# Patient Record
Sex: FEMALE | Race: WHITE | Employment: OTHER | URBAN - METROPOLITAN AREA
[De-identification: names, ages, dates, MRNs, and addresses within clinical notes are randomized per-mention and may not be internally consistent; named-entity substitution may affect disease eponyms.]

---

## 2017-02-15 ENCOUNTER — HOSPITAL ENCOUNTER (OUTPATIENT)
Age: 67
Discharge: HOME | End: 2017-02-15
Payer: MEDICARE

## 2017-02-15 DIAGNOSIS — E11.9 CONTROLLED TYPE 2 DIABETES MELLITUS WITHOUT COMPLICATION, WITHOUT LONG-TERM CURRENT USE OF INSULIN (HCC): Chronic | ICD-10-CM

## 2017-02-15 DIAGNOSIS — Z11.59 NEED FOR HEPATITIS C SCREENING TEST: ICD-10-CM

## 2017-02-15 LAB
ALBUMIN SERPL-MCNC: 3.6 G/DL (ref 3.5–5.2)
ALBUMIN/GLOBULIN RATIO: ABNORMAL (ref 1–2.5)
ALP BLD-CCNC: 86 U/L (ref 35–104)
ALT SERPL-CCNC: 9 U/L (ref 5–33)
ANION GAP SERPL CALCULATED.3IONS-SCNC: 12 MMOL/L (ref 9–17)
AST SERPL-CCNC: 14 U/L
BILIRUB SERPL-MCNC: 0.34 MG/DL (ref 0.3–1.2)
BUN BLDV-MCNC: 13 MG/DL (ref 8–23)
BUN/CREAT BLD: ABNORMAL (ref 9–20)
CALCIUM SERPL-MCNC: 9.1 MG/DL (ref 8.6–10.4)
CHLORIDE BLD-SCNC: 98 MMOL/L (ref 98–107)
CHOLESTEROL/HDL RATIO: 2.7
CHOLESTEROL: 164 MG/DL
CO2: 33 MMOL/L (ref 20–31)
CREAT SERPL-MCNC: 0.65 MG/DL (ref 0.5–0.9)
CREATININE URINE: 65.6 MG/DL (ref 28–217)
GFR AFRICAN AMERICAN: >60 ML/MIN
GFR NON-AFRICAN AMERICAN: >60 ML/MIN
GFR SERPL CREATININE-BSD FRML MDRD: ABNORMAL ML/MIN/{1.73_M2}
GFR SERPL CREATININE-BSD FRML MDRD: ABNORMAL ML/MIN/{1.73_M2}
GLUCOSE BLD-MCNC: 110 MG/DL (ref 70–99)
HDLC SERPL-MCNC: 60 MG/DL
HEPATITIS C ANTIBODY: NONREACTIVE
LDL CHOLESTEROL: 89 MG/DL (ref 0–130)
MICROALBUMIN/CREAT 24H UR: <12 MG/L
MICROALBUMIN/CREAT UR-RTO: 18 MCG/MG CREAT
POTASSIUM SERPL-SCNC: 3 MMOL/L (ref 3.7–5.3)
SODIUM BLD-SCNC: 143 MMOL/L (ref 135–144)
TOTAL PROTEIN: 6.4 G/DL (ref 6.4–8.3)
TRIGL SERPL-MCNC: 73 MG/DL
VLDLC SERPL CALC-MCNC: NORMAL MG/DL (ref 1–30)

## 2017-02-15 PROCEDURE — 82043 UR ALBUMIN QUANTITATIVE: CPT

## 2017-02-15 PROCEDURE — 86803 HEPATITIS C AB TEST: CPT

## 2017-02-15 PROCEDURE — 36415 COLL VENOUS BLD VENIPUNCTURE: CPT

## 2017-02-15 PROCEDURE — 80061 LIPID PANEL: CPT

## 2017-02-15 PROCEDURE — 80053 COMPREHEN METABOLIC PANEL: CPT

## 2017-02-15 PROCEDURE — 82570 ASSAY OF URINE CREATININE: CPT

## 2017-03-07 PROBLEM — G89.29 CHRONIC BILATERAL LOW BACK PAIN WITH LEFT-SIDED SCIATICA: Status: ACTIVE | Noted: 2017-03-07

## 2017-03-07 PROBLEM — M54.42 CHRONIC BILATERAL LOW BACK PAIN WITH LEFT-SIDED SCIATICA: Status: ACTIVE | Noted: 2017-03-07

## 2017-07-18 ENCOUNTER — HOSPITAL ENCOUNTER (OUTPATIENT)
Dept: WOMENS IMAGING | Age: 67
Discharge: HOME | End: 2017-07-18
Payer: MEDICARE

## 2017-07-18 DIAGNOSIS — Z12.31 ENCOUNTER FOR SCREENING MAMMOGRAM FOR BREAST CANCER: ICD-10-CM

## 2017-07-18 PROCEDURE — G0202 SCR MAMMO BI INCL CAD: HCPCS

## 2017-12-21 ENCOUNTER — HOSPITAL ENCOUNTER (OUTPATIENT)
Age: 67
Discharge: HOME | End: 2017-12-21
Payer: MEDICARE

## 2017-12-21 ENCOUNTER — HOSPITAL ENCOUNTER (OUTPATIENT)
Dept: GENERAL RADIOLOGY | Age: 67
Discharge: HOME | End: 2017-12-21
Payer: MEDICARE

## 2017-12-21 DIAGNOSIS — M54.2 NECK PAIN: ICD-10-CM

## 2017-12-21 PROCEDURE — 72040 X-RAY EXAM NECK SPINE 2-3 VW: CPT

## 2018-01-25 ENCOUNTER — HOSPITAL ENCOUNTER (OUTPATIENT)
Dept: CARDIAC CATH/INVASIVE PROCEDURES | Age: 68
Discharge: HOME | End: 2018-01-25
Payer: MEDICARE

## 2018-01-25 VITALS
HEART RATE: 66 BPM | DIASTOLIC BLOOD PRESSURE: 62 MMHG | WEIGHT: 214 LBS | BODY MASS INDEX: 35.65 KG/M2 | SYSTOLIC BLOOD PRESSURE: 129 MMHG | TEMPERATURE: 98.8 F | OXYGEN SATURATION: 92 % | HEIGHT: 65 IN | RESPIRATION RATE: 17 BRPM

## 2018-01-25 LAB
ACTION: NORMAL
DATE AND TIME: NORMAL
GFR NON-AFRICAN AMERICAN: >60 ML/MIN
GFR SERPL CREATININE-BSD FRML MDRD: >60 ML/MIN
GFR SERPL CREATININE-BSD FRML MDRD: NORMAL ML/MIN/{1.73_M2}
GLUCOSE BLD-MCNC: 133 MG/DL (ref 74–100)
NOTIFY: NORMAL
PLATELET # BLD: 282 K/UL (ref 138–453)
POC CHLORIDE: 96 MMOL/L (ref 98–107)
POC CREATININE: 0.72 MG/DL (ref 0.51–1.19)
POC HEMATOCRIT: 43 % (ref 36–46)
POC HEMOGLOBIN: 14.5 G/DL (ref 12–16)
POC POTASSIUM: 2.8 MMOL/L (ref 3.5–4.5)
POC SODIUM: 143 MMOL/L (ref 138–146)
READ BACK: YES

## 2018-01-25 PROCEDURE — 2500000003 HC RX 250 WO HCPCS

## 2018-01-25 PROCEDURE — 2709999900 HC NON-CHARGEABLE SUPPLY

## 2018-01-25 PROCEDURE — 6370000000 HC RX 637 (ALT 250 FOR IP)

## 2018-01-25 PROCEDURE — 82435 ASSAY OF BLOOD CHLORIDE: CPT

## 2018-01-25 PROCEDURE — 82947 ASSAY GLUCOSE BLOOD QUANT: CPT

## 2018-01-25 PROCEDURE — 93458 L HRT ARTERY/VENTRICLE ANGIO: CPT

## 2018-01-25 PROCEDURE — C1894 INTRO/SHEATH, NON-LASER: HCPCS

## 2018-01-25 PROCEDURE — 7100000010 HC PHASE II RECOVERY - FIRST 15 MIN

## 2018-01-25 PROCEDURE — 84132 ASSAY OF SERUM POTASSIUM: CPT

## 2018-01-25 PROCEDURE — 7100000011 HC PHASE II RECOVERY - ADDTL 15 MIN

## 2018-01-25 PROCEDURE — C1769 GUIDE WIRE: HCPCS

## 2018-01-25 PROCEDURE — C1725 CATH, TRANSLUMIN NON-LASER: HCPCS

## 2018-01-25 PROCEDURE — 82565 ASSAY OF CREATININE: CPT

## 2018-01-25 PROCEDURE — 85014 HEMATOCRIT: CPT

## 2018-01-25 PROCEDURE — 84295 ASSAY OF SERUM SODIUM: CPT

## 2018-01-25 PROCEDURE — 6360000002 HC RX W HCPCS

## 2018-01-25 PROCEDURE — 85049 AUTOMATED PLATELET COUNT: CPT

## 2018-01-25 RX ORDER — SODIUM CHLORIDE 9 MG/ML
INJECTION, SOLUTION INTRAVENOUS CONTINUOUS
Status: DISCONTINUED | OUTPATIENT
Start: 2018-01-25 | End: 2018-01-26 | Stop reason: HOSPADM

## 2018-01-25 RX ORDER — POTASSIUM CHLORIDE 20MEQ/15ML
40 LIQUID (ML) ORAL ONCE
Status: COMPLETED | OUTPATIENT
Start: 2018-01-25 | End: 2018-01-25

## 2018-01-25 RX ORDER — POTASSIUM CHLORIDE 7.45 MG/ML
40 INJECTION INTRAVENOUS ONCE
Status: COMPLETED | OUTPATIENT
Start: 2018-01-25 | End: 2018-01-25

## 2018-01-25 RX ORDER — ONDANSETRON 2 MG/ML
4 INJECTION INTRAMUSCULAR; INTRAVENOUS EVERY 6 HOURS PRN
Status: CANCELLED | OUTPATIENT
Start: 2018-01-25

## 2018-01-25 RX ORDER — SODIUM CHLORIDE 9 MG/ML
INJECTION, SOLUTION INTRAVENOUS CONTINUOUS
Status: CANCELLED | OUTPATIENT
Start: 2018-01-25

## 2018-01-25 RX ORDER — ACETAMINOPHEN 325 MG/1
650 TABLET ORAL EVERY 4 HOURS PRN
Status: CANCELLED | OUTPATIENT
Start: 2018-01-25

## 2018-01-25 RX ADMIN — Medication 40 MEQ: at 14:56

## 2018-01-25 RX ADMIN — POTASSIUM CHLORIDE 10 MEQ: 7.45 INJECTION INTRAVENOUS at 15:04

## 2018-01-25 RX ADMIN — SODIUM CHLORIDE: 9 INJECTION, SOLUTION INTRAVENOUS at 14:56

## 2018-01-25 NOTE — PROGRESS NOTES
Patient admitted, consent signed and questions answered. Patient ready for procedure. Call light to reach with side rails up 2 of 2. Bilateral groins clipped. family at bedside with patient.   History and physical needed

## 2018-01-31 ENCOUNTER — INITIAL CONSULT (OUTPATIENT)
Dept: CARDIOTHORACIC SURGERY | Age: 68
End: 2018-01-31
Payer: MEDICARE

## 2018-01-31 VITALS
BODY MASS INDEX: 35.82 KG/M2 | TEMPERATURE: 97.5 F | OXYGEN SATURATION: 97 % | WEIGHT: 215 LBS | HEIGHT: 65 IN | DIASTOLIC BLOOD PRESSURE: 66 MMHG | SYSTOLIC BLOOD PRESSURE: 109 MMHG | HEART RATE: 57 BPM

## 2018-01-31 DIAGNOSIS — Z01.818 PREOPERATIVE TESTING: ICD-10-CM

## 2018-01-31 DIAGNOSIS — R09.89 RIGHT CAROTID BRUIT: ICD-10-CM

## 2018-01-31 DIAGNOSIS — I71.21 ASCENDING AORTIC ANEURYSM: Primary | ICD-10-CM

## 2018-01-31 DIAGNOSIS — I25.119 CORONARY ARTERY DISEASE INVOLVING NATIVE HEART WITH ANGINA PECTORIS, UNSPECIFIED VESSEL OR LESION TYPE (HCC): ICD-10-CM

## 2018-01-31 PROCEDURE — 3014F SCREEN MAMMO DOC REV: CPT | Performed by: THORACIC SURGERY (CARDIOTHORACIC VASCULAR SURGERY)

## 2018-01-31 PROCEDURE — 1036F TOBACCO NON-USER: CPT | Performed by: THORACIC SURGERY (CARDIOTHORACIC VASCULAR SURGERY)

## 2018-01-31 PROCEDURE — G8427 DOCREV CUR MEDS BY ELIG CLIN: HCPCS | Performed by: THORACIC SURGERY (CARDIOTHORACIC VASCULAR SURGERY)

## 2018-01-31 PROCEDURE — 99204 OFFICE O/P NEW MOD 45 MIN: CPT | Performed by: THORACIC SURGERY (CARDIOTHORACIC VASCULAR SURGERY)

## 2018-01-31 PROCEDURE — 1090F PRES/ABSN URINE INCON ASSESS: CPT | Performed by: THORACIC SURGERY (CARDIOTHORACIC VASCULAR SURGERY)

## 2018-01-31 PROCEDURE — G8417 CALC BMI ABV UP PARAM F/U: HCPCS | Performed by: THORACIC SURGERY (CARDIOTHORACIC VASCULAR SURGERY)

## 2018-01-31 PROCEDURE — 3017F COLORECTAL CA SCREEN DOC REV: CPT | Performed by: THORACIC SURGERY (CARDIOTHORACIC VASCULAR SURGERY)

## 2018-01-31 PROCEDURE — 1123F ACP DISCUSS/DSCN MKR DOCD: CPT | Performed by: THORACIC SURGERY (CARDIOTHORACIC VASCULAR SURGERY)

## 2018-01-31 PROCEDURE — G8482 FLU IMMUNIZE ORDER/ADMIN: HCPCS | Performed by: THORACIC SURGERY (CARDIOTHORACIC VASCULAR SURGERY)

## 2018-01-31 PROCEDURE — 4040F PNEUMOC VAC/ADMIN/RCVD: CPT | Performed by: THORACIC SURGERY (CARDIOTHORACIC VASCULAR SURGERY)

## 2018-01-31 PROCEDURE — G8598 ASA/ANTIPLAT THER USED: HCPCS | Performed by: THORACIC SURGERY (CARDIOTHORACIC VASCULAR SURGERY)

## 2018-01-31 PROCEDURE — G8399 PT W/DXA RESULTS DOCUMENT: HCPCS | Performed by: THORACIC SURGERY (CARDIOTHORACIC VASCULAR SURGERY)

## 2018-01-31 NOTE — PROGRESS NOTES
of breath  Cardiovascular: negative  Gastrointestinal: negative  Genitourinary:negative  Hematologic/lymphatic: negative  Musculoskeletal:negative  Neurological: negative  Endocrine: negative    Physical Exam:  Vitals:    01/31/18 0959   BP: 109/66   Pulse: 57   Temp: 97.5 °F (36.4 °C)   SpO2: 97%     Weight: Weight: 215 lb (97.5 kg)    Weight: 215 lb (97.5 kg)    Labs:    Liver Profile:  Lab Results   Component Value Date    AST 14 02/15/2017    ALT 9 02/15/2017    BILITOT 0.34 02/15/2017    ALKPHOS 86 02/15/2017     Lab Results   Component Value Date    CHOL 164 02/15/2017    CHOL 165 09/29/2015    HDL 60 02/15/2017    TRIG 73 02/15/2017     Lexiscan stress (1/15/2018): A Lexiscan nuclear stress study was performed with regadenoson infused   using standard protocol. · Baseline ECG indicates sinus rhythm. There was no ST segment deviation   noted during stress. · Moderate apical anterior wall defect moderate in size, that is partially   reversible, consistent with cory infarct ischemia. · Severe lateral wall defect moderate to large in size, that is partially   reversible, consistent with cory infarct ischemia. · Low normal EF 52%. · Based on the perfusion study data, risk of cardiovascular events is   Intermediate. Echo (1/15/2018): Severe concentric hypertrophy. No LVOT obstruction is noted. · Normal left ventricular ejection fraction. · The estimated left ventricular ejection fraction is 55%. · Grade II (pseudonormal) left ventricular diastolic dysfunction. · Mild to moderate tricuspid valve regurgitation. Normal RVSP 40-45 mmHg. · Mild dilted Aortic root measured at 3.6 cm. Dilated Ascending Aortic   measured at 4.2 cm. Cardiac cath (1/25/2018):  LMCA: Normal 0% stenosis.     LAD: mid 80%, ostial diagonal 70%    LCx: prox OM 1 70%    RCA: prox 70-80%, mid 70-80%             General: Alert, awake, oriented x3  Neck: Supple, no JVD, RIGHT carotid bruit auscultated  Heart: S1 and S2, no murmurs, no rubs  Lungs: no rales, wheezes, or rhonchi  Abdomen: positive bowel sounds, soft, non-tender, non-distended, no bruits, no masses  Extremities: warm and dry, no varicosities, no edema  Neurologic: alert and oriented x 3, moves all extremities, grasps strong bilaterally    Assessment:  Patient Active Problem List   Diagnosis    Chronic pain    Controlled type 2 diabetes mellitus without complication, without long-term current use of insulin ([facility])    Allergic rhinitis    Hypertension goal BP (blood pressure) < 140/90    Mixed hyperlipidemia    Chronic obstructive pulmonary disease ([address]    Coronary artery disease involving native coronary artery of native heart without angina pectoris    Primary insomnia    Diarrhea in adult patient    Bug bites    Restless leg syndrome, uncontrolled    Hypokalemia    Atherosclerosis of superior mesenteric artery (HCC)    Traumatic compression fracture of T9 thoracic vertebra ([facility])    Left adrenal mass ([facility]    Former smoker    Obesity, Class I, BMI 30.0-34.9 (see actual BMI)    Obesity (BMI 30-39. 9)    Chronic bilateral low back pain with left-sided sciatica    BMI 37.0-37.9, adult    Class 2 obesity due to excess calories with serious comorbidity and body mass index (BMI) of 37.0 to 37.9 in adult   1. Multivessel CAD  2. S/P emergent CABG x1 with SVG from left upper leg  3. Right carotid bruit  4. Echo with dilated ascending aorta/ascending aneurysm  5. HTN  6. Hyperlipidemia  7. DM-2 (A1C 7.0 on 12/14/17)      Plan: We have discussed the proposed procedure (Re-do CABG with possible aortic aneurysm repair), along with its risk, benefits, and therapeutic alternatives in detail. Specific risks discussed included myocardial infarction, stroke, death, infection, bleeding, mediastinitis, re-operation for bleeding, and atrial fibrillation. We have also discussed the potential need for blood transfusion, along with its risks and benefits.   Patient verbalized understanding, and consents to proceed. 1. Proceed with pre-operative testing including vein mapping, carotid ultrasound, CTA chest for aortic aneurysm, and PFTs  2. We will have her stop her Plavix today and stop her Metformin on 2/4/2018 (Sunday)  3. Will tentatively plan for surgery on Tuesday, Feb. 6, 2018 as patient is eager to get this done as soon as possible  4.  was present throughout the discussion and visit and understood the risks. Thank you for allowing us to participate in [address] [place] [address]. Electronically signed on 01/31/18 at 10:52 AM by:    Leonora Dyson MD   Fellow, Cardiovascular Diseases on [facility]  Pt was seen and examined by me and I agree with findings, plan of treatment and documentation. I have made necessary changes where needed.     Rosi Connors MD

## 2018-02-01 ENCOUNTER — HOSPITAL ENCOUNTER (OUTPATIENT)
Dept: PULMONOLOGY | Age: 68
Discharge: HOME | End: 2018-02-01
Payer: MEDICARE

## 2018-02-01 ENCOUNTER — HOSPITAL ENCOUNTER (OUTPATIENT)
Dept: CT IMAGING | Age: 68
Discharge: HOME | End: 2018-02-03
Payer: MEDICARE

## 2018-02-01 ENCOUNTER — HOSPITAL ENCOUNTER (OUTPATIENT)
Dept: VASCULAR LAB | Age: 68
Discharge: HOME | End: 2018-02-01
Payer: MEDICARE

## 2018-02-01 DIAGNOSIS — Z01.818 PREOPERATIVE TESTING: ICD-10-CM

## 2018-02-01 DIAGNOSIS — R09.89 RIGHT CAROTID BRUIT: ICD-10-CM

## 2018-02-01 DIAGNOSIS — I25.119 CORONARY ARTERY DISEASE INVOLVING NATIVE HEART WITH ANGINA PECTORIS, UNSPECIFIED VESSEL OR LESION TYPE (HCC): ICD-10-CM

## 2018-02-01 DIAGNOSIS — I71.21 ASCENDING AORTIC ANEURYSM: ICD-10-CM

## 2018-02-01 LAB
BUN BLDV-MCNC: 18 MG/DL (ref 8–23)
CREAT SERPL-MCNC: 0.72 MG/DL (ref 0.5–0.9)
GFR AFRICAN AMERICAN: >60 ML/MIN
GFR NON-AFRICAN AMERICAN: >60 ML/MIN
GFR SERPL CREATININE-BSD FRML MDRD: NORMAL ML/MIN/{1.73_M2}
GFR SERPL CREATININE-BSD FRML MDRD: NORMAL ML/MIN/{1.73_M2}

## 2018-02-01 PROCEDURE — 71275 CT ANGIOGRAPHY CHEST: CPT

## 2018-02-01 PROCEDURE — 94060 EVALUATION OF WHEEZING: CPT

## 2018-02-01 PROCEDURE — 93880 EXTRACRANIAL BILAT STUDY: CPT

## 2018-02-01 PROCEDURE — 94727 GAS DIL/WSHOT DETER LNG VOL: CPT

## 2018-02-01 PROCEDURE — 84520 ASSAY OF UREA NITROGEN: CPT

## 2018-02-01 PROCEDURE — 6360000002 HC RX W HCPCS: Performed by: THORACIC SURGERY (CARDIOTHORACIC VASCULAR SURGERY)

## 2018-02-01 PROCEDURE — 82565 ASSAY OF CREATININE: CPT

## 2018-02-01 PROCEDURE — 6360000004 HC RX CONTRAST MEDICATION: Performed by: INTERNAL MEDICINE

## 2018-02-01 PROCEDURE — 2580000003 HC RX 258: Performed by: INTERNAL MEDICINE

## 2018-02-01 PROCEDURE — 94726 PLETHYSMOGRAPHY LUNG VOLUMES: CPT

## 2018-02-01 PROCEDURE — 36415 COLL VENOUS BLD VENIPUNCTURE: CPT

## 2018-02-01 PROCEDURE — 94728 AIRWY RESIST BY OSCILLOMETRY: CPT

## 2018-02-01 PROCEDURE — 94729 DIFFUSING CAPACITY: CPT

## 2018-02-01 RX ORDER — SODIUM CHLORIDE 0.9 % (FLUSH) 0.9 %
10 SYRINGE (ML) INJECTION PRN
Status: DISCONTINUED | OUTPATIENT
Start: 2018-02-01 | End: 2018-02-04 | Stop reason: HOSPADM

## 2018-02-01 RX ORDER — ALBUTEROL SULFATE 2.5 MG/3ML
2.5 SOLUTION RESPIRATORY (INHALATION) ONCE
Status: COMPLETED | OUTPATIENT
Start: 2018-02-01 | End: 2018-02-01

## 2018-02-01 RX ORDER — 0.9 % SODIUM CHLORIDE 0.9 %
100 INTRAVENOUS SOLUTION INTRAVENOUS ONCE
Status: COMPLETED | OUTPATIENT
Start: 2018-02-01 | End: 2018-02-01

## 2018-02-01 RX ADMIN — IOPAMIDOL 100 ML: 755 INJECTION, SOLUTION INTRAVENOUS at 10:38

## 2018-02-01 RX ADMIN — Medication 10 ML: at 10:38

## 2018-02-01 RX ADMIN — SODIUM CHLORIDE 100 ML: 9 INJECTION, SOLUTION INTRAVENOUS at 10:38

## 2018-02-01 RX ADMIN — ALBUTEROL SULFATE 2.5 MG: 2.5 SOLUTION RESPIRATORY (INHALATION) at 12:37

## 2018-02-02 ENCOUNTER — HOSPITAL ENCOUNTER (OUTPATIENT)
Dept: PREADMISSION TESTING | Age: 68
Discharge: HOME | End: 2018-02-06
Payer: MEDICARE

## 2018-02-02 ENCOUNTER — HOSPITAL ENCOUNTER (OUTPATIENT)
Dept: VASCULAR LAB | Age: 68
Discharge: HOME | End: 2018-02-02
Payer: MEDICARE

## 2018-02-02 ENCOUNTER — HOSPITAL ENCOUNTER (OUTPATIENT)
Dept: GENERAL RADIOLOGY | Age: 68
Discharge: HOME | End: 2018-02-04
Payer: MEDICARE

## 2018-02-02 ENCOUNTER — TELEPHONE (OUTPATIENT)
Dept: CARDIOTHORACIC SURGERY | Age: 68
End: 2018-02-02

## 2018-02-02 VITALS
WEIGHT: 212.52 LBS | SYSTOLIC BLOOD PRESSURE: 142 MMHG | TEMPERATURE: 98 F | OXYGEN SATURATION: 97 % | HEIGHT: 65 IN | DIASTOLIC BLOOD PRESSURE: 74 MMHG | HEART RATE: 60 BPM | BODY MASS INDEX: 35.41 KG/M2 | RESPIRATION RATE: 20 BRPM

## 2018-02-02 DIAGNOSIS — Z01.818 PREOPERATIVE TESTING: ICD-10-CM

## 2018-02-02 DIAGNOSIS — I25.119 CORONARY ARTERY DISEASE INVOLVING NATIVE HEART WITH ANGINA PECTORIS, UNSPECIFIED VESSEL OR LESION TYPE (HCC): Primary | ICD-10-CM

## 2018-02-02 DIAGNOSIS — Z01.818 PRE-OP TESTING: ICD-10-CM

## 2018-02-02 LAB
-: ABNORMAL
ABSOLUTE EOS #: 0.21 K/UL (ref 0–0.44)
ABSOLUTE IMMATURE GRANULOCYTE: 0.04 K/UL (ref 0–0.3)
ABSOLUTE LYMPH #: 0.87 K/UL (ref 1.1–3.7)
ABSOLUTE MONO #: 0.65 K/UL (ref 0.1–1.2)
ALBUMIN SERPL-MCNC: 4 G/DL (ref 3.5–5.2)
ALBUMIN/GLOBULIN RATIO: 1.4 (ref 1–2.5)
ALP BLD-CCNC: 94 U/L (ref 35–104)
ALT SERPL-CCNC: 8 U/L (ref 5–33)
AMORPHOUS: ABNORMAL
ANION GAP SERPL CALCULATED.3IONS-SCNC: 13 MMOL/L (ref 9–17)
AST SERPL-CCNC: 16 U/L
BACTERIA: ABNORMAL
BASOPHILS # BLD: 0 % (ref 0–2)
BASOPHILS ABSOLUTE: 0.04 K/UL (ref 0–0.2)
BILIRUB SERPL-MCNC: 0.37 MG/DL (ref 0.3–1.2)
BILIRUBIN URINE: NEGATIVE
BUN BLDV-MCNC: 16 MG/DL (ref 8–23)
BUN/CREAT BLD: ABNORMAL (ref 9–20)
CALCIUM SERPL-MCNC: 9.6 MG/DL (ref 8.6–10.4)
CASTS UA: ABNORMAL /LPF (ref 0–8)
CHLORIDE BLD-SCNC: 99 MMOL/L (ref 98–107)
CO2: 33 MMOL/L (ref 20–31)
COLLAGEN ADENOSINE-5'-DIPHOSPHATE (ADP) TIME: 74 SEC (ref 67–112)
COLLAGEN EPINEPHRINE TIME: 130 SEC (ref 85–172)
COLOR: ABNORMAL
COMMENT UA: ABNORMAL
CREAT SERPL-MCNC: 0.8 MG/DL (ref 0.5–0.9)
CRYSTALS, UA: ABNORMAL /HPF
DIFFERENTIAL TYPE: ABNORMAL
EKG ATRIAL RATE: 58 BPM
EKG P AXIS: 28 DEGREES
EKG P-R INTERVAL: 178 MS
EKG Q-T INTERVAL: 476 MS
EKG QRS DURATION: 114 MS
EKG QTC CALCULATION (BAZETT): 467 MS
EKG R AXIS: 14 DEGREES
EKG T AXIS: 92 DEGREES
EKG VENTRICULAR RATE: 58 BPM
EOSINOPHILS RELATIVE PERCENT: 2 % (ref 1–4)
EPITHELIAL CELLS UA: ABNORMAL /HPF (ref 0–5)
ESTIMATED AVERAGE GLUCOSE: 177 MG/DL
GFR AFRICAN AMERICAN: >60 ML/MIN
GFR NON-AFRICAN AMERICAN: >60 ML/MIN
GFR SERPL CREATININE-BSD FRML MDRD: ABNORMAL ML/MIN/{1.73_M2}
GFR SERPL CREATININE-BSD FRML MDRD: ABNORMAL ML/MIN/{1.73_M2}
GLUCOSE BLD-MCNC: 187 MG/DL (ref 70–99)
GLUCOSE URINE: NEGATIVE
HBA1C MFR BLD: 7.8 % (ref 4–6)
HCT VFR BLD CALC: 42.1 % (ref 36.3–47.1)
HEMOGLOBIN: 12.7 G/DL (ref 11.9–15.1)
IMMATURE GRANULOCYTES: 0 %
INR BLD: 0.9
KETONES, URINE: NEGATIVE
LEUKOCYTE ESTERASE, URINE: NEGATIVE
LYMPHOCYTES # BLD: 9 % (ref 24–43)
MCH RBC QN AUTO: 26.1 PG (ref 25.2–33.5)
MCHC RBC AUTO-ENTMCNC: 30.2 G/DL (ref 28.4–34.8)
MCV RBC AUTO: 86.4 FL (ref 82.6–102.9)
MONOCYTES # BLD: 7 % (ref 3–12)
MRSA, DNA, NASAL: NORMAL
MUCUS: ABNORMAL
NITRITE, URINE: NEGATIVE
NRBC AUTOMATED: 0 PER 100 WBC
OTHER OBSERVATIONS UA: ABNORMAL
PARTIAL THROMBOPLASTIN TIME: 23.1 SEC (ref 21.3–31.3)
PDW BLD-RTO: 14.1 % (ref 11.8–14.4)
PH UA: 5.5 (ref 5–8)
PLATELET # BLD: 307 K/UL (ref 138–453)
PLATELET ESTIMATE: ABNORMAL
PLATELET FUNCTION INTERP: NORMAL
PMV BLD AUTO: 10.7 FL (ref 8.1–13.5)
POTASSIUM SERPL-SCNC: 3.5 MMOL/L (ref 3.7–5.3)
PROTEIN UA: ABNORMAL
PROTHROMBIN TIME: 10 SEC (ref 9.4–12.6)
RBC # BLD: 4.87 M/UL (ref 3.95–5.11)
RBC # BLD: ABNORMAL 10*6/UL
RBC UA: ABNORMAL /HPF (ref 0–4)
RENAL EPITHELIAL, UA: ABNORMAL /HPF
SEG NEUTROPHILS: 82 % (ref 36–65)
SEGMENTED NEUTROPHILS ABSOLUTE COUNT: 8.18 K/UL (ref 1.5–8.1)
SODIUM BLD-SCNC: 145 MMOL/L (ref 135–144)
SPECIFIC GRAVITY UA: 1.05 (ref 1–1.03)
SPECIMEN DESCRIPTION: NORMAL
TOTAL PROTEIN: 6.9 G/DL (ref 6.4–8.3)
TRICHOMONAS: ABNORMAL
TURBIDITY: ABNORMAL
URINE HGB: NEGATIVE
UROBILINOGEN, URINE: NORMAL
WBC # BLD: 10 K/UL (ref 3.5–11.3)
WBC # BLD: ABNORMAL 10*3/UL
WBC UA: ABNORMAL /HPF (ref 0–5)
YEAST: ABNORMAL

## 2018-02-02 PROCEDURE — 85025 COMPLETE CBC W/AUTO DIFF WBC: CPT

## 2018-02-02 PROCEDURE — 93005 ELECTROCARDIOGRAM TRACING: CPT

## 2018-02-02 PROCEDURE — 86850 RBC ANTIBODY SCREEN: CPT

## 2018-02-02 PROCEDURE — 86900 BLOOD TYPING SEROLOGIC ABO: CPT

## 2018-02-02 PROCEDURE — 87186 SC STD MICRODIL/AGAR DIL: CPT

## 2018-02-02 PROCEDURE — 85730 THROMBOPLASTIN TIME PARTIAL: CPT

## 2018-02-02 PROCEDURE — 83036 HEMOGLOBIN GLYCOSYLATED A1C: CPT

## 2018-02-02 PROCEDURE — 93970 EXTREMITY STUDY: CPT

## 2018-02-02 PROCEDURE — 87641 MR-STAPH DNA AMP PROBE: CPT

## 2018-02-02 PROCEDURE — 87086 URINE CULTURE/COLONY COUNT: CPT

## 2018-02-02 PROCEDURE — 85610 PROTHROMBIN TIME: CPT

## 2018-02-02 PROCEDURE — 85576 BLOOD PLATELET AGGREGATION: CPT

## 2018-02-02 PROCEDURE — 86920 COMPATIBILITY TEST SPIN: CPT

## 2018-02-02 PROCEDURE — 71046 X-RAY EXAM CHEST 2 VIEWS: CPT

## 2018-02-02 PROCEDURE — 81001 URINALYSIS AUTO W/SCOPE: CPT

## 2018-02-02 PROCEDURE — 36415 COLL VENOUS BLD VENIPUNCTURE: CPT

## 2018-02-02 PROCEDURE — 87088 URINE BACTERIA CULTURE: CPT

## 2018-02-02 PROCEDURE — 86901 BLOOD TYPING SEROLOGIC RH(D): CPT

## 2018-02-02 PROCEDURE — 80053 COMPREHEN METABOLIC PANEL: CPT

## 2018-02-02 RX ORDER — CLOPIDOGREL BISULFATE 75 MG/1
75 TABLET ORAL DAILY
Status: ON HOLD | COMMUNITY
End: 2018-02-12 | Stop reason: HOSPADM

## 2018-02-02 RX ORDER — SODIUM CHLORIDE, SODIUM LACTATE, POTASSIUM CHLORIDE, CALCIUM CHLORIDE 600; 310; 30; 20 MG/100ML; MG/100ML; MG/100ML; MG/100ML
1000 INJECTION, SOLUTION INTRAVENOUS CONTINUOUS
Status: CANCELLED | OUTPATIENT
Start: 2018-02-02

## 2018-02-03 LAB
CULTURE: ABNORMAL
CULTURE: ABNORMAL
Lab: ABNORMAL
ORGANISM: ABNORMAL
SPECIMEN DESCRIPTION: ABNORMAL
STATUS: ABNORMAL

## 2018-02-05 ENCOUNTER — TELEPHONE (OUTPATIENT)
Dept: CARDIOTHORACIC SURGERY | Age: 68
End: 2018-02-05

## 2018-02-05 PROCEDURE — 99024 POSTOP FOLLOW-UP VISIT: CPT | Performed by: PHYSICIAN ASSISTANT
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
## 2018-02-06 ENCOUNTER — APPOINTMENT (OUTPATIENT)
Dept: GENERAL RADIOLOGY | Age: 68
DRG: 236 | End: 2018-02-06
Attending: THORACIC SURGERY (CARDIOTHORACIC VASCULAR SURGERY)
Payer: MEDICARE

## 2018-02-06 ENCOUNTER — ANESTHESIA EVENT (OUTPATIENT)
Dept: OPERATING ROOM | Age: 68
DRG: 236 | End: 2018-02-06
Payer: MEDICARE

## 2018-02-06 ENCOUNTER — ANESTHESIA (OUTPATIENT)
Dept: OPERATING ROOM | Age: 68
DRG: 236 | End: 2018-02-06
Payer: MEDICARE

## 2018-02-06 ENCOUNTER — HOSPITAL ENCOUNTER (INPATIENT)
Age: 68
LOS: 6 days | Discharge: HOME HEALTH | DRG: 236 | End: 2018-02-12
Attending: THORACIC SURGERY (CARDIOTHORACIC VASCULAR SURGERY) | Admitting: THORACIC SURGERY (CARDIOTHORACIC VASCULAR SURGERY)
Payer: MEDICARE

## 2018-02-06 VITALS
SYSTOLIC BLOOD PRESSURE: 68 MMHG | OXYGEN SATURATION: 100 % | DIASTOLIC BLOOD PRESSURE: 27 MMHG | RESPIRATION RATE: 10 BRPM

## 2018-02-06 DIAGNOSIS — Z95.1 S/P CABG X 4: Primary | ICD-10-CM

## 2018-02-06 PROBLEM — I25.10 CAD, MULTIPLE VESSEL: Status: ACTIVE | Noted: 2018-02-06

## 2018-02-06 LAB
ALLEN TEST: ABNORMAL
ANION GAP: 11 MMOL/L (ref 7–16)
EKG ATRIAL RATE: 68 BPM
EKG P AXIS: 75 DEGREES
EKG P-R INTERVAL: 158 MS
EKG Q-T INTERVAL: 390 MS
EKG QRS DURATION: 92 MS
EKG QTC CALCULATION (BAZETT): 414 MS
EKG R AXIS: 4 DEGREES
EKG T AXIS: 155 DEGREES
EKG VENTRICULAR RATE: 68 BPM
FIBRINOGEN: <80 MG/DL (ref 140–420)
FIO2: 40
FIO2: 60
FIO2: ABNORMAL
GLUCOSE BLD-MCNC: 101 MG/DL (ref 74–100)
GLUCOSE BLD-MCNC: 123 MG/DL (ref 74–100)
GLUCOSE BLD-MCNC: 131 MG/DL (ref 74–100)
GLUCOSE BLD-MCNC: 134 MG/DL (ref 74–100)
GLUCOSE BLD-MCNC: 137 MG/DL (ref 74–100)
GLUCOSE BLD-MCNC: 139 MG/DL (ref 74–100)
GLUCOSE BLD-MCNC: 142 MG/DL (ref 74–100)
GLUCOSE BLD-MCNC: 148 MG/DL (ref 74–100)
GLUCOSE BLD-MCNC: 155 MG/DL (ref 74–100)
GLUCOSE BLD-MCNC: 211 MG/DL (ref 65–105)
HCT VFR BLD CALC: 29.2 % (ref 36.3–47.1)
HCT VFR BLD CALC: 29.7 % (ref 36.3–47.1)
HEMOGLOBIN: 9.3 G/DL (ref 11.9–15.1)
HEMOGLOBIN: 9.3 G/DL (ref 11.9–15.1)
INR BLD: 2.1
MCH RBC QN AUTO: 27 PG (ref 25.2–33.5)
MCHC RBC AUTO-ENTMCNC: 31.3 G/DL (ref 28.4–34.8)
MCV RBC AUTO: 86.1 FL (ref 82.6–102.9)
MODE: ABNORMAL
NEGATIVE BASE EXCESS, ART: 3 (ref 0–2)
NEGATIVE BASE EXCESS, ART: ABNORMAL (ref 0–2)
NRBC AUTOMATED: 0 PER 100 WBC
O2 DEVICE/FLOW/%: ABNORMAL
PARTIAL THROMBOPLASTIN TIME: 59.1 SEC (ref 21.3–31.3)
PATIENT TEMP: ABNORMAL
PDW BLD-RTO: 13.9 % (ref 11.8–14.4)
PLATELET # BLD: 89 K/UL (ref 138–453)
PLATELET # BLD: ABNORMAL K/UL (ref 138–453)
PLATELET, FLUORESCENCE: 82 K/UL (ref 138–453)
PLATELET, IMMATURE FRACTION: 3.2 % (ref 1.1–10.3)
PMV BLD AUTO: ABNORMAL FL (ref 8.1–13.5)
POC CHLORIDE: 111 MMOL/L (ref 98–107)
POC HCO3: 21.8 MMOL/L (ref 21–28)
POC HCO3: 25 MMOL/L (ref 21–28)
POC HCO3: 26.9 MMOL/L (ref 21–28)
POC HCO3: 28.6 MMOL/L (ref 21–28)
POC HCO3: 28.6 MMOL/L (ref 21–28)
POC HCO3: 29.2 MMOL/L (ref 21–28)
POC HCO3: 29.2 MMOL/L (ref 21–28)
POC HCO3: 31 MMOL/L (ref 21–28)
POC HCO3: 31 MMOL/L (ref 21–28)
POC HCO3: 31.3 MMOL/L (ref 21–28)
POC HEMATOCRIT: 21 % (ref 36–46)
POC HEMATOCRIT: 21 % (ref 36–46)
POC HEMATOCRIT: 22 % (ref 36–46)
POC HEMATOCRIT: 22 % (ref 36–46)
POC HEMATOCRIT: 25 % (ref 36–46)
POC HEMATOCRIT: 26 % (ref 36–46)
POC HEMATOCRIT: 26 % (ref 36–46)
POC HEMATOCRIT: 29 % (ref 36–46)
POC HEMATOCRIT: 33 % (ref 36–46)
POC HEMOGLOBIN: 10 G/DL (ref 12–16)
POC HEMOGLOBIN: 11.3 G/DL (ref 12–16)
POC HEMOGLOBIN: 7.1 G/DL (ref 12–16)
POC HEMOGLOBIN: 7.2 G/DL (ref 12–16)
POC HEMOGLOBIN: 7.5 G/DL (ref 12–16)
POC HEMOGLOBIN: 7.6 G/DL (ref 12–16)
POC HEMOGLOBIN: 8.4 G/DL (ref 12–16)
POC HEMOGLOBIN: 8.8 G/DL (ref 12–16)
POC HEMOGLOBIN: 8.9 G/DL (ref 12–16)
POC IONIZED CALCIUM: 1.1 MMOL/L (ref 1.15–1.33)
POC IONIZED CALCIUM: 1.1 MMOL/L (ref 1.15–1.33)
POC IONIZED CALCIUM: 1.11 MMOL/L (ref 1.15–1.33)
POC IONIZED CALCIUM: 1.17 MMOL/L (ref 1.15–1.33)
POC IONIZED CALCIUM: 1.19 MMOL/L (ref 1.15–1.33)
POC IONIZED CALCIUM: 1.2 MMOL/L (ref 1.15–1.33)
POC IONIZED CALCIUM: 1.28 MMOL/L (ref 1.15–1.33)
POC IONIZED CALCIUM: 1.31 MMOL/L (ref 1.15–1.33)
POC IONIZED CALCIUM: 1.6 MMOL/L (ref 1.15–1.33)
POC O2 SATURATION: 100 % (ref 94–98)
POC O2 SATURATION: 96 % (ref 94–98)
POC PCO2 TEMP: ABNORMAL MM HG
POC PCO2: 32.4 MM HG (ref 35–48)
POC PCO2: 35.7 MM HG (ref 35–48)
POC PCO2: 37.1 MM HG (ref 35–48)
POC PCO2: 37.4 MM HG (ref 35–48)
POC PCO2: 38.6 MM HG (ref 35–48)
POC PCO2: 40 MM HG (ref 35–48)
POC PCO2: 41.1 MM HG (ref 35–48)
POC PCO2: 42.5 MM HG (ref 35–48)
POC PCO2: 42.8 MM HG (ref 35–48)
POC PCO2: 43.5 MM HG (ref 35–48)
POC PH TEMP: ABNORMAL
POC PH: 7.38 (ref 7.35–7.45)
POC PH: 7.41 (ref 7.35–7.45)
POC PH: 7.43 (ref 7.35–7.45)
POC PH: 7.44 (ref 7.35–7.45)
POC PH: 7.45 (ref 7.35–7.45)
POC PH: 7.46 (ref 7.35–7.45)
POC PH: 7.49 (ref 7.35–7.45)
POC PH: 7.5 (ref 7.35–7.45)
POC PH: 7.51 (ref 7.35–7.45)
POC PH: 7.59 (ref 7.35–7.45)
POC PO2 TEMP: ABNORMAL MM HG
POC PO2: 186.7 MM HG (ref 83–108)
POC PO2: 238.5 MM HG (ref 83–108)
POC PO2: 243.5 MM HG (ref 83–108)
POC PO2: 313 MM HG (ref 83–108)
POC PO2: 352.8 MM HG (ref 83–108)
POC PO2: 363.9 MM HG (ref 83–108)
POC PO2: 411.8 MM HG (ref 83–108)
POC PO2: 412.8 MM HG (ref 83–108)
POC PO2: 521 MM HG (ref 83–108)
POC PO2: 83.5 MM HG (ref 83–108)
POC POTASSIUM: 2.9 MMOL/L (ref 3.5–4.5)
POC POTASSIUM: 2.9 MMOL/L (ref 3.5–4.5)
POC POTASSIUM: 3 MMOL/L (ref 3.5–4.5)
POC POTASSIUM: 3.2 MMOL/L (ref 3.5–4.5)
POC POTASSIUM: 3.4 MMOL/L (ref 3.5–4.5)
POC POTASSIUM: 3.5 MMOL/L (ref 3.5–4.5)
POC POTASSIUM: 4.8 MMOL/L (ref 3.5–4.5)
POC SODIUM: 142 MMOL/L (ref 138–146)
POC SODIUM: 147 MMOL/L (ref 138–146)
POSITIVE BASE EXCESS, ART: 1 (ref 0–3)
POSITIVE BASE EXCESS, ART: 2 (ref 0–3)
POSITIVE BASE EXCESS, ART: 4 (ref 0–3)
POSITIVE BASE EXCESS, ART: 4 (ref 0–3)
POSITIVE BASE EXCESS, ART: 5 (ref 0–3)
POSITIVE BASE EXCESS, ART: 5 (ref 0–3)
POSITIVE BASE EXCESS, ART: 7 (ref 0–3)
POSITIVE BASE EXCESS, ART: 8 (ref 0–3)
POSITIVE BASE EXCESS, ART: 9 (ref 0–3)
POSITIVE BASE EXCESS, ART: ABNORMAL (ref 0–3)
POTASSIUM SERPL-SCNC: 4 MMOL/L (ref 3.7–5.3)
PROTHROMBIN TIME: 22.9 SEC (ref 9.4–12.6)
RBC # BLD: 3.45 M/UL (ref 3.95–5.11)
SAMPLE SITE: ABNORMAL
TCO2 (CALC), ART: 23 MMOL/L (ref 22–29)
TCO2 (CALC), ART: 26 MMOL/L (ref 22–29)
TCO2 (CALC), ART: 28 MMOL/L (ref 22–29)
TCO2 (CALC), ART: 30 MMOL/L (ref 22–29)
TCO2 (CALC), ART: 30 MMOL/L (ref 22–29)
TCO2 (CALC), ART: 31 MMOL/L (ref 22–29)
TCO2 (CALC), ART: 31 MMOL/L (ref 22–29)
TCO2 (CALC), ART: 32 MMOL/L (ref 22–29)
TCO2 (CALC), ART: 32 MMOL/L (ref 22–29)
TCO2 (CALC), ART: 33 MMOL/L (ref 22–29)
WBC # BLD: 28.2 K/UL (ref 3.5–11.3)

## 2018-02-06 PROCEDURE — 82435 ASSAY OF BLOOD CHLORIDE: CPT

## 2018-02-06 PROCEDURE — 3700000001 HC ADD 15 MINUTES (ANESTHESIA): Performed by: THORACIC SURGERY (CARDIOTHORACIC VASCULAR SURGERY)

## 2018-02-06 PROCEDURE — 84132 ASSAY OF SERUM POTASSIUM: CPT

## 2018-02-06 PROCEDURE — 71045 X-RAY EXAM CHEST 1 VIEW: CPT

## 2018-02-06 PROCEDURE — 85610 PROTHROMBIN TIME: CPT

## 2018-02-06 PROCEDURE — 6360000002 HC RX W HCPCS: Performed by: NURSE ANESTHETIST, CERTIFIED REGISTERED

## 2018-02-06 PROCEDURE — 2500000003 HC RX 250 WO HCPCS

## 2018-02-06 PROCEDURE — 85014 HEMATOCRIT: CPT

## 2018-02-06 PROCEDURE — C1729 CATH, DRAINAGE: HCPCS | Performed by: THORACIC SURGERY (CARDIOTHORACIC VASCULAR SURGERY)

## 2018-02-06 PROCEDURE — 85384 FIBRINOGEN ACTIVITY: CPT

## 2018-02-06 PROCEDURE — 82330 ASSAY OF CALCIUM: CPT

## 2018-02-06 PROCEDURE — 33530 CORONARY ARTERY BYPASS/REOP: CPT | Performed by: THORACIC SURGERY (CARDIOTHORACIC VASCULAR SURGERY)

## 2018-02-06 PROCEDURE — 85390 FIBRINOLYSINS SCREEN I&R: CPT

## 2018-02-06 PROCEDURE — A4648 IMPLANTABLE TISSUE MARKER: HCPCS | Performed by: THORACIC SURGERY (CARDIOTHORACIC VASCULAR SURGERY)

## 2018-02-06 PROCEDURE — 94002 VENT MGMT INPAT INIT DAY: CPT

## 2018-02-06 PROCEDURE — 82803 BLOOD GASES ANY COMBINATION: CPT

## 2018-02-06 PROCEDURE — P9045 ALBUMIN (HUMAN), 5%, 250 ML: HCPCS

## 2018-02-06 PROCEDURE — P9047 ALBUMIN (HUMAN), 25%, 50ML: HCPCS

## 2018-02-06 PROCEDURE — 2100000001 HC CVICU R&B

## 2018-02-06 PROCEDURE — 87086 URINE CULTURE/COLONY COUNT: CPT

## 2018-02-06 PROCEDURE — 021109W BYPASS CORONARY ARTERY, TWO ARTERIES FROM AORTA WITH AUTOLOGOUS VENOUS TISSUE, OPEN APPROACH: ICD-10-PCS | Performed by: THORACIC SURGERY (CARDIOTHORACIC VASCULAR SURGERY)

## 2018-02-06 PROCEDURE — 85027 COMPLETE CBC AUTOMATED: CPT

## 2018-02-06 PROCEDURE — 2580000003 HC RX 258: Performed by: THORACIC SURGERY (CARDIOTHORACIC VASCULAR SURGERY)

## 2018-02-06 PROCEDURE — C1875 STENT, COATED/COV W/O DEL SY: HCPCS | Performed by: THORACIC SURGERY (CARDIOTHORACIC VASCULAR SURGERY)

## 2018-02-06 PROCEDURE — B24BZZ4 ULTRASONOGRAPHY OF HEART WITH AORTA, TRANSESOPHAGEAL: ICD-10-PCS | Performed by: ANESTHESIOLOGY

## 2018-02-06 PROCEDURE — 33534 CABG ARTERIAL TWO: CPT | Performed by: THORACIC SURGERY (CARDIOTHORACIC VASCULAR SURGERY)

## 2018-02-06 PROCEDURE — P9046 ALBUMIN (HUMAN), 25%, 20 ML: HCPCS

## 2018-02-06 PROCEDURE — 33518 CABG ARTERY-VEIN TWO: CPT | Performed by: THORACIC SURGERY (CARDIOTHORACIC VASCULAR SURGERY)

## 2018-02-06 PROCEDURE — 94770 HC ETCO2 MONITOR DAILY: CPT

## 2018-02-06 PROCEDURE — 6360000002 HC RX W HCPCS

## 2018-02-06 PROCEDURE — 85018 HEMOGLOBIN: CPT

## 2018-02-06 PROCEDURE — 94762 N-INVAS EAR/PLS OXIMTRY CONT: CPT

## 2018-02-06 PROCEDURE — 85347 COAGULATION TIME ACTIVATED: CPT

## 2018-02-06 PROCEDURE — 84295 ASSAY OF SERUM SODIUM: CPT

## 2018-02-06 PROCEDURE — 02110Z9 BYPASS CORONARY ARTERY, TWO ARTERIES FROM LEFT INTERNAL MAMMARY, OPEN APPROACH: ICD-10-PCS | Performed by: THORACIC SURGERY (CARDIOTHORACIC VASCULAR SURGERY)

## 2018-02-06 PROCEDURE — 85576 BLOOD PLATELET AGGREGATION: CPT

## 2018-02-06 PROCEDURE — S0028 INJECTION, FAMOTIDINE, 20 MG: HCPCS | Performed by: THORACIC SURGERY (CARDIOTHORACIC VASCULAR SURGERY)

## 2018-02-06 PROCEDURE — 7100000000 HC PACU RECOVERY - FIRST 15 MIN

## 2018-02-06 PROCEDURE — 3600000018 HC SURGERY OHS ADDTL 15MIN: Performed by: THORACIC SURGERY (CARDIOTHORACIC VASCULAR SURGERY)

## 2018-02-06 PROCEDURE — 3600000008 HC SURGERY OHS BASE: Performed by: THORACIC SURGERY (CARDIOTHORACIC VASCULAR SURGERY)

## 2018-02-06 PROCEDURE — 6370000000 HC RX 637 (ALT 250 FOR IP): Performed by: THORACIC SURGERY (CARDIOTHORACIC VASCULAR SURGERY)

## 2018-02-06 PROCEDURE — 6360000002 HC RX W HCPCS: Performed by: THORACIC SURGERY (CARDIOTHORACIC VASCULAR SURGERY)

## 2018-02-06 PROCEDURE — 06BP4ZZ EXCISION OF RIGHT SAPHENOUS VEIN, PERCUTANEOUS ENDOSCOPIC APPROACH: ICD-10-PCS | Performed by: THORACIC SURGERY (CARDIOTHORACIC VASCULAR SURGERY)

## 2018-02-06 PROCEDURE — 85049 AUTOMATED PLATELET COUNT: CPT

## 2018-02-06 PROCEDURE — 6370000000 HC RX 637 (ALT 250 FOR IP): Performed by: NURSE ANESTHETIST, CERTIFIED REGISTERED

## 2018-02-06 PROCEDURE — 7100000011 HC PHASE II RECOVERY - ADDTL 15 MIN

## 2018-02-06 PROCEDURE — 2500000003 HC RX 250 WO HCPCS: Performed by: NURSE ANESTHETIST, CERTIFIED REGISTERED

## 2018-02-06 PROCEDURE — 2580000003 HC RX 258: Performed by: NURSE ANESTHETIST, CERTIFIED REGISTERED

## 2018-02-06 PROCEDURE — 3700000000 HC ANESTHESIA ATTENDED CARE: Performed by: THORACIC SURGERY (CARDIOTHORACIC VASCULAR SURGERY)

## 2018-02-06 PROCEDURE — 94799 UNLISTED PULMONARY SVC/PX: CPT

## 2018-02-06 PROCEDURE — 93005 ELECTROCARDIOGRAM TRACING: CPT

## 2018-02-06 PROCEDURE — 2500000003 HC RX 250 WO HCPCS: Performed by: THORACIC SURGERY (CARDIOTHORACIC VASCULAR SURGERY)

## 2018-02-06 PROCEDURE — 82947 ASSAY GLUCOSE BLOOD QUANT: CPT

## 2018-02-06 PROCEDURE — 5A1221Z PERFORMANCE OF CARDIAC OUTPUT, CONTINUOUS: ICD-10-PCS | Performed by: THORACIC SURGERY (CARDIOTHORACIC VASCULAR SURGERY)

## 2018-02-06 PROCEDURE — 2720000010 HC SURG SUPPLY STERILE: Performed by: THORACIC SURGERY (CARDIOTHORACIC VASCULAR SURGERY)

## 2018-02-06 PROCEDURE — A6550 NEG PRES WOUND THER DRSG SET: HCPCS | Performed by: THORACIC SURGERY (CARDIOTHORACIC VASCULAR SURGERY)

## 2018-02-06 PROCEDURE — 6370000000 HC RX 637 (ALT 250 FOR IP)

## 2018-02-06 PROCEDURE — P9045 ALBUMIN (HUMAN), 5%, 250 ML: HCPCS | Performed by: NURSE ANESTHETIST, CERTIFIED REGISTERED

## 2018-02-06 PROCEDURE — 85730 THROMBOPLASTIN TIME PARTIAL: CPT

## 2018-02-06 PROCEDURE — 85055 RETICULATED PLATELET ASSAY: CPT

## 2018-02-06 DEVICE — U-SHAPED STYLE, SILICONE (1 PER STERILE PKG)
Type: IMPLANTABLE DEVICE | Site: HEART | Status: FUNCTIONAL
Brand: SCANLAN® A/C LOCATOR® GRAFT MARKER

## 2018-02-06 RX ORDER — ALBUMIN, HUMAN INJ 5% 5 %
SOLUTION INTRAVENOUS
Status: COMPLETED
Start: 2018-02-06 | End: 2018-02-06

## 2018-02-06 RX ORDER — CHLORHEXIDINE GLUCONATE 0.12 MG/ML
15 RINSE ORAL 2 TIMES DAILY
Status: DISCONTINUED | OUTPATIENT
Start: 2018-02-06 | End: 2018-02-07

## 2018-02-06 RX ORDER — DIPHENHYDRAMINE HCL 25 MG
25 TABLET ORAL NIGHTLY PRN
Status: DISCONTINUED | OUTPATIENT
Start: 2018-02-07 | End: 2018-02-12 | Stop reason: HOSPADM

## 2018-02-06 RX ORDER — NICOTINE POLACRILEX 4 MG
15 LOZENGE BUCCAL PRN
Status: DISCONTINUED | OUTPATIENT
Start: 2018-02-06 | End: 2018-02-12 | Stop reason: HOSPADM

## 2018-02-06 RX ORDER — SIMVASTATIN 20 MG
20 TABLET ORAL NIGHTLY
Status: DISCONTINUED | OUTPATIENT
Start: 2018-02-07 | End: 2018-02-12 | Stop reason: HOSPADM

## 2018-02-06 RX ORDER — ASPIRIN 300 MG/1
150 SUPPOSITORY RECTAL ONCE
Status: DISCONTINUED | OUTPATIENT
Start: 2018-02-06 | End: 2018-02-12 | Stop reason: HOSPADM

## 2018-02-06 RX ORDER — CHLORHEXIDINE GLUCONATE 0.12 MG/ML
15 RINSE ORAL ONCE
Status: DISCONTINUED | OUTPATIENT
Start: 2018-02-06 | End: 2018-02-06 | Stop reason: HOSPADM

## 2018-02-06 RX ORDER — ALBUMIN, HUMAN INJ 5% 5 %
SOLUTION INTRAVENOUS PRN
Status: DISCONTINUED | OUTPATIENT
Start: 2018-02-06 | End: 2018-02-06 | Stop reason: SDUPTHER

## 2018-02-06 RX ORDER — CEFAZOLIN SODIUM 1 G/3ML
INJECTION, POWDER, FOR SOLUTION INTRAMUSCULAR; INTRAVENOUS PRN
Status: DISCONTINUED | OUTPATIENT
Start: 2018-02-06 | End: 2018-02-06 | Stop reason: SDUPTHER

## 2018-02-06 RX ORDER — DEXTROSE MONOHYDRATE 50 MG/ML
100 INJECTION, SOLUTION INTRAVENOUS PRN
Status: DISCONTINUED | OUTPATIENT
Start: 2018-02-06 | End: 2018-02-12 | Stop reason: HOSPADM

## 2018-02-06 RX ORDER — CITALOPRAM 20 MG/1
20 TABLET ORAL DAILY
Status: DISCONTINUED | OUTPATIENT
Start: 2018-02-06 | End: 2018-02-12 | Stop reason: HOSPADM

## 2018-02-06 RX ORDER — DEXTROSE MONOHYDRATE 25 G/50ML
12.5 INJECTION, SOLUTION INTRAVENOUS PRN
Status: DISCONTINUED | OUTPATIENT
Start: 2018-02-06 | End: 2018-02-12 | Stop reason: HOSPADM

## 2018-02-06 RX ORDER — SODIUM CHLORIDE, SODIUM LACTATE, POTASSIUM CHLORIDE, CALCIUM CHLORIDE 600; 310; 30; 20 MG/100ML; MG/100ML; MG/100ML; MG/100ML
1000 INJECTION, SOLUTION INTRAVENOUS CONTINUOUS
Status: DISCONTINUED | OUTPATIENT
Start: 2018-02-06 | End: 2018-02-06

## 2018-02-06 RX ORDER — PROTAMINE SULFATE 10 MG/ML
INJECTION, SOLUTION INTRAVENOUS PRN
Status: DISCONTINUED | OUTPATIENT
Start: 2018-02-06 | End: 2018-02-06 | Stop reason: SDUPTHER

## 2018-02-06 RX ORDER — HEPARIN SODIUM 1000 [USP'U]/ML
INJECTION, SOLUTION INTRAVENOUS; SUBCUTANEOUS PRN
Status: DISCONTINUED | OUTPATIENT
Start: 2018-02-06 | End: 2018-02-06 | Stop reason: SDUPTHER

## 2018-02-06 RX ORDER — CHLORHEXIDINE GLUCONATE 4 G/100ML
SOLUTION TOPICAL SEE ADMIN INSTRUCTIONS
Status: DISCONTINUED | OUTPATIENT
Start: 2018-02-06 | End: 2018-02-06 | Stop reason: HOSPADM

## 2018-02-06 RX ORDER — LIDOCAINE HYDROCHLORIDE 10 MG/ML
INJECTION, SOLUTION INFILTRATION; PERINEURAL PRN
Status: DISCONTINUED | OUTPATIENT
Start: 2018-02-06 | End: 2018-02-06 | Stop reason: SDUPTHER

## 2018-02-06 RX ORDER — ROCURONIUM BROMIDE 10 MG/ML
INJECTION, SOLUTION INTRAVENOUS PRN
Status: DISCONTINUED | OUTPATIENT
Start: 2018-02-06 | End: 2018-02-06 | Stop reason: SDUPTHER

## 2018-02-06 RX ORDER — ONDANSETRON 2 MG/ML
4 INJECTION INTRAMUSCULAR; INTRAVENOUS EVERY 8 HOURS PRN
Status: DISCONTINUED | OUTPATIENT
Start: 2018-02-06 | End: 2018-02-12 | Stop reason: HOSPADM

## 2018-02-06 RX ORDER — POTASSIUM CHLORIDE 29.8 MG/ML
20 INJECTION INTRAVENOUS PRN
Status: DISCONTINUED | OUTPATIENT
Start: 2018-02-06 | End: 2018-02-12 | Stop reason: HOSPADM

## 2018-02-06 RX ORDER — METOCLOPRAMIDE HYDROCHLORIDE 5 MG/ML
10 INJECTION INTRAMUSCULAR; INTRAVENOUS EVERY 6 HOURS PRN
Status: DISCONTINUED | OUTPATIENT
Start: 2018-02-06 | End: 2018-02-06

## 2018-02-06 RX ORDER — SODIUM CHLORIDE 0.9 % (FLUSH) 0.9 %
10 SYRINGE (ML) INJECTION PRN
Status: DISCONTINUED | OUTPATIENT
Start: 2018-02-06 | End: 2018-02-12 | Stop reason: HOSPADM

## 2018-02-06 RX ORDER — ASPIRIN 81 MG/1
81 TABLET ORAL DAILY
Status: DISCONTINUED | OUTPATIENT
Start: 2018-02-06 | End: 2018-02-06

## 2018-02-06 RX ORDER — ROPINIROLE 0.25 MG/1
0.5 TABLET, FILM COATED ORAL 3 TIMES DAILY
Status: DISCONTINUED | OUTPATIENT
Start: 2018-02-06 | End: 2018-02-12 | Stop reason: HOSPADM

## 2018-02-06 RX ORDER — MAGNESIUM SULFATE 1 G/100ML
1 INJECTION INTRAVENOUS PRN
Status: DISCONTINUED | OUTPATIENT
Start: 2018-02-06 | End: 2018-02-12 | Stop reason: HOSPADM

## 2018-02-06 RX ORDER — FENTANYL CITRATE 50 UG/ML
50 INJECTION, SOLUTION INTRAMUSCULAR; INTRAVENOUS
Status: DISCONTINUED | OUTPATIENT
Start: 2018-02-06 | End: 2018-02-12 | Stop reason: HOSPADM

## 2018-02-06 RX ORDER — PAPAVERINE HYDROCHLORIDE 30 MG/ML
INJECTION INTRAMUSCULAR; INTRAVENOUS PRN
Status: DISCONTINUED | OUTPATIENT
Start: 2018-02-06 | End: 2018-02-06 | Stop reason: HOSPADM

## 2018-02-06 RX ORDER — FENTANYL CITRATE 50 UG/ML
INJECTION, SOLUTION INTRAMUSCULAR; INTRAVENOUS PRN
Status: DISCONTINUED | OUTPATIENT
Start: 2018-02-06 | End: 2018-02-06 | Stop reason: SDUPTHER

## 2018-02-06 RX ORDER — PROPOFOL 10 MG/ML
10 INJECTION, EMULSION INTRAVENOUS
Status: DISCONTINUED | OUTPATIENT
Start: 2018-02-06 | End: 2018-02-07

## 2018-02-06 RX ORDER — CLOPIDOGREL BISULFATE 75 MG/1
75 TABLET ORAL DAILY
Status: DISCONTINUED | OUTPATIENT
Start: 2018-02-07 | End: 2018-02-12 | Stop reason: HOSPADM

## 2018-02-06 RX ORDER — HYDROCODONE BITARTRATE AND ACETAMINOPHEN 5; 325 MG/1; MG/1
1 TABLET ORAL EVERY 4 HOURS PRN
Status: DISCONTINUED | OUTPATIENT
Start: 2018-02-06 | End: 2018-02-12 | Stop reason: HOSPADM

## 2018-02-06 RX ORDER — HYDRALAZINE HYDROCHLORIDE 20 MG/ML
5 INJECTION INTRAMUSCULAR; INTRAVENOUS EVERY 5 MIN PRN
Status: DISCONTINUED | OUTPATIENT
Start: 2018-02-06 | End: 2018-02-12 | Stop reason: HOSPADM

## 2018-02-06 RX ORDER — MIDAZOLAM HYDROCHLORIDE 1 MG/ML
INJECTION INTRAMUSCULAR; INTRAVENOUS PRN
Status: DISCONTINUED | OUTPATIENT
Start: 2018-02-06 | End: 2018-02-06 | Stop reason: SDUPTHER

## 2018-02-06 RX ORDER — AMIODARONE HYDROCHLORIDE 200 MG/1
200 TABLET ORAL 3 TIMES DAILY
Status: DISCONTINUED | OUTPATIENT
Start: 2018-02-06 | End: 2018-02-11

## 2018-02-06 RX ORDER — ACETAMINOPHEN 325 MG/1
650 TABLET ORAL EVERY 4 HOURS PRN
Status: DISCONTINUED | OUTPATIENT
Start: 2018-02-06 | End: 2018-02-12 | Stop reason: HOSPADM

## 2018-02-06 RX ORDER — SODIUM CHLORIDE 0.9 % (FLUSH) 0.9 %
10 SYRINGE (ML) INJECTION PRN
Status: DISCONTINUED | OUTPATIENT
Start: 2018-02-06 | End: 2018-02-06

## 2018-02-06 RX ORDER — MAGNESIUM SULFATE HEPTAHYDRATE 500 MG/ML
INJECTION, SOLUTION INTRAMUSCULAR; INTRAVENOUS PRN
Status: DISCONTINUED | OUTPATIENT
Start: 2018-02-06 | End: 2018-02-06 | Stop reason: SDUPTHER

## 2018-02-06 RX ORDER — MAGNESIUM HYDROXIDE 1200 MG/15ML
LIQUID ORAL CONTINUOUS PRN
Status: DISCONTINUED | OUTPATIENT
Start: 2018-02-06 | End: 2018-02-06 | Stop reason: HOSPADM

## 2018-02-06 RX ORDER — HYDROCODONE BITARTRATE AND ACETAMINOPHEN 5; 325 MG/1; MG/1
1 TABLET ORAL EVERY 4 HOURS PRN
Status: DISCONTINUED | OUTPATIENT
Start: 2018-02-06 | End: 2018-02-06 | Stop reason: SDUPTHER

## 2018-02-06 RX ORDER — GLYCOPYRROLATE 0.2 MG/ML
INJECTION INTRAMUSCULAR; INTRAVENOUS PRN
Status: DISCONTINUED | OUTPATIENT
Start: 2018-02-06 | End: 2018-02-06 | Stop reason: SDUPTHER

## 2018-02-06 RX ORDER — HYDROCODONE BITARTRATE AND ACETAMINOPHEN 5; 325 MG/1; MG/1
2 TABLET ORAL EVERY 4 HOURS PRN
Status: DISCONTINUED | OUTPATIENT
Start: 2018-02-06 | End: 2018-02-12 | Stop reason: HOSPADM

## 2018-02-06 RX ORDER — M-VIT,TX,IRON,MINS/CALC/FOLIC 27MG-0.4MG
1 TABLET ORAL
Status: DISCONTINUED | OUTPATIENT
Start: 2018-02-07 | End: 2018-02-12 | Stop reason: HOSPADM

## 2018-02-06 RX ORDER — PROPOFOL 10 MG/ML
INJECTION, EMULSION INTRAVENOUS
Status: COMPLETED
Start: 2018-02-06 | End: 2018-02-06

## 2018-02-06 RX ORDER — CALCIUM CHLORIDE 100 MG/ML
INJECTION INTRAVENOUS; INTRAVENTRICULAR PRN
Status: DISCONTINUED | OUTPATIENT
Start: 2018-02-06 | End: 2018-02-06 | Stop reason: SDUPTHER

## 2018-02-06 RX ORDER — PROPOFOL 10 MG/ML
INJECTION, EMULSION INTRAVENOUS CONTINUOUS PRN
Status: DISCONTINUED | OUTPATIENT
Start: 2018-02-06 | End: 2018-02-06 | Stop reason: SDUPTHER

## 2018-02-06 RX ORDER — HYDROCODONE BITARTRATE AND ACETAMINOPHEN 5; 325 MG/1; MG/1
2 TABLET ORAL EVERY 4 HOURS PRN
Status: DISCONTINUED | OUTPATIENT
Start: 2018-02-06 | End: 2018-02-06 | Stop reason: SDUPTHER

## 2018-02-06 RX ORDER — SODIUM CHLORIDE, SODIUM LACTATE, POTASSIUM CHLORIDE, CALCIUM CHLORIDE 600; 310; 30; 20 MG/100ML; MG/100ML; MG/100ML; MG/100ML
INJECTION, SOLUTION INTRAVENOUS CONTINUOUS PRN
Status: DISCONTINUED | OUTPATIENT
Start: 2018-02-06 | End: 2018-02-06 | Stop reason: SDUPTHER

## 2018-02-06 RX ORDER — SODIUM CHLORIDE 0.9 % (FLUSH) 0.9 %
10 SYRINGE (ML) INJECTION EVERY 12 HOURS SCHEDULED
Status: DISCONTINUED | OUTPATIENT
Start: 2018-02-06 | End: 2018-02-12 | Stop reason: HOSPADM

## 2018-02-06 RX ORDER — PROPOFOL 10 MG/ML
INJECTION, EMULSION INTRAVENOUS PRN
Status: DISCONTINUED | OUTPATIENT
Start: 2018-02-06 | End: 2018-02-06 | Stop reason: SDUPTHER

## 2018-02-06 RX ORDER — MEPERIDINE HYDROCHLORIDE 50 MG/ML
25 INJECTION INTRAMUSCULAR; INTRAVENOUS; SUBCUTANEOUS
Status: ACTIVE | OUTPATIENT
Start: 2018-02-06 | End: 2018-02-06

## 2018-02-06 RX ORDER — SODIUM CHLORIDE 9 MG/ML
INJECTION, SOLUTION INTRAVENOUS CONTINUOUS PRN
Status: DISCONTINUED | OUTPATIENT
Start: 2018-02-06 | End: 2018-02-06 | Stop reason: SDUPTHER

## 2018-02-06 RX ORDER — POTASSIUM CHLORIDE 2 MEQ/ML
INJECTION, SOLUTION, CONCENTRATE INTRAVENOUS PRN
Status: DISCONTINUED | OUTPATIENT
Start: 2018-02-06 | End: 2018-02-06 | Stop reason: SDUPTHER

## 2018-02-06 RX ORDER — SODIUM CHLORIDE 9 MG/ML
INJECTION, SOLUTION INTRAVENOUS CONTINUOUS
Status: DISCONTINUED | OUTPATIENT
Start: 2018-02-06 | End: 2018-02-08

## 2018-02-06 RX ORDER — ASPIRIN 81 MG/1
81 TABLET ORAL DAILY
Status: DISCONTINUED | OUTPATIENT
Start: 2018-02-06 | End: 2018-02-12 | Stop reason: HOSPADM

## 2018-02-06 RX ORDER — SODIUM CHLORIDE 9 MG/ML
INJECTION, SOLUTION INTRAVENOUS CONTINUOUS
Status: DISCONTINUED | OUTPATIENT
Start: 2018-02-06 | End: 2018-02-07

## 2018-02-06 RX ADMIN — PROPOFOL 25 MCG/KG/MIN: 10 INJECTION, EMULSION INTRAVENOUS at 16:41

## 2018-02-06 RX ADMIN — PROPOFOL 150 MG: 10 INJECTION, EMULSION INTRAVENOUS at 08:03

## 2018-02-06 RX ADMIN — Medication 50 MEQ: at 10:38

## 2018-02-06 RX ADMIN — FENTANYL CITRATE 100 MCG: 50 INJECTION, SOLUTION INTRAMUSCULAR; INTRAVENOUS at 09:47

## 2018-02-06 RX ADMIN — PHENYLEPHRINE HYDROCHLORIDE 100 MCG: 10 INJECTION INTRAVENOUS at 13:52

## 2018-02-06 RX ADMIN — FENTANYL CITRATE 150 MCG: 50 INJECTION, SOLUTION INTRAMUSCULAR; INTRAVENOUS at 09:16

## 2018-02-06 RX ADMIN — FENTANYL CITRATE 50 MCG: 50 INJECTION INTRAMUSCULAR; INTRAVENOUS at 18:34

## 2018-02-06 RX ADMIN — PHENYLEPHRINE HYDROCHLORIDE 100 MCG: 10 INJECTION INTRAVENOUS at 14:10

## 2018-02-06 RX ADMIN — PHENYLEPHRINE HYDROCHLORIDE 200 MCG: 10 INJECTION INTRAVENOUS at 10:38

## 2018-02-06 RX ADMIN — HYDRALAZINE HYDROCHLORIDE 5 MG: 20 INJECTION INTRAMUSCULAR; INTRAVENOUS at 17:20

## 2018-02-06 RX ADMIN — HEPARIN SODIUM 3000 UNITS: 1000 INJECTION, SOLUTION INTRAVENOUS; SUBCUTANEOUS at 09:27

## 2018-02-06 RX ADMIN — SODIUM CHLORIDE: 900 INJECTION, SOLUTION INTRAVENOUS at 08:40

## 2018-02-06 RX ADMIN — FENTANYL CITRATE 250 MCG: 50 INJECTION, SOLUTION INTRAMUSCULAR; INTRAVENOUS at 08:03

## 2018-02-06 RX ADMIN — SODIUM CHLORIDE 2 UNITS/HR: 9 INJECTION, SOLUTION INTRAVENOUS at 08:57

## 2018-02-06 RX ADMIN — PHENYLEPHRINE HYDROCHLORIDE 100 MCG: 10 INJECTION INTRAVENOUS at 14:37

## 2018-02-06 RX ADMIN — ALBUMIN (HUMAN) 12.5 G: 12.5 INJECTION, SOLUTION INTRAVENOUS at 13:54

## 2018-02-06 RX ADMIN — HYDRALAZINE HYDROCHLORIDE 5 MG: 20 INJECTION INTRAMUSCULAR; INTRAVENOUS at 17:30

## 2018-02-06 RX ADMIN — SODIUM CHLORIDE, POTASSIUM CHLORIDE, SODIUM LACTATE AND CALCIUM CHLORIDE: 600; 310; 30; 20 INJECTION, SOLUTION INTRAVENOUS at 10:04

## 2018-02-06 RX ADMIN — SODIUM CHLORIDE, POTASSIUM CHLORIDE, SODIUM LACTATE AND CALCIUM CHLORIDE: 600; 310; 30; 20 INJECTION, SOLUTION INTRAVENOUS at 08:03

## 2018-02-06 RX ADMIN — SODIUM CHLORIDE, POTASSIUM CHLORIDE, SODIUM LACTATE AND CALCIUM CHLORIDE: 600; 310; 30; 20 INJECTION, SOLUTION INTRAVENOUS at 09:13

## 2018-02-06 RX ADMIN — PROPOFOL 25 MCG/KG/MIN: 10 INJECTION, EMULSION INTRAVENOUS at 14:26

## 2018-02-06 RX ADMIN — PROTAMINE SULFATE 250 MG: 10 INJECTION, SOLUTION INTRAVENOUS at 13:45

## 2018-02-06 RX ADMIN — POTASSIUM CHLORIDE 20 MEQ: 400 INJECTION, SOLUTION INTRAVENOUS at 20:29

## 2018-02-06 RX ADMIN — CALCIUM CHLORIDE 1 G: 100 INJECTION, SOLUTION INTRAVENOUS; INTRAVENTRICULAR at 10:38

## 2018-02-06 RX ADMIN — MIDAZOLAM HYDROCHLORIDE 2 MG: 1 INJECTION, SOLUTION INTRAMUSCULAR; INTRAVENOUS at 13:40

## 2018-02-06 RX ADMIN — POTASSIUM CHLORIDE 20 MEQ: 400 INJECTION, SOLUTION INTRAVENOUS at 19:12

## 2018-02-06 RX ADMIN — MIDAZOLAM HYDROCHLORIDE 2 MG: 1 INJECTION, SOLUTION INTRAMUSCULAR; INTRAVENOUS at 07:58

## 2018-02-06 RX ADMIN — SODIUM CHLORIDE, POTASSIUM CHLORIDE, SODIUM LACTATE AND CALCIUM CHLORIDE: 600; 310; 30; 20 INJECTION, SOLUTION INTRAVENOUS at 14:03

## 2018-02-06 RX ADMIN — Medication 0.1 MCG/KG/MIN: at 10:32

## 2018-02-06 RX ADMIN — PHENYLEPHRINE HYDROCHLORIDE 200 MCG: 10 INJECTION INTRAVENOUS at 10:34

## 2018-02-06 RX ADMIN — CEFAZOLIN 2000 MG: 330 INJECTION, POWDER, FOR SOLUTION INTRAMUSCULAR; INTRAVENOUS at 13:00

## 2018-02-06 RX ADMIN — CHLORHEXIDINE GLUCONATE 15 ML: 1.2 RINSE ORAL at 20:11

## 2018-02-06 RX ADMIN — DEXMEDETOMIDINE HYDROCHLORIDE 0.2 MCG/KG/HR: 100 INJECTION, SOLUTION INTRAVENOUS at 19:41

## 2018-02-06 RX ADMIN — FENTANYL CITRATE 50 MCG: 50 INJECTION INTRAMUSCULAR; INTRAVENOUS at 17:20

## 2018-02-06 RX ADMIN — PHENYLEPHRINE HYDROCHLORIDE 100 MCG: 10 INJECTION INTRAVENOUS at 14:06

## 2018-02-06 RX ADMIN — FENTANYL CITRATE 250 MCG: 50 INJECTION, SOLUTION INTRAMUSCULAR; INTRAVENOUS at 09:02

## 2018-02-06 RX ADMIN — ROCURONIUM BROMIDE 30 MG: 10 INJECTION INTRAVENOUS at 13:15

## 2018-02-06 RX ADMIN — SODIUM CHLORIDE, POTASSIUM CHLORIDE, SODIUM LACTATE AND CALCIUM CHLORIDE: 600; 310; 30; 20 INJECTION, SOLUTION INTRAVENOUS at 13:11

## 2018-02-06 RX ADMIN — ROCURONIUM BROMIDE 50 MG: 10 INJECTION INTRAVENOUS at 09:45

## 2018-02-06 RX ADMIN — HYDRALAZINE HYDROCHLORIDE 5 MG: 20 INJECTION INTRAMUSCULAR; INTRAVENOUS at 17:40

## 2018-02-06 RX ADMIN — POTASSIUM CHLORIDE 20 MEQ: 400 INJECTION, SOLUTION INTRAVENOUS at 17:04

## 2018-02-06 RX ADMIN — SODIUM CHLORIDE, POTASSIUM CHLORIDE, SODIUM LACTATE AND CALCIUM CHLORIDE: 600; 310; 30; 20 INJECTION, SOLUTION INTRAVENOUS at 14:39

## 2018-02-06 RX ADMIN — FAMOTIDINE 20 MG: 10 INJECTION, SOLUTION INTRAVENOUS at 23:39

## 2018-02-06 RX ADMIN — SODIUM CHLORIDE: 9 INJECTION, SOLUTION INTRAVENOUS at 15:05

## 2018-02-06 RX ADMIN — FENTANYL CITRATE 50 MCG: 50 INJECTION INTRAMUSCULAR; INTRAVENOUS at 23:38

## 2018-02-06 RX ADMIN — CEFAZOLIN 2000 MG: 330 INJECTION, POWDER, FOR SOLUTION INTRAMUSCULAR; INTRAVENOUS at 09:00

## 2018-02-06 RX ADMIN — MAGNESIUM SULFATE HEPTAHYDRATE 2 G: 500 INJECTION, SOLUTION INTRAMUSCULAR; INTRAVENOUS at 09:28

## 2018-02-06 RX ADMIN — ALBUMIN (HUMAN) 25 G: 12.5 INJECTION, SOLUTION INTRAVENOUS at 15:30

## 2018-02-06 RX ADMIN — ROCURONIUM BROMIDE 50 MG: 10 INJECTION INTRAVENOUS at 08:03

## 2018-02-06 RX ADMIN — PHENYLEPHRINE HYDROCHLORIDE 200 MCG: 10 INJECTION INTRAVENOUS at 10:31

## 2018-02-06 RX ADMIN — EPINEPHRINE 0.02 MCG/KG/MIN: 1 INJECTION PARENTERAL at 08:50

## 2018-02-06 RX ADMIN — SODIUM CHLORIDE, POTASSIUM CHLORIDE, SODIUM LACTATE AND CALCIUM CHLORIDE: 600; 310; 30; 20 INJECTION, SOLUTION INTRAVENOUS at 08:25

## 2018-02-06 RX ADMIN — FENTANYL CITRATE 50 MCG: 50 INJECTION INTRAMUSCULAR; INTRAVENOUS at 22:40

## 2018-02-06 RX ADMIN — PHENYLEPHRINE HYDROCHLORIDE 100 MCG: 10 INJECTION INTRAVENOUS at 14:21

## 2018-02-06 RX ADMIN — FENTANYL CITRATE 50 MCG: 50 INJECTION INTRAMUSCULAR; INTRAVENOUS at 21:25

## 2018-02-06 RX ADMIN — HEPARIN SODIUM 10000 UNITS: 1000 INJECTION, SOLUTION INTRAVENOUS; SUBCUTANEOUS at 10:03

## 2018-02-06 RX ADMIN — HEPARIN SODIUM 5000 UNITS: 1000 INJECTION, SOLUTION INTRAVENOUS; SUBCUTANEOUS at 10:36

## 2018-02-06 RX ADMIN — POTASSIUM CHLORIDE 40 MEQ: 149 INJECTION, SOLUTION, CONCENTRATE INTRAVENOUS at 08:53

## 2018-02-06 RX ADMIN — FENTANYL CITRATE 50 MCG: 50 INJECTION, SOLUTION INTRAMUSCULAR; INTRAVENOUS at 14:00

## 2018-02-06 RX ADMIN — GLYCOPYRROLATE 0.2 MG: 0.2 INJECTION INTRAMUSCULAR; INTRAVENOUS at 09:10

## 2018-02-06 RX ADMIN — POTASSIUM CHLORIDE 20 MEQ: 149 INJECTION, SOLUTION, CONCENTRATE INTRAVENOUS at 14:27

## 2018-02-06 RX ADMIN — FENTANYL CITRATE 50 MCG: 50 INJECTION, SOLUTION INTRAMUSCULAR; INTRAVENOUS at 13:58

## 2018-02-06 RX ADMIN — PHENYLEPHRINE HYDROCHLORIDE 200 MCG: 10 INJECTION INTRAVENOUS at 10:37

## 2018-02-06 RX ADMIN — ALBUMIN (HUMAN) 25 G: 12.5 INJECTION, SOLUTION INTRAVENOUS at 16:50

## 2018-02-06 RX ADMIN — Medication 2 G: at 20:11

## 2018-02-06 RX ADMIN — HYDRALAZINE HYDROCHLORIDE 5 MG: 20 INJECTION INTRAMUSCULAR; INTRAVENOUS at 17:25

## 2018-02-06 RX ADMIN — LIDOCAINE HYDROCHLORIDE 50 MG: 10 INJECTION, SOLUTION EPIDURAL; INFILTRATION; INTRACAUDAL; PERINEURAL at 08:03

## 2018-02-06 ASSESSMENT — PAIN SCALES - GENERAL: PAINLEVEL_OUTOF10: 10

## 2018-02-06 ASSESSMENT — PULMONARY FUNCTION TESTS
PIF_VALUE: 23
PIF_VALUE: 1
PIF_VALUE: 22
PIF_VALUE: 20
PIF_VALUE: 1
PIF_VALUE: 29
PIF_VALUE: 3
PIF_VALUE: 22
PIF_VALUE: 1
PIF_VALUE: 22
PIF_VALUE: 25
PIF_VALUE: 1
PIF_VALUE: 25
PIF_VALUE: 2
PIF_VALUE: 1
PIF_VALUE: 24
PIF_VALUE: 1
PIF_VALUE: 20
PIF_VALUE: 1
PIF_VALUE: 2
PIF_VALUE: 1
PIF_VALUE: 1
PIF_VALUE: 2
PIF_VALUE: 1
PIF_VALUE: 24
PIF_VALUE: 1
PIF_VALUE: 22
PIF_VALUE: 1
PIF_VALUE: 27
PIF_VALUE: 2
PIF_VALUE: 22
PIF_VALUE: 22
PIF_VALUE: 20
PIF_VALUE: 24
PIF_VALUE: 2
PIF_VALUE: 1
PIF_VALUE: 22
PIF_VALUE: 19
PIF_VALUE: 2
PIF_VALUE: 21
PIF_VALUE: 1
PIF_VALUE: 23
PIF_VALUE: 20
PIF_VALUE: 19
PIF_VALUE: 19
PIF_VALUE: 2
PIF_VALUE: 1
PIF_VALUE: 29
PIF_VALUE: 1
PIF_VALUE: 23
PIF_VALUE: 14
PIF_VALUE: 23
PIF_VALUE: 1
PIF_VALUE: 1
PIF_VALUE: 27
PIF_VALUE: 19
PIF_VALUE: 23
PIF_VALUE: 1
PIF_VALUE: 1
PIF_VALUE: 19
PIF_VALUE: 2
PIF_VALUE: 23
PIF_VALUE: 23
PIF_VALUE: 1
PIF_VALUE: 23
PIF_VALUE: 1
PIF_VALUE: 1
PIF_VALUE: 2
PIF_VALUE: 22
PIF_VALUE: 22
PIF_VALUE: 23
PIF_VALUE: 21
PIF_VALUE: 12
PIF_VALUE: 2
PIF_VALUE: 22
PIF_VALUE: 1
PIF_VALUE: 1
PIF_VALUE: 33
PIF_VALUE: 20
PIF_VALUE: 20
PIF_VALUE: 26
PIF_VALUE: 1
PIF_VALUE: 26
PIF_VALUE: 23
PIF_VALUE: 1
PIF_VALUE: 22
PIF_VALUE: 2
PIF_VALUE: 1
PIF_VALUE: 27
PIF_VALUE: 25
PIF_VALUE: 1
PIF_VALUE: 22
PIF_VALUE: 19
PIF_VALUE: 23
PIF_VALUE: 1
PIF_VALUE: 2
PIF_VALUE: 24
PIF_VALUE: 1
PIF_VALUE: 22
PIF_VALUE: 1
PIF_VALUE: 20
PIF_VALUE: 26
PIF_VALUE: 23
PIF_VALUE: 22
PIF_VALUE: 23
PIF_VALUE: 20
PIF_VALUE: 1
PIF_VALUE: 4
PIF_VALUE: 1
PIF_VALUE: 19
PIF_VALUE: 27
PIF_VALUE: 22
PIF_VALUE: 2
PIF_VALUE: 22
PIF_VALUE: 23
PIF_VALUE: 19
PIF_VALUE: 26
PIF_VALUE: 2
PIF_VALUE: 19
PIF_VALUE: 1
PIF_VALUE: 24
PIF_VALUE: 25
PIF_VALUE: 26
PIF_VALUE: 1
PIF_VALUE: 1
PIF_VALUE: 3
PIF_VALUE: 1
PIF_VALUE: 22
PIF_VALUE: 1
PIF_VALUE: 23
PIF_VALUE: 23
PIF_VALUE: 31
PIF_VALUE: 20
PIF_VALUE: 1
PIF_VALUE: 1
PIF_VALUE: 5
PIF_VALUE: 22
PIF_VALUE: 24
PIF_VALUE: 22
PIF_VALUE: 19
PIF_VALUE: 20
PIF_VALUE: 1
PIF_VALUE: 2
PIF_VALUE: 23
PIF_VALUE: 24
PIF_VALUE: 1
PIF_VALUE: 24
PIF_VALUE: 2
PIF_VALUE: 1
PIF_VALUE: 2
PIF_VALUE: 1
PIF_VALUE: 25
PIF_VALUE: 1
PIF_VALUE: 19
PIF_VALUE: 1
PIF_VALUE: 19
PIF_VALUE: 4
PIF_VALUE: 1
PIF_VALUE: 23
PIF_VALUE: 2
PIF_VALUE: 23
PIF_VALUE: 1
PIF_VALUE: 1
PIF_VALUE: 26
PIF_VALUE: 1
PIF_VALUE: 4
PIF_VALUE: 19
PIF_VALUE: 29
PIF_VALUE: 19
PIF_VALUE: 1
PIF_VALUE: 1
PIF_VALUE: 21
PIF_VALUE: 23
PIF_VALUE: 24
PIF_VALUE: 23
PIF_VALUE: 2
PIF_VALUE: 25
PIF_VALUE: 1
PIF_VALUE: 23
PIF_VALUE: 20
PIF_VALUE: 23
PIF_VALUE: 22
PIF_VALUE: 25
PIF_VALUE: 23
PIF_VALUE: 1
PIF_VALUE: 25
PIF_VALUE: 19
PIF_VALUE: 2
PIF_VALUE: 20
PIF_VALUE: 27
PIF_VALUE: 1
PIF_VALUE: 21
PIF_VALUE: 26
PIF_VALUE: 1
PIF_VALUE: 21
PIF_VALUE: 23
PIF_VALUE: 3
PIF_VALUE: 22
PIF_VALUE: 23
PIF_VALUE: 22
PIF_VALUE: 23
PIF_VALUE: 21
PIF_VALUE: 23
PIF_VALUE: 22
PIF_VALUE: 24
PIF_VALUE: 2
PIF_VALUE: 2
PIF_VALUE: 20
PIF_VALUE: 1
PIF_VALUE: 1
PIF_VALUE: 23
PIF_VALUE: 22
PIF_VALUE: 24
PIF_VALUE: 2
PIF_VALUE: 14
PIF_VALUE: 20
PIF_VALUE: 4
PIF_VALUE: 21
PIF_VALUE: 27
PIF_VALUE: 21
PIF_VALUE: 20
PIF_VALUE: 22
PIF_VALUE: 24
PIF_VALUE: 19
PIF_VALUE: 23
PIF_VALUE: 21
PIF_VALUE: 23
PIF_VALUE: 20
PIF_VALUE: 22
PIF_VALUE: 1
PIF_VALUE: 1
PIF_VALUE: 20
PIF_VALUE: 1
PIF_VALUE: 17
PIF_VALUE: 27
PIF_VALUE: 29
PIF_VALUE: 1
PIF_VALUE: 19
PIF_VALUE: 1
PIF_VALUE: 22
PIF_VALUE: 26
PIF_VALUE: 24
PIF_VALUE: 24
PIF_VALUE: 2
PIF_VALUE: 22
PIF_VALUE: 19
PIF_VALUE: 6
PIF_VALUE: 22
PIF_VALUE: 1
PIF_VALUE: 1
PIF_VALUE: 19
PIF_VALUE: 1
PIF_VALUE: 22
PIF_VALUE: 1
PIF_VALUE: 20
PIF_VALUE: 1
PIF_VALUE: 22
PIF_VALUE: 24
PIF_VALUE: 2
PIF_VALUE: 23
PIF_VALUE: 1
PIF_VALUE: 2
PIF_VALUE: 1
PIF_VALUE: 2
PIF_VALUE: 28
PIF_VALUE: 2
PIF_VALUE: 1
PIF_VALUE: 20
PIF_VALUE: 23
PIF_VALUE: 19
PIF_VALUE: 22
PIF_VALUE: 20
PIF_VALUE: 23
PIF_VALUE: 22
PIF_VALUE: 27
PIF_VALUE: 31
PIF_VALUE: 1
PIF_VALUE: 24
PIF_VALUE: 19
PIF_VALUE: 23
PIF_VALUE: 1
PIF_VALUE: 21
PIF_VALUE: 2
PIF_VALUE: 22
PIF_VALUE: 1
PIF_VALUE: 22
PIF_VALUE: 21
PIF_VALUE: 2
PIF_VALUE: 3
PIF_VALUE: 3
PIF_VALUE: 1
PIF_VALUE: 22
PIF_VALUE: 1
PIF_VALUE: 3
PIF_VALUE: 20
PIF_VALUE: 1
PIF_VALUE: 23
PIF_VALUE: 1
PIF_VALUE: 19
PIF_VALUE: 21
PIF_VALUE: 22
PIF_VALUE: 25
PIF_VALUE: 23
PIF_VALUE: 1
PIF_VALUE: 24
PIF_VALUE: 24
PIF_VALUE: 25
PIF_VALUE: 1
PIF_VALUE: 1
PIF_VALUE: 25
PIF_VALUE: 1
PIF_VALUE: 1
PIF_VALUE: 21
PIF_VALUE: 23
PIF_VALUE: 22
PIF_VALUE: 22
PIF_VALUE: 23
PIF_VALUE: 2
PIF_VALUE: 1
PIF_VALUE: 22
PIF_VALUE: 21
PIF_VALUE: 2
PIF_VALUE: 1
PIF_VALUE: 20
PIF_VALUE: 20
PIF_VALUE: 22
PIF_VALUE: 20
PIF_VALUE: 28
PIF_VALUE: 1
PIF_VALUE: 23
PIF_VALUE: 1
PIF_VALUE: 2
PIF_VALUE: 1
PIF_VALUE: 21
PIF_VALUE: 20
PIF_VALUE: 24
PIF_VALUE: 2
PIF_VALUE: 20
PIF_VALUE: 23
PIF_VALUE: 1
PIF_VALUE: 1
PIF_VALUE: 21
PIF_VALUE: 1
PIF_VALUE: 2
PIF_VALUE: 28
PIF_VALUE: 22
PIF_VALUE: 20
PIF_VALUE: 24
PIF_VALUE: 1
PIF_VALUE: 22
PIF_VALUE: 21
PIF_VALUE: 1
PIF_VALUE: 19
PIF_VALUE: 22
PIF_VALUE: 26
PIF_VALUE: 24
PIF_VALUE: 20
PIF_VALUE: 2
PIF_VALUE: 30
PIF_VALUE: 1
PIF_VALUE: 19
PIF_VALUE: 1
PIF_VALUE: 4
PIF_VALUE: 3
PIF_VALUE: 2
PIF_VALUE: 27
PIF_VALUE: 22
PIF_VALUE: 23
PIF_VALUE: 21
PIF_VALUE: 24
PIF_VALUE: 27
PIF_VALUE: 28
PIF_VALUE: 1
PIF_VALUE: 26
PIF_VALUE: 22
PIF_VALUE: 21
PIF_VALUE: 29
PIF_VALUE: 27
PIF_VALUE: 22
PIF_VALUE: 21
PIF_VALUE: 27
PIF_VALUE: 27
PIF_VALUE: 21
PIF_VALUE: 23
PIF_VALUE: 23
PIF_VALUE: 1
PIF_VALUE: 21
PIF_VALUE: 19
PIF_VALUE: 1
PIF_VALUE: 1
PIF_VALUE: 26
PIF_VALUE: 1
PIF_VALUE: 2
PIF_VALUE: 2
PIF_VALUE: 1
PIF_VALUE: 22
PIF_VALUE: 1
PIF_VALUE: 24
PIF_VALUE: 23

## 2018-02-06 ASSESSMENT — LIFESTYLE VARIABLES: SMOKING_STATUS: 0
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
## 2018-02-06 NOTE — ANESTHESIA PROCEDURE NOTES
Central Venous Line:    A central venous line was placed using surface landmarks, in the OR for the following indication(s):     Sterility preparation included the following: hand hygiene performed prior to procedure, maximum sterile barriers used and sterile technique used to drape from head to toe. The patient was placed in Trendelenburg position. The right internal jugular vein was prepped. The site was prepped with Betadine. A 9 Fr (size), 10 (length), introducer     During the procedure, the following specific steps were taken: target vein identified, needle advanced into vein and blood aspirated and guidewire advanced into vein. Intravenous verification was obtained by venous blood return. Post insertion care included: all ports aspirated, all ports flushed easily, guidewire removed intact, Biopatch applied, line sutured in place and dressing applied. During the procedure the patient experienced: patient tolerated procedure well with no complications. Anesthesia type: generalA(n) oximetric, 7 (size) Pulmonary Artery Catheter (PAC) was placed through the Introducer CVL in the  internal jugular vein. The PAC placement was confirmed by pressure tracing changes. The patient experienced the following events during the procedure: patient tolerated procedure well with no complications. PA Cath placed?: Yes  Staffing  Anesthesiologist: Steffany Donald  Performed: Anesthesiologist   Preanesthetic Checklist  Completed: patient identified, site marked, surgical consent, pre-op evaluation, timeout performed, IV checked, risks and benefits discussed, monitors and equipment checked, anesthesia consent given, oxygen available and patient being monitored

## 2018-02-07 ENCOUNTER — APPOINTMENT (OUTPATIENT)
Dept: GENERAL RADIOLOGY | Age: 68
DRG: 236 | End: 2018-02-07
Attending: THORACIC SURGERY (CARDIOTHORACIC VASCULAR SURGERY)
Payer: MEDICARE

## 2018-02-07 LAB
ANION GAP SERPL CALCULATED.3IONS-SCNC: 11 MMOL/L (ref 9–17)
BUN BLDV-MCNC: 12 MG/DL (ref 8–23)
BUN/CREAT BLD: ABNORMAL (ref 9–20)
CALCIUM IONIZED: 1.18 MMOL/L (ref 1.13–1.33)
CALCIUM SERPL-MCNC: 8.1 MG/DL (ref 8.6–10.4)
CHLORIDE BLD-SCNC: 110 MMOL/L (ref 98–107)
CO2: 24 MMOL/L (ref 20–31)
CREAT SERPL-MCNC: 0.71 MG/DL (ref 0.5–0.9)
CULTURE: NO GROWTH
CULTURE: NORMAL
GFR AFRICAN AMERICAN: >60 ML/MIN
GFR NON-AFRICAN AMERICAN: >60 ML/MIN
GFR SERPL CREATININE-BSD FRML MDRD: ABNORMAL ML/MIN/{1.73_M2}
GFR SERPL CREATININE-BSD FRML MDRD: ABNORMAL ML/MIN/{1.73_M2}
GLUCOSE BLD-MCNC: 102 MG/DL (ref 65–105)
GLUCOSE BLD-MCNC: 110 MG/DL (ref 65–105)
GLUCOSE BLD-MCNC: 123 MG/DL (ref 65–105)
GLUCOSE BLD-MCNC: 132 MG/DL (ref 65–105)
GLUCOSE BLD-MCNC: 134 MG/DL (ref 70–99)
GLUCOSE BLD-MCNC: 135 MG/DL (ref 65–105)
GLUCOSE BLD-MCNC: 138 MG/DL (ref 65–105)
GLUCOSE BLD-MCNC: 144 MG/DL (ref 65–105)
GLUCOSE BLD-MCNC: 147 MG/DL (ref 65–105)
GLUCOSE BLD-MCNC: 165 MG/DL (ref 65–105)
GLUCOSE BLD-MCNC: 166 MG/DL (ref 65–105)
GLUCOSE BLD-MCNC: 203 MG/DL (ref 65–105)
GLUCOSE BLD-MCNC: 215 MG/DL (ref 65–105)
GLUCOSE BLD-MCNC: 237 MG/DL (ref 65–105)
GLUCOSE BLD-MCNC: 270 MG/DL (ref 65–105)
GLUCOSE BLD-MCNC: 94 MG/DL (ref 65–105)
GLUCOSE BLD-MCNC: 98 MG/DL (ref 65–105)
HBCO, MIXED, EXTENDED: 1.2 % (ref 0–5)
HEMOGLOBIN, MIXED, EXTENDED: 8 G/DL (ref 12–18)
Lab: NORMAL
MAGNESIUM: 1.8 MG/DL (ref 1.6–2.6)
MAGNESIUM: 2.4 MG/DL (ref 1.6–2.6)
METHB, MIXED, EXTENDED: 1.2 % (ref 0–1.5)
O2 CONTENT, MIXED, EXTENDED: 8 VOL % (ref 6–15)
O2 SAT, MIXED, EXTENDED: 71.2 % (ref 60–80)
OXYGEN STATUS: ABNORMAL
POC ANGLE TEG W HEP: 42.7 DEG (ref 59–74)
POC ANGLE TEG W HEP: 77.1 DEG (ref 59–74)
POC ANGLE TEG: 38.5 DEG (ref 59–74)
POC ANGLE TEG: 78 DEG (ref 59–74)
POC EPL TEG W/HEP: 0.3 % (ref 0–15)
POC EPL TEG W/HEP: 2.4 % (ref 0–15)
POC EPL TEG: 0.1 % (ref 0–15)
POC EPL TEG: 0.7 % (ref 0–15)
POC KINETICS TEG W HEP: 1 MIN (ref 1–3)
POC KINETICS TEG W HEP: 4.3 MIN (ref 1–3)
POC KINETICS TEG: 0.9 MIN (ref 1–3)
POC KINETICS TEG: 4.9 MIN (ref 1–3)
POC LY30(LYSIS) TEG W HEP: 0.3 % (ref 0–8)
POC LY30(LYSIS) TEG W HEP: 2.4 % (ref 0–8)
POC LY30(LYSIS) TEG: 0.1 % (ref 0–8)
POC LY30(LYSIS) TEG: 0.7 % (ref 0–8)
POC MA(MAX CLOT) TEG: 53.9 MM (ref 55–74)
POC MA(MAX CLOT) TEG: 78.8 MM (ref 55–74)
POC MAX CLOT TEG W HEP: 54.6 MM (ref 55–74)
POC MAX CLOT TEG W HEP: 82.8 MM (ref 55–74)
POC REACTION TIME TEG W HEP: 13 MIN (ref 4–9)
POC REACTION TIME TEG W HEP: 5.3 MIN (ref 4–9)
POC REACTION TIME TEG: 14.4 MIN (ref 4–9)
POC REACTION TIME TEG: 5.4 MIN (ref 4–9)
POTASSIUM SERPL-SCNC: 4.4 MMOL/L (ref 3.7–5.3)
POTASSIUM SERPL-SCNC: 4.5 MMOL/L (ref 3.7–5.3)
SODIUM BLD-SCNC: 145 MMOL/L (ref 135–144)
SPECIMEN DESCRIPTION: NORMAL
STATUS: NORMAL
TEG COMMENT: ABNORMAL

## 2018-02-07 PROCEDURE — 83735 ASSAY OF MAGNESIUM: CPT

## 2018-02-07 PROCEDURE — G8978 MOBILITY CURRENT STATUS: HCPCS

## 2018-02-07 PROCEDURE — 82375 ASSAY CARBOXYHB QUANT: CPT

## 2018-02-07 PROCEDURE — 83050 HGB METHEMOGLOBIN QUAN: CPT

## 2018-02-07 PROCEDURE — 82805 BLOOD GASES W/O2 SATURATION: CPT

## 2018-02-07 PROCEDURE — 6370000000 HC RX 637 (ALT 250 FOR IP): Performed by: THORACIC SURGERY (CARDIOTHORACIC VASCULAR SURGERY)

## 2018-02-07 PROCEDURE — 6360000002 HC RX W HCPCS: Performed by: PHYSICIAN ASSISTANT

## 2018-02-07 PROCEDURE — 6360000002 HC RX W HCPCS: Performed by: THORACIC SURGERY (CARDIOTHORACIC VASCULAR SURGERY)

## 2018-02-07 PROCEDURE — 82947 ASSAY GLUCOSE BLOOD QUANT: CPT

## 2018-02-07 PROCEDURE — 82330 ASSAY OF CALCIUM: CPT

## 2018-02-07 PROCEDURE — 99024 POSTOP FOLLOW-UP VISIT: CPT | Performed by: PHYSICIAN ASSISTANT

## 2018-02-07 PROCEDURE — 97110 THERAPEUTIC EXERCISES: CPT

## 2018-02-07 PROCEDURE — 2140000001 HC CVICU INTERMEDIATE R&B

## 2018-02-07 PROCEDURE — G8979 MOBILITY GOAL STATUS: HCPCS

## 2018-02-07 PROCEDURE — 97162 PT EVAL MOD COMPLEX 30 MIN: CPT

## 2018-02-07 PROCEDURE — 71045 X-RAY EXAM CHEST 1 VIEW: CPT

## 2018-02-07 PROCEDURE — 80048 BASIC METABOLIC PNL TOTAL CA: CPT

## 2018-02-07 PROCEDURE — 97116 GAIT TRAINING THERAPY: CPT

## 2018-02-07 PROCEDURE — P9045 ALBUMIN (HUMAN), 5%, 250 ML: HCPCS | Performed by: PHYSICIAN ASSISTANT

## 2018-02-07 PROCEDURE — G8987 SELF CARE CURRENT STATUS: HCPCS

## 2018-02-07 PROCEDURE — 84132 ASSAY OF SERUM POTASSIUM: CPT

## 2018-02-07 PROCEDURE — G8988 SELF CARE GOAL STATUS: HCPCS

## 2018-02-07 PROCEDURE — 97166 OT EVAL MOD COMPLEX 45 MIN: CPT

## 2018-02-07 PROCEDURE — 97530 THERAPEUTIC ACTIVITIES: CPT

## 2018-02-07 RX ORDER — FAMOTIDINE 20 MG/1
10 TABLET, FILM COATED ORAL 2 TIMES DAILY
Status: DISCONTINUED | OUTPATIENT
Start: 2018-02-07 | End: 2018-02-12 | Stop reason: HOSPADM

## 2018-02-07 RX ORDER — ALBUMIN, HUMAN INJ 5% 5 %
25 SOLUTION INTRAVENOUS ONCE
Status: COMPLETED | OUTPATIENT
Start: 2018-02-07 | End: 2018-02-07

## 2018-02-07 RX ORDER — FUROSEMIDE 10 MG/ML
40 INJECTION INTRAMUSCULAR; INTRAVENOUS ONCE
Status: COMPLETED | OUTPATIENT
Start: 2018-02-07 | End: 2018-02-07

## 2018-02-07 RX ORDER — FUROSEMIDE 10 MG/ML
20 INJECTION INTRAMUSCULAR; INTRAVENOUS ONCE
Status: COMPLETED | OUTPATIENT
Start: 2018-02-07 | End: 2018-02-07

## 2018-02-07 RX ORDER — ALBUMIN, HUMAN INJ 5% 5 %
SOLUTION INTRAVENOUS
Status: DISPENSED
Start: 2018-02-07 | End: 2018-02-07

## 2018-02-07 RX ORDER — 0.9 % SODIUM CHLORIDE 0.9 %
250 INTRAVENOUS SOLUTION INTRAVENOUS ONCE
Status: DISCONTINUED | OUTPATIENT
Start: 2018-02-07 | End: 2018-02-11

## 2018-02-07 RX ADMIN — HYDROCODONE BITARTRATE AND ACETAMINOPHEN 2 TABLET: 5; 325 TABLET ORAL at 11:40

## 2018-02-07 RX ADMIN — AMIODARONE HYDROCHLORIDE 200 MG: 200 TABLET ORAL at 21:05

## 2018-02-07 RX ADMIN — ROPINIROLE HYDROCHLORIDE 0.5 MG: 0.25 TABLET, FILM COATED ORAL at 21:05

## 2018-02-07 RX ADMIN — MAGNESIUM SULFATE HEPTAHYDRATE 1 G: 1 INJECTION, SOLUTION INTRAVENOUS at 03:35

## 2018-02-07 RX ADMIN — FAMOTIDINE 10 MG: 20 TABLET, FILM COATED ORAL at 15:22

## 2018-02-07 RX ADMIN — HYDROCODONE BITARTRATE AND ACETAMINOPHEN 2 TABLET: 5; 325 TABLET ORAL at 23:11

## 2018-02-07 RX ADMIN — METOPROLOL TARTRATE 12.5 MG: 25 TABLET ORAL at 15:23

## 2018-02-07 RX ADMIN — DIPHENHYDRAMINE HCL 25 MG: 25 TABLET ORAL at 23:11

## 2018-02-07 RX ADMIN — METOPROLOL TARTRATE 12.5 MG: 25 TABLET ORAL at 21:05

## 2018-02-07 RX ADMIN — HYDROCODONE BITARTRATE AND ACETAMINOPHEN 1 TABLET: 5; 325 TABLET ORAL at 00:05

## 2018-02-07 RX ADMIN — AMIODARONE HYDROCHLORIDE 200 MG: 200 TABLET ORAL at 15:24

## 2018-02-07 RX ADMIN — ROPINIROLE HYDROCHLORIDE 0.5 MG: 0.25 TABLET, FILM COATED ORAL at 15:25

## 2018-02-07 RX ADMIN — FUROSEMIDE 40 MG: 10 INJECTION, SOLUTION INTRAVENOUS at 11:41

## 2018-02-07 RX ADMIN — CITALOPRAM 20 MG: 20 TABLET, FILM COATED ORAL at 11:41

## 2018-02-07 RX ADMIN — CLOPIDOGREL 75 MG: 75 TABLET, FILM COATED ORAL at 11:42

## 2018-02-07 RX ADMIN — ROPINIROLE HYDROCHLORIDE 0.5 MG: 0.25 TABLET, FILM COATED ORAL at 11:41

## 2018-02-07 RX ADMIN — HYDRALAZINE HYDROCHLORIDE 5 MG: 20 INJECTION INTRAMUSCULAR; INTRAVENOUS at 00:33

## 2018-02-07 RX ADMIN — HYDROCODONE BITARTRATE AND ACETAMINOPHEN 2 TABLET: 5; 325 TABLET ORAL at 05:46

## 2018-02-07 RX ADMIN — FUROSEMIDE 20 MG: 10 INJECTION, SOLUTION INTRAVENOUS at 03:32

## 2018-02-07 RX ADMIN — Medication 2 G: at 15:22

## 2018-02-07 RX ADMIN — Medication 2 G: at 05:32

## 2018-02-07 RX ADMIN — FENTANYL CITRATE 50 MCG: 50 INJECTION INTRAMUSCULAR; INTRAVENOUS at 02:28

## 2018-02-07 RX ADMIN — MAGNESIUM SULFATE HEPTAHYDRATE 1 G: 1 INJECTION, SOLUTION INTRAVENOUS at 02:26

## 2018-02-07 RX ADMIN — SIMVASTATIN 20 MG: 20 TABLET, FILM COATED ORAL at 21:05

## 2018-02-07 RX ADMIN — MULTIPLE VITAMINS W/ MINERALS TAB 1 TABLET: TAB at 11:41

## 2018-02-07 RX ADMIN — FAMOTIDINE 10 MG: 20 TABLET, FILM COATED ORAL at 21:05

## 2018-02-07 RX ADMIN — AMIODARONE HYDROCHLORIDE 200 MG: 200 TABLET ORAL at 11:42

## 2018-02-07 RX ADMIN — ALBUMIN (HUMAN) 25 G: 12.5 INJECTION, SOLUTION INTRAVENOUS at 03:31

## 2018-02-07 RX ADMIN — ENOXAPARIN SODIUM 40 MG: 40 INJECTION SUBCUTANEOUS at 15:22

## 2018-02-07 ASSESSMENT — PAIN DESCRIPTION - FREQUENCY: FREQUENCY: CONTINUOUS

## 2018-02-07 ASSESSMENT — PAIN SCALES - GENERAL
PAINLEVEL_OUTOF10: 9
PAINLEVEL_OUTOF10: 7
PAINLEVEL_OUTOF10: 7
PAINLEVEL_OUTOF10: 9
PAINLEVEL_OUTOF10: 9
PAINLEVEL_OUTOF10: 4
PAINLEVEL_OUTOF10: 9
PAINLEVEL_OUTOF10: 8

## 2018-02-07 ASSESSMENT — PAIN DESCRIPTION - DESCRIPTORS: DESCRIPTORS: SORE;SHARP;DISCOMFORT

## 2018-02-07 ASSESSMENT — PAIN DESCRIPTION - PAIN TYPE
TYPE: ACUTE PAIN;SURGICAL PAIN
TYPE: ACUTE PAIN;SURGICAL PAIN
TYPE: SURGICAL PAIN

## 2018-02-07 ASSESSMENT — PAIN DESCRIPTION - LOCATION
LOCATION: CHEST;STERNUM
LOCATION: CHEST;STERNUM

## 2018-02-07 ASSESSMENT — PAIN DESCRIPTION - ORIENTATION: ORIENTATION: MID

## 2018-02-07 NOTE — PROGRESS NOTES
Spoke With Ashley Levine 1729 very low urine output , verbal orders received to give 250ml albumin and 20 iv lasix

## 2018-02-07 NOTE — FLOWSHEET NOTE
Visit:  visited while rounding. Patient was awake and alert, and engaged in conversation. Assessment: Patient is coping with post surgery discomfort. Intervention:  provided a supportive and listening presence. Patient spoke of her past heart surgery, and that she is grateful for having made it through this one. Her  and two children are sources of support. \"My son has been more attentive because if this. \"  She stated that she knows she is in God's hands.  prayed with patient. Plan: Chaplains to remain available for continued support as needed. 02/07/18 0843   Encounter Summary   Services provided to: Patient   Referral/Consult From: [employer]   Support System Spouse; Children   [facility] of [facility] (2/7/18)   Complexity of Encounter Moderate   Length of Encounter 15 minutes   Spiritual Assessment Completed Yes   Routine   Type Post-procedure   Assessment Calm; Approachable   Intervention Active listening;Explored feelings, thoughts, concerns;Prayer   Outcome Expressed gratitude;Engaged in conversation;Expressed feelings/needs/concerns;Coping;Receptive

## 2018-02-07 NOTE — PROGRESS NOTES
Previous Treatment: Not applicable  Family / Caregiver Present: No  Follows Commands: Within Functional Limits  General Comment  Comments: OT co-eval  Subjective  Subjective: RN and pt agreeable to PT. Pt alert in bed bed upon arrival.   Pain Screening  Patient Currently in Pain: Yes  Pain Assessment  Pain Assessment: 0-10  Pain Level: 9  Pain Type: Acute pain;Surgical pain  Pain Location: Chest;Sternum  Pain Intervention(s): Repositioned; Ambulation/Increased activity; Emotional support  Response to Pain Intervention: Patient Satisfied  Vital Signs  Patient Currently in Pain: Yes  Pre Treatment Pain Screening  Intervention List: Patient able to continue with treatment    Orientation  Orientation  Overall Orientation Status: Within Functional Limits    Social/Functional History  Social/Functional History  Lives With: Spouse  Type of Home: House  Home Layout: One level  Home Access: Stairs to enter with rails  Entrance Stairs - Number of Steps: 7  Entrance Stairs - Rails: Both  Bathroom Shower/Tub: Tub/Shower unit  Bathroom Equipment: Grab bars in shower  Bathroom Accessibility: Accessible  Home Equipment:  (none)  Receives Help From: Family  ADL Assistance: [address] [place] [address] Avenue: Independent  Homemaking Responsibilities: Yes  Ambulation Assistance: Independent  Transfer Assistance: Independent  Active : Yes  Additional Comments: prior CABG x1 on 2005  Objective          AROM RLE (degrees)  RLE AROM: WFL  AROM LLE (degrees)  LLE AROM : WFL  AROM RUE (degrees)  RUE AROM : WFL  AROM LUE (degrees)  LUE AROM : WFL  Strength RLE  Strength RLE: WFL  Strength LLE  Strength LLE: WFL  Strength RUE  Strength RUE: WFL  Strength LUE  Strength LUE: WFL     Sensation  Overall Sensation Status: Impaired (R hand, minor N/T which has improved since line came out. Also some N/T in chest)  Bed mobility  Supine to Sit: Moderate assistance  Sit to Supine:  (left in chair)  Scooting:  Moderate assistance  Comment: Pt dizzy upon EOB, cues to deep breath and rest at EOB with gradual improvemenet. Transfers  Sit to Stand: Minimal Assistance  Stand to sit: Minimal Assistance  Comment: to RW  Ambulation  Ambulation?: Yes  Ambulation 1  Surface: level tile  Device: Rolling Walker  Assistance: Contact guard assistance  Quality of Gait: slowed movements, which was incouraged d/t line managemenet. Pt grossly stable, no signs of buckling or c/o weakness. Distance: 3' to chair  Stairs/Curb  Stairs?: No     Balance  Posture: Good  Sitting - Static: Good  Sitting - Dynamic: Good  Standing - Static: Good;-  Standing - Dynamic: Fair;+  Comments: RW for standing balance        Assessment   Body structures, Functions, Activity limitations: Decreased endurance;Decreased balance;Decreased functional mobility   Assessment: amb 3' to chair RW Karla. Pt limited by pain, fatigue, endurance this session. Pt would benefit from acute and post acute PT due to deficits listed above. Prognosis: Good  Decision Making: Medium Complexity  Patient Education: PT POC  REQUIRES PT FOLLOW UP: Yes  Activity Tolerance  Activity Tolerance: Patient Tolerated treatment well;Patient limited by pain; Patient limited by endurance  PT Equipment Recommendations  Other: TBD     Plan   Plan  Times per week: 7x/wk BID  Current Treatment Recommendations: Strengthening, Transfer Training, Endurance Training, Balance Training, Gait Training, Functional Mobility Training, Stair training, Home Exercise Program, Safety Education & Training, Patient/Caregiver Education & Training, Equipment Evaluation, Education, & procurement  Safety Devices  Type of devices: Call light within reach, Left in chair, Patient at risk for falls, Gait belt, Nurse notified  Restraints  Initially in place: No    G-Code  PT G-Codes  Functional Assessment Tool Used: [place] AM-PAC  Score: 14  Functional Limitation: Mobility: Walking and moving around  Mobility: Walking and Moving Around Current Status ():  At

## 2018-02-07 NOTE — PROGRESS NOTES
Diarrhea in adult patient    Bug bites    Restless leg syndrome, uncontrolled    Hypokalemia    Atherosclerosis of superior mesenteric artery (HCC)    Traumatic compression fracture of T9 thoracic vertebra (HCC)    Left adrenal mass ([address]    Former smoker    Obesity, Class I, BMI 30.0-34.9 (see actual BMI)    Obesity (BMI 30-39. 9)    Chronic bilateral low back pain with left-sided sciatica    BMI 37.0-37.9, adult    Class 2 obesity due to excess calories with serious comorbidity and body mass index (BMI) of 37.0 to 37.9 in adult    CAD, multiple vessel       1. S/p    Redo CABGx4, LIMA in seq to LAD and Diagonal, SVG to OM1, SVG to PDA under CPB assist  Po: Day 1  2. HD -Hemodynamically stable, off drips  3. Pulmo - Nasal oxygen  4. GI - + bowel sounds, denies N/V  5.  - Normal creatine  6. Neuro - Pain controlled  ABLA - HB 9.3 and 82    DVT ppx: SCD's while stationary  Patient is on BB,, ASA, Statin therapy per protocol       The above recommendations including medications and orders were discussed and agreed upon with  the attending on service for the cardiothoracic surgery group today.     Electronically signed by Zunilda Sow PA-C on 2/7/2018 at 4:40 PM

## 2018-02-07 NOTE — PROGRESS NOTES
Social/Functional History  Social/Functional History  Lives With: Spouse  Type of Home: House  Home Layout: One level  Home Access: Stairs to enter with rails  Entrance Stairs - Number of Steps: 7  Entrance Stairs - Rails: Both  Bathroom Shower/Tub: Tub/Shower unit  Bathroom Toilet: Standard  Bathroom Equipment: Grab bars in shower  Bathroom Accessibility: Accessible  Home Equipment:  (none)  Receives Help From: Family  ADL Assistance: Independent  Homemaking Assistance: Independent  Homemaking Responsibilities: Yes  Ambulation Assistance: Independent  Transfer Assistance: Independent  Active : Yes  Occupation: Retired  Leisure & Hobbies: go out to eat  Additional Comments: prior CABG x1 on 2005, spouse able to assist 24/7     Objective   Vision: Impaired  Vision Exceptions: Wears glasses at all times  Hearing: Within functional limits    Orientation  Overall Orientation Status: Within Functional Limits     Balance  Sitting Balance: Modified independent   Standing Balance: Stand by assistance  Standing Balance  Time: 2 min  Activity: Pt stood bedside and short func mob to nearby chair  Sit to stand: Stand by assistance  Stand to sit: Supervision  Functional Mobility  Functional - Mobility Device: Rolling Walker  Activity: Other  Assist Level: Contact guard assistance  Functional Mobility Comments: Short func mob to recliner, no LOB noted, fatigued quickly  ADL  Feeding: Setup;Modified independent   Grooming: Supervision;Setup; Increased time to complete  UE Bathing: Setup; Moderate assistance  LE Bathing: Setup;Maximum assistance  UE Dressing: Maximum assistance;Setup  LE Dressing: Maximum assistance;Setup  Toileting: Setup;Maximum assistance  Tone RUE  RUE Tone: Normotonic  Tone LUE  LUE Tone: Normotonic  Coordination  Movements Are Fluid And Coordinated: No  Coordination and Movement description: Decreased speed     Bed mobility  Supine to Sit: Moderate assistance x2  Sit to Supine: Unable to assess  Scooting: Moderate assistance  Comment: Pt sat EOB for 10 min for dizziness to subside, pt able to support self well  Transfers  Sit to stand: Stand by assistance  Stand to sit: Supervision     Cognition  Overall Cognitive Status: WFL        Sensation  Overall Sensation Status: Impaired (R hand, minor N/T which has improved since line came out. Also some N/T in chest)      LUE AROM (degrees)  LUE AROM : WFL  RUE AROM (degrees)  RUE AROM : WFL  LUE Strength  Gross LUE Strength: WFL  L Hand Grasp: 5/5  L Hand Release: 5/5  RUE Strength  Gross RUE Strength: WFL  R Hand Grasp: 5/5  R Hand Release: 5/5      Assessment   Performance deficits / Impairments: Decreased functional mobility ; Decreased ADL status; Decreased high-level IADLs;Decreased balance;Decreased endurance  Prognosis: Fair  Decision Making: Medium Complexity  Patient Education: Safety awareness, OTPOC, discharge rec, sternal precautions  REQUIRES OT FOLLOW UP: Yes  Activity Tolerance  Activity Tolerance: Patient limited by fatigue;Patient limited by pain  Safety Devices  Safety Devices in place: Yes  Type of devices: Left in chair;Nurse notified;Call light within reach; All fall risk precautions in place;Gait belt  Restraints  Initially in place: No  OT Equipment Recommendations  Equipment Needed: Yes  Mobility Devices: [employer]; ADL Assistive Devices  Walker: [employer]  ADL Assistive Devices: Transfer Tub Bench        Plan   Plan  Times per week: 5x  Current Treatment Recommendations: Balance Training, Functional Mobility Training, Endurance Training, Pain Management, Safety Education & Training, Self-Care / ADL, Home Management Training, Patient/Caregiver Education & Training, Equipment Evaluation, Education, & procurement    G-Code  OT G-codes  Functional Assessment Tool Used: [facility]  Score: 15/24  Functional Limitation: Self care  Self Care Current Status ():  At least 40 percent but less than 60 percent impaired, limited or restricted  Self

## 2018-02-07 NOTE — PROGRESS NOTES
Physical Therapy  Facility/Department: [facility] CAR 1  Daily Treatment Note  NAME: Murray Christopher  : 1950  MRN: [id number]    Date of Service: 2018    Patient Diagnosis(es):   Patient Active Problem List    Diagnosis Date Noted    CAD, multiple vessel 2018    Class 2 obesity due to excess calories with serious comorbidity and body mass index (BMI) of 37.0 to 37.9 in adult 2017    Chronic bilateral low back pain with left-sided sciatica 2017    BMI 37.0-37.9, adult 2017    Obesity (BMI 30-39.9) 11/10/2016    Obesity, Class I, BMI 30.0-34.9 (see actual BMI) 2016    Atherosclerosis of superior mesenteric artery ([address] 10/06/2015    Traumatic compression fracture of T9 thoracic vertebra ([address] 10/06/2015    Left adrenal mass ([address] 10/06/2015    Former smoker 10/06/2015    Hypokalemia 2015    Controlled type 2 diabetes mellitus without complication, without long-term current use of insulin ([address] 09/10/2015    Allergic rhinitis 09/10/2015    Hypertension goal BP (blood pressure) < 140/90 09/10/2015    Mixed hyperlipidemia 09/10/2015    Chronic obstructive pulmonary disease ([address] 09/10/2015    Coronary artery disease involving native coronary artery of native heart without angina pectoris 09/10/2015    Primary insomnia 09/10/2015    Diarrhea in adult patient 09/10/2015    Bug bites 09/10/2015    Restless leg syndrome, uncontrolled 09/10/2015    Chronic pain        Past Medical History:   Diagnosis Date    Allergic rhinitis     Arthritis     general    CAD (coronary artery disease)     Dr. Ricardo Elizalde CHF (congestive heart failure) ([address]     Controlled type 2 diabetes mellitus without complication, without long-term current use of insulin ([address] 9/10/2015    COPD (chronic obstructive pulmonary disease) ([address]     Depression     Former smoker 10/6/2015    History of blood transfusion     no reaction    Hyperlipidemia     Hypertension     Kidney mobility  Supine to Sit: Moderate assistance  Sit to Supine:  (left in chair)  Scooting: Moderate assistance  Comment: Pt dizzy upon EOB, cues to deep breath and rest at EOB with gradual improvemenet. Transfers  Sit to Stand: Minimal Assistance  Stand to sit: Contact guard assistance (cueing on hand placement)  Comment: to RW  Ambulation  Ambulation?: Yes  Ambulation 1  Surface: level tile  Device: Rolling Walker  Other Apparatus: O2 (6L)  Assistance: Contact guard assistance  Quality of Gait: significantly decreased michelle, decreased step length, quick to fatigue, cues to deep breath. Distance: 20'  Comments: Additional 2' w/c to chair after 5min rest break following ambulation with water and cool cloth resulting in good recovery. Stairs/Curb  Stairs?: No     Balance  Posture: Good  Sitting - Static: Good  Sitting - Dynamic: Good  Standing - Static: Good;-  Standing - Dynamic: Fair  Comments: RW for standing balance  Exercises  Comments: 15x Bilat: ankle pumps, quad sets, glute sets, LAQ, increased time to complete. Pt performed 2 sit to stands   AROM RLE (degrees)  RLE AROM: WFL  AROM LLE (degrees)  LLE AROM : WFL  AROM RUE (degrees)  RUE AROM : WFL  AROM LUE (degrees)  LUE AROM : WFL  Strength RLE  Strength RLE: WFL  Strength LLE  Strength LLE: WFL  Strength RUE  Strength RUE: WFL  Strength LUE  Strength LUE: WFL                 Assessment   Body structures, Functions, Activity limitations: Decreased endurance;Decreased balance;Decreased functional mobility   Assessment: amb 20' RW Karla very slow. Pt limited by feeling hot, needing to sit after 20' with good recovery following cold drink and cool cloth. Pt grossly stable with mobility except for deficits listed above. Pt with significantly increased time to complete all activity d/t frequent rest breaks and slow mobility. Prognosis: Good  Decision Making: Medium Complexity  Patient Education: improtance of mobility.   REQUIRES PT FOLLOW UP: Yes  Activity

## 2018-02-08 LAB
ABO/RH: NORMAL
ANION GAP SERPL CALCULATED.3IONS-SCNC: 5 MMOL/L (ref 9–17)
ANTIBODY SCREEN: NEGATIVE
ARM BAND NUMBER: NORMAL
BLD PROD TYP BPU: NORMAL
BUN BLDV-MCNC: 16 MG/DL (ref 8–23)
BUN/CREAT BLD: ABNORMAL (ref 9–20)
CALCIUM SERPL-MCNC: 8.5 MG/DL (ref 8.6–10.4)
CHLORIDE BLD-SCNC: 108 MMOL/L (ref 98–107)
CO2: 27 MMOL/L (ref 20–31)
CREAT SERPL-MCNC: 0.67 MG/DL (ref 0.5–0.9)
CROSSMATCH RESULT: NORMAL
DISPENSE STATUS BLOOD BANK: NORMAL
EXPIRATION DATE: NORMAL
GFR AFRICAN AMERICAN: >60 ML/MIN
GFR NON-AFRICAN AMERICAN: >60 ML/MIN
GFR SERPL CREATININE-BSD FRML MDRD: ABNORMAL ML/MIN/{1.73_M2}
GFR SERPL CREATININE-BSD FRML MDRD: ABNORMAL ML/MIN/{1.73_M2}
GLUCOSE BLD-MCNC: 150 MG/DL (ref 65–105)
GLUCOSE BLD-MCNC: 191 MG/DL (ref 65–105)
GLUCOSE BLD-MCNC: 204 MG/DL (ref 65–105)
GLUCOSE BLD-MCNC: 205 MG/DL (ref 65–105)
GLUCOSE BLD-MCNC: 227 MG/DL (ref 65–105)
GLUCOSE BLD-MCNC: 253 MG/DL (ref 65–105)
GLUCOSE BLD-MCNC: 92 MG/DL (ref 70–99)
HCT VFR BLD CALC: 25.6 % (ref 36.3–47.1)
HEMOGLOBIN: 7.8 G/DL (ref 11.9–15.1)
MAGNESIUM: 2.3 MG/DL (ref 1.6–2.6)
MCH RBC QN AUTO: 27.1 PG (ref 25.2–33.5)
MCHC RBC AUTO-ENTMCNC: 30.5 G/DL (ref 28.4–34.8)
MCV RBC AUTO: 88.9 FL (ref 82.6–102.9)
NRBC AUTOMATED: 0 PER 100 WBC
PDW BLD-RTO: 15.2 % (ref 11.8–14.4)
PLATELET # BLD: ABNORMAL K/UL (ref 138–453)
PLATELET, FLUORESCENCE: 129 K/UL (ref 138–453)
PLATELET, IMMATURE FRACTION: 6 % (ref 1.1–10.3)
PMV BLD AUTO: ABNORMAL FL (ref 8.1–13.5)
POTASSIUM SERPL-SCNC: 4.1 MMOL/L (ref 3.7–5.3)
RBC # BLD: 2.88 M/UL (ref 3.95–5.11)
SODIUM BLD-SCNC: 140 MMOL/L (ref 135–144)
TRANSFUSION STATUS: NORMAL
UNIT DIVISION: 0
UNIT NUMBER: NORMAL
WBC # BLD: 21.2 K/UL (ref 3.5–11.3)

## 2018-02-08 PROCEDURE — 83735 ASSAY OF MAGNESIUM: CPT

## 2018-02-08 PROCEDURE — 85027 COMPLETE CBC AUTOMATED: CPT

## 2018-02-08 PROCEDURE — 94640 AIRWAY INHALATION TREATMENT: CPT

## 2018-02-08 PROCEDURE — 6360000002 HC RX W HCPCS: Performed by: THORACIC SURGERY (CARDIOTHORACIC VASCULAR SURGERY)

## 2018-02-08 PROCEDURE — 85055 RETICULATED PLATELET ASSAY: CPT

## 2018-02-08 PROCEDURE — 80048 BASIC METABOLIC PNL TOTAL CA: CPT

## 2018-02-08 PROCEDURE — 6360000002 HC RX W HCPCS: Performed by: NURSE PRACTITIONER

## 2018-02-08 PROCEDURE — 97116 GAIT TRAINING THERAPY: CPT

## 2018-02-08 PROCEDURE — 97110 THERAPEUTIC EXERCISES: CPT

## 2018-02-08 PROCEDURE — 6370000000 HC RX 637 (ALT 250 FOR IP): Performed by: THORACIC SURGERY (CARDIOTHORACIC VASCULAR SURGERY)

## 2018-02-08 PROCEDURE — 97530 THERAPEUTIC ACTIVITIES: CPT

## 2018-02-08 PROCEDURE — 82947 ASSAY GLUCOSE BLOOD QUANT: CPT

## 2018-02-08 PROCEDURE — 2140000001 HC CVICU INTERMEDIATE R&B

## 2018-02-08 PROCEDURE — 36415 COLL VENOUS BLD VENIPUNCTURE: CPT

## 2018-02-08 RX ORDER — FUROSEMIDE 10 MG/ML
40 INJECTION INTRAMUSCULAR; INTRAVENOUS ONCE
Status: COMPLETED | OUTPATIENT
Start: 2018-02-08 | End: 2018-02-08

## 2018-02-08 RX ORDER — ALBUTEROL SULFATE 2.5 MG/3ML
2.5 SOLUTION RESPIRATORY (INHALATION) 4 TIMES DAILY
Status: DISCONTINUED | OUTPATIENT
Start: 2018-02-08 | End: 2018-02-09

## 2018-02-08 RX ORDER — ALBUTEROL SULFATE 2.5 MG/3ML
2.5 SOLUTION RESPIRATORY (INHALATION) EVERY 6 HOURS PRN
Status: DISCONTINUED | OUTPATIENT
Start: 2018-02-08 | End: 2018-02-12 | Stop reason: HOSPADM

## 2018-02-08 RX ORDER — POTASSIUM CHLORIDE 20 MEQ/1
20 TABLET, EXTENDED RELEASE ORAL 2 TIMES DAILY
Status: DISCONTINUED | OUTPATIENT
Start: 2018-02-08 | End: 2018-02-12 | Stop reason: HOSPADM

## 2018-02-08 RX ORDER — KETOROLAC TROMETHAMINE 30 MG/ML
15 INJECTION, SOLUTION INTRAMUSCULAR; INTRAVENOUS ONCE
Status: COMPLETED | OUTPATIENT
Start: 2018-02-08 | End: 2018-02-08

## 2018-02-08 RX ADMIN — SIMVASTATIN 20 MG: 20 TABLET, FILM COATED ORAL at 21:00

## 2018-02-08 RX ADMIN — METOPROLOL TARTRATE 12.5 MG: 25 TABLET ORAL at 21:02

## 2018-02-08 RX ADMIN — ALBUTEROL SULFATE 2.5 MG: 2.5 SOLUTION RESPIRATORY (INHALATION) at 15:21

## 2018-02-08 RX ADMIN — FAMOTIDINE 10 MG: 20 TABLET, FILM COATED ORAL at 09:08

## 2018-02-08 RX ADMIN — AMIODARONE HYDROCHLORIDE 200 MG: 200 TABLET ORAL at 13:46

## 2018-02-08 RX ADMIN — LINAGLIPTIN 5 MG: 5 TABLET, FILM COATED ORAL at 11:11

## 2018-02-08 RX ADMIN — POTASSIUM CHLORIDE 20 MEQ: 20 TABLET, EXTENDED RELEASE ORAL at 21:04

## 2018-02-08 RX ADMIN — MULTIPLE VITAMINS W/ MINERALS TAB 1 TABLET: TAB at 09:02

## 2018-02-08 RX ADMIN — ENOXAPARIN SODIUM 40 MG: 40 INJECTION SUBCUTANEOUS at 09:02

## 2018-02-08 RX ADMIN — POTASSIUM CHLORIDE 20 MEQ: 20 TABLET, EXTENDED RELEASE ORAL at 10:59

## 2018-02-08 RX ADMIN — ROPINIROLE HYDROCHLORIDE 0.5 MG: 0.25 TABLET, FILM COATED ORAL at 09:03

## 2018-02-08 RX ADMIN — HYDROCODONE BITARTRATE AND ACETAMINOPHEN 2 TABLET: 5; 325 TABLET ORAL at 10:59

## 2018-02-08 RX ADMIN — METOPROLOL TARTRATE 12.5 MG: 25 TABLET ORAL at 09:10

## 2018-02-08 RX ADMIN — ROPINIROLE HYDROCHLORIDE 0.5 MG: 0.25 TABLET, FILM COATED ORAL at 13:46

## 2018-02-08 RX ADMIN — FAMOTIDINE 10 MG: 20 TABLET, FILM COATED ORAL at 21:02

## 2018-02-08 RX ADMIN — KETOROLAC TROMETHAMINE 15 MG: 30 INJECTION, SOLUTION INTRAMUSCULAR at 10:58

## 2018-02-08 RX ADMIN — INSULIN LISPRO 1 UNITS: 100 INJECTION, SOLUTION INTRAVENOUS; SUBCUTANEOUS at 21:54

## 2018-02-08 RX ADMIN — HYDROCODONE BITARTRATE AND ACETAMINOPHEN 2 TABLET: 5; 325 TABLET ORAL at 21:54

## 2018-02-08 RX ADMIN — ALBUTEROL SULFATE 2.5 MG: 2.5 SOLUTION RESPIRATORY (INHALATION) at 08:26

## 2018-02-08 RX ADMIN — AMIODARONE HYDROCHLORIDE 200 MG: 200 TABLET ORAL at 21:00

## 2018-02-08 RX ADMIN — DIPHENHYDRAMINE HCL 25 MG: 25 TABLET ORAL at 21:54

## 2018-02-08 RX ADMIN — ALBUTEROL SULFATE 2.5 MG: 2.5 SOLUTION RESPIRATORY (INHALATION) at 20:30

## 2018-02-08 RX ADMIN — CITALOPRAM 20 MG: 20 TABLET, FILM COATED ORAL at 09:09

## 2018-02-08 RX ADMIN — FUROSEMIDE 40 MG: 10 INJECTION, SOLUTION INTRAMUSCULAR; INTRAVENOUS at 10:59

## 2018-02-08 RX ADMIN — INSULIN LISPRO 2 UNITS: 100 INJECTION, SOLUTION INTRAVENOUS; SUBCUTANEOUS at 18:14

## 2018-02-08 RX ADMIN — ROPINIROLE HYDROCHLORIDE 0.5 MG: 0.25 TABLET, FILM COATED ORAL at 21:00

## 2018-02-08 RX ADMIN — ALBUTEROL SULFATE 2.5 MG: 2.5 SOLUTION RESPIRATORY (INHALATION) at 11:27

## 2018-02-08 RX ADMIN — CLOPIDOGREL 75 MG: 75 TABLET, FILM COATED ORAL at 09:02

## 2018-02-08 RX ADMIN — AMIODARONE HYDROCHLORIDE 200 MG: 200 TABLET ORAL at 09:02

## 2018-02-08 ASSESSMENT — PAIN SCALES - GENERAL
PAINLEVEL_OUTOF10: 7
PAINLEVEL_OUTOF10: 3

## 2018-02-08 ASSESSMENT — PAIN DESCRIPTION - PAIN TYPE
TYPE: SURGICAL PAIN
TYPE: SURGICAL PAIN

## 2018-02-08 NOTE — PROGRESS NOTES
Physical Therapy  Facility/Department: [facility] CAR 1  Daily Treatment Note  NAME: Gordon Simons  : 1950  MRN: [id number]    Date of Service: 2018    Patient Diagnosis(es):   Patient Active Problem List    Diagnosis Date Noted    CAD, multiple vessel 2018    Class 2 obesity due to excess calories with serious comorbidity and body mass index (BMI) of 37.0 to 37.9 in adult 2017    Chronic bilateral low back pain with left-sided sciatica 2017    BMI 37.0-37.9, adult 2017    Obesity (BMI 30-39.9) 11/10/2016    Obesity, Class I, BMI 30.0-34.9 (see actual BMI) 2016    Atherosclerosis of superior mesenteric artery ([address] 10/06/2015    Traumatic compression fracture of T9 thoracic vertebra ([address] 10/06/2015    Left adrenal mass ([address] 10/06/2015    Former smoker 10/06/2015    Hypokalemia 2015    Controlled type 2 diabetes mellitus without complication, without long-term current use of insulin ([address] 09/10/2015    Allergic rhinitis 09/10/2015    Hypertension goal BP (blood pressure) < 140/90 09/10/2015    Mixed hyperlipidemia 09/10/2015    Chronic obstructive pulmonary disease ([address] 09/10/2015    Coronary artery disease involving native coronary artery of native heart without angina pectoris 09/10/2015    Primary insomnia 09/10/2015    Diarrhea in adult patient 09/10/2015    Bug bites 09/10/2015    Restless leg syndrome, uncontrolled 09/10/2015    Chronic pain        Past Medical History:   Diagnosis Date    Allergic rhinitis     Arthritis     general    CAD (coronary artery disease)     Dr. Cuba Villarreal CHF (congestive heart failure) ([address]     Controlled type 2 diabetes mellitus without complication, without long-term current use of insulin ([address] 9/10/2015    COPD (chronic obstructive pulmonary disease) ([address]     Depression     Former smoker 10/6/2015    History of blood transfusion     no reaction    Hyperlipidemia     Hypertension     Kidney

## 2018-02-08 NOTE — PROGRESS NOTES
Progress Note    Patient Name:  Denver Berumen    :  1950 8:08 AM      SUBJECTIVE       Ms. Lesly Santamaria  has no chest pain, shortness of breath, palpitations, nausea or vomiting      OBJECTIVE     Vital signs:    BP (!) 143/75   Pulse 81   Temp 97.5 °F (36.4 °C) (Oral)   Resp 17   Ht 5' 5\" (1.651 m)   Wt 212 lb 1.3 oz (96.2 kg)   SpO2 100%   BMI 35.29 kg/m²  5 L/min      Admit Weight:  210 lb 5.1 oz (95.4 kg)    Last 3 weights: Wt Readings from Last 3 Encounters:   18 212 lb 1.3 oz (96.2 kg)   18 212 lb 8.4 oz (96.4 kg)   18 215 lb (97.5 kg)       BMI: Body mass index is 35.29 kg/m². Input/Output:       Intake/Output Summary (Last 24 hours) at 18 0808  Last data filed at 18 0600   Gross per 24 hour   Intake             2534 ml   Output             2850 ml   Net             -316 ml         Exam:     General appearance: awake and alert moves all ext   Lungs: no rhonchi, no wheezes, no rales  Heart: S1 and S2 no murmur  Abdomen: positive bowel sounds, no bruits, no masses  Extremities: warm and dry, no cyanosis, no clubbing        Laboratory Studies:     CBC: Recent Labs      18   1436  18   1553  18   0722   WBC   --   28.2*  21.2*   HGB  9.3*  9.3*  7.8*   HCT  29.2*  29.7*  25.6*   MCV   --   86.1  88.9   PLT  89*  See Reflexed IPF Result  See Reflexed IPF Result     BMP: Recent Labs      18   0031  18   0557  18   0422   NA   --   145*  140   K  4.5  4.4  4.1   CL   --   110*  108*   CO2   --   24  27   BUN   --   12  16   CREATININE   --   0.71  0.67     PT/INR:   Recent Labs      18   1436   PROTIME  22.9*   INR  2.1     APTT:   Recent Labs      18   1436   APTT  59.1*     MAG:   Recent Labs      18   0031  18   0557  18   0422   MG  1.8  2.4  2.3     D Dimer: No results for input(s): DDIMER in the last 72 hours. Troponin  No results for input(s): TROPONINI in the last 72 hours.       No

## 2018-02-09 LAB
ANION GAP SERPL CALCULATED.3IONS-SCNC: 9 MMOL/L (ref 9–17)
BLOOD BANK SPECIMEN: NORMAL
BUN BLDV-MCNC: 21 MG/DL (ref 8–23)
BUN/CREAT BLD: ABNORMAL (ref 9–20)
CALCIUM SERPL-MCNC: 8.5 MG/DL (ref 8.6–10.4)
CHLORIDE BLD-SCNC: 105 MMOL/L (ref 98–107)
CO2: 27 MMOL/L (ref 20–31)
CREAT SERPL-MCNC: 0.64 MG/DL (ref 0.5–0.9)
GFR AFRICAN AMERICAN: >60 ML/MIN
GFR NON-AFRICAN AMERICAN: >60 ML/MIN
GFR SERPL CREATININE-BSD FRML MDRD: ABNORMAL ML/MIN/{1.73_M2}
GFR SERPL CREATININE-BSD FRML MDRD: ABNORMAL ML/MIN/{1.73_M2}
GLUCOSE BLD-MCNC: 140 MG/DL (ref 65–105)
GLUCOSE BLD-MCNC: 149 MG/DL (ref 70–99)
GLUCOSE BLD-MCNC: 154 MG/DL (ref 65–105)
GLUCOSE BLD-MCNC: 220 MG/DL (ref 65–105)
GLUCOSE BLD-MCNC: 233 MG/DL (ref 65–105)
GLUCOSE BLD-MCNC: 90 MG/DL (ref 65–105)
HCT VFR BLD CALC: 24.9 % (ref 36.3–47.1)
HCT VFR BLD CALC: 27.2 % (ref 36.3–47.1)
HEMOGLOBIN: 7.2 G/DL (ref 11.9–15.1)
HEMOGLOBIN: 8.2 G/DL (ref 11.9–15.1)
MAGNESIUM: 2.2 MG/DL (ref 1.6–2.6)
MCH RBC QN AUTO: 26.8 PG (ref 25.2–33.5)
MCHC RBC AUTO-ENTMCNC: 28.9 G/DL (ref 28.4–34.8)
MCV RBC AUTO: 92.6 FL (ref 82.6–102.9)
NRBC AUTOMATED: 0.1 PER 100 WBC
PDW BLD-RTO: 15.4 % (ref 11.8–14.4)
PLATELET # BLD: 145 K/UL (ref 138–453)
PMV BLD AUTO: 11.5 FL (ref 8.1–13.5)
POTASSIUM SERPL-SCNC: 4.4 MMOL/L (ref 3.7–5.3)
RBC # BLD: 2.69 M/UL (ref 3.95–5.11)
SODIUM BLD-SCNC: 141 MMOL/L (ref 135–144)
WBC # BLD: 14.8 K/UL (ref 3.5–11.3)

## 2018-02-09 PROCEDURE — 36430 TRANSFUSION BLD/BLD COMPNT: CPT

## 2018-02-09 PROCEDURE — 6360000002 HC RX W HCPCS: Performed by: THORACIC SURGERY (CARDIOTHORACIC VASCULAR SURGERY)

## 2018-02-09 PROCEDURE — 97116 GAIT TRAINING THERAPY: CPT

## 2018-02-09 PROCEDURE — 83735 ASSAY OF MAGNESIUM: CPT

## 2018-02-09 PROCEDURE — 6360000002 HC RX W HCPCS: Performed by: PHYSICIAN ASSISTANT

## 2018-02-09 PROCEDURE — 97110 THERAPEUTIC EXERCISES: CPT

## 2018-02-09 PROCEDURE — 36415 COLL VENOUS BLD VENIPUNCTURE: CPT

## 2018-02-09 PROCEDURE — 99024 POSTOP FOLLOW-UP VISIT: CPT | Performed by: PHYSICIAN ASSISTANT

## 2018-02-09 PROCEDURE — 80048 BASIC METABOLIC PNL TOTAL CA: CPT

## 2018-02-09 PROCEDURE — 6370000000 HC RX 637 (ALT 250 FOR IP): Performed by: THORACIC SURGERY (CARDIOTHORACIC VASCULAR SURGERY)

## 2018-02-09 PROCEDURE — P9016 RBC LEUKOCYTES REDUCED: HCPCS

## 2018-02-09 PROCEDURE — 85018 HEMOGLOBIN: CPT

## 2018-02-09 PROCEDURE — 2140000001 HC CVICU INTERMEDIATE R&B

## 2018-02-09 PROCEDURE — 86920 COMPATIBILITY TEST SPIN: CPT

## 2018-02-09 PROCEDURE — 2580000003 HC RX 258: Performed by: THORACIC SURGERY (CARDIOTHORACIC VASCULAR SURGERY)

## 2018-02-09 PROCEDURE — 82947 ASSAY GLUCOSE BLOOD QUANT: CPT

## 2018-02-09 PROCEDURE — 86901 BLOOD TYPING SEROLOGIC RH(D): CPT

## 2018-02-09 PROCEDURE — 86900 BLOOD TYPING SEROLOGIC ABO: CPT

## 2018-02-09 PROCEDURE — 85014 HEMATOCRIT: CPT

## 2018-02-09 PROCEDURE — 85027 COMPLETE CBC AUTOMATED: CPT

## 2018-02-09 PROCEDURE — 94640 AIRWAY INHALATION TREATMENT: CPT

## 2018-02-09 PROCEDURE — 6360000002 HC RX W HCPCS: Performed by: NURSE PRACTITIONER

## 2018-02-09 PROCEDURE — 97535 SELF CARE MNGMENT TRAINING: CPT

## 2018-02-09 PROCEDURE — 86850 RBC ANTIBODY SCREEN: CPT

## 2018-02-09 PROCEDURE — 94762 N-INVAS EAR/PLS OXIMTRY CONT: CPT

## 2018-02-09 RX ORDER — FUROSEMIDE 10 MG/ML
20 INJECTION INTRAMUSCULAR; INTRAVENOUS ONCE
Status: COMPLETED | OUTPATIENT
Start: 2018-02-09 | End: 2018-02-09

## 2018-02-09 RX ORDER — ALBUTEROL SULFATE 2.5 MG/3ML
2.5 SOLUTION RESPIRATORY (INHALATION) 3 TIMES DAILY
Status: DISCONTINUED | OUTPATIENT
Start: 2018-02-09 | End: 2018-02-12 | Stop reason: HOSPADM

## 2018-02-09 RX ORDER — 0.9 % SODIUM CHLORIDE 0.9 %
250 INTRAVENOUS SOLUTION INTRAVENOUS ONCE
Status: COMPLETED | OUTPATIENT
Start: 2018-02-09 | End: 2018-02-10

## 2018-02-09 RX ADMIN — DIPHENHYDRAMINE HCL 25 MG: 25 TABLET ORAL at 21:04

## 2018-02-09 RX ADMIN — METOPROLOL TARTRATE 12.5 MG: 25 TABLET ORAL at 21:04

## 2018-02-09 RX ADMIN — INSULIN LISPRO 2 UNITS: 100 INJECTION, SOLUTION INTRAVENOUS; SUBCUTANEOUS at 16:30

## 2018-02-09 RX ADMIN — ENOXAPARIN SODIUM 40 MG: 40 INJECTION SUBCUTANEOUS at 08:35

## 2018-02-09 RX ADMIN — ALBUTEROL SULFATE 2.5 MG: 2.5 SOLUTION RESPIRATORY (INHALATION) at 08:30

## 2018-02-09 RX ADMIN — SODIUM CHLORIDE 250 ML: 9 INJECTION, SOLUTION INTRAVENOUS at 16:29

## 2018-02-09 RX ADMIN — INSULIN LISPRO 2 UNITS: 100 INJECTION, SOLUTION INTRAVENOUS; SUBCUTANEOUS at 11:52

## 2018-02-09 RX ADMIN — INSULIN LISPRO 1 UNITS: 100 INJECTION, SOLUTION INTRAVENOUS; SUBCUTANEOUS at 21:05

## 2018-02-09 RX ADMIN — METOPROLOL TARTRATE 12.5 MG: 25 TABLET ORAL at 08:34

## 2018-02-09 RX ADMIN — ALBUTEROL SULFATE 2.5 MG: 2.5 SOLUTION RESPIRATORY (INHALATION) at 20:04

## 2018-02-09 RX ADMIN — AMIODARONE HYDROCHLORIDE 200 MG: 200 TABLET ORAL at 08:34

## 2018-02-09 RX ADMIN — CITALOPRAM 20 MG: 20 TABLET, FILM COATED ORAL at 08:34

## 2018-02-09 RX ADMIN — SIMVASTATIN 20 MG: 20 TABLET, FILM COATED ORAL at 21:04

## 2018-02-09 RX ADMIN — FAMOTIDINE 10 MG: 20 TABLET, FILM COATED ORAL at 21:04

## 2018-02-09 RX ADMIN — AMIODARONE HYDROCHLORIDE 200 MG: 200 TABLET ORAL at 21:04

## 2018-02-09 RX ADMIN — ROPINIROLE HYDROCHLORIDE 0.5 MG: 0.25 TABLET, FILM COATED ORAL at 08:34

## 2018-02-09 RX ADMIN — HYDROCODONE BITARTRATE AND ACETAMINOPHEN 2 TABLET: 5; 325 TABLET ORAL at 06:44

## 2018-02-09 RX ADMIN — ROPINIROLE HYDROCHLORIDE 0.5 MG: 0.25 TABLET, FILM COATED ORAL at 16:27

## 2018-02-09 RX ADMIN — FAMOTIDINE 10 MG: 20 TABLET, FILM COATED ORAL at 08:34

## 2018-02-09 RX ADMIN — ROPINIROLE HYDROCHLORIDE 0.5 MG: 0.25 TABLET, FILM COATED ORAL at 21:04

## 2018-02-09 RX ADMIN — AMIODARONE HYDROCHLORIDE 200 MG: 200 TABLET ORAL at 16:27

## 2018-02-09 RX ADMIN — MULTIPLE VITAMINS W/ MINERALS TAB 1 TABLET: TAB at 08:34

## 2018-02-09 RX ADMIN — CLOPIDOGREL 75 MG: 75 TABLET, FILM COATED ORAL at 08:34

## 2018-02-09 RX ADMIN — HYDROCODONE BITARTRATE AND ACETAMINOPHEN 2 TABLET: 5; 325 TABLET ORAL at 19:49

## 2018-02-09 RX ADMIN — POTASSIUM CHLORIDE 20 MEQ: 20 TABLET, EXTENDED RELEASE ORAL at 08:34

## 2018-02-09 RX ADMIN — LINAGLIPTIN 5 MG: 5 TABLET, FILM COATED ORAL at 08:34

## 2018-02-09 RX ADMIN — FUROSEMIDE 20 MG: 10 INJECTION, SOLUTION INTRAVENOUS at 21:08

## 2018-02-09 RX ADMIN — HYDROCODONE BITARTRATE AND ACETAMINOPHEN 2 TABLET: 5; 325 TABLET ORAL at 10:26

## 2018-02-09 ASSESSMENT — PAIN DESCRIPTION - ORIENTATION
ORIENTATION: LEFT;MID
ORIENTATION: LEFT

## 2018-02-09 ASSESSMENT — PAIN DESCRIPTION - FREQUENCY
FREQUENCY: INTERMITTENT
FREQUENCY: CONTINUOUS

## 2018-02-09 ASSESSMENT — PAIN SCALES - GENERAL
PAINLEVEL_OUTOF10: 7
PAINLEVEL_OUTOF10: 4
PAINLEVEL_OUTOF10: 7

## 2018-02-09 ASSESSMENT — PAIN DESCRIPTION - LOCATION
LOCATION: CHEST
LOCATION: CHEST

## 2018-02-09 ASSESSMENT — PAIN DESCRIPTION - PAIN TYPE
TYPE: ACUTE PAIN
TYPE: SURGICAL PAIN
TYPE: ACUTE PAIN;SURGICAL PAIN

## 2018-02-09 ASSESSMENT — PAIN DESCRIPTION - DESCRIPTORS
DESCRIPTORS: SORE
DESCRIPTORS: ACHING

## 2018-02-09 ASSESSMENT — PAIN DESCRIPTION - ONSET: ONSET: ON-GOING

## 2018-02-09 NOTE — PROGRESS NOTES
Left;Mid  Pain Descriptors: Aching  Pain Frequency: Continuous  Pain Intervention(s): RN notified;Distraction  Response to Pain Intervention: Patient Satisfied  Objective    ADL  Feeding: Independent (seated in chair at tray table)  Grooming: Stand by assistance; Moderate assistance;Setup; Increased time to complete (seated in chair at tray table, mod assist w/brushing hair, SBA for washing face and brushing teeth)  UE Bathing: Minimal assistance;Setup; Increased time to complete (assist w/back)  LE Bathing: Minimal assistance;Setup; Increased time to complete (assist w/feet and bottom)  UE Dressing: Minimal assistance;Setup (assist w/gown)  LE Dressing: Maximum assistance;Setup (assist w/footies)  Toileting: Maximum assistance ([place] cath)     Balance  Sitting Balance: Stand by assistance  Standing Balance: Contact guard assistance  Standing Balance  Time: Pt stood for approx 3-4 min for cory care and to transfer from eob to chair w/SW  Sit to stand: Contact guard assistance  Stand to sit: Contact guard assistance  Functional Mobility  Functional - Mobility Device: Standard Walker  Assist Level: Contact guard assistance  Functional Mobility Comments: Short func mob to recliner, no LOB noted, fatigued quickly, vc's for deep breathing tech  Bed mobility  Rolling to Left: Stand by assistance;Modified independent (using bed rail and HOB elevated)  Supine to Sit: Stand by assistance;Modified independent (w/HOB elevated and use of grab bar)  Scooting: Stand by assistance;Modified independent  Transfers  Stand Step Transfers: Contact guard assistance  Sit to stand: Contact guard assistance  Stand to sit: Contact guard assistance      Assessment   Performance deficits / Impairments: Decreased functional mobility ; Decreased ADL status; Decreased endurance;Decreased high-level IADLs  Prognosis: Good  Patient Education: Safety awareness, OT POC, discharge rec, sternal precautions  REQUIRES OT FOLLOW UP: Yes  Activity Tolerance  Activity Tolerance: Patient Tolerated treatment well;Patient limited by fatigue;Patient limited by pain  Safety Devices  Safety Devices in place: Yes  Type of devices: Left in chair;Nurse notified;Call light within reach; All fall risk precautions in place;Gait belt  OT Equipment Recommendations  Equipment Needed: Yes  Mobility Devices: Walker  ADL Assistive Devices: Transfer Tub Bench;Hand-held Shower;Long-handled Sponge       Plan   Plan  Times per week: 5x  Current Treatment Recommendations: Balance Training, Functional Mobility Training, Endurance Training, Pain Management, Safety Education & Training, Self-Care / ADL, Home Management Training, Patient/Caregiver Education & Training, Equipment Evaluation, Education, & procurement    Goals  Short term goals  Time Frame for Short term goals: Pt will by discharge   Short term goal 1: identify all sternal precautions with 1 vc  Short term goal 2: demo ADL UB/LB dressing/bathing seated with setup and min A  Short term goal 3: demo good safety awareness during func mob around floor with RW and mod I  Short term goal 4: demo min A for all transfers and bed mob  Short term goal 5: identify 2 pain-relieving tech's with 1 vc     Therapy Time   Individual Concurrent Group Co-treatment   Time In 0845         Time Out 8112         Minutes 79               RN ok for OT Tx. Pt in bed upon arrival. Sup to sit to eob SBA. Sit<>stand and func mob w/SW to chair CGA, no LOB. Pt set up at tray table for self care using surgical soap (see above for LOF). Pt wore O2 NC throughout tx @7L. RN advised to turn down to 3L. Pt maintained SpO2 in high 90's. BLE elevated, call light and phone in reach. Vc's for deep breathing tech vs mouth breathing with SOB during activity.     [address] A BACA, QUICK/L

## 2018-02-09 NOTE — PROGRESS NOTES
Physical Therapy  Facility/Department: [facility] CAR 1  Daily Treatment Note  NAME: Arcadio King  : 1950  MRN: [id number]    Date of Service: 2018    Patient Diagnosis(es):   Patient Active Problem List    Diagnosis Date Noted    CAD, multiple vessel 2018    Class 2 obesity due to excess calories with serious comorbidity and body mass index (BMI) of 37.0 to 37.9 in adult 2017    Chronic bilateral low back pain with left-sided sciatica 2017    BMI 37.0-37.9, adult 2017    Obesity (BMI 30-39.9) 11/10/2016    Obesity, Class I, BMI 30.0-34.9 (see actual BMI) 2016    Atherosclerosis of superior mesenteric artery ([address] 10/06/2015    Traumatic compression fracture of T9 thoracic vertebra ([address] 10/06/2015    Left adrenal mass ([address] 10/06/2015    Former smoker 10/06/2015    Hypokalemia 2015    Controlled type 2 diabetes mellitus without complication, without long-term current use of insulin ([address] 09/10/2015    Allergic rhinitis 09/10/2015    Hypertension goal BP (blood pressure) < 140/90 09/10/2015    Mixed hyperlipidemia 09/10/2015    Chronic obstructive pulmonary disease ([address] 09/10/2015    Coronary artery disease involving native coronary artery of native heart without angina pectoris 09/10/2015    Primary insomnia 09/10/2015    Diarrhea in adult patient 09/10/2015    Bug bites 09/10/2015    Restless leg syndrome, uncontrolled 09/10/2015    Chronic pain        Past Medical History:   Diagnosis Date    Allergic rhinitis     Arthritis     general    CAD (coronary artery disease)     Dr. Mario Haskins CHF (congestive heart failure) ([address]     Controlled type 2 diabetes mellitus without complication, without long-term current use of insulin ([address] 9/10/2015    COPD (chronic obstructive pulmonary disease) ([address]     Depression     Former smoker 10/6/2015    History of blood transfusion     no reaction    Hyperlipidemia     Hypertension     Kidney

## 2018-02-09 NOTE — PLAN OF CARE
Problem: Falls - Risk of  Goal: Absence of falls  Outcome: Ongoing  Patient remains free from falls or injuries, call light with in reach, bed is locked in lowest position with bed alarm on, chair alarm on when in use. Nurse rounds hourly and prn. Nurse continued to monitor patient.

## 2018-02-09 NOTE — PROGRESS NOTES
Progress Note    Patient Name:  Armando Ware    :  1950 12:28 PM      SUBJECTIVE       Ms. Joel Zhou  has no chest pain, shortness of breath, palpitations, nausea or vomiting      OBJECTIVE     Vital signs:    BP (!) 100/57   Pulse 69   Temp 97.9 °F (36.6 °C) (Oral)   Resp 16   Ht 5' 5\" (1.651 m)   Wt 212 lb 8.4 oz (96.4 kg)   SpO2 100%   BMI 35.37 kg/m²  5 L/min      Admit Weight:  210 lb 5.1 oz (95.4 kg)    Last 3 weights: Wt Readings from Last 3 Encounters:   18 212 lb 8.4 oz (96.4 kg)   18 212 lb 8.4 oz (96.4 kg)   18 215 lb (97.5 kg)       BMI: Body mass index is 35.37 kg/m². Input/Output:       Intake/Output Summary (Last 24 hours) at 18 1228  Last data filed at 18 0800   Gross per 24 hour   Intake                0 ml   Output              600 ml   Net             -600 ml         Exam:     General appearance: awake and alert moves all ext   Lungs: no rhonchi, no wheezes, no rales  Heart: S1 and S2 no murmur  Abdomen: positive bowel sounds, no bruits, no masses  Extremities: warm and dry, no cyanosis, no clubbing        Laboratory Studies:     CBC: Recent Labs      18   1553  18   0722  18   0457   WBC  28.2*  21.2*  14.8*   HGB  9.3*  7.8*  7.2*   HCT  29.7*  25.6*  24.9*   MCV  86.1  88.9  92.6   PLT  See Reflexed IPF Result  See Reflexed IPF Result  145     BMP: Recent Labs      18   0557  18   0422  18   0457   NA  145*  140  141   K  4.4  4.1  4.4   CL  110*  108*  105   CO2  24  27  27   BUN  12  16  21   CREATININE  0.71  0.67  0.64     PT/INR:   Recent Labs      18   1436   PROTIME  22.9*   INR  2.1     APTT:   Recent Labs      18   1436   APTT  59.1*     MAG:   Recent Labs      18   0557  18   0422  18   0457   MG  2.4  2.3  2.2     D Dimer: No results for input(s): DDIMER in the last 72 hours. Troponin  No results for input(s): TROPONINI in the last 72 hours.       No

## 2018-02-09 NOTE — PROGRESS NOTES
37.0 to 37.9 in adult    CAD, multiple vessel       69year old female: S/p  Redo sternotomy. Redo CABGx4, LIMA in seq to LAD and Diagonal, SVG to OM1, SVG to PDA under CPB assist. PO Day 3       HD:  - stable, no pressors  - On Bb, and on amio  - lovenox and plavix   - lasix  - zocor     Pulm:  - encourage IS  - breathing tx  -D/C chest tubes     GI:  - bowel regimen, james PO  - pepcid PO     Renal:  - Cr WNL  - lasix and potassium PO     Neuro/Pain:  - fentanyl, norco, tylenol prn  - home meds resumed : requip and celexa      Heme/Onc:  - anemia of acute blood loss; stable  -CBC pending     Endo:  - resume home tradjenta      Other info:  -Recheck weight and chart I/0 accurately        DVT ppx: Lovenox & SCD's while stationary  Patient is on BB,  Statin therapy per protocol   Plan for discharge pending improvement and functional status    The above recommendations including medications and orders were discussed and agreed upon with  the attending on service for the cardiothoracic surgery group today.     Electronically signed by Teresa Muro PA-C on 2/9/2018 at 4:17 PM

## 2018-02-09 NOTE — PROGRESS NOTES
Nutrition Education    Type and Reason for Visit: Patient Education        · Verbally reviewed following information with patient  · Written educational materials provided. · Contact number provided. · Refer to Patient Education activity for more details.     Electronically signed by Mona Kay DTR on 2/9/18 at 1:58 PM

## 2018-02-10 LAB
ABO/RH: NORMAL
ANION GAP SERPL CALCULATED.3IONS-SCNC: 8 MMOL/L (ref 9–17)
ANTIBODY SCREEN: NEGATIVE
ARM BAND NUMBER: NORMAL
BLD PROD TYP BPU: NORMAL
BUN BLDV-MCNC: 15 MG/DL (ref 8–23)
BUN/CREAT BLD: ABNORMAL (ref 9–20)
CALCIUM SERPL-MCNC: 8.5 MG/DL (ref 8.6–10.4)
CHLORIDE BLD-SCNC: 104 MMOL/L (ref 98–107)
CO2: 28 MMOL/L (ref 20–31)
CREAT SERPL-MCNC: 0.5 MG/DL (ref 0.5–0.9)
CROSSMATCH RESULT: NORMAL
DISPENSE STATUS BLOOD BANK: NORMAL
EXPIRATION DATE: NORMAL
GFR AFRICAN AMERICAN: >60 ML/MIN
GFR NON-AFRICAN AMERICAN: >60 ML/MIN
GFR SERPL CREATININE-BSD FRML MDRD: ABNORMAL ML/MIN/{1.73_M2}
GFR SERPL CREATININE-BSD FRML MDRD: ABNORMAL ML/MIN/{1.73_M2}
GLUCOSE BLD-MCNC: 152 MG/DL (ref 65–105)
GLUCOSE BLD-MCNC: 156 MG/DL (ref 70–99)
GLUCOSE BLD-MCNC: 160 MG/DL (ref 65–105)
GLUCOSE BLD-MCNC: 189 MG/DL (ref 65–105)
GLUCOSE BLD-MCNC: 209 MG/DL (ref 65–105)
HCT VFR BLD CALC: 27.7 % (ref 36.3–47.1)
HEMOGLOBIN: 8.4 G/DL (ref 11.9–15.1)
MAGNESIUM: 1.9 MG/DL (ref 1.6–2.6)
MCH RBC QN AUTO: 27.3 PG (ref 25.2–33.5)
MCHC RBC AUTO-ENTMCNC: 30.3 G/DL (ref 28.4–34.8)
MCV RBC AUTO: 89.9 FL (ref 82.6–102.9)
NRBC AUTOMATED: 0 PER 100 WBC
PDW BLD-RTO: 15.7 % (ref 11.8–14.4)
PLATELET # BLD: 202 K/UL (ref 138–453)
PMV BLD AUTO: 10.9 FL (ref 8.1–13.5)
POTASSIUM SERPL-SCNC: 4.3 MMOL/L (ref 3.7–5.3)
RBC # BLD: 3.08 M/UL (ref 3.95–5.11)
SODIUM BLD-SCNC: 140 MMOL/L (ref 135–144)
TRANSFUSION STATUS: NORMAL
UNIT DIVISION: 0
UNIT NUMBER: NORMAL
WBC # BLD: 13.3 K/UL (ref 3.5–11.3)

## 2018-02-10 PROCEDURE — 82947 ASSAY GLUCOSE BLOOD QUANT: CPT

## 2018-02-10 PROCEDURE — 97116 GAIT TRAINING THERAPY: CPT

## 2018-02-10 PROCEDURE — 36415 COLL VENOUS BLD VENIPUNCTURE: CPT

## 2018-02-10 PROCEDURE — 6360000002 HC RX W HCPCS: Performed by: THORACIC SURGERY (CARDIOTHORACIC VASCULAR SURGERY)

## 2018-02-10 PROCEDURE — 97530 THERAPEUTIC ACTIVITIES: CPT

## 2018-02-10 PROCEDURE — 94640 AIRWAY INHALATION TREATMENT: CPT

## 2018-02-10 PROCEDURE — 97110 THERAPEUTIC EXERCISES: CPT

## 2018-02-10 PROCEDURE — 85027 COMPLETE CBC AUTOMATED: CPT

## 2018-02-10 PROCEDURE — 2140000001 HC CVICU INTERMEDIATE R&B

## 2018-02-10 PROCEDURE — 6370000000 HC RX 637 (ALT 250 FOR IP): Performed by: THORACIC SURGERY (CARDIOTHORACIC VASCULAR SURGERY)

## 2018-02-10 PROCEDURE — 83735 ASSAY OF MAGNESIUM: CPT

## 2018-02-10 PROCEDURE — 2580000003 HC RX 258: Performed by: PHYSICIAN ASSISTANT

## 2018-02-10 PROCEDURE — 80048 BASIC METABOLIC PNL TOTAL CA: CPT

## 2018-02-10 PROCEDURE — 99024 POSTOP FOLLOW-UP VISIT: CPT | Performed by: PHYSICIAN ASSISTANT

## 2018-02-10 PROCEDURE — 94762 N-INVAS EAR/PLS OXIMTRY CONT: CPT

## 2018-02-10 RX ORDER — LANOLIN ALCOHOL/MO/W.PET/CERES
325 CREAM (GRAM) TOPICAL 2 TIMES DAILY WITH MEALS
Status: DISCONTINUED | OUTPATIENT
Start: 2018-02-10 | End: 2018-02-12 | Stop reason: HOSPADM

## 2018-02-10 RX ORDER — BISACODYL 10 MG
10 SUPPOSITORY, RECTAL RECTAL DAILY PRN
Status: DISCONTINUED | OUTPATIENT
Start: 2018-02-10 | End: 2018-02-12 | Stop reason: HOSPADM

## 2018-02-10 RX ORDER — FUROSEMIDE 10 MG/ML
40 INJECTION INTRAMUSCULAR; INTRAVENOUS ONCE
Status: COMPLETED | OUTPATIENT
Start: 2018-02-10 | End: 2018-02-10

## 2018-02-10 RX ADMIN — AMIODARONE HYDROCHLORIDE 200 MG: 200 TABLET ORAL at 19:40

## 2018-02-10 RX ADMIN — HYDROCODONE BITARTRATE AND ACETAMINOPHEN 1 TABLET: 5; 325 TABLET ORAL at 11:52

## 2018-02-10 RX ADMIN — AMIODARONE HYDROCHLORIDE 200 MG: 200 TABLET ORAL at 08:47

## 2018-02-10 RX ADMIN — FAMOTIDINE 10 MG: 20 TABLET, FILM COATED ORAL at 19:40

## 2018-02-10 RX ADMIN — ALBUTEROL SULFATE 2.5 MG: 2.5 SOLUTION RESPIRATORY (INHALATION) at 08:08

## 2018-02-10 RX ADMIN — ALBUTEROL SULFATE 2.5 MG: 2.5 SOLUTION RESPIRATORY (INHALATION) at 19:56

## 2018-02-10 RX ADMIN — FAMOTIDINE 10 MG: 20 TABLET, FILM COATED ORAL at 08:50

## 2018-02-10 RX ADMIN — METOPROLOL TARTRATE 25 MG: 25 TABLET ORAL at 19:40

## 2018-02-10 RX ADMIN — Medication 10 ML: at 08:47

## 2018-02-10 RX ADMIN — LINAGLIPTIN 5 MG: 5 TABLET, FILM COATED ORAL at 08:48

## 2018-02-10 RX ADMIN — INSULIN LISPRO 1 UNITS: 100 INJECTION, SOLUTION INTRAVENOUS; SUBCUTANEOUS at 20:55

## 2018-02-10 RX ADMIN — ROPINIROLE HYDROCHLORIDE 0.5 MG: 0.25 TABLET, FILM COATED ORAL at 08:48

## 2018-02-10 RX ADMIN — FUROSEMIDE 40 MG: 10 INJECTION, SOLUTION INTRAMUSCULAR; INTRAVENOUS at 08:46

## 2018-02-10 RX ADMIN — CITALOPRAM 20 MG: 20 TABLET, FILM COATED ORAL at 08:47

## 2018-02-10 RX ADMIN — POTASSIUM CHLORIDE 20 MEQ: 20 TABLET, EXTENDED RELEASE ORAL at 19:39

## 2018-02-10 RX ADMIN — MULTIPLE VITAMINS W/ MINERALS TAB 1 TABLET: TAB at 08:47

## 2018-02-10 RX ADMIN — ENOXAPARIN SODIUM 40 MG: 40 INJECTION SUBCUTANEOUS at 08:47

## 2018-02-10 RX ADMIN — ROPINIROLE HYDROCHLORIDE 0.5 MG: 0.25 TABLET, FILM COATED ORAL at 19:39

## 2018-02-10 RX ADMIN — INSULIN LISPRO 2 UNITS: 100 INJECTION, SOLUTION INTRAVENOUS; SUBCUTANEOUS at 11:52

## 2018-02-10 RX ADMIN — POTASSIUM CHLORIDE 20 MEQ: 20 TABLET, EXTENDED RELEASE ORAL at 08:48

## 2018-02-10 RX ADMIN — SIMVASTATIN 20 MG: 20 TABLET, FILM COATED ORAL at 19:39

## 2018-02-10 RX ADMIN — MAGNESIUM HYDROXIDE 30 ML: 400 SUSPENSION ORAL at 15:12

## 2018-02-10 RX ADMIN — METOPROLOL TARTRATE 25 MG: 25 TABLET ORAL at 08:48

## 2018-02-10 RX ADMIN — CLOPIDOGREL 75 MG: 75 TABLET, FILM COATED ORAL at 08:48

## 2018-02-10 RX ADMIN — FERROUS SULFATE TAB EC 325 MG (65 MG FE EQUIVALENT) 325 MG: 325 (65 FE) TABLET DELAYED RESPONSE at 11:52

## 2018-02-10 RX ADMIN — FERROUS SULFATE TAB EC 325 MG (65 MG FE EQUIVALENT) 325 MG: 325 (65 FE) TABLET DELAYED RESPONSE at 17:22

## 2018-02-10 RX ADMIN — ALBUTEROL SULFATE 2.5 MG: 2.5 SOLUTION RESPIRATORY (INHALATION) at 14:30

## 2018-02-10 RX ADMIN — HYDROCODONE BITARTRATE AND ACETAMINOPHEN 2 TABLET: 5; 325 TABLET ORAL at 19:37

## 2018-02-10 ASSESSMENT — PAIN SCALES - GENERAL
PAINLEVEL_OUTOF10: 9
PAINLEVEL_OUTOF10: 5

## 2018-02-10 NOTE — PROGRESS NOTES
200 mg Oral TID    citalopram  20 mg Oral Daily    rOPINIRole  0.5 mg Oral TID    amiodarone  200 mg Oral TID    therapeutic multivitamin-minerals  1 tablet Oral Daily with breakfast    simvastatin  20 mg Oral Nightly    aspirin  150 mg Rectal Once    clopidogrel  75 mg Oral Daily     Continuous Infusions:    dextrose         Exam:   General: alert and oriented to person, place and time, well-developed and well-nourished, in no acute distress  Heart:Normal S1 and S2.  Regular rhythm. No murmurs, gallops, or rubs. Lungs:Diminished bilaterally. Equal and symmetric expansion with respiration. Chest tubes to water seal  Abdomen: soft, non tender, non distended, BSx4  Extremities:trace edema  Musculoskeletal:  Intact range of motion of peripheral joints. grossly normal  Neurologic:  Non-focal sensory deficits on exam.  Psychiatric: Mood and affect are appropriate. Wounds: clean and dry, healing appropriately, dressing in place and intact  . Pravena with out leaks, seal in tact. Assessment/Plan:   Patient Active Problem List   Diagnosis    Chronic pain    Controlled type 2 diabetes mellitus without complication, without long-term current use of insulin (HCC)    Allergic rhinitis    Hypertension goal BP (blood pressure) < 140/90    Mixed hyperlipidemia    Chronic obstructive pulmonary disease (HCC)    Coronary artery disease involving native coronary artery of native heart without angina pectoris    Primary insomnia    Diarrhea in adult patient    Bug bites    Restless leg syndrome, uncontrolled    Hypokalemia    Atherosclerosis of superior mesenteric artery (HCC)    Traumatic compression fracture of T9 thoracic vertebra (HCC)    Left adrenal mass ([facility] [address]    Former smoker    Obesity, Class I, BMI 30.0-34.9 (see actual BMI)    Obesity (BMI 30-39. 9)    Chronic bilateral low back pain with left-sided sciatica    BMI 37.0-37.9, adult    Class 2 obesity due to excess calories with serious
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
## 2018-02-10 NOTE — PLAN OF CARE
Problem: Falls - Risk of  Goal: Absence of falls  Outcome: Ongoing      Problem:  Activity Intolerance:  Goal: Able to perform prescribed physical activity  Able to perform prescribed physical activity   Outcome: Ongoing      Problem: Fluid Volume - Imbalance:  Goal: Ability to achieve a balanced intake and output will improve  Ability to achieve a balanced intake and output will improve   Outcome: Ongoing      Problem: Gas Exchange - Impaired:  Goal: Levels of oxygenation will improve  Levels of oxygenation will improve   Outcome: Ongoing

## 2018-02-10 NOTE — PROGRESS NOTES
endurance;Decreased balance;Decreased functional mobility   Assessment: amb 300' HHA, CGA, pt quickly fatigued and SOB. Pt grossly stable with mobility except for deficits listed above. Pt with significantly increased time to complete all activity d/t frequent rest breaks and slow mobility. Prognosis: Good  Decision Making: Medium Complexity   Patient  Education: Kelsy Sellers.   Goals  Short term goals  Time Frame for Short term goals: 14 visits  Short term goal 1: Pt will be I with bed mobility  Short term goal 2: Pt will be I with trasnfers  Short term goal 3: Pt will be I with amb 300' without AD  Short term goal 4: Pt will navigate 10 steps with either or both rail use Qian    Plan    Plan  Times per week: 7x/wk BID  Current Treatment Recommendations: Strengthening, Transfer Training, Endurance Training, Balance Training, Gait Training, Functional Mobility Training, Stair training, Home Exercise Program, Safety Education & Training, Patient/Caregiver Education & Training, Equipment Evaluation, Education, & procurement  Safety Devices  Type of devices: Call light within reach, Left in chair, Patient at risk for falls, Gait belt, Nurse notified  Restraints  Initially in place: No     Therapy Time   Individual Concurrent Group Co-treatment   Time In   915         Time Out   945         Minutes   [facility], PTA,

## 2018-02-11 LAB
GLUCOSE BLD-MCNC: 151 MG/DL (ref 65–105)
GLUCOSE BLD-MCNC: 158 MG/DL (ref 65–105)
GLUCOSE BLD-MCNC: 159 MG/DL (ref 65–105)
GLUCOSE BLD-MCNC: 213 MG/DL (ref 65–105)

## 2018-02-11 PROCEDURE — 97530 THERAPEUTIC ACTIVITIES: CPT

## 2018-02-11 PROCEDURE — 6370000000 HC RX 637 (ALT 250 FOR IP): Performed by: THORACIC SURGERY (CARDIOTHORACIC VASCULAR SURGERY)

## 2018-02-11 PROCEDURE — 97116 GAIT TRAINING THERAPY: CPT

## 2018-02-11 PROCEDURE — 94640 AIRWAY INHALATION TREATMENT: CPT

## 2018-02-11 PROCEDURE — 94762 N-INVAS EAR/PLS OXIMTRY CONT: CPT

## 2018-02-11 PROCEDURE — 2140000001 HC CVICU INTERMEDIATE R&B

## 2018-02-11 PROCEDURE — 97110 THERAPEUTIC EXERCISES: CPT

## 2018-02-11 PROCEDURE — 6360000002 HC RX W HCPCS: Performed by: THORACIC SURGERY (CARDIOTHORACIC VASCULAR SURGERY)

## 2018-02-11 PROCEDURE — 82947 ASSAY GLUCOSE BLOOD QUANT: CPT

## 2018-02-11 RX ORDER — AMIODARONE HYDROCHLORIDE 200 MG/1
100 TABLET ORAL 2 TIMES DAILY
Status: DISCONTINUED | OUTPATIENT
Start: 2018-02-11 | End: 2018-02-12 | Stop reason: HOSPADM

## 2018-02-11 RX ADMIN — FERROUS SULFATE TAB EC 325 MG (65 MG FE EQUIVALENT) 325 MG: 325 (65 FE) TABLET DELAYED RESPONSE at 08:41

## 2018-02-11 RX ADMIN — ENOXAPARIN SODIUM 40 MG: 40 INJECTION SUBCUTANEOUS at 08:41

## 2018-02-11 RX ADMIN — FAMOTIDINE 10 MG: 20 TABLET, FILM COATED ORAL at 20:32

## 2018-02-11 RX ADMIN — INSULIN LISPRO 1 UNITS: 100 INJECTION, SOLUTION INTRAVENOUS; SUBCUTANEOUS at 21:21

## 2018-02-11 RX ADMIN — AMIODARONE HYDROCHLORIDE 100 MG: 200 TABLET ORAL at 20:32

## 2018-02-11 RX ADMIN — POTASSIUM CHLORIDE 20 MEQ: 20 TABLET, EXTENDED RELEASE ORAL at 20:44

## 2018-02-11 RX ADMIN — INSULIN LISPRO 2 UNITS: 100 INJECTION, SOLUTION INTRAVENOUS; SUBCUTANEOUS at 12:24

## 2018-02-11 RX ADMIN — CITALOPRAM 20 MG: 20 TABLET, FILM COATED ORAL at 08:42

## 2018-02-11 RX ADMIN — FAMOTIDINE 10 MG: 20 TABLET, FILM COATED ORAL at 08:42

## 2018-02-11 RX ADMIN — ALBUTEROL SULFATE 2.5 MG: 2.5 SOLUTION RESPIRATORY (INHALATION) at 21:28

## 2018-02-11 RX ADMIN — AMIODARONE HYDROCHLORIDE 100 MG: 200 TABLET ORAL at 08:42

## 2018-02-11 RX ADMIN — ROPINIROLE HYDROCHLORIDE 0.5 MG: 0.25 TABLET, FILM COATED ORAL at 12:24

## 2018-02-11 RX ADMIN — METOPROLOL TARTRATE 25 MG: 25 TABLET ORAL at 20:44

## 2018-02-11 RX ADMIN — ALBUTEROL SULFATE 2.5 MG: 2.5 SOLUTION RESPIRATORY (INHALATION) at 14:42

## 2018-02-11 RX ADMIN — LINAGLIPTIN 5 MG: 5 TABLET, FILM COATED ORAL at 08:42

## 2018-02-11 RX ADMIN — FERROUS SULFATE TAB EC 325 MG (65 MG FE EQUIVALENT) 325 MG: 325 (65 FE) TABLET DELAYED RESPONSE at 16:36

## 2018-02-11 RX ADMIN — HYDROCODONE BITARTRATE AND ACETAMINOPHEN 2 TABLET: 5; 325 TABLET ORAL at 20:32

## 2018-02-11 RX ADMIN — ROPINIROLE HYDROCHLORIDE 0.5 MG: 0.25 TABLET, FILM COATED ORAL at 08:42

## 2018-02-11 RX ADMIN — ROPINIROLE HYDROCHLORIDE 0.5 MG: 0.25 TABLET, FILM COATED ORAL at 20:32

## 2018-02-11 RX ADMIN — ALBUTEROL SULFATE 2.5 MG: 2.5 SOLUTION RESPIRATORY (INHALATION) at 07:52

## 2018-02-11 RX ADMIN — METOPROLOL TARTRATE 25 MG: 25 TABLET ORAL at 08:41

## 2018-02-11 RX ADMIN — CLOPIDOGREL 75 MG: 75 TABLET, FILM COATED ORAL at 08:42

## 2018-02-11 RX ADMIN — HYDROCODONE BITARTRATE AND ACETAMINOPHEN 1 TABLET: 5; 325 TABLET ORAL at 16:36

## 2018-02-11 RX ADMIN — SIMVASTATIN 20 MG: 20 TABLET, FILM COATED ORAL at 20:32

## 2018-02-11 ASSESSMENT — PAIN DESCRIPTION - PAIN TYPE: TYPE: SURGICAL PAIN

## 2018-02-11 ASSESSMENT — PAIN SCALES - GENERAL
PAINLEVEL_OUTOF10: 9
PAINLEVEL_OUTOF10: 5

## 2018-02-11 ASSESSMENT — PAIN DESCRIPTION - LOCATION: LOCATION: STERNUM
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
## 2018-02-12 VITALS
HEART RATE: 79 BPM | OXYGEN SATURATION: 95 % | BODY MASS INDEX: 35.96 KG/M2 | TEMPERATURE: 97.9 F | HEIGHT: 65 IN | WEIGHT: 215.83 LBS | RESPIRATION RATE: 18 BRPM | DIASTOLIC BLOOD PRESSURE: 84 MMHG | SYSTOLIC BLOOD PRESSURE: 137 MMHG

## 2018-02-12 PROBLEM — I25.10 CAD, MULTIPLE VESSEL: Status: RESOLVED | Noted: 2018-02-06 | Resolved: 2018-02-12

## 2018-02-12 LAB
ANION GAP SERPL CALCULATED.3IONS-SCNC: 9 MMOL/L (ref 9–17)
BUN BLDV-MCNC: 13 MG/DL (ref 8–23)
BUN/CREAT BLD: ABNORMAL (ref 9–20)
CALCIUM SERPL-MCNC: 8.4 MG/DL (ref 8.6–10.4)
CHLORIDE BLD-SCNC: 101 MMOL/L (ref 98–107)
CO2: 29 MMOL/L (ref 20–31)
CREAT SERPL-MCNC: 0.51 MG/DL (ref 0.5–0.9)
GFR AFRICAN AMERICAN: >60 ML/MIN
GFR NON-AFRICAN AMERICAN: >60 ML/MIN
GFR SERPL CREATININE-BSD FRML MDRD: ABNORMAL ML/MIN/{1.73_M2}
GFR SERPL CREATININE-BSD FRML MDRD: ABNORMAL ML/MIN/{1.73_M2}
GLUCOSE BLD-MCNC: 143 MG/DL (ref 70–99)
GLUCOSE BLD-MCNC: 151 MG/DL (ref 65–105)
GLUCOSE BLD-MCNC: 205 MG/DL (ref 65–105)
HCT VFR BLD CALC: 26.1 % (ref 36.3–47.1)
HEMOGLOBIN: 8 G/DL (ref 11.9–15.1)
MAGNESIUM: 2.1 MG/DL (ref 1.6–2.6)
MCH RBC QN AUTO: 26.8 PG (ref 25.2–33.5)
MCHC RBC AUTO-ENTMCNC: 30.7 G/DL (ref 28.4–34.8)
MCV RBC AUTO: 87.6 FL (ref 82.6–102.9)
NRBC AUTOMATED: 0.3 PER 100 WBC
PDW BLD-RTO: 16 % (ref 11.8–14.4)
PLATELET # BLD: 308 K/UL (ref 138–453)
PMV BLD AUTO: 10.3 FL (ref 8.1–13.5)
POTASSIUM SERPL-SCNC: 4.4 MMOL/L (ref 3.7–5.3)
RBC # BLD: 2.98 M/UL (ref 3.95–5.11)
SODIUM BLD-SCNC: 139 MMOL/L (ref 135–144)
WBC # BLD: 11.1 K/UL (ref 3.5–11.3)

## 2018-02-12 PROCEDURE — 83735 ASSAY OF MAGNESIUM: CPT

## 2018-02-12 PROCEDURE — 80048 BASIC METABOLIC PNL TOTAL CA: CPT

## 2018-02-12 PROCEDURE — 94762 N-INVAS EAR/PLS OXIMTRY CONT: CPT

## 2018-02-12 PROCEDURE — 97110 THERAPEUTIC EXERCISES: CPT

## 2018-02-12 PROCEDURE — 99024 POSTOP FOLLOW-UP VISIT: CPT | Performed by: PHYSICIAN ASSISTANT

## 2018-02-12 PROCEDURE — 85027 COMPLETE CBC AUTOMATED: CPT

## 2018-02-12 PROCEDURE — 94640 AIRWAY INHALATION TREATMENT: CPT

## 2018-02-12 PROCEDURE — 99999 PR OFFICE/OUTPT VISIT,PROCEDURE ONLY: CPT | Performed by: NURSE PRACTITIONER

## 2018-02-12 PROCEDURE — 82947 ASSAY GLUCOSE BLOOD QUANT: CPT

## 2018-02-12 PROCEDURE — 99999 PR OFFICE/OUTPT VISIT,PROCEDURE ONLY: CPT | Performed by: THORACIC SURGERY (CARDIOTHORACIC VASCULAR SURGERY)

## 2018-02-12 PROCEDURE — 6370000000 HC RX 637 (ALT 250 FOR IP): Performed by: THORACIC SURGERY (CARDIOTHORACIC VASCULAR SURGERY)

## 2018-02-12 PROCEDURE — 6360000002 HC RX W HCPCS: Performed by: THORACIC SURGERY (CARDIOTHORACIC VASCULAR SURGERY)

## 2018-02-12 PROCEDURE — 97116 GAIT TRAINING THERAPY: CPT

## 2018-02-12 PROCEDURE — 36415 COLL VENOUS BLD VENIPUNCTURE: CPT

## 2018-02-12 RX ORDER — ASPIRIN 81 MG/1
81 TABLET ORAL DAILY
Qty: 30 TABLET | Refills: 3 | Status: ON HOLD | OUTPATIENT
Start: 2018-02-12 | End: 2020-07-26 | Stop reason: HOSPADM

## 2018-02-12 RX ORDER — M-VIT,TX,IRON,MINS/CALC/FOLIC 27MG-0.4MG
1 TABLET ORAL
Qty: 30 TABLET | Refills: 0 | Status: SHIPPED | OUTPATIENT
Start: 2018-02-13 | End: 2018-06-29

## 2018-02-12 RX ORDER — AMIODARONE HYDROCHLORIDE 200 MG/1
100 TABLET ORAL 2 TIMES DAILY
Qty: 30 TABLET | Refills: 3 | Status: SHIPPED | OUTPATIENT
Start: 2018-02-12 | End: 2018-03-21 | Stop reason: SDUPTHER

## 2018-02-12 RX ORDER — FAMOTIDINE 10 MG
10 TABLET ORAL 2 TIMES DAILY
Qty: 60 TABLET | Refills: 3 | Status: SHIPPED | OUTPATIENT
Start: 2018-02-12 | End: 2018-03-23

## 2018-02-12 RX ORDER — LANOLIN ALCOHOL/MO/W.PET/CERES
325 CREAM (GRAM) TOPICAL 2 TIMES DAILY WITH MEALS
Qty: 90 TABLET | Refills: 3 | Status: SHIPPED | OUTPATIENT
Start: 2018-02-12 | End: 2018-03-23

## 2018-02-12 RX ORDER — HYDROCODONE BITARTRATE AND ACETAMINOPHEN 5; 325 MG/1; MG/1
1 TABLET ORAL EVERY 6 HOURS PRN
Qty: 28 TABLET | Refills: 0 | Status: SHIPPED | OUTPATIENT
Start: 2018-02-12 | End: 2018-02-19

## 2018-02-12 RX ORDER — CLOPIDOGREL BISULFATE 75 MG/1
75 TABLET ORAL DAILY
Qty: 30 TABLET | Refills: 3 | Status: SHIPPED | OUTPATIENT
Start: 2018-02-13 | End: 2019-02-14

## 2018-02-12 RX ORDER — SIMVASTATIN 20 MG
20 TABLET ORAL NIGHTLY
Qty: 30 TABLET | Refills: 3 | Status: SHIPPED | OUTPATIENT
Start: 2018-02-12 | End: 2018-06-21

## 2018-02-12 RX ADMIN — ALBUTEROL SULFATE 2.5 MG: 2.5 SOLUTION RESPIRATORY (INHALATION) at 08:30

## 2018-02-12 RX ADMIN — ROPINIROLE HYDROCHLORIDE 0.5 MG: 0.25 TABLET, FILM COATED ORAL at 13:13

## 2018-02-12 RX ADMIN — INSULIN LISPRO 2 UNITS: 100 INJECTION, SOLUTION INTRAVENOUS; SUBCUTANEOUS at 13:09

## 2018-02-12 RX ADMIN — ALBUTEROL SULFATE 2.5 MG: 2.5 SOLUTION RESPIRATORY (INHALATION) at 13:40

## 2018-02-12 RX ADMIN — ROPINIROLE HYDROCHLORIDE 0.5 MG: 0.25 TABLET, FILM COATED ORAL at 08:44

## 2018-02-12 RX ADMIN — AMIODARONE HYDROCHLORIDE 100 MG: 200 TABLET ORAL at 08:37

## 2018-02-12 RX ADMIN — FERROUS SULFATE TAB EC 325 MG (65 MG FE EQUIVALENT) 325 MG: 325 (65 FE) TABLET DELAYED RESPONSE at 08:40

## 2018-02-12 RX ADMIN — CITALOPRAM 20 MG: 20 TABLET, FILM COATED ORAL at 08:44

## 2018-02-12 RX ADMIN — METOPROLOL TARTRATE 25 MG: 25 TABLET ORAL at 08:40

## 2018-02-12 RX ADMIN — ENOXAPARIN SODIUM 40 MG: 40 INJECTION SUBCUTANEOUS at 08:16

## 2018-02-12 RX ADMIN — HYDROCODONE BITARTRATE AND ACETAMINOPHEN 1 TABLET: 5; 325 TABLET ORAL at 12:57

## 2018-02-12 RX ADMIN — POTASSIUM CHLORIDE 20 MEQ: 20 TABLET, EXTENDED RELEASE ORAL at 08:41

## 2018-02-12 RX ADMIN — LINAGLIPTIN 5 MG: 5 TABLET, FILM COATED ORAL at 08:43

## 2018-02-12 RX ADMIN — INSULIN LISPRO 1 UNITS: 100 INJECTION, SOLUTION INTRAVENOUS; SUBCUTANEOUS at 08:35

## 2018-02-12 RX ADMIN — HYDROCODONE BITARTRATE AND ACETAMINOPHEN 1 TABLET: 5; 325 TABLET ORAL at 08:39

## 2018-02-12 RX ADMIN — CLOPIDOGREL 75 MG: 75 TABLET, FILM COATED ORAL at 08:40

## 2018-02-12 RX ADMIN — FAMOTIDINE 10 MG: 20 TABLET, FILM COATED ORAL at 08:38

## 2018-02-12 RX ADMIN — MULTIPLE VITAMINS W/ MINERALS TAB 1 TABLET: TAB at 08:41

## 2018-02-12 ASSESSMENT — PAIN SCALES - GENERAL
PAINLEVEL_OUTOF10: 9
PAINLEVEL_OUTOF10: 10

## 2018-02-12 NOTE — PLAN OF CARE
Problem: RESPIRATORY  Intervention: Administer treatments as ordered  BRONCHOSPASM/BRONCHOCONSTRICTION     [x]         IMPROVE AERATION/BREATH SOUNDS  [x]   ADMINISTER BRONCHODILATOR THERAPY AS APPROPRIATE  [x]   ASSESS BREATH SOUNDS  []   IMPLEMENT AEROSOL/MDI PROTOCOL  [x]   PATIENT EDUCATION AS NEEDED      Intervention: Education, Medication and treatments  Patient educated on current respiratory medication and modalities administered. Possible side effects of medications discussed. AIDT method of introduction used in presenting my self.   Patient accepts Daily POC

## 2018-02-12 NOTE — CARE COORDINATION
Chart reviewed  Placed call to [address] and left message for octavia Vazquez of patient discharge disposition to home.
Consult received this date re;concerns about living condition  Chart reviewed  Patient  Is a 69year old female present with consultation for redo CABG  Case discussed with Libertad RN . Patients step daughter Romeo Chirinos reports patient and  her  both have significant health problems .  is in wheelchair and home is infested with bedbugs. Writer contacted [facility] Dept [phone] no assistance available for bed bug infestation . Placed call to stepdtr Romeo Chirinos [phone] states she is here visiting from [place]. And will be returning home Friday 2/9/18. Dtr inquired about help for bed bugs. Informed her of response from Health Dept. Romeo Chirinos states patient came to visit her in [place]. last year and her home became infested with bed bugs. Romeo Chirinos states it was very expensive to have her home exterminated. Romeo Chirinos states she had to take out a loan to pay for it. Romeo Chirinos states she has tried to de-clutter their home and patient would not allow it. Romeo Chirinos states she can not afford to exterminate their home due to her own financial issues. Passport services suggested for additional assistance in home. Dtr unsure if patient will be agreeable.   Placed call to [facility] [phone] spoke with Everton Moore and referral made  Car 1 casemgr notified of above  Insurance is [facility] Medicare/Medicaid
Met with patient to discuss discharge planning. Patient agrees that her  is in poor health and family is out of town. She is agreeable to making a referral for SNF. SNF list provided to patient for review and freedom of choice. Patient is agreeable for referral to [facility].   Referral electronically faxed and VM left for liaison Dorothy Acevedo, await return call    [phone] from [facility] here for on-site visit
Misha Reid RN on 2/7/18 at 10:12 AM

## 2018-02-12 NOTE — PROGRESS NOTES
[Urine:600]  Air Leak:  No air leak     Scheduled Meds:    amiodarone  100 mg Oral BID    metoprolol tartrate  25 mg Oral BID    ferrous sulfate  325 mg Oral BID WC    albuterol  2.5 mg Nebulization TID    potassium chloride  20 mEq Oral BID    linagliptin  5 mg Oral Daily    insulin lispro  0-6 Units Subcutaneous TID     insulin lispro  0-3 Units Subcutaneous Nightly    famotidine  10 mg Oral BID    enoxaparin  40 mg Subcutaneous Daily    [MAR Hold] sodium chloride flush  10 mL Intravenous 2 times per day    [MAR Hold] aspirin  81 mg Oral Daily    citalopram  20 mg Oral Daily    rOPINIRole  0.5 mg Oral TID    therapeutic multivitamin-minerals  1 tablet Oral Daily with breakfast    simvastatin  20 mg Oral Nightly    aspirin  150 mg Rectal Once    clopidogrel  75 mg Oral Daily     Continuous Infusions:    dextrose         Exam:   General: alert and oriented to person, place and time, well-developed and well-nourished, in no acute distress  Heart:Normal S1 and S2.  Regular rhythm. No murmurs, gallops, or rubs. , Pacing wires  No  Lungs: clear to auscultation bilaterally Equal and symmetric expansion with respiration. Chest tubes -No  Abdomen: soft, non tender, non distended, BSx4  Extremities: negative, intact pulses in all four extremities  Musculoskeletal:  Intact range of motion of peripheral joints. grossly normal  Neurologic:  Non-focal sensory deficits on exam.  Psychiatric: Mood and affect are appropriate. Wounds: clean and dry, healing appropriately, dressing in place with pravena. No leaks. Right leg still has drain with wrap.       Assessment/Plan:   Patient Active Problem List   Diagnosis    Chronic pain    Controlled type 2 diabetes mellitus without complication, without long-term current use of insulin ([facility])    Allergic rhinitis    Hypertension goal BP (blood pressure) < 140/90    Mixed hyperlipidemia    Chronic obstructive pulmonary disease (HCC)    Coronary artery disease involving native coronary artery of native heart without angina pectoris    Primary insomnia    Diarrhea in adult patient    Bug bites    Restless leg syndrome, uncontrolled    Hypokalemia    Atherosclerosis of superior mesenteric artery (HCC)    Traumatic compression fracture of T9 thoracic vertebra (HCC)    Left adrenal mass ([facility] [address]    Former smoker    Obesity, Class I, BMI 30.0-34.9 (see actual BMI)    Obesity (BMI 30-39. 9)    Chronic bilateral low back pain with left-sided sciatica    BMI 37.0-37.9, adult    Class 2 obesity due to excess calories with serious comorbidity and body mass index (BMI) of 37.0 to 37.9 in adult    CAD, multiple vessel       1. S/p Redo sternotomy. Redo CABGx4, LIMA in seq to LAD and Diagonal, SVG to OM1, SVG to PDA under CPB assist  POD 6  2. HD - stable, no drips  3. Pulmo - RUL crackles, Chest tubes d/macy  4. GI - Soft, non tender. + Bs, Denies N/V  5.  - normal CR  6. Neuro - Pain controlled, No focal deficits  ABLA - Hb: 8.0 Pl: 308       Patient is on:   Plavix tab 75mg daily  Lovenox inj 40 mg Daily  Lopressor tab 25 mg 2x Daily  Simvastatin tab 20mg nightly      Plan for discharge today    The above recommendations including medications and orders were discussed and agreed upon with  the attending on service for the cardiothoracic surgery group today.     Electronically signed by Kaye Borja PA-C on 2/12/2018 at 9:58 AM

## 2018-02-12 NOTE — PROGRESS NOTES
Progress Note    Patient Name:  Omega Coley    :  1950 1:38 PM      SUBJECTIVE       Ms. Eid Night  has no chest pain, shortness of breath, palpitations, nausea or vomiting. Ready to go home    OBJECTIVE     Vital signs:    /84   Pulse 79   Temp 97.9 °F (36.6 °C) (Oral)   Resp 18   Ht 5' 5\" (1.651 m)   Wt 215 lb 13.3 oz (97.9 kg) Comment: per stand up scale  SpO2 95%   BMI 35.92 kg/m²  2 L/min      Admit Weight:  210 lb 5.1 oz (95.4 kg)    Last 3 weights: Wt Readings from Last 3 Encounters:   18 215 lb 13.3 oz (97.9 kg)   18 212 lb 8.4 oz (96.4 kg)   18 215 lb (97.5 kg)       BMI: Body mass index is 35.92 kg/m². Input/Output:       Intake/Output Summary (Last 24 hours) at 18 1338  Last data filed at 18 0946   Gross per 24 hour   Intake              980 ml   Output              600 ml   Net              380 ml         Exam:     General appearance: awake and alert moves all ext   Lungs:clear to auscultation. Heart: S1 and S2 no murmur  Abdomen:soft. Extremities: no significant edema        Laboratory Studies:     CBC:   Recent Labs      02/09/18   2028  02/10/18   0529  18   0433   WBC   --   13.3*  11.1   HGB  8.2*  8.4*  8.0*   HCT  27.2*  27.7*  26.1*   MCV   --   89.9  87.6   PLT   --   202  308     BMP:   Recent Labs      02/10/18   0529  18   0433   NA  140  139   K  4.3  4.4   CL  104  101   CO2  28  29   BUN  15  13   CREATININE  0.50  0.51     PT/INR: No results for input(s): PROTIME, INR in the last 72 hours. APTT: No results for input(s): APTT in the last 72 hours. MAG:   Recent Labs      02/10/18   0529  18   0433   MG  1.9  2.1       Pulse Ox:  SpO2  Av %  Min: 95 %  Max: 97 %  Supplemental O2: O2 Flow Rate (L/min): 2 L/min     Current Meds:    amiodarone  100 mg Oral BID    metoprolol tartrate  25 mg Oral BID    ferrous sulfate  325 mg Oral BID WC    albuterol  2.5 mg Nebulization TID    potassium

## 2018-02-12 NOTE — FLOWSHEET NOTE
Visit: Chaplain anders with Dr. Arcenio White. Patient sitting in chair and engaging very well in conversation. Assessment: Patient was happy at the news of going home to see her  and her pets. She expressed great determination to get up the seven steps she will need to climb to get into her home. Intervention:  provided a supportive presence and prayed with the patient for continued healing. Plan: Chaplains to remain available for further support before patient is discharged. 02/12/18 0750   Encounter Summary   Services provided to: Patient   Referral/Consult From: Russ   Continue Visiting (2/12/18)   Complexity of Encounter Low   Length of Encounter 15 minutes   Routine   Type Follow up   Assessment Calm; Approachable; Hopeful   Intervention Explored feelings, thoughts, concerns;Prayer   Outcome Expressed gratitude;Engaged in conversation;Receptive
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
## 2018-02-12 NOTE — PLAN OF CARE
DISCHARGE INSTRUCTIONS    Discharge instruction given: to pt and her spouse at [facility] After Open Heart Surgery Book  Supplies needed  Activity instructions  Incisional Care  Weight and temperature   Pain Medication  Remi Hose  Incentive spirometer   Smoking Cessation  When to call your Doctor  Follow up appointments  Call Primary Doctor if blood sugars consistently above 130 or symptoms of high or low blood sugars  Call Primary Care Doctor if feelings of depression persist  Reinforced pt to participate in outpt cardiac rehabilitation program  Cardiac rehab referral faxed with face sheet, op note and H&P [facility] [address] incision with Prevena replaced today by Paula Ordonez NP  Rt leg incisions x 3 open to air with staples (prevena removed and left off per Paula Ordonez NP)    Verbalized understanding of all discharge instructions

## 2018-02-12 NOTE — PLAN OF CARE
Problem: Falls - Risk of  Goal: Absence of falls  Outcome: Ongoing  Pt advised to call out for any all needs. Call light, bedside table within reach.

## 2018-02-14 ENCOUNTER — TELEPHONE (OUTPATIENT)
Dept: CARDIOTHORACIC SURGERY | Age: 68
End: 2018-02-14

## 2018-02-14 NOTE — TELEPHONE ENCOUNTER
Pt was inquiring about some medications that weren't filled when she picked them up. I called [facility]o about the Iron prescription; that medication is an OTC medication that is not covered by the pts insurance (Medicare D). Pt is stating that she was having low Potassium levels and was on previously on Lasix. I spoke with Pipo; we will have a BMP done on the patient at her appt on 2/21/18 to clarify if she should continue with the Potassium. If there is any weight gain or swelling of the legs please call the office.

## 2018-02-16 ENCOUNTER — TELEPHONE (OUTPATIENT)
Dept: CARDIOTHORACIC SURGERY | Age: 68
End: 2018-02-16

## 2018-02-16 RX ORDER — FUROSEMIDE 40 MG/1
40 TABLET ORAL DAILY
Qty: 30 TABLET | Refills: 3 | Status: SHIPPED | OUTPATIENT
Start: 2018-02-16 | End: 2018-02-16 | Stop reason: SDUPTHER

## 2018-02-16 RX ORDER — POTASSIUM CHLORIDE 750 MG/1
TABLET, FILM COATED, EXTENDED RELEASE ORAL
Qty: 180 TABLET | Refills: 3 | Status: SHIPPED | OUTPATIENT
Start: 2018-02-16 | End: 2019-03-11 | Stop reason: SDUPTHER

## 2018-02-16 RX ORDER — FUROSEMIDE 40 MG/1
40 TABLET ORAL DAILY
Qty: 90 TABLET | Refills: 3 | Status: SHIPPED | OUTPATIENT
Start: 2018-02-16 | End: 2018-03-06

## 2018-02-16 RX ORDER — POTASSIUM CHLORIDE 20 MEQ/1
20 TABLET, EXTENDED RELEASE ORAL DAILY
Qty: 30 TABLET | Refills: 3 | Status: SHIPPED | OUTPATIENT
Start: 2018-02-16 | End: 2018-02-16 | Stop reason: SDUPTHER

## 2018-02-16 NOTE — TELEPHONE ENCOUNTER
Pt's [facility] Nurse called in with weight concerns. The pt was 215lbs at discharge and is 221lbs today. Any suggestions?
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
## 2018-02-21 ENCOUNTER — OFFICE VISIT (OUTPATIENT)
Dept: CARDIOTHORACIC SURGERY | Age: 68
End: 2018-02-21

## 2018-02-21 VITALS
HEART RATE: 79 BPM | OXYGEN SATURATION: 98 % | DIASTOLIC BLOOD PRESSURE: 74 MMHG | TEMPERATURE: 98.1 F | SYSTOLIC BLOOD PRESSURE: 120 MMHG | BODY MASS INDEX: 35.78 KG/M2 | WEIGHT: 215 LBS

## 2018-02-21 DIAGNOSIS — J44.9 CHRONIC OBSTRUCTIVE PULMONARY DISEASE, UNSPECIFIED COPD TYPE (HCC): Primary | ICD-10-CM

## 2018-02-21 DIAGNOSIS — Z95.1 S/P CABG X 4: ICD-10-CM

## 2018-02-21 DIAGNOSIS — L03.115 CELLULITIS OF RIGHT LOWER EXTREMITY: ICD-10-CM

## 2018-02-21 PROCEDURE — 99024 POSTOP FOLLOW-UP VISIT: CPT | Performed by: NURSE PRACTITIONER

## 2018-02-21 RX ORDER — ROPINIROLE 0.5 MG/1
TABLET, FILM COATED ORAL
COMMUNITY
Start: 2018-01-30 | End: 2018-02-21 | Stop reason: SDUPTHER

## 2018-02-21 RX ORDER — SIMVASTATIN 20 MG
1 TABLET ORAL DAILY
COMMUNITY
Start: 2018-02-14 | End: 2018-02-21 | Stop reason: SDUPTHER

## 2018-02-21 RX ORDER — CEPHALEXIN 500 MG/1
500 CAPSULE ORAL 3 TIMES DAILY
Qty: 21 CAPSULE | Refills: 0 | Status: SHIPPED | OUTPATIENT
Start: 2018-02-21 | End: 2018-03-06

## 2018-02-21 RX ORDER — POTASSIUM CHLORIDE 750 MG/1
2 TABLET, FILM COATED, EXTENDED RELEASE ORAL DAILY
COMMUNITY
Start: 2018-02-16 | End: 2018-02-21 | Stop reason: SDUPTHER

## 2018-02-21 RX ORDER — ALBUTEROL SULFATE 90 UG/1
2 AEROSOL, METERED RESPIRATORY (INHALATION) EVERY 4 HOURS PRN
Qty: 1 INHALER | Refills: 3 | Status: SHIPPED | OUTPATIENT
Start: 2018-02-21 | End: 2021-04-20 | Stop reason: SDUPTHER

## 2018-02-21 RX ORDER — LISINOPRIL 2.5 MG/1
1 TABLET ORAL DAILY
COMMUNITY
Start: 2018-01-12 | End: 2018-03-06

## 2018-02-21 RX ORDER — FUROSEMIDE 40 MG/1
1 TABLET ORAL DAILY
COMMUNITY
Start: 2018-02-16 | End: 2018-02-21 | Stop reason: SDUPTHER

## 2018-02-21 NOTE — PATIENT INSTRUCTIONS
Ace wrap to right upper leg, on during day, off at night. Wear ENRRIQUE hose during day, bilateral legs, off at night.

## 2018-02-28 ENCOUNTER — OFFICE VISIT (OUTPATIENT)
Dept: CARDIOTHORACIC SURGERY | Age: 68
End: 2018-02-28

## 2018-02-28 VITALS
OXYGEN SATURATION: 94 % | TEMPERATURE: 97.1 F | SYSTOLIC BLOOD PRESSURE: 107 MMHG | DIASTOLIC BLOOD PRESSURE: 61 MMHG | WEIGHT: 210 LBS | HEIGHT: 65 IN | HEART RATE: 68 BPM | RESPIRATION RATE: 18 BRPM | BODY MASS INDEX: 34.99 KG/M2

## 2018-02-28 DIAGNOSIS — L03.115 CELLULITIS OF RIGHT LOWER EXTREMITY: ICD-10-CM

## 2018-02-28 DIAGNOSIS — Z95.1 S/P CABG X 4: Primary | ICD-10-CM

## 2018-02-28 PROCEDURE — 99024 POSTOP FOLLOW-UP VISIT: CPT | Performed by: NURSE PRACTITIONER

## 2018-02-28 RX ORDER — ISOSORBIDE MONONITRATE 30 MG/1
30 TABLET, EXTENDED RELEASE ORAL DAILY
COMMUNITY
End: 2018-03-06

## 2018-02-28 RX ORDER — HYDROCHLOROTHIAZIDE 25 MG/1
25 TABLET ORAL DAILY
COMMUNITY
End: 2018-03-06

## 2018-02-28 RX ORDER — CIPROFLOXACIN 500 MG/1
500 TABLET, FILM COATED ORAL 3 TIMES DAILY
COMMUNITY
End: 2018-02-28 | Stop reason: DRUGHIGH

## 2018-02-28 RX ORDER — CIPROFLOXACIN 500 MG/1
500 TABLET, FILM COATED ORAL 2 TIMES DAILY
Qty: 10 TABLET | Refills: 0 | Status: SHIPPED
Start: 2018-02-28 | End: 2018-03-05

## 2018-02-28 NOTE — PROGRESS NOTES
[facility]  Office Visit      Subjective:  Ms. Jocelyn Yoon is a 69 y.o. female s/p Redo CABG x 4 on 2/6/2018. she feels well today. Pain is controlled without use of pain medication. She admits to intermittent muscle spasm in the left breast/arm area that resolve within a few minutes without interventions. Denies chest pain or shortness of breath. She has had no more episodes of wheezing since last visit. Plan of care reviewed and questions answered. Physical Exam  Vitals:  Vitals:    02/28/18 0943   BP: 107/61   Pulse: 68   Resp: 18   Temp: 97.1 °F (36.2 °C)   SpO2: 94%       General: Alert and Oriented x3. Ambulatory. No apparent distress. HEENT:  Normocephalic and atraumatic. Neck:  Supple. Trachea midline. Chest:  No abnormality. Equal and symmetric expansion with respiration. Lungs:  Clear/diminished to auscultation. Cardiac:  Regular rate and rhythm without murmurs, rubs or gallops. Abdomen:  Soft, non-tender, normoactive bowel sounds. No masses or organomegaly. Extremities:  +1 pitting edema BLE. Intact pulses in all four extremities. Musculoskeletal:  Intact range of motion of peripheral joints. Normal muscular strength. Psychiatric: Mood and affect are appropriate.   Surgical Wounds: Chest midline incision well approximated, open to air, no drainage; RLE 2x incisions, upper incision +staples, well-approximated, no drainage, minimal erythema surrounding site; lower incision +staples, no drainage, well-approximated, erythematous and edematous, warm to touch surrounding site    Current Medications:    Current Outpatient Prescriptions:     isosorbide mononitrate (IMDUR) 30 MG extended release tablet, Take 30 mg by mouth daily, Disp: , Rfl:     hydrochlorothiazide (HYDRODIURIL) 25 MG tablet, Take 25 mg by mouth daily, Disp: , Rfl:     ciprofloxacin (CIPRO) 500 MG tablet, Take 1 tablet by mouth 2 times daily for 5 days, Disp: 10 tablet, Rfl: 0    lisinopril (PRINIVIL;ZESTRIL) 2.5 MG tablet, Take 1 tablet by mouth daily, Disp: , Rfl:     albuterol sulfate HFA (PROAIR HFA) 108 (90 Base) MCG/ACT inhaler, Inhale 2 puffs into the lungs every 4 hours as needed for Wheezing, Disp: 1 Inhaler, Rfl: 3    Spacer/Aero-Holding Chambers (E-Z SPACER) [place], 1 Device by Does not apply route daily as needed (with pro air inhaler), Disp: 1 Device, Rfl: 0    furosemide (LASIX) 40 MG tablet, TAKE 1 TABLET BY MOUTH DAILY, Disp: 90 tablet, Rfl: 3    potassium chloride (KLOR-CON) 10 MEQ extended release tablet, TAKE 2 TABLETS BY MOUTH ONCE DAILY, Disp: 180 tablet, Rfl: 3    metFORMIN (GLUCOPHAGE-XR) 500 MG extended release tablet, TAKE 1 TABLET BY MOUTH EVERY DAY WITH BREAKFAST, Disp: 90 tablet, Rfl: 11    aspirin 81 MG EC tablet, Take 1 tablet by mouth daily, Disp: 30 tablet, Rfl: 3    amiodarone (CORDARONE) 200 MG tablet, Take 0.5 tablets by mouth 2 times daily, Disp: 30 tablet, Rfl: 3    simvastatin (ZOCOR) 20 MG tablet, Take 1 tablet by mouth nightly, Disp: 30 tablet, Rfl: 3    metoprolol tartrate (LOPRESSOR) 25 MG tablet, Take 1 tablet by mouth 2 times daily, Disp: 60 tablet, Rfl: 3    ferrous sulfate 325 (65 Fe) MG EC tablet, Take 1 tablet by mouth 2 times daily (with meals), Disp: 90 tablet, Rfl: 3    clopidogrel (PLAVIX) 75 MG tablet, Take 1 tablet by mouth daily, Disp: 30 tablet, Rfl: 3    Multiple Vitamins-Minerals (THERAPEUTIC MULTIVITAMIN-MINERALS) tablet, Take 1 tablet by mouth daily (with breakfast), Disp: 30 tablet, Rfl: 0    famotidine (PEPCID) 10 MG tablet, Take 1 tablet by mouth 2 times daily, Disp: 60 tablet, Rfl: 3    JANUVIA 100 MG tablet, TAKE 1 TABLET BY MOUTH DAILY, Disp: 90 tablet, Rfl: 3    citalopram (CELEXA) 20 MG tablet, TAKE 1 TABLET BY MOUTH EVERY DAY, Disp: 90 tablet, Rfl: 3    glucose blood VI test strips (ASCENSIA AUTODISC VI;ONE TOUCH ULTRA TEST VI) strip, 1 each by In Vitro route daily As needed. , Disp: 100 each, Rfl: 3    hydrocortisone 2.5 % cream, Apply topically 2 times daily. , Disp: 28 g, Rfl: 11    rOPINIRole (REQUIP) 0.5 MG tablet, TAKE 1 TABLET BY MOUTH EVERY NIGHT AS NEEDED, Disp: 90 tablet, Rfl: 3    cephALEXin (KEFLEX) 500 MG capsule, Take 1 capsule by mouth 3 times daily, Disp: 21 capsule, Rfl: 0    Past Surgical History:   Procedure Laterality Date    APPENDECTOMY      CARDIAC SURGERY      cath x 2/ stent x 1    CHOLECYSTECTOMY      HYSTERECTOMY      JOINT REPLACEMENT Bilateral     knees    [place] OFFICE/OUTPT VISIT,PROCEDURE ONLY N/A 2/6/2018    CABG X 3 LIMA-LAD-DIAG,SVG-PDA,CORONARY ARTERY BYPASS REDO, PUMP ASSIST, SWAN, JARRED, REDO STERNOTOMY performed by Gudelia Rodriguez MD at [facility] Hx: reports that she quit smoking about 6 weeks ago. Her smoking use included Cigarettes. She smoked 0.50 packs per day. She has never used smokeless tobacco.    Assessment & Plan:   1. Removed staples from remaining right leg incisions. Well-approximated, has one day left of Keflex. Continued cellulitis surrounding lower right leg incision. Will order 500 mg Cipro BID for 5 days. 2. [address] rehab next week. OK to drive next week if muscle pain is controlled. No need for scheduled follow up. 3. Follow up with Dr. Kathy Blake as needed. The above recommendations including medications and orders were discussed and agreed upon with .     Mert Magana  Office Phone: [phone]

## 2018-03-06 PROBLEM — E03.9 HYPOTHYROIDISM: Status: ACTIVE | Noted: 2018-03-06

## 2018-03-14 ENCOUNTER — HOSPITAL ENCOUNTER (OUTPATIENT)
Age: 68
Discharge: HOME | End: 2018-03-14
Payer: MEDICARE

## 2018-03-14 DIAGNOSIS — E11.9 CONTROLLED TYPE 2 DIABETES MELLITUS WITHOUT COMPLICATION, WITHOUT LONG-TERM CURRENT USE OF INSULIN (HCC): Chronic | ICD-10-CM

## 2018-03-14 DIAGNOSIS — I10 HYPERTENSION GOAL BP (BLOOD PRESSURE) < 140/90: Chronic | ICD-10-CM

## 2018-03-14 DIAGNOSIS — E03.9 HYPOTHYROIDISM, UNSPECIFIED TYPE: ICD-10-CM

## 2018-03-14 LAB
ALBUMIN SERPL-MCNC: 3.7 G/DL (ref 3.5–5.2)
ALBUMIN/GLOBULIN RATIO: ABNORMAL (ref 1–2.5)
ALP BLD-CCNC: 97 U/L (ref 35–104)
ALT SERPL-CCNC: 8 U/L (ref 5–33)
ANION GAP SERPL CALCULATED.3IONS-SCNC: 9 MMOL/L (ref 9–17)
AST SERPL-CCNC: 18 U/L
BILIRUB SERPL-MCNC: 0.23 MG/DL (ref 0.3–1.2)
BUN BLDV-MCNC: 14 MG/DL (ref 8–23)
BUN/CREAT BLD: ABNORMAL (ref 9–20)
CALCIUM SERPL-MCNC: 9.4 MG/DL (ref 8.6–10.4)
CHLORIDE BLD-SCNC: 100 MMOL/L (ref 98–107)
CHOLESTEROL/HDL RATIO: 2
CHOLESTEROL: 131 MG/DL
CO2: 33 MMOL/L (ref 20–31)
CREAT SERPL-MCNC: 0.92 MG/DL (ref 0.5–0.9)
CREATININE URINE: 227.1 MG/DL (ref 28–217)
GFR AFRICAN AMERICAN: >60 ML/MIN
GFR NON-AFRICAN AMERICAN: >60 ML/MIN
GFR SERPL CREATININE-BSD FRML MDRD: ABNORMAL ML/MIN/{1.73_M2}
GFR SERPL CREATININE-BSD FRML MDRD: ABNORMAL ML/MIN/{1.73_M2}
GLUCOSE BLD-MCNC: 163 MG/DL (ref 70–99)
HDLC SERPL-MCNC: 65 MG/DL
LDL CHOLESTEROL: 53 MG/DL (ref 0–130)
MICROALBUMIN/CREAT 24H UR: 41 MG/L
MICROALBUMIN/CREAT UR-RTO: 18 MCG/MG CREAT
POTASSIUM SERPL-SCNC: 3.5 MMOL/L (ref 3.7–5.3)
SODIUM BLD-SCNC: 142 MMOL/L (ref 135–144)
THYROXINE, FREE: 1.16 NG/DL (ref 0.93–1.7)
TOTAL PROTEIN: 6.4 G/DL (ref 6.4–8.3)
TRIGL SERPL-MCNC: 63 MG/DL
TSH SERPL DL<=0.05 MIU/L-ACNC: 4.29 MIU/L (ref 0.3–5)
VLDLC SERPL CALC-MCNC: NORMAL MG/DL (ref 1–30)

## 2018-03-14 PROCEDURE — 36415 COLL VENOUS BLD VENIPUNCTURE: CPT

## 2018-03-14 PROCEDURE — 80053 COMPREHEN METABOLIC PANEL: CPT

## 2018-03-14 PROCEDURE — 84439 ASSAY OF FREE THYROXINE: CPT

## 2018-03-14 PROCEDURE — 82570 ASSAY OF URINE CREATININE: CPT

## 2018-03-14 PROCEDURE — 82043 UR ALBUMIN QUANTITATIVE: CPT

## 2018-03-14 PROCEDURE — 84443 ASSAY THYROID STIM HORMONE: CPT

## 2018-03-14 PROCEDURE — 80061 LIPID PANEL: CPT

## 2018-03-21 ENCOUNTER — OFFICE VISIT (OUTPATIENT)
Dept: CARDIOTHORACIC SURGERY | Age: 68
End: 2018-03-21

## 2018-03-21 VITALS
DIASTOLIC BLOOD PRESSURE: 72 MMHG | WEIGHT: 212 LBS | HEIGHT: 65 IN | TEMPERATURE: 97.1 F | BODY MASS INDEX: 35.32 KG/M2 | SYSTOLIC BLOOD PRESSURE: 114 MMHG | OXYGEN SATURATION: 98 % | RESPIRATION RATE: 18 BRPM | HEART RATE: 89 BPM

## 2018-03-21 DIAGNOSIS — Z95.1 S/P CABG X 4: Primary | ICD-10-CM

## 2018-03-21 PROCEDURE — 99024 POSTOP FOLLOW-UP VISIT: CPT | Performed by: NURSE PRACTITIONER

## 2018-03-21 RX ORDER — AMIODARONE HYDROCHLORIDE 200 MG/1
100 TABLET ORAL DAILY
Qty: 30 TABLET | Refills: 0
Start: 2018-03-21 | End: 2019-01-10

## 2018-03-23 ENCOUNTER — HOSPITAL ENCOUNTER (OUTPATIENT)
Dept: CARDIAC REHAB | Age: 68
Setting detail: THERAPIES SERIES
Discharge: HOME | End: 2018-03-23
Payer: MEDICARE

## 2018-03-23 VITALS
DIASTOLIC BLOOD PRESSURE: 74 MMHG | WEIGHT: 210.7 LBS | SYSTOLIC BLOOD PRESSURE: 118 MMHG | HEIGHT: 65 IN | BODY MASS INDEX: 35.1 KG/M2

## 2018-03-23 PROCEDURE — 93798 PHYS/QHP OP CAR RHAB W/ECG: CPT

## 2018-03-23 NOTE — PROGRESS NOTES
New pt orientation complete. Pt watched CAD video, medications reconciled, equipment reviewed in exercise room. Pt given updated med list and further information.

## 2018-03-26 ENCOUNTER — HOSPITAL ENCOUNTER (OUTPATIENT)
Dept: CARDIAC REHAB | Age: 68
Setting detail: THERAPIES SERIES
Discharge: HOME | End: 2018-03-26
Payer: MEDICARE

## 2018-03-28 ENCOUNTER — HOSPITAL ENCOUNTER (OUTPATIENT)
Dept: CARDIAC REHAB | Age: 68
Setting detail: THERAPIES SERIES
Discharge: HOME | End: 2018-03-28
Payer: MEDICARE

## 2018-03-30 ENCOUNTER — APPOINTMENT (OUTPATIENT)
Dept: CARDIAC REHAB | Age: 68
End: 2018-03-30
Payer: MEDICARE

## 2018-04-04 ENCOUNTER — HOSPITAL ENCOUNTER (OUTPATIENT)
Dept: CARDIAC REHAB | Age: 68
Setting detail: THERAPIES SERIES
Discharge: HOME | End: 2018-04-04
Payer: MEDICARE

## 2018-05-02 ENCOUNTER — HOSPITAL ENCOUNTER (INPATIENT)
Age: 68
LOS: 8 days | Discharge: HOME HEALTH | DRG: 862 | End: 2018-05-10
Attending: EMERGENCY MEDICINE | Admitting: FAMILY MEDICINE
Payer: MEDICARE

## 2018-05-02 ENCOUNTER — APPOINTMENT (OUTPATIENT)
Dept: GENERAL RADIOLOGY | Age: 68
DRG: 862 | End: 2018-05-02
Payer: MEDICARE

## 2018-05-02 DIAGNOSIS — D72.829 LEUKOCYTOSIS, UNSPECIFIED TYPE: ICD-10-CM

## 2018-05-02 DIAGNOSIS — N30.00 ACUTE CYSTITIS WITHOUT HEMATURIA: ICD-10-CM

## 2018-05-02 DIAGNOSIS — L03.115 CELLULITIS OF RIGHT LOWER EXTREMITY: ICD-10-CM

## 2018-05-02 DIAGNOSIS — A41.9 SEPSIS, DUE TO UNSPECIFIED ORGANISM: Primary | ICD-10-CM

## 2018-05-02 DIAGNOSIS — R09.02 HYPOXIA: ICD-10-CM

## 2018-05-02 PROBLEM — L03.90 SEPSIS DUE TO CELLULITIS (HCC): Status: ACTIVE | Noted: 2018-05-02

## 2018-05-02 LAB
-: ABNORMAL
ABSOLUTE EOS #: 0 K/UL (ref 0–0.4)
ABSOLUTE IMMATURE GRANULOCYTE: ABNORMAL K/UL (ref 0–0.3)
ABSOLUTE LYMPH #: 0.76 K/UL (ref 1–4.8)
ABSOLUTE MONO #: 0.57 K/UL (ref 0.1–1.3)
ALBUMIN SERPL-MCNC: 3.7 G/DL (ref 3.5–5.2)
ALBUMIN/GLOBULIN RATIO: ABNORMAL (ref 1–2.5)
ALP BLD-CCNC: 128 U/L (ref 35–104)
ALT SERPL-CCNC: 12 U/L (ref 5–33)
AMORPHOUS: ABNORMAL
ANION GAP SERPL CALCULATED.3IONS-SCNC: 12 MMOL/L (ref 9–17)
AST SERPL-CCNC: 21 U/L
BACTERIA: ABNORMAL
BASOPHILS # BLD: 0 % (ref 0–2)
BASOPHILS ABSOLUTE: 0 K/UL (ref 0–0.2)
BILIRUB SERPL-MCNC: 0.54 MG/DL (ref 0.3–1.2)
BILIRUBIN URINE: NEGATIVE
BNP INTERPRETATION: ABNORMAL
BUN BLDV-MCNC: 9 MG/DL (ref 8–23)
BUN/CREAT BLD: ABNORMAL (ref 9–20)
CALCIUM SERPL-MCNC: 9.1 MG/DL (ref 8.6–10.4)
CASTS UA: ABNORMAL /LPF
CHLORIDE BLD-SCNC: 96 MMOL/L (ref 98–107)
CO2: 27 MMOL/L (ref 20–31)
COLOR: YELLOW
COMMENT UA: ABNORMAL
CREAT SERPL-MCNC: 0.78 MG/DL (ref 0.5–0.9)
CRYSTALS, UA: ABNORMAL /HPF
DIFFERENTIAL TYPE: ABNORMAL
EKG ATRIAL RATE: 103 BPM
EKG P AXIS: 66 DEGREES
EKG P-R INTERVAL: 146 MS
EKG Q-T INTERVAL: 358 MS
EKG QRS DURATION: 92 MS
EKG QTC CALCULATION (BAZETT): 468 MS
EKG R AXIS: 11 DEGREES
EKG T AXIS: 107 DEGREES
EKG VENTRICULAR RATE: 103 BPM
EOSINOPHILS RELATIVE PERCENT: 0 % (ref 0–4)
EPITHELIAL CELLS UA: ABNORMAL /HPF
GFR AFRICAN AMERICAN: >60 ML/MIN
GFR NON-AFRICAN AMERICAN: >60 ML/MIN
GFR SERPL CREATININE-BSD FRML MDRD: ABNORMAL ML/MIN/{1.73_M2}
GFR SERPL CREATININE-BSD FRML MDRD: ABNORMAL ML/MIN/{1.73_M2}
GLUCOSE BLD-MCNC: 217 MG/DL (ref 70–99)
GLUCOSE URINE: NEGATIVE
HCT VFR BLD CALC: 31 % (ref 36–46)
HEMOGLOBIN: 9.5 G/DL (ref 12–16)
IMMATURE GRANULOCYTES: ABNORMAL %
INR BLD: 1.1
KETONES, URINE: ABNORMAL
LACTIC ACID, SEPSIS WHOLE BLOOD: NORMAL MMOL/L (ref 0.5–1.9)
LACTIC ACID, SEPSIS: 1.1 MMOL/L (ref 0.5–1.9)
LACTIC ACID, WHOLE BLOOD: NORMAL MMOL/L (ref 0.7–2.1)
LACTIC ACID: 1.6 MMOL/L (ref 0.5–2.2)
LEUKOCYTE ESTERASE, URINE: ABNORMAL
LYMPHOCYTES # BLD: 4 % (ref 24–44)
MCH RBC QN AUTO: 23.1 PG (ref 26–34)
MCHC RBC AUTO-ENTMCNC: 30.8 G/DL (ref 31–37)
MCV RBC AUTO: 75.2 FL (ref 80–100)
MONOCYTES # BLD: 3 % (ref 1–7)
MORPHOLOGY: ABNORMAL
MUCUS: ABNORMAL
NITRITE, URINE: NEGATIVE
NRBC AUTOMATED: ABNORMAL PER 100 WBC
OTHER OBSERVATIONS UA: ABNORMAL
PARTIAL THROMBOPLASTIN TIME: 26.5 SEC (ref 23–31)
PDW BLD-RTO: 16.7 % (ref 11.5–14.9)
PH UA: 5.5 (ref 5–8)
PLATELET # BLD: 344 K/UL (ref 150–450)
PLATELET ESTIMATE: ABNORMAL
PMV BLD AUTO: 8.3 FL (ref 6–12)
POTASSIUM SERPL-SCNC: 3.4 MMOL/L (ref 3.7–5.3)
PRO-BNP: 2627 PG/ML
PROTEIN UA: ABNORMAL
PROTHROMBIN TIME: 11.1 SEC (ref 9.7–12)
RBC # BLD: 4.12 M/UL (ref 4–5.2)
RBC # BLD: ABNORMAL 10*6/UL
RBC UA: ABNORMAL /HPF
RENAL EPITHELIAL, UA: ABNORMAL /HPF
SEG NEUTROPHILS: 93 % (ref 36–66)
SEGMENTED NEUTROPHILS ABSOLUTE COUNT: 17.77 K/UL (ref 1.3–9.1)
SODIUM BLD-SCNC: 135 MMOL/L (ref 135–144)
SPECIFIC GRAVITY UA: 1.02 (ref 1–1.03)
TOTAL PROTEIN: 6.8 G/DL (ref 6.4–8.3)
TRICHOMONAS: ABNORMAL
TROPONIN INTERP: NORMAL
TROPONIN INTERP: NORMAL
TROPONIN T: <0.03 NG/ML
TROPONIN T: <0.03 NG/ML
TURBIDITY: ABNORMAL
URINE HGB: NEGATIVE
UROBILINOGEN, URINE: NORMAL
WBC # BLD: 19.1 K/UL (ref 3.5–11)
WBC # BLD: ABNORMAL 10*3/UL
WBC UA: ABNORMAL /HPF
YEAST: ABNORMAL

## 2018-05-02 PROCEDURE — 84484 ASSAY OF TROPONIN QUANT: CPT

## 2018-05-02 PROCEDURE — 6370000000 HC RX 637 (ALT 250 FOR IP): Performed by: EMERGENCY MEDICINE

## 2018-05-02 PROCEDURE — 36415 COLL VENOUS BLD VENIPUNCTURE: CPT

## 2018-05-02 PROCEDURE — 83605 ASSAY OF LACTIC ACID: CPT

## 2018-05-02 PROCEDURE — 80053 COMPREHEN METABOLIC PANEL: CPT

## 2018-05-02 PROCEDURE — 2060000000 HC ICU INTERMEDIATE R&B

## 2018-05-02 PROCEDURE — 85730 THROMBOPLASTIN TIME PARTIAL: CPT

## 2018-05-02 PROCEDURE — 71045 X-RAY EXAM CHEST 1 VIEW: CPT

## 2018-05-02 PROCEDURE — 83880 ASSAY OF NATRIURETIC PEPTIDE: CPT

## 2018-05-02 PROCEDURE — 83036 HEMOGLOBIN GLYCOSYLATED A1C: CPT

## 2018-05-02 PROCEDURE — 6360000002 HC RX W HCPCS: Performed by: EMERGENCY MEDICINE

## 2018-05-02 PROCEDURE — 87641 MR-STAPH DNA AMP PROBE: CPT

## 2018-05-02 PROCEDURE — 2580000003 HC RX 258: Performed by: EMERGENCY MEDICINE

## 2018-05-02 PROCEDURE — 81001 URINALYSIS AUTO W/SCOPE: CPT

## 2018-05-02 PROCEDURE — 2580000003 HC RX 258: Performed by: FAMILY MEDICINE

## 2018-05-02 PROCEDURE — 6370000000 HC RX 637 (ALT 250 FOR IP): Performed by: FAMILY MEDICINE

## 2018-05-02 PROCEDURE — 85025 COMPLETE CBC W/AUTO DIFF WBC: CPT

## 2018-05-02 PROCEDURE — 96365 THER/PROPH/DIAG IV INF INIT: CPT

## 2018-05-02 PROCEDURE — 82947 ASSAY GLUCOSE BLOOD QUANT: CPT

## 2018-05-02 PROCEDURE — 93005 ELECTROCARDIOGRAM TRACING: CPT

## 2018-05-02 PROCEDURE — 96368 THER/DIAG CONCURRENT INF: CPT

## 2018-05-02 PROCEDURE — 99285 EMERGENCY DEPT VISIT HI MDM: CPT

## 2018-05-02 PROCEDURE — 87040 BLOOD CULTURE FOR BACTERIA: CPT

## 2018-05-02 PROCEDURE — 87086 URINE CULTURE/COLONY COUNT: CPT

## 2018-05-02 PROCEDURE — 96366 THER/PROPH/DIAG IV INF ADDON: CPT

## 2018-05-02 PROCEDURE — 85610 PROTHROMBIN TIME: CPT

## 2018-05-02 PROCEDURE — 1200000000 HC SEMI PRIVATE

## 2018-05-02 PROCEDURE — 96375 TX/PRO/DX INJ NEW DRUG ADDON: CPT

## 2018-05-02 RX ORDER — FENTANYL CITRATE 50 UG/ML
50 INJECTION, SOLUTION INTRAMUSCULAR; INTRAVENOUS ONCE
Status: COMPLETED | OUTPATIENT
Start: 2018-05-02 | End: 2018-05-02

## 2018-05-02 RX ORDER — DEXTROSE MONOHYDRATE 50 MG/ML
100 INJECTION, SOLUTION INTRAVENOUS PRN
Status: DISCONTINUED | OUTPATIENT
Start: 2018-05-02 | End: 2018-05-10 | Stop reason: HOSPADM

## 2018-05-02 RX ORDER — ACETAMINOPHEN 325 MG/1
650 TABLET ORAL ONCE
Status: COMPLETED | OUTPATIENT
Start: 2018-05-02 | End: 2018-05-02

## 2018-05-02 RX ORDER — CIPROFLOXACIN 2 MG/ML
400 INJECTION, SOLUTION INTRAVENOUS EVERY 12 HOURS
Status: DISCONTINUED | OUTPATIENT
Start: 2018-05-03 | End: 2018-05-04

## 2018-05-02 RX ORDER — AMIODARONE HYDROCHLORIDE 200 MG/1
100 TABLET ORAL DAILY
Status: DISCONTINUED | OUTPATIENT
Start: 2018-05-03 | End: 2018-05-10 | Stop reason: HOSPADM

## 2018-05-02 RX ORDER — SODIUM CHLORIDE 9 MG/ML
INJECTION, SOLUTION INTRAVENOUS CONTINUOUS
Status: DISCONTINUED | OUTPATIENT
Start: 2018-05-02 | End: 2018-05-08

## 2018-05-02 RX ORDER — ALBUTEROL SULFATE 90 UG/1
2 AEROSOL, METERED RESPIRATORY (INHALATION) EVERY 4 HOURS PRN
Status: DISCONTINUED | OUTPATIENT
Start: 2018-05-02 | End: 2018-05-10 | Stop reason: HOSPADM

## 2018-05-02 RX ORDER — M-VIT,TX,IRON,MINS/CALC/FOLIC 27MG-0.4MG
1 TABLET ORAL
Status: DISCONTINUED | OUTPATIENT
Start: 2018-05-03 | End: 2018-05-10 | Stop reason: HOSPADM

## 2018-05-02 RX ORDER — ASPIRIN 81 MG/1
81 TABLET ORAL DAILY
Status: DISCONTINUED | OUTPATIENT
Start: 2018-05-03 | End: 2018-05-10 | Stop reason: HOSPADM

## 2018-05-02 RX ORDER — FUROSEMIDE 40 MG/1
40 TABLET ORAL DAILY
Status: DISCONTINUED | OUTPATIENT
Start: 2018-05-03 | End: 2018-05-10 | Stop reason: HOSPADM

## 2018-05-02 RX ORDER — DEXTROSE MONOHYDRATE 25 G/50ML
12.5 INJECTION, SOLUTION INTRAVENOUS PRN
Status: DISCONTINUED | OUTPATIENT
Start: 2018-05-02 | End: 2018-05-10 | Stop reason: HOSPADM

## 2018-05-02 RX ORDER — METFORMIN HYDROCHLORIDE 500 MG/1
500 TABLET, EXTENDED RELEASE ORAL
Status: DISCONTINUED | OUTPATIENT
Start: 2018-05-03 | End: 2018-05-05

## 2018-05-02 RX ORDER — ROPINIROLE 0.5 MG/1
0.5 TABLET, FILM COATED ORAL NIGHTLY
Status: DISCONTINUED | OUTPATIENT
Start: 2018-05-02 | End: 2018-05-10 | Stop reason: HOSPADM

## 2018-05-02 RX ORDER — CLOPIDOGREL BISULFATE 75 MG/1
75 TABLET ORAL DAILY
Status: DISCONTINUED | OUTPATIENT
Start: 2018-05-03 | End: 2018-05-10 | Stop reason: HOSPADM

## 2018-05-02 RX ORDER — SODIUM CHLORIDE 0.9 % (FLUSH) 0.9 %
10 SYRINGE (ML) INJECTION PRN
Status: DISCONTINUED | OUTPATIENT
Start: 2018-05-02 | End: 2018-05-07 | Stop reason: SDUPTHER

## 2018-05-02 RX ORDER — SIMVASTATIN 20 MG
20 TABLET ORAL NIGHTLY
Status: DISCONTINUED | OUTPATIENT
Start: 2018-05-02 | End: 2018-05-10 | Stop reason: HOSPADM

## 2018-05-02 RX ORDER — CITALOPRAM 20 MG/1
20 TABLET ORAL DAILY
Status: DISCONTINUED | OUTPATIENT
Start: 2018-05-03 | End: 2018-05-10 | Stop reason: HOSPADM

## 2018-05-02 RX ORDER — POTASSIUM CHLORIDE 750 MG/1
20 CAPSULE, EXTENDED RELEASE ORAL DAILY
Status: DISCONTINUED | OUTPATIENT
Start: 2018-05-03 | End: 2018-05-10 | Stop reason: HOSPADM

## 2018-05-02 RX ORDER — CIPROFLOXACIN 2 MG/ML
400 INJECTION, SOLUTION INTRAVENOUS ONCE
Status: COMPLETED | OUTPATIENT
Start: 2018-05-02 | End: 2018-05-02

## 2018-05-02 RX ORDER — NICOTINE POLACRILEX 4 MG
15 LOZENGE BUCCAL PRN
Status: DISCONTINUED | OUTPATIENT
Start: 2018-05-02 | End: 2018-05-10 | Stop reason: HOSPADM

## 2018-05-02 RX ORDER — ALBUTEROL SULFATE 2.5 MG/3ML
2.5 SOLUTION RESPIRATORY (INHALATION) EVERY 6 HOURS PRN
Status: DISCONTINUED | OUTPATIENT
Start: 2018-05-02 | End: 2018-05-10 | Stop reason: HOSPADM

## 2018-05-02 RX ORDER — 0.9 % SODIUM CHLORIDE 0.9 %
30 INTRAVENOUS SOLUTION INTRAVENOUS ONCE
Status: COMPLETED | OUTPATIENT
Start: 2018-05-02 | End: 2018-05-02

## 2018-05-02 RX ORDER — SODIUM CHLORIDE 0.9 % (FLUSH) 0.9 %
10 SYRINGE (ML) INJECTION EVERY 12 HOURS SCHEDULED
Status: DISCONTINUED | OUTPATIENT
Start: 2018-05-02 | End: 2018-05-10 | Stop reason: HOSPADM

## 2018-05-02 RX ADMIN — VANCOMYCIN HYDROCHLORIDE 1500 MG: 10 INJECTION, POWDER, LYOPHILIZED, FOR SOLUTION INTRAVENOUS at 20:50

## 2018-05-02 RX ADMIN — ACETAMINOPHEN 650 MG: 325 TABLET, FILM COATED ORAL at 19:45

## 2018-05-02 RX ADMIN — ROPINIROLE HYDROCHLORIDE 0.5 MG: 0.5 TABLET, FILM COATED ORAL at 23:55

## 2018-05-02 RX ADMIN — CIPROFLOXACIN 400 MG: 2 INJECTION, SOLUTION INTRAVENOUS at 21:27

## 2018-05-02 RX ADMIN — INSULIN LISPRO 1 UNITS: 100 INJECTION, SOLUTION INTRAVENOUS; SUBCUTANEOUS at 23:58

## 2018-05-02 RX ADMIN — METOPROLOL TARTRATE 25 MG: 25 TABLET, FILM COATED ORAL at 23:55

## 2018-05-02 RX ADMIN — SODIUM CHLORIDE: 9 INJECTION, SOLUTION INTRAVENOUS at 23:56

## 2018-05-02 RX ADMIN — SODIUM CHLORIDE 1000 ML: 9 INJECTION, SOLUTION INTRAVENOUS at 19:31

## 2018-05-02 RX ADMIN — FENTANYL CITRATE 50 MCG: 50 INJECTION INTRAMUSCULAR; INTRAVENOUS at 19:46

## 2018-05-02 RX ADMIN — SIMVASTATIN 20 MG: 20 TABLET, FILM COATED ORAL at 23:55

## 2018-05-02 RX ADMIN — PIPERACILLIN SODIUM,TAZOBACTAM SODIUM 3.38 G: 3; .375 INJECTION, POWDER, FOR SOLUTION INTRAVENOUS at 19:47

## 2018-05-02 ASSESSMENT — ENCOUNTER SYMPTOMS
CONSTIPATION: 0
DIARRHEA: 0
PHOTOPHOBIA: 0
ABDOMINAL PAIN: 0
COUGH: 0
VOMITING: 0
WHEEZING: 0
COLOR CHANGE: 1
RHINORRHEA: 0
SHORTNESS OF BREATH: 0
SINUS PAIN: 0
CHOKING: 0
NAUSEA: 0
BACK PAIN: 0

## 2018-05-02 ASSESSMENT — PAIN DESCRIPTION - ORIENTATION: ORIENTATION: RIGHT

## 2018-05-02 ASSESSMENT — PAIN SCALES - GENERAL
PAINLEVEL_OUTOF10: 8
PAINLEVEL_OUTOF10: 8
PAINLEVEL_OUTOF10: 7
PAINLEVEL_OUTOF10: 8

## 2018-05-02 ASSESSMENT — PAIN DESCRIPTION - PAIN TYPE
TYPE: ACUTE PAIN
TYPE: ACUTE PAIN

## 2018-05-02 ASSESSMENT — PAIN DESCRIPTION - LOCATION
LOCATION: NECK
LOCATION: NECK

## 2018-05-03 PROBLEM — N30.00 ACUTE CYSTITIS WITHOUT HEMATURIA: Status: ACTIVE | Noted: 2018-05-03

## 2018-05-03 LAB
ABSOLUTE BANDS #: 0.77 K/UL (ref 0–1)
ABSOLUTE EOS #: 0 K/UL (ref 0–0.4)
ABSOLUTE IMMATURE GRANULOCYTE: ABNORMAL K/UL (ref 0–0.3)
ABSOLUTE LYMPH #: 1.02 K/UL (ref 1–4.8)
ABSOLUTE MONO #: 1.54 K/UL (ref 0.1–1.3)
ANION GAP SERPL CALCULATED.3IONS-SCNC: 10 MMOL/L (ref 9–17)
BANDS: 3 % (ref 0–10)
BASOPHILS # BLD: 0 % (ref 0–2)
BASOPHILS ABSOLUTE: 0 K/UL (ref 0–0.2)
BUN BLDV-MCNC: 10 MG/DL (ref 8–23)
BUN/CREAT BLD: ABNORMAL (ref 9–20)
CALCIUM SERPL-MCNC: 8.1 MG/DL (ref 8.6–10.4)
CHLORIDE BLD-SCNC: 102 MMOL/L (ref 98–107)
CO2: 27 MMOL/L (ref 20–31)
CREAT SERPL-MCNC: 0.75 MG/DL (ref 0.5–0.9)
CULTURE: NORMAL
CULTURE: NORMAL
DIFFERENTIAL TYPE: ABNORMAL
EOSINOPHILS RELATIVE PERCENT: 0 % (ref 0–4)
ESTIMATED AVERAGE GLUCOSE: 134 MG/DL
GFR AFRICAN AMERICAN: >60 ML/MIN
GFR NON-AFRICAN AMERICAN: >60 ML/MIN
GFR SERPL CREATININE-BSD FRML MDRD: ABNORMAL ML/MIN/{1.73_M2}
GFR SERPL CREATININE-BSD FRML MDRD: ABNORMAL ML/MIN/{1.73_M2}
GLUCOSE BLD-MCNC: 183 MG/DL (ref 70–99)
GLUCOSE BLD-MCNC: 186 MG/DL (ref 65–105)
GLUCOSE BLD-MCNC: 188 MG/DL (ref 65–105)
GLUCOSE BLD-MCNC: 245 MG/DL (ref 65–105)
HBA1C MFR BLD: 6.3 % (ref 4–6)
HCT VFR BLD CALC: 26.9 % (ref 36–46)
HEMOGLOBIN: 8.1 G/DL (ref 12–16)
IMMATURE GRANULOCYTES: ABNORMAL %
LV EF: 53 %
LVEF MODALITY: NORMAL
LYMPHOCYTES # BLD: 4 % (ref 24–44)
Lab: NORMAL
MAGNESIUM: 1.6 MG/DL (ref 1.6–2.6)
MCH RBC QN AUTO: 23 PG (ref 26–34)
MCHC RBC AUTO-ENTMCNC: 30.2 G/DL (ref 31–37)
MCV RBC AUTO: 76.2 FL (ref 80–100)
MONOCYTES # BLD: 6 % (ref 1–7)
MORPHOLOGY: ABNORMAL
MRSA, DNA, NASAL: NORMAL
NRBC AUTOMATED: ABNORMAL PER 100 WBC
PDW BLD-RTO: 16.7 % (ref 11.5–14.9)
PLATELET # BLD: 296 K/UL (ref 150–450)
PLATELET ESTIMATE: ABNORMAL
PMV BLD AUTO: 8.8 FL (ref 6–12)
POTASSIUM SERPL-SCNC: 3.4 MMOL/L (ref 3.7–5.3)
RBC # BLD: 3.53 M/UL (ref 4–5.2)
RBC # BLD: ABNORMAL 10*6/UL
SEG NEUTROPHILS: 87 % (ref 36–66)
SEGMENTED NEUTROPHILS ABSOLUTE COUNT: 22.27 K/UL (ref 1.3–9.1)
SODIUM BLD-SCNC: 139 MMOL/L (ref 135–144)
SPECIMEN DESCRIPTION: NORMAL
STATUS: NORMAL
WBC # BLD: 25.6 K/UL (ref 3.5–11)
WBC # BLD: ABNORMAL 10*3/UL

## 2018-05-03 PROCEDURE — 82947 ASSAY GLUCOSE BLOOD QUANT: CPT

## 2018-05-03 PROCEDURE — 93306 TTE W/DOPPLER COMPLETE: CPT

## 2018-05-03 PROCEDURE — 2060000000 HC ICU INTERMEDIATE R&B

## 2018-05-03 PROCEDURE — 6360000002 HC RX W HCPCS: Performed by: FAMILY MEDICINE

## 2018-05-03 PROCEDURE — 2580000003 HC RX 258: Performed by: FAMILY MEDICINE

## 2018-05-03 PROCEDURE — 80048 BASIC METABOLIC PNL TOTAL CA: CPT

## 2018-05-03 PROCEDURE — 94640 AIRWAY INHALATION TREATMENT: CPT

## 2018-05-03 PROCEDURE — 6370000000 HC RX 637 (ALT 250 FOR IP): Performed by: FAMILY MEDICINE

## 2018-05-03 PROCEDURE — 2580000003 HC RX 258: Performed by: EMERGENCY MEDICINE

## 2018-05-03 PROCEDURE — 83735 ASSAY OF MAGNESIUM: CPT

## 2018-05-03 PROCEDURE — 6360000002 HC RX W HCPCS: Performed by: EMERGENCY MEDICINE

## 2018-05-03 PROCEDURE — 85025 COMPLETE CBC W/AUTO DIFF WBC: CPT

## 2018-05-03 PROCEDURE — 94760 N-INVAS EAR/PLS OXIMETRY 1: CPT

## 2018-05-03 PROCEDURE — 36415 COLL VENOUS BLD VENIPUNCTURE: CPT

## 2018-05-03 RX ORDER — POTASSIUM CHLORIDE 20 MEQ/1
40 TABLET, EXTENDED RELEASE ORAL ONCE
Status: COMPLETED | OUTPATIENT
Start: 2018-05-03 | End: 2018-05-03

## 2018-05-03 RX ORDER — HYDROCODONE BITARTRATE AND ACETAMINOPHEN 5; 325 MG/1; MG/1
1 TABLET ORAL EVERY 6 HOURS PRN
Status: DISCONTINUED | OUTPATIENT
Start: 2018-05-03 | End: 2018-05-06

## 2018-05-03 RX ORDER — ONDANSETRON 2 MG/ML
4 INJECTION INTRAMUSCULAR; INTRAVENOUS EVERY 4 HOURS PRN
Status: DISCONTINUED | OUTPATIENT
Start: 2018-05-03 | End: 2018-05-07 | Stop reason: SDUPTHER

## 2018-05-03 RX ORDER — LORAZEPAM 0.5 MG/1
0.5 TABLET ORAL NIGHTLY PRN
Status: DISCONTINUED | OUTPATIENT
Start: 2018-05-03 | End: 2018-05-10 | Stop reason: HOSPADM

## 2018-05-03 RX ADMIN — AMIODARONE HYDROCHLORIDE 100 MG: 200 TABLET ORAL at 08:07

## 2018-05-03 RX ADMIN — INSULIN LISPRO 1 UNITS: 100 INJECTION, SOLUTION INTRAVENOUS; SUBCUTANEOUS at 12:36

## 2018-05-03 RX ADMIN — INSULIN LISPRO 1 UNITS: 100 INJECTION, SOLUTION INTRAVENOUS; SUBCUTANEOUS at 18:11

## 2018-05-03 RX ADMIN — METFORMIN HYDROCHLORIDE 500 MG: 500 TABLET, EXTENDED RELEASE ORAL at 08:08

## 2018-05-03 RX ADMIN — FUROSEMIDE 40 MG: 40 TABLET ORAL at 08:09

## 2018-05-03 RX ADMIN — CITALOPRAM HYDROBROMIDE 20 MG: 20 TABLET ORAL at 08:09

## 2018-05-03 RX ADMIN — PIPERACILLIN SODIUM AND TAZOBACTAM SODIUM 3.38 G: 3; .375 INJECTION, POWDER, LYOPHILIZED, FOR SOLUTION INTRAVENOUS at 12:42

## 2018-05-03 RX ADMIN — PIPERACILLIN SODIUM AND TAZOBACTAM SODIUM 3.38 G: 3; .375 INJECTION, POWDER, LYOPHILIZED, FOR SOLUTION INTRAVENOUS at 18:09

## 2018-05-03 RX ADMIN — ALBUTEROL SULFATE 2.5 MG: 2.5 SOLUTION RESPIRATORY (INHALATION) at 08:56

## 2018-05-03 RX ADMIN — CIPROFLOXACIN 400 MG: 2 INJECTION, SOLUTION INTRAVENOUS at 21:01

## 2018-05-03 RX ADMIN — HYDROCODONE BITARTRATE AND ACETAMINOPHEN 1 TABLET: 5; 325 TABLET ORAL at 07:39

## 2018-05-03 RX ADMIN — PIPERACILLIN SODIUM,TAZOBACTAM SODIUM 3.38 G: 3; .375 INJECTION, POWDER, FOR SOLUTION INTRAVENOUS at 04:29

## 2018-05-03 RX ADMIN — LINAGLIPTIN 5 MG: 5 TABLET, FILM COATED ORAL at 08:08

## 2018-05-03 RX ADMIN — SODIUM CHLORIDE: 9 INJECTION, SOLUTION INTRAVENOUS at 16:35

## 2018-05-03 RX ADMIN — CLOPIDOGREL BISULFATE 75 MG: 75 TABLET ORAL at 08:09

## 2018-05-03 RX ADMIN — HYDROCODONE BITARTRATE AND ACETAMINOPHEN 1 TABLET: 5; 325 TABLET ORAL at 14:41

## 2018-05-03 RX ADMIN — VANCOMYCIN HYDROCHLORIDE 1500 MG: 1 INJECTION, POWDER, LYOPHILIZED, FOR SOLUTION INTRAVENOUS at 10:22

## 2018-05-03 RX ADMIN — POTASSIUM CHLORIDE 20 MEQ: 750 CAPSULE, EXTENDED RELEASE ORAL at 08:08

## 2018-05-03 RX ADMIN — ROPINIROLE HYDROCHLORIDE 0.5 MG: 0.5 TABLET, FILM COATED ORAL at 21:00

## 2018-05-03 RX ADMIN — ENOXAPARIN SODIUM 40 MG: 40 INJECTION SUBCUTANEOUS at 08:06

## 2018-05-03 RX ADMIN — METOPROLOL TARTRATE 25 MG: 25 TABLET, FILM COATED ORAL at 21:00

## 2018-05-03 RX ADMIN — SIMVASTATIN 20 MG: 20 TABLET, FILM COATED ORAL at 21:00

## 2018-05-03 RX ADMIN — HYDROCODONE BITARTRATE AND ACETAMINOPHEN 1 TABLET: 5; 325 TABLET ORAL at 21:00

## 2018-05-03 RX ADMIN — Medication 10 ML: at 21:01

## 2018-05-03 RX ADMIN — METOPROLOL TARTRATE 25 MG: 25 TABLET, FILM COATED ORAL at 08:07

## 2018-05-03 RX ADMIN — ASPIRIN 81 MG: 81 TABLET, COATED ORAL at 08:08

## 2018-05-03 RX ADMIN — Medication 10 ML: at 08:11

## 2018-05-03 RX ADMIN — MULTIPLE VITAMINS W/ MINERALS TAB 1 TABLET: TAB at 08:09

## 2018-05-03 RX ADMIN — INSULIN LISPRO 1 UNITS: 100 INJECTION, SOLUTION INTRAVENOUS; SUBCUTANEOUS at 21:11

## 2018-05-03 RX ADMIN — ONDANSETRON 4 MG: 2 INJECTION INTRAMUSCULAR; INTRAVENOUS at 21:01

## 2018-05-03 RX ADMIN — POTASSIUM CHLORIDE 40 MEQ: 20 TABLET, EXTENDED RELEASE ORAL at 10:18

## 2018-05-03 RX ADMIN — CIPROFLOXACIN 400 MG: 2 INJECTION, SOLUTION INTRAVENOUS at 08:11

## 2018-05-03 RX ADMIN — VANCOMYCIN HYDROCHLORIDE 1500 MG: 1 INJECTION, POWDER, LYOPHILIZED, FOR SOLUTION INTRAVENOUS at 22:29

## 2018-05-03 RX ADMIN — ALBUTEROL SULFATE 2.5 MG: 2.5 SOLUTION RESPIRATORY (INHALATION) at 19:21

## 2018-05-03 ASSESSMENT — PAIN SCALES - GENERAL
PAINLEVEL_OUTOF10: 9
PAINLEVEL_OUTOF10: 0
PAINLEVEL_OUTOF10: 0
PAINLEVEL_OUTOF10: 5
PAINLEVEL_OUTOF10: 10
PAINLEVEL_OUTOF10: 0
PAINLEVEL_OUTOF10: 4
PAINLEVEL_OUTOF10: 0

## 2018-05-03 ASSESSMENT — PAIN DESCRIPTION - DESCRIPTORS
DESCRIPTORS: ACHING
DESCRIPTORS: ACHING

## 2018-05-03 ASSESSMENT — PAIN DESCRIPTION - LOCATION
LOCATION: HEAD
LOCATION: HEAD;NECK
LOCATION: NECK

## 2018-05-03 ASSESSMENT — PAIN DESCRIPTION - PAIN TYPE
TYPE: CHRONIC PAIN
TYPE: ACUTE PAIN
TYPE: ACUTE PAIN

## 2018-05-03 ASSESSMENT — PAIN DESCRIPTION - FREQUENCY
FREQUENCY: INTERMITTENT
FREQUENCY: INTERMITTENT

## 2018-05-03 ASSESSMENT — PAIN DESCRIPTION - ORIENTATION
ORIENTATION: POSTERIOR
ORIENTATION: POSTERIOR

## 2018-05-04 LAB
ABSOLUTE EOS #: 0.19 K/UL (ref 0–0.4)
ABSOLUTE IMMATURE GRANULOCYTE: ABNORMAL K/UL (ref 0–0.3)
ABSOLUTE LYMPH #: 0.56 K/UL (ref 1–4.8)
ABSOLUTE MONO #: 1.3 K/UL (ref 0.1–1.3)
ANION GAP SERPL CALCULATED.3IONS-SCNC: 9 MMOL/L (ref 9–17)
BASOPHILS # BLD: 0 % (ref 0–2)
BASOPHILS ABSOLUTE: 0 K/UL (ref 0–0.2)
BUN BLDV-MCNC: 13 MG/DL (ref 8–23)
BUN/CREAT BLD: ABNORMAL (ref 9–20)
CALCIUM SERPL-MCNC: 8.3 MG/DL (ref 8.6–10.4)
CHLORIDE BLD-SCNC: 100 MMOL/L (ref 98–107)
CO2: 26 MMOL/L (ref 20–31)
CREAT SERPL-MCNC: 0.81 MG/DL (ref 0.5–0.9)
DIFFERENTIAL TYPE: ABNORMAL
EOSINOPHILS RELATIVE PERCENT: 1 % (ref 0–4)
GFR AFRICAN AMERICAN: >60 ML/MIN
GFR NON-AFRICAN AMERICAN: >60 ML/MIN
GFR SERPL CREATININE-BSD FRML MDRD: ABNORMAL ML/MIN/{1.73_M2}
GFR SERPL CREATININE-BSD FRML MDRD: ABNORMAL ML/MIN/{1.73_M2}
GLUCOSE BLD-MCNC: 142 MG/DL (ref 65–105)
GLUCOSE BLD-MCNC: 149 MG/DL (ref 65–105)
GLUCOSE BLD-MCNC: 168 MG/DL (ref 65–105)
GLUCOSE BLD-MCNC: 171 MG/DL (ref 65–105)
GLUCOSE BLD-MCNC: 178 MG/DL (ref 70–99)
GLUCOSE BLD-MCNC: 179 MG/DL (ref 65–105)
GLUCOSE BLD-MCNC: 179 MG/DL (ref 65–105)
HCT VFR BLD CALC: 25.8 % (ref 36–46)
HEMOGLOBIN: 7.9 G/DL (ref 12–16)
IMMATURE GRANULOCYTES: ABNORMAL %
LYMPHOCYTES # BLD: 3 % (ref 24–44)
MCH RBC QN AUTO: 23.5 PG (ref 26–34)
MCHC RBC AUTO-ENTMCNC: 30.7 G/DL (ref 31–37)
MCV RBC AUTO: 76.6 FL (ref 80–100)
MONOCYTES # BLD: 7 % (ref 1–7)
MORPHOLOGY: ABNORMAL
NRBC AUTOMATED: ABNORMAL PER 100 WBC
PDW BLD-RTO: 16.5 % (ref 11.5–14.9)
PLATELET # BLD: 262 K/UL (ref 150–450)
PLATELET ESTIMATE: ABNORMAL
PMV BLD AUTO: 9.1 FL (ref 6–12)
POTASSIUM SERPL-SCNC: 3.6 MMOL/L (ref 3.7–5.3)
RBC # BLD: 3.36 M/UL (ref 4–5.2)
RBC # BLD: ABNORMAL 10*6/UL
SEG NEUTROPHILS: 89 % (ref 36–66)
SEGMENTED NEUTROPHILS ABSOLUTE COUNT: 16.45 K/UL (ref 1.3–9.1)
SODIUM BLD-SCNC: 135 MMOL/L (ref 135–144)
VANCOMYCIN TROUGH DATE LAST DOSE: NORMAL
VANCOMYCIN TROUGH DOSE AMOUNT: NORMAL
VANCOMYCIN TROUGH TIME LAST DOSE: 2229
VANCOMYCIN TROUGH: 18.6 UG/ML (ref 10–20)
WBC # BLD: 18.5 K/UL (ref 3.5–11)
WBC # BLD: ABNORMAL 10*3/UL

## 2018-05-04 PROCEDURE — 6370000000 HC RX 637 (ALT 250 FOR IP): Performed by: FAMILY MEDICINE

## 2018-05-04 PROCEDURE — 2580000003 HC RX 258: Performed by: EMERGENCY MEDICINE

## 2018-05-04 PROCEDURE — 80048 BASIC METABOLIC PNL TOTAL CA: CPT

## 2018-05-04 PROCEDURE — 99232 SBSQ HOSP IP/OBS MODERATE 35: CPT | Performed by: FAMILY MEDICINE

## 2018-05-04 PROCEDURE — 93970 EXTREMITY STUDY: CPT

## 2018-05-04 PROCEDURE — 2060000000 HC ICU INTERMEDIATE R&B

## 2018-05-04 PROCEDURE — 99222 1ST HOSP IP/OBS MODERATE 55: CPT | Performed by: INTERNAL MEDICINE

## 2018-05-04 PROCEDURE — 6360000002 HC RX W HCPCS: Performed by: EMERGENCY MEDICINE

## 2018-05-04 PROCEDURE — 85025 COMPLETE CBC W/AUTO DIFF WBC: CPT

## 2018-05-04 PROCEDURE — 2580000003 HC RX 258: Performed by: FAMILY MEDICINE

## 2018-05-04 PROCEDURE — 80202 ASSAY OF VANCOMYCIN: CPT

## 2018-05-04 PROCEDURE — 6360000002 HC RX W HCPCS: Performed by: INTERNAL MEDICINE

## 2018-05-04 PROCEDURE — 82947 ASSAY GLUCOSE BLOOD QUANT: CPT

## 2018-05-04 PROCEDURE — 36415 COLL VENOUS BLD VENIPUNCTURE: CPT

## 2018-05-04 PROCEDURE — 6360000002 HC RX W HCPCS: Performed by: FAMILY MEDICINE

## 2018-05-04 PROCEDURE — 2580000003 HC RX 258: Performed by: INTERNAL MEDICINE

## 2018-05-04 RX ADMIN — METFORMIN HYDROCHLORIDE 500 MG: 500 TABLET, EXTENDED RELEASE ORAL at 09:14

## 2018-05-04 RX ADMIN — Medication 10 ML: at 20:27

## 2018-05-04 RX ADMIN — PIPERACILLIN SODIUM AND TAZOBACTAM SODIUM 3.38 G: 3; .375 INJECTION, POWDER, LYOPHILIZED, FOR SOLUTION INTRAVENOUS at 01:08

## 2018-05-04 RX ADMIN — CEFEPIME HYDROCHLORIDE 2 G: 2 INJECTION, POWDER, FOR SOLUTION INTRAVENOUS at 14:05

## 2018-05-04 RX ADMIN — POTASSIUM CHLORIDE 20 MEQ: 750 CAPSULE, EXTENDED RELEASE ORAL at 09:14

## 2018-05-04 RX ADMIN — ONDANSETRON 4 MG: 2 INJECTION INTRAMUSCULAR; INTRAVENOUS at 17:42

## 2018-05-04 RX ADMIN — HYDROCODONE BITARTRATE AND ACETAMINOPHEN 1 TABLET: 5; 325 TABLET ORAL at 21:18

## 2018-05-04 RX ADMIN — METOPROLOL TARTRATE 25 MG: 25 TABLET, FILM COATED ORAL at 09:14

## 2018-05-04 RX ADMIN — HYDROCODONE BITARTRATE AND ACETAMINOPHEN 1 TABLET: 5; 325 TABLET ORAL at 03:01

## 2018-05-04 RX ADMIN — HYDROCODONE BITARTRATE AND ACETAMINOPHEN 1 TABLET: 5; 325 TABLET ORAL at 15:18

## 2018-05-04 RX ADMIN — ASPIRIN 81 MG: 81 TABLET, COATED ORAL at 09:13

## 2018-05-04 RX ADMIN — VANCOMYCIN HYDROCHLORIDE 1500 MG: 1 INJECTION, POWDER, LYOPHILIZED, FOR SOLUTION INTRAVENOUS at 14:46

## 2018-05-04 RX ADMIN — CIPROFLOXACIN 400 MG: 2 INJECTION, SOLUTION INTRAVENOUS at 09:15

## 2018-05-04 RX ADMIN — PIPERACILLIN SODIUM AND TAZOBACTAM SODIUM 3.38 G: 3; .375 INJECTION, POWDER, LYOPHILIZED, FOR SOLUTION INTRAVENOUS at 06:46

## 2018-05-04 RX ADMIN — ENOXAPARIN SODIUM 30 MG: 30 INJECTION SUBCUTANEOUS at 20:32

## 2018-05-04 RX ADMIN — INSULIN LISPRO 1 UNITS: 100 INJECTION, SOLUTION INTRAVENOUS; SUBCUTANEOUS at 20:28

## 2018-05-04 RX ADMIN — LINAGLIPTIN 5 MG: 5 TABLET, FILM COATED ORAL at 09:13

## 2018-05-04 RX ADMIN — ONDANSETRON 4 MG: 2 INJECTION INTRAMUSCULAR; INTRAVENOUS at 20:27

## 2018-05-04 RX ADMIN — ROPINIROLE HYDROCHLORIDE 0.5 MG: 0.5 TABLET, FILM COATED ORAL at 20:27

## 2018-05-04 RX ADMIN — SODIUM CHLORIDE: 9 INJECTION, SOLUTION INTRAVENOUS at 07:39

## 2018-05-04 RX ADMIN — INSULIN LISPRO 1 UNITS: 100 INJECTION, SOLUTION INTRAVENOUS; SUBCUTANEOUS at 11:58

## 2018-05-04 RX ADMIN — INSULIN LISPRO 1 UNITS: 100 INJECTION, SOLUTION INTRAVENOUS; SUBCUTANEOUS at 17:35

## 2018-05-04 RX ADMIN — CITALOPRAM HYDROBROMIDE 20 MG: 20 TABLET ORAL at 09:13

## 2018-05-04 RX ADMIN — FUROSEMIDE 40 MG: 40 TABLET ORAL at 09:13

## 2018-05-04 RX ADMIN — HYDROCODONE BITARTRATE AND ACETAMINOPHEN 1 TABLET: 5; 325 TABLET ORAL at 09:13

## 2018-05-04 RX ADMIN — MULTIPLE VITAMINS W/ MINERALS TAB 1 TABLET: TAB at 09:13

## 2018-05-04 RX ADMIN — AMIODARONE HYDROCHLORIDE 100 MG: 200 TABLET ORAL at 09:13

## 2018-05-04 RX ADMIN — INSULIN LISPRO 1 UNITS: 100 INJECTION, SOLUTION INTRAVENOUS; SUBCUTANEOUS at 09:09

## 2018-05-04 RX ADMIN — SODIUM CHLORIDE: 9 INJECTION, SOLUTION INTRAVENOUS at 22:24

## 2018-05-04 RX ADMIN — ENOXAPARIN SODIUM 30 MG: 30 INJECTION SUBCUTANEOUS at 09:14

## 2018-05-04 RX ADMIN — SIMVASTATIN 20 MG: 20 TABLET, FILM COATED ORAL at 20:27

## 2018-05-04 RX ADMIN — CLOPIDOGREL BISULFATE 75 MG: 75 TABLET ORAL at 09:13

## 2018-05-04 RX ADMIN — PIPERACILLIN SODIUM AND TAZOBACTAM SODIUM 3.38 G: 3; .375 INJECTION, POWDER, LYOPHILIZED, FOR SOLUTION INTRAVENOUS at 11:39

## 2018-05-04 RX ADMIN — METOPROLOL TARTRATE 25 MG: 25 TABLET, FILM COATED ORAL at 20:27

## 2018-05-04 ASSESSMENT — PAIN DESCRIPTION - DESCRIPTORS
DESCRIPTORS: ACHING
DESCRIPTORS: ACHING

## 2018-05-04 ASSESSMENT — PAIN SCALES - GENERAL
PAINLEVEL_OUTOF10: 9
PAINLEVEL_OUTOF10: 0
PAINLEVEL_OUTOF10: 10
PAINLEVEL_OUTOF10: 10
PAINLEVEL_OUTOF10: 5
PAINLEVEL_OUTOF10: 2
PAINLEVEL_OUTOF10: 0
PAINLEVEL_OUTOF10: 9
PAINLEVEL_OUTOF10: 0
PAINLEVEL_OUTOF10: 5
PAINLEVEL_OUTOF10: 0

## 2018-05-04 ASSESSMENT — PAIN DESCRIPTION - LOCATION
LOCATION: HEAD
LOCATION: HEAD;NECK
LOCATION: HEAD
LOCATION: BACK;NECK;HEAD
LOCATION: HEAD

## 2018-05-04 ASSESSMENT — PAIN DESCRIPTION - PAIN TYPE
TYPE: CHRONIC PAIN

## 2018-05-04 ASSESSMENT — PAIN DESCRIPTION - ORIENTATION
ORIENTATION: POSTERIOR
ORIENTATION: POSTERIOR
ORIENTATION: RIGHT

## 2018-05-04 ASSESSMENT — PAIN DESCRIPTION - FREQUENCY
FREQUENCY: INTERMITTENT
FREQUENCY: INTERMITTENT

## 2018-05-05 ENCOUNTER — APPOINTMENT (OUTPATIENT)
Dept: CT IMAGING | Age: 68
DRG: 862 | End: 2018-05-05
Payer: MEDICARE

## 2018-05-05 LAB
ABSOLUTE EOS #: 0.17 K/UL (ref 0–0.4)
ABSOLUTE IMMATURE GRANULOCYTE: ABNORMAL K/UL (ref 0–0.3)
ABSOLUTE LYMPH #: 0.83 K/UL (ref 1–4.8)
ABSOLUTE MONO #: 0.66 K/UL (ref 0.1–1.3)
ANION GAP SERPL CALCULATED.3IONS-SCNC: 11 MMOL/L (ref 9–17)
BASOPHILS # BLD: 0 % (ref 0–2)
BASOPHILS ABSOLUTE: 0 K/UL (ref 0–0.2)
BUN BLDV-MCNC: 14 MG/DL (ref 8–23)
BUN/CREAT BLD: ABNORMAL (ref 9–20)
CALCIUM SERPL-MCNC: 8.4 MG/DL (ref 8.6–10.4)
CHLORIDE BLD-SCNC: 99 MMOL/L (ref 98–107)
CO2: 25 MMOL/L (ref 20–31)
CREAT SERPL-MCNC: 0.9 MG/DL (ref 0.5–0.9)
DIFFERENTIAL TYPE: ABNORMAL
EOSINOPHILS RELATIVE PERCENT: 1 % (ref 0–4)
GFR AFRICAN AMERICAN: >60 ML/MIN
GFR NON-AFRICAN AMERICAN: >60 ML/MIN
GFR SERPL CREATININE-BSD FRML MDRD: ABNORMAL ML/MIN/{1.73_M2}
GFR SERPL CREATININE-BSD FRML MDRD: ABNORMAL ML/MIN/{1.73_M2}
GLUCOSE BLD-MCNC: 164 MG/DL (ref 65–105)
GLUCOSE BLD-MCNC: 167 MG/DL (ref 65–105)
GLUCOSE BLD-MCNC: 197 MG/DL (ref 65–105)
GLUCOSE BLD-MCNC: 206 MG/DL (ref 70–99)
GLUCOSE BLD-MCNC: 220 MG/DL (ref 65–105)
HCT VFR BLD CALC: 25.6 % (ref 36–46)
HEMOGLOBIN: 7.8 G/DL (ref 12–16)
IMMATURE GRANULOCYTES: ABNORMAL %
LYMPHOCYTES # BLD: 5 % (ref 24–44)
MCH RBC QN AUTO: 23.2 PG (ref 26–34)
MCHC RBC AUTO-ENTMCNC: 30.6 G/DL (ref 31–37)
MCV RBC AUTO: 75.8 FL (ref 80–100)
MONOCYTES # BLD: 4 % (ref 1–7)
MORPHOLOGY: ABNORMAL
NRBC AUTOMATED: ABNORMAL PER 100 WBC
PDW BLD-RTO: 16.5 % (ref 11.5–14.9)
PLATELET # BLD: 289 K/UL (ref 150–450)
PLATELET ESTIMATE: ABNORMAL
PMV BLD AUTO: 8.8 FL (ref 6–12)
POTASSIUM SERPL-SCNC: 3.9 MMOL/L (ref 3.7–5.3)
RBC # BLD: 3.38 M/UL (ref 4–5.2)
RBC # BLD: ABNORMAL 10*6/UL
SEG NEUTROPHILS: 90 % (ref 36–66)
SEGMENTED NEUTROPHILS ABSOLUTE COUNT: 14.94 K/UL (ref 1.3–9.1)
SODIUM BLD-SCNC: 135 MMOL/L (ref 135–144)
WBC # BLD: 16.6 K/UL (ref 3.5–11)
WBC # BLD: ABNORMAL 10*3/UL

## 2018-05-05 PROCEDURE — 2580000003 HC RX 258: Performed by: EMERGENCY MEDICINE

## 2018-05-05 PROCEDURE — 6360000002 HC RX W HCPCS: Performed by: INTERNAL MEDICINE

## 2018-05-05 PROCEDURE — 36415 COLL VENOUS BLD VENIPUNCTURE: CPT

## 2018-05-05 PROCEDURE — 73701 CT LOWER EXTREMITY W/DYE: CPT

## 2018-05-05 PROCEDURE — 6360000002 HC RX W HCPCS: Performed by: EMERGENCY MEDICINE

## 2018-05-05 PROCEDURE — 2580000003 HC RX 258: Performed by: FAMILY MEDICINE

## 2018-05-05 PROCEDURE — 2580000003 HC RX 258: Performed by: INTERNAL MEDICINE

## 2018-05-05 PROCEDURE — 80048 BASIC METABOLIC PNL TOTAL CA: CPT

## 2018-05-05 PROCEDURE — 6360000002 HC RX W HCPCS: Performed by: FAMILY MEDICINE

## 2018-05-05 PROCEDURE — 6360000004 HC RX CONTRAST MEDICATION: Performed by: INTERNAL MEDICINE

## 2018-05-05 PROCEDURE — 99232 SBSQ HOSP IP/OBS MODERATE 35: CPT | Performed by: FAMILY MEDICINE

## 2018-05-05 PROCEDURE — 6370000000 HC RX 637 (ALT 250 FOR IP): Performed by: FAMILY MEDICINE

## 2018-05-05 PROCEDURE — 85025 COMPLETE CBC W/AUTO DIFF WBC: CPT

## 2018-05-05 PROCEDURE — 99233 SBSQ HOSP IP/OBS HIGH 50: CPT | Performed by: INTERNAL MEDICINE

## 2018-05-05 PROCEDURE — 82947 ASSAY GLUCOSE BLOOD QUANT: CPT

## 2018-05-05 PROCEDURE — 1200000000 HC SEMI PRIVATE

## 2018-05-05 RX ORDER — SODIUM CHLORIDE 0.9 % (FLUSH) 0.9 %
10 SYRINGE (ML) INJECTION PRN
Status: DISCONTINUED | OUTPATIENT
Start: 2018-05-05 | End: 2018-05-06

## 2018-05-05 RX ORDER — 0.9 % SODIUM CHLORIDE 0.9 %
100 INTRAVENOUS SOLUTION INTRAVENOUS ONCE
Status: COMPLETED | OUTPATIENT
Start: 2018-05-05 | End: 2018-05-05

## 2018-05-05 RX ADMIN — CEFEPIME HYDROCHLORIDE 2 G: 2 INJECTION, POWDER, FOR SOLUTION INTRAVENOUS at 00:44

## 2018-05-05 RX ADMIN — HYDROCODONE BITARTRATE AND ACETAMINOPHEN 1 TABLET: 5; 325 TABLET ORAL at 17:20

## 2018-05-05 RX ADMIN — CITALOPRAM HYDROBROMIDE 20 MG: 20 TABLET ORAL at 08:24

## 2018-05-05 RX ADMIN — IOPAMIDOL 100 ML: 755 INJECTION, SOLUTION INTRAVENOUS at 22:10

## 2018-05-05 RX ADMIN — INSULIN LISPRO 1 UNITS: 100 INJECTION, SOLUTION INTRAVENOUS; SUBCUTANEOUS at 12:09

## 2018-05-05 RX ADMIN — SODIUM CHLORIDE: 9 INJECTION, SOLUTION INTRAVENOUS at 09:24

## 2018-05-05 RX ADMIN — ROPINIROLE HYDROCHLORIDE 0.5 MG: 0.5 TABLET, FILM COATED ORAL at 21:32

## 2018-05-05 RX ADMIN — AMIODARONE HYDROCHLORIDE 100 MG: 200 TABLET ORAL at 08:24

## 2018-05-05 RX ADMIN — Medication 10 ML: at 08:23

## 2018-05-05 RX ADMIN — LORAZEPAM 0.5 MG: 0.5 TABLET ORAL at 22:39

## 2018-05-05 RX ADMIN — CEFEPIME HYDROCHLORIDE 2 G: 2 INJECTION, POWDER, FOR SOLUTION INTRAVENOUS at 23:46

## 2018-05-05 RX ADMIN — CLOPIDOGREL BISULFATE 75 MG: 75 TABLET ORAL at 08:24

## 2018-05-05 RX ADMIN — ONDANSETRON 4 MG: 2 INJECTION INTRAMUSCULAR; INTRAVENOUS at 16:33

## 2018-05-05 RX ADMIN — MULTIPLE VITAMINS W/ MINERALS TAB 1 TABLET: TAB at 08:24

## 2018-05-05 RX ADMIN — ENOXAPARIN SODIUM 30 MG: 30 INJECTION SUBCUTANEOUS at 08:23

## 2018-05-05 RX ADMIN — Medication 10 ML: at 21:33

## 2018-05-05 RX ADMIN — SODIUM CHLORIDE: 9 INJECTION, SOLUTION INTRAVENOUS at 22:39

## 2018-05-05 RX ADMIN — ASPIRIN 81 MG: 81 TABLET, COATED ORAL at 08:24

## 2018-05-05 RX ADMIN — METFORMIN HYDROCHLORIDE 500 MG: 500 TABLET, EXTENDED RELEASE ORAL at 08:24

## 2018-05-05 RX ADMIN — SIMVASTATIN 20 MG: 20 TABLET, FILM COATED ORAL at 21:32

## 2018-05-05 RX ADMIN — HYDROCODONE BITARTRATE AND ACETAMINOPHEN 1 TABLET: 5; 325 TABLET ORAL at 03:21

## 2018-05-05 RX ADMIN — LINAGLIPTIN 5 MG: 5 TABLET, FILM COATED ORAL at 08:24

## 2018-05-05 RX ADMIN — VANCOMYCIN HYDROCHLORIDE 1500 MG: 1 INJECTION, POWDER, LYOPHILIZED, FOR SOLUTION INTRAVENOUS at 02:18

## 2018-05-05 RX ADMIN — ENOXAPARIN SODIUM 30 MG: 30 INJECTION SUBCUTANEOUS at 21:33

## 2018-05-05 RX ADMIN — CEFEPIME HYDROCHLORIDE 2 G: 2 INJECTION, POWDER, FOR SOLUTION INTRAVENOUS at 12:09

## 2018-05-05 RX ADMIN — METOPROLOL TARTRATE 25 MG: 25 TABLET, FILM COATED ORAL at 08:24

## 2018-05-05 RX ADMIN — INSULIN LISPRO 1 UNITS: 100 INJECTION, SOLUTION INTRAVENOUS; SUBCUTANEOUS at 08:25

## 2018-05-05 RX ADMIN — INSULIN LISPRO 1 UNITS: 100 INJECTION, SOLUTION INTRAVENOUS; SUBCUTANEOUS at 21:32

## 2018-05-05 RX ADMIN — Medication 10 ML: at 22:11

## 2018-05-05 RX ADMIN — INSULIN LISPRO 1 UNITS: 100 INJECTION, SOLUTION INTRAVENOUS; SUBCUTANEOUS at 17:15

## 2018-05-05 RX ADMIN — METOPROLOL TARTRATE 25 MG: 25 TABLET, FILM COATED ORAL at 21:32

## 2018-05-05 RX ADMIN — HYDROCODONE BITARTRATE AND ACETAMINOPHEN 1 TABLET: 5; 325 TABLET ORAL at 09:25

## 2018-05-05 RX ADMIN — FUROSEMIDE 40 MG: 40 TABLET ORAL at 08:24

## 2018-05-05 RX ADMIN — POTASSIUM CHLORIDE 20 MEQ: 750 CAPSULE, EXTENDED RELEASE ORAL at 08:24

## 2018-05-05 RX ADMIN — VANCOMYCIN HYDROCHLORIDE 1500 MG: 1 INJECTION, POWDER, LYOPHILIZED, FOR SOLUTION INTRAVENOUS at 16:29

## 2018-05-05 RX ADMIN — SODIUM CHLORIDE 100 ML: 9 INJECTION, SOLUTION INTRAVENOUS at 22:10

## 2018-05-05 ASSESSMENT — PAIN DESCRIPTION - PAIN TYPE
TYPE: CHRONIC PAIN
TYPE: ACUTE PAIN
TYPE: CHRONIC PAIN
TYPE: CHRONIC PAIN

## 2018-05-05 ASSESSMENT — PAIN SCALES - GENERAL
PAINLEVEL_OUTOF10: 3
PAINLEVEL_OUTOF10: 10
PAINLEVEL_OUTOF10: 9
PAINLEVEL_OUTOF10: 9
PAINLEVEL_OUTOF10: 5

## 2018-05-05 ASSESSMENT — PAIN DESCRIPTION - LOCATION
LOCATION: HEAD
LOCATION: HEAD
LOCATION: LEG
LOCATION: HEAD

## 2018-05-05 ASSESSMENT — PAIN DESCRIPTION - FREQUENCY: FREQUENCY: INTERMITTENT

## 2018-05-05 ASSESSMENT — PAIN DESCRIPTION - DESCRIPTORS: DESCRIPTORS: ACHING

## 2018-05-05 ASSESSMENT — PAIN DESCRIPTION - ONSET: ONSET: ON-GOING

## 2018-05-05 ASSESSMENT — PAIN SCALES - WONG BAKER: WONGBAKER_NUMERICALRESPONSE: 0

## 2018-05-05 ASSESSMENT — PAIN DESCRIPTION - ORIENTATION
ORIENTATION: RIGHT
ORIENTATION: POSTERIOR

## 2018-05-06 ENCOUNTER — ANESTHESIA EVENT (OUTPATIENT)
Dept: OPERATING ROOM | Age: 68
DRG: 862 | End: 2018-05-06
Payer: MEDICARE

## 2018-05-06 LAB
ANION GAP SERPL CALCULATED.3IONS-SCNC: 7 MMOL/L (ref 9–17)
BUN BLDV-MCNC: 13 MG/DL (ref 8–23)
BUN/CREAT BLD: ABNORMAL (ref 9–20)
CALCIUM SERPL-MCNC: 8.3 MG/DL (ref 8.6–10.4)
CHLORIDE BLD-SCNC: 102 MMOL/L (ref 98–107)
CO2: 28 MMOL/L (ref 20–31)
CREAT SERPL-MCNC: 0.71 MG/DL (ref 0.5–0.9)
GFR AFRICAN AMERICAN: >60 ML/MIN
GFR NON-AFRICAN AMERICAN: >60 ML/MIN
GFR SERPL CREATININE-BSD FRML MDRD: ABNORMAL ML/MIN/{1.73_M2}
GFR SERPL CREATININE-BSD FRML MDRD: ABNORMAL ML/MIN/{1.73_M2}
GLUCOSE BLD-MCNC: 178 MG/DL (ref 65–105)
GLUCOSE BLD-MCNC: 187 MG/DL (ref 65–105)
GLUCOSE BLD-MCNC: 198 MG/DL (ref 65–105)
GLUCOSE BLD-MCNC: 214 MG/DL (ref 70–99)
GLUCOSE BLD-MCNC: 226 MG/DL (ref 65–105)
POTASSIUM SERPL-SCNC: 3.7 MMOL/L (ref 3.7–5.3)
SODIUM BLD-SCNC: 137 MMOL/L (ref 135–144)

## 2018-05-06 PROCEDURE — 6370000000 HC RX 637 (ALT 250 FOR IP): Performed by: FAMILY MEDICINE

## 2018-05-06 PROCEDURE — 99232 SBSQ HOSP IP/OBS MODERATE 35: CPT | Performed by: FAMILY MEDICINE

## 2018-05-06 PROCEDURE — 2580000003 HC RX 258: Performed by: EMERGENCY MEDICINE

## 2018-05-06 PROCEDURE — 1200000000 HC SEMI PRIVATE

## 2018-05-06 PROCEDURE — 99233 SBSQ HOSP IP/OBS HIGH 50: CPT | Performed by: INTERNAL MEDICINE

## 2018-05-06 PROCEDURE — 36415 COLL VENOUS BLD VENIPUNCTURE: CPT

## 2018-05-06 PROCEDURE — 82947 ASSAY GLUCOSE BLOOD QUANT: CPT

## 2018-05-06 PROCEDURE — 6360000002 HC RX W HCPCS: Performed by: INTERNAL MEDICINE

## 2018-05-06 PROCEDURE — 80048 BASIC METABOLIC PNL TOTAL CA: CPT

## 2018-05-06 PROCEDURE — 2580000003 HC RX 258: Performed by: INTERNAL MEDICINE

## 2018-05-06 PROCEDURE — 6360000002 HC RX W HCPCS: Performed by: FAMILY MEDICINE

## 2018-05-06 PROCEDURE — 6360000002 HC RX W HCPCS: Performed by: EMERGENCY MEDICINE

## 2018-05-06 PROCEDURE — 2580000003 HC RX 258: Performed by: FAMILY MEDICINE

## 2018-05-06 RX ORDER — HYDROCODONE BITARTRATE AND ACETAMINOPHEN 5; 325 MG/1; MG/1
2 TABLET ORAL EVERY 4 HOURS PRN
Status: DISCONTINUED | OUTPATIENT
Start: 2018-05-06 | End: 2018-05-10 | Stop reason: HOSPADM

## 2018-05-06 RX ADMIN — VANCOMYCIN HYDROCHLORIDE 1500 MG: 1 INJECTION, POWDER, LYOPHILIZED, FOR SOLUTION INTRAVENOUS at 01:52

## 2018-05-06 RX ADMIN — ENOXAPARIN SODIUM 30 MG: 30 INJECTION SUBCUTANEOUS at 19:57

## 2018-05-06 RX ADMIN — ASPIRIN 81 MG: 81 TABLET, COATED ORAL at 08:46

## 2018-05-06 RX ADMIN — POTASSIUM CHLORIDE 20 MEQ: 750 CAPSULE, EXTENDED RELEASE ORAL at 08:46

## 2018-05-06 RX ADMIN — LORAZEPAM 0.5 MG: 0.5 TABLET ORAL at 22:38

## 2018-05-06 RX ADMIN — INSULIN LISPRO 1 UNITS: 100 INJECTION, SOLUTION INTRAVENOUS; SUBCUTANEOUS at 22:38

## 2018-05-06 RX ADMIN — CEFEPIME HYDROCHLORIDE 2 G: 2 INJECTION, POWDER, FOR SOLUTION INTRAVENOUS at 13:44

## 2018-05-06 RX ADMIN — INSULIN LISPRO 1 UNITS: 100 INJECTION, SOLUTION INTRAVENOUS; SUBCUTANEOUS at 08:48

## 2018-05-06 RX ADMIN — ROPINIROLE HYDROCHLORIDE 0.5 MG: 0.5 TABLET, FILM COATED ORAL at 19:57

## 2018-05-06 RX ADMIN — INSULIN LISPRO 1 UNITS: 100 INJECTION, SOLUTION INTRAVENOUS; SUBCUTANEOUS at 17:21

## 2018-05-06 RX ADMIN — CLOPIDOGREL BISULFATE 75 MG: 75 TABLET ORAL at 08:46

## 2018-05-06 RX ADMIN — FUROSEMIDE 40 MG: 40 TABLET ORAL at 08:46

## 2018-05-06 RX ADMIN — HYDROCODONE BITARTRATE AND ACETAMINOPHEN 1 TABLET: 5; 325 TABLET ORAL at 09:58

## 2018-05-06 RX ADMIN — AMIODARONE HYDROCHLORIDE 100 MG: 200 TABLET ORAL at 08:46

## 2018-05-06 RX ADMIN — LINAGLIPTIN 5 MG: 5 TABLET, FILM COATED ORAL at 08:47

## 2018-05-06 RX ADMIN — ENOXAPARIN SODIUM 30 MG: 30 INJECTION SUBCUTANEOUS at 08:45

## 2018-05-06 RX ADMIN — SIMVASTATIN 20 MG: 20 TABLET, FILM COATED ORAL at 19:57

## 2018-05-06 RX ADMIN — INSULIN LISPRO 1 UNITS: 100 INJECTION, SOLUTION INTRAVENOUS; SUBCUTANEOUS at 12:56

## 2018-05-06 RX ADMIN — HYDROCODONE BITARTRATE AND ACETAMINOPHEN 1 TABLET: 5; 325 TABLET ORAL at 15:56

## 2018-05-06 RX ADMIN — HYDROCODONE BITARTRATE AND ACETAMINOPHEN 2 TABLET: 5; 325 TABLET ORAL at 20:03

## 2018-05-06 RX ADMIN — SODIUM CHLORIDE: 9 INJECTION, SOLUTION INTRAVENOUS at 12:56

## 2018-05-06 RX ADMIN — METOPROLOL TARTRATE 25 MG: 25 TABLET, FILM COATED ORAL at 08:46

## 2018-05-06 RX ADMIN — HYDROCODONE BITARTRATE AND ACETAMINOPHEN 1 TABLET: 5; 325 TABLET ORAL at 01:56

## 2018-05-06 RX ADMIN — METOPROLOL TARTRATE 25 MG: 25 TABLET, FILM COATED ORAL at 19:57

## 2018-05-06 RX ADMIN — VANCOMYCIN HYDROCHLORIDE 1500 MG: 1 INJECTION, POWDER, LYOPHILIZED, FOR SOLUTION INTRAVENOUS at 14:52

## 2018-05-06 RX ADMIN — MULTIPLE VITAMINS W/ MINERALS TAB 1 TABLET: TAB at 08:46

## 2018-05-06 RX ADMIN — Medication 10 ML: at 08:48

## 2018-05-06 RX ADMIN — CITALOPRAM HYDROBROMIDE 20 MG: 20 TABLET ORAL at 08:45

## 2018-05-06 ASSESSMENT — PAIN DESCRIPTION - ORIENTATION
ORIENTATION: RIGHT

## 2018-05-06 ASSESSMENT — PAIN DESCRIPTION - PAIN TYPE
TYPE: ACUTE PAIN

## 2018-05-06 ASSESSMENT — PAIN SCALES - GENERAL
PAINLEVEL_OUTOF10: 5
PAINLEVEL_OUTOF10: 5
PAINLEVEL_OUTOF10: 9
PAINLEVEL_OUTOF10: 6
PAINLEVEL_OUTOF10: 9
PAINLEVEL_OUTOF10: 10
PAINLEVEL_OUTOF10: 7
PAINLEVEL_OUTOF10: 10

## 2018-05-06 ASSESSMENT — PAIN DESCRIPTION - LOCATION
LOCATION: LEG

## 2018-05-07 ENCOUNTER — ANESTHESIA (OUTPATIENT)
Dept: OPERATING ROOM | Age: 68
DRG: 862 | End: 2018-05-07
Payer: MEDICARE

## 2018-05-07 VITALS — OXYGEN SATURATION: 100 % | DIASTOLIC BLOOD PRESSURE: 77 MMHG | SYSTOLIC BLOOD PRESSURE: 138 MMHG

## 2018-05-07 LAB
ABSOLUTE BANDS #: 0.49 K/UL (ref 0–1)
ABSOLUTE EOS #: 0 K/UL (ref 0–0.4)
ABSOLUTE IMMATURE GRANULOCYTE: ABNORMAL K/UL (ref 0–0.3)
ABSOLUTE LYMPH #: 0.33 K/UL (ref 1–4.8)
ABSOLUTE MONO #: 0.65 K/UL (ref 0.1–1.3)
ANION GAP SERPL CALCULATED.3IONS-SCNC: 10 MMOL/L (ref 9–17)
BANDS: 3 % (ref 0–10)
BASOPHILS # BLD: 0 % (ref 0–2)
BASOPHILS ABSOLUTE: 0 K/UL (ref 0–0.2)
BUN BLDV-MCNC: 9 MG/DL (ref 8–23)
BUN/CREAT BLD: ABNORMAL (ref 9–20)
CALCIUM SERPL-MCNC: 8.5 MG/DL (ref 8.6–10.4)
CHLORIDE BLD-SCNC: 100 MMOL/L (ref 98–107)
CO2: 26 MMOL/L (ref 20–31)
CREAT SERPL-MCNC: 0.62 MG/DL (ref 0.5–0.9)
DIFFERENTIAL TYPE: ABNORMAL
EOSINOPHILS RELATIVE PERCENT: 0 % (ref 0–4)
GFR AFRICAN AMERICAN: >60 ML/MIN
GFR NON-AFRICAN AMERICAN: >60 ML/MIN
GFR SERPL CREATININE-BSD FRML MDRD: ABNORMAL ML/MIN/{1.73_M2}
GFR SERPL CREATININE-BSD FRML MDRD: ABNORMAL ML/MIN/{1.73_M2}
GLUCOSE BLD-MCNC: 144 MG/DL (ref 65–105)
GLUCOSE BLD-MCNC: 153 MG/DL (ref 65–105)
GLUCOSE BLD-MCNC: 154 MG/DL (ref 70–99)
GLUCOSE BLD-MCNC: 165 MG/DL (ref 65–105)
GLUCOSE BLD-MCNC: 166 MG/DL (ref 65–105)
GLUCOSE BLD-MCNC: 199 MG/DL (ref 65–105)
HCT VFR BLD CALC: 27.8 % (ref 36–46)
HEMOGLOBIN: 9 G/DL (ref 12–16)
IMMATURE GRANULOCYTES: ABNORMAL %
LYMPHOCYTES # BLD: 2 % (ref 24–44)
MCH RBC QN AUTO: 23.9 PG (ref 26–34)
MCHC RBC AUTO-ENTMCNC: 32.3 G/DL (ref 31–37)
MCV RBC AUTO: 73.8 FL (ref 80–100)
MONOCYTES # BLD: 4 % (ref 1–7)
MORPHOLOGY: ABNORMAL
NRBC AUTOMATED: ABNORMAL PER 100 WBC
PDW BLD-RTO: 16.6 % (ref 11.5–14.9)
PLATELET # BLD: 348 K/UL (ref 150–450)
PLATELET ESTIMATE: ABNORMAL
PMV BLD AUTO: 8.6 FL (ref 6–12)
POTASSIUM SERPL-SCNC: 3.7 MMOL/L (ref 3.7–5.3)
RBC # BLD: 3.76 M/UL (ref 4–5.2)
RBC # BLD: ABNORMAL 10*6/UL
SEG NEUTROPHILS: 91 % (ref 36–66)
SEGMENTED NEUTROPHILS ABSOLUTE COUNT: 14.83 K/UL (ref 1.3–9.1)
SODIUM BLD-SCNC: 136 MMOL/L (ref 135–144)
WBC # BLD: 16.3 K/UL (ref 3.5–11)
WBC # BLD: ABNORMAL 10*3/UL

## 2018-05-07 PROCEDURE — 87205 SMEAR GRAM STAIN: CPT

## 2018-05-07 PROCEDURE — 94760 N-INVAS EAR/PLS OXIMETRY 1: CPT

## 2018-05-07 PROCEDURE — 87176 TISSUE HOMOGENIZATION CULTR: CPT

## 2018-05-07 PROCEDURE — 86403 PARTICLE AGGLUT ANTBDY SCRN: CPT

## 2018-05-07 PROCEDURE — 87186 SC STD MICRODIL/AGAR DIL: CPT

## 2018-05-07 PROCEDURE — 99231 SBSQ HOSP IP/OBS SF/LOW 25: CPT | Performed by: FAMILY MEDICINE

## 2018-05-07 PROCEDURE — 80048 BASIC METABOLIC PNL TOTAL CA: CPT

## 2018-05-07 PROCEDURE — 1200000000 HC SEMI PRIVATE

## 2018-05-07 PROCEDURE — 6360000002 HC RX W HCPCS: Performed by: NURSE ANESTHETIST, CERTIFIED REGISTERED

## 2018-05-07 PROCEDURE — 99233 SBSQ HOSP IP/OBS HIGH 50: CPT | Performed by: INTERNAL MEDICINE

## 2018-05-07 PROCEDURE — 7100000001 HC PACU RECOVERY - ADDTL 15 MIN: Performed by: SURGERY

## 2018-05-07 PROCEDURE — 2500000003 HC RX 250 WO HCPCS: Performed by: NURSE ANESTHETIST, CERTIFIED REGISTERED

## 2018-05-07 PROCEDURE — 3700000001 HC ADD 15 MINUTES (ANESTHESIA): Performed by: SURGERY

## 2018-05-07 PROCEDURE — 82947 ASSAY GLUCOSE BLOOD QUANT: CPT

## 2018-05-07 PROCEDURE — 3600000002 HC SURGERY LEVEL 2 BASE: Performed by: SURGERY

## 2018-05-07 PROCEDURE — 85025 COMPLETE CBC W/AUTO DIFF WBC: CPT

## 2018-05-07 PROCEDURE — 2580000003 HC RX 258: Performed by: INTERNAL MEDICINE

## 2018-05-07 PROCEDURE — 6360000002 HC RX W HCPCS: Performed by: SURGERY

## 2018-05-07 PROCEDURE — 6370000000 HC RX 637 (ALT 250 FOR IP): Performed by: FAMILY MEDICINE

## 2018-05-07 PROCEDURE — 87075 CULTR BACTERIA EXCEPT BLOOD: CPT

## 2018-05-07 PROCEDURE — 87070 CULTURE OTHR SPECIMN AEROBIC: CPT

## 2018-05-07 PROCEDURE — 6360000002 HC RX W HCPCS: Performed by: INTERNAL MEDICINE

## 2018-05-07 PROCEDURE — 36415 COLL VENOUS BLD VENIPUNCTURE: CPT

## 2018-05-07 PROCEDURE — 2580000003 HC RX 258: Performed by: EMERGENCY MEDICINE

## 2018-05-07 PROCEDURE — 7100000000 HC PACU RECOVERY - FIRST 15 MIN: Performed by: SURGERY

## 2018-05-07 PROCEDURE — 2580000003 HC RX 258: Performed by: FAMILY MEDICINE

## 2018-05-07 PROCEDURE — 3700000000 HC ANESTHESIA ATTENDED CARE: Performed by: SURGERY

## 2018-05-07 PROCEDURE — 3600000012 HC SURGERY LEVEL 2 ADDTL 15MIN: Performed by: SURGERY

## 2018-05-07 PROCEDURE — 0J9L0ZX DRAINAGE OF RIGHT UPPER LEG SUBCUTANEOUS TISSUE AND FASCIA, OPEN APPROACH, DIAGNOSTIC: ICD-10-PCS | Performed by: SURGERY

## 2018-05-07 PROCEDURE — 2580000003 HC RX 258: Performed by: NURSE ANESTHETIST, CERTIFIED REGISTERED

## 2018-05-07 PROCEDURE — 6360000002 HC RX W HCPCS: Performed by: EMERGENCY MEDICINE

## 2018-05-07 RX ORDER — SODIUM CHLORIDE 0.9 % (FLUSH) 0.9 %
10 SYRINGE (ML) INJECTION PRN
Status: DISCONTINUED | OUTPATIENT
Start: 2018-05-07 | End: 2018-05-10 | Stop reason: HOSPADM

## 2018-05-07 RX ORDER — FENTANYL CITRATE 50 UG/ML
25 INJECTION, SOLUTION INTRAMUSCULAR; INTRAVENOUS EVERY 5 MIN PRN
Status: DISCONTINUED | OUTPATIENT
Start: 2018-05-07 | End: 2018-05-07 | Stop reason: HOSPADM

## 2018-05-07 RX ORDER — ONDANSETRON 2 MG/ML
INJECTION INTRAMUSCULAR; INTRAVENOUS PRN
Status: DISCONTINUED | OUTPATIENT
Start: 2018-05-07 | End: 2018-05-07 | Stop reason: SDUPTHER

## 2018-05-07 RX ORDER — MIDAZOLAM HYDROCHLORIDE 1 MG/ML
INJECTION INTRAMUSCULAR; INTRAVENOUS PRN
Status: DISCONTINUED | OUTPATIENT
Start: 2018-05-07 | End: 2018-05-07 | Stop reason: SDUPTHER

## 2018-05-07 RX ORDER — ONDANSETRON 2 MG/ML
4 INJECTION INTRAMUSCULAR; INTRAVENOUS
Status: DISCONTINUED | OUTPATIENT
Start: 2018-05-07 | End: 2018-05-07 | Stop reason: HOSPADM

## 2018-05-07 RX ORDER — CEFEPIME HYDROCHLORIDE 2 G/1
INJECTION, POWDER, FOR SOLUTION INTRAVENOUS PRN
Status: DISCONTINUED | OUTPATIENT
Start: 2018-05-07 | End: 2018-05-07 | Stop reason: SDUPTHER

## 2018-05-07 RX ORDER — SODIUM CHLORIDE 9 MG/ML
INJECTION, SOLUTION INTRAVENOUS CONTINUOUS PRN
Status: DISCONTINUED | OUTPATIENT
Start: 2018-05-07 | End: 2018-05-07 | Stop reason: SDUPTHER

## 2018-05-07 RX ORDER — LABETALOL HYDROCHLORIDE 5 MG/ML
5 INJECTION, SOLUTION INTRAVENOUS EVERY 10 MIN PRN
Status: DISCONTINUED | OUTPATIENT
Start: 2018-05-07 | End: 2018-05-07 | Stop reason: HOSPADM

## 2018-05-07 RX ORDER — LIDOCAINE HYDROCHLORIDE 10 MG/ML
INJECTION, SOLUTION EPIDURAL; INFILTRATION; INTRACAUDAL; PERINEURAL PRN
Status: DISCONTINUED | OUTPATIENT
Start: 2018-05-07 | End: 2018-05-07 | Stop reason: SDUPTHER

## 2018-05-07 RX ORDER — DIPHENHYDRAMINE HYDROCHLORIDE 50 MG/ML
12.5 INJECTION INTRAMUSCULAR; INTRAVENOUS
Status: DISCONTINUED | OUTPATIENT
Start: 2018-05-07 | End: 2018-05-07 | Stop reason: HOSPADM

## 2018-05-07 RX ORDER — ONDANSETRON 2 MG/ML
4 INJECTION INTRAMUSCULAR; INTRAVENOUS EVERY 6 HOURS PRN
Status: DISCONTINUED | OUTPATIENT
Start: 2018-05-07 | End: 2018-05-10 | Stop reason: HOSPADM

## 2018-05-07 RX ORDER — FENTANYL CITRATE 50 UG/ML
INJECTION, SOLUTION INTRAMUSCULAR; INTRAVENOUS PRN
Status: DISCONTINUED | OUTPATIENT
Start: 2018-05-07 | End: 2018-05-07 | Stop reason: SDUPTHER

## 2018-05-07 RX ORDER — ACETAMINOPHEN 325 MG/1
650 TABLET ORAL EVERY 4 HOURS PRN
Status: DISCONTINUED | OUTPATIENT
Start: 2018-05-07 | End: 2018-05-10 | Stop reason: HOSPADM

## 2018-05-07 RX ORDER — SODIUM CHLORIDE 0.9 % (FLUSH) 0.9 %
10 SYRINGE (ML) INJECTION EVERY 12 HOURS SCHEDULED
Status: DISCONTINUED | OUTPATIENT
Start: 2018-05-07 | End: 2018-05-10 | Stop reason: HOSPADM

## 2018-05-07 RX ORDER — PROPOFOL 10 MG/ML
INJECTION, EMULSION INTRAVENOUS PRN
Status: DISCONTINUED | OUTPATIENT
Start: 2018-05-07 | End: 2018-05-07 | Stop reason: SDUPTHER

## 2018-05-07 RX ADMIN — CEFEPIME HYDROCHLORIDE 2 G: 2 INJECTION, POWDER, FOR SOLUTION INTRAVENOUS at 00:10

## 2018-05-07 RX ADMIN — FENTANYL CITRATE 50 MCG: 50 INJECTION, SOLUTION INTRAMUSCULAR; INTRAVENOUS at 13:29

## 2018-05-07 RX ADMIN — LIDOCAINE HYDROCHLORIDE 50 MG: 10 INJECTION, SOLUTION EPIDURAL; INFILTRATION; INTRACAUDAL; PERINEURAL at 13:03

## 2018-05-07 RX ADMIN — MIDAZOLAM 2 MG: 1 INJECTION INTRAMUSCULAR; INTRAVENOUS at 12:44

## 2018-05-07 RX ADMIN — CEFEPIME 2 G: 2 INJECTION, POWDER, FOR SOLUTION INTRAVENOUS at 12:56

## 2018-05-07 RX ADMIN — SIMVASTATIN 20 MG: 20 TABLET, FILM COATED ORAL at 22:47

## 2018-05-07 RX ADMIN — HYDROCODONE BITARTRATE AND ACETAMINOPHEN 2 TABLET: 5; 325 TABLET ORAL at 00:10

## 2018-05-07 RX ADMIN — PROPOFOL 150 MG: 10 INJECTION, EMULSION INTRAVENOUS at 13:03

## 2018-05-07 RX ADMIN — AMIODARONE HYDROCHLORIDE 100 MG: 200 TABLET ORAL at 08:45

## 2018-05-07 RX ADMIN — FENTANYL CITRATE 50 MCG: 50 INJECTION, SOLUTION INTRAMUSCULAR; INTRAVENOUS at 13:11

## 2018-05-07 RX ADMIN — SODIUM CHLORIDE: 9 INJECTION, SOLUTION INTRAVENOUS at 00:10

## 2018-05-07 RX ADMIN — FENTANYL CITRATE 50 MCG: 50 INJECTION, SOLUTION INTRAMUSCULAR; INTRAVENOUS at 13:03

## 2018-05-07 RX ADMIN — SODIUM CHLORIDE: 9 INJECTION, SOLUTION INTRAVENOUS at 12:34

## 2018-05-07 RX ADMIN — LORAZEPAM 0.5 MG: 0.5 TABLET ORAL at 23:21

## 2018-05-07 RX ADMIN — ROPINIROLE HYDROCHLORIDE 0.5 MG: 0.5 TABLET, FILM COATED ORAL at 22:47

## 2018-05-07 RX ADMIN — INSULIN LISPRO 1 UNITS: 100 INJECTION, SOLUTION INTRAVENOUS; SUBCUTANEOUS at 22:48

## 2018-05-07 RX ADMIN — HYDROCODONE BITARTRATE AND ACETAMINOPHEN 2 TABLET: 5; 325 TABLET ORAL at 23:21

## 2018-05-07 RX ADMIN — VANCOMYCIN HYDROCHLORIDE 1500 MG: 1 INJECTION, POWDER, LYOPHILIZED, FOR SOLUTION INTRAVENOUS at 01:45

## 2018-05-07 RX ADMIN — METOPROLOL TARTRATE 25 MG: 25 TABLET, FILM COATED ORAL at 22:47

## 2018-05-07 RX ADMIN — METOPROLOL TARTRATE 25 MG: 25 TABLET, FILM COATED ORAL at 08:45

## 2018-05-07 RX ADMIN — PROPOFOL 50 MG: 10 INJECTION, EMULSION INTRAVENOUS at 13:13

## 2018-05-07 RX ADMIN — ONDANSETRON 4 MG: 2 INJECTION INTRAMUSCULAR; INTRAVENOUS at 13:29

## 2018-05-07 RX ADMIN — ENOXAPARIN SODIUM 30 MG: 30 INJECTION SUBCUTANEOUS at 22:47

## 2018-05-07 RX ADMIN — FUROSEMIDE 40 MG: 40 TABLET ORAL at 10:08

## 2018-05-07 ASSESSMENT — PULMONARY FUNCTION TESTS
PIF_VALUE: 14
PIF_VALUE: 15
PIF_VALUE: 14
PIF_VALUE: 0
PIF_VALUE: 0
PIF_VALUE: 15
PIF_VALUE: 16
PIF_VALUE: 20
PIF_VALUE: 1
PIF_VALUE: 1
PIF_VALUE: 14
PIF_VALUE: 14
PIF_VALUE: 17
PIF_VALUE: 1
PIF_VALUE: 1
PIF_VALUE: 15
PIF_VALUE: 0
PIF_VALUE: 14
PIF_VALUE: 14
PIF_VALUE: 18
PIF_VALUE: 0
PIF_VALUE: 16
PIF_VALUE: 0
PIF_VALUE: 0
PIF_VALUE: 1
PIF_VALUE: 15
PIF_VALUE: 14
PIF_VALUE: 14
PIF_VALUE: 0
PIF_VALUE: 16
PIF_VALUE: 15
PIF_VALUE: 15
PIF_VALUE: 1
PIF_VALUE: 18
PIF_VALUE: 12
PIF_VALUE: 15
PIF_VALUE: 2
PIF_VALUE: 14
PIF_VALUE: 16
PIF_VALUE: 15
PIF_VALUE: 14
PIF_VALUE: 17
PIF_VALUE: 15
PIF_VALUE: 16
PIF_VALUE: 1
PIF_VALUE: 15
PIF_VALUE: 0
PIF_VALUE: 16
PIF_VALUE: 1
PIF_VALUE: 17
PIF_VALUE: 14

## 2018-05-07 ASSESSMENT — PAIN DESCRIPTION - ORIENTATION: ORIENTATION: RIGHT

## 2018-05-07 ASSESSMENT — PAIN SCALES - GENERAL
PAINLEVEL_OUTOF10: 9
PAINLEVEL_OUTOF10: 9
PAINLEVEL_OUTOF10: 0

## 2018-05-07 ASSESSMENT — PAIN DESCRIPTION - LOCATION: LOCATION: LEG

## 2018-05-07 ASSESSMENT — COPD QUESTIONNAIRES: CAT_SEVERITY: NO INTERVAL CHANGE

## 2018-05-07 ASSESSMENT — ENCOUNTER SYMPTOMS: STRIDOR: 0

## 2018-05-07 ASSESSMENT — PAIN DESCRIPTION - PAIN TYPE: TYPE: ACUTE PAIN

## 2018-05-08 PROBLEM — E66.01 MORBID OBESITY WITH BODY MASS INDEX (BMI) OF 40.0 TO 49.9 (HCC): Status: ACTIVE | Noted: 2018-05-08

## 2018-05-08 PROBLEM — M25.561 ACUTE PAIN OF RIGHT KNEE: Status: ACTIVE | Noted: 2018-05-08

## 2018-05-08 PROBLEM — L02.415 ABSCESS OF RIGHT THIGH: Status: ACTIVE | Noted: 2018-05-08

## 2018-05-08 LAB
ABSOLUTE EOS #: 0.32 K/UL (ref 0–0.4)
ABSOLUTE IMMATURE GRANULOCYTE: ABNORMAL K/UL (ref 0–0.3)
ABSOLUTE LYMPH #: 1.4 K/UL (ref 1–4.8)
ABSOLUTE MONO #: 0.97 K/UL (ref 0.1–1.3)
ANION GAP SERPL CALCULATED.3IONS-SCNC: 10 MMOL/L (ref 9–17)
BASOPHILS # BLD: 1 % (ref 0–2)
BASOPHILS ABSOLUTE: 0.11 K/UL (ref 0–0.2)
BUN BLDV-MCNC: 12 MG/DL (ref 8–23)
BUN/CREAT BLD: ABNORMAL (ref 9–20)
CALCIUM SERPL-MCNC: 8.5 MG/DL (ref 8.6–10.4)
CHLORIDE BLD-SCNC: 99 MMOL/L (ref 98–107)
CO2: 28 MMOL/L (ref 20–31)
CREAT SERPL-MCNC: 0.79 MG/DL (ref 0.5–0.9)
CULTURE: NORMAL
DIFFERENTIAL TYPE: ABNORMAL
EOSINOPHILS RELATIVE PERCENT: 3 % (ref 0–4)
GFR AFRICAN AMERICAN: >60 ML/MIN
GFR NON-AFRICAN AMERICAN: >60 ML/MIN
GFR SERPL CREATININE-BSD FRML MDRD: ABNORMAL ML/MIN/{1.73_M2}
GFR SERPL CREATININE-BSD FRML MDRD: ABNORMAL ML/MIN/{1.73_M2}
GLUCOSE BLD-MCNC: 182 MG/DL (ref 65–105)
GLUCOSE BLD-MCNC: 183 MG/DL (ref 70–99)
GLUCOSE BLD-MCNC: 189 MG/DL (ref 65–105)
GLUCOSE BLD-MCNC: 220 MG/DL (ref 65–105)
GLUCOSE BLD-MCNC: 248 MG/DL (ref 65–105)
HCT VFR BLD CALC: 27.9 % (ref 36–46)
HEMOGLOBIN: 8.4 G/DL (ref 12–16)
IMMATURE GRANULOCYTES: ABNORMAL %
LYMPHOCYTES # BLD: 13 % (ref 24–44)
Lab: NORMAL
Lab: NORMAL
MAGNESIUM: 1.9 MG/DL (ref 1.6–2.6)
MCH RBC QN AUTO: 22.7 PG (ref 26–34)
MCHC RBC AUTO-ENTMCNC: 29.9 G/DL (ref 31–37)
MCV RBC AUTO: 75.7 FL (ref 80–100)
MONOCYTES # BLD: 9 % (ref 1–7)
MORPHOLOGY: ABNORMAL
NRBC AUTOMATED: ABNORMAL PER 100 WBC
PDW BLD-RTO: 16.2 % (ref 11.5–14.9)
PLATELET # BLD: 354 K/UL (ref 150–450)
PLATELET ESTIMATE: ABNORMAL
PMV BLD AUTO: 8.2 FL (ref 6–12)
POTASSIUM SERPL-SCNC: 3.3 MMOL/L (ref 3.7–5.3)
RBC # BLD: 3.69 M/UL (ref 4–5.2)
RBC # BLD: ABNORMAL 10*6/UL
SEG NEUTROPHILS: 74 % (ref 36–66)
SEGMENTED NEUTROPHILS ABSOLUTE COUNT: 8 K/UL (ref 1.3–9.1)
SODIUM BLD-SCNC: 137 MMOL/L (ref 135–144)
SPECIMEN DESCRIPTION: NORMAL
STATUS: NORMAL
STATUS: NORMAL
WBC # BLD: 10.8 K/UL (ref 3.5–11)
WBC # BLD: ABNORMAL 10*3/UL

## 2018-05-08 PROCEDURE — 2580000003 HC RX 258: Performed by: FAMILY MEDICINE

## 2018-05-08 PROCEDURE — 6360000002 HC RX W HCPCS: Performed by: INTERNAL MEDICINE

## 2018-05-08 PROCEDURE — 0SJC3ZZ INSPECTION OF RIGHT KNEE JOINT, PERCUTANEOUS APPROACH: ICD-10-PCS | Performed by: ORTHOPAEDIC SURGERY

## 2018-05-08 PROCEDURE — 99233 SBSQ HOSP IP/OBS HIGH 50: CPT | Performed by: INTERNAL MEDICINE

## 2018-05-08 PROCEDURE — 82947 ASSAY GLUCOSE BLOOD QUANT: CPT

## 2018-05-08 PROCEDURE — 1200000000 HC SEMI PRIVATE

## 2018-05-08 PROCEDURE — 80048 BASIC METABOLIC PNL TOTAL CA: CPT

## 2018-05-08 PROCEDURE — 2580000003 HC RX 258: Performed by: EMERGENCY MEDICINE

## 2018-05-08 PROCEDURE — 2580000003 HC RX 258: Performed by: INTERNAL MEDICINE

## 2018-05-08 PROCEDURE — 99231 SBSQ HOSP IP/OBS SF/LOW 25: CPT | Performed by: FAMILY MEDICINE

## 2018-05-08 PROCEDURE — 83735 ASSAY OF MAGNESIUM: CPT

## 2018-05-08 PROCEDURE — 99223 1ST HOSP IP/OBS HIGH 75: CPT | Performed by: ORTHOPAEDIC SURGERY

## 2018-05-08 PROCEDURE — 6370000000 HC RX 637 (ALT 250 FOR IP): Performed by: FAMILY MEDICINE

## 2018-05-08 PROCEDURE — 85025 COMPLETE CBC W/AUTO DIFF WBC: CPT

## 2018-05-08 PROCEDURE — 20610 DRAIN/INJ JOINT/BURSA W/O US: CPT | Performed by: ORTHOPAEDIC SURGERY

## 2018-05-08 PROCEDURE — 36415 COLL VENOUS BLD VENIPUNCTURE: CPT

## 2018-05-08 PROCEDURE — 6360000002 HC RX W HCPCS: Performed by: EMERGENCY MEDICINE

## 2018-05-08 PROCEDURE — 6360000002 HC RX W HCPCS: Performed by: SURGERY

## 2018-05-08 RX ORDER — ACETAMINOPHEN 650 MG
TABLET, EXTENDED RELEASE ORAL PRN
Status: DISCONTINUED | OUTPATIENT
Start: 2018-05-08 | End: 2018-05-10 | Stop reason: HOSPADM

## 2018-05-08 RX ORDER — METFORMIN HYDROCHLORIDE 500 MG/1
500 TABLET, EXTENDED RELEASE ORAL
Status: DISCONTINUED | OUTPATIENT
Start: 2018-05-08 | End: 2018-05-10 | Stop reason: HOSPADM

## 2018-05-08 RX ADMIN — LORAZEPAM 0.5 MG: 0.5 TABLET ORAL at 23:15

## 2018-05-08 RX ADMIN — ENOXAPARIN SODIUM 30 MG: 30 INJECTION SUBCUTANEOUS at 21:00

## 2018-05-08 RX ADMIN — CEFEPIME HYDROCHLORIDE 2 G: 2 INJECTION, POWDER, FOR SOLUTION INTRAVENOUS at 13:20

## 2018-05-08 RX ADMIN — VANCOMYCIN HYDROCHLORIDE 1500 MG: 1 INJECTION, POWDER, LYOPHILIZED, FOR SOLUTION INTRAVENOUS at 14:55

## 2018-05-08 RX ADMIN — INSULIN LISPRO 1 UNITS: 100 INJECTION, SOLUTION INTRAVENOUS; SUBCUTANEOUS at 17:23

## 2018-05-08 RX ADMIN — CLOPIDOGREL BISULFATE 75 MG: 75 TABLET ORAL at 08:38

## 2018-05-08 RX ADMIN — SODIUM CHLORIDE: 9 INJECTION, SOLUTION INTRAVENOUS at 01:27

## 2018-05-08 RX ADMIN — INSULIN LISPRO 1 UNITS: 100 INJECTION, SOLUTION INTRAVENOUS; SUBCUTANEOUS at 08:37

## 2018-05-08 RX ADMIN — METFORMIN HYDROCHLORIDE 500 MG: 500 TABLET, EXTENDED RELEASE ORAL at 08:50

## 2018-05-08 RX ADMIN — MULTIPLE VITAMINS W/ MINERALS TAB 1 TABLET: TAB at 08:38

## 2018-05-08 RX ADMIN — LINAGLIPTIN 5 MG: 5 TABLET, FILM COATED ORAL at 08:38

## 2018-05-08 RX ADMIN — INSULIN LISPRO 2 UNITS: 100 INJECTION, SOLUTION INTRAVENOUS; SUBCUTANEOUS at 11:40

## 2018-05-08 RX ADMIN — HYDROCODONE BITARTRATE AND ACETAMINOPHEN 2 TABLET: 5; 325 TABLET ORAL at 08:50

## 2018-05-08 RX ADMIN — AMIODARONE HYDROCHLORIDE 100 MG: 200 TABLET ORAL at 08:38

## 2018-05-08 RX ADMIN — INSULIN LISPRO 2 UNITS: 100 INJECTION, SOLUTION INTRAVENOUS; SUBCUTANEOUS at 21:00

## 2018-05-08 RX ADMIN — ROPINIROLE HYDROCHLORIDE 0.5 MG: 0.5 TABLET, FILM COATED ORAL at 22:07

## 2018-05-08 RX ADMIN — ENOXAPARIN SODIUM 30 MG: 30 INJECTION SUBCUTANEOUS at 08:38

## 2018-05-08 RX ADMIN — POTASSIUM CHLORIDE 20 MEQ: 750 CAPSULE, EXTENDED RELEASE ORAL at 08:38

## 2018-05-08 RX ADMIN — METOPROLOL TARTRATE 25 MG: 25 TABLET, FILM COATED ORAL at 21:00

## 2018-05-08 RX ADMIN — VANCOMYCIN HYDROCHLORIDE 1500 MG: 1 INJECTION, POWDER, LYOPHILIZED, FOR SOLUTION INTRAVENOUS at 03:31

## 2018-05-08 RX ADMIN — CEFEPIME HYDROCHLORIDE 2 G: 2 INJECTION, POWDER, FOR SOLUTION INTRAVENOUS at 01:27

## 2018-05-08 RX ADMIN — ASPIRIN 81 MG: 81 TABLET, COATED ORAL at 08:38

## 2018-05-08 RX ADMIN — Medication 10 ML: at 21:00

## 2018-05-08 RX ADMIN — HYDROCODONE BITARTRATE AND ACETAMINOPHEN 2 TABLET: 5; 325 TABLET ORAL at 23:14

## 2018-05-08 RX ADMIN — METOPROLOL TARTRATE 25 MG: 25 TABLET, FILM COATED ORAL at 08:38

## 2018-05-08 RX ADMIN — CITALOPRAM HYDROBROMIDE 20 MG: 20 TABLET ORAL at 08:38

## 2018-05-08 RX ADMIN — FUROSEMIDE 40 MG: 40 TABLET ORAL at 08:38

## 2018-05-08 RX ADMIN — SIMVASTATIN 20 MG: 20 TABLET, FILM COATED ORAL at 21:00

## 2018-05-08 RX ADMIN — HYDROCODONE BITARTRATE AND ACETAMINOPHEN 2 TABLET: 5; 325 TABLET ORAL at 18:26

## 2018-05-08 ASSESSMENT — PAIN SCALES - GENERAL
PAINLEVEL_OUTOF10: 10
PAINLEVEL_OUTOF10: 9
PAINLEVEL_OUTOF10: 4
PAINLEVEL_OUTOF10: 9

## 2018-05-08 ASSESSMENT — PAIN DESCRIPTION - PAIN TYPE: TYPE: ACUTE PAIN

## 2018-05-08 ASSESSMENT — PAIN DESCRIPTION - LOCATION: LOCATION: LEG

## 2018-05-08 ASSESSMENT — PAIN DESCRIPTION - ORIENTATION: ORIENTATION: RIGHT

## 2018-05-09 LAB
ANION GAP SERPL CALCULATED.3IONS-SCNC: 7 MMOL/L (ref 9–17)
BUN BLDV-MCNC: 9 MG/DL (ref 8–23)
BUN/CREAT BLD: ABNORMAL (ref 9–20)
CALCIUM SERPL-MCNC: 8.2 MG/DL (ref 8.6–10.4)
CHLORIDE BLD-SCNC: 100 MMOL/L (ref 98–107)
CO2: 34 MMOL/L (ref 20–31)
CREAT SERPL-MCNC: 0.81 MG/DL (ref 0.5–0.9)
GFR AFRICAN AMERICAN: >60 ML/MIN
GFR NON-AFRICAN AMERICAN: >60 ML/MIN
GFR SERPL CREATININE-BSD FRML MDRD: ABNORMAL ML/MIN/{1.73_M2}
GFR SERPL CREATININE-BSD FRML MDRD: ABNORMAL ML/MIN/{1.73_M2}
GLUCOSE BLD-MCNC: 150 MG/DL (ref 65–105)
GLUCOSE BLD-MCNC: 157 MG/DL (ref 65–105)
GLUCOSE BLD-MCNC: 161 MG/DL (ref 70–99)
GLUCOSE BLD-MCNC: 192 MG/DL (ref 65–105)
GLUCOSE BLD-MCNC: 247 MG/DL (ref 65–105)
MAGNESIUM: 1.8 MG/DL (ref 1.6–2.6)
POTASSIUM SERPL-SCNC: 3.5 MMOL/L (ref 3.7–5.3)
SODIUM BLD-SCNC: 141 MMOL/L (ref 135–144)
VANCOMYCIN TROUGH DATE LAST DOSE: ABNORMAL
VANCOMYCIN TROUGH DOSE AMOUNT: 1500
VANCOMYCIN TROUGH TIME LAST DOSE: 148
VANCOMYCIN TROUGH: 33.9 UG/ML (ref 10–20)

## 2018-05-09 PROCEDURE — 82947 ASSAY GLUCOSE BLOOD QUANT: CPT

## 2018-05-09 PROCEDURE — 6370000000 HC RX 637 (ALT 250 FOR IP): Performed by: FAMILY MEDICINE

## 2018-05-09 PROCEDURE — 2580000003 HC RX 258: Performed by: EMERGENCY MEDICINE

## 2018-05-09 PROCEDURE — 83735 ASSAY OF MAGNESIUM: CPT

## 2018-05-09 PROCEDURE — 1200000000 HC SEMI PRIVATE

## 2018-05-09 PROCEDURE — 2580000003 HC RX 258: Performed by: SURGERY

## 2018-05-09 PROCEDURE — 2580000003 HC RX 258: Performed by: INTERNAL MEDICINE

## 2018-05-09 PROCEDURE — 6360000002 HC RX W HCPCS: Performed by: INTERNAL MEDICINE

## 2018-05-09 PROCEDURE — 6360000002 HC RX W HCPCS: Performed by: SURGERY

## 2018-05-09 PROCEDURE — 36415 COLL VENOUS BLD VENIPUNCTURE: CPT

## 2018-05-09 PROCEDURE — 6360000002 HC RX W HCPCS: Performed by: EMERGENCY MEDICINE

## 2018-05-09 PROCEDURE — 99231 SBSQ HOSP IP/OBS SF/LOW 25: CPT | Performed by: ORTHOPAEDIC SURGERY

## 2018-05-09 PROCEDURE — 80048 BASIC METABOLIC PNL TOTAL CA: CPT

## 2018-05-09 PROCEDURE — 2580000003 HC RX 258: Performed by: FAMILY MEDICINE

## 2018-05-09 PROCEDURE — 99221 1ST HOSP IP/OBS SF/LOW 40: CPT | Performed by: FAMILY MEDICINE

## 2018-05-09 PROCEDURE — 80202 ASSAY OF VANCOMYCIN: CPT

## 2018-05-09 RX ADMIN — LINAGLIPTIN 5 MG: 5 TABLET, FILM COATED ORAL at 07:51

## 2018-05-09 RX ADMIN — INSULIN LISPRO 2 UNITS: 100 INJECTION, SOLUTION INTRAVENOUS; SUBCUTANEOUS at 11:37

## 2018-05-09 RX ADMIN — METOPROLOL TARTRATE 25 MG: 25 TABLET, FILM COATED ORAL at 07:51

## 2018-05-09 RX ADMIN — CEFEPIME HYDROCHLORIDE 2 G: 2 INJECTION, POWDER, FOR SOLUTION INTRAVENOUS at 00:42

## 2018-05-09 RX ADMIN — Medication 10 ML: at 07:54

## 2018-05-09 RX ADMIN — INSULIN LISPRO 1 UNITS: 100 INJECTION, SOLUTION INTRAVENOUS; SUBCUTANEOUS at 20:52

## 2018-05-09 RX ADMIN — ASPIRIN 81 MG: 81 TABLET, COATED ORAL at 07:51

## 2018-05-09 RX ADMIN — SIMVASTATIN 20 MG: 20 TABLET, FILM COATED ORAL at 19:34

## 2018-05-09 RX ADMIN — ENOXAPARIN SODIUM 30 MG: 30 INJECTION SUBCUTANEOUS at 19:34

## 2018-05-09 RX ADMIN — ROPINIROLE HYDROCHLORIDE 0.5 MG: 0.5 TABLET, FILM COATED ORAL at 19:35

## 2018-05-09 RX ADMIN — INSULIN LISPRO 1 UNITS: 100 INJECTION, SOLUTION INTRAVENOUS; SUBCUTANEOUS at 17:08

## 2018-05-09 RX ADMIN — MULTIPLE VITAMINS W/ MINERALS TAB 1 TABLET: TAB at 07:50

## 2018-05-09 RX ADMIN — METFORMIN HYDROCHLORIDE 500 MG: 500 TABLET, EXTENDED RELEASE ORAL at 07:51

## 2018-05-09 RX ADMIN — METOPROLOL TARTRATE 25 MG: 25 TABLET, FILM COATED ORAL at 19:34

## 2018-05-09 RX ADMIN — AMIODARONE HYDROCHLORIDE 100 MG: 200 TABLET ORAL at 07:50

## 2018-05-09 RX ADMIN — VANCOMYCIN HYDROCHLORIDE 1500 MG: 1 INJECTION, POWDER, LYOPHILIZED, FOR SOLUTION INTRAVENOUS at 01:48

## 2018-05-09 RX ADMIN — ENOXAPARIN SODIUM 30 MG: 30 INJECTION SUBCUTANEOUS at 07:50

## 2018-05-09 RX ADMIN — POTASSIUM CHLORIDE 20 MEQ: 750 CAPSULE, EXTENDED RELEASE ORAL at 07:59

## 2018-05-09 RX ADMIN — CEFEPIME HYDROCHLORIDE 2 G: 2 INJECTION, POWDER, FOR SOLUTION INTRAVENOUS at 12:18

## 2018-05-09 RX ADMIN — FUROSEMIDE 40 MG: 40 TABLET ORAL at 07:51

## 2018-05-09 RX ADMIN — HYDROCODONE BITARTRATE AND ACETAMINOPHEN 2 TABLET: 5; 325 TABLET ORAL at 19:35

## 2018-05-09 RX ADMIN — HYDROCODONE BITARTRATE AND ACETAMINOPHEN 2 TABLET: 5; 325 TABLET ORAL at 07:05

## 2018-05-09 RX ADMIN — CLOPIDOGREL BISULFATE 75 MG: 75 TABLET ORAL at 07:50

## 2018-05-09 RX ADMIN — INSULIN LISPRO 1 UNITS: 100 INJECTION, SOLUTION INTRAVENOUS; SUBCUTANEOUS at 08:06

## 2018-05-09 RX ADMIN — CITALOPRAM HYDROBROMIDE 20 MG: 20 TABLET ORAL at 07:50

## 2018-05-09 RX ADMIN — Medication 10 ML: at 19:36

## 2018-05-09 ASSESSMENT — PAIN DESCRIPTION - DESCRIPTORS: DESCRIPTORS: PINS AND NEEDLES

## 2018-05-09 ASSESSMENT — PAIN SCALES - GENERAL
PAINLEVEL_OUTOF10: 4
PAINLEVEL_OUTOF10: 9
PAINLEVEL_OUTOF10: 7
PAINLEVEL_OUTOF10: 9

## 2018-05-09 ASSESSMENT — PAIN DESCRIPTION - ORIENTATION: ORIENTATION: RIGHT;UPPER

## 2018-05-09 ASSESSMENT — PAIN DESCRIPTION - FREQUENCY: FREQUENCY: CONTINUOUS

## 2018-05-09 ASSESSMENT — PAIN DESCRIPTION - PROGRESSION: CLINICAL_PROGRESSION: GRADUALLY IMPROVING

## 2018-05-09 ASSESSMENT — PAIN DESCRIPTION - LOCATION: LOCATION: LEG

## 2018-05-09 ASSESSMENT — PAIN DESCRIPTION - PAIN TYPE: TYPE: SURGICAL PAIN

## 2018-05-10 ENCOUNTER — APPOINTMENT (OUTPATIENT)
Dept: INTERVENTIONAL RADIOLOGY/VASCULAR | Age: 68
DRG: 862 | End: 2018-05-10
Payer: MEDICARE

## 2018-05-10 VITALS
HEART RATE: 72 BPM | RESPIRATION RATE: 14 BRPM | DIASTOLIC BLOOD PRESSURE: 65 MMHG | OXYGEN SATURATION: 100 % | BODY MASS INDEX: 40.87 KG/M2 | HEIGHT: 64 IN | TEMPERATURE: 99.3 F | WEIGHT: 239.42 LBS | SYSTOLIC BLOOD PRESSURE: 118 MMHG

## 2018-05-10 LAB
ANION GAP SERPL CALCULATED.3IONS-SCNC: 5 MMOL/L (ref 9–17)
BUN BLDV-MCNC: 9 MG/DL (ref 8–23)
BUN/CREAT BLD: ABNORMAL (ref 9–20)
CALCIUM SERPL-MCNC: 8.3 MG/DL (ref 8.6–10.4)
CHLORIDE BLD-SCNC: 99 MMOL/L (ref 98–107)
CO2: 37 MMOL/L (ref 20–31)
CREAT SERPL-MCNC: 0.71 MG/DL (ref 0.5–0.9)
GFR AFRICAN AMERICAN: >60 ML/MIN
GFR NON-AFRICAN AMERICAN: >60 ML/MIN
GFR SERPL CREATININE-BSD FRML MDRD: ABNORMAL ML/MIN/{1.73_M2}
GFR SERPL CREATININE-BSD FRML MDRD: ABNORMAL ML/MIN/{1.73_M2}
GLUCOSE BLD-MCNC: 122 MG/DL (ref 70–99)
GLUCOSE BLD-MCNC: 124 MG/DL (ref 65–105)
GLUCOSE BLD-MCNC: 161 MG/DL (ref 65–105)
GLUCOSE BLD-MCNC: 245 MG/DL (ref 65–105)
MAGNESIUM: 1.8 MG/DL (ref 1.6–2.6)
POTASSIUM SERPL-SCNC: 3.4 MMOL/L (ref 3.7–5.3)
SODIUM BLD-SCNC: 141 MMOL/L (ref 135–144)

## 2018-05-10 PROCEDURE — G8978 MOBILITY CURRENT STATUS: HCPCS

## 2018-05-10 PROCEDURE — 36415 COLL VENOUS BLD VENIPUNCTURE: CPT

## 2018-05-10 PROCEDURE — 80048 BASIC METABOLIC PNL TOTAL CA: CPT

## 2018-05-10 PROCEDURE — 83735 ASSAY OF MAGNESIUM: CPT

## 2018-05-10 PROCEDURE — 76937 US GUIDE VASCULAR ACCESS: CPT | Performed by: RADIOLOGY

## 2018-05-10 PROCEDURE — G8979 MOBILITY GOAL STATUS: HCPCS

## 2018-05-10 PROCEDURE — 82947 ASSAY GLUCOSE BLOOD QUANT: CPT

## 2018-05-10 PROCEDURE — 99024 POSTOP FOLLOW-UP VISIT: CPT | Performed by: ORTHOPAEDIC SURGERY

## 2018-05-10 PROCEDURE — 99232 SBSQ HOSP IP/OBS MODERATE 35: CPT | Performed by: INTERNAL MEDICINE

## 2018-05-10 PROCEDURE — C1751 CATH, INF, PER/CENT/MIDLINE: HCPCS

## 2018-05-10 PROCEDURE — 2580000003 HC RX 258: Performed by: INTERNAL MEDICINE

## 2018-05-10 PROCEDURE — 97161 PT EVAL LOW COMPLEX 20 MIN: CPT

## 2018-05-10 PROCEDURE — 6360000002 HC RX W HCPCS: Performed by: INTERNAL MEDICINE

## 2018-05-10 PROCEDURE — 77001 FLUOROGUIDE FOR VEIN DEVICE: CPT | Performed by: RADIOLOGY

## 2018-05-10 PROCEDURE — 6370000000 HC RX 637 (ALT 250 FOR IP): Performed by: FAMILY MEDICINE

## 2018-05-10 PROCEDURE — 02HV33Z INSERTION OF INFUSION DEVICE INTO SUPERIOR VENA CAVA, PERCUTANEOUS APPROACH: ICD-10-PCS | Performed by: RADIOLOGY

## 2018-05-10 PROCEDURE — 36569 INSJ PICC 5 YR+ W/O IMAGING: CPT | Performed by: RADIOLOGY

## 2018-05-10 PROCEDURE — G8980 MOBILITY D/C STATUS: HCPCS

## 2018-05-10 RX ORDER — SODIUM CHLORIDE 0.9 % (FLUSH) 0.9 %
10 SYRINGE (ML) INJECTION EVERY 12 HOURS SCHEDULED
Status: DISCONTINUED | OUTPATIENT
Start: 2018-05-10 | End: 2018-05-10 | Stop reason: HOSPADM

## 2018-05-10 RX ORDER — SODIUM CHLORIDE 0.9 % (FLUSH) 0.9 %
10 SYRINGE (ML) INJECTION PRN
Status: DISCONTINUED | OUTPATIENT
Start: 2018-05-10 | End: 2018-05-10 | Stop reason: HOSPADM

## 2018-05-10 RX ADMIN — HYDROCODONE BITARTRATE AND ACETAMINOPHEN 2 TABLET: 5; 325 TABLET ORAL at 00:15

## 2018-05-10 RX ADMIN — VANCOMYCIN HYDROCHLORIDE 1500 MG: 10 INJECTION, POWDER, LYOPHILIZED, FOR SOLUTION INTRAVENOUS at 04:41

## 2018-05-10 RX ADMIN — CITALOPRAM HYDROBROMIDE 20 MG: 20 TABLET ORAL at 08:44

## 2018-05-10 RX ADMIN — CLOPIDOGREL BISULFATE 75 MG: 75 TABLET ORAL at 08:43

## 2018-05-10 RX ADMIN — LINAGLIPTIN 5 MG: 5 TABLET, FILM COATED ORAL at 08:44

## 2018-05-10 RX ADMIN — POTASSIUM CHLORIDE 20 MEQ: 750 CAPSULE, EXTENDED RELEASE ORAL at 08:43

## 2018-05-10 RX ADMIN — MULTIPLE VITAMINS W/ MINERALS TAB 1 TABLET: TAB at 08:44

## 2018-05-10 RX ADMIN — INSULIN LISPRO 2 UNITS: 100 INJECTION, SOLUTION INTRAVENOUS; SUBCUTANEOUS at 15:08

## 2018-05-10 RX ADMIN — AMIODARONE HYDROCHLORIDE 100 MG: 200 TABLET ORAL at 08:44

## 2018-05-10 RX ADMIN — METOPROLOL TARTRATE 25 MG: 25 TABLET, FILM COATED ORAL at 08:43

## 2018-05-10 RX ADMIN — ASPIRIN 81 MG: 81 TABLET, COATED ORAL at 08:44

## 2018-05-10 RX ADMIN — DEXTROSE MONOHYDRATE 2 G: 50 INJECTION, SOLUTION INTRAVENOUS at 14:21

## 2018-05-10 RX ADMIN — LORAZEPAM 0.5 MG: 0.5 TABLET ORAL at 00:15

## 2018-05-10 RX ADMIN — METFORMIN HYDROCHLORIDE 500 MG: 500 TABLET, EXTENDED RELEASE ORAL at 08:43

## 2018-05-10 RX ADMIN — FUROSEMIDE 40 MG: 40 TABLET ORAL at 08:43

## 2018-05-10 RX ADMIN — HYDROCODONE BITARTRATE AND ACETAMINOPHEN 2 TABLET: 5; 325 TABLET ORAL at 14:16

## 2018-05-10 ASSESSMENT — PAIN DESCRIPTION - FREQUENCY: FREQUENCY: CONTINUOUS

## 2018-05-10 ASSESSMENT — PAIN DESCRIPTION - DESCRIPTORS: DESCRIPTORS: PINS AND NEEDLES

## 2018-05-10 ASSESSMENT — PAIN SCALES - GENERAL
PAINLEVEL_OUTOF10: 0
PAINLEVEL_OUTOF10: 9
PAINLEVEL_OUTOF10: 0
PAINLEVEL_OUTOF10: 6
PAINLEVEL_OUTOF10: 0

## 2018-05-10 ASSESSMENT — PAIN SCALES - WONG BAKER: WONGBAKER_NUMERICALRESPONSE: 0

## 2018-05-10 ASSESSMENT — PAIN DESCRIPTION - PAIN TYPE: TYPE: SURGICAL PAIN;ACUTE PAIN

## 2018-05-10 ASSESSMENT — PAIN DESCRIPTION - LOCATION: LOCATION: LEG

## 2018-05-10 ASSESSMENT — PAIN DESCRIPTION - ORIENTATION: ORIENTATION: RIGHT;UPPER

## 2018-05-11 ENCOUNTER — TELEPHONE (OUTPATIENT)
Dept: INFECTIOUS DISEASES | Age: 68
End: 2018-05-11

## 2018-05-11 PROBLEM — M79.604 RIGHT LEG PAIN: Status: ACTIVE | Noted: 2018-05-11

## 2018-05-12 LAB
CULTURE: ABNORMAL
DIRECT EXAM: ABNORMAL
Lab: ABNORMAL
Lab: ABNORMAL
ORGANISM: ABNORMAL
SPECIMEN DESCRIPTION: ABNORMAL
STATUS: ABNORMAL
STATUS: ABNORMAL

## 2018-05-14 ENCOUNTER — HOSPITAL ENCOUNTER (OUTPATIENT)
Age: 68
Setting detail: SPECIMEN
Discharge: HOME | End: 2018-05-14
Payer: MEDICARE

## 2018-05-14 LAB
ALBUMIN SERPL-MCNC: 3.1 G/DL (ref 3.5–5.2)
ALBUMIN/GLOBULIN RATIO: ABNORMAL (ref 1–2.5)
ALP BLD-CCNC: 106 U/L (ref 35–104)
ALT SERPL-CCNC: 15 U/L (ref 5–33)
AST SERPL-CCNC: 21 U/L
BILIRUB SERPL-MCNC: <0.15 MG/DL (ref 0.3–1.2)
BILIRUBIN DIRECT: <0.08 MG/DL
BILIRUBIN, INDIRECT: ABNORMAL MG/DL (ref 0–1)
BUN BLDV-MCNC: 14 MG/DL (ref 8–23)
C-REACTIVE PROTEIN: 35 MG/L (ref 0–5)
CREAT SERPL-MCNC: 0.7 MG/DL (ref 0.5–0.9)
GFR AFRICAN AMERICAN: >60 ML/MIN
GFR NON-AFRICAN AMERICAN: >60 ML/MIN
GFR SERPL CREATININE-BSD FRML MDRD: NORMAL ML/MIN/{1.73_M2}
GFR SERPL CREATININE-BSD FRML MDRD: NORMAL ML/MIN/{1.73_M2}
GLOBULIN: ABNORMAL G/DL (ref 1.5–3.8)
TOTAL PROTEIN: 6.5 G/DL (ref 6.4–8.3)

## 2018-05-14 PROCEDURE — 80076 HEPATIC FUNCTION PANEL: CPT

## 2018-05-14 PROCEDURE — 86140 C-REACTIVE PROTEIN: CPT

## 2018-05-14 PROCEDURE — 84520 ASSAY OF UREA NITROGEN: CPT

## 2018-05-14 PROCEDURE — 82565 ASSAY OF CREATININE: CPT

## 2018-05-16 ENCOUNTER — OFFICE VISIT (OUTPATIENT)
Dept: INFECTIOUS DISEASES | Age: 68
End: 2018-05-16
Payer: MEDICARE

## 2018-05-16 ENCOUNTER — TELEPHONE (OUTPATIENT)
Dept: INFECTIOUS DISEASES | Age: 68
End: 2018-05-16

## 2018-05-16 VITALS
BODY MASS INDEX: 38.31 KG/M2 | SYSTOLIC BLOOD PRESSURE: 121 MMHG | WEIGHT: 224.4 LBS | HEART RATE: 69 BPM | HEIGHT: 64 IN | DIASTOLIC BLOOD PRESSURE: 68 MMHG | RESPIRATION RATE: 16 BRPM

## 2018-05-16 DIAGNOSIS — L02.91 ABSCESS: Primary | ICD-10-CM

## 2018-05-16 PROCEDURE — 1036F TOBACCO NON-USER: CPT | Performed by: INTERNAL MEDICINE

## 2018-05-16 PROCEDURE — 1111F DSCHRG MED/CURRENT MED MERGE: CPT | Performed by: INTERNAL MEDICINE

## 2018-05-16 PROCEDURE — G8428 CUR MEDS NOT DOCUMENT: HCPCS | Performed by: INTERNAL MEDICINE

## 2018-05-16 PROCEDURE — 4040F PNEUMOC VAC/ADMIN/RCVD: CPT | Performed by: INTERNAL MEDICINE

## 2018-05-16 PROCEDURE — G8417 CALC BMI ABV UP PARAM F/U: HCPCS | Performed by: INTERNAL MEDICINE

## 2018-05-16 PROCEDURE — G8399 PT W/DXA RESULTS DOCUMENT: HCPCS | Performed by: INTERNAL MEDICINE

## 2018-05-16 PROCEDURE — 99215 OFFICE O/P EST HI 40 MIN: CPT | Performed by: INTERNAL MEDICINE

## 2018-05-16 PROCEDURE — G8598 ASA/ANTIPLAT THER USED: HCPCS | Performed by: INTERNAL MEDICINE

## 2018-05-16 PROCEDURE — 1090F PRES/ABSN URINE INCON ASSESS: CPT | Performed by: INTERNAL MEDICINE

## 2018-05-16 PROCEDURE — 3017F COLORECTAL CA SCREEN DOC REV: CPT | Performed by: INTERNAL MEDICINE

## 2018-05-16 PROCEDURE — 1123F ACP DISCUSS/DSCN MKR DOCD: CPT | Performed by: INTERNAL MEDICINE

## 2018-05-19 ENCOUNTER — HOSPITAL ENCOUNTER (EMERGENCY)
Age: 68
Discharge: HOME | End: 2018-05-19
Attending: EMERGENCY MEDICINE
Payer: MEDICARE

## 2018-05-19 VITALS
HEART RATE: 72 BPM | HEIGHT: 64 IN | SYSTOLIC BLOOD PRESSURE: 131 MMHG | BODY MASS INDEX: 38.24 KG/M2 | WEIGHT: 224 LBS | OXYGEN SATURATION: 97 % | RESPIRATION RATE: 16 BRPM | TEMPERATURE: 98.4 F | DIASTOLIC BLOOD PRESSURE: 70 MMHG

## 2018-05-19 DIAGNOSIS — T82.9XXA COMPLICATION ASSOCIATED WITH PERIPHERALLY INSERTED CENTRAL CATHETER, INITIAL ENCOUNTER: Primary | ICD-10-CM

## 2018-05-19 PROCEDURE — 99283 EMERGENCY DEPT VISIT LOW MDM: CPT

## 2018-05-19 ASSESSMENT — ENCOUNTER SYMPTOMS
EYE PAIN: 0
SORE THROAT: 0
VOMITING: 0
DIARRHEA: 0
ABDOMINAL PAIN: 0
COUGH: 0
SHORTNESS OF BREATH: 0
BACK PAIN: 0
NAUSEA: 0

## 2018-05-19 ASSESSMENT — PAIN SCALES - GENERAL: PAINLEVEL_OUTOF10: 8

## 2018-05-21 ENCOUNTER — TELEPHONE (OUTPATIENT)
Dept: INFECTIOUS DISEASES | Age: 68
End: 2018-05-21

## 2018-05-21 ENCOUNTER — HOSPITAL ENCOUNTER (OUTPATIENT)
Age: 68
Setting detail: SPECIMEN
Discharge: HOME | End: 2018-05-21
Payer: MEDICARE

## 2018-05-21 ENCOUNTER — HOSPITAL ENCOUNTER (OUTPATIENT)
Dept: WOUND CARE | Age: 68
Discharge: HOME | End: 2018-05-21

## 2018-05-21 ENCOUNTER — APPOINTMENT (OUTPATIENT)
Dept: INTERVENTIONAL RADIOLOGY/VASCULAR | Age: 68
End: 2018-05-21
Payer: MEDICARE

## 2018-05-21 ENCOUNTER — HOSPITAL ENCOUNTER (EMERGENCY)
Age: 68
Discharge: HOME | End: 2018-05-21
Attending: EMERGENCY MEDICINE
Payer: MEDICARE

## 2018-05-21 ENCOUNTER — HOSPITAL ENCOUNTER (OUTPATIENT)
Age: 68
Discharge: HOME | End: 2018-05-21
Payer: MEDICARE

## 2018-05-21 VITALS
HEIGHT: 64 IN | OXYGEN SATURATION: 97 % | WEIGHT: 224 LBS | DIASTOLIC BLOOD PRESSURE: 67 MMHG | TEMPERATURE: 98.5 F | RESPIRATION RATE: 16 BRPM | HEART RATE: 67 BPM | SYSTOLIC BLOOD PRESSURE: 133 MMHG | BODY MASS INDEX: 38.24 KG/M2

## 2018-05-21 DIAGNOSIS — Z95.828 STATUS POST PERIPHERALLY INSERTED CENTRAL CATHETER (PICC) CENTRAL LINE PLACEMENT: Primary | ICD-10-CM

## 2018-05-21 PROCEDURE — 36569 INSJ PICC 5 YR+ W/O IMAGING: CPT | Performed by: RADIOLOGY

## 2018-05-21 PROCEDURE — 77001 FLUOROGUIDE FOR VEIN DEVICE: CPT | Performed by: RADIOLOGY

## 2018-05-21 PROCEDURE — 86140 C-REACTIVE PROTEIN: CPT

## 2018-05-21 PROCEDURE — 99283 EMERGENCY DEPT VISIT LOW MDM: CPT

## 2018-05-21 PROCEDURE — 80076 HEPATIC FUNCTION PANEL: CPT

## 2018-05-21 PROCEDURE — 85651 RBC SED RATE NONAUTOMATED: CPT

## 2018-05-21 PROCEDURE — 82565 ASSAY OF CREATININE: CPT

## 2018-05-21 PROCEDURE — 86141 C-REACTIVE PROTEIN HS: CPT

## 2018-05-21 PROCEDURE — 76937 US GUIDE VASCULAR ACCESS: CPT | Performed by: RADIOLOGY

## 2018-05-21 PROCEDURE — C1751 CATH, INF, PER/CENT/MIDLINE: HCPCS

## 2018-05-21 PROCEDURE — 84520 ASSAY OF UREA NITROGEN: CPT

## 2018-05-22 ENCOUNTER — HOSPITAL ENCOUNTER (OUTPATIENT)
Dept: WOUND CARE | Age: 68
Discharge: HOME | End: 2018-05-22
Payer: MEDICARE

## 2018-05-22 VITALS
HEIGHT: 64 IN | TEMPERATURE: 97.9 F | RESPIRATION RATE: 18 BRPM | WEIGHT: 224 LBS | HEART RATE: 61 BPM | DIASTOLIC BLOOD PRESSURE: 58 MMHG | BODY MASS INDEX: 38.24 KG/M2 | SYSTOLIC BLOOD PRESSURE: 105 MMHG

## 2018-05-22 DIAGNOSIS — L97.912 LEG ULCER, RIGHT, WITH FAT LAYER EXPOSED (HCC): ICD-10-CM

## 2018-05-22 LAB
-: NORMAL
ALBUMIN SERPL-MCNC: 3.1 G/DL (ref 3.5–5.2)
ALBUMIN/GLOBULIN RATIO: ABNORMAL (ref 1–2.5)
ALP BLD-CCNC: 93 U/L (ref 35–104)
ALT SERPL-CCNC: 9 U/L (ref 5–33)
AST SERPL-CCNC: 17 U/L
BILIRUB SERPL-MCNC: 0.17 MG/DL (ref 0.3–1.2)
BILIRUBIN DIRECT: <0.08 MG/DL
BILIRUBIN, INDIRECT: ABNORMAL MG/DL (ref 0–1)
BUN BLDV-MCNC: 11 MG/DL (ref 8–23)
C-REACTIVE PROTEIN: 19 MG/L (ref 0–5)
CREAT SERPL-MCNC: 0.69 MG/DL (ref 0.5–0.9)
GFR AFRICAN AMERICAN: >60 ML/MIN
GFR NON-AFRICAN AMERICAN: >60 ML/MIN
GFR SERPL CREATININE-BSD FRML MDRD: NORMAL ML/MIN/{1.73_M2}
GFR SERPL CREATININE-BSD FRML MDRD: NORMAL ML/MIN/{1.73_M2}
GLOBULIN: ABNORMAL G/DL (ref 1.5–3.8)
HIGH SENSITIVE C-REACTIVE PROTEIN: 18.6 MG/L
REASON FOR REJECTION: NORMAL
SEDIMENTATION RATE, ERYTHROCYTE: 34 MM (ref 0–20)
TOTAL PROTEIN: 6.2 G/DL (ref 6.4–8.3)
ZZ NTE CLEAN UP: ORDERED TEST: NORMAL
ZZ NTE WITH NAME CLEAN UP: SPECIMEN SOURCE: NORMAL

## 2018-05-22 PROCEDURE — 99213 OFFICE O/P EST LOW 20 MIN: CPT

## 2018-05-22 PROCEDURE — 6370000000 HC RX 637 (ALT 250 FOR IP): Performed by: NURSE PRACTITIONER

## 2018-05-22 PROCEDURE — 11042 DBRDMT SUBQ TIS 1ST 20SQCM/<: CPT | Performed by: NURSE PRACTITIONER

## 2018-05-22 PROCEDURE — 11042 DBRDMT SUBQ TIS 1ST 20SQCM/<: CPT

## 2018-05-22 RX ORDER — LIDOCAINE HYDROCHLORIDE 40 MG/ML
SOLUTION TOPICAL ONCE
Status: COMPLETED | OUTPATIENT
Start: 2018-05-22 | End: 2018-05-22

## 2018-05-22 RX ADMIN — LIDOCAINE HYDROCHLORIDE 10 ML: 40 SOLUTION TOPICAL at 10:32

## 2018-05-22 ASSESSMENT — PAIN SCALES - GENERAL: PAINLEVEL_OUTOF10: 0

## 2018-05-30 ENCOUNTER — OFFICE VISIT (OUTPATIENT)
Dept: INFECTIOUS DISEASES | Age: 68
End: 2018-05-30
Payer: MEDICARE

## 2018-05-30 VITALS
TEMPERATURE: 98.1 F | DIASTOLIC BLOOD PRESSURE: 72 MMHG | HEART RATE: 70 BPM | HEIGHT: 64 IN | SYSTOLIC BLOOD PRESSURE: 134 MMHG | WEIGHT: 210 LBS | BODY MASS INDEX: 35.85 KG/M2

## 2018-05-30 DIAGNOSIS — L02.91 ABSCESS: Primary | ICD-10-CM

## 2018-05-30 PROCEDURE — G8598 ASA/ANTIPLAT THER USED: HCPCS | Performed by: INTERNAL MEDICINE

## 2018-05-30 PROCEDURE — 99215 OFFICE O/P EST HI 40 MIN: CPT | Performed by: INTERNAL MEDICINE

## 2018-05-30 PROCEDURE — 1090F PRES/ABSN URINE INCON ASSESS: CPT | Performed by: INTERNAL MEDICINE

## 2018-05-30 PROCEDURE — 1111F DSCHRG MED/CURRENT MED MERGE: CPT | Performed by: INTERNAL MEDICINE

## 2018-05-30 PROCEDURE — G8399 PT W/DXA RESULTS DOCUMENT: HCPCS | Performed by: INTERNAL MEDICINE

## 2018-05-30 PROCEDURE — 1123F ACP DISCUSS/DSCN MKR DOCD: CPT | Performed by: INTERNAL MEDICINE

## 2018-05-30 PROCEDURE — 3017F COLORECTAL CA SCREEN DOC REV: CPT | Performed by: INTERNAL MEDICINE

## 2018-05-30 PROCEDURE — 4040F PNEUMOC VAC/ADMIN/RCVD: CPT | Performed by: INTERNAL MEDICINE

## 2018-05-30 PROCEDURE — 1036F TOBACCO NON-USER: CPT | Performed by: INTERNAL MEDICINE

## 2018-05-30 PROCEDURE — G8427 DOCREV CUR MEDS BY ELIG CLIN: HCPCS | Performed by: INTERNAL MEDICINE

## 2018-05-30 PROCEDURE — G8417 CALC BMI ABV UP PARAM F/U: HCPCS | Performed by: INTERNAL MEDICINE

## 2018-05-30 RX ORDER — CEPHALEXIN 500 MG/1
500 CAPSULE ORAL 3 TIMES DAILY
Qty: 42 CAPSULE | Refills: 0 | Status: SHIPPED | OUTPATIENT
Start: 2018-05-30 | End: 2018-06-29

## 2018-05-31 ENCOUNTER — HOSPITAL ENCOUNTER (OUTPATIENT)
Dept: WOUND CARE | Age: 68
Discharge: HOME | End: 2018-05-31
Payer: MEDICARE

## 2018-05-31 VITALS
BODY MASS INDEX: 35.68 KG/M2 | TEMPERATURE: 99.5 F | RESPIRATION RATE: 18 BRPM | HEIGHT: 64 IN | DIASTOLIC BLOOD PRESSURE: 63 MMHG | SYSTOLIC BLOOD PRESSURE: 105 MMHG | HEART RATE: 74 BPM | WEIGHT: 209 LBS

## 2018-05-31 DIAGNOSIS — L97.923 LEG ULCER, LEFT, WITH NECROSIS OF MUSCLE (HCC): Primary | ICD-10-CM

## 2018-05-31 PROBLEM — E03.9 HYPOTHYROIDISM: Chronic | Status: ACTIVE | Noted: 2018-03-06

## 2018-05-31 PROBLEM — I48.91 ATRIAL FIBRILLATION (HCC): Status: ACTIVE | Noted: 2018-05-31

## 2018-05-31 PROBLEM — M54.42 CHRONIC BILATERAL LOW BACK PAIN WITH LEFT-SIDED SCIATICA: Chronic | Status: ACTIVE | Noted: 2017-03-07

## 2018-05-31 PROBLEM — L97.912 LEG ULCER, RIGHT, WITH FAT LAYER EXPOSED (HCC): Chronic | Status: ACTIVE | Noted: 2018-05-22

## 2018-05-31 PROBLEM — G89.29 CHRONIC BILATERAL LOW BACK PAIN WITH LEFT-SIDED SCIATICA: Chronic | Status: ACTIVE | Noted: 2017-03-07

## 2018-05-31 PROBLEM — R94.39 ABNORMAL STRESS TEST: Status: ACTIVE | Noted: 2018-01-24

## 2018-05-31 PROBLEM — E66.01 MORBID OBESITY WITH BODY MASS INDEX (BMI) OF 40.0 TO 49.9 (HCC): Chronic | Status: ACTIVE | Noted: 2018-05-08

## 2018-05-31 PROBLEM — I20.9 ANGINA PECTORIS (HCC): Status: ACTIVE | Noted: 2018-05-31

## 2018-05-31 LAB
ALBUMIN SERPL-MCNC: 3.4 G/DL (ref 3.5–5.2)
PREALBUMIN: 13.7 MG/DL (ref 20–40)
TOTAL PROTEIN: 6.7 G/DL (ref 6.4–8.3)

## 2018-05-31 PROCEDURE — 82040 ASSAY OF SERUM ALBUMIN: CPT

## 2018-05-31 PROCEDURE — 11043 DBRDMT MUSC&/FSCA 1ST 20/<: CPT

## 2018-05-31 PROCEDURE — 6370000000 HC RX 637 (ALT 250 FOR IP): Performed by: SURGERY

## 2018-05-31 PROCEDURE — 36415 COLL VENOUS BLD VENIPUNCTURE: CPT

## 2018-05-31 PROCEDURE — 84134 ASSAY OF PREALBUMIN: CPT

## 2018-05-31 PROCEDURE — 84155 ASSAY OF PROTEIN SERUM: CPT

## 2018-05-31 RX ORDER — MULTIVIT-MIN/IRON FUM/FOLIC AC 7.5 MG-4
1 TABLET ORAL DAILY
Qty: 30 TABLET | Refills: 5 | Status: ON HOLD | OUTPATIENT
Start: 2018-05-31 | End: 2020-10-14

## 2018-05-31 RX ORDER — HYDROCODONE BITARTRATE AND ACETAMINOPHEN 5; 325 MG/1; MG/1
1 TABLET ORAL
COMMUNITY
End: 2018-06-07 | Stop reason: ALTCHOICE

## 2018-05-31 RX ORDER — LIDOCAINE HYDROCHLORIDE 40 MG/ML
SOLUTION TOPICAL ONCE
Status: COMPLETED | OUTPATIENT
Start: 2018-05-31 | End: 2018-05-31

## 2018-05-31 RX ADMIN — LIDOCAINE HYDROCHLORIDE 5 ML: 40 SOLUTION TOPICAL at 13:46

## 2018-05-31 ASSESSMENT — PAIN SCALES - GENERAL: PAINLEVEL_OUTOF10: 0

## 2018-06-07 ENCOUNTER — HOSPITAL ENCOUNTER (OUTPATIENT)
Dept: WOUND CARE | Age: 68
Discharge: HOME | End: 2018-06-07
Payer: MEDICARE

## 2018-06-07 VITALS
BODY MASS INDEX: 35.51 KG/M2 | HEART RATE: 73 BPM | HEIGHT: 64 IN | TEMPERATURE: 99.5 F | SYSTOLIC BLOOD PRESSURE: 144 MMHG | DIASTOLIC BLOOD PRESSURE: 75 MMHG | WEIGHT: 208 LBS

## 2018-06-07 DIAGNOSIS — T81.89XD DELAYED SURGICAL WOUND HEALING, SUBSEQUENT ENCOUNTER: Chronic | ICD-10-CM

## 2018-06-07 PROBLEM — T81.89XA DELAYED SURGICAL WOUND HEALING: Chronic | Status: ACTIVE | Noted: 2018-06-07

## 2018-06-07 PROCEDURE — 11043 DBRDMT MUSC&/FSCA 1ST 20/<: CPT

## 2018-06-07 PROCEDURE — 6370000000 HC RX 637 (ALT 250 FOR IP): Performed by: SURGERY

## 2018-06-07 RX ORDER — LIDOCAINE HYDROCHLORIDE 40 MG/ML
SOLUTION TOPICAL ONCE
Status: COMPLETED | OUTPATIENT
Start: 2018-06-07 | End: 2018-06-07

## 2018-06-07 RX ADMIN — LIDOCAINE HYDROCHLORIDE 5 ML: 40 SOLUTION TOPICAL at 15:50

## 2018-06-07 ASSESSMENT — PAIN DESCRIPTION - LOCATION: LOCATION: LEG

## 2018-06-07 ASSESSMENT — PAIN DESCRIPTION - ONSET: ONSET: ON-GOING

## 2018-06-07 ASSESSMENT — PAIN SCALES - WONG BAKER: WONGBAKER_NUMERICALRESPONSE: 8

## 2018-06-07 ASSESSMENT — PAIN DESCRIPTION - FREQUENCY: FREQUENCY: INTERMITTENT

## 2018-06-07 ASSESSMENT — PAIN SCALES - GENERAL: PAINLEVEL_OUTOF10: 8

## 2018-06-07 ASSESSMENT — PAIN DESCRIPTION - DESCRIPTORS: DESCRIPTORS: JABBING

## 2018-06-07 ASSESSMENT — PAIN DESCRIPTION - PAIN TYPE: TYPE: SURGICAL PAIN;ACUTE PAIN

## 2018-06-07 ASSESSMENT — PAIN DESCRIPTION - ORIENTATION: ORIENTATION: RIGHT;UPPER

## 2018-06-07 ASSESSMENT — PAIN DESCRIPTION - PROGRESSION: CLINICAL_PROGRESSION: NOT CHANGED

## 2018-06-14 ENCOUNTER — HOSPITAL ENCOUNTER (OUTPATIENT)
Dept: WOUND CARE | Age: 68
Discharge: HOME | End: 2018-06-14
Payer: MEDICARE

## 2018-06-14 VITALS
TEMPERATURE: 98.8 F | SYSTOLIC BLOOD PRESSURE: 118 MMHG | RESPIRATION RATE: 18 BRPM | BODY MASS INDEX: 35.51 KG/M2 | HEIGHT: 64 IN | DIASTOLIC BLOOD PRESSURE: 65 MMHG | HEART RATE: 75 BPM | WEIGHT: 208 LBS

## 2018-06-14 PROCEDURE — 11042 DBRDMT SUBQ TIS 1ST 20SQCM/<: CPT

## 2018-06-14 PROCEDURE — 97605 NEG PRS WND THER DME<=50SQCM: CPT

## 2018-06-14 PROCEDURE — 6370000000 HC RX 637 (ALT 250 FOR IP): Performed by: SURGERY

## 2018-06-14 RX ORDER — LIDOCAINE HYDROCHLORIDE 40 MG/ML
SOLUTION TOPICAL ONCE
Status: COMPLETED | OUTPATIENT
Start: 2018-06-14 | End: 2018-06-14

## 2018-06-14 RX ADMIN — LIDOCAINE HYDROCHLORIDE 5 ML: 40 SOLUTION TOPICAL at 14:31

## 2018-06-21 ENCOUNTER — HOSPITAL ENCOUNTER (OUTPATIENT)
Dept: WOUND CARE | Age: 68
Discharge: HOME | End: 2018-06-21
Payer: MEDICARE

## 2018-06-21 VITALS
BODY MASS INDEX: 36.02 KG/M2 | DIASTOLIC BLOOD PRESSURE: 81 MMHG | RESPIRATION RATE: 18 BRPM | WEIGHT: 211 LBS | HEIGHT: 64 IN | TEMPERATURE: 98.4 F | SYSTOLIC BLOOD PRESSURE: 148 MMHG | HEART RATE: 69 BPM

## 2018-06-21 DIAGNOSIS — R22.41 MASS OF RIGHT THIGH: Primary | ICD-10-CM

## 2018-06-21 PROBLEM — T81.89XA DELAYED SURGICAL WOUND HEALING: Chronic | Status: RESOLVED | Noted: 2018-06-07 | Resolved: 2018-06-21

## 2018-06-21 PROCEDURE — 6370000000 HC RX 637 (ALT 250 FOR IP): Performed by: SURGERY

## 2018-06-21 PROCEDURE — 11042 DBRDMT SUBQ TIS 1ST 20SQCM/<: CPT

## 2018-06-21 RX ORDER — LIDOCAINE HYDROCHLORIDE 40 MG/ML
SOLUTION TOPICAL ONCE
Status: COMPLETED | OUTPATIENT
Start: 2018-06-21 | End: 2018-06-21

## 2018-06-21 RX ADMIN — LIDOCAINE HYDROCHLORIDE 5 ML: 40 SOLUTION TOPICAL at 15:43

## 2018-06-21 ASSESSMENT — PAIN SCALES - GENERAL: PAINLEVEL_OUTOF10: 0

## 2018-06-25 ENCOUNTER — HOSPITAL ENCOUNTER (OUTPATIENT)
Dept: ULTRASOUND IMAGING | Age: 68
Discharge: HOME | End: 2018-06-27
Payer: MEDICARE

## 2018-06-25 DIAGNOSIS — R22.41 MASS OF RIGHT THIGH: ICD-10-CM

## 2018-06-25 PROCEDURE — 76882 US LMTD JT/FCL EVL NVASC XTR: CPT

## 2018-06-29 ENCOUNTER — HOSPITAL ENCOUNTER (OUTPATIENT)
Dept: WOUND CARE | Age: 68
Discharge: HOME | End: 2018-06-29
Payer: MEDICARE

## 2018-06-29 VITALS
DIASTOLIC BLOOD PRESSURE: 75 MMHG | TEMPERATURE: 98.4 F | HEIGHT: 64 IN | SYSTOLIC BLOOD PRESSURE: 122 MMHG | HEART RATE: 64 BPM | RESPIRATION RATE: 18 BRPM | WEIGHT: 211 LBS | BODY MASS INDEX: 36.02 KG/M2

## 2018-06-29 PROCEDURE — 11042 DBRDMT SUBQ TIS 1ST 20SQCM/<: CPT

## 2018-06-29 PROCEDURE — 6370000000 HC RX 637 (ALT 250 FOR IP): Performed by: SURGERY

## 2018-06-29 RX ORDER — LIDOCAINE HYDROCHLORIDE 40 MG/ML
SOLUTION TOPICAL ONCE
Status: COMPLETED | OUTPATIENT
Start: 2018-06-29 | End: 2018-06-29

## 2018-06-29 RX ADMIN — LIDOCAINE HYDROCHLORIDE 5 ML: 40 SOLUTION TOPICAL at 13:11

## 2018-06-29 ASSESSMENT — PAIN SCALES - GENERAL: PAINLEVEL_OUTOF10: 0

## 2018-07-06 ENCOUNTER — HOSPITAL ENCOUNTER (OUTPATIENT)
Dept: WOUND CARE | Age: 68
Discharge: HOME | End: 2018-07-06
Payer: MEDICARE

## 2018-07-13 ENCOUNTER — HOSPITAL ENCOUNTER (OUTPATIENT)
Dept: WOUND CARE | Age: 68
Discharge: HOME | End: 2018-07-13
Payer: MEDICARE

## 2018-07-13 VITALS
BODY MASS INDEX: 36.02 KG/M2 | SYSTOLIC BLOOD PRESSURE: 137 MMHG | TEMPERATURE: 97.7 F | WEIGHT: 211 LBS | DIASTOLIC BLOOD PRESSURE: 78 MMHG | HEIGHT: 64 IN | RESPIRATION RATE: 18 BRPM | HEART RATE: 61 BPM

## 2018-07-13 PROCEDURE — 99211 OFF/OP EST MAY X REQ PHY/QHP: CPT

## 2018-07-13 PROCEDURE — 6370000000 HC RX 637 (ALT 250 FOR IP): Performed by: SURGERY

## 2018-07-13 RX ORDER — LIDOCAINE HYDROCHLORIDE 40 MG/ML
SOLUTION TOPICAL ONCE
Status: COMPLETED | OUTPATIENT
Start: 2018-07-13 | End: 2018-07-13

## 2018-07-13 RX ADMIN — LIDOCAINE HYDROCHLORIDE 5 ML: 40 SOLUTION TOPICAL at 13:43

## 2018-07-13 ASSESSMENT — PAIN SCALES - GENERAL: PAINLEVEL_OUTOF10: 0

## 2018-07-13 NOTE — PROGRESS NOTES
INCISION AND DRAINAGE THIGH ABSCESS performed by Janessa Mendoza DO at [address] [place] [address] OFFICE/OUTPT VISIT,PROCEDURE ONLY N/A 2/6/2018    CABG X 3 LIMA-LAD-DIAG,SVG-PDA,CORONARY ARTERY BYPASS REDO, PUMP ASSIST, SWAN, JARRED, REDO STERNOTOMY performed by Ayala Newberry MD at [facility]    Family History   Problem Relation Age of Onset    Heart Disease Father        SOCIAL HISTORY    Social History   Substance Use Topics    Smoking status: Former Smoker     Packs/day: 1.00     Years: 56.00     Types: Cigarettes     Quit date: 1/17/2018    Smokeless tobacco: Never Used    Alcohol use No       ALLERGIES    Allergies   Allergen Reactions    Codeine Other (See Comments)     Constipation.  Morphine Other (See Comments)     Hallucinations.        MEDICATIONS    Current Outpatient Prescriptions on File Prior to Encounter   Medication Sig Dispense Refill    metoprolol tartrate (LOPRESSOR) 25 MG tablet TAKE 1 TABLET BY MOUTH TWICE DAILY 180 tablet 3    atorvastatin (LIPITOR) 40 MG tablet Take 1 tablet by mouth daily 90 tablet 3    lisinopril (PRINIVIL;ZESTRIL) 2.5 MG tablet TAKE 1 TABLET BY MOUTH EVERY DAY 90 tablet 3    rOPINIRole (REQUIP) 1 MG tablet TAKE 1 TABLET BY MOUTH EVERY NIGHT AS NEEDED 90 tablet 11    Multiple Vitamins-Minerals (MULTIVITAMIN WITH MINERALS) tablet Take 1 tablet by mouth daily 30 tablet 5    metFORMIN (GLUCOPHAGE-XR) 500 MG extended release tablet TAKE 1 TABLET BY MOUTH EVERY DAY WITH BREAKFAST 90 tablet 3    tiZANidine (ZANAFLEX) 4 MG tablet TAKE 1 TABLET BY MOUTH EVERY 8 HOURS AS NEEDED FOR BACK PAIN 270 tablet 0    amiodarone (CORDARONE) 200 MG tablet Take 0.5 tablets by mouth daily 30 tablet 0    albuterol (PROVENTIL) (2.5 MG/3ML) 0.083% nebulizer solution Take 3 mLs by nebulization every 6 hours as needed for Wheezing 120 each 3    furosemide (LASIX) 40 MG tablet Take 40 mg by mouth daily      albuterol sulfate HFA (PROAIR HFA) 108 (90 Base) MCG/ACT inhaler Inhale 2 puffs into the lungs every 4 hours as needed for Wheezing 1 Inhaler 3    potassium chloride (KLOR-CON) 10 MEQ extended release tablet TAKE 2 TABLETS BY MOUTH ONCE DAILY 180 tablet 3    aspirin 81 MG EC tablet Take 1 tablet by mouth daily 30 tablet 3    clopidogrel (PLAVIX) 75 MG tablet Take 1 tablet by mouth daily 30 tablet 3    JANUVIA 100 MG tablet TAKE 1 TABLET BY MOUTH DAILY 90 tablet 3    citalopram (CELEXA) 20 MG tablet TAKE 1 TABLET BY MOUTH EVERY DAY 90 tablet 3    hydrocortisone 2.5 % cream Apply topically 2 times daily. 28 g 11     No current facility-administered medications on file prior to encounter. REVIEW OF SYSTEMS    Pertinent items are noted in HPI.     Objective:      /78   Pulse 61   Temp 97.7 °F (36.5 °C) (Tympanic)   Resp 18   Ht 5' 4\" (1.626 m)   Wt 211 lb (95.7 kg)   BMI 36.22 kg/m²     Wt Readings from Last 3 Encounters:   07/13/18 211 lb (95.7 kg)   06/29/18 211 lb (95.7 kg)   06/21/18 211 lb (95.7 kg)       PHYSICAL EXAM    General Appearance: alert and oriented to person, place and time, well developed and well- nourished, in no acute distress  Skin: warm and dry, no rash or erythema  Head: normocephalic and atraumatic  Eyes: pupils equal, round, and reactive to light, extraocular eye movements intact, conjunctivae normal  ENT: tympanic membrane, external ear and ear canal normal bilaterally, nose without deformity, nasal mucosa and turbinates normal without polyps  Neck: supple and non-tender without mass, no thyromegaly or thyroid nodules, no cervical lymphadenopathy  Pulmonary/Chest: clear to auscultation bilaterally- no wheezes, rales or rhonchi, normal air movement, no respiratory distress  Cardiovascular: normal rate, regular rhythm, normal S1 and S2, no murmurs, rubs, clicks, or gallops, distal pulses intact, no carotid bruits  Abdomen: soft, non-tender, non-distended, normal bowel sounds, no masses or organomegaly  Extremities: no cyanosis, above and if you are unable to keep, kindly give a 24 hour notice.  Thank you.)     If you experience any of the following, please call the [address] during business hours:  [phone]     * Increase in Pain  * Temperature over 101  * Increase in drainage from your wound  * Drainage with a foul odor  * Bleeding  * Increase in swelling  * Need for compression bandage changes due to slippage, breakthrough drainage.     If you need medical attention outside of the business hours of the [address] please contact your PCP or go to the nearest emergency room.      AVS Reviewed  [x] YES     Patient Signature:__________________________________________________Date:_______        Electronically signed by Arley Hough MD on 7/13/2018 at 1:51 PM

## 2018-07-18 ENCOUNTER — OFFICE VISIT (OUTPATIENT)
Dept: INFECTIOUS DISEASES | Age: 68
End: 2018-07-18
Payer: MEDICARE

## 2018-07-18 VITALS
SYSTOLIC BLOOD PRESSURE: 149 MMHG | BODY MASS INDEX: 35.99 KG/M2 | DIASTOLIC BLOOD PRESSURE: 82 MMHG | TEMPERATURE: 98.4 F | HEIGHT: 65 IN | HEART RATE: 61 BPM | WEIGHT: 216 LBS

## 2018-07-18 DIAGNOSIS — L02.91 ABSCESS: Primary | ICD-10-CM

## 2018-07-18 PROCEDURE — 1123F ACP DISCUSS/DSCN MKR DOCD: CPT | Performed by: INTERNAL MEDICINE

## 2018-07-18 PROCEDURE — 1101F PT FALLS ASSESS-DOCD LE1/YR: CPT | Performed by: INTERNAL MEDICINE

## 2018-07-18 PROCEDURE — G8598 ASA/ANTIPLAT THER USED: HCPCS | Performed by: INTERNAL MEDICINE

## 2018-07-18 PROCEDURE — 3017F COLORECTAL CA SCREEN DOC REV: CPT | Performed by: INTERNAL MEDICINE

## 2018-07-18 PROCEDURE — G8427 DOCREV CUR MEDS BY ELIG CLIN: HCPCS | Performed by: INTERNAL MEDICINE

## 2018-07-18 PROCEDURE — G8399 PT W/DXA RESULTS DOCUMENT: HCPCS | Performed by: INTERNAL MEDICINE

## 2018-07-18 PROCEDURE — 1036F TOBACCO NON-USER: CPT | Performed by: INTERNAL MEDICINE

## 2018-07-18 PROCEDURE — G8417 CALC BMI ABV UP PARAM F/U: HCPCS | Performed by: INTERNAL MEDICINE

## 2018-07-18 PROCEDURE — 99214 OFFICE O/P EST MOD 30 MIN: CPT | Performed by: INTERNAL MEDICINE

## 2018-07-18 PROCEDURE — 1090F PRES/ABSN URINE INCON ASSESS: CPT | Performed by: INTERNAL MEDICINE

## 2018-07-18 PROCEDURE — 4040F PNEUMOC VAC/ADMIN/RCVD: CPT | Performed by: INTERNAL MEDICINE

## 2018-07-18 NOTE — PROGRESS NOTES
uncontrolled     Wears glasses     Wears partial dentures     upper plate      Past Surgical History:   Procedure Laterality Date    APPENDECTOMY      CARDIAC SURGERY      cath x 2/ stent x 1    CHOLECYSTECTOMY      HYSTERECTOMY      JOINT REPLACEMENT Bilateral     knees    DE INCIS/DRAIN THIGH/KNEE ABSCESS,DEEP Right 5/7/2018    DEBRIDEMENT INCISION AND DRAINAGE THIGH ABSCESS performed by Keyla English DO at [facility] OFFICE/OUTPT VISIT,PROCEDURE ONLY N/A 2/6/2018    CABG X 3 LIMA-LAD-DIAG,SVG-PDA,CORONARY ARTERY BYPASS REDO, PUMP ASSIST, SWAN, JARRED, REDO STERNOTOMY performed by Vilma Ward MD at [address] [facility]          Admission Meds  Current Outpatient Prescriptions on File Prior to Visit   Medication Sig Dispense Refill    metoprolol tartrate (LOPRESSOR) 25 MG tablet TAKE 1 TABLET BY MOUTH TWICE DAILY 180 tablet 3    atorvastatin (LIPITOR) 40 MG tablet Take 1 tablet by mouth daily 90 tablet 3    lisinopril (PRINIVIL;ZESTRIL) 2.5 MG tablet TAKE 1 TABLET BY MOUTH EVERY DAY 90 tablet 3    rOPINIRole (REQUIP) 1 MG tablet TAKE 1 TABLET BY MOUTH EVERY NIGHT AS NEEDED 90 tablet 11    Multiple Vitamins-Minerals (MULTIVITAMIN WITH MINERALS) tablet Take 1 tablet by mouth daily 30 tablet 5    metFORMIN (GLUCOPHAGE-XR) 500 MG extended release tablet TAKE 1 TABLET BY MOUTH EVERY DAY WITH BREAKFAST 90 tablet 3    tiZANidine (ZANAFLEX) 4 MG tablet TAKE 1 TABLET BY MOUTH EVERY 8 HOURS AS NEEDED FOR BACK PAIN 270 tablet 0    amiodarone (CORDARONE) 200 MG tablet Take 0.5 tablets by mouth daily 30 tablet 0    albuterol (PROVENTIL) (2.5 MG/3ML) 0.083% nebulizer solution Take 3 mLs by nebulization every 6 hours as needed for Wheezing 120 each 3    furosemide (LASIX) 40 MG tablet Take 40 mg by mouth daily      albuterol sulfate HFA (PROAIR HFA) 108 (90 Base) MCG/ACT inhaler Inhale 2 puffs into the lungs every 4 hours as needed for Wheezing 1 Inhaler 3    potassium chloride (KLOR-CON) 10 MEQ extended

## 2018-08-06 ENCOUNTER — HOSPITAL ENCOUNTER (OUTPATIENT)
Dept: WOMENS IMAGING | Age: 68
Discharge: HOME | End: 2018-08-08
Payer: MEDICARE

## 2018-08-06 DIAGNOSIS — Z12.31 ENCOUNTER FOR SCREENING MAMMOGRAM FOR BREAST CANCER: ICD-10-CM

## 2018-08-06 PROCEDURE — 77067 SCR MAMMO BI INCL CAD: CPT

## 2018-10-22 ENCOUNTER — HOSPITAL ENCOUNTER (OUTPATIENT)
Age: 68
Discharge: HOME | End: 2018-10-22
Payer: MEDICARE

## 2018-10-22 LAB
ALBUMIN SERPL-MCNC: 4 G/DL (ref 3.5–5.2)
ALBUMIN/GLOBULIN RATIO: ABNORMAL (ref 1–2.5)
ALP BLD-CCNC: 109 U/L (ref 35–104)
ALT SERPL-CCNC: 18 U/L (ref 5–33)
AST SERPL-CCNC: 25 U/L
BILIRUB SERPL-MCNC: 0.29 MG/DL (ref 0.3–1.2)
BILIRUBIN DIRECT: 0.09 MG/DL
BILIRUBIN, INDIRECT: 0.2 MG/DL (ref 0–1)
CHOLESTEROL/HDL RATIO: 2.7
CHOLESTEROL: 178 MG/DL
GLOBULIN: ABNORMAL G/DL (ref 1.5–3.8)
HDLC SERPL-MCNC: 65 MG/DL
LDL CHOLESTEROL: 102 MG/DL (ref 0–130)
TOTAL PROTEIN: 7.2 G/DL (ref 6.4–8.3)
TRIGL SERPL-MCNC: 53 MG/DL
VLDLC SERPL CALC-MCNC: NORMAL MG/DL (ref 1–30)

## 2018-10-22 PROCEDURE — 36415 COLL VENOUS BLD VENIPUNCTURE: CPT

## 2018-10-22 PROCEDURE — 80076 HEPATIC FUNCTION PANEL: CPT

## 2018-10-22 PROCEDURE — 80061 LIPID PANEL: CPT

## 2019-01-10 PROBLEM — F17.200 TOBACCO USE DISORDER: Status: ACTIVE | Noted: 2019-01-10

## 2019-03-11 PROBLEM — M54.2 CERVICAL PAIN (NECK): Status: ACTIVE | Noted: 2019-03-11

## 2019-08-07 ENCOUNTER — HOSPITAL ENCOUNTER (OUTPATIENT)
Dept: WOMENS IMAGING | Age: 69
Discharge: HOME | End: 2019-08-09
Payer: MEDICARE

## 2019-08-07 DIAGNOSIS — Z12.39 SCREENING BREAST EXAMINATION: ICD-10-CM

## 2019-08-07 PROCEDURE — 77063 BREAST TOMOSYNTHESIS BI: CPT

## 2019-08-29 ENCOUNTER — HOSPITAL ENCOUNTER (OUTPATIENT)
Age: 69
Setting detail: OUTPATIENT SURGERY
Discharge: HOME | End: 2019-08-29
Attending: SURGERY | Admitting: SURGERY
Payer: MEDICARE

## 2019-08-29 VITALS
OXYGEN SATURATION: 95 % | DIASTOLIC BLOOD PRESSURE: 61 MMHG | WEIGHT: 193 LBS | HEART RATE: 76 BPM | RESPIRATION RATE: 17 BRPM | SYSTOLIC BLOOD PRESSURE: 136 MMHG | BODY MASS INDEX: 32.15 KG/M2 | HEIGHT: 65 IN | TEMPERATURE: 97.4 F

## 2019-08-29 LAB — GLUCOSE BLD-MCNC: 192 MG/DL (ref 65–105)

## 2019-08-29 PROCEDURE — 88305 TISSUE EXAM BY PATHOLOGIST: CPT

## 2019-08-29 PROCEDURE — 7100000011 HC PHASE II RECOVERY - ADDTL 15 MIN: Performed by: SURGERY

## 2019-08-29 PROCEDURE — 3600000002 HC SURGERY LEVEL 2 BASE: Performed by: SURGERY

## 2019-08-29 PROCEDURE — 2709999900 HC NON-CHARGEABLE SUPPLY: Performed by: SURGERY

## 2019-08-29 PROCEDURE — 2500000003 HC RX 250 WO HCPCS: Performed by: SURGERY

## 2019-08-29 PROCEDURE — 82947 ASSAY GLUCOSE BLOOD QUANT: CPT

## 2019-08-29 PROCEDURE — 7100000010 HC PHASE II RECOVERY - FIRST 15 MIN: Performed by: SURGERY

## 2019-08-29 PROCEDURE — 2720000010 HC SURG SUPPLY STERILE: Performed by: SURGERY

## 2019-08-29 PROCEDURE — 3600000012 HC SURGERY LEVEL 2 ADDTL 15MIN: Performed by: SURGERY

## 2019-08-29 RX ORDER — LIDOCAINE HYDROCHLORIDE 10 MG/ML
INJECTION, SOLUTION INFILTRATION; PERINEURAL PRN
Status: DISCONTINUED | OUTPATIENT
Start: 2019-08-29 | End: 2019-08-29 | Stop reason: ALTCHOICE

## 2019-08-29 ASSESSMENT — PAIN - FUNCTIONAL ASSESSMENT: PAIN_FUNCTIONAL_ASSESSMENT: 0-10

## 2019-08-29 ASSESSMENT — PAIN SCALES - GENERAL: PAINLEVEL_OUTOF10: 0

## 2019-08-29 NOTE — H&P
nebulizer solution Take 3 mLs by nebulization every 6 hours as needed for Wheezing 120 each 3    albuterol sulfate HFA (PROAIR HFA) 108 (90 Base) MCG/ACT inhaler Inhale 2 puffs into the lungs every 4 hours as needed for Wheezing 1 Inhaler 3    aspirin 81 MG EC tablet Take 1 tablet by mouth daily 30 tablet 3    hydrocortisone 2.5 % cream Apply topically 2 times daily. 28 g 11     Negative except for what is mentioned in the HPI. GENERAL PHYSICAL EXAM     Vitals: /68   Pulse 75   Temp 97.3 °F (36.3 °C) (Oral)   Resp 18   Ht 5' 5\" (1.651 m)   Wt 193 lb (87.5 kg)   SpO2 95%   BMI 32.12 kg/m²  Body mass index is 32.12 kg/m². GENERAL APPEARANCE:   Chuck Campbell is a 77 y.o.   female, mildly obese, nourished, conscious, alert. Does not appear to be distress or pain at this time. SKIN:  Normal temperature, turgor and texture. No cyanosis or jaundice. HEAD:  Normocephalic, atraumatic. EYES:  Pupils equal, reactive to light and accomodation. Conjunctiva clear, no pallor. THROAT:   Mucous membranes moist. No tonsillar erythema or exudates. NECK:  No stiffness, trachea central.  No palpable masses. CHEST:  Symmetrical and equal on expansion. HEART:  Regular rate, rhythm. No murmur. LUNGS:  Equal on expansion. Clear to auscultation with no adventitious sounds. ABDOMEN:  Obese. Soft on palpation. No localized tenderness, guarding or rigidity. EXTREMITIES:  Right lower extremity with focal area of swelling posterior legs, non tender to palpation. Right foot with scattered erythematous lesions, scaling present, mild edema. No calf tenderness. No pain with dorsiflexion. NEUROLOGIC:  The patient is conscious, alert, oriented x 3. Speech is clear, no facial droop. No focal sensory or motor deficits.

## 2019-09-06 LAB — SURGICAL PATHOLOGY REPORT: NORMAL

## 2019-09-09 LAB — SURGICAL PATHOLOGY REPORT: NORMAL

## 2019-09-20 ENCOUNTER — APPOINTMENT (OUTPATIENT)
Dept: CT IMAGING | Age: 69
End: 2019-09-20
Payer: MEDICARE

## 2019-09-20 ENCOUNTER — APPOINTMENT (OUTPATIENT)
Dept: NUCLEAR MEDICINE | Age: 69
End: 2019-09-20
Payer: MEDICARE

## 2019-09-20 ENCOUNTER — APPOINTMENT (OUTPATIENT)
Dept: GENERAL RADIOLOGY | Age: 69
End: 2019-09-20
Payer: MEDICARE

## 2019-09-20 ENCOUNTER — HOSPITAL ENCOUNTER (OUTPATIENT)
Age: 69
Setting detail: OBSERVATION
Discharge: HOSPICE | End: 2019-09-21
Attending: EMERGENCY MEDICINE | Admitting: FAMILY MEDICINE
Payer: MEDICARE

## 2019-09-20 DIAGNOSIS — R07.9 CHEST PAIN, UNSPECIFIED TYPE: Primary | ICD-10-CM

## 2019-09-20 LAB
ABSOLUTE EOS #: 0.3 K/UL (ref 0–0.4)
ABSOLUTE IMMATURE GRANULOCYTE: ABNORMAL K/UL (ref 0–0.3)
ABSOLUTE LYMPH #: 1.3 K/UL (ref 1–4.8)
ABSOLUTE MONO #: 0.9 K/UL (ref 0.1–1.3)
ALBUMIN SERPL-MCNC: 3.8 G/DL (ref 3.5–5.2)
ALBUMIN/GLOBULIN RATIO: ABNORMAL (ref 1–2.5)
ALP BLD-CCNC: 105 U/L (ref 35–104)
ALT SERPL-CCNC: <5 U/L (ref 5–33)
ANION GAP SERPL CALCULATED.3IONS-SCNC: 11 MMOL/L (ref 9–17)
ANION GAP SERPL CALCULATED.3IONS-SCNC: 13 MMOL/L (ref 9–17)
AST SERPL-CCNC: 11 U/L
BASOPHILS # BLD: 2 % (ref 0–2)
BASOPHILS ABSOLUTE: 0.2 K/UL (ref 0–0.2)
BILIRUB SERPL-MCNC: 0.27 MG/DL (ref 0.3–1.2)
BNP INTERPRETATION: ABNORMAL
BUN BLDV-MCNC: 10 MG/DL (ref 8–23)
BUN BLDV-MCNC: 10 MG/DL (ref 8–23)
BUN/CREAT BLD: ABNORMAL (ref 9–20)
BUN/CREAT BLD: ABNORMAL (ref 9–20)
CALCIUM SERPL-MCNC: 9.1 MG/DL (ref 8.6–10.4)
CALCIUM SERPL-MCNC: 9.2 MG/DL (ref 8.6–10.4)
CHLORIDE BLD-SCNC: 102 MMOL/L (ref 98–107)
CHLORIDE BLD-SCNC: 97 MMOL/L (ref 98–107)
CHOLESTEROL/HDL RATIO: 3.1
CHOLESTEROL: 132 MG/DL
CO2: 28 MMOL/L (ref 20–31)
CO2: 28 MMOL/L (ref 20–31)
CREAT SERPL-MCNC: 0.92 MG/DL (ref 0.5–0.9)
CREAT SERPL-MCNC: 1.06 MG/DL (ref 0.5–0.9)
D-DIMER QUANTITATIVE: 3.07 MG/L FEU (ref 0–0.59)
DIFFERENTIAL TYPE: ABNORMAL
EKG ATRIAL RATE: 76 BPM
EKG P AXIS: 39 DEGREES
EKG P-R INTERVAL: 166 MS
EKG Q-T INTERVAL: 442 MS
EKG QRS DURATION: 100 MS
EKG QTC CALCULATION (BAZETT): 497 MS
EKG R AXIS: 16 DEGREES
EKG T AXIS: 76 DEGREES
EKG VENTRICULAR RATE: 76 BPM
EOSINOPHILS RELATIVE PERCENT: 3 % (ref 0–4)
ESTIMATED AVERAGE GLUCOSE: 128 MG/DL
GFR AFRICAN AMERICAN: >60 ML/MIN
GFR AFRICAN AMERICAN: >60 ML/MIN
GFR NON-AFRICAN AMERICAN: 51 ML/MIN
GFR NON-AFRICAN AMERICAN: >60 ML/MIN
GFR SERPL CREATININE-BSD FRML MDRD: ABNORMAL ML/MIN/{1.73_M2}
GLUCOSE BLD-MCNC: 109 MG/DL (ref 65–105)
GLUCOSE BLD-MCNC: 127 MG/DL (ref 70–99)
GLUCOSE BLD-MCNC: 153 MG/DL (ref 65–105)
GLUCOSE BLD-MCNC: 172 MG/DL (ref 70–99)
HBA1C MFR BLD: 6.1 % (ref 4–6)
HCT VFR BLD CALC: 35.8 % (ref 36–46)
HDLC SERPL-MCNC: 43 MG/DL
HEMOGLOBIN: 11.7 G/DL (ref 12–16)
IMMATURE GRANULOCYTES: ABNORMAL %
INR BLD: 0.9
LDL CHOLESTEROL: 68 MG/DL (ref 0–130)
LYMPHOCYTES # BLD: 12 % (ref 24–44)
MAGNESIUM: 1.5 MG/DL (ref 1.6–2.6)
MAGNESIUM: 1.6 MG/DL (ref 1.6–2.6)
MCH RBC QN AUTO: 27.8 PG (ref 26–34)
MCHC RBC AUTO-ENTMCNC: 32.7 G/DL (ref 31–37)
MCV RBC AUTO: 85 FL (ref 80–100)
MONOCYTES # BLD: 9 % (ref 1–7)
NRBC AUTOMATED: ABNORMAL PER 100 WBC
PARTIAL THROMBOPLASTIN TIME: 26.8 SEC (ref 24–36)
PDW BLD-RTO: 14.8 % (ref 11.5–14.9)
PLATELET # BLD: 287 K/UL (ref 150–450)
PLATELET ESTIMATE: ABNORMAL
PMV BLD AUTO: 9.2 FL (ref 6–12)
POTASSIUM SERPL-SCNC: 2.9 MMOL/L (ref 3.7–5.3)
POTASSIUM SERPL-SCNC: 3.5 MMOL/L (ref 3.7–5.3)
PRO-BNP: 736 PG/ML
PROTHROMBIN TIME: 12.6 SEC (ref 11.8–14.6)
RBC # BLD: 4.21 M/UL (ref 4–5.2)
RBC # BLD: ABNORMAL 10*6/UL
SEG NEUTROPHILS: 74 % (ref 36–66)
SEGMENTED NEUTROPHILS ABSOLUTE COUNT: 7.7 K/UL (ref 1.3–9.1)
SODIUM BLD-SCNC: 138 MMOL/L (ref 135–144)
SODIUM BLD-SCNC: 141 MMOL/L (ref 135–144)
TOTAL PROTEIN: 7.2 G/DL (ref 6.4–8.3)
TRIGL SERPL-MCNC: 104 MG/DL
TROPONIN INTERP: ABNORMAL
TROPONIN T: ABNORMAL NG/ML
TROPONIN, HIGH SENSITIVITY: 21 NG/L (ref 0–14)
TROPONIN, HIGH SENSITIVITY: 22 NG/L (ref 0–14)
TROPONIN, HIGH SENSITIVITY: 23 NG/L (ref 0–14)
VLDLC SERPL CALC-MCNC: NORMAL MG/DL (ref 1–30)
WBC # BLD: 10.3 K/UL (ref 3.5–11)
WBC # BLD: ABNORMAL 10*3/UL

## 2019-09-20 PROCEDURE — 85730 THROMBOPLASTIN TIME PARTIAL: CPT

## 2019-09-20 PROCEDURE — 85025 COMPLETE CBC W/AUTO DIFF WBC: CPT

## 2019-09-20 PROCEDURE — 80048 BASIC METABOLIC PNL TOTAL CA: CPT

## 2019-09-20 PROCEDURE — 96365 THER/PROPH/DIAG IV INF INIT: CPT

## 2019-09-20 PROCEDURE — 6360000002 HC RX W HCPCS: Performed by: EMERGENCY MEDICINE

## 2019-09-20 PROCEDURE — 6370000000 HC RX 637 (ALT 250 FOR IP): Performed by: FAMILY MEDICINE

## 2019-09-20 PROCEDURE — 2580000003 HC RX 258: Performed by: FAMILY MEDICINE

## 2019-09-20 PROCEDURE — 83036 HEMOGLOBIN GLYCOSYLATED A1C: CPT

## 2019-09-20 PROCEDURE — 83880 ASSAY OF NATRIURETIC PEPTIDE: CPT

## 2019-09-20 PROCEDURE — 83735 ASSAY OF MAGNESIUM: CPT

## 2019-09-20 PROCEDURE — G0378 HOSPITAL OBSERVATION PER HR: HCPCS

## 2019-09-20 PROCEDURE — 71045 X-RAY EXAM CHEST 1 VIEW: CPT

## 2019-09-20 PROCEDURE — 36415 COLL VENOUS BLD VENIPUNCTURE: CPT

## 2019-09-20 PROCEDURE — A9500 TC99M SESTAMIBI: HCPCS | Performed by: FAMILY MEDICINE

## 2019-09-20 PROCEDURE — 96366 THER/PROPH/DIAG IV INF ADDON: CPT

## 2019-09-20 PROCEDURE — 71260 CT THORAX DX C+: CPT

## 2019-09-20 PROCEDURE — 85610 PROTHROMBIN TIME: CPT

## 2019-09-20 PROCEDURE — 78452 HT MUSCLE IMAGE SPECT MULT: CPT

## 2019-09-20 PROCEDURE — 84484 ASSAY OF TROPONIN QUANT: CPT

## 2019-09-20 PROCEDURE — 6360000002 HC RX W HCPCS: Performed by: FAMILY MEDICINE

## 2019-09-20 PROCEDURE — 80053 COMPREHEN METABOLIC PANEL: CPT

## 2019-09-20 PROCEDURE — 85379 FIBRIN DEGRADATION QUANT: CPT

## 2019-09-20 PROCEDURE — 93010 ELECTROCARDIOGRAM REPORT: CPT | Performed by: INTERNAL MEDICINE

## 2019-09-20 PROCEDURE — 82947 ASSAY GLUCOSE BLOOD QUANT: CPT

## 2019-09-20 PROCEDURE — 6360000004 HC RX CONTRAST MEDICATION: Performed by: FAMILY MEDICINE

## 2019-09-20 PROCEDURE — 99285 EMERGENCY DEPT VISIT HI MDM: CPT

## 2019-09-20 PROCEDURE — 80061 LIPID PANEL: CPT

## 2019-09-20 PROCEDURE — 93005 ELECTROCARDIOGRAM TRACING: CPT | Performed by: EMERGENCY MEDICINE

## 2019-09-20 PROCEDURE — 96372 THER/PROPH/DIAG INJ SC/IM: CPT

## 2019-09-20 PROCEDURE — 3430000000 HC RX DIAGNOSTIC RADIOPHARMACEUTICAL: Performed by: FAMILY MEDICINE

## 2019-09-20 PROCEDURE — 6370000000 HC RX 637 (ALT 250 FOR IP): Performed by: EMERGENCY MEDICINE

## 2019-09-20 RX ORDER — 0.9 % SODIUM CHLORIDE 0.9 %
80 INTRAVENOUS SOLUTION INTRAVENOUS ONCE
Status: COMPLETED | OUTPATIENT
Start: 2019-09-20 | End: 2019-09-20

## 2019-09-20 RX ORDER — CLOPIDOGREL BISULFATE 75 MG/1
75 TABLET ORAL DAILY
Status: DISCONTINUED | OUTPATIENT
Start: 2019-09-20 | End: 2019-09-21 | Stop reason: HOSPADM

## 2019-09-20 RX ORDER — TIZANIDINE 4 MG/1
4 TABLET ORAL EVERY 8 HOURS PRN
Status: DISCONTINUED | OUTPATIENT
Start: 2019-09-20 | End: 2019-09-21 | Stop reason: HOSPADM

## 2019-09-20 RX ORDER — SODIUM CHLORIDE 9 MG/ML
500 INJECTION, SOLUTION INTRAVENOUS CONTINUOUS PRN
Status: ACTIVE | OUTPATIENT
Start: 2019-09-20 | End: 2019-09-20

## 2019-09-20 RX ORDER — M-VIT,TX,IRON,MINS/CALC/FOLIC 27MG-0.4MG
1 TABLET ORAL DAILY
Status: DISCONTINUED | OUTPATIENT
Start: 2019-09-20 | End: 2019-09-21 | Stop reason: HOSPADM

## 2019-09-20 RX ORDER — SODIUM CHLORIDE 0.9 % (FLUSH) 0.9 %
10 SYRINGE (ML) INJECTION EVERY 12 HOURS SCHEDULED
Status: DISCONTINUED | OUTPATIENT
Start: 2019-09-20 | End: 2019-09-21 | Stop reason: HOSPADM

## 2019-09-20 RX ORDER — POTASSIUM CHLORIDE 20 MEQ/1
40 TABLET, EXTENDED RELEASE ORAL ONCE
Status: COMPLETED | OUTPATIENT
Start: 2019-09-20 | End: 2019-09-20

## 2019-09-20 RX ORDER — DEXTROSE MONOHYDRATE 25 G/50ML
12.5 INJECTION, SOLUTION INTRAVENOUS PRN
Status: DISCONTINUED | OUTPATIENT
Start: 2019-09-20 | End: 2019-09-21 | Stop reason: HOSPADM

## 2019-09-20 RX ORDER — ONDANSETRON 2 MG/ML
4 INJECTION INTRAMUSCULAR; INTRAVENOUS EVERY 6 HOURS PRN
Status: DISCONTINUED | OUTPATIENT
Start: 2019-09-20 | End: 2019-09-21 | Stop reason: HOSPADM

## 2019-09-20 RX ORDER — FUROSEMIDE 40 MG/1
40 TABLET ORAL DAILY
Status: DISCONTINUED | OUTPATIENT
Start: 2019-09-20 | End: 2019-09-21 | Stop reason: HOSPADM

## 2019-09-20 RX ORDER — ACETAMINOPHEN 325 MG/1
650 TABLET ORAL EVERY 4 HOURS PRN
Status: DISCONTINUED | OUTPATIENT
Start: 2019-09-20 | End: 2019-09-21 | Stop reason: HOSPADM

## 2019-09-20 RX ORDER — ATROPINE SULFATE 0.1 MG/ML
0.5 INJECTION INTRAVENOUS EVERY 5 MIN PRN
Status: ACTIVE | OUTPATIENT
Start: 2019-09-20 | End: 2019-09-20

## 2019-09-20 RX ORDER — NITROGLYCERIN 0.4 MG/1
0.4 TABLET SUBLINGUAL EVERY 5 MIN PRN
Status: DISCONTINUED | OUTPATIENT
Start: 2019-09-20 | End: 2019-09-21 | Stop reason: HOSPADM

## 2019-09-20 RX ORDER — NITROGLYCERIN 0.4 MG/1
0.4 TABLET SUBLINGUAL EVERY 5 MIN PRN
Status: ACTIVE | OUTPATIENT
Start: 2019-09-20 | End: 2019-09-20

## 2019-09-20 RX ORDER — SODIUM CHLORIDE 0.9 % (FLUSH) 0.9 %
10 SYRINGE (ML) INJECTION PRN
Status: DISCONTINUED | OUTPATIENT
Start: 2019-09-20 | End: 2019-09-21 | Stop reason: HOSPADM

## 2019-09-20 RX ORDER — ISOSORBIDE MONONITRATE 30 MG/1
30 TABLET, EXTENDED RELEASE ORAL DAILY
Status: DISCONTINUED | OUTPATIENT
Start: 2019-09-20 | End: 2019-09-21 | Stop reason: HOSPADM

## 2019-09-20 RX ORDER — CITALOPRAM 20 MG/1
20 TABLET ORAL DAILY
Status: DISCONTINUED | OUTPATIENT
Start: 2019-09-20 | End: 2019-09-21 | Stop reason: HOSPADM

## 2019-09-20 RX ORDER — LISINOPRIL 2.5 MG/1
2.5 TABLET ORAL DAILY
Status: DISCONTINUED | OUTPATIENT
Start: 2019-09-20 | End: 2019-09-21 | Stop reason: HOSPADM

## 2019-09-20 RX ORDER — NICOTINE POLACRILEX 4 MG
15 LOZENGE BUCCAL PRN
Status: DISCONTINUED | OUTPATIENT
Start: 2019-09-20 | End: 2019-09-21 | Stop reason: HOSPADM

## 2019-09-20 RX ORDER — ASPIRIN 81 MG/1
81 TABLET, CHEWABLE ORAL DAILY
Status: DISCONTINUED | OUTPATIENT
Start: 2019-09-21 | End: 2019-09-21 | Stop reason: HOSPADM

## 2019-09-20 RX ORDER — ALBUTEROL SULFATE 90 UG/1
2 AEROSOL, METERED RESPIRATORY (INHALATION) PRN
Status: ACTIVE | OUTPATIENT
Start: 2019-09-20 | End: 2019-09-20

## 2019-09-20 RX ORDER — ASPIRIN 81 MG/1
324 TABLET, CHEWABLE ORAL ONCE
Status: COMPLETED | OUTPATIENT
Start: 2019-09-20 | End: 2019-09-20

## 2019-09-20 RX ORDER — SODIUM CHLORIDE AND POTASSIUM CHLORIDE .9; .15 G/100ML; G/100ML
SOLUTION INTRAVENOUS CONTINUOUS
Status: DISCONTINUED | OUTPATIENT
Start: 2019-09-20 | End: 2019-09-21 | Stop reason: HOSPADM

## 2019-09-20 RX ORDER — SODIUM CHLORIDE 0.9 % (FLUSH) 0.9 %
10 SYRINGE (ML) INJECTION PRN
Status: ACTIVE | OUTPATIENT
Start: 2019-09-20 | End: 2019-09-20

## 2019-09-20 RX ORDER — AMINOPHYLLINE DIHYDRATE 25 MG/ML
50 INJECTION, SOLUTION INTRAVENOUS PRN
Status: ACTIVE | OUTPATIENT
Start: 2019-09-20 | End: 2019-09-20

## 2019-09-20 RX ORDER — DEXTROSE MONOHYDRATE 50 MG/ML
100 INJECTION, SOLUTION INTRAVENOUS PRN
Status: DISCONTINUED | OUTPATIENT
Start: 2019-09-20 | End: 2019-09-21 | Stop reason: HOSPADM

## 2019-09-20 RX ORDER — ALBUTEROL SULFATE 2.5 MG/3ML
2.5 SOLUTION RESPIRATORY (INHALATION) EVERY 6 HOURS PRN
Status: DISCONTINUED | OUTPATIENT
Start: 2019-09-20 | End: 2019-09-21 | Stop reason: HOSPADM

## 2019-09-20 RX ORDER — METOPROLOL TARTRATE 5 MG/5ML
5 INJECTION INTRAVENOUS EVERY 5 MIN PRN
Status: ACTIVE | OUTPATIENT
Start: 2019-09-20 | End: 2019-09-20

## 2019-09-20 RX ORDER — ROPINIROLE 1 MG/1
1 TABLET, FILM COATED ORAL NIGHTLY
Status: DISCONTINUED | OUTPATIENT
Start: 2019-09-20 | End: 2019-09-21 | Stop reason: HOSPADM

## 2019-09-20 RX ORDER — POTASSIUM CHLORIDE 750 MG/1
10 CAPSULE, EXTENDED RELEASE ORAL DAILY
Status: DISCONTINUED | OUTPATIENT
Start: 2019-09-20 | End: 2019-09-21 | Stop reason: HOSPADM

## 2019-09-20 RX ORDER — ATORVASTATIN CALCIUM 40 MG/1
40 TABLET, FILM COATED ORAL DAILY
Status: DISCONTINUED | OUTPATIENT
Start: 2019-09-20 | End: 2019-09-21 | Stop reason: HOSPADM

## 2019-09-20 RX ORDER — ASPIRIN 81 MG/1
81 TABLET ORAL DAILY
Status: DISCONTINUED | OUTPATIENT
Start: 2019-09-20 | End: 2019-09-20 | Stop reason: SDUPTHER

## 2019-09-20 RX ORDER — METFORMIN HYDROCHLORIDE 500 MG/1
500 TABLET, EXTENDED RELEASE ORAL
Status: DISCONTINUED | OUTPATIENT
Start: 2019-09-21 | End: 2019-09-20

## 2019-09-20 RX ORDER — POTASSIUM CHLORIDE 7.45 MG/ML
10 INJECTION INTRAVENOUS ONCE
Status: COMPLETED | OUTPATIENT
Start: 2019-09-20 | End: 2019-09-20

## 2019-09-20 RX ADMIN — LINAGLIPTIN 5 MG: 5 TABLET, FILM COATED ORAL at 17:57

## 2019-09-20 RX ADMIN — SODIUM CHLORIDE 80 ML: 9 INJECTION, SOLUTION INTRAVENOUS at 13:46

## 2019-09-20 RX ADMIN — CLOPIDOGREL BISULFATE 75 MG: 75 TABLET ORAL at 15:18

## 2019-09-20 RX ADMIN — POTASSIUM CHLORIDE 10 MEQ: 7.46 INJECTION, SOLUTION INTRAVENOUS at 03:28

## 2019-09-20 RX ADMIN — POTASSIUM CHLORIDE AND SODIUM CHLORIDE: 900; 150 INJECTION, SOLUTION INTRAVENOUS at 15:18

## 2019-09-20 RX ADMIN — Medication 10 ML: at 13:07

## 2019-09-20 RX ADMIN — CITALOPRAM HYDROBROMIDE 20 MG: 20 TABLET ORAL at 15:19

## 2019-09-20 RX ADMIN — Medication 10 ML: at 21:29

## 2019-09-20 RX ADMIN — INSULIN LISPRO 1 UNITS: 100 INJECTION, SOLUTION INTRAVENOUS; SUBCUTANEOUS at 17:57

## 2019-09-20 RX ADMIN — POTASSIUM CHLORIDE 40 MEQ: 1500 TABLET, EXTENDED RELEASE ORAL at 03:28

## 2019-09-20 RX ADMIN — MULTIPLE VITAMINS W/ MINERALS TAB 1 TABLET: TAB at 15:18

## 2019-09-20 RX ADMIN — IOVERSOL 75 ML: 741 INJECTION INTRA-ARTERIAL; INTRAVENOUS at 13:46

## 2019-09-20 RX ADMIN — ENOXAPARIN SODIUM 40 MG: 40 INJECTION SUBCUTANEOUS at 15:19

## 2019-09-20 RX ADMIN — LISINOPRIL 2.5 MG: 2.5 TABLET ORAL at 21:28

## 2019-09-20 RX ADMIN — POTASSIUM CHLORIDE 10 MEQ: 750 CAPSULE, EXTENDED RELEASE ORAL at 15:18

## 2019-09-20 RX ADMIN — METOPROLOL TARTRATE 25 MG: 25 TABLET ORAL at 15:18

## 2019-09-20 RX ADMIN — ROPINIROLE HYDROCHLORIDE 1 MG: 1 TABLET, FILM COATED ORAL at 21:28

## 2019-09-20 RX ADMIN — ATORVASTATIN CALCIUM 40 MG: 40 TABLET, FILM COATED ORAL at 21:28

## 2019-09-20 RX ADMIN — METOPROLOL TARTRATE 25 MG: 25 TABLET ORAL at 21:28

## 2019-09-20 RX ADMIN — FUROSEMIDE 40 MG: 40 TABLET ORAL at 15:18

## 2019-09-20 RX ADMIN — ASPIRIN 81 MG 324 MG: 81 TABLET ORAL at 03:28

## 2019-09-20 RX ADMIN — Medication 10 ML: at 13:46

## 2019-09-20 RX ADMIN — TETRAKIS(2-METHOXYISOBUTYLISOCYANIDE)COPPER(I) TETRAFLUOROBORATE 28.2 MILLICURIE: 1 INJECTION, POWDER, LYOPHILIZED, FOR SOLUTION INTRAVENOUS at 13:07

## 2019-09-20 RX ADMIN — ISOSORBIDE MONONITRATE 30 MG: 30 TABLET, EXTENDED RELEASE ORAL at 15:19

## 2019-09-20 ASSESSMENT — PAIN SCALES - GENERAL
PAINLEVEL_OUTOF10: 7
PAINLEVEL_OUTOF10: 0

## 2019-09-20 NOTE — ED NOTES
Pt is an alert and oriented x4 female who presents to the ER c/o chest pain. Pt has a significant cardiac hx. Pt states she took nitro at home with some relief that was short lasting. Pt is in no distress at this time.  [facility] [address] [facility], [facility]  09/20/19 0662

## 2019-09-20 NOTE — ED PROVIDER NOTES
SURGERY      cath x 2/ stent x 1    CHOLECYSTECTOMY      HYSTERECTOMY      JOINT REPLACEMENT Bilateral     knees    PLEURAL CATHETER INSERTION Right 8/29/2019    LEG LESION PUNCH BIOPSY performed by Gus Jacques MD at [address] [facility] INCIS/DRAIN THIGH/KNEE ABSCESS,DEEP Right 5/7/2018    DEBRIDEMENT INCISION AND DRAINAGE THIGH ABSCESS performed by Radha Barboza DO at [address] [facility] OFFICE/OUTPT VISIT,PROCEDURE ONLY N/A 2/6/2018    CABG X 3 LIMA-LAD-DIAG,SVG-PDA,CORONARY ARTERY BYPASS REDO, PUMP ASSIST, SWAN, JARRED, REDO STERNOTOMY performed by Cyril Sanchez MD at [address]       Previous Medications    ALBUTEROL (PROVENTIL) (2.5 MG/3ML) 0.083% NEBULIZER SOLUTION    Take 3 mLs by nebulization every 6 hours as needed for Wheezing    ALBUTEROL SULFATE HFA (PROAIR HFA) 108 (90 BASE) MCG/ACT INHALER    Inhale 2 puffs into the lungs every 4 hours as needed for Wheezing    ASPIRIN 81 MG EC TABLET    Take 1 tablet by mouth daily    ATORVASTATIN (LIPITOR) 40 MG TABLET    TAKE 1 TABLET BY MOUTH DAILY    CITALOPRAM (CELEXA) 20 MG TABLET    TAKE 1 TABLET BY MOUTH EVERY DAY    CLOPIDOGREL (PLAVIX) 75 MG TABLET    TAKE 1 TABLET BY MOUTH EVERY DAY    FUROSEMIDE (LASIX) 40 MG TABLET    Take 1 tablet by mouth daily    HANDICAP PLACARD MISC    by Does not apply route 5 years, cannot walk over 200 ft. HYDROCORTISONE 2.5 % CREAM    Apply topically 2 times daily.     ISOSORBIDE MONONITRATE (IMDUR) 30 MG EXTENDED RELEASE TABLET    Take 30 mg by mouth    JANUVIA 100 MG TABLET    TAKE 1 TABLET BY MOUTH DAILY    LISINOPRIL (PRINIVIL;ZESTRIL) 2.5 MG TABLET    TAKE 1 TABLET BY MOUTH EVERY DAY    METFORMIN (GLUCOPHAGE-XR) 500 MG EXTENDED RELEASE TABLET    TAKE 1 TABLET BY MOUTH EVERY DAY WITH BREAKFAST    METOPROLOL TARTRATE (LOPRESSOR) 25 MG TABLET    TAKE 1 TABLET BY MOUTH TWICE DAILY    MULTIPLE VITAMINS-MINERALS (MULTIVITAMIN WITH MINERALS) TABLET    Take 1 tablet by mouth daily    NITROGLYCERIN

## 2019-09-20 NOTE — CONSULTS
tablet TAKE 1 TABLET BY MOUTH TWICE DAILY 7/1/19  Yes JAMES Orr CNP   JANUVIA 100 MG tablet TAKE 1 TABLET BY MOUTH DAILY 7/1/19  Yes Flory Farr MD   isosorbide mononitrate (IMDUR) 30 MG extended release tablet Take 30 mg by mouth 5/24/19  Yes Historical Provider, MD   nitroGLYCERIN (NITROSTAT) 0.4 MG SL tablet Place 0.4 mg under the tongue every 5 minutes as needed for Chest pain up to max of 3 total doses. If no relief after 1 dose, call 911.    Yes Historical Provider, MD   rOPINIRole (REQUIP) 1 MG tablet TAKE 1 TABLET BY MOUTH EVERY NIGHT AS NEEDED 4/12/19  Yes JAMES Orr CNP   metFORMIN (GLUCOPHAGE-XR) 500 MG extended release tablet TAKE 1 TABLET BY MOUTH EVERY DAY WITH BREAKFAST 4/1/19  Yes JAMES Orr CNP   ONE TOUCH ULTRA TEST strip TEST ONCE DAILY AS NEEDED 3/28/19  Yes JAMES Orr CNP   potassium chloride (KLOR-CON) 10 MEQ extended release tablet TAKE 2 TABLETS BY MOUTH ONCE DAILY 3/11/19  Yes Flory Farr MD   clopidogrel (PLAVIX) 75 MG tablet TAKE 1 TABLET BY MOUTH EVERY DAY 2/14/19  Yes Flory Farr MD   Handicap [facility] [address] [facility] by Does not apply route 5 years, cannot walk over 200 ft. 1/10/19  Yes Flory Farr MD   furosemide (LASIX) 40 MG tablet Take 1 tablet by mouth daily 1/10/19  Yes Flory Farr MD   lisinopril (PRINIVIL;ZESTRIL) 2.5 MG tablet TAKE 1 TABLET BY MOUTH EVERY DAY 1/2/19  Yes JAMES Orr CNP   citalopram (CELEXA) 20 MG tablet TAKE 1 TABLET BY MOUTH EVERY DAY 11/15/18  Yes JAMES Orr CNP   Multiple Vitamins-Minerals (MULTIVITAMIN WITH MINERALS) tablet Take 1 tablet by mouth daily 5/31/18  Yes Lisa Banda MD   albuterol (PROVENTIL) (2.5 MG/3ML) 0.083% nebulizer solution Take 3 mLs by nebulization every 6 hours as needed for Wheezing 3/9/18  Yes Barbara Alston, APRN - CNP   albuterol sulfate HFA (PROAIR HFA) 108 (90 Base) MCG/ACT inhaler Inhale 2 puffs

## 2019-09-21 VITALS
OXYGEN SATURATION: 96 % | HEART RATE: 62 BPM | BODY MASS INDEX: 31.15 KG/M2 | WEIGHT: 186.95 LBS | TEMPERATURE: 98.6 F | SYSTOLIC BLOOD PRESSURE: 93 MMHG | RESPIRATION RATE: 16 BRPM | DIASTOLIC BLOOD PRESSURE: 44 MMHG | HEIGHT: 65 IN

## 2019-09-21 LAB
GLUCOSE BLD-MCNC: 131 MG/DL (ref 65–105)
GLUCOSE BLD-MCNC: 204 MG/DL (ref 65–105)
LV EF: 47 %
LVEF MODALITY: NORMAL

## 2019-09-21 PROCEDURE — 93017 CV STRESS TEST TRACING ONLY: CPT

## 2019-09-21 PROCEDURE — 96372 THER/PROPH/DIAG INJ SC/IM: CPT

## 2019-09-21 PROCEDURE — 2580000003 HC RX 258: Performed by: FAMILY MEDICINE

## 2019-09-21 PROCEDURE — 82947 ASSAY GLUCOSE BLOOD QUANT: CPT

## 2019-09-21 PROCEDURE — 3430000000 HC RX DIAGNOSTIC RADIOPHARMACEUTICAL: Performed by: FAMILY MEDICINE

## 2019-09-21 PROCEDURE — 6360000002 HC RX W HCPCS: Performed by: FAMILY MEDICINE

## 2019-09-21 PROCEDURE — 6360000002 HC RX W HCPCS: Performed by: INTERNAL MEDICINE

## 2019-09-21 PROCEDURE — A9500 TC99M SESTAMIBI: HCPCS | Performed by: FAMILY MEDICINE

## 2019-09-21 PROCEDURE — 99220 PR INITIAL OBSERVATION CARE/DAY 70 MINUTES: CPT | Performed by: FAMILY MEDICINE

## 2019-09-21 PROCEDURE — 6370000000 HC RX 637 (ALT 250 FOR IP): Performed by: INTERNAL MEDICINE

## 2019-09-21 PROCEDURE — G0378 HOSPITAL OBSERVATION PER HR: HCPCS

## 2019-09-21 PROCEDURE — 6370000000 HC RX 637 (ALT 250 FOR IP): Performed by: FAMILY MEDICINE

## 2019-09-21 RX ORDER — SODIUM CHLORIDE 9 MG/ML
500 INJECTION, SOLUTION INTRAVENOUS CONTINUOUS PRN
Status: DISCONTINUED | OUTPATIENT
Start: 2019-09-21 | End: 2019-09-21 | Stop reason: HOSPADM

## 2019-09-21 RX ORDER — METOPROLOL TARTRATE 5 MG/5ML
5 INJECTION INTRAVENOUS EVERY 5 MIN PRN
Status: DISCONTINUED | OUTPATIENT
Start: 2019-09-21 | End: 2019-09-21 | Stop reason: HOSPADM

## 2019-09-21 RX ORDER — NITROGLYCERIN 0.4 MG/1
0.4 TABLET SUBLINGUAL EVERY 5 MIN PRN
Status: DISCONTINUED | OUTPATIENT
Start: 2019-09-21 | End: 2019-09-21 | Stop reason: HOSPADM

## 2019-09-21 RX ORDER — ALBUTEROL SULFATE 90 UG/1
2 AEROSOL, METERED RESPIRATORY (INHALATION) PRN
Status: DISCONTINUED | OUTPATIENT
Start: 2019-09-21 | End: 2019-09-21 | Stop reason: HOSPADM

## 2019-09-21 RX ORDER — ATROPINE SULFATE 0.1 MG/ML
0.5 INJECTION INTRAVENOUS EVERY 5 MIN PRN
Status: DISCONTINUED | OUTPATIENT
Start: 2019-09-21 | End: 2019-09-21 | Stop reason: HOSPADM

## 2019-09-21 RX ORDER — SODIUM CHLORIDE 0.9 % (FLUSH) 0.9 %
10 SYRINGE (ML) INJECTION PRN
Status: DISCONTINUED | OUTPATIENT
Start: 2019-09-21 | End: 2019-09-21 | Stop reason: HOSPADM

## 2019-09-21 RX ORDER — AMINOPHYLLINE DIHYDRATE 25 MG/ML
50 INJECTION, SOLUTION INTRAVENOUS PRN
Status: DISCONTINUED | OUTPATIENT
Start: 2019-09-21 | End: 2019-09-21 | Stop reason: HOSPADM

## 2019-09-21 RX ADMIN — CLOPIDOGREL BISULFATE 75 MG: 75 TABLET ORAL at 10:45

## 2019-09-21 RX ADMIN — ATORVASTATIN CALCIUM 40 MG: 40 TABLET, FILM COATED ORAL at 10:45

## 2019-09-21 RX ADMIN — CITALOPRAM HYDROBROMIDE 20 MG: 20 TABLET ORAL at 10:45

## 2019-09-21 RX ADMIN — ASPIRIN 81 MG 81 MG: 81 TABLET ORAL at 10:45

## 2019-09-21 RX ADMIN — Medication 10 ML: at 09:26

## 2019-09-21 RX ADMIN — LINAGLIPTIN 5 MG: 5 TABLET, FILM COATED ORAL at 10:45

## 2019-09-21 RX ADMIN — POTASSIUM CHLORIDE 10 MEQ: 750 CAPSULE, EXTENDED RELEASE ORAL at 10:45

## 2019-09-21 RX ADMIN — FUROSEMIDE 40 MG: 40 TABLET ORAL at 10:45

## 2019-09-21 RX ADMIN — POTASSIUM CHLORIDE AND SODIUM CHLORIDE 75 ML/HR: 900; 150 INJECTION, SOLUTION INTRAVENOUS at 04:26

## 2019-09-21 RX ADMIN — MULTIPLE VITAMINS W/ MINERALS TAB 1 TABLET: TAB at 10:45

## 2019-09-21 RX ADMIN — INSULIN LISPRO 2 UNITS: 100 INJECTION, SOLUTION INTRAVENOUS; SUBCUTANEOUS at 12:36

## 2019-09-21 RX ADMIN — ENOXAPARIN SODIUM 40 MG: 40 INJECTION SUBCUTANEOUS at 10:45

## 2019-09-21 RX ADMIN — ACETAMINOPHEN 650 MG: 325 TABLET, FILM COATED ORAL at 10:44

## 2019-09-21 RX ADMIN — TETRAKIS(2-METHOXYISOBUTYLISOCYANIDE)COPPER(I) TETRAFLUOROBORATE 36 MILLICURIE: 1 INJECTION, POWDER, LYOPHILIZED, FOR SOLUTION INTRAVENOUS at 09:50

## 2019-09-21 RX ADMIN — ISOSORBIDE MONONITRATE 30 MG: 30 TABLET, EXTENDED RELEASE ORAL at 10:45

## 2019-09-21 RX ADMIN — REGADENOSON 0.4 MG: 0.08 INJECTION, SOLUTION INTRAVENOUS at 09:25

## 2019-09-21 ASSESSMENT — PAIN SCALES - GENERAL
PAINLEVEL_OUTOF10: 4
PAINLEVEL_OUTOF10: 0
PAINLEVEL_OUTOF10: 0

## 2019-09-21 NOTE — FLOWSHEET NOTE
09/21/19 1140   Encounter Summary   Services provided to: Patient   Referral/Consult From: Rounding   Complexity of Encounter Low   Length of Encounter 15 minutes   Spiritual/Congregation   Type Spiritual support   Assessment Sleeping   Intervention Prayer;Provided reading materials/devotional materials   Outcome Did not respond
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
## 2019-09-23 NOTE — PROCEDURES
[address] [facility]                    [address] [facility]. [address]                              CARDIAC STRESS TEST    PATIENT NAME: Darvin Rubio                  :        1950  MED REC NO:   [id number]                              ROOM:       2105  ACCOUNT NO:   [de-identified]                           ADMIT DATE: 2019  PROVIDER:     Grodo Arreguin    DATE OF STUDY:  2019    ORDERING PROVIDER:  Art Maciel MD    INTERPRETING PHYSICIAN:  SAMINA RIVERA MD    LEXISCAN STRESS TEST    INDICATION:  CHEST PAIN    INTERPRETATION:  Resting heart rate:  68 bpm.  Resting blood pressure:  129/65 mmHg. Resting EKG:  Valarie sinus rhythm. Nonspecific T wave changes. Stress heart response:  Normal response. Blood pressure response:  Appropriate. Stress EKGs:  No changes seen. No ischemic Ekg changes. IMPRESSION:  NEGATIVE LEXISCAN STRESS TEST. THE NUCLEAR SCAN TO FOLLOW.           COLE RIVERA    D: 2019 10:10:53       T: 2019 10:11:59     RITO  Job#: [id number]     Doc#: Unknown    CC:    (Retain this field even if not dictated or not decipherable)

## 2020-05-20 ENCOUNTER — HOSPITAL ENCOUNTER (OUTPATIENT)
Age: 70
Discharge: HOME | End: 2020-05-20
Payer: MEDICARE

## 2020-05-20 LAB
ANION GAP SERPL CALCULATED.3IONS-SCNC: 11 MMOL/L (ref 9–17)
BUN BLDV-MCNC: 15 MG/DL (ref 8–23)
BUN/CREAT BLD: ABNORMAL (ref 9–20)
CALCIUM SERPL-MCNC: 9.2 MG/DL (ref 8.6–10.4)
CHLORIDE BLD-SCNC: 105 MMOL/L (ref 98–107)
CO2: 28 MMOL/L (ref 20–31)
CREAT SERPL-MCNC: 1.22 MG/DL (ref 0.5–0.9)
GFR AFRICAN AMERICAN: 53 ML/MIN
GFR NON-AFRICAN AMERICAN: 43 ML/MIN
GFR SERPL CREATININE-BSD FRML MDRD: ABNORMAL ML/MIN/{1.73_M2}
GFR SERPL CREATININE-BSD FRML MDRD: ABNORMAL ML/MIN/{1.73_M2}
GLUCOSE BLD-MCNC: 122 MG/DL (ref 70–99)
POTASSIUM SERPL-SCNC: 4.2 MMOL/L (ref 3.7–5.3)
SODIUM BLD-SCNC: 144 MMOL/L (ref 135–144)
THYROXINE, FREE: 1.42 NG/DL (ref 0.93–1.7)
TSH SERPL DL<=0.05 MIU/L-ACNC: 1.15 MIU/L (ref 0.3–5)

## 2020-05-20 PROCEDURE — 36415 COLL VENOUS BLD VENIPUNCTURE: CPT

## 2020-05-20 PROCEDURE — 84439 ASSAY OF FREE THYROXINE: CPT

## 2020-05-20 PROCEDURE — 84443 ASSAY THYROID STIM HORMONE: CPT

## 2020-05-20 PROCEDURE — 80048 BASIC METABOLIC PNL TOTAL CA: CPT

## 2020-07-02 ENCOUNTER — APPOINTMENT (OUTPATIENT)
Dept: GENERAL RADIOLOGY | Age: 70
End: 2020-07-02
Payer: MEDICARE

## 2020-07-02 ENCOUNTER — HOSPITAL ENCOUNTER (EMERGENCY)
Age: 70
Discharge: HOME | End: 2020-07-02
Attending: EMERGENCY MEDICINE
Payer: MEDICARE

## 2020-07-02 VITALS
TEMPERATURE: 98.1 F | RESPIRATION RATE: 18 BRPM | DIASTOLIC BLOOD PRESSURE: 63 MMHG | OXYGEN SATURATION: 98 % | WEIGHT: 160 LBS | HEART RATE: 63 BPM | SYSTOLIC BLOOD PRESSURE: 120 MMHG | BODY MASS INDEX: 26.63 KG/M2

## 2020-07-02 LAB
ABSOLUTE EOS #: 0.1 K/UL (ref 0–0.4)
ABSOLUTE IMMATURE GRANULOCYTE: ABNORMAL K/UL (ref 0–0.3)
ABSOLUTE LYMPH #: 1.4 K/UL (ref 1–4.8)
ABSOLUTE MONO #: 0.9 K/UL (ref 0.1–1.3)
ANION GAP SERPL CALCULATED.3IONS-SCNC: 12 MMOL/L (ref 9–17)
BASOPHILS # BLD: 1 % (ref 0–2)
BASOPHILS ABSOLUTE: 0.1 K/UL (ref 0–0.2)
BUN BLDV-MCNC: 22 MG/DL (ref 8–23)
BUN/CREAT BLD: ABNORMAL (ref 9–20)
CALCIUM SERPL-MCNC: 9.3 MG/DL (ref 8.6–10.4)
CHLORIDE BLD-SCNC: 103 MMOL/L (ref 98–107)
CO2: 25 MMOL/L (ref 20–31)
CREAT SERPL-MCNC: 1.32 MG/DL (ref 0.5–0.9)
DIFFERENTIAL TYPE: ABNORMAL
EOSINOPHILS RELATIVE PERCENT: 1 % (ref 0–4)
GFR AFRICAN AMERICAN: 48 ML/MIN
GFR NON-AFRICAN AMERICAN: 40 ML/MIN
GFR SERPL CREATININE-BSD FRML MDRD: ABNORMAL ML/MIN/{1.73_M2}
GFR SERPL CREATININE-BSD FRML MDRD: ABNORMAL ML/MIN/{1.73_M2}
GLUCOSE BLD-MCNC: 111 MG/DL (ref 70–99)
HCT VFR BLD CALC: 34.9 % (ref 36–46)
HEMOGLOBIN: 11.5 G/DL (ref 12–16)
IMMATURE GRANULOCYTES: ABNORMAL %
LACTIC ACID: 1.3 MMOL/L (ref 0.5–2.2)
LYMPHOCYTES # BLD: 11 % (ref 24–44)
MCH RBC QN AUTO: 28.7 PG (ref 26–34)
MCHC RBC AUTO-ENTMCNC: 32.8 G/DL (ref 31–37)
MCV RBC AUTO: 87.3 FL (ref 80–100)
MONOCYTES # BLD: 8 % (ref 1–7)
NRBC AUTOMATED: ABNORMAL PER 100 WBC
PDW BLD-RTO: 16.9 % (ref 11.5–14.9)
PLATELET # BLD: 285 K/UL (ref 150–450)
PLATELET ESTIMATE: ABNORMAL
PMV BLD AUTO: 8.2 FL (ref 6–12)
POTASSIUM SERPL-SCNC: 3.8 MMOL/L (ref 3.7–5.3)
RBC # BLD: 4 M/UL (ref 4–5.2)
RBC # BLD: ABNORMAL 10*6/UL
SEG NEUTROPHILS: 79 % (ref 36–66)
SEGMENTED NEUTROPHILS ABSOLUTE COUNT: 9.6 K/UL (ref 1.3–9.1)
SODIUM BLD-SCNC: 140 MMOL/L (ref 135–144)
WBC # BLD: 12.2 K/UL (ref 3.5–11)
WBC # BLD: ABNORMAL 10*3/UL

## 2020-07-02 PROCEDURE — 83605 ASSAY OF LACTIC ACID: CPT

## 2020-07-02 PROCEDURE — 90471 IMMUNIZATION ADMIN: CPT | Performed by: PHYSICIAN ASSISTANT

## 2020-07-02 PROCEDURE — 90715 TDAP VACCINE 7 YRS/> IM: CPT | Performed by: PHYSICIAN ASSISTANT

## 2020-07-02 PROCEDURE — 99283 EMERGENCY DEPT VISIT LOW MDM: CPT

## 2020-07-02 PROCEDURE — 6360000002 HC RX W HCPCS: Performed by: PHYSICIAN ASSISTANT

## 2020-07-02 PROCEDURE — 2580000003 HC RX 258: Performed by: PHYSICIAN ASSISTANT

## 2020-07-02 PROCEDURE — 36415 COLL VENOUS BLD VENIPUNCTURE: CPT

## 2020-07-02 PROCEDURE — 96374 THER/PROPH/DIAG INJ IV PUSH: CPT

## 2020-07-02 PROCEDURE — 80048 BASIC METABOLIC PNL TOTAL CA: CPT

## 2020-07-02 PROCEDURE — 85025 COMPLETE CBC W/AUTO DIFF WBC: CPT

## 2020-07-02 PROCEDURE — 96375 TX/PRO/DX INJ NEW DRUG ADDON: CPT

## 2020-07-02 PROCEDURE — 73130 X-RAY EXAM OF HAND: CPT

## 2020-07-02 RX ORDER — AMOXICILLIN AND CLAVULANATE POTASSIUM 875; 125 MG/1; MG/1
1 TABLET, FILM COATED ORAL 2 TIMES DAILY
Qty: 20 TABLET | Refills: 0 | Status: SHIPPED | OUTPATIENT
Start: 2020-07-02 | End: 2020-07-12

## 2020-07-02 RX ORDER — KETOROLAC TROMETHAMINE 30 MG/ML
15 INJECTION, SOLUTION INTRAMUSCULAR; INTRAVENOUS ONCE
Status: COMPLETED | OUTPATIENT
Start: 2020-07-02 | End: 2020-07-02

## 2020-07-02 RX ORDER — SODIUM CHLORIDE 9 MG/ML
1000 INJECTION, SOLUTION INTRAVENOUS CONTINUOUS
Status: DISCONTINUED | OUTPATIENT
Start: 2020-07-02 | End: 2020-07-02 | Stop reason: HOSPADM

## 2020-07-02 RX ORDER — METHYLPREDNISOLONE SODIUM SUCCINATE 40 MG/ML
40 INJECTION, POWDER, LYOPHILIZED, FOR SOLUTION INTRAMUSCULAR; INTRAVENOUS ONCE
Status: COMPLETED | OUTPATIENT
Start: 2020-07-02 | End: 2020-07-02

## 2020-07-02 RX ADMIN — METHYLPREDNISOLONE SODIUM SUCCINATE 40 MG: 40 INJECTION, POWDER, FOR SOLUTION INTRAMUSCULAR; INTRAVENOUS at 15:22

## 2020-07-02 RX ADMIN — AMPICILLIN SODIUM AND SULBACTAM SODIUM 3 G: 2; 1 INJECTION, POWDER, FOR SOLUTION INTRAMUSCULAR; INTRAVENOUS at 15:21

## 2020-07-02 RX ADMIN — KETOROLAC TROMETHAMINE 15 MG: 30 INJECTION, SOLUTION INTRAMUSCULAR at 15:22

## 2020-07-02 RX ADMIN — SODIUM CHLORIDE 1000 ML: 9 INJECTION, SOLUTION INTRAVENOUS at 15:23

## 2020-07-02 RX ADMIN — TETANUS TOXOID, REDUCED DIPHTHERIA TOXOID AND ACELLULAR PERTUSSIS VACCINE, ADSORBED 0.5 ML: 5; 2.5; 8; 8; 2.5 SUSPENSION INTRAMUSCULAR at 15:22

## 2020-07-02 ASSESSMENT — PAIN SCALES - GENERAL
PAINLEVEL_OUTOF10: 8
PAINLEVEL_OUTOF10: 8

## 2020-07-02 NOTE — ED PROVIDER NOTES
[address]  Emergency Department  Independent Attestation     Pt Name: Destini Winkler  MRN: [id number]  [place] 1950  Date of evaluation: 7/2/20       Destini Winkler is a 68 y.o. female who presents with Animal Bite      I was personally available for consultation in the Emergency Department.     Katty Avery DO  Attending Emergency Physician  [address] ED      (Please note that portions of this note were completed with a voice recognition program.  Efforts were made to edit the dictations but occasionally words are mis-transcribed.)        Katty Avery DO  07/02/20 [id number]

## 2020-07-23 ENCOUNTER — OFFICE VISIT (OUTPATIENT)
Dept: ORTHOPEDIC SURGERY | Age: 70
End: 2020-07-23
Payer: MEDICARE

## 2020-07-23 PROCEDURE — 4040F PNEUMOC VAC/ADMIN/RCVD: CPT | Performed by: ORTHOPAEDIC SURGERY

## 2020-07-23 PROCEDURE — 1123F ACP DISCUSS/DSCN MKR DOCD: CPT | Performed by: ORTHOPAEDIC SURGERY

## 2020-07-23 PROCEDURE — G8399 PT W/DXA RESULTS DOCUMENT: HCPCS | Performed by: ORTHOPAEDIC SURGERY

## 2020-07-23 PROCEDURE — 1036F TOBACCO NON-USER: CPT | Performed by: ORTHOPAEDIC SURGERY

## 2020-07-23 PROCEDURE — G8428 CUR MEDS NOT DOCUMENT: HCPCS | Performed by: ORTHOPAEDIC SURGERY

## 2020-07-23 PROCEDURE — G8417 CALC BMI ABV UP PARAM F/U: HCPCS | Performed by: ORTHOPAEDIC SURGERY

## 2020-07-23 PROCEDURE — 99213 OFFICE O/P EST LOW 20 MIN: CPT | Performed by: ORTHOPAEDIC SURGERY

## 2020-07-23 PROCEDURE — 1090F PRES/ABSN URINE INCON ASSESS: CPT | Performed by: ORTHOPAEDIC SURGERY

## 2020-07-23 PROCEDURE — 3017F COLORECTAL CA SCREEN DOC REV: CPT | Performed by: ORTHOPAEDIC SURGERY
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
## 2020-07-25 ENCOUNTER — APPOINTMENT (OUTPATIENT)
Dept: GENERAL RADIOLOGY | Age: 70
End: 2020-07-25
Payer: MEDICARE

## 2020-07-25 ENCOUNTER — APPOINTMENT (OUTPATIENT)
Dept: CT IMAGING | Age: 70
End: 2020-07-25
Payer: MEDICARE

## 2020-07-25 ENCOUNTER — HOSPITAL ENCOUNTER (EMERGENCY)
Age: 70
Discharge: ANOTHER HOSPITAL | End: 2020-07-26
Attending: EMERGENCY MEDICINE
Payer: MEDICARE

## 2020-07-25 VITALS
SYSTOLIC BLOOD PRESSURE: 115 MMHG | WEIGHT: 160 LBS | HEART RATE: 64 BPM | BODY MASS INDEX: 26.66 KG/M2 | OXYGEN SATURATION: 93 % | HEIGHT: 65 IN | RESPIRATION RATE: 16 BRPM | TEMPERATURE: 97.4 F | DIASTOLIC BLOOD PRESSURE: 61 MMHG

## 2020-07-25 PROBLEM — I63.232 ACUTE ISCHEMIC LEFT ICA STROKE (HCC): Status: ACTIVE | Noted: 2020-07-25

## 2020-07-25 LAB
ABSOLUTE EOS #: 0.3 K/UL (ref 0–0.4)
ABSOLUTE IMMATURE GRANULOCYTE: ABNORMAL K/UL (ref 0–0.3)
ABSOLUTE LYMPH #: 2.3 K/UL (ref 1–4.8)
ABSOLUTE MONO #: 0.9 K/UL (ref 0.1–1.3)
ACETAMINOPHEN LEVEL: <5 UG/ML (ref 10–30)
ANION GAP SERPL CALCULATED.3IONS-SCNC: 14 MMOL/L (ref 9–17)
BASOPHILS # BLD: 2 % (ref 0–2)
BASOPHILS ABSOLUTE: 0.2 K/UL (ref 0–0.2)
BUN BLDV-MCNC: 12 MG/DL (ref 8–23)
BUN/CREAT BLD: ABNORMAL (ref 9–20)
CALCIUM SERPL-MCNC: 9 MG/DL (ref 8.6–10.4)
CHLORIDE BLD-SCNC: 103 MMOL/L (ref 98–107)
CHP ED QC CHECK: YES
CO2: 24 MMOL/L (ref 20–31)
CREAT SERPL-MCNC: 1.34 MG/DL (ref 0.5–0.9)
DIFFERENTIAL TYPE: ABNORMAL
EKG ATRIAL RATE: 60 BPM
EKG P AXIS: 8 DEGREES
EKG P-R INTERVAL: 138 MS
EKG Q-T INTERVAL: 460 MS
EKG QRS DURATION: 98 MS
EKG QTC CALCULATION (BAZETT): 460 MS
EKG R AXIS: 32 DEGREES
EKG T AXIS: 102 DEGREES
EKG VENTRICULAR RATE: 60 BPM
EOSINOPHILS RELATIVE PERCENT: 3 % (ref 0–4)
ETHANOL PERCENT: <0.01 %
ETHANOL: <10 MG/DL
GFR AFRICAN AMERICAN: 47 ML/MIN
GFR NON-AFRICAN AMERICAN: 39 ML/MIN
GFR SERPL CREATININE-BSD FRML MDRD: ABNORMAL ML/MIN/{1.73_M2}
GFR SERPL CREATININE-BSD FRML MDRD: ABNORMAL ML/MIN/{1.73_M2}
GLUCOSE BLD-MCNC: 103 MG/DL (ref 70–99)
GLUCOSE BLD-MCNC: 104 MG/DL
HCT VFR BLD CALC: 34.8 % (ref 36–46)
HEMOGLOBIN: 11.6 G/DL (ref 12–16)
IMMATURE GRANULOCYTES: ABNORMAL %
LYMPHOCYTES # BLD: 22 % (ref 24–44)
MAGNESIUM: 1.8 MG/DL (ref 1.6–2.6)
MCH RBC QN AUTO: 29.4 PG (ref 26–34)
MCHC RBC AUTO-ENTMCNC: 33.4 G/DL (ref 31–37)
MCV RBC AUTO: 88 FL (ref 80–100)
MONOCYTES # BLD: 9 % (ref 1–7)
NRBC AUTOMATED: ABNORMAL PER 100 WBC
PDW BLD-RTO: 16.9 % (ref 11.5–14.9)
PLATELET # BLD: 347 K/UL (ref 150–450)
PLATELET ESTIMATE: ABNORMAL
PMV BLD AUTO: 8.7 FL (ref 6–12)
POTASSIUM SERPL-SCNC: 3.8 MMOL/L (ref 3.7–5.3)
RBC # BLD: 3.95 M/UL (ref 4–5.2)
RBC # BLD: ABNORMAL 10*6/UL
SALICYLATE LEVEL: <1 MG/DL (ref 3–10)
SARS-COV-2, PCR: NORMAL
SARS-COV-2, RAPID: NOT DETECTED
SARS-COV-2: NORMAL
SEG NEUTROPHILS: 64 % (ref 36–66)
SEGMENTED NEUTROPHILS ABSOLUTE COUNT: 6.7 K/UL (ref 1.3–9.1)
SODIUM BLD-SCNC: 141 MMOL/L (ref 135–144)
SOURCE: NORMAL
TROPONIN INTERP: ABNORMAL
TROPONIN INTERP: ABNORMAL
TROPONIN T: ABNORMAL NG/ML
TROPONIN T: ABNORMAL NG/ML
TROPONIN, HIGH SENSITIVITY: 25 NG/L (ref 0–14)
TROPONIN, HIGH SENSITIVITY: 28 NG/L (ref 0–14)
WBC # BLD: 10.4 K/UL (ref 3.5–11)
WBC # BLD: ABNORMAL 10*3/UL

## 2020-07-25 PROCEDURE — G0426 INPT/ED TELECONSULT50: HCPCS | Performed by: PSYCHIATRY & NEUROLOGY

## 2020-07-25 PROCEDURE — 6370000000 HC RX 637 (ALT 250 FOR IP): Performed by: EMERGENCY MEDICINE

## 2020-07-25 PROCEDURE — 2580000003 HC RX 258: Performed by: EMERGENCY MEDICINE

## 2020-07-25 PROCEDURE — 6360000002 HC RX W HCPCS: Performed by: EMERGENCY MEDICINE

## 2020-07-25 PROCEDURE — U0002 COVID-19 LAB TEST NON-CDC: HCPCS

## 2020-07-25 PROCEDURE — 36415 COLL VENOUS BLD VENIPUNCTURE: CPT

## 2020-07-25 PROCEDURE — 99285 EMERGENCY DEPT VISIT HI MDM: CPT

## 2020-07-25 PROCEDURE — 85025 COMPLETE CBC W/AUTO DIFF WBC: CPT

## 2020-07-25 PROCEDURE — 82947 ASSAY GLUCOSE BLOOD QUANT: CPT

## 2020-07-25 PROCEDURE — 84484 ASSAY OF TROPONIN QUANT: CPT

## 2020-07-25 PROCEDURE — 70498 CT ANGIOGRAPHY NECK: CPT

## 2020-07-25 PROCEDURE — 6360000004 HC RX CONTRAST MEDICATION: Performed by: EMERGENCY MEDICINE

## 2020-07-25 PROCEDURE — 83880 ASSAY OF NATRIURETIC PEPTIDE: CPT

## 2020-07-25 PROCEDURE — 83735 ASSAY OF MAGNESIUM: CPT

## 2020-07-25 PROCEDURE — G0480 DRUG TEST DEF 1-7 CLASSES: HCPCS

## 2020-07-25 PROCEDURE — 71045 X-RAY EXAM CHEST 1 VIEW: CPT

## 2020-07-25 PROCEDURE — 96374 THER/PROPH/DIAG INJ IV PUSH: CPT

## 2020-07-25 PROCEDURE — 80048 BASIC METABOLIC PNL TOTAL CA: CPT

## 2020-07-25 PROCEDURE — 80307 DRUG TEST PRSMV CHEM ANLYZR: CPT

## 2020-07-25 PROCEDURE — 82565 ASSAY OF CREATININE: CPT

## 2020-07-25 PROCEDURE — 70450 CT HEAD/BRAIN W/O DYE: CPT

## 2020-07-25 PROCEDURE — 93005 ELECTROCARDIOGRAM TRACING: CPT

## 2020-07-25 RX ORDER — 0.9 % SODIUM CHLORIDE 0.9 %
500 INTRAVENOUS SOLUTION INTRAVENOUS ONCE
Status: COMPLETED | OUTPATIENT
Start: 2020-07-25 | End: 2020-07-26

## 2020-07-25 RX ORDER — SODIUM CHLORIDE 0.9 % (FLUSH) 0.9 %
10 SYRINGE (ML) INJECTION PRN
Status: DISCONTINUED | OUTPATIENT
Start: 2020-07-25 | End: 2020-07-26 | Stop reason: HOSPADM

## 2020-07-25 RX ORDER — HEPARIN SODIUM 10000 [USP'U]/100ML
12 INJECTION, SOLUTION INTRAVENOUS CONTINUOUS
Status: DISCONTINUED | OUTPATIENT
Start: 2020-07-25 | End: 2020-07-26 | Stop reason: HOSPADM

## 2020-07-25 RX ORDER — ACETAMINOPHEN 500 MG
1000 TABLET ORAL ONCE
Status: COMPLETED | OUTPATIENT
Start: 2020-07-25 | End: 2020-07-25

## 2020-07-25 RX ORDER — 0.9 % SODIUM CHLORIDE 0.9 %
80 INTRAVENOUS SOLUTION INTRAVENOUS ONCE
Status: COMPLETED | OUTPATIENT
Start: 2020-07-25 | End: 2020-07-25

## 2020-07-25 RX ORDER — HEPARIN SODIUM 5000 [USP'U]/ML
3000 INJECTION, SOLUTION INTRAVENOUS; SUBCUTANEOUS ONCE
Status: COMPLETED | OUTPATIENT
Start: 2020-07-25 | End: 2020-07-25

## 2020-07-25 RX ADMIN — SODIUM CHLORIDE 500 ML: 9 INJECTION, SOLUTION INTRAVENOUS at 23:15

## 2020-07-25 RX ADMIN — SODIUM CHLORIDE 80 ML: 9 INJECTION, SOLUTION INTRAVENOUS at 21:56

## 2020-07-25 RX ADMIN — HEPARIN SODIUM AND DEXTROSE 12 UNITS/KG/HR: 10000; 5 INJECTION INTRAVENOUS at 23:15

## 2020-07-25 RX ADMIN — HEPARIN SODIUM 3000 UNITS: 5000 INJECTION INTRAVENOUS; SUBCUTANEOUS at 23:15

## 2020-07-25 RX ADMIN — Medication 10 ML: at 21:56

## 2020-07-25 RX ADMIN — IOVERSOL 75 ML: 741 INJECTION INTRA-ARTERIAL; INTRAVENOUS at 21:56

## 2020-07-25 RX ADMIN — ACETAMINOPHEN 1000 MG: 500 TABLET, FILM COATED ORAL at 23:13

## 2020-07-25 ASSESSMENT — PAIN SCALES - GENERAL: PAINLEVEL_OUTOF10: 5

## 2020-07-26 ENCOUNTER — APPOINTMENT (OUTPATIENT)
Dept: MRI IMAGING | Age: 70
DRG: 066 | End: 2020-07-26
Payer: MEDICARE

## 2020-07-26 ENCOUNTER — APPOINTMENT (OUTPATIENT)
Dept: INTERVENTIONAL RADIOLOGY/VASCULAR | Age: 70
DRG: 066 | End: 2020-07-26
Payer: MEDICARE

## 2020-07-26 ENCOUNTER — ANESTHESIA (OUTPATIENT)
Dept: INTERVENTIONAL RADIOLOGY/VASCULAR | Age: 70
DRG: 066 | End: 2020-07-26
Payer: MEDICARE

## 2020-07-26 ENCOUNTER — ANESTHESIA EVENT (OUTPATIENT)
Dept: INTERVENTIONAL RADIOLOGY/VASCULAR | Age: 70
DRG: 066 | End: 2020-07-26
Payer: MEDICARE

## 2020-07-26 ENCOUNTER — HOSPITAL ENCOUNTER (INPATIENT)
Age: 70
LOS: 1 days | Discharge: HOME | DRG: 066 | End: 2020-07-26
Attending: EMERGENCY MEDICINE | Admitting: PSYCHIATRY & NEUROLOGY
Payer: MEDICARE

## 2020-07-26 VITALS
RESPIRATION RATE: 20 BRPM | HEIGHT: 68 IN | WEIGHT: 160 LBS | HEART RATE: 62 BPM | TEMPERATURE: 97.7 F | BODY MASS INDEX: 24.25 KG/M2 | OXYGEN SATURATION: 96 % | DIASTOLIC BLOOD PRESSURE: 72 MMHG | SYSTOLIC BLOOD PRESSURE: 150 MMHG

## 2020-07-26 VITALS — DIASTOLIC BLOOD PRESSURE: 67 MMHG | OXYGEN SATURATION: 96 % | TEMPERATURE: 96.2 F | SYSTOLIC BLOOD PRESSURE: 132 MMHG

## 2020-07-26 PROBLEM — I63.512 ACUTE ISCHEMIC LEFT MCA STROKE (HCC): Status: ACTIVE | Noted: 2020-07-25

## 2020-07-26 PROBLEM — I65.22 STENOSIS OF LEFT INTERNAL CAROTID ARTERY: Status: ACTIVE | Noted: 2020-07-26

## 2020-07-26 PROBLEM — I63.9 STROKE (HCC): Status: ACTIVE | Noted: 2020-07-25

## 2020-07-26 LAB
ABSOLUTE EOS #: 0.24 K/UL (ref 0–0.44)
ABSOLUTE IMMATURE GRANULOCYTE: 0.03 K/UL (ref 0–0.3)
ABSOLUTE LYMPH #: 1.39 K/UL (ref 1.1–3.7)
ABSOLUTE MONO #: 0.7 K/UL (ref 0.1–1.2)
ACTIVATED CLOTTING TIME: 129 SEC (ref 79–149)
ACTIVATED CLOTTING TIME: 230 SEC (ref 79–149)
ACTIVATED CLOTTING TIME: 95 SEC (ref 79–149)
ANION GAP SERPL CALCULATED.3IONS-SCNC: 11 MMOL/L (ref 9–17)
BASOPHILS # BLD: 1 % (ref 0–2)
BASOPHILS ABSOLUTE: 0.08 K/UL (ref 0–0.2)
BNP INTERPRETATION: ABNORMAL
BUN BLDV-MCNC: 12 MG/DL (ref 8–23)
BUN/CREAT BLD: ABNORMAL (ref 9–20)
CALCIUM SERPL-MCNC: 8.3 MG/DL (ref 8.6–10.4)
CHLORIDE BLD-SCNC: 102 MMOL/L (ref 98–107)
CHOLESTEROL/HDL RATIO: 3.1
CHOLESTEROL: 140 MG/DL
CHP ED QC CHECK: 105
CO2: 24 MMOL/L (ref 20–31)
CREAT SERPL-MCNC: 1.13 MG/DL (ref 0.5–0.9)
DIFFERENTIAL TYPE: ABNORMAL
EOSINOPHILS RELATIVE PERCENT: 3 % (ref 1–4)
ESTIMATED AVERAGE GLUCOSE: 128 MG/DL
GFR AFRICAN AMERICAN: 58 ML/MIN
GFR NON-AFRICAN AMERICAN: 47 ML/MIN
GFR SERPL CREATININE-BSD FRML MDRD: ABNORMAL ML/MIN/{1.73_M2}
GFR SERPL CREATININE-BSD FRML MDRD: ABNORMAL ML/MIN/{1.73_M2}
GLUCOSE BLD-MCNC: 105 MG/DL (ref 65–105)
GLUCOSE BLD-MCNC: 110 MG/DL (ref 70–99)
HBA1C MFR BLD: 6.1 % (ref 4–6)
HCT VFR BLD CALC: 34.5 % (ref 36.3–47.1)
HDLC SERPL-MCNC: 45 MG/DL
HEMOGLOBIN: 10.8 G/DL (ref 11.9–15.1)
IMMATURE GRANULOCYTES: 0 %
INR BLD: 1
LDL CHOLESTEROL: 84 MG/DL (ref 0–130)
LYMPHOCYTES # BLD: 17 % (ref 24–43)
MAGNESIUM: 1.6 MG/DL (ref 1.6–2.6)
MCH RBC QN AUTO: 28.4 PG (ref 25.2–33.5)
MCHC RBC AUTO-ENTMCNC: 31.3 G/DL (ref 28.4–34.8)
MCV RBC AUTO: 90.8 FL (ref 82.6–102.9)
MONOCYTES # BLD: 8 % (ref 3–12)
NRBC AUTOMATED: 0 PER 100 WBC
PARTIAL THROMBOPLASTIN TIME: 30 SEC (ref 20.5–30.5)
PARTIAL THROMBOPLASTIN TIME: >120 SEC (ref 20.5–30.5)
PDW BLD-RTO: 15.8 % (ref 11.8–14.4)
PLATELET # BLD: 298 K/UL (ref 138–453)
PLATELET ESTIMATE: ABNORMAL
PMV BLD AUTO: 10.7 FL (ref 8.1–13.5)
POTASSIUM SERPL-SCNC: 3.8 MMOL/L (ref 3.7–5.3)
PRO-BNP: 2539 PG/ML
PROTHROMBIN TIME: 10.3 SEC (ref 9–12)
RBC # BLD: 3.8 M/UL (ref 3.95–5.11)
RBC # BLD: ABNORMAL 10*6/UL
SARS-COV-2, PCR: NORMAL
SARS-COV-2, RAPID: NOT DETECTED
SARS-COV-2: NORMAL
SEG NEUTROPHILS: 71 % (ref 36–65)
SEGMENTED NEUTROPHILS ABSOLUTE COUNT: 5.88 K/UL (ref 1.5–8.1)
SODIUM BLD-SCNC: 137 MMOL/L (ref 135–144)
SOURCE: NORMAL
TRIGL SERPL-MCNC: 54 MG/DL
VLDLC SERPL CALC-MCNC: NORMAL MG/DL (ref 1–30)
WBC # BLD: 8.3 K/UL (ref 3.5–11.3)
WBC # BLD: ABNORMAL 10*3/UL

## 2020-07-26 PROCEDURE — 70551 MRI BRAIN STEM W/O DYE: CPT

## 2020-07-26 PROCEDURE — 2709999900 HC NON-CHARGEABLE SUPPLY

## 2020-07-26 PROCEDURE — 3700000001 HC ADD 15 MINUTES (ANESTHESIA)

## 2020-07-26 PROCEDURE — 6370000000 HC RX 637 (ALT 250 FOR IP): Performed by: STUDENT IN AN ORGANIZED HEALTH CARE EDUCATION/TRAINING PROGRAM

## 2020-07-26 PROCEDURE — 80048 BASIC METABOLIC PNL TOTAL CA: CPT

## 2020-07-26 PROCEDURE — 85025 COMPLETE CBC W/AUTO DIFF WBC: CPT

## 2020-07-26 PROCEDURE — 85347 COAGULATION TIME ACTIVATED: CPT

## 2020-07-26 PROCEDURE — 2500000003 HC RX 250 WO HCPCS: Performed by: NURSE ANESTHETIST, CERTIFIED REGISTERED

## 2020-07-26 PROCEDURE — 7100000000 HC PACU RECOVERY - FIRST 15 MIN

## 2020-07-26 PROCEDURE — 36222 PLACE CATH CAROTID/INOM ART: CPT | Performed by: PSYCHIATRY & NEUROLOGY

## 2020-07-26 PROCEDURE — B3151ZZ FLUOROSCOPY OF BILATERAL COMMON CAROTID ARTERIES USING LOW OSMOLAR CONTRAST: ICD-10-PCS | Performed by: PSYCHIATRY & NEUROLOGY

## 2020-07-26 PROCEDURE — 99285 EMERGENCY DEPT VISIT HI MDM: CPT

## 2020-07-26 PROCEDURE — 2060000000 HC ICU INTERMEDIATE R&B

## 2020-07-26 PROCEDURE — 2580000003 HC RX 258: Performed by: NURSE ANESTHETIST, CERTIFIED REGISTERED

## 2020-07-26 PROCEDURE — 80061 LIPID PANEL: CPT

## 2020-07-26 PROCEDURE — 83735 ASSAY OF MAGNESIUM: CPT

## 2020-07-26 PROCEDURE — 93005 ELECTROCARDIOGRAM TRACING: CPT | Performed by: STUDENT IN AN ORGANIZED HEALTH CARE EDUCATION/TRAINING PROGRAM

## 2020-07-26 PROCEDURE — U0002 COVID-19 LAB TEST NON-CDC: HCPCS

## 2020-07-26 PROCEDURE — 85610 PROTHROMBIN TIME: CPT

## 2020-07-26 PROCEDURE — 83036 HEMOGLOBIN GLYCOSYLATED A1C: CPT

## 2020-07-26 PROCEDURE — C1894 INTRO/SHEATH, NON-LASER: HCPCS

## 2020-07-26 PROCEDURE — 6360000002 HC RX W HCPCS: Performed by: NURSE ANESTHETIST, CERTIFIED REGISTERED

## 2020-07-26 PROCEDURE — 76377 3D RENDER W/INTRP POSTPROCES: CPT | Performed by: PSYCHIATRY & NEUROLOGY

## 2020-07-26 PROCEDURE — 6360000004 HC RX CONTRAST MEDICATION: Performed by: EMERGENCY MEDICINE

## 2020-07-26 PROCEDURE — 6360000002 HC RX W HCPCS: Performed by: STUDENT IN AN ORGANIZED HEALTH CARE EDUCATION/TRAINING PROGRAM

## 2020-07-26 PROCEDURE — C1725 CATH, TRANSLUMIN NON-LASER: HCPCS

## 2020-07-26 PROCEDURE — 99222 1ST HOSP IP/OBS MODERATE 55: CPT | Performed by: PSYCHIATRY & NEUROLOGY

## 2020-07-26 PROCEDURE — 85730 THROMBOPLASTIN TIME PARTIAL: CPT

## 2020-07-26 PROCEDURE — 3700000000 HC ANESTHESIA ATTENDED CARE

## 2020-07-26 PROCEDURE — 99152 MOD SED SAME PHYS/QHP 5/>YRS: CPT | Performed by: PSYCHIATRY & NEUROLOGY

## 2020-07-26 PROCEDURE — 36224 PLACE CATH CAROTD ART: CPT | Performed by: PSYCHIATRY & NEUROLOGY

## 2020-07-26 PROCEDURE — G0378 HOSPITAL OBSERVATION PER HR: HCPCS

## 2020-07-26 PROCEDURE — 6370000000 HC RX 637 (ALT 250 FOR IP): Performed by: PSYCHIATRY & NEUROLOGY

## 2020-07-26 PROCEDURE — C1760 CLOSURE DEV, VASC: HCPCS

## 2020-07-26 PROCEDURE — C1769 GUIDE WIRE: HCPCS

## 2020-07-26 PROCEDURE — 82947 ASSAY GLUCOSE BLOOD QUANT: CPT

## 2020-07-26 PROCEDURE — 7100000001 HC PACU RECOVERY - ADDTL 15 MIN

## 2020-07-26 PROCEDURE — 2580000003 HC RX 258: Performed by: STUDENT IN AN ORGANIZED HEALTH CARE EDUCATION/TRAINING PROGRAM

## 2020-07-26 RX ORDER — CLOPIDOGREL BISULFATE 75 MG/1
75 TABLET ORAL DAILY
Status: DISCONTINUED | OUTPATIENT
Start: 2020-07-26 | End: 2020-07-26 | Stop reason: HOSPADM

## 2020-07-26 RX ORDER — MIDAZOLAM HYDROCHLORIDE 1 MG/ML
INJECTION INTRAMUSCULAR; INTRAVENOUS PRN
Status: DISCONTINUED | OUTPATIENT
Start: 2020-07-26 | End: 2020-07-26 | Stop reason: SDUPTHER

## 2020-07-26 RX ORDER — LISINOPRIL 2.5 MG/1
2.5 TABLET ORAL DAILY
Status: DISCONTINUED | OUTPATIENT
Start: 2020-07-26 | End: 2020-07-26 | Stop reason: HOSPADM

## 2020-07-26 RX ORDER — ROPINIROLE 1 MG/1
1 TABLET, FILM COATED ORAL NIGHTLY
Status: DISCONTINUED | OUTPATIENT
Start: 2020-07-26 | End: 2020-07-26 | Stop reason: HOSPADM

## 2020-07-26 RX ORDER — CEFAZOLIN SODIUM 2 G/50ML
SOLUTION INTRAVENOUS PRN
Status: DISCONTINUED | OUTPATIENT
Start: 2020-07-26 | End: 2020-07-26 | Stop reason: SDUPTHER

## 2020-07-26 RX ORDER — SODIUM CHLORIDE, SODIUM LACTATE, POTASSIUM CHLORIDE, CALCIUM CHLORIDE 600; 310; 30; 20 MG/100ML; MG/100ML; MG/100ML; MG/100ML
INJECTION, SOLUTION INTRAVENOUS CONTINUOUS PRN
Status: DISCONTINUED | OUTPATIENT
Start: 2020-07-26 | End: 2020-07-26 | Stop reason: SDUPTHER

## 2020-07-26 RX ORDER — ATORVASTATIN CALCIUM 40 MG/1
80 TABLET, FILM COATED ORAL DAILY
Qty: 90 TABLET | Refills: 3 | Status: SHIPPED | OUTPATIENT
Start: 2020-07-26 | End: 2021-02-02 | Stop reason: SDUPTHER

## 2020-07-26 RX ORDER — IODIXANOL 270 MG/ML
150 INJECTION, SOLUTION INTRAVASCULAR
Status: COMPLETED | OUTPATIENT
Start: 2020-07-26 | End: 2020-07-26

## 2020-07-26 RX ORDER — ACETAMINOPHEN 325 MG/1
650 TABLET ORAL EVERY 4 HOURS PRN
Status: DISCONTINUED | OUTPATIENT
Start: 2020-07-26 | End: 2020-07-26 | Stop reason: HOSPADM

## 2020-07-26 RX ORDER — HEPARIN SODIUM 10000 [USP'U]/100ML
12 INJECTION, SOLUTION INTRAVENOUS CONTINUOUS
Status: DISCONTINUED | OUTPATIENT
Start: 2020-07-26 | End: 2020-07-26 | Stop reason: HOSPADM

## 2020-07-26 RX ORDER — CITALOPRAM 20 MG/1
20 TABLET ORAL DAILY
Status: DISCONTINUED | OUTPATIENT
Start: 2020-07-26 | End: 2020-07-26 | Stop reason: HOSPADM

## 2020-07-26 RX ORDER — ASPIRIN 81 MG/1
81 TABLET, CHEWABLE ORAL DAILY
Status: DISCONTINUED | OUTPATIENT
Start: 2020-07-26 | End: 2020-07-26

## 2020-07-26 RX ORDER — SODIUM CHLORIDE 0.9 % (FLUSH) 0.9 %
10 SYRINGE (ML) INJECTION PRN
Status: DISCONTINUED | OUTPATIENT
Start: 2020-07-26 | End: 2020-07-26 | Stop reason: HOSPADM

## 2020-07-26 RX ORDER — SODIUM CHLORIDE, SODIUM LACTATE, POTASSIUM CHLORIDE, CALCIUM CHLORIDE 600; 310; 30; 20 MG/100ML; MG/100ML; MG/100ML; MG/100ML
INJECTION, SOLUTION INTRAVENOUS CONTINUOUS
Status: DISCONTINUED | OUTPATIENT
Start: 2020-07-26 | End: 2020-07-26 | Stop reason: HOSPADM

## 2020-07-26 RX ORDER — ATORVASTATIN CALCIUM 80 MG/1
80 TABLET, FILM COATED ORAL NIGHTLY
Status: DISCONTINUED | OUTPATIENT
Start: 2020-07-26 | End: 2020-07-26 | Stop reason: HOSPADM

## 2020-07-26 RX ORDER — LABETALOL HYDROCHLORIDE 5 MG/ML
5 INJECTION, SOLUTION INTRAVENOUS EVERY 10 MIN PRN
Status: DISCONTINUED | OUTPATIENT
Start: 2020-07-26 | End: 2020-07-26 | Stop reason: HOSPADM

## 2020-07-26 RX ORDER — CLOPIDOGREL BISULFATE 75 MG/1
75 TABLET ORAL DAILY
Qty: 90 TABLET | Refills: 3 | Status: SHIPPED | OUTPATIENT
Start: 2020-07-26 | End: 2021-03-17 | Stop reason: SDUPTHER

## 2020-07-26 RX ORDER — HEPARIN SODIUM 1000 [USP'U]/ML
INJECTION, SOLUTION INTRAVENOUS; SUBCUTANEOUS PRN
Status: DISCONTINUED | OUTPATIENT
Start: 2020-07-26 | End: 2020-07-26 | Stop reason: SDUPTHER

## 2020-07-26 RX ORDER — ASPIRIN 81 MG/1
81 TABLET, CHEWABLE ORAL DAILY
Status: DISCONTINUED | OUTPATIENT
Start: 2020-07-27 | End: 2020-07-26 | Stop reason: HOSPADM

## 2020-07-26 RX ORDER — ONDANSETRON 2 MG/ML
4 INJECTION INTRAMUSCULAR; INTRAVENOUS
Status: DISCONTINUED | OUTPATIENT
Start: 2020-07-26 | End: 2020-07-26 | Stop reason: HOSPADM

## 2020-07-26 RX ORDER — FENTANYL CITRATE 50 UG/ML
25 INJECTION, SOLUTION INTRAMUSCULAR; INTRAVENOUS
Status: DISCONTINUED | OUTPATIENT
Start: 2020-07-26 | End: 2020-07-26 | Stop reason: HOSPADM

## 2020-07-26 RX ORDER — FENTANYL CITRATE 50 UG/ML
25 INJECTION, SOLUTION INTRAMUSCULAR; INTRAVENOUS EVERY 5 MIN PRN
Status: DISCONTINUED | OUTPATIENT
Start: 2020-07-26 | End: 2020-07-26 | Stop reason: HOSPADM

## 2020-07-26 RX ORDER — ASPIRIN 81 MG/1
81 TABLET, CHEWABLE ORAL DAILY
Qty: 30 TABLET | Refills: 3 | Status: SHIPPED | OUTPATIENT
Start: 2020-07-27 | End: 2021-08-11

## 2020-07-26 RX ORDER — LIDOCAINE HYDROCHLORIDE 10 MG/ML
INJECTION, SOLUTION EPIDURAL; INFILTRATION; INTRACAUDAL; PERINEURAL PRN
Status: DISCONTINUED | OUTPATIENT
Start: 2020-07-26 | End: 2020-07-26 | Stop reason: SDUPTHER

## 2020-07-26 RX ORDER — ISOSORBIDE MONONITRATE 30 MG/1
30 TABLET, EXTENDED RELEASE ORAL DAILY
Status: DISCONTINUED | OUTPATIENT
Start: 2020-07-26 | End: 2020-07-26 | Stop reason: HOSPADM

## 2020-07-26 RX ORDER — ALBUTEROL SULFATE 2.5 MG/3ML
2.5 SOLUTION RESPIRATORY (INHALATION) EVERY 6 HOURS PRN
Status: DISCONTINUED | OUTPATIENT
Start: 2020-07-26 | End: 2020-07-26 | Stop reason: HOSPADM

## 2020-07-26 RX ORDER — POLYETHYLENE GLYCOL 3350 17 G/17G
17 POWDER, FOR SOLUTION ORAL DAILY
Status: DISCONTINUED | OUTPATIENT
Start: 2020-07-26 | End: 2020-07-26 | Stop reason: HOSPADM

## 2020-07-26 RX ORDER — DIPHENHYDRAMINE HYDROCHLORIDE 50 MG/ML
12.5 INJECTION INTRAMUSCULAR; INTRAVENOUS
Status: DISCONTINUED | OUTPATIENT
Start: 2020-07-26 | End: 2020-07-26 | Stop reason: HOSPADM

## 2020-07-26 RX ORDER — FENTANYL CITRATE 50 UG/ML
INJECTION, SOLUTION INTRAMUSCULAR; INTRAVENOUS PRN
Status: DISCONTINUED | OUTPATIENT
Start: 2020-07-26 | End: 2020-07-26 | Stop reason: SDUPTHER

## 2020-07-26 RX ORDER — PROTAMINE SULFATE 10 MG/ML
INJECTION, SOLUTION INTRAVENOUS PRN
Status: DISCONTINUED | OUTPATIENT
Start: 2020-07-26 | End: 2020-07-26 | Stop reason: SDUPTHER

## 2020-07-26 RX ORDER — PANTOPRAZOLE SODIUM 40 MG/1
40 TABLET, DELAYED RELEASE ORAL
Status: DISCONTINUED | OUTPATIENT
Start: 2020-07-26 | End: 2020-07-26 | Stop reason: HOSPADM

## 2020-07-26 RX ORDER — TIZANIDINE 4 MG/1
4 TABLET ORAL EVERY 8 HOURS PRN
Status: DISCONTINUED | OUTPATIENT
Start: 2020-07-26 | End: 2020-07-26 | Stop reason: HOSPADM

## 2020-07-26 RX ORDER — SODIUM CHLORIDE 0.9 % (FLUSH) 0.9 %
10 SYRINGE (ML) INJECTION EVERY 12 HOURS SCHEDULED
Status: DISCONTINUED | OUTPATIENT
Start: 2020-07-26 | End: 2020-07-26 | Stop reason: HOSPADM

## 2020-07-26 RX ORDER — FUROSEMIDE 40 MG/1
40 TABLET ORAL EVERY OTHER DAY
Status: DISCONTINUED | OUTPATIENT
Start: 2020-07-26 | End: 2020-07-26 | Stop reason: HOSPADM

## 2020-07-26 RX ORDER — ALBUTEROL SULFATE 90 UG/1
2 AEROSOL, METERED RESPIRATORY (INHALATION) EVERY 4 HOURS PRN
Status: DISCONTINUED | OUTPATIENT
Start: 2020-07-26 | End: 2020-07-26 | Stop reason: HOSPADM

## 2020-07-26 RX ADMIN — MIDAZOLAM HYDROCHLORIDE 1 MG: 1 INJECTION, SOLUTION INTRAMUSCULAR; INTRAVENOUS at 09:24

## 2020-07-26 RX ADMIN — CLOPIDOGREL BISULFATE 75 MG: 75 TABLET ORAL at 08:58

## 2020-07-26 RX ADMIN — HEPARIN SODIUM AND DEXTROSE 12 UNITS/KG/HR: 10000; 5 INJECTION INTRAVENOUS at 02:31

## 2020-07-26 RX ADMIN — SODIUM CHLORIDE, POTASSIUM CHLORIDE, SODIUM LACTATE AND CALCIUM CHLORIDE: 600; 310; 30; 20 INJECTION, SOLUTION INTRAVENOUS at 02:28

## 2020-07-26 RX ADMIN — LIDOCAINE HYDROCHLORIDE 10 MG: 10 INJECTION, SOLUTION EPIDURAL; INFILTRATION; INTRACAUDAL; PERINEURAL at 09:25

## 2020-07-26 RX ADMIN — PANTOPRAZOLE SODIUM 40 MG: 40 TABLET, DELAYED RELEASE ORAL at 07:45

## 2020-07-26 RX ADMIN — FENTANYL CITRATE 30 MCG: 50 INJECTION INTRAMUSCULAR; INTRAVENOUS at 11:14

## 2020-07-26 RX ADMIN — PROTAMINE SULFATE 35 MG: 10 INJECTION, SOLUTION INTRAVENOUS at 11:01

## 2020-07-26 RX ADMIN — CEFAZOLIN SODIUM 2 G: 2 SOLUTION INTRAVENOUS at 09:35

## 2020-07-26 RX ADMIN — MIDAZOLAM HYDROCHLORIDE 1 MG: 1 INJECTION, SOLUTION INTRAMUSCULAR; INTRAVENOUS at 11:11

## 2020-07-26 RX ADMIN — HEPARIN SODIUM 3000 UNITS: 1000 INJECTION INTRAVENOUS; SUBCUTANEOUS at 10:33

## 2020-07-26 RX ADMIN — HEPARIN SODIUM 5000 UNITS: 1000 INJECTION INTRAVENOUS; SUBCUTANEOUS at 10:16

## 2020-07-26 RX ADMIN — IODIXANOL 82 ML: 270 INJECTION, SOLUTION INTRAVASCULAR at 11:14

## 2020-07-26 RX ADMIN — SODIUM CHLORIDE, POTASSIUM CHLORIDE, SODIUM LACTATE AND CALCIUM CHLORIDE: 600; 310; 30; 20 INJECTION, SOLUTION INTRAVENOUS at 09:14

## 2020-07-26 RX ADMIN — PROTAMINE SULFATE 5 MG: 10 INJECTION, SOLUTION INTRAVENOUS at 11:00

## 2020-07-26 RX ADMIN — ASPIRIN 81 MG 81 MG: 81 TABLET ORAL at 08:58

## 2020-07-26 RX ADMIN — FENTANYL CITRATE 25 MCG: 50 INJECTION INTRAMUSCULAR; INTRAVENOUS at 10:10

## 2020-07-26 RX ADMIN — FENTANYL CITRATE 25 MCG: 50 INJECTION INTRAMUSCULAR; INTRAVENOUS at 09:24

## 2020-07-26 RX ADMIN — ATORVASTATIN CALCIUM 80 MG: 80 TABLET, FILM COATED ORAL at 02:28

## 2020-07-26 RX ADMIN — FENTANYL CITRATE 20 MCG: 50 INJECTION INTRAMUSCULAR; INTRAVENOUS at 10:48

## 2020-07-26 ASSESSMENT — ENCOUNTER SYMPTOMS
VOMITING: 0
SHORTNESS OF BREATH: 0
WHEEZING: 0
PHOTOPHOBIA: 0
ABDOMINAL PAIN: 0
NAUSEA: 0
RHINORRHEA: 0

## 2020-07-26 ASSESSMENT — PULMONARY FUNCTION TESTS
PIF_VALUE: 0
PIF_VALUE: 1
PIF_VALUE: 1
PIF_VALUE: 0
PIF_VALUE: 1
PIF_VALUE: 1
PIF_VALUE: 0
PIF_VALUE: 1
PIF_VALUE: 0
PIF_VALUE: 0
PIF_VALUE: 1
PIF_VALUE: 0
PIF_VALUE: 0
PIF_VALUE: 1
PIF_VALUE: 0
PIF_VALUE: 1
PIF_VALUE: 0
PIF_VALUE: 1
PIF_VALUE: 0
PIF_VALUE: 1
PIF_VALUE: 0
PIF_VALUE: 1
PIF_VALUE: 0
PIF_VALUE: 1
PIF_VALUE: 0
PIF_VALUE: 1
PIF_VALUE: 0
PIF_VALUE: 1
PIF_VALUE: 0
PIF_VALUE: 1
PIF_VALUE: 0
PIF_VALUE: 0
PIF_VALUE: 1
PIF_VALUE: 0
PIF_VALUE: 0
PIF_VALUE: 1
PIF_VALUE: 1
PIF_VALUE: 0
PIF_VALUE: 1
PIF_VALUE: 0
PIF_VALUE: 1
PIF_VALUE: 0
PIF_VALUE: 0
PIF_VALUE: 1
PIF_VALUE: 0
PIF_VALUE: 0
PIF_VALUE: 1
PIF_VALUE: 0
PIF_VALUE: 1
PIF_VALUE: 0
PIF_VALUE: 1
PIF_VALUE: 0
PIF_VALUE: 1
PIF_VALUE: 0
PIF_VALUE: 1
PIF_VALUE: 0
PIF_VALUE: 1
PIF_VALUE: 0
PIF_VALUE: 1
PIF_VALUE: 1
PIF_VALUE: 0
PIF_VALUE: 1
PIF_VALUE: 0
PIF_VALUE: 1
PIF_VALUE: 0
PIF_VALUE: 1
PIF_VALUE: 2
PIF_VALUE: 0
PIF_VALUE: 0
PIF_VALUE: 1
PIF_VALUE: 1

## 2020-07-26 ASSESSMENT — LIFESTYLE VARIABLES: SMOKING_STATUS: 0

## 2020-07-26 ASSESSMENT — PAIN SCALES - GENERAL
PAINLEVEL_OUTOF10: 0

## 2020-07-26 NOTE — ED PROVIDER NOTES
[address] [facility]     Emergency Department     Faculty Attestation        I performed a history and physical examination of the patient and discussed management with the resident. I reviewed the residents note and agree with the documented findings and plan of care. Any areas of disagreement are noted on the chart. I was personally present for the key portions of any procedures. I have documented in the chart those procedures where I was not present during the key portions. I have reviewed the emergency nurses triage note. I agree with the chief complaint, past medical history, past surgical history, allergies, medications, social and family history as documented unless otherwise noted below. For Physician Assistant/ Nurse Practitioner cases/documentation I have personally evaluated this patient and have completed at least one if not all key elements of the E/M (history, physical exam, and MDM). Additional findings are as noted. Vital Signs: BP: (!) 154/93  Pulse: 64  Resp: 15  Temp: 98.8 °F (37.1 °C) SpO2: 98 %  PCP:  Libby Iverson MD    Pertinent Comments:     Patient is a 75-year-old female transfer from [facility] for CVA with aphasia. 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 Dr. Tommy Parra from neuro interventional has already started the patient on aspirin/Plavix/heparin. Last known normal was well before 4 and half hours ago therefore no lytic candidate. COVID test is negative  Plan: Stroke alert      EKG Interpretation    Interpreted by emergency department physician    Rhythm: normal sinus   Rate: normal at 61 bpm  Axis: normal  Conduction: normal  ST Segments: no acute change  T Waves: no acute change  Q Waves: no acute change    Clinical Impression:  nonspecific EKG.             CRITICAL CARE: There was a high probability of clinically significant/life threatening deterioration in this patient's condition which required my urgent intervention. Total critical care time was 15 minutes. This excludes any time for separately reportable procedures. This patient was evaluated in the Emergency Department for symptoms described in the history of present illness. He/she was evaluated in the context of the global COVID-19 pandemic, which necessitated consideration that the patient might be at risk for infection with the SARS-CoV-2 virus that causes COVID-19. Institutional protocols and algorithms that pertain to the evaluation of patients at risk for COVID-19 are in a state of rapid change based on information released by regulatory bodies including the CDC and federal and state organizations. These policies and algorithms were followed during the patient's care in the ED. (Please note that portions of this note were completed with a voice recognition program. Efforts were made to edit the dictations but occasionally words are mis-transcribed.  Whenever words are used in this note in any gender, they shall be construed as though they were used in the gender appropriate to the circumstances; and whenever words are used in this note in the singular or plural form, they shall be construed as though they were used in the form appropriate to the circumstances.)    MD Isidro Phoenix  Attending Emergency Medicine Physician            Jes Sanchez MD  07/26/20 Parviz Miles MD  07/26/20 [id number]       Jes Sanchez MD  07/26/20 9633

## 2020-07-26 NOTE — ED PROVIDER NOTES
Faculty Sign-Out Attestation  Handoff taken on the following patient from prior Attending Physician: Victoriano Sutton    I was available and discussed any additional care issues that arose and coordinated the management plans with the resident(s) caring for the patient during my duty period. Any areas of disagreement with residents documentation of care or procedures are noted on the chart. I was personally present for the key portions of any/all procedures during my duty period. I have documented in the chart those procedures where I was not present during the key portions. Stroke, admission, boarding.     Jessica Danielson MD  Attending Physician         Jessica Danielson MD  07/26/20 3032

## 2020-07-26 NOTE — ED NOTES
Pt resting on cot with eyes closed, RR even and unlabored, NAD, call light in reach     LIVE Medina  07/26/20 8705

## 2020-07-26 NOTE — ED PROVIDER NOTES
[address] [facility]  Emergency Department  Emergency Medicine Resident Sign-out     Care of [address] was assumed from Dr. Vero Aguilar  and is being seen for Cerebrovascular Accident (symptoms started yesterday later afternoon)  . The patient's initial evaluation and plan have been discussed with the prior provider who initially evaluated the patient.      EMERGENCY DEPARTMENT COURSE / MEDICAL DECISION MAKING:       MEDICATIONS GIVEN:  Orders Placed This Encounter   Medications    atorvastatin (LIPITOR) tablet 80 mg    heparin 25,000 units in dextrose 5% 250 mL infusion    lactated ringers infusion    pantoprazole (PROTONIX) tablet 40 mg    polyethylene glycol (GLYCOLAX) packet 17 g    metoprolol tartrate (LOPRESSOR) tablet 25 mg    rOPINIRole (REQUIP) tablet 1 mg    tiZANidine (ZANAFLEX) tablet 4 mg    fentaNYL (SUBLIMAZE) injection 25 mcg       LABS / RADIOLOGY:     Results for orders placed or performed during the hospital encounter of 07/26/20   APTT   Result Value Ref Range    PTT >120.0 (HH) 20.5 - 30.5 sec   LIPID PANEL   Result Value Ref Range    Cholesterol 140 <200 mg/dL    HDL 45 >40 mg/dL    LDL Cholesterol 84 0 - 130 mg/dL    Chol/HDL Ratio 3.1 <5    Triglycerides 54 <150 mg/dL    VLDL NOT REPORTED 1 - 30 mg/dL   Protime-INR   Result Value Ref Range    Protime 10.3 9.0 - 12.0 sec    INR 1.0    CBC WITH AUTO DIFFERENTIAL   Result Value Ref Range    WBC 8.3 3.5 - 11.3 k/uL    RBC 3.80 (L) 3.95 - 5.11 m/uL    Hemoglobin 10.8 (L) 11.9 - 15.1 g/dL    Hematocrit 34.5 (L) 36.3 - 47.1 %    MCV 90.8 82.6 - 102.9 fL    MCH 28.4 25.2 - 33.5 pg    MCHC 31.3 28.4 - 34.8 g/dL    RDW 15.8 (H) 11.8 - 14.4 %    Platelets 246 214 - 673 k/uL    MPV 10.7 8.1 - 13.5 fL    NRBC Automated 0.0 0.0 per 100 WBC    Differential Type NOT REPORTED     Seg Neutrophils 71 (H) 36 - 65 %    Lymphocytes 17 (L) 24 - 43 %    Monocytes 8 3 - 12 %    Eosinophils % 3 1 - 4 %    Basophils 1 0 - 2 %    Immature Granulocytes 0 0 %    Segs Absolute 5.88 1.50 - 8.10 k/uL    Absolute Lymph # 1.39 1.10 - 3.70 k/uL    Absolute Mono # 0.70 0.10 - 1.20 k/uL    Absolute Eos # 0.24 0.00 - 0.44 k/uL    Basophils Absolute 0.08 0.00 - 0.20 k/uL    Absolute Immature Granulocyte 0.03 0.00 - 0.30 k/uL    WBC Morphology NOT REPORTED     RBC Morphology ANISOCYTOSIS PRESENT     Platelet Estimate NOT REPORTED    BASIC METABOLIC PANEL   Result Value Ref Range    Glucose 110 (H) 70 - 99 mg/dL    BUN 12 8 - 23 mg/dL    CREATININE 1.13 (H) 0.50 - 0.90 mg/dL    Bun/Cre Ratio NOT REPORTED 9 - 20    Calcium 8.3 (L) 8.6 - 10.4 mg/dL    Sodium 137 135 - 144 mmol/L    Potassium 3.8 3.7 - 5.3 mmol/L    Chloride 102 98 - 107 mmol/L    CO2 24 20 - 31 mmol/L    Anion Gap 11 9 - 17 mmol/L    GFR Non-African American 47 (L) >60 mL/min    GFR  58 (L) >60 mL/min    GFR Comment          GFR Staging NOT REPORTED    MAGNESIUM   Result Value Ref Range    Magnesium 1.6 1.6 - 2.6 mg/dL   APTT   Result Value Ref Range    PTT 30.0 20.5 - 30.5 sec   POCT Glucose   Result Value Ref Range    QC OK? 105    POC Glucose Fingerstick   Result Value Ref Range    POC Glucose 105 65 - 105 mg/dL       Xr Hand Right (min 3 Views)    Result Date: 7/2/2020  EXAMINATION: THREE XRAY VIEWS OF THE RIGHT HAND 7/2/2020 3:44 pm COMPARISON: None. HISTORY: ORDERING SYSTEM PROVIDED HISTORY: cat bite TECHNOLOGIST PROVIDED HISTORY: cat bite Reason for Exam: cat bite rt hand, swelling, ? infection Acuity: Unknown Type of Exam: Unknown FINDINGS: Degenerative changes of the thumb CMC and triscaphe joint. Thumb MCP degenerative change and lesser thumb IP joint degenerative change. Degenerative changes of 2nd and 3rd PIP joints. Soft tissue swelling of the hand. No radiopaque foreign body or soft tissue air. No acute osseous abnormality. Mild soft tissue swelling without air or radiopaque foreign body. Scattered degenerative changes about the hand.      Ct Head Wo Contrast    Result Date: 7/25/2020  EXAMINATION: CT OF THE HEAD WITHOUT CONTRAST  7/25/2020 9:37 pm TECHNIQUE: CT of the head was performed without the administration of intravenous contrast. Dose modulation, iterative reconstruction, and/or weight based adjustment of the mA/kV was utilized to reduce the radiation dose to as low as reasonably achievable. COMPARISON: September 13, 2015 HISTORY: ORDERING SYSTEM PROVIDED HISTORY: altered speech difficulties TECHNOLOGIST PROVIDED HISTORY: altered speech difficulties FINDINGS: BRAIN/VENTRICLES: There is no acute intracranial hemorrhage, mass effect or midline shift. No abnormal extra-axial fluid collection. Loss of cortical gray-white matter differentiation posterior parietal cortex on the left. There is no evidence of hydrocephalus. Mild cortical atrophy. Intracranial atherosclerosis. ORBITS: The visualized portion of the orbits demonstrate no acute abnormality. SINUSES: The visualized paranasal sinuses and mastoid air cells demonstrate no acute abnormality. SOFT TISSUES/SKULL:  No acute abnormality of the visualized skull or soft tissues. Possible acute infarct left posterior parietal lobe. RECOMMENDATIONS: The findings were sent to the Radiology Results [address] at 9:50 pm on 7/25/2020to be communicated to a licensed caregiver. Discussed with Dr. Nathan Vergara at 10:02 p.m. Xr Chest Portable    Result Date: 7/26/2020  EXAMINATION: ONE XRAY VIEW OF THE CHEST 7/25/2020 10:11 pm COMPARISON: 09/20/2019. HISTORY: ORDERING SYSTEM PROVIDED HISTORY: stroke workup TECHNOLOGIST PROVIDED HISTORY: stroke workup Reason for Exam: Difficulty speaking and altered mental status. Acuity: Acute Type of Exam: Initial Additional signs and symptoms: Difficulty speaking and altered mental status. FINDINGS: Low lung volumes which may be on the basis of patient inspiratory effort. Mild bibasilar likely scarring versus atelectasis.   No focal consolidations, pleural effusions or pulmonary edema. No pneumothoraces. Cardiac and mediastinal silhouettes are stable with stable cardiomegaly. No acute bony abnormality. Prior median sternotomy. No evidence for acute cardiopulmonary process. Cta Head Neck W Contrast    Result Date: 7/25/2020  EXAMINATION: CTA OF THE HEAD AND NECK WITH CONTRAST 7/25/2020 9:54 pm: TECHNIQUE: CTA of the head and neck was performed with the administration of intravenous contrast. Multiplanar reformatted images are provided for review. MIP images are provided for review. Stenosis of the internal carotid arteries measured using NASCET criteria. Dose modulation, iterative reconstruction, and/or weight based adjustment of the mA/kV was utilized to reduce the radiation dose to as low as reasonably achievable. COMPARISON: CT of the head from today. HISTORY: ORDERING SYSTEM PROVIDED HISTORY: speech difficulties TECHNOLOGIST PROVIDED HISTORY: speech difficulties Reason for Exam: stroke Acuity: Acute Type of Exam: Initial FINDINGS: CTA NECK: AORTIC ARCH/ARCH VESSELS: No dissection or arterial injury. No significant stenosis of the brachiocephalic or subclavian arteries. CAROTID ARTERIES: Calcified plaque in the carotid bulb causes severe/90% narrowing of the proximal left internal carotid artery. Right internal carotid artery is patent. Both carotids are tortuous and otherwise patent. Carotid bifurcation is high and retropharyngeal bilaterally. VERTEBRAL ARTERIES: Calcified plaque at the origin of the left vertebral artery causes moderate stenosis. Bilateral vertebral arteries otherwise normal. SOFT TISSUES: The lung apices are clear. No cervical or superior mediastinal lymphadenopathy. The larynx and pharynx are unremarkable. No acute abnormality of the salivary and thyroid glands. Calcific coronary artery disease. BONES: No acute osseous abnormality.  CTA HEAD: ANTERIOR CIRCULATION: No significant stenosis of the intracranial internal carotid, anterior cerebral, or middle cerebral arteries. No aneurysm. POSTERIOR CIRCULATION: No significant stenosis of the vertebral, basilar, or posterior cerebral arteries. No aneurysm. OTHER: No dural venous sinus thrombosis on this non-dedicated study. BRAIN: No mass effect or midline shift. No extra-axial fluid collection. The gray-white differentiation is maintained. Severe/90% stenosis proximal left internal carotid artery. Moderate stenosis proximal left vertebral artery approximately 60-70%. RECOMMENDATIONS: The findings were sent to the Radiology Results [address] at 10:17 pm on 7/25/2020to be communicated to a licensed caregiver. Discussed with Dr. Merlyn Nichols at 10:27 p.m. RECENT VITALS:     Temp: 98.8 °F (37.1 °C),  Pulse: 64, Resp: 15, BP: (!) 154/93, SpO2: 98 %    This patient is a 68 y.o. Female with CVA with right-sided weakness and left ICA stenosis. Patient moved to the neuro ICU and started on heparin for plan for angiography. OUTSTANDING TASKS / RECOMMENDATIONS:    1. None, waiting for bed     FINAL IMPRESSION:     1.  Stenosis of left internal carotid artery        DISPOSITION:         DISPOSITION:  []  Home   []  Nursing Facility   []  Transfer -    [x]  Admission -  NICU   FOLLOW-UP: Leonie Layne MD  [facility]  [facility]  [address] [facility] [phone]           [address]: New Prescriptions    No medications on file          Berta Kelsey DO  Emergency Medicine Resident  [address] [facility] [place], [place]  Resident  07/26/20 7581

## 2020-07-26 NOTE — ED NOTES
Bed: 27  Expected date:   Expected time:   Means of arrival:   Comments:  [facility] transfer     [facility]  07/26/20 7319

## 2020-07-26 NOTE — FLOWSHEET NOTE
SPIRITUAL CARE DEPARTMENT - [facility]  PROGRESS NOTE    Shift date: 7/25/20  Shift day: Saturday   Shift # 3    Room # 27/27   Name: Karlo Mallory            Age: 68 y.o. Gender: female          Gnosticist:    Place of Judaism:     Referral: Routine Visit    Admit Date & Time: 7/26/2020  1:24 AM    PATIENT/EVENT DESCRIPTION:  Karlo Mallory is a 68 y.o. female The patient is a 68 y.o. female who presents as transfer from [facility] where she had presented with expressive aphasia. Pt started on heparin gtt and loaded with aspirin and plavix pta. CTH showed possible left parietal infarct. Per boyfriend last known well was 7/25 am howver per family language difficulties have been ongoing x 3 days.        SPIRITUAL ASSESSMENT/INTERVENTION:   was bedside support for Stroke Alert offering comfort and compassionate support.  was calming presence and offered prayer.  will follow up during hospital stay.      SPIRITUAL CARE FOLLOW-UP PLAN:  Chaplains will remain available to offer spiritual and emotional support as needed.     Electronically signed by David Tafoya on 7/26/2020 at 8:20 AM.  [facility]  [phone]     07/26/20 0713   Encounter Summary   Services provided to: Patient   Referral/Consult From: Multi-disciplinary team   Support System Family members   Continue Visiting   (7/25/20)   Complexity of Encounter Moderate   Length of Encounter 30 minutes   Spiritual Assessment Completed Yes   Routine   Type Initial   Assessment Approachable   Intervention Explored feelings, thoughts, concerns   Outcome Comfort

## 2020-07-26 NOTE — ED NOTES
Report received per Lavonne RN  Patient stable, nondistressed       Bushra Zavala [facility]  07/26/20 5181
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
## 2020-07-26 NOTE — ED NOTES
Patient ambulates to and from restroom with steady gait with writer at side.        Dutch Pierre RN  07/26/20 3710

## 2020-07-26 NOTE — ANESTHESIA PROCEDURE NOTES
Arterial Line:    An arterial line was placed using surface landmarks, in the OR for the following indication(s): continuous blood pressure monitoring and blood sampling needed. A 20 gauge (size), 1 and 1/4 inch (length), Arrow (type) catheter was placed, Seldinger technique used, into the left radial artery, secured by tape. Anesthesia type: Local  Local infiltration: Topical    Events:  patient tolerated procedure well with no complications and EBL < 5mL.   7/26/2020 9:20 AM  Resident/CRNA: JAMES Frederick - CRNA  Performed: Resident/CRNA

## 2020-07-26 NOTE — ED NOTES
Pt resting on cot, RR even and unlabored, NAD, A&O, call light in reach, denies any needs     Jeff Ballesteros RN  07/26/20 0708

## 2020-07-26 NOTE — CONSULTS
Endovascular Neurosurgery Consult    Pt Name: Cindy Munoz  MRN: [id number]  YOB: 1950  Date of evaluation: 7/26/2020  Primary Care Physician: Simone Doss MD  Reason for evaluation: Left ICA severe stenosis    SUBJECTIVE:   History of Chief Complaint:    Cindy Munoz is a 68 y.o. female with past medical history including carotid artery disease, congestive heart failure, diabetes, COPD, hypertension and hyperlipidemia. Patient presented to outside hospital with speech difficulty. Patient last known well at [address] [place] [address] in July 25. CT head showed possible left parietal ischemia. She underwent CTA head and neck showing severe left ICA cervical stenosis. She was loaded with aspirin 325 and Plavix 300. She was started on heparin drip. Transferred here for higher level of care. Patient was seen and examined this morning. She reported doing good. No headache. No chest pain. She reported being on aspirin and Plavix at home. She lives in [place] with a boyfriend. Allergies  is allergic to codeine and morphine. Medications  Prior to Admission medications    Medication Sig Start Date End Date Taking?  Authorizing Provider   atorvastatin (LIPITOR) 40 MG tablet TAKE 1 TABLET BY MOUTH DAILY 7/5/20   Simone Doss MD   metoprolol tartrate (LOPRESSOR) 25 MG tablet TAKE 1 TABLET BY MOUTH TWICE DAILY 7/5/20   Simone Doss MD   diclofenac sodium (VOLTAREN) 1 % GEL Apply 2 g topically 4 times daily 6/2/20   Simone Doss MD   furosemide (LASIX) 40 MG tablet Take 1 tablet by mouth every other day 5/20/20   Simone Doss MD   ONE TOUCH ULTRA TEST strip TEST ONCE DAILY AS NEEDED 4/6/20   Simone Doss MD   fluticasone [facility]) 50 MCG/ACT nasal spray SHAKE LIQUID AND USE 2 SPRAYS IN EACH NOSTRIL DAILY 4/2/20 7/1/20  JAMES Hook NP   tiZANidine (ZANAFLEX) 4 MG tablet TAKE 1 TABLET BY MOUTH EVERY 8 HOURS AS NEEDED FOR BACK PAIN 3/9/20   Jimmy Spence Mee Freire MD   potassium chloride (KLOR-CON) 10 MEQ extended release tablet TAKE 2 TABLETS BY MOUTH EVERY DAY 2/29/20   Dudley Pfeiffer MD   clopidogrel (PLAVIX) 75 MG tablet TAKE 1 TABLET BY MOUTH EVERY DAY 2/29/20   Dudley Pfeiffer MD   rOPINIRole (REQUIP) 1 MG tablet TAKE 1 TABLET BY MOUTH EVERY NIGHT AS NEEDED 2/19/20   Dudley Pfeiffer MD   metFORMIN (GLUCOPHAGE-XR) 500 MG extended release tablet Take 3 tablets by mouth daily (with breakfast) 1/3/20   Dudley Pfeiffer MD   lisinopril (PRINIVIL;ZESTRIL) 2.5 MG tablet TAKE 1 TABLET BY MOUTH EVERY DAY 1/2/20   Dudley Pfeiffer MD   citalopram (CELEXA) 20 MG tablet TAKE 1 TABLET BY MOUTH EVERY DAY 10/21/19   Dudley Pfeiffer MD   isosorbide mononitrate (IMDUR) 30 MG extended release tablet Take 30 mg by mouth 5/24/19   Historical Provider, MD   nitroGLYCERIN (NITROSTAT) 0.4 MG SL tablet Place 0.4 mg under the tongue every 5 minutes as needed for Chest pain up to max of 3 total doses. If no relief after 1 dose, call 911. Historical Provider, MD   Multiple Vitamins-Minerals (MULTIVITAMIN WITH MINERALS) tablet Take 1 tablet by mouth daily 5/31/18   Cheli Godoy MD   albuterol (PROVENTIL) (2.5 MG/3ML) 0.083% nebulizer solution Take 3 mLs by nebulization every 6 hours as needed for Wheezing 3/9/18   JAMES Orr CNP   albuterol sulfate HFA (PROAIR HFA) 108 (90 Base) MCG/ACT inhaler Inhale 2 puffs into the lungs every 4 hours as needed for Wheezing 2/21/18   JAMES Henley CNP   aspirin 81 MG EC tablet Take 1 tablet by mouth daily 2/12/18   JAMES Henley CNP   hydrocortisone 2.5 % cream Apply topically 2 times daily.  12/14/17   Dudley Pfeiffer MD    Scheduled Meds:   atorvastatin  80 mg Oral Nightly    pantoprazole  40 mg Oral QAM AC    polyethylene glycol  17 g Oral Daily    metoprolol tartrate  25 mg Oral BID    rOPINIRole  1 mg Oral Nightly    aspirin  81 mg Oral Daily    clopidogrel  75 mg Oral Daily Continuous Infusions:   heparin (porcine) Stopped (07/26/20 0600)    lactated ringers 75 mL/hr at 07/26/20 0228     PRN Meds:.tiZANidine, fentanNYL  Past Medical History   has a past medical history of Allergic rhinitis, Arthritis, CAD (coronary artery disease), CHF (congestive heart failure) ([facility] [address] Controlled type 2 diabetes mellitus without complication, without long-term current use of insulin ([facility] [address] COPD (chronic obstructive pulmonary disease) ([facility] [address] Depression, Former smoker, History of blood transfusion, Hyperlipidemia, Hypertension, Kidney stone, Myocardial infarction ([facility] [address] Obesity, Class I, BMI 30.0-34.9 (see actual BMI), Restless leg syndrome, Skin abnormality, Type II or unspecified type diabetes mellitus without mention of complication, not stated as uncontrolled, Wears glasses, and Wears partial dentures. Past Surgical History    has a past surgical history that includes Appendectomy; Hysterectomy; Cholecystectomy; joint replacement (Bilateral); pr office/outpt visit,procedure only (N/A, 2/6/2018); pr incis/drain thigh/knee abscess,deep (Right, 5/7/2018); Leg biopsy excision (Right, 8/29/2019); Cardiac surgery; and Cardiac surgery. Social History   reports that she quit smoking about 10 months ago. Her smoking use included cigarettes. She has a 56.00 pack-year smoking history. She has never used smokeless tobacco.   reports no history of alcohol use. reports current drug use. Drug: Marijuana. Family History  family history includes Heart Disease in her father.     Review of Systems:  CONSTITUTIONAL:  negative for fevers, chills, fatigue and malaise    EYES:  negative for double vision, blurred vision and photophobia     HEENT:  negative for tinnitus, epistaxis and sore throat    RESPIRATORY:  negative for cough, shortness of breath, wheezing    CARDIOVASCULAR:  negative for chest pain, palpitations, syncope, edema    GASTROINTESTINAL:  negative for nausea, vomiting    GENITOURINARY:  negative for incontinence    MUSCULOSKELETAL:  negative for neck or back pain    NEUROLOGICAL:  Negative for weakness and tingling  negative for headaches and dizziness    PSYCHIATRIC:  negative for anxiety      Review of systems otherwise negative. OBJECTIVE:   Vitals: /70   Pulse 68   Temp 98.8 °F (37.1 °C) (Oral)   Resp 15   Ht 5' 8\" (1.727 m)   Wt 160 lb (72.6 kg)   SpO2 95%   BMI 24.33 kg/m²   General appearance: Lying in bed, NAD. HEENT: Atraumatic. Neck: Neck is supple. Lungs: No respiratory distress noted. Heart: normal sinus rhythm on tele. .   Abdomen: Soft nontender. Extremities: No lower limb edema noted. Neurologic: awake, following simple commands appropriately, able to name simple objects intermittently, she has mild to moderate expressive aphasia noted, intact comprehension, no dysarthria noted. CN: Has intact extraocular muscles movements, no facial droop noted, intact sensation on the face on trigeminal distribution bilaterally. MOTOR: Has good strength in both upper and lower extremities, moving both upper and lower extremities against gravity with no drift. SENSORY: Intact sensation to simple touch bilaterally in both upper and lower extremities. NIH Stroke Scale Total:  1a.  Level of consciousness:  0  1b. Level of consciousness questions:  0   1c. Level of consciousness questions:  0   2. Best Gaze:  0   3. Visual:  0   4. Facial Palsy:  0   5a. Motor left arm:  0  5b. Motor right arm:  0  6a. Motor left le  6b. Motor right le  7. Limb Ataxia:  0  8. Sensory:  0  9. Best Language:  2  10. Dysarthria:  0  11.   Extinction and Inattention: 0     Total: 2    LABS:     Recent Labs     208 20  0312   WBC 10.4 8.3   HGB 11.6* 10.8*   HCT 34.8* 34.5*    298    137   K 3.8 3.8    102   CO2 24 24   BUN 12 12   CREATININE 1.34* 1.13*   MG 1.8 1.6   CALCIUM 9.0 8.3*   INR  --  1.0     No results for input(s): ALKPHOS, ALT, AST, BILITOT, BILIDIR, LABALBU, AMYLASE, LIPASE in the last 72 hours. RADIOLOGY:   Images were personally reviewed including:  CT brain without contrast: No bleeding noted  CTA imaging: Severe left cervical ICA stenosis. IMPRESSIONS:     1. Symptomatic severe left cervical ICA stenosis. Discussed with the patient current neuro imaging findings. Due to symptomatic left ICA disease we will proceed for carotid stenting. Patient was loaded with aspirin Plavix. Hold heparin drip. MRI brain was ordered. Cerebral angiogram with IV moderate sedation. Risks and benefits discussed including but not limited to bruising, stroke, sah, death, retroperitoneal hematoma, femoral pseudoaneurysm, lower ext and renal as well as peripheral vasc compromise discussed - informed consent obtained at bedside from the patient. PLANS:   1. Keep patient n.p.o.  2. Continue dual antiplatelet therapy with aspirin and Plavix. 3. Hold heparin drip. 4. MRI brain. 5. IV fluids. 6. Proceed for left carotid stenting under MAC. Hold heparin drip Thank you for the interesting evaluation.      Gwyneth Simmonds, MD  Pager [phone]  Stroke, [facility]  [id number]  Electronically signed 7/26/2020 at 8:20 AM

## 2020-07-26 NOTE — ED NOTES
Unable to complete triage at this time d/t pt difficulty speaking.      Mann Grant, RN  07/25/20 0737

## 2020-07-26 NOTE — ED NOTES
68 yof with CVA expressive aphasia. Last normal this morning. CTA head with negative head CT however also showing left ICA occlusion. Dr. Jennifer Astudillo aware the patient and wishes transfer ED to ED secondary to no neuro ICU beds at this time.    Currently has aspirin and Plavix as well as heparin drip  Hemodynamically stable coming by ground     Ephraim Chaney RN  07/26/20 6778

## 2020-07-26 NOTE — ED PROVIDER NOTES
[facility] ED  Emergency Department  Emergency Medicine Resident Sign-out     Care of Destini Winkler was assumed from Dr. Howard Plata and is being seen for Cerebrovascular Accident (symptoms started yesterday later afternoon)  . The patient's initial evaluation and plan have been discussed with the prior provider who initially evaluated the patient. EMERGENCY DEPARTMENT COURSE / MEDICAL DECISION MAKING:       MEDICATIONS GIVEN:  Orders Placed This Encounter   Medications    atorvastatin (LIPITOR) tablet 80 mg    heparin 25,000 units in dextrose 5% 250 mL infusion    lactated ringers infusion    pantoprazole (PROTONIX) tablet 40 mg    polyethylene glycol (GLYCOLAX) packet 17 g    metoprolol tartrate (LOPRESSOR) tablet 25 mg    rOPINIRole (REQUIP) tablet 1 mg    tiZANidine (ZANAFLEX) tablet 4 mg    fentaNYL (SUBLIMAZE) injection 25 mcg       LABS / RADIOLOGY:     Labs Reviewed   APTT - Abnormal; Notable for the following components:       Result Value    PTT >120.0 (*)     All other components within normal limits   CBC WITH AUTO DIFFERENTIAL - Abnormal; Notable for the following components:    RBC 3.80 (*)     Hemoglobin 10.8 (*)     Hematocrit 34.5 (*)     RDW 15.8 (*)     Seg Neutrophils 71 (*)     Lymphocytes 17 (*)     All other components within normal limits   BASIC METABOLIC PANEL - Abnormal; Notable for the following components:    Glucose 110 (*)     CREATININE 1.13 (*)     Calcium 8.3 (*)     GFR Non- 47 (*)     GFR  58 (*)     All other components within normal limits   POCT GLUCOSE - Normal   LIPID PANEL   PROTIME-INR   MAGNESIUM   APTT   HEMOGLOBIN A1C   COVID-19   POC GLUCOSE FINGERSTICK       Xr Hand Right (min 3 Views)    Result Date: 7/2/2020  EXAMINATION: THREE XRAY VIEWS OF THE RIGHT HAND 7/2/2020 3:44 pm COMPARISON: None.  HISTORY: ORDERING SYSTEM PROVIDED HISTORY: cat bite TECHNOLOGIST PROVIDED HISTORY: cat bite Reason for Exam: OF THE CHEST 7/25/2020 10:11 pm COMPARISON: 09/20/2019. HISTORY: ORDERING SYSTEM PROVIDED HISTORY: stroke workup TECHNOLOGIST PROVIDED HISTORY: stroke workup Reason for Exam: Difficulty speaking and altered mental status. Acuity: Acute Type of Exam: Initial Additional signs and symptoms: Difficulty speaking and altered mental status. FINDINGS: Low lung volumes which may be on the basis of patient inspiratory effort. Mild bibasilar likely scarring versus atelectasis. No focal consolidations, pleural effusions or pulmonary edema. No pneumothoraces. Cardiac and mediastinal silhouettes are stable with stable cardiomegaly. No acute bony abnormality. Prior median sternotomy. No evidence for acute cardiopulmonary process. Cta Head Neck W Contrast    Result Date: 7/25/2020  EXAMINATION: CTA OF THE HEAD AND NECK WITH CONTRAST 7/25/2020 9:54 pm: TECHNIQUE: CTA of the head and neck was performed with the administration of intravenous contrast. Multiplanar reformatted images are provided for review. MIP images are provided for review. Stenosis of the internal carotid arteries measured using NASCET criteria. Dose modulation, iterative reconstruction, and/or weight based adjustment of the mA/kV was utilized to reduce the radiation dose to as low as reasonably achievable. COMPARISON: CT of the head from today. HISTORY: ORDERING SYSTEM PROVIDED HISTORY: speech difficulties TECHNOLOGIST PROVIDED HISTORY: speech difficulties Reason for Exam: stroke Acuity: Acute Type of Exam: Initial FINDINGS: CTA NECK: AORTIC ARCH/ARCH VESSELS: No dissection or arterial injury. No significant stenosis of the brachiocephalic or subclavian arteries. CAROTID ARTERIES: Calcified plaque in the carotid bulb causes severe/90% narrowing of the proximal left internal carotid artery. Right internal carotid artery is patent. Both carotids are tortuous and otherwise patent. Carotid bifurcation is high and retropharyngeal bilaterally.  VERTEBRAL Against Medical Advice   []  Eloped   FOLLOW-UP: Mariela Borrero, [address]  [address] [address] [address] [facility] [phone]           DISCHARGE MEDICATIONS: New Prescriptions    No medications on file           Jo Mcknight MD  Emergency Medicine Resident  [address] Jo Mcknight MD  07/26/20 2903

## 2020-07-26 NOTE — ED NOTES
Phone call received, patient to be going to lab for carotid stent placement shortly       Miles Champion RN  07/26/20 5118

## 2020-07-26 NOTE — ED NOTES
Report given to [facility] MU - charge nurse at [facility]     Deanna Reardon RN  07/26/20 2650
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
## 2020-07-26 NOTE — ED NOTES
Pt resting on cot with eyes closed, RR even and unlabored, NAD, call light in reach     LIVE Medina  07/26/20 1412

## 2020-07-26 NOTE — CODE DOCUMENTATION
Patient on ct table and scan in progress, Dr Karen Reyna spoke with family and they said patient has not been acting right x 3 days
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
## 2020-07-26 NOTE — VIRTUAL HEALTH
Consults  Patient Location:  [address] [facility] ED      [facility] AT [facility] Stroke and Vascular Neurology Consult for  G[facility] Stroke Alert through [address] @ 21:38  7/25/2020 9:51 PM  Pt Name: Alyssa Osuna  MRN: [id number]  YOB: 1950  Date of evaluation: 7/25/2020  Primary Care Physician: Janett Guerrero MD  Reason for Evaluation: Stroke Evaluation with Discussion with Ed or primary team with Telemedicine and stroke evaluation with Review of imaging and labs    Alyssa Osuna is a 68 y.o. female with medical history of CAD, CHF and COPD who presents with language impairment. The patient, according to nursing, was last known normal at approximately 1830. However, ED staff spoke with the patient's family and her daughter said that she began having difficulty with language approximately 3 days ago. Her boyfriend said that she was last normal this morning. The patient was taken to the ED for evaluation. Head CT was performed which showed possible left parietal infarct. Allergies  is allergic to codeine and morphine. Medications  Prior to Admission medications    Medication Sig Start Date End Date Taking?  Authorizing Provider   atorvastatin (LIPITOR) 40 MG tablet TAKE 1 TABLET BY MOUTH DAILY 7/5/20   Janett Guerrero MD   metoprolol tartrate (LOPRESSOR) 25 MG tablet TAKE 1 TABLET BY MOUTH TWICE DAILY 7/5/20   Janett Guerrero MD   diclofenac sodium (VOLTAREN) 1 % GEL Apply 2 g topically 4 times daily 6/2/20   Janett Guerrero MD   furosemide (LASIX) 40 MG tablet Take 1 tablet by mouth every other day 5/20/20   Janett Guerrero MD   ONE TOUCH ULTRA TEST strip TEST ONCE DAILY AS NEEDED 4/6/20   Janett Guerrero MD   fluticasone (FLONASE) 50 MCG/ACT nasal spray SHAKE LIQUID AND USE 2 SPRAYS IN EACH NOSTRIL DAILY 4/2/20 7/1/20  JAMES Weber NP   tiZANidine (ZANAFLEX) 4 MG tablet TAKE 1 TABLET BY MOUTH EVERY 8 HOURS AS diabetes mellitus without complication, without long-term current use of insulin ([facility] COPD (chronic obstructive pulmonary disease) ([facility] Depression, Former smoker, History of blood transfusion, Hyperlipidemia, Hypertension, Kidney stone, Myocardial infarction ([facility] Obesity, Class I, BMI 30.0-34.9 (see actual BMI), Restless leg syndrome, Skin abnormality, Type II or unspecified type diabetes mellitus without mention of complication, not stated as uncontrolled, Wears glasses, and Wears partial dentures. Social History  Social History     Socioeconomic History    Marital status:       Spouse name: Not on file    Number of children: Not on file    Years of education: Not on file    Highest education level: Not on file   Occupational History    Not on file   Social Needs    Financial resource strain: Not on file    Food insecurity     Worry: Not on file     Inability: Not on file    Transportation needs     Medical: Not on file     Non-medical: Not on file   Tobacco Use    Smoking status: Former Smoker     Packs/day: 1.00     Years: 56.00     Pack years: 56.00     Types: Cigarettes     Last attempt to quit: 2019     Years since quittin.8    Smokeless tobacco: Never Used   Substance and Sexual Activity    Alcohol use: No     Alcohol/week: 0.0 standard drinks    Drug use: Yes     Types: Marijuana    Sexual activity: Not on file   Lifestyle    Physical activity     Days per week: Not on file     Minutes per session: Not on file    Stress: Not on file   Relationships    Social connections     Talks on phone: Not on file     Gets together: Not on file     Attends Pentecostalism service: Not on file     Active member of club or organization: Not on file     Attends meetings of clubs or organizations: Not on file     Relationship status: Not on file    Intimate partner violence     Fear of current or ex partner: Not on file     Emotionally abused: Not on file     Physically abused: Not on file Forced sexual activity: Not on file   Other Topics Concern    Not on file   Social History Narrative    Not on file     Family History      Problem Relation Age of Onset    Heart Disease Father        OBJECTIVE  /66   Pulse 55   Temp 97.4 °F (36.3 °C) (Tympanic)   Resp 20   Ht 5' 5\" (1.651 m)   Wt 160 lb (72.6 kg)   SpO2 96%   BMI 26.63 kg/m²     NIH Stroke Scale  NIH Stroke Scale Assessed: Yes  Level of Consciousness (1a. ): Alert  LOC Questions (1b. ): Answers both correctly  LOC Commands (1c. ): Performs both tasks correctly  Best Gaze (2. ): Normal  Visual (3. ): No visual loss  Facial Palsy (4. ): Normal symmetrical movement  Motor Arm, Left (5a. ): No drift  Motor Arm, Right (5b. ): No drift  Motor Leg, Left (6a. ): No drift  Motor Leg, Right (6b. ): No drift  Limb Ataxia (7. ): (!) Present in two limbs  Sensory (8. ): Normal  Best Language (9. ): Mild to moderate aphasia  Dysarthria (10. ): Normal  Extinction and Inattention (11): No abnormality  Total: 3  Pre-Morbid mRS: 0    Imaging:  Images were personally reviewed including:  CT brain without contrast: Loss of gray white differentiation in the left parietal lobe which may be acute stroke  CTA imaging:  Critical stenosis, 90%, of proximal left ICA    Assessment      Differential DDx:  1. Left parietal stroke      Recommendations:  1. NIH 4  2. Recommend Inpatient Neurology Consult for further assessment and evaluation   3. Patient is not a TPA candidate as there is no definitive last known well. 4. Transfer to [facility] neuro ICU - Rule out COVID prior to transfer to neuro ICU  5. Load aspirin 325 and plavix 300  6. 3000 U Heparin bolus followed by low intensity heparin infusion with no subsequent boluses  7. MRI brain without contrast  8. Echo with bubble study  9. -180, 500 cc bolus  10. Lipid profile  11. Statin therapy  12.  Hemoglobin A1C        Discussed with ED Physician    At least 50 min of Telemedicine and time in

## 2020-07-26 NOTE — ED NOTES
Report given to [facility], [facility] [address], [facility]  07/26/20 5712

## 2020-07-26 NOTE — ED PROVIDER NOTES
[facility] ED  Emergency Department Encounter  Emergency Medicine Resident     Pt Name: Akila Lin  MRN: [id number]  [place] 1950  Date of evaluation: 7/26/20  PCP:  Harley Garay MD    CHIEF COMPLAINT       Chief Complaint   Patient presents with    Cerebrovascular Accident     symptoms started yesterday later afternoon       HISTORY OFPRESENT ILLNESS  (Location/Symptom, Timing/Onset, Context/Setting, Quality, Duration, Modifying Factors,Severity.)      Akila Lin is a 68 y. o.yo female who presents with possible stroke. Patient was a transfer from [facility] where she presented with expressive aphasia. Her last known well by patient's family was 3 days prior however per patient's boyfriend on report her last known well was approximately 8 hours prior to patient's presentation to this ED. Patient states that she felt that she had trouble speaking without any other symptoms. No falls. No previous strokes. No anticoagulation use. PAST MEDICAL / SURGICAL / SOCIAL / FAMILY HISTORY      has a past medical history of Allergic rhinitis, Arthritis, CAD (coronary artery disease), CHF (congestive heart failure) ([address] Controlled type 2 diabetes mellitus without complication, without long-term current use of insulin ([address] COPD (chronic obstructive pulmonary disease) ([address] Depression, Former smoker, History of blood transfusion, Hyperlipidemia, Hypertension, Kidney stone, Myocardial infarction ([address] Obesity, Class I, BMI 30.0-34.9 (see actual BMI), Restless leg syndrome, Skin abnormality, Type II or unspecified type diabetes mellitus without mention of complication, not stated as uncontrolled, Wears glasses, and Wears partial dentures. has a past surgical history that includes Appendectomy; Hysterectomy;  Cholecystectomy; joint replacement (Bilateral); pr office/outpt visit,procedure only (N/A, 2/6/2018); pr incis/drain thigh/knee abscess,deep (Right, 5/7/2018); Yaya Escalante MD   metoprolol tartrate (LOPRESSOR) 25 MG tablet TAKE 1 TABLET BY MOUTH TWICE DAILY 7/5/20   Kennedi Farfan MD   diclofenac sodium (VOLTAREN) 1 % GEL Apply 2 g topically 4 times daily 6/2/20   Kennedi Farfan MD   furosemide (LASIX) 40 MG tablet Take 1 tablet by mouth every other day 5/20/20   Kennedi Farfan MD   ONE TOUCH ULTRA TEST strip TEST ONCE DAILY AS NEEDED 4/6/20   Kennedi Farfan MD   fluticasone [facility]) 50 MCG/ACT nasal spray SHAKE LIQUID AND USE 2 SPRAYS IN EACH NOSTRIL DAILY 4/2/20 7/1/20  JAMES Suero NP   tiZANidine (ZANAFLEX) 4 MG tablet TAKE 1 TABLET BY MOUTH EVERY 8 HOURS AS NEEDED FOR BACK PAIN 3/9/20   Kennedi Farfan MD   potassium chloride (KLOR-CON) 10 MEQ extended release tablet TAKE 2 TABLETS BY MOUTH EVERY DAY 2/29/20   Kennedi Farfan MD   clopidogrel (PLAVIX) 75 MG tablet TAKE 1 TABLET BY MOUTH EVERY DAY 2/29/20   Kennedi Farfan MD   rOPINIRole (REQUIP) 1 MG tablet TAKE 1 TABLET BY MOUTH EVERY NIGHT AS NEEDED 2/19/20   Kennedi Farfan MD   metFORMIN (GLUCOPHAGE-XR) 500 MG extended release tablet Take 3 tablets by mouth daily (with breakfast) 1/3/20   Kennedi Farfan MD   lisinopril (PRINIVIL;ZESTRIL) 2.5 MG tablet TAKE 1 TABLET BY MOUTH EVERY DAY 1/2/20   Kennedi Farfan MD   citalopram (CELEXA) 20 MG tablet TAKE 1 TABLET BY MOUTH EVERY DAY 10/21/19   Kennedi Farfan MD   isosorbide mononitrate (IMDUR) 30 MG extended release tablet Take 30 mg by mouth 5/24/19   Historical Provider, MD   nitroGLYCERIN (NITROSTAT) 0.4 MG SL tablet Place 0.4 mg under the tongue every 5 minutes as needed for Chest pain up to max of 3 total doses. If no relief after 1 dose, call 911.     Historical Provider, MD   Multiple Vitamins-Minerals (MULTIVITAMIN WITH MINERALS) tablet Take 1 tablet by mouth daily 5/31/18   Rg Brunner MD   albuterol (PROVENTIL) (2.5 MG/3ML) 0.083% nebulizer solution Take 3 mLs by nebulization every 6 hours as needed for Wheezing 3/9/18   Barbara AlstonJAMES - CNP   albuterol sulfate HFA (PROAIR HFA) 108 (90 Base) MCG/ACT inhaler Inhale 2 puffs into the lungs every 4 hours as needed for Wheezing 2/21/18   Robertmelissa JAMES Capone CNP   aspirin 81 MG EC tablet Take 1 tablet by mouth daily 2/12/18   JAMES Arroyo CNP   hydrocortisone 2.5 % cream Apply topically 2 times daily. 12/14/17   Jhony Ferguson MD       REVIEW OFSYSTEMS    (2-9 systems for level 4, 10 or more for level 5)      Review of Systems   Constitutional: Negative for chills and fever. HENT: Negative for congestion and rhinorrhea. Eyes: Negative for photophobia and visual disturbance. Respiratory: Negative for shortness of breath and wheezing. Cardiovascular: Negative for chest pain and palpitations. Gastrointestinal: Negative for abdominal pain, nausea and vomiting. Musculoskeletal: Negative for neck pain and neck stiffness. Skin: Negative for rash and wound. Neurological: Positive for speech difficulty. Negative for dizziness and headaches. PHYSICAL EXAM   (up to 7 for level 4, 8 or more forlevel 5)      INITIAL VITALS:   ED Triage Vitals [07/26/20 0130]   BP Temp Temp Source Pulse Resp SpO2 Height Weight   (!) 154/93 98.8 °F (37.1 °C) Oral 64 15 98 % 5' 8\" (1.727 m) 160 lb (72.6 kg)       Physical Exam  Constitutional:       General: She is not in acute distress. Appearance: She is well-developed. HENT:      Head: Normocephalic and atraumatic. Eyes:      Extraocular Movements: Extraocular movements intact. Conjunctiva/sclera: Conjunctivae normal.      Pupils: Pupils are equal, round, and reactive to light. Neck:      Musculoskeletal: Normal range of motion and neck supple. Cardiovascular:      Rate and Rhythm: Normal rate. Heart sounds: Normal heart sounds. Pulmonary:      Effort: Pulmonary effort is normal.   Abdominal:      Palpations: Abdomen is soft. Tenderness:  There is no abdominal tenderness. Musculoskeletal: Normal range of motion. General: No deformity. Skin:     General: Skin is warm. Capillary Refill: Capillary refill takes less than 2 seconds. Neurological:      Mental Status: She is alert. Comments: Mild expressive aphasia. Right-sided dysmetria in the upper extremity. Otherwise, no focal neurological deficits. INITIAL NIH STROKE SCALE      Administer stroke scale items in the order listed. Record performance in each category after each subscale exam. Do not go back and change scores. Follow directions provided for each exam technique. Scores should reflect what the patient does, not what the clinician thinks the patient can do. The clinician should record answers while administering the exam and work quickly. Except where indicated, the patient should not be coached (i.e., repeated requests to patient to make a special effort). 1a.  Level of consciousness:  0 - alert; keenly responsive  1b. Level of consciousness questions:  0 - answers both questions correctly  1c. Level of consciousness questions:  0 - performs both tasks correctly  2. Best Gaze:  0 - normal  3. Visual:  0 - no visual loss  4. Facial Palsy:  0 - normal symmetric movement  5a. Motor left arm:  0 - no drift, limb holds 90 (or 45) degrees for full 10 seconds  5b. Motor right arm:  0 - no drift, limb holds 90 (or 45) degrees for full 10 seconds  6a. Motor left le - no drift; leg holds 30 degree position for full 5 seconds  6b. Motor right le - no drift; leg holds 30 degree position for full 5 seconds  7. Limb Ataxia:  1 - present in one limb  8. Sensory:  0 - normal; no sensory loss  9. Best Language:  1 - mild to moderate aphasia; some obvious loss of fluency or facility of comprehension without significant limitation on ideas expressed or form of expression.   Reduction of speech and/or comprehension, however, makes conversation about provided materials difficult or impossible. For example, in conversation about provided materials, examiner can identify picture or naming card content from patient's response. 10.  Dysarthria:  0 - normal  11. Extinction and Inattention:  0 - no abnormality    TOTAL:  2        DIFFERENTIAL  DIAGNOSIS     PLAN (LABS / IMAGING / EKG):  Orders Placed This Encounter   Procedures    MRI BRAIN WO CONTRAST    APTT    HEMOGLOBIN A1C    LIPID PANEL    APTT    Protime-INR    CBC WITH AUTO DIFFERENTIAL    BASIC METABOLIC PANEL    MAGNESIUM    APTT    Diet NPO, After Midnight Exceptions are: Sips with Meds    Misc nursing order (specify)    Inpatient consult to Stroke Team    Inpatient consult to Neurocritical care    OT eval and treat    PT eval and treat    SLP eval and treat    POCT Glucose    POC Glucose Fingerstick    EKG 12 Lead    ECHO Complete 2D W Doppler W Color    PATIENT STATUS (FROM ED OR OR/PROCEDURAL) Inpatient       MEDICATIONS ORDERED:  Orders Placed This Encounter   Medications    atorvastatin (LIPITOR) tablet 80 mg    heparin 25,000 units in dextrose 5% 250 mL infusion    lactated ringers infusion    pantoprazole (PROTONIX) tablet 40 mg    polyethylene glycol (GLYCOLAX) packet 17 g    metoprolol tartrate (LOPRESSOR) tablet 25 mg    rOPINIRole (REQUIP) tablet 1 mg    tiZANidine (ZANAFLEX) tablet 4 mg    fentaNYL (SUBLIMAZE) injection 25 mcg       Initial MDM/Plan: 68 y.o. female who presents with mild a fascia. Transfer from [facility]. Patient with MI in the past and on aspirin and Plavix. She was sent on heparin. CT head shows a possible left parietal stroke and CTA did show severe, 90% stenosis of the proximal left ICA with moderate stenosis of the left vertebral artery. NIH of 2 upon arrival.  No TPA. Patient is hemodynamically stable. Likely CVA and patient will need MRI. Likely this angiogram in a.m. Will discuss with neuro ICU for admission.     DIAGNOSTIC RESULTS / EMERGENCYDEPARTMENT COURSE / MDM     LABS:  Labs Reviewed   APTT - Abnormal; Notable for the following components:       Result Value    PTT >120.0 (*)     All other components within normal limits   CBC WITH AUTO DIFFERENTIAL - Abnormal; Notable for the following components:    RBC 3.80 (*)     Hemoglobin 10.8 (*)     Hematocrit 34.5 (*)     RDW 15.8 (*)     Seg Neutrophils 71 (*)     Lymphocytes 17 (*)     All other components within normal limits   BASIC METABOLIC PANEL - Abnormal; Notable for the following components:    Glucose 110 (*)     CREATININE 1.13 (*)     Calcium 8.3 (*)     GFR Non- 47 (*)     GFR  58 (*)     All other components within normal limits   POCT GLUCOSE - Normal   LIPID PANEL   PROTIME-INR   MAGNESIUM   APTT   HEMOGLOBIN A1C   APTT   POC GLUCOSE FINGERSTICK         RADIOLOGY:  Ct Head Wo Contrast    Result Date: 7/25/2020  EXAMINATION: CT OF THE HEAD WITHOUT CONTRAST  7/25/2020 9:37 pm TECHNIQUE: CT of the head was performed without the administration of intravenous contrast. Dose modulation, iterative reconstruction, and/or weight based adjustment of the mA/kV was utilized to reduce the radiation dose to as low as reasonably achievable. COMPARISON: September 13, 2015 HISTORY: ORDERING SYSTEM PROVIDED HISTORY: altered speech difficulties TECHNOLOGIST PROVIDED HISTORY: altered speech difficulties FINDINGS: BRAIN/VENTRICLES: There is no acute intracranial hemorrhage, mass effect or midline shift. No abnormal extra-axial fluid collection. Loss of cortical gray-white matter differentiation posterior parietal cortex on the left. There is no evidence of hydrocephalus. Mild cortical atrophy. Intracranial atherosclerosis. ORBITS: The visualized portion of the orbits demonstrate no acute abnormality. SINUSES: The visualized paranasal sinuses and mastoid air cells demonstrate no acute abnormality.  SOFT TISSUES/SKULL:  No acute abnormality of the visualized skull or soft tissues. Possible acute infarct left posterior parietal lobe. RECOMMENDATIONS: The findings were sent to the Radiology Results [address] at 9:50 pm on 7/25/2020to be communicated to a licensed caregiver. Discussed with Dr. Omid Calhoun at 10:02 p.m. Xr Chest Portable    Result Date: 7/26/2020  EXAMINATION: ONE XRAY VIEW OF THE CHEST 7/25/2020 10:11 pm COMPARISON: 09/20/2019. HISTORY: ORDERING SYSTEM PROVIDED HISTORY: stroke workup TECHNOLOGIST PROVIDED HISTORY: stroke workup Reason for Exam: Difficulty speaking and altered mental status. Acuity: Acute Type of Exam: Initial Additional signs and symptoms: Difficulty speaking and altered mental status. FINDINGS: Low lung volumes which may be on the basis of patient inspiratory effort. Mild bibasilar likely scarring versus atelectasis. No focal consolidations, pleural effusions or pulmonary edema. No pneumothoraces. Cardiac and mediastinal silhouettes are stable with stable cardiomegaly. No acute bony abnormality. Prior median sternotomy. No evidence for acute cardiopulmonary process. Cta Head Neck W Contrast    Result Date: 7/25/2020  EXAMINATION: CTA OF THE HEAD AND NECK WITH CONTRAST 7/25/2020 9:54 pm: TECHNIQUE: CTA of the head and neck was performed with the administration of intravenous contrast. Multiplanar reformatted images are provided for review. MIP images are provided for review. Stenosis of the internal carotid arteries measured using NASCET criteria. Dose modulation, iterative reconstruction, and/or weight based adjustment of the mA/kV was utilized to reduce the radiation dose to as low as reasonably achievable. COMPARISON: CT of the head from today. HISTORY: ORDERING SYSTEM PROVIDED HISTORY: speech difficulties TECHNOLOGIST PROVIDED HISTORY: speech difficulties Reason for Exam: stroke Acuity: Acute Type of Exam: Initial FINDINGS: CTA NECK: AORTIC ARCH/ARCH VESSELS: No dissection or arterial injury.   No significant stenosis

## 2020-07-26 NOTE — ED NOTES
Pt resting on cot with eyes closed, RR even and unlabored, NAD, call light in reach     LIVE Medina  07/26/20 [id number]

## 2020-07-26 NOTE — ED NOTES
Patient resting on cart, non distressed, TV turned on for comfort    Updated that patient continues to be boarder in ER. Denies pain or complaints at this time  MRI checklist completed with patient and faxed    Denies complaints at this time.   Call light within reach    Will continue to monitor       Tod Dyer RN  07/26/20 [id number]

## 2020-07-26 NOTE — OP NOTE
[facility]    NEUROENDOVASCULAR SERVICE: POST-OP NOTE: 7/26/2020    Pt Name: Hannah Jacobs  MRN: [id number]  YOB: 1950  Date of Procedure: 7/26/2020  Primary Care Physician: Gaetano Hatfield MD     Pre-Procedural Diagnosis: Severe left carotid stenosis noted on CTA neck  Post-Procedural Diagnosis: Less than 50% left carotid stenosis with underlying ulcerated plaque      Procedure Performed:Diagnostic Cerebral Angiogram    Surgeon:   Sloane Alves MD    Fellow:  Marialuisa Martino MD     1st Assistant:  Donald Shankar    PRE-PROCEDURAL EXAM:  Prestroke baseline mRS MODIFIED MORRIS SCORE: 2 - Slight disability:  unable to carry out all previous activities, but able to look after own affairs without assistance. Neurological exam performed and unchanged from initial H&P or consult    Anesthesia: MAC anesthesia  Complications: none    Intra-Operative EXAM:  Neurological exam performed and unchanged from initial H&P or consult    EBL: < Minimal      Cc            Specimens: Were not Obtained  Contrast:     Visipaque 270 low osmolar 80 Cc             Fluoro: 14.1 min    Findings:  Please see dictated Radiology note for further details  1. Left common carotid artery proximal tortuosity noted. 2. Left cervical internal carotid artery ulcerated plaque with less than 50% stenosis per NASCET criteria noted. POST-PROCEDURAL EXAM :   Stable neurological Exam  Neurological exam performed and unchanged from initial H&P or consult    Closure:  right Angioseal 6   F      POST-PROCEDURAL MONITORING : see orders  Disposition: Recovery room      Recommendations:  Back to recovery than 5 cm neurology floor. Do not bend right leg for 3 hours. Groin checks per protocol. Neuro checks per protocol  Peripheral pulse checks per protocol. Further management per primary team.  SBP goal 110-150  Resume aspirin and plavix starting tomorrow. She was loaded with 600 Plavix in the IR lab.   Upon discharge follow up with Dr. Edna Calzada in 2 weeks and Dr. Mia Reynoso in 3 months.       MD Catalino Paris MD   Pager: [phone]  Stroke, [facility]  [address] [address] [place]  Electronically signed 7/26/2020 at 11:55 AM

## 2020-07-26 NOTE — ED NOTES
MRI calls, reports they will be coming to get patient shortly for MRI  Pt updated       Nikolai Lazo RN  07/26/20 [id number]

## 2020-07-27 LAB
EKG ATRIAL RATE: 61 BPM
EKG P AXIS: -29 DEGREES
EKG P-R INTERVAL: 116 MS
EKG Q-T INTERVAL: 440 MS
EKG QRS DURATION: 96 MS
EKG QTC CALCULATION (BAZETT): 442 MS
EKG R AXIS: 10 DEGREES
EKG T AXIS: 103 DEGREES
EKG VENTRICULAR RATE: 61 BPM
GFR NON-AFRICAN AMERICAN: 35 ML/MIN
GFR SERPL CREATININE-BSD FRML MDRD: 43 ML/MIN
GFR SERPL CREATININE-BSD FRML MDRD: ABNORMAL ML/MIN/{1.73_M2}
GLUCOSE BLD-MCNC: 117 MG/DL (ref 65–105)
POC CREATININE: 1.46 MG/DL (ref 0.51–1.19)

## 2020-07-27 PROCEDURE — 93010 ELECTROCARDIOGRAM REPORT: CPT | Performed by: INTERNAL MEDICINE

## 2020-07-27 NOTE — DISCHARGE SUMMARY
Neuro Critical Care   Discharge Summary      PATIENT NAME: Hannah Jacobs  YOB: 1950  MEDICAL RECORD NO. [id number]  DATE: 7/27/2020  PRIMARY CARE PHYSICIAN: Gaetano Hatfield MD  DISCHARGE DATE:  7/26/2020  DISCHARGE DIAGNOSIS:   Patient Active Problem List   Diagnosis Code    Chronic pain G89.29    Controlled type 2 diabetes mellitus without complication, without long-term current use of insulin ([facility]) E11.9    Allergic rhinitis J30.9    Hypertension goal BP (blood pressure) < 140/90 I10    Mixed hyperlipidemia E78.2    Chronic obstructive pulmonary disease ([facility]) J44.9    Coronary artery disease involving native coronary artery of native heart without angina pectoris I25.10    Primary insomnia F51.01    Diarrhea in adult patient R19.7    Restless leg syndrome G25.81    Atherosclerosis of superior mesenteric artery ([facility]) K55.1    Traumatic compression fracture of T9 thoracic vertebra S22.070A    Left adrenal mass ([facility]) E27.8    Former smoker Z87.891    Chronic bilateral low back pain with left-sided sciatica M54.42, G89.29    Hypothyroidism E03.9    S/P CABG x 4 Z95.1    Morbid obesity with body mass index (BMI) of 40.0 to 49.9 ([facility]) E66.01    Acute pain of right knee M25.561    Abscess of right thigh L02.415    Leg ulcer, left, with necrosis of muscle ([facility]) L97.923    Angina pectoris ([facility]) I20.9    Abnormal stress test R94.39    Atrial fibrillation ([facility]) I48.91    Sepsis ([facility]) A41.9    Tobacco use disorder F17.200    Cervical pain (neck) M54.2    Chest pain R07.9    Acute ischemic left MCA stroke ([facility]) C75.922    Internal carotid artery occlusion I65.29    Stenosis of left internal carotid artery I65.22       HOSPITAL COURSE     Hannah Jacobs is a 68 y.o. yo female who presented to [facility] on 7/26/2020  1:24 AM with language difficulty.   Patient had a loss of gray-white differentiation on the left parietal lobe, as well as 90% stenosis of proximal ICA on CTA head and neck. Patient was loaded with aspirin, Plavix, and given heparin bolus and infusion. Patient was then transferred to the endovascular suite for angiogram possible stenting. Upon stenting, the left carotid stenosis less than 50% but there was an underlying ulcerated plaque. No stenting was performed. Postoperatively, patient was speaking in full sentences with her speech difficulty improved. Labs and imaging were followed daily. At time of discharge, Alisa Kendrick was tolerating a No diet orders on file, having bowel movements, and is in good condition to be discharged to home. PROCEDURES:    Diagnostic angio without intervention    PHYSICAL EXAMINATION        Discharge Vitals:  height is 5' 8\" (1.727 m) and weight is 160 lb (72.6 kg). Her temporal temperature is 97.7 °F (36.5 °C). Her blood pressure is 150/72 (abnormal) and her pulse is 62. Her respiration is 20 and oxygen saturation is 96%. CONSTITUTIONAL:  Well developed, well nourished, alert and oriented x 3, in no acute distress. GCS 15. Nontoxic. No dysarthria. No aphasia.    HEAD:  normocephalic, atraumatic    EYES:  PERRLA, EOMI.   ENT:  moist mucous membranes   NECK:  supple, symmetric   LUNGS:  Equal air entry bilaterally   CARDIOVASCULAR:  normal s1 / s2, RRR, distal pulses intact   ABDOMEN:  Soft, no rigidity   NEUROLOGIC:  Mental Status:  A & O x3,awake             Cranial Nerves:    cranial nerves II-XII are grossly intact    Motor Exam:    Drift:  absent  Tone:  normal    Motor exam is symmetrical 5 out of 5 all extremities bilaterally    Sensory:    Touch:    Right Upper Extremity:  normal  Left Upper Extremity:  normal  Right Lower Extremity:  normal  Left Lower Extremity:  normal    Clonus:  absent                 LABS/IMAGING     Recent Labs     07/25/20 2128 07/26/20  0312   WBC 10.4 8.3   HGB 11.6* 10.8*   HCT 34.8* 34.5*    298    137   K 3.8 3.8    102   CO2 24 24   BUN 12 12   CREATININE 1.34*  1.46* 1.13*       Xr Hand Right (min 3 Views)    Result Date: 7/2/2020  EXAMINATION: THREE XRAY VIEWS OF THE RIGHT HAND 7/2/2020 3:44 pm COMPARISON: None. HISTORY: ORDERING SYSTEM PROVIDED HISTORY: cat bite TECHNOLOGIST PROVIDED HISTORY: cat bite Reason for Exam: cat bite rt hand, swelling, ? infection Acuity: Unknown Type of Exam: Unknown FINDINGS: Degenerative changes of the thumb CMC and triscaphe joint. Thumb MCP degenerative change and lesser thumb IP joint degenerative change. Degenerative changes of 2nd and 3rd PIP joints. Soft tissue swelling of the hand. No radiopaque foreign body or soft tissue air. No acute osseous abnormality. Mild soft tissue swelling without air or radiopaque foreign body. Scattered degenerative changes about the hand. Ct Head Wo Contrast    Result Date: 7/25/2020  EXAMINATION: CT OF THE HEAD WITHOUT CONTRAST  7/25/2020 9:37 pm TECHNIQUE: CT of the head was performed without the administration of intravenous contrast. Dose modulation, iterative reconstruction, and/or weight based adjustment of the mA/kV was utilized to reduce the radiation dose to as low as reasonably achievable. COMPARISON: September 13, 2015 HISTORY: ORDERING SYSTEM PROVIDED HISTORY: altered speech difficulties TECHNOLOGIST PROVIDED HISTORY: altered speech difficulties FINDINGS: BRAIN/VENTRICLES: There is no acute intracranial hemorrhage, mass effect or midline shift. No abnormal extra-axial fluid collection. Loss of cortical gray-white matter differentiation posterior parietal cortex on the left. There is no evidence of hydrocephalus. Mild cortical atrophy. Intracranial atherosclerosis. ORBITS: The visualized portion of the orbits demonstrate no acute abnormality. SINUSES: The visualized paranasal sinuses and mastoid air cells demonstrate no acute abnormality. SOFT TISSUES/SKULL:  No acute abnormality of the visualized skull or soft tissues.      Possible acute infarct left posterior parietal lobe. RECOMMENDATIONS: The findings were sent to the Radiology Results Communication Center at 9:50 pm on 7/25/2020to be communicated to a licensed caregiver. Discussed with Dr. Cuco Driver at 10:02 p.m. Ir Angiogram Carotid Cerebral Bilateral    Result Date: 7/26/2020  Date of procedure: 7/26/2020 12:01 PM Procedure: Diagnostic cerebral angiogram. Indication: Severe left carotid stenosis noted on CTA neck Procedures: 1. Selective left common carotid artery angiogram 2. Right common femoral artery angiogram 3. Left common carotid artery 3-D angiogram. Neurointerventionalist: Estela Dunaway MD Colony: Margarette Menchaca MD Comparison: None Fluoroscopy time: 14.1 minutes Contrast: 80 cc Visipaque-270 Side of Access: Right femoral. Closure device: Angio-Seal. Sedation: Mac anesthesia Patient arrived to the angio suite: 9:34 AM Puncture obtained at: 10:15 AM Vascular access removed at: 11:14 AM Consent: After explaining the risks and benefits to the patient and the patient's family, including but not limited to stroke, coma, death, vessel injury, dissection, tear, occlusion, and X-ray dye allergic type reaction, a signed consent form was obtained. Anesthesia: MAC anesthesia was supervised by Dr. Dea Huston. The patient was independently monitored by a Registered Nurse assigned to the Department of Radiology?using automated blood pressure, EKG and pulse oximetry. ? The detailed Conscious Record is permanently stored  in the [place] [id number]. The following is the conscious sedation record including Start and End times: Fentanyl and Versed from 10:00 AM to  11:15 AM . Clinical History: 26-year-old female with past medical history including hypertension, hyperlipidemia, coronary artery disease and congestive heart failure. She presented with speech difficulty and found to have severe left carotid artery stenosis noted on CTA head and neck. She is undergoing cerebral angiogram for evaluation of possible carotid stenting. Description and findings: The patient's right groin was prepped and draped in standard sterile fashion and under local anesthesia with conscious sedation, the right common femoral artery was accessed with a micropuncture needle technique, and a 6 [place] Brite tip sheath was placed within the right common femoral artery, establishing arterial access. A 5 [place] [employer] 1 35 cm catheter was then advanced over a guide wire to the level of the aortic arch, and used to selectively catheterize the right common carotid artery. Left CCA technique: The left common carotid artery was selectively catheterized under fluoroscopic guidance and digital subtraction angiography images were obtained in biplane projections of the right cervical carotid artery. There is proximal common carotid artery tortuosity with type III kink noted. Interpretation: The left common carotid artery injection demonstrates normal antegrade flow into the external and internal carotid arteries with normal filling of the external carotid artery branches. Course and caliber of the cervical portion of the left internal carotid artery revealed type I kink. Further inspection demonstrates ulcerated plaque at the carotid bulb resulting in less than 50% stenosis per NASCET criteria. Otherwise no evidence of dissection, stenosis, aneurysm, or other vascular abnormality within the right CCA into the cervical segment of the left ICA. 3-D rotational left internal carotid cerebral angiogram: A 3-D rotation of cerebral angiogram was performed with a left common carotid artery injection using a power injector to better visualize the aneurysmal morphology and for surgical planning. This was interpreted on a separate workstation. Information obtained from the 3-D rotational angiogram could not be obtained from other noninvasive diagnostic studies done prior to the procedure.  Right CFA technique: A right common femoral artery angiogram was performed and demonstrated contrast. Multiplanar reformatted images are provided for review. MIP images are provided for review. Stenosis of the internal carotid arteries measured using NASCET criteria. Dose modulation, iterative reconstruction, and/or weight based adjustment of the mA/kV was utilized to reduce the radiation dose to as low as reasonably achievable. COMPARISON: CT of the head from today. HISTORY: ORDERING SYSTEM PROVIDED HISTORY: speech difficulties TECHNOLOGIST PROVIDED HISTORY: speech difficulties Reason for Exam: stroke Acuity: Acute Type of Exam: Initial FINDINGS: CTA NECK: AORTIC ARCH/ARCH VESSELS: No dissection or arterial injury. No significant stenosis of the brachiocephalic or subclavian arteries. CAROTID ARTERIES: Calcified plaque in the carotid bulb causes severe/90% narrowing of the proximal left internal carotid artery. Right internal carotid artery is patent. Both carotids are tortuous and otherwise patent. Carotid bifurcation is high and retropharyngeal bilaterally. VERTEBRAL ARTERIES: Calcified plaque at the origin of the left vertebral artery causes moderate stenosis. Bilateral vertebral arteries otherwise normal. SOFT TISSUES: The lung apices are clear. No cervical or superior mediastinal lymphadenopathy. The larynx and pharynx are unremarkable. No acute abnormality of the salivary and thyroid glands. Calcific coronary artery disease. BONES: No acute osseous abnormality. CTA HEAD: ANTERIOR CIRCULATION: No significant stenosis of the intracranial internal carotid, anterior cerebral, or middle cerebral arteries. No aneurysm. POSTERIOR CIRCULATION: No significant stenosis of the vertebral, basilar, or posterior cerebral arteries. No aneurysm. OTHER: No dural venous sinus thrombosis on this non-dedicated study. BRAIN: No mass effect or midline shift. No extra-axial fluid collection. The gray-white differentiation is maintained. Severe/90% stenosis proximal left internal carotid artery.  Moderate stenosis proximal left vertebral artery approximately 60-70%. RECOMMENDATIONS: The findings were sent to the Radiology Results [id number] at 10:17 pm on 7/25/2020to be communicated to a licensed caregiver. Discussed with Dr. Jennifer Astudillo at 10:27 p.m. Mri Brain Wo Contrast    Result Date: 7/26/2020  EXAMINATION: MRI OF THE BRAIN WITHOUT CONTRAST  7/26/2020 8:19 am TECHNIQUE: Multiplanar multisequence MRI of the brain was performed without the administration of intravenous contrast. COMPARISON: None. HISTORY: ORDERING SYSTEM PROVIDED HISTORY: expressive aphasia left ica occlusion TECHNOLOGIST PROVIDED HISTORY: expressive aphasia left ica occlusion FINDINGS: INTRACRANIAL STRUCTURES/VENTRICLES: The sellar and suprasellar structures, optic chiasm, corpus callosum, pineal gland, tectum, and midline brainstem structures are unremarkable. The craniocervical junction is unremarkable. There is no acute hemorrhage, mass effect, or midline shift. There is satisfactory overall gray-white matter differentiation. There is chronic microvascular disease. The ventricular structures are symmetric and unremarkable. The infratentorial structures including the cerebellopontine angles and internal auditory canals are unremarkable. There is restricted diffusion in the left parietal lobe with associated FLAIR signal abnormality. There is no abnormal blooming artifact on susceptibility weighted imaging. ORBITS: The visualized portion of the orbits demonstrate no acute abnormality. SINUSES: The visualized paranasal sinuses and mastoid air cells are well aerated. BONES/SOFT TISSUES: The bone marrow signal intensity appears normal. The soft tissues demonstrate no acute abnormality. Acute/subacute ischemia in the left parietal lobe.          DISCHARGE INSTRUCTIONS     Discharge Medications:        Medication List      START taking these medications    aspirin 81 MG chewable tablet  Take 1 tablet by mouth daily  Replaces: aspirin 81 MG EC tablet        CHANGE how you take these medications    atorvastatin 40 MG tablet  Commonly known as:  LIPITOR  Take 2 tablets by mouth daily  What changed:  how much to take     clopidogrel 75 MG tablet  Commonly known as:  PLAVIX  Take 1 tablet by mouth daily  What changed:  See the new instructions. CONTINUE taking these medications    * albuterol sulfate  (90 Base) MCG/ACT inhaler  Commonly known as:  ProAir HFA  Inhale 2 puffs into the lungs every 4 hours as needed for Wheezing     * albuterol (2.5 MG/3ML) 0.083% nebulizer solution  Commonly known as:  PROVENTIL  Take 3 mLs by nebulization every 6 hours as needed for Wheezing     citalopram 20 MG tablet  Commonly known as:  CELEXA  TAKE 1 TABLET BY MOUTH EVERY DAY     diclofenac sodium 1 % Gel  Commonly known as:  VOLTAREN  Apply 2 g topically 4 times daily     fluticasone 50 MCG/ACT nasal spray  Commonly known as:  FLONASE  SHAKE LIQUID AND USE 2 SPRAYS IN EACH NOSTRIL DAILY     furosemide 40 MG tablet  Commonly known as:  LASIX  Take 1 tablet by mouth every other day     hydrocortisone 2.5 % cream  Apply topically 2 times daily.      isosorbide mononitrate 30 MG extended release tablet  Commonly known as:  IMDUR     lisinopril 2.5 MG tablet  Commonly known as:  PRINIVIL;ZESTRIL  TAKE 1 TABLET BY MOUTH EVERY DAY     metFORMIN 500 MG extended release tablet  Commonly known as:  GLUCOPHAGE-XR  Take 3 tablets by mouth daily (with breakfast)     metoprolol tartrate 25 MG tablet  Commonly known as:  LOPRESSOR  TAKE 1 TABLET BY MOUTH TWICE DAILY     multivitamin with minerals tablet  Take 1 tablet by mouth daily     nitroGLYCERIN 0.4 MG SL tablet  Commonly known as:  NITROSTAT     ONE TOUCH ULTRA TEST strip  Generic drug:  blood glucose test strips  TEST ONCE DAILY AS NEEDED     potassium chloride 10 MEQ extended release tablet  Commonly known as:  KLOR-CON  TAKE 2 TABLETS BY MOUTH EVERY DAY     rOPINIRole 1 MG tablet  Commonly known as: REQUIP  TAKE 1 TABLET BY MOUTH EVERY NIGHT AS NEEDED     tiZANidine 4 MG tablet  Commonly known as:  ZANAFLEX  TAKE 1 TABLET BY MOUTH EVERY 8 HOURS AS NEEDED FOR BACK PAIN         * This list has 2 medication(s) that are the same as other medications prescribed for you. Read the directions carefully, and ask your doctor or other care provider to review them with you.             STOP taking these medications    aspirin 81 MG EC tablet  Replaced by:  aspirin 81 MG chewable tablet           Where to Get Your Medications      You can get these medications from any pharmacy    Bring a paper prescription for each of these medications  · aspirin 81 MG chewable tablet  · atorvastatin 40 MG tablet  · clopidogrel 75 MG tablet       Diet: No diet orders on file diet as tolerated  Activity: as tolerated  Follow-up:  neuroendovascular in 2 weeks and in 3 months  Time Spent for discharge: 30 minutes    Wendie Burgess DO  Neuro Critical Care  7/27/2020, 4:07 PM

## 2020-07-29 LAB — GLUCOSE BLD-MCNC: 104 MG/DL (ref 65–105)

## 2020-07-30 ENCOUNTER — HOSPITAL ENCOUNTER (OUTPATIENT)
Dept: CT IMAGING | Age: 70
Discharge: HOME | End: 2020-08-01
Payer: MEDICARE

## 2020-07-30 ENCOUNTER — HOSPITAL ENCOUNTER (OUTPATIENT)
Dept: MRI IMAGING | Age: 70
Discharge: HOME | End: 2020-08-01
Payer: MEDICARE

## 2020-07-30 PROCEDURE — 72141 MRI NECK SPINE W/O DYE: CPT

## 2020-07-30 PROCEDURE — 72125 CT NECK SPINE W/O DYE: CPT

## 2020-08-03 ENCOUNTER — TELEPHONE (OUTPATIENT)
Dept: ORTHOPEDIC SURGERY | Age: 70
End: 2020-08-03

## 2020-09-03 ENCOUNTER — OFFICE VISIT (OUTPATIENT)
Dept: ORTHOPEDIC SURGERY | Age: 70
End: 2020-09-03
Payer: MEDICARE

## 2020-09-03 PROCEDURE — 1123F ACP DISCUSS/DSCN MKR DOCD: CPT | Performed by: ORTHOPAEDIC SURGERY

## 2020-09-03 PROCEDURE — 4040F PNEUMOC VAC/ADMIN/RCVD: CPT | Performed by: ORTHOPAEDIC SURGERY

## 2020-09-03 PROCEDURE — 99213 OFFICE O/P EST LOW 20 MIN: CPT | Performed by: ORTHOPAEDIC SURGERY

## 2020-09-03 PROCEDURE — G8420 CALC BMI NORM PARAMETERS: HCPCS | Performed by: ORTHOPAEDIC SURGERY

## 2020-09-03 PROCEDURE — 3017F COLORECTAL CA SCREEN DOC REV: CPT | Performed by: ORTHOPAEDIC SURGERY

## 2020-09-03 PROCEDURE — G8399 PT W/DXA RESULTS DOCUMENT: HCPCS | Performed by: ORTHOPAEDIC SURGERY

## 2020-09-03 PROCEDURE — 1090F PRES/ABSN URINE INCON ASSESS: CPT | Performed by: ORTHOPAEDIC SURGERY

## 2020-09-03 PROCEDURE — G8427 DOCREV CUR MEDS BY ELIG CLIN: HCPCS | Performed by: ORTHOPAEDIC SURGERY

## 2020-09-03 PROCEDURE — 1036F TOBACCO NON-USER: CPT | Performed by: ORTHOPAEDIC SURGERY
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
## 2020-09-14 PROBLEM — M48.02 STENOSIS OF CERVICAL SPINE WITH MYELOPATHY (HCC): Status: ACTIVE | Noted: 2020-09-14

## 2020-09-14 PROBLEM — G99.2 STENOSIS OF CERVICAL SPINE WITH MYELOPATHY (HCC): Status: ACTIVE | Noted: 2020-09-14

## 2020-09-14 PROBLEM — M43.12 ACQUIRED SPONDYLOLISTHESIS OF CERVICAL VERTEBRA: Status: ACTIVE | Noted: 2020-09-14

## 2020-09-21 ENCOUNTER — HOSPITAL ENCOUNTER (OUTPATIENT)
Dept: WOMENS IMAGING | Age: 70
Discharge: HOME | End: 2020-09-23
Payer: MEDICARE

## 2020-09-21 PROCEDURE — 77063 BREAST TOMOSYNTHESIS BI: CPT

## 2020-09-30 ENCOUNTER — HOSPITAL ENCOUNTER (OUTPATIENT)
Dept: NON INVASIVE DIAGNOSTICS | Age: 70
Discharge: HOME | End: 2020-09-30
Payer: MEDICARE

## 2020-09-30 ENCOUNTER — HOSPITAL ENCOUNTER (OUTPATIENT)
Dept: NUCLEAR MEDICINE | Age: 70
Discharge: HOME | End: 2020-10-02
Payer: MEDICARE

## 2020-09-30 ENCOUNTER — HOSPITAL ENCOUNTER (OUTPATIENT)
Dept: PREADMISSION TESTING | Age: 70
Discharge: HOME | End: 2020-10-04
Payer: MEDICARE

## 2020-09-30 VITALS
OXYGEN SATURATION: 100 % | WEIGHT: 138 LBS | SYSTOLIC BLOOD PRESSURE: 131 MMHG | HEART RATE: 79 BPM | DIASTOLIC BLOOD PRESSURE: 87 MMHG | HEIGHT: 65 IN | TEMPERATURE: 98.1 F | RESPIRATION RATE: 18 BRPM | BODY MASS INDEX: 22.99 KG/M2

## 2020-09-30 VITALS — BODY MASS INDEX: 22.99 KG/M2 | HEIGHT: 65 IN | WEIGHT: 138 LBS

## 2020-09-30 LAB
ABSOLUTE EOS #: 0.1 K/UL (ref 0–0.4)
ABSOLUTE IMMATURE GRANULOCYTE: ABNORMAL K/UL (ref 0–0.3)
ABSOLUTE LYMPH #: 1.1 K/UL (ref 1–4.8)
ABSOLUTE MONO #: 0.5 K/UL (ref 0.1–1.3)
ANION GAP SERPL CALCULATED.3IONS-SCNC: 10 MMOL/L (ref 9–17)
BASOPHILS # BLD: 1 % (ref 0–2)
BASOPHILS ABSOLUTE: 0 K/UL (ref 0–0.2)
BUN BLDV-MCNC: 14 MG/DL (ref 8–23)
BUN/CREAT BLD: ABNORMAL (ref 9–20)
CALCIUM SERPL-MCNC: 9.7 MG/DL (ref 8.6–10.4)
CHLORIDE BLD-SCNC: 102 MMOL/L (ref 98–107)
CO2: 27 MMOL/L (ref 20–31)
CREAT SERPL-MCNC: 1.17 MG/DL (ref 0.5–0.9)
DIFFERENTIAL TYPE: ABNORMAL
EOSINOPHILS RELATIVE PERCENT: 1 % (ref 0–4)
GFR AFRICAN AMERICAN: 55 ML/MIN
GFR NON-AFRICAN AMERICAN: 45 ML/MIN
GFR SERPL CREATININE-BSD FRML MDRD: ABNORMAL ML/MIN/{1.73_M2}
GFR SERPL CREATININE-BSD FRML MDRD: ABNORMAL ML/MIN/{1.73_M2}
GLUCOSE BLD-MCNC: 165 MG/DL (ref 70–99)
HCT VFR BLD CALC: 38.1 % (ref 36–46)
HEMOGLOBIN: 12.5 G/DL (ref 12–16)
IMMATURE GRANULOCYTES: ABNORMAL %
LV EF: 51 %
LVEF MODALITY: NORMAL
LYMPHOCYTES # BLD: 14 % (ref 24–44)
MCH RBC QN AUTO: 28.5 PG (ref 26–34)
MCHC RBC AUTO-ENTMCNC: 32.9 G/DL (ref 31–37)
MCV RBC AUTO: 86.6 FL (ref 80–100)
MONOCYTES # BLD: 7 % (ref 1–7)
NRBC AUTOMATED: ABNORMAL PER 100 WBC
PDW BLD-RTO: 14.7 % (ref 11.5–14.9)
PLATELET # BLD: 342 K/UL (ref 150–450)
PLATELET ESTIMATE: ABNORMAL
PMV BLD AUTO: 8.7 FL (ref 6–12)
POTASSIUM SERPL-SCNC: 3.8 MMOL/L (ref 3.7–5.3)
RBC # BLD: 4.39 M/UL (ref 4–5.2)
RBC # BLD: ABNORMAL 10*6/UL
SEG NEUTROPHILS: 77 % (ref 36–66)
SEGMENTED NEUTROPHILS ABSOLUTE COUNT: 6 K/UL (ref 1.3–9.1)
SODIUM BLD-SCNC: 139 MMOL/L (ref 135–144)
WBC # BLD: 7.7 K/UL (ref 3.5–11)
WBC # BLD: ABNORMAL 10*3/UL

## 2020-09-30 PROCEDURE — 6360000002 HC RX W HCPCS: Performed by: INTERNAL MEDICINE

## 2020-09-30 PROCEDURE — 36415 COLL VENOUS BLD VENIPUNCTURE: CPT

## 2020-09-30 PROCEDURE — 3430000000 HC RX DIAGNOSTIC RADIOPHARMACEUTICAL: Performed by: INTERNAL MEDICINE

## 2020-09-30 PROCEDURE — 78452 HT MUSCLE IMAGE SPECT MULT: CPT

## 2020-09-30 PROCEDURE — 93017 CV STRESS TEST TRACING ONLY: CPT

## 2020-09-30 PROCEDURE — 80048 BASIC METABOLIC PNL TOTAL CA: CPT

## 2020-09-30 PROCEDURE — 2580000003 HC RX 258: Performed by: INTERNAL MEDICINE

## 2020-09-30 PROCEDURE — 85025 COMPLETE CBC W/AUTO DIFF WBC: CPT

## 2020-09-30 PROCEDURE — A9500 TC99M SESTAMIBI: HCPCS | Performed by: INTERNAL MEDICINE

## 2020-09-30 RX ORDER — ATROPINE SULFATE 0.1 MG/ML
0.5 INJECTION INTRAVENOUS EVERY 5 MIN PRN
Status: ACTIVE | OUTPATIENT
Start: 2020-09-30 | End: 2020-09-30

## 2020-09-30 RX ORDER — SODIUM CHLORIDE 0.9 % (FLUSH) 0.9 %
10 SYRINGE (ML) INJECTION PRN
Status: ACTIVE | OUTPATIENT
Start: 2020-09-30 | End: 2020-09-30

## 2020-09-30 RX ORDER — AMINOPHYLLINE DIHYDRATE 25 MG/ML
50 INJECTION, SOLUTION INTRAVENOUS PRN
Status: ACTIVE | OUTPATIENT
Start: 2020-09-30 | End: 2020-09-30

## 2020-09-30 RX ORDER — SODIUM CHLORIDE 9 MG/ML
500 INJECTION, SOLUTION INTRAVENOUS CONTINUOUS PRN
Status: ACTIVE | OUTPATIENT
Start: 2020-09-30 | End: 2020-09-30

## 2020-09-30 RX ORDER — METOPROLOL TARTRATE 5 MG/5ML
5 INJECTION INTRAVENOUS EVERY 5 MIN PRN
Status: ACTIVE | OUTPATIENT
Start: 2020-09-30 | End: 2020-09-30

## 2020-09-30 RX ORDER — NITROGLYCERIN 0.4 MG/1
0.4 TABLET SUBLINGUAL EVERY 5 MIN PRN
Status: ACTIVE | OUTPATIENT
Start: 2020-09-30 | End: 2020-09-30

## 2020-09-30 RX ORDER — SODIUM CHLORIDE 0.9 % (FLUSH) 0.9 %
10 SYRINGE (ML) INJECTION PRN
Status: DISCONTINUED | OUTPATIENT
Start: 2020-09-30 | End: 2020-10-03 | Stop reason: HOSPADM

## 2020-09-30 RX ADMIN — TETRAKIS(2-METHOXYISOBUTYLISOCYANIDE)COPPER(I) TETRAFLUOROBORATE 34.3 MILLICURIE: 1 INJECTION, POWDER, LYOPHILIZED, FOR SOLUTION INTRAVENOUS at 09:00

## 2020-09-30 RX ADMIN — Medication 10 ML: at 08:28

## 2020-09-30 RX ADMIN — REGADENOSON 0.4 MG: 0.08 INJECTION, SOLUTION INTRAVENOUS at 08:58

## 2020-09-30 RX ADMIN — Medication 10 ML: at 08:59

## 2020-09-30 RX ADMIN — Medication 10 ML: at 07:17

## 2020-09-30 RX ADMIN — Medication 10 ML: at 07:16

## 2020-09-30 RX ADMIN — TETRAKIS(2-METHOXYISOBUTYLISOCYANIDE)COPPER(I) TETRAFLUOROBORATE 11.1 MILLICURIE: 1 INJECTION, POWDER, LYOPHILIZED, FOR SOLUTION INTRAVENOUS at 07:17

## 2020-09-30 NOTE — PROGRESS NOTES
Dr. Andrew Lacey, anesthesia, was contacted and informed of patient's history and planned surgery. Cardiac clearance requested. Surgery scheduling will notify Dr. Niharika Antoine office who will be responsible for making sure the clearance is obtained and is in the chart for surgery. Patient saw Dr Claudia Gibson and ordered a stress test for clearance which she had done today-results are pending.
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
## 2020-09-30 NOTE — PROCEDURES
[address] [place], [place] [address]                              CARDIAC STRESS TEST    PATIENT NAME: Rachel Stahl                  :        1950  MED REC NO:   [id number]                              ROOM:  ACCOUNT NO:   [de-identified]                           ADMIT DATE: 2020  PROVIDER:     Nichol Yancey DO    DATE OF STUDY:  2020    TEST TYPE: LEXISCAN CARDIOLYTE STRESS TEST  INDICATION: CHEST PAIN  REFERRING PHYSICIAN: FELICITAS LE    RESTING HEART RATE: 63 BEATS PER MINUTE  RESTING BLOOD PRESSURE: 145/79    MEDICATION(S) GIVEN: 0.4MG IV LEXISCAN  REASON FOR TERMINATION: MEDICATION INFUSION COMPLETE    RESTING EKG: ABNORMAL  STRESS HEART RESPONSE: NORMAL RESPONSE  BLOOD PRESSURE RESPONSE: APPROPRIATE  STRESS EKGs: PREMATURE VENTRICULAR CONTRACTION, PREMATURE ATRIAL  CONTRACTION, SINUS ARRHYTHMIA  ISCHEMIC EKG CHANGES: NONE    EKG IMPRESSION: ELECTROCARDIOGRAPHICALLY NEGATIVE LEXISCAN STRESS TEST. RADIOISOTOPE RESULTS TO FOLLOW FROM THE DEPARTMENT OF NUCLEAR MEDICINE. COMMENTS: ABNORMAL BASELINE ECG, T-WAVE INVERSION LATERALLY, NO CHANGES  WITH STRESS.     [address] Noemi DO    D: 2020 13:08:46       T: 2020 13:18:26     /MARIELLA  Job#: [id number]     Doc#: Unknown    CC:    (Retain this field even if not dictated or not decipherable)
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
## 2020-09-30 NOTE — PROGRESS NOTES
lexiscan stress test  Complete. Pt tolerated procedure well. Pepsi given. Vital signs back to baseline. HR 86   /73   . IV taken out and pt taken to nuclear med for images.

## 2020-10-01 ENCOUNTER — ANESTHESIA EVENT (OUTPATIENT)
Dept: OPERATING ROOM | Age: 70
DRG: 471 | End: 2020-10-01
Payer: MEDICARE

## 2020-10-01 RX ORDER — SODIUM CHLORIDE 0.9 % (FLUSH) 0.9 %
10 SYRINGE (ML) INJECTION EVERY 12 HOURS SCHEDULED
Status: CANCELLED | OUTPATIENT
Start: 2020-10-01

## 2020-10-01 RX ORDER — LIDOCAINE HYDROCHLORIDE 10 MG/ML
1 INJECTION, SOLUTION EPIDURAL; INFILTRATION; INTRACAUDAL; PERINEURAL
Status: CANCELLED | OUTPATIENT
Start: 2020-10-01 | End: 2020-10-01

## 2020-10-01 RX ORDER — SODIUM CHLORIDE 9 MG/ML
INJECTION, SOLUTION INTRAVENOUS CONTINUOUS
Status: CANCELLED | OUTPATIENT
Start: 2020-10-01

## 2020-10-01 RX ORDER — SODIUM CHLORIDE 0.9 % (FLUSH) 0.9 %
10 SYRINGE (ML) INJECTION PRN
Status: CANCELLED | OUTPATIENT
Start: 2020-10-01

## 2020-10-10 ENCOUNTER — HOSPITAL ENCOUNTER (OUTPATIENT)
Dept: PREADMISSION TESTING | Age: 70
Setting detail: SPECIMEN
Discharge: HOME | End: 2020-10-14
Payer: MEDICARE

## 2020-10-10 PROCEDURE — U0003 INFECTIOUS AGENT DETECTION BY NUCLEIC ACID (DNA OR RNA); SEVERE ACUTE RESPIRATORY SYNDROME CORONAVIRUS 2 (SARS-COV-2) (CORONAVIRUS DISEASE [COVID-19]), AMPLIFIED PROBE TECHNIQUE, MAKING USE OF HIGH THROUGHPUT TECHNOLOGIES AS DESCRIBED BY CMS-2020-01-R: HCPCS

## 2020-10-11 LAB
SARS-COV-2, RAPID: NORMAL
SARS-COV-2: NORMAL
SARS-COV-2: NOT DETECTED
SOURCE: NORMAL

## 2020-10-14 ENCOUNTER — APPOINTMENT (OUTPATIENT)
Dept: GENERAL RADIOLOGY | Age: 70
DRG: 471 | End: 2020-10-14
Attending: ORTHOPAEDIC SURGERY
Payer: MEDICARE

## 2020-10-14 ENCOUNTER — HOSPITAL ENCOUNTER (INPATIENT)
Age: 70
LOS: 4 days | Discharge: HOME HEALTH | DRG: 471 | End: 2020-10-18
Attending: ORTHOPAEDIC SURGERY | Admitting: ORTHOPAEDIC SURGERY
Payer: MEDICARE

## 2020-10-14 ENCOUNTER — ANESTHESIA (OUTPATIENT)
Dept: OPERATING ROOM | Age: 70
DRG: 471 | End: 2020-10-14
Payer: MEDICARE

## 2020-10-14 VITALS — DIASTOLIC BLOOD PRESSURE: 79 MMHG | OXYGEN SATURATION: 97 % | TEMPERATURE: 97 F | SYSTOLIC BLOOD PRESSURE: 131 MMHG

## 2020-10-14 LAB
GLUCOSE BLD-MCNC: 132 MG/DL (ref 65–105)
GLUCOSE BLD-MCNC: 155 MG/DL (ref 65–105)
GLUCOSE BLD-MCNC: 165 MG/DL (ref 65–105)
GLUCOSE BLD-MCNC: 181 MG/DL (ref 65–105)

## 2020-10-14 PROCEDURE — 2580000003 HC RX 258: Performed by: ANESTHESIOLOGY

## 2020-10-14 PROCEDURE — 76000 FLUOROSCOPY <1 HR PHYS/QHP: CPT

## 2020-10-14 PROCEDURE — 72040 X-RAY EXAM NECK SPINE 2-3 VW: CPT

## 2020-10-14 PROCEDURE — 82947 ASSAY GLUCOSE BLOOD QUANT: CPT

## 2020-10-14 PROCEDURE — 2580000003 HC RX 258: Performed by: ORTHOPAEDIC SURGERY

## 2020-10-14 PROCEDURE — 3600000013 HC SURGERY LEVEL 3 ADDTL 15MIN: Performed by: ORTHOPAEDIC SURGERY

## 2020-10-14 PROCEDURE — 3700000001 HC ADD 15 MINUTES (ANESTHESIA): Performed by: ORTHOPAEDIC SURGERY

## 2020-10-14 PROCEDURE — 3600000003 HC SURGERY LEVEL 3 BASE: Performed by: ORTHOPAEDIC SURGERY

## 2020-10-14 PROCEDURE — 6360000002 HC RX W HCPCS: Performed by: ANESTHESIOLOGY

## 2020-10-14 PROCEDURE — 6360000002 HC RX W HCPCS: Performed by: ORTHOPAEDIC SURGERY

## 2020-10-14 PROCEDURE — 6370000000 HC RX 637 (ALT 250 FOR IP): Performed by: ORTHOPAEDIC SURGERY

## 2020-10-14 PROCEDURE — 6360000002 HC RX W HCPCS: Performed by: NURSE ANESTHETIST, CERTIFIED REGISTERED

## 2020-10-14 PROCEDURE — 01N10ZZ RELEASE CERVICAL NERVE, OPEN APPROACH: ICD-10-PCS | Performed by: ORTHOPAEDIC SURGERY

## 2020-10-14 PROCEDURE — 2580000003 HC RX 258: Performed by: NURSE ANESTHETIST, CERTIFIED REGISTERED

## 2020-10-14 PROCEDURE — 3700000000 HC ANESTHESIA ATTENDED CARE: Performed by: ORTHOPAEDIC SURGERY

## 2020-10-14 PROCEDURE — 7100000001 HC PACU RECOVERY - ADDTL 15 MIN: Performed by: ORTHOPAEDIC SURGERY

## 2020-10-14 PROCEDURE — 0RG2071 FUSION OF 2 OR MORE CERVICAL VERTEBRAL JOINTS WITH AUTOLOGOUS TISSUE SUBSTITUTE, POSTERIOR APPROACH, POSTERIOR COLUMN, OPEN APPROACH: ICD-10-PCS | Performed by: ORTHOPAEDIC SURGERY

## 2020-10-14 PROCEDURE — C1713 ANCHOR/SCREW BN/BN,TIS/BN: HCPCS | Performed by: ORTHOPAEDIC SURGERY

## 2020-10-14 PROCEDURE — 7100000000 HC PACU RECOVERY - FIRST 15 MIN: Performed by: ORTHOPAEDIC SURGERY

## 2020-10-14 PROCEDURE — 1200000000 HC SEMI PRIVATE

## 2020-10-14 PROCEDURE — 2709999900 HC NON-CHARGEABLE SUPPLY: Performed by: ORTHOPAEDIC SURGERY

## 2020-10-14 PROCEDURE — 2720000010 HC SURG SUPPLY STERILE: Performed by: ORTHOPAEDIC SURGERY

## 2020-10-14 PROCEDURE — 2500000003 HC RX 250 WO HCPCS: Performed by: NURSE ANESTHETIST, CERTIFIED REGISTERED

## 2020-10-14 DEVICE — 3.5MM ELLIPSE ROD, 80MM
Type: IMPLANTABLE DEVICE | Site: SPINE CERVICAL | Status: FUNCTIONAL
Brand: ELLIPSE

## 2020-10-14 DEVICE — 3.5MM ELLIPSE POLYAXIAL SCREW, 10MM
Type: IMPLANTABLE DEVICE | Site: SPINE CERVICAL | Status: FUNCTIONAL
Brand: ELLIPSE

## 2020-10-14 DEVICE — 3.5MM ELLIPSE POLYAXIAL SCREW, 12MM
Type: IMPLANTABLE DEVICE | Site: SPINE CERVICAL | Status: FUNCTIONAL
Brand: ELLIPSE

## 2020-10-14 DEVICE — ELLIPSE LOCKING CAP
Type: IMPLANTABLE DEVICE | Site: SPINE CERVICAL | Status: FUNCTIONAL
Brand: ELLIPSE

## 2020-10-14 RX ORDER — ROPINIROLE 0.5 MG/1
1 TABLET, FILM COATED ORAL NIGHTLY
Status: DISCONTINUED | OUTPATIENT
Start: 2020-10-14 | End: 2020-10-18 | Stop reason: HOSPADM

## 2020-10-14 RX ORDER — ALBUTEROL SULFATE 2.5 MG/3ML
2.5 SOLUTION RESPIRATORY (INHALATION) EVERY 6 HOURS PRN
Status: DISCONTINUED | OUTPATIENT
Start: 2020-10-14 | End: 2020-10-18 | Stop reason: HOSPADM

## 2020-10-14 RX ORDER — ROCURONIUM BROMIDE 10 MG/ML
INJECTION, SOLUTION INTRAVENOUS PRN
Status: DISCONTINUED | OUTPATIENT
Start: 2020-10-14 | End: 2020-10-14 | Stop reason: SDUPTHER

## 2020-10-14 RX ORDER — LIDOCAINE HYDROCHLORIDE 10 MG/ML
INJECTION, SOLUTION EPIDURAL; INFILTRATION; INTRACAUDAL; PERINEURAL PRN
Status: DISCONTINUED | OUTPATIENT
Start: 2020-10-14 | End: 2020-10-14 | Stop reason: SDUPTHER

## 2020-10-14 RX ORDER — POTASSIUM CHLORIDE 750 MG/1
10 CAPSULE, EXTENDED RELEASE ORAL DAILY
Status: DISCONTINUED | OUTPATIENT
Start: 2020-10-14 | End: 2020-10-18 | Stop reason: HOSPADM

## 2020-10-14 RX ORDER — FENTANYL CITRATE 50 UG/ML
INJECTION, SOLUTION INTRAMUSCULAR; INTRAVENOUS PRN
Status: DISCONTINUED | OUTPATIENT
Start: 2020-10-14 | End: 2020-10-14 | Stop reason: SDUPTHER

## 2020-10-14 RX ORDER — SODIUM CHLORIDE 9 MG/ML
INJECTION, SOLUTION INTRAVENOUS CONTINUOUS
Status: DISCONTINUED | OUTPATIENT
Start: 2020-10-14 | End: 2020-10-14

## 2020-10-14 RX ORDER — CYCLOBENZAPRINE HCL 10 MG
10 TABLET ORAL 3 TIMES DAILY PRN
Status: DISCONTINUED | OUTPATIENT
Start: 2020-10-14 | End: 2020-10-18 | Stop reason: HOSPADM

## 2020-10-14 RX ORDER — NITROGLYCERIN 0.4 MG/1
0.4 TABLET SUBLINGUAL EVERY 5 MIN PRN
Status: DISCONTINUED | OUTPATIENT
Start: 2020-10-14 | End: 2020-10-18 | Stop reason: HOSPADM

## 2020-10-14 RX ORDER — TRANEXAMIC ACID 100 MG/ML
INJECTION, SOLUTION INTRAVENOUS PRN
Status: DISCONTINUED | OUTPATIENT
Start: 2020-10-14 | End: 2020-10-14 | Stop reason: SDUPTHER

## 2020-10-14 RX ORDER — PROMETHAZINE HYDROCHLORIDE 25 MG/ML
6.25 INJECTION, SOLUTION INTRAMUSCULAR; INTRAVENOUS
Status: DISCONTINUED | OUTPATIENT
Start: 2020-10-14 | End: 2020-10-14 | Stop reason: HOSPADM

## 2020-10-14 RX ORDER — ONDANSETRON 2 MG/ML
4 INJECTION INTRAMUSCULAR; INTRAVENOUS EVERY 6 HOURS PRN
Status: DISCONTINUED | OUTPATIENT
Start: 2020-10-14 | End: 2020-10-18 | Stop reason: HOSPADM

## 2020-10-14 RX ORDER — MIDAZOLAM HYDROCHLORIDE 1 MG/ML
INJECTION INTRAMUSCULAR; INTRAVENOUS PRN
Status: DISCONTINUED | OUTPATIENT
Start: 2020-10-14 | End: 2020-10-14 | Stop reason: SDUPTHER

## 2020-10-14 RX ORDER — GLYCOPYRROLATE 1 MG/5 ML
SYRINGE (ML) INTRAVENOUS PRN
Status: DISCONTINUED | OUTPATIENT
Start: 2020-10-14 | End: 2020-10-14 | Stop reason: SDUPTHER

## 2020-10-14 RX ORDER — ONDANSETRON 2 MG/ML
INJECTION INTRAMUSCULAR; INTRAVENOUS PRN
Status: DISCONTINUED | OUTPATIENT
Start: 2020-10-14 | End: 2020-10-14 | Stop reason: SDUPTHER

## 2020-10-14 RX ORDER — PROMETHAZINE HYDROCHLORIDE 25 MG/1
12.5 TABLET ORAL EVERY 6 HOURS PRN
Status: DISCONTINUED | OUTPATIENT
Start: 2020-10-14 | End: 2020-10-18 | Stop reason: HOSPADM

## 2020-10-14 RX ORDER — CITALOPRAM 20 MG/1
20 TABLET ORAL DAILY
Status: DISCONTINUED | OUTPATIENT
Start: 2020-10-14 | End: 2020-10-18 | Stop reason: HOSPADM

## 2020-10-14 RX ORDER — SODIUM CHLORIDE 0.9 % (FLUSH) 0.9 %
10 SYRINGE (ML) INJECTION PRN
Status: DISCONTINUED | OUTPATIENT
Start: 2020-10-14 | End: 2020-10-18 | Stop reason: HOSPADM

## 2020-10-14 RX ORDER — OXYCODONE HYDROCHLORIDE 5 MG/1
5 TABLET ORAL EVERY 4 HOURS PRN
Status: DISCONTINUED | OUTPATIENT
Start: 2020-10-14 | End: 2020-10-18 | Stop reason: HOSPADM

## 2020-10-14 RX ORDER — ATORVASTATIN CALCIUM 80 MG/1
80 TABLET, FILM COATED ORAL DAILY
Status: DISCONTINUED | OUTPATIENT
Start: 2020-10-14 | End: 2020-10-18 | Stop reason: HOSPADM

## 2020-10-14 RX ORDER — LISINOPRIL 2.5 MG/1
2.5 TABLET ORAL DAILY
Status: DISCONTINUED | OUTPATIENT
Start: 2020-10-14 | End: 2020-10-18 | Stop reason: HOSPADM

## 2020-10-14 RX ORDER — SODIUM CHLORIDE 0.9 % (FLUSH) 0.9 %
10 SYRINGE (ML) INJECTION PRN
Status: DISCONTINUED | OUTPATIENT
Start: 2020-10-14 | End: 2020-10-14 | Stop reason: HOSPADM

## 2020-10-14 RX ORDER — SODIUM CHLORIDE 0.9 % (FLUSH) 0.9 %
10 SYRINGE (ML) INJECTION EVERY 12 HOURS SCHEDULED
Status: DISCONTINUED | OUTPATIENT
Start: 2020-10-14 | End: 2020-10-14 | Stop reason: HOSPADM

## 2020-10-14 RX ORDER — ISOSORBIDE MONONITRATE 30 MG/1
30 TABLET, EXTENDED RELEASE ORAL DAILY
Status: DISCONTINUED | OUTPATIENT
Start: 2020-10-14 | End: 2020-10-18 | Stop reason: HOSPADM

## 2020-10-14 RX ORDER — KETAMINE HYDROCHLORIDE 50 MG/ML
INJECTION, SOLUTION, CONCENTRATE INTRAMUSCULAR; INTRAVENOUS PRN
Status: DISCONTINUED | OUTPATIENT
Start: 2020-10-14 | End: 2020-10-14 | Stop reason: SDUPTHER

## 2020-10-14 RX ORDER — LABETALOL HYDROCHLORIDE 5 MG/ML
5 INJECTION, SOLUTION INTRAVENOUS EVERY 10 MIN PRN
Status: DISCONTINUED | OUTPATIENT
Start: 2020-10-14 | End: 2020-10-14 | Stop reason: HOSPADM

## 2020-10-14 RX ORDER — DIPHENHYDRAMINE HYDROCHLORIDE 50 MG/ML
12.5 INJECTION INTRAMUSCULAR; INTRAVENOUS
Status: DISCONTINUED | OUTPATIENT
Start: 2020-10-14 | End: 2020-10-14 | Stop reason: HOSPADM

## 2020-10-14 RX ORDER — DEXAMETHASONE SODIUM PHOSPHATE 4 MG/ML
INJECTION, SOLUTION INTRA-ARTICULAR; INTRALESIONAL; INTRAMUSCULAR; INTRAVENOUS; SOFT TISSUE PRN
Status: DISCONTINUED | OUTPATIENT
Start: 2020-10-14 | End: 2020-10-14 | Stop reason: SDUPTHER

## 2020-10-14 RX ORDER — FUROSEMIDE 40 MG/1
40 TABLET ORAL EVERY OTHER DAY
Status: DISCONTINUED | OUTPATIENT
Start: 2020-10-14 | End: 2020-10-18 | Stop reason: HOSPADM

## 2020-10-14 RX ORDER — PROPOFOL 10 MG/ML
INJECTION, EMULSION INTRAVENOUS PRN
Status: DISCONTINUED | OUTPATIENT
Start: 2020-10-14 | End: 2020-10-14 | Stop reason: SDUPTHER

## 2020-10-14 RX ORDER — METOCLOPRAMIDE HYDROCHLORIDE 5 MG/ML
10 INJECTION INTRAMUSCULAR; INTRAVENOUS
Status: DISCONTINUED | OUTPATIENT
Start: 2020-10-14 | End: 2020-10-14 | Stop reason: HOSPADM

## 2020-10-14 RX ORDER — HYDRALAZINE HYDROCHLORIDE 20 MG/ML
5 INJECTION INTRAMUSCULAR; INTRAVENOUS EVERY 10 MIN PRN
Status: DISCONTINUED | OUTPATIENT
Start: 2020-10-14 | End: 2020-10-14 | Stop reason: HOSPADM

## 2020-10-14 RX ORDER — SODIUM CHLORIDE 0.9 % (FLUSH) 0.9 %
10 SYRINGE (ML) INJECTION EVERY 12 HOURS SCHEDULED
Status: DISCONTINUED | OUTPATIENT
Start: 2020-10-14 | End: 2020-10-18 | Stop reason: HOSPADM

## 2020-10-14 RX ORDER — PROPOFOL 10 MG/ML
INJECTION, EMULSION INTRAVENOUS CONTINUOUS PRN
Status: DISCONTINUED | OUTPATIENT
Start: 2020-10-14 | End: 2020-10-14 | Stop reason: SDUPTHER

## 2020-10-14 RX ORDER — LIDOCAINE HYDROCHLORIDE 10 MG/ML
1 INJECTION, SOLUTION EPIDURAL; INFILTRATION; INTRACAUDAL; PERINEURAL
Status: DISCONTINUED | OUTPATIENT
Start: 2020-10-14 | End: 2020-10-14 | Stop reason: HOSPADM

## 2020-10-14 RX ORDER — FENTANYL CITRATE 50 UG/ML
25 INJECTION, SOLUTION INTRAMUSCULAR; INTRAVENOUS EVERY 5 MIN PRN
Status: DISCONTINUED | OUTPATIENT
Start: 2020-10-14 | End: 2020-10-14 | Stop reason: HOSPADM

## 2020-10-14 RX ORDER — PHENYLEPHRINE HYDROCHLORIDE 10 MG/ML
INJECTION INTRAVENOUS PRN
Status: DISCONTINUED | OUTPATIENT
Start: 2020-10-14 | End: 2020-10-14 | Stop reason: SDUPTHER

## 2020-10-14 RX ORDER — 0.9 % SODIUM CHLORIDE 0.9 %
500 INTRAVENOUS SOLUTION INTRAVENOUS
Status: DISCONTINUED | OUTPATIENT
Start: 2020-10-14 | End: 2020-10-14 | Stop reason: HOSPADM

## 2020-10-14 RX ORDER — OXYCODONE HYDROCHLORIDE 10 MG/1
10 TABLET ORAL EVERY 4 HOURS PRN
Status: DISCONTINUED | OUTPATIENT
Start: 2020-10-14 | End: 2020-10-18 | Stop reason: HOSPADM

## 2020-10-14 RX ORDER — KETAMINE HYDROCHLORIDE 10 MG/ML
INJECTION, SOLUTION INTRAMUSCULAR; INTRAVENOUS PRN
Status: DISCONTINUED | OUTPATIENT
Start: 2020-10-14 | End: 2020-10-14 | Stop reason: SDUPTHER

## 2020-10-14 RX ORDER — METFORMIN HYDROCHLORIDE 500 MG/1
1500 TABLET, EXTENDED RELEASE ORAL
Status: DISCONTINUED | OUTPATIENT
Start: 2020-10-15 | End: 2020-10-18 | Stop reason: HOSPADM

## 2020-10-14 RX ADMIN — MIDAZOLAM 2 MG: 1 INJECTION INTRAMUSCULAR; INTRAVENOUS at 07:26

## 2020-10-14 RX ADMIN — Medication 10 ML: at 20:00

## 2020-10-14 RX ADMIN — ONDANSETRON 4 MG: 2 INJECTION INTRAMUSCULAR; INTRAVENOUS at 11:35

## 2020-10-14 RX ADMIN — CEFAZOLIN 2 G: 10 INJECTION, POWDER, FOR SOLUTION INTRAVENOUS at 07:41

## 2020-10-14 RX ADMIN — DEXAMETHASONE SODIUM PHOSPHATE 8 MG: 4 INJECTION, SOLUTION INTRA-ARTICULAR; INTRALESIONAL; INTRAMUSCULAR; INTRAVENOUS; SOFT TISSUE at 08:24

## 2020-10-14 RX ADMIN — OXYCODONE HYDROCHLORIDE 10 MG: 10 TABLET ORAL at 15:24

## 2020-10-14 RX ADMIN — LIDOCAINE HYDROCHLORIDE 40 MG: 10 INJECTION, SOLUTION EPIDURAL; INFILTRATION; INTRACAUDAL; PERINEURAL at 07:30

## 2020-10-14 RX ADMIN — PROPOFOL 50 MG: 10 INJECTION, EMULSION INTRAVENOUS at 11:11

## 2020-10-14 RX ADMIN — Medication 0.2 MG: at 07:42

## 2020-10-14 RX ADMIN — POTASSIUM CHLORIDE 10 MEQ: 10 CAPSULE, COATED, EXTENDED RELEASE ORAL at 15:23

## 2020-10-14 RX ADMIN — HYDROMORPHONE HYDROCHLORIDE 0.5 MG: 1 INJECTION, SOLUTION INTRAMUSCULAR; INTRAVENOUS; SUBCUTANEOUS at 12:52

## 2020-10-14 RX ADMIN — FENTANYL CITRATE 50 MCG: 50 INJECTION, SOLUTION INTRAMUSCULAR; INTRAVENOUS at 08:54

## 2020-10-14 RX ADMIN — TRANEXAMIC ACID 1000 MG: 100 INJECTION, SOLUTION INTRAVENOUS at 08:42

## 2020-10-14 RX ADMIN — LISINOPRIL 2.5 MG: 2.5 TABLET ORAL at 15:25

## 2020-10-14 RX ADMIN — ROCURONIUM BROMIDE 25 MG: 10 INJECTION INTRAVENOUS at 07:30

## 2020-10-14 RX ADMIN — CEFAZOLIN 2 G: 10 INJECTION, POWDER, FOR SOLUTION INTRAVENOUS at 11:30

## 2020-10-14 RX ADMIN — SODIUM CHLORIDE: 9 INJECTION, SOLUTION INTRAVENOUS at 06:58

## 2020-10-14 RX ADMIN — PHENYLEPHRINE HYDROCHLORIDE 200 MCG: 10 INJECTION INTRAVENOUS at 07:42

## 2020-10-14 RX ADMIN — FENTANYL CITRATE 50 MCG: 50 INJECTION, SOLUTION INTRAMUSCULAR; INTRAVENOUS at 08:37

## 2020-10-14 RX ADMIN — PHENYLEPHRINE HYDROCHLORIDE 200 MCG: 10 INJECTION INTRAVENOUS at 08:43

## 2020-10-14 RX ADMIN — SODIUM CHLORIDE: 9 INJECTION, SOLUTION INTRAVENOUS at 13:12

## 2020-10-14 RX ADMIN — KETAMINE HYDROCHLORIDE 50 MG: 50 INJECTION, SOLUTION INTRAMUSCULAR; INTRAVENOUS at 07:40

## 2020-10-14 RX ADMIN — OXYCODONE HYDROCHLORIDE 10 MG: 10 TABLET ORAL at 21:03

## 2020-10-14 RX ADMIN — TRANEXAMIC ACID 1000 MG: 100 INJECTION, SOLUTION INTRAVENOUS at 11:25

## 2020-10-14 RX ADMIN — FENTANYL CITRATE 50 MCG: 50 INJECTION, SOLUTION INTRAMUSCULAR; INTRAVENOUS at 07:30

## 2020-10-14 RX ADMIN — METOPROLOL TARTRATE 25 MG: 25 TABLET, FILM COATED ORAL at 15:23

## 2020-10-14 RX ADMIN — FUROSEMIDE 40 MG: 40 TABLET ORAL at 15:23

## 2020-10-14 RX ADMIN — FENTANYL CITRATE 50 MCG: 50 INJECTION, SOLUTION INTRAMUSCULAR; INTRAVENOUS at 11:11

## 2020-10-14 RX ADMIN — PROPOFOL 75 MCG/KG/MIN: 10 INJECTION, EMULSION INTRAVENOUS at 08:22

## 2020-10-14 RX ADMIN — CITALOPRAM HYDROBROMIDE 20 MG: 20 TABLET ORAL at 15:23

## 2020-10-14 RX ADMIN — METOPROLOL TARTRATE 25 MG: 25 TABLET, FILM COATED ORAL at 21:29

## 2020-10-14 RX ADMIN — KETAMINE HYDROCHLORIDE 50 MG: 10 INJECTION, SOLUTION INTRAMUSCULAR; INTRAVENOUS at 07:40

## 2020-10-14 RX ADMIN — PHENYLEPHRINE HYDROCHLORIDE 100 MCG: 10 INJECTION INTRAVENOUS at 08:26

## 2020-10-14 RX ADMIN — ROPINIROLE HYDROCHLORIDE 1 MG: 0.5 TABLET, FILM COATED ORAL at 21:29

## 2020-10-14 RX ADMIN — PHENYLEPHRINE HYDROCHLORIDE 50 MCG/MIN: 10 INJECTION, SOLUTION INTRAMUSCULAR; INTRAVENOUS; SUBCUTANEOUS at 08:48

## 2020-10-14 RX ADMIN — ATORVASTATIN CALCIUM 80 MG: 80 TABLET, FILM COATED ORAL at 15:23

## 2020-10-14 RX ADMIN — ISOSORBIDE MONONITRATE 30 MG: 30 TABLET, EXTENDED RELEASE ORAL at 15:23

## 2020-10-14 RX ADMIN — PHENYLEPHRINE HYDROCHLORIDE 100 MCG: 10 INJECTION INTRAVENOUS at 07:54

## 2020-10-14 RX ADMIN — CEFAZOLIN 2 G: 1 INJECTION, POWDER, FOR SOLUTION INTRAMUSCULAR; INTRAVENOUS at 19:48

## 2020-10-14 RX ADMIN — SODIUM CHLORIDE: 9 INJECTION, SOLUTION INTRAVENOUS at 08:30

## 2020-10-14 ASSESSMENT — PULMONARY FUNCTION TESTS
PIF_VALUE: 18
PIF_VALUE: 19
PIF_VALUE: 18
PIF_VALUE: 18
PIF_VALUE: 19
PIF_VALUE: 18
PIF_VALUE: 1
PIF_VALUE: 19
PIF_VALUE: 18
PIF_VALUE: 22
PIF_VALUE: 18
PIF_VALUE: 19
PIF_VALUE: 18
PIF_VALUE: 19
PIF_VALUE: 21
PIF_VALUE: 12
PIF_VALUE: 18
PIF_VALUE: 19
PIF_VALUE: 18
PIF_VALUE: 18
PIF_VALUE: 23
PIF_VALUE: 18
PIF_VALUE: 19
PIF_VALUE: 18
PIF_VALUE: 19
PIF_VALUE: 20
PIF_VALUE: 19
PIF_VALUE: 1
PIF_VALUE: 18
PIF_VALUE: 21
PIF_VALUE: 18
PIF_VALUE: 19
PIF_VALUE: 30
PIF_VALUE: 18
PIF_VALUE: 19
PIF_VALUE: 18
PIF_VALUE: 19
PIF_VALUE: 1
PIF_VALUE: 20
PIF_VALUE: 18
PIF_VALUE: 18
PIF_VALUE: 19
PIF_VALUE: 18
PIF_VALUE: 2
PIF_VALUE: 18
PIF_VALUE: 18
PIF_VALUE: 19
PIF_VALUE: 18
PIF_VALUE: 22
PIF_VALUE: 20
PIF_VALUE: 18
PIF_VALUE: 20
PIF_VALUE: 20
PIF_VALUE: 30
PIF_VALUE: 19
PIF_VALUE: 18
PIF_VALUE: 19
PIF_VALUE: 18
PIF_VALUE: 19
PIF_VALUE: 18
PIF_VALUE: 19
PIF_VALUE: 18
PIF_VALUE: 22
PIF_VALUE: 18
PIF_VALUE: 22
PIF_VALUE: 18
PIF_VALUE: 19
PIF_VALUE: 16
PIF_VALUE: 19
PIF_VALUE: 18
PIF_VALUE: 18
PIF_VALUE: 2
PIF_VALUE: 19
PIF_VALUE: 18
PIF_VALUE: 19
PIF_VALUE: 18
PIF_VALUE: 18
PIF_VALUE: 1
PIF_VALUE: 17
PIF_VALUE: 19
PIF_VALUE: 18
PIF_VALUE: 19
PIF_VALUE: 18
PIF_VALUE: 19
PIF_VALUE: 19
PIF_VALUE: 18
PIF_VALUE: 19
PIF_VALUE: 19
PIF_VALUE: 18
PIF_VALUE: 19
PIF_VALUE: 18
PIF_VALUE: 21
PIF_VALUE: 19
PIF_VALUE: 18
PIF_VALUE: 21
PIF_VALUE: 22
PIF_VALUE: 26
PIF_VALUE: 18
PIF_VALUE: 19
PIF_VALUE: 18
PIF_VALUE: 20
PIF_VALUE: 19
PIF_VALUE: 18
PIF_VALUE: 20
PIF_VALUE: 18
PIF_VALUE: 19
PIF_VALUE: 18
PIF_VALUE: 20
PIF_VALUE: 19
PIF_VALUE: 19
PIF_VALUE: 18
PIF_VALUE: 18
PIF_VALUE: 35
PIF_VALUE: 18
PIF_VALUE: 24
PIF_VALUE: 18
PIF_VALUE: 18
PIF_VALUE: 19
PIF_VALUE: 18
PIF_VALUE: 18
PIF_VALUE: 20
PIF_VALUE: 18
PIF_VALUE: 18
PIF_VALUE: 19
PIF_VALUE: 19
PIF_VALUE: 18
PIF_VALUE: 19
PIF_VALUE: 18
PIF_VALUE: 19
PIF_VALUE: 18
PIF_VALUE: 19
PIF_VALUE: 18
PIF_VALUE: 19
PIF_VALUE: 20
PIF_VALUE: 18
PIF_VALUE: 18
PIF_VALUE: 19
PIF_VALUE: 20
PIF_VALUE: 19
PIF_VALUE: 19
PIF_VALUE: 9
PIF_VALUE: 18
PIF_VALUE: 26
PIF_VALUE: 19
PIF_VALUE: 20
PIF_VALUE: 19
PIF_VALUE: 18
PIF_VALUE: 19
PIF_VALUE: 36
PIF_VALUE: 18
PIF_VALUE: 19
PIF_VALUE: 1
PIF_VALUE: 18
PIF_VALUE: 19
PIF_VALUE: 18
PIF_VALUE: 20
PIF_VALUE: 19
PIF_VALUE: 18
PIF_VALUE: 19
PIF_VALUE: 18
PIF_VALUE: 18
PIF_VALUE: 19
PIF_VALUE: 1
PIF_VALUE: 1
PIF_VALUE: 18
PIF_VALUE: 19
PIF_VALUE: 18
PIF_VALUE: 20
PIF_VALUE: 22
PIF_VALUE: 18
PIF_VALUE: 18
PIF_VALUE: 19
PIF_VALUE: 2
PIF_VALUE: 20
PIF_VALUE: 20
PIF_VALUE: 18
PIF_VALUE: 19
PIF_VALUE: 18
PIF_VALUE: 15
PIF_VALUE: 20
PIF_VALUE: 22
PIF_VALUE: 19
PIF_VALUE: 21
PIF_VALUE: 18
PIF_VALUE: 19
PIF_VALUE: 18
PIF_VALUE: 19
PIF_VALUE: 20
PIF_VALUE: 18
PIF_VALUE: 18
PIF_VALUE: 20
PIF_VALUE: 19
PIF_VALUE: 18
PIF_VALUE: 19
PIF_VALUE: 18
PIF_VALUE: 19
PIF_VALUE: 19
PIF_VALUE: 18
PIF_VALUE: 20
PIF_VALUE: 18
PIF_VALUE: 3
PIF_VALUE: 18
PIF_VALUE: 4
PIF_VALUE: 18
PIF_VALUE: 19
PIF_VALUE: 19
PIF_VALUE: 18
PIF_VALUE: 20
PIF_VALUE: 18
PIF_VALUE: 21
PIF_VALUE: 19
PIF_VALUE: 10
PIF_VALUE: 18
PIF_VALUE: 19
PIF_VALUE: 19
PIF_VALUE: 18
PIF_VALUE: 18

## 2020-10-14 ASSESSMENT — PAIN DESCRIPTION - LOCATION
LOCATION: NECK
LOCATION: NECK

## 2020-10-14 ASSESSMENT — PAIN SCALES - GENERAL
PAINLEVEL_OUTOF10: 5
PAINLEVEL_OUTOF10: 10
PAINLEVEL_OUTOF10: 10
PAINLEVEL_OUTOF10: 9
PAINLEVEL_OUTOF10: 0
PAINLEVEL_OUTOF10: 5

## 2020-10-14 ASSESSMENT — PAIN DESCRIPTION - FREQUENCY
FREQUENCY: CONTINUOUS
FREQUENCY: CONTINUOUS

## 2020-10-14 ASSESSMENT — PAIN DESCRIPTION - DESCRIPTORS
DESCRIPTORS: ACHING;DISCOMFORT
DESCRIPTORS: BURNING
DESCRIPTORS: BURNING

## 2020-10-14 ASSESSMENT — PAIN DESCRIPTION - PAIN TYPE
TYPE: SURGICAL PAIN
TYPE: SURGICAL PAIN

## 2020-10-14 ASSESSMENT — PAIN - FUNCTIONAL ASSESSMENT: PAIN_FUNCTIONAL_ASSESSMENT: 0-10

## 2020-10-14 ASSESSMENT — LIFESTYLE VARIABLES: SMOKING_STATUS: 1

## 2020-10-14 ASSESSMENT — PAIN DESCRIPTION - ORIENTATION
ORIENTATION: POSTERIOR
ORIENTATION: POSTERIOR

## 2020-10-14 NOTE — ANESTHESIA POSTPROCEDURE EVALUATION
Department of Anesthesiology  Postprocedure Note    Patient: Mario Samaniego  MRN: [id number]  YOB: 1950  Date of evaluation: 10/14/2020  Time:  1:54 PM     Procedure Summary     Date:  10/14/20 Room / Location:  [address] [place] [address] Agustina Vanegas 01 / Josephine\[facility] [place] [facility]\"" 167    Anesthesia Start:  1703 Anesthesia Stop:  1218    Procedure:  C3-C7 POSTERIOR CERVICAL DECOMPRESSION FUSION (N/A Spine Cervical) Diagnosis:  (CERVICAL DJD, STENOSIS, MYELOPATHY)    Surgeon:  Noris Negro MD Responsible Provider:  Luan Levi MD    Anesthesia Type:  general ASA Status:  3          Anesthesia Type: general    Carlos Phase I: Carlos Score: 8    Carlos Phase II:      Last vitals: Reviewed and per EMR flowsheets.        Anesthesia Post Evaluation    Comments: POST- ANESTHESIA EVALUATION       Pt Name: Mario Samaniego  MRN: [id number]  [place]: 1950  Date of evaluation: 10/14/2020  Time:  1:54 PM      /71   Pulse 86   Temp 98.6 °F (37 °C)   Resp 15   Ht 5' 5\" (1.651 m)   Wt 138 lb (62.6 kg)   SpO2 92%   BMI 22.96 kg/m²      Consciousness Level  Awake  Cardiopulmonary Status  Stable  Pain Adequately Treated YES  Nausea / Vomiting  NO  Adequate Hydration  YES  Anesthesia Related Complications NONE      Electronically signed by Luan Levi MD on 10/14/2020 at 1:54 PM
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
## 2020-10-14 NOTE — CARE COORDINATION
CASE MANAGEMENT NOTE:    Admission Date:  10/14/2020 Jcarlos Andrade is a 65 y.o.  female    Admitted for : Neck pain [M54.2]    Met with:  Patient    PCP:  Dr. Jj Kash:  [facility] Medicare      Current Residence/ Living Arrangements:  independently at home with boyfriend            Current Services PTA:  No    Is patient agreeable to VNS: No    Freedom of choice provided:  Yes    List of [address] provided: No    VNS chosen:  NA    DME:  none    Home Oxygen: No    Nebulizer: No    CPAP/BIPAP: No    Supplier: N/A    Potential Assistance Needed: No    SNF needed: No    Freedom of choice and list provided: NA    Pharmacy:  [facility] on [address] [place] [address]       Does Patient want to use MEDS to BEDS? No    Is patient currently receiving oral anticoagulation therapy? No    Is the Patient an [facility] with Readmission Risk Score greater than 14%? No  If yes, pt needs a follow up appointment made within 7 days. Family Members/Caregivers that pt would like involved in their care:    Yes    If yes, list name here:  Benoit Andersen    Transportation Provider:  Patient and Family                  Discharge Plan:  Home without needs. POD#0 C3-7 posterior cervical decompression fusion.              Electronically signed by: Librado Avila RN on 10/14/2020 at 3:38 PM

## 2020-10-14 NOTE — INTERVAL H&P NOTE
Update History & Physical    The patient's History and Physical of September 30, 2020 was reviewed with the patient and I examined the patient. There was no change. The surgical site was confirmed by the patient and me. C3-C7 POSSIBLE T1 OR T2 POSTERIOR CERVICAL DECOMPRESSION FUSION. Pt denies fever/chills, chest pain , or SOB  Pt NPO since the past midnight,  Pt took Lopressor , and Imdur with sip of water today . Pt stopped taking aspirin and plavix since 10/6/2020. Pt denies any problem with anesthesia ,no Hx of infection MRSA    See nursing flow sheet for vital signs,     Plan: The risks, benefits, expected outcome, and alternative to the recommended procedure have been discussed with the patient. Patient understands and wants to proceed with the procedure.      Electronically signed by JAMES Powell CNP on 10/14/2020 at 5:55 AM

## 2020-10-14 NOTE — PROGRESS NOTES
Pt arrived in the room at this time, pt orientated to the room, call light is within reach, bed is in the lowest position, wheels are locked.

## 2020-10-14 NOTE — BRIEF OP NOTE
Brief Postoperative Note      Patient: Kim Evans  YOB: 1950  MRN: [id number]    Date of Procedure: 10/14/2020    Pre-Op Diagnosis: CERVICAL DJD, STENOSIS, MYELOPATHY     Post-Op Diagnosis: Same       Procedure(s):  C3-C7 POSTERIOR CERVICAL DECOMPRESSION FUSION    Surgeon(s):  Gloria Leon MD    Assistant:  * No surgical staff found *    Anesthesia: General    Estimated Blood Loss (mL): 054     Complications: None    Specimens:   * No specimens in log *    Implants:  Implant Name Type Inv.  Item Serial No.  Lot No. LRB No. Used Action   CAP LK ELLIPSE Spine CAP LK ELLIPSE  [employer]  N/A 8 Implanted   SCREW 3.5 12MM POLYAXIAL ELLIPSE Spine SCREW 3.5 12MM POLYAXIAL ELLIPSE  [employer]  N/A 1 Implanted   SCREW POLYAXIAL ELLIPSE 3.5X10MM Spine SCREW POLYAXIAL ELLIPSE 3.5X10MM  [employer]  N/A 7 Implanted   IMPL SPINE SPARKLE ELLIPSE 3.5X80MM Spine IMPL SPINE SPARKLE ELLIPSE 3.5X80MM  [employer]  N/A 2 Implanted         Drains: * No LDAs found *    Findings: see dictation    Electronically signed by Gloria Leon MD on 10/14/2020 at 12:10 PM

## 2020-10-15 ENCOUNTER — APPOINTMENT (OUTPATIENT)
Dept: GENERAL RADIOLOGY | Age: 70
DRG: 471 | End: 2020-10-15
Attending: ORTHOPAEDIC SURGERY
Payer: MEDICARE

## 2020-10-15 LAB
GLUCOSE BLD-MCNC: 142 MG/DL (ref 65–105)
GLUCOSE BLD-MCNC: 145 MG/DL (ref 65–105)
GLUCOSE BLD-MCNC: 160 MG/DL (ref 65–105)
GLUCOSE BLD-MCNC: 172 MG/DL (ref 65–105)
HCT VFR BLD CALC: 33.5 % (ref 36–46)
HEMOGLOBIN: 10.9 G/DL (ref 12–16)
MCH RBC QN AUTO: 28.3 PG (ref 26–34)
MCHC RBC AUTO-ENTMCNC: 32.5 G/DL (ref 31–37)
MCV RBC AUTO: 86.9 FL (ref 80–100)
NRBC AUTOMATED: ABNORMAL PER 100 WBC
PDW BLD-RTO: 14.3 % (ref 11.5–14.9)
PLATELET # BLD: 273 K/UL (ref 150–450)
PMV BLD AUTO: 8.3 FL (ref 6–12)
RBC # BLD: 3.85 M/UL (ref 4–5.2)
WBC # BLD: 15 K/UL (ref 3.5–11)

## 2020-10-15 PROCEDURE — 36415 COLL VENOUS BLD VENIPUNCTURE: CPT

## 2020-10-15 PROCEDURE — 6360000002 HC RX W HCPCS: Performed by: ORTHOPAEDIC SURGERY

## 2020-10-15 PROCEDURE — 2580000003 HC RX 258: Performed by: ORTHOPAEDIC SURGERY

## 2020-10-15 PROCEDURE — 85027 COMPLETE CBC AUTOMATED: CPT

## 2020-10-15 PROCEDURE — 99024 POSTOP FOLLOW-UP VISIT: CPT | Performed by: ORTHOPAEDIC SURGERY

## 2020-10-15 PROCEDURE — 97162 PT EVAL MOD COMPLEX 30 MIN: CPT

## 2020-10-15 PROCEDURE — 71045 X-RAY EXAM CHEST 1 VIEW: CPT

## 2020-10-15 PROCEDURE — 97530 THERAPEUTIC ACTIVITIES: CPT

## 2020-10-15 PROCEDURE — 97166 OT EVAL MOD COMPLEX 45 MIN: CPT

## 2020-10-15 PROCEDURE — 82947 ASSAY GLUCOSE BLOOD QUANT: CPT

## 2020-10-15 PROCEDURE — 1200000000 HC SEMI PRIVATE

## 2020-10-15 PROCEDURE — 6370000000 HC RX 637 (ALT 250 FOR IP): Performed by: ORTHOPAEDIC SURGERY

## 2020-10-15 RX ORDER — OXYCODONE HYDROCHLORIDE AND ACETAMINOPHEN 5; 325 MG/1; MG/1
1 TABLET ORAL EVERY 6 HOURS PRN
Qty: 28 TABLET | Refills: 0 | Status: SHIPPED | OUTPATIENT
Start: 2020-10-15 | End: 2020-10-22

## 2020-10-15 RX ADMIN — CYCLOBENZAPRINE 10 MG: 10 TABLET, FILM COATED ORAL at 20:57

## 2020-10-15 RX ADMIN — ISOSORBIDE MONONITRATE 30 MG: 30 TABLET, EXTENDED RELEASE ORAL at 08:02

## 2020-10-15 RX ADMIN — LISINOPRIL 2.5 MG: 2.5 TABLET ORAL at 11:57

## 2020-10-15 RX ADMIN — OXYCODONE HYDROCHLORIDE 10 MG: 10 TABLET ORAL at 08:01

## 2020-10-15 RX ADMIN — CEFAZOLIN 2 G: 1 INJECTION, POWDER, FOR SOLUTION INTRAMUSCULAR; INTRAVENOUS at 02:56

## 2020-10-15 RX ADMIN — CITALOPRAM HYDROBROMIDE 20 MG: 20 TABLET ORAL at 08:01

## 2020-10-15 RX ADMIN — OXYCODONE HYDROCHLORIDE 10 MG: 10 TABLET ORAL at 11:57

## 2020-10-15 RX ADMIN — ROPINIROLE HYDROCHLORIDE 1 MG: 0.5 TABLET, FILM COATED ORAL at 20:57

## 2020-10-15 RX ADMIN — METOPROLOL TARTRATE 25 MG: 25 TABLET, FILM COATED ORAL at 08:02

## 2020-10-15 RX ADMIN — OXYCODONE HYDROCHLORIDE 10 MG: 10 TABLET ORAL at 17:19

## 2020-10-15 RX ADMIN — METFORMIN HYDROCHLORIDE 1500 MG: 500 TABLET, EXTENDED RELEASE ORAL at 08:02

## 2020-10-15 RX ADMIN — OXYCODONE HYDROCHLORIDE 10 MG: 10 TABLET ORAL at 21:15

## 2020-10-15 RX ADMIN — METOPROLOL TARTRATE 25 MG: 25 TABLET, FILM COATED ORAL at 20:57

## 2020-10-15 RX ADMIN — OXYCODONE HYDROCHLORIDE 10 MG: 10 TABLET ORAL at 01:34

## 2020-10-15 RX ADMIN — ATORVASTATIN CALCIUM 80 MG: 80 TABLET, FILM COATED ORAL at 08:01

## 2020-10-15 RX ADMIN — ONDANSETRON 4 MG: 2 INJECTION INTRAMUSCULAR; INTRAVENOUS at 18:22

## 2020-10-15 RX ADMIN — POTASSIUM CHLORIDE 10 MEQ: 10 CAPSULE, COATED, EXTENDED RELEASE ORAL at 08:02

## 2020-10-15 RX ADMIN — Medication 10 ML: at 20:57

## 2020-10-15 ASSESSMENT — PAIN DESCRIPTION - DESCRIPTORS
DESCRIPTORS: DISCOMFORT;HEADACHE
DESCRIPTORS: DISCOMFORT;HEADACHE
DESCRIPTORS: PATIENT UNABLE TO DESCRIBE
DESCRIPTORS: DISCOMFORT

## 2020-10-15 ASSESSMENT — PAIN DESCRIPTION - ORIENTATION
ORIENTATION: POSTERIOR

## 2020-10-15 ASSESSMENT — PAIN SCALES - GENERAL
PAINLEVEL_OUTOF10: 7
PAINLEVEL_OUTOF10: 6
PAINLEVEL_OUTOF10: 7
PAINLEVEL_OUTOF10: 9
PAINLEVEL_OUTOF10: 8
PAINLEVEL_OUTOF10: 8
PAINLEVEL_OUTOF10: 7
PAINLEVEL_OUTOF10: 0
PAINLEVEL_OUTOF10: 10
PAINLEVEL_OUTOF10: 8
PAINLEVEL_OUTOF10: 9
PAINLEVEL_OUTOF10: 7
PAINLEVEL_OUTOF10: 10

## 2020-10-15 ASSESSMENT — PAIN DESCRIPTION - ONSET
ONSET: ON-GOING

## 2020-10-15 ASSESSMENT — PAIN DESCRIPTION - FREQUENCY
FREQUENCY: CONTINUOUS
FREQUENCY: CONTINUOUS
FREQUENCY: INTERMITTENT
FREQUENCY: CONTINUOUS

## 2020-10-15 ASSESSMENT — PAIN DESCRIPTION - PAIN TYPE
TYPE: SURGICAL PAIN

## 2020-10-15 ASSESSMENT — PAIN DESCRIPTION - LOCATION
LOCATION: NECK
LOCATION: NECK
LOCATION: HEAD;NECK
LOCATION: HEAD;NECK

## 2020-10-15 ASSESSMENT — PAIN - FUNCTIONAL ASSESSMENT
PAIN_FUNCTIONAL_ASSESSMENT: PREVENTS OR INTERFERES SOME ACTIVE ACTIVITIES AND ADLS
PAIN_FUNCTIONAL_ASSESSMENT: PREVENTS OR INTERFERES SOME ACTIVE ACTIVITIES AND ADLS

## 2020-10-15 ASSESSMENT — PAIN DESCRIPTION - PROGRESSION
CLINICAL_PROGRESSION: NOT CHANGED
CLINICAL_PROGRESSION: GRADUALLY WORSENING
CLINICAL_PROGRESSION: GRADUALLY WORSENING
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
## 2020-10-15 NOTE — CARE COORDINATION
DISCHARGE PLANNING NOTE:    Plan is for this patient to return to home where she lives with her boyfriend. She would like [facility] - Referral sent - No PT/OT until F/U appt with Dr. Amaya Negro. She is POD #1 C3-C7 posterior cervical decompression fusion. Awaiting PT/OT recs post-op for any discharge recommendations.      Electronically signed by Jeff Sal RN on 10/15/2020 at 11:25 AM

## 2020-10-15 NOTE — PROGRESS NOTES
[facility]    Occupational Therapy Evaluation  Date: 10/15/20  Patient Name: Kim Evans       Room: 1081/7938-06  MRN: [id number]  Account: [de-identified]   : 1950  (65 y.o.) Gender: female     Discharge Recommendations: The patient's needs are being met with no further Occupational Therapy recommended at discharge. Equipment Needed: (TBD)    Referring Practitioner: Dr. Chon Tabor  Diagnosis: 10/14/20 C3-7 posterior cervical decompression fusion    Treatment Diagnosis: Impaired self-care status  Past Medical History:  has a past medical history of Allergic rhinitis, Arthritis, CAD (coronary artery disease), Cerebral artery occlusion with cerebral infarction ([address] CHF (congestive heart failure) ([address] Controlled type 2 diabetes mellitus without complication, without long-term current use of insulin ([address] COPD (chronic obstructive pulmonary disease) ([address] Depression, Former smoker, History of blood transfusion, Hyperlipidemia, Hypertension, Kidney stone, Myocardial infarction ([address] Obesity, Class I, BMI 30.0-34.9 (see actual BMI), Restless leg syndrome, Skin abnormality, Type II or unspecified type diabetes mellitus without mention of complication, not stated as uncontrolled, Wears glasses, and Wears partial dentures. Past Surgical History:   has a past surgical history that includes Appendectomy; Hysterectomy; Cholecystectomy; joint replacement (Bilateral); pr office/outpt visit,procedure only (N/A, 2018); pr incis/drain thigh/knee abscess,deep (Right, 2018); Leg biopsy excision (Right, 2019); Cardiac surgery; Cardiac surgery; other surgical history (2020); and cervical laminectomy (N/A, 10/14/2020).     Restrictions  Restrictions/Precautions: General Precautions, Fall Risk  Implants present? : Metal implants(cervical fusion, B TKA)  Other position/activity restrictions: ambulate patient  Required Braces or Orthoses?: (ASPEN collar ordered by Ortho after evaluation completed. Will need for follow-up treatments. Per order may remove to shower and eat.)     Vitals  Temp: 97.3 °F (36.3 °C)  Pulse: 85  Resp: 16  BP: 127/68  Height: 5' 5\" (165.1 cm)  Weight: 147 lb 12.8 oz (67 kg)  BMI (Calculated): 24.6  Oxygen Therapy  SpO2: 95 %  Pulse Oximeter Device Mode: Continuous  Pulse Oximeter Device Location: Left, Finger  O2 Device: Nasal cannula  O2 Flow Rate (L/min): 3 L/min  Level of Consciousness: Alert    Subjective  Subjective: \"I can do for myself\"  Comments: Pt very motivated to complete self-care tasks independently. Overall Orientation Status: Within Functional Limits  Vision  Vision: Impaired  Vision Exceptions: Wears glasses at all times  Hearing  Hearing: Within functional limits  Social/Functional History  Lives With: Significant other  Type of Home: House  Home Layout: One level, Laundry in basement  Home Access: Stairs to enter with rails  Entrance Stairs - Number of Steps: 5, flight to basement no HR  Entrance Stairs - Rails: Both  Bathroom Shower/Tub: Tub/Shower unit, Curtain  Bathroom Toilet: Standard  Bathroom Equipment: Shower chair, Hand-held shower  Bathroom Accessibility: Accessible  Home Equipment: Cane, Ayesha Moy cane  ADL Assistance: Independent  Homemaking Assistance: Independent  Homemaking Responsibilities: Yes  Ambulation Assistance: Independent  Transfer Assistance: Independent  Active : Yes  Mode of Transportation: [employer]  Occupation: Retired  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 No home therapy prior to admit. (Reports she has DIL that isn't working and can A if needed.)  Pain Assessment  Pain Assessment: 0-10  Pain Level: 9  Pain Type: Surgical pain  Pain Location: Head, Neck  Pain Orientation: Posterior  Pain Descriptors: Discomfort, Headache  Pain Frequency: Continuous  Clinical Progression: Gradually worsening  Response to Pain Intervention: Patient Satisfied    Objective  Vision - Basic Assessment  Patient Visual Report: No visual complaint reported. Cognition  Overall Cognitive Status: WFL   Perception  Overall Perceptual Status: WFL  Sensation  Overall Sensation Status: WFL(pt denies)   ADL  Feeding: Setup  Grooming: Minimal assistance(A for hair 2* pain )  UE Bathing: Minimal assistance  LE Bathing: Minimal assistance  UE Dressing: Minimal assistance  LE Dressing: Minimal assistance  Toileting: Setup(Hygiene only, no clothing mgt assessed)  Additional Comments: Above levels based on pt report, skilled observation, and clinical reasoning unless otherwise noted. Pt able to don socks while sitting EOB and completed toileting hygiene in sitting. Pt with limited ROM of B shoulders 2* associated neck pain, resulting in a need for increased A with grooming/UB ADL's. CGA for safety in standing with rolling walker. UE Function  LUE Strength  Gross LUE Strength: Exceptions to [facility] [employer]  L Hand General: 4/5  LUE Strength Comment: Shoulder/elbow NT 2* recent cervical surgery and associated neck pain     LUE Tone: Normotonic     LUE AROM (degrees)  LUE AROM : Exceptions  LUE General AROM: Active shoulder ROM <50% due to associated neck pain; Other joints WFL     Left Hand AROM (degrees)  Left Hand AROM: WFL     RUE Strength  Gross RUE Strength: Exceptions to [facility] [employer] [employer]  R Hand General: 4/5  RUE Strength Comment: Shoulder/elbow NT 2* recent cervical surgery and associated neck pain      RUE Tone: Normotonic     RUE AROM (degrees)  RUE AROM : Exceptions  RUE General AROM: Active shoulder ROM <50% due to associated neck pain;  Other joints WFL     Right Hand AROM (degrees)  Right Hand AROM: WFL    Fine Motor Skills  Coordination  Movements Are Fluid And Coordinated: No  Coordination and Movement description: Gross motor impairments, Right UE, Left UE(Limited function at shoulder level 2* neck pain)     Mobility  Supine to Sit: Minimal assistance(at trunk)  Sit to Supine: Stand by assistance       Balance  Sitting Balance: Supervision  Standing Balance: Contact guard assistance     Bed mobility  Bridging: Stand by assistance  Rolling to Left: Stand by assistance(use of bed rail)  Rolling to Right: Stand by assistance(use of bed rail)  Supine to Sit: Minimal assistance(at trunk)  Sit to Supine: Stand by assistance  Scooting: Stand by assistance  Comment: Per PT evaluation done this AM.      Transfers  Stand Step Transfers: Contact guard assistance  Stand Pivot Transfers: Contact guard assistance  Sit to stand: Contact guard assistance  Stand to sit: Contact guard assistance  Transfer Comments: w/rolling walker  Toilet Transfers  Toilet - Technique: Stand pivot  Equipment Used: Standard bedside commode  Toilet Transfer: Contact guard assistance(with rolling walker)  Toilet Transfers Comments: Pt declined to ambulate into bathroom  Functional Activity Tolerance  Functional Activity Tolerance: Tolerates 10 - 20 min exercise with multiple rests   Assessment  Performance deficits / Impairments: Decreased functional mobility , Decreased ADL status, Decreased ROM, Decreased strength, Decreased endurance, Decreased balance, Decreased high-level IADLs  Treatment Diagnosis: Impaired self-care status  Prognosis: Good  Decision Making: Medium Complexity  REQUIRES OT FOLLOW UP: Yes  Activity Tolerance: Patient Tolerated treatment well, Patient limited by pain    Goals  Patient Goals   Patient goals : Return home with A from SO/DIL as needed. Agreeable to [facility] services. Short term goals  Time Frame for Short term goals: By Discharge  Short term goal 1: Pt will complete toilet transfer and toileting with Modified IND and Good safety using least restrictive device. Short term goal 2: Pt will complete BADL's with set-up/supervision and Good safety using modified techniques as needed.   Short term goal 3: Pt will verbalize/demonstrate Good understanding of proper body mechanice/neck care priniciples during functional tasks. Short term goal 4: Pt will actively participate in 15-20 minutes of therapeutic exercise/activity to promote increased independence and safety with self-care and mobility.     Plan  Safety Devices  Safety Devices in place: Yes  Type of devices: Patient at risk for falls, Gait belt, Left in chair, Call light within reach, Chair alarm in place, Nurse notified     Plan  Times per week: 5-7  Times per day: Daily  Current Treatment Recommendations: ROM, Balance Training, Functional Mobility Training, Endurance Training, Pain Management, Safety Education & Training, Patient/Caregiver Education & Training, Equipment Evaluation, Education, & procurement, Self-Care / ADL, Positioning, Home Management Training    Equipment Recommendations  Equipment Needed: (TBD)  OT Individual Minutes  Time In: 1300  Time Out: 3100  Minutes: 17    Electronically signed by Maxx Garcia on 10/15/20 at 2:08 PM EDT

## 2020-10-15 NOTE — PLAN OF CARE
Problem: Falls - Risk of:  Goal: Will remain free from falls  Description: Will remain free from falls  10/15/2020 1517 by Sergo Lowery RN  Outcome: Ongoing  10/15/2020 0634 by Geena Metcalf RN  Outcome: Ongoing  Note: Patient remained free from falls this shift. Call light and bed side table are within reach, bed is in lowest position, and side rails are up x2. Will continue to monitor. Goal: Absence of physical injury  Description: Absence of physical injury  10/15/2020 1517 by Sergo Lowery RN  Outcome: Ongoing  10/15/2020 0634 by Geena Metcalf RN  Outcome: Ongoing     Problem: Skin Integrity:  Goal: Will show no infection signs and symptoms  Description: Will show no infection signs and symptoms  10/15/2020 1517 by Sergo Lowery RN  Outcome: Ongoing  10/15/2020 0634 by Geena Metcalf RN  Outcome: Ongoing  Goal: Absence of new skin breakdown  Description: Absence of new skin breakdown  10/15/2020 1517 by Sergo Lowery RN  Outcome: Ongoing  10/15/2020 0634 by Geena Metcalf RN  Outcome: Ongoing     Problem: Pain:  Goal: Pain level will decrease  Description: Pain level will decrease  10/15/2020 1517 by Sergo Lowery RN  Outcome: Ongoing  10/15/2020 0634 by Geena Metcalf RN  Outcome: Ongoing  Goal: Control of acute pain  Description: Control of acute pain  10/15/2020 1517 by Sergo Lowery RN  Outcome: Ongoing  10/15/2020 0634 by Geena Metcalf RN  Outcome: Ongoing  Goal: Control of chronic pain  Description: Control of chronic pain  10/15/2020 1517 by Sergo Lowery RN  Outcome: Ongoing  10/15/2020 0634 by Geena Metcalf RN  Outcome: Ongoing     Problem: Musculor/Skeletal Functional Status  Goal: Highest potential functional level  Patient sat in chair for 2 hours after therapy session today.    Outcome: Ongoing  Goal: Absence of falls  Outcome: Ongoing

## 2020-10-15 NOTE — PROGRESS NOTES
Physical Therapy      Per Dr Gregorio Hernandez Updated orders at [phone] for neck brace aspen collar. Patient may remove to eat and shower. Updated accordingly in PT flowsheet and whiteboard to be worn for future therapy sessions.       Marley Steele, PT

## 2020-10-15 NOTE — PROGRESS NOTES
Physical Therapy  Facility/Department: [facility] MED SURG  Daily Treatment Note  NAME: Catherine Pizarro  : 1950  MRN: [id number]    Date of Service: 10/15/2020    Discharge Recommendations:  Patient would benefit from continued therapy after discharge, Continue to assess pending progress   PT Equipment Recommendations  Other: to be determined if RW needed    Assessment   Body structures, Functions, Activity limitations: Decreased functional mobility ; Decreased ADL status; Decreased strength;Decreased ROM; Decreased endurance;Decreased balance; Increased pain;Decreased posture  Treatment Diagnosis: Impaired functional mobility 2* neck pain  Specific instructions for Next Treatment: monitor O2 sats, progress gait/stairs, up to chair, trial RW with gait vs cane  Prognosis: Good  History: s/p C3-C7 POSTERIOR CERVICAL DECOMPRESSION FUSION on 10/14/20  Barriers to Learning: pain  REQUIRES PT FOLLOW UP: Yes  Activity Tolerance  Activity Tolerance: Patient limited by pain     Patient Diagnosis(es): There were no encounter diagnoses. has a past medical history of Allergic rhinitis, Arthritis, CAD (coronary artery disease), Cerebral artery occlusion with cerebral infarction ([address] CHF (congestive heart failure) ([address] Controlled type 2 diabetes mellitus without complication, without long-term current use of insulin ([address] COPD (chronic obstructive pulmonary disease) ([address] Depression, Former smoker, History of blood transfusion, Hyperlipidemia, Hypertension, Kidney stone, Myocardial infarction ([address] Obesity, Class I, BMI 30.0-34.9 (see actual BMI), Restless leg syndrome, Skin abnormality, Type II or unspecified type diabetes mellitus without mention of complication, not stated as uncontrolled, Wears glasses, and Wears partial dentures. has a past surgical history that includes Appendectomy; Hysterectomy;  Cholecystectomy; joint replacement (Bilateral); pr office/outpt visit,procedure only (N/A, 2018); pr incis/drain thigh/knee abscess,deep (Right, 5/7/2018); Leg biopsy excision (Right, 8/29/2019); Cardiac surgery; Cardiac surgery; other surgical history (07/26/2020); and cervical laminectomy (N/A, 10/14/2020). Restrictions  Restrictions/Precautions  Restrictions/Precautions: General Precautions, Fall Risk  Required Braces or Orthoses?: No  Implants present? : Metal implants(cervical fusion, B TKA)  Position Activity Restriction  Other position/activity restrictions: ambulate patient  Subjective   General  Additional Pertinent Hx: DM, COPD, MI, CABG  Family / Caregiver Present: No  Referring Practitioner: Kareem Camilo MD   Subjective  Subjective: Patient in bed upon arrival. Requests needing to use bedside commode, defers further walker. Pt requires extra encouragement initially. General Comment  Comments: RN ok's pt. Co-tx with Nuzhat Yanes OT. Pain Screening  Patient Currently in Pain: Yes  Pain Assessment  Pain Assessment: 0-10  Pain Level: 9  Pain Type: Surgical pain  Pain Location: Head;Neck  Pain Orientation: Posterior  Pain Descriptors: Discomfort; Headache  Pain Frequency: Continuous  Pain Onset: On-going  Non-Pharmaceutical Pain Intervention(s): Rest;Repositioned;Distraction; Ambulation/Increased Activity  Vital Signs  Patient Currently in Pain: Yes       Orientation  Orientation  Overall Orientation Status: Within Functional Limits  Cognition      Objective   Bed mobility  Bridging: Stand by assistance  Rolling to Left: Stand by assistance  Rolling to Right: Stand by assistance  Supine to Sit: Minimal assistance  Sit to Supine: Unable to assess(Pt in bedside chair at end of session)  Scooting: Stand by assistance  Comment: Use of hand rail for rolling. Cues for technique. Increased time d/t increased neck pain with activity.   Transfers  Sit to Stand: Contact guard assistance  Stand to sit: Contact guard assistance  Bed to Chair: Contact guard assistance  Stand Pivot Transfers: Contact guard assistance  Comment: CGA for transfers using rolling walker. Cues for technique and hand placement with fair carryover. Transfers from edge of bed and commode. Ambulation  Ambulation?: Yes  Ambulation 1  Surface: level tile  Device: Rolling Walker  Assistance: Contact guard assistance;Minimal assistance  Quality of Gait: small steps, slighty unsteady, narrow MARIN, no LOB  Gait Deviations: Slow Lindsay;Decreased step length;Decreased step height  Distance: 5 steps x2  Comments: Cues to keep rolling walker within MARIN with fair carryover. Stairs/Curb  Stairs?: No        Other exercises  Other exercises?: Yes  Other exercises 1: Bed mobility x1  Other exercises 2: Toilet transfer using commode  Other exercises 3: Seated B LE AROM x10 reps            Goals  Short term goals  Time Frame for Short term goals: 2-3 days  Short term goal 1: Pt to demo Mod I bed mobility. Short term goal 2: Pt to demo Mod I transfers. Short term goal 3: Pt to amb 75'-100' Mod I. Short term goal 4: Pt to ascend/descend 5 stairs per home set up, SBA. Short term goal 5: Pt to demo good technique for HEP for B LE strengthening. Short term goal 6: Pt to reduce pain to 3/10 to improve independence and tolerance to functional mobility. Patient Goals   Patient goals : To go home    Plan    Plan  Times per week: 1-2x/day  Specific instructions for Next Treatment: monitor O2 sats, progress gait/stairs, up to chair, trial RW with gait vs cane  Current Treatment Recommendations: Strengthening, ROM, Balance Training, Functional Mobility Training, Transfer Training, Endurance Training, Gait Training, Stair training, Equipment Evaluation, Education, & procurement, Patient/Caregiver Education & Training, Safety Education & Training, Home Exercise Program, Pain Management, Positioning  Safety Devices  Type of devices:  All fall risk precautions in place, Chair alarm in place, Gait belt, Nurse notified, Left in chair, Patient at risk for falls     Therapy Time   Individual Concurrent

## 2020-10-15 NOTE — PLAN OF CARE
Problem: Falls - Risk of:  Goal: Will remain free from falls  Description: Will remain free from falls  Outcome: Ongoing  Note: Patient remained free from falls this shift. Call light and bed side table are within reach, bed is in lowest position, and side rails are up x2. Will continue to monitor.    Goal: Absence of physical injury  Description: Absence of physical injury  Outcome: Ongoing     Problem: Skin Integrity:  Goal: Will show no infection signs and symptoms  Description: Will show no infection signs and symptoms  Outcome: Ongoing  Goal: Absence of new skin breakdown  Description: Absence of new skin breakdown  Outcome: Ongoing     Problem: Pain:  Goal: Pain level will decrease  Description: Pain level will decrease  Outcome: Ongoing  Goal: Control of acute pain  Description: Control of acute pain  Outcome: Ongoing  Goal: Control of chronic pain  Description: Control of chronic pain  Outcome: Ongoing

## 2020-10-15 NOTE — PROGRESS NOTES
[address]   OCCUPATIONAL THERAPY MISSED TREATMENT NOTE   INPATIENT   Date: 10/15/20  Patient Name: Suzy Wilkerson       Room: 3899/3761-64  MRN: [id number]   Account #: [de-identified]    : 1950  (65 y.o.)  Gender: female     REASON FOR MISSED TREATMENT:  Pt requested OT return in PM after next dose of pain medication - 1046.     [place] [facility]

## 2020-10-15 NOTE — DISCHARGE INSTR - COC
Continuity of Care Form    Patient Name: Mike Ty   :  1950  MRN:  [id number]    Admit date:  10/14/2020  Discharge date:  10/18/2020    Code Status Order: Full Code   Advance Directives:   [facility] Documentation       Date/Time Healthcare Directive Type of Healthcare Directive Copy in [address] [facility] [address] Agent's Name Healthcare Agent's Phone Number    10/14/20 4076  No, patient does not have an advance directive for healthcare treatment declined info -- -- -- -- --            Admitting Physician:  Heath Abad MD  PCP: Maryetta Seip, MD    Discharging Nurse: [facility] Unit/Room#: 3337/9631-65  Discharging Unit Phone Number: [phone]    Emergency Contact:   Extended Emergency Contact Information  Primary Emergency Contact: Guanakito Zheng  Address: [address] [facility]           [address] [facility], [address] Lolita Dance of [address] Phone: [phone]  Work Phone: [phone]  Mobile Phone: [phone]  Relation: Child    Past Surgical History:  Past Surgical History:   Procedure Laterality Date    APPENDECTOMY      CARDIAC SURGERY      cath x 2/ stent x 1    CARDIAC SURGERY      bypass 4 vessel 2018    CERVICAL LAMINECTOMY N/A 10/14/2020    C3-C7 POSTERIOR CERVICAL DECOMPRESSION FUSION performed by Heath Abad MD at [address] [facility] Bilateral     knees    LEG BIOPSY EXCISION Right 2019    LEG LESION PUNCH BIOPSY performed by Babar Moore MD at [place] [address]  2020    cerebral angiogram    [place] INCIS/DRAIN THIGH/KNEE ABSCESS,DEEP Right 2018    DEBRIDEMENT INCISION AND DRAINAGE THIGH ABSCESS performed by Andres Briceño DO at [address] [facility] OFFICE/OUTPT VISIT,PROCEDURE ONLY N/A 2018    CABG X 3 LIMA-LAD-DIAG,SVG-PDA,CORONARY ARTERY BYPASS REDO, PUMP ASSIST, SWAN, JARRED, REDO STERNOTOMY performed by Rosa Oro MD at [facility] Immunization History:   Immunization History   Administered Date(s) Administered    Influenza Virus Vaccine 11/14/2016, 09/29/2017    Influenza Whole 11/11/2015    Influenza, High Dose (Fluzone 65 yrs and older) 10/24/2018    Influenza, Quadv, IM, PF (6 mo and older Fluzone, Flulaval, Fluarix, and 3 yrs and older Afluria) 08/27/2019    Pneumococcal Conjugate 13-valent ([id number]) 11/14/2016    Pneumococcal Polysaccharide ([id number]) 11/01/2017    Tdap (Boostrix, Adacel) 07/02/2020       Active Problems:  Patient Active Problem List   Diagnosis Code    Chronic pain G89.29    Controlled type 2 diabetes mellitus without complication, without long-term current use of insulin ([facility]) E11.9    Allergic rhinitis J30.9    Hypertension goal BP (blood pressure) < 140/90 I10    Mixed hyperlipidemia E78.2    Chronic obstructive pulmonary disease ([facility]) J44.9    Coronary artery disease involving native coronary artery of native heart without angina pectoris I25.10    Primary insomnia F51.01    Diarrhea in adult patient R19.7    Restless leg syndrome G25.81    Atherosclerosis of superior mesenteric artery ([facility]) K55.1    Traumatic compression fracture of T9 thoracic vertebra S22.070A    Left adrenal mass ([facility]) E27.8    Former smoker Z87.891    Chronic bilateral low back pain with left-sided sciatica M54.42, G89.29    Hypothyroidism E03.9    S/P CABG x 4 Z95.1    Morbid obesity with body mass index (BMI) of 40.0 to 49.9 ([facility]) E66.01    Acute pain of right knee M25.561    Abscess of right thigh L02.415    Leg ulcer, left, with necrosis of muscle ([facility]) L97.923    Angina pectoris ([facility]) I20.9    Abnormal stress test R94.39    Atrial fibrillation ([facility]) I48.91    Sepsis ([facility]) A41.9    Tobacco use disorder F17.200    Neck pain M54.2    Chest pain R07.9    Acute ischemic left MCA stroke ([facility]) L08.884    Internal carotid artery occlusion I65.29    Stenosis of left internal carotid artery I65.22    Acquired spondylolisthesis of cervical vertebra M43.12    Stenosis of cervical spine with myelopathy (HCC) M48.02, G99.2       Isolation/Infection:   Isolation            No Isolation          Patient Infection Status       Infection Onset Added Last Indicated Last Indicated By Review Planned Expiration Resolved Resolved By    None active    Resolved    COVID-19 Rule Out 10/10/20 10/10/20 10/10/20 COVID-19 (Ordered)   10/11/20 Rule-Out Test Resulted            Nurse Assessment:  Last Vital Signs: /68   Pulse 85   Temp 97.3 °F (36.3 °C) (Oral)   Resp 16   Ht 5' 5\" (1.651 m)   Wt 147 lb 12.8 oz (67 kg)   SpO2 95%   BMI 24.60 kg/m²     Last documented pain score (0-10 scale): Pain Level: 9  Last Weight:   Wt Readings from Last 1 Encounters:   10/15/20 147 lb 12.8 oz (67 kg)     Mental Status:  oriented and alert    IV Access:  - None    Nursing Mobility/ADLs:  Walking   Assisted  Transfer  Assisted  Bathing  Assisted  Dressing  Independent  Toileting  Independent  Feeding  Independent  Med Admin  Independent  Med Delivery   whole      Safety Concerns: At Risk for Falls    Impairments/Disabilities:      None    Nutrition Therapy:  Current Nutrition Therapy:   - Oral Diet:  General    Routes of Feeding: Oral  Liquids: No Restrictions  Daily Fluid Restriction: no  Last Modified Barium Swallow with Video (Video Swallowing Test): not done    Treatments at the Time of Hospital Discharge:   Respiratory Treatments: see MAR  Oxygen Therapy:  is on oxygen at 2 L/min per nasal cannula.  this is new for the patient  Ventilator:    - No ventilator support    Rehab Therapies: No PT/OT until follow-up with Dr. Paul Edmonds  Weight Bearing Status/Restrictions: No weight bearing restirctions  Other Medical Equipment (for information only, NOT a DME order):  walker  Other Treatments: Skilled Nursing Assessment, Medication Education and Monitoring  Patient wants Rohit Bermudez as her nurse - Her neighbor    Patient's personal belongings (please select all that are sent with patient):  None    RN SIGNATURE:  Electronically signed by Diogo Gan RN on 10/18/20 at 12:59 PM EDT    CASE MANAGEMENT/SOCIAL WORK SECTION    Inpatient Status Date: 10/14/2020    Readmission Risk Assessment Score:  Readmission Risk              Risk of Unplanned Readmission:        16           Discharging to Facility/ [facility]  Phone: [phone]  Fax [phone]        / signature: Electronically signed by Trell Ch RN on 10/15/20 at 2:07 PM EDT    PHYSICIAN SECTION    Prognosis: Fair    Condition at Discharge: Stable    Rehab Potential (if transferring to Rehab): Fair    Recommended Labs or Other Treatments After Discharge: see above    Physician Certification: I certify the above information and transfer of Vicky Martínez  is necessary for the continuing treatment of the diagnosis listed and that she requires Home Care for less 30 days.      Update Admission H&P: Changes in H&P as follows - s/p Cspine surgery    PHYSICIAN SIGNATURE:  Electronically signed by Albino Damon MD on 10/18/20 at 10:51 AM EDT

## 2020-10-15 NOTE — FLOWSHEET NOTE
Patient weaned from 3L to 2L successfully on this shift. Just weaned down to 1L will continue to monitor.
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
## 2020-10-15 NOTE — OP NOTE
[address]                 [address] [facility], [address]                                OPERATIVE REPORT    PATIENT NAME: Cheryl Driscoll                  :        1950  MED REC NO:   [id number]                              ROOM:       2059  ACCOUNT NO:   [de-identified]                           ADMIT DATE: 10/14/2020  PROVIDER:     Magda Young    DATE OF PROCEDURE:  10/14/2020    PREOPERATIVE DIAGNOSES:  Multilevel cervical spondylosis with cervical  spinal stenosis and cervical myelopathy. POSTOPERATIVE DIAGNOSES:  Multilevel cervical spondylosis with cervical  spinal stenosis and cervical myelopathy. OPERATING SURGEON:  Magda Young MD    ASSISTANT:  None. ANESTHESIA:  General.    BLOOD LOSS:  200. COMPLICATIONS:  None. SPECIMEN:  None. IMPLANTS:  Globus posterior screw edvin complex. DRAINS:  None. PROCEDURE PERFORMED:  1. Posterior cervical decompression with laminectomies at C3, C4, C5,  C6 and C7, posterior segmental instrumentation C3 to C7, posterior  fusion C3-4, C4-5, C5-6 and C6-7.  2.  Local autograft bone grafting. 3.  Fluoroscopic assistance. FINDINGS:  1. The patient with inability to instrument C5, C6 and C7  simultaneously due to the significant C6-7 anterior subluxation. 2.  C3 lateral mass on the right, very atrophic, as the patient had some  lateral rotatory subluxation at C3-4 as well. PROCEDURE IN DETAIL:  The patient was taken to the operating room,  placed under general anesthesia, transferred to the operating table,  checked for padding and positioning. Lateral fluoroscopy was verified  that we could see to approximately C3, although visualization below was  extraordinarily difficult, even with oblique x-rays of the lateral, we  were unable to really visualize any more than this. Neck was prepped and draped in the usual sterile fashion.     Posterior incision was made, carried down through skin and subcutaneous  tissue. Paraspinal musculature was elevated out to the lateral aspect  of the lateral masses C3 to C7, caudal aspect of C2. Lateral mass  screws were drilled. On the right at C3, I actually had initiated my  drill hole caudal to get to the lateral mass, I had just to about clip  the inferior aspect of the C2 facet. It was right at this margin that I  had to drill. Post drilling for the lateral mass screws, the posterior decompression  laminectomies were completed. High-speed jenni was utilized to cut  channels bilaterally at C3, C4, C5, C6 and C7. Having completed this,  the posterior lamina were removed. Actually due to the patient's changes, although we could put screws at  C7, having screws, her neck was so short that we also had some in  addition to not being able to make the edvin contour, we were getting some  tulip-Head impingement with the screws at C4, C5 and C6. I therefore  utilized one screw at C5 on one side and one screw at C6 on the  contralateral side. Screws were placed, rods were contoured, placed into position as the  rotatory deformity subluxation was more significant on the right. This  made for a bit of an unusual appearance for the edvin screw head  configuration. Left was much more symmetrical and looked much more  traditional.  All screws were torqued. Lateral gutters were grafted  with the spinous processes that had been cleaned and minced. Deep fascia was re-approximated in three layers of #1 Ethibond,  subcuticular layer with 2-0 Vicryl followed by skin staple skin closure. It should be noted the patient's soft tissue envelope was a bit unique  in that it appeared that she had lost a very substantial amount of  weight and had quite elastic skin. When we initially taped down her  shoulders, actually we had to reposition the neuromonitoring as we had  moved the wires quite caudally. Final AP and lateral fluoroscopic images were obtained.   This showed  satisfactory hardware position although a lateral x-ray was definitely  challenged. The patient was awakened from anesthesia, taken to recovery  room in stable condition. COMPLICATIONS RAISED DURING THE OPERATION:  None noted.         SHANTELLE Atkins    D: 10/14/2020 12:27:07       T: 10/14/2020 12:34:47     [email or web address]  Job#: [id number]     Doc#: [id number]    CC:

## 2020-10-16 LAB
GLUCOSE BLD-MCNC: 115 MG/DL (ref 65–105)
GLUCOSE BLD-MCNC: 164 MG/DL (ref 65–105)
HCT VFR BLD CALC: 33.2 % (ref 36–46)
HEMOGLOBIN: 10.8 G/DL (ref 12–16)
MCH RBC QN AUTO: 28.3 PG (ref 26–34)
MCHC RBC AUTO-ENTMCNC: 32.6 G/DL (ref 31–37)
MCV RBC AUTO: 86.8 FL (ref 80–100)
NRBC AUTOMATED: ABNORMAL PER 100 WBC
PDW BLD-RTO: 14.6 % (ref 11.5–14.9)
PLATELET # BLD: 230 K/UL (ref 150–450)
PMV BLD AUTO: 8.6 FL (ref 6–12)
RBC # BLD: 3.82 M/UL (ref 4–5.2)
WBC # BLD: 11.3 K/UL (ref 3.5–11)

## 2020-10-16 PROCEDURE — 97530 THERAPEUTIC ACTIVITIES: CPT

## 2020-10-16 PROCEDURE — 85027 COMPLETE CBC AUTOMATED: CPT

## 2020-10-16 PROCEDURE — 97535 SELF CARE MNGMENT TRAINING: CPT

## 2020-10-16 PROCEDURE — 1200000000 HC SEMI PRIVATE

## 2020-10-16 PROCEDURE — 97110 THERAPEUTIC EXERCISES: CPT

## 2020-10-16 PROCEDURE — 97116 GAIT TRAINING THERAPY: CPT

## 2020-10-16 PROCEDURE — 82947 ASSAY GLUCOSE BLOOD QUANT: CPT

## 2020-10-16 PROCEDURE — 6370000000 HC RX 637 (ALT 250 FOR IP): Performed by: ORTHOPAEDIC SURGERY

## 2020-10-16 PROCEDURE — 2580000003 HC RX 258: Performed by: ORTHOPAEDIC SURGERY

## 2020-10-16 PROCEDURE — 36415 COLL VENOUS BLD VENIPUNCTURE: CPT

## 2020-10-16 PROCEDURE — 99024 POSTOP FOLLOW-UP VISIT: CPT | Performed by: ORTHOPAEDIC SURGERY

## 2020-10-16 RX ADMIN — ISOSORBIDE MONONITRATE 30 MG: 30 TABLET, EXTENDED RELEASE ORAL at 08:48

## 2020-10-16 RX ADMIN — ROPINIROLE HYDROCHLORIDE 1 MG: 0.5 TABLET, FILM COATED ORAL at 21:37

## 2020-10-16 RX ADMIN — Medication 10 ML: at 08:38

## 2020-10-16 RX ADMIN — FUROSEMIDE 40 MG: 40 TABLET ORAL at 08:48

## 2020-10-16 RX ADMIN — OXYCODONE HYDROCHLORIDE 10 MG: 10 TABLET ORAL at 02:54

## 2020-10-16 RX ADMIN — METOPROLOL TARTRATE 25 MG: 25 TABLET, FILM COATED ORAL at 08:36

## 2020-10-16 RX ADMIN — OXYCODONE HYDROCHLORIDE 10 MG: 10 TABLET ORAL at 12:04

## 2020-10-16 RX ADMIN — POTASSIUM CHLORIDE 10 MEQ: 10 CAPSULE, COATED, EXTENDED RELEASE ORAL at 08:37

## 2020-10-16 RX ADMIN — ATORVASTATIN CALCIUM 80 MG: 80 TABLET, FILM COATED ORAL at 08:48

## 2020-10-16 RX ADMIN — CITALOPRAM HYDROBROMIDE 20 MG: 20 TABLET ORAL at 08:48

## 2020-10-16 RX ADMIN — Medication 10 ML: at 23:24

## 2020-10-16 RX ADMIN — METFORMIN HYDROCHLORIDE 1500 MG: 500 TABLET, EXTENDED RELEASE ORAL at 08:38

## 2020-10-16 ASSESSMENT — PAIN DESCRIPTION - PROGRESSION
CLINICAL_PROGRESSION: NOT CHANGED
CLINICAL_PROGRESSION: NOT CHANGED

## 2020-10-16 ASSESSMENT — PAIN SCALES - GENERAL
PAINLEVEL_OUTOF10: 8
PAINLEVEL_OUTOF10: 5
PAINLEVEL_OUTOF10: 5
PAINLEVEL_OUTOF10: 8
PAINLEVEL_OUTOF10: 3
PAINLEVEL_OUTOF10: 7

## 2020-10-16 ASSESSMENT — PAIN DESCRIPTION - PAIN TYPE
TYPE: SURGICAL PAIN;ACUTE PAIN
TYPE: SURGICAL PAIN;ACUTE PAIN

## 2020-10-16 ASSESSMENT — PAIN DESCRIPTION - LOCATION
LOCATION: NECK
LOCATION: NECK

## 2020-10-16 ASSESSMENT — PAIN DESCRIPTION - ORIENTATION
ORIENTATION: POSTERIOR
ORIENTATION: POSTERIOR

## 2020-10-16 NOTE — CARE COORDINATION
DISCHARGE PLANNING NOTE:    Plan is for this patient to return to home. Patient would like [employer] services - She is still trying to figure which company her neighbor works for. Patient states she is not discharging today. Bedside nurse states patients lungs do not sound great.      Electronically signed by Megan Griffin RN on 10/16/2020 at 12:07 PM

## 2020-10-16 NOTE — PROGRESS NOTES
Pt is approved to go to [facility] if she decides she needs skilled care. SW is aware pt plans on going home. However, if pt changes her mind, authorization is good thru the weekend. Call [place] [phone].

## 2020-10-16 NOTE — PROGRESS NOTES
Page out to Dr. Candance Phoenix regarding discharge order and oxygen sat levels. Update provided. New order for pulmonary consult.

## 2020-10-16 NOTE — PROGRESS NOTES
Surgical Progress Note    POD: 2    Patient doing fairly well  Vitals:    10/16/20 1439   BP:    Pulse:    Resp:    Temp:    SpO2: 93%      Temp (24hrs), Av.2 °F (36.8 °C), Min:97.6 °F (36.4 °C), Max:98.8 °F (37.1 °C)       Pain Control Fair  No unusual nausea    Exam:  Doing well      Lungs:  No respiratory distress    Labs reviewed:  Hemoglobin   Date/Time Value Ref Range Status   10/16/2020 05:42 AM 10.8 (L) 12.0 - 16.0 g/dL Final     Hematocrit   Date/Time Value Ref Range Status   10/16/2020 05:42 AM 33.2 (L) 36 - 46 % Final     WBC   Date/Time Value Ref Range Status   10/16/2020 05:42 AM 11.3 (H) 3.5 - 11.0 k/uL Final     Sodium   Date/Time Value Ref Range Status   2020 09:50  135 - 144 mmol/L Final     Potassium   Date/Time Value Ref Range Status   2020 09:50 AM 3.8 3.7 - 5.3 mmol/L Final     Chloride   Date/Time Value Ref Range Status   2020 09:50  98 - 107 mmol/L Final     CO2   Date/Time Value Ref Range Status   2020 09:50 AM 27 20 - 31 mmol/L Final     INR   Date/Time Value Ref Range Status   2020 03:12 AM 1.0  Final     Comment:           Therapeutic Range: Moderate Anticoagulant Intensity:     INR = 2.0-3.0   High Anticoagulant Intensity:     INR = 2.5-3.5               I/O last 3 completed shifts:   In: 65 [P.O.:635]  Out: 625 [Urine:625]    Assessment:  S/p posterior cervical decompression and fusion    Plan:  See my orders  discharge  Gonzalez Callahan MD  10/16/2020 3:28 PM

## 2020-10-16 NOTE — PLAN OF CARE
Problem: Falls - Risk of:  Goal: Will remain free from falls  Description: Will remain free from falls  10/16/2020 0453 by Jeramie Stoddard RN  Outcome: Met This Shift  Note: No falls noted this shift. Bed kept in low position. Safe environment maintained. Bedside table & call light in reach. Uses call light appropriately when needing assistance. 10/15/2020 1517 by Efrain May RN  Outcome: Ongoing  Goal: Absence of physical injury  Description: Absence of physical injury  10/16/2020 0453 by Jeramie Stoddard RN  Outcome: Met This Shift  10/15/2020 1517 by Efrain May RN  Outcome: Ongoing     Problem: Skin Integrity:  Goal: Will show no infection signs and symptoms  Description: Will show no infection signs and symptoms  10/16/2020 0453 by Jeramie Stoddard RN  Outcome: Met This Shift  Note: Afebrile. VSS. Incision to posterior neck has Aquacel dressing on from surgery. No need to change at this time. 10/15/2020 1517 by Efrain May RN  Outcome: Ongoing  Goal: Absence of new skin breakdown  Description: Absence of new skin breakdown  10/16/2020 0453 by Jeramie Stoddard RN  Outcome: Met This Shift  10/15/2020 1517 by Efrain May RN  Outcome: Ongoing     Problem: Pain:  Goal: Pain level will decrease  Description: Pain level will decrease  10/16/2020 0453 by Jeramie Stoddard RN  Outcome: Met This Shift  Note: Patient taking PRN oxycodone 10mg PO for pain with effective relief of symptoms.    10/15/2020 1517 by Efrain May RN  Outcome: Ongoing  Goal: Control of acute pain  Description: Control of acute pain  10/16/2020 0453 by Jeramie Stoddard RN  Outcome: Met This Shift  10/15/2020 1517 by Efrain May RN  Outcome: Ongoing     Problem: Musculor/Skeletal Functional Status  Goal: Absence of falls  10/16/2020 0453 by Jeramie Stoddard RN  Outcome: Met This Shift  10/15/2020 1517 by Efrain May RN  Outcome: Ongoing     Problem: Musculor/Skeletal Functional Status  Goal: Highest potential functional level  10/16/2020 0453 by Jeramie Stoddard RN  Outcome: Ongoing  Note: Patient encouraged to cough and deep breathe as well as use IS while awake. RN required to stay in room to ensure patient is using IS correctly. Educated on use of IS and rationale. Pt verbalizes understanding.    10/15/2020 1517 by Efrain May RN  Outcome: Ongoing     Problem: Pain:  Goal: Control of chronic pain  Description: Control of chronic pain  10/16/2020 0453 by Jeramie Stoddard RN  Outcome: Completed  10/15/2020 1517 by Efrain May RN  Outcome: Ongoing

## 2020-10-16 NOTE — PROGRESS NOTES
Patient educated on use of Aspen neck brace as well as rationale, as she continues to refuse to wear.

## 2020-10-16 NOTE — PROGRESS NOTES
Physical Therapy  Facility/Department: [facility] MED SURG  Daily Treatment Note  NAME: Vera Stewart  : 1950  MRN: [id number]    Date of Service: 10/16/2020    Discharge Recommendations:  Patient would benefit from continued therapy after discharge, Continue to assess pending progress   PT Equipment Recommendations  Other: to be determined if RW needed    Assessment   Body structures, Functions, Activity limitations: Decreased functional mobility ; Decreased ADL status; Decreased strength;Decreased ROM; Decreased endurance;Decreased balance; Increased pain;Decreased posture  Treatment Diagnosis: Impaired functional mobility 2* neck pain  Specific instructions for Next Treatment: monitor O2 sats, progress gait/stairs, up to chair, trial RW with gait vs cane  Prognosis: Good  Barriers to Learning: pain  REQUIRES PT FOLLOW UP: Yes  Activity Tolerance  Activity Tolerance: Patient limited by pain     Patient Diagnosis(es): The primary encounter diagnosis was Acquired spondylolisthesis of cervical vertebra. A diagnosis of Stenosis of cervical spine with myelopathy (HCC) was also pertinent to this visit. has a past medical history of Allergic rhinitis, Arthritis, CAD (coronary artery disease), Cerebral artery occlusion with cerebral infarction ([address] CHF (congestive heart failure) ([address] Controlled type 2 diabetes mellitus without complication, without long-term current use of insulin ([address] COPD (chronic obstructive pulmonary disease) ([address] Depression, Former smoker, History of blood transfusion, Hyperlipidemia, Hypertension, Kidney stone, Myocardial infarction ([address] Obesity, Class I, BMI 30.0-34.9 (see actual BMI), Restless leg syndrome, Skin abnormality, Type II or unspecified type diabetes mellitus without mention of complication, not stated as uncontrolled, Wears glasses, and Wears partial dentures. has a past surgical history that includes Appendectomy; Hysterectomy;  Cholecystectomy; joint replacement (Bilateral); pr office/outpt visit,procedure only (N/A, 2/6/2018); pr incis/drain thigh/knee abscess,deep (Right, 5/7/2018); Leg biopsy excision (Right, 8/29/2019); Cardiac surgery; Cardiac surgery; other surgical history (07/26/2020); and cervical laminectomy (N/A, 10/14/2020). Restrictions  Restrictions/Precautions  Restrictions/Precautions: General Precautions, Fall Risk  Required Braces or Orthoses?: Yes  Implants present? : Metal implants(cervical fusion, B TKA)  Required Braces or Orthoses  Cervical: c-collar(May remove to eat and shower. )  Position Activity Restriction  Other position/activity restrictions: ambulate patient  Subjective   Subjective  Subjective: Patient in bed upon arrival, agreeable to therapy. General Comment  Comments: Patient cooperative, report being sleepy. Pt stated she slept poorly. Pain Assessment  Pain Assessment: 0-10  Pain Level: 5  Pain Type: Surgical pain;Acute pain  Pain Location: Neck(Surgery site.)  Pain Orientation: Posterior  Clinical Progression: Not changed  Oxygen Therapy  SpO2: 96 %  Pulse Oximeter Device Mode: Intermittent  Pulse Oximeter Device Location: Finger  O2 Device: Nasal cannula  O2 Flow Rate (L/min): 2.5 L/min(2.5 L O2@ 96%.  Decreased O2 1.5L, sats decreased 94%)       Orientation     Cognition      Objective   Bed mobility  Bridging: Stand by assistance  Rolling to Left: Stand by assistance  Supine to Sit: Minimal assistance(Cues to roll to side and support Upper back and trunk.)  Sit to Supine: (Patient remained in bedside chair following treatment.)  Scooting: Stand by assistance(Pt uses bedrail)  Transfers  Sit to Stand: Contact guard assistance  Stand to sit: Contact guard assistance  Bed to Chair: Contact guard assistance  Stand Pivot Transfers: Contact guard assistance  Comment: Transfer CGA using RW  Ambulation  Ambulation?: Yes  Ambulation 1  Surface: level tile  Device: Rolling Walker  Assistance: Contact guard assistance  Quality of Gait: chair, Patient at risk for falls     Therapy Time   Individual Concurrent Group Co-treatment   Time In 0914         Time Out 0946         Minutes [address] [place], [place]   Electronically signed by Julio César Byrd PTA on 10/16/2020 at 1:53 PM

## 2020-10-16 NOTE — PROGRESS NOTES
Dressing: Stand by assistance(footies only)  Toileting: Setup(Hygiene only, no clothing mgt assessed)  Additional Comments: Above levels based on pt report, skilled observation, and clinical reasoning unless otherwise noted. Pt able to don socks while sitting in chair by bringing foot to opposite kneeand completed toileting hygiene in sitting. Pt with limited ROM of B shoulders 2* associated neck pain, resulting in a need for increased A with grooming/UB ADL's. CGA for safety in standing with rolling walker. pt edu on AE/DME to increase safety and independence during ADLs and transfers ie reacher, grab bar placement, hand held showerhead     Transfers  Sit to stand: Contact guard assistance  Stand to sit: Contact guard assistance  Transfer Comments: w/rolling walker  Toilet Transfers  Toilet - Technique: Stand pivot  Equipment Used: Standard bedside commode  Toilet Transfer: Contact guard assistance(with rolling walker)                  Vision  Patient Visual Report: No visual complaint reported. Assessment  Performance deficits / Impairments: Decreased functional mobility ; Decreased ADL status; Decreased ROM; Decreased strength;Decreased endurance;Decreased balance;Decreased high-level IADLs  Prognosis: Good  Activity Tolerance: Patient Tolerated treatment well;Patient limited by pain  Safety Devices in place: Yes  Type of devices: Patient at risk for falls;Gait belt;Left in chair;Call light within reach; Chair alarm in place;Nurse notified  Equipment Recommendations  Equipment Needed: (TBD)          Patient Education:  Patient Education: OT POC, home safety/fall prevention, RW safety, C-collar mgt and how to wash and replace pads, AE/DME  Learner:patient  Method: demonstration, explanation and handout       Outcome: acknowledged understanding, demonstrated understanding and asked questions     Plan  Safety Devices  Safety Devices in place: Yes  Type of devices: Patient at risk for falls, Gait belt, Left in chair, Call light within reach, Chair alarm in place, Nurse notified  Plan  Times per week: 5-7  Times per day: Daily  Current Treatment Recommendations: ROM, Balance Training, Functional Mobility Training, Endurance Training, Pain Management, Safety Education & Training, Patient/Caregiver Education & Training, Equipment Evaluation, Education, & procurement, Self-Care / ADL, Positioning, Home Management Training      Goals  Short term goals  Time Frame for Short term goals: By Discharge  Short term goal 1: Pt will complete toilet transfer and toileting with Modified IND and Good safety using least restrictive device. Short term goal 2: Pt will complete BADL's with set-up/supervision and Good safety using modified techniques as needed. Short term goal 3: Pt will verbalize/demonstrate Good understanding of proper body mechanice/neck care priniciples during functional tasks. Short term goal 4: Pt will actively participate in 15-20 minutes of therapeutic exercise/activity to promote increased independence and safety with self-care and mobility.     OT Individual Minutes  Time In: 4660  Time Out: 9784  Minutes: 27      Electronically signed by SARAHY De La Fuente on 10/16/20 at 11:26 AM EDT
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
## 2020-10-17 LAB
GLUCOSE BLD-MCNC: 134 MG/DL (ref 65–105)
GLUCOSE BLD-MCNC: 155 MG/DL (ref 65–105)
GLUCOSE BLD-MCNC: 161 MG/DL (ref 65–105)

## 2020-10-17 PROCEDURE — 94669 MECHANICAL CHEST WALL OSCILL: CPT

## 2020-10-17 PROCEDURE — 94640 AIRWAY INHALATION TREATMENT: CPT

## 2020-10-17 PROCEDURE — 82947 ASSAY GLUCOSE BLOOD QUANT: CPT

## 2020-10-17 PROCEDURE — 1200000000 HC SEMI PRIVATE

## 2020-10-17 PROCEDURE — 2580000003 HC RX 258: Performed by: ORTHOPAEDIC SURGERY

## 2020-10-17 PROCEDURE — 94664 DEMO&/EVAL PT USE INHALER: CPT

## 2020-10-17 PROCEDURE — 6370000000 HC RX 637 (ALT 250 FOR IP): Performed by: ORTHOPAEDIC SURGERY

## 2020-10-17 PROCEDURE — 99232 SBSQ HOSP IP/OBS MODERATE 35: CPT | Performed by: FAMILY MEDICINE

## 2020-10-17 PROCEDURE — 97116 GAIT TRAINING THERAPY: CPT

## 2020-10-17 PROCEDURE — 2700000000 HC OXYGEN THERAPY PER DAY

## 2020-10-17 PROCEDURE — 94761 N-INVAS EAR/PLS OXIMETRY MLT: CPT

## 2020-10-17 PROCEDURE — 97110 THERAPEUTIC EXERCISES: CPT

## 2020-10-17 PROCEDURE — 6370000000 HC RX 637 (ALT 250 FOR IP): Performed by: INTERNAL MEDICINE

## 2020-10-17 RX ORDER — GUAIFENESIN 600 MG/1
600 TABLET, EXTENDED RELEASE ORAL 2 TIMES DAILY
Status: DISCONTINUED | OUTPATIENT
Start: 2020-10-17 | End: 2020-10-18 | Stop reason: HOSPADM

## 2020-10-17 RX ORDER — IPRATROPIUM BROMIDE AND ALBUTEROL SULFATE 2.5; .5 MG/3ML; MG/3ML
1 SOLUTION RESPIRATORY (INHALATION)
Status: DISCONTINUED | OUTPATIENT
Start: 2020-10-17 | End: 2020-10-18 | Stop reason: HOSPADM

## 2020-10-17 RX ADMIN — IPRATROPIUM BROMIDE AND ALBUTEROL SULFATE 1 AMPULE: .5; 3 SOLUTION RESPIRATORY (INHALATION) at 15:01

## 2020-10-17 RX ADMIN — OXYCODONE HYDROCHLORIDE 10 MG: 10 TABLET ORAL at 18:09

## 2020-10-17 RX ADMIN — ISOSORBIDE MONONITRATE 30 MG: 30 TABLET, EXTENDED RELEASE ORAL at 10:19

## 2020-10-17 RX ADMIN — ROPINIROLE HYDROCHLORIDE 1 MG: 0.5 TABLET, FILM COATED ORAL at 22:11

## 2020-10-17 RX ADMIN — IPRATROPIUM BROMIDE AND ALBUTEROL SULFATE 1 AMPULE: .5; 3 SOLUTION RESPIRATORY (INHALATION) at 19:47

## 2020-10-17 RX ADMIN — GUAIFENESIN 600 MG: 600 TABLET, EXTENDED RELEASE ORAL at 14:03

## 2020-10-17 RX ADMIN — METOPROLOL TARTRATE 25 MG: 25 TABLET, FILM COATED ORAL at 10:15

## 2020-10-17 RX ADMIN — OXYCODONE HYDROCHLORIDE 10 MG: 10 TABLET ORAL at 09:01

## 2020-10-17 RX ADMIN — CITALOPRAM HYDROBROMIDE 20 MG: 20 TABLET ORAL at 10:16

## 2020-10-17 RX ADMIN — METFORMIN HYDROCHLORIDE 1500 MG: 500 TABLET, EXTENDED RELEASE ORAL at 10:14

## 2020-10-17 RX ADMIN — Medication 10 ML: at 10:16

## 2020-10-17 RX ADMIN — POTASSIUM CHLORIDE 10 MEQ: 10 CAPSULE, COATED, EXTENDED RELEASE ORAL at 10:19

## 2020-10-17 RX ADMIN — LISINOPRIL 2.5 MG: 2.5 TABLET ORAL at 10:19

## 2020-10-17 RX ADMIN — IPRATROPIUM BROMIDE AND ALBUTEROL SULFATE 1 AMPULE: .5; 3 SOLUTION RESPIRATORY (INHALATION) at 12:12

## 2020-10-17 RX ADMIN — OXYCODONE HYDROCHLORIDE 10 MG: 10 TABLET ORAL at 14:05

## 2020-10-17 RX ADMIN — Medication 10 ML: at 22:11

## 2020-10-17 RX ADMIN — GUAIFENESIN 600 MG: 600 TABLET, EXTENDED RELEASE ORAL at 22:11

## 2020-10-17 RX ADMIN — ATORVASTATIN CALCIUM 80 MG: 80 TABLET, FILM COATED ORAL at 10:19

## 2020-10-17 RX ADMIN — METOPROLOL TARTRATE 25 MG: 25 TABLET, FILM COATED ORAL at 22:11

## 2020-10-17 ASSESSMENT — PAIN DESCRIPTION - ORIENTATION
ORIENTATION: POSTERIOR
ORIENTATION: POSTERIOR

## 2020-10-17 ASSESSMENT — PAIN DESCRIPTION - DESCRIPTORS
DESCRIPTORS: ACHING;DULL
DESCRIPTORS: ACHING;DULL

## 2020-10-17 ASSESSMENT — PAIN SCALES - GENERAL
PAINLEVEL_OUTOF10: 8
PAINLEVEL_OUTOF10: 7
PAINLEVEL_OUTOF10: 5
PAINLEVEL_OUTOF10: 7

## 2020-10-17 ASSESSMENT — PAIN DESCRIPTION - PAIN TYPE
TYPE: ACUTE PAIN;SURGICAL PAIN
TYPE: SURGICAL PAIN;ACUTE PAIN
TYPE: SURGICAL PAIN;ACUTE PAIN

## 2020-10-17 ASSESSMENT — PAIN DESCRIPTION - LOCATION
LOCATION: NECK
LOCATION: NECK

## 2020-10-17 ASSESSMENT — PAIN DESCRIPTION - PROGRESSION
CLINICAL_PROGRESSION: NOT CHANGED

## 2020-10-17 ASSESSMENT — PAIN DESCRIPTION - FREQUENCY
FREQUENCY: CONTINUOUS
FREQUENCY: CONTINUOUS

## 2020-10-17 ASSESSMENT — PAIN DESCRIPTION - ONSET: ONSET: ON-GOING

## 2020-10-17 NOTE — PLAN OF CARE
Problem: Falls - Risk of:  Goal: Will remain free from falls  Description: Will remain free from falls  Outcome: Ongoing  Goal: Absence of physical injury  Description: Absence of physical injury  Outcome: Ongoing     Problem: Skin Integrity:  Goal: Will show no infection signs and symptoms  Description: Will show no infection signs and symptoms  Outcome: Ongoing  Goal: Absence of new skin breakdown  Description: Absence of new skin breakdown  Outcome: Ongoing     Problem: Pain:  Goal: Pain level will decrease  Description: Pain level will decrease  Outcome: Ongoing  Goal: Control of acute pain  Description: Control of acute pain  Outcome: Ongoing     Problem: Musculor/Skeletal Functional Status  Goal: Highest potential functional level  Outcome: Ongoing  Goal: Absence of falls  Outcome: Ongoing
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
## 2020-10-17 NOTE — PROGRESS NOTES
FAMILY MEDICINE  - PROGRESS NOTE    Date:  10/17/2020  Sanjuana Gutierrez  [id number]      Chief complaint: Neck pain      Interval History:  not changed, she has no new complaints this am. Pulmonology was consulted and will see her today.       Subjective  Respiratory: positive for cough and emphysema  Musculoskeletal:positive for arthralgias, myalgias and stiff joints  Endocrine: positive for diabetic symptoms including hyperglycemia  Allergic/Immunologic: positive for hay fever:    Objective:    /65   Pulse 74   Temp 97.4 °F (36.3 °C) (Oral)   Resp 18   Ht 5' 5\" (1.651 m)   Wt 143 lb 4.8 oz (65 kg)   SpO2 93%   BMI 23.85 kg/m²   General appearance - alert, well appearing, and in no distress  Mental status - alert, oriented to person, place, and time  Eyes - pupils equal and reactive, extraocular eye movements intact  Ears - hearing grossly normal bilaterally  Nose - normal and patent, no erythema, discharge or polyps  Mouth - mucous membranes moist, pharynx normal without lesions  Neck - supple, no significant adenopathy  Lymphatics - no palpable lymphadenopathy, no hepatosplenomegaly  Chest - wheezing noted posteriorly, decreased air entry noted posteriorly  Heart - normal rate, regular rhythm, normal S1, S2, no murmurs, rubs, clicks or gallops  Abdomen - soft, nontender, nondistended, no masses or organomegaly  Breasts - not examined  Back exam - not examined  Neurological - alert, oriented, normal speech, no focal findings or movement disorder noted  Musculoskeletal - osteoarthritic changes noted in both hands  Extremities - peripheral pulses normal, no pedal edema, no clubbing or cyanosis  Skin - normal coloration and turgor, no rashes, no suspicious skin lesions noted    Data:   Medications:   Current Facility-Administered Medications   Medication Dose Route Frequency Provider Last Rate Last Dose    albuterol (PROVENTIL) nebulizer solution 2.5 mg promethazine (PHENERGAN) tablet 12.5 mg  12.5 mg Oral Q6H PRN Gonzalez Callahan MD        Or    ondansetron [facility]) injection 4 mg  4 mg Intravenous Q6H PRN Gonzalez Callahan MD   4 mg at 10/15/20 1822       Intake/Output Summary (Last 24 hours) at 10/17/2020 8641  Last data filed at 10/16/2020 1100  Gross per 24 hour   Intake 515 ml   Output 175 ml   Net 340 ml     Recent Results (from the past 24 hour(s))   POC Glucose Fingerstick    Collection Time: 10/16/20 11:46 AM   Result Value Ref Range    POC Glucose 164 (H) 65 - 105 mg/dL     -----------------------------------------------------------------  RAD:  EKG:  Micro:     Assessment & Plan:    Patient Active Problem List:     Chronic pain     Controlled type 2 diabetes mellitus without complication, without long-term current use of insulin ([facility])     Allergic rhinitis     Hypertension goal BP (blood pressure) < 140/90     Mixed hyperlipidemia     Chronic obstructive pulmonary disease ([facility])     Coronary artery disease involving native coronary artery of native heart without angina pectoris     Primary insomnia     Diarrhea in adult patient     Restless leg syndrome     Atherosclerosis of superior mesenteric artery ([facility])     Traumatic compression fracture of T9 thoracic vertebra     Left adrenal mass ([facility])     Former smoker     Chronic bilateral low back pain with left-sided sciatica     Hypothyroidism     S/P CABG x 4     Morbid obesity with body mass index (BMI) of 40.0 to 49.9 ([facility])     Acute pain of right knee     Abscess of right thigh     Leg ulcer, left, with necrosis of muscle ([facility])     Angina pectoris ([facility])     Abnormal stress test     Atrial fibrillation ([facility])     Sepsis ([facility])     Tobacco use disorder     Neck pain     Chest pain     Acute ischemic left MCA stroke ([facility])     Internal carotid artery occlusion     Stenosis of left internal carotid artery     Acquired spondylolisthesis of cervical vertebra     Stenosis of cervical spine with myelopathy ([facility]) Plan:  -Cervical decompression - POD #3, doing okay. -COPD with cough and dyspnea - Pulmonology consulted. -D/C planning ongoing.  -Continue current treatments.  -Complete orders per chart.     See orders   Disposition:    Electronically signed by Nathaly Castorena MD on 10/17/2020 at 8:28 AM

## 2020-10-17 NOTE — PROGRESS NOTES
Physical Therapy  Facility/Department: [facility] MED SURG  Daily Treatment Note  NAME: Erna Mesa  : 1950  MRN: [id number]    Date of Service: 10/17/2020    Discharge Recommendations:  Patient would benefit from continued therapy after discharge, Continue to assess pending progress   PT Equipment Recommendations  Other: to be determined if RW needed    Assessment   Body structures, Functions, Activity limitations: Decreased functional mobility ; Decreased ADL status; Decreased strength;Decreased ROM; Decreased endurance;Decreased balance; Increased pain;Decreased posture  Treatment Diagnosis: Impaired functional mobility 2* neck pain  Prognosis: Good  History: s/p C3-C7 POSTERIOR CERVICAL DECOMPRESSION FUSION on 10/14/20  Barriers to Learning: pain  REQUIRES PT FOLLOW UP: Yes  Activity Tolerance  Activity Tolerance: Patient limited by pain     Patient Diagnosis(es): The primary encounter diagnosis was Acquired spondylolisthesis of cervical vertebra. A diagnosis of Stenosis of cervical spine with myelopathy (HCC) was also pertinent to this visit. has a past medical history of Allergic rhinitis, Arthritis, CAD (coronary artery disease), Cerebral artery occlusion with cerebral infarction ([address] CHF (congestive heart failure) ([address] Controlled type 2 diabetes mellitus without complication, without long-term current use of insulin ([address] COPD (chronic obstructive pulmonary disease) ([address] Depression, Former smoker, History of blood transfusion, Hyperlipidemia, Hypertension, Kidney stone, Myocardial infarction ([address] Obesity, Class I, BMI 30.0-34.9 (see actual BMI), Restless leg syndrome, Skin abnormality, Type II or unspecified type diabetes mellitus without mention of complication, not stated as uncontrolled, Wears glasses, and Wears partial dentures. has a past surgical history that includes Appendectomy; Hysterectomy;  Cholecystectomy; joint replacement (Bilateral); pr office/outpt visit,procedure only (N/A, 2/6/2018); pr incis/drain thigh/knee abscess,deep (Right, 5/7/2018); Leg biopsy excision (Right, 8/29/2019); Cardiac surgery; Cardiac surgery; other surgical history (07/26/2020); and cervical laminectomy (N/A, 10/14/2020). Restrictions  Restrictions/Precautions  Restrictions/Precautions: General Precautions, Fall Risk  Required Braces or Orthoses?: Yes  Implants present? : Metal implants  Required Braces or Orthoses  Cervical: c-collar(May remove to eat or shower.)  Position Activity Restriction  Other position/activity restrictions: ambulate patient  Subjective   General  Chart Reviewed: Yes  Additional Pertinent Hx: DM, COPD, MI, CABG  Family / Caregiver Present: No  Referring Practitioner: Suellen Villeda MD   Subjective  Subjective: Patient in bed upon arrival. Pt is alert and agreeable for therapy. General Comment  Comments: Writer assists patient with donning c-collar beginning of session. Pt reports understanding of c-collar. However while seated in bedside chair at the end of session, patient declines keeping c-collar donned. LIVE Barone notifed. Pain Screening  Patient Currently in Pain: Yes  Pain Assessment  Pain Assessment: 0-10  Pain Level: 7  Pain Type: Surgical pain;Acute pain  Pain Location: Neck  Pain Orientation: Posterior  Pain Descriptors: Aching;Dull  Pain Frequency: Continuous  Non-Pharmaceutical Pain Intervention(s): Ambulation/Increased Activity;Repositioned  Vital Signs  Patient Currently in Pain: Yes       Orientation  Orientation  Overall Orientation Status: Within Functional Limits  Cognition      Objective   Bed mobility  Bridging: Stand by assistance  Rolling to Left: Stand by assistance  Rolling to Right: Stand by assistance  Supine to Sit: Stand by assistance  Sit to Supine: Unable to assess(patient in bedside chair end of session.)  Scooting: Stand by assistance  Comment: Head of bed flat, used hand rail for bed mobility.  Increased time d/t increased neck pain with activity. Transfers  Sit to Stand: Contact guard assistance  Stand to sit: Stand by assistance  Bed to Chair: Stand by assistance  Comment: Transfers with RW. No cues needed safe technique. Ambulation  Ambulation?: Yes  Ambulation 1  Surface: level tile  Device: Rolling Walker  Assistance: Contact guard assistance  Quality of Gait: small steps, slighty unsteady, narrow MARIN, no LOB  Gait Deviations: Slow [place];Decreased step length;Decreased step height  Distance: 100 ft  Comments: Cues to keep rolling walker within MARIN with fair carryover. Stairs/Curb  Stairs?: Yes  Stairs  # Steps : 5  Stairs Height: 4\"  Rails: (R HHA)  Assistance: Contact guard assistance  Comment: slow pace, steady, no LOB     Balance  Posture: Fair  Sitting - Static: Good  Sitting - Dynamic: Good;-  Standing - Static: Fair;+  Comments: Standing w/ RW   Other exercises  Other exercises 1: Bed mobility - review of log roll technique  Other exercises 2: Seated (B) LE AROM exercises x10-12 reps  Other exercises 3: Patient education of importance of c-collar and use. Goals  Short term goals  Time Frame for Short term goals: 2-3 days  Short term goal 1: Pt to demo Mod I bed mobility. Short term goal 2: Pt to demo Mod I transfers. Short term goal 3: Pt to amb 75'-100' Mod I. Short term goal 4: Pt to ascend/descend 5 stairs per home set up, SBA. Short term goal 5: Pt to demo good technique for HEP for B LE strengthening. Short term goal 6: Pt to reduce pain to 3/10 to improve independence and tolerance to functional mobility. Patient Goals   Patient goals :  To go home    Plan    Plan  Times per week: 1-2x/day  Specific instructions for Next Treatment: monitor O2 sats, progress gait/stairs, up to chair, trial RW with gait vs cane  Current Treatment Recommendations: Strengthening, ROM, Balance Training, Functional Mobility Training, Transfer Training, Endurance Training, Gait Training, Stair training, Equipment Evaluation, Education, & procurement, Patient/Caregiver Education & Training, Safety Education & Training, Home Exercise Program, Pain Management, Positioning  Safety Devices  Type of devices:  All fall risk precautions in place, Chair alarm in place, Gait belt, Nurse notified, Left in chair, Patient at risk for falls     Therapy Time   Individual Concurrent Group Co-treatment   Time In 0810         Time Out 0840         Minutes David 634, PTA

## 2020-10-17 NOTE — CARE COORDINATION
ONGOING DISCHARGE PLAN:    Spoke with patient regarding discharge plan and patient confirms that plan is still home with [facility]. Referral was sent yesterday. Does not want to go to SNF- [facility]. POD#3 C3-7 posterior cervical decompression fusion. Pulmonary consulted. CXR ordered for tomorrow. Started on Duone[facility]. Will continue to follow for additional discharge needs.     Electronically signed by Sarahi Diaz RN on 10/17/2020 at 9:40 AM

## 2020-10-17 NOTE — PLAN OF CARE
Problem: Falls - Risk of:  Goal: Will remain free from falls  Description: Will remain free from falls  10/17/2020 1510 by Ernesto Roper RN  Outcome: Ongoing  10/17/2020 0159 by Abel Olivia RN  Outcome: Ongoing  Goal: Absence of physical injury  Description: Absence of physical injury  10/17/2020 1510 by Ernesto Roper RN  Outcome: Ongoing  10/17/2020 0159 by Abel Olivia RN  Outcome: Ongoing     Problem: Skin Integrity:  Goal: Will show no infection signs and symptoms  Description: Will show no infection signs and symptoms  10/17/2020 1510 by Ernesto Roper RN  Outcome: Ongoing  10/17/2020 0159 by Abel Olivia RN  Outcome: Ongoing  Goal: Absence of new skin breakdown  Description: Absence of new skin breakdown  10/17/2020 1510 by Ernesto Roper RN  Outcome: Ongoing  10/17/2020 0159 by Abel Olivia RN  Outcome: Ongoing     Problem: Pain:  Goal: Pain level will decrease  Description: Pain level will decrease  10/17/2020 1510 by Ernesto Roper RN  Outcome: Ongoing  10/17/2020 0159 by Abel Olivia RN  Outcome: Ongoing  Goal: Control of acute pain  Description: Control of acute pain  10/17/2020 1510 by Ernesto Roper RN  Outcome: Ongoing  10/17/2020 0159 by Abel Olivia RN  Outcome: Ongoing     Problem: Musculor/Skeletal Functional Status  Goal: Highest potential functional level  10/17/2020 1510 by Ernesto Roper RN  Outcome: Ongoing  10/17/2020 0159 by Abel Olivai RN  Outcome: Ongoing  Goal: Absence of falls  10/17/2020 1510 by Ernesto Roper RN  Outcome: Ongoing  10/17/2020 0159 by Abel Olivia RN  Outcome: Ongoing

## 2020-10-18 ENCOUNTER — APPOINTMENT (OUTPATIENT)
Dept: GENERAL RADIOLOGY | Age: 70
DRG: 471 | End: 2020-10-18
Attending: ORTHOPAEDIC SURGERY
Payer: MEDICARE

## 2020-10-18 VITALS
HEIGHT: 65 IN | RESPIRATION RATE: 16 BRPM | SYSTOLIC BLOOD PRESSURE: 147 MMHG | OXYGEN SATURATION: 95 % | WEIGHT: 143.3 LBS | HEART RATE: 86 BPM | BODY MASS INDEX: 23.88 KG/M2 | TEMPERATURE: 97.8 F | DIASTOLIC BLOOD PRESSURE: 71 MMHG

## 2020-10-18 LAB
GLUCOSE BLD-MCNC: 121 MG/DL (ref 65–105)
GLUCOSE BLD-MCNC: 131 MG/DL (ref 65–105)

## 2020-10-18 PROCEDURE — 6370000000 HC RX 637 (ALT 250 FOR IP): Performed by: INTERNAL MEDICINE

## 2020-10-18 PROCEDURE — 82947 ASSAY GLUCOSE BLOOD QUANT: CPT

## 2020-10-18 PROCEDURE — 2580000003 HC RX 258: Performed by: ORTHOPAEDIC SURGERY

## 2020-10-18 PROCEDURE — 71046 X-RAY EXAM CHEST 2 VIEWS: CPT

## 2020-10-18 PROCEDURE — 6370000000 HC RX 637 (ALT 250 FOR IP): Performed by: ORTHOPAEDIC SURGERY

## 2020-10-18 PROCEDURE — 99232 SBSQ HOSP IP/OBS MODERATE 35: CPT | Performed by: FAMILY MEDICINE

## 2020-10-18 PROCEDURE — 97116 GAIT TRAINING THERAPY: CPT

## 2020-10-18 PROCEDURE — 97110 THERAPEUTIC EXERCISES: CPT

## 2020-10-18 PROCEDURE — 94640 AIRWAY INHALATION TREATMENT: CPT

## 2020-10-18 RX ADMIN — GUAIFENESIN 600 MG: 600 TABLET, EXTENDED RELEASE ORAL at 08:27

## 2020-10-18 RX ADMIN — ATORVASTATIN CALCIUM 80 MG: 80 TABLET, FILM COATED ORAL at 08:27

## 2020-10-18 RX ADMIN — METFORMIN HYDROCHLORIDE 1500 MG: 500 TABLET, EXTENDED RELEASE ORAL at 08:27

## 2020-10-18 RX ADMIN — CITALOPRAM HYDROBROMIDE 20 MG: 20 TABLET ORAL at 08:27

## 2020-10-18 RX ADMIN — IPRATROPIUM BROMIDE AND ALBUTEROL SULFATE 1 AMPULE: .5; 3 SOLUTION RESPIRATORY (INHALATION) at 07:03

## 2020-10-18 RX ADMIN — IPRATROPIUM BROMIDE AND ALBUTEROL SULFATE 1 AMPULE: .5; 3 SOLUTION RESPIRATORY (INHALATION) at 10:53

## 2020-10-18 RX ADMIN — LISINOPRIL 2.5 MG: 2.5 TABLET ORAL at 08:29

## 2020-10-18 RX ADMIN — METOPROLOL TARTRATE 25 MG: 25 TABLET, FILM COATED ORAL at 08:27

## 2020-10-18 RX ADMIN — POTASSIUM CHLORIDE 10 MEQ: 10 CAPSULE, COATED, EXTENDED RELEASE ORAL at 08:28

## 2020-10-18 RX ADMIN — ISOSORBIDE MONONITRATE 30 MG: 30 TABLET, EXTENDED RELEASE ORAL at 08:27

## 2020-10-18 RX ADMIN — FUROSEMIDE 40 MG: 40 TABLET ORAL at 08:27

## 2020-10-18 RX ADMIN — Medication 10 ML: at 08:29

## 2020-10-18 ASSESSMENT — PAIN DESCRIPTION - FREQUENCY
FREQUENCY: CONTINUOUS
FREQUENCY: CONTINUOUS

## 2020-10-18 ASSESSMENT — PAIN DESCRIPTION - ONSET: ONSET: ON-GOING

## 2020-10-18 ASSESSMENT — PAIN DESCRIPTION - PROGRESSION
CLINICAL_PROGRESSION: NOT CHANGED

## 2020-10-18 ASSESSMENT — PAIN DESCRIPTION - PAIN TYPE
TYPE: SURGICAL PAIN;ACUTE PAIN
TYPE: SURGICAL PAIN;ACUTE PAIN

## 2020-10-18 ASSESSMENT — PAIN DESCRIPTION - LOCATION
LOCATION: NECK
LOCATION: NECK

## 2020-10-18 ASSESSMENT — PAIN DESCRIPTION - DESCRIPTORS
DESCRIPTORS: DULL;ACHING
DESCRIPTORS: DULL;ACHING

## 2020-10-18 ASSESSMENT — PAIN DESCRIPTION - ORIENTATION
ORIENTATION: POSTERIOR
ORIENTATION: POSTERIOR

## 2020-10-18 ASSESSMENT — PAIN SCALES - GENERAL: PAINLEVEL_OUTOF10: 7

## 2020-10-18 ASSESSMENT — PAIN - FUNCTIONAL ASSESSMENT: PAIN_FUNCTIONAL_ASSESSMENT: PREVENTS OR INTERFERES SOME ACTIVE ACTIVITIES AND ADLS

## 2020-10-18 NOTE — CARE COORDINATION
[facility] [address] Encounter Date/Time: 10/14/2020 Richie Santacruz Account: [de-identified]    MRN: [id number]    Patient: Lubna Garcia    Contact Serial #: [id number]      ENCOUNTER          Patient Class: I Private Enc? No Unit RM BD: Leticia 15    Hospital Service: Med/Surg   ADM DX: Neck pain [M54.2]   ADM Provider: Suellen Villeda MD   Procedure: VT LUMBAR SPINE FUSN,POS*   ATT Provider: Suellen Villeda MD   REF Provider:        PATIENT  Name: Lubna Garcia : 1950 (70 yrs)   Address: [address] Sex: Female   [place] city: [address] [place] [address]         Marital Status:    Employer: [address] [place] [address] View         Sikh: Tenriism   Primary Care Provider: Susana Hernandes MD         Primary Phone: [phone]   EMERGENCY CONTACT   Contact Name Legal Guardian? Relationship to Patient Home Phone Work Phone   1. Guanakito Zheng  2. *No Contact Specified*      Child    [phone]            GUARANTOR            Guarantor: Lubna Garcia     : 1950   Address: [address] Sex: Female   [place] [address]     Relation to Patient: Self       Home Phone: [phone]   Guarantor ID: [id number]       Work Phone:     Guarantor Employer: [employer]         Status: RETIRED      COVERAGE        PRIMARY INSURANCE   Payor: [facility] MEDICARE Plan: Sanjuanita Rj [place]*   Payor Address: ,          Group Number: [id number] Insurance Type: INDEMNITY   Subscriber Name: Tom Soto Subscriber : 1950   Subscriber ID: [id number] Pat. Rel. to Sub: Self   SECONDARY INSURANCE   Payor:   Plan:     Payor Address:  ,           Group Number:   Insurance Type:     Subscriber Name:   Subscriber :     Subscriber ID:   Pat.  Rel. to Sub:              CSN                                    Req/Control # [Problem retrieving Specimen ID]                                   Order Date:  Oct 18, 2020  [id number]                                          Patient Information Comp Date of Injury:

## 2020-10-18 NOTE — PLAN OF CARE
Problem: Falls - Risk of:  Goal: Will remain free from falls  Description: Will remain free from falls  10/18/2020 1415 by Cheli Burkett RN  Outcome: Ongoing  10/18/2020 0352 by Mari Fisher RN  Outcome: Ongoing  Goal: Absence of physical injury  Description: Absence of physical injury  10/18/2020 1415 by Cheli Burkett RN  Outcome: Ongoing  10/18/2020 0352 by Mari Fisher RN  Outcome: Ongoing     Problem: Skin Integrity:  Goal: Will show no infection signs and symptoms  Description: Will show no infection signs and symptoms  10/18/2020 1415 by Cheli Burkett RN  Outcome: Ongoing  10/18/2020 0352 by Mari Fisher RN  Outcome: Ongoing  Goal: Absence of new skin breakdown  Description: Absence of new skin breakdown  10/18/2020 1415 by Cheli Burkett RN  Outcome: Ongoing  10/18/2020 0352 by Mari Fisher RN  Outcome: Ongoing     Problem: Pain:  Goal: Pain level will decrease  Description: Pain level will decrease  Outcome: Ongoing  Goal: Control of acute pain  Description: Control of acute pain  Outcome: Ongoing     Problem: Musculor/Skeletal Functional Status  Goal: Highest potential functional level  Outcome: Ongoing  Goal: Absence of falls  Outcome: Ongoing

## 2020-10-18 NOTE — PROGRESS NOTES
FAMILY MEDICINE  - PROGRESS NOTE    Date:  10/18/2020  Chico Marcos  [id number]      Chief complaint: Neck pain      Interval History:  Improved, patient getting ready to do physical therapy. She has no new complaints.       Subjective  Eyes: positive for contacts/glasses  Respiratory: positive for cough and emphysema  Musculoskeletal:positive for arthralgias, myalgias and stiff joints  Behavioral/Psych: negative  Endocrine: positive for diabetic symptoms including hyperglycemia:    Objective:    BP (!) 147/71   Pulse 86   Temp 97.8 °F (36.6 °C) (Oral)   Resp 16   Ht 5' 5\" (1.651 m)   Wt 143 lb 4.8 oz (65 kg)   SpO2 97%   BMI 23.85 kg/m²   General appearance - alert, well appearing, and in no distress  Mental status - alert, oriented to person, place, and time  Eyes - pupils equal and reactive, extraocular eye movements intact  Ears - hearing grossly normal bilaterally  Nose - normal and patent, no erythema, discharge or polyps  Mouth - mucous membranes moist, pharynx normal without lesions  Neck - supple, no significant adenopathy  Lymphatics - no palpable lymphadenopathy, no hepatosplenomegaly  Chest - clear to auscultation, no wheezes, rales or rhonchi, symmetric air entry, decreased air entry noted posteriorly  Heart - normal rate, regular rhythm, normal S1, S2, no murmurs, rubs, clicks or gallops  Abdomen - soft, nontender, nondistended, no masses or organomegaly  Breasts - not examined  Back exam - not examined  Neurological - alert, oriented, normal speech, no focal findings or movement disorder noted  Musculoskeletal - osteoarthritic changes noted in both hands  Extremities - peripheral pulses normal, no pedal edema, no clubbing or cyanosis  Skin - normal coloration and turgor, no rashes, no suspicious skin lesions noted    Data:   Medications:   Current Facility-Administered Medications   Medication Dose Route Frequency Provider Last Rate Last Dose    guaiFENesin (MUCINEX) extended release tablet 600 mg  600 mg Oral BID Tono Bridges MD   600 mg at 10/18/20 0827    ipratropium-albuterol (DUONEB) nebulizer solution 1 ampule  1 ampule Inhalation Q4H WA Tono Bridges MD   1 ampule at 10/18/20 0703    albuterol (PROVENTIL) nebulizer solution 2.5 mg  2.5 mg Nebulization Q6H PRN Gudelia Davis MD        albuterol (PROVENTIL) nebulizer solution 2.5 mg  2.5 mg Nebulization Q6H PRN Gudelia Davis MD        atorvastatin (LIPITOR) tablet 80 mg  80 mg Oral Daily Gudelia Davis MD   80 mg at 10/18/20 0827    citalopram (CELEXA) tablet 20 mg  20 mg Oral Daily Gudelia Davis MD   20 mg at 10/18/20 0827    furosemide (LASIX) tablet 40 mg  40 mg Oral Every Other Day Gudelia Davis MD   40 mg at 10/18/20 0827    isosorbide mononitrate (IMDUR) extended release tablet 30 mg  30 mg Oral Daily Gudelia Davis MD   30 mg at 10/18/20 0827    lisinopril (PRINIVIL;ZESTRIL) tablet 2.5 mg  2.5 mg Oral Daily Gudelia Davis MD   2.5 mg at 10/18/20 7000    metFORMIN (GLUCOPHAGE-XR) extended release tablet 1,500 mg  1,500 mg Oral Daily with breakfast Gudelia Davis MD   1,500 mg at 10/18/20 0827    metoprolol tartrate (LOPRESSOR) tablet 25 mg  25 mg Oral BID Gudelia Davis MD   25 mg at 10/18/20 0827    nitroGLYCERIN (NITROSTAT) SL tablet 0.4 mg  0.4 mg Sublingual Q5 Min PRN Gudelia Davis MD        potassium chloride (MICRO-K) extended release capsule 10 mEq  10 mEq Oral Daily Gudelia Davis MD   10 mEq at 10/18/20 2938    rOPINIRole (REQUIP) tablet 1 mg  1 mg Oral Nightly Gudelia Davis MD   1 mg at 10/17/20 2471    sodium chloride flush 0.9 % injection 10 mL  10 mL Intravenous 2 times per day Gudelia Davis MD   10 mL at 10/18/20 0829    sodium chloride flush 0.9 % injection 10 mL  10 mL Intravenous PRN Gudelia Davis MD        oxyCODONE (ROXICODONE) immediate release tablet 5 mg  5 mg Oral Q4H PRN Gudelia Davis MD        Or   Sarwat Hanks oxyCODONE HCl (OXY-IR) immediate release tablet 10 mg  10 mg Oral Q4H PRN Tariq Herrera MD   10 mg at 10/17/20 1809    cyclobenzaprine (FLEXERIL) tablet 10 mg  10 mg Oral TID PRN Tariq Herrera MD   10 mg at 10/15/20 2057    magnesium hydroxide (MILK OF MAGNESIA) 400 MG/5ML suspension 30 mL  30 mL Oral Daily PRN Tariq Herrera MD        promethazine (PHENERGAN) tablet 12.5 mg  12.5 mg Oral Q6H PRMASON Herrera MD        Or    ondansetron [facility] injection 4 mg  4 mg Intravenous Q6H PRN Tariq Herrera MD   4 mg at 10/15/20 1822       Intake/Output Summary (Last 24 hours) at 10/18/2020 0900  Last data filed at 10/17/2020 1145  Gross per 24 hour   Intake --   Output 350 ml   Net -350 ml     Recent Results (from the past 24 hour(s))   POC Glucose Fingerstick    Collection Time: 10/17/20  9:09 AM   Result Value Ref Range    POC Glucose 134 (H) 65 - 105 mg/dL   POC Glucose Fingerstick    Collection Time: 10/17/20  5:00 PM   Result Value Ref Range    POC Glucose 161 (H) 65 - 105 mg/dL   POC Glucose Fingerstick    Collection Time: 10/17/20  8:45 PM   Result Value Ref Range    POC Glucose 155 (H) 65 - 105 mg/dL   POC Glucose Fingerstick    Collection Time: 10/18/20  6:09 AM   Result Value Ref Range    POC Glucose 121 (H) 65 - 105 mg/dL     -----------------------------------------------------------------  RAD:  EKG:  Micro:     Assessment & Plan:    Patient Active Problem List:     Chronic pain     Controlled type 2 diabetes mellitus without complication, without long-term current use of insulin (HCC)     Allergic rhinitis     Hypertension goal BP (blood pressure) < 140/90     Mixed hyperlipidemia     Chronic obstructive pulmonary disease (HCC)     Coronary artery disease involving native coronary artery of native heart without angina pectoris     Primary insomnia     Diarrhea in adult patient     Restless leg syndrome     Atherosclerosis of superior mesenteric artery ([address]     Traumatic compression fracture of T9 thoracic vertebra     Left adrenal mass

## 2020-10-18 NOTE — PLAN OF CARE
Problem: Falls - Risk of:  Goal: Will remain free from falls  Description: Will remain free from falls  10/18/2020 0352 by Sabine Rod RN  Outcome: Ongoing  10/17/2020 1510 by Frances Patel RN  Outcome: Ongoing  Goal: Absence of physical injury  Description: Absence of physical injury  10/18/2020 0352 by Sabine Rod RN  Outcome: Ongoing  10/17/2020 1510 by Frances Patel RN  Outcome: Ongoing

## 2020-10-18 NOTE — PROGRESS NOTES
Patient removed from O2 this morning and declined to 88% on room air. Patient was placed back on 2L NC. Message sent to Dr. Urszula Hayes. Confirmed to do a home O2 evaluation.

## 2020-10-18 NOTE — CARE COORDINATION
Continuity of Care Form    Patient Name: Mike Ty   :  1950  MRN:  [id number]    Admit date:  10/14/2020  Discharge date:  10/18/2020    Code Status Order: Full Code   Advance Directives:   [facility] Documentation       Date/Time Healthcare Directive Type of Healthcare Directive Copy in [address] Agent's Name Healthcare Agent's Phone Number    10/14/20 4475  No, patient does not have an advance directive for healthcare treatment declined info -- -- -- -- --            Admitting Physician:  Heath Abad MD  PCP: Maryetta Seip, MD    Discharging Nurse: [facility] Unit/Room#: 9099/4584-37  Discharging Unit Phone Number: [phone]    Emergency Contact:   Extended Emergency Contact Information  Primary Emergency Contact: Guanakito Zheng  Address: [address] Phone: [phone]  Work Phone: [phone]  Mobile Phone: [phone]  Relation: Child    Past Surgical History:  Past Surgical History:   Procedure Laterality Date    APPENDECTOMY      CARDIAC SURGERY      cath x 2/ stent x 1    CARDIAC SURGERY      bypass 4 vessel 2018    CERVICAL LAMINECTOMY N/A 10/14/2020    C3-C7 POSTERIOR CERVICAL DECOMPRESSION FUSION performed by Heath Abad MD at [address] [facility] [place] Bilateral     knees    LEG BIOPSY EXCISION Right 2019    LEG LESION PUNCH BIOPSY performed by Babar Moore MD at [place] [address]  2020    cerebral angiogram    DE INCIS/DRAIN THIGH/KNEE ABSCESS,DEEP Right 2018    DEBRIDEMENT INCISION AND DRAINAGE THIGH ABSCESS performed by Andres Briceño DO at [address] [place] [address] OFFICE/OUTPT VISIT,PROCEDURE ONLY N/A 2018    CABG X 3 LIMA-LAD-DIAG,SVG-PDA,CORONARY ARTERY BYPASS REDO, PUMP ASSIST, SWAN, JARRED, REDO STERNOTOMY performed by Rosa Oro MD at [address] [facility] Immunization History:   Immunization History   Administered Date(s) Administered    Influenza Virus Vaccine 11/14/2016, 09/29/2017    Influenza Whole 11/11/2015    Influenza, High Dose (Fluzone 65 yrs and older) 10/24/2018    Influenza, Quadv, IM, PF (6 mo and older Fluzone, Flulaval, Fluarix, and 3 yrs and older Afluria) 08/27/2019    Pneumococcal Conjugate 13-valent ([id number]) 11/14/2016    Pneumococcal Polysaccharide ([id number]) 11/01/2017    Tdap (Boostrix, Adacel) 07/02/2020       Active Problems:  Patient Active Problem List   Diagnosis Code    Chronic pain G89.29    Controlled type 2 diabetes mellitus without complication, without long-term current use of insulin ([facility]) E11.9    Allergic rhinitis J30.9    Hypertension goal BP (blood pressure) < 140/90 I10    Mixed hyperlipidemia E78.2    Chronic obstructive pulmonary disease ([facility]) J44.9    Coronary artery disease involving native coronary artery of native heart without angina pectoris I25.10    Primary insomnia F51.01    Diarrhea in adult patient R19.7    Restless leg syndrome G25.81    Atherosclerosis of superior mesenteric artery ([facility]) K55.1    Traumatic compression fracture of T9 thoracic vertebra S22.070A    Left adrenal mass ([facility]) E27.8    Former smoker Z87.891    Chronic bilateral low back pain with left-sided sciatica M54.42, G89.29    Hypothyroidism E03.9    S/P CABG x 4 Z95.1    Morbid obesity with body mass index (BMI) of 40.0 to 49.9 ([facility]) E66.01    Acute pain of right knee M25.561    Abscess of right thigh L02.415    Leg ulcer, left, with necrosis of muscle ([facility]) L97.923    Angina pectoris ([facility]) I20.9    Abnormal stress test R94.39    Atrial fibrillation ([facility]) I48.91    Sepsis ([facility]) A41.9    Tobacco use disorder F17.200    Neck pain M54.2    Chest pain R07.9    Acute ischemic left MCA stroke ([facility]) X67.693    Internal carotid artery occlusion I65.29    Stenosis of left internal carotid artery I65.22    Acquired spondylolisthesis of cervical vertebra M43.12    Stenosis of cervical spine with myelopathy (HCC) M48.02, G99.2       Isolation/Infection:   Isolation            No Isolation          Patient Infection Status       Infection Onset Added Last Indicated Last Indicated By Review Planned Expiration Resolved Resolved By    None active    Resolved    COVID-19 Rule Out 10/10/20 10/10/20 10/10/20 COVID-19 (Ordered)   10/11/20 Rule-Out Test Resulted            Nurse Assessment:  Last Vital Signs: /68   Pulse 85   Temp 97.3 °F (36.3 °C) (Oral)   Resp 16   Ht 5' 5\" (1.651 m)   Wt 147 lb 12.8 oz (67 kg)   SpO2 95%   BMI 24.60 kg/m²     Last documented pain score (0-10 scale): Pain Level: 9  Last Weight:   Wt Readings from Last 1 Encounters:   10/15/20 147 lb 12.8 oz (67 kg)     Mental Status:  oriented and alert    IV Access:  - None    Nursing Mobility/ADLs:  Walking   Assisted  Transfer  Assisted  Bathing  Assisted  Dressing  Independent  Toileting  Independent  Feeding  Independent  Med Admin  Independent  Med Delivery   whole      Safety Concerns: At Risk for Falls    Impairments/Disabilities:      None    Nutrition Therapy:  Current Nutrition Therapy:   - Oral Diet:  General    Routes of Feeding: Oral  Liquids: No Restrictions  Daily Fluid Restriction: no  Last Modified Barium Swallow with Video (Video Swallowing Test): not done    Treatments at the Time of Hospital Discharge:   Respiratory Treatments: see MAR  Oxygen Therapy:  is on oxygen at 2 L/min per nasal cannula.  this is new for the patient  Ventilator:    - No ventilator support    Rehab Therapies: No PT/OT until follow-up with Dr. Brayan Calero  Weight Bearing Status/Restrictions: No weight bearing restirctions  Other Medical Equipment (for information only, NOT a DME order):  walker  Other Treatments: Skilled Nursing Assessment, Medication Education and Monitoring  Patient wants Yossi Olga as her nurse - Her neighbor    Patient's personal belongings (please select all that are sent with patient):  None    RN SIGNATURE:  Electronically signed by Cassandra Arshad RN on 10/18/20 at 12:59 PM EDT    CASE MANAGEMENT/SOCIAL WORK SECTION    Inpatient Status Date: 10/14/2020    Readmission Risk Assessment Score:  Readmission Risk              Risk of Unplanned Readmission:        16           Discharging to Facility/ [facility]  Phone: [phone]  Fax [phone]        / signature: Electronically signed by Ahsan Gee RN on 10/15/20 at 2:07 PM EDT    PHYSICIAN SECTION    Prognosis: Fair    Condition at Discharge: Stable    Rehab Potential (if transferring to Rehab): Fair    Recommended Labs or Other Treatments After Discharge: see above    Physician Certification: I certify the above information and transfer of Aries Pierce  is necessary for the continuing treatment of the diagnosis listed and that she requires Home Care for less 30 days. Update Admission H&P: Changes in H&P as follows - s/p Cspine surgery    PHYSICIAN SIGNATURE:  Electronically signed by Silas Niño MD on 10/18/20 at 10:51 AM EDT    Current Discharge Medication List     START taking these medications     Medication  Dose    oxyCODONE-acetaminophen (PERCOCET) 5-325 MG per tablet  1 tablet    Take 1 tablet by mouth every 6 hours as needed for Pain for up to 7 days. Intended supply: 7 days.  Take lowest dose possible to manage pain    Quantity: 28 tablet  Refills: 0        Comments: Post operative       CONTINUE these medications which have NOT CHANGED     Medication  Dose    atorvastatin (LIPITOR) 40 MG tablet  80 mg    Take 2 tablets by mouth daily    Quantity: 90 tablet  Refills: 3        clopidogrel (PLAVIX) 75 MG tablet  75 mg    Take 1 tablet by mouth daily    Quantity: 90 tablet  Refills: 3        metoprolol tartrate (LOPRESSOR) 25 MG tablet     TAKE 1 TABLET BY MOUTH TWICE DAILY    Quantity: 180 tablet  Refills: 3        diclofenac sodium (VOLTAREN) 1 % GEL  2 g    Apply 2 g topically 4 times daily    Quantity: 2 Tube  Refills: 1        furosemide (LASIX) 40 MG tablet  40 mg    Take 1 tablet by mouth every other day    Quantity: 90 tablet  Refills: 3        fluticasone (FLONASE) 50 MCG/ACT nasal spray     SHAKE LIQUID AND USE 2 SPRAYS IN EACH NOSTRIL DAILY    Quantity: 3 Bottle  Refills: 3        Comments: **Patient requests 90 days supply**       tiZANidine (ZANAFLEX) 4 MG tablet     TAKE 1 TABLET BY MOUTH EVERY 8 HOURS AS NEEDED FOR BACK PAIN    Quantity: 270 tablet  Refills: 0        potassium chloride (KLOR-CON) 10 MEQ extended release tablet     TAKE 2 TABLETS BY MOUTH EVERY DAY    Quantity: 180 tablet  Refills: 3        rOPINIRole (REQUIP) 1 MG tablet     TAKE 1 TABLET BY MOUTH EVERY NIGHT AS NEEDED    Quantity: 90 tablet  Refills: 3        metFORMIN (GLUCOPHAGE-XR) 500 MG extended release tablet  1,500 mg    Take 3 tablets by mouth daily (with breakfast)    Quantity: 270 tablet  Refills: 3        lisinopril (PRINIVIL;ZESTRIL) 2.5 MG tablet     TAKE 1 TABLET BY MOUTH EVERY DAY    Quantity: 90 tablet  Refills: 3        citalopram (CELEXA) 20 MG tablet     TAKE 1 TABLET BY MOUTH EVERY DAY    Quantity: 90 tablet  Refills: 3        isosorbide mononitrate (IMDUR) 30 MG extended release tablet  30 mg    Take 30 mg by mouth        albuterol (PROVENTIL) (2.5 MG/3ML) 0.083% nebulizer solution  2.5 mg    Take 3 mLs by nebulization every 6 hours as needed for Wheezing    Quantity: 120 each  Refills: 3        albuterol sulfate HFA (PROAIR HFA) 108 (90 Base) MCG/ACT inhaler  2 puffs    Inhale 2 puffs into the lungs every 4 hours as needed for Wheezing    Quantity: 1 Inhaler  Refills: 3        hydrocortisone 2.5 % cream     Apply topically 2 times daily.     Quantity: 28 g  Refills: 11        aspirin 81 MG chewable tablet  81 mg    Take 1 tablet by mouth daily    Quantity: 30 tablet  Refills: 3        ONE TOUCH ULTRA TEST strip TEST ONCE DAILY AS NEEDED    Quantity: 100 strip  Refills: 3        nitroGLYCERIN (NITROSTAT) 0.4 MG SL tablet  0.4 mg    Place 0.4 mg under the tongue every 5 minutes as needed for Chest pain up to max of 3 total doses. If no relief after 1 dose, call 911.             STOP taking these previous medications     Medication  Dose  Reason for Stopping  Comments    (STOP TAKING) Multiple Vitamins-Minerals (MULTIVITAMIN WITH MINERALS) tablet  1 tablet

## 2020-10-18 NOTE — PROGRESS NOTES
CREATININE, CALCIUM in the last 72 hours. ABGs: No results for input(s): PHART, PO2ART, HDJ9TBP, YDH0CBB, BEART, Q0FDYYNQ, MXI8RTK in the last 72 hours.    PT/INR:  No results found for: PTINR    ASSESSMENT / PLAN:    COPD - scheduled BD  Mucus plugging - mucinex; acapella  Acute hypoxic resp failure - due to atelectasis - IS  CXR 10/18/20  S/p posterior cervical decompression C3-7 and fusion  10/14/20  F2F for home O2  OK for DC  F/u in office    Electronically signed by Kasey Solano on 10/18/20 at 12:45 PM.

## 2020-10-18 NOTE — CARE COORDINATION
ONGOING DISCHARGE PLAN:     Spoke with patient regarding discharge plan and she confirmed that plan is home with [facility]. Yesterday, she stated she would like [facility], now today she states it is actually [address] that she would like. Called [facility], spoke with \"Ashley\" and cancelled referral.  Referral faxed to [address] Pt was NOT agreeable to having a walker ordered for her yesterday, however today she is agreeable.     POD#4 C3-7 posterior cervical decompression fusion.     Pulmonary following. CXR today. On Duoneb (has neb at home). May need home 02 at discharge.     Will continue to follow for additional discharge needs. Electronically signed by Mike Lopez RN on 10/18/2020 at 9:05 AM    Pt qualifies for home 02. Electronically signed by Mike Lopez RN on 10/18/2020 at 12:09 PM    Faxed facesheet and order for walker to [facility]. Also faxed home 02 eval, home 02 order, face to face, and d/c med list to [facility]. Spoke with Lavinia from [facility]. Portable tank and walker to be delivered to pt's room for d/c today. Electronically signed by Mike Lopez RN on 10/18/2020 at 12:56 PM     Faxed [address] and d/c med list to [address]     Electronically signed by Mike Lopez RN on 10/18/2020 at 1:00 PM

## 2020-10-18 NOTE — PROGRESS NOTES
Physical Therapy  Facility/Department: [facility] MED SURG  Daily Treatment Note  NAME: Cody Arenas  : 1950  MRN: [id number]    Date of Service: 10/18/2020    Discharge Recommendations:  Patient would benefit from continued therapy after discharge, Continue to assess pending progress   PT Equipment Recommendations  Equipment Needed: Yes  Mobility Devices: Mehreennikky Kerns: Standard    Assessment   Body structures, Functions, Activity limitations: Decreased functional mobility ; Decreased ADL status; Decreased strength;Decreased ROM; Decreased endurance;Decreased balance; Increased pain;Decreased posture  Treatment Diagnosis: Impaired functional mobility 2* neck pain  Specific instructions for Next Treatment: monitor O2 sats, progress gait/stairs, up to chair, trial RW with gait vs cane  Prognosis: Good  History: s/p C3-C7 POSTERIOR CERVICAL DECOMPRESSION FUSION on 10/14/20  Barriers to Learning: pain  REQUIRES PT FOLLOW UP: Yes  Activity Tolerance  Activity Tolerance: Patient limited by pain     Patient Diagnosis(es): The primary encounter diagnosis was Acquired spondylolisthesis of cervical vertebra. A diagnosis of Stenosis of cervical spine with myelopathy (HCC) was also pertinent to this visit. has a past medical history of Allergic rhinitis, Arthritis, CAD (coronary artery disease), Cerebral artery occlusion with cerebral infarction ([address] CHF (congestive heart failure) ([address] Controlled type 2 diabetes mellitus without complication, without long-term current use of insulin ([address] COPD (chronic obstructive pulmonary disease) ([address] Depression, Former smoker, History of blood transfusion, Hyperlipidemia, Hypertension, Kidney stone, Myocardial infarction ([address] Obesity, Class I, BMI 30.0-34.9 (see actual BMI), Restless leg syndrome, Skin abnormality, Type II or unspecified type diabetes mellitus without mention of complication, not stated as uncontrolled, Wears glasses, and Wears partial dentures.    has a past surgical history that includes Appendectomy; Hysterectomy; Cholecystectomy; joint replacement (Bilateral); pr office/outpt visit,procedure only (N/A, 2/6/2018); pr incis/drain thigh/knee abscess,deep (Right, 5/7/2018); Leg biopsy excision (Right, 8/29/2019); Cardiac surgery; Cardiac surgery; other surgical history (07/26/2020); and cervical laminectomy (N/A, 10/14/2020). Restrictions  Restrictions/Precautions  Restrictions/Precautions: General Precautions, Fall Risk  Required Braces or Orthoses?: Yes  Implants present? : Metal implants  Required Braces or Orthoses  Cervical: c-collar(May remove to eat or shower.)  Position Activity Restriction  Other position/activity restrictions: ambulate patient  Subjective   General  Additional Pertinent Hx: DM, COPD, MI, CABG  Response To Previous Treatment: Patient with no complaints from previous session. Family / Caregiver Present: No  Referring Practitioner: Andra Ritchie MD   Subjective  Subjective: Patient in bed upon arrival. Pt is alert and agreeable for therapy. States she is eager to go home. General Comment  Comments: RN Miladis preston's pt for PT. C-collar donned for out of bed activities. Pain Screening  Patient Currently in Pain: Yes  Pain Assessment  Pain Assessment: 0-10  Patient's Stated Pain Goal: 7  Pain Type: Surgical pain;Acute pain  Pain Location: Neck  Pain Orientation: Posterior  Pain Descriptors: Dull;Aching  Pain Frequency: Continuous  Non-Pharmaceutical Pain Intervention(s): Repositioned; Ambulation/Increased Activity; Rest  Vital Signs  Patient Currently in Pain: Yes  Oxygen Therapy  SpO2: 95 %  Pulse Oximeter Device Location: Finger;Right  O2 Device: Nasal cannula  O2 Flow Rate (L/min): 2 L/min       Orientation  Orientation  Overall Orientation Status: Within Functional Limits  Cognition      Objective   Bed mobility  Bridging: Supervision  Rolling to Left: Supervision  Rolling to Right: Supervision  Supine to Sit: Stand by assistance  Sit to Supine: Stand by assistance  Scooting: Supervision  Comment: Head of bed flat, used hand rail for rolling. Increased time d/t increased neck pain with activity. Transfers  Sit to Stand: Stand by assistance;Contact guard assistance  Stand to sit: Stand by assistance  Stand Pivot Transfers: Contact guard assistance  Comment: Transfers with rolling walker. No cues needed for safe technique. Ambulation  Ambulation?: Yes  Ambulation 1  Surface: level tile  Device: Rolling Walker  Assistance: Contact guard assistance;Stand by assistance  Quality of Gait: small steps, slighty unsteady, narrow MARIN, no LOB, forward posture  Gait Deviations: Slow Lindsay;Decreased step length;Decreased step height  Distance: 120 ft  Comments: Cues for posture and to look up, tends to gaze down at floor. Cues to keep rolling walker within MARIN with fair carryover. Stairs/Curb  Stairs?: Yes  Stairs  # Steps : 10  Stairs Height: 4\"  Rails: (R HHA)  Assistance: Contact guard assistance  Comment: slow pace, steady, no LOB     Balance  Posture: Fair  Sitting - Static: Good  Sitting - Dynamic: Good;-  Standing - Static: Fair;+  Standing - Dynamic: Fair  Comments: Standing w/ RW   Other exercises  Other exercises 1: Bed mobility - review of log roll technique  Other exercises 2: Seated (B) LE AROM exercises x10-12 reps  Other exercises 3: Patient education of importance of c-collar and use. Other exercises 4: Issued and reviewed HEP for B LE strengthening. All questions answered. Goals  Short term goals  Time Frame for Short term goals: 2-3 days  Short term goal 1: Pt to demo Mod I bed mobility. Short term goal 2: Pt to demo Mod I transfers. Short term goal 3: Pt to amb 75'-100' Mod I. Short term goal 4: Pt to ascend/descend 5 stairs per home set up, SBA. Short term goal 5: Pt to demo good technique for HEP for B LE strengthening.   Short term goal 6: Pt to reduce pain to 3/10 to improve independence and tolerance to functional mobility. Patient Goals   Patient goals : To go home    Plan    Plan  Times per week: 1-2x/day  Specific instructions for Next Treatment: monitor O2 sats, progress gait/stairs, up to chair, trial RW with gait vs cane  Current Treatment Recommendations: Strengthening, ROM, Balance Training, Functional Mobility Training, Transfer Training, Endurance Training, Gait Training, Stair training, Equipment Evaluation, Education, & procurement, Patient/Caregiver Education & Training, Safety Education & Training, Home Exercise Program, Pain Management, Positioning  Safety Devices  Type of devices:  All fall risk precautions in place, Gait belt, Nurse notified, Patient at risk for falls, Left in bed, Bed alarm in place     Therapy Time   Individual Concurrent Group Co-treatment   Time In 1026         Time Out 1053         Minutes [address], PTA

## 2020-10-18 NOTE — CARE COORDINATION
DISCHARGE PLANNING NOTE:    Walker and portable 02 tank delivered to room per [facility].     Electronically signed by Enrique Villasenor RN on 10/18/2020 at 2:52 PM

## 2020-10-18 NOTE — PROGRESS NOTES
Home Oxygen Evaluation    Home Oxygen Evaluation completed. Patient is on 2 liters per minute via nasal cannula. Resting SpO2 = 95%  Resting SpO2 on room air = 88%    SpO2 on room air with exercise = na%  SpO2 on oxygen as above with exercise = na%    Nocturnal Oximetry with patient on room air is recommended is SpO2 is between 89% and 95% (requires additional order).     Alicia Nuñez  11:36 AM

## 2020-10-18 NOTE — CARE COORDINATION
[facility] [address] Encounter Date/Time: 10/14/2020 Richie Santacruz Account: [de-identified]    MRN: [id number]    Patient: Loyda Mora    Contact Serial #: [id number]      ENCOUNTER                Patient Class: I Private Enc? Shanon Nim BD: [address] RPS [id number]   Hospital Service: Med/Surg   ADM DX: Neck pain [M54.2]   ADM Provider: Nuvia Good MD   Procedure: LA LUMBAR SPINE FUSN,POS*   ATT Provider: Nuvia Good MD   REF Provider:        PATIENT                 Name: Loyda Mora : 1950 (70 yrs)   Address: [address] Sex: Female   [place] city: [address] [place] [address]         Marital Status:    Employer: [address] [place], [place] [address] [place]         Pentecostalism: [employer]   Primary Care Provider: Mehul Wilkerson MD         Primary Phone: [phone]   EMERGENCY CONTACT   Contact Name Legal Guardian? Relationship to Patient Home Phone Work Phone   1. Guanakito Zheng  2. *No Contact Specified*      Child    [phone]            GUARANTOR                    Guarantor: Loyda Mora     : 1950   Address: [address] Sex: Female   [place] [address]     Relation to Patient: Self       Home Phone: [phone]   Guarantor ID: [id number]       Work Phone:     Guarantor Employer: [employer]         Status: RETIRED      COVERAGE            PRIMARY INSURANCE   Payor: [facility] MEDICARE Plan: [address] [place] [address] DUAL CO*   Payor Address: ,          Group Number: [id number] Insurance Type: INDEMNITY   Subscriber Name: Edilma Contreras Subscriber : 1950   Subscriber ID: [id number] Pat. Rel. to Sub: Self   SECONDARY INSURANCE   Payor:   Plan:     Payor Address:  ,           Group Number:   Insurance Type:     Subscriber Name:   Subscriber :     Subscriber ID:   Pat.  Rel. to Sub:          CSN                                    Req/Control # [Problem retrieving Specimen ID]                                   Order Date:  Oct 18, 2020  [id number]     Patient Information      Name:  Jimy Arellano  :  1950  Age:  65 y.o. Address:  [address] [place] [address]   Zip:  [address]  PCP: Feliberto Barriga MD Sex:  F  SSN: [id number]  Home Phone: [phone]  Work Phone:    Patient MRN:  [id number]    Alt Patient ID:  [id number]  PCP Phone: [phone]       Authorizing Provider Information       AUTHORIZING PROVIDER: Zeus Olson MD  Physician ID: [id number]  NPI:  [id number]  Site:   Address: [address] [place] [address] 3, [address] [facility] [address]  [address]  Phone: [phone]  Fax: [phone]               EQUIPMENT ORDERED  DME Order for Home Oxygen as OP [KLJ504] (ORD   #:   [id number]) Priority  Routine Class  Hospital Performed        Associated Diagnosis:  Panlobular emphysema ([facility] (J43.1 [ICD-10-CM])        Comments:    You must complete the order parameters below and add the medical necessity documentation for this DME in a separate note.     Stationary Oxygen Concentrator at 2 lpm via Nasal Cannula     Stationary Prescribed at:  Continuous     Portable Gaseous O2 System and contents at 2 lpm via Nasal Cannula     3-4hrs/day     Diagnosis: COPD  Length of need: Lifetime            Scheduling Instructions:                                 Specimen Source             Collection Date    Collection Time    Order Status    Expected Date                Electronically Signed By  Zeus Olson MD Date  Oct 18, 2020      Responsible Party Leonard Kevin      Guar-ActID   Relationship Account Type Home Phone   Ramakrishna Adams - 100* [address] [place], [address] Self P/F [phone]   Work [facility]     Primary Insurance  Insurance/Subscriber ID:  [id number]  Subscriber Name:  Ramakrishna Adams              Relationship to Patient: SelfSigned ABN: N    Payor Name:  [address]:  Eliseo Dsouza Group: [id number]  Worker's Comp Date of Injury:            Current Discharge Medication List     START taking these medications     Medication  Dose    oxyCODONE-acetaminophen (PERCOCET) 5-325 MG per tablet  1 tablet    Take 1 tablet by mouth every 6 hours as needed for Pain for up to 7 days. Intended supply: 7 days.  Take lowest dose possible to manage pain    Quantity: 28 tablet  Refills: 0        Comments: Post operative       CONTINUE these medications which have NOT CHANGED     Medication  Dose    atorvastatin (LIPITOR) 40 MG tablet  80 mg    Take 2 tablets by mouth daily    Quantity: 90 tablet  Refills: 3        clopidogrel (PLAVIX) 75 MG tablet  75 mg    Take 1 tablet by mouth daily    Quantity: 90 tablet  Refills: 3        metoprolol tartrate (LOPRESSOR) 25 MG tablet     TAKE 1 TABLET BY MOUTH TWICE DAILY    Quantity: 180 tablet  Refills: 3        diclofenac sodium (VOLTAREN) 1 % GEL  2 g    Apply 2 g topically 4 times daily    Quantity: 2 Tube  Refills: 1        furosemide (LASIX) 40 MG tablet  40 mg    Take 1 tablet by mouth every other day    Quantity: 90 tablet  Refills: 3        fluticasone (FLONASE) 50 MCG/ACT nasal spray     SHAKE LIQUID AND USE 2 SPRAYS IN EACH NOSTRIL DAILY    Quantity: 3 Bottle  Refills: 3        Comments: **Patient requests 90 days supply**       tiZANidine (ZANAFLEX) 4 MG tablet     TAKE 1 TABLET BY MOUTH EVERY 8 HOURS AS NEEDED FOR BACK PAIN    Quantity: 270 tablet  Refills: 0        potassium chloride (KLOR-CON) 10 MEQ extended release tablet     TAKE 2 TABLETS BY MOUTH EVERY DAY    Quantity: 180 tablet  Refills: 3        rOPINIRole (REQUIP) 1 MG tablet     TAKE 1 TABLET BY MOUTH EVERY NIGHT AS NEEDED    Quantity: 90 tablet  Refills: 3        metFORMIN (GLUCOPHAGE-XR) 500 MG extended release tablet  1,500 mg    Take 3 tablets by mouth daily (with breakfast)    Quantity: 270 tablet  Refills: 3        lisinopril (PRINIVIL;ZESTRIL) 2.5 MG tablet     TAKE 1 TABLET BY MOUTH EVERY DAY Quantity: 90 tablet  Refills: 3        citalopram (CELEXA) 20 MG tablet     TAKE 1 TABLET BY MOUTH EVERY DAY    Quantity: 90 tablet  Refills: 3        isosorbide mononitrate (IMDUR) 30 MG extended release tablet  30 mg    Take 30 mg by mouth        albuterol (PROVENTIL) (2.5 MG/3ML) 0.083% nebulizer solution  2.5 mg    Take 3 mLs by nebulization every 6 hours as needed for Wheezing    Quantity: 120 each  Refills: 3        albuterol sulfate HFA (PROAIR HFA) 108 (90 Base) MCG/ACT inhaler  2 puffs    Inhale 2 puffs into the lungs every 4 hours as needed for Wheezing    Quantity: 1 Inhaler  Refills: 3        hydrocortisone 2.5 % cream     Apply topically 2 times daily. Quantity: 28 g  Refills: 11        aspirin 81 MG chewable tablet  81 mg    Take 1 tablet by mouth daily    Quantity: 30 tablet  Refills: 3        ONE TOUCH ULTRA TEST strip     TEST ONCE DAILY AS NEEDED    Quantity: 100 strip  Refills: 3        nitroGLYCERIN (NITROSTAT) 0.4 MG SL tablet  0.4 mg    Place 0.4 mg under the tongue every 5 minutes as needed for Chest pain up to max of 3 total doses. If no relief after 1 dose, call 911.             STOP taking these previous medications     Medication  Dose  Reason for Stopping  Comments    (STOP TAKING) Multiple Vitamins-Minerals (MULTIVITAMIN WITH MINERALS) tablet  1 tablet
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
## 2020-10-27 ENCOUNTER — OFFICE VISIT (OUTPATIENT)
Dept: ORTHOPEDIC SURGERY | Age: 70
End: 2020-10-27

## 2020-10-27 PROCEDURE — 99024 POSTOP FOLLOW-UP VISIT: CPT | Performed by: ORTHOPAEDIC SURGERY

## 2020-10-27 NOTE — PROGRESS NOTES
at [facility] OFFICE/OUTPT VISIT,PROCEDURE ONLY N/A 2018    CABG X 3 LIMA-LAD-DIAG,SVG-PDA,CORONARY ARTERY BYPASS REDO, PUMP ASSIST, SWAN, JARRED, REDO STERNOTOMY performed by Jude Perera MD at [facility] History   Problem Relation Age of Onset    Heart Disease Father      Social History     Occupational History    Not on file   Tobacco Use    Smoking status: Current Every Day Smoker     Packs/day: 0.25     Years: 56.00     Pack years: 14.00     Types: Cigarettes     Last attempt to quit: 2019     Years since quittin.1    Smokeless tobacco: Never Used    Tobacco comment: 4-5 cigarettes a day   Substance and Sexual Activity    Alcohol use: No     Alcohol/week: 0.0 standard drinks    Drug use: Yes     Types: Marijuana     Comment: ocassionally    Sexual activity: Not on file        Review of Systems         Physical Exam  Vitals signs and nursing note reviewed. Constitutional:       Appearance: She is well-developed. HENT:      Head: Normocephalic and atraumatic. Nose: Nose normal.   Eyes:      Conjunctiva/sclera: Conjunctivae normal.   Neck:      Musculoskeletal: Normal range of motion and neck supple. Pulmonary:      Effort: Pulmonary effort is normal. No respiratory distress. Musculoskeletal:      Comments: Normal gait     Skin:     General: Skin is warm and dry. Neurological:      Mental Status: She is alert and oriented to person, place, and time. Sensory: No sensory deficit. Psychiatric:         Behavior: Behavior normal.         Thought Content: Thought content normal.     Incision healing well Staples discontinued    Diagnostic x-rays AP lateral cervical spine severe multilevel cervical spondylosis status post C3-C7 posterior cervical decompression instrumented fusion  Assessment:          1. Neck pain    2. Acquired spondylolisthesis of cervical vertebra    3. Stenosis of cervical spine with myelopathy ([facility]        Plan:      Fu 4 w  Discontinue collar 2 weeks    Orders Placed This Encounter   Procedures    XR CERVICAL SPINE (2-3 VIEWS)     Standing Status:   Future     Number of Occurrences:   1     Standing Expiration Date:   10/27/2021        Damian Lorenz MD    Please note that this chart was generated using voicerecognition Dragon dictation software. Although every effort was made to ensurethe accuracy of this automated transcription, some errors in transcription may haveoccurred.

## 2020-11-11 PROBLEM — Z01.810 ENCOUNTER FOR PREPROCEDURAL CARDIOVASCULAR EXAMINATION: Status: ACTIVE | Noted: 2020-09-04

## 2020-11-11 PROBLEM — R94.31 ABNORMAL EKG: Status: ACTIVE | Noted: 2020-09-04

## 2020-11-19 ENCOUNTER — OFFICE VISIT (OUTPATIENT)
Dept: ORTHOPEDIC SURGERY | Age: 70
End: 2020-11-19

## 2020-11-19 VITALS — WEIGHT: 135 LBS | HEIGHT: 65 IN | BODY MASS INDEX: 22.49 KG/M2 | TEMPERATURE: 97.9 F

## 2020-11-19 PROCEDURE — 99024 POSTOP FOLLOW-UP VISIT: CPT | Performed by: ORTHOPAEDIC SURGERY
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
## 2020-12-11 PROBLEM — Z01.810 ENCOUNTER FOR PREPROCEDURAL CARDIOVASCULAR EXAMINATION: Status: RESOLVED | Noted: 2020-09-04 | Resolved: 2020-12-11

## 2021-01-21 ENCOUNTER — OFFICE VISIT (OUTPATIENT)
Dept: ORTHOPEDIC SURGERY | Age: 71
End: 2021-01-21
Payer: MEDICARE

## 2021-01-21 DIAGNOSIS — M43.12 ACQUIRED SPONDYLOLISTHESIS OF CERVICAL VERTEBRA: Primary | ICD-10-CM

## 2021-01-21 DIAGNOSIS — G99.2 STENOSIS OF CERVICAL SPINE WITH MYELOPATHY (HCC): ICD-10-CM

## 2021-01-21 DIAGNOSIS — M54.2 NECK PAIN: ICD-10-CM

## 2021-01-21 DIAGNOSIS — M48.02 STENOSIS OF CERVICAL SPINE WITH MYELOPATHY (HCC): ICD-10-CM

## 2021-01-21 PROCEDURE — G8420 CALC BMI NORM PARAMETERS: HCPCS | Performed by: ORTHOPAEDIC SURGERY

## 2021-01-21 PROCEDURE — 99213 OFFICE O/P EST LOW 20 MIN: CPT | Performed by: ORTHOPAEDIC SURGERY

## 2021-01-21 PROCEDURE — 4004F PT TOBACCO SCREEN RCVD TLK: CPT | Performed by: ORTHOPAEDIC SURGERY

## 2021-01-21 PROCEDURE — 1090F PRES/ABSN URINE INCON ASSESS: CPT | Performed by: ORTHOPAEDIC SURGERY

## 2021-01-21 PROCEDURE — 1123F ACP DISCUSS/DSCN MKR DOCD: CPT | Performed by: ORTHOPAEDIC SURGERY

## 2021-01-21 PROCEDURE — G8399 PT W/DXA RESULTS DOCUMENT: HCPCS | Performed by: ORTHOPAEDIC SURGERY

## 2021-01-21 PROCEDURE — 4040F PNEUMOC VAC/ADMIN/RCVD: CPT | Performed by: ORTHOPAEDIC SURGERY

## 2021-01-21 PROCEDURE — G8482 FLU IMMUNIZE ORDER/ADMIN: HCPCS | Performed by: ORTHOPAEDIC SURGERY

## 2021-01-21 PROCEDURE — 3017F COLORECTAL CA SCREEN DOC REV: CPT | Performed by: ORTHOPAEDIC SURGERY

## 2021-01-21 PROCEDURE — G8427 DOCREV CUR MEDS BY ELIG CLIN: HCPCS | Performed by: ORTHOPAEDIC SURGERY
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
## 2021-02-12 ENCOUNTER — TELEPHONE (OUTPATIENT)
Dept: ORTHOPEDIC SURGERY | Age: 71
End: 2021-02-12

## 2021-02-12 DIAGNOSIS — M48.02 SPINAL STENOSIS IN CERVICAL REGION: Primary | ICD-10-CM

## 2021-02-12 NOTE — TELEPHONE ENCOUNTER
Pt called in stating she was having a lot of pain in her neck pt is wondering if there is something dr Feliciano Garcia can prescribe to help with the pain   Pt last ov 01/21/2015

## 2021-02-13 RX ORDER — HYDROCODONE BITARTRATE AND ACETAMINOPHEN 5; 325 MG/1; MG/1
1 TABLET ORAL EVERY 4 HOURS PRN
Qty: 18 TABLET | Refills: 0 | Status: SHIPPED | OUTPATIENT
Start: 2021-02-13 | End: 2021-02-16

## 2021-04-21 DIAGNOSIS — M48.02 SPINAL STENOSIS IN CERVICAL REGION: Primary | ICD-10-CM

## 2021-04-21 DIAGNOSIS — M43.12 ACQUIRED SPONDYLOLISTHESIS OF CERVICAL VERTEBRA: ICD-10-CM

## 2021-04-21 DIAGNOSIS — M54.2 NECK PAIN: ICD-10-CM

## 2021-05-13 PROBLEM — E66.01 MORBID OBESITY WITH BODY MASS INDEX (BMI) OF 40.0 TO 49.9 (HCC): Chronic | Status: RESOLVED | Noted: 2018-05-08 | Resolved: 2021-05-13

## 2021-05-13 PROBLEM — L97.923 LEG ULCER, LEFT, WITH NECROSIS OF MUSCLE (HCC): Status: RESOLVED | Noted: 2018-05-22 | Resolved: 2021-05-13

## 2021-06-08 ENCOUNTER — OFFICE VISIT (OUTPATIENT)
Dept: ORTHOPEDIC SURGERY | Age: 71
End: 2021-06-08
Payer: MEDICARE

## 2021-06-08 VITALS — HEIGHT: 65 IN | WEIGHT: 135 LBS | BODY MASS INDEX: 22.49 KG/M2 | HEART RATE: 90 BPM

## 2021-06-08 DIAGNOSIS — M54.2 NECK PAIN: ICD-10-CM

## 2021-06-08 DIAGNOSIS — M48.02 SPINAL STENOSIS IN CERVICAL REGION: Primary | ICD-10-CM

## 2021-06-08 DIAGNOSIS — G99.2 STENOSIS OF CERVICAL SPINE WITH MYELOPATHY (HCC): ICD-10-CM

## 2021-06-08 DIAGNOSIS — M43.12 ACQUIRED SPONDYLOLISTHESIS OF CERVICAL VERTEBRA: ICD-10-CM

## 2021-06-08 DIAGNOSIS — M48.02 STENOSIS OF CERVICAL SPINE WITH MYELOPATHY (HCC): ICD-10-CM

## 2021-06-08 PROCEDURE — 1123F ACP DISCUSS/DSCN MKR DOCD: CPT | Performed by: ORTHOPAEDIC SURGERY

## 2021-06-08 PROCEDURE — G8399 PT W/DXA RESULTS DOCUMENT: HCPCS | Performed by: ORTHOPAEDIC SURGERY

## 2021-06-08 PROCEDURE — 1090F PRES/ABSN URINE INCON ASSESS: CPT | Performed by: ORTHOPAEDIC SURGERY

## 2021-06-08 PROCEDURE — 99213 OFFICE O/P EST LOW 20 MIN: CPT | Performed by: ORTHOPAEDIC SURGERY

## 2021-06-08 PROCEDURE — G8420 CALC BMI NORM PARAMETERS: HCPCS | Performed by: ORTHOPAEDIC SURGERY

## 2021-06-08 PROCEDURE — 3017F COLORECTAL CA SCREEN DOC REV: CPT | Performed by: ORTHOPAEDIC SURGERY

## 2021-06-08 PROCEDURE — 4040F PNEUMOC VAC/ADMIN/RCVD: CPT | Performed by: ORTHOPAEDIC SURGERY

## 2021-06-08 PROCEDURE — 4004F PT TOBACCO SCREEN RCVD TLK: CPT | Performed by: ORTHOPAEDIC SURGERY

## 2021-06-08 PROCEDURE — G8427 DOCREV CUR MEDS BY ELIG CLIN: HCPCS | Performed by: ORTHOPAEDIC SURGERY
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
## 2021-08-11 ENCOUNTER — HOSPITAL ENCOUNTER (INPATIENT)
Age: 71
LOS: 6 days | Discharge: HOME | DRG: 191 | End: 2021-08-17
Attending: EMERGENCY MEDICINE | Admitting: FAMILY MEDICINE
Payer: MEDICARE

## 2021-08-11 ENCOUNTER — APPOINTMENT (OUTPATIENT)
Dept: GENERAL RADIOLOGY | Age: 71
DRG: 191 | End: 2021-08-11
Payer: MEDICARE

## 2021-08-11 DIAGNOSIS — J44.1 COPD EXACERBATION (HCC): Primary | ICD-10-CM

## 2021-08-11 LAB
ABSOLUTE EOS #: 0.18 K/UL (ref 0–0.4)
ABSOLUTE IMMATURE GRANULOCYTE: ABNORMAL K/UL (ref 0–0.3)
ABSOLUTE LYMPH #: 1.09 K/UL (ref 1–4.8)
ABSOLUTE MONO #: 0.73 K/UL (ref 0.1–1.3)
ALBUMIN SERPL-MCNC: 3.4 G/DL (ref 3.5–5.2)
ALBUMIN/GLOBULIN RATIO: ABNORMAL (ref 1–2.5)
ALP BLD-CCNC: 126 U/L (ref 35–104)
ALT SERPL-CCNC: 27 U/L (ref 5–33)
ANION GAP SERPL CALCULATED.3IONS-SCNC: 10 MMOL/L (ref 9–17)
AST SERPL-CCNC: 20 U/L
BASOPHILS # BLD: 1 % (ref 0–2)
BASOPHILS ABSOLUTE: 0.09 K/UL (ref 0–0.2)
BILIRUB SERPL-MCNC: 0.21 MG/DL (ref 0.3–1.2)
BILIRUBIN DIRECT: <0.08 MG/DL
BILIRUBIN, INDIRECT: ABNORMAL MG/DL (ref 0–1)
BUN BLDV-MCNC: 27 MG/DL (ref 8–23)
BUN/CREAT BLD: ABNORMAL (ref 9–20)
CALCIUM SERPL-MCNC: 8.4 MG/DL (ref 8.6–10.4)
CHLORIDE BLD-SCNC: 102 MMOL/L (ref 98–107)
CO2: 28 MMOL/L (ref 20–31)
CREAT SERPL-MCNC: 1.45 MG/DL (ref 0.5–0.9)
DIFFERENTIAL TYPE: ABNORMAL
EOSINOPHILS RELATIVE PERCENT: 2 % (ref 0–4)
GFR AFRICAN AMERICAN: 43 ML/MIN
GFR NON-AFRICAN AMERICAN: 35 ML/MIN
GFR SERPL CREATININE-BSD FRML MDRD: ABNORMAL ML/MIN/{1.73_M2}
GFR SERPL CREATININE-BSD FRML MDRD: ABNORMAL ML/MIN/{1.73_M2}
GLOBULIN: ABNORMAL G/DL (ref 1.5–3.8)
GLUCOSE BLD-MCNC: 236 MG/DL (ref 70–99)
GLUCOSE BLD-MCNC: 249 MG/DL (ref 65–105)
GLUCOSE BLD-MCNC: 395 MG/DL (ref 65–105)
HCT VFR BLD CALC: 28.8 % (ref 36–46)
HEMOGLOBIN: 9.3 G/DL (ref 12–16)
IMMATURE GRANULOCYTES: ABNORMAL %
INR BLD: 1
LYMPHOCYTES # BLD: 12 % (ref 24–44)
MAGNESIUM: 1.3 MG/DL (ref 1.6–2.6)
MAGNESIUM: 1.7 MG/DL (ref 1.6–2.6)
MCH RBC QN AUTO: 24.5 PG (ref 26–34)
MCHC RBC AUTO-ENTMCNC: 32.5 G/DL (ref 31–37)
MCV RBC AUTO: 75.6 FL (ref 80–100)
MONOCYTES # BLD: 8 % (ref 1–7)
MORPHOLOGY: ABNORMAL
NRBC AUTOMATED: ABNORMAL PER 100 WBC
PARTIAL THROMBOPLASTIN TIME: 25.6 SEC (ref 24–36)
PDW BLD-RTO: 16.8 % (ref 11.5–14.9)
PLATELET # BLD: 415 K/UL (ref 150–450)
PLATELET ESTIMATE: ABNORMAL
PMV BLD AUTO: 7.5 FL (ref 6–12)
POTASSIUM SERPL-SCNC: 2.9 MMOL/L (ref 3.7–5.3)
POTASSIUM SERPL-SCNC: 3.3 MMOL/L (ref 3.7–5.3)
PROTHROMBIN TIME: 12.9 SEC (ref 11.8–14.6)
RBC # BLD: 3.81 M/UL (ref 4–5.2)
RBC # BLD: ABNORMAL 10*6/UL
SARS-COV-2, RAPID: NOT DETECTED
SEG NEUTROPHILS: 77 % (ref 36–66)
SEGMENTED NEUTROPHILS ABSOLUTE COUNT: 7.01 K/UL (ref 1.3–9.1)
SODIUM BLD-SCNC: 140 MMOL/L (ref 135–144)
SPECIMEN DESCRIPTION: NORMAL
TOTAL PROTEIN: 7.2 G/DL (ref 6.4–8.3)
TROPONIN INTERP: ABNORMAL
TROPONIN T: ABNORMAL NG/ML
TROPONIN, HIGH SENSITIVITY: 39 NG/L (ref 0–14)
TROPONIN, HIGH SENSITIVITY: 45 NG/L (ref 0–14)
TROPONIN, HIGH SENSITIVITY: 46 NG/L (ref 0–14)
TROPONIN, HIGH SENSITIVITY: 64 NG/L (ref 0–14)
WBC # BLD: 9.1 K/UL (ref 3.5–11)
WBC # BLD: ABNORMAL 10*3/UL

## 2021-08-11 PROCEDURE — 94640 AIRWAY INHALATION TREATMENT: CPT

## 2021-08-11 PROCEDURE — 99284 EMERGENCY DEPT VISIT MOD MDM: CPT

## 2021-08-11 PROCEDURE — 71045 X-RAY EXAM CHEST 1 VIEW: CPT

## 2021-08-11 PROCEDURE — 6370000000 HC RX 637 (ALT 250 FOR IP): Performed by: EMERGENCY MEDICINE

## 2021-08-11 PROCEDURE — 6360000002 HC RX W HCPCS: Performed by: EMERGENCY MEDICINE

## 2021-08-11 PROCEDURE — 96375 TX/PRO/DX INJ NEW DRUG ADDON: CPT

## 2021-08-11 PROCEDURE — 85610 PROTHROMBIN TIME: CPT

## 2021-08-11 PROCEDURE — 94761 N-INVAS EAR/PLS OXIMETRY MLT: CPT

## 2021-08-11 PROCEDURE — 6360000002 HC RX W HCPCS: Performed by: FAMILY MEDICINE

## 2021-08-11 PROCEDURE — 83735 ASSAY OF MAGNESIUM: CPT

## 2021-08-11 PROCEDURE — 2580000003 HC RX 258: Performed by: FAMILY MEDICINE

## 2021-08-11 PROCEDURE — 85730 THROMBOPLASTIN TIME PARTIAL: CPT

## 2021-08-11 PROCEDURE — 84484 ASSAY OF TROPONIN QUANT: CPT

## 2021-08-11 PROCEDURE — 93005 ELECTROCARDIOGRAM TRACING: CPT | Performed by: EMERGENCY MEDICINE

## 2021-08-11 PROCEDURE — 84132 ASSAY OF SERUM POTASSIUM: CPT

## 2021-08-11 PROCEDURE — 6370000000 HC RX 637 (ALT 250 FOR IP): Performed by: INTERNAL MEDICINE

## 2021-08-11 PROCEDURE — 80076 HEPATIC FUNCTION PANEL: CPT

## 2021-08-11 PROCEDURE — 80048 BASIC METABOLIC PNL TOTAL CA: CPT

## 2021-08-11 PROCEDURE — 85025 COMPLETE CBC W/AUTO DIFF WBC: CPT

## 2021-08-11 PROCEDURE — 6370000000 HC RX 637 (ALT 250 FOR IP): Performed by: FAMILY MEDICINE

## 2021-08-11 PROCEDURE — 82947 ASSAY GLUCOSE BLOOD QUANT: CPT

## 2021-08-11 PROCEDURE — 2060000000 HC ICU INTERMEDIATE R&B

## 2021-08-11 PROCEDURE — 36415 COLL VENOUS BLD VENIPUNCTURE: CPT

## 2021-08-11 PROCEDURE — 83036 HEMOGLOBIN GLYCOSYLATED A1C: CPT

## 2021-08-11 PROCEDURE — 96365 THER/PROPH/DIAG IV INF INIT: CPT

## 2021-08-11 PROCEDURE — 87635 SARS-COV-2 COVID-19 AMP PRB: CPT

## 2021-08-11 RX ORDER — MAGNESIUM SULFATE 1 G/100ML
1000 INJECTION INTRAVENOUS PRN
Status: DISCONTINUED | OUTPATIENT
Start: 2021-08-11 | End: 2021-08-17 | Stop reason: HOSPADM

## 2021-08-11 RX ORDER — ASPIRIN 81 MG/1
81 TABLET ORAL DAILY
Status: DISCONTINUED | OUTPATIENT
Start: 2021-08-11 | End: 2021-08-17 | Stop reason: HOSPADM

## 2021-08-11 RX ORDER — FUROSEMIDE 40 MG/1
40 TABLET ORAL DAILY
Status: DISCONTINUED | OUTPATIENT
Start: 2021-08-11 | End: 2021-08-11

## 2021-08-11 RX ORDER — DEXTROSE MONOHYDRATE 50 MG/ML
100 INJECTION, SOLUTION INTRAVENOUS PRN
Status: DISCONTINUED | OUTPATIENT
Start: 2021-08-11 | End: 2021-08-17 | Stop reason: HOSPADM

## 2021-08-11 RX ORDER — LISINOPRIL 5 MG/1
2.5 TABLET ORAL DAILY
Status: DISCONTINUED | OUTPATIENT
Start: 2021-08-11 | End: 2021-08-13

## 2021-08-11 RX ORDER — METHYLPREDNISOLONE SODIUM SUCCINATE 125 MG/2ML
125 INJECTION, POWDER, LYOPHILIZED, FOR SOLUTION INTRAMUSCULAR; INTRAVENOUS ONCE
Status: COMPLETED | OUTPATIENT
Start: 2021-08-11 | End: 2021-08-11

## 2021-08-11 RX ORDER — PANTOPRAZOLE SODIUM 40 MG/1
40 TABLET, DELAYED RELEASE ORAL
Status: DISCONTINUED | OUTPATIENT
Start: 2021-08-12 | End: 2021-08-17 | Stop reason: HOSPADM

## 2021-08-11 RX ORDER — ONDANSETRON 2 MG/ML
4 INJECTION INTRAMUSCULAR; INTRAVENOUS EVERY 6 HOURS PRN
Status: DISCONTINUED | OUTPATIENT
Start: 2021-08-11 | End: 2021-08-17 | Stop reason: HOSPADM

## 2021-08-11 RX ORDER — MAGNESIUM SULFATE 1 G/100ML
1000 INJECTION INTRAVENOUS
Status: DISPENSED | OUTPATIENT
Start: 2021-08-11 | End: 2021-08-11

## 2021-08-11 RX ORDER — POTASSIUM CHLORIDE 750 MG/1
20 CAPSULE, EXTENDED RELEASE ORAL DAILY
Status: DISCONTINUED | OUTPATIENT
Start: 2021-08-11 | End: 2021-08-11

## 2021-08-11 RX ORDER — SODIUM CHLORIDE 0.9 % (FLUSH) 0.9 %
5-40 SYRINGE (ML) INJECTION EVERY 12 HOURS SCHEDULED
Status: DISCONTINUED | OUTPATIENT
Start: 2021-08-11 | End: 2021-08-17 | Stop reason: HOSPADM

## 2021-08-11 RX ORDER — ONDANSETRON 4 MG/1
4 TABLET, ORALLY DISINTEGRATING ORAL EVERY 8 HOURS PRN
Status: DISCONTINUED | OUTPATIENT
Start: 2021-08-11 | End: 2021-08-17 | Stop reason: HOSPADM

## 2021-08-11 RX ORDER — TIZANIDINE 2 MG/1
2 TABLET ORAL NIGHTLY PRN
Status: DISCONTINUED | OUTPATIENT
Start: 2021-08-11 | End: 2021-08-17 | Stop reason: HOSPADM

## 2021-08-11 RX ORDER — POTASSIUM CHLORIDE 7.45 MG/ML
10 INJECTION INTRAVENOUS PRN
Status: DISCONTINUED | OUTPATIENT
Start: 2021-08-11 | End: 2021-08-17 | Stop reason: HOSPADM

## 2021-08-11 RX ORDER — SODIUM CHLORIDE 0.9 % (FLUSH) 0.9 %
5-40 SYRINGE (ML) INJECTION PRN
Status: DISCONTINUED | OUTPATIENT
Start: 2021-08-11 | End: 2021-08-17 | Stop reason: HOSPADM

## 2021-08-11 RX ORDER — POLYETHYLENE GLYCOL 3350 17 G/17G
17 POWDER, FOR SOLUTION ORAL DAILY PRN
Status: DISCONTINUED | OUTPATIENT
Start: 2021-08-11 | End: 2021-08-17 | Stop reason: HOSPADM

## 2021-08-11 RX ORDER — POTASSIUM CHLORIDE 20 MEQ/1
40 TABLET, EXTENDED RELEASE ORAL ONCE
Status: COMPLETED | OUTPATIENT
Start: 2021-08-11 | End: 2021-08-11

## 2021-08-11 RX ORDER — METFORMIN HYDROCHLORIDE 500 MG/1
500 TABLET, EXTENDED RELEASE ORAL
Status: DISCONTINUED | OUTPATIENT
Start: 2021-08-12 | End: 2021-08-17 | Stop reason: HOSPADM

## 2021-08-11 RX ORDER — ACETAMINOPHEN 650 MG/1
650 SUPPOSITORY RECTAL EVERY 6 HOURS PRN
Status: DISCONTINUED | OUTPATIENT
Start: 2021-08-11 | End: 2021-08-17 | Stop reason: HOSPADM

## 2021-08-11 RX ORDER — DEXTROSE MONOHYDRATE 25 G/50ML
12.5 INJECTION, SOLUTION INTRAVENOUS PRN
Status: DISCONTINUED | OUTPATIENT
Start: 2021-08-11 | End: 2021-08-17 | Stop reason: HOSPADM

## 2021-08-11 RX ORDER — ROPINIROLE 1 MG/1
1 TABLET, FILM COATED ORAL NIGHTLY
Status: DISCONTINUED | OUTPATIENT
Start: 2021-08-11 | End: 2021-08-17 | Stop reason: HOSPADM

## 2021-08-11 RX ORDER — CLOPIDOGREL BISULFATE 75 MG/1
75 TABLET ORAL DAILY
Status: DISCONTINUED | OUTPATIENT
Start: 2021-08-11 | End: 2021-08-17 | Stop reason: HOSPADM

## 2021-08-11 RX ORDER — POTASSIUM CHLORIDE 20 MEQ/1
40 TABLET, EXTENDED RELEASE ORAL PRN
Status: DISCONTINUED | OUTPATIENT
Start: 2021-08-11 | End: 2021-08-17 | Stop reason: SDUPTHER

## 2021-08-11 RX ORDER — MAGNESIUM SULFATE 1 G/100ML
1000 INJECTION INTRAVENOUS PRN
Status: DISCONTINUED | OUTPATIENT
Start: 2021-08-11 | End: 2021-08-17 | Stop reason: SDUPTHER

## 2021-08-11 RX ORDER — CITALOPRAM 20 MG/1
20 TABLET ORAL DAILY
Status: DISCONTINUED | OUTPATIENT
Start: 2021-08-11 | End: 2021-08-17 | Stop reason: HOSPADM

## 2021-08-11 RX ORDER — ACETAMINOPHEN 325 MG/1
650 TABLET ORAL EVERY 6 HOURS PRN
Status: DISCONTINUED | OUTPATIENT
Start: 2021-08-11 | End: 2021-08-17 | Stop reason: HOSPADM

## 2021-08-11 RX ORDER — FLUTICASONE PROPIONATE 50 MCG
2 SPRAY, SUSPENSION (ML) NASAL DAILY
Status: DISCONTINUED | OUTPATIENT
Start: 2021-08-11 | End: 2021-08-17 | Stop reason: HOSPADM

## 2021-08-11 RX ORDER — SODIUM CHLORIDE 9 MG/ML
25 INJECTION, SOLUTION INTRAVENOUS PRN
Status: DISCONTINUED | OUTPATIENT
Start: 2021-08-11 | End: 2021-08-17 | Stop reason: HOSPADM

## 2021-08-11 RX ORDER — FUROSEMIDE 10 MG/ML
40 INJECTION INTRAMUSCULAR; INTRAVENOUS DAILY
Status: DISCONTINUED | OUTPATIENT
Start: 2021-08-11 | End: 2021-08-12

## 2021-08-11 RX ORDER — METOPROLOL TARTRATE 50 MG/1
50 TABLET, FILM COATED ORAL DAILY
Status: DISCONTINUED | OUTPATIENT
Start: 2021-08-11 | End: 2021-08-11

## 2021-08-11 RX ORDER — POTASSIUM CHLORIDE 20 MEQ/1
40 TABLET, EXTENDED RELEASE ORAL PRN
Status: DISCONTINUED | OUTPATIENT
Start: 2021-08-11 | End: 2021-08-17 | Stop reason: HOSPADM

## 2021-08-11 RX ORDER — NICOTINE POLACRILEX 4 MG
15 LOZENGE BUCCAL PRN
Status: DISCONTINUED | OUTPATIENT
Start: 2021-08-11 | End: 2021-08-17 | Stop reason: HOSPADM

## 2021-08-11 RX ORDER — ISOSORBIDE MONONITRATE 30 MG/1
30 TABLET, EXTENDED RELEASE ORAL DAILY
Status: DISCONTINUED | OUTPATIENT
Start: 2021-08-11 | End: 2021-08-17 | Stop reason: HOSPADM

## 2021-08-11 RX ORDER — ATORVASTATIN CALCIUM 80 MG/1
80 TABLET, FILM COATED ORAL DAILY
Status: DISCONTINUED | OUTPATIENT
Start: 2021-08-11 | End: 2021-08-17 | Stop reason: HOSPADM

## 2021-08-11 RX ORDER — POTASSIUM CHLORIDE 7.45 MG/ML
10 INJECTION INTRAVENOUS PRN
Status: DISCONTINUED | OUTPATIENT
Start: 2021-08-11 | End: 2021-08-17 | Stop reason: SDUPTHER

## 2021-08-11 RX ORDER — IPRATROPIUM BROMIDE AND ALBUTEROL SULFATE 2.5; .5 MG/3ML; MG/3ML
2 SOLUTION RESPIRATORY (INHALATION) EVERY 4 HOURS PRN
Status: DISCONTINUED | OUTPATIENT
Start: 2021-08-11 | End: 2021-08-15

## 2021-08-11 RX ORDER — NITROGLYCERIN 0.4 MG/1
0.4 TABLET SUBLINGUAL EVERY 5 MIN PRN
Status: DISCONTINUED | OUTPATIENT
Start: 2021-08-11 | End: 2021-08-17 | Stop reason: HOSPADM

## 2021-08-11 RX ORDER — POTASSIUM CHLORIDE 20 MEQ/1
40 TABLET, EXTENDED RELEASE ORAL
Status: DISCONTINUED | OUTPATIENT
Start: 2021-08-11 | End: 2021-08-12

## 2021-08-11 RX ADMIN — POTASSIUM CHLORIDE 40 MEQ: 1500 TABLET, EXTENDED RELEASE ORAL at 20:41

## 2021-08-11 RX ADMIN — ROPINIROLE HYDROCHLORIDE 1 MG: 1 TABLET, FILM COATED ORAL at 20:41

## 2021-08-11 RX ADMIN — LISINOPRIL 2.5 MG: 5 TABLET ORAL at 20:41

## 2021-08-11 RX ADMIN — POTASSIUM CHLORIDE 40 MEQ: 1500 TABLET, EXTENDED RELEASE ORAL at 15:20

## 2021-08-11 RX ADMIN — ENOXAPARIN SODIUM 40 MG: 40 INJECTION SUBCUTANEOUS at 20:42

## 2021-08-11 RX ADMIN — INSULIN LISPRO 3 UNITS: 100 INJECTION, SOLUTION INTRAVENOUS; SUBCUTANEOUS at 20:42

## 2021-08-11 RX ADMIN — CITALOPRAM HYDROBROMIDE 20 MG: 20 TABLET ORAL at 20:41

## 2021-08-11 RX ADMIN — IPRATROPIUM BROMIDE AND ALBUTEROL SULFATE 2 AMPULE: .5; 3 SOLUTION RESPIRATORY (INHALATION) at 13:26

## 2021-08-11 RX ADMIN — SODIUM CHLORIDE, PRESERVATIVE FREE 10 ML: 5 INJECTION INTRAVENOUS at 20:43

## 2021-08-11 RX ADMIN — Medication 400 MG: at 20:41

## 2021-08-11 RX ADMIN — METHYLPREDNISOLONE SODIUM SUCCINATE 125 MG: 125 INJECTION, POWDER, FOR SOLUTION INTRAMUSCULAR; INTRAVENOUS at 13:30

## 2021-08-11 RX ADMIN — INSULIN LISPRO 2 UNITS: 100 INJECTION, SOLUTION INTRAVENOUS; SUBCUTANEOUS at 18:26

## 2021-08-11 RX ADMIN — MAGNESIUM SULFATE HEPTAHYDRATE 1000 MG: 1 INJECTION, SOLUTION INTRAVENOUS at 15:19

## 2021-08-11 ASSESSMENT — PAIN SCALES - GENERAL: PAINLEVEL_OUTOF10: 4

## 2021-08-11 ASSESSMENT — ENCOUNTER SYMPTOMS
COUGH: 1
SHORTNESS OF BREATH: 1
BACK PAIN: 0
COLOR CHANGE: 0
EYE PAIN: 0
ABDOMINAL PAIN: 0

## 2021-08-11 ASSESSMENT — PAIN DESCRIPTION - DESCRIPTORS: DESCRIPTORS: ACHING

## 2021-08-11 ASSESSMENT — PAIN DESCRIPTION - LOCATION: LOCATION: CHEST

## 2021-08-11 ASSESSMENT — PAIN DESCRIPTION - ORIENTATION: ORIENTATION: LEFT

## 2021-08-11 NOTE — ED TRIAGE NOTES
Mode of arrival (squad #, walk in, police, etc) : ambulance        Chief complaint(s): SOB        Arrival Note (brief scenario, treatment PTA, etc). : Pt BIB ambulance reporting x2 weeks of of worsening SOB despite using inhaler and nebulizer treatments. Pt 88% on RA per EMS and placed on 4L saturating >95%. Pt with rapid respirations and tripod position. Pt A&Ox4, able to transfer self from stretcher to bed. C= \"Have you ever felt that you should Cut down on your drinking? \"  No  A= \"Have people Annoyed you by criticizing your drinking? \"  No  G= \"Have you ever felt bad or Guilty about your drinking? \"  No  E= \"Have you ever had a drink as an Eye-opener first thing in the morning to steady your nerves or to help a hangover? \"  No      Deferred []      Reason for deferring: N/A    *If yes to two or more: probable alcohol abuse. *

## 2021-08-11 NOTE — ED NOTES
Bed: 01  Expected date:   Expected time:   Means of arrival:   Comments:  LUANN [id number] Adrianne Fontenot RN  08/11/21 [id number]
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
## 2021-08-12 ENCOUNTER — APPOINTMENT (OUTPATIENT)
Dept: NON INVASIVE DIAGNOSTICS | Age: 71
DRG: 191 | End: 2021-08-12
Payer: MEDICARE

## 2021-08-12 LAB
ANION GAP SERPL CALCULATED.3IONS-SCNC: 5 MMOL/L (ref 9–17)
BUN BLDV-MCNC: 29 MG/DL (ref 8–23)
BUN/CREAT BLD: ABNORMAL (ref 9–20)
CALCIUM SERPL-MCNC: 8.3 MG/DL (ref 8.6–10.4)
CHLORIDE BLD-SCNC: 106 MMOL/L (ref 98–107)
CHOLESTEROL/HDL RATIO: 2.8
CHOLESTEROL: 136 MG/DL
CO2: 29 MMOL/L (ref 20–31)
CREAT SERPL-MCNC: 1.2 MG/DL (ref 0.5–0.9)
EKG ATRIAL RATE: 100 BPM
EKG ATRIAL RATE: 77 BPM
EKG P AXIS: 88 DEGREES
EKG P AXIS: 88 DEGREES
EKG P-R INTERVAL: 146 MS
EKG P-R INTERVAL: 154 MS
EKG Q-T INTERVAL: 434 MS
EKG Q-T INTERVAL: 460 MS
EKG QRS DURATION: 90 MS
EKG QRS DURATION: 98 MS
EKG QTC CALCULATION (BAZETT): 520 MS
EKG QTC CALCULATION (BAZETT): 545 MS
EKG R AXIS: -4 DEGREES
EKG R AXIS: 12 DEGREES
EKG T AXIS: 64 DEGREES
EKG T AXIS: 84 DEGREES
EKG VENTRICULAR RATE: 77 BPM
EKG VENTRICULAR RATE: 95 BPM
ESTIMATED AVERAGE GLUCOSE: 146 MG/DL
GFR AFRICAN AMERICAN: 54 ML/MIN
GFR NON-AFRICAN AMERICAN: 44 ML/MIN
GFR SERPL CREATININE-BSD FRML MDRD: ABNORMAL ML/MIN/{1.73_M2}
GFR SERPL CREATININE-BSD FRML MDRD: ABNORMAL ML/MIN/{1.73_M2}
GLUCOSE BLD-MCNC: 123 MG/DL (ref 65–105)
GLUCOSE BLD-MCNC: 124 MG/DL (ref 65–105)
GLUCOSE BLD-MCNC: 174 MG/DL (ref 70–99)
GLUCOSE BLD-MCNC: 175 MG/DL (ref 65–105)
GLUCOSE BLD-MCNC: 213 MG/DL (ref 65–105)
HBA1C MFR BLD: 6.7 % (ref 4–6)
HDLC SERPL-MCNC: 49 MG/DL
LDL CHOLESTEROL: 79 MG/DL (ref 0–130)
LV EF: 35 %
LVEF MODALITY: NORMAL
MAGNESIUM: 1.8 MG/DL (ref 1.6–2.6)
POTASSIUM SERPL-SCNC: 4.3 MMOL/L (ref 3.7–5.3)
SODIUM BLD-SCNC: 140 MMOL/L (ref 135–144)
THYROXINE, FREE: 1.27 NG/DL (ref 0.93–1.7)
TRIGL SERPL-MCNC: 41 MG/DL
TSH SERPL DL<=0.05 MIU/L-ACNC: 0.22 MIU/L (ref 0.3–5)
VLDLC SERPL CALC-MCNC: NORMAL MG/DL (ref 1–30)

## 2021-08-12 PROCEDURE — 93005 ELECTROCARDIOGRAM TRACING: CPT | Performed by: INTERNAL MEDICINE

## 2021-08-12 PROCEDURE — 99222 1ST HOSP IP/OBS MODERATE 55: CPT | Performed by: FAMILY MEDICINE

## 2021-08-12 PROCEDURE — 6370000000 HC RX 637 (ALT 250 FOR IP): Performed by: NURSE PRACTITIONER

## 2021-08-12 PROCEDURE — 6360000002 HC RX W HCPCS: Performed by: NURSE PRACTITIONER

## 2021-08-12 PROCEDURE — 6370000000 HC RX 637 (ALT 250 FOR IP): Performed by: FAMILY MEDICINE

## 2021-08-12 PROCEDURE — 2580000003 HC RX 258: Performed by: FAMILY MEDICINE

## 2021-08-12 PROCEDURE — 2060000000 HC ICU INTERMEDIATE R&B

## 2021-08-12 PROCEDURE — 93010 ELECTROCARDIOGRAM REPORT: CPT | Performed by: INTERNAL MEDICINE

## 2021-08-12 PROCEDURE — 6370000000 HC RX 637 (ALT 250 FOR IP): Performed by: INTERNAL MEDICINE

## 2021-08-12 PROCEDURE — 80061 LIPID PANEL: CPT

## 2021-08-12 PROCEDURE — 6360000002 HC RX W HCPCS: Performed by: FAMILY MEDICINE

## 2021-08-12 PROCEDURE — 84443 ASSAY THYROID STIM HORMONE: CPT

## 2021-08-12 PROCEDURE — 36415 COLL VENOUS BLD VENIPUNCTURE: CPT

## 2021-08-12 PROCEDURE — 84439 ASSAY OF FREE THYROXINE: CPT

## 2021-08-12 PROCEDURE — 83735 ASSAY OF MAGNESIUM: CPT

## 2021-08-12 PROCEDURE — 82947 ASSAY GLUCOSE BLOOD QUANT: CPT

## 2021-08-12 PROCEDURE — 80048 BASIC METABOLIC PNL TOTAL CA: CPT

## 2021-08-12 PROCEDURE — C8929 TTE W OR WO FOL WCON,DOPPLER: HCPCS

## 2021-08-12 RX ORDER — AZITHROMYCIN 250 MG/1
250 TABLET, FILM COATED ORAL DAILY
Status: COMPLETED | OUTPATIENT
Start: 2021-08-13 | End: 2021-08-16

## 2021-08-12 RX ORDER — GUAIFENESIN 600 MG/1
600 TABLET, EXTENDED RELEASE ORAL 2 TIMES DAILY
Status: DISCONTINUED | OUTPATIENT
Start: 2021-08-12 | End: 2021-08-17 | Stop reason: HOSPADM

## 2021-08-12 RX ORDER — POTASSIUM CHLORIDE 20 MEQ/1
20 TABLET, EXTENDED RELEASE ORAL
Status: DISCONTINUED | OUTPATIENT
Start: 2021-08-13 | End: 2021-08-13

## 2021-08-12 RX ORDER — FUROSEMIDE 40 MG/1
40 TABLET ORAL DAILY
Status: DISCONTINUED | OUTPATIENT
Start: 2021-08-12 | End: 2021-08-17 | Stop reason: HOSPADM

## 2021-08-12 RX ORDER — AZITHROMYCIN 250 MG/1
500 TABLET, FILM COATED ORAL DAILY
Status: COMPLETED | OUTPATIENT
Start: 2021-08-12 | End: 2021-08-12

## 2021-08-12 RX ORDER — METHYLPREDNISOLONE SODIUM SUCCINATE 40 MG/ML
40 INJECTION, POWDER, LYOPHILIZED, FOR SOLUTION INTRAMUSCULAR; INTRAVENOUS EVERY 8 HOURS
Status: DISCONTINUED | OUTPATIENT
Start: 2021-08-12 | End: 2021-08-17

## 2021-08-12 RX ADMIN — SODIUM CHLORIDE, PRESERVATIVE FREE 10 ML: 5 INJECTION INTRAVENOUS at 20:45

## 2021-08-12 RX ADMIN — ENOXAPARIN SODIUM 40 MG: 40 INJECTION SUBCUTANEOUS at 11:21

## 2021-08-12 RX ADMIN — PANTOPRAZOLE SODIUM 40 MG: 40 TABLET, DELAYED RELEASE ORAL at 06:26

## 2021-08-12 RX ADMIN — LISINOPRIL 2.5 MG: 5 TABLET ORAL at 11:22

## 2021-08-12 RX ADMIN — CITALOPRAM HYDROBROMIDE 20 MG: 20 TABLET ORAL at 11:22

## 2021-08-12 RX ADMIN — AZITHROMYCIN MONOHYDRATE 500 MG: 250 TABLET ORAL at 12:07

## 2021-08-12 RX ADMIN — CLOPIDOGREL BISULFATE 75 MG: 75 TABLET ORAL at 11:23

## 2021-08-12 RX ADMIN — ASPIRIN 81 MG: 81 TABLET, COATED ORAL at 11:21

## 2021-08-12 RX ADMIN — POTASSIUM CHLORIDE 40 MEQ: 1500 TABLET, EXTENDED RELEASE ORAL at 11:24

## 2021-08-12 RX ADMIN — Medication 400 MG: at 11:21

## 2021-08-12 RX ADMIN — INSULIN LISPRO 1 UNITS: 100 INJECTION, SOLUTION INTRAVENOUS; SUBCUTANEOUS at 17:10

## 2021-08-12 RX ADMIN — SODIUM CHLORIDE, PRESERVATIVE FREE 10 ML: 5 INJECTION INTRAVENOUS at 11:25

## 2021-08-12 RX ADMIN — FLUTICASONE PROPIONATE 2 SPRAY: 50 SPRAY, METERED NASAL at 11:23

## 2021-08-12 RX ADMIN — ISOSORBIDE MONONITRATE 30 MG: 30 TABLET, EXTENDED RELEASE ORAL at 11:22

## 2021-08-12 RX ADMIN — ROPINIROLE HYDROCHLORIDE 1 MG: 1 TABLET, FILM COATED ORAL at 20:43

## 2021-08-12 RX ADMIN — ATORVASTATIN CALCIUM 80 MG: 80 TABLET, FILM COATED ORAL at 11:21

## 2021-08-12 RX ADMIN — METHYLPREDNISOLONE SODIUM SUCCINATE 40 MG: 40 INJECTION, POWDER, FOR SOLUTION INTRAMUSCULAR; INTRAVENOUS at 12:07

## 2021-08-12 RX ADMIN — ACETAMINOPHEN 650 MG: 325 TABLET ORAL at 20:50

## 2021-08-12 RX ADMIN — METFORMIN HYDROCHLORIDE 500 MG: 500 TABLET, EXTENDED RELEASE ORAL at 11:21

## 2021-08-12 RX ADMIN — METOPROLOL TARTRATE 25 MG: 25 TABLET, FILM COATED ORAL at 11:21

## 2021-08-12 RX ADMIN — INSULIN LISPRO 1 UNITS: 100 INJECTION, SOLUTION INTRAVENOUS; SUBCUTANEOUS at 20:45

## 2021-08-12 RX ADMIN — GUAIFENESIN 600 MG: 600 TABLET, EXTENDED RELEASE ORAL at 20:44

## 2021-08-12 RX ADMIN — ACETAMINOPHEN 650 MG: 325 TABLET ORAL at 02:00

## 2021-08-12 RX ADMIN — Medication 400 MG: at 20:43

## 2021-08-12 RX ADMIN — METHYLPREDNISOLONE SODIUM SUCCINATE 40 MG: 40 INJECTION, POWDER, FOR SOLUTION INTRAMUSCULAR; INTRAVENOUS at 20:43

## 2021-08-12 RX ADMIN — GUAIFENESIN 600 MG: 600 TABLET, EXTENDED RELEASE ORAL at 12:07

## 2021-08-12 RX ADMIN — FUROSEMIDE 40 MG: 40 TABLET ORAL at 11:23

## 2021-08-12 ASSESSMENT — PAIN DESCRIPTION - PAIN TYPE
TYPE: ACUTE PAIN

## 2021-08-12 ASSESSMENT — PAIN DESCRIPTION - LOCATION
LOCATION: HEAD

## 2021-08-12 ASSESSMENT — PAIN SCALES - GENERAL
PAINLEVEL_OUTOF10: 5
PAINLEVEL_OUTOF10: 2
PAINLEVEL_OUTOF10: 0
PAINLEVEL_OUTOF10: 5

## 2021-08-12 ASSESSMENT — PAIN DESCRIPTION - DESCRIPTORS
DESCRIPTORS: ACHING;HEADACHE

## 2021-08-12 ASSESSMENT — PAIN DESCRIPTION - FREQUENCY
FREQUENCY: INTERMITTENT

## 2021-08-12 ASSESSMENT — PAIN DESCRIPTION - ORIENTATION: ORIENTATION: LEFT

## 2021-08-12 NOTE — PLAN OF CARE
Problem: Falls - Risk of:  Goal: Will remain free from falls  Description: Will remain free from falls  8/12/2021 1536 by Israel Berry RN  Outcome: Met This Shift     Problem: Falls - Risk of:  Goal: Absence of physical injury  Description: Absence of physical injury  Outcome: Met This Shift     Problem: Pain:  Goal: Pain level will decrease  Description: Pain level will decrease  8/12/2021 1536 by Israel Berry RN  Outcome: Ongoing     Problem: Pain:  Goal: Control of acute pain  Description: Control of acute pain  Outcome: Ongoing     Problem: Pain:  Goal: Control of chronic pain  Description: Control of chronic pain  Outcome: Ongoing
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
## 2021-08-12 NOTE — CARE COORDINATION
CASE MANAGEMENT NOTE:    Admission Date:  8/11/2021 Prashant Uriostegui is a 65 y.o.  female    Admitted for : Acute exacerbation of chronic obstructive pulmonary disease (COPD) ([facility] [J44.1]  COPD exacerbation ([facility] [J44.1]    Met with:  Patient    PCP:  Dr. Alise Plummer:  [facility]      Is patient alert and oriented at time of discussion:  Yes    Current Residence/ Living Arrangements:  independently at home             Current Services PTA:  No    Does patient go to outpatient dialysis: No  If yes, location and chair time:     Is patient agreeable to VNS: No    Freedom of choice provided:  No    List of [address] provided: No    VNS chosen:  No    DME:  walker    Home Oxygen: No    Nebulizer: Yes    CPAP/BIPAP: NA    Supplier: N/A    Potential Assistance Needed: No    SNF needed: No    Freedom of choice and list provided: No    Pharmacy:  [facility]       Does Patient want to use MEDS to BEDS? No    Is patient currently receiving oral anticoagulation therapy? No    Is the Patient an [facility] with Readmission Risk Score greater than 14%? No  If yes, pt needs a follow up appointment made within 7 days. Family Members/Caregivers that pt would like involved in their care:    Yes    If yes, list name here:  Blaise Armijo    Transportation Provider:  Family             Discharge Plan:  8/12/21 [place] Pt is from home in a one story home she uses a walker and has a neb pt denies need for vns plan is to discharge to home with no needs will continue to follow for needs . //tv                  Electronically signed by:  Stephanie Russell RN on 8/12/2021 at 4:51 PM

## 2021-08-12 NOTE — CONSULTS
[facility] CARDIOLOGY CONSULT NOTE      Date of Admission:  8/11/2021    Date of Consultation:  8/12/2021    PCP:  Sherry Alejandro MD      REASON FOR CONSULT:  Elevated Troponin. HISTORY OF PRESENT ILLNESS:  Vicky Martínez is a 65 y.o. female,  with prior history of ASCVD, prior MI, CABG twice in 2005 and subsequent CABG x4 in 2018 by history, essential hypertension, hyperlipidemia, diabetes mellitus, history of TIA 1 year ago, COPD, anxiety disorder. No tobacco abuse. Apparently patient sees Dr. Josee Mendez in our office. Patient was admitted with complaints of hard time breathing which got worse for the past 7 days. No anginal chest pain or palpitation or lightheadedness or dizziness or cough or fever or chills or leg swelling or PND or orthopnea or bleeding or nausea or vomiting or diaphoresis or syncope. Patient was found to have elevated high-sensitivity troponin and we are asked to see in consultation. At the time of my evaluation she is resting comfortably in bed and feeling better with O2 in place. No ongoing shortness of breath. Please refer to admitting history and physical and other providers notes for further details. PMH:   has a past medical history of Allergic rhinitis, Arthritis, CAD (coronary artery disease), Cerebral artery occlusion with cerebral infarction ([address] CHF (congestive heart failure) ([address] Controlled type 2 diabetes mellitus without complication, without long-term current use of insulin ([address] COPD (chronic obstructive pulmonary disease) ([address] Depression, Former smoker, History of blood transfusion, Hyperlipidemia, Hypertension, Kidney stone, Myocardial infarction ([address] Obesity, Class I, BMI 30.0-34.9 (see actual BMI), Restless leg syndrome, Skin abnormality, Type II or unspecified type diabetes mellitus without mention of complication, not stated as uncontrolled, Wears glasses, and Wears partial dentures.     PSH:   has a past surgical history that includes Appendectomy; Hysterectomy; Cholecystectomy; joint replacement (Bilateral); pr office/outpt visit,procedure only (N/A, 2/6/2018); pr incis/drain thigh/knee abscess,deep (Right, 5/7/2018); Leg biopsy excision (Right, 8/29/2019); Cardiac surgery; Cardiac surgery; other surgical history (07/26/2020); and cervical laminectomy (N/A, 10/14/2020). Allergies: Allergies   Allergen Reactions    Codeine Other (See Comments)     Constipation.  Morphine Other (See Comments)     Hallucinations. Home Meds:    Prior to Admission medications    Medication Sig Start Date End Date Taking?  Authorizing Provider   diclofenac sodium (VOLTAREN) 1 % GEL Apply 2 g topically 2 times daily as needed for Pain   Yes Historical Provider, MD   metFORMIN (GLUCOPHAGE-XR) 500 MG extended release tablet Take 1 tablet by mouth daily (with breakfast) 8/9/21  Yes January Ness MD   citalopram (CELEXA) 20 MG tablet Take 1 tablet by mouth daily 8/9/21  Yes January Ness MD   lisinopril (PRINIVIL;ZESTRIL) 2.5 MG tablet Take 1 tablet by mouth daily 8/9/21  Yes January Ness MD   tiZANidine (ZANAFLEX) 2 MG tablet Take 1 tablet by mouth nightly as needed (pain) 8/9/21  Yes January Ness MD   metoprolol tartrate (LOPRESSOR) 50 MG tablet Take 1 tablet by mouth daily 1tab twice daily 8/9/21  Yes January Ness MD   SM ASPIRIN ADULT LOW STRENGTH 81 MG EC tablet TAKE 1 TABLET BY MOUTH DAILY 5/21/21  Yes January Ness MD   fluticasone [facility]) 50 MCG/ACT nasal spray USE 2 SPRAYS IN EACH NOSTRIL ONCE DAILY 5/21/21  Yes January Ness MD   albuterol sulfate HFA (PROAIR HFA) 108 (90 Base) MCG/ACT inhaler Inhale 2 puffs into the lungs every 4 hours as needed for Wheezing May sub covered product 4/20/21  Yes January Ness MD   potassium chloride (KLOR-CON) 10 MEQ extended release tablet TAKE 2 TABLETS BY MOUTH EVERY DAY 3/23/21  Yes January Ness MD clopidogrel (PLAVIX) 75 MG tablet Take 1 tablet by mouth daily 3/18/21  Yes Koffi Maharaj MD   atorvastatin (LIPITOR) 40 MG tablet Take 2 tablets by mouth daily 2/2/21  Yes Koffi Maharaj MD   rOPINIRole (REQUIP) 1 MG tablet TAKE 1 TABLET BY MOUTH EVERY NIGHT AS NEEDED 2/2/21  Yes Koffi Maharaj MD   furosemide (LASIX) 40 MG tablet TAKE 1 TABLET BY MOUTH DAILY 11/8/20  Yes Koffi Maharaj MD   isosorbide mononitrate (IMDUR) 30 MG extended release tablet Take 30 mg by mouth 5/24/19  Yes Historical Provider, MD   nitroGLYCERIN (NITROSTAT) 0.4 MG SL tablet Place 1 tablet under the tongue every 5 minutes as needed for Chest pain up to max of 3 total doses.  If no relief after 1 dose, call 911. 8/9/21   Koffi Maharaj MD   ONE TOUCH ULTRA TEST strip TEST ONCE DAILY AS NEEDED 4/6/20   Koffi Maharaj, [address] Meds:    Current Facility-Administered Medications   Medication Dose Route Frequency Provider Last Rate Last Admin    perflutren lipid microspheres (DEFINITY) injection 2.2 mg  2 mL Intravenous ONCE PRN Alexus Chan MD        ipratropium-albuterol (DUONEB) nebulizer solution 2 ampule  2 ampule Inhalation Q4H PRN Koffi Maharaj MD   2 ampule at 08/11/21 1326    atorvastatin (LIPITOR) tablet 80 mg  80 mg Oral Daily Koffi Maharaj MD        citalopram (CELEXA) tablet 20 mg  20 mg Oral Daily Koffi Maharaj MD   20 mg at 08/11/21 2041    clopidogrel (PLAVIX) tablet 75 mg  75 mg Oral Daily Koffi Maharaj MD        fluticasone [facility]) 50 MCG/ACT nasal spray 2 spray  2 spray Each Nostril Daily Koffi Maharaj MD        isosorbide mononitrate (IMDUR) extended release tablet 30 mg  30 mg Oral Daily Kathy Celis MD        lisinopril (PRINIVIL;ZESTRIL) tablet 2.5 mg  2.5 mg Oral Daily Kathy Celis MD   2.5 mg at 08/11/21 2041    metFORMIN (GLUCOPHAGE-XR) extended release tablet 500 mg  500 mg Oral Daily with breakfast Neo Child mEq/100 mL IVPB (Peripheral Line)  10 mEq Intravenous PRN Dasia Andrade MD        magnesium sulfate 1000 mg in dextrose 5% 100 mL IVPB  1,000 mg Intravenous PRN Dasia Andrade MD        insulin lispro (HUMALOG) injection vial 0-6 Units  0-6 Units Subcutaneous TID WC Dasia Andrade MD   2 Units at 21    insulin lispro (HUMALOG) injection vial 0-3 Units  0-3 Units Subcutaneous Nightly Dasia Andrade MD   3 Units at 21    potassium chloride (KLOR-CON M) extended release tablet 40 mEq  40 mEq Oral PRN Marivel Velazquez MD   40 mEq at 21    Or    potassium bicarb-citric acid (EFFER-K) effervescent tablet 40 mEq  40 mEq Oral PRN Marivel Velazquez MD        Or   [facility] potassium chloride 10 mEq/100 mL IVPB (Peripheral Line)  10 mEq Intravenous PRN Marivel Velazquez MD        magnesium sulfate 1000 mg in dextrose 5% 100 mL IVPB  1,000 mg Intravenous PRN Marivel Velazquez MD        metoprolol tartrate (LOPRESSOR) tablet 25 mg  25 mg Oral Daily Tammy Couch MD        furosemide (LASIX) injection 40 mg  40 mg Intravenous Daily Tammy Couch MD        potassium chloride (KLOR-CON M) extended release tablet 40 mEq  40 mEq Oral Daily with breakfast Roybn Celis MD        magnesium oxide (MAG-OX) tablet 400 mg  400 mg Oral BID Robyn Celis MD   400 mg at 21    pantoprazole (PROTONIX) tablet 40 mg  40 mg Oral QAM AC Robyn Celis MD   40 mg at 212       Social History:    Social History     Socioeconomic History    Marital status:       Spouse name: None    Number of children: None    Years of education: None    Highest education level: None   Occupational History    None   Tobacco Use    Smoking status: Current Every Day Smoker     Packs/day: 0.25     Years: 56.00     Pack years: 14.00     Types: Cigarettes     Last attempt to quit: 2019     Years since quittin.9    Smokeless tobacco: Never Used   [facility] Tobacco comment: 4-5 cigarettes a day   Vaping Use    Vaping Use: Former   Substance and Sexual Activity    Alcohol use: No     Alcohol/week: 0.0 standard drinks    Drug use: Yes     Types: Marijuana     Comment: ocassionally    Sexual activity: None   Other Topics Concern    None   Social History Narrative    None     Social Determinants of Health     Financial Resource Strain:     Difficulty of Paying Living Expenses:    Food Insecurity:     Worried About Running Out of Food in the Last Year:     Ran Out of Food in the Last Year:    Transportation Needs:     Lack of Transportation (Medical):  Lack of Transportation (Non-Medical):    Physical Activity:     Days of Exercise per Week:     Minutes of Exercise per Session:    Stress:     Feeling of Stress :    Social Connections:     Frequency of Communication with Friends and Family:     Frequency of Social Gatherings with Friends and Family:     Attends [employer] Services:     Active Member of Clubs or Organizations:     Attends Club or Organization Meetings:     Marital Status:    Intimate Partner Violence:     Fear of Current or Ex-Partner:     Emotionally Abused:     Physically Abused:     Sexually Abused:        Family History:    Family History   Problem Relation Age of Onset    Heart Disease Father        Review of Systems:      · Constitutional: no weight loss or gain, no fever or chills. · Eyes: No new visual changes. · ENT: No new hearing loss. · Cardiovascular: see HPI. · Respiratory: No cough. No hemoptysis. · Gastrointestinal: No abdominal pain, no blood in stools. · Genitourinary: No hematuria or increased frequency. · Musculoskeletal:  No new gait disturbance. · Integumentary: No rash or pruritis. · Neurological: No headache. No seizures or recent CVA/TIA. · Psychiatric: No anxiety or depression. · Hematologic/Lymphatic: No abnormal bruising or bleeding.   · Allergic/Immunologic: No new allergies. Physical Exam   Vital Signs: /68   Pulse 93   Temp 97.9 °F (36.6 °C) (Axillary)   Resp 18   Ht 5' 4\" (1.626 m)   Wt 134 lb 7.7 oz (61 kg)   SpO2 96%   BMI 23.08 kg/m²  O2 Flow Rate (L/min): 3 L/min     Admission Weight: 131 lb 2.8 oz (59.5 kg)     General appearance: Awake, Alert, well developed, well nourished, pleasant. Cooperative. Looks older than stated age. Resting in bed comfortably at the time of my evaluation. Head: Normocephalic, atraumatic. Eyes: Conjunctival clear. Neck: no JVD, no carotid bruits, no thyromegaly. Chest: Scattered rhonchi bilaterally. No chest wall tenderness. No wheezing. Cardiac: Irregular rate and rhythm frequent premature beats, no S3 or S4 gallop, no significant audible murmur or rubs or clicks. Abdomen: Soft, non-tender. Extremities: no cyanosis or clubbing or leg edema. No calf tenderness. Pulses:  Bilaterally symmetrical and intact radial pulses. Neurologic:  Able to move all 4 extremities. No new gross focal deficits. Skin:  No rashes. Warm and dry.       EKG:     Sinus rhythm with Premature atrial complexes   Prolonged QT   Abnormal ECG       Labs:      CBC:   Recent Labs     08/11/21  1325   WBC 9.1   HGB 9.3*   HCT 28.8*   MCV 75.6*        BMP:   Recent Labs     08/11/21  1325 08/11/21  1741 08/12/21  0600     --  140   K 2.9* 3.3* 4.3     --  106   CO2 28  --  29   BUN 27*  --  29*   CREATININE 1.45*  --  1.20*     PT/INR:   Recent Labs     08/11/21  1325   PROTIME 12.9   INR 1.0     APTT:   Recent Labs     08/11/21  1325   APTT 25.6     MAG:   Recent Labs     08/11/21  1325 08/11/21  1741 08/12/21  0600   MG 1.3* 1.7 1.8       Recent Results (from the past 24 hour(s))   CBC Auto Differential    Collection Time: 08/11/21  1:25 PM   Result Value Ref Range    WBC 9.1 3.5 - 11.0 k/uL    RBC 3.81 (L) 4.0 - 5.2 m/uL    Hemoglobin 9.3 (L) 12.0 - 16.0 g/dL    Hematocrit 28.8 (L) 36 - 46 %    MCV 1:25 PM   Result Value Ref Range    Magnesium 1.3 (L) 1.6 - 2.6 mg/dL   COVID-19, Rapid    Collection Time: 08/11/21  1:31 PM    Specimen: Nasopharyngeal Swab   Result Value Ref Range    Specimen Description . NASOPHARYNGEAL SWAB     SARS-CoV-2, Rapid Not Detected Not Detected   EKG 12 Lead    Collection Time: 08/11/21  1:57 PM   Result Value Ref Range    Ventricular Rate 95 BPM    Atrial Rate 100 BPM    P-R Interval 154 ms    QRS Duration 98 ms    Q-T Interval 434 ms    QTc Calculation (Bazett) 545 ms    P Axis 88 degrees    R Axis -4 degrees    T Axis 64 degrees   Troponin    Collection Time: 08/11/21  3:25 PM   Result Value Ref Range    Troponin, High Sensitivity 45 (H) 0 - 14 ng/L    Troponin T NOT REPORTED <0.03 ng/mL    Troponin Interp NOT REPORTED    POC Glucose Fingerstick    Collection Time: 08/11/21  5:03 PM   Result Value Ref Range    POC Glucose 249 (H) 65 - 105 mg/dL   MAGNESIUM    Collection Time: 08/11/21  5:41 PM   Result Value Ref Range    Magnesium 1.7 1.6 - 2.6 mg/dL   K (Potassium)    Collection Time: 08/11/21  5:41 PM   Result Value Ref Range    Potassium 3.3 (L) 3.7 - 5.3 mmol/L   Hepatic Function Panel    Collection Time: 08/11/21  5:41 PM   Result Value Ref Range    Albumin 3.4 (L) 3.5 - 5.2 g/dL    Alkaline Phosphatase 126 (H) 35 - 104 U/L    ALT 27 5 - 33 U/L    AST 20 <32 U/L    Total Bilirubin 0.21 (L) 0.3 - 1.2 mg/dL    Bilirubin, Direct <0.08 <0.31 mg/dL    Bilirubin, Indirect CANNOT BE CALCULATED 0.00 - 1.00 mg/dL    Total Protein 7.2 6.4 - 8.3 g/dL    Globulin NOT REPORTED 1.5 - 3.8 g/dL    Albumin/Globulin Ratio NOT REPORTED 1.0 - 2.5   Troponin    Collection Time: 08/11/21  5:41 PM   Result Value Ref Range    Troponin, High Sensitivity 46 (H) 0 - 14 ng/L    Troponin T NOT REPORTED <0.03 ng/mL    Troponin Interp NOT REPORTED    POC Glucose Fingerstick    Collection Time: 08/11/21  8:08 PM   Result Value Ref Range    POC Glucose 395 (H) 65 - 105 mg/dL   Troponin    Collection Time: 08/11/21  8:15 PM   Result Value Ref Range    Troponin, High Sensitivity 39 (H) 0 - 14 ng/L    Troponin T NOT REPORTED <0.03 ng/mL    Troponin Interp NOT REPORTED    EKG 12 Lead    Collection Time: 08/12/21  5:16 AM   Result Value Ref Range    Ventricular Rate 77 BPM    Atrial Rate 77 BPM    P-R Interval 146 ms    QRS Duration 90 ms    Q-T Interval 460 ms    QTc Calculation (Bazett) 520 ms    P Axis 88 degrees    R Axis 12 degrees    T Axis 84 degrees   Basic Metabolic Panel w/ Reflex to MG    Collection Time: 08/12/21  6:00 AM   Result Value Ref Range    Glucose 174 (H) 70 - 99 mg/dL    BUN 29 (H) 8 - 23 mg/dL    CREATININE 1.20 (H) 0.50 - 0.90 mg/dL    Bun/Cre Ratio NOT REPORTED 9 - 20    Calcium 8.3 (L) 8.6 - 10.4 mg/dL    Sodium 140 135 - 144 mmol/L    Potassium 4.3 3.7 - 5.3 mmol/L    Chloride 106 98 - 107 mmol/L    CO2 29 20 - 31 mmol/L    Anion Gap 5 (L) 9 - 17 mmol/L    GFR Non-African American 44 (L) >60 mL/min    GFR  54 (L) >60 mL/min    GFR Comment          GFR Staging NOT REPORTED    Magnesium    Collection Time: 08/12/21  6:00 AM   Result Value Ref Range    Magnesium 1.8 1.6 - 2.6 mg/dL   Lipid Panel    Collection Time: 08/12/21  6:00 AM   Result Value Ref Range    Cholesterol 136 <200 mg/dL    HDL 49 >40 mg/dL    LDL Cholesterol 79 0 - 130 mg/dL    Chol/HDL Ratio 2.8 <5    Triglycerides 41 <150 mg/dL    VLDL NOT REPORTED 1 - 30 mg/dL   TSH w/reflex to FT4    Collection Time: 08/12/21  6:00 AM   Result Value Ref Range    TSH 0.22 (L) 0.30 - 5.00 mIU/L   T4, Free    Collection Time: 08/12/21  6:00 AM   Result Value Ref Range    Thyroxine, Free 1.27 0.93 - 1.70 ng/dL         2 D ECHOCARDIOGRAM:    Ordered. IMPRESSION:    Elevated high-sensitivity troponin peaked at 64 range initially and subsequently trended down to 39. Suspect due to demand ischemia from shortness of breath upon admission. No active angina pectoris.       ASCVD, CABG twice in 2005 and in 2018 with CABG x4 by

## 2021-08-13 LAB
ANION GAP SERPL CALCULATED.3IONS-SCNC: 3 MMOL/L (ref 9–17)
BUN BLDV-MCNC: 29 MG/DL (ref 8–23)
BUN/CREAT BLD: ABNORMAL (ref 9–20)
CALCIUM SERPL-MCNC: 8.7 MG/DL (ref 8.6–10.4)
CHLORIDE BLD-SCNC: 105 MMOL/L (ref 98–107)
CO2: 29 MMOL/L (ref 20–31)
CREAT SERPL-MCNC: 1.24 MG/DL (ref 0.5–0.9)
GFR AFRICAN AMERICAN: 52 ML/MIN
GFR NON-AFRICAN AMERICAN: 43 ML/MIN
GFR SERPL CREATININE-BSD FRML MDRD: ABNORMAL ML/MIN/{1.73_M2}
GFR SERPL CREATININE-BSD FRML MDRD: ABNORMAL ML/MIN/{1.73_M2}
GLUCOSE BLD-MCNC: 143 MG/DL (ref 65–105)
GLUCOSE BLD-MCNC: 190 MG/DL (ref 65–105)
GLUCOSE BLD-MCNC: 242 MG/DL (ref 70–99)
GLUCOSE BLD-MCNC: 258 MG/DL (ref 65–105)
GLUCOSE BLD-MCNC: 446 MG/DL (ref 65–105)
MAGNESIUM: 2 MG/DL (ref 1.6–2.6)
POTASSIUM SERPL-SCNC: 5.4 MMOL/L (ref 3.7–5.3)
SODIUM BLD-SCNC: 137 MMOL/L (ref 135–144)

## 2021-08-13 PROCEDURE — 2060000000 HC ICU INTERMEDIATE R&B

## 2021-08-13 PROCEDURE — 6360000002 HC RX W HCPCS: Performed by: NURSE PRACTITIONER

## 2021-08-13 PROCEDURE — 6370000000 HC RX 637 (ALT 250 FOR IP): Performed by: INTERNAL MEDICINE

## 2021-08-13 PROCEDURE — 83735 ASSAY OF MAGNESIUM: CPT

## 2021-08-13 PROCEDURE — 6370000000 HC RX 637 (ALT 250 FOR IP): Performed by: NURSE PRACTITIONER

## 2021-08-13 PROCEDURE — 6360000002 HC RX W HCPCS: Performed by: FAMILY MEDICINE

## 2021-08-13 PROCEDURE — 6370000000 HC RX 637 (ALT 250 FOR IP): Performed by: FAMILY MEDICINE

## 2021-08-13 PROCEDURE — 80048 BASIC METABOLIC PNL TOTAL CA: CPT

## 2021-08-13 PROCEDURE — 2580000003 HC RX 258: Performed by: FAMILY MEDICINE

## 2021-08-13 PROCEDURE — 82947 ASSAY GLUCOSE BLOOD QUANT: CPT

## 2021-08-13 PROCEDURE — 36415 COLL VENOUS BLD VENIPUNCTURE: CPT

## 2021-08-13 RX ADMIN — Medication 400 MG: at 10:12

## 2021-08-13 RX ADMIN — ATORVASTATIN CALCIUM 80 MG: 80 TABLET, FILM COATED ORAL at 10:12

## 2021-08-13 RX ADMIN — SODIUM CHLORIDE, PRESERVATIVE FREE 10 ML: 5 INJECTION INTRAVENOUS at 10:18

## 2021-08-13 RX ADMIN — FLUTICASONE PROPIONATE 2 SPRAY: 50 SPRAY, METERED NASAL at 10:12

## 2021-08-13 RX ADMIN — INSULIN LISPRO 1 UNITS: 100 INJECTION, SOLUTION INTRAVENOUS; SUBCUTANEOUS at 20:21

## 2021-08-13 RX ADMIN — INSULIN LISPRO 3 UNITS: 100 INJECTION, SOLUTION INTRAVENOUS; SUBCUTANEOUS at 10:11

## 2021-08-13 RX ADMIN — Medication 400 MG: at 20:21

## 2021-08-13 RX ADMIN — PANTOPRAZOLE SODIUM 40 MG: 40 TABLET, DELAYED RELEASE ORAL at 06:01

## 2021-08-13 RX ADMIN — CLOPIDOGREL BISULFATE 75 MG: 75 TABLET ORAL at 10:12

## 2021-08-13 RX ADMIN — METHYLPREDNISOLONE SODIUM SUCCINATE 40 MG: 40 INJECTION, POWDER, FOR SOLUTION INTRAMUSCULAR; INTRAVENOUS at 04:03

## 2021-08-13 RX ADMIN — AZITHROMYCIN MONOHYDRATE 250 MG: 250 TABLET ORAL at 10:11

## 2021-08-13 RX ADMIN — METOPROLOL TARTRATE 25 MG: 25 TABLET, FILM COATED ORAL at 10:11

## 2021-08-13 RX ADMIN — ASPIRIN 81 MG: 81 TABLET, COATED ORAL at 10:12

## 2021-08-13 RX ADMIN — ROPINIROLE HYDROCHLORIDE 1 MG: 1 TABLET, FILM COATED ORAL at 20:21

## 2021-08-13 RX ADMIN — INSULIN LISPRO 6 UNITS: 100 INJECTION, SOLUTION INTRAVENOUS; SUBCUTANEOUS at 14:46

## 2021-08-13 RX ADMIN — GUAIFENESIN 600 MG: 600 TABLET, EXTENDED RELEASE ORAL at 20:21

## 2021-08-13 RX ADMIN — METHYLPREDNISOLONE SODIUM SUCCINATE 40 MG: 40 INJECTION, POWDER, FOR SOLUTION INTRAMUSCULAR; INTRAVENOUS at 14:46

## 2021-08-13 RX ADMIN — ACETAMINOPHEN 650 MG: 325 TABLET ORAL at 15:00

## 2021-08-13 RX ADMIN — METHYLPREDNISOLONE SODIUM SUCCINATE 40 MG: 40 INJECTION, POWDER, FOR SOLUTION INTRAMUSCULAR; INTRAVENOUS at 20:21

## 2021-08-13 RX ADMIN — SODIUM CHLORIDE, PRESERVATIVE FREE 10 ML: 5 INJECTION INTRAVENOUS at 20:22

## 2021-08-13 RX ADMIN — METFORMIN HYDROCHLORIDE 500 MG: 500 TABLET, EXTENDED RELEASE ORAL at 10:11

## 2021-08-13 RX ADMIN — FUROSEMIDE 40 MG: 40 TABLET ORAL at 10:12

## 2021-08-13 RX ADMIN — CITALOPRAM HYDROBROMIDE 20 MG: 20 TABLET ORAL at 10:11

## 2021-08-13 RX ADMIN — ENOXAPARIN SODIUM 40 MG: 40 INJECTION SUBCUTANEOUS at 10:11

## 2021-08-13 RX ADMIN — LISINOPRIL 2.5 MG: 5 TABLET ORAL at 10:19

## 2021-08-13 RX ADMIN — INSULIN LISPRO 1 UNITS: 100 INJECTION, SOLUTION INTRAVENOUS; SUBCUTANEOUS at 18:18

## 2021-08-13 RX ADMIN — ISOSORBIDE MONONITRATE 30 MG: 30 TABLET, EXTENDED RELEASE ORAL at 10:11

## 2021-08-13 RX ADMIN — GUAIFENESIN 600 MG: 600 TABLET, EXTENDED RELEASE ORAL at 10:11

## 2021-08-13 RX ADMIN — METOPROLOL TARTRATE 25 MG: 25 TABLET, FILM COATED ORAL at 20:21

## 2021-08-13 ASSESSMENT — PAIN SCALES - GENERAL
PAINLEVEL_OUTOF10: 5
PAINLEVEL_OUTOF10: 0

## 2021-08-13 NOTE — PROGRESS NOTES
Progress Note  Date:2021       Room:-  Patient [id number]     YOB: 1950     Age:70 y.o. Subjective    Subjective:  Symptoms:  Stable. (Depressed d/t boyfriend leaving her with debt due to his crack habit. ). Diet:  Adequate intake. Activity level: Returning to normal.    Pain:  She reports no pain. Review of Systems  Objective         Vitals Last 24 Hours:  TEMPERATURE:  Temp  Av.1 °F (36.7 °C)  Min: 97.9 °F (36.6 °C)  Max: 98.2 °F (36.8 °C)  RESPIRATIONS RANGE: Resp  Av  Min: 16  Max: 20  PULSE OXIMETRY RANGE: SpO2  Av.8 %  Min: 83 %  Max: 99 %  PULSE RANGE: Pulse  Av.8  Min: 57  Max: 68  BLOOD PRESSURE RANGE: Systolic (61BWC), VOI:040 , Min:93 , JK   ; Diastolic (16KCV), NKV:28, Min:56, Max:78    I/O (24Hr): Intake/Output Summary (Last 24 hours) at 2021 0806  Last data filed at 2021  Gross per 24 hour   Intake 1515 ml   Output --   Net 1515 ml     Objective:  General Appearance:  Comfortable. Vital signs: (most recent): Blood pressure 108/62, pulse 57, temperature 98.2 °F (36.8 °C), temperature source Oral, resp. rate 16, height 5' 4\" (1.626 m), weight 134 lb 11.2 oz (61.1 kg), SpO2 (!) 83 %. Vital signs are normal.    Lungs:  Normal effort and normal respiratory rate. There are wheezes. (Improved air movement)  Heart: Normal rate. Regular rhythm.   S1 normal and S2 normal.      Labs/Imaging/Diagnostics    Labs:  CBC:  Recent Labs     21  1325   WBC 9.1   RBC 3.81*   HGB 9.3*   HCT 28.8*   MCV 75.6*   RDW 16.8*        CHEMISTRIES:  Recent Labs     21  1325 21  1325 21  1741 21  0600 21  0535     --   --  140 137   K 2.9*   < > 3.3* 4.3 5.4*     --   --  106 105   CO2 28  --   --  29 29   BUN 27*  --   --  29* 29*   CREATININE 1.45*  --   --  1.20* 1.24*   GLUCOSE 236*  --   --  174* 242*   MG 1.3*   < > 1.7 1.8 2.0    < > = values in this interval not displayed.      PT/INR:  Recent Labs     08/11/21  1325   PROTIME 12.9   INR 1.0     APTT:  Recent Labs     08/11/21  1325   APTT 25.6     LIVER PROFILE:  Recent Labs     08/11/21  1741   AST 20   ALT 27   BILIDIR <0.08   BILITOT 0.21*   ALKPHOS 126*       Imaging Last 24 Hours:  XR CHEST PORTABLE    Result Date: 8/11/2021  EXAMINATION: ONE XRAY VIEW OF THE CHEST 8/11/2021 1:23 pm COMPARISON: October 18, 2020 chest examination HISTORY: ORDERING SYSTEM PROVIDED HISTORY: sob TECHNOLOGIST PROVIDED HISTORY: sob Reason for Exam: Congestion and sob x 1 month Acuity: Acute Type of Exam: Ongoing Additional signs and symptoms: Congestion and sob x 1 month FINDINGS: Median sternotomy/stable cardiomegaly/mild tortuosity of the thoracic aorta Mild diffuse bilateral interstitial infiltrates with mild streaky bibasilar densities No significant pleural process Degenerative and posttraumatic changes of the shoulders/thoracic spine     Cardiomegaly Mild diffuse interstitial infiltrates, findings suggest mild vascular congestion Mild streaky bibasilar atelectasis     Assessment//Plan           Hospital Problems         Last Modified POA    * (Principal) COPD exacerbation ([address] 8/12/2021 Yes    Controlled type 2 diabetes mellitus without complication, without long-term current use of insulin ([address] (Chronic) 8/12/2021 Yes    Hypertension goal BP (blood pressure) < 140/90 (Chronic) 8/12/2021 Yes    Mixed hyperlipidemia (Chronic) 8/12/2021 Yes    Chronic obstructive pulmonary disease ([address] (Chronic) 8/12/2021 Yes    Coronary artery disease involving native coronary artery of native heart without angina pectoris (Chronic) 8/12/2021 Yes    Hypothyroidism (Chronic) 8/12/2021 Yes        Assessment & Plan  AE COPD - continue pulmonology management    CAD - stable    Diabetes - stable    Electronically signed by Anaya Rose MD on 8/13/21 at 8:06 AM EDT

## 2021-08-13 NOTE — PROGRESS NOTES
[facility] CARDIOLOGY Progress Note    2021 7:19 AM      Subjective:  Ms. Khalif Fajardo   Is feeling better and denies any chest pain or palpitations or lightheadedness or dizziness. Review of systems:  No fever or chills. No diarrhea. No headaches. LABS:     Recent Results (from the past 24 hour(s))   POC Glucose Fingerstick    Collection Time: 21 11:02 AM   Result Value Ref Range    POC Glucose 123 (H) 65 - 105 mg/dL   POC Glucose Fingerstick    Collection Time: 21  4:19 PM   Result Value Ref Range    POC Glucose 175 (H) 65 - 105 mg/dL   POC Glucose Fingerstick    Collection Time: 21  7:39 PM   Result Value Ref Range    POC Glucose 213 (H) 65 - 105 mg/dL   Basic Metabolic Panel w/ Reflex to MG    Collection Time: 21  5:35 AM   Result Value Ref Range    Glucose 242 (H) 70 - 99 mg/dL    BUN 29 (H) 8 - 23 mg/dL    CREATININE 1.24 (H) 0.50 - 0.90 mg/dL    Bun/Cre Ratio NOT REPORTED 9 - 20    Calcium 8.7 8.6 - 10.4 mg/dL    Sodium 137 135 - 144 mmol/L    Potassium 5.4 (H) 3.7 - 5.3 mmol/L    Chloride 105 98 - 107 mmol/L    CO2 29 20 - 31 mmol/L    Anion Gap 3 (L) 9 - 17 mmol/L    GFR Non-African American 43 (L) >60 mL/min    GFR  52 (L) >60 mL/min    GFR Comment          GFR Staging NOT REPORTED    Magnesium    Collection Time: 21  5:35 AM   Result Value Ref Range    Magnesium 2.0 1.6 - 2.6 mg/dL   POC Glucose Fingerstick    Collection Time: 21  6:36 AM   Result Value Ref Range    POC Glucose 258 (H) 65 - 105 mg/dL       Pulse Ox:  SpO2  Av %  Min: 95 %  Max: 99 %    Supplemental O2: O2 Flow Rate (L/min): 3 L/min     Current Meds:    metoprolol tartrate  25 mg Oral BID    potassium chloride  20 mEq Oral Daily with breakfast    furosemide  40 mg Oral Daily    methylPREDNISolone  40 mg Intravenous Q8H    azithromycin  250 mg Oral Daily    guaiFENesin  600 mg Oral BID    atorvastatin  80 mg Oral Daily    citalopram  20 mg Oral Daily    clopidogrel  75 mg Oral Daily    fluticasone  2 spray Each Nostril Daily    isosorbide mononitrate  30 mg Oral Daily    lisinopril  2.5 mg Oral Daily    metFORMIN  500 mg Oral Daily with breakfast    rOPINIRole  1 mg Oral Nightly    aspirin  81 mg Oral Daily    sodium chloride flush  5-40 mL Intravenous 2 times per day    enoxaparin  40 mg Subcutaneous Daily    insulin lispro  0-6 Units Subcutaneous TID WC    insulin lispro  0-3 Units Subcutaneous Nightly    magnesium oxide  400 mg Oral BID    pantoprazole  40 mg Oral QAM AC              VITAL SIGNS:    /78   Pulse 68   Temp 98.2 °F (36.8 °C) (Oral)   Resp 20   Ht 5' 4\" (1.626 m)   Wt 134 lb 11.2 oz (61.1 kg)   SpO2 98%   BMI 23.12 kg/m²  3 L/min      Admit Weight:  131 lb 2.8 oz (59.5 kg)    Last 3 weights: Wt Readings from Last 3 Encounters:   08/13/21 134 lb 11.2 oz (61.1 kg)   06/08/21 135 lb (61.2 kg)   11/19/20 135 lb (61.2 kg)       BMI: Body mass index is 23.12 kg/m². INPUT/OUTPUT:          Intake/Output Summary (Last 24 hours) at 8/13/2021 0719  Last data filed at 8/12/2021 2043  Gross per 24 hour   Intake 2005 ml   Output --   Net 2005 ml         Telemetry shows Sinus rhythm with PACs. EXAM:     General appearance: awake, alert. Pleasant. Resting comfortably in bed. Neck: No JVD or thyromegaly  Chest:  Scattered rhonchi bilaterally. No tenderness. No wheezing. Cardiac: Irregular rate and rhythm with frequent premature beats. No significant murmur or gallop or rubs. Abdomen: soft, non-tender. Extremities: no cyanosis, no clubbing, no calf tenderness, no leg edema. Pulses: intact bilateral radial pulses. Skin:  warm and dry. Neuro:  Able to move all 4 extremities. No new gross focal deficits. EKG:    Sinus rhythm with Premature atrial complexes / frequent   Prolonged QT   Abnormal ECG       2 D ECHOCARDIOGRAM:    Pending.       ASSESSMENT:    Elevated high-sensitivity troponin peaked at 64 range initially and subsequently trended down to 39. Suspect due to demand ischemia from shortness of breath upon admission. No active angina pectoris.       ASCVD, CABG twice in 2005 and in 2018 with CABG x 4 by history. No active angina pectoris.     Frequent premature atrial complexes on telemetry. Asymptomatic.     Occasional premature ventricular complexes.     Abnormal EKG.     Hyperkalemia.  K 5.4 this AM.      Hypomagnesemia. Corrected.     Low TSH. Normal free thyroxine level.     Essential hypertension.     Hyperlipidemia.     Diabetes mellitus.     Shortness of breath due to COPD.     History of TIA.     History of anxiety disorder.     Other problems as charted. REC/PLAN:    Improved symptomatically. Given elevated potassium level of 5.4 this a.m., will discontinue low-dose lisinopril 2.5 mg daily as it may be contributing to it. Continue on aspirin, atorvastatin, Lasix, Imdur, metoprolol from cardiac standpoint. May consider adding diltiazem if any worsening PACs and if her blood pressure can tolerate. Continued supportive care and monitoring of labs closely. Will follow. Discussed with the charge nurse. Electronically signed by Alexus Chan MD, [facility]        PLEASE NOTE:  This progress note was completed using a voice transcription system. Every effort was made to ensure accuracy. However, inadvertent computerized transcription errors may be present.

## 2021-08-13 NOTE — CARE COORDINATION
ONGOING DISCHARGE PLAN:    Patient is alert and oriented x4. Spoke with patient regarding discharge plan and patient confirms that plan is still home without needs. VNS has been declined. Active order for PO Zithromax and IV Solu-medrol 40mg every 8 hours. Following for home 02. Will need home 02 eval ordered prior to discharge. Will continue to follow for additional discharge needs.     Electronically signed by Naren Chappell RN on 8/13/2021 at 1:28 PM

## 2021-08-13 NOTE — FLOWSHEET NOTE
provided listening presence, words of comfort and prayer.       08/13/21 0916   Encounter Summary   Services provided to: Patient   Referral/Consult From: Rounding   Continue Visiting   (8/13/2021)   Complexity of Encounter Low   Length of Encounter 15 minutes   Spiritual Assessment Completed Yes   Routine   Type Initial   Assessment Hopeful   Intervention Prayer   Outcome Expressed gratitude

## 2021-08-14 LAB
ANION GAP SERPL CALCULATED.3IONS-SCNC: 7 MMOL/L (ref 9–17)
BUN BLDV-MCNC: 30 MG/DL (ref 8–23)
BUN/CREAT BLD: ABNORMAL (ref 9–20)
CALCIUM SERPL-MCNC: 8.7 MG/DL (ref 8.6–10.4)
CHLORIDE BLD-SCNC: 101 MMOL/L (ref 98–107)
CO2: 30 MMOL/L (ref 20–31)
CREAT SERPL-MCNC: 1.28 MG/DL (ref 0.5–0.9)
GFR AFRICAN AMERICAN: 50 ML/MIN
GFR NON-AFRICAN AMERICAN: 41 ML/MIN
GFR SERPL CREATININE-BSD FRML MDRD: ABNORMAL ML/MIN/{1.73_M2}
GFR SERPL CREATININE-BSD FRML MDRD: ABNORMAL ML/MIN/{1.73_M2}
GLUCOSE BLD-MCNC: 190 MG/DL (ref 65–105)
GLUCOSE BLD-MCNC: 225 MG/DL (ref 70–99)
GLUCOSE BLD-MCNC: 253 MG/DL (ref 65–105)
GLUCOSE BLD-MCNC: 292 MG/DL (ref 65–105)
GLUCOSE BLD-MCNC: 399 MG/DL (ref 65–105)
MAGNESIUM: 2.2 MG/DL (ref 1.6–2.6)
POTASSIUM SERPL-SCNC: 5.2 MMOL/L (ref 3.7–5.3)
SODIUM BLD-SCNC: 138 MMOL/L (ref 135–144)

## 2021-08-14 PROCEDURE — 2580000003 HC RX 258: Performed by: FAMILY MEDICINE

## 2021-08-14 PROCEDURE — 80048 BASIC METABOLIC PNL TOTAL CA: CPT

## 2021-08-14 PROCEDURE — 6360000002 HC RX W HCPCS: Performed by: FAMILY MEDICINE

## 2021-08-14 PROCEDURE — 36415 COLL VENOUS BLD VENIPUNCTURE: CPT

## 2021-08-14 PROCEDURE — 94640 AIRWAY INHALATION TREATMENT: CPT

## 2021-08-14 PROCEDURE — 82947 ASSAY GLUCOSE BLOOD QUANT: CPT

## 2021-08-14 PROCEDURE — 6370000000 HC RX 637 (ALT 250 FOR IP): Performed by: INTERNAL MEDICINE

## 2021-08-14 PROCEDURE — 83735 ASSAY OF MAGNESIUM: CPT

## 2021-08-14 PROCEDURE — 6360000002 HC RX W HCPCS: Performed by: NURSE PRACTITIONER

## 2021-08-14 PROCEDURE — 6370000000 HC RX 637 (ALT 250 FOR IP): Performed by: NURSE PRACTITIONER

## 2021-08-14 PROCEDURE — 6370000000 HC RX 637 (ALT 250 FOR IP): Performed by: FAMILY MEDICINE

## 2021-08-14 PROCEDURE — 2060000000 HC ICU INTERMEDIATE R&B

## 2021-08-14 PROCEDURE — 94760 N-INVAS EAR/PLS OXIMETRY 1: CPT

## 2021-08-14 PROCEDURE — 94669 MECHANICAL CHEST WALL OSCILL: CPT

## 2021-08-14 RX ADMIN — FLUTICASONE PROPIONATE 2 SPRAY: 50 SPRAY, METERED NASAL at 12:13

## 2021-08-14 RX ADMIN — AZITHROMYCIN MONOHYDRATE 250 MG: 250 TABLET ORAL at 10:19

## 2021-08-14 RX ADMIN — METOPROLOL TARTRATE 25 MG: 25 TABLET, FILM COATED ORAL at 20:37

## 2021-08-14 RX ADMIN — INSULIN LISPRO 3 UNITS: 100 INJECTION, SOLUTION INTRAVENOUS; SUBCUTANEOUS at 12:09

## 2021-08-14 RX ADMIN — GUAIFENESIN 600 MG: 600 TABLET, EXTENDED RELEASE ORAL at 10:19

## 2021-08-14 RX ADMIN — METHYLPREDNISOLONE SODIUM SUCCINATE 40 MG: 40 INJECTION, POWDER, FOR SOLUTION INTRAMUSCULAR; INTRAVENOUS at 12:09

## 2021-08-14 RX ADMIN — ASPIRIN 81 MG: 81 TABLET, COATED ORAL at 10:18

## 2021-08-14 RX ADMIN — METOPROLOL TARTRATE 25 MG: 25 TABLET, FILM COATED ORAL at 10:19

## 2021-08-14 RX ADMIN — IPRATROPIUM BROMIDE AND ALBUTEROL SULFATE 1 AMPULE: .5; 3 SOLUTION RESPIRATORY (INHALATION) at 19:52

## 2021-08-14 RX ADMIN — SODIUM CHLORIDE, PRESERVATIVE FREE 10 ML: 5 INJECTION INTRAVENOUS at 10:20

## 2021-08-14 RX ADMIN — ROPINIROLE HYDROCHLORIDE 1 MG: 1 TABLET, FILM COATED ORAL at 20:37

## 2021-08-14 RX ADMIN — INSULIN LISPRO 3 UNITS: 100 INJECTION, SOLUTION INTRAVENOUS; SUBCUTANEOUS at 18:34

## 2021-08-14 RX ADMIN — CLOPIDOGREL BISULFATE 75 MG: 75 TABLET ORAL at 10:20

## 2021-08-14 RX ADMIN — METHYLPREDNISOLONE SODIUM SUCCINATE 40 MG: 40 INJECTION, POWDER, FOR SOLUTION INTRAMUSCULAR; INTRAVENOUS at 04:16

## 2021-08-14 RX ADMIN — METHYLPREDNISOLONE SODIUM SUCCINATE 40 MG: 40 INJECTION, POWDER, FOR SOLUTION INTRAMUSCULAR; INTRAVENOUS at 20:36

## 2021-08-14 RX ADMIN — SODIUM CHLORIDE, PRESERVATIVE FREE 10 ML: 5 INJECTION INTRAVENOUS at 20:36

## 2021-08-14 RX ADMIN — INSULIN LISPRO 1 UNITS: 100 INJECTION, SOLUTION INTRAVENOUS; SUBCUTANEOUS at 10:20

## 2021-08-14 RX ADMIN — ISOSORBIDE MONONITRATE 30 MG: 30 TABLET, EXTENDED RELEASE ORAL at 10:18

## 2021-08-14 RX ADMIN — Medication 400 MG: at 20:37

## 2021-08-14 RX ADMIN — METFORMIN HYDROCHLORIDE 500 MG: 500 TABLET, EXTENDED RELEASE ORAL at 10:18

## 2021-08-14 RX ADMIN — PANTOPRAZOLE SODIUM 40 MG: 40 TABLET, DELAYED RELEASE ORAL at 05:27

## 2021-08-14 RX ADMIN — INSULIN LISPRO 3 UNITS: 100 INJECTION, SOLUTION INTRAVENOUS; SUBCUTANEOUS at 20:33

## 2021-08-14 RX ADMIN — ENOXAPARIN SODIUM 40 MG: 40 INJECTION SUBCUTANEOUS at 10:20

## 2021-08-14 RX ADMIN — CITALOPRAM HYDROBROMIDE 20 MG: 20 TABLET ORAL at 10:19

## 2021-08-14 RX ADMIN — Medication 400 MG: at 10:19

## 2021-08-14 RX ADMIN — GUAIFENESIN 600 MG: 600 TABLET, EXTENDED RELEASE ORAL at 20:37

## 2021-08-14 RX ADMIN — ATORVASTATIN CALCIUM 80 MG: 80 TABLET, FILM COATED ORAL at 10:20

## 2021-08-14 RX ADMIN — FUROSEMIDE 40 MG: 40 TABLET ORAL at 10:19

## 2021-08-14 ASSESSMENT — PAIN SCALES - GENERAL
PAINLEVEL_OUTOF10: 0
PAINLEVEL_OUTOF10: 0

## 2021-08-14 NOTE — PROGRESS NOTES
PULMONARY PROGRESS NOTE:    REASON FOR VISIT: COPD exac  Interval History:    Shortness of Breath: ++  Cough: ++  Sputum: difficult to expectorate     Hemoptysis: no  Chest Pain: no  Fever: no                   Swelling Feet: no  Headache: no                                           Nausea, Emesis, Abdominal Pain: no  Diarrhea: no         Constipation: no    Events since last visit: none    PAST MEDICAL HISTORY:      Scheduled Meds:   metoprolol tartrate  25 mg Oral BID    furosemide  40 mg Oral Daily    methylPREDNISolone  40 mg Intravenous Q8H    azithromycin  250 mg Oral Daily    guaiFENesin  600 mg Oral BID    atorvastatin  80 mg Oral Daily    citalopram  20 mg Oral Daily    clopidogrel  75 mg Oral Daily    fluticasone  2 spray Each Nostril Daily    isosorbide mononitrate  30 mg Oral Daily    metFORMIN  500 mg Oral Daily with breakfast    rOPINIRole  1 mg Oral Nightly    aspirin  81 mg Oral Daily    sodium chloride flush  5-40 mL Intravenous 2 times per day    enoxaparin  40 mg Subcutaneous Daily    insulin lispro  0-6 Units Subcutaneous TID WC    insulin lispro  0-3 Units Subcutaneous Nightly    magnesium oxide  400 mg Oral BID    pantoprazole  40 mg Oral QAM AC     Continuous Infusions:   dextrose      sodium chloride       PRN Meds:perflutren lipid microspheres, ipratropium-albuterol, nitroGLYCERIN, tiZANidine, glucose, dextrose, glucagon (rDNA), dextrose, sodium chloride flush, sodium chloride, ondansetron **OR** ondansetron, polyethylene glycol, acetaminophen **OR** acetaminophen, potassium chloride **OR** potassium alternative oral replacement **OR** potassium chloride, magnesium sulfate, potassium chloride **OR** potassium alternative oral replacement **OR** potassium chloride, magnesium sulfate        PHYSICAL EXAMINATION:  /67   Pulse 61   Temp 98.1 °F (36.7 °C) (Oral)   Resp 16   Ht 5' 4\" (1.626 m)   Wt 141 lb 1.5 oz (64 kg)   SpO2 97%   BMI 24.22 kg/m²

## 2021-08-14 NOTE — PLAN OF CARE
Problem: Falls - Risk of:  Goal: Will remain free from falls  Description: Will remain free from falls  Outcome: Ongoing  Note: No falls noted this shift. Patient ambulates with x1 staff assistance without difficulty. Bed kept in low position. Safe environment maintained. Bedside table & call light in reach. Uses call light appropriately when needing assistance. Problem: Falls - Risk of:  Goal: Absence of physical injury  Description: Absence of physical injury  Outcome: Ongoing     Problem: Pain:  Goal: Pain level will decrease  Description: Pain level will decrease  Outcome: Ongoing  Note: Pt medicated with pain medication prn. Assessed all pain characteristics including level, type, location, frequency, and onset. Non-pharmacologic interventions offered to pt as well. Pt states pain is tolerable at this time. Will continue to monitor.        Problem: Pain:  Goal: Control of acute pain  Description: Control of acute pain  Outcome: Ongoing     Problem: Pain:  Goal: Control of chronic pain  Description: Control of chronic pain  Outcome: Ongoing     Problem: Safety:  Goal: Free from accidental physical injury  Description: Free from accidental physical injury  Note: .saf

## 2021-08-14 NOTE — PROGRESS NOTES
[facility] CARDIOLOGY Progress Note    2021 9:57 AM      Subjective:  Ms. Maxx Levin   Is feeling better and denies any chest pain or palpitations or lightheadedness or dizziness. Still not   Quite to her baseline    Review of systems:  No fever or chills. No diarrhea. No headaches. LABS:     Recent Results (from the past 24 hour(s))   POC Glucose Fingerstick    Collection Time: 21 11:35 AM   Result Value Ref Range    POC Glucose 446 (HH) 65 - 105 mg/dL   POC Glucose Fingerstick    Collection Time: 21  4:38 PM   Result Value Ref Range    POC Glucose 143 (H) 65 - 105 mg/dL   POC Glucose Fingerstick    Collection Time: 21  7:28 PM   Result Value Ref Range    POC Glucose 190 (H) 65 - 105 mg/dL   Basic Metabolic Panel w/ Reflex to MG    Collection Time: 21  5:29 AM   Result Value Ref Range    Glucose 225 (H) 70 - 99 mg/dL    BUN 30 (H) 8 - 23 mg/dL    CREATININE 1.28 (H) 0.50 - 0.90 mg/dL    Bun/Cre Ratio NOT REPORTED 9 - 20    Calcium 8.7 8.6 - 10.4 mg/dL    Sodium 138 135 - 144 mmol/L    Potassium 5.2 3.7 - 5.3 mmol/L    Chloride 101 98 - 107 mmol/L    CO2 30 20 - 31 mmol/L    Anion Gap 7 (L) 9 - 17 mmol/L    GFR Non-African American 41 (L) >60 mL/min    GFR African American 50 (L) >60 mL/min    GFR Comment          GFR Staging NOT REPORTED    Magnesium    Collection Time: 21  5:29 AM   Result Value Ref Range    Magnesium 2.2 1.6 - 2.6 mg/dL   POC Glucose Fingerstick    Collection Time: 21  7:19 AM   Result Value Ref Range    POC Glucose 190 (H) 65 - 105 mg/dL       Pulse Ox:  SpO2  Av %  Min: 95 %  Max: 98 %    Supplemental O2: O2 Flow Rate (L/min): 2 L/min     Current Meds:    metoprolol tartrate  25 mg Oral BID    furosemide  40 mg Oral Daily    methylPREDNISolone  40 mg Intravenous Q8H    azithromycin  250 mg Oral Daily    guaiFENesin  600 mg Oral BID    atorvastatin  80 mg Oral Daily    citalopram  20 mg Oral Daily    clopidogrel  75 mg Oral Daily    fluticasone  2 spray Each Nostril Daily    isosorbide mononitrate  30 mg Oral Daily    metFORMIN  500 mg Oral Daily with breakfast    rOPINIRole  1 mg Oral Nightly    aspirin  81 mg Oral Daily    sodium chloride flush  5-40 mL Intravenous 2 times per day    enoxaparin  40 mg Subcutaneous Daily    insulin lispro  0-6 Units Subcutaneous TID WC    insulin lispro  0-3 Units Subcutaneous Nightly    magnesium oxide  400 mg Oral BID    pantoprazole  40 mg Oral QAM AC              VITAL SIGNS:    /75   Pulse 65   Temp 97.5 °F (36.4 °C) (Axillary)   Resp 18   Ht 5' 4\" (1.626 m)   Wt 141 lb 1.5 oz (64 kg)   SpO2 95%   BMI 24.22 kg/m²  2 L/min      Admit Weight:  131 lb 2.8 oz (59.5 kg)    Last 3 weights: Wt Readings from Last 3 Encounters:   08/14/21 141 lb 1.5 oz (64 kg)   06/08/21 135 lb (61.2 kg)   11/19/20 135 lb (61.2 kg)       BMI: Body mass index is 24.22 kg/m². INPUT/OUTPUT:          Intake/Output Summary (Last 24 hours) at 8/14/2021 0957  Last data filed at 8/14/2021 0529  Gross per 24 hour   Intake 840 ml   Output 175 ml   Net 665 ml         Telemetry shows Sinus rhythm with PACs. EXAM:     General appearance: awake, alert. Pleasant. Resting comfortably in bed. Neck: No JVD or thyromegaly  Chest:  Scattered rhonchi bilaterally. No tenderness. No wheezing. Cardiac: Irregular rate and rhythm with frequent premature beats. No significant murmur or gallop or rubs. Abdomen: soft, non-tender. Extremities: no cyanosis, no clubbing, no calf tenderness, no leg edema. Pulses: intact bilateral radial pulses. Skin:  warm and dry. Neuro:  Able to move all 4 extremities. No new gross focal deficits. EKG:    Sinus rhythm with Premature atrial complexes / frequent   Prolonged QT   Abnormal ECG       2 D ECHOCARDIOGRAM:    Pending. ASSESSMENT:    Elevated high-sensitivity troponin peaked at 64 range initially and subsequently trended down to 39.   Suspect due to

## 2021-08-14 NOTE — CARE COORDINATION
ONGOING DISCHARGE PLAN:    Patient is alert and oriented x4. Spoke with patient regarding discharge plan and patient confirms that plan is still to discharge to home with no needs  Patient is on steroids   Echo ordered   Cardio to sign off      Will continue to follow for additional discharge needs.     Electronically signed by Leno Schroeder RN on 8/14/2021 at 1:48 PM

## 2021-08-14 NOTE — PLAN OF CARE
Problem: Falls - Risk of:  Goal: Will remain free from falls  Description: Will remain free from falls  Outcome: Ongoing    Patient remained free of falls. Call light within reach, side rails up x2, bedside table in reach. Room free of clutter. Bed alarm on. Problem: Pain:  Goal: Pain level will decrease  Description: Pain level will decrease  Outcome: Ongoing   No complaints of pain. Problem: Breathing Pattern - Ineffective:  Goal: Ability to achieve and maintain a regular respiratory rate will improve  Description: Ability to achieve and maintain a regular respiratory rate will improve  Outcome: Ongoing   Patient O2 sats within normal range throughout shift. No complaints of shortness of breath.     Problem: Safety:  Goal: Free from accidental physical injury  Description: Free from accidental physical injury  8/14/2021 1732 by Adelia Kim RN  Outcome: Ongoing

## 2021-08-14 NOTE — PROGRESS NOTES
PULMONARY PROGRESS NOTE:    REASON FOR VISIT: COPD exac  Interval History:    Shortness of Breath: ++  Cough: ++  Sputum: difficult to expectorate     Hemoptysis: no  Chest Pain: no  Fever: no                   Swelling Feet: no  Headache: no                                           Nausea, Emesis, Abdominal Pain: no  Diarrhea: no         Constipation: no    Events since last visit: none    PAST MEDICAL HISTORY:      Scheduled Meds:   metoprolol tartrate  25 mg Oral BID    furosemide  40 mg Oral Daily    methylPREDNISolone  40 mg Intravenous Q8H    azithromycin  250 mg Oral Daily    guaiFENesin  600 mg Oral BID    atorvastatin  80 mg Oral Daily    citalopram  20 mg Oral Daily    clopidogrel  75 mg Oral Daily    fluticasone  2 spray Each Nostril Daily    isosorbide mononitrate  30 mg Oral Daily    metFORMIN  500 mg Oral Daily with breakfast    rOPINIRole  1 mg Oral Nightly    aspirin  81 mg Oral Daily    sodium chloride flush  5-40 mL Intravenous 2 times per day    enoxaparin  40 mg Subcutaneous Daily    insulin lispro  0-6 Units Subcutaneous TID WC    insulin lispro  0-3 Units Subcutaneous Nightly    magnesium oxide  400 mg Oral BID    pantoprazole  40 mg Oral QAM AC     Continuous Infusions:   dextrose      sodium chloride       PRN Meds:perflutren lipid microspheres, ipratropium-albuterol, nitroGLYCERIN, tiZANidine, glucose, dextrose, glucagon (rDNA), dextrose, sodium chloride flush, sodium chloride, ondansetron **OR** ondansetron, polyethylene glycol, acetaminophen **OR** acetaminophen, potassium chloride **OR** potassium alternative oral replacement **OR** potassium chloride, magnesium sulfate, potassium chloride **OR** potassium alternative oral replacement **OR** potassium chloride, magnesium sulfate        PHYSICAL EXAMINATION:  BP (!) 106/56   Pulse 59   Temp 97.5 °F (36.4 °C) (Axillary)   Resp 18   Ht 5' 4\" (1.626 m)   Wt 134 lb 11.2 oz (61.1 kg)   SpO2 95%   BMI 23.12 kg/m² General : Awake, alert,   Neck - supple, no lymphadenopathy, JVD not raised  Heart - regular rhythm, S1 and S2 normal; no additional sounds heard  Lungs - Air Entry- fair bilaterally; breath sounds : vesicular; rhonchi ++   rales/crackles - absent  Abdomen - soft, no tenderness  Upper Extremities  - no cyanosis, mottling; edema : absent  Lower Extremities: no cyanosis, mottling; edema : absent    Current Laboratory, Radiologic, Microbiologic, and Diagnostic studies reviewed  Data ReviewCBC:   Recent Labs     08/11/21  1325   WBC 9.1   RBC 3.81*   HGB 9.3*   HCT 28.8*        BMP:   Recent Labs     08/11/21  1325 08/11/21  1325 08/11/21  1741 08/12/21  0600 08/13/21  0535   GLUCOSE 236*  --   --  174* 242*     --   --  140 137   K 2.9*   < > 3.3* 4.3 5.4*   BUN 27*  --   --  29* 29*   CREATININE 1.45*  --   --  1.20* 1.24*   CALCIUM 8.4*  --   --  8.3* 8.7    < > = values in this interval not displayed. ABGs: No results for input(s): PHART, PO2ART, JKW6ROA, GOA3APB, BEART, F5IJSKJR, XCX6PFE in the last 72 hours. PT/INR:  No results found for: PTINR    ASSESSMENT / PLAN:    COPD exac- steroids, BD  Elevated troponin   Add mucinex, acapella   Add MetaNeb  Hx DM- managed by primary     This is a late note on patient seen by me earlier today.     Electronically signed by Lety Lubin MD on 08/13/21 at 8:26 PM.

## 2021-08-15 LAB
ANION GAP SERPL CALCULATED.3IONS-SCNC: 3 MMOL/L (ref 9–17)
ANION GAP SERPL CALCULATED.3IONS-SCNC: 4 MMOL/L (ref 9–17)
BUN BLDV-MCNC: 29 MG/DL (ref 8–23)
BUN BLDV-MCNC: 30 MG/DL (ref 8–23)
BUN/CREAT BLD: ABNORMAL (ref 9–20)
BUN/CREAT BLD: ABNORMAL (ref 9–20)
CALCIUM SERPL-MCNC: 8.6 MG/DL (ref 8.6–10.4)
CALCIUM SERPL-MCNC: 8.7 MG/DL (ref 8.6–10.4)
CHLORIDE BLD-SCNC: 97 MMOL/L (ref 98–107)
CHLORIDE BLD-SCNC: 98 MMOL/L (ref 98–107)
CO2: 31 MMOL/L (ref 20–31)
CO2: 33 MMOL/L (ref 20–31)
CREAT SERPL-MCNC: 1.33 MG/DL (ref 0.5–0.9)
CREAT SERPL-MCNC: 1.34 MG/DL (ref 0.5–0.9)
GFR AFRICAN AMERICAN: 47 ML/MIN
GFR AFRICAN AMERICAN: 48 ML/MIN
GFR NON-AFRICAN AMERICAN: 39 ML/MIN
GFR NON-AFRICAN AMERICAN: 39 ML/MIN
GFR SERPL CREATININE-BSD FRML MDRD: ABNORMAL ML/MIN/{1.73_M2}
GLUCOSE BLD-MCNC: 215 MG/DL (ref 65–105)
GLUCOSE BLD-MCNC: 232 MG/DL (ref 70–99)
GLUCOSE BLD-MCNC: 266 MG/DL (ref 65–105)
GLUCOSE BLD-MCNC: 295 MG/DL (ref 70–99)
GLUCOSE BLD-MCNC: 308 MG/DL (ref 65–105)
GLUCOSE BLD-MCNC: 374 MG/DL (ref 65–105)
MAGNESIUM: 2.2 MG/DL (ref 1.6–2.6)
POTASSIUM SERPL-SCNC: 5.4 MMOL/L (ref 3.7–5.3)
POTASSIUM SERPL-SCNC: 5.6 MMOL/L (ref 3.7–5.3)
SARS-COV-2, RAPID: NOT DETECTED
SODIUM BLD-SCNC: 132 MMOL/L (ref 135–144)
SODIUM BLD-SCNC: 134 MMOL/L (ref 135–144)
SPECIMEN DESCRIPTION: NORMAL

## 2021-08-15 PROCEDURE — 82947 ASSAY GLUCOSE BLOOD QUANT: CPT

## 2021-08-15 PROCEDURE — 80048 BASIC METABOLIC PNL TOTAL CA: CPT

## 2021-08-15 PROCEDURE — 6370000000 HC RX 637 (ALT 250 FOR IP): Performed by: NURSE PRACTITIONER

## 2021-08-15 PROCEDURE — 94761 N-INVAS EAR/PLS OXIMETRY MLT: CPT

## 2021-08-15 PROCEDURE — 94640 AIRWAY INHALATION TREATMENT: CPT

## 2021-08-15 PROCEDURE — 6370000000 HC RX 637 (ALT 250 FOR IP): Performed by: FAMILY MEDICINE

## 2021-08-15 PROCEDURE — 2060000000 HC ICU INTERMEDIATE R&B

## 2021-08-15 PROCEDURE — 94669 MECHANICAL CHEST WALL OSCILL: CPT

## 2021-08-15 PROCEDURE — 83735 ASSAY OF MAGNESIUM: CPT

## 2021-08-15 PROCEDURE — 2700000000 HC OXYGEN THERAPY PER DAY

## 2021-08-15 PROCEDURE — 6360000002 HC RX W HCPCS: Performed by: FAMILY MEDICINE

## 2021-08-15 PROCEDURE — 6370000000 HC RX 637 (ALT 250 FOR IP): Performed by: INTERNAL MEDICINE

## 2021-08-15 PROCEDURE — 6360000002 HC RX W HCPCS: Performed by: NURSE PRACTITIONER

## 2021-08-15 PROCEDURE — 87635 SARS-COV-2 COVID-19 AMP PRB: CPT

## 2021-08-15 PROCEDURE — 2580000003 HC RX 258: Performed by: FAMILY MEDICINE

## 2021-08-15 PROCEDURE — 36415 COLL VENOUS BLD VENIPUNCTURE: CPT

## 2021-08-15 RX ORDER — INSULIN GLARGINE 100 [IU]/ML
15 INJECTION, SOLUTION SUBCUTANEOUS DAILY
Status: DISCONTINUED | OUTPATIENT
Start: 2021-08-15 | End: 2021-08-17 | Stop reason: HOSPADM

## 2021-08-15 RX ORDER — IPRATROPIUM BROMIDE AND ALBUTEROL SULFATE 2.5; .5 MG/3ML; MG/3ML
1 SOLUTION RESPIRATORY (INHALATION) EVERY 4 HOURS PRN
Status: DISCONTINUED | OUTPATIENT
Start: 2021-08-15 | End: 2021-08-17 | Stop reason: HOSPADM

## 2021-08-15 RX ADMIN — GUAIFENESIN 600 MG: 600 TABLET, EXTENDED RELEASE ORAL at 21:36

## 2021-08-15 RX ADMIN — ROPINIROLE HYDROCHLORIDE 1 MG: 1 TABLET, FILM COATED ORAL at 21:37

## 2021-08-15 RX ADMIN — INSULIN GLARGINE 15 UNITS: 100 INJECTION, SOLUTION SUBCUTANEOUS at 09:08

## 2021-08-15 RX ADMIN — METOPROLOL TARTRATE 25 MG: 25 TABLET, FILM COATED ORAL at 21:37

## 2021-08-15 RX ADMIN — INSULIN LISPRO 3 UNITS: 100 INJECTION, SOLUTION INTRAVENOUS; SUBCUTANEOUS at 21:35

## 2021-08-15 RX ADMIN — ISOSORBIDE MONONITRATE 30 MG: 30 TABLET, EXTENDED RELEASE ORAL at 08:59

## 2021-08-15 RX ADMIN — CITALOPRAM HYDROBROMIDE 20 MG: 20 TABLET ORAL at 08:58

## 2021-08-15 RX ADMIN — FLUTICASONE PROPIONATE 2 SPRAY: 50 SPRAY, METERED NASAL at 08:58

## 2021-08-15 RX ADMIN — INSULIN LISPRO 3 UNITS: 100 INJECTION, SOLUTION INTRAVENOUS; SUBCUTANEOUS at 18:05

## 2021-08-15 RX ADMIN — Medication 400 MG: at 21:36

## 2021-08-15 RX ADMIN — METHYLPREDNISOLONE SODIUM SUCCINATE 40 MG: 40 INJECTION, POWDER, FOR SOLUTION INTRAMUSCULAR; INTRAVENOUS at 11:25

## 2021-08-15 RX ADMIN — ATORVASTATIN CALCIUM 80 MG: 80 TABLET, FILM COATED ORAL at 08:58

## 2021-08-15 RX ADMIN — INSULIN LISPRO 2 UNITS: 100 INJECTION, SOLUTION INTRAVENOUS; SUBCUTANEOUS at 08:57

## 2021-08-15 RX ADMIN — METHYLPREDNISOLONE SODIUM SUCCINATE 40 MG: 40 INJECTION, POWDER, FOR SOLUTION INTRAMUSCULAR; INTRAVENOUS at 21:49

## 2021-08-15 RX ADMIN — Medication 400 MG: at 08:59

## 2021-08-15 RX ADMIN — SODIUM CHLORIDE, PRESERVATIVE FREE 10 ML: 5 INJECTION INTRAVENOUS at 09:00

## 2021-08-15 RX ADMIN — METFORMIN HYDROCHLORIDE 500 MG: 500 TABLET, EXTENDED RELEASE ORAL at 08:59

## 2021-08-15 RX ADMIN — IPRATROPIUM BROMIDE AND ALBUTEROL SULFATE 1 AMPULE: .5; 3 SOLUTION RESPIRATORY (INHALATION) at 06:50

## 2021-08-15 RX ADMIN — FUROSEMIDE 40 MG: 40 TABLET ORAL at 08:58

## 2021-08-15 RX ADMIN — IPRATROPIUM BROMIDE AND ALBUTEROL SULFATE 1 AMPULE: .5; 3 SOLUTION RESPIRATORY (INHALATION) at 10:38

## 2021-08-15 RX ADMIN — IPRATROPIUM BROMIDE AND ALBUTEROL SULFATE 1 AMPULE: .5; 3 SOLUTION RESPIRATORY (INHALATION) at 14:49

## 2021-08-15 RX ADMIN — ENOXAPARIN SODIUM 40 MG: 40 INJECTION SUBCUTANEOUS at 08:58

## 2021-08-15 RX ADMIN — AZITHROMYCIN MONOHYDRATE 250 MG: 250 TABLET ORAL at 08:58

## 2021-08-15 RX ADMIN — ASPIRIN 81 MG: 81 TABLET, COATED ORAL at 08:59

## 2021-08-15 RX ADMIN — SODIUM CHLORIDE, PRESERVATIVE FREE 10 ML: 5 INJECTION INTRAVENOUS at 21:39

## 2021-08-15 RX ADMIN — METHYLPREDNISOLONE SODIUM SUCCINATE 40 MG: 40 INJECTION, POWDER, FOR SOLUTION INTRAMUSCULAR; INTRAVENOUS at 03:51

## 2021-08-15 RX ADMIN — GUAIFENESIN 600 MG: 600 TABLET, EXTENDED RELEASE ORAL at 08:59

## 2021-08-15 RX ADMIN — IPRATROPIUM BROMIDE AND ALBUTEROL SULFATE 1 AMPULE: .5; 3 SOLUTION RESPIRATORY (INHALATION) at 20:41

## 2021-08-15 RX ADMIN — PANTOPRAZOLE SODIUM 40 MG: 40 TABLET, DELAYED RELEASE ORAL at 05:37

## 2021-08-15 RX ADMIN — CLOPIDOGREL BISULFATE 75 MG: 75 TABLET ORAL at 08:58

## 2021-08-15 RX ADMIN — INSULIN LISPRO 4 UNITS: 100 INJECTION, SOLUTION INTRAVENOUS; SUBCUTANEOUS at 11:25

## 2021-08-15 ASSESSMENT — PAIN SCALES - GENERAL: PAINLEVEL_OUTOF10: 0

## 2021-08-15 NOTE — PROGRESS NOTES
PULMONARY PROGRESS NOTE:    REASON FOR VISIT: COPD exac  Interval History:    Shortness of Breath: ++  Cough: ++  Sputum: none  Hemoptysis: no  Chest Pain: no  Fever: no                   Swelling Feet: no  Headache: no                                           Nausea, Emesis, Abdominal Pain: no  Diarrhea: no         Constipation: no    Events since last visit: none    PAST MEDICAL HISTORY:      Scheduled Meds:   insulin glargine  15 Units Subcutaneous Daily    metoprolol tartrate  25 mg Oral BID    furosemide  40 mg Oral Daily    methylPREDNISolone  40 mg Intravenous Q8H    azithromycin  250 mg Oral Daily    guaiFENesin  600 mg Oral BID    atorvastatin  80 mg Oral Daily    citalopram  20 mg Oral Daily    clopidogrel  75 mg Oral Daily    fluticasone  2 spray Each Nostril Daily    isosorbide mononitrate  30 mg Oral Daily    metFORMIN  500 mg Oral Daily with breakfast    rOPINIRole  1 mg Oral Nightly    aspirin  81 mg Oral Daily    sodium chloride flush  5-40 mL Intravenous 2 times per day    enoxaparin  40 mg Subcutaneous Daily    insulin lispro  0-6 Units Subcutaneous TID WC    insulin lispro  0-3 Units Subcutaneous Nightly    magnesium oxide  400 mg Oral BID    pantoprazole  40 mg Oral QAM AC     Continuous Infusions:   dextrose      sodium chloride       PRN Meds:ipratropium-albuterol, perflutren lipid microspheres, nitroGLYCERIN, tiZANidine, glucose, dextrose, glucagon (rDNA), dextrose, sodium chloride flush, sodium chloride, ondansetron **OR** ondansetron, polyethylene glycol, acetaminophen **OR** acetaminophen, potassium chloride **OR** potassium alternative oral replacement **OR** potassium chloride, magnesium sulfate, potassium chloride **OR** potassium alternative oral replacement **OR** potassium chloride, magnesium sulfate        PHYSICAL EXAMINATION:  BP (!) 140/67   Pulse 56   Temp 97.4 °F (36.3 °C) (Oral)   Resp 16   Ht 5' 4\" (1.626 m)   Wt 141 lb 5 oz (64.1 kg)   SpO2 95%   BMI 24.26 kg/m²   afebrile  General : Awake, alert  Neck - supple, no lymphadenopathy, JVD not raised  Heart - SB 56  Lungs - Air Entry- fair bilaterally; breath sounds : coarse, 95% on 2 l nc  Abdomen - soft, no tenderness  Upper Extremities  - no cyanosis, mottling; edema : absent  Lower Extremities: no cyanosis, mottling; edema : absent    Current Laboratory, Radiologic, Microbiologic, and Diagnostic studies reviewed  Data ReviewCBC:   No results for input(s): WBC, RBC, HGB, HCT, PLT in the last 72 hours. BMP:   Recent Labs     08/14/21  0529 08/15/21  0532 08/15/21  1151   GLUCOSE 225* 232* 295*    134* 132*   K 5.2 5.6* 5.4*   BUN 30* 29* 30*   CREATININE 1.28* 1.33* 1.34*   CALCIUM 8.7 8.6 8.7     ABGs: No results for input(s): PHART, PO2ART, EGQ5YZC, XYF5ANW, BEART, G2AIEVQU, MEU4WBQ in the last 72 hours.    PT/INR:  No results found for: PTINR    ASSESSMENT / PLAN:    COPD exac- steroids, BD  Elevated troponin   mucinex, acapella, MetaNeb  Hx DM- managed by primary   NPO after MN for bronch 8-16-21    Plan of care discussed with Dr Britney Lugo  Electronically signed by JAMES Lara CNP on 08/15/21 at 12:57 PM.

## 2021-08-15 NOTE — PROGRESS NOTES
Progress Note  Date:8/15/2021       Room:-  Patient [id number] [facility]     YOB: 1950     Age:70 y.o. Subjective    Subjective:  Symptoms:  Stable. Diet:  Adequate intake. Activity level: Impaired due to weakness. Pain:  She reports no pain. Review of Systems  Objective         Vitals Last 24 Hours:  TEMPERATURE:  Temp  Av.7 °F (36.5 °C)  Min: 97.3 °F (36.3 °C)  Max: 98.1 °F (36.7 °C)  RESPIRATIONS RANGE: Resp  Av.3  Min: 16  Max: 18  PULSE OXIMETRY RANGE: SpO2  Av.1 %  Min: 94 %  Max: 98 %  PULSE RANGE: Pulse  Av.2  Min: 56  Max: 64  BLOOD PRESSURE RANGE: Systolic (61WBF), AKA:754 , Min:94 , GWM:945   ; Diastolic (88NSW), WJK:03, Min:46, Max:69    I/O (24Hr): Intake/Output Summary (Last 24 hours) at 8/15/2021 0855  Last data filed at 2021 2351  Gross per 24 hour   Intake 120 ml   Output 600 ml   Net -480 ml     Objective:  General Appearance:  Comfortable. Vital signs: (most recent): Blood pressure (!) 140/67, pulse 56, temperature 97.4 °F (36.3 °C), temperature source Oral, resp. rate 18, height 5' 4\" (1.626 m), weight 141 lb 5 oz (64.1 kg), SpO2 98 %. Vital signs are normal.    Lungs:  Normal effort and normal respiratory rate. There are wheezes (bases). Heart: Normal rate. Regular rhythm. S1 normal and S2 normal.      Labs/Imaging/Diagnostics    Labs:  CBC:No results for input(s): WBC, RBC, HGB, HCT, MCV, RDW, PLT in the last 72 hours. CHEMISTRIES:  Recent Labs     21  0535 21  0529 08/15/21  0532    138 134*   K 5.4* 5.2 5.6*    101 98   CO2 29 30 33*   BUN 29* 30* 29*   CREATININE 1.24* 1.28* 1.33*   GLUCOSE 242* 225* 232*   MG 2.0 2.2 2.2     PT/INR:No results for input(s): PROTIME, INR in the last 72 hours. APTT:No results for input(s): APTT in the last 72 hours. LIVER PROFILE:No results for input(s): AST, ALT, BILIDIR, BILITOT, ALKPHOS in the last 72 hours.     Imaging Last 24 Hours:  No results found.  Assessment//Plan           Hospital Problems         Last Modified POA    * (Principal) COPD exacerbation ([address] 8/12/2021 Yes    Controlled type 2 diabetes mellitus without complication, without long-term current use of insulin (HCC) (Chronic) 8/12/2021 Yes    Hypertension goal BP (blood pressure) < 140/90 (Chronic) 8/12/2021 Yes    Mixed hyperlipidemia (Chronic) 8/12/2021 Yes    Chronic obstructive pulmonary disease (HCC) (Chronic) 8/12/2021 Yes    Coronary artery disease involving native coronary artery of native heart without angina pectoris (Chronic) 8/12/2021 Yes    Hypothyroidism (Chronic) 8/12/2021 Yes        Assessment & Plan  AE COPD - management per pulmonology    Diabetes - hyperglycemia d/t steroids -add basal insulin while on steroids as IP    Electronically signed by Lizzie Bailey MD on 8/15/21 at 8:55 AM EDT

## 2021-08-15 NOTE — PLAN OF CARE
Problem: Falls - Risk of:  Goal: Will remain free from falls  Description: Will remain free from falls  8/15/2021 0423 by Hermes Nevarez RN  Outcome: Ongoing  Note: No falls noted this shift. Patient ambulates with x1 staff assistance without difficulty. Bed kept in low position. Safe environment maintained. Bedside table & call light in reach. Uses call light appropriately when needing assistance. Problem: Falls - Risk of:  Goal: Absence of physical injury  Description: Absence of physical injury  8/15/2021 0423 by Hermes Nevarez RN  Outcome: Ongoing     Problem: Pain:  Goal: Pain level will decrease  Description: Pain level will decrease  8/15/2021 0423 by Hermes Nevarez RN  Outcome: Ongoing  Note: No pain at this time. 0/10 pain scale.        Problem: Pain:  Goal: Control of acute pain  Description: Control of acute pain  8/15/2021 0423 by Hermes Nevarez RN  Outcome: Ongoing     Problem: Breathing Pattern - Ineffective:  Goal: Ability to achieve and maintain a regular respiratory rate will improve  Description: Ability to achieve and maintain a regular respiratory rate will improve  8/15/2021 0423 by Hermes Nevarez RN  Outcome: Ongoing     Problem: Safety:  Goal: Free from accidental physical injury  Description: Free from accidental physical injury  8/15/2021 0423 by Hermes Nevarez RN  Outcome: Ongoing  8/14/2021 1732 by Tim Gitelman, RN  Outcome: Ongoing     Problem: Safety:  Goal: Free from intentional harm  Description: Free from intentional harm  8/15/2021 0423 by Hermes Nevarez RN  Outcome: Ongoing

## 2021-08-15 NOTE — CARE COORDINATION
ONGOING DISCHARGE PLAN:    Patient is alert and oriented x4. Spoke with patient regarding discharge plan and patient confirms that plan is still to discharge to home with no needs  Patient will have a Bronchoscopy done tomorrow   On steroids  mucinex   acapella   Will follow for home ox     Will continue to follow for additional discharge needs.     Electronically signed by Reina Garcia RN on 8/15/2021 at 2:31 PM

## 2021-08-16 ENCOUNTER — ANESTHESIA EVENT (OUTPATIENT)
Dept: ENDOSCOPY | Age: 71
DRG: 191 | End: 2021-08-16
Payer: MEDICARE

## 2021-08-16 ENCOUNTER — ANESTHESIA (OUTPATIENT)
Dept: ENDOSCOPY | Age: 71
DRG: 191 | End: 2021-08-16
Payer: MEDICARE

## 2021-08-16 VITALS — DIASTOLIC BLOOD PRESSURE: 67 MMHG | SYSTOLIC BLOOD PRESSURE: 125 MMHG | TEMPERATURE: 98.2 F | OXYGEN SATURATION: 96 %

## 2021-08-16 LAB
ANION GAP SERPL CALCULATED.3IONS-SCNC: 2 MMOL/L (ref 9–17)
APPEARANCE FLUID: NORMAL
BUN BLDV-MCNC: 31 MG/DL (ref 8–23)
BUN/CREAT BLD: ABNORMAL (ref 9–20)
CALCIUM SERPL-MCNC: 8.6 MG/DL (ref 8.6–10.4)
CHLORIDE BLD-SCNC: 97 MMOL/L (ref 98–107)
CO2: 34 MMOL/L (ref 20–31)
COLOR FLUID: NORMAL
CREAT SERPL-MCNC: 1.28 MG/DL (ref 0.5–0.9)
GFR AFRICAN AMERICAN: 50 ML/MIN
GFR NON-AFRICAN AMERICAN: 41 ML/MIN
GFR SERPL CREATININE-BSD FRML MDRD: ABNORMAL ML/MIN/{1.73_M2}
GFR SERPL CREATININE-BSD FRML MDRD: ABNORMAL ML/MIN/{1.73_M2}
GLUCOSE BLD-MCNC: 162 MG/DL (ref 65–105)
GLUCOSE BLD-MCNC: 170 MG/DL (ref 65–105)
GLUCOSE BLD-MCNC: 179 MG/DL (ref 65–105)
GLUCOSE BLD-MCNC: 191 MG/DL (ref 70–99)
GLUCOSE BLD-MCNC: 332 MG/DL (ref 65–105)
GLUCOSE BLD-MCNC: 332 MG/DL (ref 65–105)
MAGNESIUM: 2.2 MG/DL (ref 1.6–2.6)
POTASSIUM SERPL-SCNC: 5.3 MMOL/L (ref 3.7–5.3)
RBC FLUID: 1600 /MM3
SODIUM BLD-SCNC: 133 MMOL/L (ref 135–144)
SPECIMEN TYPE: NORMAL
WBC FLUID: 50 /MM3

## 2021-08-16 PROCEDURE — 2500000003 HC RX 250 WO HCPCS: Performed by: NURSE ANESTHETIST, CERTIFIED REGISTERED

## 2021-08-16 PROCEDURE — 6370000000 HC RX 637 (ALT 250 FOR IP): Performed by: INTERNAL MEDICINE

## 2021-08-16 PROCEDURE — 87102 FUNGUS ISOLATION CULTURE: CPT

## 2021-08-16 PROCEDURE — 2700000000 HC OXYGEN THERAPY PER DAY

## 2021-08-16 PROCEDURE — 82947 ASSAY GLUCOSE BLOOD QUANT: CPT

## 2021-08-16 PROCEDURE — 6360000002 HC RX W HCPCS: Performed by: INTERNAL MEDICINE

## 2021-08-16 PROCEDURE — 3700000000 HC ANESTHESIA ATTENDED CARE: Performed by: INTERNAL MEDICINE

## 2021-08-16 PROCEDURE — 87015 SPECIMEN INFECT AGNT CONCNTJ: CPT

## 2021-08-16 PROCEDURE — 87116 MYCOBACTERIA CULTURE: CPT

## 2021-08-16 PROCEDURE — 3700000001 HC ADD 15 MINUTES (ANESTHESIA): Performed by: INTERNAL MEDICINE

## 2021-08-16 PROCEDURE — 3609027000 HC BRONCHOSCOPY: Performed by: INTERNAL MEDICINE

## 2021-08-16 PROCEDURE — 36415 COLL VENOUS BLD VENIPUNCTURE: CPT

## 2021-08-16 PROCEDURE — 87206 SMEAR FLUORESCENT/ACID STAI: CPT

## 2021-08-16 PROCEDURE — 2500000003 HC RX 250 WO HCPCS: Performed by: INTERNAL MEDICINE

## 2021-08-16 PROCEDURE — 87106 FUNGI IDENTIFICATION YEAST: CPT

## 2021-08-16 PROCEDURE — 2580000003 HC RX 258: Performed by: FAMILY MEDICINE

## 2021-08-16 PROCEDURE — 94669 MECHANICAL CHEST WALL OSCILL: CPT

## 2021-08-16 PROCEDURE — 2709999900 HC NON-CHARGEABLE SUPPLY: Performed by: INTERNAL MEDICINE

## 2021-08-16 PROCEDURE — 88112 CYTOPATH CELL ENHANCE TECH: CPT

## 2021-08-16 PROCEDURE — 6370000000 HC RX 637 (ALT 250 FOR IP): Performed by: NURSE PRACTITIONER

## 2021-08-16 PROCEDURE — 94640 AIRWAY INHALATION TREATMENT: CPT

## 2021-08-16 PROCEDURE — 6360000002 HC RX W HCPCS: Performed by: NURSE ANESTHETIST, CERTIFIED REGISTERED

## 2021-08-16 PROCEDURE — 87205 SMEAR GRAM STAIN: CPT

## 2021-08-16 PROCEDURE — 83735 ASSAY OF MAGNESIUM: CPT

## 2021-08-16 PROCEDURE — 88305 TISSUE EXAM BY PATHOLOGIST: CPT

## 2021-08-16 PROCEDURE — 80048 BASIC METABOLIC PNL TOTAL CA: CPT

## 2021-08-16 PROCEDURE — 94761 N-INVAS EAR/PLS OXIMETRY MLT: CPT

## 2021-08-16 PROCEDURE — 0B938ZZ DRAINAGE OF RIGHT MAIN BRONCHUS, VIA NATURAL OR ARTIFICIAL OPENING ENDOSCOPIC: ICD-10-PCS | Performed by: INTERNAL MEDICINE

## 2021-08-16 PROCEDURE — 87070 CULTURE OTHR SPECIMN AEROBIC: CPT

## 2021-08-16 PROCEDURE — 7100000000 HC PACU RECOVERY - FIRST 15 MIN: Performed by: INTERNAL MEDICINE

## 2021-08-16 PROCEDURE — 0B978ZZ DRAINAGE OF LEFT MAIN BRONCHUS, VIA NATURAL OR ARTIFICIAL OPENING ENDOSCOPIC: ICD-10-PCS | Performed by: INTERNAL MEDICINE

## 2021-08-16 PROCEDURE — 6360000002 HC RX W HCPCS: Performed by: NURSE PRACTITIONER

## 2021-08-16 PROCEDURE — 7100000001 HC PACU RECOVERY - ADDTL 15 MIN: Performed by: INTERNAL MEDICINE

## 2021-08-16 PROCEDURE — 2060000000 HC ICU INTERMEDIATE R&B

## 2021-08-16 PROCEDURE — 6370000000 HC RX 637 (ALT 250 FOR IP): Performed by: FAMILY MEDICINE

## 2021-08-16 PROCEDURE — 2580000003 HC RX 258: Performed by: INTERNAL MEDICINE

## 2021-08-16 RX ORDER — ALBUTEROL SULFATE 2.5 MG/3ML
2.5 SOLUTION RESPIRATORY (INHALATION) ONCE
Status: COMPLETED | OUTPATIENT
Start: 2021-08-16 | End: 2021-08-16

## 2021-08-16 RX ORDER — LIDOCAINE HYDROCHLORIDE 10 MG/ML
INJECTION, SOLUTION INFILTRATION; PERINEURAL PRN
Status: DISCONTINUED | OUTPATIENT
Start: 2021-08-16 | End: 2021-08-16 | Stop reason: SDUPTHER

## 2021-08-16 RX ORDER — PROPOFOL 10 MG/ML
INJECTION, EMULSION INTRAVENOUS PRN
Status: DISCONTINUED | OUTPATIENT
Start: 2021-08-16 | End: 2021-08-16 | Stop reason: SDUPTHER

## 2021-08-16 RX ORDER — FENTANYL CITRATE 50 UG/ML
INJECTION, SOLUTION INTRAMUSCULAR; INTRAVENOUS PRN
Status: DISCONTINUED | OUTPATIENT
Start: 2021-08-16 | End: 2021-08-16 | Stop reason: SDUPTHER

## 2021-08-16 RX ORDER — LIDOCAINE HYDROCHLORIDE 10 MG/ML
INJECTION, SOLUTION INFILTRATION; PERINEURAL PRN
Status: DISCONTINUED | OUTPATIENT
Start: 2021-08-16 | End: 2021-08-16 | Stop reason: ALTCHOICE

## 2021-08-16 RX ORDER — LIDOCAINE HYDROCHLORIDE 40 MG/ML
4 INJECTION, SOLUTION RETROBULBAR; TOPICAL ONCE
Status: COMPLETED | OUTPATIENT
Start: 2021-08-16 | End: 2021-08-16

## 2021-08-16 RX ADMIN — INSULIN LISPRO 2 UNITS: 100 INJECTION, SOLUTION INTRAVENOUS; SUBCUTANEOUS at 21:22

## 2021-08-16 RX ADMIN — ROPINIROLE HYDROCHLORIDE 1 MG: 1 TABLET, FILM COATED ORAL at 21:21

## 2021-08-16 RX ADMIN — IPRATROPIUM BROMIDE AND ALBUTEROL SULFATE 1 AMPULE: .5; 3 SOLUTION RESPIRATORY (INHALATION) at 08:10

## 2021-08-16 RX ADMIN — METFORMIN HYDROCHLORIDE 500 MG: 500 TABLET, EXTENDED RELEASE ORAL at 14:09

## 2021-08-16 RX ADMIN — SODIUM CHLORIDE 25 ML: 9 INJECTION, SOLUTION INTRAVENOUS at 11:02

## 2021-08-16 RX ADMIN — LIDOCAINE HYDROCHLORIDE 100 MG: 10 INJECTION, SOLUTION INFILTRATION; PERINEURAL at 12:18

## 2021-08-16 RX ADMIN — CITALOPRAM HYDROBROMIDE 20 MG: 20 TABLET ORAL at 14:09

## 2021-08-16 RX ADMIN — Medication 400 MG: at 21:22

## 2021-08-16 RX ADMIN — AZITHROMYCIN MONOHYDRATE 250 MG: 250 TABLET ORAL at 14:10

## 2021-08-16 RX ADMIN — LIDOCAINE HYDROCHLORIDE 4 ML: 40 INJECTION, SOLUTION RETROBULBAR; TOPICAL at 11:14

## 2021-08-16 RX ADMIN — ENOXAPARIN SODIUM 40 MG: 40 INJECTION SUBCUTANEOUS at 14:09

## 2021-08-16 RX ADMIN — PROPOFOL 20 MG: 10 INJECTION, EMULSION INTRAVENOUS at 12:25

## 2021-08-16 RX ADMIN — PROPOFOL 20 MG: 10 INJECTION, EMULSION INTRAVENOUS at 12:19

## 2021-08-16 RX ADMIN — METHYLPREDNISOLONE SODIUM SUCCINATE 40 MG: 40 INJECTION, POWDER, FOR SOLUTION INTRAMUSCULAR; INTRAVENOUS at 04:01

## 2021-08-16 RX ADMIN — SODIUM CHLORIDE, PRESERVATIVE FREE 10 ML: 5 INJECTION INTRAVENOUS at 09:16

## 2021-08-16 RX ADMIN — GUAIFENESIN 600 MG: 600 TABLET, EXTENDED RELEASE ORAL at 21:21

## 2021-08-16 RX ADMIN — INSULIN GLARGINE 15 UNITS: 100 INJECTION, SOLUTION SUBCUTANEOUS at 09:13

## 2021-08-16 RX ADMIN — INSULIN LISPRO 1 UNITS: 100 INJECTION, SOLUTION INTRAVENOUS; SUBCUTANEOUS at 09:13

## 2021-08-16 RX ADMIN — ATORVASTATIN CALCIUM 80 MG: 80 TABLET, FILM COATED ORAL at 14:08

## 2021-08-16 RX ADMIN — FENTANYL CITRATE 100 MCG: 50 INJECTION, SOLUTION INTRAMUSCULAR; INTRAVENOUS at 12:18

## 2021-08-16 RX ADMIN — ASPIRIN 81 MG: 81 TABLET, COATED ORAL at 14:09

## 2021-08-16 RX ADMIN — ACETAMINOPHEN 650 MG: 325 TABLET ORAL at 21:35

## 2021-08-16 RX ADMIN — SODIUM CHLORIDE, PRESERVATIVE FREE 10 ML: 5 INJECTION INTRAVENOUS at 21:29

## 2021-08-16 RX ADMIN — INSULIN LISPRO 1 UNITS: 100 INJECTION, SOLUTION INTRAVENOUS; SUBCUTANEOUS at 14:11

## 2021-08-16 RX ADMIN — PROPOFOL 20 MG: 10 INJECTION, EMULSION INTRAVENOUS at 12:22

## 2021-08-16 RX ADMIN — METOPROLOL TARTRATE 25 MG: 25 TABLET, FILM COATED ORAL at 21:21

## 2021-08-16 RX ADMIN — CLOPIDOGREL BISULFATE 75 MG: 75 TABLET ORAL at 14:09

## 2021-08-16 RX ADMIN — FLUTICASONE PROPIONATE 2 SPRAY: 50 SPRAY, METERED NASAL at 09:16

## 2021-08-16 RX ADMIN — METHYLPREDNISOLONE SODIUM SUCCINATE 40 MG: 40 INJECTION, POWDER, FOR SOLUTION INTRAMUSCULAR; INTRAVENOUS at 21:21

## 2021-08-16 RX ADMIN — ALBUTEROL SULFATE 2.5 MG: 2.5 SOLUTION RESPIRATORY (INHALATION) at 11:14

## 2021-08-16 RX ADMIN — IPRATROPIUM BROMIDE AND ALBUTEROL SULFATE 1 AMPULE: .5; 3 SOLUTION RESPIRATORY (INHALATION) at 15:33

## 2021-08-16 RX ADMIN — INSULIN LISPRO 4 UNITS: 100 INJECTION, SOLUTION INTRAVENOUS; SUBCUTANEOUS at 17:54

## 2021-08-16 RX ADMIN — FUROSEMIDE 40 MG: 40 TABLET ORAL at 14:10

## 2021-08-16 RX ADMIN — METHYLPREDNISOLONE SODIUM SUCCINATE 40 MG: 40 INJECTION, POWDER, FOR SOLUTION INTRAMUSCULAR; INTRAVENOUS at 14:08

## 2021-08-16 ASSESSMENT — PULMONARY FUNCTION TESTS
PIF_VALUE: 1

## 2021-08-16 ASSESSMENT — PAIN SCALES - GENERAL
PAINLEVEL_OUTOF10: 0
PAINLEVEL_OUTOF10: 0
PAINLEVEL_OUTOF10: 4

## 2021-08-16 ASSESSMENT — PAIN DESCRIPTION - ONSET: ONSET: ON-GOING

## 2021-08-16 ASSESSMENT — PAIN DESCRIPTION - DESCRIPTORS: DESCRIPTORS: ACHING

## 2021-08-16 ASSESSMENT — PAIN - FUNCTIONAL ASSESSMENT: PAIN_FUNCTIONAL_ASSESSMENT: ACTIVITIES ARE NOT PREVENTED

## 2021-08-16 ASSESSMENT — PAIN DESCRIPTION - LOCATION: LOCATION: THROAT

## 2021-08-16 ASSESSMENT — PAIN DESCRIPTION - PAIN TYPE: TYPE: ACUTE PAIN

## 2021-08-16 ASSESSMENT — LIFESTYLE VARIABLES: SMOKING_STATUS: 1

## 2021-08-16 ASSESSMENT — ENCOUNTER SYMPTOMS: SHORTNESS OF BREATH: 1

## 2021-08-16 ASSESSMENT — PAIN DESCRIPTION - FREQUENCY: FREQUENCY: CONTINUOUS

## 2021-08-16 ASSESSMENT — PAIN DESCRIPTION - ORIENTATION: ORIENTATION: POSTERIOR

## 2021-08-16 ASSESSMENT — PAIN DESCRIPTION - PROGRESSION: CLINICAL_PROGRESSION: NOT CHANGED

## 2021-08-16 NOTE — PLAN OF CARE
Problem: Falls - Risk of:  Goal: Will remain free from falls  Description: Will remain free from falls  8/16/2021 0315 by Snehal Varela RN  Outcome: Ongoing     Problem: Falls - Risk of:  Goal: Absence of physical injury  Description: Absence of physical injury  8/16/2021 0315 by Snehal Varela RN  Outcome: Ongoing  Note: Pt assessed as a fall risk this shift. Remains free from falls and accidental injury at this time. Fall precautions in place, including falling star sign and fall risk band on pt. Floor free from obstacles, and bed is locked and in lowest position. Adequate lighting provided. Pt encouraged to call before getting OOB for any need. Bed alarm activated. Will continue to monitor needs during hourly rounding, and reinforce education on use of call light. Problem: Pain:  Goal: Pain level will decrease  Description: Pain level will decrease  8/16/2021 0315 by Snehal Varela RN  Outcome: Ongoing  Note: No pain at this time. 0/10 pain scale. Problem: Pain:  Goal: Control of acute pain  Description: Control of acute pain  8/16/2021 0315 by Snehal Varela RN  Outcome: Ongoing     Problem: Pain:  Goal: Control of chronic pain  Description: Control of chronic pain  8/16/2021 0315 by Snehal Varela RN  Outcome: Ongoing     Problem: Breathing Pattern - Ineffective:  Goal: Ability to achieve and maintain a regular respiratory rate will improve  Description: Ability to achieve and maintain a regular respiratory rate will improve  8/16/2021 0315 by Snehal Varela RN  Outcome: Ongoing  Note: Patient resp rate and lung sounds are within normal limits; will continue to monitor oxygenation.        Problem: Safety:  Goal: Free from accidental physical injury  Description: Free from accidental physical injury  8/16/2021 0315 by Snehal Varela RN  Outcome: Ongoing     Problem: Safety:  Goal: Free from intentional harm  Description: Free from intentional harm  8/16/2021 0315 by Nicole Stanton RN  Outcome: Ongoing  Note: No falls noted this shift. Patient ambulates with x1 staff assistance without difficulty. Bed kept in low position. Safe environment maintained. Bedside table & call light in reach. Uses call light appropriately when needing assistance.

## 2021-08-16 NOTE — ANESTHESIA POSTPROCEDURE EVALUATION
Department of Anesthesiology  Postprocedure Note    Patient: Vera Stewart  MRN: [id number]  YOB: 1950  Date of evaluation: 8/16/2021  Time:  3:27 PM     Procedure Summary     Date: 08/16/21 Room / Location: [place] [address] 01 / [facility]    Anesthesia Start: 6933 Anesthesia Stop: 0101    Procedure: BRONCHOSCOPY w/ WASHINGS (N/A Bronchus) Diagnosis:       (EXACERBATION OF COPD)      (NEG COVID 8/11)    Surgeons: Ignacio Mccarthy MD Responsible Provider: Diogenes Child MD    Anesthesia Type: general ASA Status: 3          Anesthesia Type: general    Carlos Phase I: Carlos Score: 9    Carlos Phase II:      Last vitals: Reviewed and per EMR flowsheets.        Anesthesia Post Evaluation    Comments: POST- ANESTHESIA EVALUATION       Pt Name: Vera Stewart  MRN: [id number]  [place]: 1950  Date of evaluation: 8/16/2021  Time:  3:27 PM      /64   Pulse 64   Temp 98.4 °F (36.9 °C)   Resp 19   Ht 5' 4\" (1.626 m)   Wt 140 lb 3.4 oz (63.6 kg)   SpO2 97%   BMI 24.07 kg/m²      Consciousness Level  Awake  Cardiopulmonary Status  Stable  Pain Adequately Treated YES  Nausea / Vomiting  NO  Adequate Hydration  YES  Anesthesia Related Complications NONE      Electronically signed by Diogenes Child MD on 8/16/2021 at 3:27 PM

## 2021-08-16 NOTE — PROGRESS NOTES
Patient is scheduled for a Bronchoscopy tomorrow and education has been given as per patient question. NPO statue is initiated at midnight and patient is alright with it and resting at this time with no sign of distress or pain.

## 2021-08-16 NOTE — PROGRESS NOTES
Patient returned from Bronchoscopy. Patient alert and oriented. Resting comfortably. Gag reflex present. Bedside swallow screen passed.
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
## 2021-08-16 NOTE — OP NOTE
Operative Note    PLANNED PROCEDURE:  BRONCHOSCOPY, WASHINGS, BRUSHINGS, BIOPSY    PREOP DIAGNOSIS: mucus plug    POST OP DIAGNOSIS: mucus plug    PREMEDICATION: NEBULIZED ALBUTEROL WITH LIDOCAINE    SEDATION: per anesthesia    ANESTHESIA: Topical    PROCEDURE:  After obtaining topical anesthesia, fiberoptic bronchosocpe was introduced via oral cavity. Both vocal cords were moving and meeting in midline. Trachea was intubated with bronchoscope. Tita appeared sharp. Both right and left bronchial tree were examined to subsegmental level. Thick mucoid secretions noted on both sides; aspirated with difficulty; washings were done from both sides    Patient tolerated procedure well.       PROCEDURE DONE: BRONCHOSCOPY, WASHINGS    ESTIMATED BLOOD LOSS: 0 ML    SPECIMENS OBTAINED:  Bronchial washings    Electronically signed by Lilliam Liriano MD on 08/16/21 at 12:29 PM.

## 2021-08-16 NOTE — PLAN OF CARE
Problem: Falls - Risk of:  Goal: Will remain free from falls  Description: Will remain free from falls  Outcome: Ongoing    Patient remained free of falls. Call light within reach, side rails up x2, bedside table in reach. Room free of clutter. Bed alarm on. Problem: Pain:  Goal: Pain level will decrease  Description: Pain level will decrease  Outcome: Ongoing   No complaints of pain.      Problem: Breathing Pattern - Ineffective:  Goal: Ability to achieve and maintain a regular respiratory rate will improve  Description: Ability to achieve and maintain a regular respiratory rate will improve  Outcome: Ongoing     Problem: Safety:  Goal: Free from accidental physical injury  Description: Free from accidental physical injury  Outcome: Ongoing

## 2021-08-16 NOTE — CARE COORDINATION
ONGOING DISCHARGE PLAN:    Patient is alert and oriented x4. Spoke with patient regarding discharge plan and patient confirms that plan is still to go home with no needs. S/P Bronchoscopy - washings. , Mucous plug    Following for home oxygen eval tomorrow. Probable discharge on Tuesday. Will continue to follow for additional discharge needs.     Electronically signed by Johnathan Bermudez RN on 8/16/2021 at 5:06 PM

## 2021-08-16 NOTE — PLAN OF CARE
Problem: Falls - Risk of:  Goal: Will remain free from falls  Description: Will remain free from falls  8/16/2021 0315 by Alexx Roberts RN  Outcome: Ongoing     Problem: Falls - Risk of:  Goal: Absence of physical injury  Description: Absence of physical injury  8/16/2021 0315 by Alexx Roberts RN  Outcome: Ongoing  Note: Pt assessed as a fall risk this shift. Remains free from falls and accidental injury at this time. Fall precautions in place, including falling star sign and fall risk band on pt. Floor free from obstacles, and bed is locked and in lowest position. Adequate lighting provided. Pt encouraged to call before getting OOB for any need. Bed alarm activated. Will continue to monitor needs during hourly rounding, and reinforce education on use of call light. Problem: Pain:  Goal: Pain level will decrease  Description: Pain level will decrease  8/16/2021 0315 by Alexx Roberts RN  Outcome: Ongoing  Note: No pain at this time. 0/10 pain scale. Problem: Breathing Pattern - Ineffective:  Goal: Ability to achieve and maintain a regular respiratory rate will improve  Description: Ability to achieve and maintain a regular respiratory rate will improve  8/16/2021 0315 by Alexx Roberts RN  Outcome: Ongoing  Note: Patient resp rate and lung sounds are within normal limits; will continue to monitor oxygenation. Problem: Safety:  Goal: Free from intentional harm  Description: Free from intentional harm  8/16/2021 0315 by Alexx Roberts RN  Outcome: Ongoing  Note: No falls noted this shift. Patient ambulates with x1 staff assistance without difficulty. Bed kept in low position. Safe environment maintained. Bedside table & call light in reach. Uses call light appropriately when needing assistance.

## 2021-08-17 VITALS
OXYGEN SATURATION: 94 % | BODY MASS INDEX: 24.88 KG/M2 | SYSTOLIC BLOOD PRESSURE: 101 MMHG | HEIGHT: 64 IN | HEART RATE: 61 BPM | WEIGHT: 145.72 LBS | TEMPERATURE: 97.8 F | DIASTOLIC BLOOD PRESSURE: 55 MMHG | RESPIRATION RATE: 18 BRPM

## 2021-08-17 LAB
ANION GAP SERPL CALCULATED.3IONS-SCNC: 2 MMOL/L (ref 9–17)
BASO FLUID: ABNORMAL %
BUN BLDV-MCNC: 30 MG/DL (ref 8–23)
BUN/CREAT BLD: ABNORMAL (ref 9–20)
CALCIUM SERPL-MCNC: 8.5 MG/DL (ref 8.6–10.4)
CELLS COUNTED: 75
CHLORIDE BLD-SCNC: 96 MMOL/L (ref 98–107)
CO2: 37 MMOL/L (ref 20–31)
CREAT SERPL-MCNC: 1.24 MG/DL (ref 0.5–0.9)
CULTURE: ABNORMAL
DIRECT EXAM: ABNORMAL
DIRECT EXAM: NORMAL
EOSINOPHIL FLUID: ABNORMAL %
FLUID DIFF COMMENT: ABNORMAL
GFR AFRICAN AMERICAN: 52 ML/MIN
GFR NON-AFRICAN AMERICAN: 43 ML/MIN
GFR SERPL CREATININE-BSD FRML MDRD: ABNORMAL ML/MIN/{1.73_M2}
GFR SERPL CREATININE-BSD FRML MDRD: ABNORMAL ML/MIN/{1.73_M2}
GLUCOSE BLD-MCNC: 149 MG/DL (ref 65–105)
GLUCOSE BLD-MCNC: 203 MG/DL (ref 70–99)
GLUCOSE BLD-MCNC: 312 MG/DL (ref 65–105)
GLUCOSE BLD-MCNC: 328 MG/DL (ref 65–105)
LYMPHOCYTES, BODY FLUID: 5 %
Lab: ABNORMAL
Lab: NORMAL
MAGNESIUM: 2.2 MG/DL (ref 1.6–2.6)
MONOCYTE, FLUID: ABNORMAL %
NEUTROPHIL, FLUID: 67 %
OTHER CELLS FLUID: 28 %
POTASSIUM SERPL-SCNC: 5.2 MMOL/L (ref 3.7–5.3)
POTASSIUM SERPL-SCNC: 5.5 MMOL/L (ref 3.7–5.3)
SODIUM BLD-SCNC: 135 MMOL/L (ref 135–144)
SPECIMEN DESCRIPTION: ABNORMAL
SPECIMEN DESCRIPTION: NORMAL

## 2021-08-17 PROCEDURE — 6370000000 HC RX 637 (ALT 250 FOR IP): Performed by: INTERNAL MEDICINE

## 2021-08-17 PROCEDURE — 2580000003 HC RX 258: Performed by: INTERNAL MEDICINE

## 2021-08-17 PROCEDURE — 6370000000 HC RX 637 (ALT 250 FOR IP): Performed by: NURSE PRACTITIONER

## 2021-08-17 PROCEDURE — 80048 BASIC METABOLIC PNL TOTAL CA: CPT

## 2021-08-17 PROCEDURE — 94640 AIRWAY INHALATION TREATMENT: CPT

## 2021-08-17 PROCEDURE — 84132 ASSAY OF SERUM POTASSIUM: CPT

## 2021-08-17 PROCEDURE — 6360000002 HC RX W HCPCS: Performed by: INTERNAL MEDICINE

## 2021-08-17 PROCEDURE — 83735 ASSAY OF MAGNESIUM: CPT

## 2021-08-17 PROCEDURE — 2700000000 HC OXYGEN THERAPY PER DAY

## 2021-08-17 PROCEDURE — 36415 COLL VENOUS BLD VENIPUNCTURE: CPT

## 2021-08-17 PROCEDURE — 94761 N-INVAS EAR/PLS OXIMETRY MLT: CPT

## 2021-08-17 PROCEDURE — 82947 ASSAY GLUCOSE BLOOD QUANT: CPT

## 2021-08-17 RX ORDER — GUAIFENESIN 600 MG/1
600 TABLET, EXTENDED RELEASE ORAL 2 TIMES DAILY
Qty: 30 TABLET | Refills: 0 | Status: SHIPPED | OUTPATIENT
Start: 2021-08-17

## 2021-08-17 RX ORDER — PANTOPRAZOLE SODIUM 40 MG/1
40 TABLET, DELAYED RELEASE ORAL
Qty: 30 TABLET | Refills: 3 | Status: SHIPPED | OUTPATIENT
Start: 2021-08-18 | End: 2022-05-03

## 2021-08-17 RX ORDER — IPRATROPIUM BROMIDE AND ALBUTEROL SULFATE 2.5; .5 MG/3ML; MG/3ML
3 SOLUTION RESPIRATORY (INHALATION) EVERY 4 HOURS PRN
Qty: 360 ML | Refills: 11 | Status: ON HOLD | OUTPATIENT
Start: 2021-08-17 | End: 2022-05-16 | Stop reason: SDUPTHER

## 2021-08-17 RX ORDER — DOXYCYCLINE 100 MG/1
100 CAPSULE ORAL EVERY 12 HOURS SCHEDULED
Status: DISCONTINUED | OUTPATIENT
Start: 2021-08-17 | End: 2021-08-17 | Stop reason: HOSPADM

## 2021-08-17 RX ORDER — PREDNISONE 20 MG/1
20 TABLET ORAL DAILY
Qty: 10 TABLET | Refills: 0 | Status: SHIPPED | OUTPATIENT
Start: 2021-08-18 | End: 2021-08-28

## 2021-08-17 RX ORDER — DOXYCYCLINE 100 MG/1
100 CAPSULE ORAL EVERY 12 HOURS SCHEDULED
Qty: 14 CAPSULE | Refills: 0 | Status: ON HOLD | OUTPATIENT
Start: 2021-08-17 | End: 2021-10-05 | Stop reason: HOSPADM

## 2021-08-17 RX ORDER — PREDNISONE 20 MG/1
20 TABLET ORAL DAILY
Status: DISCONTINUED | OUTPATIENT
Start: 2021-08-17 | End: 2021-08-17 | Stop reason: HOSPADM

## 2021-08-17 RX ADMIN — IPRATROPIUM BROMIDE AND ALBUTEROL SULFATE 1 AMPULE: .5; 3 SOLUTION RESPIRATORY (INHALATION) at 16:13

## 2021-08-17 RX ADMIN — ASPIRIN 81 MG: 81 TABLET, COATED ORAL at 09:49

## 2021-08-17 RX ADMIN — INSULIN LISPRO 4 UNITS: 100 INJECTION, SOLUTION INTRAVENOUS; SUBCUTANEOUS at 12:24

## 2021-08-17 RX ADMIN — IPRATROPIUM BROMIDE AND ALBUTEROL SULFATE 1 AMPULE: .5; 3 SOLUTION RESPIRATORY (INHALATION) at 08:05

## 2021-08-17 RX ADMIN — ENOXAPARIN SODIUM 40 MG: 40 INJECTION SUBCUTANEOUS at 09:50

## 2021-08-17 RX ADMIN — PREDNISONE 20 MG: 20 TABLET ORAL at 12:24

## 2021-08-17 RX ADMIN — CLOPIDOGREL BISULFATE 75 MG: 75 TABLET ORAL at 09:49

## 2021-08-17 RX ADMIN — PANTOPRAZOLE SODIUM 40 MG: 40 TABLET, DELAYED RELEASE ORAL at 06:11

## 2021-08-17 RX ADMIN — SODIUM CHLORIDE, PRESERVATIVE FREE 5 ML: 5 INJECTION INTRAVENOUS at 12:08

## 2021-08-17 RX ADMIN — FLUTICASONE PROPIONATE 2 SPRAY: 50 SPRAY, METERED NASAL at 10:03

## 2021-08-17 RX ADMIN — ATORVASTATIN CALCIUM 80 MG: 80 TABLET, FILM COATED ORAL at 09:49

## 2021-08-17 RX ADMIN — FUROSEMIDE 40 MG: 40 TABLET ORAL at 09:49

## 2021-08-17 RX ADMIN — DOXYCYCLINE 100 MG: 100 CAPSULE ORAL at 12:23

## 2021-08-17 RX ADMIN — CITALOPRAM HYDROBROMIDE 20 MG: 20 TABLET ORAL at 09:49

## 2021-08-17 RX ADMIN — INSULIN GLARGINE 15 UNITS: 100 INJECTION, SOLUTION SUBCUTANEOUS at 09:51

## 2021-08-17 RX ADMIN — METFORMIN HYDROCHLORIDE 500 MG: 500 TABLET, EXTENDED RELEASE ORAL at 10:03

## 2021-08-17 RX ADMIN — INSULIN LISPRO 1 UNITS: 100 INJECTION, SOLUTION INTRAVENOUS; SUBCUTANEOUS at 17:43

## 2021-08-17 RX ADMIN — Medication 400 MG: at 09:49

## 2021-08-17 RX ADMIN — METOPROLOL TARTRATE 25 MG: 25 TABLET, FILM COATED ORAL at 09:48

## 2021-08-17 RX ADMIN — INSULIN LISPRO 4 UNITS: 100 INJECTION, SOLUTION INTRAVENOUS; SUBCUTANEOUS at 09:50

## 2021-08-17 RX ADMIN — ISOSORBIDE MONONITRATE 30 MG: 30 TABLET, EXTENDED RELEASE ORAL at 09:49

## 2021-08-17 RX ADMIN — METHYLPREDNISOLONE SODIUM SUCCINATE 40 MG: 40 INJECTION, POWDER, FOR SOLUTION INTRAMUSCULAR; INTRAVENOUS at 03:42

## 2021-08-17 RX ADMIN — GUAIFENESIN 600 MG: 600 TABLET, EXTENDED RELEASE ORAL at 09:49

## 2021-08-17 NOTE — PROGRESS NOTES
Pt currently at 96% on room air seated in bed. Pt ambulated 30 ft in hallway; Pt oxygen at 94% during ambulation. Will continue to monitor.

## 2021-08-17 NOTE — PROGRESS NOTES
Physician Progress Note      PATIENTComatt Puentes  CSN #:                  [id number]  :                       1950  ADMIT DATE:       2021 12:57 PM  [address] [place] DATE:  RESPONDING  PROVIDER #:        Nancy Small MD          QUERY TEXT:    Pt admitted with COPD exacerbation and has PMH of CHF documented. If possible,   please document in progress notes and discharge summary further specificity   regarding the type and acuity of CHF:    The medical record reflects the following:  Risk Factors: HTN and CAD  Clinical Indicators:   CXR:  Cardiomegaly, Mild diffuse interstitial   infiltrates, findings suggest mild vascular congestion;   ECHO:    Moderately increased LV wall thickness, Asymmetrical septal hypertrophy,   Moderate global hypokinesis. Akinetic apex Estimated LV EF 35 %. Left and   right atrial dilatation; mild to mod TR; (EF was 50-55% in 2018)  Treatment: Cardiology consult--signed off on , Continued 40 mg oral Lasix,   Plavix, Imdur, & Lopressor; lisinopril discontinued  Options provided:  -- Chronic Systolic CHF/HFrEF  -- Chronic Systolic and Diastolic CHF  -- Other - I will add my own diagnosis  -- Disagree - Not applicable / Not valid  -- Disagree - Clinically unable to determine / Unknown  -- Refer to Clinical Documentation Reviewer    PROVIDER RESPONSE TEXT:    Provider disagreed with this query.   not a fctor in this admission    Query created by: Akila Romo on 2021 8:20 AM      Electronically signed by:  Nancy Small MD 2021 8:27 AM

## 2021-08-17 NOTE — CARE COORDINATION
ONGOING DISCHARGE PLAN:    Patient is alert and oriented x4. Spoke with patient regarding discharge plan and patient confirms that plan is still to go home with no needs. Cardiology signed off    Probable discharge home today. Will continue to follow for additional discharge needs.     Electronically signed by Luz Maria Stout RN on 8/17/2021 at 2:53 PM

## 2021-08-17 NOTE — PROGRESS NOTES
Discharge teaching and instructions for diagnosis/procedure of COPD completed with patient using teachback method. AVS reviewed. Printed prescriptions given to patient. Patient voiced understanding regarding prescriptions, follow up appointments, and care of self at home.  Discharged in a wheelchair to  home with support per [facility]

## 2021-08-17 NOTE — PROGRESS NOTES
Progress Note  Date:2021       Room:[facility]  Patient [id number] [facility]     YOB: 1950     Age:70 y.o. Subjective    Subjective:  Symptoms:  Improved. Diet:  Adequate intake. Activity level: Returning to normal.    Pain:  She reports no pain. Review of Systems  Objective         Vitals Last 24 Hours:  TEMPERATURE:  Temp  Av.2 °F (36.8 °C)  Min: 97.5 °F (36.4 °C)  Max: 99.3 °F (37.4 °C)  RESPIRATIONS RANGE: Resp  Av.4  Min: 16  Max: 25  PULSE OXIMETRY RANGE: SpO2  Av.9 %  Min: 85 %  Max: 100 %  PULSE RANGE: Pulse  Av.8  Min: 55  Max: 78  BLOOD PRESSURE RANGE: Systolic (59NUN), RCZ:365 , Min:111 , QWR:417   ; Diastolic (46VXU), GET:43, Min:58, Max:77    I/O (24Hr): Intake/Output Summary (Last 24 hours) at 2021 0840  Last data filed at 2021 0604  Gross per 24 hour   Intake 946 ml   Output 1800 ml   Net -854 ml     Objective:  General Appearance:  Comfortable. Vital signs: (most recent): Blood pressure 132/67, pulse 55, temperature 97.5 °F (36.4 °C), temperature source Oral, resp. rate 16, height 5' 4\" (1.626 m), weight 145 lb 11.6 oz (66.1 kg), SpO2 96 %. Vital signs are normal.    Lungs:  Normal effort and normal respiratory rate. There are decreased breath sounds. Labs/Imaging/Diagnostics    Labs:  CBC:No results for input(s): WBC, RBC, HGB, HCT, MCV, RDW, PLT in the last 72 hours. CHEMISTRIES:  Recent Labs     08/15/21  0532 08/15/21  0532 08/15/21  1151 21  0513 21  0559   *   < > 132* 133* 135   K 5.6*   < > 5.4* 5.3 5.5*   CL 98   < > 97* 97* 96*   CO2 33*   < > 31 34* 37*   BUN 29*   < > 30* 31* 30*   CREATININE 1.33*   < > 1.34* 1.28* 1.24*   GLUCOSE 232*   < > 295* 191* 203*   MG 2.2  --   --  2.2 2.2    < > = values in this interval not displayed. PT/INR:No results for input(s): PROTIME, INR in the last 72 hours. APTT:No results for input(s): APTT in the last 72 hours.   LIVER PROFILE:No results for input(s): AST, ALT, BILIDIR, BILITOT, ALKPHOS in the last 72 hours. Imaging Last 24 Hours:  No results found.   Assessment//Plan           Hospital Problems         Last Modified POA    * (Principal) COPD exacerbation ([address] 8/12/2021 Yes    Controlled type 2 diabetes mellitus without complication, without long-term current use of insulin (HCC) (Chronic) 8/12/2021 Yes    Hypertension goal BP (blood pressure) < 140/90 (Chronic) 8/12/2021 Yes    Mixed hyperlipidemia (Chronic) 8/12/2021 Yes    Chronic obstructive pulmonary disease (HCC) (Chronic) 8/12/2021 Yes    Coronary artery disease involving native coronary artery of native heart without angina pectoris (Chronic) 8/12/2021 Yes    Hypothyroidism (Chronic) 8/12/2021 Yes        Assessment & Plan  D/c planning    Yesterday's respiratory culture - rare G+cocci in pairs, rare G+ rods, rare yeast    Electronically signed by Emerald Calle MD on 8/17/21 at 8:40 AM EDT

## 2021-08-17 NOTE — PLAN OF CARE
Problem: Falls - Risk of:  Goal: Will remain free from falls  Description: Will remain free from falls  8/17/2021 0328 by Chantel Anne RN  Outcome: Ongoing  Note: The patient remained free from falls this shift, call light within reach, bed in locked and lowest position. Side rails up x2. Continue to monitor closely. Problem: Pain:  Goal: Pain level will decrease  Description: Pain level will decrease  8/17/2021 0328 by Chantel Anne RN  Outcome: Ongoing  Note: Patient able to verbalize pain and given PRN medication as prescribed. Will continue to monitor.         Problem: Breathing Pattern - Ineffective:  Goal: Ability to achieve and maintain a regular respiratory rate will improve  Description: Ability to achieve and maintain a regular respiratory rate will improve  8/17/2021 0328 by Chantel Anne RN  Outcome: Ongoing     Problem: Safety:  Goal: Free from accidental physical injury  Description: Free from accidental physical injury  8/17/2021 0328 by Chantel Anne RN  Outcome: Ongoing

## 2021-08-18 LAB — SURGICAL PATHOLOGY REPORT: NORMAL

## 2021-08-23 LAB
CULTURE: ABNORMAL
CULTURE: ABNORMAL
Lab: ABNORMAL
SPECIMEN DESCRIPTION: ABNORMAL

## 2021-08-26 NOTE — DISCHARGE SUMMARY
Family Medicine Discharge Summary    Raymond Benoit  :  1950  MRN:  [id number]    Admit date:  2021  Discharge date:  2021    Admitting Physician:  Jaylyn Ritchie MD    Discharge Diagnoses:    Patient Active Problem List   Diagnosis    Chronic pain    Controlled type 2 diabetes mellitus without complication, without long-term current use of insulin ([address]    Allergic rhinitis    Hypertension goal BP (blood pressure) < 140/90    Mixed hyperlipidemia    Chronic obstructive pulmonary disease ([address]    Coronary artery disease involving native coronary artery of native heart without angina pectoris    Primary insomnia    Diarrhea in adult patient    Restless leg syndrome    Atherosclerosis of superior mesenteric artery ([address]    Left adrenal mass ([address]    Former smoker    Chronic bilateral low back pain with left-sided sciatica    Hypothyroidism    S/P CABG x 4    Acute pain of right knee    Abscess of right thigh    Angina pectoris (HCC)    Abnormal stress test    Atrial fibrillation (HCC)    Tobacco use disorder    Neck pain    Chest pain    Acute ischemic left MCA stroke ([address]    Internal carotid artery occlusion    Stenosis of left internal carotid artery    Acquired spondylolisthesis of cervical vertebra    Stenosis of cervical spine with myelopathy (HCC)    Abnormal EKG    COPD exacerbation ([address]       Admission Condition:  guarded  Discharged Condition:  fair    Hospital Course:   66 yo admitted wt acute exacerbation of COPD and elevation of troponin. Troponin elevation was thought to be due to demand ischemia from SOB on admission. Patient underwent bronchoscopy and mucus plug removal on 2021.      Discharge Medications:       Charissa Dance   Home Medication Instructions UCB:[id number]    Printed on:21 0818   Medication Information                      albuterol sulfate HFA (PROAIR HFA) 108 (90 Base) MCG/ACT inhaler  Inhale 2 puffs into the lungs every 4 hours as needed for Wheezing May sub covered product             atorvastatin (LIPITOR) 40 MG tablet  Take 2 tablets by mouth daily             citalopram (CELEXA) 20 MG tablet  Take 1 tablet by mouth daily             clopidogrel (PLAVIX) 75 MG tablet  Take 1 tablet by mouth daily             diclofenac sodium (VOLTAREN) 1 % GEL  Apply 2 g topically 2 times daily as needed for Pain             doxycycline monohydrate (MONODOX) 100 MG capsule  Take 1 capsule by mouth every 12 hours             fluticasone (FLONASE) 50 MCG/ACT nasal spray  USE 2 SPRAYS IN EACH NOSTRIL ONCE DAILY             furosemide (LASIX) 40 MG tablet  TAKE 1 TABLET BY MOUTH DAILY             guaiFENesin (MUCINEX) 600 MG extended release tablet  Take 1 tablet by mouth 2 times daily             ipratropium-albuterol (DUONEB) 0.5-2.5 (3) MG/3ML SOLN nebulizer solution  Inhale 3 mLs into the lungs every 4 hours as needed for Shortness of Breath             isosorbide mononitrate (IMDUR) 30 MG extended release tablet  Take 30 mg by mouth             magnesium oxide (MAG-OX) 400 (241.3 Mg) MG TABS tablet  Take 1 tablet by mouth 2 times daily             metFORMIN (GLUCOPHAGE-XR) 500 MG extended release tablet  Take 1 tablet by mouth daily (with breakfast)             metoprolol tartrate (LOPRESSOR) 25 MG tablet  Take 1 tablet by mouth 2 times daily             nitroGLYCERIN (NITROSTAT) 0.4 MG SL tablet  Place 1 tablet under the tongue every 5 minutes as needed for Chest pain up to max of 3 total doses. If no relief after 1 dose, call 911.              ONE TOUCH ULTRA TEST strip  TEST ONCE DAILY AS NEEDED             pantoprazole (PROTONIX) 40 MG tablet  Take 1 tablet by mouth every morning (before breakfast)             predniSONE (DELTASONE) 20 MG tablet  Take 1 tablet by mouth daily for 10 days             rOPINIRole (REQUIP) 1 MG tablet  TAKE 1 TABLET BY MOUTH EVERY NIGHT AS NEEDED             SM ASPIRIN ADULT LOW STRENGTH 81 MG EC tablet  TAKE 1 TABLET BY MOUTH DAILY             tiZANidine (ZANAFLEX) 2 MG tablet  Take 1 tablet by mouth nightly as needed (pain)                 Consults:  Cardiology, pulmonology    Significant Diagnostic Studies:  Bronchoscopy, radiology    Treatments:   Steroids, bronchodilators    Disposition:   home  Follow up with Mehul Wilkerson MD in 1-2 weeks. Signed:   Earline Richardson MD, [facility]  8/26/2021, 8:18 AM

## 2021-10-02 ENCOUNTER — HOSPITAL ENCOUNTER (INPATIENT)
Age: 71
LOS: 6 days | Discharge: HOME | DRG: 193 | End: 2021-10-08
Attending: EMERGENCY MEDICINE | Admitting: FAMILY MEDICINE
Payer: MEDICARE

## 2021-10-02 ENCOUNTER — APPOINTMENT (OUTPATIENT)
Dept: GENERAL RADIOLOGY | Age: 71
DRG: 193 | End: 2021-10-02
Payer: MEDICARE

## 2021-10-02 DIAGNOSIS — J18.9 PNEUMONIA DUE TO INFECTIOUS ORGANISM, UNSPECIFIED LATERALITY, UNSPECIFIED PART OF LUNG: ICD-10-CM

## 2021-10-02 DIAGNOSIS — U07.1 COVID-19: ICD-10-CM

## 2021-10-02 DIAGNOSIS — R09.02 HYPOXIA: Primary | ICD-10-CM

## 2021-10-02 LAB
ABSOLUTE BANDS #: 0.08 K/UL (ref 0–1)
ABSOLUTE EOS #: 0.08 K/UL (ref 0–0.4)
ABSOLUTE IMMATURE GRANULOCYTE: ABNORMAL K/UL (ref 0–0.3)
ABSOLUTE LYMPH #: 0.46 K/UL (ref 1–4.8)
ABSOLUTE MONO #: 0.85 K/UL (ref 0.1–1.3)
ALBUMIN SERPL-MCNC: 3.6 G/DL (ref 3.5–5.2)
ALBUMIN/GLOBULIN RATIO: ABNORMAL (ref 1–2.5)
ALP BLD-CCNC: 112 U/L (ref 35–104)
ALT SERPL-CCNC: 14 U/L (ref 5–33)
ANION GAP SERPL CALCULATED.3IONS-SCNC: 9 MMOL/L (ref 9–17)
AST SERPL-CCNC: 22 U/L
BANDS: 1 % (ref 0–10)
BASOPHILS # BLD: 0 % (ref 0–2)
BASOPHILS ABSOLUTE: 0 K/UL (ref 0–0.2)
BILIRUB SERPL-MCNC: 0.37 MG/DL (ref 0.3–1.2)
BNP INTERPRETATION: ABNORMAL
BUN BLDV-MCNC: 16 MG/DL (ref 8–23)
BUN/CREAT BLD: ABNORMAL (ref 9–20)
C-REACTIVE PROTEIN: 13.1 MG/L (ref 0–5)
CALCIUM SERPL-MCNC: 8.9 MG/DL (ref 8.6–10.4)
CHLORIDE BLD-SCNC: 102 MMOL/L (ref 98–107)
CO2: 26 MMOL/L (ref 20–31)
CREAT SERPL-MCNC: 1.15 MG/DL (ref 0.5–0.9)
DIFFERENTIAL TYPE: ABNORMAL
EOSINOPHILS RELATIVE PERCENT: 1 % (ref 0–4)
FERRITIN: 27 UG/L (ref 13–150)
GFR AFRICAN AMERICAN: 56 ML/MIN
GFR NON-AFRICAN AMERICAN: 46 ML/MIN
GFR SERPL CREATININE-BSD FRML MDRD: ABNORMAL ML/MIN/{1.73_M2}
GFR SERPL CREATININE-BSD FRML MDRD: ABNORMAL ML/MIN/{1.73_M2}
GLUCOSE BLD-MCNC: 127 MG/DL (ref 70–99)
GLUCOSE BLD-MCNC: 142 MG/DL (ref 65–105)
GLUCOSE BLD-MCNC: 271 MG/DL (ref 65–105)
HCT VFR BLD CALC: 31 % (ref 36–46)
HEMOGLOBIN: 9.8 G/DL (ref 12–16)
IMMATURE GRANULOCYTES: ABNORMAL %
INFLUENZA A: NEGATIVE
INFLUENZA B: NEGATIVE
INR BLD: 1
LACTIC ACID, SEPSIS WHOLE BLOOD: NORMAL MMOL/L (ref 0.5–1.9)
LACTIC ACID, SEPSIS: 1 MMOL/L (ref 0.5–1.9)
LYMPHOCYTES # BLD: 6 % (ref 24–44)
MAGNESIUM: 1.8 MG/DL (ref 1.6–2.6)
MCH RBC QN AUTO: 23.9 PG (ref 26–34)
MCHC RBC AUTO-ENTMCNC: 31.6 G/DL (ref 31–37)
MCV RBC AUTO: 75.6 FL (ref 80–100)
MONOCYTES # BLD: 11 % (ref 1–7)
MORPHOLOGY: ABNORMAL
NRBC AUTOMATED: ABNORMAL PER 100 WBC
PDW BLD-RTO: 19.3 % (ref 11.5–14.9)
PLATELET # BLD: 273 K/UL (ref 150–450)
PLATELET ESTIMATE: ABNORMAL
PMV BLD AUTO: 8.2 FL (ref 6–12)
POTASSIUM SERPL-SCNC: 4.4 MMOL/L (ref 3.7–5.3)
PRO-BNP: ABNORMAL PG/ML
PROCALCITONIN: 0.08 NG/ML
PROTHROMBIN TIME: 13.3 SEC (ref 11.8–14.6)
RBC # BLD: 4.11 M/UL (ref 4–5.2)
RBC # BLD: ABNORMAL 10*6/UL
SARS-COV-2 RNA, RT PCR: NOT DETECTED
SEG NEUTROPHILS: 81 % (ref 36–66)
SEGMENTED NEUTROPHILS ABSOLUTE COUNT: 6.23 K/UL (ref 1.3–9.1)
SODIUM BLD-SCNC: 137 MMOL/L (ref 135–144)
SOURCE: NORMAL
SPECIMEN DESCRIPTION: NORMAL
TOTAL PROTEIN: 6.8 G/DL (ref 6.4–8.3)
TROPONIN INTERP: ABNORMAL
TROPONIN INTERP: ABNORMAL
TROPONIN T: ABNORMAL NG/ML
TROPONIN T: ABNORMAL NG/ML
TROPONIN, HIGH SENSITIVITY: 42 NG/L (ref 0–14)
TROPONIN, HIGH SENSITIVITY: 45 NG/L (ref 0–14)
WBC # BLD: 7.7 K/UL (ref 3.5–11)
WBC # BLD: ABNORMAL 10*3/UL

## 2021-10-02 PROCEDURE — 2580000003 HC RX 258: Performed by: EMERGENCY MEDICINE

## 2021-10-02 PROCEDURE — 80053 COMPREHEN METABOLIC PANEL: CPT

## 2021-10-02 PROCEDURE — 84145 PROCALCITONIN (PCT): CPT

## 2021-10-02 PROCEDURE — 6370000000 HC RX 637 (ALT 250 FOR IP): Performed by: FAMILY MEDICINE

## 2021-10-02 PROCEDURE — 99285 EMERGENCY DEPT VISIT HI MDM: CPT

## 2021-10-02 PROCEDURE — 93005 ELECTROCARDIOGRAM TRACING: CPT | Performed by: EMERGENCY MEDICINE

## 2021-10-02 PROCEDURE — 83735 ASSAY OF MAGNESIUM: CPT

## 2021-10-02 PROCEDURE — 84484 ASSAY OF TROPONIN QUANT: CPT

## 2021-10-02 PROCEDURE — 82947 ASSAY GLUCOSE BLOOD QUANT: CPT

## 2021-10-02 PROCEDURE — 6360000002 HC RX W HCPCS: Performed by: EMERGENCY MEDICINE

## 2021-10-02 PROCEDURE — 87040 BLOOD CULTURE FOR BACTERIA: CPT

## 2021-10-02 PROCEDURE — 87636 SARSCOV2 & INF A&B AMP PRB: CPT

## 2021-10-02 PROCEDURE — 96365 THER/PROPH/DIAG IV INF INIT: CPT

## 2021-10-02 PROCEDURE — 36415 COLL VENOUS BLD VENIPUNCTURE: CPT

## 2021-10-02 PROCEDURE — 85025 COMPLETE CBC W/AUTO DIFF WBC: CPT

## 2021-10-02 PROCEDURE — 85610 PROTHROMBIN TIME: CPT

## 2021-10-02 PROCEDURE — 83880 ASSAY OF NATRIURETIC PEPTIDE: CPT

## 2021-10-02 PROCEDURE — 71045 X-RAY EXAM CHEST 1 VIEW: CPT

## 2021-10-02 PROCEDURE — 2060000000 HC ICU INTERMEDIATE R&B

## 2021-10-02 PROCEDURE — 83605 ASSAY OF LACTIC ACID: CPT

## 2021-10-02 PROCEDURE — 2580000003 HC RX 258: Performed by: FAMILY MEDICINE

## 2021-10-02 PROCEDURE — 96375 TX/PRO/DX INJ NEW DRUG ADDON: CPT

## 2021-10-02 PROCEDURE — 82728 ASSAY OF FERRITIN: CPT

## 2021-10-02 PROCEDURE — 86140 C-REACTIVE PROTEIN: CPT

## 2021-10-02 RX ORDER — NITROGLYCERIN 0.4 MG/1
0.4 TABLET SUBLINGUAL EVERY 5 MIN PRN
Status: DISCONTINUED | OUTPATIENT
Start: 2021-10-02 | End: 2021-10-08 | Stop reason: HOSPADM

## 2021-10-02 RX ORDER — FUROSEMIDE 10 MG/ML
40 INJECTION INTRAMUSCULAR; INTRAVENOUS ONCE
Status: COMPLETED | OUTPATIENT
Start: 2021-10-02 | End: 2021-10-02

## 2021-10-02 RX ORDER — ACETAMINOPHEN 325 MG/1
650 TABLET ORAL EVERY 4 HOURS PRN
Status: DISCONTINUED | OUTPATIENT
Start: 2021-10-02 | End: 2021-10-08 | Stop reason: HOSPADM

## 2021-10-02 RX ORDER — METOPROLOL TARTRATE 50 MG/1
25 TABLET, FILM COATED ORAL 2 TIMES DAILY
Status: DISCONTINUED | OUTPATIENT
Start: 2021-10-02 | End: 2021-10-08 | Stop reason: HOSPADM

## 2021-10-02 RX ORDER — FUROSEMIDE 40 MG/1
40 TABLET ORAL DAILY
Status: DISCONTINUED | OUTPATIENT
Start: 2021-10-02 | End: 2021-10-08 | Stop reason: HOSPADM

## 2021-10-02 RX ORDER — FLUTICASONE PROPIONATE 50 MCG
2 SPRAY, SUSPENSION (ML) NASAL DAILY
Status: DISCONTINUED | OUTPATIENT
Start: 2021-10-03 | End: 2021-10-08 | Stop reason: HOSPADM

## 2021-10-02 RX ORDER — CLOPIDOGREL BISULFATE 75 MG/1
75 TABLET ORAL DAILY
Status: DISCONTINUED | OUTPATIENT
Start: 2021-10-02 | End: 2021-10-08 | Stop reason: HOSPADM

## 2021-10-02 RX ORDER — DEXAMETHASONE 4 MG/1
6 TABLET ORAL ONCE
Status: COMPLETED | OUTPATIENT
Start: 2021-10-02 | End: 2021-10-02

## 2021-10-02 RX ORDER — NICOTINE POLACRILEX 4 MG
15 LOZENGE BUCCAL PRN
Status: DISCONTINUED | OUTPATIENT
Start: 2021-10-02 | End: 2021-10-08 | Stop reason: HOSPADM

## 2021-10-02 RX ORDER — TIZANIDINE 2 MG/1
2 TABLET ORAL NIGHTLY PRN
Status: DISCONTINUED | OUTPATIENT
Start: 2021-10-02 | End: 2021-10-08 | Stop reason: HOSPADM

## 2021-10-02 RX ORDER — ROPINIROLE 1 MG/1
1 TABLET, FILM COATED ORAL NIGHTLY PRN
Status: DISCONTINUED | OUTPATIENT
Start: 2021-10-02 | End: 2021-10-08 | Stop reason: HOSPADM

## 2021-10-02 RX ORDER — ONDANSETRON 2 MG/ML
4 INJECTION INTRAMUSCULAR; INTRAVENOUS EVERY 6 HOURS PRN
Status: DISCONTINUED | OUTPATIENT
Start: 2021-10-02 | End: 2021-10-08 | Stop reason: HOSPADM

## 2021-10-02 RX ORDER — ASPIRIN 81 MG/1
81 TABLET ORAL DAILY
Status: DISCONTINUED | OUTPATIENT
Start: 2021-10-03 | End: 2021-10-08 | Stop reason: HOSPADM

## 2021-10-02 RX ORDER — ONDANSETRON 4 MG/1
4 TABLET, ORALLY DISINTEGRATING ORAL EVERY 8 HOURS PRN
Status: DISCONTINUED | OUTPATIENT
Start: 2021-10-02 | End: 2021-10-08 | Stop reason: HOSPADM

## 2021-10-02 RX ORDER — DEXTROSE MONOHYDRATE 25 G/50ML
12.5 INJECTION, SOLUTION INTRAVENOUS PRN
Status: DISCONTINUED | OUTPATIENT
Start: 2021-10-02 | End: 2021-10-08 | Stop reason: HOSPADM

## 2021-10-02 RX ORDER — METFORMIN HYDROCHLORIDE 500 MG/1
500 TABLET, EXTENDED RELEASE ORAL
Status: DISCONTINUED | OUTPATIENT
Start: 2021-10-03 | End: 2021-10-08 | Stop reason: HOSPADM

## 2021-10-02 RX ORDER — PANTOPRAZOLE SODIUM 40 MG/1
40 TABLET, DELAYED RELEASE ORAL
Status: DISCONTINUED | OUTPATIENT
Start: 2021-10-03 | End: 2021-10-08 | Stop reason: HOSPADM

## 2021-10-02 RX ORDER — SODIUM CHLORIDE 9 MG/ML
25 INJECTION, SOLUTION INTRAVENOUS PRN
Status: DISCONTINUED | OUTPATIENT
Start: 2021-10-02 | End: 2021-10-08 | Stop reason: HOSPADM

## 2021-10-02 RX ORDER — GUAIFENESIN 600 MG/1
600 TABLET, EXTENDED RELEASE ORAL 2 TIMES DAILY
Status: DISCONTINUED | OUTPATIENT
Start: 2021-10-02 | End: 2021-10-08 | Stop reason: HOSPADM

## 2021-10-02 RX ORDER — CITALOPRAM 20 MG/1
20 TABLET ORAL DAILY
Status: DISCONTINUED | OUTPATIENT
Start: 2021-10-02 | End: 2021-10-08 | Stop reason: HOSPADM

## 2021-10-02 RX ORDER — DEXTROSE MONOHYDRATE 50 MG/ML
100 INJECTION, SOLUTION INTRAVENOUS PRN
Status: DISCONTINUED | OUTPATIENT
Start: 2021-10-02 | End: 2021-10-08 | Stop reason: HOSPADM

## 2021-10-02 RX ORDER — ISOSORBIDE MONONITRATE 30 MG/1
30 TABLET, EXTENDED RELEASE ORAL DAILY
Status: DISCONTINUED | OUTPATIENT
Start: 2021-10-02 | End: 2021-10-08 | Stop reason: HOSPADM

## 2021-10-02 RX ORDER — SODIUM CHLORIDE 0.9 % (FLUSH) 0.9 %
5-40 SYRINGE (ML) INJECTION PRN
Status: DISCONTINUED | OUTPATIENT
Start: 2021-10-02 | End: 2021-10-08 | Stop reason: HOSPADM

## 2021-10-02 RX ORDER — ALBUTEROL SULFATE 90 UG/1
2 AEROSOL, METERED RESPIRATORY (INHALATION) EVERY 4 HOURS PRN
Status: DISCONTINUED | OUTPATIENT
Start: 2021-10-02 | End: 2021-10-08 | Stop reason: HOSPADM

## 2021-10-02 RX ORDER — ATORVASTATIN CALCIUM 80 MG/1
80 TABLET, FILM COATED ORAL DAILY
Status: DISCONTINUED | OUTPATIENT
Start: 2021-10-02 | End: 2021-10-08 | Stop reason: HOSPADM

## 2021-10-02 RX ORDER — IPRATROPIUM BROMIDE AND ALBUTEROL SULFATE 2.5; .5 MG/3ML; MG/3ML
3 SOLUTION RESPIRATORY (INHALATION) EVERY 4 HOURS PRN
Status: DISCONTINUED | OUTPATIENT
Start: 2021-10-02 | End: 2021-10-08 | Stop reason: HOSPADM

## 2021-10-02 RX ORDER — SODIUM CHLORIDE 0.9 % (FLUSH) 0.9 %
5-40 SYRINGE (ML) INJECTION EVERY 12 HOURS SCHEDULED
Status: DISCONTINUED | OUTPATIENT
Start: 2021-10-02 | End: 2021-10-08 | Stop reason: HOSPADM

## 2021-10-02 RX ADMIN — INSULIN LISPRO 2 UNITS: 100 INJECTION, SOLUTION INTRAVENOUS; SUBCUTANEOUS at 18:24

## 2021-10-02 RX ADMIN — FUROSEMIDE 40 MG: 10 INJECTION, SOLUTION INTRAMUSCULAR; INTRAVENOUS at 10:50

## 2021-10-02 RX ADMIN — METOPROLOL TARTRATE 25 MG: 50 TABLET, FILM COATED ORAL at 21:53

## 2021-10-02 RX ADMIN — ISOSORBIDE MONONITRATE 30 MG: 30 TABLET, EXTENDED RELEASE ORAL at 18:24

## 2021-10-02 RX ADMIN — FUROSEMIDE 40 MG: 40 TABLET ORAL at 18:24

## 2021-10-02 RX ADMIN — CITALOPRAM HYDROBROMIDE 20 MG: 20 TABLET ORAL at 18:24

## 2021-10-02 RX ADMIN — AZITHROMYCIN MONOHYDRATE 500 MG: 500 INJECTION, POWDER, LYOPHILIZED, FOR SOLUTION INTRAVENOUS at 11:32

## 2021-10-02 RX ADMIN — Medication 400 MG: at 21:54

## 2021-10-02 RX ADMIN — CEFTRIAXONE SODIUM 1000 MG: 1 INJECTION, POWDER, FOR SOLUTION INTRAMUSCULAR; INTRAVENOUS at 10:49

## 2021-10-02 RX ADMIN — SODIUM CHLORIDE, PRESERVATIVE FREE 10 ML: 5 INJECTION INTRAVENOUS at 21:55

## 2021-10-02 RX ADMIN — ATORVASTATIN CALCIUM 80 MG: 80 TABLET, FILM COATED ORAL at 18:24

## 2021-10-02 RX ADMIN — GUAIFENESIN 600 MG: 600 TABLET, EXTENDED RELEASE ORAL at 21:54

## 2021-10-02 RX ADMIN — CLOPIDOGREL BISULFATE 75 MG: 75 TABLET ORAL at 18:24

## 2021-10-02 RX ADMIN — DEXAMETHASONE 6 MG: 4 TABLET ORAL at 13:07

## 2021-10-02 RX ADMIN — INSULIN LISPRO 3 UNITS: 100 INJECTION, SOLUTION INTRAVENOUS; SUBCUTANEOUS at 21:56

## 2021-10-02 ASSESSMENT — ENCOUNTER SYMPTOMS
COUGH: 1
SHORTNESS OF BREATH: 1
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
## 2021-10-02 NOTE — ED NOTES
RN spoke with patient's daughter Ellen Abdul and updated on POC.       Dalila Basurto RN  10/02/21 7906

## 2021-10-02 NOTE — PROGRESS NOTES
Pulmonary. The patient had been seen by 's team in August.  There was a bronchoscopy note noted    We will transfer care to his team service.   Discussed with Dr. Vee Garcias MD

## 2021-10-02 NOTE — ED NOTES
Resting in bed, denies needs at this time. Call light in reach.      Marisa Sexton, LIVE  10/02/21 2092

## 2021-10-03 ENCOUNTER — APPOINTMENT (OUTPATIENT)
Dept: GENERAL RADIOLOGY | Age: 71
DRG: 193 | End: 2021-10-03
Payer: MEDICARE

## 2021-10-03 LAB
EKG ATRIAL RATE: 85 BPM
EKG P AXIS: 53 DEGREES
EKG P-R INTERVAL: 144 MS
EKG Q-T INTERVAL: 410 MS
EKG QRS DURATION: 96 MS
EKG QTC CALCULATION (BAZETT): 487 MS
EKG T AXIS: 141 DEGREES
EKG VENTRICULAR RATE: 85 BPM
GLUCOSE BLD-MCNC: 120 MG/DL (ref 65–105)
GLUCOSE BLD-MCNC: 133 MG/DL (ref 65–105)
GLUCOSE BLD-MCNC: 148 MG/DL (ref 65–105)
GLUCOSE BLD-MCNC: 204 MG/DL (ref 65–105)
INFLUENZA A: NEGATIVE
INFLUENZA B: NEGATIVE
SARS-COV-2 RNA, RT PCR: NOT DETECTED
SOURCE: NORMAL
SPECIMEN DESCRIPTION: NORMAL

## 2021-10-03 PROCEDURE — 82947 ASSAY GLUCOSE BLOOD QUANT: CPT

## 2021-10-03 PROCEDURE — 1200000000 HC SEMI PRIVATE

## 2021-10-03 PROCEDURE — 6370000000 HC RX 637 (ALT 250 FOR IP): Performed by: FAMILY MEDICINE

## 2021-10-03 PROCEDURE — 2580000003 HC RX 258: Performed by: FAMILY MEDICINE

## 2021-10-03 PROCEDURE — 6360000002 HC RX W HCPCS: Performed by: FAMILY MEDICINE

## 2021-10-03 PROCEDURE — 94761 N-INVAS EAR/PLS OXIMETRY MLT: CPT

## 2021-10-03 PROCEDURE — 93010 ELECTROCARDIOGRAM REPORT: CPT | Performed by: INTERNAL MEDICINE

## 2021-10-03 PROCEDURE — 6370000000 HC RX 637 (ALT 250 FOR IP): Performed by: INTERNAL MEDICINE

## 2021-10-03 PROCEDURE — 99223 1ST HOSP IP/OBS HIGH 75: CPT | Performed by: FAMILY MEDICINE

## 2021-10-03 PROCEDURE — 6360000002 HC RX W HCPCS: Performed by: INTERNAL MEDICINE

## 2021-10-03 PROCEDURE — 87636 SARSCOV2 & INF A&B AMP PRB: CPT

## 2021-10-03 PROCEDURE — 94640 AIRWAY INHALATION TREATMENT: CPT

## 2021-10-03 PROCEDURE — 2700000000 HC OXYGEN THERAPY PER DAY

## 2021-10-03 PROCEDURE — 71045 X-RAY EXAM CHEST 1 VIEW: CPT

## 2021-10-03 RX ORDER — IPRATROPIUM BROMIDE AND ALBUTEROL SULFATE 2.5; .5 MG/3ML; MG/3ML
1 SOLUTION RESPIRATORY (INHALATION)
Status: DISCONTINUED | OUTPATIENT
Start: 2021-10-03 | End: 2021-10-08 | Stop reason: HOSPADM

## 2021-10-03 RX ORDER — METHYLPREDNISOLONE SODIUM SUCCINATE 125 MG/2ML
60 INJECTION, POWDER, LYOPHILIZED, FOR SOLUTION INTRAMUSCULAR; INTRAVENOUS EVERY 6 HOURS
Status: COMPLETED | OUTPATIENT
Start: 2021-10-03 | End: 2021-10-04

## 2021-10-03 RX ORDER — BUDESONIDE AND FORMOTEROL FUMARATE DIHYDRATE 160; 4.5 UG/1; UG/1
2 AEROSOL RESPIRATORY (INHALATION) 2 TIMES DAILY
Status: DISCONTINUED | OUTPATIENT
Start: 2021-10-03 | End: 2021-10-08 | Stop reason: HOSPADM

## 2021-10-03 RX ADMIN — INSULIN LISPRO 1 UNITS: 100 INJECTION, SOLUTION INTRAVENOUS; SUBCUTANEOUS at 20:15

## 2021-10-03 RX ADMIN — GUAIFENESIN 600 MG: 600 TABLET, EXTENDED RELEASE ORAL at 20:14

## 2021-10-03 RX ADMIN — ENOXAPARIN SODIUM 40 MG: 40 INJECTION SUBCUTANEOUS at 08:12

## 2021-10-03 RX ADMIN — SODIUM CHLORIDE, PRESERVATIVE FREE 10 ML: 5 INJECTION INTRAVENOUS at 20:14

## 2021-10-03 RX ADMIN — METFORMIN HYDROCHLORIDE 500 MG: 500 TABLET, EXTENDED RELEASE ORAL at 09:16

## 2021-10-03 RX ADMIN — METHYLPREDNISOLONE SODIUM SUCCINATE 60 MG: 125 INJECTION, POWDER, FOR SOLUTION INTRAMUSCULAR; INTRAVENOUS at 12:09

## 2021-10-03 RX ADMIN — IPRATROPIUM BROMIDE AND ALBUTEROL SULFATE 1 AMPULE: .5; 3 SOLUTION RESPIRATORY (INHALATION) at 19:00

## 2021-10-03 RX ADMIN — METHYLPREDNISOLONE SODIUM SUCCINATE 60 MG: 125 INJECTION, POWDER, FOR SOLUTION INTRAMUSCULAR; INTRAVENOUS at 19:56

## 2021-10-03 RX ADMIN — FUROSEMIDE 40 MG: 40 TABLET ORAL at 08:13

## 2021-10-03 RX ADMIN — FLUTICASONE PROPIONATE 2 SPRAY: 50 SPRAY, METERED NASAL at 08:12

## 2021-10-03 RX ADMIN — INSULIN LISPRO 4 UNITS: 100 INJECTION, SOLUTION INTRAVENOUS; SUBCUTANEOUS at 17:14

## 2021-10-03 RX ADMIN — GUAIFENESIN 600 MG: 600 TABLET, EXTENDED RELEASE ORAL at 08:14

## 2021-10-03 RX ADMIN — PANTOPRAZOLE SODIUM 40 MG: 40 TABLET, DELAYED RELEASE ORAL at 06:33

## 2021-10-03 RX ADMIN — ATORVASTATIN CALCIUM 80 MG: 80 TABLET, FILM COATED ORAL at 08:13

## 2021-10-03 RX ADMIN — IPRATROPIUM BROMIDE AND ALBUTEROL SULFATE 1 AMPULE: .5; 3 SOLUTION RESPIRATORY (INHALATION) at 16:20

## 2021-10-03 RX ADMIN — Medication 400 MG: at 08:13

## 2021-10-03 RX ADMIN — CITALOPRAM HYDROBROMIDE 20 MG: 20 TABLET ORAL at 08:13

## 2021-10-03 RX ADMIN — SODIUM CHLORIDE, PRESERVATIVE FREE 10 ML: 5 INJECTION INTRAVENOUS at 08:24

## 2021-10-03 RX ADMIN — CLOPIDOGREL BISULFATE 75 MG: 75 TABLET ORAL at 08:13

## 2021-10-03 RX ADMIN — ASPIRIN 81 MG: 81 TABLET, COATED ORAL at 08:13

## 2021-10-03 RX ADMIN — METOPROLOL TARTRATE 25 MG: 50 TABLET, FILM COATED ORAL at 08:13

## 2021-10-03 RX ADMIN — METOPROLOL TARTRATE 25 MG: 50 TABLET, FILM COATED ORAL at 20:14

## 2021-10-03 RX ADMIN — ISOSORBIDE MONONITRATE 30 MG: 30 TABLET, EXTENDED RELEASE ORAL at 08:13

## 2021-10-03 RX ADMIN — Medication 400 MG: at 20:14

## 2021-10-03 NOTE — CARE COORDINATION
CASE MANAGEMENT NOTE:    Admission Date:  10/2/2021 Eva Medina is a 66 y.o.  female    Admitted for : Pneumonia [J18.9]  Hypoxia [R09.02]  Pneumonia due to infectious organism, unspecified laterality, unspecified part of lung [J18.9]  COVID-19 [U07.1]  Multifocal pneumonia [J18.9]    Met with:  Patient via telephone    PCP:  Dr. Kaur Challenger:  [employer]      Is patient alert and oriented at time of discussion:  Yes    Current Residence/ Living Arrangements:  independently at home             Current Services PTA:  No    Does patient go to outpatient dialysis: No  If yes, location and chair time: N/A    Is patient agreeable to VNS: No    Freedom of choice provided:  Yes    List of [address] provided: No    VNS chosen:  No - Has had [address] in the past    DME:  walker    Home Oxygen: No    Nebulizer: Yes    CPAP/BIPAP: No    Supplier: Has had O2 through [facility] in the past    Potential Assistance Needed: Yes    SNF needed: No    Freedom of choice and list provided: NA    Pharmacy:  [address] [place] [address]       Does Patient want to use MEDS to BEDS? No    Is patient currently receiving oral anticoagulation therapy? No    Is the Patient an [facility] with Readmission Risk Score greater than 14%? No  If yes, pt needs a follow up appointment made within 7 days. Family Members/Caregivers that pt would like involved in their care:    Yes    If yes, list name here:  Haroon Broussard    Transportation Provider:  Patient             Discharge Plan:  10/3: [address] PENDING (2L O2) - From 1-story home alone. She is independent and drives. DME - Walker and nebulizer (Has had O2 in past through [facility] [place]). Declines VNS.  On IV steroids 60Q6. //CAMPBELL                  Electronically signed by: Willis Marion RN on 10/3/2021 at 12:02 PM

## 2021-10-03 NOTE — PLAN OF CARE
Problem: Infection:  Goal: Will remain free from infection  Description: Will remain free from infection  10/3/2021 1412 by Trinity Thompson RN  Outcome: Ongoing     Problem: Safety:  Goal: Free from accidental physical injury  Description: Free from accidental physical injury  10/3/2021 1412 by Trinity Thompson RN  Outcome: Ongoing     Problem: Safety:  Goal: Free from intentional harm  Description: Free from intentional harm  10/3/2021 1412 by Trinity Thompson RN  Outcome: Ongoing     Problem: Daily Care:  Goal: Daily care needs are met  Description: Daily care needs are met  10/3/2021 1412 by Trinity Thompson RN  Outcome: Ongoing     Problem: Pain:  Goal: Patient's pain/discomfort is manageable  Description: Patient's pain/discomfort is manageable  10/3/2021 1412 by Trinity Thompson RN  Outcome: Ongoing     Problem: Skin Integrity:  Goal: Skin integrity will stabilize  Description: Skin integrity will stabilize  10/3/2021 1412 by Trinity Thompson RN  Outcome: Ongoing     Problem: Discharge Planning:  Goal: Patients continuum of care needs are met  Description: Patients continuum of care needs are met  10/3/2021 1412 by Trinity Thompson RN  Outcome: Ongoing
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
## 2021-10-03 NOTE — ED NOTES
Report called to [facility]  10/02/21 2019

## 2021-10-03 NOTE — H&P
vessel 2/2018    CERVICAL LAMINECTOMY N/A 10/14/2020    C3-C7 POSTERIOR CERVICAL DECOMPRESSION FUSION performed by Maxx Martins MD at [address] Bilateral     knees    LEG BIOPSY EXCISION Right 8/29/2019    LEG LESION PUNCH BIOPSY performed by Rose Castrejon MD at [place] [address]  07/26/2020    cerebral angiogram    DC INCIS/DRAIN THIGH/KNEE ABSCESS,DEEP Right 5/7/2018    DEBRIDEMENT INCISION AND DRAINAGE THIGH ABSCESS performed by Ginny Nevarez DO at [address] OFFICE/OUTPT VISIT,PROCEDURE ONLY N/A 2/6/2018    CABG X 3 LIMA-LAD-DIAG,SVG-PDA,CORONARY ARTERY BYPASS REDO, PUMP ASSIST, SWAN, JARRED, REDO STERNOTOMY performed by Allen López MD at [facility]       Medications Prior to Admission:    Prior to Admission medications    Medication Sig Start Date End Date Taking?  Authorizing Provider   ipratropium-albuterol (DUONEB) 0.5-2.5 (3) MG/3ML SOLN nebulizer solution Inhale 3 mLs into the lungs every 4 hours as needed for Shortness of Breath 8/17/21   Avila Cody MD   metoprolol tartrate (LOPRESSOR) 25 MG tablet Take 1 tablet by mouth 2 times daily 8/17/21   MD Mckenzie Mckeon [facility]) 600 MG extended release tablet Take 1 tablet by mouth 2 times daily 8/17/21   Avila Cody MD   magnesium oxide (MAG-OX) 400 (241.3 Mg) MG TABS tablet Take 1 tablet by mouth 2 times daily 8/17/21   Avila Cody MD   doxycycline monohydrate (MONODOX) 100 MG capsule Take 1 capsule by mouth every 12 hours 8/17/21   Avila Cody MD   pantoprazole (PROTONIX) 40 MG tablet Take 1 tablet by mouth every morning (before breakfast) 8/18/21   Avila Cody MD   diclofenac sodium (VOLTAREN) 1 % GEL Apply 2 g topically 2 times daily as needed for Pain    Historical Provider, MD   metFORMIN (GLUCOPHAGE-XR) 500 MG extended release tablet Take 1 tablet by mouth daily (with breakfast) 8/9/21   Yuliya Hart Nilam Noriega MD   citalopram (CELEXA) 20 MG tablet Take 1 tablet by mouth daily 8/9/21   Jessica Zarate MD   tiZANidine (ZANAFLEX) 2 MG tablet Take 1 tablet by mouth nightly as needed (pain) 8/9/21   Jessica Zarate MD   nitroGLYCERIN (NITROSTAT) 0.4 MG SL tablet Place 1 tablet under the tongue every 5 minutes as needed for Chest pain up to max of 3 total doses. If no relief after 1 dose, call 911. 8/9/21   Jessica Zarate MD   SM ASPIRIN ADULT LOW STRENGTH 81 MG EC tablet TAKE 1 TABLET BY MOUTH DAILY 5/21/21   Jessica Zarate MD   fluticasone Deadra Buster) 50 MCG/ACT nasal spray USE 2 SPRAYS IN EACH NOSTRIL ONCE DAILY 5/21/21   Jessica Zarate MD   albuterol sulfate HFA (PROAIR HFA) 108 (90 Base) MCG/ACT inhaler Inhale 2 puffs into the lungs every 4 hours as needed for Wheezing May sub covered product 4/20/21   Jessica Zarate MD   clopidogrel (PLAVIX) 75 MG tablet Take 1 tablet by mouth daily 3/18/21   Jessica Zarate MD   atorvastatin (LIPITOR) 40 MG tablet Take 2 tablets by mouth daily 2/2/21   Jessica Zarate MD   rOPINIRole (REQUIP) 1 MG tablet TAKE 1 TABLET BY MOUTH EVERY NIGHT AS NEEDED 2/2/21   Jessica Zarate MD   furosemide (LASIX) 40 MG tablet TAKE 1 TABLET BY MOUTH DAILY 11/8/20   Jessica Zarate MD   ONE TOUCH ULTRA TEST strip TEST ONCE DAILY AS NEEDED 4/6/20   Jessica Zarate MD   isosorbide mononitrate (IMDUR) 30 MG extended release tablet Take 30 mg by mouth 5/24/19   Historical Provider, MD       Allergies:  Codeine, Lisinopril, and Morphine    Social History:  The patient currently lives at home    TOBACCO:   reports that she has been smoking cigarettes. She has a 14.00 pack-year smoking history. She has never used smokeless tobacco.  ETOH:   reports no history of alcohol use.     Review of Systems - General ROS: negative  Psychological ROS: positive for - sleep disturbances  Ophthalmic ROS: positive for - uses glasses  ENT ROS: negative  Endocrine ROS: positive for - hyperglycemia  Respiratory ROS: positive for - cough  Cardiovascular ROS: positive for - irregular heartbeat  Gastrointestinal ROS: negative  Musculoskeletal ROS: positive for - muscle pain  Neurological ROS: positive for - restless legs      Family History:          Problem Relation Age of Onset    Heart Disease Father        PHYSICAL EXAM:    /71   Pulse 62   Temp 98 °F (36.7 °C) (Oral)   Resp 16   Ht 5' 4\" (1.626 m)   Wt 139 lb (63 kg)   SpO2 94%   BMI 23.86 kg/m²     Physical exam deferred due to risk of exposure to COVID and in an effort to conserve PPE during the global COVID 19 pandemic. CXR:  I have reviewed the CXR with the following interpretation: worsening consolidation favoring COVID 19 pneumonia  EKG:  I have reviewed the EKG with the following interpretation: NSR    CBC   Recent Labs     10/02/21  0850   WBC 7.7   HGB 9.8*   HCT 31.0*         RENAL  Recent Labs     10/02/21  0850      K 4.4      CO2 26   BUN 16   CREATININE 1.15*     LFT'S  Recent Labs     10/02/21  0850   AST 22   ALT 14   BILITOT 0.37   ALKPHOS 112*     COAG  Recent Labs     10/02/21  0850   INR 1.0     CARDIAC ENZYMES  No results for input(s): CKTOTAL, CKMB, CKMBINDEX, TROPONINI in the last 72 hours.     U/A:    Lab Results   Component Value Date    COLORU YELLOW 05/02/2018    WBCUA 20 TO 50 05/02/2018    RBCUA 2 TO 5 05/02/2018    MUCUS 1+ 05/02/2018    BACTERIA MODERATE 05/02/2018    SPECGRAV 1.016 05/02/2018    LEUKOCYTESUR MOD 05/02/2018    GLUCOSEU NEGATIVE 05/02/2018    AMORPHOUS 2+ 05/02/2018       ABG    Lab Results   Component Value Date    DVZ2GHW 23 02/06/2018           Active Hospital Problems    Diagnosis Date Noted    Multifocal pneumonia [J18.9] 10/02/2021    Internal carotid artery occlusion [I65.29]     Atrial fibrillation ([facility] [address] [I48.91] 05/31/2018    S/P CABG x 4 [Z95.1]     Hypothyroidism [E03.9] 03/06/2018    Chronic bilateral low back pain with left-sided sciatica [M54.42, G89.29] 03/07/2017    Restless leg syndrome [G25.81] 09/10/2015    Primary insomnia [F51.01] 09/10/2015    Chronic obstructive pulmonary disease ([facility] [address] [J44.9] 09/10/2015    Controlled type 2 diabetes mellitus without complication, without long-term current use of insulin ([facility] [address] [E11.9] 09/10/2015    Hypertension goal BP (blood pressure) < 140/90 [I10] 09/10/2015    Mixed hyperlipidemia [E78.2] 09/10/2015         ASSESSMENT/PLAN:  Patient admitted with presumed COVID 19 pneumonia. Co-morbidities as above. Orders Placed This Encounter   Procedures    COVID-19 & Influenza Combo    Culture, Blood 1    Culture, Blood 1    COVID-19 & Influenza Combo    XR CHEST PORTABLE    XR CHEST (SINGLE VIEW FRONTAL)    XR CHEST PORTABLE    CBC Auto Differential    Brain Natriuretic Peptide    Comprehensive Metabolic Panel    Troponin    Magnesium    Protime-INR    Ferritin    C-Reactive Protein    Lactate, Sepsis    Procalcitonin    Basic Metabolic Panel    CBC Auto Differential    Procalcitonin    ADULT DIET;  Regular    Telemetry monitoring - continuous duration    Vital signs per unit routine    Notify physician    Notify physician    Up with assistance    HYPOGLYCEMIA TREATMENT: blood glucose less than 50 mg/dL and patient  ALERT and TOLERATING PO    HYPOGLYCEMIA TREATMENT: blood glucose less than 70 mg/dL and patient ALERT and TOLERATING PO    HYPOGLYCEMIA TREATMENT: blood glucose less than 70 mg/dL and patient NOT ALERT or NPO    Full Code    Inpatient consult to [facility] Inpatient consult to Pulmonology    Initiate Oxygen Therapy Protocol    POCT Glucose    POCT Glucose    POC Glucose Fingerstick    POC Glucose Fingerstick    POC Glucose Fingerstick    POC Glucose Fingerstick    POC Glucose Fingerstick    POC Glucose Fingerstick    EKG 12 Lead    Insert peripheral IV    PATIENT STATUS (DIRECT) Inpatient    PATIENT STATUS (FROM ED OR OR/PROCEDURAL) Inpatient    Transfer patient         DVT Prophylaxis:   Diet: ADULT DIET;  Regular  Code Status: Full Code      Dispo - admitted to Progressive/Surge overflow       @KGI@

## 2021-10-03 NOTE — PROGRESS NOTES
Dr Gali Mantilla notified of patients covid 19 pcr  being negative. Tranfer order put in for medical surgical unit. Daughter updated on patients condition.

## 2021-10-04 ENCOUNTER — APPOINTMENT (OUTPATIENT)
Dept: CT IMAGING | Age: 71
DRG: 193 | End: 2021-10-04
Payer: MEDICARE

## 2021-10-04 ENCOUNTER — APPOINTMENT (OUTPATIENT)
Dept: GENERAL RADIOLOGY | Age: 71
DRG: 193 | End: 2021-10-04
Payer: MEDICARE

## 2021-10-04 LAB
ABSOLUTE EOS #: 0 K/UL (ref 0–0.4)
ABSOLUTE IMMATURE GRANULOCYTE: ABNORMAL K/UL (ref 0–0.3)
ABSOLUTE LYMPH #: 0.4 K/UL (ref 1–4.8)
ABSOLUTE MONO #: 0.1 K/UL (ref 0.1–1.3)
ANION GAP SERPL CALCULATED.3IONS-SCNC: 5 MMOL/L (ref 9–17)
BASOPHILS # BLD: 0 % (ref 0–2)
BASOPHILS ABSOLUTE: 0 K/UL (ref 0–0.2)
BUN BLDV-MCNC: 28 MG/DL (ref 8–23)
BUN/CREAT BLD: ABNORMAL (ref 9–20)
CALCIUM SERPL-MCNC: 8.4 MG/DL (ref 8.6–10.4)
CHLORIDE BLD-SCNC: 100 MMOL/L (ref 98–107)
CO2: 29 MMOL/L (ref 20–31)
CREAT SERPL-MCNC: 1.66 MG/DL (ref 0.5–0.9)
CULTURE: NORMAL
DIFFERENTIAL TYPE: ABNORMAL
DIRECT EXAM: NORMAL
EOSINOPHILS RELATIVE PERCENT: 0 % (ref 0–4)
GFR AFRICAN AMERICAN: 37 ML/MIN
GFR NON-AFRICAN AMERICAN: 30 ML/MIN
GFR SERPL CREATININE-BSD FRML MDRD: ABNORMAL ML/MIN/{1.73_M2}
GFR SERPL CREATININE-BSD FRML MDRD: ABNORMAL ML/MIN/{1.73_M2}
GLUCOSE BLD-MCNC: 164 MG/DL (ref 65–105)
GLUCOSE BLD-MCNC: 223 MG/DL (ref 65–105)
GLUCOSE BLD-MCNC: 227 MG/DL (ref 65–105)
GLUCOSE BLD-MCNC: 243 MG/DL (ref 70–99)
GLUCOSE BLD-MCNC: 398 MG/DL (ref 65–105)
HCT VFR BLD CALC: 28.9 % (ref 36–46)
HEMOGLOBIN: 9.2 G/DL (ref 12–16)
IMMATURE GRANULOCYTES: ABNORMAL %
LYMPHOCYTES # BLD: 6 % (ref 24–44)
Lab: NORMAL
MCH RBC QN AUTO: 23.9 PG (ref 26–34)
MCHC RBC AUTO-ENTMCNC: 31.7 G/DL (ref 31–37)
MCV RBC AUTO: 75.4 FL (ref 80–100)
MONOCYTES # BLD: 1 % (ref 1–7)
NRBC AUTOMATED: ABNORMAL PER 100 WBC
PDW BLD-RTO: 18.9 % (ref 11.5–14.9)
PLATELET # BLD: 276 K/UL (ref 150–450)
PLATELET ESTIMATE: ABNORMAL
PMV BLD AUTO: 8.2 FL (ref 6–12)
POTASSIUM SERPL-SCNC: 4.7 MMOL/L (ref 3.7–5.3)
PROCALCITONIN: 0.07 NG/ML
RBC # BLD: 3.84 M/UL (ref 4–5.2)
RBC # BLD: ABNORMAL 10*6/UL
SEG NEUTROPHILS: 93 % (ref 36–66)
SEGMENTED NEUTROPHILS ABSOLUTE COUNT: 5.5 K/UL (ref 1.3–9.1)
SODIUM BLD-SCNC: 134 MMOL/L (ref 135–144)
SPECIMEN DESCRIPTION: NORMAL
WBC # BLD: 6 K/UL (ref 3.5–11)
WBC # BLD: ABNORMAL 10*3/UL

## 2021-10-04 PROCEDURE — 94761 N-INVAS EAR/PLS OXIMETRY MLT: CPT

## 2021-10-04 PROCEDURE — 2700000000 HC OXYGEN THERAPY PER DAY

## 2021-10-04 PROCEDURE — 99232 SBSQ HOSP IP/OBS MODERATE 35: CPT | Performed by: FAMILY MEDICINE

## 2021-10-04 PROCEDURE — 6360000002 HC RX W HCPCS: Performed by: INTERNAL MEDICINE

## 2021-10-04 PROCEDURE — 6360000002 HC RX W HCPCS: Performed by: FAMILY MEDICINE

## 2021-10-04 PROCEDURE — 84145 PROCALCITONIN (PCT): CPT

## 2021-10-04 PROCEDURE — 94640 AIRWAY INHALATION TREATMENT: CPT

## 2021-10-04 PROCEDURE — 6370000000 HC RX 637 (ALT 250 FOR IP): Performed by: INTERNAL MEDICINE

## 2021-10-04 PROCEDURE — 94669 MECHANICAL CHEST WALL OSCILL: CPT

## 2021-10-04 PROCEDURE — 6370000000 HC RX 637 (ALT 250 FOR IP): Performed by: FAMILY MEDICINE

## 2021-10-04 PROCEDURE — 2580000003 HC RX 258: Performed by: FAMILY MEDICINE

## 2021-10-04 PROCEDURE — 1200000000 HC SEMI PRIVATE

## 2021-10-04 PROCEDURE — 85025 COMPLETE CBC W/AUTO DIFF WBC: CPT

## 2021-10-04 PROCEDURE — 71045 X-RAY EXAM CHEST 1 VIEW: CPT

## 2021-10-04 PROCEDURE — 82947 ASSAY GLUCOSE BLOOD QUANT: CPT

## 2021-10-04 PROCEDURE — 36415 COLL VENOUS BLD VENIPUNCTURE: CPT

## 2021-10-04 PROCEDURE — 71250 CT THORAX DX C-: CPT

## 2021-10-04 PROCEDURE — 6370000000 HC RX 637 (ALT 250 FOR IP): Performed by: NURSE PRACTITIONER

## 2021-10-04 PROCEDURE — 80048 BASIC METABOLIC PNL TOTAL CA: CPT

## 2021-10-04 RX ORDER — PREDNISONE 20 MG/1
20 TABLET ORAL DAILY
Status: DISCONTINUED | OUTPATIENT
Start: 2021-10-04 | End: 2021-10-08 | Stop reason: HOSPADM

## 2021-10-04 RX ADMIN — IPRATROPIUM BROMIDE AND ALBUTEROL SULFATE 1 AMPULE: .5; 3 SOLUTION RESPIRATORY (INHALATION) at 19:04

## 2021-10-04 RX ADMIN — ASPIRIN 81 MG: 81 TABLET, COATED ORAL at 08:12

## 2021-10-04 RX ADMIN — METOPROLOL TARTRATE 25 MG: 50 TABLET, FILM COATED ORAL at 08:12

## 2021-10-04 RX ADMIN — GUAIFENESIN 600 MG: 600 TABLET, EXTENDED RELEASE ORAL at 21:42

## 2021-10-04 RX ADMIN — IPRATROPIUM BROMIDE AND ALBUTEROL SULFATE 1 AMPULE: .5; 3 SOLUTION RESPIRATORY (INHALATION) at 14:32

## 2021-10-04 RX ADMIN — METHYLPREDNISOLONE SODIUM SUCCINATE 60 MG: 125 INJECTION, POWDER, FOR SOLUTION INTRAMUSCULAR; INTRAVENOUS at 00:26

## 2021-10-04 RX ADMIN — GUAIFENESIN 600 MG: 600 TABLET, EXTENDED RELEASE ORAL at 08:12

## 2021-10-04 RX ADMIN — CITALOPRAM HYDROBROMIDE 20 MG: 20 TABLET ORAL at 08:12

## 2021-10-04 RX ADMIN — IPRATROPIUM BROMIDE AND ALBUTEROL SULFATE 1 AMPULE: .5; 3 SOLUTION RESPIRATORY (INHALATION) at 07:07

## 2021-10-04 RX ADMIN — FUROSEMIDE 40 MG: 40 TABLET ORAL at 08:12

## 2021-10-04 RX ADMIN — SODIUM CHLORIDE, PRESERVATIVE FREE 10 ML: 5 INJECTION INTRAVENOUS at 22:07

## 2021-10-04 RX ADMIN — INSULIN LISPRO 2 UNITS: 100 INJECTION, SOLUTION INTRAVENOUS; SUBCUTANEOUS at 17:17

## 2021-10-04 RX ADMIN — Medication 400 MG: at 21:41

## 2021-10-04 RX ADMIN — PREDNISONE 20 MG: 20 TABLET ORAL at 14:40

## 2021-10-04 RX ADMIN — ACETAMINOPHEN 650 MG: 325 TABLET ORAL at 22:05

## 2021-10-04 RX ADMIN — PANTOPRAZOLE SODIUM 40 MG: 40 TABLET, DELAYED RELEASE ORAL at 06:33

## 2021-10-04 RX ADMIN — METOPROLOL TARTRATE 25 MG: 50 TABLET, FILM COATED ORAL at 21:42

## 2021-10-04 RX ADMIN — METFORMIN HYDROCHLORIDE 500 MG: 500 TABLET, EXTENDED RELEASE ORAL at 08:12

## 2021-10-04 RX ADMIN — FLUTICASONE PROPIONATE 2 SPRAY: 50 SPRAY, METERED NASAL at 11:05

## 2021-10-04 RX ADMIN — INSULIN LISPRO 2 UNITS: 100 INJECTION, SOLUTION INTRAVENOUS; SUBCUTANEOUS at 21:53

## 2021-10-04 RX ADMIN — ENOXAPARIN SODIUM 40 MG: 40 INJECTION SUBCUTANEOUS at 08:12

## 2021-10-04 RX ADMIN — CLOPIDOGREL BISULFATE 75 MG: 75 TABLET ORAL at 08:12

## 2021-10-04 RX ADMIN — Medication 400 MG: at 08:12

## 2021-10-04 RX ADMIN — ATORVASTATIN CALCIUM 80 MG: 80 TABLET, FILM COATED ORAL at 08:11

## 2021-10-04 RX ADMIN — IPRATROPIUM BROMIDE AND ALBUTEROL SULFATE 1 AMPULE: .5; 3 SOLUTION RESPIRATORY (INHALATION) at 10:55

## 2021-10-04 RX ADMIN — SODIUM CHLORIDE, PRESERVATIVE FREE 10 ML: 5 INJECTION INTRAVENOUS at 08:12

## 2021-10-04 RX ADMIN — INSULIN LISPRO 4 UNITS: 100 INJECTION, SOLUTION INTRAVENOUS; SUBCUTANEOUS at 08:13

## 2021-10-04 RX ADMIN — INSULIN LISPRO 10 UNITS: 100 INJECTION, SOLUTION INTRAVENOUS; SUBCUTANEOUS at 11:49

## 2021-10-04 RX ADMIN — ISOSORBIDE MONONITRATE 30 MG: 30 TABLET, EXTENDED RELEASE ORAL at 08:12

## 2021-10-04 ASSESSMENT — ENCOUNTER SYMPTOMS: COUGH: 1

## 2021-10-04 ASSESSMENT — PAIN SCALES - GENERAL: PAINLEVEL_OUTOF10: 2

## 2021-10-04 NOTE — FLOWSHEET NOTE
Patient shared medical update and also some financial needs that she said a  was trying to help her with. Writer provided listening presence and prayer. 10/04/21 1603   Encounter Summary   Services provided to: Patient   Referral/Consult From: [facility]   (10-4-21)   Complexity of Encounter Moderate   Length of Encounter 15 minutes   Spiritual Assessment Completed Yes   Spiritual/Latter day   Type Spiritual support   Assessment Approachable; Anxious   Intervention Active listening;Explored feelings, thoughts, concerns;Prayer;Sustaining presence/ Ministry of presence; Discussed illness/injury and it's impact   Outcome Expressed gratitude;Engaged in conversation;Expressed feelings/needs/concerns;Receptive

## 2021-10-04 NOTE — PLAN OF CARE
Problem: Safety:  Goal: Free from accidental physical injury  Description: Free from accidental physical injury  10/4/2021 1727 by Argenis Shaffer RN  Outcome: Met This Shift  Note: No falls noted this shift. Patient ambulates independently without difficulty. Bed kept in low position. Safe environment maintained. Bedside table & call light in reach. Uses call light appropriately when needing assistance. 10/4/2021 0355 by Jorgito aPyton RN  Outcome: Ongoing     Problem: Infection:  Goal: Will remain free from infection  Description: Will remain free from infection  10/4/2021 1727 by Argenis Shaffer RN  Outcome: Ongoing  10/4/2021 0355 by Jorgito Payton RN  Outcome: Ongoing     Problem: Safety:  Goal: Free from intentional harm  Description: Free from intentional harm  10/4/2021 0355 by Jorgito Payton RN  Outcome: Ongoing     Problem: Daily Care:  Goal: Daily care needs are met  Description: Daily care needs are met  10/4/2021 1727 by Argenis Shaffer RN  Outcome: Ongoing  Note: Patient and staff currently meeting all patient's daily care needs. Patient encouraged to participate and complete all ADLs per self. Will continue to monitor for opportunities for patient to meet all ADLs per self. 10/4/2021 0355 by Jorgito Payton RN  Outcome: Ongoing     Problem: Pain:  Goal: Patient's pain/discomfort is manageable  Description: Patient's pain/discomfort is manageable  10/4/2021 0355 by Jorgito Payton RN  Outcome: Ongoing     Problem: Skin Integrity:  Goal: Skin integrity will stabilize  Description: Skin integrity will stabilize  10/4/2021 1727 by Argenis Shaffer RN  Outcome: Ongoing  Note: Skin assessment performed. See head to toe assessment. Will continue to monitor.      10/4/2021 0355 by Jorgito Payton RN  Outcome: Ongoing     Problem: Discharge Planning:  Goal: Patients continuum of care needs are met  Description: Patients continuum of care needs are met  10/4/2021 0355 by Jorgito Payton RN  Outcome: Ongoing

## 2021-10-04 NOTE — PROGRESS NOTES
Progress Note  Date:10/4/2021       Room:[facility]  Patient [address] [facility]     YOB: 1950     Age:71 y.o. Subjective    Subjective:  Symptoms:  Stable. She reports cough. Diet:  Adequate intake. Activity level: Returning to normal.       Review of Systems   Respiratory: Positive for cough. Objective         Vitals Last 24 Hours:  TEMPERATURE:  Temp  Av.3 °F (36.8 °C)  Min: 97.9 °F (36.6 °C)  Max: 98.5 °F (36.9 °C)  RESPIRATIONS RANGE: Resp  Av  Min: 16  Max: 20  PULSE OXIMETRY RANGE: SpO2  Av.9 %  Min: 94 %  Max: 96 %  PULSE RANGE: Pulse  Av  Min: 63  Max: 69  BLOOD PRESSURE RANGE: Systolic (33MIW), VN , Min:113 , PIQ:687   ; Diastolic (35UGD), SQK:93, Min:59, Max:92    I/O (24Hr): No intake or output data in the 24 hours ending 10/04/21 0804  Objective:  General Appearance:  Comfortable. Vital signs: (most recent): Blood pressure (!) 146/92, pulse 65, temperature 98.2 °F (36.8 °C), resp. rate 18, height 5' 4\" (1.626 m), weight 139 lb (63 kg), SpO2 96 %. Vital signs are normal.    Lungs:  Normal effort and normal respiratory rate. There are decreased breath sounds and rhonchi. Heart: Normal rate. Regular rhythm. S1 normal and S2 normal.      Labs/Imaging/Diagnostics    Labs:  CBC:Recent Labs     10/02/21  0850 10/04/21  0614   WBC 7.7 6.0   RBC 4.11 3.84*   HGB 9.8* 9.2*   HCT 31.0* 28.9*   MCV 75.6* 75.4*   RDW 19.3* 18.9*    276     CHEMISTRIES:  Recent Labs     10/02/21  0850 10/04/21  0614    134*   K 4.4 4.7    100   CO2 26 29   BUN 16 28*   CREATININE 1.15* 1.66*   GLUCOSE 127* 243*   MG 1.8  --      PT/INR:  Recent Labs     10/02/21  0850   PROTIME 13.3   INR 1.0     APTT:No results for input(s): APTT in the last 72 hours.   LIVER PROFILE:  Recent Labs     10/02/21  0850   AST 22   ALT 14   BILITOT 0.37   ALKPHOS 112*       Imaging Last 24 Hours:  XR CHEST (SINGLE VIEW FRONTAL)    Result Date: 10/3/2021  EXAMINATION: ONE XRAY VIEW OF THE CHEST 10/3/2021 5:38 am COMPARISON: 10/02/2021 HISTORY: ORDERING SYSTEM PROVIDED HISTORY: Hypoxia TECHNOLOGIST PROVIDED HISTORY: Hypoxia Reason for Exam: Hypoxia Acuity: Unknown Type of Exam: Unknown Additional signs and symptoms: Hypoxia FINDINGS: Mild stable cardiomegaly. Patchy bilateral airspace opacity at the mid and lower lung zones has decreased since yesterday's exam.  No pneumothorax or pleural effusion. Surrounding osseous and soft tissue structures show no acute abnormality. Improving bilateral infiltrates. Mild stable cardiomegaly. XR CHEST PORTABLE    Result Date: 10/2/2021  EXAMINATION: ONE XRAY VIEW OF THE CHEST 10/2/2021 8:23 am COMPARISON: 08/11/2021 HISTORY: ORDERING SYSTEM PROVIDED HISTORY: dyspnea, pneumonia or CHF, possible Covid TECHNOLOGIST PROVIDED HISTORY: dyspnea, pneumonia or CHF, possible Covid Reason for Exam: dyspnea, pneumonia or CHF, possible Covid Acuity: Unknown Type of Exam: Unknown FINDINGS: Worsening mild-moderate consolidation in the perihilar and peripheral lungs and left lung base. No pneumothorax or pleural effusion. Evidence of prior CABG with mediasternotomy wires and mediastinal clips. Stable cardiomegaly. Worsening mild-moderate consolidation favoring COVID-19 pneumonia.      Assessment//Plan           Hospital Problems         Last Modified POA    * (Principal) Multifocal pneumonia 10/3/2021 Yes    Controlled type 2 diabetes mellitus without complication, without long-term current use of insulin (HCC) (Chronic) 10/3/2021 Yes    Hypertension goal BP (blood pressure) < 140/90 (Chronic) 10/3/2021 Yes    Mixed hyperlipidemia (Chronic) 10/3/2021 Yes    Chronic obstructive pulmonary disease (HCC) (Chronic) 10/3/2021 Yes    Primary insomnia (Chronic) 10/3/2021 Yes    Restless leg syndrome (Chronic) 10/3/2021 Yes    Chronic bilateral low back pain with left-sided sciatica (Chronic) 10/3/2021 Yes    Hypothyroidism (Chronic) 10/3/2021 Yes    S/P CABG x 4 (Chronic) 10/3/2021 Yes    Atrial fibrillation ([address] 10/3/2021 Yes    Internal carotid artery occlusion 10/3/2021 Yes    Hypoxia 10/3/2021 Yes        Assessment & Plan  Pneumonia - per pulmonology    D/w nursing - social work/discharge planning needs to see. Patient has been w/o electricity for 35 days and cannot have it turned on with a medical certificate.      Electronically signed by Candy Goodman MD on 10/4/21 at 8:04 AM EDT

## 2021-10-04 NOTE — PLAN OF CARE
Problem: Infection:  Goal: Will remain free from infection  Description: Will remain free from infection  10/4/2021 0355 by Junaid Smith RN  Outcome: Ongoing  10/3/2021 1412 by Ashish Lopes RN  Outcome: Ongoing     Problem: Safety:  Goal: Free from accidental physical injury  Description: Free from accidental physical injury  10/4/2021 0355 by Junaid Smith RN  Outcome: Ongoing  10/3/2021 1412 by Ashish Lopes RN  Outcome: Ongoing  Goal: Free from intentional harm  Description: Free from intentional harm  10/4/2021 0355 by Junaid Smith RN  Outcome: Ongoing  10/3/2021 1412 by Ashish Lopes RN  Outcome: Ongoing     Problem: Daily Care:  Goal: Daily care needs are met  Description: Daily care needs are met  10/4/2021 0355 by Junaid Smith RN  Outcome: Ongoing  10/3/2021 1412 by Ashish Lopes RN  Outcome: Ongoing     Problem: Pain:  Goal: Patient's pain/discomfort is manageable  Description: Patient's pain/discomfort is manageable  10/4/2021 0355 by Junaid Smith RN  Outcome: Ongoing  10/3/2021 1412 by Ashish Lopes RN  Outcome: Ongoing     Problem: Skin Integrity:  Goal: Skin integrity will stabilize  Description: Skin integrity will stabilize  10/4/2021 0355 by Junaid Smith RN  Outcome: Ongoing  10/3/2021 1412 by Ashish Lopes RN  Outcome: Ongoing     Problem: Discharge Planning:  Goal: Patients continuum of care needs are met  Description: Patients continuum of care needs are met  10/4/2021 0355 by Junaid Smith RN  Outcome: Ongoing  10/3/2021 1412 by Ashish Lopes RN  Outcome: Ongoing

## 2021-10-04 NOTE — PROGRESS NOTES
PULMONARY PROGRESS NOTE:    Interval History: COPD, CHF, acute hypoxic respiratory failure    Shortness of Breath: ++  Cough: + - not any phlegm  Sputum: no  Hemoptysis: no  Chest Pain: no  Fever: no  Swelling Feet: no  Headache: no  Nausea, Emesis, Abdominal Pain: no  Diarrhea: no  Constipation: no    Events since last visit: not feeling any better. She tells me she has not had any electricity at home for over a month and therefore is unable to use her nebulizer. Her dog chewed up her rescue inhaler and shes been using her symbicort as needed. SW to see about the electricity issues    PAST MEDICAL HISTORY:    Smoking: recent    PHYSICAL EXAMINATION:  afebrile  General : Awake, alert, oriented to time, place, and person  Neck - supple, no lymphadenopathy, JVD not raised  Heart - regular rhythm, S1 and S2 normal; no additional sounds heard  Lungs - poor to fair air entry-breath sounds : coarse crackles heard in left base   93% on 2 l nc  Abdomen - soft, no tenderness  Upper Extremities  - no cyanosis, mottling; edema : absent  Lower Extremities: no cyanosis, mottling; edema : absent    Current Laboratory, Radiologic, Microbiologic, and Diagnostic studies reviewed    ASSESSMENT / PLAN:     Assessment   · Pulmonary infiltrate, pulmonary edema versus ? infectious (procalcitonin: 0.08, SARS-CoV-2 and Influenza A/B negative and proBNP 12,156)  · COPD with extubation  · Cardiomyopathy with EF 35% by echo on 8/21  · Pulmonary hypertension, RVSP 60 mmHg, mostly secondary  · CAD/CHF/ASCVD/HTN/HDL  · Diabetes mellitus  · Afebrile, no elevated WBC, PCT 0.07 today  · 40-50-pack-year smoking, quit in 9/21     Recommendations     · To PO steroids   · Diuresis  · Albuterol and Ipratropium Q 4 hours and as needed  · Symbicort  · X-ray chest reviewed - unchanged, persistent left base infiltrate.   · Would be a good idea to get a CT chest w/o contrast to get a better look at left base  · Mucinex, add acapella  · 2 liters/min via nasal cannula  · Home 02 eval prior to d/c    Plan of care discussed with Dr Jarad Sarah, APRN - CNP

## 2021-10-05 ENCOUNTER — APPOINTMENT (OUTPATIENT)
Dept: INTERVENTIONAL RADIOLOGY/VASCULAR | Age: 71
DRG: 193 | End: 2021-10-05
Payer: MEDICARE

## 2021-10-05 LAB
APPEARANCE FLUID: NORMAL
COLOR FLUID: YELLOW
GLUCOSE BLD-MCNC: 136 MG/DL (ref 65–105)
GLUCOSE BLD-MCNC: 151 MG/DL (ref 65–105)
GLUCOSE BLD-MCNC: 290 MG/DL (ref 65–105)
GLUCOSE BLD-MCNC: 306 MG/DL (ref 65–105)
GLUCOSE, FLUID: 157 MG/DL
LACTATE DEHYDROGENASE, FLUID: 99 U/L
PH FLUID: 8.5
RBC FLUID: 1488 /MM3
SPECIMEN TYPE: NORMAL
TOTAL PROTEIN, BODY FLUID: 1.6 G/DL
WBC FLUID: 215 /MM3

## 2021-10-05 PROCEDURE — 87075 CULTR BACTERIA EXCEPT BLOOD: CPT

## 2021-10-05 PROCEDURE — 2580000003 HC RX 258: Performed by: FAMILY MEDICINE

## 2021-10-05 PROCEDURE — 2709999900 IR GUIDED THORACENTESIS PLEURAL

## 2021-10-05 PROCEDURE — 94640 AIRWAY INHALATION TREATMENT: CPT

## 2021-10-05 PROCEDURE — 1200000000 HC SEMI PRIVATE

## 2021-10-05 PROCEDURE — 83615 LACTATE (LD) (LDH) ENZYME: CPT

## 2021-10-05 PROCEDURE — 2700000000 HC OXYGEN THERAPY PER DAY

## 2021-10-05 PROCEDURE — 0W9B3ZZ DRAINAGE OF LEFT PLEURAL CAVITY, PERCUTANEOUS APPROACH: ICD-10-PCS | Performed by: FAMILY MEDICINE

## 2021-10-05 PROCEDURE — 88305 TISSUE EXAM BY PATHOLOGIST: CPT

## 2021-10-05 PROCEDURE — 94761 N-INVAS EAR/PLS OXIMETRY MLT: CPT

## 2021-10-05 PROCEDURE — 82945 GLUCOSE OTHER FLUID: CPT

## 2021-10-05 PROCEDURE — 84157 ASSAY OF PROTEIN OTHER: CPT

## 2021-10-05 PROCEDURE — 32555 ASPIRATE PLEURA W/ IMAGING: CPT | Performed by: RADIOLOGY

## 2021-10-05 PROCEDURE — 6360000002 HC RX W HCPCS: Performed by: FAMILY MEDICINE

## 2021-10-05 PROCEDURE — 6370000000 HC RX 637 (ALT 250 FOR IP): Performed by: FAMILY MEDICINE

## 2021-10-05 PROCEDURE — 6370000000 HC RX 637 (ALT 250 FOR IP): Performed by: NURSE PRACTITIONER

## 2021-10-05 PROCEDURE — 82947 ASSAY GLUCOSE BLOOD QUANT: CPT

## 2021-10-05 PROCEDURE — 83986 ASSAY PH BODY FLUID NOS: CPT

## 2021-10-05 PROCEDURE — 88112 CYTOPATH CELL ENHANCE TECH: CPT

## 2021-10-05 PROCEDURE — 87205 SMEAR GRAM STAIN: CPT

## 2021-10-05 PROCEDURE — 6370000000 HC RX 637 (ALT 250 FOR IP): Performed by: INTERNAL MEDICINE

## 2021-10-05 PROCEDURE — 99238 HOSP IP/OBS DSCHRG MGMT 30/<: CPT | Performed by: FAMILY MEDICINE

## 2021-10-05 PROCEDURE — 87070 CULTURE OTHR SPECIMN AEROBIC: CPT

## 2021-10-05 RX ORDER — PREDNISONE 20 MG/1
20 TABLET ORAL DAILY
Qty: 5 TABLET | Refills: 0 | Status: SHIPPED | OUTPATIENT
Start: 2021-10-06 | End: 2021-10-11

## 2021-10-05 RX ORDER — BUDESONIDE AND FORMOTEROL FUMARATE DIHYDRATE 160; 4.5 UG/1; UG/1
2 AEROSOL RESPIRATORY (INHALATION) 2 TIMES DAILY
Qty: 10.2 G | Refills: 3 | Status: ON HOLD | OUTPATIENT
Start: 2021-10-05 | End: 2022-05-16 | Stop reason: SDUPTHER

## 2021-10-05 RX ADMIN — Medication 400 MG: at 20:51

## 2021-10-05 RX ADMIN — GUAIFENESIN 600 MG: 600 TABLET, EXTENDED RELEASE ORAL at 20:51

## 2021-10-05 RX ADMIN — ASPIRIN 81 MG: 81 TABLET, COATED ORAL at 08:32

## 2021-10-05 RX ADMIN — PREDNISONE 20 MG: 20 TABLET ORAL at 08:32

## 2021-10-05 RX ADMIN — FUROSEMIDE 40 MG: 40 TABLET ORAL at 08:32

## 2021-10-05 RX ADMIN — IPRATROPIUM BROMIDE AND ALBUTEROL SULFATE 1 AMPULE: .5; 3 SOLUTION RESPIRATORY (INHALATION) at 19:11

## 2021-10-05 RX ADMIN — CITALOPRAM HYDROBROMIDE 20 MG: 20 TABLET ORAL at 08:32

## 2021-10-05 RX ADMIN — FLUTICASONE PROPIONATE 2 SPRAY: 50 SPRAY, METERED NASAL at 11:38

## 2021-10-05 RX ADMIN — IPRATROPIUM BROMIDE AND ALBUTEROL SULFATE 1 AMPULE: .5; 3 SOLUTION RESPIRATORY (INHALATION) at 10:49

## 2021-10-05 RX ADMIN — GUAIFENESIN 600 MG: 600 TABLET, EXTENDED RELEASE ORAL at 08:33

## 2021-10-05 RX ADMIN — INSULIN LISPRO 8 UNITS: 100 INJECTION, SOLUTION INTRAVENOUS; SUBCUTANEOUS at 16:29

## 2021-10-05 RX ADMIN — CLOPIDOGREL BISULFATE 75 MG: 75 TABLET ORAL at 08:32

## 2021-10-05 RX ADMIN — METOPROLOL TARTRATE 25 MG: 50 TABLET, FILM COATED ORAL at 08:32

## 2021-10-05 RX ADMIN — SODIUM CHLORIDE, PRESERVATIVE FREE 10 ML: 5 INJECTION INTRAVENOUS at 08:33

## 2021-10-05 RX ADMIN — SODIUM CHLORIDE, PRESERVATIVE FREE 10 ML: 5 INJECTION INTRAVENOUS at 20:54

## 2021-10-05 RX ADMIN — METFORMIN HYDROCHLORIDE 500 MG: 500 TABLET, EXTENDED RELEASE ORAL at 08:32

## 2021-10-05 RX ADMIN — IPRATROPIUM BROMIDE AND ALBUTEROL SULFATE 1 AMPULE: .5; 3 SOLUTION RESPIRATORY (INHALATION) at 06:59

## 2021-10-05 RX ADMIN — ATORVASTATIN CALCIUM 80 MG: 80 TABLET, FILM COATED ORAL at 08:33

## 2021-10-05 RX ADMIN — ISOSORBIDE MONONITRATE 30 MG: 30 TABLET, EXTENDED RELEASE ORAL at 08:32

## 2021-10-05 RX ADMIN — METOPROLOL TARTRATE 25 MG: 50 TABLET, FILM COATED ORAL at 20:51

## 2021-10-05 RX ADMIN — IPRATROPIUM BROMIDE AND ALBUTEROL SULFATE 1 AMPULE: .5; 3 SOLUTION RESPIRATORY (INHALATION) at 14:48

## 2021-10-05 RX ADMIN — ENOXAPARIN SODIUM 30 MG: 30 INJECTION SUBCUTANEOUS at 08:33

## 2021-10-05 RX ADMIN — INSULIN LISPRO 3 UNITS: 100 INJECTION, SOLUTION INTRAVENOUS; SUBCUTANEOUS at 20:51

## 2021-10-05 RX ADMIN — Medication 400 MG: at 08:33

## 2021-10-05 RX ADMIN — PANTOPRAZOLE SODIUM 40 MG: 40 TABLET, DELAYED RELEASE ORAL at 06:18

## 2021-10-05 ASSESSMENT — PAIN SCALES - GENERAL
PAINLEVEL_OUTOF10: 0

## 2021-10-05 NOTE — PROGRESS NOTES
Progress Note  Date:10/5/2021       Room:[facility]  Patient [id number] [facility]     YOB: 1950     Age:71 y.o. Subjective    Subjective:  Symptoms:  Improved. Diet:  Adequate intake. Activity level: Returning to normal.    Pain:  She reports no pain. Review of Systems  Objective         Vitals Last 24 Hours:  TEMPERATURE:  Temp  Av.6 °F (37 °C)  Min: 97.9 °F (36.6 °C)  Max: 99 °F (37.2 °C)  RESPIRATIONS RANGE: Resp  Av  Min: 20  Max: 20  PULSE OXIMETRY RANGE: SpO2  Av.1 %  Min: 93 %  Max: 99 %  PULSE RANGE: Pulse  Av.5  Min: 68  Max: 76  BLOOD PRESSURE RANGE: Systolic (25BQS), IVD:927 , Min:108 , SMA:235   ; Diastolic (31MQZ), ANGELLA:28, Min:56, Max:86    I/O (24Hr): Intake/Output Summary (Last 24 hours) at 10/5/2021 0843  Last data filed at 10/5/2021 0832  Gross per 24 hour   Intake 160 ml   Output --   Net 160 ml     Objective:  General Appearance:  Comfortable. Vital signs: (most recent): Blood pressure (!) 143/86, pulse 68, temperature 98.9 °F (37.2 °C), temperature source Oral, resp. rate 20, height 5' 4\" (1.626 m), weight 139 lb (63 kg), SpO2 98 %. Vital signs are normal.    Lungs:  Normal effort and normal respiratory rate. Breath sounds clear to auscultation. Heart: Normal rate. Regular rhythm. S1 normal and S2 normal.      Labs/Imaging/Diagnostics    Labs:  CBC:  Recent Labs     10/02/21  0850 10/04/21  0614   WBC 7.7 6.0   RBC 4.11 3.84*   HGB 9.8* 9.2*   HCT 31.0* 28.9*   MCV 75.6* 75.4*   RDW 19.3* 18.9*    276     CHEMISTRIES:  Recent Labs     10/02/21  0850 10/04/21  0614    134*   K 4.4 4.7    100   CO2 26 29   BUN 16 28*   CREATININE 1.15* 1.66*   GLUCOSE 127* 243*   MG 1.8  --      PT/INR:  Recent Labs     10/02/21  0850   PROTIME 13.3   INR 1.0     APTT:No results for input(s): APTT in the last 72 hours.   LIVER PROFILE:  Recent Labs     10/02/21  0850   AST 22   ALT 14   BILITOT 0.37   ALKPHOS 112*       Imaging Last 24 Hours:  CT CHEST WO CONTRAST    Result Date: 10/4/2021  EXAMINATION: CT OF THE CHEST WITHOUT CONTRAST 10/4/2021 1:29 pm TECHNIQUE: CT of the chest was performed without the administration of intravenous contrast. Multiplanar reformatted images are provided for review. Dose modulation, iterative reconstruction, and/or weight based adjustment of the mA/kV was utilized to reduce the radiation dose to as low as reasonably achievable. COMPARISON: Chest radiograph performed 10/04/2021. HISTORY: ORDERING SYSTEM PROVIDED HISTORY: persistent LLL infiltrate TECHNOLOGIST PROVIDED HISTORY: persistent LLL infiltrate Reason for Exam: persistent LLL infiltrate Acuity: Unknown Type of Exam: Unknown Relevant Medical/Surgical History: dm, afib, cardiac cath, stents FINDINGS: Mediastinum: Soft tissues of the thoracic inlet are unremarkable. The thoracic aorta is normal in caliber. The main pulmonary artery is dilated measuring 4.1 cm in diameter. The heart is enlarged. There is no mediastinal or hilar adenopathy. Lungs/pleura: There is a mild to moderate left-sided effusion with adjacent linear infiltrates. There are scattered linear areas of scarring/atelectasis through the remainder of the lungs. There is no pneumothorax. The tracheobronchial tree is patent. Upper Abdomen: The upper abdomen is grossly unremarkable. The gallbladder has been removed. Soft Tissues/Bones: The extrathoracic soft tissues are unremarkable. There is no axillary adenopathy. There is no acute osseous abnormality. There is a remote compression deformity at the T9 level. Mild-to-moderate left-sided effusion with adjacent linear infiltrates representing atelectasis versus pneumonia. Scattered linear infiltrates throughout the remainder of the lungs representing scarring versus atelectasis. Cardiomegaly with a dilated main pulmonary artery that may relate to a degree of pulmonary hypertension.      XR CHEST PORTABLE    Result Date: 10/4/2021  EXAMINATION: ONE XRAY VIEW OF THE CHEST 10/4/2021 8:00 am COMPARISON: October 3, 2021 HISTORY: ORDERING SYSTEM PROVIDED HISTORY: infiltrate TECHNOLOGIST PROVIDED HISTORY: infiltrate Reason for Exam: infiltrate Acuity: Unknown Type of Exam: Unknown FINDINGS: Stable cardiomediastinal silhouette. Stable patchy parenchymal opacities in the mid and lower lung zones bilaterally, most pronounced in the left base. No new focal lung abnormality. No sizable pleural effusion. No pneumothorax. Stable patchy bilateral mid-lower lung zone parenchymal opacities.      Assessment//Plan           Hospital Problems         Last Modified POA    * (Principal) Multifocal pneumonia 10/3/2021 Yes    Controlled type 2 diabetes mellitus without complication, without long-term current use of insulin (HCC) (Chronic) 10/3/2021 Yes    Hypertension goal BP (blood pressure) < 140/90 (Chronic) 10/3/2021 Yes    Mixed hyperlipidemia (Chronic) 10/3/2021 Yes    Chronic obstructive pulmonary disease ([address] (Chronic) 10/3/2021 Yes    Primary insomnia (Chronic) 10/3/2021 Yes    Restless leg syndrome (Chronic) 10/3/2021 Yes    Chronic bilateral low back pain with left-sided sciatica (Chronic) 10/3/2021 Yes    Hypothyroidism (Chronic) 10/3/2021 Yes    S/P CABG x 4 (Chronic) 10/3/2021 Yes    Atrial fibrillation ([address] 10/3/2021 Yes    Internal carotid artery occlusion 10/3/2021 Yes    Hypoxia 10/3/2021 Yes        Assessment & Plan  Multifocal pneumonia - per pulmonology    D/c plan per pulmonology      Electronically signed by Milly Ferguson MD on 10/5/21 at 8:43 AM EDT

## 2021-10-05 NOTE — BRIEF OP NOTE
Brief Postoperative Note    Lupe Shields  YOB: 1950  [id number]    Pre-operative Diagnosis: SOB    Post-operative Diagnosis: Same    Procedure: US guided left thoracentesis    Anesthesia: Local    Surgeons/Assistants: Meir    Estimated Blood Loss: less than 50     Complications: None    Specimens: Was Obtained: 110 ml of nonturbid light yellow fluid    Electronically signed by Karen Jaime MD on 10/5/2021 at 2:54 PM

## 2021-10-05 NOTE — CARE COORDINATION
DISCHARGE PLANNING NOTE:    Plan is for this patient to return to home. LSW is working on getting patient electricity turned back on - Need MD note stating that termination of electric services would be especially dangerous for the patient. IR thoracentesis today    PO prednisone. Will need home O2 eval PTD.      Electronically signed by Willis Marion RN on 10/5/2021 at 1:24 PM

## 2021-10-05 NOTE — PROGRESS NOTES
Left thoracentesis completed in IR oer Dr Aishwarya Waters with [address] [facility]. Total of 110 ml of clear light yellow fluid removed and sent to lab for ordered testing. Pt tolerated procedure well, remained on 2 liters NC, O2 sats % report called to [facility] 450 and pt transported back to 2072 per bed.

## 2021-10-05 NOTE — PLAN OF CARE
Problem: Safety:  Goal: Free from accidental physical injury  Description: Free from accidental physical injury  10/5/2021 1613 by Travis Bishop RN  Outcome: Met This Shift  Note: No falls noted this shift. Patient ambulates with x1 staff assistance without difficulty. Bed kept in low position. Safe environment maintained. Bedside table & call light in reach. Uses call light appropriately when needing assistance.     10/5/2021 0516 by Cornell Blizzard, RN  Outcome: Ongoing     Problem: Infection:  Goal: Will remain free from infection  Description: Will remain free from infection  10/5/2021 1613 by Travis Bishop RN  Outcome: Ongoing  10/5/2021 0516 by Cornell Blizzard, RN  Outcome: Ongoing     Problem: Safety:  Goal: Free from intentional harm  Description: Free from intentional harm  10/5/2021 0516 by Cornell Blizzard, RN  Outcome: Ongoing     Problem: Daily Care:  Goal: Daily care needs are met  Description: Daily care needs are met  10/5/2021 0516 by Cornell Blizzard, RN  Outcome: Ongoing     Problem: Pain:  Goal: Patient's pain/discomfort is manageable  Description: Patient's pain/discomfort is manageable  10/5/2021 0516 by Cornell Blizzard, RN  Outcome: Ongoing     Problem: Skin Integrity:  Goal: Skin integrity will stabilize  Description: Skin integrity will stabilize  10/5/2021 0516 by Cornell Blizzard, RN  Outcome: Ongoing     Problem: Discharge Planning:  Goal: Patients continuum of care needs are met  Description: Patients continuum of care needs are met  10/5/2021 0516 by Cornell Blizzard, RN  Outcome: Ongoing     Problem: Falls - Risk of:  Goal: Will remain free from falls  Description: Will remain free from falls  10/5/2021 0516 by Cornell Blizzard, RN  Outcome: Ongoing  Goal: Absence of physical injury  Description: Absence of physical injury  10/5/2021 0516 by Cornell Blizzard, RN  Outcome: Ongoing

## 2021-10-06 LAB
BASO FLUID: ABNORMAL %
EOSINOPHIL FLUID: ABNORMAL %
FLUID DIFF COMMENT: ABNORMAL
GLUCOSE BLD-MCNC: 104 MG/DL (ref 65–105)
GLUCOSE BLD-MCNC: 139 MG/DL (ref 65–105)
GLUCOSE BLD-MCNC: 220 MG/DL (ref 65–105)
GLUCOSE BLD-MCNC: 248 MG/DL (ref 65–105)
LYMPHOCYTES, BODY FLUID: 78 %
MONOCYTE, FLUID: ABNORMAL %
NEUTROPHIL, FLUID: 6 %
OTHER CELLS FLUID: 16 %

## 2021-10-06 PROCEDURE — 99232 SBSQ HOSP IP/OBS MODERATE 35: CPT | Performed by: FAMILY MEDICINE

## 2021-10-06 PROCEDURE — 6360000002 HC RX W HCPCS: Performed by: INTERNAL MEDICINE

## 2021-10-06 PROCEDURE — 6370000000 HC RX 637 (ALT 250 FOR IP): Performed by: FAMILY MEDICINE

## 2021-10-06 PROCEDURE — 82947 ASSAY GLUCOSE BLOOD QUANT: CPT

## 2021-10-06 PROCEDURE — 94640 AIRWAY INHALATION TREATMENT: CPT

## 2021-10-06 PROCEDURE — 6370000000 HC RX 637 (ALT 250 FOR IP): Performed by: INTERNAL MEDICINE

## 2021-10-06 PROCEDURE — 6360000002 HC RX W HCPCS: Performed by: FAMILY MEDICINE

## 2021-10-06 PROCEDURE — 6370000000 HC RX 637 (ALT 250 FOR IP): Performed by: NURSE PRACTITIONER

## 2021-10-06 PROCEDURE — 94761 N-INVAS EAR/PLS OXIMETRY MLT: CPT

## 2021-10-06 PROCEDURE — 2580000003 HC RX 258: Performed by: FAMILY MEDICINE

## 2021-10-06 PROCEDURE — 1200000000 HC SEMI PRIVATE

## 2021-10-06 RX ORDER — ACETYLCYSTEINE 200 MG/ML
200 SOLUTION ORAL; RESPIRATORY (INHALATION) 2 TIMES DAILY
Status: DISCONTINUED | OUTPATIENT
Start: 2021-10-06 | End: 2021-10-08 | Stop reason: HOSPADM

## 2021-10-06 RX ORDER — ACETYLCYSTEINE 200 MG/ML
600 SOLUTION ORAL; RESPIRATORY (INHALATION) 2 TIMES DAILY
Status: DISCONTINUED | OUTPATIENT
Start: 2021-10-06 | End: 2021-10-06

## 2021-10-06 RX ADMIN — FUROSEMIDE 40 MG: 40 TABLET ORAL at 09:19

## 2021-10-06 RX ADMIN — METOPROLOL TARTRATE 25 MG: 50 TABLET, FILM COATED ORAL at 09:21

## 2021-10-06 RX ADMIN — IPRATROPIUM BROMIDE AND ALBUTEROL SULFATE 1 AMPULE: .5; 3 SOLUTION RESPIRATORY (INHALATION) at 06:59

## 2021-10-06 RX ADMIN — PANTOPRAZOLE SODIUM 40 MG: 40 TABLET, DELAYED RELEASE ORAL at 06:09

## 2021-10-06 RX ADMIN — ISOSORBIDE MONONITRATE 30 MG: 30 TABLET, EXTENDED RELEASE ORAL at 09:20

## 2021-10-06 RX ADMIN — CITALOPRAM HYDROBROMIDE 20 MG: 20 TABLET ORAL at 09:18

## 2021-10-06 RX ADMIN — CLOPIDOGREL BISULFATE 75 MG: 75 TABLET ORAL at 09:18

## 2021-10-06 RX ADMIN — Medication 400 MG: at 20:57

## 2021-10-06 RX ADMIN — METFORMIN HYDROCHLORIDE 500 MG: 500 TABLET, EXTENDED RELEASE ORAL at 09:20

## 2021-10-06 RX ADMIN — ENOXAPARIN SODIUM 30 MG: 30 INJECTION SUBCUTANEOUS at 09:18

## 2021-10-06 RX ADMIN — SODIUM CHLORIDE, PRESERVATIVE FREE 10 ML: 5 INJECTION INTRAVENOUS at 09:21

## 2021-10-06 RX ADMIN — Medication 400 MG: at 09:20

## 2021-10-06 RX ADMIN — PREDNISONE 20 MG: 20 TABLET ORAL at 09:21

## 2021-10-06 RX ADMIN — ASPIRIN 81 MG: 81 TABLET, COATED ORAL at 09:17

## 2021-10-06 RX ADMIN — METOPROLOL TARTRATE 25 MG: 50 TABLET, FILM COATED ORAL at 20:57

## 2021-10-06 RX ADMIN — IPRATROPIUM BROMIDE AND ALBUTEROL SULFATE 1 AMPULE: .5; 3 SOLUTION RESPIRATORY (INHALATION) at 11:19

## 2021-10-06 RX ADMIN — FLUTICASONE PROPIONATE 2 SPRAY: 50 SPRAY, METERED NASAL at 09:19

## 2021-10-06 RX ADMIN — IPRATROPIUM BROMIDE AND ALBUTEROL SULFATE 1 AMPULE: .5; 3 SOLUTION RESPIRATORY (INHALATION) at 14:56

## 2021-10-06 RX ADMIN — ATORVASTATIN CALCIUM 80 MG: 80 TABLET, FILM COATED ORAL at 09:17

## 2021-10-06 RX ADMIN — ACETYLCYSTEINE 200 MG: 200 SOLUTION ORAL; RESPIRATORY (INHALATION) at 19:00

## 2021-10-06 RX ADMIN — GUAIFENESIN 600 MG: 600 TABLET, EXTENDED RELEASE ORAL at 20:57

## 2021-10-06 RX ADMIN — IPRATROPIUM BROMIDE AND ALBUTEROL SULFATE 1 AMPULE: .5; 3 SOLUTION RESPIRATORY (INHALATION) at 18:59

## 2021-10-06 RX ADMIN — SODIUM CHLORIDE, PRESERVATIVE FREE 10 ML: 5 INJECTION INTRAVENOUS at 20:58

## 2021-10-06 RX ADMIN — INSULIN LISPRO 2 UNITS: 100 INJECTION, SOLUTION INTRAVENOUS; SUBCUTANEOUS at 20:58

## 2021-10-06 RX ADMIN — GUAIFENESIN 600 MG: 600 TABLET, EXTENDED RELEASE ORAL at 09:19

## 2021-10-06 NOTE — PROGRESS NOTES
PULMONARY PROGRESS NOTE:    Interval History: COPD, CHF, acute hypoxic respiratory failure    Shortness of Breath: ++  Cough: + - not any phlegm  Sputum: no  Hemoptysis: no  Chest Pain: no  Fever: no  Swelling Feet: no  Headache: no  Nausea, Emesis, Abdominal Pain: no  Diarrhea: no  Constipation: no    Events since last visit: feeling sl better; had thoracentesis left    PAST MEDICAL HISTORY:    Smoking: recent    PHYSICAL EXAMINATION:  afebrile  General : Awake, alert, oriented to time, place, and person  Neck - supple, no lymphadenopathy, JVD not raised  Heart - regular rhythm, S1 and S2 normal; no additional sounds heard  Lungs - poor to fair air entry-breath sounds : coarse crackles heard in left base, coarse BS,   93% on 2 l nc  Abdomen - soft, no tenderness  Upper Extremities  - no cyanosis, mottling; edema : absent  Lower Extremities: no cyanosis, mottling; edema : absent    Current Laboratory, Radiologic, Microbiologic, and Diagnostic studies reviewed    ASSESSMENT / PLAN:     Assessment   · Pulmonary infiltrate, pulmonary edema versus ? infectious (procalcitonin: 0.08, SARS-CoV-2 and Influenza A/B negative and proBNP 12,156)  · COPD with exacerbation  · Cardiomyopathy with EF 35% by echo on 8/21  · Pulmonary hypertension, RVSP 60 mmHg, mostly secondary  · CAD/CHF/ASCVD/HTN/HDL  · Diabetes mellitus  · Afebrile, no elevated WBC,   · 40-50-pack-year smoking, quit in 9/21     Recommendations     · To PO steroids   · Diuresis  · Albuterol and Ipratropium Q 4 hours and as needed  · Symbicort  · X-ray chest reviewed - unchanged, persistent left base infiltrate.   · Check CT chest - left effusion with infiltrates - thoracentesis left - transudate - culture/cytology pending  · Mucinex, add acapella/ mucomyst  · 2 liters/min via nasal cannula  · Home 02 eval prior to d/c    Electronically signed by Faby Woodruff MD on 10/06/21 at 12:09 PM

## 2021-10-06 NOTE — PROGRESS NOTES
10/5/2021 HISTORY: ORDERING SYSTEM PROVIDED HISTORY: pl effusion TECHNOLOGIST PROVIDED HISTORY: pl effusion Which side should the procedure be performed? ->Left Shortness of breath TECHNIQUE: Informed consent was obtained after a detailed explanation of the procedure including risks, benefits, and alternatives. Universal protocol was performed. While the patient was seated upright, after localizing, marking and anesthetizing the posterior left lower chest with one percent lidocaine, a 5 [employer] Corinne Yueh needle was advanced under live sonography. Sonogram image confirmed safe tract access and satisfactory needle tip position. The catheter was advanced and the needle was removed. The catheter was connected to a Vacutainer bottle and 110 mL of non turbid light yellow fluid was drained. The catheter was removed and the site was dressed appropriately. A sample was sent for laboratory analysis. The patient tolerated the procedure well and left the Department in stable condition. Dr. Curly Calvo was present. FINDINGS: A total of 110 mL was removed. Postprocedural sonogram demonstrated no residual fluid remaining. Successful ultrasound guided left thoracentesis.      Assessment//Plan           Hospital Problems         Last Modified POA    * (Principal) Multifocal pneumonia 10/3/2021 Yes    Controlled type 2 diabetes mellitus without complication, without long-term current use of insulin (HCC) (Chronic) 10/3/2021 Yes    Hypertension goal BP (blood pressure) < 140/90 (Chronic) 10/3/2021 Yes    Mixed hyperlipidemia (Chronic) 10/3/2021 Yes    Chronic obstructive pulmonary disease (HCC) (Chronic) 10/3/2021 Yes    Primary insomnia (Chronic) 10/3/2021 Yes    Restless leg syndrome (Chronic) 10/3/2021 Yes    Chronic bilateral low back pain with left-sided sciatica (Chronic) 10/3/2021 Yes    Hypothyroidism (Chronic) 10/3/2021 Yes    S/P CABG x 4 (Chronic) 10/3/2021 Yes    Atrial fibrillation ([address] 10/3/2021 Yes    Internal carotid artery occlusion 10/3/2021 Yes    Hypoxia 10/3/2021 Yes        Assessment & Plan    Multifocal pneumonia - management/plan per pulmonology      Electronically signed by Jemma Chen MD on 10/6/21 at 2:19 PM EDT

## 2021-10-06 NOTE — PROGRESS NOTES
Pt rests peacefully, O 2  Cont. On per NC  @  2L/min NC  Pt. Has no complaints at this time  Telemetry cont.  On pt  Telemetry strip was posted on the pt's chart today at 0735 am

## 2021-10-06 NOTE — PROGRESS NOTES
Dr Tung ann served, regarding the pt's lung sds. Being exp. Wheezes  TO, with  O 2  Cont. On the pt. @ 2L/min NC, and with dyspnea noted with a sl. Amt. Of exertion.

## 2021-10-06 NOTE — CARE COORDINATION
ONGOING DISCHARGE PLAN:    Patient is alert and oriented x4. Spoke with patient regarding discharge plan and patient confirms that plan is still home without needs. Pt continues to decline VNS. Also adamantly refuses SNF. Following for home 02. Discussed with patient that she will need electricity for 02 concentrator at home. She states she will just portable tanks, writer explained those only last a couple hours. Pt states \"well I dont have $1,000 to pay for the bill. I'll have to worry about that later. \" Encouraged SNF while this is being worked out, however she refused. Per LSW notes, \"Customers with severe health problems may have a licensed physician certify in writing that a termination of electric service would be especially dangerous. The medical certification will postpone the termination of electric service for 30 days. \"    Active order for PO steroids. Thoracentesis done yesterday with 110ml removed. Will continue to follow for additional discharge needs.     Electronically signed by Yariel Turner RN on 10/6/2021 at 1:22 PM

## 2021-10-06 NOTE — PLAN OF CARE
Problem: Infection:  Goal: Will remain free from infection  Description: Will remain free from infection  10/6/2021 0543 by Hetal Riggs RN  Outcome: Ongoing  10/5/2021 1613 by Zak Diggs RN  Outcome: Ongoing     Problem: Safety:  Goal: Free from accidental physical injury  Description: Free from accidental physical injury  10/6/2021 0543 by Hetal Riggs RN  Outcome: Ongoing  10/5/2021 1613 by Zak Diggs RN  Outcome: Met This Shift  Note: No falls noted this shift. Patient ambulates with x1 staff assistance without difficulty. Bed kept in low position. Safe environment maintained. Bedside table & call light in reach. Uses call light appropriately when needing assistance. Goal: Free from intentional harm  Description: Free from intentional harm  Outcome: Ongoing     Problem: Daily Care:  Goal: Daily care needs are met  Description: Daily care needs are met  Outcome: Ongoing     Problem: Pain:  Goal: Patient's pain/discomfort is manageable  Description: Patient's pain/discomfort is manageable  Outcome: Ongoing     Problem: Skin Integrity:  Goal: Skin integrity will stabilize  Description: Skin integrity will stabilize  Outcome: Ongoing     Problem: Discharge Planning:  Goal: Patients continuum of care needs are met  Description: Patients continuum of care needs are met  Outcome: Ongoing     Problem: Falls - Risk of:  Goal: Will remain free from falls  Description: Will remain free from falls  Outcome: Ongoing  Note: Patient remained free from falls all throughout shift. Bed locked, side rails up X2, belongings and call light within reach.     Goal: Absence of physical injury  Description: Absence of physical injury  Outcome: Ongoing

## 2021-10-06 NOTE — PROGRESS NOTES
Physician Progress Note      Rudy Ramos  CSN #:                  [id number]  :                       1950  ADMIT DATE:       10/2/2021 7:54 AM  DISCH DATE:  RESPONDING  PROVIDER #:        Julito Moser MD          QUERY TEXT:    Patient admitted with cough and shortness of breath. Documentation reflects   pneumonia in 10/3 H & P. and 10/4 progress note. Pt noted to have documented   COPD exacerbation by ordered pulmonology consultant on 10/4 and 10/5. If   possible, please document in the progress notes and discharge summary if   patient was being treated for: The medical record reflects the following:  Risk Factors: 66year old female with PMH including COPD, CHF, Pulmonary   Hypertension, and previous tobacco use (45-50 pack year history, quit in   2021)  Clinical Indicators: Pt presented to ED with c/o cough and SOB. 10/2 CXR:   worsening mild-moderate consolidation favoring COVID-10 PNA; Covid ruled out   with 2 negative PCR tests. 10/3:Pulmonary consult note: Pulmonary infiltrate,   pulmonary edema versus ? infectious (procalcitonin: 0.08, SARS-CoV-2 and   Influenza A/B negative and pro-BNP 12,156). 10/4 and 10/5 Pulmonology   progress note: COPD exacerbation;  no documentation of PNA. Treatment: Rocephin & Zithromax IV X 1 only on 10/2. Lasix 40 mg IV   administered x 1 and pt given 40 mg PO daily x 3 days . Pt received 3 doses IV   Solumedrol before switching to PO steroids, albuterol and Ipratropium q 4   hours, and Symbicort as needed. Mucinex and acapella. 2L O2 per NC.   Options provided:  -- Pneumonia has been ruled out and patient is being treated for exacerbation   of COPD  -- Pneumonia confirmed after study  -- Pneumonia was present on admission but has been treated and resolved  -- Other - I will add my own diagnosis  -- Disagree - Not applicable / Not valid  -- Disagree - Clinically unable to determine / Unknown  -- Refer to Clinical

## 2021-10-07 LAB
ANION GAP SERPL CALCULATED.3IONS-SCNC: 5 MMOL/L (ref 9–17)
BUN BLDV-MCNC: 28 MG/DL (ref 8–23)
BUN/CREAT BLD: ABNORMAL (ref 9–20)
CALCIUM SERPL-MCNC: 8.8 MG/DL (ref 8.6–10.4)
CHLORIDE BLD-SCNC: 98 MMOL/L (ref 98–107)
CO2: 35 MMOL/L (ref 20–31)
CREAT SERPL-MCNC: 1.27 MG/DL (ref 0.5–0.9)
GFR AFRICAN AMERICAN: 50 ML/MIN
GFR NON-AFRICAN AMERICAN: 41 ML/MIN
GFR SERPL CREATININE-BSD FRML MDRD: ABNORMAL ML/MIN/{1.73_M2}
GFR SERPL CREATININE-BSD FRML MDRD: ABNORMAL ML/MIN/{1.73_M2}
GLUCOSE BLD-MCNC: 111 MG/DL (ref 65–105)
GLUCOSE BLD-MCNC: 115 MG/DL (ref 65–105)
GLUCOSE BLD-MCNC: 120 MG/DL (ref 70–99)
GLUCOSE BLD-MCNC: 125 MG/DL (ref 65–105)
GLUCOSE BLD-MCNC: 234 MG/DL (ref 65–105)
GLUCOSE BLD-MCNC: 237 MG/DL (ref 65–105)
POTASSIUM SERPL-SCNC: 4.4 MMOL/L (ref 3.7–5.3)
SODIUM BLD-SCNC: 138 MMOL/L (ref 135–144)
SURGICAL PATHOLOGY REPORT: NORMAL

## 2021-10-07 PROCEDURE — 6360000002 HC RX W HCPCS: Performed by: INTERNAL MEDICINE

## 2021-10-07 PROCEDURE — 99232 SBSQ HOSP IP/OBS MODERATE 35: CPT | Performed by: FAMILY MEDICINE

## 2021-10-07 PROCEDURE — 82947 ASSAY GLUCOSE BLOOD QUANT: CPT

## 2021-10-07 PROCEDURE — 6370000000 HC RX 637 (ALT 250 FOR IP): Performed by: FAMILY MEDICINE

## 2021-10-07 PROCEDURE — 94640 AIRWAY INHALATION TREATMENT: CPT

## 2021-10-07 PROCEDURE — 36415 COLL VENOUS BLD VENIPUNCTURE: CPT

## 2021-10-07 PROCEDURE — 6370000000 HC RX 637 (ALT 250 FOR IP): Performed by: NURSE PRACTITIONER

## 2021-10-07 PROCEDURE — 1200000000 HC SEMI PRIVATE

## 2021-10-07 PROCEDURE — 2580000003 HC RX 258: Performed by: FAMILY MEDICINE

## 2021-10-07 PROCEDURE — 94761 N-INVAS EAR/PLS OXIMETRY MLT: CPT

## 2021-10-07 PROCEDURE — 6370000000 HC RX 637 (ALT 250 FOR IP): Performed by: INTERNAL MEDICINE

## 2021-10-07 PROCEDURE — 2700000000 HC OXYGEN THERAPY PER DAY

## 2021-10-07 PROCEDURE — 6360000002 HC RX W HCPCS: Performed by: FAMILY MEDICINE

## 2021-10-07 PROCEDURE — 80048 BASIC METABOLIC PNL TOTAL CA: CPT

## 2021-10-07 RX ADMIN — INSULIN LISPRO 2 UNITS: 100 INJECTION, SOLUTION INTRAVENOUS; SUBCUTANEOUS at 21:09

## 2021-10-07 RX ADMIN — PANTOPRAZOLE SODIUM 40 MG: 40 TABLET, DELAYED RELEASE ORAL at 05:39

## 2021-10-07 RX ADMIN — ENOXAPARIN SODIUM 30 MG: 30 INJECTION SUBCUTANEOUS at 08:33

## 2021-10-07 RX ADMIN — IPRATROPIUM BROMIDE AND ALBUTEROL SULFATE 1 AMPULE: .5; 3 SOLUTION RESPIRATORY (INHALATION) at 19:30

## 2021-10-07 RX ADMIN — IPRATROPIUM BROMIDE AND ALBUTEROL SULFATE 1 AMPULE: .5; 3 SOLUTION RESPIRATORY (INHALATION) at 16:24

## 2021-10-07 RX ADMIN — IPRATROPIUM BROMIDE AND ALBUTEROL SULFATE 1 AMPULE: .5; 3 SOLUTION RESPIRATORY (INHALATION) at 10:59

## 2021-10-07 RX ADMIN — FUROSEMIDE 40 MG: 40 TABLET ORAL at 08:28

## 2021-10-07 RX ADMIN — METOPROLOL TARTRATE 25 MG: 50 TABLET, FILM COATED ORAL at 08:28

## 2021-10-07 RX ADMIN — GUAIFENESIN 600 MG: 600 TABLET, EXTENDED RELEASE ORAL at 08:27

## 2021-10-07 RX ADMIN — CITALOPRAM HYDROBROMIDE 20 MG: 20 TABLET ORAL at 08:28

## 2021-10-07 RX ADMIN — SODIUM CHLORIDE, PRESERVATIVE FREE 10 ML: 5 INJECTION INTRAVENOUS at 22:19

## 2021-10-07 RX ADMIN — FLUTICASONE PROPIONATE 2 SPRAY: 50 SPRAY, METERED NASAL at 08:35

## 2021-10-07 RX ADMIN — GUAIFENESIN 600 MG: 600 TABLET, EXTENDED RELEASE ORAL at 21:09

## 2021-10-07 RX ADMIN — METFORMIN HYDROCHLORIDE 500 MG: 500 TABLET, EXTENDED RELEASE ORAL at 08:27

## 2021-10-07 RX ADMIN — ACETYLCYSTEINE 200 MG: 200 SOLUTION ORAL; RESPIRATORY (INHALATION) at 08:01

## 2021-10-07 RX ADMIN — ISOSORBIDE MONONITRATE 30 MG: 30 TABLET, EXTENDED RELEASE ORAL at 08:28

## 2021-10-07 RX ADMIN — Medication 400 MG: at 08:27

## 2021-10-07 RX ADMIN — SODIUM CHLORIDE, PRESERVATIVE FREE 10 ML: 5 INJECTION INTRAVENOUS at 08:30

## 2021-10-07 RX ADMIN — PREDNISONE 20 MG: 20 TABLET ORAL at 08:28

## 2021-10-07 RX ADMIN — ASPIRIN 81 MG: 81 TABLET, COATED ORAL at 08:27

## 2021-10-07 RX ADMIN — IPRATROPIUM BROMIDE AND ALBUTEROL SULFATE 1 AMPULE: .5; 3 SOLUTION RESPIRATORY (INHALATION) at 08:01

## 2021-10-07 RX ADMIN — ATORVASTATIN CALCIUM 80 MG: 80 TABLET, FILM COATED ORAL at 08:27

## 2021-10-07 RX ADMIN — Medication 400 MG: at 21:09

## 2021-10-07 RX ADMIN — ACETYLCYSTEINE 200 MG: 200 SOLUTION ORAL; RESPIRATORY (INHALATION) at 19:30

## 2021-10-07 RX ADMIN — BUDESONIDE AND FORMOTEROL FUMARATE DIHYDRATE 2 PUFF: 160; 4.5 AEROSOL RESPIRATORY (INHALATION) at 19:30

## 2021-10-07 RX ADMIN — INSULIN LISPRO 4 UNITS: 100 INJECTION, SOLUTION INTRAVENOUS; SUBCUTANEOUS at 16:21

## 2021-10-07 RX ADMIN — CLOPIDOGREL BISULFATE 75 MG: 75 TABLET ORAL at 08:28

## 2021-10-07 RX ADMIN — METOPROLOL TARTRATE 25 MG: 50 TABLET, FILM COATED ORAL at 21:09

## 2021-10-07 ASSESSMENT — PAIN SCALES - GENERAL
PAINLEVEL_OUTOF10: 0
PAINLEVEL_OUTOF10: 0

## 2021-10-07 NOTE — PLAN OF CARE
Problem: Infection:  Goal: Will remain free from infection  Description: Will remain free from infection  Outcome: Ongoing  Note: Patient remains free from infection and no signs and symptoms of infection. Problem: Safety:  Goal: Free from accidental physical injury  Description: Free from accidental physical injury  Outcome: Ongoing  Note: No falls this shift. Call light within reach and siderails x2. Bed in lowest position. Patient safety maintained. Goal: Free from intentional harm  Description: Free from intentional harm  Outcome: Ongoing     Problem: Daily Care:  Goal: Daily care needs are met  Description: Daily care needs are met  Outcome: Ongoing     Problem: Pain:  Goal: Patient's pain/discomfort is manageable  Description: Patient's pain/discomfort is manageable  Outcome: Ongoing  Note: Denies any pain at this time. Will continue to monitor. Problem: Skin Integrity:  Goal: Skin integrity will stabilize  Description: Skin integrity will stabilize  Outcome: Ongoing  Note: Skin assessment as charted. No new areas of breakdown. Problem: Discharge Planning:  Goal: Patients continuum of care needs are met  Description: Patients continuum of care needs are met  Outcome: Ongoing     Problem: Falls - Risk of:  Goal: Will remain free from falls  Description: Will remain free from falls  Outcome: Ongoing  Note: No falls this shift. Call light within reach and siderails x2. Bed in lowest position. Patient safety maintained. Goal: Absence of physical injury  Description: Absence of physical injury  Outcome: Ongoing     Problem: Skin Integrity:  Goal: Will show no infection signs and symptoms  Description: Will show no infection signs and symptoms  Outcome: Ongoing  Note: Skin assessment as charted. No new areas of breakdown.     Goal: Absence of new skin breakdown  Description: Absence of new skin breakdown  Outcome: Ongoing

## 2021-10-07 NOTE — PLAN OF CARE
Problem: Infection:  Goal: Will remain free from infection  Description: Will remain free from infection  10/7/2021 1502 by Lionel Cano RN  Outcome: Ongoing  Pt. Remains free of infecton, no new signs of infection noted. Problem: Safety:  Goal: Free from accidental physical injury  Description: Free from accidental physical injury  10/7/2021 1502 by Lionel Cano RN  Outcome: Ongoing     Problem: Daily Care:  Goal: Daily care needs are met  Description: Daily care needs are met  10/7/2021 1502 by Lionel Cano RN  Outcome: Ongoing  Pt. Satisfied with daily care needs     Problem: Pain:  Goal: Patient's pain/discomfort is manageable  Description: Patient's pain/discomfort is manageable  10/7/2021 1502 by Lionel Cano RN  Outcome: Ongoing   Pt. Denies any pain at this time. Problem: Discharge Planning:  Goal: Patients continuum of care needs are met  Description: Patients continuum of care needs are met  10/7/2021 1502 by Lionel Cano RN  Outcome: Ongoing  Pt. Unable to be discharged due to no electricity, CM and SW aware, WCTM.       Problem: Falls - Risk of:  Goal: Will remain free from falls  Description: Will remain free from falls  10/7/2021 1502 by Lionel Cano RN  Outcome: Ongoing

## 2021-10-07 NOTE — CARE COORDINATION
Writer spoke to pt. About her having no Electricity & the need for Oxygen for home. Pt. States, \"she can't pay the $1000 until Nov 3, when she gets her check\". Writer asked, if there were a friend/family member she could stay with so she could get the Oxygen. Pt. States \"Her Daughter, lives in [place], [place] they do not get along, Her Son, is a , & is out of town & there is no one else. \" She can't borrow any money, to pay the bill as well. Pt. States, Dave Garrido will just have to do without the Oxygen\". Awaiting Dr. Keary Holstein, to see pt & will follow.

## 2021-10-07 NOTE — PROGRESS NOTES
Home Oxygen Evaluation    Resting SpO2 on room air = 85%    SpO2 on room air with exercise = NA%    Patient placed on 2L and ambulated around her room.   SpO2 on oxygen as above with exercise = 90%      Colette Yeh  1:00 PM

## 2021-10-07 NOTE — PROGRESS NOTES
SW spoke to pt again regarding her electric bill. Pt does not want help from this worker. Pt said she is working on making payment arrangements with the [employer]. Pt has the resources this worker gave her. APS can be contacted if pt is in danger. [phone]. Please note; SW can send note to the electric company but pt still has to follow through with payment arrangements. Per pt she receives $1600 per month and her rent is 400. Addendum: MELISSA requested pt/ot to see if pt would qualify for SNF.

## 2021-10-07 NOTE — PROGRESS NOTES
PULMONARY PROGRESS NOTE:    REASON FOR VISIT:COPD, CHF, acute hypoxic respiratory failure    Interval History:    Shortness of Breath: ++  Cough: yes, non-productive   Sputum: no          Hemoptysis: no  Chest Pain: no  Fever: no                   Swelling Feet: no  Headache: no                                           Nausea, Emesis, Abdominal Pain: no  Diarrhea: no         Constipation: no    Events since last visit: Home O2 eval is pending. Patient is 90% on 2L NC. Patient has no electricity at home which will be problematic as she will most likely require home O2. She is currently refusing SNF placement.      PAST MEDICAL HISTORY:      Scheduled Meds:   acetylcysteine  200 mg Inhalation BID    predniSONE  20 mg Oral Daily    enoxaparin  30 mg SubCUTAneous Daily    budesonide-formoterol  2 puff Inhalation BID    ipratropium-albuterol  1 ampule Inhalation Q4H WA    sodium chloride flush  5-40 mL IntraVENous 2 times per day    isosorbide mononitrate  30 mg Oral Daily    furosemide  40 mg Oral Daily    atorvastatin  80 mg Oral Daily    clopidogrel  75 mg Oral Daily    aspirin  81 mg Oral Daily    fluticasone  2 spray Each Nostril Daily    metFORMIN  500 mg Oral Daily with breakfast    citalopram  20 mg Oral Daily    metoprolol tartrate  25 mg Oral BID    guaiFENesin  600 mg Oral BID    magnesium oxide  400 mg Oral BID    pantoprazole  40 mg Oral QAM AC    insulin lispro  0-12 Units SubCUTAneous TID WC    insulin lispro  0-6 Units SubCUTAneous Nightly     Continuous Infusions:   sodium chloride      dextrose       PRN Meds:sodium chloride flush, sodium chloride, acetaminophen, ondansetron **OR** ondansetron, rOPINIRole, albuterol sulfate HFA, tiZANidine, nitroGLYCERIN, diclofenac sodium, ipratropium-albuterol, glucose, dextrose, glucagon (rDNA), dextrose        PHYSICAL EXAMINATION:  BP (!) 140/82   Pulse 68   Temp 98.4 °F (36.9 °C) (Oral)   Resp 18   Ht 5' 4\" (1.626 m)   Wt 139 lb (63 kg)   SpO2 91%   BMI 23.86 kg/m²     General : Awake, alert, oriented x 3  Neck - supple, no lymphadenopathy, JVD not raised  Heart - regular rhythm, S1 and S2 normal; no additional sounds heard  Lungs - Air Entry- poor to fair bilaterally; breath sounds : vesicular;   rales/crackles - absent, 90% on 2L  Abdomen - soft, no tenderness  Upper Extremities  - no cyanosis, mottling; edema : absent  Lower Extremities: no cyanosis, mottling; edema : absent    Current Laboratory, Radiologic, Microbiologic, and Diagnostic studies reviewed  Data ReviewCBC: No results for input(s): WBC, RBC, HGB, HCT, PLT in the last 72 hours. BMP:   Recent Labs     10/07/21  0801   GLUCOSE 120*      K 4.4   BUN 28*   CREATININE 1.27*   CALCIUM 8.8     ABGs: No results for input(s): PHART, PO2ART, ESO2ZDF, IJP7ZIK, BEART, C3IESRHC, CWC8KTQ in the last 72 hours. PT/INR:  No results found for: PTINR    ASSESSMENT / PLAN:  Assessment   · Pulmonary infiltrate, pulmonary edema versus ?infectious (procalcitonin: 0.08, SARS-CoV-2 and Influenza A/B negative and proBNP 12,156)  · COPD with exacerbation  · Cardiomyopathy with EF 35% by echo on 8/21  · Pulmonary hypertension, RVSP 60 mmHg, mostly secondary  · CAD/CHF/ASCVD/HTN/HDL  · Diabetes mellitus  · Afebrile, no elevated WBC,   · 40-50-pack-year smoking, quit in 9/21     Recommendations      · To PO steroids   · Diuresis  · Albuterol and Ipratropium Q 4 hours and as needed  · Symbicort  · X-ray chest reviewed - unchanged, persistent left base infiltrate. · Check CT chest - left effusion with infiltrates - thoracentesis left - transudate - culture/cytology pending  · Mucinex, add acapella/ mucomyst  · 2 liters/min via nasal cannula  · Home 02 eval prior to d/c- patient will most likely require home O2. She currently has no electricity. Due to her severe health problems and COPD, and respiratory failure, termination of her electric service would be especially dangerous.  She will need electricity for oxygen needs.    · Discussed with Dr. Fara De         Electronically signed by JAMES Lopez CNP on 10/07/21

## 2021-10-07 NOTE — CARE COORDINATION
ONGOING DISCHARGE PLAN:     Patient is alert and oriented x4.     Spoke with patient regarding discharge plan yesterday and patient confirmed that plan is home without needs. Pt continues to decline VNS. Also adamantly refuses SNF.      Following for home 02. Discussed with patient that she will need electricity for 02 concentrator at home. She states she will just portable tanks, writer explained those only last a couple hours. Pt states \"well I dont have $1,000 to pay for the bill. I'll have to worry about that later. \" Encouraged SNF while this is being worked out, however she refused.     Per LSW notes, \"Customers with severe health problems may have a licensed physician certify in writing that a termination of electric service would be especially dangerous. The medical certification will postpone the termination of electric service for 30 days. \"     Active order for PO steroids.     Thoracentesis done 10/5 with 110ml removed.     Will continue to follow for additional discharge needs.     Electronically signed by Caryle Slate, RN on 10/7/2021 at 8:35 AM

## 2021-10-07 NOTE — PROGRESS NOTES
PROVIDED HISTORY: pl effusion TECHNOLOGIST PROVIDED HISTORY: pl effusion Which side should the procedure be performed? ->Left Shortness of breath TECHNIQUE: Informed consent was obtained after a detailed explanation of the procedure including risks, benefits, and alternatives. Universal protocol was performed. While the patient was seated upright, after localizing, marking and anesthetizing the posterior left lower chest with one percent lidocaine, a 5 [employer] Corinne Yueh needle was advanced under live sonography. Sonogram image confirmed safe tract access and satisfactory needle tip position. The catheter was advanced and the needle was removed. The catheter was connected to a Vacutainer bottle and 110 mL of non turbid light yellow fluid was drained. The catheter was removed and the site was dressed appropriately. A sample was sent for laboratory analysis. The patient tolerated the procedure well and left the Department in stable condition. Dr. Christo Joe was present. FINDINGS: A total of 110 mL was removed. Postprocedural sonogram demonstrated no residual fluid remaining. Successful ultrasound guided left thoracentesis.      Assessment//Plan           Hospital Problems         Last Modified POA    * (Principal) Multifocal pneumonia 10/3/2021 Yes    Controlled type 2 diabetes mellitus without complication, without long-term current use of insulin (HCC) (Chronic) 10/3/2021 Yes    Hypertension goal BP (blood pressure) < 140/90 (Chronic) 10/3/2021 Yes    Mixed hyperlipidemia (Chronic) 10/3/2021 Yes    Chronic obstructive pulmonary disease ([address] (Chronic) 10/3/2021 Yes    Primary insomnia (Chronic) 10/3/2021 Yes    Restless leg syndrome (Chronic) 10/3/2021 Yes    Chronic bilateral low back pain with left-sided sciatica (Chronic) 10/3/2021 Yes    Hypothyroidism (Chronic) 10/3/2021 Yes    S/P CABG x 4 (Chronic) 10/3/2021 Yes    Atrial fibrillation ([address] 10/3/2021 Yes    Internal carotid artery occlusion 10/3/2021 Yes Hypoxia 10/3/2021 Yes        Assessment & Plan  Plan per pulmonology      Electronically signed by El Galaviz MD on 10/7/21 at 8:33 AM EDT

## 2021-10-08 VITALS
HEIGHT: 64 IN | WEIGHT: 139 LBS | HEART RATE: 73 BPM | SYSTOLIC BLOOD PRESSURE: 106 MMHG | BODY MASS INDEX: 23.73 KG/M2 | RESPIRATION RATE: 18 BRPM | TEMPERATURE: 98.4 F | OXYGEN SATURATION: 96 % | DIASTOLIC BLOOD PRESSURE: 61 MMHG

## 2021-10-08 LAB
CULTURE: NORMAL
CULTURE: NORMAL
GLUCOSE BLD-MCNC: 167 MG/DL (ref 65–105)
GLUCOSE BLD-MCNC: 95 MG/DL (ref 65–105)
Lab: NORMAL
Lab: NORMAL
SPECIMEN DESCRIPTION: NORMAL
SPECIMEN DESCRIPTION: NORMAL

## 2021-10-08 PROCEDURE — 94761 N-INVAS EAR/PLS OXIMETRY MLT: CPT

## 2021-10-08 PROCEDURE — 6360000002 HC RX W HCPCS: Performed by: INTERNAL MEDICINE

## 2021-10-08 PROCEDURE — 94640 AIRWAY INHALATION TREATMENT: CPT

## 2021-10-08 PROCEDURE — 6370000000 HC RX 637 (ALT 250 FOR IP): Performed by: INTERNAL MEDICINE

## 2021-10-08 PROCEDURE — 2580000003 HC RX 258: Performed by: FAMILY MEDICINE

## 2021-10-08 PROCEDURE — 6360000002 HC RX W HCPCS: Performed by: FAMILY MEDICINE

## 2021-10-08 PROCEDURE — 2700000000 HC OXYGEN THERAPY PER DAY

## 2021-10-08 PROCEDURE — 82947 ASSAY GLUCOSE BLOOD QUANT: CPT

## 2021-10-08 PROCEDURE — 6370000000 HC RX 637 (ALT 250 FOR IP): Performed by: NURSE PRACTITIONER

## 2021-10-08 PROCEDURE — 97162 PT EVAL MOD COMPLEX 30 MIN: CPT

## 2021-10-08 PROCEDURE — 6370000000 HC RX 637 (ALT 250 FOR IP): Performed by: FAMILY MEDICINE

## 2021-10-08 PROCEDURE — 97166 OT EVAL MOD COMPLEX 45 MIN: CPT

## 2021-10-08 RX ADMIN — SODIUM CHLORIDE, PRESERVATIVE FREE 10 ML: 5 INJECTION INTRAVENOUS at 07:53

## 2021-10-08 RX ADMIN — CLOPIDOGREL BISULFATE 75 MG: 75 TABLET ORAL at 07:51

## 2021-10-08 RX ADMIN — ASPIRIN 81 MG: 81 TABLET, COATED ORAL at 07:49

## 2021-10-08 RX ADMIN — ENOXAPARIN SODIUM 40 MG: 40 INJECTION SUBCUTANEOUS at 09:00

## 2021-10-08 RX ADMIN — IPRATROPIUM BROMIDE AND ALBUTEROL SULFATE 1 AMPULE: .5; 3 SOLUTION RESPIRATORY (INHALATION) at 11:12

## 2021-10-08 RX ADMIN — FUROSEMIDE 40 MG: 40 TABLET ORAL at 07:50

## 2021-10-08 RX ADMIN — CITALOPRAM HYDROBROMIDE 20 MG: 20 TABLET ORAL at 07:51

## 2021-10-08 RX ADMIN — METOPROLOL TARTRATE 25 MG: 50 TABLET, FILM COATED ORAL at 07:50

## 2021-10-08 RX ADMIN — IPRATROPIUM BROMIDE AND ALBUTEROL SULFATE 1 AMPULE: .5; 3 SOLUTION RESPIRATORY (INHALATION) at 07:11

## 2021-10-08 RX ADMIN — PANTOPRAZOLE SODIUM 40 MG: 40 TABLET, DELAYED RELEASE ORAL at 05:54

## 2021-10-08 RX ADMIN — ACETYLCYSTEINE 200 MG: 200 SOLUTION ORAL; RESPIRATORY (INHALATION) at 07:11

## 2021-10-08 RX ADMIN — ISOSORBIDE MONONITRATE 30 MG: 30 TABLET, EXTENDED RELEASE ORAL at 07:49

## 2021-10-08 RX ADMIN — Medication 400 MG: at 07:50

## 2021-10-08 RX ADMIN — PREDNISONE 20 MG: 20 TABLET ORAL at 07:49

## 2021-10-08 RX ADMIN — BUDESONIDE AND FORMOTEROL FUMARATE DIHYDRATE 2 PUFF: 160; 4.5 AEROSOL RESPIRATORY (INHALATION) at 07:11

## 2021-10-08 RX ADMIN — ACETAMINOPHEN 650 MG: 325 TABLET ORAL at 07:50

## 2021-10-08 RX ADMIN — ATORVASTATIN CALCIUM 80 MG: 80 TABLET, FILM COATED ORAL at 07:49

## 2021-10-08 RX ADMIN — GUAIFENESIN 600 MG: 600 TABLET, EXTENDED RELEASE ORAL at 07:50

## 2021-10-08 RX ADMIN — METFORMIN HYDROCHLORIDE 500 MG: 500 TABLET, EXTENDED RELEASE ORAL at 07:49

## 2021-10-08 ASSESSMENT — PAIN SCALES - GENERAL: PAINLEVEL_OUTOF10: 3

## 2021-10-08 NOTE — PROGRESS NOTES
Pt given discharge instructions, verbalized, understanding of all discharge instructions and follow up appt's, and new medications of symbicort and prednisone. Pt. Cont. To await for taxi cab to arrive.

## 2021-10-08 NOTE — PROGRESS NOTES
[facility] She would be accepted under insurance waiver. Pt adamantly refuses SNF. SW has given pt all the needed resources for [facility] and taking care of her outstanding bill. Please note, even with a medical letter,  Pt would have to call [facility] and make payment arrangements. Pt has refused to do this with this worker. Also notable, there is no agency that will pay pt's bill in full. Pt reports her income is 1600 per month and her mortgage payment is $400. Pt tells this worker she is \"working on\" making payment arrangements. Pt would not say what her roommate contributes to the monthly bills. Pt said she will call someone to pick her up upon discharge. SW has tried to contact pt's roommate Anali and son Manan Levin multiple times to no avail.

## 2021-10-08 NOTE — PROGRESS NOTES
[facility]   Occupational Therapy Evaluation  Date: 10/8/21  Patient Name: Cooper Heath       Room: 3028/6465-90  MRN: [id number]  Account: [de-identified]   : 1950  (66 y.o.) Gender: female     Discharge Recommendations:  Further Occupational Therapy is recommended upon facility discharge. Equipment Needed:  (TBD)    Referring Practitioner: Flakito Teixeira MD  Diagnosis: Pneumonia       Treatment Diagnosis: Impaired self care status  Past Medical History:  has a past medical history of Allergic rhinitis, Arthritis, CAD (coronary artery disease), Cerebral artery occlusion with cerebral infarction ([address] CHF (congestive heart failure) ([facility] Controlled type 2 diabetes mellitus without complication, without long-term current use of insulin ([address] COPD (chronic obstructive pulmonary disease) ([address] Depression, Former smoker, History of blood transfusion, Hyperlipidemia, Hypertension, Kidney stone, Myocardial infarction ([facility] Obesity, Class I, BMI 30.0-34.9 (see actual BMI), Restless leg syndrome, Skin abnormality, Type II or unspecified type diabetes mellitus without mention of complication, not stated as uncontrolled, Wears glasses, and Wears partial dentures. Past Surgical History:   has a past surgical history that includes Appendectomy; Hysterectomy; Cholecystectomy; joint replacement (Bilateral); pr office/outpt visit,procedure only (N/A, 2018); pr incis/drain thigh/knee abscess,deep (Right, 2018); Leg biopsy excision (Right, 2019); Cardiac surgery; Cardiac surgery; other surgical history (2020); cervical laminectomy (N/A, 10/14/2020); and bronchoscopy (N/A, 2021). Restrictions  Restrictions/Precautions: General Precautions, Fall Risk  Implants present? : Metal implants (Bilat TKAs, Metal in neck from cervical surgery)  Other position/activity restrictions:  Up with Assist  Required Braces or Orthoses?: No     Vitals  Temp: 97.5 °F (36.4 °C)  Pulse: 63  Resp: 20  BP: 136/82  Height: 5' 4\" (162.6 cm)  Weight: 139 lb (63 kg)  BMI (Calculated): 23.9  Oxygen Therapy  SpO2: 90 % (pt desats to 86% post stair mobility)  Pulse Oximeter Device Mode: Intermittent  Pulse Oximeter Device Location: Finger  O2 Device: Nasal cannula  FiO2 : 28 %  O2 Flow Rate (L/min): 2 L/min  Blood Gas  Performed?: No  Level of Consciousness: Alert (0)    Subjective  Subjective: Pt resting in bed upon arrival. Pt was pleasant and agreeable to OT/PT eval   Comments: Ok per [employer] for OT/PT eval  Overall Orientation Status: Within Functional Limits  Vision  Vision: Impaired  Vision Exceptions: Wears glasses for reading, Wears glasses for distance  Hearing  Hearing: Exceptions to [facility]  Hearing Exceptions: Hard of hearing/hearing concerns  Social/Functional History  Lives With: Friend(s) (3 roommates; 1 is permanent roommate)  Type of Home: House  Home Layout: One level, Laundry in basement  Home Access: Stairs to enter with rails  Entrance Stairs - Number of Steps: 5  Entrance Stairs - Rails: Both (cannot reach both at same time)  Bathroom Shower/Tub: Tub/Shower unit, Curtain, Shower chair with back  [employer]: Standard  Bathroom Equipment: Shower chair, Hand-held shower  Bathroom Accessibility: Walker accessible  Home Equipment: Rolling walker, 4 wheeled walker  Receives Help From: Friend(s) (Roommate)  ADL Assistance: Independent  Homemaking Assistance: Needs assistance (Roommates help to get groceries)  Homemaking Responsibilities: Yes  Ambulation Assistance: Independent (no AD)  Transfer Assistance: Independent (no AD)  Active : No  Mode of Transportation: Walk, Friends  IADL Comments: pt sleeps in a flat bed  Additional Comments: pt reports that at least one of her roommates is home 90% of the time, the primary helper roommate recently got diagnosed with cancer.  Pt reports that they are in and out andcannot provide 24/hr assist. pt states that roommates do not drive, pt is recieving transportation from friends, transportation availability is not consistent       Objective          Sensation  Overall Sensation Status: WFL (pt Denies N/T)   ADL  Feeding: Modified independent   Grooming: Setup  UE Bathing: Setup  LE Bathing: Stand by assistance  UE Dressing: Setup  LE Dressing: Stand by assistance  Toileting: Stand by assistance  Additional Comments: ADL scores based on clinical reasoning and skilled observation unless otherwise noted. Pt donned bilateral socks while sitting EOB utilizing figure 4 technique. Pt currently limited due to decreased strength and activity tolerance impacting safety and independence with self care tasks    UE Function           LUE Strength  Gross LUE Strength: WFL  L Hand General: 4/5  LUE Strength Comment: Grossly 4/5     LUE Tone: Normotonic     LUE AROM (degrees)  LUE AROM : WFL     Left Hand AROM (degrees)  Left Hand AROM: WFL  RUE Strength  Gross RUE Strength: WFL  R Hand General: 4/5  RUE Strength Comment: Grossly 4/5      RUE Tone: Normotonic     RUE AROM (degrees)  RUE AROM : WFL     Right Hand AROM (degrees)  Right Hand AROM: WFL    Fine Motor Skills  Coordination  Movements Are Fluid And Coordinated: Yes                           Mobility  Supine to Sit: Modified independent  Sit to Supine: Modified independent       Balance  Sitting Balance: Modified independent   Standing Balance: Stand by assistance  Standing Balance  Time: 2-3 mintues  Activity: functional mobility  Comment: without device   Functional Mobility  Functional - Mobility Device: No device  Activity: Other (within room)  Assist Level: Stand by assistance  Functional Mobility Comments: No LOB noted. O2 monitored with O2 sats dropping to 86% after functional task.  Pt required verbal cues for pursed lip breathing with 1-2 minutes to recover above 90%  Bed mobility  Supine to Sit: Modified independent  Sit to Supine: Modified independent  Scooting: Modified independent  Comment: Bed mobility completed with HOB elevated. Transfers  Sit to stand: Stand by assistance  Stand to sit: Stand by assistance  Functional Activity Tolerance  Functional Activity Tolerance: Tolerates 30 min exercise with multiple rests     Assessment  Assessment  Performance deficits / Impairments: Decreased ADL status, Decreased functional mobility , Decreased strength, Decreased safe awareness, Decreased endurance, Decreased balance, Decreased high-level IADLs  Treatment Diagnosis: Impaired self care status  Prognosis: Good  Decision Making: Medium Complexity  REQUIRES OT FOLLOW UP: Yes  Activity Tolerance: Patient limited by fatigue         Functional Outcome Measures  AM-PAC Daily Activity Inpatient   How much help for putting on and taking off regular lower body clothing?: A Little  How much help for Bathing?: A Little  How much help for Toileting?: A Little  How much help for putting on and taking off regular upper body clothing?: A Little  How much help for taking care of personal grooming?: A Little  How much help for eating meals?: None  [facility] Inpatient Daily Activity Raw Score: 19  AM-PAC Inpatient ADL T-Scale Score : 40.22  ADL Inpatient CMS 0-100% Score: 42.8  ADL Inpatient CMS G-Code Modifier : CK       Goals  Patient Goals   Patient goals :  To go home  Short term goals  Time Frame for Short term goals: By discharge  Short term goal 1: Pt will complete BADLs with modified independence and good safety while maintaining SpO2 above 90%  Short term goal 2: Pt will complete functional transfers/mobility during self care tasks with modified independence and good safety while maintaining SpO2 above 90%  Short term goal 3: Pt will tolerate standing 7+ minutes during functional activity of choice while maintaining SpO2 above 90%  Short term goal 4: Pt will verbalize/demonstrate good understanding of energy conservation techniques to increase safety and independence with self care and mobility  Short term goal 5: Pt will participate in 15+ minutes of therapeutic exercises/functional activities to increase safety and independence with self care and mobility    Plan  Safety Devices  Safety Devices in place: Yes  Type of devices: Call light within reach, Gait belt, Patient at risk for falls, Left in bed, Nurse notified     Plan  Times per week: 4-6  Current Treatment Recommendations: Self-Care / ADL, Strengthening, Balance Training, Functional Mobility Training, Endurance Training, Safety Education & Training, Patient/Caregiver Education & Training, Equipment Evaluation, Education, & procurement, Home Management Training       Equipment Recommendations  Equipment Needed:  (TBD)  OT Individual Minutes  Time In: 2801  Time Out: 0840  Minutes: 21    Electronically signed by Toni Rodrigues OT on 10/8/21 at 10:31 AM EDT

## 2021-10-08 NOTE — PROGRESS NOTES
Nutrition Assessment     Type and Reason for Visit:  (length of stay)    Nutrition Recommendations/Plan: Will add 5 carbohydrate restriction to diet. Nutrition Assessment:  Pt was admitted due to Covid. She is consuming adequate amounts (50-75%). Unable to assess wts due to stated wt. Plan is for discharge possibly later today. Malnutrition Assessment:  Malnutrition Status:  no malnutrition    Current Nutrition Therapies:    ADULT DIET;  Regular; 5 carb choices (75 gm/meal)    Anthropometric Measures:  · Height: 5' 4\" (162.6 cm)  Food/Nutrient Intake Outcomes:  Food and Nutrient Intake        Electronically signed by Annie Noriega RD, LD on 10/8/21 at 2:43 PM EDT    Contact: [phone]

## 2021-10-08 NOTE — PROGRESS NOTES
PULMONARY PROGRESS NOTE:    REASON FOR VISIT:COPD, CHF, acute hypoxic respiratory failure    Interval History:    Shortness of Breath: ++  Cough: yes, non-productive   Sputum: no          Hemoptysis: no  Chest Pain: no  Fever: no                   Swelling Feet: no  Headache: no                                           Nausea, Emesis, Abdominal Pain: no  Diarrhea: no         Constipation: no    Events since last visit: Patient continues to refuse SNF placement. She was 86% on room air. She will require 2L continuous o2.      PAST MEDICAL HISTORY:      Scheduled Meds:   enoxaparin  40 mg SubCUTAneous Daily    acetylcysteine  200 mg Inhalation BID    predniSONE  20 mg Oral Daily    budesonide-formoterol  2 puff Inhalation BID    ipratropium-albuterol  1 ampule Inhalation Q4H WA    sodium chloride flush  5-40 mL IntraVENous 2 times per day    isosorbide mononitrate  30 mg Oral Daily    furosemide  40 mg Oral Daily    atorvastatin  80 mg Oral Daily    clopidogrel  75 mg Oral Daily    aspirin  81 mg Oral Daily    fluticasone  2 spray Each Nostril Daily    metFORMIN  500 mg Oral Daily with breakfast    citalopram  20 mg Oral Daily    metoprolol tartrate  25 mg Oral BID    guaiFENesin  600 mg Oral BID    magnesium oxide  400 mg Oral BID    pantoprazole  40 mg Oral QAM AC    insulin lispro  0-12 Units SubCUTAneous TID WC    insulin lispro  0-6 Units SubCUTAneous Nightly     Continuous Infusions:   sodium chloride      dextrose       PRN Meds:sodium chloride flush, sodium chloride, acetaminophen, ondansetron **OR** ondansetron, rOPINIRole, albuterol sulfate HFA, tiZANidine, nitroGLYCERIN, diclofenac sodium, ipratropium-albuterol, glucose, dextrose, glucagon (rDNA), dextrose        PHYSICAL EXAMINATION:  /82   Pulse 63   Temp 97.5 °F (36.4 °C) (Oral)   Resp 20   Ht 5' 4\" (1.626 m)   Wt 139 lb (63 kg)   SpO2 93%   BMI 23.86 kg/m²     General : Awake, alert, oriented x 3  Neck - supple, no lymphadenopathy, JVD not raised  Heart - regular rhythm, S1 and S2 normal; no additional sounds heard  Lungs - Air Entry- poor to fair bilaterally; breath sounds : vesicular;   rales/crackles - absent, 93% on 2L  Abdomen - soft, no tenderness  Upper Extremities  - no cyanosis, mottling; edema : absent  Lower Extremities: no cyanosis, mottling; edema : absent    Current Laboratory, Radiologic, Microbiologic, and Diagnostic studies reviewed  Data ReviewCBC: No results for input(s): WBC, RBC, HGB, HCT, PLT in the last 72 hours. BMP:   Recent Labs     10/07/21  0801   GLUCOSE 120*      K 4.4   BUN 28*   CREATININE 1.27*   CALCIUM 8.8     ABGs: No results for input(s): PHART, PO2ART, RBQ8YZI, YFO3QXL, BEART, P6COYUMO, VCP5YSX in the last 72 hours. PT/INR:  No results found for: PTINR    ASSESSMENT / PLAN:  Assessment   · Pulmonary infiltrate, pulmonary edema versus ?infectious (procalcitonin: 0.08, SARS-CoV-2 and Influenza A/B negative and proBNP 12,156)  · COPD with exacerbation  · Cardiomyopathy with EF 35% by echo on 8/21  · Pulmonary hypertension, RVSP 60 mmHg, mostly secondary  · CAD/CHF/ASCVD/HTN/HDL  · Diabetes mellitus  · Afebrile, no elevated WBC,   · 40-50-pack-year smoking, quit in 9/21     Recommendations      · To PO steroids   · Diuresis  · Albuterol and Ipratropium Q 4 hours and as needed  · Symbicort  · X-ray chest reviewed - unchanged, persistent left base infiltrate. · Check CT chest - left effusion with infiltrates - thoracentesis left - transudate - culture/cytology pending  · Mucinex, add acapella/ mucomyst  · 2 liters/min via nasal cannula  · Home 02 eval prior to d/c- patient will most likely require home O2. She currently has no electricity. Due to her severe health problems and COPD, and respiratory failure, termination of her electric service would be especially dangerous. She will need electricity for oxygen needs  · Face to face with patient regarding need for home o2.  [employer] will have to supply her with portable tanks until the electricity issues gets sorted out. .   · Discussed with Dr. Cholo Bynum            Electronically signed by JAMES Francis CNP on 10/08/21

## 2021-10-08 NOTE — DISCHARGE INSTR - COC
Emir Reid, IM, PF (6 mo and older Fluzone, Flulaval, Fluarix, and 3 yrs and older Afluria) 08/27/2019, 11/13/2020    Influenza, Triv, inactivated, subunit, adjuvanted, IM (Fluad 65 yrs and older) 09/29/2017    Pneumococcal Conjugate 13-valent (Anselm Hess) 11/14/2016    Pneumococcal Polysaccharide (Riboerutb91) 11/01/2017    Tdap (Boostrix, Adacel) 07/02/2020       Active Problems:  Patient Active Problem List   Diagnosis Code    Chronic pain G89.29    Controlled type 2 diabetes mellitus without complication, without long-term current use of insulin ([facility]) E11.9    Allergic rhinitis J30.9    Hypertension goal BP (blood pressure) < 140/90 I10    Mixed hyperlipidemia E78.2    Chronic obstructive pulmonary disease ([facility]) J44.9    Coronary artery disease involving native coronary artery of native heart without angina pectoris I25.10    Primary insomnia F51.01    Diarrhea in adult patient R19.7    Restless leg syndrome G25.81    Atherosclerosis of superior mesenteric artery ([facility]) K55.1    Left adrenal mass ([facility]) E27.8    Former smoker Z87.891    Chronic bilateral low back pain with left-sided sciatica M54.42, G89.29    Hypothyroidism E03.9    S/P CABG x 4 Z95.1    Acute pain of right knee M25.561    Abscess of right thigh L02.415    Angina pectoris ([facility]) I20.9    Abnormal stress test R94.39    Atrial fibrillation ([facility]) I48.91    Tobacco use disorder F17.200    Neck pain M54.2    Chest pain R07.9    Acute ischemic left MCA stroke ([facility]) Q02.364    Internal carotid artery occlusion I65.29    Stenosis of left internal carotid artery I65.22    Acquired spondylolisthesis of cervical vertebra M43.12    Stenosis of cervical spine with myelopathy ([facility]) M48.02, G99.2    Abnormal EKG R94.31    COPD exacerbation ([facility]) J44.1    Multifocal pneumonia J18.9    Hypoxia R09.02       Isolation/Infection:   Isolation            No Isolation          Patient Infection Status       Infection Onset Added Last Indicated Last Indicated By Review Planned Expiration Resolved Resolved By    None active    Resolved    COVID-19 Rule Out 10/03/21 10/03/21 10/03/21 COVID-19 & Influenza Combo (Ordered)   10/03/21 Rule-Out Test Resulted    COVID-19 Rule Out 10/02/21 10/02/21 10/02/21 COVID-19 & Influenza Combo (Ordered)   10/02/21 Rule-Out Test Resulted    COVID-19 Rule Out 08/11/21 08/11/21 08/11/21 COVID-19, Rapid (Ordered)   08/11/21 Rule-Out Test Resulted    COVID-19 Rule Out 10/10/20 10/10/20 10/10/20 COVID-19 (Ordered)   10/11/20 Rule-Out Test Resulted            Nurse Assessment:  Last Vital Signs: /82   Pulse 63   Temp 97.5 °F (36.4 °C) (Oral)   Resp 20   Ht 5' 4\" (1.626 m)   Wt 139 lb (63 kg)   SpO2 95%   BMI 23.86 kg/m²     Last documented pain score (0-10 scale): Pain Level: 3  Last Weight:   Wt Readings from Last 1 Encounters:   10/02/21 139 lb (63 kg)     Mental Status:  {IP PT MENTAL STATUS:20030:::0}    IV Access:  { CRISTOBAL IV ACCESS:811993668:::0}    Nursing Mobility/ADLs:  Walking   {CHP DME ADLs:312351032:::0}  Transfer  {CHP DME ADLs:901304530:::0}  Bathing  {CHP DME ADLs:670540077:::0}  Dressing  {CHP DME ADLs:422071724:::0}  Toileting  {CHP DME ADLs:554441205:::0}  Feeding  {CHP DME ADLs:522670125:::0}  Med Admin  {CHP DME ADLs:897567090:::0}  Med Delivery   { CRISTOBAL MED Delivery:139434904:::0}    Wound Care Documentation and Therapy:        Elimination:  Continence: Bowel: {YES / OV:19184}  Bladder: {YES / RE:55654}  Urinary Catheter: {Urinary Catheter:183828400:::0}   Colostomy/Ileostomy/Ileal Conduit: {YES / NB:89538}       Date of Last BM: ***  No intake or output data in the 24 hours ending 10/08/21 0840  No intake/output data recorded.     Safety Concerns:     508 Travel Notes Safety Concerns:635617664:::0}    Impairments/Disabilities:      508 Travel Notes Impairments/Disabilities:205042788:::0}    Nutrition Therapy:  Current Nutrition Therapy:   [facility] Travel Notes Diet List:843786414:::0}    Routes of Feeding: {CHP DME Other Feedings:754811721:::0}  Liquids: {Slp liquid thickness:64660}  Daily Fluid Restriction: {CHP DME Yes amt example:368881416:::0}  Last Modified Barium Swallow with Video (Video Swallowing Test): {Done Not Done YFQL:245484921:::3}    Treatments at the Time of Hospital Discharge:   Respiratory Treatments: ***  Oxygen Therapy:  {Therapy; copd oxygen:98160:::0}  Ventilator:    {[facility] Vent List:999727222:::0}    Rehab Therapies: {THERAPEUTIC INTERVENTION:2767510382}  Weight Bearing Status/Restrictions: {[facility] Weight Bearin:::0}  Other Medical Equipment (for information only, NOT a DME order):  {EQUIPMENT:928293510}  Other Treatments: ***    Patient's personal belongings (please select all that are sent with patient):  {CHP DME Belongings:233146632:::0}    RN SIGNATURE:  {Esignature:333013903:::0}    CASE MANAGEMENT/SOCIAL WORK SECTION    Inpatient Status Date: ***    Readmission Risk Assessment Score:  Readmission Risk              Risk of Unplanned Readmission:  24           Discharging to Facility/ Agency   Name:   Address:  Phone:  Fax:    Dialysis Facility (if applicable)   Name:  Address:  Dialysis Schedule:  Phone:  Fax:    / signature: {Esignature:402663464:::0}    PHYSICIAN SECTION    Prognosis: Fair    Condition at Discharge: Stable    Rehab Potential (if transferring to Rehab): Fair    Recommended Labs or Other Treatments After Discharge:     Physician Certification: I certify the above information and transfer of Pati Byrne  is necessary for the continuing treatment of the diagnosis listed and that she requires Home Care for greater 30 days.      Update Admission H&P: No change in H&P    PHYSICIAN SIGNATURE:  Electronically signed by Itzel Wallace MD on 10/8/21 at 8:40 AM EDT

## 2021-10-08 NOTE — PROGRESS NOTES
Physical Therapy    Facility/Department: [facility] MED SURG  Initial Assessment    NAME: Master Orellana  : 1950  MRN: [id number]    Date of Service: 10/8/2021    Discharge Recommendations:  Patient would benefit from continued therapy after discharge   PT Equipment Recommendations  Equipment Needed: No    Assessment   Body structures, Functions, Activity limitations: Decreased functional mobility ; Decreased strength;Decreased endurance  Assessment: Pt performs bed mobility Mod I, and is overall requiring CGA-SBA for safety during transfers, gait, and stairs without an Assistive device. Pt demonstrates SpO2 desaturation from 90% down to 86% post stair mobility x4 steps. Pt would benefit from continued PT to address deficits in BLE strength, Endurance, and to improve energy conservation techniques to improve safety at home. Reccommending that Pt have Assist prn upon returning home, especially for stair climbing and moderate endurance tasks. Treatment Diagnosis: Impaired functional mobility, decreased endurance secondary to multifocal pneumonia and hypoxia associated with COVID 19 infection  Specific instructions for Next Treatment: Endurance training, Energy Conservation techniques, stair climbing (pt has 5 steps to enter, and FF to basement laundry)  Prognosis: Good  Decision Making: Medium Complexity  History: H/O CAD, CHF, COPD, HTN;   Exam: MMT, ROM, Balance, and functional mobility assessments  Clinical Presentation: pt is alert, pleasant, and cooperative  REQUIRES PT FOLLOW UP: Yes  Activity Tolerance  Activity Tolerance: Patient limited by endurance  Activity Tolerance: Overall pt tolerates assessments well and does not become symptomatic, However pt's SpO2 desats to 86% post stair mobility and pt requires ~2 minute seated rest break with VCs on breathing technique to recover SpO2 to 90%       Patient Diagnosis(es): The primary encounter diagnosis was Hypoxia.  Diagnoses of Pneumonia due to infectious organism, unspecified laterality, unspecified part of lung and COVID-19 were also pertinent to this visit. has a past medical history of Allergic rhinitis, Arthritis, CAD (coronary artery disease), Cerebral artery occlusion with cerebral infarction ([facility] CHF (congestive heart failure) ([address] Controlled type 2 diabetes mellitus without complication, without long-term current use of insulin ([address] COPD (chronic obstructive pulmonary disease) ([address] Depression, Former smoker, History of blood transfusion, Hyperlipidemia, Hypertension, Kidney stone, Myocardial infarction ([address] Obesity, Class I, BMI 30.0-34.9 (see actual BMI), Restless leg syndrome, Skin abnormality, Type II or unspecified type diabetes mellitus without mention of complication, not stated as uncontrolled, Wears glasses, and Wears partial dentures. has a past surgical history that includes Appendectomy; Hysterectomy; Cholecystectomy; joint replacement (Bilateral); pr office/outpt visit,procedure only (N/A, 2/6/2018); pr incis/drain thigh/knee abscess,deep (Right, 5/7/2018); Leg biopsy excision (Right, 8/29/2019); Cardiac surgery; Cardiac surgery; other surgical history (07/26/2020); cervical laminectomy (N/A, 10/14/2020); and bronchoscopy (N/A, 8/16/2021). Restrictions  Restrictions/Precautions  Restrictions/Precautions: General Precautions, Fall Risk  Required Braces or Orthoses?: No  Implants present? : Metal implants (Bilat TKAs, Metal in neck from cervical surgery)  Position Activity Restriction  Other position/activity restrictions:  Up with Assist  Vision/Hearing  Vision: Impaired  Vision Exceptions: Wears glasses for reading;Wears glasses for distance  Hearing: Exceptions to [facility]  Hearing Exceptions: Hard of hearing/hearing concerns     Subjective  General  Chart Reviewed: Yes  Patient assessed for rehabilitation services?: Yes  Additional Pertinent Hx: The patient is a 66 y.o. female who presents to [facility] with complaints of shortness of breath and a dry cough for the past few days. She reports that her symptoms are worsening. She states that nothing seems to make her symptoms better or worse. She has not been COVID vaccinated. . Pt underwent thoracentesis on 10/05/21 and had 110 mL of fluid removed. Response To Previous Treatment: Not applicable  Family / Caregiver Present: No  Referring Practitioner: Dr. Phillip Vences  Referral Date : 10/07/21  Diagnosis: Multifocal pneumonia associated with COVID 19 infection, hypoxia,   Follows Commands: Within Functional Limits  General Comment  Comments: [id number] [address] Vanegas to proceed with PT eval per nurse Qi Dennison  Subjective  Subjective: pt states \"I'm not your average 66year old, I get around pretty well\", pt is agreeable to therapy assessments   Pain Screening  Patient Currently in Pain: Denies  Vital Signs  Patient Currently in Pain: Denies  Oxygen Therapy  SpO2: 90 % (pt desats to 86% post stair mobility)  Pulse Oximeter Device Mode: Intermittent  Pulse Oximeter Device Location: Finger  O2 Device: Nasal cannula  O2 Flow Rate (L/min): 2 L/min  Patient Observation  Observations: pt resting in bed with HOB elevated, 2L O2 via NC, Peripheral IV L hand, Telemetry.         Orientation  Orientation  Overall Orientation Status: Within Functional Limits  Social/Functional History  Social/Functional History  Lives With: Friend(s) (3 roommates; 1 is permanent roommate)  Type of Home: House  Home Layout: One level, Laundry in basement  Home Access: Stairs to enter with rails  Entrance Stairs - Number of Steps: 5  Entrance Stairs - Rails: Both (cannot reach both at same time)  Bathroom Shower/Tub: Tub/Shower unit, Curtain, Shower chair with back  [employer]: Standard  Bathroom Equipment: Shower chair, Hand-held shower  Bathroom Accessibility: Walker accessible  Home Equipment: Rolling walker, 4 wheeled walker  Receives Help From: Friend(s) (Roommate)  ADL Assistance: [address] [place] [address]: Needs assistance (Roommates help to get groceries)  Homemaking Responsibilities: Yes  Ambulation Assistance: Independent (no AD)  Transfer Assistance: Independent (no AD)  Active : No  Mode of Transportation: Walk, Friends  IADL Comments: pt sleeps in a flat bed  Additional Comments: pt reports that at least one of her roommates is home 90% of the time, the primary helper roommate recently got diagnosed with cancer. Pt reports that they are in and out andcannot provide 24/hr assist. pt states that roommates do not drive, pt is recieving transportation from friends, transportation availability is not consistent  Cognition        Objective    AROM RLE (degrees)  RLE AROM: WFL  AROM LLE (degrees)  LLE AROM : WFL  AROM RUE (degrees)  RUE General AROM: See OT Assessments  AROM LUE (degrees)  LUE General AROM: See OT Assessments  Strength RLE  Comment: Grossly 4+/5 except Hip flexion and Knee flexion are 4/5  Strength LLE  Comment: Grossly 4+/5 except Hip flexion and Knee flexion are 4/5  Strength RUE  Comment: See OT Assessments  Strength LUE  Comment: See OT Assessments     Sensation  Overall Sensation Status: WFL (pt Denies N/T)  Bed mobility  Rolling to Left: Modified independent  Rolling to Right: Modified independent  Supine to Sit: Modified independent  Sit to Supine: Modified independent  Scooting: Modified independent  Comment: pt performs bed mobility with HOB elevated and use of bed rails. Pt without difficulty during these tasks  Transfers  Sit to Stand: Stand by assistance  Stand to sit: Stand by assistance  Bed to Chair: Stand by assistance  Stand Pivot Transfers: Contact guard assistance;Stand by assistance  Comment: All transfers performed without AD, Initially providing CGA for safety, pt progresses to SBA.   Ambulation  Ambulation?: Yes  Ambulation 1  Surface: level tile  Device: No Device  Assistance: Stand by assistance  Quality of Gait: pt demos normal gait pattern  Gait Deviations: None  Distance: 15'x2, 10'x2  Comments: pt amb within room with SBA fpr safety and O2 line management. Pt demos normal gait mechanics and SpO2 remains at 90% post amb. Stairs/Curb  Stairs?: Yes  Stairs  # Steps : 4  Stairs Height: 4\"  Rails: Left ascending;Right ascending  Assistance: Stand by assistance  Comment: pt negotiates box step x3 with Countertop as railing using 1UE support. Pt performs without difficulty however pt desats to 86% post stair mobility and requires ~1-2 minutes to recover to 90% while resting seated on 2L O2      Balance  Posture: Fair  Sitting - Static: Good  Sitting - Dynamic: Good  Standing - Static: Good  Standing - Dynamic: Good  Comments: Standing balance assessed without device. No LOB to report and pt is steady throughout mobility tasks.          Plan   Plan  Times per week: 5-6 treatments/wk  Specific instructions for Next Treatment: Endurance training, Energy Conservation techniques, stair climbing (pt has 5 steps to enter, and FF to basement laundry)  Current Treatment Recommendations: Strengthening, Endurance Training (Energy conservation, endurance training)  Safety Devices  Type of devices: Call light within reach, Gait belt, Patient at risk for falls, Left in bed, Nurse notified (Nurse Ada Perez notified of O2 Desat with mobility)  Restraints  Initially in place: No    G-Code       OutComes Score     AM-PAC Score  AM-PAC Inpatient Mobility Raw Score : 21 (10/08/21 0819)  AM-PAC Inpatient T-Scale Score : 50.25 (10/08/21 0819)  Mobility Inpatient CMS 0-100% Score: 28.97 (10/08/21 0819)  Mobility Inpatient CMS G-Code Modifier : Monica Richmond (10/08/21 9993)          Goals  Short term goals  Time Frame for Short term goals: until D/C  Short term goal 1: pt to demo all transfers independently   Short term goal 2: pt to tolerate 25 minutes of therapeutic activity/exercises while remaining at 90% or higher SpO2 to demo improved endurance  Short term goal 3: pt to demo proper breathing technique to recover O2 levels with activity to improve endurance and safety with functional mobility   Short term goal 4: pt to improve BLE strength by 1/2 manual muscle grade to improve safety with functional mobility   Short term goal 5: pt to negotiate Full Flight with hand rail Mod I to allow access to laundry in basement  Patient Goals   Patient goals : to return home safely       Therapy Time   Individual Concurrent Group Co-treatment   Time In 0819         Time Out 0840         Minutes 21         Timed Code Treatment Minutes: 0 Minutes       Pratibha Clayton, PT

## 2021-10-08 NOTE — PROGRESS NOTES
Patient discharged via wheelchair with portable O2 to cab.  All belongings and discharge instructions with patient

## 2021-10-08 NOTE — PROGRESS NOTES
Per our outpatient pharmacy, the symbicort inhaler and prednisone scripts were both transferred to [address] [place] [address], per the pt's request.

## 2021-10-08 NOTE — PROGRESS NOTES
Progress Note  Date:10/8/2021       Room:  Patient [facility]     YOB: 1950     Age:71 y.o. Subjective    Subjective:  Symptoms:  Stable. Diet:  Adequate intake. Activity level: Returning to normal.    Pain:  She reports no pain. Review of Systems  Objective         Vitals Last 24 Hours:  TEMPERATURE:  Temp  Av.3 °F (36.8 °C)  Min: 97.5 °F (36.4 °C)  Max: 98.8 °F (37.1 °C)  RESPIRATIONS RANGE: Resp  Av.3  Min: 16  Max: 20  PULSE OXIMETRY RANGE: SpO2  Av.1 %  Min: 91 %  Max: 97 %  PULSE RANGE: Pulse  Av.3  Min: 63  Max: 75  BLOOD PRESSURE RANGE: Systolic (14IQM), YMI:023 , Min:112 , QMK:094   ; Diastolic (45PHW), KUY:68, Min:68, Max:82    I/O (24Hr): No intake or output data in the 24 hours ending 10/08/21 0840  Objective:  General Appearance:  Comfortable. Vital signs: (most recent): Blood pressure 136/82, pulse 63, temperature 97.5 °F (36.4 °C), temperature source Oral, resp. rate 20, height 5' 4\" (1.626 m), weight 139 lb (63 kg), SpO2 95 %. Vital signs are normal.    Lungs:  Normal effort and normal respiratory rate. There are wheezes. Heart: Normal rate. S1 normal and S2 normal.      Labs/Imaging/Diagnostics    Labs:  CBC:No results for input(s): WBC, RBC, HGB, HCT, MCV, RDW, PLT in the last 72 hours. CHEMISTRIES:Recent Labs     10/07/21  0801      K 4.4   CL 98   CO2 35*   BUN 28*   CREATININE 1.27*   GLUCOSE 120*     PT/INR:No results for input(s): PROTIME, INR in the last 72 hours. APTT:No results for input(s): APTT in the last 72 hours. LIVER PROFILE:No results for input(s): AST, ALT, BILIDIR, BILITOT, ALKPHOS in the last 72 hours. Imaging Last 24 Hours:  No results found.   Assessment//Plan           Hospital Problems         Last Modified POA    * (Principal) Multifocal pneumonia 10/3/2021 Yes    Controlled type 2 diabetes mellitus without complication, without long-term current use of insulin (HCC) (Chronic) 10/3/2021 Yes    Hypertension goal BP (blood pressure) < 140/90 (Chronic) 10/3/2021 Yes    Mixed hyperlipidemia (Chronic) 10/3/2021 Yes    Chronic obstructive pulmonary disease ([address] (Chronic) 10/3/2021 Yes    Primary insomnia (Chronic) 10/3/2021 Yes    Restless leg syndrome (Chronic) 10/3/2021 Yes    Chronic bilateral low back pain with left-sided sciatica (Chronic) 10/3/2021 Yes    Hypothyroidism (Chronic) 10/3/2021 Yes    S/P CABG x 4 (Chronic) 10/3/2021 Yes    Atrial fibrillation ([address] 10/3/2021 Yes    Internal carotid artery occlusion 10/3/2021 Yes    Hypoxia 10/3/2021 Yes        Assessment & Plan  D/c planning per pulmonology      Electronically signed by Kareem Moya MD on 10/8/21 at 8:40 AM EDT

## 2021-10-08 NOTE — CARE COORDINATION
DISCHARGE PLANNING NOTE:    Notified by Ayesha Fofana from [facility] that they have a solution for this patient. They are going to give the patient liquid oxygen which does not require electricity. Ayesha Fofana will deliver portable tank to patients bedside shortly. I faxed DME order, facesheet, face to face notes and home O2 eval to [facility] at [phone]    I informed the patient of this information and she was less than appreciative. Patient wants to leave now, however the O2 tank has not been delivered yet and she states she doesn't care and I informed her that it would not be considered safe to discharge her without the portable tank. I informed her that she will call [facility] upon arrival home and they will deliver the rest of her home O2 equipment.      Electronically signed by Maris De Los Santos RN on 10/8/2021 at 1:50 PM
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
## 2021-10-08 NOTE — PLAN OF CARE
Problem: Infection:  Goal: Will remain free from infection  Description: Will remain free from infection  10/8/2021 0331 by Dong Greene RN  Outcome: Ongoing  Note: Patient remains free from infection and no signs and symptoms of infection. Problem: Safety:  Goal: Free from accidental physical injury  Description: Free from accidental physical injury  10/8/2021 0331 by Dong Greene RN  Outcome: Ongoing  Note: No falls this shift. Call light within reach and siderails x2. Bed in lowest position. Patient safety maintained. Problem: Safety:  Goal: Free from intentional harm  Description: Free from intentional harm  Outcome: Ongoing     Problem: Daily Care:  Goal: Daily care needs are met  Description: Daily care needs are met  10/8/2021 0331 by Dong Greene RN  Outcome: Ongoing     Problem: Pain:  Goal: Patient's pain/discomfort is manageable  Description: Patient's pain/discomfort is manageable  10/8/2021 0331 by Dong Greene RN  Outcome: Ongoing  Note: Denies any pain at this time. Will continue to monitor. Problem: Skin Integrity:  Goal: Skin integrity will stabilize  Description: Skin integrity will stabilize  Outcome: Ongoing  Note: Skin assessment as charted. No new areas of breakdown. Problem: Discharge Planning:  Goal: Patients continuum of care needs are met  Description: Patients continuum of care needs are met  10/8/2021 0331 by Dong Greene RN  Outcome: Ongoing     Problem: Falls - Risk of:  Goal: Will remain free from falls  Description: Will remain free from falls  10/8/2021 0331 by Dong Greene RN  Outcome: Ongoing  Note: No falls this shift. Call light within reach and siderails x2. Bed in lowest position. Patient safety maintained.        Problem: Falls - Risk of:  Goal: Absence of physical injury  Description: Absence of physical injury  Outcome: Ongoing     Problem: Skin Integrity:  Goal: Will show no infection signs and symptoms  Description: Will show no infection signs and symptoms  Outcome: Ongoing  Note: Skin assessment as charted. No new areas of breakdown.        Problem: Skin Integrity:  Goal: Absence of new skin breakdown  Description: Absence of new skin breakdown  Outcome: Ongoing

## 2021-10-11 ENCOUNTER — CARE COORDINATION (OUTPATIENT)
Dept: CASE MANAGEMENT | Age: 71
End: 2021-10-11

## 2021-10-11 NOTE — PROGRESS NOTES
Physician Progress Note      Yary CALVO #:                  [id number]  :                       1950  ADMIT DATE:       10/2/2021 7:54 AM  DISCH DATE:        10/8/2021 3:39 PM  RESPONDING  PROVIDER #:        Denia Andersen MD          QUERY TEXT:    Patient admitted with cough and shortness of breath, noted to have abnormal   lab findings on admission (10/2) of Creatinine 1.15 and GFR 46. If possible,   please document in progress notes and discharge summary if you are evaluating   and/or treating any of the:    The medical record reflects the following:  Risk Factors: 66 y.o. female with significant PMH including CHF, HTN, and Type   II Diabetes. Clinical Indicators: BUN/Cr/GFR: (10/2) 16/1.15/46, (10/4) 28/1.66/30. Pt has   no documented history of CKD but has not had a GFR > 60 since 2019. Pt has had an average GFR of 40.6 and Creatinine of 1.29 since that time,   according to lab values in Epic. Treatment: Lasix 40 mg IV administered x 1 and pt given 40 mg PO daily x 3   days per pulmonology for possible pulmonary edema  Options provided:  -- CKD Stage 3a GFR 45-59  -- CKD Stage 3b GFR 30-44  -- CKD Stage 4 GFR 15-29  -- Other - I will add my own diagnosis  -- Disagree - Not applicable / Not valid  -- Disagree - Clinically unable to determine / Unknown  -- Refer to Clinical Documentation Reviewer    PROVIDER RESPONSE TEXT:    Provider is clinically unable to determine a response to this query.     Query created by: Feliciano Baptiste on 10/11/2021 7:06 AM      Electronically signed by:  Denia Andersen MD 10/11/2021 7:56 AM

## 2021-10-11 NOTE — CARE COORDINATION
[address] [facility] Transitions Initial Follow Up Call    Call within 2 business days of discharge: Yes    Patient: Gabino Hester Patient : 1950   MRN: [id number]  Reason for Admission: Hypoxia  Discharge Date: 10/8/21 RARS: Readmission Risk Score: 24      Last Discharge [facility]       Complaint Diagnosis Description Type Department Provider    10/2/21 Shortness of Breath Hypoxia . .. ED to Hosp-Admission (Discharged) (ADMITTED) 1316 Sunil Ocasio MD; Kenn Crowder...           1st attempt to reach patient for Care Transitions. No answer and no voice mail  Will attempt to contact at a later date/time.     Facility: [facility]    Non-face-to-face services provided:  Obtained and reviewed discharge summary and/or continuity of care documents    Care Transitions 24 Hour Call    Care Transitions Interventions         Follow Up  Future Appointments   Date Time Provider Kendra Jimenez   10/14/2021  1:00 PM MD VAN Pierre RN

## 2021-10-12 ENCOUNTER — CARE COORDINATION (OUTPATIENT)
Dept: CASE MANAGEMENT | Age: 71
End: 2021-10-12

## 2021-10-12 LAB
CULTURE: ABNORMAL
DIRECT EXAM: ABNORMAL
Lab: ABNORMAL
SPECIMEN DESCRIPTION: ABNORMAL

## 2021-10-18 NOTE — DISCHARGE SUMMARY
Family Medicine Discharge Summary    Bobbi Flores  :  1950  MRN:  [id number]    Admit date:  10/2/2021  Discharge date:  10/8/2021    Admitting Physician:  Soo Fernandez MD    Discharge Diagnoses:    Patient Active Problem List   Diagnosis    Chronic pain    Controlled type 2 diabetes mellitus without complication, without long-term current use of insulin ([address]    Allergic rhinitis    Hypertension goal BP (blood pressure) < 140/90    Mixed hyperlipidemia    Chronic obstructive pulmonary disease ([address]    Coronary artery disease involving native coronary artery of native heart without angina pectoris    Primary insomnia    Diarrhea in adult patient    Restless leg syndrome    Atherosclerosis of superior mesenteric artery ([address]    Left adrenal mass ([address]    Former smoker    Chronic bilateral low back pain with left-sided sciatica    Hypothyroidism    S/P CABG x 4    Acute pain of right knee    Abscess of right thigh    Angina pectoris (HCC)    Abnormal stress test    Atrial fibrillation (HCC)    Tobacco use disorder    Neck pain    Chest pain    Acute ischemic left MCA stroke ([address]    Internal carotid artery occlusion    Stenosis of left internal carotid artery    Acquired spondylolisthesis of cervical vertebra    Stenosis of cervical spine with myelopathy (HCC)    Abnormal EKG    COPD exacerbation ([address]    Multifocal pneumonia    Hypoxia       Admission Condition:  guarded  Discharged Condition:  fair    Hospital Course:   65 yo admitted with multifocal pneumonia treated with antibiotics and steroids. Left thoracentesis on 10/5/2021 only yielded 110 mL of light yellow fluid.      Discharge Medications:       Liliane Tweet Category   Home Medication Instructions [facility]:[phone]    Printed on:10/18/21 1004   Medication Information                      albuterol sulfate HFA (PROAIR HFA) 108 (90 Base) MCG/ACT inhaler  Inhale 2 puffs into the lungs every 4 hours as needed for Wheezing May sub covered product             atorvastatin (LIPITOR) 40 MG tablet  Take 2 tablets by mouth daily             budesonide-formoterol (SYMBICORT) 160-4.5 MCG/ACT AERO  Inhale 2 puffs into the lungs 2 times daily             citalopram (CELEXA) 20 MG tablet  Take 1 tablet by mouth daily             clopidogrel (PLAVIX) 75 MG tablet  Take 1 tablet by mouth daily             diclofenac sodium (VOLTAREN) 1 % GEL  Apply 2 g topically 2 times daily as needed for Pain             fluticasone (FLONASE) 50 MCG/ACT nasal spray  USE 2 SPRAYS IN EACH NOSTRIL ONCE DAILY             furosemide (LASIX) 40 MG tablet  TAKE 1 TABLET BY MOUTH DAILY             guaiFENesin (MUCINEX) 600 MG extended release tablet  Take 1 tablet by mouth 2 times daily             ipratropium-albuterol (DUONEB) 0.5-2.5 (3) MG/3ML SOLN nebulizer solution  Inhale 3 mLs into the lungs every 4 hours as needed for Shortness of Breath             isosorbide mononitrate (IMDUR) 30 MG extended release tablet  Take 30 mg by mouth             magnesium oxide (MAG-OX) 400 (241.3 Mg) MG TABS tablet  Take 1 tablet by mouth 2 times daily             metFORMIN (GLUCOPHAGE-XR) 500 MG extended release tablet  Take 1 tablet by mouth daily (with breakfast)             metoprolol tartrate (LOPRESSOR) 25 MG tablet  Take 1 tablet by mouth 2 times daily             nitroGLYCERIN (NITROSTAT) 0.4 MG SL tablet  Place 1 tablet under the tongue every 5 minutes as needed for Chest pain up to max of 3 total doses. If no relief after 1 dose, call 911.              ONE TOUCH ULTRA TEST strip  TEST ONCE DAILY AS NEEDED             pantoprazole (PROTONIX) 40 MG tablet  Take 1 tablet by mouth every morning (before breakfast)             rOPINIRole (REQUIP) 1 MG tablet  TAKE 1 TABLET BY MOUTH EVERY NIGHT AS NEEDED             SM ASPIRIN ADULT LOW STRENGTH 81 MG EC tablet  TAKE 1 TABLET BY MOUTH DAILY             tiZANidine (ZANAFLEX) 2 MG tablet  Take 1 tablet by mouth nightly

## 2021-10-21 ENCOUNTER — OFFICE VISIT (OUTPATIENT)
Dept: ORTHOPEDIC SURGERY | Age: 71
End: 2021-10-21
Payer: MEDICARE

## 2021-10-21 VITALS — RESPIRATION RATE: 14 BRPM | BODY MASS INDEX: 23.73 KG/M2 | HEIGHT: 64 IN | WEIGHT: 139 LBS

## 2021-10-21 DIAGNOSIS — M75.41 IMPINGEMENT SYNDROME OF RIGHT SHOULDER: ICD-10-CM

## 2021-10-21 DIAGNOSIS — M43.12 ACQUIRED SPONDYLOLISTHESIS OF CERVICAL VERTEBRA: ICD-10-CM

## 2021-10-21 DIAGNOSIS — M48.02 STENOSIS OF CERVICAL SPINE WITH MYELOPATHY (HCC): ICD-10-CM

## 2021-10-21 DIAGNOSIS — G99.2 STENOSIS OF CERVICAL SPINE WITH MYELOPATHY (HCC): ICD-10-CM

## 2021-10-21 DIAGNOSIS — M25.511 RIGHT SHOULDER PAIN, UNSPECIFIED CHRONICITY: Primary | ICD-10-CM

## 2021-10-21 DIAGNOSIS — M48.02 SPINAL STENOSIS IN CERVICAL REGION: ICD-10-CM

## 2021-10-21 PROCEDURE — 99213 OFFICE O/P EST LOW 20 MIN: CPT | Performed by: ORTHOPAEDIC SURGERY

## 2021-10-21 PROCEDURE — 20610 DRAIN/INJ JOINT/BURSA W/O US: CPT | Performed by: ORTHOPAEDIC SURGERY

## 2021-10-21 RX ORDER — LIDOCAINE HYDROCHLORIDE 10 MG/ML
2 INJECTION, SOLUTION INFILTRATION; PERINEURAL ONCE
Status: COMPLETED | OUTPATIENT
Start: 2021-10-21 | End: 2021-10-21

## 2021-10-21 RX ORDER — TRAZODONE HYDROCHLORIDE 50 MG/1
50 TABLET ORAL NIGHTLY PRN
COMMUNITY
End: 2022-01-17 | Stop reason: SDUPTHER

## 2021-10-21 RX ORDER — BUPIVACAINE HYDROCHLORIDE 5 MG/ML
2 INJECTION, SOLUTION PERINEURAL ONCE
Status: COMPLETED | OUTPATIENT
Start: 2021-10-21 | End: 2021-10-21

## 2021-10-21 RX ORDER — BETAMETHASONE SODIUM PHOSPHATE AND BETAMETHASONE ACETATE 3; 3 MG/ML; MG/ML
12 INJECTION, SUSPENSION INTRA-ARTICULAR; INTRALESIONAL; INTRAMUSCULAR; SOFT TISSUE ONCE
Status: COMPLETED | OUTPATIENT
Start: 2021-10-21 | End: 2021-10-21

## 2021-10-21 RX ADMIN — LIDOCAINE HYDROCHLORIDE 2 ML: 10 INJECTION, SOLUTION INFILTRATION; PERINEURAL at 15:35

## 2021-10-21 RX ADMIN — BUPIVACAINE HYDROCHLORIDE 10 MG: 5 INJECTION, SOLUTION PERINEURAL at 15:34

## 2021-10-21 RX ADMIN — BETAMETHASONE SODIUM PHOSPHATE AND BETAMETHASONE ACETATE 12 MG: 3; 3 INJECTION, SUSPENSION INTRA-ARTICULAR; INTRALESIONAL; INTRAMUSCULAR; SOFT TISSUE at 15:32
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
## 2021-10-25 NOTE — ED NOTES
Celexa is helping  refilled   Lab paged for second set up blood cultures     Glorine Scheuermann, RN  10/02/21 3483

## 2021-10-25 NOTE — PROGRESS NOTES
Physician Progress Note      Joey Sharpe  CSN #:                  [id number]  :                       1950  ADMIT DATE:       10/2/2021 7:54 AM  DISCH DATE:        10/8/2021 3:39 PM  RESPONDING  PROVIDER #:        Luigi Martinez MD          QUERY TEXT:    Patient admitted with cough and shortness of breath. Noted documentation of   acute hypoxic respiratory failure in 10/4 Pulmonary progress note. In order   to support the diagnosis of acute hypoxic respiratory failure, please include   additional clinical indicators in your documentation. Or please document if   the diagnosis of acute hypoxic respiratory failure has been ruled out after   further study. The medical record reflects the following:  Risk Factors: 66 y.o. female with PMH significant for CHF, COPD, and pulmonary   hypertension. Pt quit smoking cigarettes in 2021 and has a 45-50   pack year smoking history. Clinical Indicators: Pt had SpO2 of 86% on RA upon arrival to ED. Pt was   placed on O2 per NC and has had SpO2 % on 2-4L O2 per NC. 10/2 ED   Pulmonary assessment Pulmonary effort is normal. No respiratory distress. No   stridor. No wheezing. Rales present. 10/4 Pulmonology assessment: Lungs - poor   to fair air entry-breath sounds : coarse crackles heard in left base. Treatment: O2 per NC 2-4 L. Albuterol and Ipratropium q 4 hours, and Symbicort   as needed. Mucinex and acapella.     Acute Respiratory Failure Clinical Indicators per  MS-DRG Training Guide and   Quick Reference Guide:  pO2 < 60 mmHg or SpO2 (pulse oximetry) < 91% breathing room air  pCO2 > 50 and pH < 7.35  P/F ratio (pO2 / FIO2) < 300  pO2 decrease or pCO2 increase by 10 mmHg from baseline (if known)  Supplemental oxygen of 40% or more  Presence of respiratory distress, tachypnea, dyspnea, wheezing  Unable to speak in complete sentences  Use of accessory muscles to breathe  Extreme anxiety and feeling of impending doom  Tripod position  Confusion/altered mental status/obtunded  Options provided:  -- Acute Hypoxic Respiratory Failure as evidenced by, Please document   evidence.   -- Acute Hypoxic Respiratory Failure ruled out after study  -- Other - I will add my own diagnosis  -- Disagree - Not applicable / Not valid  -- Disagree - Clinically unable to determine / Unknown  -- Refer to Clinical Documentation Reviewer    PROVIDER RESPONSE TEXT:    This patient is in acute hypoxic respiratory failure as evidenced by low O2   sat on admission    Query created by: En Ta on 10/5/2021 9:58 AM      Electronically signed by:  Dianne Win MD 10/25/2021 7:54 PM

## 2021-11-26 ENCOUNTER — HOSPITAL ENCOUNTER (INPATIENT)
Age: 71
LOS: 4 days | Discharge: HOME | DRG: 291 | End: 2021-11-30
Attending: EMERGENCY MEDICINE | Admitting: FAMILY MEDICINE
Payer: MEDICARE

## 2021-11-26 ENCOUNTER — APPOINTMENT (OUTPATIENT)
Dept: GENERAL RADIOLOGY | Age: 71
DRG: 291 | End: 2021-11-26
Payer: MEDICARE

## 2021-11-26 DIAGNOSIS — J44.1 COPD EXACERBATION (HCC): Primary | ICD-10-CM

## 2021-11-26 DIAGNOSIS — J18.9 PNEUMONIA DUE TO INFECTIOUS ORGANISM, UNSPECIFIED LATERALITY, UNSPECIFIED PART OF LUNG: ICD-10-CM

## 2021-11-26 LAB
-: NORMAL
ABSOLUTE EOS #: 0.1 K/UL (ref 0–0.4)
ABSOLUTE IMMATURE GRANULOCYTE: ABNORMAL K/UL (ref 0–0.3)
ABSOLUTE LYMPH #: 0.57 K/UL (ref 1–4.8)
ABSOLUTE MONO #: 0.62 K/UL (ref 0.1–1.3)
ANION GAP SERPL CALCULATED.3IONS-SCNC: 8 MMOL/L (ref 9–17)
BASOPHILS # BLD: 1 % (ref 0–2)
BASOPHILS ABSOLUTE: 0.05 K/UL (ref 0–0.2)
BNP INTERPRETATION: ABNORMAL
BUN BLDV-MCNC: 12 MG/DL (ref 8–23)
BUN/CREAT BLD: ABNORMAL (ref 9–20)
CALCIUM SERPL-MCNC: 7 MG/DL (ref 8.6–10.4)
CHLORIDE BLD-SCNC: 112 MMOL/L (ref 98–107)
CO2: 23 MMOL/L (ref 20–31)
CREAT SERPL-MCNC: 0.82 MG/DL (ref 0.5–0.9)
DIFFERENTIAL TYPE: ABNORMAL
EOSINOPHILS RELATIVE PERCENT: 2 % (ref 0–4)
GFR AFRICAN AMERICAN: >60 ML/MIN
GFR NON-AFRICAN AMERICAN: >60 ML/MIN
GFR SERPL CREATININE-BSD FRML MDRD: ABNORMAL ML/MIN/{1.73_M2}
GFR SERPL CREATININE-BSD FRML MDRD: ABNORMAL ML/MIN/{1.73_M2}
GLUCOSE BLD-MCNC: 155 MG/DL (ref 70–99)
HCT VFR BLD CALC: 26 % (ref 36–46)
HEMOGLOBIN: 8.1 G/DL (ref 12–16)
IMMATURE GRANULOCYTES: ABNORMAL %
INFLUENZA A: NOT DETECTED
INFLUENZA B: NOT DETECTED
LYMPHOCYTES # BLD: 11 % (ref 24–44)
MCH RBC QN AUTO: 23.9 PG (ref 26–34)
MCHC RBC AUTO-ENTMCNC: 31.1 G/DL (ref 31–37)
MCV RBC AUTO: 76.9 FL (ref 80–100)
MONOCYTES # BLD: 12 % (ref 1–7)
MORPHOLOGY: ABNORMAL
NRBC AUTOMATED: ABNORMAL PER 100 WBC
PDW BLD-RTO: 18.6 % (ref 11.5–14.9)
PLATELET # BLD: 188 K/UL (ref 150–450)
PLATELET ESTIMATE: ABNORMAL
PMV BLD AUTO: 7.8 FL (ref 6–12)
POTASSIUM SERPL-SCNC: 3.8 MMOL/L (ref 3.7–5.3)
PRO-BNP: ABNORMAL PG/ML
RBC # BLD: 3.38 M/UL (ref 4–5.2)
RBC # BLD: ABNORMAL 10*6/UL
REASON FOR REJECTION: NORMAL
SARS-COV-2 RNA, RT PCR: NOT DETECTED
SEG NEUTROPHILS: 74 % (ref 36–66)
SEGMENTED NEUTROPHILS ABSOLUTE COUNT: 3.86 K/UL (ref 1.3–9.1)
SODIUM BLD-SCNC: 143 MMOL/L (ref 135–144)
SOURCE: NORMAL
SPECIMEN DESCRIPTION: NORMAL
TROPONIN INTERP: ABNORMAL
TROPONIN INTERP: ABNORMAL
TROPONIN T: ABNORMAL NG/ML
TROPONIN T: ABNORMAL NG/ML
TROPONIN, HIGH SENSITIVITY: 36 NG/L (ref 0–14)
TROPONIN, HIGH SENSITIVITY: 44 NG/L (ref 0–14)
WBC # BLD: 5.2 K/UL (ref 3.5–11)
WBC # BLD: ABNORMAL 10*3/UL
ZZ NTE CLEAN UP: ORDERED TEST: NORMAL
ZZ NTE WITH NAME CLEAN UP: SPECIMEN SOURCE: NORMAL

## 2021-11-26 PROCEDURE — 93005 ELECTROCARDIOGRAM TRACING: CPT | Performed by: EMERGENCY MEDICINE

## 2021-11-26 PROCEDURE — 87636 SARSCOV2 & INF A&B AMP PRB: CPT

## 2021-11-26 PROCEDURE — 2580000003 HC RX 258: Performed by: EMERGENCY MEDICINE

## 2021-11-26 PROCEDURE — 94761 N-INVAS EAR/PLS OXIMETRY MLT: CPT

## 2021-11-26 PROCEDURE — 2700000000 HC OXYGEN THERAPY PER DAY

## 2021-11-26 PROCEDURE — 80048 BASIC METABOLIC PNL TOTAL CA: CPT

## 2021-11-26 PROCEDURE — 96375 TX/PRO/DX INJ NEW DRUG ADDON: CPT

## 2021-11-26 PROCEDURE — 36415 COLL VENOUS BLD VENIPUNCTURE: CPT

## 2021-11-26 PROCEDURE — 94640 AIRWAY INHALATION TREATMENT: CPT

## 2021-11-26 PROCEDURE — 85025 COMPLETE CBC W/AUTO DIFF WBC: CPT

## 2021-11-26 PROCEDURE — 99285 EMERGENCY DEPT VISIT HI MDM: CPT

## 2021-11-26 PROCEDURE — 96365 THER/PROPH/DIAG IV INF INIT: CPT

## 2021-11-26 PROCEDURE — 2060000000 HC ICU INTERMEDIATE R&B

## 2021-11-26 PROCEDURE — 2500000003 HC RX 250 WO HCPCS: Performed by: EMERGENCY MEDICINE

## 2021-11-26 PROCEDURE — 87641 MR-STAPH DNA AMP PROBE: CPT

## 2021-11-26 PROCEDURE — 6370000000 HC RX 637 (ALT 250 FOR IP): Performed by: EMERGENCY MEDICINE

## 2021-11-26 PROCEDURE — 71045 X-RAY EXAM CHEST 1 VIEW: CPT

## 2021-11-26 PROCEDURE — 83880 ASSAY OF NATRIURETIC PEPTIDE: CPT

## 2021-11-26 PROCEDURE — 96366 THER/PROPH/DIAG IV INF ADDON: CPT

## 2021-11-26 PROCEDURE — 84484 ASSAY OF TROPONIN QUANT: CPT

## 2021-11-26 PROCEDURE — 6360000002 HC RX W HCPCS: Performed by: EMERGENCY MEDICINE

## 2021-11-26 RX ORDER — METHYLPREDNISOLONE SODIUM SUCCINATE 125 MG/2ML
125 INJECTION, POWDER, LYOPHILIZED, FOR SOLUTION INTRAMUSCULAR; INTRAVENOUS ONCE
Status: COMPLETED | OUTPATIENT
Start: 2021-11-26 | End: 2021-11-26

## 2021-11-26 RX ORDER — ONDANSETRON 2 MG/ML
4 INJECTION INTRAMUSCULAR; INTRAVENOUS EVERY 6 HOURS PRN
Status: DISCONTINUED | OUTPATIENT
Start: 2021-11-26 | End: 2021-11-30 | Stop reason: HOSPADM

## 2021-11-26 RX ORDER — ROPINIROLE 1 MG/1
1 TABLET, FILM COATED ORAL NIGHTLY
Status: DISCONTINUED | OUTPATIENT
Start: 2021-11-26 | End: 2021-11-30 | Stop reason: HOSPADM

## 2021-11-26 RX ORDER — IPRATROPIUM BROMIDE AND ALBUTEROL SULFATE 2.5; .5 MG/3ML; MG/3ML
1 SOLUTION RESPIRATORY (INHALATION) ONCE
Status: COMPLETED | OUTPATIENT
Start: 2021-11-26 | End: 2021-11-26

## 2021-11-26 RX ORDER — TRAZODONE HYDROCHLORIDE 50 MG/1
50 TABLET ORAL NIGHTLY PRN
Status: DISCONTINUED | OUTPATIENT
Start: 2021-11-26 | End: 2021-11-30 | Stop reason: HOSPADM

## 2021-11-26 RX ORDER — BUDESONIDE AND FORMOTEROL FUMARATE DIHYDRATE 160; 4.5 UG/1; UG/1
2 AEROSOL RESPIRATORY (INHALATION) 2 TIMES DAILY
Status: DISCONTINUED | OUTPATIENT
Start: 2021-11-26 | End: 2021-11-30 | Stop reason: HOSPADM

## 2021-11-26 RX ORDER — ATORVASTATIN CALCIUM 80 MG/1
80 TABLET, FILM COATED ORAL DAILY
Status: DISCONTINUED | OUTPATIENT
Start: 2021-11-27 | End: 2021-11-30 | Stop reason: HOSPADM

## 2021-11-26 RX ORDER — CLOPIDOGREL BISULFATE 75 MG/1
75 TABLET ORAL DAILY
Status: DISCONTINUED | OUTPATIENT
Start: 2021-11-27 | End: 2021-11-30 | Stop reason: HOSPADM

## 2021-11-26 RX ORDER — NICOTINE 21 MG/24HR
1 PATCH, TRANSDERMAL 24 HOURS TRANSDERMAL DAILY
Status: DISCONTINUED | OUTPATIENT
Start: 2021-11-27 | End: 2021-11-30 | Stop reason: HOSPADM

## 2021-11-26 RX ORDER — ONDANSETRON 4 MG/1
4 TABLET, ORALLY DISINTEGRATING ORAL EVERY 8 HOURS PRN
Status: DISCONTINUED | OUTPATIENT
Start: 2021-11-26 | End: 2021-11-30 | Stop reason: HOSPADM

## 2021-11-26 RX ORDER — ISOSORBIDE MONONITRATE 30 MG/1
30 TABLET, EXTENDED RELEASE ORAL DAILY
Status: DISCONTINUED | OUTPATIENT
Start: 2021-11-27 | End: 2021-11-30 | Stop reason: HOSPADM

## 2021-11-26 RX ORDER — ASPIRIN 81 MG/1
81 TABLET ORAL DAILY
Status: DISCONTINUED | OUTPATIENT
Start: 2021-11-27 | End: 2021-11-30 | Stop reason: HOSPADM

## 2021-11-26 RX ORDER — FLUTICASONE PROPIONATE 50 MCG
2 SPRAY, SUSPENSION (ML) NASAL DAILY
Status: DISCONTINUED | OUTPATIENT
Start: 2021-11-27 | End: 2021-11-30 | Stop reason: HOSPADM

## 2021-11-26 RX ORDER — FUROSEMIDE 10 MG/ML
40 INJECTION INTRAMUSCULAR; INTRAVENOUS ONCE
Status: COMPLETED | OUTPATIENT
Start: 2021-11-26 | End: 2021-11-26

## 2021-11-26 RX ORDER — LORAZEPAM 2 MG/ML
0.5 INJECTION INTRAMUSCULAR ONCE
Status: COMPLETED | OUTPATIENT
Start: 2021-11-26 | End: 2021-11-26

## 2021-11-26 RX ORDER — GUAIFENESIN 600 MG/1
600 TABLET, EXTENDED RELEASE ORAL 2 TIMES DAILY
Status: DISCONTINUED | OUTPATIENT
Start: 2021-11-26 | End: 2021-11-30 | Stop reason: HOSPADM

## 2021-11-26 RX ORDER — NITROGLYCERIN 20 MG/100ML
5-200 INJECTION INTRAVENOUS CONTINUOUS
Status: DISCONTINUED | OUTPATIENT
Start: 2021-11-26 | End: 2021-11-27

## 2021-11-26 RX ORDER — ACETAMINOPHEN 650 MG/1
650 SUPPOSITORY RECTAL EVERY 6 HOURS PRN
Status: DISCONTINUED | OUTPATIENT
Start: 2021-11-26 | End: 2021-11-30 | Stop reason: HOSPADM

## 2021-11-26 RX ORDER — TIZANIDINE 2 MG/1
2 TABLET ORAL NIGHTLY PRN
Status: DISCONTINUED | OUTPATIENT
Start: 2021-11-26 | End: 2021-11-30 | Stop reason: HOSPADM

## 2021-11-26 RX ORDER — ACETAMINOPHEN 325 MG/1
650 TABLET ORAL EVERY 6 HOURS PRN
Status: DISCONTINUED | OUTPATIENT
Start: 2021-11-26 | End: 2021-11-30 | Stop reason: HOSPADM

## 2021-11-26 RX ORDER — FUROSEMIDE 40 MG/1
40 TABLET ORAL DAILY
Status: DISCONTINUED | OUTPATIENT
Start: 2021-11-27 | End: 2021-11-30 | Stop reason: HOSPADM

## 2021-11-26 RX ORDER — CITALOPRAM 20 MG/1
20 TABLET ORAL DAILY
Status: DISCONTINUED | OUTPATIENT
Start: 2021-11-27 | End: 2021-11-30 | Stop reason: HOSPADM

## 2021-11-26 RX ADMIN — NITROGLYCERIN 25 MCG/MIN: 20 INJECTION INTRAVENOUS at 18:45

## 2021-11-26 RX ADMIN — CEFEPIME HYDROCHLORIDE 2000 MG: 2 INJECTION, POWDER, FOR SOLUTION INTRAVENOUS at 18:30

## 2021-11-26 RX ADMIN — IPRATROPIUM BROMIDE AND ALBUTEROL SULFATE 1 AMPULE: .5; 3 SOLUTION RESPIRATORY (INHALATION) at 15:53

## 2021-11-26 RX ADMIN — FUROSEMIDE 40 MG: 10 INJECTION INTRAMUSCULAR; INTRAVENOUS at 18:30

## 2021-11-26 RX ADMIN — METHYLPREDNISOLONE SODIUM SUCCINATE 125 MG: 125 INJECTION, POWDER, FOR SOLUTION INTRAMUSCULAR; INTRAVENOUS at 18:30

## 2021-11-26 RX ADMIN — VANCOMYCIN HYDROCHLORIDE 1500 MG: 1.5 INJECTION, POWDER, LYOPHILIZED, FOR SOLUTION INTRAVENOUS at 18:45

## 2021-11-26 RX ADMIN — LORAZEPAM 0.5 MG: 2 INJECTION INTRAMUSCULAR; INTRAVENOUS at 20:26

## 2021-11-26 ASSESSMENT — ENCOUNTER SYMPTOMS
SHORTNESS OF BREATH: 1
DIARRHEA: 0
CONSTIPATION: 0
ABDOMINAL PAIN: 0
CHEST TIGHTNESS: 0
COLOR CHANGE: 0
NAUSEA: 0
VOMITING: 0

## 2021-11-26 NOTE — ED NOTES
Mode of arrival (squad #, walk in, police, etc) : LS 2         Chief complaint(s): SOB, respiratory distress         Arrival Note (brief scenario, treatment PTA, etc). : patient was seen and treated at [facility] about 3 weeks ago, she was diagnosed with pneumonia. Patient was given prescriptions, but didn't fill them. Patient has had increased SOB since discharge from [facility]. Patient using accessory muscles and tripoding while in a nonrebreather at 12l/min. Patient states she has hx of COPD and CHF. Patient is alert and oriented x4  .    C= \"Have you ever felt that you should Cut down on your drinking? \"  No  A= \"Have people Annoyed you by criticizing your drinking? \"  No  G= \"Have you ever felt bad or Guilty about your drinking? \"  No  E= \"Have you ever had a drink as an Eye-opener first thing in the morning to steady your nerves or to help a hangover? \"  No      Deferred []      Reason for deferring: N/A    *If yes to two or more: probable alcohol abuse. *      Racquel Brown RN  11/26/21 110 [address], RN  11/26/21 1920

## 2021-11-26 NOTE — ED PROVIDER NOTES
[address]  Emergency Department Encounter  EmergencyMedicine Resident     Pt Name:Graciela Lindsay  MRN: [id number]  [place] 1950  Date of evaluation: 11/26/21  PCP:  Olga Dunn MD    CHIEF COMPLAINT       Chief Complaint   Patient presents with    Shortness of Breath       HISTORY OF PRESENT ILLNESS  (Location/Symptom, Timing/Onset, Context/Setting, Quality, Duration, Modifying Factors, Severity.)      Eliazar Hashimoto is a 66 y.o. female who presents with worsening shortness of breath. Patient was recently evaluated 3 weeks ago with pneumonia, with able to obtain antibiotics. Patient states that she is unable to lay flat, has had increase her oxygen at home. Is a chronic smoker, half pack a day, recently quit 2 days ago as she started feeling worse shortness of breath. Does state that her legs are more swollen. Patient states she feels a little lightheaded, denies any chest pain, denies any abdominal pain, change in urination or bowel habits. PAST MEDICAL / SURGICAL / SOCIAL / FAMILY HISTORY      has a past medical history of Allergic rhinitis, Arthritis, CAD (coronary artery disease), Cerebral artery occlusion with cerebral infarction ([address] CHF (congestive heart failure) ([address] Controlled type 2 diabetes mellitus without complication, without long-term current use of insulin ([address] COPD (chronic obstructive pulmonary disease) ([address] Depression, Former smoker, History of blood transfusion, Hyperlipidemia, Hypertension, Kidney stone, Myocardial infarction ([address] Obesity, Class I, BMI 30.0-34.9 (see actual BMI), Restless leg syndrome, Skin abnormality, Type II or unspecified type diabetes mellitus without mention of complication, not stated as uncontrolled, Wears glasses, and Wears partial dentures. No additional pertinent     has a past surgical history that includes Appendectomy; Hysterectomy;  Cholecystectomy; joint replacement (Bilateral); pr office/outpt visit,procedure only (N/A, 2018); pr incis/drain thigh/knee abscess,deep (Right, 2018); Leg biopsy excision (Right, 2019); Cardiac surgery; Cardiac surgery; other surgical history (2020); cervical laminectomy (N/A, 10/14/2020); and bronchoscopy (N/A, 2021). No additional pertinent    Social History     Socioeconomic History    Marital status:      Spouse name: Not on file    Number of children: Not on file    Years of education: Not on file    Highest education level: Not on file   Occupational History    Not on file   Tobacco Use    Smoking status: Current Every Day Smoker     Packs/day: 0.25     Years: 56.00     Pack years: 14.00     Types: Cigarettes     Last attempt to quit: 2019     Years since quittin.2    Smokeless tobacco: Never Used    Tobacco comment: 4-5 cigarettes a day   Vaping Use    Vaping Use: Former   Substance and Sexual Activity    Alcohol use: No     Alcohol/week: 0.0 standard drinks    Drug use: Yes     Types: Marijuana Champ Cue)     Comment: ocassionally    Sexual activity: Not on file   Other Topics Concern    Not on file   Social History Narrative    Not on file     Social Determinants of Health     Financial Resource Strain:     Difficulty of Paying Living Expenses: Not on file   Food Insecurity:     Worried About Running Out of Food in the Last Year: Not on file    Alphonse of Food in the Last Year: Not on file   Transportation Needs:     Lack of Transportation (Medical): Not on file    Lack of Transportation (Non-Medical):  Not on file   Physical Activity:     Days of Exercise per Week: Not on file    Minutes of Exercise per Session: Not on file   Stress:     Feeling of Stress : Not on file   Social Connections:     Frequency of Communication with Friends and Family: Not on file    Frequency of Social Gatherings with Friends and Family: Not on file    Attends Congregation Services: Not on file    Active Member of Clubs or Organizations: Not on file  Attends Club or Organization Meetings: Not on file    Marital Status: Not on file   Intimate Partner Violence:     Fear of Current or Ex-Partner: Not on file    Emotionally Abused: Not on file    Physically Abused: Not on file    Sexually Abused: Not on file   Housing Stability:     Unable to Pay for Housing in the Last Year: Not on file    Number of [place] in the Last Year: Not on file    Unstable Housing in the Last Year: Not on file       Family History   Problem Relation Age of Onset    Heart Disease Father        Allergies:  Codeine, Lisinopril, Morphine, and Potassium-containing compounds    Home Medications:  Prior to Admission medications    Medication Sig Start Date End Date Taking?  Authorizing Provider   albuterol sulfate  (90 Base) MCG/ACT inhaler INHALE TWO PUFFS BY MOUTH 4 TIMES A DAY AND RPN 11/11/21   Dustin Wilkerson MD   traZODone (DESYREL) 50 MG tablet Take 50 mg by mouth nightly as needed for Sleep    Historical Provider, MD   budesonide-formoterol (SYMBICORT) 160-4.5 MCG/ACT AERO Inhale 2 puffs into the lungs 2 times daily 10/5/21   Dustin Wilkerson MD   ipratropium-albuterol (DUONEB) 0.5-2.5 (3) MG/3ML SOLN nebulizer solution Inhale 3 mLs into the lungs every 4 hours as needed for Shortness of Breath 8/17/21   Dustin Wilkerson MD   metoprolol tartrate (LOPRESSOR) 25 MG tablet Take 1 tablet by mouth 2 times daily 8/17/21   Dustin Wilkerson MD   guaiFENesin [facility]) 600 MG extended release tablet Take 1 tablet by mouth 2 times daily 8/17/21   Dustin Wilkerson MD   magnesium oxide (MAG-OX) 400 (241.3 Mg) MG TABS tablet Take 1 tablet by mouth 2 times daily 8/17/21   Dustin Wilkerson MD   pantoprazole (PROTONIX) 40 MG tablet Take 1 tablet by mouth every morning (before breakfast) 8/18/21   Dustin Wilkerson MD   diclofenac sodium (VOLTAREN) 1 % GEL Apply 2 g topically 2 times daily as needed for Pain    Historical Provider, MD   metFORMIN (GLUCOPHAGE-XR) 500 MG extended release tablet Take 1 tablet by mouth daily (with breakfast) 8/9/21   Marissa Guillaume, MD   citalopram (CELEXA) 20 MG tablet Take 1 tablet by mouth daily 8/9/21   Yvetta Postin, MD   tiZANidine (ZANAFLEX) 2 MG tablet Take 1 tablet by mouth nightly as needed (pain) 8/9/21   Yvetta Markell, MD   nitroGLYCERIN (NITROSTAT) 0.4 MG SL tablet Place 1 tablet under the tongue every 5 minutes as needed for Chest pain up to max of 3 total doses. If no relief after 1 dose, call 911. 8/9/21   Yvetta Postin, MD WOLF ASPIRIN ADULT LOW STRENGTH 81 MG EC tablet TAKE 1 TABLET BY MOUTH DAILY 5/21/21   Yvetta Postin, MD   fluticasone [facility]) 50 MCG/ACT nasal spray USE 2 SPRAYS IN [facility] NOSTRIL ONCE DAILY 5/21/21   Yvetta Postin, MD   clopidogrel (PLAVIX) 75 MG tablet Take 1 tablet by mouth daily 3/18/21   Yvetta Postin, MD   atorvastatin (LIPITOR) 40 MG tablet Take 2 tablets by mouth daily 2/2/21   Yvetta Postin, MD   rOPINIRole (REQUIP) 1 MG tablet TAKE 1 TABLET BY MOUTH EVERY NIGHT AS NEEDED 2/2/21   Yvetta MD Markell   furosemide (LASIX) 40 MG tablet TAKE 1 TABLET BY MOUTH DAILY 11/8/20   Yvetta Postin, MD   ONE TOUCH ULTRA TEST strip TEST ONCE DAILY AS NEEDED 4/6/20   Yvetta Postin, MD   isosorbide mononitrate (IMDUR) 30 MG extended release tablet Take 30 mg by mouth 5/24/19   Historical Provider, MD       REVIEW OF SYSTEMS    (2-9 systems for level 4, 10 or more for level 5)      Review of Systems   Constitutional: Positive for fatigue and fever. Negative for chills. HENT: Negative for congestion. Respiratory: Positive for shortness of breath. Negative for chest tightness. Cardiovascular: Negative for chest pain and leg swelling. Gastrointestinal: Negative for abdominal pain, constipation, diarrhea, nausea and vomiting. Endocrine: Negative for polyuria. Genitourinary: Negative for difficulty urinating.    Skin: Negative for color change. Neurological: Negative for dizziness, weakness, light-headedness and headaches. Psychiatric/Behavioral: Negative for confusion. PHYSICAL EXAM   (up to 7 for level 4, 8 or more for level 5)      INITIAL VITALS:   /69   Pulse 70   Temp 97.6 °F (36.4 °C) (Oral)   Resp 20   Wt 138 lb 14.2 oz (63 kg)   SpO2 95%   BMI 23.84 kg/m²     Physical Exam  Constitutional:       Appearance: Normal appearance. She is obese. HENT:      Head: Normocephalic and atraumatic. Mouth/Throat:      Mouth: Mucous membranes are moist.      Pharynx: Oropharynx is clear. Eyes:      Extraocular Movements: Extraocular movements intact. Conjunctiva/sclera: Conjunctivae normal.   Cardiovascular:      Rate and Rhythm: Normal rate and regular rhythm. Pulses: Normal pulses. Heart sounds: Normal heart sounds. No murmur heard. Pulmonary:      Effort: Pulmonary effort is normal. Tachypnea present. Breath sounds: Decreased breath sounds, wheezing and rhonchi present. Chest:      Chest wall: No tenderness. Abdominal:      General: Bowel sounds are normal. There is no distension. Tenderness: There is no abdominal tenderness. There is no guarding. Musculoskeletal:         General: Normal range of motion. Right lower leg: Edema (1+) present. Left lower leg: Edema (1+) present. Comments: Range of motion noted to be normal with patient's natural movements   Skin:     General: Skin is warm and dry. Findings: No rash (On exposed skin). Neurological:      General: No focal deficit present. Mental Status: She is alert and oriented to person, place, and time.    Psychiatric:         Mood and Affect: Mood normal.         Behavior: Behavior normal.         DIFFERENTIAL  DIAGNOSIS     PLAN (LABS / IMAGING / EKG):  Orders Placed This Encounter   Procedures    COVID-19 & Influenza Combo    MRSA DNA Probe, Nasal    XR CHEST PORTABLE    Basic Metabolic Panel w/ Reflex to MG    Troponin    Brain Natriuretic Peptide    SPECIMEN REJECTION    CBC Auto Differential    Troponin    Basic Metabolic Panel    Brain Natriuretic Peptide    CBC    ADULT DIET; Regular; Low Fat/Low Chol/High Fiber/JOSE RAMON; 1500 ml    Pharmacy to Dose: Vancomycin    Inpatient consult to Primary Care Provider    Inpatient consult to Pulmonology    EKG 12 Lead    Insert peripheral IV    PATIENT STATUS (FROM ED OR OR/PROCEDURAL) Inpatient       MEDICATIONS ORDERED:  Orders Placed This Encounter   Medications    ipratropium-albuterol (DUONEB) nebulizer solution 1 ampule     Order Specific Question:   Initiate RT Bronchodilator Protocol     Answer:    Yes    methylPREDNISolone sodium (SOLU-MEDROL) injection 125 mg    furosemide (LASIX) injection 40 mg    cefepime (MAXIPIME) 2000 mg IVPB minibag     Order Specific Question:   Antimicrobial Indications     Answer:   Pneumonia (HAP)    nitroGLYCERIN 50 mg in dextrose 5% 250 mL infusion    vancomycin (VANCOCIN) intermittent dosing (placeholder)     Order Specific Question:   Antimicrobial Indications     Answer:   Pneumonia (HAP)    vancomycin (VANCOCIN) 1,500 mg in dextrose 5 % 250 mL IVPB (ADDAVIAL)     Order Specific Question:   Antimicrobial Indications     Answer:   Pneumonia (HAP)    vancomycin (VANCOCIN) 1,250 mg in dextrose 5 % 250 mL IVPB (ADDAVIAL)     Order Specific Question:   Antimicrobial Indications     Answer:   Pneumonia (HAP)    LORazepam (ATIVAN) injection 0.5 mg    atorvastatin (LIPITOR) tablet 80 mg    budesonide-formoterol (SYMBICORT) 160-4.5 MCG/ACT inhaler 2 puff    citalopram (CELEXA) tablet 20 mg    clopidogrel (PLAVIX) tablet 75 mg    fluticasone (FLONASE) 50 MCG/ACT nasal spray 2 spray    furosemide (LASIX) tablet 40 mg    guaiFENesin (MUCINEX) extended release tablet 600 mg    isosorbide mononitrate (IMDUR) extended release tablet 30 mg    magnesium oxide (MAG-OX) tablet 400 mg    metoprolol tartrate (LOPRESSOR) tablet 25 mg    rOPINIRole (REQUIP) tablet 1 mg    aspirin EC tablet 81 mg    tiZANidine (ZANAFLEX) tablet 2 mg    traZODone (DESYREL) tablet 50 mg    enoxaparin (LOVENOX) injection 40 mg    OR Linked Order Group     ondansetron (ZOFRAN-ODT) disintegrating tablet 4 mg     ondansetron (ZOFRAN) injection 4 mg    OR Linked Order Group     acetaminophen (TYLENOL) tablet 650 mg     acetaminophen (TYLENOL) suppository 650 mg    nicotine (NICODERM CQ) 14 MG/24HR 1 patch    cefepime (MAXIPIME) 2000 mg IVPB minibag     Order Specific Question:   Antimicrobial Indications     Answer:   Pneumonia (HAP)       DIAGNOSTIC RESULTS / EMERGENCY DEPARTMENT COURSE / MDM   LAB RESULTS:  Results for orders placed or performed during the hospital encounter of 11/26/21   COVID-19 & Influenza Combo    Specimen: Nasopharyngeal Swab   Result Value Ref Range    Specimen Description . NASOPHARYNGEAL SWAB     Source . NASOPHARYNGEAL SWAB     SARS-CoV-2 RNA, RT PCR Not Detected Not Detected    INFLUENZA A Not Detected Not Detected    INFLUENZA B Not Detected Not Detected   Basic Metabolic Panel w/ Reflex to MG   Result Value Ref Range    Glucose 155 (H) 70 - 99 mg/dL    BUN 12 8 - 23 mg/dL    CREATININE 0.82 0.50 - 0.90 mg/dL    Bun/Cre Ratio NOT REPORTED 9 - 20    Calcium 7.0 (L) 8.6 - 10.4 mg/dL    Sodium 143 135 - 144 mmol/L    Potassium 3.8 3.7 - 5.3 mmol/L    Chloride 112 (H) 98 - 107 mmol/L    CO2 23 20 - 31 mmol/L    Anion Gap 8 (L) 9 - 17 mmol/L    GFR Non-African American >60 >60 mL/min    GFR African American >60 >60 mL/min    GFR Comment          GFR Staging NOT REPORTED    Troponin   Result Value Ref Range    Troponin, High Sensitivity 36 (H) 0 - 14 ng/L    Troponin T NOT REPORTED <0.03 ng/mL    Troponin Interp NOT REPORTED    Brain Natriuretic Peptide   Result Value Ref Range    Pro-BNP 12,114 (H) <300 pg/mL    BNP Interpretation NOT REPORTED    SPECIMEN REJECTION   Result Value Ref Range    Specimen Source Santhosh Copping BLOOD     Ordered Test CDP     Reason for Rejection Unable to perform testing: Specimen clotted. - NOT REPORTED    CBC Auto Differential   Result Value Ref Range    WBC 5.2 3.5 - 11.0 k/uL    RBC 3.38 (L) 4.0 - 5.2 m/uL    Hemoglobin 8.1 (L) 12.0 - 16.0 g/dL    Hematocrit 26.0 (L) 36 - 46 %    MCV 76.9 (L) 80 - 100 fL    MCH 23.9 (L) 26 - 34 pg    MCHC 31.1 31 - 37 g/dL    RDW 18.6 (H) 11.5 - 14.9 %    Platelets 572 491 - 823 k/uL    MPV 7.8 6.0 - 12.0 fL    NRBC Automated NOT REPORTED per 100 WBC    Differential Type NOT REPORTED     Immature Granulocytes NOT REPORTED 0 %    Absolute Immature Granulocyte NOT REPORTED 0.00 - 0.30 k/uL    WBC Morphology NOT REPORTED     RBC Morphology NOT REPORTED     Platelet Estimate NOT REPORTED     Seg Neutrophils 74 (H) 36 - 66 %    Lymphocytes 11 (L) 24 - 44 %    Monocytes 12 (H) 1 - 7 %    Eosinophils % 2 0 - 4 %    Basophils 1 0 - 2 %    Segs Absolute 3.86 1.3 - 9.1 k/uL    Absolute Lymph # 0.57 (L) 1.0 - 4.8 k/uL    Absolute Mono # 0.62 0.1 - 1.3 k/uL    Absolute Eos # 0.10 0.0 - 0.4 k/uL    Basophils Absolute 0.05 0.0 - 0.2 k/uL    Morphology ANISOCYTOSIS PRESENT     Morphology MICROCYTOSIS PRESENT     Morphology HYPOCHROMIA PRESENT     Morphology 1+ ELLIPTOCYTES     Morphology 1+ ACANTHOCYTES    Troponin   Result Value Ref Range    Troponin, High Sensitivity 44 (H) 0 - 14 ng/L    Troponin T NOT REPORTED <0.03 ng/mL    Troponin Interp NOT REPORTED    EKG 12 Lead   Result Value Ref Range    Ventricular Rate 81 BPM    Atrial Rate 81 BPM    P-R Interval 132 ms    QRS Duration 96 ms    Q-T Interval 382 ms    QTc Calculation (Bazett) 443 ms    P Axis 20 degrees    R Axis -12 degrees    T Axis 138 degrees       RADIOLOGY:  XR CHEST PORTABLE   Final Result   Cardiomegaly with vascular congestion, bibasilar airspace disease and small   pleural effusions. Findings suggest CHF. Superimposed pneumonia cannot be   excluded.                 EKG  EKG Interpretation    Interpreted by emergency department physician    Rhythm: normal sinus   Rate: normal  Axis: left  Ectopy: none  Conduction: normal  ST Segments: normal  T Waves: flattening in  lateral leads and inversion in  v2  Q Waves: aVl    Clinical Impression: non-specific EKG    Dane Hickey DO     All EKG's are interpreted by the Emergency Department Physician who either signs or Co-signs this chart in the absence of a cardiologist.    EMERGENCY DEPARTMENT COURSE:  ED Course as of 11/27/21 0033   Fri Nov 26, 2021   2058 Discussed patient with patient's pulmonologist Dr. Garth Stafford. Pulmonology following and will follow up on patient. [CR]      ED Course User Index  [CR] Yuliya Vela DO          PROCEDURES:  None    CONSULTS:  PHARMACY TO DOSE VANCOMYCIN  IP CONSULT TO PRIMARY CARE PROVIDER  IP CONSULT TO PULMONOLOGY  IP CONSULT TO HEART FAILURE NURSE/COORDINATOR  IP CONSULT TO DIETITIAN  IP CONSULT TO CARDIOLOGY  IP CONSULT TO PULMONOLOGY  IP CONSULT TO PHARMACY    MEDICAL DECISION MAKING:  Patient presenting with concerns of worsening shortness of breath. Patient came in by EMS on BiPAP, patient was transferred Venturi mask and tolerating well with FiO2 of 70%. Patient was recently admitted for pneumonia and was unable to finish taking her antibiotics. Patient demonstrating here for concerns of hospital-acquired pneumonia at this time, patient was started on vancomycin and cefepime for hospital-acquired pneumonia. .  Patient noted to be afebrile with no leukocytosis. Patient did demonstrate elevated BNP, chest x-ray demonstrated pleural effusions and bibasilar airspace disease with vascular congestion. Concern for CHF exacerbation and patient was started on nitro drip and given 40 of Lasix, patient normally takes 40 Lasix at home. .  Patient received 125 Solu-Medrol for concerns of COPD with her smoking history which been exacerbated by pneumonia causing worsening shortness of breath.   Work-up demonstrated elevated troponin, this was noted to be increasing, most likely secondary to oxygen demand is close to patient's baseline but will need to be continued evaluated. Patient was admitted to Dr. Richi Haley for further care and treatment of CHF, COPD exacerbation, and pneumonia. CRITICAL CARE:  Please see attending note    FINAL IMPRESSION      COPD exacerbation  Pneumonia  CHF exacerbation    DISPOSITION / PLAN     DISPOSITION Admitted 11/26/2021 08:54:58 PM      PATIENT REFERRED TO:  No follow-up provider specified.     DISCHARGE MEDICATIONS:  New Prescriptions    No medications on file       Abbey Major, [address] [place] [address], DO  Emergency Medicine Resident    (Please note that portions of thisnote were completed with a voice recognition program.  Efforts were made to edit the dictations but occasionally words are mis-transcribed.)        Kingston Virgen DO  Resident  11/27/21 3743

## 2021-11-26 NOTE — ED NOTES
Bed: 08  Expected date:   Expected time:   Means of arrival: [facility]  Comments:  LUANN [address] [place], RN  11/26/21 1754

## 2021-11-26 NOTE — ED PROVIDER NOTES
[address] [facility] ED     Emergency Department     Faculty Attestation        I performed a history and physical examination of the patient and discussed management with the resident. I reviewed the residents note and agree with the documented findings and plan of care. Any areas of disagreement are noted on the chart. I was personally present for the key portions of any procedures. I have documented in the chart those procedures where I was not present during the key portions. I have reviewed the emergency nurses triage note. I agree with the chief complaint, past medical history, past surgical history, allergies, medications, social and family history as documented unless otherwise noted below. Documentation of the HPI, Physical Exam and Medical Decision Making performed by medical students or scribes is based on my personal performance of the HPI, PE and MDM. For Physician Assistant/ Nurse Practitioner cases/documentation I have have had a face to face evaluation with this patient and have completed at least one if not all key elements of the E/M (history, physical exam, and MDM). Additional findings are as noted. Pertinent Comments     Primary Care Physician: El Galaviz MD    History: This is a 66 y.o. female who presents to the Emergency Department with complaint of shortness of breath. Physical:     oral temperature is 97.6 °F (36.4 °C). Her blood pressure is 159/92 (abnormal) and her pulse is 97. Her respiration is 31 (abnormal) and oxygen saturation is 100%. 66 y.o. female     Afebrile, nontoxic, tachypneic, oxygen saturation 100% right now on Ventimask. Lower extremity edema noted bilaterally. Impression: COPD versus CHF exacerbation, pneumonia    Plan: Cardiac, metabolic work-up. Will have respiratory evaluate. DuoNeb treatment. Likely admission.       EKG Interpretation    Interpreted by me    Rhythm: normal sinus   Rate: normal  Axis: normal  Ectopy: none  Conduction: normal  ST Segments: no acute change  T Waves: Inversion lead I, aVL  Q Waves: none    Clinical Impression: Nonspecific EKG, T wave inversions consistent with prior EKG    The care is provided during an unprecedented national emergency due to the novel coronavirus, COVID-19.     (Please note that portions of this note were completed with a voice recognition program.  Efforts were made to edit the dictations but occasionally words are mis-transcribed.)    Blake De Leon,   Attending Emergency Physician         Blake De Leon, DO  11/26/21 [address] [facility] [place],[address], DO  11/26/21 7988

## 2021-11-27 LAB
ANION GAP SERPL CALCULATED.3IONS-SCNC: 4 MMOL/L (ref 9–17)
BUN BLDV-MCNC: 15 MG/DL (ref 8–23)
BUN/CREAT BLD: ABNORMAL (ref 9–20)
CALCIUM SERPL-MCNC: 8.7 MG/DL (ref 8.6–10.4)
CHLORIDE BLD-SCNC: 103 MMOL/L (ref 98–107)
CO2: 33 MMOL/L (ref 20–31)
CREAT SERPL-MCNC: 1.12 MG/DL (ref 0.5–0.9)
GFR AFRICAN AMERICAN: 58 ML/MIN
GFR NON-AFRICAN AMERICAN: 48 ML/MIN
GFR SERPL CREATININE-BSD FRML MDRD: ABNORMAL ML/MIN/{1.73_M2}
GFR SERPL CREATININE-BSD FRML MDRD: ABNORMAL ML/MIN/{1.73_M2}
GLUCOSE BLD-MCNC: 259 MG/DL (ref 70–99)
HCT VFR BLD CALC: 30.2 % (ref 36–46)
HEMOGLOBIN: 9.4 G/DL (ref 12–16)
IRON SATURATION: 8 % (ref 20–55)
IRON: 26 UG/DL (ref 37–145)
MCH RBC QN AUTO: 23.5 PG (ref 26–34)
MCHC RBC AUTO-ENTMCNC: 31.2 G/DL (ref 31–37)
MCV RBC AUTO: 75.4 FL (ref 80–100)
MRSA, DNA, NASAL: NORMAL
NRBC AUTOMATED: ABNORMAL PER 100 WBC
PDW BLD-RTO: 18.7 % (ref 11.5–14.9)
PLATELET # BLD: 327 K/UL (ref 150–450)
PMV BLD AUTO: 8 FL (ref 6–12)
POTASSIUM SERPL-SCNC: 5 MMOL/L (ref 3.7–5.3)
RBC # BLD: 4.01 M/UL (ref 4–5.2)
SODIUM BLD-SCNC: 140 MMOL/L (ref 135–144)
SPECIMEN DESCRIPTION: NORMAL
TOTAL IRON BINDING CAPACITY: 342 UG/DL (ref 250–450)
UNSATURATED IRON BINDING CAPACITY: 316 UG/DL (ref 112–347)
WBC # BLD: 4.4 K/UL (ref 3.5–11)

## 2021-11-27 PROCEDURE — 36415 COLL VENOUS BLD VENIPUNCTURE: CPT

## 2021-11-27 PROCEDURE — 83550 IRON BINDING TEST: CPT

## 2021-11-27 PROCEDURE — 6360000002 HC RX W HCPCS: Performed by: FAMILY MEDICINE

## 2021-11-27 PROCEDURE — 2580000003 HC RX 258: Performed by: FAMILY MEDICINE

## 2021-11-27 PROCEDURE — 80048 BASIC METABOLIC PNL TOTAL CA: CPT

## 2021-11-27 PROCEDURE — 2500000003 HC RX 250 WO HCPCS: Performed by: FAMILY MEDICINE

## 2021-11-27 PROCEDURE — 85027 COMPLETE CBC AUTOMATED: CPT

## 2021-11-27 PROCEDURE — 2060000000 HC ICU INTERMEDIATE R&B

## 2021-11-27 PROCEDURE — 6370000000 HC RX 637 (ALT 250 FOR IP): Performed by: FAMILY MEDICINE

## 2021-11-27 PROCEDURE — 83540 ASSAY OF IRON: CPT

## 2021-11-27 PROCEDURE — 94640 AIRWAY INHALATION TREATMENT: CPT

## 2021-11-27 RX ORDER — SODIUM CHLORIDE 9 MG/ML
25 INJECTION, SOLUTION INTRAVENOUS PRN
Status: DISCONTINUED | OUTPATIENT
Start: 2021-11-27 | End: 2021-11-30 | Stop reason: HOSPADM

## 2021-11-27 RX ORDER — SODIUM CHLORIDE 0.9 % (FLUSH) 0.9 %
5-40 SYRINGE (ML) INJECTION PRN
Status: DISCONTINUED | OUTPATIENT
Start: 2021-11-27 | End: 2021-11-30 | Stop reason: HOSPADM

## 2021-11-27 RX ORDER — IPRATROPIUM BROMIDE AND ALBUTEROL SULFATE 2.5; .5 MG/3ML; MG/3ML
3 SOLUTION RESPIRATORY (INHALATION) EVERY 4 HOURS PRN
Status: DISCONTINUED | OUTPATIENT
Start: 2021-11-27 | End: 2021-11-30 | Stop reason: HOSPADM

## 2021-11-27 RX ORDER — METFORMIN HYDROCHLORIDE 500 MG/1
500 TABLET, EXTENDED RELEASE ORAL
Status: DISCONTINUED | OUTPATIENT
Start: 2021-11-27 | End: 2021-11-30 | Stop reason: HOSPADM

## 2021-11-27 RX ORDER — NITROGLYCERIN 0.4 MG/1
0.4 TABLET SUBLINGUAL EVERY 5 MIN PRN
Status: DISCONTINUED | OUTPATIENT
Start: 2021-11-27 | End: 2021-11-30 | Stop reason: HOSPADM

## 2021-11-27 RX ORDER — POLYETHYLENE GLYCOL 3350 17 G/17G
17 POWDER, FOR SOLUTION ORAL DAILY PRN
Status: DISCONTINUED | OUTPATIENT
Start: 2021-11-27 | End: 2021-11-30 | Stop reason: HOSPADM

## 2021-11-27 RX ORDER — SODIUM CHLORIDE 0.9 % (FLUSH) 0.9 %
5-40 SYRINGE (ML) INJECTION EVERY 12 HOURS SCHEDULED
Status: DISCONTINUED | OUTPATIENT
Start: 2021-11-27 | End: 2021-11-30 | Stop reason: HOSPADM

## 2021-11-27 RX ORDER — PANTOPRAZOLE SODIUM 40 MG/1
40 TABLET, DELAYED RELEASE ORAL
Status: DISCONTINUED | OUTPATIENT
Start: 2021-11-27 | End: 2021-11-30 | Stop reason: HOSPADM

## 2021-11-27 RX ADMIN — CLOPIDOGREL BISULFATE 75 MG: 75 TABLET ORAL at 08:27

## 2021-11-27 RX ADMIN — METOPROLOL TARTRATE 25 MG: 25 TABLET, FILM COATED ORAL at 20:56

## 2021-11-27 RX ADMIN — BUDESONIDE AND FORMOTEROL FUMARATE DIHYDRATE 2 PUFF: 160; 4.5 AEROSOL RESPIRATORY (INHALATION) at 21:03

## 2021-11-27 RX ADMIN — ASPIRIN 81 MG: 81 TABLET, COATED ORAL at 08:27

## 2021-11-27 RX ADMIN — Medication 400 MG: at 20:56

## 2021-11-27 RX ADMIN — METFORMIN HYDROCHLORIDE 500 MG: 500 TABLET, EXTENDED RELEASE ORAL at 11:27

## 2021-11-27 RX ADMIN — SODIUM CHLORIDE, PRESERVATIVE FREE 10 ML: 5 INJECTION INTRAVENOUS at 21:04

## 2021-11-27 RX ADMIN — FUROSEMIDE 40 MG: 40 TABLET ORAL at 08:27

## 2021-11-27 RX ADMIN — PANTOPRAZOLE SODIUM 40 MG: 40 TABLET, DELAYED RELEASE ORAL at 11:27

## 2021-11-27 RX ADMIN — TRAZODONE HYDROCHLORIDE 50 MG: 50 TABLET ORAL at 00:13

## 2021-11-27 RX ADMIN — Medication 400 MG: at 08:27

## 2021-11-27 RX ADMIN — ROPINIROLE HYDROCHLORIDE 1 MG: 1 TABLET, FILM COATED ORAL at 20:56

## 2021-11-27 RX ADMIN — GUAIFENESIN 600 MG: 600 TABLET, EXTENDED RELEASE ORAL at 00:13

## 2021-11-27 RX ADMIN — Medication 400 MG: at 00:13

## 2021-11-27 RX ADMIN — METOPROLOL TARTRATE 25 MG: 25 TABLET, FILM COATED ORAL at 08:26

## 2021-11-27 RX ADMIN — CEFEPIME HYDROCHLORIDE 2000 MG: 2 INJECTION, POWDER, FOR SOLUTION INTRAVENOUS at 18:36

## 2021-11-27 RX ADMIN — ROPINIROLE HYDROCHLORIDE 1 MG: 1 TABLET, FILM COATED ORAL at 00:15

## 2021-11-27 RX ADMIN — METOPROLOL TARTRATE 25 MG: 25 TABLET, FILM COATED ORAL at 00:13

## 2021-11-27 RX ADMIN — ATORVASTATIN CALCIUM 80 MG: 80 TABLET, FILM COATED ORAL at 08:27

## 2021-11-27 RX ADMIN — NITROGLYCERIN 25 MCG/MIN: 20 INJECTION INTRAVENOUS at 15:09

## 2021-11-27 RX ADMIN — GUAIFENESIN 600 MG: 600 TABLET, EXTENDED RELEASE ORAL at 08:27

## 2021-11-27 RX ADMIN — ENOXAPARIN SODIUM 40 MG: 100 INJECTION SUBCUTANEOUS at 08:27

## 2021-11-27 RX ADMIN — GUAIFENESIN 600 MG: 600 TABLET, EXTENDED RELEASE ORAL at 20:56

## 2021-11-27 RX ADMIN — IPRATROPIUM BROMIDE AND ALBUTEROL SULFATE 3 ML: .5; 3 SOLUTION RESPIRATORY (INHALATION) at 23:11

## 2021-11-27 RX ADMIN — CEFEPIME HYDROCHLORIDE 2000 MG: 2 INJECTION, POWDER, FOR SOLUTION INTRAVENOUS at 06:57

## 2021-11-27 NOTE — ED NOTES
Dr Kanu Simmons aware of lowered blood pressure in relation to nitroglycerin gtt. States cardiology needs to be informed as they initiated the nitroglycerin order. Aware patient is still awaiting admission bed assignment.      So Trinh RN  11/27/21 3952

## 2021-11-27 NOTE — PROGRESS NOTES
Vancomycin Dosing by Pharmacy - Daily Note   Vancomycin Therapy Day:  2  Indication: HAP pneumonia    Allergies:  Codeine, Lisinopril, Morphine, and Potassium-containing compounds   Actual Weight:    Wt Readings from Last 1 Encounters:   11/26/21 138 lb 14.2 oz (63 kg)       Labs/Ancillary Data  Estimated Creatinine Clearance: 39 mL/min (A) (based on SCr of 1.12 mg/dL (H)). Recent Labs     11/26/21  1631 11/26/21  1650 11/27/21  0844   CREATININE 0.82  --  1.12*   BUN 12  --  15   WBC  --  5.2 4.4     Procalcitonin   Date Value Ref Range Status   10/04/2021 0.07 <0.09 ng/mL Final     Comment:           Suspected Sepsis:  <0.50 ng/mL     Low likelihood of sepsis. 0.50-2.00 ng/mL     Increased likelihood of sepsis. Antibiotics encouraged. >2.00 ng/mL     High risk of sepsis/shock. Antibiotics strongly encouraged. Suspected Lower Resp Tract Infections:  <0.24 ng/mL     Low likelihood of bacterial infection. >0.24 ng/mL     Increased likelihood of bacterial infection. Antibiotics encouraged. With successful antibiotic therapy, PCT levels should decrease rapidly. (Half-life of 24 to   36 hours.)        Procalcitonin values from samples collected within the first 6 hours of systemic infection   may still be low. Retesting may be indicated. Values from day 1 and day 4 can be entered into the Change in Procalcitonin Calculator   (www."[employer]s-pct-calculator. [employer]) to determine the patient's Mortality Risk Prognosis        In healthy neonates, plasma Procalcitonin (PCT) concentrations increase gradually after   birth, reaching peak values at about 24 hours of age then decrease to normal values below   0.5 ng/mL by 48-72 hours of age.          Intake/Output Summary (Last 24 hours) at 11/27/2021 0958  Last data filed at 11/27/2021 0019  Gross per 24 hour   Intake 300 ml   Output 2200 ml   Net -1900 ml     Temp: none recorded    Culture Date / Source  /  Results  See micro  Recent vancomycin administrations vancomycin (VANCOCIN) 1,500 mg in dextrose 5 % 250 mL IVPB (ADDAVIAL) (mg) 1,500 mg New Bag 11/26/21 1845                    Vancomycin Concentrations:   TROUGH:  No results for input(s): VANCOTROUGH in the last 72 hours. RANDOM:  No results for input(s): VANCORANDOM in the last 72 hours. MRSA Nasal Swab: was ordered by provider, awaiting results. Jyoti Rob PLAN     Decrease dose to 1000 mg q24h IV and extend inteval on this dose to start at 2300 hrs to give time for trough to lower since the srcr went up. Ensured BUN/sCr ordered at baseline and every 48 hours x at least 3 levels, then at least weekly. Repeat vancomycin concentration ordered for 11/28/21 @ 0600   Pharmacy will continue to monitor patient and adjust therapy as indicated      Vancomycin Target Concentration Parameters  Treatment  Population Target AUC/TERRIE Target Trough   Invasive MRSA Infection (bacteremia, pneumonia, meningitis, endocarditis, osteomyelitis)  Sepsis (undifferentiated) 400-600 N/A   Infection due to non-MRSA pathogen  Empiric treatment of non-invasive MRSA infection  (SSTI, UTI) <500 10-15 mg/L   CrCl < 29 mL/min  Rapidly fluctuating serum creatinine   KELSEY N/A < 15 mg/L     Renal replacement therapy is dosed by levels, per hospital protocol. Abbreviations  * Pauc: probability that AUC is >400 (efficacy); Pconc: probability that Ctrough is above 20 ?g/mL (toxicity); Tox: Probability of nephrotoxicity, based on Estuardo et al. Clin Infect Dis 2009. Loading dose: N/A  Regimen: 1000 mg IV every 24 hours. Start time: 09:54 on 11/27/2021  Exposure target: AUC24 (range)400-600 mg/L.hr   AUC24,ss: 481 mg/L.hr  Probability of AUC24 > 400: 70 %  Ctrough,ss: 14.6 mg/L  Probability of Ctrough,ss > 20: 23 %  Probability of nephrotoxicity (Lodise PATRIC 2009): 10 %      Thank you for the consult. Pharmacy will continue to follow. Rik Garay. Ph.  11/27/2021  9:59 AM

## 2021-11-27 NOTE — ED NOTES
Report given to Mehrdad Flores RN from ED. Report method in person   The following was reviewed with receiving RN:   Current vital signs:  BP (!) 149/97   Pulse 78   Temp 97.6 °F (36.4 °C) (Oral)   Resp 25   Wt 138 lb 14.2 oz (63 kg)   SpO2 99%   BMI 23.84 kg/m²                MEWS Score: 3     Any medication or safety alerts were reviewed. Any pending diagnostics and notifications were also reviewed, as well as any safety concerns or issues, abnormal labs, abnormal imaging, and abnormal assessment findings. Questions were answered.             Crispin Mancera RN  11/26/21 4802

## 2021-11-27 NOTE — PROGRESS NOTES
Vancomycin Dosing by Pharmacy - Initial Note   TODAY'S DATE:  11/26/2021  Patient name, age:  Raul Hathaway, 66 y.o. Indication: Respiratory infection, MRSA suspected  Additional antimicrobials:  cefepime    Allergies:  Codeine, Lisinopril, Morphine, and Potassium-containing compounds   Actual Weight:    Wt Readings from Last 1 Encounters:   11/26/21 138 lb 14.2 oz (63 kg)     Labs/Ancillary Data  Estimated Creatinine Clearance: 53 mL/min (based on SCr of 0.82 mg/dL). Recent Labs     11/26/21  1631 11/26/21  1650   CREATININE 0.82  --    BUN 12  --    WBC  --  5.2     Procalcitonin   Date Value Ref Range Status   10/04/2021 0.07 <0.09 ng/mL Final     Comment:           Suspected Sepsis:  <0.50 ng/mL     Low likelihood of sepsis. 0.50-2.00 ng/mL     Increased likelihood of sepsis. Antibiotics encouraged. >2.00 ng/mL     High risk of sepsis/shock. Antibiotics strongly encouraged. Suspected Lower Resp Tract Infections:  <0.24 ng/mL     Low likelihood of bacterial infection. >0.24 ng/mL     Increased likelihood of bacterial infection. Antibiotics encouraged. With successful antibiotic therapy, PCT levels should decrease rapidly. (Half-life of 24 to   36 hours.)        Procalcitonin values from samples collected within the first 6 hours of systemic infection   may still be low. Retesting may be indicated. Values from day 1 and day 4 can be entered into the Change in Procalcitonin Calculator   (www.[employer]-pct-calculator. [employer]) to determine the patient's Mortality Risk Prognosis        In healthy neonates, plasma Procalcitonin (PCT) concentrations increase gradually after   birth, reaching peak values at about 24 hours of age then decrease to normal values below   0.5 ng/mL by 48-72 hours of age.        No intake or output data in the 24 hours ending 11/26/21 1858  Temp: 97.6 F    Recent vancomycin administrations                     vancomycin (VANCOCIN) 1,500 mg in dextrose 5 % 250 mL IVPB (ADDAVIAL) (mg) 1,500 mg New Bag 11/26/21 1845                  Culture Date / Source  /  Results  See micro    MRSA Nasal Swab: not ordered. Order placed by pharmacy. Mayank Amend PLAN   Initial loading dose of 25mg/kg (max of 2,500 mg) = 1500  mg  x 1, then  vancomycin 1250 mg IV every 24 hours. Ensured BUN/sCr ordered at baseline and every 48 hours x at least 3 levels, then at least weekly. Vancomycin level ordered for 11/28/2021 @ 0600. Vancomycin Target Concentration Parameters  Treatment  Population Target AUC/TERRIE Target Trough   Invasive MRSA Infection (bacteremia, pneumonia, meningitis, endocarditis, osteomyelitis)  Sepsis (undifferentiated) 400-600 N/A   Infection due to non-MRSA pathogen  Empiric treatment of non-invasive MRSA infection  (SSTI, UTI) <500 10-15 mg/L   CrCl < 29 mL/min  Rapidly fluctuating serum creatinine   KELSEY N/A < 15 mg/L     Renal replacement therapy is dosed by levels, per hospital protocol. Abbreviations  * Pauc: probability that AUC is >400 (efficacy); Pconc: probability that Ctrough is above 20 ?g/mL (toxicity); Tox: Probability of nephrotoxicity, based on Estuardo et al. Clin Infect Dis 2009. Loading dose: 1500 mg at 18:16 11/26/2021. Regimen: 1250 mg IV every 24 hours. Start time: 19:01 on 11/26/2021  Exposure target: AUC24 (range)400-600 mg/L.hr   AUC24,ss: 465 mg/L.hr  Probability of AUC24 > 400: 67 %  Ctrough,ss: 13 mg/L  Probability of Ctrough,ss > 20: 16 %  Probability of nephrotoxicity (Estuardo PATRIC 2009): 8 %    Thank you for the consult. Pharmacy will continue to follow. Claudie Shark Huey Gaucher. Ph.  11/26/2021  7:03 PM

## 2021-11-27 NOTE — ED NOTES
Encouraged to put her oxygen back on. States she will when finished with her lunch tray.      Rajendra Shea RN  11/27/21 5181

## 2021-11-27 NOTE — ED NOTES
Introduced self to patient as her primary nurse this morning. Aware we are awaiting admission ICU bed assignment. Aware ICU is full at this time. Denies needs or discomforts. Breakfast tray provided. Nitro gtt maintaining BP at parameters.       Lashonda Hernandez RN  11/27/21 3171

## 2021-11-27 NOTE — ED NOTES
Dr Terrence Ríos, cardiology, aware of consult. Order obtained to discontinue nitroglycerin gtt.  Will see patient in the AM.      Keira Obrien RN  11/27/21 2016

## 2021-11-27 NOTE — ED NOTES
Report given to [address] [place] [address] from ED. Report method in person   The following was reviewed with receiving RN:   Current vital signs:  /69   Pulse 70   Temp 97.6 °F (36.4 °C) (Oral)   Resp 20   Wt 138 lb 14.2 oz (63 kg)   SpO2 95%   BMI 23.84 kg/m²                MEWS Score: 3     Any medication or safety alerts were reviewed. Any pending diagnostics and notifications were also reviewed, as well as any safety concerns or issues, abnormal labs, abnormal imaging, and abnormal assessment findings. Questions were answered.             Mariah Saenz RN  11/27/21 4803
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
## 2021-11-28 LAB
ANION GAP SERPL CALCULATED.3IONS-SCNC: 4 MMOL/L (ref 9–17)
BUN BLDV-MCNC: 24 MG/DL (ref 8–23)
BUN/CREAT BLD: ABNORMAL (ref 9–20)
CALCIUM SERPL-MCNC: 8.8 MG/DL (ref 8.6–10.4)
CHLORIDE BLD-SCNC: 102 MMOL/L (ref 98–107)
CO2: 34 MMOL/L (ref 20–31)
CREAT SERPL-MCNC: 1.37 MG/DL (ref 0.5–0.9)
GFR AFRICAN AMERICAN: 46 ML/MIN
GFR NON-AFRICAN AMERICAN: 38 ML/MIN
GFR SERPL CREATININE-BSD FRML MDRD: ABNORMAL ML/MIN/{1.73_M2}
GFR SERPL CREATININE-BSD FRML MDRD: ABNORMAL ML/MIN/{1.73_M2}
GLUCOSE BLD-MCNC: 163 MG/DL (ref 70–99)
HCT VFR BLD CALC: 30.1 % (ref 36–46)
HEMOGLOBIN: 9.5 G/DL (ref 12–16)
MAGNESIUM: 1.9 MG/DL (ref 1.6–2.6)
MCH RBC QN AUTO: 23.5 PG (ref 26–34)
MCHC RBC AUTO-ENTMCNC: 31.4 G/DL (ref 31–37)
MCV RBC AUTO: 74.8 FL (ref 80–100)
NRBC AUTOMATED: ABNORMAL PER 100 WBC
PDW BLD-RTO: 18.7 % (ref 11.5–14.9)
PLATELET # BLD: 282 K/UL (ref 150–450)
PMV BLD AUTO: 7.8 FL (ref 6–12)
POTASSIUM SERPL-SCNC: 4.8 MMOL/L (ref 3.7–5.3)
RBC # BLD: 4.02 M/UL (ref 4–5.2)
SODIUM BLD-SCNC: 140 MMOL/L (ref 135–144)
WBC # BLD: 6.1 K/UL (ref 3.5–11)

## 2021-11-28 PROCEDURE — 36415 COLL VENOUS BLD VENIPUNCTURE: CPT

## 2021-11-28 PROCEDURE — 80048 BASIC METABOLIC PNL TOTAL CA: CPT

## 2021-11-28 PROCEDURE — 6370000000 HC RX 637 (ALT 250 FOR IP): Performed by: NURSE PRACTITIONER

## 2021-11-28 PROCEDURE — 83735 ASSAY OF MAGNESIUM: CPT

## 2021-11-28 PROCEDURE — 6370000000 HC RX 637 (ALT 250 FOR IP): Performed by: FAMILY MEDICINE

## 2021-11-28 PROCEDURE — 2700000000 HC OXYGEN THERAPY PER DAY

## 2021-11-28 PROCEDURE — 2580000003 HC RX 258: Performed by: FAMILY MEDICINE

## 2021-11-28 PROCEDURE — 6360000002 HC RX W HCPCS: Performed by: FAMILY MEDICINE

## 2021-11-28 PROCEDURE — 1200000000 HC SEMI PRIVATE

## 2021-11-28 PROCEDURE — 94761 N-INVAS EAR/PLS OXIMETRY MLT: CPT

## 2021-11-28 PROCEDURE — 6360000002 HC RX W HCPCS: Performed by: NURSE PRACTITIONER

## 2021-11-28 PROCEDURE — 99223 1ST HOSP IP/OBS HIGH 75: CPT | Performed by: FAMILY MEDICINE

## 2021-11-28 PROCEDURE — 85027 COMPLETE CBC AUTOMATED: CPT

## 2021-11-28 PROCEDURE — 94640 AIRWAY INHALATION TREATMENT: CPT

## 2021-11-28 RX ORDER — METHYLPREDNISOLONE SODIUM SUCCINATE 40 MG/ML
40 INJECTION, POWDER, LYOPHILIZED, FOR SOLUTION INTRAMUSCULAR; INTRAVENOUS EVERY 12 HOURS
Status: DISCONTINUED | OUTPATIENT
Start: 2021-11-28 | End: 2021-11-29

## 2021-11-28 RX ORDER — FERROUS SULFATE 325(65) MG
325 TABLET ORAL 2 TIMES DAILY WITH MEALS
Status: DISCONTINUED | OUTPATIENT
Start: 2021-11-28 | End: 2021-11-30 | Stop reason: HOSPADM

## 2021-11-28 RX ORDER — IPRATROPIUM BROMIDE AND ALBUTEROL SULFATE 2.5; .5 MG/3ML; MG/3ML
1 SOLUTION RESPIRATORY (INHALATION)
Status: DISCONTINUED | OUTPATIENT
Start: 2021-11-28 | End: 2021-11-30 | Stop reason: HOSPADM

## 2021-11-28 RX ADMIN — GUAIFENESIN 600 MG: 600 TABLET, EXTENDED RELEASE ORAL at 09:14

## 2021-11-28 RX ADMIN — ROPINIROLE HYDROCHLORIDE 1 MG: 1 TABLET, FILM COATED ORAL at 20:52

## 2021-11-28 RX ADMIN — FERROUS SULFATE TAB 325 MG (65 MG ELEMENTAL FE) 325 MG: 325 (65 FE) TAB at 12:11

## 2021-11-28 RX ADMIN — CLOPIDOGREL BISULFATE 75 MG: 75 TABLET ORAL at 09:14

## 2021-11-28 RX ADMIN — METHYLPREDNISOLONE SODIUM SUCCINATE 40 MG: 40 INJECTION, POWDER, FOR SOLUTION INTRAMUSCULAR; INTRAVENOUS at 15:31

## 2021-11-28 RX ADMIN — METOPROLOL TARTRATE 25 MG: 25 TABLET, FILM COATED ORAL at 20:52

## 2021-11-28 RX ADMIN — PANTOPRAZOLE SODIUM 40 MG: 40 TABLET, DELAYED RELEASE ORAL at 05:43

## 2021-11-28 RX ADMIN — GUAIFENESIN 600 MG: 600 TABLET, EXTENDED RELEASE ORAL at 20:52

## 2021-11-28 RX ADMIN — SODIUM CHLORIDE, PRESERVATIVE FREE 10 ML: 5 INJECTION INTRAVENOUS at 09:16

## 2021-11-28 RX ADMIN — CEFEPIME HYDROCHLORIDE 2000 MG: 2 INJECTION, POWDER, FOR SOLUTION INTRAVENOUS at 17:08

## 2021-11-28 RX ADMIN — TRAZODONE HYDROCHLORIDE 50 MG: 50 TABLET ORAL at 01:01

## 2021-11-28 RX ADMIN — Medication 400 MG: at 20:52

## 2021-11-28 RX ADMIN — METFORMIN HYDROCHLORIDE 500 MG: 500 TABLET, EXTENDED RELEASE ORAL at 09:14

## 2021-11-28 RX ADMIN — SODIUM CHLORIDE, PRESERVATIVE FREE 10 ML: 5 INJECTION INTRAVENOUS at 20:53

## 2021-11-28 RX ADMIN — ENOXAPARIN SODIUM 40 MG: 100 INJECTION SUBCUTANEOUS at 09:13

## 2021-11-28 RX ADMIN — METOPROLOL TARTRATE 25 MG: 25 TABLET, FILM COATED ORAL at 09:14

## 2021-11-28 RX ADMIN — CEFEPIME HYDROCHLORIDE 2000 MG: 2 INJECTION, POWDER, FOR SOLUTION INTRAVENOUS at 05:43

## 2021-11-28 RX ADMIN — FUROSEMIDE 40 MG: 40 TABLET ORAL at 09:14

## 2021-11-28 RX ADMIN — FERROUS SULFATE TAB 325 MG (65 MG ELEMENTAL FE) 325 MG: 325 (65 FE) TAB at 17:08

## 2021-11-28 RX ADMIN — ISOSORBIDE MONONITRATE 30 MG: 30 TABLET, EXTENDED RELEASE ORAL at 09:14

## 2021-11-28 RX ADMIN — IPRATROPIUM BROMIDE AND ALBUTEROL SULFATE 1 AMPULE: .5; 3 SOLUTION RESPIRATORY (INHALATION) at 20:26

## 2021-11-28 RX ADMIN — Medication 400 MG: at 09:14

## 2021-11-28 RX ADMIN — ATORVASTATIN CALCIUM 80 MG: 80 TABLET, FILM COATED ORAL at 09:14

## 2021-11-28 RX ADMIN — CITALOPRAM HYDROBROMIDE 20 MG: 20 TABLET ORAL at 09:14

## 2021-11-28 RX ADMIN — ASPIRIN 81 MG: 81 TABLET, COATED ORAL at 09:14

## 2021-11-28 RX ADMIN — IPRATROPIUM BROMIDE AND ALBUTEROL SULFATE 3 ML: .5; 3 SOLUTION RESPIRATORY (INHALATION) at 08:33

## 2021-11-28 RX ADMIN — BUDESONIDE AND FORMOTEROL FUMARATE DIHYDRATE 2 PUFF: 160; 4.5 AEROSOL RESPIRATORY (INHALATION) at 20:26

## 2021-11-28 RX ADMIN — BUDESONIDE AND FORMOTEROL FUMARATE DIHYDRATE 2 PUFF: 160; 4.5 AEROSOL RESPIRATORY (INHALATION) at 08:32

## 2021-11-28 RX ADMIN — IPRATROPIUM BROMIDE AND ALBUTEROL SULFATE 1 AMPULE: .5; 3 SOLUTION RESPIRATORY (INHALATION) at 15:22

## 2021-11-28 NOTE — ED NOTES
Report given to Mirta Roberts RN from PCU  Report method by phone   The following was reviewed with receiving RN: IV access x2, IV antibiotics,Pulmonology and cardiology consults. Supplemental oxygen 40% veti-mask. Current vital signs:  /63   Pulse 63   Temp 98.3 °F (36.8 °C) (Oral)   Resp 17   Wt 138 lb 14.2 oz (63 kg)   SpO2 99%   BMI 23.84 kg/m²                MEWS Score: 1     Any medication or safety alerts were reviewed. Any pending diagnostics and notifications were also reviewed, as well as any safety concerns or issues, abnormal labs, abnormal imaging, and abnormal assessment findings. Questions were answered.             Avani Bacon RN  11/27/21 9584

## 2021-11-28 NOTE — CARE COORDINATION
CASE MANAGEMENT NOTE:    Admission Date:  11/26/2021 Serena Bird is a 66 y.o.  female    Admitted for : Pneumonia [J18.9]  COPD exacerbation ([facility] [address] [J44.1]  Pneumonia due to infectious organism, unspecified laterality, unspecified part of lung [J18.9]    Met with:  Patient    PCP:  Leann Chambers                                Insurance:  [facility]      Is patient alert and oriented at time of discussion:  Yes    Current Residence/ Living Arrangements:  independently at home, has Roommate, Currently selling her home. Current Services PTA:  No    Does patient go to outpatient dialysis: No  If yes, location and chair time: NA    Is patient agreeable to VNS: No    Freedom of choice provided:  No    List of [address] provided: No    VNS chosen:  Pt has Had [address], in the past, denies the need at this time. DME:  walker    Home Oxygen: Yes,Wears 2LNC, at HS & PRN. Has Port/Concentrator. Last admission, was sent home on Liquid Oxygen. Nebulizer: Yes    CPAP/BIPAP: No    Supplier: [facility]    Potential Assistance Needed: No    SNF needed: No    Freedom of choice and list provided: NA    Pharmacy:  [address] on [facility]       Does Patient want to use MEDS to BEDS? No    Is patient currently receiving oral anticoagulation therapy? No    Is the Patient an [facility] with Readmission Risk Score greater than 14%? Yes  If yes, pt needs a follow up appointment made within 7 days. Family Members/Caregivers that pt would like involved in their care:    Yes    If yes, list name here:  Trell Lozano    Transportation Provider:  Patient             Discharge Plan:  11/28/21 [facility] Medicare Pt. Lives in 1 story home w/ Roommate, is currently selling her home. DME, walker, Neb, Oxygen, wears 2LNC, at HS & PRN, was DC last admission on Liquid Oxygen.  Has Port/Concentrator, [facility], Currently sating 100% on 4-5 LNC, Denies VNS, Has Had [address] in the past. IV Cefepime, Cr. 1.37, Cardio/Pulm, Oral Lasix, ?CHF Clinic. [place] 20%.  Denies needs, will follow//KB                 Electronically signed by: Juju Alcala RN on 11/28/2021 at 11:06 AM
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
## 2021-11-28 NOTE — CONSULTS
[address]  [phone]               Cardiology Consult           Date of Admission:  11/26/2021  Date of Consultation:  11/28/2021      PCP:  Valerio Che MD      Chief Complaint:dyspnea    History of Present Illness:  Rosey Jessica is a 66 y.o. female who presents with increased dyspnea and cough. She denies any chest pain or pressure. She has had orthopnea and leg swelling. She has had a prductive cough       PMH:   has a past medical history of Allergic rhinitis, Arthritis, CAD (coronary artery disease), Cerebral artery occlusion with cerebral infarction ([address] CHF (congestive heart failure) ([address] Controlled type 2 diabetes mellitus without complication, without long-term current use of insulin ([address] COPD (chronic obstructive pulmonary disease) ([address] Depression, Former smoker, History of blood transfusion, Hyperlipidemia, Hypertension, Kidney stone, Myocardial infarction ([address] Obesity, Class I, BMI 30.0-34.9 (see actual BMI), Restless leg syndrome, Skin abnormality, Type II or unspecified type diabetes mellitus without mention of complication, not stated as uncontrolled, Wears glasses, and Wears partial dentures. PSH:   has a past surgical history that includes Appendectomy; Hysterectomy; Cholecystectomy; joint replacement (Bilateral); pr office/outpt visit,procedure only (N/A, 2/6/2018); pr incis/drain thigh/knee abscess,deep (Right, 5/7/2018); Leg biopsy excision (Right, 8/29/2019); Cardiac surgery; Cardiac surgery; other surgical history (07/26/2020); cervical laminectomy (N/A, 10/14/2020); and bronchoscopy (N/A, 8/16/2021). Allergies: Allergies   Allergen Reactions    Codeine Other (See Comments)     Constipation.  Lisinopril      hyperkalemia    Morphine Other (See Comments)     Hallucinations.     Potassium-Containing Compounds         Home Meds:    Prior to Admission medications    Medication Sig Start Date End Date Taking? Authorizing Provider   albuterol sulfate  (90 Base) MCG/ACT inhaler INHALE TWO PUFFS BY MOUTH 4 TIMES A DAY AND RPN 11/11/21   Alex Ocasio MD   traZODone (DESYREL) 50 MG tablet Take 50 mg by mouth nightly as needed for Sleep    Historical Provider, MD   budesonide-formoterol (SYMBICORT) 160-4.5 MCG/ACT AERO Inhale 2 puffs into the lungs 2 times daily 10/5/21   Alex Ocasio MD   ipratropium-albuterol (DUONEB) 0.5-2.5 (3) MG/3ML SOLN nebulizer solution Inhale 3 mLs into the lungs every 4 hours as needed for Shortness of Breath 8/17/21   Alex Ocasio MD   metoprolol tartrate (LOPRESSOR) 25 MG tablet Take 1 tablet by mouth 2 times daily 8/17/21   Alex Ocasio MD   guaiFENesin [facility]) 600 MG extended release tablet Take 1 tablet by mouth 2 times daily 8/17/21   Alex Ocasio MD   magnesium oxide (MAG-OX) 400 (241.3 Mg) MG TABS tablet Take 1 tablet by mouth 2 times daily 8/17/21   Alex Ocasio MD   pantoprazole (PROTONIX) 40 MG tablet Take 1 tablet by mouth every morning (before breakfast) 8/18/21   Alex Ocasio MD   diclofenac sodium (VOLTAREN) 1 % GEL Apply 2 g topically 2 times daily as needed for Pain    Historical Provider, MD   metFORMIN (GLUCOPHAGE-XR) 500 MG extended release tablet Take 1 tablet by mouth daily (with breakfast) 8/9/21   Alex Ocasio MD   citalopram (CELEXA) 20 MG tablet Take 1 tablet by mouth daily 8/9/21   Alex Ocasio MD   tiZANidine (ZANAFLEX) 2 MG tablet Take 1 tablet by mouth nightly as needed (pain) 8/9/21   Alex Ocasio MD   nitroGLYCERIN (NITROSTAT) 0.4 MG SL tablet Place 1 tablet under the tongue every 5 minutes as needed for Chest pain up to max of 3 total doses.  If no relief after 1 dose, call 911. 8/9/21   Alex Ocasio MD   SM ASPIRIN ADULT LOW STRENGTH 81 MG EC tablet TAKE 1 TABLET BY MOUTH DAILY 5/21/21   Alex Ocasio MD   fluticasone Tigist Hamm) 50 MCG/ACT nasal spray USE 2 SPRAYS IN EACH NOSTRIL ONCE DAILY 5/21/21   Miguel Ángel Todd MD   clopidogrel (PLAVIX) 75 MG tablet Take 1 tablet by mouth daily 3/18/21   Miguel Ángel Todd MD   atorvastatin (LIPITOR) 40 MG tablet Take 2 tablets by mouth daily 2/2/21   Miguel Ángel Todd MD   rOPINIRole (REQUIP) 1 MG tablet TAKE 1 TABLET BY MOUTH EVERY NIGHT AS NEEDED 2/2/21   Miguel Ángel Todd MD   furosemide (LASIX) 40 MG tablet TAKE 1 TABLET BY MOUTH DAILY 11/8/20   Miguel Ángel Todd MD   ONE TOUCH ULTRA TEST strip TEST ONCE DAILY AS NEEDED 4/6/20   Miguel Ángel Todd MD   isosorbide mononitrate (IMDUR) 30 MG extended release tablet Take 30 mg by mouth 5/24/19   Historical Provider, MD        [facility] Meds:    Current Facility-Administered Medications   Medication Dose Route Frequency Provider Last Rate Last Admin    ferrous sulfate (IRON 325) tablet 325 mg  325 mg Oral BID  Miguel Ángel Todd MD        ipratropium-albuterol (DUONEB) nebulizer solution 3 mL  3 mL Inhalation Q4H PRN Miguel Ángel Todd MD   3 mL at 11/28/21 2099    metFORMIN (GLUCOPHAGE-XR) extended release tablet 500 mg  500 mg Oral Daily with breakfast Miguel Ángel Todd MD   500 mg at 11/28/21 0914    nitroGLYCERIN (NITROSTAT) SL tablet 0.4 mg  0.4 mg SubLINGual Q5 Min PRN Miguel Ángel Todd MD        pantoprazole (PROTONIX) tablet 40 mg  40 mg Oral QAM AC Miguel Ángel Todd MD   40 mg at 11/28/21 0543    sodium chloride flush 0.9 % injection 5-40 mL  5-40 mL IntraVENous 2 times per day Miguel Ángel Todd MD   10 mL at 11/28/21 0916    sodium chloride flush 0.9 % injection 5-40 mL  5-40 mL IntraVENous PRN Miguel Ángel Todd MD        0.9 % sodium chloride infusion  25 mL IntraVENous PRN Miguel Ángel Todd MD        polyethylene glycol [facility]) packet 17 g  17 g Oral Daily PRN Miguel Ángel Todd MD        atorvastatin (LIPITOR) tablet 80 mg  80 mg Oral Daily Miguel Ángel Todd MD   80 mg at 11/28/21 0914    budesonide-formoterol (SYMBICORT) 160-4.5 MCG/ACT inhaler 2 puff  2 puff Inhalation BID Jagura Chavis MD   2 puff at 11/28/21 0832    citalopram (CELEXA) tablet 20 mg  20 mg Oral Daily Jaguar Chavis MD   20 mg at 11/28/21 0914    clopidogrel (PLAVIX) tablet 75 mg  75 mg Oral Daily Jaguar Chavis MD   75 mg at 11/28/21 0914    fluticasone (FLONASE) 50 MCG/ACT nasal spray 2 spray  2 spray Each Nostril Daily Jaguar Chavis MD        furosemide (LASIX) tablet 40 mg  40 mg Oral Daily Jaguar Chavis MD   40 mg at 11/28/21 0914    guaiFENesin (MUCINEX) extended release tablet 600 mg  600 mg Oral BID Jaguar Chavsi MD   600 mg at 11/28/21 0914    isosorbide mononitrate (IMDUR) extended release tablet 30 mg  30 mg Oral Daily Jaguar Chavis MD   30 mg at 11/28/21 0914    magnesium oxide (MAG-OX) tablet 400 mg  400 mg Oral BID Jaguar Chavis MD   400 mg at 11/28/21 0914    metoprolol tartrate (LOPRESSOR) tablet 25 mg  25 mg Oral BID Jaguar Chavis MD   25 mg at 11/28/21 0914    rOPINIRole (REQUIP) tablet 1 mg  1 mg Oral Nightly Jaguar Chavis MD   1 mg at 11/27/21 2056    aspirin EC tablet 81 mg  81 mg Oral Daily Jaguar Chavis MD   81 mg at 11/28/21 0914    tiZANidine (ZANAFLEX) tablet 2 mg  2 mg Oral Nightly PRN Jaguar Chavis MD        traZODone (DESYREL) tablet 50 mg  50 mg Oral Nightly PRN Jaguar Chavis MD   50 mg at 11/28/21 0101    enoxaparin (LOVENOX) injection 40 mg  40 mg SubCUTAneous Daily Jaguar Chavis MD   40 mg at 11/28/21 0913    ondansetron (ZOFRAN-ODT) disintegrating tablet 4 mg  4 mg Oral Q8H PRN Jaguar Chavis MD        Or    ondansetron [facility]) injection 4 mg  4 mg IntraVENous Q6H PRN Jaguar Chavis MD        acetaminophen (TYLENOL) tablet 650 mg  650 mg Oral Q6H PRN Jaguar Chavis MD        Or    acetaminophen (TYLENOL) suppository 650 mg  650 mg Rectal Q6H PRN Jaguar Chavis MD  nicotine (NICODERM CQ) 14 MG/24HR 1 patch  1 patch TransDERmal Daily Kareem Moya MD   1 patch at 11/28/21 0915    cefepime (MAXIPIME) 2000 mg IVPB minibag  2,000 mg IntraVENous Q12H Kareem Moya MD   Stopped at 11/28/21 9728       Social History:       TOBACCO:   reports that she has been smoking cigarettes. She has a 14.00 pack-year smoking history. She has never used smokeless tobacco.  ETOH:   reports no history of alcohol use. DRUGS:  reports current drug use. Drug: Marijuana [employer]). OCCUPATION:          Family Histroy:         Problem Relation Age of Onset    Heart Disease Father            Review of Systems:   · Constitutional: there has been no unanticipated weight loss. There's been no change in energy level, sleep pattern, or activity level. · Eyes: No visual changes or diplopia. No scleral icterus. · ENT: No Headaches, hearing loss or vertigo. No mouth sores or sore throat. · Cardiovascular: No chest pain, dyspnea on exertion, palpitations or loss of consciousness. No cough, hemoptysis, pleuritic pain, or phlebitis. · Respiratory: No cough or wheezing, no sputum production. No hematemesis. · Gastrointestinal: No abdominal pain, appetite loss, blood in stools. No change in bowel or bladder habits. · Genitourinary: No dysuria, trouble voiding, or hematuria. · Musculoskeletal:  No gait disturbance, weakness or joint complaints. · Integumentary: No rash or pruritis. · Neurological: No headache, diplopia, change in muscle strength, numbness or tingling. No change in gait, balance, coordination, mood, affect, memory, mentation, behavior. · Psychiatric: No anxiety, or depression. · Endocrine: No temperature intolerance. No excessive thirst, fluid intake, or urination. No tremor. · Hematologic/Lymphatic: No abnormal bruising or bleeding, blood clots or swollen lymph nodes. · Allergic/Immunologic: No nasal congestion or hives.        Physical Exam    Vital Signs: BP (!) 140/85   Pulse 66   Temp 97.6 °F (36.4 °C) (Oral)   Resp 18   Ht 5' 4\" (1.626 m)   Wt 141 lb 1.5 oz (64 kg)   SpO2 100%   BMI 24.22 kg/m²  O2 Flow Rate (L/min): 5 L/min (dec to 4lpm)     Admission Weight: 138 lb 14.2 oz (63 kg)     General appearance: Awake, Alert Cooperative    Head: Normocephalic, without obvious abnormality, atraumatic    Eyes: Conjunctivae/corneas clear. PERRL, EOM's intact. Fundi benign    Neck: no adenopathy, no carotid bruit, no JVD, supple, symmetrical, trachea midline and thyroid: not enlarged, symmetric, no tenderness/mass/nodules    Lungs: diffuse expiratory wheezing    Heart: regular rate and rhythm, S1, S2 normal, no murmur, click, rub or gallop    Abdomen: Soft, non-tender. Bowel sounds normal. No masses,  no organomegaly    Extremities: extremities normal, atraumatic, no cyanosis 1+ edema lower extremities bilaterally    Skin: Skin color, texture, turgor normal. No rashes or lesions    Neurologic: Grossly normal            Labs:      CBC:   Recent Labs     11/26/21  1650 11/27/21  0844 11/28/21  0625   WBC 5.2 4.4 6.1   HGB 8.1* 9.4* 9.5*   HCT 26.0* 30.2* 30.1*   MCV 76.9* 75.4* 74.8*    327 282     BMP:   Recent Labs     11/26/21  1631 11/27/21  0844 11/28/21  0625    140 140   K 3.8 5.0 4.8   * 103 102   CO2 23 33* 34*   BUN 12 15 24*   CREATININE 0.82 1.12* 1.37*     PT/INR: No results for input(s): PROTIME, INR in the last 72 hours. APTT: No results for input(s): APTT in the last 72 hours. MAG:   Recent Labs     11/28/21  0625   MG 1.9     D Dimer: No results for input(s): DDIMER in the last 72 hours. Troponin T   Recent Labs     11/26/21  1631 11/26/21  1814   TROPHS 36* 44*     ProBNP Invalid input(s): PRO-BNP    XR CHEST PORTABLE    Result Date: 11/26/2021  Cardiomegaly with vascular congestion, bibasilar airspace disease and small pleural effusions. Findings suggest CHF. Superimposed pneumonia cannot be excluded.          Diagnosis:  Active Problems:    Pneumonia  Resolved Problems:    * No resolved hospital problems. *    Heart failure due to systolic dysfunction  CAD with hx of redo CABG 2018  COPD exacerbation  Anemia      Plan:  Patient received IV lasix with good diuresis and is doing well on po lasix. Continue beta blocker. She has had a reaction with ace inhibitor (potassium elevation). Consider hydralazine for afterload reduction. Off nitroglycerin due to hypotension      Continue treatment of pneumonia and COPD  Will follow.           Electronically signed by Dominick Pan DO on 11/28/2021 at 11:24 AM

## 2021-11-28 NOTE — PROGRESS NOTES
Patient arrives to room 2099 via bed. Vitals taken/stable, assessment complete, admission complete. Patient oriented to room, bed in lowest position with wheels locked and side rails up x3, table/call light within reach, no needs expressed at this time.

## 2021-11-28 NOTE — PLAN OF CARE
Problem: Pain:  Goal: Pain level will decrease  Description: Pain level will decrease  Outcome: Ongoing  Goal: Control of acute pain  Description: Control of acute pain  Outcome: Ongoing  Note: Assess the location, characteristics, onset, duration, frequency, quality, and severity of pain. Encourage immediate report of pain. Use appropriate pain scale to rate pain. Manage pain using nonpharmacologic/pharmacologic interventions. Goal: Control of chronic pain  Description: Control of chronic pain  Outcome: Ongoing     Problem: Falls - Risk of:  Goal: Will remain free from falls  Description: Will remain free from falls  Outcome: Ongoing  Note: Patient remains free of falls and injuries throughout shift. Bed remains in the lowest position, wheels locked, call light and bedside table are within reach.    Goal: Absence of physical injury  Description: Absence of physical injury  Outcome: Ongoing     Problem: Skin Integrity:  Goal: Will show no infection signs and symptoms  Description: Will show no infection signs and symptoms  Outcome: Ongoing  Goal: Absence of new skin breakdown  Description: Absence of new skin breakdown  Outcome: Ongoing

## 2021-11-28 NOTE — PROGRESS NOTES
PULMONARY PROGRESS NOTE:    Interval History: copd exac,  pneumonia    Shortness of Breath: +  Cough: +  Sputum: none  Hemoptysis: no  Chest Pain: no  Fever: no  Swelling Feet: no  Headache: no  Nausea, Emesis, Abdominal Pain: no  Diarrhea: no  Constipation: no    Events since last visit: none    PAST MEDICAL HISTORY:    Smoking: quit 3 days ago    PHYSICAL EXAMINATION:  afebrile  General : Awake, alert, oriented to time, place, and person  Neck - supple, no lymphadenopathy, JVD not raised  Heart - regular rhythm, S1 and S2 normal; no additional sounds heard  Lungs - Air Entry- fair bilaterally; breath sounds : diminished, 98% on 4 l nc   Abdomen - soft, no tenderness  Upper Extremities  - no cyanosis, mottling; edema : absent  Lower Extremities: no cyanosis, mottling; edema : absent    Current Laboratory, Radiologic, Microbiologic, and Diagnostic studies reviewed    ASSESSMENT / PLAN:  COPD exac - steroid, BD  Acute on chronic hypoxic respiratory failure  Mucinex, acapella  Suspected pneumonia - ABx  Elevated troponin/ BNP - nitrates / lasix  Monitor renal function  EF 35% 8/2021  Smoking cessation encouraged     Plan of care discussed with Dr Kendy Sims, APRN - CNP

## 2021-11-29 LAB
ANION GAP SERPL CALCULATED.3IONS-SCNC: 4 MMOL/L (ref 9–17)
BNP INTERPRETATION: ABNORMAL
BUN BLDV-MCNC: 24 MG/DL (ref 8–23)
BUN/CREAT BLD: ABNORMAL (ref 9–20)
CALCIUM SERPL-MCNC: 8.6 MG/DL (ref 8.6–10.4)
CHLORIDE BLD-SCNC: 97 MMOL/L (ref 98–107)
CO2: 33 MMOL/L (ref 20–31)
CREAT SERPL-MCNC: 1.2 MG/DL (ref 0.5–0.9)
EKG ATRIAL RATE: 81 BPM
EKG P AXIS: 20 DEGREES
EKG P-R INTERVAL: 132 MS
EKG Q-T INTERVAL: 382 MS
EKG QRS DURATION: 96 MS
EKG QTC CALCULATION (BAZETT): 443 MS
EKG R AXIS: -12 DEGREES
EKG T AXIS: 138 DEGREES
EKG VENTRICULAR RATE: 81 BPM
GFR AFRICAN AMERICAN: 53 ML/MIN
GFR NON-AFRICAN AMERICAN: 44 ML/MIN
GFR SERPL CREATININE-BSD FRML MDRD: ABNORMAL ML/MIN/{1.73_M2}
GFR SERPL CREATININE-BSD FRML MDRD: ABNORMAL ML/MIN/{1.73_M2}
GLUCOSE BLD-MCNC: 256 MG/DL (ref 70–99)
HCT VFR BLD CALC: 28.8 % (ref 36–46)
HEMOGLOBIN: 9 G/DL (ref 12–16)
MAGNESIUM: 1.8 MG/DL (ref 1.6–2.6)
MCH RBC QN AUTO: 23.3 PG (ref 26–34)
MCHC RBC AUTO-ENTMCNC: 31.2 G/DL (ref 31–37)
MCV RBC AUTO: 74.8 FL (ref 80–100)
NRBC AUTOMATED: ABNORMAL PER 100 WBC
PDW BLD-RTO: 18.4 % (ref 11.5–14.9)
PLATELET # BLD: 280 K/UL (ref 150–450)
PMV BLD AUTO: 7.7 FL (ref 6–12)
POTASSIUM SERPL-SCNC: 4.4 MMOL/L (ref 3.7–5.3)
PRO-BNP: ABNORMAL PG/ML
RBC # BLD: 3.84 M/UL (ref 4–5.2)
SODIUM BLD-SCNC: 134 MMOL/L (ref 135–144)
WBC # BLD: 5.9 K/UL (ref 3.5–11)

## 2021-11-29 PROCEDURE — 85027 COMPLETE CBC AUTOMATED: CPT

## 2021-11-29 PROCEDURE — 94669 MECHANICAL CHEST WALL OSCILL: CPT

## 2021-11-29 PROCEDURE — 94640 AIRWAY INHALATION TREATMENT: CPT

## 2021-11-29 PROCEDURE — 6370000000 HC RX 637 (ALT 250 FOR IP): Performed by: FAMILY MEDICINE

## 2021-11-29 PROCEDURE — 94761 N-INVAS EAR/PLS OXIMETRY MLT: CPT

## 2021-11-29 PROCEDURE — 6360000002 HC RX W HCPCS: Performed by: FAMILY MEDICINE

## 2021-11-29 PROCEDURE — 6370000000 HC RX 637 (ALT 250 FOR IP): Performed by: NURSE PRACTITIONER

## 2021-11-29 PROCEDURE — 6370000000 HC RX 637 (ALT 250 FOR IP): Performed by: INTERNAL MEDICINE

## 2021-11-29 PROCEDURE — 2700000000 HC OXYGEN THERAPY PER DAY

## 2021-11-29 PROCEDURE — 80048 BASIC METABOLIC PNL TOTAL CA: CPT

## 2021-11-29 PROCEDURE — 1200000000 HC SEMI PRIVATE

## 2021-11-29 PROCEDURE — 36415 COLL VENOUS BLD VENIPUNCTURE: CPT

## 2021-11-29 PROCEDURE — 83735 ASSAY OF MAGNESIUM: CPT

## 2021-11-29 PROCEDURE — 2580000003 HC RX 258: Performed by: FAMILY MEDICINE

## 2021-11-29 PROCEDURE — 6360000002 HC RX W HCPCS: Performed by: NURSE PRACTITIONER

## 2021-11-29 PROCEDURE — 83880 ASSAY OF NATRIURETIC PEPTIDE: CPT

## 2021-11-29 RX ORDER — PREDNISONE 20 MG/1
20 TABLET ORAL DAILY
Status: DISCONTINUED | OUTPATIENT
Start: 2021-11-29 | End: 2021-11-30 | Stop reason: HOSPADM

## 2021-11-29 RX ORDER — METOPROLOL SUCCINATE 50 MG/1
50 TABLET, EXTENDED RELEASE ORAL DAILY
Status: DISCONTINUED | OUTPATIENT
Start: 2021-11-29 | End: 2021-11-30 | Stop reason: HOSPADM

## 2021-11-29 RX ADMIN — METFORMIN HYDROCHLORIDE 500 MG: 500 TABLET, EXTENDED RELEASE ORAL at 09:05

## 2021-11-29 RX ADMIN — FERROUS SULFATE TAB 325 MG (65 MG ELEMENTAL FE) 325 MG: 325 (65 FE) TAB at 09:05

## 2021-11-29 RX ADMIN — ATORVASTATIN CALCIUM 80 MG: 80 TABLET, FILM COATED ORAL at 09:05

## 2021-11-29 RX ADMIN — CITALOPRAM HYDROBROMIDE 20 MG: 20 TABLET ORAL at 09:05

## 2021-11-29 RX ADMIN — SACUBITRIL AND VALSARTAN 0.5 TABLET: 24; 26 TABLET, FILM COATED ORAL at 11:20

## 2021-11-29 RX ADMIN — CLOPIDOGREL BISULFATE 75 MG: 75 TABLET ORAL at 09:05

## 2021-11-29 RX ADMIN — IPRATROPIUM BROMIDE AND ALBUTEROL SULFATE 1 AMPULE: .5; 3 SOLUTION RESPIRATORY (INHALATION) at 07:02

## 2021-11-29 RX ADMIN — GUAIFENESIN 600 MG: 600 TABLET, EXTENDED RELEASE ORAL at 09:05

## 2021-11-29 RX ADMIN — FERROUS SULFATE TAB 325 MG (65 MG ELEMENTAL FE) 325 MG: 325 (65 FE) TAB at 19:45

## 2021-11-29 RX ADMIN — ASPIRIN 81 MG: 81 TABLET, COATED ORAL at 09:05

## 2021-11-29 RX ADMIN — ROPINIROLE HYDROCHLORIDE 1 MG: 1 TABLET, FILM COATED ORAL at 19:45

## 2021-11-29 RX ADMIN — BUDESONIDE AND FORMOTEROL FUMARATE DIHYDRATE 2 PUFF: 160; 4.5 AEROSOL RESPIRATORY (INHALATION) at 07:12

## 2021-11-29 RX ADMIN — Medication 400 MG: at 19:45

## 2021-11-29 RX ADMIN — PANTOPRAZOLE SODIUM 40 MG: 40 TABLET, DELAYED RELEASE ORAL at 05:32

## 2021-11-29 RX ADMIN — PREDNISONE 20 MG: 20 TABLET ORAL at 13:35

## 2021-11-29 RX ADMIN — ONDANSETRON 4 MG: 4 TABLET, ORALLY DISINTEGRATING ORAL at 09:06

## 2021-11-29 RX ADMIN — Medication 400 MG: at 09:05

## 2021-11-29 RX ADMIN — ISOSORBIDE MONONITRATE 30 MG: 30 TABLET, EXTENDED RELEASE ORAL at 09:05

## 2021-11-29 RX ADMIN — CEFEPIME HYDROCHLORIDE 2000 MG: 2 INJECTION, POWDER, FOR SOLUTION INTRAVENOUS at 05:32

## 2021-11-29 RX ADMIN — METHYLPREDNISOLONE SODIUM SUCCINATE 40 MG: 40 INJECTION, POWDER, FOR SOLUTION INTRAMUSCULAR; INTRAVENOUS at 03:28

## 2021-11-29 RX ADMIN — TIZANIDINE 2 MG: 2 TABLET ORAL at 20:53

## 2021-11-29 RX ADMIN — METOPROLOL SUCCINATE 50 MG: 50 TABLET, EXTENDED RELEASE ORAL at 11:21

## 2021-11-29 RX ADMIN — ENOXAPARIN SODIUM 40 MG: 100 INJECTION SUBCUTANEOUS at 09:04

## 2021-11-29 RX ADMIN — BUDESONIDE AND FORMOTEROL FUMARATE DIHYDRATE 2 PUFF: 160; 4.5 AEROSOL RESPIRATORY (INHALATION) at 19:06

## 2021-11-29 RX ADMIN — SODIUM CHLORIDE, PRESERVATIVE FREE 10 ML: 5 INJECTION INTRAVENOUS at 09:13

## 2021-11-29 RX ADMIN — TRAZODONE HYDROCHLORIDE 50 MG: 50 TABLET ORAL at 00:02

## 2021-11-29 RX ADMIN — IPRATROPIUM BROMIDE AND ALBUTEROL SULFATE 1 AMPULE: .5; 3 SOLUTION RESPIRATORY (INHALATION) at 10:59

## 2021-11-29 RX ADMIN — ACETAMINOPHEN 650 MG: 325 TABLET ORAL at 09:05

## 2021-11-29 RX ADMIN — CEFEPIME HYDROCHLORIDE 2000 MG: 2 INJECTION, POWDER, FOR SOLUTION INTRAVENOUS at 19:45

## 2021-11-29 RX ADMIN — SACUBITRIL AND VALSARTAN 0.5 TABLET: 24; 26 TABLET, FILM COATED ORAL at 19:45

## 2021-11-29 RX ADMIN — IPRATROPIUM BROMIDE AND ALBUTEROL SULFATE 1 AMPULE: .5; 3 SOLUTION RESPIRATORY (INHALATION) at 19:06

## 2021-11-29 RX ADMIN — GUAIFENESIN 600 MG: 600 TABLET, EXTENDED RELEASE ORAL at 19:45

## 2021-11-29 RX ADMIN — FUROSEMIDE 40 MG: 40 TABLET ORAL at 09:06

## 2021-11-29 RX ADMIN — IPRATROPIUM BROMIDE AND ALBUTEROL SULFATE 1 AMPULE: .5; 3 SOLUTION RESPIRATORY (INHALATION) at 15:17

## 2021-11-29 ASSESSMENT — PAIN SCALES - GENERAL: PAINLEVEL_OUTOF10: 3

## 2021-11-29 NOTE — PROGRESS NOTES
Nutrition Education    · Verbally reviewed information with Patient  · Educated on Diet for Heart Failure from [address] [place] [address]  · Written educational materials provided. · Contact name and number provided. · Refer to Patient Education activity for more details.     Electronically signed by Jerrod Patrick RD, BETSY on 11/29/21 at 4:20 PM EST    Contact: [phone]

## 2021-11-29 NOTE — PLAN OF CARE
Problem: Pain:  Goal: Pain level will decrease  Description: Pain level will decrease  11/29/2021 0411 by Charmaine Thornton RN  Outcome: Ongoing  11/28/2021 1654 by Yari Chester RN  Outcome: Ongoing  Goal: Control of acute pain  Description: Control of acute pain  11/29/2021 0411 by Charmaine Thornton RN  Outcome: Ongoing  Note: Assess the location, characteristics, onset, duration, frequency, quality, and severity of pain. Encourage immediate report of pain. Use appropriate pain scale to rate pain. Manage pain using nonpharmacologic/pharmacologic interventions. 11/28/2021 1654 by Yari Chester RN  Outcome: Ongoing  Goal: Control of chronic pain  Description: Control of chronic pain  11/29/2021 0411 by Charmaine Thornton RN  Outcome: Ongoing  11/28/2021 1654 by Yari Chester RN  Outcome: Ongoing     Problem: Falls - Risk of:  Goal: Will remain free from falls  Description: Will remain free from falls  11/29/2021 0411 by Charmaine Thornton RN  Outcome: Ongoing  Note: Patient remains free of falls and injuries throughout shift. Bed remains in the lowest position, wheels locked, call light and bedside table are within reach.    11/28/2021 1654 by Yari Chester RN  Outcome: Ongoing  Goal: Absence of physical injury  Description: Absence of physical injury  11/29/2021 0411 by Charmaine Thornton RN  Outcome: Ongoing  11/28/2021 1654 by Yari Chester RN  Outcome: Ongoing     Problem: Skin Integrity:  Goal: Will show no infection signs and symptoms  Description: Will show no infection signs and symptoms  11/29/2021 0411 by Charmaine Thornton RN  Outcome: Ongoing  11/28/2021 1654 by Yari Chester RN  Outcome: Ongoing  Goal: Absence of new skin breakdown  Description: Absence of new skin breakdown  11/29/2021 0411 by Charmaine Thornton RN  Outcome: Ongoing  11/28/2021 1654 by Yari Chester RN  Outcome: Ongoing

## 2021-11-29 NOTE — FLOWSHEET NOTE
11/29/21 1416   Encounter Summary   Services provided to: Patient not available  (PT was on the phone)

## 2021-11-29 NOTE — PROGRESS NOTES
PULMONARY PROGRESS NOTE:    Interval History: copd exac,  pneumonia    Shortness of Breath: +  Cough: +  Sputum: none  Hemoptysis: no  Chest Pain: no  Fever: no  Swelling Feet: no  Headache: no  Nausea, Emesis, Abdominal Pain: no  Diarrhea: no  Constipation: no    Events since last visit: none    PAST MEDICAL HISTORY:    Smoking: quit 3 days ago    PHYSICAL EXAMINATION:  afebrile  General : Awake, alert, oriented to time, place, and person  Neck - supple, no lymphadenopathy, JVD not raised  Heart - regular rhythm, S1 and S2 normal; no additional sounds heard  Lungs - Air Entry- fair bilaterally; breath sounds : diminished, coarse, 97% on 4 l nc   Abdomen - soft, no tenderness  Upper Extremities  - no cyanosis, mottling; edema : absent  Lower Extremities: no cyanosis, mottling; edema : absent    Current Laboratory, Radiologic, Microbiologic, and Diagnostic studies reviewed    ASSESSMENT / PLAN:  COPD exac - steroid to PO, BD  Acute on chronic hypoxic respiratory failure  Mucinex, acapella  Suspected pneumonia - ABx  Elevated troponin/ BNP - nitrates / lasix  Monitor renal function  EF 35% 8/2021  Smoking cessation encouraged     Plan of care discussed with Dr Daylin Posadas, APRN - CNP

## 2021-11-29 NOTE — PROGRESS NOTES
Patient without complaint this am  She is being treated for PNA/COPD and CHF    Known systolic chf  Large apical defect fixed on prior nuclear stress  2 prior CABG procedures    Vitals:    11/29/21 0719   BP: 139/77   Pulse: 77   Resp: 20   Temp: 98.1 °F (36.7 °C)   SpO2: 96%     JVP 8cm  No edema  Skin intact  abd s/nt/nd  Op clear    Impression  1. Acute on chronic systolic chf  Apical and anteroseptal infarct on nuclear stress previously  Prior CABG, 2 separate procedures, last 2018  EF 30-35  S/p IV diuresis  ?hyperk with acei  I note in 2019 she was on lasix orally AND on KCL supplementation  2.  Presented with more COPD/PNA picture but also significant volume overload    The hyperk with acei may have been multifactorial  entresto has less risk of this electrolyte issue  No KCL supplements  Repeat BMP in am  Then again in 1-2 weeks as outpt  Consolidate metop to toprol xl

## 2021-11-29 NOTE — CARE COORDINATION
ONGOING DISCHARGE PLAN:    Patient is alert and oriented x4. Spoke with patient regarding discharge plan and patient confirms that plan is still home. Continues to decline VNS. Active order for IV Cefepime and PO steroids. Will continue to follow for additional discharge needs.     Electronically signed by Yariel Turner RN on 11/29/2021 at 1:20 PM

## 2021-11-30 VITALS
HEIGHT: 64 IN | BODY MASS INDEX: 24.54 KG/M2 | WEIGHT: 143.74 LBS | SYSTOLIC BLOOD PRESSURE: 121 MMHG | OXYGEN SATURATION: 96 % | DIASTOLIC BLOOD PRESSURE: 67 MMHG | HEART RATE: 81 BPM | RESPIRATION RATE: 20 BRPM | TEMPERATURE: 98.6 F

## 2021-11-30 LAB
ANION GAP SERPL CALCULATED.3IONS-SCNC: 5 MMOL/L (ref 9–17)
BUN BLDV-MCNC: 30 MG/DL (ref 8–23)
BUN/CREAT BLD: ABNORMAL (ref 9–20)
CALCIUM SERPL-MCNC: 9 MG/DL (ref 8.6–10.4)
CHLORIDE BLD-SCNC: 95 MMOL/L (ref 98–107)
CO2: 36 MMOL/L (ref 20–31)
CREAT SERPL-MCNC: 1.21 MG/DL (ref 0.5–0.9)
GFR AFRICAN AMERICAN: 53 ML/MIN
GFR NON-AFRICAN AMERICAN: 44 ML/MIN
GFR SERPL CREATININE-BSD FRML MDRD: ABNORMAL ML/MIN/{1.73_M2}
GFR SERPL CREATININE-BSD FRML MDRD: ABNORMAL ML/MIN/{1.73_M2}
GLUCOSE BLD-MCNC: 191 MG/DL (ref 70–99)
HCT VFR BLD CALC: 31.7 % (ref 36–46)
HEMOGLOBIN: 9.9 G/DL (ref 12–16)
MAGNESIUM: 2.1 MG/DL (ref 1.6–2.6)
MCH RBC QN AUTO: 23.5 PG (ref 26–34)
MCHC RBC AUTO-ENTMCNC: 31.2 G/DL (ref 31–37)
MCV RBC AUTO: 75.2 FL (ref 80–100)
NRBC AUTOMATED: ABNORMAL PER 100 WBC
PDW BLD-RTO: 19 % (ref 11.5–14.9)
PLATELET # BLD: 342 K/UL (ref 150–450)
PMV BLD AUTO: 8 FL (ref 6–12)
POTASSIUM SERPL-SCNC: 4.8 MMOL/L (ref 3.7–5.3)
RBC # BLD: 4.21 M/UL (ref 4–5.2)
SODIUM BLD-SCNC: 136 MMOL/L (ref 135–144)
WBC # BLD: 10.8 K/UL (ref 3.5–11)

## 2021-11-30 PROCEDURE — 6370000000 HC RX 637 (ALT 250 FOR IP): Performed by: INTERNAL MEDICINE

## 2021-11-30 PROCEDURE — 6370000000 HC RX 637 (ALT 250 FOR IP): Performed by: NURSE PRACTITIONER

## 2021-11-30 PROCEDURE — 83735 ASSAY OF MAGNESIUM: CPT

## 2021-11-30 PROCEDURE — 6370000000 HC RX 637 (ALT 250 FOR IP): Performed by: FAMILY MEDICINE

## 2021-11-30 PROCEDURE — 6360000002 HC RX W HCPCS: Performed by: FAMILY MEDICINE

## 2021-11-30 PROCEDURE — 85027 COMPLETE CBC AUTOMATED: CPT

## 2021-11-30 PROCEDURE — 2700000000 HC OXYGEN THERAPY PER DAY

## 2021-11-30 PROCEDURE — 94761 N-INVAS EAR/PLS OXIMETRY MLT: CPT

## 2021-11-30 PROCEDURE — 36415 COLL VENOUS BLD VENIPUNCTURE: CPT

## 2021-11-30 PROCEDURE — 94640 AIRWAY INHALATION TREATMENT: CPT

## 2021-11-30 PROCEDURE — 2580000003 HC RX 258: Performed by: FAMILY MEDICINE

## 2021-11-30 PROCEDURE — 80048 BASIC METABOLIC PNL TOTAL CA: CPT

## 2021-11-30 PROCEDURE — 99232 SBSQ HOSP IP/OBS MODERATE 35: CPT | Performed by: FAMILY MEDICINE

## 2021-11-30 RX ORDER — METOPROLOL SUCCINATE 50 MG/1
50 TABLET, EXTENDED RELEASE ORAL DAILY
Qty: 30 TABLET | Refills: 11 | Status: SHIPPED | OUTPATIENT
Start: 2021-11-30

## 2021-11-30 RX ORDER — PREDNISONE 20 MG/1
20 TABLET ORAL DAILY
Qty: 30 TABLET | Refills: 0
Start: 2021-12-01 | End: 2021-12-31

## 2021-11-30 RX ORDER — NICOTINE 21 MG/24HR
1 PATCH, TRANSDERMAL 24 HOURS TRANSDERMAL DAILY
Qty: 30 PATCH | Refills: 3 | Status: SHIPPED | OUTPATIENT
Start: 2021-11-30

## 2021-11-30 RX ORDER — HYDRALAZINE HYDROCHLORIDE 25 MG/1
25 TABLET, FILM COATED ORAL EVERY 8 HOURS SCHEDULED
Status: DISCONTINUED | OUTPATIENT
Start: 2021-11-30 | End: 2021-11-30 | Stop reason: HOSPADM

## 2021-11-30 RX ORDER — FERROUS SULFATE 325(65) MG
325 TABLET ORAL 2 TIMES DAILY WITH MEALS
Qty: 60 TABLET | Refills: 11 | Status: SHIPPED | OUTPATIENT
Start: 2021-11-30 | End: 2022-02-04

## 2021-11-30 RX ORDER — CEFDINIR 300 MG/1
300 CAPSULE ORAL EVERY 12 HOURS SCHEDULED
Status: DISCONTINUED | OUTPATIENT
Start: 2021-11-30 | End: 2021-11-30 | Stop reason: HOSPADM

## 2021-11-30 RX ORDER — CEFDINIR 300 MG/1
300 CAPSULE ORAL EVERY 12 HOURS SCHEDULED
Qty: 10 CAPSULE | Refills: 0
Start: 2021-11-30 | End: 2021-12-05

## 2021-11-30 RX ORDER — HYDRALAZINE HYDROCHLORIDE 25 MG/1
25 TABLET, FILM COATED ORAL EVERY 8 HOURS SCHEDULED
Qty: 90 TABLET | Refills: 3
Start: 2021-11-30 | End: 2022-02-10 | Stop reason: ALTCHOICE

## 2021-11-30 RX ADMIN — ENOXAPARIN SODIUM 40 MG: 100 INJECTION SUBCUTANEOUS at 08:30

## 2021-11-30 RX ADMIN — CEFEPIME HYDROCHLORIDE 2000 MG: 2 INJECTION, POWDER, FOR SOLUTION INTRAVENOUS at 05:51

## 2021-11-30 RX ADMIN — IPRATROPIUM BROMIDE AND ALBUTEROL SULFATE 1 AMPULE: .5; 3 SOLUTION RESPIRATORY (INHALATION) at 07:10

## 2021-11-30 RX ADMIN — SACUBITRIL AND VALSARTAN 0.5 TABLET: 24; 26 TABLET, FILM COATED ORAL at 08:30

## 2021-11-30 RX ADMIN — FUROSEMIDE 40 MG: 40 TABLET ORAL at 08:30

## 2021-11-30 RX ADMIN — METOPROLOL SUCCINATE 50 MG: 50 TABLET, EXTENDED RELEASE ORAL at 08:30

## 2021-11-30 RX ADMIN — PANTOPRAZOLE SODIUM 40 MG: 40 TABLET, DELAYED RELEASE ORAL at 05:51

## 2021-11-30 RX ADMIN — IPRATROPIUM BROMIDE AND ALBUTEROL SULFATE 1 AMPULE: .5; 3 SOLUTION RESPIRATORY (INHALATION) at 15:24

## 2021-11-30 RX ADMIN — HYDRALAZINE HYDROCHLORIDE 25 MG: 25 TABLET, FILM COATED ORAL at 13:15

## 2021-11-30 RX ADMIN — IPRATROPIUM BROMIDE AND ALBUTEROL SULFATE 1 AMPULE: .5; 3 SOLUTION RESPIRATORY (INHALATION) at 11:06

## 2021-11-30 RX ADMIN — METFORMIN HYDROCHLORIDE 500 MG: 500 TABLET, EXTENDED RELEASE ORAL at 08:30

## 2021-11-30 RX ADMIN — FERROUS SULFATE TAB 325 MG (65 MG ELEMENTAL FE) 325 MG: 325 (65 FE) TAB at 08:30

## 2021-11-30 RX ADMIN — BUDESONIDE AND FORMOTEROL FUMARATE DIHYDRATE 2 PUFF: 160; 4.5 AEROSOL RESPIRATORY (INHALATION) at 07:10

## 2021-11-30 RX ADMIN — ASPIRIN 81 MG: 81 TABLET, COATED ORAL at 08:35

## 2021-11-30 RX ADMIN — CITALOPRAM HYDROBROMIDE 20 MG: 20 TABLET ORAL at 08:30

## 2021-11-30 RX ADMIN — SODIUM CHLORIDE, PRESERVATIVE FREE 10 ML: 5 INJECTION INTRAVENOUS at 08:30

## 2021-11-30 RX ADMIN — GUAIFENESIN 600 MG: 600 TABLET, EXTENDED RELEASE ORAL at 08:30

## 2021-11-30 RX ADMIN — ATORVASTATIN CALCIUM 80 MG: 80 TABLET, FILM COATED ORAL at 08:30

## 2021-11-30 RX ADMIN — Medication 400 MG: at 08:30

## 2021-11-30 RX ADMIN — ISOSORBIDE MONONITRATE 30 MG: 30 TABLET, EXTENDED RELEASE ORAL at 08:30

## 2021-11-30 RX ADMIN — CEFDINIR 300 MG: 300 CAPSULE ORAL at 13:15

## 2021-11-30 RX ADMIN — PREDNISONE 20 MG: 20 TABLET ORAL at 08:30

## 2021-11-30 RX ADMIN — CLOPIDOGREL BISULFATE 75 MG: 75 TABLET ORAL at 08:30

## 2021-11-30 NOTE — CARE COORDINATION
ONGOING DISCHARGE PLAN:    Patient is alert and oriented x4. Spoke with patient regarding discharge plan and patient confirms that plan is still to return to home, no needs. Cardio was going to start patient on Entresto however primary thought it would not be affordable. I called prescription into [facility] and patients OOP cost is $47. I spoke with the patient and she is open to it, but would like to wait until after the new year when she has a new insurance and see if she can get it even cheaper. Will continue to follow for additional discharge needs.     Electronically signed by Juve Mendosa RN on 11/30/2021 at 2:07 PM

## 2021-11-30 NOTE — PLAN OF CARE
BRONCHOSPASM/BRONCHOCONSTRICTION   [x]  IMPROVE AERATION/BREATH SOUNDS  [x]   ADMINISTER BRONCHODILATOR THERAPY AS APPROPRIATE  [x]   ASSESS BREATH SOUNDS  [x]   IMPLEMENT AEROSOL/MDI PROTOCOL  [x]   PATIENT EDUCATION AS NEEDED    PROVIDE ADEQUATE OXYGENATION WITH ACCEPTABLE SP02/ABG'S  [x]  IDENTIFY APPROPRIATE OXYGEN THERAPY  [x]   MONITOR SP02/ABG'S AS NEEDED   [x]   PATIENT EDUCATION AS NEEDED

## 2021-11-30 NOTE — PROGRESS NOTES
Signed off to Arnaud jha and Maria T Yin RN's  Iv's intact, no redness noted,  INT'd  X 2  O 2  Cont. On per NC  @ 3L/min NC  Pt.  Has no complaints at this time

## 2021-11-30 NOTE — PROGRESS NOTES
Entry- fair bilaterally; breath sounds : diminished,   rales/crackles - absent  Abdomen - soft, no tenderness  Upper Extremities  - no cyanosis, mottling; edema : absent  Lower Extremities: no cyanosis, mottling; edema : absent    Current Laboratory, Radiologic, Microbiologic, and Diagnostic studies reviewed  Data ReviewCBC:   Recent Labs     11/28/21  0625 11/29/21  0656 11/30/21  0618   WBC 6.1 5.9 10.8   RBC 4.02 3.84* 4.21   HGB 9.5* 9.0* 9.9*   HCT 30.1* 28.8* 31.7*    280 342     BMP:   Recent Labs     11/28/21  0625 11/29/21  0656 11/30/21  0618   GLUCOSE 163* 256* 191*    134* 136   K 4.8 4.4 4.8   BUN 24* 24* 30*   CREATININE 1.37* 1.20* 1.21*   CALCIUM 8.8 8.6 9.0     ABGs: No results for input(s): PHART, PO2ART, GIE6UAQ, MNM5REI, BEART, T4TFROJV, ANR0AIS in the last 72 hours. PT/INR:  No results found for: PTINR    ASSESSMENT / PLAN:    Acute on chronic hypoxic respiratory failure  Mucinex, acapella  Suspected pneumonia - ABx to po  Elevated troponin/ BNP - nitrates / lasix  Monitor renal function  EF 35% 8/2021  Smoking cessation encouraged  No objection to discharge.  Follow up in office 2-3 weeks   Discussed with Dr. Lindsay Galvez     Electronically signed by JAMES Conn - INNA on 11/30/21

## 2021-11-30 NOTE — PROGRESS NOTES
No events overnight  K4.8  Cre 1.2    On oral lasix  Vitals:    11/30/21 0714   BP:    Pulse:    Resp:    Temp:    SpO2: 98%   'nad  rrr   No mrg  ctab  abd s/nt/nd  Skin intact    Impression  1. Acute on chronic CHF  CABG last 2018  On orals  Hx hyperk while hospitalized, was on ACEi, she had longstanding hx of acei tolerance previously. Suspect this was multifactorial more than solely acei related  Started entresto which has less risk of hyperk  Noted by Primary service she will not afford entresto  If that is case, will stop entresto, start on acei as outpt down the road.  Start hydralazine (on imdur already) and d/c today possibly

## 2021-11-30 NOTE — DISCHARGE INSTR - DIET

## 2021-11-30 NOTE — PROGRESS NOTES
Progress Note  Date:2021       Room:[address] [place] [address]  Patient [id number] [facility]     YOB: 1950     Age:71 y.o. Subjective    Subjective:  Symptoms:  Improved. Diet:  Adequate intake. Activity level: Returning to normal.       Review of Systems  Objective         Vitals Last 24 Hours:  TEMPERATURE:  Temp  Av °F (36.7 °C)  Min: 97.7 °F (36.5 °C)  Max: 98.2 °F (36.8 °C)  RESPIRATIONS RANGE: Resp  Av.7  Min: 18  Max: 20  PULSE OXIMETRY RANGE: SpO2  Av.1 %  Min: 94 %  Max: 98 %  PULSE RANGE: Pulse  Av.3  Min: 65  Max: 82  BLOOD PRESSURE RANGE: Systolic (44FNL), YOR:414 , Min:111 , ROWDY:429   ; Diastolic (75MSV), TDU:92, Min:67, Max:72    I/O (24Hr): Intake/Output Summary (Last 24 hours) at 2021 0753  Last data filed at 2021 1715  Gross per 24 hour   Intake 725 ml   Output 1125 ml   Net -400 ml     Objective:  General Appearance:  Comfortable. Vital signs: (most recent): Blood pressure 120/67, pulse 65, temperature 97.7 °F (36.5 °C), resp. rate 20, height 5' 4\" (1.626 m), weight 141 lb 1.5 oz (64 kg), SpO2 98 %. Vital signs are normal.    Lungs:  Normal effort and normal respiratory rate. Breath sounds clear to auscultation. Heart: Normal rate. Regular rhythm. S1 normal and S2 normal.      Labs/Imaging/Diagnostics    Labs:  CBC:  Recent Labs     21  0621  0656 21  0618   WBC 6.1 5.9 10.8   RBC 4.02 3.84* 4.21   HGB 9.5* 9.0* 9.9*   HCT 30.1* 28.8* 31.7*   MCV 74.8* 74.8* 75.2*   RDW 18.7* 18.4* 19.0*    280 342     CHEMISTRIES:  Recent Labs     21  0625 21  0656 21  0618    134* 136   K 4.8 4.4 4.8    97* 95*   CO2 34* 33* 36*   BUN 24* 24* 30*   CREATININE 1.37* 1.20* 1.21*   GLUCOSE 163* 256* 191*   MG 1.9 1.8 2.1     PT/INR:No results for input(s): PROTIME, INR in the last 72 hours. APTT:No results for input(s): APTT in the last 72 hours.   LIVER PROFILE:No results for input(s): AST, ALT, BILIDIR, BILITOT, ALKPHOS in the last 72 hours. Imaging Last 24 Hours:  No results found. Assessment//Plan           Hospital Problems           Last Modified POA    * (Principal) Pneumonia 11/28/2021 Yes    Chronic obstructive pulmonary disease (HCC) (Chronic) 11/28/2021 Yes    Coronary artery disease involving native coronary artery of native heart without angina pectoris (Chronic) 11/28/2021 Yes    Chronic bilateral low back pain with left-sided sciatica (Chronic) 11/28/2021 Yes    Tobacco use disorder 11/28/2021 Yes        Assessment & Plan  Pneumonia/COPD - per pulmonology    Acute on chronic systolic CHF -diuresed. Placed on Entresto by cardiology - she will not be able to afford this.      Acute kidney injury d/t prerenal azotemia from diuretics used to alleviate acute CHF    Discharge planning    Electronically signed by Nicki Krishnamurthy MD on 11/30/21 at 7:53 AM EST

## 2021-11-30 NOTE — PROGRESS NOTES
Pt awaits discharge to home, pt.  Wants to stay to eat supper--then she will need a taxi cab to get home

## 2021-11-30 NOTE — PROGRESS NOTES
Pt is alert and  O x4  No c/o of pain, or SOB at this time  Resp. Easy  @  18/min  O 2  Cont.  On per NC

## 2021-11-30 NOTE — PROGRESS NOTES
Pt discharged to home , via wheel chair, per private car, pt. Has no c/o of pain, or SOB at this time  Resp.  Easy  @  18/min  Pt verbalized understanding of all discharge instructions and follow up appt's  No c/o of pain, or SOB at this time

## 2021-11-30 NOTE — PLAN OF CARE
Problem: Pain:  Goal: Pain level will decrease  Description: Pain level will decrease  11/29/2021 1759 by Chary Cho RN  Outcome: Ongoing  Goal: Control of acute pain  Description: Control of acute pain  11/29/2021 1759 by Chary Cho RN  Outcome: Ongoing     Problem: Falls - Risk of:  Goal: Will remain free from falls  Description: Will remain free from falls  11/30/2021 0422 by Aaron Zavala RN  Outcome: Ongoing  Note: Patient remains free of falls this shift. Call light within reach, bed in lowest position.    11/29/2021 1759 by Chary Cho RN  Outcome: Ongoing     Problem: Skin Integrity:  Goal: Absence of new skin breakdown  Description: Absence of new skin breakdown  Outcome: Ongoing

## 2021-12-01 ENCOUNTER — CARE COORDINATION (OUTPATIENT)
Dept: CASE MANAGEMENT | Age: 71
End: 2021-12-01

## 2021-12-01 NOTE — CARE COORDINATION
Ro [address] Transitions Initial Follow Up Call    Call within 2 business days of discharge: Yes    Patient: Rosey Jessica Patient : 1950   MRN: [id number]  Reason for Admission: sob  Discharge Date: 21 RARS: Readmission Risk Score: 21.8 ( )      Last Discharge [facility]       Complaint Diagnosis Description Type Department Provider    21 Shortness of Breath COPD exacerbation ([address] . .. ED to Hosp-Admission (Discharged) (ADMITTED) [address] MEREDITH Valerio Che MD; Corin Arrieta. .. Spoke with: [address], [address]: [facility]    Non-face-to-face services provided:  Scheduled appointment with PCP-patient has HFU with pcp on 21  Obtained and reviewed discharge summary and/or continuity of care documents  Assessment and support for treatment adherence and medication management-reviewed discharge instructions and medications, 1111F order completed, no vns, c/o congestion and cough, taking medications, just feels tired. explained role of CTN provided contact information, will follow//JU  Reviewed coivd precautions and healthcare decision makers, no ACP planning at this time. Transitions of Care Initial Call    Was this an external facility discharge? No Discharge Facility: [facility]    Challenges to be reviewed by the provider   Additional needs identified to be addressed with provider: No  none             Method of communication with provider : none      Advance Care Planning:   Does patient have an Advance Directive: reviewed and current, reviewed and needs to be updated, not on file; education provided, not on file, patient declined education, decision maker updated and referral to internal ACP facilitator. Was this a readmission? No  Patient stated reason for admission:   Patients top risk factors for readmission: functional physical ability    Care Transition Nurse (CTN) contacted the patient by telephone to perform post hospital discharge assessment.  Verified name and  with patient as identifiers. Provided introduction to self, and explanation of the CTN role. CTN reviewed discharge instructions, medical action plan and red flags with patient who verbalized understanding. Patient given an opportunity to ask questions and does not have any further questions or concerns at this time. Were discharge instructions available to patient? Yes. Reviewed appropriate site of care based on symptoms and resources available to patient including: PCP, Specialist, Urgent care clinics and When to call 911. The patient agrees to contact the PCP office for questions related to their healthcare. Medication reconciliation was performed with patient, who verbalizes understanding of administration of home medications. Advised obtaining a 90-day supply of all daily and as-needed medications. Covid Risk Education     Educated patient about risk for severe COVID-19 due to risk factors according to CDC guidelines. CTN reviewed discharge instructions, medical action plan and red flag symptoms with the patient who verbalized understanding. Discussed COVID vaccination status: Yes. Education provided on COVID-19 vaccination as appropriate. Discussed exposure protocols and quarantine with CDC Guidelines. Patient was given an opportunity to verbalize any questions and concerns and agrees to contact CTN or health care provider for questions related to their healthcare. Reviewed and educated patient on any new and changed medications related to discharge diagnosis. CTN provided contact information. Plan for follow-up call in 5-7 days based on severity of symptoms and risk factors.   Plan for next call: routine      Care Transitions 24 Hour Call    Do you have any ongoing symptoms?: Yes  Patient-reported symptoms: Congestion, Cough  Do you have a copy of your discharge instructions?: Yes  Do you have all of your prescriptions and are they filled?: Yes  Care Transitions Interventions         Follow Up  Future Appointments   Date Time Provider Kendra Jimenez   12/7/2021  3:30 PM Dedra Barriga MD [address] [place], [facility]

## 2021-12-05 NOTE — PROGRESS NOTES
Physician Progress Note      Tenzin Porter  CSN #:                  [id number]  :                       1950  ADMIT DATE:       2021 3:45 PM  [address] DATE:        2021 6:23 PM  RESPONDING  PROVIDER #:        Andrew Lee MD          QUERY TEXT:    Patient admitted with CHF exacerbation, noted to have elevated BUN/Cr, with   decreased GFR. If possible, please document in progress notes and discharge   summary if you are evaluating and/or treating any of the following: The medical record reflects the following:  Risk Factors: 66 y.o. female with PMH including hypertension, DM Type II, and   CHF, currently being treated with diuretics for CHF exacerbation. Clinical Indicators: Upon review of pt's labs, Last normal GFR > 60 was in   2019. BUN/Cr/GFR for this admission: 12/0.82/>60 (), 15/1.12/48 (),   24/1.37/38 (), 24/1.20/44 (). GFR Range since 2019: 30-46  Treatment: Lasix 40 mg PO daily. Daily BMP. Monitoring I & Os  Options provided:  -- CKD Stage 3a GFR 45-59  -- CKD Stage 3b GFR 30-44  -- Other - I will add my own diagnosis  -- Disagree - Not applicable / Not valid  -- Disagree - Clinically unable to determine / Unknown  -- Refer to Clinical Documentation Reviewer    PROVIDER RESPONSE TEXT:    Patient had prerenal azotemia caused by increased diuretic dosing to treat her   acute congestive heart failure. This was an acute issue.     Query created by: Kaylyn Amado on 2021 4:06 PM      Electronically signed by:  Andrew Lee MD 2021 5:26 PM

## 2021-12-05 NOTE — DISCHARGE SUMMARY
Family Medicine Discharge Summary    Master Orellana  :  1950  MRN:  [id number]    Admit date:  2021  Discharge date:  2021    Admitting Physician:  Tonia Jaramillo MD    Discharge Diagnoses:    Patient Active Problem List   Diagnosis    Chronic pain    Controlled type 2 diabetes mellitus without complication, without long-term current use of insulin ([address]    Allergic rhinitis    Hypertension goal BP (blood pressure) < 140/90    Mixed hyperlipidemia    Chronic obstructive pulmonary disease ([address]    Coronary artery disease involving native coronary artery of native heart without angina pectoris    Primary insomnia    Diarrhea in adult patient    Restless leg syndrome    Atherosclerosis of superior mesenteric artery ([address]    Left adrenal mass ([address]    Former smoker    Chronic bilateral low back pain with left-sided sciatica    Hypothyroidism    S/P CABG x 4    Acute pain of right knee    Abscess of right thigh    Angina pectoris (HCC)    Abnormal stress test    Atrial fibrillation (HCC)    Tobacco use disorder    Neck pain    Chest pain    Acute ischemic left MCA stroke ([address]    Internal carotid artery occlusion    Stenosis of left internal carotid artery    Acquired spondylolisthesis of cervical vertebra    Stenosis of cervical spine with myelopathy (HCC)    Abnormal EKG    COPD exacerbation ([address]    Multifocal pneumonia    Hypoxia    Pneumonia       Admission Condition:  fair  Discharged Condition:  fair    Hospital Course:   67 yo admitted with shortness of breath. She was treated for acute on chronic systolic CHF with diuresis that resulted in prerenal azotemia, COPD exacerbation with steroids and bronchodilators, and suspected pneumonia with antibiotics.      Discharge Medications:         Medication List      START taking these medications    cefdinir 300 MG capsule  Commonly known as: OMNICEF  Take 1 capsule by mouth every 12 hours for 5 days     ferrous NITROSTAT  Place 1 tablet under the tongue every 5 minutes as needed for Chest pain up to max of 3 total doses. If no relief after 1 dose, call 911. ONE TOUCH ULTRA TEST strip  Generic drug: blood glucose test strips  TEST ONCE DAILY AS NEEDED     pantoprazole 40 MG tablet  Commonly known as: PROTONIX  Take 1 tablet by mouth every morning (before breakfast)     rOPINIRole 1 MG tablet  Commonly known as: REQUIP  TAKE 1 TABLET BY MOUTH EVERY NIGHT AS NEEDED     SM Aspirin Adult Low Strength 81 MG EC tablet  Generic drug: aspirin  TAKE 1 TABLET BY MOUTH DAILY     tiZANidine 2 MG tablet  Commonly known as: ZANAFLEX  Take 1 tablet by mouth nightly as needed (pain)     traZODone 50 MG tablet  Commonly known as: DESYREL        STOP taking these medications    metoprolol tartrate 25 MG tablet  Commonly known as: LOPRESSOR           Where to Get Your Medications      [place] medications [place] [address]    Phone: [phone]   · ferrous sulfate 325 (65 Fe) MG tablet  · metoprolol succinate 50 MG extended release tablet  · nicotine 14 MG/24HR     Information about where to get these medications is not yet available    Ask your nurse or doctor about these medications  · cefdinir 300 MG capsule  · hydrALAZINE 25 MG tablet  · predniSONE 20 MG tablet         Consults:  Pulmonology, cardiology    Significant Diagnostic Studies:  Radiology, labs    Treatments:   Diuresis, antibiotics, steroids    Disposition:   home  Follow up with Olga Dunn MD in 1-2 weeks. Signed:   Colleen Ignacio MD, FAAFP  12/5/2021, 6:33 PM

## 2021-12-09 ENCOUNTER — CARE COORDINATION (OUTPATIENT)
Dept: CASE MANAGEMENT | Age: 71
End: 2021-12-09

## 2021-12-16 ENCOUNTER — CARE COORDINATION (OUTPATIENT)
Dept: CASE MANAGEMENT | Age: 71
End: 2021-12-16

## 2021-12-28 ENCOUNTER — CARE COORDINATION (OUTPATIENT)
Dept: CASE MANAGEMENT | Age: 71
End: 2021-12-28

## 2021-12-28 NOTE — CARE COORDINATION
Ro 45 Transitions Follow Up Call    2021    Patient: Rosey Jessica  Patient : 1950   MRN: [id number]  Reason for Admission:   Discharge Date: 21 RARS: Readmission Risk Score: 21.8 ( )         # 3 attempts- unable to reach patient, left vm message with name and contact information, requested call back, 3 unsuccessful attempts to reach patient, care transitions completed    Care Transitions Subsequent and Final Call    Subsequent and Final Calls  Care Transitions Interventions  Other Interventions:            Follow Up  Future Appointments   Date Time Provider Kendra Jimenez   2022  3:20 PM MD [id number]   2/10/2022  9:00 AM MD benjamin Vega RN

## 2022-01-27 ENCOUNTER — CARE COORDINATION (OUTPATIENT)
Dept: CARE COORDINATION | Age: 72
End: 2022-01-27

## 2022-01-27 NOTE — LETTER
1/27/2022    Graciela Peterson      Dear Mrs. Juvenal Asif,    My name is Robbins Nick. I am a registered nurse who partners with Zulema Nolasco MD, to improve patients' health and I would like to offer my service to you. As a member of your health care team, I work with Dr. Marily Scanlon and other providers involved in your care, offer education for your specific health conditions and connect you with additional resources as needed. The additional support I provide is no additional cost to you. My primary focus is to support you in following your treatment plan, help you achieve specific goals, and improve your health. We are committed to walk with you on this journey and look forward to working with you. Please call me to further discuss your healthcare needs. I look forward to speaking with you. You can reach me at [phone].     In good health,       Rosendo Romero, RN  Ambulatory Care Manager  [facility]

## 2022-01-27 NOTE — CARE COORDINATION
Call placed to patient for CC Enrollment. Female who answered phone states patient is not available. .  Requested she ask patient to return call. Call back information provided. Introductory letter sent to patient via [employer]. Will F/U in one week if no response.

## 2022-02-01 ENCOUNTER — CARE COORDINATION (OUTPATIENT)
Dept: CARE COORDINATION | Age: 72
End: 2022-02-01

## 2022-02-03 ENCOUNTER — CARE COORDINATION (OUTPATIENT)
Dept: CARE COORDINATION | Age: 72
End: 2022-02-03

## 2022-02-03 RX ORDER — ISOSORBIDE MONONITRATE 30 MG/1
30 TABLET, EXTENDED RELEASE ORAL DAILY
Qty: 30 TABLET | Refills: 11 | Status: SHIPPED | OUTPATIENT
Start: 2022-02-03 | End: 2022-02-04 | Stop reason: SDUPTHER

## 2022-02-03 SDOH — ECONOMIC STABILITY: TRANSPORTATION INSECURITY
IN THE PAST 12 MONTHS, HAS LACK OF TRANSPORTATION KEPT YOU FROM MEETINGS, WORK, OR FROM GETTING THINGS NEEDED FOR DAILY LIVING?: NO

## 2022-02-03 SDOH — ECONOMIC STABILITY: FOOD INSECURITY: WITHIN THE PAST 12 MONTHS, YOU WORRIED THAT YOUR FOOD WOULD RUN OUT BEFORE YOU GOT MONEY TO BUY MORE.: NEVER TRUE

## 2022-02-03 SDOH — ECONOMIC STABILITY: HOUSING INSECURITY: IN THE LAST 12 MONTHS, HOW MANY PLACES HAVE YOU LIVED?: 1

## 2022-02-03 SDOH — ECONOMIC STABILITY: TRANSPORTATION INSECURITY
IN THE PAST 12 MONTHS, HAS THE LACK OF TRANSPORTATION KEPT YOU FROM MEDICAL APPOINTMENTS OR FROM GETTING MEDICATIONS?: NO

## 2022-02-03 SDOH — ECONOMIC STABILITY: FOOD INSECURITY: WITHIN THE PAST 12 MONTHS, THE FOOD YOU BOUGHT JUST DIDN'T LAST AND YOU DIDN'T HAVE MONEY TO GET MORE.: NEVER TRUE

## 2022-02-03 SDOH — HEALTH STABILITY: PHYSICAL HEALTH: ON AVERAGE, HOW MANY MINUTES DO YOU ENGAGE IN EXERCISE AT THIS LEVEL?: 0 MIN

## 2022-02-03 SDOH — ECONOMIC STABILITY: INCOME INSECURITY: IN THE LAST 12 MONTHS, WAS THERE A TIME WHEN YOU WERE NOT ABLE TO PAY THE MORTGAGE OR RENT ON TIME?: NO

## 2022-02-03 SDOH — ECONOMIC STABILITY: HOUSING INSECURITY
IN THE LAST 12 MONTHS, WAS THERE A TIME WHEN YOU DID NOT HAVE A STEADY PLACE TO SLEEP OR SLEPT IN A SHELTER (INCLUDING NOW)?: NO

## 2022-02-03 SDOH — HEALTH STABILITY: PHYSICAL HEALTH: ON AVERAGE, HOW MANY DAYS PER WEEK DO YOU ENGAGE IN MODERATE TO STRENUOUS EXERCISE (LIKE A BRISK WALK)?: 0 DAYS

## 2022-02-03 ASSESSMENT — SOCIAL DETERMINANTS OF HEALTH (SDOH)
IN A TYPICAL WEEK, HOW MANY TIMES DO YOU TALK ON THE PHONE WITH FAMILY, FRIENDS, OR NEIGHBORS?: MORE THAN THREE TIMES A WEEK
HOW OFTEN DO YOU GET TOGETHER WITH FRIENDS OR RELATIVES?: MORE THAN THREE TIMES A WEEK
IN A TYPICAL WEEK, HOW MANY TIMES DO YOU TALK ON THE PHONE WITH FAMILY, FRIENDS, OR NEIGHBORS?: MORE THAN THREE TIMES A WEEK
HOW OFTEN DO YOU GET TOGETHER WITH FRIENDS OR RELATIVES?: MORE THAN THREE TIMES A WEEK
DO YOU BELONG TO ANY CLUBS OR ORGANIZATIONS SUCH AS CHURCH GROUPS UNIONS, FRATERNAL OR ATHLETIC GROUPS, OR SCHOOL GROUPS?: YES
HOW OFTEN DO YOU ATTEND CHURCH OR RELIGIOUS SERVICES?: 1 TO 4 TIMES PER YEAR
HOW OFTEN DO YOU ATTEND CHURCH OR RELIGIOUS SERVICES?: NEVER
HOW HARD IS IT FOR YOU TO PAY FOR THE VERY BASICS LIKE FOOD, HOUSING, MEDICAL CARE, AND HEATING?: NOT HARD AT ALL
DO YOU BELONG TO ANY CLUBS OR ORGANIZATIONS SUCH AS CHURCH GROUPS UNIONS, FRATERNAL OR ATHLETIC GROUPS, OR SCHOOL GROUPS?: YES
HOW OFTEN DO YOU ATTENT MEETINGS OF THE CLUB OR ORGANIZATION YOU BELONG TO?: MORE THAN 4 TIMES PER YEAR
HOW OFTEN DO YOU ATTENT MEETINGS OF THE CLUB OR ORGANIZATION YOU BELONG TO?: MORE THAN 4 TIMES PER YEAR

## 2022-02-03 ASSESSMENT — LIFESTYLE VARIABLES: HOW OFTEN DO YOU HAVE A DRINK CONTAINING ALCOHOL: NEVER

## 2022-02-03 NOTE — ACP (ADVANCE CARE PLANNING)
Advance Care Planning   Healthcare Decision Maker:    Primary Decision Maker: Urvashi Thacker Child - [phone]    Secondary Decision Maker: Chelsie Alcocer - Child - [phone]    Click here to complete Healthcare Decision Makers including selection of the Healthcare Decision Maker Relationship (ie \"Primary\"). Today we documented Decision Maker(s) consistent with Legal Next of Kin hierarchy. and referred to ACP Clinical Specialist for assistance.

## 2022-02-03 NOTE — CARE COORDINATION
Ambulatory Care Coordination Note  2/3/2022  CM Risk Score: 10  Charlson 10 Year Mortality Risk Score: 100%     ACC: Aneta Jose RN    Summary Note:   Summary  Date Care Coordination Episode Started:  03 February 2022    Reason for Call Today:     CC Enrollment    Reason patient is in Care Coordination:     · COPD        · Diabetes  · RAEV 85%    Topics Discussed Today:     1) COPD         - Patient continues to smoke approximately one-half pack per day. States has not yet started with nicotine patches. Patient uses oxygen PRN during the day and at 2-3 liters/NC at night. Patient cautioned about smoking while wearing oxygen. Has pulse oximeter. Reports oxygen saturation in mid to upper nineties. Denies exertional dyspnea or productive cough. 2) Diabetes        - Does not check glucose levels. States \"I always forget. \"  States last time her glucose was checked was when hospitalized in November 2021. Discussed the importance of checking at least daily. Patient agrees to begin checking morning fasting    Interventions completed today:    Assessments completed today:   1. Fall risk  2. Initial assessment  3. Medication reconciliation  4. SDOH  5. COPD, Diabetes     Care Coordinator plan of care:     1. Update PCP  2. Zone Management, COPD - GREEN zone, Diabetes - GREEN Zone  3. Patient will start checking morning fasting daily and record  4. Continue use of oxygen as directed  5. Smoking cessation  6. Continue medications as ordered  7. Follow up with physicians as scheduled  8.  Follow up in one week    COPD Assessment    Does the patient understand envrionmental exposure?: Yes  Is the patient able to verbalize Rescue vs. Long Acting medications?: Yes  Does the patient have a nebulizer?: Yes  Does the patient use a space with inhaled medications?: No            Symptoms:  None: Yes      Breathlessness: none  Increase use of rapid acting/rescue inhaled medications?: No  Change in chronic cough?: No/At Baseline  Self Monitoring - SaO2: Yes  Baseline SaO2 Readin  Have you had a recent diagnosis of pneumonia either by PCP or at a hospital?: No       Diabetes Assessment    Medic Alert ID: No  Meal Planning: Avoidance of concentrated sweets   How often do you test your blood sugar?: No Testing   Do you have barriers with adherence to non-pharmacologic self-management interventions? (Nutrition/Exercise/Self-Monitoring): No   Have you ever had to go to the ED for symptoms of low blood sugar?: No       Do you have hyperglycemia symptoms?: No   Do you have hypoglycemia symptoms?: No   Blood Sugar Monitoring Regimen: Not Testing        Lab Results   Component Value Date    LABA1C 6.7 (H) 2021    LABA1C 6.0 2020    LABA1C 6.1 (H) 2020     Lab Results   Component Value Date     2021     2020     2019     Wt Readings from Last 3 Encounters:   21 143 lb 11.8 oz (65.2 kg)   10/21/21 139 lb (63 kg)   10/02/21 139 lb (63 kg)     BP Readings from Last 3 Encounters:   21 121/67   10/08/21 106/61   21 (!) 101/55     Ambulatory Care Coordination Assessment    Care Coordination Protocol  Program Enrollment: Complex Care  Referral from Primary Care Provider: No  Week 1 - Initial Assessment     Do you have all of your prescriptions and are they filled?: Yes  Barriers to medication adherence: None  Are you able to afford your medications?: No  How often do you have trouble taking your medications the way you have been told to take them?: I always take them as prescribed. Do you have Home O2 Therapy?: Yes   Oxygen Regimen: PRN, At night/Sleep Flow - Enter rate/FIO2: 2   Method of Delivery: Nasal Cannula   CPAP Use: None      Ability to seek help/take action for Emergent Urgent situations i.e. fire, crime, inclement weather or health crisis. : Independent  Ability to ambulate to restroom: Independent  Ability handle personal hygeine needs (bathing/dressing/grooming): Independent  Ability to manage Medications: Independent  Ability to prepare Food Preparation: Independent  Ability to maintain home (clean home, laundry): Independent  Ability to drive and/or has transportation: Needs Assistance  Ability to do shopping: Independent  Ability to manage finances: Independent  Is patient able to live independently?: Yes     Current Housing: Private Residence        Per the Fall Risk Screening, did the patient have 2 or more falls or 1 fall with injury in the past year?: Yes  How often do you think you are about to fall and you do NOT fall? For example, you grab something to stabilize yourself or hold onto a wall/furniture?: Occasionally  Use of a Mobility Aid:  (Comment: PRN)  Difficulty walking/impaired gait: No  Dizziness: No           Suggested Interventions and Community Resources   Transportation Services: Completed   Zone Management Tools: Completed                  Prior to Admission medications    Medication Sig Start Date End Date Taking?  Authorizing Provider   traZODone (DESYREL) 50 MG tablet Take 1 tablet by mouth nightly as needed for Sleep 1/17/22  Yes Karl Reeder MD   metoprolol succinate (TOPROL XL) 50 MG extended release tablet Take 1 tablet by mouth daily 11/30/21  Yes Karl Reeder MD   hydrALAZINE (APRESOLINE) 25 MG tablet Take 1 tablet by mouth every 8 hours 11/30/21  Yes Tigist Aldrich MD   albuterol sulfate  (90 Base) MCG/ACT inhaler INHALE TWO PUFFS BY MOUTH 4 TIMES A DAY AND RPN 11/11/21  Yes Karl Reeder MD   budesonide-formoterol [facility]) 160-4.5 MCG/ACT AERO Inhale 2 puffs into the lungs 2 times daily 10/5/21  Yes Karl Reeder MD   ipratropium-albuterol (DUONEB) 0.5-2.5 (3) MG/3ML SOLN nebulizer solution Inhale 3 mLs into the lungs every 4 hours as needed for Shortness of Breath 8/17/21  Yes Karl Reeder MD   guaiFENesin (MUCINEX) 600 MG extended release tablet Take 1 tablet by mouth 2 times daily 8/17/21  Yes Avie Overall, MD   magnesium oxide (MAG-OX) 400 (241.3 Mg) MG TABS tablet Take 1 tablet by mouth 2 times daily 8/17/21  Yes Avie Overall, MD   pantoprazole (PROTONIX) 40 MG tablet Take 1 tablet by mouth every morning (before breakfast) 8/18/21  Yes Avie Overall, MD   diclofenac sodium (VOLTAREN) 1 % GEL Apply 2 g topically 2 times daily as needed for Pain   Yes Historical Provider, MD   metFORMIN (GLUCOPHAGE-XR) 500 MG extended release tablet Take 1 tablet by mouth daily (with breakfast) 8/9/21  Yes Avie Overall, MD   citalopram (CELEXA) 20 MG tablet Take 1 tablet by mouth daily 8/9/21  Yes Avie Overall, MD   tiZANidine (ZANAFLEX) 2 MG tablet Take 1 tablet by mouth nightly as needed (pain) 8/9/21  Yes Avie Overall, MD   nitroGLYCERIN (NITROSTAT) 0.4 MG SL tablet Place 1 tablet under the tongue every 5 minutes as needed for Chest pain up to max of 3 total doses.  If no relief after 1 dose, call 911. 8/9/21  Yes Avie Overall, MD WOLF ASPIRIN ADULT LOW STRENGTH 81 MG EC tablet TAKE 1 TABLET BY MOUTH DAILY 5/21/21  Yes Avie Overall, MD   fluticasone [employer]) 50 MCG/ACT nasal spray USE 2 SPRAYS IN EACH NOSTRIL ONCE DAILY 5/21/21  Yes Avie Overall, MD   clopidogrel (PLAVIX) 75 MG tablet Take 1 tablet by mouth daily 3/18/21  Yes Avie Overall, MD   atorvastatin (LIPITOR) 40 MG tablet Take 2 tablets by mouth daily 2/2/21  Yes Avie Overall, MD   rOPINIRole (REQUIP) 1 MG tablet TAKE 1 TABLET BY MOUTH EVERY NIGHT AS NEEDED 2/2/21  Yes Avie Overall, MD   furosemide (LASIX) 40 MG tablet TAKE 1 TABLET BY MOUTH DAILY 11/8/20  Yes Avie Overall, MD   ONE TOUCH ULTRA TEST strip TEST ONCE DAILY AS NEEDED 4/6/20  Yes Avie Overall, MD   isosorbide mononitrate (IMDUR) 30 MG extended release tablet Take 30 mg by mouth 5/24/19  Yes Historical Provider, MD   ferrous sulfate (IRON 325) 325 (65 Fe) MG tablet Take 1 tablet by mouth 2 times daily (with meals)  Patient not taking: Reported on 2/3/2022 11/30/21   Lucía Levy MD   nicotine (NICODERM CQ) 14 MG/24HR Place 1 patch onto the skin daily 11/30/21   Lucía Levy MD       Future Appointments   Date Time Provider Kendra Jimenez   2/10/2022  9:00 AM Andria De La Fuente MD [facility]

## 2022-02-04 ENCOUNTER — CARE COORDINATION (OUTPATIENT)
Dept: CARE COORDINATION | Age: 72
End: 2022-02-04

## 2022-02-04 NOTE — CARE COORDINATION
Advance Care Planning    Ambulatory ACP Specialist Patient Outreach    Date:  2/4/2022  ACP Specialist:  Monika Carias    Outreach call to patient in follow-up to ACP Specialist referral from: Simran Meneses MD    [x] PCP  [] Provider   [x] Ambulatory Care Management [] Other for Reason:        [x] Early ACP Decision-Making   [] Advance Directive Assistance   [] Discuss Goals of Care   [] Code Status Discussion   [] Completion of Portable DNR order   [] Complete POST/MOST   [] Other (Specify)    Date Referral Received: 02/03/22    Today's Outreach:  [x] First   [] Second  [] Third                               Third outreach made by [x]  phone  [] email []   [employer]     Intervention:  [x] Spoke with Patient  [] Left VM requesting return call      Outcome: ACP Specialist phoned patient, made introductions, stated the reason for the call. ACP Specialist verified that the patient is interested in receiving the ACP documents. Patient agreed that she wants the documents mailed to her home. ACP Specialist will have the documents mailed and will follow up with patient in two weeks. Next Step:   [] ACP scheduled conversation  [] Outreach again in one week               [] Email / Mail ACP Info Sheets  [] Email / Mail Advance Directive            [] Close Referral. Routing closure to referring provider/staff and to ACP Specialist .      Thank you for this referral.

## 2022-02-09 ENCOUNTER — CARE COORDINATION (OUTPATIENT)
Dept: CARE COORDINATION | Age: 72
End: 2022-02-09

## 2022-02-09 NOTE — CARE COORDINATION
Mychart sheet, COPD and diabetes zone tools, walk-in clinic and right care-right place-right time information sheets was mailed out to pt.

## 2022-02-10 ENCOUNTER — CARE COORDINATION (OUTPATIENT)
Dept: CARE COORDINATION | Age: 72
End: 2022-02-10

## 2022-02-10 ENCOUNTER — OFFICE VISIT (OUTPATIENT)
Dept: FAMILY MEDICINE CLINIC | Age: 72
End: 2022-02-10
Payer: COMMERCIAL

## 2022-02-10 VITALS
OXYGEN SATURATION: 89 % | TEMPERATURE: 98.1 F | DIASTOLIC BLOOD PRESSURE: 48 MMHG | SYSTOLIC BLOOD PRESSURE: 84 MMHG | HEART RATE: 76 BPM | WEIGHT: 132.6 LBS | BODY MASS INDEX: 22.64 KG/M2 | HEIGHT: 64 IN

## 2022-02-10 DIAGNOSIS — E27.8 LEFT ADRENAL MASS (HCC): ICD-10-CM

## 2022-02-10 DIAGNOSIS — I25.10 CORONARY ARTERY DISEASE INVOLVING NATIVE CORONARY ARTERY OF NATIVE HEART WITHOUT ANGINA PECTORIS: Chronic | ICD-10-CM

## 2022-02-10 DIAGNOSIS — M48.02 STENOSIS OF CERVICAL SPINE WITH MYELOPATHY (HCC): ICD-10-CM

## 2022-02-10 DIAGNOSIS — G99.2 STENOSIS OF CERVICAL SPINE WITH MYELOPATHY (HCC): ICD-10-CM

## 2022-02-10 DIAGNOSIS — E11.22 TYPE 2 DIABETES MELLITUS WITH STAGE 3A CHRONIC KIDNEY DISEASE, WITHOUT LONG-TERM CURRENT USE OF INSULIN (HCC): Primary | ICD-10-CM

## 2022-02-10 DIAGNOSIS — J41.1 MUCOPURULENT CHRONIC BRONCHITIS (HCC): ICD-10-CM

## 2022-02-10 DIAGNOSIS — D50.9 IRON DEFICIENCY ANEMIA, UNSPECIFIED IRON DEFICIENCY ANEMIA TYPE: ICD-10-CM

## 2022-02-10 DIAGNOSIS — K55.1 ATHEROSCLEROSIS OF SUPERIOR MESENTERIC ARTERY (HCC): ICD-10-CM

## 2022-02-10 DIAGNOSIS — I50.42 CHRONIC COMBINED SYSTOLIC AND DIASTOLIC CONGESTIVE HEART FAILURE (HCC): ICD-10-CM

## 2022-02-10 DIAGNOSIS — Z23 NEED FOR PROPHYLACTIC VACCINATION AND INOCULATION AGAINST VARICELLA: ICD-10-CM

## 2022-02-10 DIAGNOSIS — N18.31 STAGE 3A CHRONIC KIDNEY DISEASE (HCC): ICD-10-CM

## 2022-02-10 DIAGNOSIS — N18.31 TYPE 2 DIABETES MELLITUS WITH STAGE 3A CHRONIC KIDNEY DISEASE, WITHOUT LONG-TERM CURRENT USE OF INSULIN (HCC): Primary | ICD-10-CM

## 2022-02-10 DIAGNOSIS — I95.2 HYPOTENSION DUE TO DRUGS: ICD-10-CM

## 2022-02-10 PROBLEM — I48.91 ATRIAL FIBRILLATION (HCC): Status: RESOLVED | Noted: 2018-05-31 | Resolved: 2022-02-10

## 2022-02-10 PROBLEM — R07.9 CHEST PAIN: Status: RESOLVED | Noted: 2019-09-20 | Resolved: 2022-02-10

## 2022-02-10 PROBLEM — I20.9 ANGINA PECTORIS (HCC): Status: RESOLVED | Noted: 2018-05-31 | Resolved: 2022-02-10

## 2022-02-10 PROBLEM — L02.415 ABSCESS OF RIGHT THIGH: Status: RESOLVED | Noted: 2018-05-08 | Resolved: 2022-02-10

## 2022-02-10 LAB — HBA1C MFR BLD: 6.4 %

## 2022-02-10 PROCEDURE — 99205 OFFICE O/P NEW HI 60 MIN: CPT | Performed by: FAMILY MEDICINE

## 2022-02-10 PROCEDURE — 83036 HEMOGLOBIN GLYCOSYLATED A1C: CPT | Performed by: FAMILY MEDICINE

## 2022-02-10 PROCEDURE — 3288F FALL RISK ASSESSMENT DOCD: CPT | Performed by: FAMILY MEDICINE

## 2022-02-10 RX ORDER — FERROUS SULFATE 325(65) MG
325 TABLET ORAL
Qty: 180 TABLET | Refills: 1 | Status: SHIPPED | OUTPATIENT
Start: 2022-02-10

## 2022-02-10 RX ORDER — BLOOD PRESSURE TEST KIT
KIT MISCELLANEOUS
Qty: 1 KIT | Refills: 0 | Status: SHIPPED | OUTPATIENT
Start: 2022-02-10

## 2022-02-10 ASSESSMENT — ENCOUNTER SYMPTOMS
SHORTNESS OF BREATH: 1
ABDOMINAL DISTENTION: 0
NAUSEA: 0
COLOR CHANGE: 0
CHEST TIGHTNESS: 0
CONSTIPATION: 0
VOMITING: 0
SINUS PRESSURE: 0
ABDOMINAL PAIN: 0
BACK PAIN: 1
WHEEZING: 1
SORE THROAT: 0

## 2022-02-10 ASSESSMENT — PATIENT HEALTH QUESTIONNAIRE - PHQ9
SUM OF ALL RESPONSES TO PHQ9 QUESTIONS 1 & 2: 0
2. FEELING DOWN, DEPRESSED OR HOPELESS: 0
SUM OF ALL RESPONSES TO PHQ QUESTIONS 1-9: 0
1. LITTLE INTEREST OR PLEASURE IN DOING THINGS: 0
SUM OF ALL RESPONSES TO PHQ QUESTIONS 1-9: 0

## 2022-02-10 NOTE — PROGRESS NOTES
Visit Information    Have you changed or started any medications since your last visit including any over-the-counter medicines, vitamins, or herbal medicines? no   Have you stopped taking any of your medications? Is so, why? -  no  Are you having any side effects from any of your medications? - no    Have you seen any other physician or provider since your last visit?  no   Have you had any other diagnostic tests since your last visit?  no   Have you been seen in the emergency room and/or had an admission in a hospital since we last saw you?  no   Have you had your routine dental cleaning in the past 6 months?  no     Do you have an active MyChart account? If no, what is the barrier?   No:     Patient Care Team:  Jerry Garcia MD as PCP - General (Family Medicine)  Tom Glaser MD as PCP - [place] [id number] [place], [place] as Consulting Physician (Gastroenterology)  Guille Lorenzo MD as Consulting Physician (Infectious Diseases)  Guille Lorenzo MD as Consulting Physician (Infectious Diseases)  Priti Little RN as Ambulatory Care Manager  Mariblel Albrecht as Health     Medical History Review  Past Medical, Family, and Social History reviewed and does contribute to the patient presenting condition    Health Maintenance   Topic Date Due    COVID-19 Vaccine (1) Never done    Depression Screen  Never done    Shingles Vaccine (1 of 2) Never done    Diabetic microalbuminuria test  03/14/2019    Diabetic retinal exam  11/01/2019    Flu vaccine (1) 09/01/2021    Annual Wellness Visit (AWV)  10/02/2021    Diabetic foot exam  11/13/2021    A1C test (Diabetic or Prediabetic)  08/11/2022    Lipid screen  08/12/2022    TSH testing  08/12/2022    Breast cancer screen  09/21/2022    Potassium monitoring  11/30/2022    Creatinine monitoring  11/30/2022    Colon cancer screen colonoscopy  01/18/2028    DTaP/Tdap/Td vaccine (2 - Td or Tdap) 07/02/2030    Pneumococcal 65+ years Vaccine  Completed    Hepatitis C screen  Completed    DEXA (modify frequency per FRAX score)  Addressed    Hepatitis A vaccine  Aged Out    Hib vaccine  Aged Out    Meningococcal (ACWY) vaccine  Aged Out

## 2022-02-10 NOTE — PROGRESS NOTES
Chief Complaint   Patient presents with    New Patient    Diabetes         Bailee Alfaro  here today for follow up on chronic medical problems, go over labs and/or diagnostic studies, and medication refills. New Patient and Diabetes      HPI: Patient is here to establish as a new patient. Patient has extensive medical history. History of diabetes complicated with chronic kidney disease stage III. A1c is stable 6.4 which is improved takes Metformin once daily. Blood sugars are running in the range. Patient was in the hospital in November with CHF and COPD. Ejection fraction of 35% echo results below. Patient blood pressure is running low, patient is currently on metoprolol Lasix and hydralazine 3 times per day. Patient follows with cardiologist as seen in November. Results seen in care everywhere. No history of A. Fib. Patient also has history of coronary artery disease with gastric bypass and also stent placed. Patient is currently working on smoking reports she has cut down being on nicotine patches. COPD on multiple inhalers takes as needed. Patient has chronic anemia iron deficiency, multifactorial evaluated in the past, up-to-date on colonoscopy. History of adrenal nodule which is stable on MRI abdomen 2016 care everywhere. Atherosclerosis, patient follows with vascular surgeon. Echo contrast utilized on this technically difficult study. Left ventricle is normal in size. Moderately increased LV wall thickness, Asymmetrical septal hypertrophy  Moderate global hypokinesis. Akinetic apex  Estimated LV EF 35 %. Right ventricular dilatation with normal systolic function. Left atrial dilatation. Right atrial dilatation. Normal aortic valve structure and function without stenosis or  regurgitation. Normal aortic root dimension. Thickened mitral valve leaflets. Trivial mitral regurgitation. Grossly normal tricuspid valve structure and function.  Mild to moderate  tricuspid regurgitation. Estimated right ventricular systolic pressure is 60 mmHg. Moderately  elevated right ventricular systolic pressure. The pulmonic valve is normal in structure. Mild pulmonic insufficiency. IVC Increased diameter and impaired or no inspiratory variation. No significant pericardial effusion is seen.       BP (!) 84/48   Pulse 76   Temp 98.1 °F (36.7 °C)   Ht 5' 4\" (1.626 m)   Wt 132 lb 9.6 oz (60.1 kg)   SpO2 (!) 89%   BMI 22.76 kg/m²    Body mass index is 22.76 kg/m². Wt Readings from Last 3 Encounters:   02/10/22 132 lb 9.6 oz (60.1 kg)   11/30/21 143 lb 11.8 oz (65.2 kg)   10/21/21 139 lb (63 kg)        [x]Negative depression screening. PHQ Scores 2/10/2022 5/12/2020 6/21/2018 11/10/2016 2/11/2016   PHQ2 Score 0 - 0 0 0   PHQ9 Score 0 0 0 0 0      []1-4 = Minimal depression   []5-9 = Milddepression   []10-14 = Moderate depression   []15-19 = Moderately severe depression   []20-27 = Severe depression    Discussed testing with the patient and all questions fully answered.     Admission on 11/26/2021, Discharged on 11/30/2021   Component Date Value Ref Range Status    Ventricular Rate 11/26/2021 81  BPM Final    Atrial Rate 11/26/2021 81  BPM Final    P-R Interval 11/26/2021 132  ms Final    QRS Duration 11/26/2021 96  ms Final    Q-T Interval 11/26/2021 382  ms Final    QTc Calculation (Bazett) 11/26/2021 443  ms Final    P Axis 11/26/2021 20  degrees Final    R Axis 11/26/2021 -12  degrees Final    T Axis 11/26/2021 138  degrees Final    Glucose 11/26/2021 155* 70 - 99 mg/dL Final    BUN 11/26/2021 12  8 - 23 mg/dL Final    CREATININE 11/26/2021 0.82  0.50 - 0.90 mg/dL Final    Bun/Cre Ratio 11/26/2021 NOT REPORTED  9 - 20 Final    Calcium 11/26/2021 7.0* 8.6 - 10.4 mg/dL Final    Sodium 11/26/2021 143  135 - 144 mmol/L Final    Potassium 11/26/2021 3.8  3.7 - 5.3 mmol/L Final    Chloride 11/26/2021 112* 98 - 107 mmol/L Final    CO2 11/26/2021 23  20 - (EUA) only. Not Detected results do not preclude SARS-CoV-2 infection and should not be used as the sole   basis for patient management decisions. Negative results must be combined with clinical observations, patient history, and   epidemiological information. Fact sheet for Patients: [employer]pl  Fact sheet for Healthcare Providers: [employer].pl      INFLUENZA A 11/26/2021 Not Detected  Not Detected Final    INFLUENZA B 11/26/2021 Not Detected  Not Detected Final    Specimen Source 11/26/2021 . BLOOD   Final   [facility] Test 11/26/2021 CDP   Final    Reason for Rejection 11/26/2021 Unable to perform testing: Specimen clotted.    Final    - 11/26/2021 NOT REPORTED   Final    WBC 11/26/2021 5.2  3.5 - 11.0 k/uL Final    RBC 11/26/2021 3.38* 4.0 - 5.2 m/uL Final    Hemoglobin 11/26/2021 8.1* 12.0 - 16.0 g/dL Final    Hematocrit 11/26/2021 26.0* 36 - 46 % Final    MCV 11/26/2021 76.9* 80 - 100 fL Final    MCH 11/26/2021 23.9* 26 - 34 pg Final    MCHC 11/26/2021 31.1  31 - 37 g/dL Final    RDW 11/26/2021 18.6* 11.5 - 14.9 % Final    Platelets 72/34/9590 188  150 - 450 k/uL Final    MPV 11/26/2021 7.8  6.0 - 12.0 fL Final    NRBC Automated 11/26/2021 NOT REPORTED  per 100 WBC Final    Differential Type 11/26/2021 NOT REPORTED   Final    Immature Granulocytes 11/26/2021 NOT REPORTED  0 % Final    Absolute Immature Granulocyte 11/26/2021 NOT REPORTED  0.00 - 0.30 k/uL Final    WBC Morphology 11/26/2021 NOT REPORTED   Final    RBC Morphology 11/26/2021 NOT REPORTED   Final    Platelet Estimate 39/35/7794 NOT REPORTED   Final    Seg Neutrophils 11/26/2021 74* 36 - 66 % Final    Lymphocytes 11/26/2021 11* 24 - 44 % Final    Monocytes 11/26/2021 12* 1 - 7 % Final    Eosinophils % 11/26/2021 2  0 - 4 % Final    Basophils 11/26/2021 1  0 - 2 % Final    Segs Absolute 11/26/2021 3.86  1.3 - 9.1 k/uL Final    Absolute Lymph # 11/26/2021 0.57* 1.0 - 4.8 k/uL Final    Absolute Mono # 11/26/2021 0.62  0.1 - 1.3 k/uL Final    Absolute Eos # 11/26/2021 0.10  0.0 - 0.4 k/uL Final    Basophils Absolute 11/26/2021 0.05  0.0 - 0.2 k/uL Final    Morphology 11/26/2021 ANISOCYTOSIS PRESENT   Final    Morphology 11/26/2021 MICROCYTOSIS PRESENT   Final    Morphology 11/26/2021 HYPOCHROMIA PRESENT   Final    Morphology 11/26/2021 1+ ELLIPTOCYTES   Final    Morphology 11/26/2021 1+ ACANTHOCYTES   Final    Troponin, High Sensitivity 11/26/2021 44* 0 - 14 ng/L Final    Comment:       High Sensitivity Troponin values cannot be compared with other Troponin methodologies. Patients with high levels of Biotin oral intake (i.e >5mg/day) may have falsely decreased   Troponin levels. Samples collected within 8 hours of biotin intake may require additional   information for diagnosis.  Troponin T 11/26/2021 NOT REPORTED  <0.03 ng/mL Final    Troponin Interp 11/26/2021 NOT REPORTED   Final    Specimen Description 11/26/2021 . NASAL SWAB   Final    MRSA, DNA, Nasal 11/26/2021 NEGATIVE:  MRSA DNA not detected by nucleic acid amplification. NEGATIVE:  MRSA DNA not detected by nucleic acid amplificati Final    Comment:                                                   Results should be used as an adjunct to nosocomial control efforts to identify patients   needing enhanced precautions. The test is not intended to identify patients with staphylococcal infections. Results   should not be used to guide or monitor treatment for MRSA infections.       Glucose 11/27/2021 259* 70 - 99 mg/dL Final    BUN 11/27/2021 15  8 - 23 mg/dL Final    CREATININE 11/27/2021 1.12* 0.50 - 0.90 mg/dL Final    Bun/Cre Ratio 11/27/2021 NOT REPORTED  9 - 20 Final    Calcium 11/27/2021 8.7  8.6 - 10.4 mg/dL Final    Sodium 11/27/2021 140  135 - 144 mmol/L Final    Potassium 11/27/2021 5.0  3.7 - 5.3 mmol/L Final    Chloride 11/27/2021 103  98 - 107 mmol/L Final  CO2 11/27/2021 33* 20 - 31 mmol/L Final    Anion Gap 11/27/2021 4* 9 - 17 mmol/L Final    GFR Non- 11/27/2021 48* >60 mL/min Final    GFR  11/27/2021 58* >60 mL/min Final    GFR Comment 11/27/2021        Final    Comment: Average GFR for 79or more years old:   76 mL/min/1.73sq m  Chronic Kidney Disease:   <60 mL/min/1.73sq m  Kidney failure:   <15 mL/min/1.73sq m              eGFR calculated using average adult body mass.  Additional eGFR calculator available at:        [email or web address]            GFR Staging 11/27/2021 NOT REPORTED   Final    WBC 11/27/2021 4.4  3.5 - 11.0 k/uL Final    RBC 11/27/2021 4.01  4.0 - 5.2 m/uL Final    Hemoglobin 11/27/2021 9.4* 12.0 - 16.0 g/dL Final    Hematocrit 11/27/2021 30.2* 36 - 46 % Final    MCV 11/27/2021 75.4* 80 - 100 fL Final    MCH 11/27/2021 23.5* 26 - 34 pg Final    MCHC 11/27/2021 31.2  31 - 37 g/dL Final    RDW 11/27/2021 18.7* 11.5 - 14.9 % Final    Platelets 65/65/4020 327  150 - 450 k/uL Final    MPV 11/27/2021 8.0  6.0 - 12.0 fL Final    NRBC Automated 11/27/2021 NOT REPORTED  per 100 WBC Final    Iron 11/27/2021 26* 37 - 145 ug/dL Final    TIBC 11/27/2021 342  250 - 450 ug/dL Final    Iron Saturation 11/27/2021 8* 20 - 55 % Final    UIBC 11/27/2021 316  112 - 347 ug/dL Final    Glucose 11/28/2021 163* 70 - 99 mg/dL Final    BUN 11/28/2021 24* 8 - 23 mg/dL Final    CREATININE 11/28/2021 1.37* 0.50 - 0.90 mg/dL Final    Bun/Cre Ratio 11/28/2021 NOT REPORTED  9 - 20 Final    Calcium 11/28/2021 8.8  8.6 - 10.4 mg/dL Final    Sodium 11/28/2021 140  135 - 144 mmol/L Final    Potassium 11/28/2021 4.8  3.7 - 5.3 mmol/L Final    Chloride 11/28/2021 102  98 - 107 mmol/L Final    CO2 11/28/2021 34* 20 - 31 mmol/L Final    Anion Gap 11/28/2021 4* 9 - 17 mmol/L Final    GFR Non- 11/28/2021 38* >60 mL/min Final    GFR  11/28/2021 46* >60 mL/min Final    GFR Comment 11/28/2021        Final    Comment: Average GFR for 79or more years old:   76 mL/min/1.73sq m  Chronic Kidney Disease:   <60 mL/min/1.73sq m  Kidney failure:   <15 mL/min/1.73sq m              eGFR calculated using average adult body mass. Additional eGFR calculator available at:        [email or web address]            GFR Staging 11/28/2021 NOT REPORTED   Final    Magnesium 11/28/2021 1.9  1.6 - 2.6 mg/dL Final    WBC 11/28/2021 6.1  3.5 - 11.0 k/uL Final    RBC 11/28/2021 4.02  4.0 - 5.2 m/uL Final    Hemoglobin 11/28/2021 9.5* 12.0 - 16.0 g/dL Final    Hematocrit 11/28/2021 30.1* 36 - 46 % Final    MCV 11/28/2021 74.8* 80 - 100 fL Final    MCH 11/28/2021 23.5* 26 - 34 pg Final    MCHC 11/28/2021 31.4  31 - 37 g/dL Final    RDW 11/28/2021 18.7* 11.5 - 14.9 % Final    Platelets 29/08/1086 282  150 - 450 k/uL Final    MPV 11/28/2021 7.8  6.0 - 12.0 fL Final    NRBC Automated 11/28/2021 NOT REPORTED  per 100 WBC Final    Glucose 11/29/2021 256* 70 - 99 mg/dL Final    BUN 11/29/2021 24* 8 - 23 mg/dL Final    CREATININE 11/29/2021 1.20* 0.50 - 0.90 mg/dL Final    Bun/Cre Ratio 11/29/2021 NOT REPORTED  9 - 20 Final    Calcium 11/29/2021 8.6  8.6 - 10.4 mg/dL Final    Sodium 11/29/2021 134* 135 - 144 mmol/L Final    Potassium 11/29/2021 4.4  3.7 - 5.3 mmol/L Final    Chloride 11/29/2021 97* 98 - 107 mmol/L Final    CO2 11/29/2021 33* 20 - 31 mmol/L Final    Anion Gap 11/29/2021 4* 9 - 17 mmol/L Final    GFR Non- 11/29/2021 44* >60 mL/min Final    GFR  11/29/2021 53* >60 mL/min Final    GFR Comment 11/29/2021        Final    Comment: Average GFR for 79or more years old:   76 mL/min/1.73sq m  Chronic Kidney Disease:   <60 mL/min/1.73sq m  Kidney failure:   <15 mL/min/1.73sq m              eGFR calculated using average adult body mass.  Additional eGFR calculator available at: [email or web address]            GFR Staging 11/29/2021 NOT REPORTED   Final    Magnesium 11/29/2021 1.8  1.6 - 2.6 mg/dL Final    Pro-BNP 11/29/2021 10,084* <300 pg/mL Final    Comment:       An age-independent cutoff point of 300 pg/ml has a 98% negative predictive value excluding   acute heart failure.  BNP Interpretation 11/29/2021 NOT REPORTED   Final    WBC 11/29/2021 5.9  3.5 - 11.0 k/uL Final    RBC 11/29/2021 3.84* 4.0 - 5.2 m/uL Final    Hemoglobin 11/29/2021 9.0* 12.0 - 16.0 g/dL Final    Hematocrit 11/29/2021 28.8* 36 - 46 % Final    MCV 11/29/2021 74.8* 80 - 100 fL Final    MCH 11/29/2021 23.3* 26 - 34 pg Final    MCHC 11/29/2021 31.2  31 - 37 g/dL Final    RDW 11/29/2021 18.4* 11.5 - 14.9 % Final    Platelets 56/66/4556 280  150 - 450 k/uL Final    MPV 11/29/2021 7.7  6.0 - 12.0 fL Final    NRBC Automated 11/29/2021 NOT REPORTED  per 100 WBC Final    Glucose 11/30/2021 191* 70 - 99 mg/dL Final    BUN 11/30/2021 30* 8 - 23 mg/dL Final    CREATININE 11/30/2021 1.21* 0.50 - 0.90 mg/dL Final    Bun/Cre Ratio 11/30/2021 NOT REPORTED  9 - 20 Final    Calcium 11/30/2021 9.0  8.6 - 10.4 mg/dL Final    Sodium 11/30/2021 136  135 - 144 mmol/L Final    Potassium 11/30/2021 4.8  3.7 - 5.3 mmol/L Final    Chloride 11/30/2021 95* 98 - 107 mmol/L Final    CO2 11/30/2021 36* 20 - 31 mmol/L Final    Anion Gap 11/30/2021 5* 9 - 17 mmol/L Final    GFR Non- 11/30/2021 44* >60 mL/min Final    GFR  11/30/2021 53* >60 mL/min Final    GFR Comment 11/30/2021        Final    Comment: Average GFR for 79or more years old:   76 mL/min/1.73sq m  Chronic Kidney Disease:   <60 mL/min/1.73sq m  Kidney failure:   <15 mL/min/1.73sq m              eGFR calculated using average adult body mass.  Additional eGFR calculator available at:        [email or web address]            GFR Staging 11/30/2021 NOT REPORTED   Final    Magnesium 11/30/2021 2.1  1.6 - 2.6 mg/dL Final    WBC 11/30/2021 10.8  3.5 - 11.0 k/uL Final    RBC 11/30/2021 4.21  4.0 - 5.2 m/uL Final    Hemoglobin 11/30/2021 9.9* 12.0 - 16.0 g/dL Final    Hematocrit 11/30/2021 31.7* 36 - 46 % Final    MCV 11/30/2021 75.2* 80 - 100 fL Final    MCH 11/30/2021 23.5* 26 - 34 pg Final    MCHC 11/30/2021 31.2  31 - 37 g/dL Final    RDW 11/30/2021 19.0* 11.5 - 14.9 % Final    Platelets 00/67/3709 342  150 - 450 k/uL Final    MPV 11/30/2021 8.0  6.0 - 12.0 fL Final    NRBC Automated 11/30/2021 NOT REPORTED  per 100 WBC Final         Most recent labs reviewed:     Lab Results   Component Value Date    WBC 10.8 11/30/2021    HGB 9.9 (L) 11/30/2021    HCT 31.7 (L) 11/30/2021    MCV 75.2 (L) 11/30/2021     11/30/2021       @BRIEFLAB(NA,K,CL,CO2,BUN,CREATININE,GLUCOSE,CALCIUM)@     Lab Results   Component Value Date    ALT 14 10/02/2021    AST 22 10/02/2021    ALKPHOS 112 (H) 10/02/2021    BILITOT 0.37 10/02/2021       Lab Results   Component Value Date    TSH 0.22 (L) 08/12/2021       Lab Results   Component Value Date    CHOL 136 08/12/2021    CHOL 140 07/26/2020    CHOL 132 09/20/2019     Lab Results   Component Value Date    TRIG 41 08/12/2021    TRIG 54 07/26/2020    TRIG 104 09/20/2019     Lab Results   Component Value Date    HDL 49 08/12/2021    HDL 45 07/26/2020    HDL 43 09/20/2019     Lab Results   Component Value Date    LDLCALC 99 09/29/2015    LDLCHOLESTEROL 79 08/12/2021    LDLCHOLESTEROL 84 07/26/2020    LDLCHOLESTEROL 68 09/20/2019     Lab Results   Component Value Date    VLDL NOT REPORTED 08/12/2021    VLDL NOT REPORTED 07/26/2020    VLDL NOT REPORTED 09/20/2019     Lab Results   Component Value Date    CHOLHDLRATIO 2.8 08/12/2021    CHOLHDLRATIO 3.1 07/26/2020    CHOLHDLRATIO 3.1 09/20/2019       Lab Results   Component Value Date    LABA1C 6.4 02/10/2022       No results found for: [id number]    No results found for: FOLATE    Lab Results   Component Value Date    IRON 26 (L) 11/27/2021    TIBC 342 11/27/2021    FERRITIN 27 10/02/2021       No results found for: VITD25          Current Outpatient Medications   Medication Sig Dispense Refill    ferrous sulfate (IRON 325) 325 (65 Fe) MG tablet Take 1 tablet by mouth daily (with breakfast) 180 tablet 1    isosorbide mononitrate (IMDUR) 30 MG extended release tablet Take 1 tablet by mouth daily 30 tablet 11    traZODone (DESYREL) 50 MG tablet Take 1 tablet by mouth nightly as needed for Sleep 30 tablet 11    metoprolol succinate (TOPROL XL) 50 MG extended release tablet Take 1 tablet by mouth daily 30 tablet 11    albuterol sulfate  (90 Base) MCG/ACT inhaler INHALE TWO PUFFS BY MOUTH 4 TIMES A DAY AND RPN 8.5 g 11    budesonide-formoterol (SYMBICORT) 160-4.5 MCG/ACT AERO Inhale 2 puffs into the lungs 2 times daily 10.2 g 3    ipratropium-albuterol (DUONEB) 0.5-2.5 (3) MG/3ML SOLN nebulizer solution Inhale 3 mLs into the lungs every 4 hours as needed for Shortness of Breath 360 mL 11    guaiFENesin (MUCINEX) 600 MG extended release tablet Take 1 tablet by mouth 2 times daily 30 tablet 0    magnesium oxide (MAG-OX) 400 (241.3 Mg) MG TABS tablet Take 1 tablet by mouth 2 times daily 60 tablet 11    pantoprazole (PROTONIX) 40 MG tablet Take 1 tablet by mouth every morning (before breakfast) 30 tablet 3    metFORMIN (GLUCOPHAGE-XR) 500 MG extended release tablet Take 1 tablet by mouth daily (with breakfast) 90 tablet 3    citalopram (CELEXA) 20 MG tablet Take 1 tablet by mouth daily 90 tablet 3    tiZANidine (ZANAFLEX) 2 MG tablet Take 1 tablet by mouth nightly as needed (pain) 90 tablet 0    SM ASPIRIN ADULT LOW STRENGTH 81 MG EC tablet TAKE 1 TABLET BY MOUTH DAILY 90 tablet 3    fluticasone (FLONASE) 50 MCG/ACT nasal spray USE 2 SPRAYS IN EACH NOSTRIL ONCE DAILY 48 g 3    clopidogrel (PLAVIX) 75 MG tablet Take 1 tablet by mouth daily 90 tablet 3    atorvastatin (LIPITOR) 40 MG tablet Take 2 tablets by mouth daily 90 tablet 3    rOPINIRole (REQUIP) 1 MG tablet TAKE 1 TABLET BY MOUTH EVERY NIGHT AS NEEDED 90 tablet 3    furosemide (LASIX) 40 MG tablet TAKE 1 TABLET BY MOUTH DAILY 90 tablet 3    ONE TOUCH ULTRA TEST strip TEST ONCE DAILY AS NEEDED 100 strip 3    nicotine (NICODERM CQ) 14 MG/24HR Place 1 patch onto the skin daily (Patient not taking: Reported on 2/10/2022) 30 patch 3    diclofenac sodium (VOLTAREN) 1 % GEL Apply 2 g topically 2 times daily as needed for Pain (Patient not taking: Reported on 2/10/2022)      nitroGLYCERIN (NITROSTAT) 0.4 MG SL tablet Place 1 tablet under the tongue every 5 minutes as needed for Chest pain up to max of 3 total doses. If no relief after 1 dose, call 911. (Patient not taking: Reported on 2/10/2022) 25 tablet 11     No current facility-administered medications for this visit. Social History     Socioeconomic History    Marital status:       Spouse name: Not on file    Number of children: Not on file    Years of education: Not on file    Highest education level: Not on file   Occupational History    Not on file   Tobacco Use    Smoking status: Current Every Day Smoker     Packs/day: 0.25     Years: 56.00     Pack years: 14.00     Types: Cigarettes    Smokeless tobacco: Never Used    Tobacco comment: 4-5 cigarettes a day   Vaping Use    Vaping Use: Former   Substance and Sexual Activity    Alcohol use: No     Alcohol/week: 0.0 standard drinks    Drug use: Yes     Types: Marijuana Fredick Copping)     Comment: ocassionally    Sexual activity: Not on file   Other Topics Concern    Not on file   Social History Narrative    Not on file     Social Determinants of Health     Financial Resource Strain: Low Risk     Difficulty of Paying Living Expenses: Not hard at all   Food Insecurity: No Food Insecurity    Worried About Running Out of Food in the Last Year: Never true    Alphonse of Food in the Last Year: Never true   Transportation Needs: No Transportation Needs    Lack of Transportation (Medical): No    Lack of Transportation (Non-Medical): No   Physical Activity: Inactive    Days of Exercise per Week: 0 days    Minutes of Exercise per Session: 0 min   Stress: No Stress Concern Present    Feeling of Stress : Only a little   Social Connections: Moderately Isolated    Frequency of Communication with Friends and Family: More than three times a week    Frequency of Social Gatherings with Friends and Family: More than three times a week    Attends Rastafari Services: Never    Active Member of Clubs or Organizations: Yes    Attends Club or Organization Meetings: More than 4 times per year    Marital Status:    Intimate Partner Violence:     Fear of Current or Ex-Partner: Not on file    Emotionally Abused: Not on file    Physically Abused: Not on file    Sexually Abused: Not on file   Housing Stability: [address] Unable to Pay for Housing in the Last Year: No    Number of [place] in the Last Year: 1    Unstable Housing in the Last Year: No     Ready to quit: Yes  Counseling given: Yes  Comment: 4-5 cigarettes a day        Family History   Problem Relation Age of Onset    Heart Disease Father              -rest of complaints with corresponding details per ROS    The patient's past medical, surgical, social, and family history as well as her current medications and allergies were reviewed as documented intoday's encounter. Review of Systems   Constitutional: Positive for activity change and fatigue. Negative for appetite change, diaphoresis and unexpected weight change. HENT: Negative for congestion, postnasal drip, sinus pressure, sneezing and sore throat. Eyes: Negative for visual disturbance. Respiratory: Positive for shortness of breath and wheezing. Negative for chest tightness. Cardiovascular: Negative for chest pain, palpitations and leg swelling.    Gastrointestinal: Negative for abdominal distention, abdominal pain, constipation, nausea and vomiting. Endocrine: Negative for polyuria. Genitourinary: Negative for difficulty urinating, flank pain, urgency and vaginal pain. Musculoskeletal: Positive for arthralgias and back pain. Negative for gait problem, myalgias and neck pain. Skin: Negative for color change, rash and wound. Neurological: Positive for weakness and numbness. Negative for dizziness, speech difficulty and headaches. Psychiatric/Behavioral: Positive for decreased concentration. Negative for behavioral problems, dysphoric mood, hallucinations and sleep disturbance. The patient is nervous/anxious. Physical Exam  Vitals and nursing note reviewed. Constitutional:       Appearance: Normal appearance. HENT:      Head: Normocephalic. Nose: Nose normal.      Mouth/Throat:      Mouth: Mucous membranes are moist.   Eyes:      Extraocular Movements: Extraocular movements intact. Pupils: Pupils are equal, round, and reactive to light. Comments: Wearing glasses   Cardiovascular:      Rate and Rhythm: Normal rate and regular rhythm. Heart sounds: Normal heart sounds. Pulmonary:      Effort: Pulmonary effort is normal. No tachypnea or accessory muscle usage. Breath sounds: Decreased air movement present. Decreased breath sounds present. No wheezing, rhonchi or rales. Abdominal:      General: Abdomen is protuberant. Palpations: Abdomen is soft. Musculoskeletal:      Cervical back: Normal range of motion. No pain with movement. Normal range of motion. Thoracic back: No deformity or spasms. Normal range of motion. Lumbar back: Spasms present. No deformity or bony tenderness. Decreased range of motion. Right lower leg: No swelling. No edema. Left lower leg: No swelling. No edema. Skin:     General: Skin is warm. Neurological:      Mental Status: She is alert and oriented to person, place, and time.       Cranial Nerves: No cranial nerve deficit. Motor: Weakness present. Coordination: Romberg sign negative. Gait: Gait abnormal.   Psychiatric:         Attention and Perception: Attention and perception normal.         Mood and Affect: Mood is not depressed. Speech: She is communicative. Behavior: Behavior is slowed. Behavior is not agitated. ASSESSMENT AND PLAN      1. Type 2 diabetes mellitus with stage 3a chronic kidney disease, without long-term current use of insulin (HCC)  Controlled, continue same medications. Monitor blood sugars at home  - Microalbumin, Ur; Future  - Creatinine, Random Urine; Future    2. Chronic combined systolic and diastolic congestive heart failure (HCC)  Stable on current treatment. Blood pressure is running low I will discontinue hydralazine continue metoprolol and Lasix    3. Coronary artery disease involving native coronary artery of native heart without angina pectoris  Stable follow-up with cardiologist continue statins beta-blockers. 4. Mucopurulent chronic bronchitis (HCC)  Stable continue inhalers. Try to cut down on smoking. Continue nicotine patches    5. Hypotension due to drugs  Discontinue hydralazine monitor blood pressure at home    6. Iron deficiency anemia, unspecified iron deficiency anemia type  Chronic anemia multifactorial start on iron supplements  - ferrous sulfate (IRON 325) 325 (65 Fe) MG tablet; Take 1 tablet by mouth daily (with breakfast)  Dispense: 180 tablet; Refill: 1    7. Atherosclerosis of superior mesenteric artery ([address]  Follows with neurovascular surgeon    8. Stage 3a chronic kidney disease (HCC)  Stable continue to monitor avoid nephrotoxic drugs    9. Stenosis of cervical spine with myelopathy (HCC)  Stable continue conservative treatment as needed    10. Left adrenal mass (HCC)  No further follow-up needed    11.  Need for prophylactic vaccination and inoculation against varicella        Orders Placed This Encounter   Procedures  Microalbumin, Ur     Standing Status:   Future     Standing Expiration Date:   2/9/2023    Creatinine, Random Urine     Standing Status:   Future     Standing Expiration Date:   2/9/2023    POCT glycosylated hemoglobin (Hb A1C)         Medications Discontinued During This Encounter   Medication Reason    hydrALAZINE (APRESOLINE) 25 MG tablet Therapy completed       Graciela received counseling on the following healthy behaviors: nutrition, exercise, medication adherence and tobacco cessation  Reviewed prior labs and health maintenance  Continue current medications, diet and exercise. Discussed use, benefit, and side effects of prescribed medications. Barriers to medication compliance addressed. Patient given educational materials - see patient instructions  Was a self-tracking handout given in paper form or via [employer]? Yes    Requested Prescriptions     Signed Prescriptions Disp Refills    ferrous sulfate (IRON 325) 325 (65 Fe) MG tablet 180 tablet 1     Sig: Take 1 tablet by mouth daily (with breakfast)       All patient questions answered. Patient voiced understanding. Quality Measures    Body mass index is 22.76 kg/m². Normal. Weight control planned discussed Healthy diet and regular exercise. BP: (!) 84/48. Blood pressure is low. Treatment plan consists of Discontinue hydralazine monitor blood pressure DC hydralazine and No treatment change needed. Fall Risk 2/10/2022 2/3/2022 5/12/2020 6/21/2018 11/10/2016 2/11/2016   2 or more falls in past year? no yes no no no no   Fall with injury in past year? no no no no no yes     The patient does not have a history of falls. I did , complete a risk assessment for falls.  A plan of care for falls in-office gait and balance testing performed using The Timed Up and Go Test was negative for increased falls risk- no further intervention is currently indicated, home safety tips provided, No Treatment plan indicated    Lab Results   Component Value Date [address] 09/29/2015    LDLCHOLESTEROL 79 08/12/2021    (goal LDL reduction with dx if diabetes is 50% LDL reduction)    PHQ Scores 2/10/2022 5/12/2020 6/21/2018 11/10/2016 2/11/2016   PHQ2 Score 0 - 0 0 0   PHQ9 Score 0 0 0 0 0     Interpretation of Total Score Depression Severity: 1-4 = Minimal depression, 5-9 = Mild depression, 10-14 = Moderate depression, 15-19 = Moderately severe depression, 20-27 = Severe depression      The patient'spast medical, surgical, social, and family history as well as her   current medications and allergies were reviewed as documented in today's encounter. Medications, labs, diagnostic studies, consultations andfollow-up as documented in this encounter. Return in about 3 months (around 5/10/2022). Patient wasseen with total face to face time of 60 minutes. More than 50% of this visit was counseling and education. Future Appointments   Date Time Provider Kendra Jimenez   4/4/2022  2:45 PM Ab Wallace MD [facility]   5/12/2022 12:00 PM Ab Wallace MD fp sc [address] [facility]     This note was completed by using the assistance of a speech-recognition program. However, inadvertent computerized transcription errors may be present. Althoughevery effort was made to ensure accuracy, no guarantees can be provided that every mistake has been identified and corrected by editing.   Electronically signed by Ab Wallace MD on 2/10/2022  11:03 AM

## 2022-02-10 NOTE — CARE COORDINATION
Follow up CC call attempted. Patient was not available. Message left with daughter requesting return call. Call back info provided. Will attempt follow up with patient within one week if no response received.

## 2022-02-11 ENCOUNTER — TELEPHONE (OUTPATIENT)
Dept: FAMILY MEDICINE CLINIC | Age: 72
End: 2022-02-11

## 2022-02-11 NOTE — TELEPHONE ENCOUNTER
Blood pressure machine script was signed. Placed into basket to be faxed to [facility] medical equipment. Was faxed to [address] #[phone]. Along with script and pg note. Once confirmation is received. Will place where (fax confirmation pile) are located up front.

## 2022-02-17 ENCOUNTER — CARE COORDINATION (OUTPATIENT)
Dept: CARE COORDINATION | Age: 72
End: 2022-02-17

## 2022-02-18 ENCOUNTER — CARE COORDINATION (OUTPATIENT)
Dept: CARE COORDINATION | Age: 72
End: 2022-02-18

## 2022-02-18 ASSESSMENT — ENCOUNTER SYMPTOMS: DYSPNEA ASSOCIATED WITH: EXERTION

## 2022-02-18 NOTE — CARE COORDINATION
I agree with the Care Coordinator's Plan of Care     Medication were refilled by Dr. Shawn Benites, I have not refilled. She can hold both for now.

## 2022-02-18 NOTE — CARE COORDINATION
associated dyspnea on exertion: Pos      Symptom course: stable  Patient-reported weight (lb): 132  Weight trend: stable  Salt intake watch compared to last visit: stable       COPD Assessment    Does the patient understand envrionmental exposure?: Yes  Is the patient able to verbalize Rescue vs. Long Acting medications?: Yes  Does the patient have a nebulizer?: Yes  Does the patient use a space with inhaled medications?: No            Symptoms:     Breathlessness: exertion  Change in chronic cough?: No/At Baseline  Change in sputum?: No/At Baseline  Baseline SaO2 Readin  Have you had a recent diagnosis of pneumonia either by PCP or at a hospital?: No       Diabetes Assessment    Medic Alert ID: No  Meal Planning: Avoidance of concentrated sweets   How often do you test your blood sugar?: No Testing   Do you have barriers with adherence to non-pharmacologic self-management interventions? (Nutrition/Exercise/Self-Monitoring): No   Have you ever had to go to the ED for symptoms of low blood sugar?: No       Do you have hyperglycemia symptoms?: No   Do you have hypoglycemia symptoms?: No   Last Blood Sugar Value: 120   Blood Sugar Monitoring Regimen: Morning Fasting            Care Coordination Interventions    Program Enrollment: Complex Care  Referral from Primary Care Provider: No  Suggested Interventions and Community Resources  Transportation Support: Completed  Zone Management Tools: Completed (Comment: COPD, Diabetes)         Goals Addressed                 This Visit's Progress     Conditions and Symptoms        I will schedule office visits, as directed by my provider. I will keep my appointment or reschedule if I have to cancel. I will notify my provider of any barriers to my plan of care. I will follow my Zone Management tool to seek urgent or emergent care. I will notify my provider of any symptoms that indicate a worsening of my condition.     Barriers: financial, lack of support, and lack of Mg) MG TABS tablet Take 1 tablet by mouth 2 times daily 8/17/21  Yes David Roberts MD   pantoprazole (PROTONIX) 40 MG tablet Take 1 tablet by mouth every morning (before breakfast) 8/18/21  Yes David Roberts MD   diclofenac sodium (VOLTAREN) 1 % GEL Apply 2 g topically 2 times daily as needed for Pain    Yes Historical Provider, MD   metFORMIN (GLUCOPHAGE-XR) 500 MG extended release tablet Take 1 tablet by mouth daily (with breakfast) 8/9/21  Yes David Roberts MD   citalopram (CELEXA) 20 MG tablet Take 1 tablet by mouth daily 8/9/21  Yes David Roberts MD   SM ASPIRIN ADULT LOW STRENGTH 81 MG EC tablet TAKE 1 TABLET BY MOUTH DAILY 5/21/21  Yes David Roberts MD   fluticasone [facility]) 50 MCG/ACT nasal spray USE 2 SPRAYS IN EACH NOSTRIL ONCE DAILY 5/21/21  Yes David Roberts MD   clopidogrel (PLAVIX) 75 MG tablet Take 1 tablet by mouth daily 3/18/21  Yes David Roberts MD   rOPINIRole (REQUIP) 1 MG tablet TAKE 1 TABLET BY MOUTH EVERY NIGHT AS NEEDED 2/2/21  Yes David Roberts MD   ONE TOUCH ULTRA TEST strip TEST ONCE DAILY AS NEEDED 4/6/20  Yes David Roberts MD   nitroGLYCERIN (NITROSTAT) 0.4 MG SL tablet Place 1 tablet under the tongue every 5 minutes as needed for Chest pain up to max of 3 total doses. If no relief after 1 dose, call 911.   Patient not taking: Reported on 2/18/2022 8/9/21   David Roberts MD       Future Appointments   Date Time Provider Kendra Jimenez   4/4/2022  2:45 PM Meghan Cho MD [facility]   5/12/2022 12:00 PM Meghan Cho MD [facility]

## 2022-02-21 NOTE — CARE COORDINATION
Daughter notified to inform patient she can hold both potassium and Lisinopril. Daughter asks if they can dispose of medications. ACM recommended taking them to a facility that will dispose to the medication properly.

## 2022-02-23 ENCOUNTER — CARE COORDINATION (OUTPATIENT)
Dept: CARE COORDINATION | Age: 72
End: 2022-02-23

## 2022-02-23 NOTE — CARE COORDINATION
Advance Care Planning    Ambulatory ACP Specialist Patient Outreach    Date:  2/23/2022  ACP Specialist:  Flo Vasquez    Outreach call to patient in follow-up to ACP Specialist referral from: Srinivas Pitts MD/FABIANO/Margarito Mendez    [x] PCP  [] Provider   [x] Ambulatory Care Management [] Other for Reason:        [x] Early ACP Decision-Making   [] Advance Directive Assistance   [] Discuss Goals of Care   [] Code Status Discussion   [] Completion of Portable DNR order   [] Complete POST/MOST   [] Other (Specify)    Date Referral Received: 02/03/22    Today's Outreach:  [] First   [x] Second  [] Third                               Third outreach made by []  phone  [] email []   [employer]     Intervention:  [x] Spoke with Patient  [] Left VM requesting return call      Outcome:  ACP Specialist phoned patient and was told that she received her ACP document on 02/22/22. Patient will speak with her son and will be prepared to have the ACP conversation on 03/09/22. Next Step:   [] ACP scheduled conversation  [] Outreach again in one week               [] Email / Mail ACP Info Sheets  [] Email / Mail Advance Directive            [] Close Referral. Routing closure to referring provider/staff and to ACP Specialist .      Thank you for this referral.

## 2022-02-28 ENCOUNTER — CARE COORDINATION (OUTPATIENT)
Dept: CARE COORDINATION | Age: 72
End: 2022-02-28

## 2022-02-28 ASSESSMENT — ENCOUNTER SYMPTOMS: DYSPNEA ASSOCIATED WITH: EXERTION

## 2022-02-28 NOTE — CARE COORDINATION
Follow up CC call attempted. Patient was not available. Message left requesting return call. Call back info provided. Will attempt follow up with patient in one week if no response received.

## 2022-02-28 NOTE — CARE COORDINATION
Ambulatory Care Coordination Note  2/28/2022  CM Risk Score: 7  Charlson 10 Year Mortality Risk Score: 100%     ACC: Palomo Day RN    Summary Note:   Summary  Date Care Coordination Episode Started:  03 February 2022     Reason for Call Today:      Week 3 follow up     Reason patient is in Care Coordination:      · COPD        · Diabetes  · RAEV 85%     Topics Discussed Today:      1. COPD         - Patient reports she has smoked two cigarettes today. Continues to use oxygen PRN during the day and at 2-3 liters/NC at night.  Reports oxygen saturation at 95%. Denies exertional dyspnea or productive cough. 2. Diabetes        -  Patient not checking glucose. Has not yet replaced the batteries in her glucometer.     Interventions completed today:     Assessments completed today:   1. Fall risk  2. Initial assessment  3. Medication reconciliation  4. SDOH  5. CHF COPD, Diabetes      Care Coordinator plan of care:      1. Zone Management, CHF - GREEN zone, COPD - GREEN zone, Diabetes - GREEN Zone  2. Continue checking morning fasting daily and record  3. Continue use of oxygen as directed  4. Smoking cessation  5. Continue medications as ordered  6. Follow up with physicians as scheduled  7.  Follow up in one week      Congestive Heart Failure Assessment    Are you currently restricting fluids?: No Restriction  Do you understand a low sodium diet?: Yes  Do you understand how to read food labels?: Yes  Do you salt your food before tasting it?: No         Symptoms:  CHF associated dyspnea on exertion: Pos      Patient-reported weight (lb): 130  Weight trend: fluctuating minimally  Salt intake watch compared to last visit: stable       COPD Assessment    Does the patient understand envrionmental exposure?: Yes  Is the patient able to verbalize Rescue vs. Long Acting medications?: Yes  Does the patient have a nebulizer?: Yes  Does the patient use a space with inhaled medications?: No            Symptoms:  None: Yes Breathlessness: exertion  Increase use of rapid acting/rescue inhaled medications?: No  Change in chronic cough?: No/At Baseline  Change in sputum?: No/At Baseline  Self Monitoring - SaO2: Yes  Baseline SaO2 Readin  Have you had a recent diagnosis of pneumonia either by PCP or at a hospital?: No       Diabetes Assessment    Medic Alert ID: No  Meal Planning: Avoidance of concentrated sweets   How often do you test your blood sugar?: No Testing   Do you have barriers with adherence to non-pharmacologic self-management interventions? (Nutrition/Exercise/Self-Monitoring): No   Have you ever had to go to the ED for symptoms of low blood sugar?: No       Do you have hyperglycemia symptoms?: No   Do you have hypoglycemia symptoms?: No   Blood Sugar Monitoring Regimen: Not Testing          Care Coordination Interventions    Program Enrollment: Complex Care  Referral from Primary Care Provider: No  Suggested Interventions and Community Resources  Transportation Support: Completed  Zone Management Tools: Completed (Comment: COPD, Diabetes)         Goals Addressed                 This Visit's Progress     Conditions and Symptoms   On track     I will schedule office visits, as directed by my provider. I will keep my appointment or reschedule if I have to cancel. I will notify my provider of any barriers to my plan of care. I will follow my Zone Management tool to seek urgent or emergent care. I will notify my provider of any symptoms that indicate a worsening of my condition. Barriers: financial, lack of support, and lack of education  Plan for overcoming my barriers: Care Coordination  Confidence: 7/10  Anticipated Goal Completion Date: 03 May 2022              Prior to Admission medications    Medication Sig Start Date End Date Taking?  Authorizing Provider   tiZANidine (ZANAFLEX) 2 MG tablet TAKE 1 TABLET BY MOUTH NIGHTLY AS NEEDED (PAIN) 22  Yes Shannan Hargrove MD   atorvastatin (LIPITOR) 40 MG tablet TAKE 2 TABLETS BY MOUTH DAILY 2/17/22  Yes Louisa Courtney, MD   furosemide (LASIX) 40 MG tablet TAKE 1 TABLET BY MOUTH DAILY 2/17/22  Yes Louisa Courtney MD   ferrous sulfate (IRON 325) 325 (65 Fe) MG tablet Take 1 tablet by mouth daily (with breakfast) 2/10/22  Yes Corrinne Burows, MD   Blood Pressure KIT Dx: HTN. Needs automatic blood pressure machine to monitor her blood pressure.  2/10/22  Yes Corrinne Burows, MD   isosorbide mononitrate (IMDUR) 30 MG extended release tablet Take 1 tablet by mouth daily 2/4/22  Yes Louisa Courtney, MD   traZODone (DESYREL) 50 MG tablet Take 1 tablet by mouth nightly as needed for Sleep 1/17/22  Yes Louisa Courtney MD   metoprolol succinate (TOPROL XL) 50 MG extended release tablet Take 1 tablet by mouth daily 11/30/21  Yes Louisa Courtney MD   nicotine (NICODERM CQ) 14 MG/24HR Place 1 patch onto the skin daily 11/30/21  Yes Louisa Courtney MD   albuterol sulfate  (90 Base) MCG/ACT inhaler INHALE TWO PUFFS BY MOUTH 4 TIMES A DAY AND RPN 11/11/21  Yes Louisa Courtney MD   budesonide-formoterol [facility]) 160-4.5 MCG/ACT AERO Inhale 2 puffs into the lungs 2 times daily 10/5/21  Yes Louisa Courtney MD   ipratropium-albuterol (DUONEB) 0.5-2.5 (3) MG/3ML SOLN nebulizer solution Inhale 3 mLs into the lungs every 4 hours as needed for Shortness of Breath 8/17/21  Yes Louisa Courtney MD   guaiFENesin [facility]) 600 MG extended release tablet Take 1 tablet by mouth 2 times daily 8/17/21  Yes Louisa Courtney MD   magnesium oxide (MAG-OX) 400 (241.3 Mg) MG TABS tablet Take 1 tablet by mouth 2 times daily 8/17/21  Yes Louisa Courtney MD   pantoprazole (PROTONIX) 40 MG tablet Take 1 tablet by mouth every morning (before breakfast) 8/18/21  Yes Louisa Courtney MD   diclofenac sodium (VOLTAREN) 1 % GEL Apply 2 g topically 2 times daily as needed for Pain    Yes Historical Provider, MD   metFORMIN (GLUCOPHAGE-XR) 500 MG extended release tablet Take 1 tablet by mouth daily (with breakfast) 8/9/21  Yes Poornima Colon MD   citalopram (CELEXA) 20 MG tablet Take 1 tablet by mouth daily 8/9/21  Yes Poornima Colon MD   nitroGLYCERIN (NITROSTAT) 0.4 MG SL tablet Place 1 tablet under the tongue every 5 minutes as needed for Chest pain up to max of 3 total doses.  If no relief after 1 dose, call 911. 8/9/21  Yes Poornima Colon MD    ASPIRIN ADULT LOW STRENGTH 81 MG EC tablet TAKE 1 TABLET BY MOUTH DAILY 5/21/21  Yes Poornima Colon MD   fluticasone [facility]) 50 MCG/ACT nasal spray USE 2 SPRAYS IN EACH NOSTRIL ONCE DAILY 5/21/21  Yes Poornima Colon MD   clopidogrel (PLAVIX) 75 MG tablet Take 1 tablet by mouth daily 3/18/21  Yes Poornima Colon MD   rOPINIRole (REQUIP) 1 MG tablet TAKE 1 TABLET BY MOUTH EVERY NIGHT AS NEEDED 2/2/21  Yes Poornima Colon MD   ONE TOUCH ULTRA TEST strip TEST ONCE DAILY AS NEEDED 4/6/20  Yes Poornima Colon MD       Future Appointments   Date Time Provider Kendra Jimenez   4/4/2022  2:45 PM Lacey Rod MD [facility]   5/12/2022 12:00 PM Lacey Rod MD [facility] [address] [facility]

## 2022-03-09 ENCOUNTER — CARE COORDINATION (OUTPATIENT)
Dept: CARE COORDINATION | Age: 72
End: 2022-03-09

## 2022-03-14 ENCOUNTER — CARE COORDINATION (OUTPATIENT)
Dept: CARE COORDINATION | Age: 72
End: 2022-03-14

## 2022-03-16 ENCOUNTER — CARE COORDINATION (OUTPATIENT)
Dept: CARE COORDINATION | Age: 72
End: 2022-03-16

## 2022-03-16 NOTE — CARE COORDINATION
Follow up CC call attempted to review CHF [employer]  Patient was not available. Message left requesting return call. Call back info provided. Will attempt follow up with patient if no response received.

## 2022-03-16 NOTE — CARE COORDINATION
Advance Care Planning    Ambulatory ACP Specialist Patient Outreach    Date:  3/16/2022  ACP Specialist:  Caren Burgess    Outreach call to patient in follow-up to ACP Specialist referral from: Amy Kelly MD/[facility]     [x] PCP  [] Provider   [x] Ambulatory Care Management [] Other for Reason:        [x] Early ACP Decision-Making   [] Advance Directive Assistance   [] Discuss Goals of Care   [] Code Status Discussion   [] Completion of Portable DNR order   [] Complete POST/MOST   [] Other (Specify)    Date Referral Received: 02/03/22    Today's Outreach:  [] First   [] Second  [] Third x                               Third outreach made by [x]  phone  [] email []   [employer]     Intervention:  [] Spoke with Patient  [] Left VM requesting return call      Outcome:  ACP Specialist left a message with patient's relative, requesting a return call. ACP Specialist attempted to leave her contact information, relative stated  that he could see the number on his phone. If no return within a week, patient will be referred back to patient's PCP. Next Step:   [] ACP scheduled conversation  [] Outreach again in one week               [] Email / Mail ACP Info Sheets  [] Email / Mail Advance Directive            [] Close Referral. Routing closure to referring provider/staff and to ACP Specialist .      Thank you for this referral.

## 2022-03-18 ENCOUNTER — CARE COORDINATION (OUTPATIENT)
Dept: CARE COORDINATION | Age: 72
End: 2022-03-18

## 2022-03-18 NOTE — CARE COORDINATION
Follow up CC call attempted to review CHF [employer]  Patient was not available. No VM available.

## 2022-03-23 ENCOUNTER — CARE COORDINATION (OUTPATIENT)
Dept: CARE COORDINATION | Age: 72
End: 2022-03-23

## 2022-03-23 NOTE — CARE COORDINATION
Advance Care Planning    Ambulatory ACP Specialist Patient Outreach    Date:  3/23/2022  ACP Specialist:  Ricardo Heredia    Outreach call to patient in follow-up to ACP Specialist referral from: Ray Nichols MD/[facility]    [x] PCP  [] Provider   [x] Ambulatory Care Management [] Other for Reason:        [x] Early ACP Decision-Making   [] Advance Directive Assistance   [] Discuss Goals of Care   [] Code Status Discussion   [] Completion of Portable DNR order   [] Complete POST/MOST   [] Other (Specify)    Date Referral Received: 02/03/22    Today's Outreach:  [] First   [] Second  [x] Third xx                             Third outreach made by []  phone  [] email []   [employer] [facility]     Intervention:  [] Spoke with Patient  [] Left VM requesting return call      Outcome:  ACP Specialist has made more than three outreaches to patient. There appears to be a lack of interest, there have been no return calls. ACP Specialist  Will refer patient back to her PCP. Next Step:   [] ACP scheduled conversation  [] Outreach again in one week               [] Email / Mail ACP Info Sheets  [] Email / Mail Advance Directive            [x] Close Referral. Routing closure to referring provider/staff and to ACP Specialist .      Thank you for this referral.

## 2022-03-29 LAB
AVERAGE GLUCOSE: NORMAL
CREATININE: 1.1 MG/DL
HBA1C MFR BLD: 6.5 %
HBA1C MFR BLD: NORMAL %
HBA1C MFR BLD: NORMAL %

## 2022-04-04 ENCOUNTER — CARE COORDINATION (OUTPATIENT)
Dept: CARE COORDINATION | Age: 72
End: 2022-04-04

## 2022-04-04 ASSESSMENT — ENCOUNTER SYMPTOMS: DYSPNEA ASSOCIATED WITH: EXERTION

## 2022-04-04 NOTE — CARE COORDINATION
Ambulatory Care Coordination Note  4/4/2022  CM Risk Score: 7  Charlson 10 Year Mortality Risk Score: 100%     ACC: Nathaly Lowery RN    Summary Note:   Summary  Date Care Coordination Episode Started:  03 February 2022     Reason for Call Today:      CC Follow up     Reason patient is in Care Coordination:      · COPD        · Diabetes  · RAEV 85%     Topics Discussed Today:      1. CHF         - Reports exertional dyspnea, otherwise denies any issues related to CHF. 2.   COPD         - Patient continues to use oxygen PRN during the day and at 2-3 liters/NC at night.  Reports room air oxygen saturation at 94%. Denies exertional dyspnea or productive cough. 3.  Diabetes        -  Patient reports morning fasting glucose as 205 mg/dl today. Denies any symptoms of hyperglycemia. 4.  Other               -  Patient reports she has developed numbness in the fingertips of her (L) hand. Denies any complaints of falls, pain, or injury.               -  States she recently had hearing testing and qualifies for use of hearing aids. -  Eye exam scheduled for 13 April at [facility]. [place] Mo.)     Interventions completed today:     Assessments completed today:   1. Medication reconciliation  2. CHF COPD, Diabetes      Care Coordinator plan of care:      1. Zone Management, CHF - GREEN zone, COPD - GREEN zone, Diabetes - GREEN Zone  2. Continue checking morning fasting daily and record  3. Continue use of oxygen as directed  4. Smoking cessation  5. Continue medications as ordered  6. Follow up with physicians as scheduled  7.  Follow up in ten days    Congestive Heart Failure Assessment    Are you currently restricting fluids?: No Restriction  Do you understand a low sodium diet?: Yes  Do you understand how to read food labels?: Yes  Do you salt your food before tasting it?: No         Symptoms:  CHF associated dyspnea on exertion: Pos      Symptom course: no change  Patient-reported weight (lb):  (Comment: No weight reported today)  Salt intake watch compared to last visit: stable       COPD Assessment    Does the patient understand envrionmental exposure?: Yes  Is the patient able to verbalize Rescue vs. Long Acting medications?: Yes  Does the patient have a nebulizer?: Yes  Does the patient use a space with inhaled medications?: No            Symptoms:     Breathlessness: exertion  Increase use of rapid acting/rescue inhaled medications?: No  Change in chronic cough?: No/At Baseline  Change in sputum?: No/At Baseline  Self Monitoring - SaO2: Yes  Baseline SaO2 Readin  Have you had a recent diagnosis of pneumonia either by PCP or at a hospital?: No       Diabetes Assessment    Medic Alert ID: No  Meal Planning: Avoidance of concentrated sweets   How often do you test your blood sugar?: No Testing   Do you have barriers with adherence to non-pharmacologic self-management interventions? (Nutrition/Exercise/Self-Monitoring): No   Have you ever had to go to the ED for symptoms of low blood sugar?: No       Do you have hyperglycemia symptoms?: No   Do you have hypoglycemia symptoms?: No   Last Blood Sugar Value: 205   Blood Sugar Monitoring Regimen: Morning Fasting   Blood Sugar Trends: Fluctuating          Care Coordination Interventions    Program Enrollment: Complex Care  Referral from Primary Care Provider: No  Suggested Interventions and Community Resources  Transportation Support: Completed  Zone Management Tools: Completed (Comment: COPD, Diabetes)         Goals Addressed                 This Visit's Progress     Conditions and Symptoms   On track     I will schedule office visits, as directed by my provider. I will keep my appointment or reschedule if I have to cancel. I will notify my provider of any barriers to my plan of care. I will follow my Zone Management tool to seek urgent or emergent care.   I will notify my provider of any symptoms that indicate a worsening of my condition. Barriers: financial, lack of support, and lack of education  Plan for overcoming my barriers: Care Coordination  Confidence: 7/10  Anticipated Goal Completion Date: 03 May 2022              Prior to Admission medications    Medication Sig Start Date End Date Taking? Authorizing Provider   rOPINIRole (REQUIP) 1 MG tablet TAKE 1 TABLET BY MOUTH EVERY NIGHT AS NEEDED 3/30/22  Yes JAMES Metcalf NP   tiZANidine (ZANAFLEX) 2 MG tablet TAKE 1 TABLET BY MOUTH NIGHTLY AS NEEDED (PAIN) 2/17/22  Yes Lizzie Bailey MD   atorvastatin (LIPITOR) 40 MG tablet TAKE 2 TABLETS BY MOUTH DAILY 2/17/22  Yes Lizzie Bailey MD   furosemide (LASIX) 40 MG tablet TAKE 1 TABLET BY MOUTH DAILY 2/17/22  Yes Lizzie Bailey MD   ferrous sulfate (IRON 325) 325 (65 Fe) MG tablet Take 1 tablet by mouth daily (with breakfast) 2/10/22  Yes Kareen Kiser MD   Blood Pressure KIT Dx: HTN. Needs automatic blood pressure machine to monitor her blood pressure.  2/10/22  Yes Kareen Kiser MD   isosorbide mononitrate (IMDUR) 30 MG extended release tablet Take 1 tablet by mouth daily 2/4/22  Yes Lizzie Bailey MD   traZODone (DESYREL) 50 MG tablet Take 1 tablet by mouth nightly as needed for Sleep 1/17/22  Yes Lizzie Bailey MD   metoprolol succinate (TOPROL XL) 50 MG extended release tablet Take 1 tablet by mouth daily 11/30/21  Yes Lizzie Bailey MD   nicotine (NICODERM CQ) 14 MG/24HR Place 1 patch onto the skin daily 11/30/21  Yes Lizzie Bailey MD   albuterol sulfate  (90 Base) MCG/ACT inhaler INHALE TWO PUFFS BY MOUTH 4 TIMES A DAY AND RPN 11/11/21  Yes Lizzie Bailey MD   budesonide-formoterol [facility]) 160-4.5 MCG/ACT AERO Inhale 2 puffs into the lungs 2 times daily 10/5/21  Yes Lizzie Bailey MD   ipratropium-albuterol (DUONEB) 0.5-2.5 (3) MG/3ML SOLN nebulizer solution Inhale 3 mLs into the lungs every 4 hours as needed for Shortness of Breath 8/17/21  Yes Blaise Grey MD siena SimsaiFENesin [facility]) 600 MG extended release tablet Take 1 tablet by mouth 2 times daily 8/17/21  Yes Danni Darden MD   magnesium oxide (MAG-OX) 400 (241.3 Mg) MG TABS tablet Take 1 tablet by mouth 2 times daily 8/17/21  Yes Danni Darden MD   pantoprazole (PROTONIX) 40 MG tablet Take 1 tablet by mouth every morning (before breakfast) 8/18/21  Yes Danni Darden MD   diclofenac sodium (VOLTAREN) 1 % GEL Apply 2 g topically 2 times daily as needed for Pain    Yes Historical Provider, MD   metFORMIN (GLUCOPHAGE-XR) 500 MG extended release tablet Take 1 tablet by mouth daily (with breakfast) 8/9/21  Yes Danni Darden MD   citalopram (CELEXA) 20 MG tablet Take 1 tablet by mouth daily 8/9/21  Yes Danni Darden MD   nitroGLYCERIN (NITROSTAT) 0.4 MG SL tablet Place 1 tablet under the tongue every 5 minutes as needed for Chest pain up to max of 3 total doses.  If no relief after 1 dose, call 911. 8/9/21  Yes Danni Darden MD   SM ASPIRIN ADULT LOW STRENGTH 81 MG EC tablet TAKE 1 TABLET BY MOUTH DAILY 5/21/21  Yes Danni Darden MD   fluticasone [facility]) 50 MCG/ACT nasal spray USE 2 SPRAYS IN [facility] NOSTRIL ONCE DAILY 5/21/21  Yes Danni Darden MD   clopidogrel (PLAVIX) 75 MG tablet Take 1 tablet by mouth daily 3/18/21  Yes Danni Darden MD   ONE TOUCH ULTRA TEST strip TEST ONCE DAILY AS NEEDED 4/6/20  Yes Danni Darden MD       Future Appointments   Date Time Provider Kendra Jimenez   4/4/2022  2:45 PM [facility]   5/12/2022 12:00 PM Kiki Power MD [facility]aint

## 2022-04-14 ENCOUNTER — CARE COORDINATION (OUTPATIENT)
Dept: CARE COORDINATION | Age: 72
End: 2022-04-14

## 2022-04-14 DIAGNOSIS — J40 BRONCHITIS: Primary | ICD-10-CM

## 2022-04-14 NOTE — TELEPHONE ENCOUNTER
Patient can just take 1 tablet daily however last cholesterol test was done in last year which is good for Lipitor 40 mg is fine. It was refilled by her previous PCP.

## 2022-04-14 NOTE — TELEPHONE ENCOUNTER
----- Message from Amy Taveras sent at 4/13/2022  4:31 PM EDT -----  Subject: Message to Provider    QUESTIONS  Information for Provider? Wants to see if provider if she can take  Atorvastatin 80mg instead of 2 40mg. She said its easier to take one tablet vs two. Direct   ext. ? [id number]  ---------------------------------------------------------------------------  --------------  CALL BACK INFO  What is the best way for the office to contact you? OK to leave message on   voicemail  Preferred Call Back Phone Number?  [phone]  ---------------------------------------------------------------------------  --------------  SCRIPT ANSWERS  undefined

## 2022-04-14 NOTE — CARE COORDINATION
Follow up CC call attempted. Patient was not available.   Unable to leave message as none was avalable   Will attempt follow up with patient Tuesday, 19 April if no response received

## 2022-04-15 RX ORDER — AZITHROMYCIN 250 MG/1
TABLET, FILM COATED ORAL
Qty: 6 TABLET | Refills: 0
Start: 2022-04-15 | End: 2022-04-20

## 2022-04-15 NOTE — CARE COORDINATION
Ambulatory Care Coordination Note  4/15/2022  CM Risk Score: 7  Charlson 10 Year Mortality Risk Score: 100%     ACC: Lei Perez, RN    Summary Note:   Summary  Date Care Coordination Episode Started:  03 February 2022     Reason for Call Today:      CC Follow up     Reason patient is in Care Coordination:      · COPD        · Diabetes  · RAEV 85%     Topics Discussed Today:      1. CHF         - Reports exertional dyspnea, otherwise denies any issues related to CHF. Reported weight as 130 lbs. 2.   COPD         - Patient continues to use oxygen PRN during the day and at 2-3 liters/NC at night.  Reports room air oxygen saturation at 95%. Denies exertional dyspnea. Reports a productive cough with yellowish-green sputum. 3.  Diabetes        -  Patient reports morning fasting glucose as 125 mg/dl today. No complaints   4. Other               - Requires hearing aids. Does not yet have them. -  Eye exam rescheduled for 09 May at [facility]. (Ashley Ram.)      Interventions completed today:     Assessments completed today:   1. Medication reconciliation  2. CHF COPD, Diabetes      Care Coordinator plan of care:      1. Zone Management, CHF - GREEN zone, COPD - GREEN zone, Diabetes - GREEN Zone  2. Continue checking morning fasting daily and record  3. Continue use of oxygen as directed  4. Smoking cessation  5. Continue medications as ordered  6. Follow up with physicians as scheduled  7.  Follow up in two weeks    Congestive Heart Failure Assessment    Are you currently restricting fluids?: No Restriction  Do you understand a low sodium diet?: Yes  Do you understand how to read food labels?: Yes  Do you salt your food before tasting it?: No         Symptoms:  CHF associated dyspnea on exertion: Pos      Symptom course: stable  Patient-reported weight (lb): 130  Salt intake watch compared to last visit: stable       COPD Assessment    Does the patient understand envrionmental exposure?: Yes  Is the patient able to verbalize Rescue vs. Long Acting medications?: Yes  Does the patient have a nebulizer?: Yes  Does the patient use a space with inhaled medications?: No            Symptoms:  None: Yes      Breathlessness: none  Increase use of rapid acting/rescue inhaled medications?: No  Change in chronic cough?: No/At Baseline  Change in sputum?: No/At Baseline  Sputum characteristics: Yellow, Green  Self Monitoring - SaO2: Yes  Baseline SaO2 Readin  Have you had a recent diagnosis of pneumonia either by PCP or at a hospital?: No       Diabetes Assessment    Medic Alert ID: No  Meal Planning: Avoidance of concentrated sweets   How often do you test your blood sugar?: Other (Comment: Morning fasting)   Do you have barriers with adherence to non-pharmacologic self-management interventions? (Nutrition/Exercise/Self-Monitoring): No   Have you ever had to go to the ED for symptoms of low blood sugar?: No       Do you have hyperglycemia symptoms?: No   Do you have hypoglycemia symptoms?: No   Last Blood Sugar Value: 125   Blood Sugar Monitoring Regimen: Morning Fasting   Blood Sugar Trends: Fluctuating          Care Coordination Interventions    Program Enrollment: Complex Care  Referral from Primary Care Provider: No  Suggested Interventions and Community Resources  Transportation Support: Completed  Zone Management Tools: Completed (Comment: COPD, Diabetes)         Goals Addressed                 This Visit's Progress     Conditions and Symptoms   On track     I will schedule office visits, as directed by my provider. I will keep my appointment or reschedule if I have to cancel. I will notify my provider of any barriers to my plan of care. I will follow my Zone Management tool to seek urgent or emergent care. I will notify my provider of any symptoms that indicate a worsening of my condition.     Barriers: financial, lack of support, and lack of education  Plan for overcoming my barriers: Care Coordination  Confidence: 7/10  Anticipated Goal Completion Date: 03 May 2022              Prior to Admission medications    Medication Sig Start Date End Date Taking? Authorizing Provider   rOPINIRole (REQUIP) 1 MG tablet TAKE 1 TABLET BY MOUTH EVERY NIGHT AS NEEDED 3/30/22  Yes Soo Herring APRN - NP   tiZANidine (ZANAFLEX) 2 MG tablet TAKE 1 TABLET BY MOUTH NIGHTLY AS NEEDED (PAIN) 2/17/22  Yes Oli Garcia MD   atorvastatin (LIPITOR) 40 MG tablet TAKE 2 TABLETS BY MOUTH DAILY 2/17/22  Yes Oli Garcia MD   furosemide (LASIX) 40 MG tablet TAKE 1 TABLET BY MOUTH DAILY 2/17/22  Yes Oli Garcia MD   ferrous sulfate (IRON 325) 325 (65 Fe) MG tablet Take 1 tablet by mouth daily (with breakfast) 2/10/22  Yes Ashley Robison MD   Blood Pressure KIT Dx: HTN. Needs automatic blood pressure machine to monitor her blood pressure.  2/10/22  Yes Ashley Robison MD   isosorbide mononitrate (IMDUR) 30 MG extended release tablet Take 1 tablet by mouth daily 2/4/22  Yes Oli Garcia MD   traZODone (DESYREL) 50 MG tablet Take 1 tablet by mouth nightly as needed for Sleep 1/17/22  Yes Oli Garcia MD   metoprolol succinate (TOPROL XL) 50 MG extended release tablet Take 1 tablet by mouth daily 11/30/21  Yes Oli Garcia MD   nicotine (NICODERM CQ) 14 MG/24HR Place 1 patch onto the skin daily 11/30/21  Yes Oli Garcia MD   albuterol sulfate  (90 Base) MCG/ACT inhaler INHALE TWO PUFFS BY MOUTH 4 TIMES A DAY AND RPN 11/11/21  Yes Oli Garcia MD   budesonide-formoterol [facility]) 160-4.5 MCG/ACT AERO Inhale 2 puffs into the lungs 2 times daily 10/5/21  Yes Oli Garcia MD   ipratropium-albuterol (DUONEB) 0.5-2.5 (3) MG/3ML SOLN nebulizer solution Inhale 3 mLs into the lungs every 4 hours as needed for Shortness of Breath 8/17/21  Yes Oli Garcia MD   guaiFENesin [facility]) 600 MG extended release tablet Take 1 tablet by mouth 2 times daily 8/17/21 Yes Juvenal Amador MD   magnesium oxide (MAG-OX) 400 (241.3 Mg) MG TABS tablet Take 1 tablet by mouth 2 times daily 8/17/21  Yes Juvenal Amador MD   pantoprazole (PROTONIX) 40 MG tablet Take 1 tablet by mouth every morning (before breakfast) 8/18/21  Yes Juvenal Amador MD   diclofenac sodium (VOLTAREN) 1 % GEL Apply 2 g topically 2 times daily as needed for Pain    Yes Historical Provider, MD   metFORMIN (GLUCOPHAGE-XR) 500 MG extended release tablet Take 1 tablet by mouth daily (with breakfast) 8/9/21  Yes Juvenal Amador MD   citalopram (CELEXA) 20 MG tablet Take 1 tablet by mouth daily 8/9/21  Yes Juvenal Amador MD   nitroGLYCERIN (NITROSTAT) 0.4 MG SL tablet Place 1 tablet under the tongue every 5 minutes as needed for Chest pain up to max of 3 total doses.  If no relief after 1 dose, call 911. 8/9/21  Yes Juvenal Amador MD    ASPIRIN ADULT LOW STRENGTH 81 MG EC tablet TAKE 1 TABLET BY MOUTH DAILY 5/21/21  Yes Juvenal Amador MD   fluticasone [facility]) 50 MCG/ACT nasal spray USE 2 SPRAYS IN [facility] NOSTRIL ONCE DAILY 5/21/21  Yes Juvenal Amador MD   clopidogrel (PLAVIX) 75 MG tablet Take 1 tablet by mouth daily 3/18/21  Yes Juvenal Amador MD   ONE TOUCH ULTRA TEST strip TEST ONCE DAILY AS NEEDED 4/6/20  Yes Juvenal Amador MD       Future Appointments   Date Time Provider Kendra Jimenez   5/12/2022 12:00 PM Farshad Arias MD [facility]

## 2022-04-15 NOTE — CARE COORDINATION
Patient notified of new order for z-myles. Suggested she contact her pharmacy if she does not hear from them in 2-3 hours. Patient verbalized understanding.

## 2022-04-18 ENCOUNTER — CARE COORDINATION (OUTPATIENT)
Dept: CARE COORDINATION | Age: 72
End: 2022-04-18

## 2022-04-18 NOTE — CARE COORDINATION
Call received from patient stating her pharmacy did not receive orders for antibiotics on Friday. [facility] [place] [facility] [place] [id number] ([phone])  Pharmacist advised they did not receive the order. [address] [place] [address] ([phone])  They also did not receive the order. RX given to pharmacist over phone. Pharmacist states they will deliver it tomorrow.     Patient updated

## 2022-04-21 ENCOUNTER — CARE COORDINATION (OUTPATIENT)
Dept: CARE COORDINATION | Age: 72
End: 2022-04-21

## 2022-04-21 RX ORDER — ATORVASTATIN CALCIUM 40 MG/1
40 TABLET, FILM COATED ORAL DAILY
Qty: 30 TABLET | Refills: 5 | Status: ON HOLD | OUTPATIENT
Start: 2022-04-21 | End: 2022-05-14

## 2022-04-21 NOTE — CARE COORDINATION
Call received from patient stating her electricity has been shut off. Patient requires supplemental oxygen, PRN during the day and 2-3 liters per NC while sleeping. Advised patient to contact [facility] and have them fax a wavier for medical necessity to [phone] by no later than 1:00 PM.    INDU REMY notified at office. MA states they have no record of [employer] of renewal to office.

## 2022-04-21 NOTE — TELEPHONE ENCOUNTER
Please Approve or Refuse. Nurse from [facility], requesting if a script can be called in for the atorvastatin 40 mg 1 tablet daily. To reflect on how she is currently taking medication also so their is no confusion for pt. Or pharmacy.      Send to Pharmacy [facility] Pt's Request[facility]     Next Visit Date:  5/12/2022   Last Visit Date: 2/10/2022    Hemoglobin A1C (%)   Date Value   03/28/2022 6.5   02/10/2022 6.4   08/11/2021 6.7 (H)             ( goal A1C is < 7)   BP Readings from Last 3 Encounters:   02/10/22 (!) 84/48   11/30/21 121/67   10/08/21 106/61          (goal 120/80)  BUN   Date Value Ref Range Status   11/30/2021 30 (H) 8 - 23 mg/dL Final     Creatinine   Date Value Ref Range Status   03/28/2022 1.1 mg/dL Final     Potassium   Date Value Ref Range Status   11/30/2021 4.8 3.7 - 5.3 mmol/L Final

## 2022-05-11 ENCOUNTER — CARE COORDINATION (OUTPATIENT)
Dept: CARE COORDINATION | Age: 72
End: 2022-05-11

## 2022-05-11 ASSESSMENT — ENCOUNTER SYMPTOMS: DYSPNEA ASSOCIATED WITH: EXERTION

## 2022-05-11 NOTE — CARE COORDINATION
Ambulatory Care Coordination Note  5/11/2022  CM Risk Score: 7  Charlson 10 Year Mortality Risk Score: 100%     ACC: Chi Chavarria RN    Summary Note:   Summary  Date Care Coordination Episode Started:  03 February 2022     Reason for Call Today:      CC Follow up     Reason patient is in Care Coordination:      · COPD        · Diabetes  · RAEV 85%     Topics Discussed Today:      1.   CHF         - Reports exertional dyspnea, and edema of the left lower leg and ankle. Reported weight as 136 lbs. 2.   COPD         - Patient continues to use oxygen PRN during the day and at 2-3 liters/NC at night.  Reports room air oxygen saturation at 94%. Positive for exertional dyspnea. Reports a productive cough with yellowish sputum.   3.  Diabetes        -  Patient reports morning fasting glucose as 121 mg/dl today. No complaints   4.  Other               - Requires hearing aids. OOP expense is $400.00 which patient states she does not have.               -  Eye exam completed. Patient states she was told she has cataracts requiring surgery. Patient states she has contacted her insurance provider. They are mailing patient a list of approved providers.                - Patient states she has paperwork to be completed by PCP for a one year waiver of fees through [facility]. [facility] recommended she drop the paperwork off at the office this afternoon or tomorrow morning to allow PCP time to complete it. Patient verbalized understanding. Interventions completed today:     Assessments completed today:   1. Medication reconciliation  2. CHF COPD, Diabetes      Care Coordinator plan of care:      1. Zone Management, CHF - GREEN zone, COPD - GREEN zone, Diabetes - GREEN Zone  2. Continue checking morning fasting daily and record  3. Continue use of oxygen as directed  4. Smoking cessation  5. Continue medications as ordered  6. Follow up with physicians as scheduled  7.  Follow up in two weeks    Congestive Heart Failure Assessment    Are you currently restricting fluids?: No Restriction  Do you understand a low sodium diet?: Yes  Do you understand how to read food labels?: Yes  Do you salt your food before tasting it?: No         Symptoms:  CHF associated dyspnea on exertion: Pos, CHF associated leg swelling: Pos (Comment: Left leg)      Patient-reported weight (lb): 136 (Comment: Increasing slowly)       COPD Assessment    Does the patient understand envrionmental exposure?: Yes  Is the patient able to verbalize Rescue vs. Long Acting medications?: Yes  Does the patient have a nebulizer?: Yes  Does the patient use a space with inhaled medications?: No            Symptoms:  COPD associated increased fatigue: Pos      Symptom course: stable  Breathlessness: exertion  Increase use of rapid acting/rescue inhaled medications?: No  Change in chronic cough?: No/At Baseline  Change in sputum?: No/At Baseline  Sputum characteristics: Yellow  Self Monitoring - SaO2: Yes  Baseline SaO2 Readin  Have you had a recent diagnosis of pneumonia either by PCP or at a hospital?: No       Diabetes Assessment    Medic Alert ID: No  Meal Planning: Avoidance of concentrated sweets   How often do you test your blood sugar?: Other (Comment: Morning fasting)   Do you have barriers with adherence to non-pharmacologic self-management interventions?  (Nutrition/Exercise/Self-Monitoring): No   Have you ever had to go to the ED for symptoms of low blood sugar?: No       Do you have hyperglycemia symptoms?: No   Do you have hypoglycemia symptoms?: No   Last Blood Sugar Value: 121   Blood Sugar Monitoring Regimen: Morning Fasting   Blood Sugar Trends: Fluctuating          Care Coordination Interventions    Program Enrollment: Complex Care  Referral from Primary Care Provider: No  Suggested Interventions and Community Resources  Transportation Support: Completed  Zone Management Tools: Completed (Comment: COPD, Diabetes)         Goals Addressed This Visit's Progress     Conditions and Symptoms   On track     I will schedule office visits, as directed by my provider. I will keep my appointment or reschedule if I have to cancel. I will notify my provider of any barriers to my plan of care. I will follow my Zone Management tool to seek urgent or emergent care. I will notify my provider of any symptoms that indicate a worsening of my condition. Barriers: financial, lack of support, and lack of education  Plan for overcoming my barriers: Care Coordination  Confidence: 7/10  Anticipated Goal Completion Date: 03 May 2022              Prior to Admission medications    Medication Sig Start Date End Date Taking? Authorizing Provider   pantoprazole (PROTONIX) 40 MG tablet TAKE 1 TABLET BY MOUTH EVERY MORNING (BEFORE BREAKFAST) 5/3/22  Yes Norene Schirmer, MD   atorvastatin (LIPITOR) 40 MG tablet Take 1 tablet by mouth daily 4/21/22  Yes Norene Schirmer, MD   tiZANidine (ZANAFLEX) 2 MG tablet TAKE 1 TABLET BY MOUTH NIGHTLY AS NEEDED (PAIN) 4/16/22  Yes Thea Butt MD   rOPINIRole (REQUIP) 1 MG tablet TAKE 1 TABLET BY MOUTH EVERY NIGHT AS NEEDED 3/30/22  Yes JAMES Caicedo NP   atorvastatin (LIPITOR) 40 MG tablet TAKE 2 TABLETS BY MOUTH DAILY 2/17/22  Yes Thea Butt MD   furosemide (LASIX) 40 MG tablet TAKE 1 TABLET BY MOUTH DAILY 2/17/22  Yes Thea Butt MD   ferrous sulfate (IRON 325) 325 (65 Fe) MG tablet Take 1 tablet by mouth daily (with breakfast) 2/10/22  Yes Norene Schirmer, MD   Blood Pressure KIT Dx: HTN. Needs automatic blood pressure machine to monitor her blood pressure.  2/10/22  Yes Norene Schirmer, MD   isosorbide mononitrate (IMDUR) 30 MG extended release tablet Take 1 tablet by mouth daily 2/4/22  Yes Thea Butt MD   traZODone (DESYREL) 50 MG tablet Take 1 tablet by mouth nightly as needed for Sleep 1/17/22  Yes Thea Butt MD   metoprolol succinate (TOPROL XL) 50 MG extended release tablet Take 1 tablet by mouth daily 11/30/21  Yes Christi Cisneros MD   nicotine (NICODERM CQ) 14 MG/24HR Place 1 patch onto the skin daily 11/30/21  Yes Christi Cisneros MD   albuterol sulfate  (90 Base) MCG/ACT inhaler INHALE TWO PUFFS BY MOUTH 4 TIMES A DAY AND RPN 11/11/21  Yes Christi Cisneros MD   budesonide-formoterol [facility]) 160-4.5 MCG/ACT AERO Inhale 2 puffs into the lungs 2 times daily 10/5/21  Yes Christi Cisneros MD   ipratropium-albuterol (DUONEB) 0.5-2.5 (3) MG/3ML SOLN nebulizer solution Inhale 3 mLs into the lungs every 4 hours as needed for Shortness of Breath 8/17/21  Yes Christi Cisneros MD   guaiFENesin [facility] [place] [facility]") 600 MG extended release tablet Take 1 tablet by mouth 2 times daily 8/17/21  Yes Christi Cisneros MD   magnesium oxide (MAG-OX) 400 (241.3 Mg) MG TABS tablet Take 1 tablet by mouth 2 times daily 8/17/21  Yes Christi Cisneros MD   diclofenac sodium (VOLTAREN) 1 % GEL Apply 2 g topically 2 times daily as needed for Pain    Yes Historical Provider, MD   metFORMIN (GLUCOPHAGE-XR) 500 MG extended release tablet Take 1 tablet by mouth daily (with breakfast) 8/9/21  Yes Christi Cisneros MD   citalopram (CELEXA) 20 MG tablet Take 1 tablet by mouth daily 8/9/21  Yes Christi Cisneros MD   nitroGLYCERIN (NITROSTAT) 0.4 MG SL tablet Place 1 tablet under the tongue every 5 minutes as needed for Chest pain up to max of 3 total doses.  If no relief after 1 dose, call 911. 8/9/21  Yes Christi Cisneros MD   SM ASPIRIN ADULT LOW STRENGTH 81 MG EC tablet TAKE 1 TABLET BY MOUTH DAILY 5/21/21  Yes Christi Cisneros MD   fluticasone [facility]) 50 MCG/ACT nasal spray USE 2 SPRAYS IN [facility] NOSTRIL ONCE DAILY 5/21/21  Yes Christi Cisneros MD   clopidogrel (PLAVIX) 75 MG tablet Take 1 tablet by mouth daily 3/18/21  Yes Christi Cisneros MD   ONE TOUCH ULTRA TEST strip TEST ONCE DAILY AS NEEDED 4/6/20  Yes Christi Cisneros MD       Future Appointments   Date Time Provider Kendra Jimenez   5/12/2022 12:00 PM [facility]

## 2022-05-12 ENCOUNTER — TELEPHONE (OUTPATIENT)
Dept: FAMILY MEDICINE CLINIC | Age: 72
End: 2022-05-12

## 2022-05-12 NOTE — CARE COORDINATION
I agree with the Care Coordinator's Plan of Care     I have seen pt only once and she had 3 no shows so far. She missed her chela today.

## 2022-05-12 NOTE — TELEPHONE ENCOUNTER
Attempted to call both numbers, first number has no VMB set up and other number rings with no answer. Patient needs an appointment in order to have [facility] paperwork filled out. She has no showed her last two appointments.    *Dr Deborah Clemente does not want patient back on her schedule, please schedule with someone else*

## 2022-05-14 ENCOUNTER — HOSPITAL ENCOUNTER (INPATIENT)
Age: 72
LOS: 2 days | Discharge: HOME | DRG: 291 | End: 2022-05-16
Attending: EMERGENCY MEDICINE | Admitting: INTERNAL MEDICINE
Payer: COMMERCIAL

## 2022-05-14 ENCOUNTER — APPOINTMENT (OUTPATIENT)
Dept: GENERAL RADIOLOGY | Age: 72
DRG: 291 | End: 2022-05-14
Payer: COMMERCIAL

## 2022-05-14 ENCOUNTER — APPOINTMENT (OUTPATIENT)
Dept: CT IMAGING | Age: 72
DRG: 291 | End: 2022-05-14
Payer: COMMERCIAL

## 2022-05-14 DIAGNOSIS — R60.9 PERIPHERAL EDEMA: ICD-10-CM

## 2022-05-14 DIAGNOSIS — J44.1 COPD EXACERBATION (HCC): Primary | ICD-10-CM

## 2022-05-14 DIAGNOSIS — J44.9 CHRONIC OBSTRUCTIVE PULMONARY DISEASE, UNSPECIFIED COPD TYPE (HCC): ICD-10-CM

## 2022-05-14 PROBLEM — I50.23 ACUTE ON CHRONIC SYSTOLIC CHF (CONGESTIVE HEART FAILURE) (HCC): Status: ACTIVE | Noted: 2022-05-14

## 2022-05-14 PROBLEM — Z01.810 SYMPTOMATIC CONGESTIVE HEART FAILURE, PRE-OPERATIVE CARDIOVASCULAR EXAMINATION (HCC): Status: ACTIVE | Noted: 2022-05-14

## 2022-05-14 PROBLEM — I50.9 SYMPTOMATIC CONGESTIVE HEART FAILURE, PRE-OPERATIVE CARDIOVASCULAR EXAMINATION (HCC): Status: ACTIVE | Noted: 2022-05-14

## 2022-05-14 LAB
ABSOLUTE EOS #: 0.2 K/UL (ref 0–0.4)
ABSOLUTE LYMPH #: 1.4 K/UL (ref 1–4.8)
ABSOLUTE MONO #: 1 K/UL (ref 0.1–1.3)
ANION GAP SERPL CALCULATED.3IONS-SCNC: 11 MMOL/L (ref 9–17)
BASOPHILS # BLD: 1 % (ref 0–2)
BASOPHILS ABSOLUTE: 0.1 K/UL (ref 0–0.2)
BUN BLDV-MCNC: 20 MG/DL (ref 8–23)
CALCIUM SERPL-MCNC: 9 MG/DL (ref 8.6–10.4)
CHLORIDE BLD-SCNC: 97 MMOL/L (ref 98–107)
CO2: 30 MMOL/L (ref 20–31)
CREAT SERPL-MCNC: 1.22 MG/DL (ref 0.5–0.9)
D-DIMER QUANTITATIVE: 1.32 MG/L FEU (ref 0–0.59)
EOSINOPHILS RELATIVE PERCENT: 3 % (ref 0–4)
GFR AFRICAN AMERICAN: 52 ML/MIN
GFR NON-AFRICAN AMERICAN: 43 ML/MIN
GFR SERPL CREATININE-BSD FRML MDRD: ABNORMAL ML/MIN/{1.73_M2}
GLUCOSE BLD-MCNC: 174 MG/DL (ref 70–99)
GLUCOSE BLD-MCNC: 473 MG/DL (ref 65–105)
HCT VFR BLD CALC: 32.2 % (ref 36–46)
HEMOGLOBIN: 10.3 G/DL (ref 12–16)
INFLUENZA A: NOT DETECTED
INFLUENZA B: NOT DETECTED
LEGIONELLA PNEUMOPHILIA AG, URINE: NEGATIVE
LYMPHOCYTES # BLD: 18 % (ref 24–44)
MCH RBC QN AUTO: 25 PG (ref 26–34)
MCHC RBC AUTO-ENTMCNC: 31.9 G/DL (ref 31–37)
MCV RBC AUTO: 78.5 FL (ref 80–100)
MONOCYTES # BLD: 13 % (ref 1–7)
PDW BLD-RTO: 17 % (ref 11.5–14.9)
PLATELET # BLD: 310 K/UL (ref 150–450)
PMV BLD AUTO: 7.7 FL (ref 6–12)
POTASSIUM SERPL-SCNC: 3.5 MMOL/L (ref 3.7–5.3)
PRO-BNP: 5969 PG/ML
PROCALCITONIN: 0.04 NG/ML
RBC # BLD: 4.1 M/UL (ref 4–5.2)
SARS-COV-2 RNA, RT PCR: NOT DETECTED
SEG NEUTROPHILS: 65 % (ref 36–66)
SEGMENTED NEUTROPHILS ABSOLUTE COUNT: 5 K/UL (ref 1.3–9.1)
SODIUM BLD-SCNC: 138 MMOL/L (ref 135–144)
SOURCE: NORMAL
SOURCE: NORMAL
SPECIMEN DESCRIPTION: NORMAL
STREP PNEUMONIAE ANTIGEN: NEGATIVE
TROPONIN, HIGH SENSITIVITY: 42 NG/L (ref 0–14)
TROPONIN, HIGH SENSITIVITY: 45 NG/L (ref 0–14)
WBC # BLD: 7.6 K/UL (ref 3.5–11)

## 2022-05-14 PROCEDURE — 87636 SARSCOV2 & INF A&B AMP PRB: CPT

## 2022-05-14 PROCEDURE — 96374 THER/PROPH/DIAG INJ IV PUSH: CPT

## 2022-05-14 PROCEDURE — 6370000000 HC RX 637 (ALT 250 FOR IP)

## 2022-05-14 PROCEDURE — 84145 PROCALCITONIN (PCT): CPT

## 2022-05-14 PROCEDURE — 80048 BASIC METABOLIC PNL TOTAL CA: CPT

## 2022-05-14 PROCEDURE — 6370000000 HC RX 637 (ALT 250 FOR IP): Performed by: EMERGENCY MEDICINE

## 2022-05-14 PROCEDURE — 99285 EMERGENCY DEPT VISIT HI MDM: CPT

## 2022-05-14 PROCEDURE — 2700000000 HC OXYGEN THERAPY PER DAY

## 2022-05-14 PROCEDURE — 6370000000 HC RX 637 (ALT 250 FOR IP): Performed by: INTERNAL MEDICINE

## 2022-05-14 PROCEDURE — 97161 PT EVAL LOW COMPLEX 20 MIN: CPT

## 2022-05-14 PROCEDURE — 71045 X-RAY EXAM CHEST 1 VIEW: CPT

## 2022-05-14 PROCEDURE — 96375 TX/PRO/DX INJ NEW DRUG ADDON: CPT

## 2022-05-14 PROCEDURE — 93005 ELECTROCARDIOGRAM TRACING: CPT | Performed by: EMERGENCY MEDICINE

## 2022-05-14 PROCEDURE — 36415 COLL VENOUS BLD VENIPUNCTURE: CPT

## 2022-05-14 PROCEDURE — 6360000002 HC RX W HCPCS: Performed by: INTERNAL MEDICINE

## 2022-05-14 PROCEDURE — 85025 COMPLETE CBC W/AUTO DIFF WBC: CPT

## 2022-05-14 PROCEDURE — 86738 MYCOPLASMA ANTIBODY: CPT

## 2022-05-14 PROCEDURE — 85379 FIBRIN DEGRADATION QUANT: CPT

## 2022-05-14 PROCEDURE — 71260 CT THORAX DX C+: CPT

## 2022-05-14 PROCEDURE — 2580000003 HC RX 258: Performed by: INTERNAL MEDICINE

## 2022-05-14 PROCEDURE — 6360000002 HC RX W HCPCS

## 2022-05-14 PROCEDURE — 6360000002 HC RX W HCPCS: Performed by: STUDENT IN AN ORGANIZED HEALTH CARE EDUCATION/TRAINING PROGRAM

## 2022-05-14 PROCEDURE — 2580000003 HC RX 258: Performed by: EMERGENCY MEDICINE

## 2022-05-14 PROCEDURE — 83880 ASSAY OF NATRIURETIC PEPTIDE: CPT

## 2022-05-14 PROCEDURE — 99223 1ST HOSP IP/OBS HIGH 75: CPT | Performed by: INTERNAL MEDICINE

## 2022-05-14 PROCEDURE — 6360000002 HC RX W HCPCS: Performed by: EMERGENCY MEDICINE

## 2022-05-14 PROCEDURE — 6360000004 HC RX CONTRAST MEDICATION: Performed by: EMERGENCY MEDICINE

## 2022-05-14 PROCEDURE — 87449 NOS EACH ORGANISM AG IA: CPT

## 2022-05-14 PROCEDURE — 87899 AGENT NOS ASSAY W/OPTIC: CPT

## 2022-05-14 PROCEDURE — 84484 ASSAY OF TROPONIN QUANT: CPT

## 2022-05-14 PROCEDURE — 82947 ASSAY GLUCOSE BLOOD QUANT: CPT

## 2022-05-14 PROCEDURE — 94640 AIRWAY INHALATION TREATMENT: CPT

## 2022-05-14 PROCEDURE — 2060000000 HC ICU INTERMEDIATE R&B

## 2022-05-14 RX ORDER — ENOXAPARIN SODIUM 100 MG/ML
40 INJECTION SUBCUTANEOUS DAILY
Status: DISCONTINUED | OUTPATIENT
Start: 2022-05-14 | End: 2022-05-14

## 2022-05-14 RX ORDER — INSULIN LISPRO 100 [IU]/ML
0-6 INJECTION, SOLUTION INTRAVENOUS; SUBCUTANEOUS
Status: DISCONTINUED | OUTPATIENT
Start: 2022-05-14 | End: 2022-05-16 | Stop reason: HOSPADM

## 2022-05-14 RX ORDER — ALBUTEROL SULFATE 2.5 MG/3ML
2.5 SOLUTION RESPIRATORY (INHALATION)
Status: DISCONTINUED | OUTPATIENT
Start: 2022-05-14 | End: 2022-05-16 | Stop reason: HOSPADM

## 2022-05-14 RX ORDER — ONDANSETRON 2 MG/ML
4 INJECTION INTRAMUSCULAR; INTRAVENOUS EVERY 6 HOURS PRN
Status: DISCONTINUED | OUTPATIENT
Start: 2022-05-14 | End: 2022-05-16 | Stop reason: HOSPADM

## 2022-05-14 RX ORDER — ONDANSETRON 4 MG/1
4 TABLET, ORALLY DISINTEGRATING ORAL EVERY 8 HOURS PRN
Status: DISCONTINUED | OUTPATIENT
Start: 2022-05-14 | End: 2022-05-16 | Stop reason: HOSPADM

## 2022-05-14 RX ORDER — METOPROLOL SUCCINATE 50 MG/1
50 TABLET, EXTENDED RELEASE ORAL DAILY
Status: DISCONTINUED | OUTPATIENT
Start: 2022-05-14 | End: 2022-05-16 | Stop reason: HOSPADM

## 2022-05-14 RX ORDER — SODIUM CHLORIDE 0.9 % (FLUSH) 0.9 %
5-40 SYRINGE (ML) INJECTION EVERY 12 HOURS SCHEDULED
Status: DISCONTINUED | OUTPATIENT
Start: 2022-05-14 | End: 2022-05-16 | Stop reason: HOSPADM

## 2022-05-14 RX ORDER — INSULIN LISPRO 100 [IU]/ML
0-3 INJECTION, SOLUTION INTRAVENOUS; SUBCUTANEOUS NIGHTLY
Status: DISCONTINUED | OUTPATIENT
Start: 2022-05-14 | End: 2022-05-16 | Stop reason: HOSPADM

## 2022-05-14 RX ORDER — BUDESONIDE AND FORMOTEROL FUMARATE DIHYDRATE 160; 4.5 UG/1; UG/1
2 AEROSOL RESPIRATORY (INHALATION) 2 TIMES DAILY
Status: DISCONTINUED | OUTPATIENT
Start: 2022-05-14 | End: 2022-05-16 | Stop reason: HOSPADM

## 2022-05-14 RX ORDER — SODIUM CHLORIDE 9 MG/ML
INJECTION, SOLUTION INTRAVENOUS CONTINUOUS
Status: DISCONTINUED | OUTPATIENT
Start: 2022-05-14 | End: 2022-05-15

## 2022-05-14 RX ORDER — INSULIN GLARGINE 100 [IU]/ML
5 INJECTION, SOLUTION SUBCUTANEOUS NIGHTLY
Status: DISCONTINUED | OUTPATIENT
Start: 2022-05-14 | End: 2022-05-15

## 2022-05-14 RX ORDER — ACETAMINOPHEN 650 MG/1
650 SUPPOSITORY RECTAL EVERY 6 HOURS PRN
Status: DISCONTINUED | OUTPATIENT
Start: 2022-05-14 | End: 2022-05-16 | Stop reason: HOSPADM

## 2022-05-14 RX ORDER — METHYLPREDNISOLONE SODIUM SUCCINATE 125 MG/2ML
125 INJECTION, POWDER, LYOPHILIZED, FOR SOLUTION INTRAMUSCULAR; INTRAVENOUS ONCE
Status: COMPLETED | OUTPATIENT
Start: 2022-05-14 | End: 2022-05-14

## 2022-05-14 RX ORDER — CITALOPRAM 20 MG/1
20 TABLET ORAL DAILY
Status: DISCONTINUED | OUTPATIENT
Start: 2022-05-14 | End: 2022-05-16 | Stop reason: HOSPADM

## 2022-05-14 RX ORDER — ROPINIROLE 1 MG/1
1 TABLET, FILM COATED ORAL NIGHTLY
Status: DISCONTINUED | OUTPATIENT
Start: 2022-05-14 | End: 2022-05-16 | Stop reason: HOSPADM

## 2022-05-14 RX ORDER — POLYETHYLENE GLYCOL 3350 17 G/17G
17 POWDER, FOR SOLUTION ORAL DAILY PRN
Status: DISCONTINUED | OUTPATIENT
Start: 2022-05-14 | End: 2022-05-16 | Stop reason: HOSPADM

## 2022-05-14 RX ORDER — 0.9 % SODIUM CHLORIDE 0.9 %
80 INTRAVENOUS SOLUTION INTRAVENOUS ONCE
Status: COMPLETED | OUTPATIENT
Start: 2022-05-14 | End: 2022-05-14

## 2022-05-14 RX ORDER — FERROUS SULFATE 325(65) MG
325 TABLET ORAL
Status: DISCONTINUED | OUTPATIENT
Start: 2022-05-15 | End: 2022-05-16 | Stop reason: HOSPADM

## 2022-05-14 RX ORDER — CLOPIDOGREL BISULFATE 75 MG/1
75 TABLET ORAL DAILY
Status: DISCONTINUED | OUTPATIENT
Start: 2022-05-14 | End: 2022-05-16 | Stop reason: HOSPADM

## 2022-05-14 RX ORDER — ASPIRIN 81 MG/1
81 TABLET ORAL DAILY
Status: DISCONTINUED | OUTPATIENT
Start: 2022-05-14 | End: 2022-05-16 | Stop reason: HOSPADM

## 2022-05-14 RX ORDER — SODIUM CHLORIDE 0.9 % (FLUSH) 0.9 %
5-40 SYRINGE (ML) INJECTION PRN
Status: DISCONTINUED | OUTPATIENT
Start: 2022-05-14 | End: 2022-05-16 | Stop reason: HOSPADM

## 2022-05-14 RX ORDER — ACETAMINOPHEN 325 MG/1
650 TABLET ORAL EVERY 6 HOURS PRN
Status: DISCONTINUED | OUTPATIENT
Start: 2022-05-14 | End: 2022-05-16 | Stop reason: HOSPADM

## 2022-05-14 RX ORDER — TRAZODONE HYDROCHLORIDE 50 MG/1
50 TABLET ORAL NIGHTLY
Status: DISCONTINUED | OUTPATIENT
Start: 2022-05-14 | End: 2022-05-16 | Stop reason: HOSPADM

## 2022-05-14 RX ORDER — ATORVASTATIN CALCIUM 80 MG/1
80 TABLET, FILM COATED ORAL DAILY
Status: DISCONTINUED | OUTPATIENT
Start: 2022-05-14 | End: 2022-05-16 | Stop reason: HOSPADM

## 2022-05-14 RX ORDER — IPRATROPIUM BROMIDE AND ALBUTEROL SULFATE 2.5; .5 MG/3ML; MG/3ML
1 SOLUTION RESPIRATORY (INHALATION)
Status: DISCONTINUED | OUTPATIENT
Start: 2022-05-14 | End: 2022-05-16 | Stop reason: HOSPADM

## 2022-05-14 RX ORDER — DEXTROSE MONOHYDRATE 50 MG/ML
100 INJECTION, SOLUTION INTRAVENOUS PRN
Status: DISCONTINUED | OUTPATIENT
Start: 2022-05-14 | End: 2022-05-16 | Stop reason: HOSPADM

## 2022-05-14 RX ORDER — NICOTINE 21 MG/24HR
1 PATCH, TRANSDERMAL 24 HOURS TRANSDERMAL DAILY
Status: DISCONTINUED | OUTPATIENT
Start: 2022-05-14 | End: 2022-05-16 | Stop reason: HOSPADM

## 2022-05-14 RX ORDER — IPRATROPIUM BROMIDE AND ALBUTEROL SULFATE 2.5; .5 MG/3ML; MG/3ML
1 SOLUTION RESPIRATORY (INHALATION) ONCE
Status: COMPLETED | OUTPATIENT
Start: 2022-05-14 | End: 2022-05-14

## 2022-05-14 RX ORDER — HEPARIN SODIUM 5000 [USP'U]/ML
5000 INJECTION, SOLUTION INTRAVENOUS; SUBCUTANEOUS EVERY 8 HOURS SCHEDULED
Status: DISCONTINUED | OUTPATIENT
Start: 2022-05-14 | End: 2022-05-16 | Stop reason: HOSPADM

## 2022-05-14 RX ORDER — FUROSEMIDE 10 MG/ML
20 INJECTION INTRAMUSCULAR; INTRAVENOUS DAILY
Status: DISCONTINUED | OUTPATIENT
Start: 2022-05-14 | End: 2022-05-15

## 2022-05-14 RX ORDER — SODIUM CHLORIDE 0.9 % (FLUSH) 0.9 %
10 SYRINGE (ML) INJECTION PRN
Status: DISCONTINUED | OUTPATIENT
Start: 2022-05-14 | End: 2022-05-16 | Stop reason: HOSPADM

## 2022-05-14 RX ORDER — METHYLPREDNISOLONE SODIUM SUCCINATE 125 MG/2ML
60 INJECTION, POWDER, LYOPHILIZED, FOR SOLUTION INTRAMUSCULAR; INTRAVENOUS EVERY 6 HOURS
Status: DISCONTINUED | OUTPATIENT
Start: 2022-05-14 | End: 2022-05-16 | Stop reason: HOSPADM

## 2022-05-14 RX ORDER — AZITHROMYCIN 250 MG/1
500 TABLET, FILM COATED ORAL DAILY
Status: COMPLETED | OUTPATIENT
Start: 2022-05-14 | End: 2022-05-16

## 2022-05-14 RX ORDER — FUROSEMIDE 10 MG/ML
40 INJECTION INTRAMUSCULAR; INTRAVENOUS 2 TIMES DAILY
Status: DISCONTINUED | OUTPATIENT
Start: 2022-05-14 | End: 2022-05-14

## 2022-05-14 RX ORDER — ISOSORBIDE MONONITRATE 30 MG/1
30 TABLET, EXTENDED RELEASE ORAL DAILY
Status: DISCONTINUED | OUTPATIENT
Start: 2022-05-14 | End: 2022-05-16 | Stop reason: HOSPADM

## 2022-05-14 RX ORDER — SODIUM CHLORIDE 9 MG/ML
INJECTION, SOLUTION INTRAVENOUS PRN
Status: DISCONTINUED | OUTPATIENT
Start: 2022-05-14 | End: 2022-05-16 | Stop reason: HOSPADM

## 2022-05-14 RX ORDER — FUROSEMIDE 10 MG/ML
40 INJECTION INTRAMUSCULAR; INTRAVENOUS ONCE
Status: COMPLETED | OUTPATIENT
Start: 2022-05-14 | End: 2022-05-14

## 2022-05-14 RX ORDER — GUAIFENESIN 600 MG/1
600 TABLET, EXTENDED RELEASE ORAL 2 TIMES DAILY
Status: DISCONTINUED | OUTPATIENT
Start: 2022-05-14 | End: 2022-05-16 | Stop reason: HOSPADM

## 2022-05-14 RX ADMIN — METHYLPREDNISOLONE SODIUM SUCCINATE 60 MG: 125 INJECTION, POWDER, FOR SOLUTION INTRAMUSCULAR; INTRAVENOUS at 19:00

## 2022-05-14 RX ADMIN — AZITHROMYCIN 500 MG: 250 TABLET, FILM COATED ORAL at 18:45

## 2022-05-14 RX ADMIN — IPRATROPIUM BROMIDE AND ALBUTEROL SULFATE 1 AMPULE: .5; 2.5 SOLUTION RESPIRATORY (INHALATION) at 08:59

## 2022-05-14 RX ADMIN — SODIUM CHLORIDE: 9 INJECTION, SOLUTION INTRAVENOUS at 18:47

## 2022-05-14 RX ADMIN — ATORVASTATIN CALCIUM 80 MG: 80 TABLET, FILM COATED ORAL at 21:28

## 2022-05-14 RX ADMIN — IPRATROPIUM BROMIDE AND ALBUTEROL SULFATE 1 AMPULE: .5; 3 SOLUTION RESPIRATORY (INHALATION) at 19:32

## 2022-05-14 RX ADMIN — INSULIN GLARGINE 5 UNITS: 100 INJECTION, SOLUTION SUBCUTANEOUS at 21:23

## 2022-05-14 RX ADMIN — IPRATROPIUM BROMIDE AND ALBUTEROL SULFATE 1 AMPULE: .5; 3 SOLUTION RESPIRATORY (INHALATION) at 15:25

## 2022-05-14 RX ADMIN — HEPARIN SODIUM 5000 UNITS: 5000 INJECTION INTRAVENOUS; SUBCUTANEOUS at 21:24

## 2022-05-14 RX ADMIN — CEFTRIAXONE SODIUM 1000 MG: 1 INJECTION, POWDER, FOR SOLUTION INTRAMUSCULAR; INTRAVENOUS at 18:48

## 2022-05-14 RX ADMIN — ROPINIROLE HYDROCHLORIDE 1 MG: 1 TABLET, FILM COATED ORAL at 21:24

## 2022-05-14 RX ADMIN — FUROSEMIDE 40 MG: 10 INJECTION, SOLUTION INTRAMUSCULAR; INTRAVENOUS at 11:29

## 2022-05-14 RX ADMIN — METHYLPREDNISOLONE SODIUM SUCCINATE 125 MG: 125 INJECTION, POWDER, FOR SOLUTION INTRAMUSCULAR; INTRAVENOUS at 09:05

## 2022-05-14 RX ADMIN — GUAIFENESIN 600 MG: 600 TABLET, EXTENDED RELEASE ORAL at 21:24

## 2022-05-14 RX ADMIN — TRAZODONE HYDROCHLORIDE 50 MG: 50 TABLET ORAL at 21:24

## 2022-05-14 RX ADMIN — FUROSEMIDE 20 MG: 10 INJECTION, SOLUTION INTRAMUSCULAR; INTRAVENOUS at 18:46

## 2022-05-14 RX ADMIN — IOPAMIDOL 75 ML: 755 INJECTION, SOLUTION INTRAVENOUS at 10:54

## 2022-05-14 RX ADMIN — SODIUM CHLORIDE 80 ML: 9 INJECTION, SOLUTION INTRAVENOUS at 10:55

## 2022-05-14 RX ADMIN — INSULIN LISPRO 3 UNITS: 100 INJECTION, SOLUTION INTRAVENOUS; SUBCUTANEOUS at 21:23

## 2022-05-14 RX ADMIN — BUDESONIDE AND FORMOTEROL FUMARATE DIHYDRATE 2 PUFF: 160; 4.5 AEROSOL RESPIRATORY (INHALATION) at 19:32

## 2022-05-14 RX ADMIN — SODIUM CHLORIDE, PRESERVATIVE FREE 10 ML: 5 INJECTION INTRAVENOUS at 10:54

## 2022-05-14 ASSESSMENT — ENCOUNTER SYMPTOMS
NAUSEA: 0
BLOOD IN STOOL: 0
BACK PAIN: 0
CONSTIPATION: 0
WHEEZING: 1
ABDOMINAL PAIN: 0
COLOR CHANGE: 0
SHORTNESS OF BREATH: 1
DIARRHEA: 0
COUGH: 0
SORE THROAT: 0
VOMITING: 0
COUGH: 1
TROUBLE SWALLOWING: 0

## 2022-05-14 ASSESSMENT — PAIN - FUNCTIONAL ASSESSMENT: PAIN_FUNCTIONAL_ASSESSMENT: NONE - DENIES PAIN

## 2022-05-14 NOTE — ED TRIAGE NOTES
Patient to emergency department with complaints of sob and bilateral leg swelling. Pt states the sob began on Monday, requiring her to wear her oxygen daily which she usually only wears at night.  Pt also reports increased in 5 pounds since Tuesday
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
## 2022-05-14 NOTE — PROGRESS NOTES
Physical Therapy  Facility/Department: [address] [place] [address]  Physical Therapy Initial Assessment    Name: Vera Stewart  : 1950  MRN: [id number]  Date of Service: 2022    Discharge Recommendations:  Home with assist PRN      Patient Diagnosis(es): The primary encounter diagnosis was COPD exacerbation ([facility] A diagnosis of Peripheral edema was also pertinent to this visit. Past Medical History:  has a past medical history of Allergic rhinitis, Arthritis, CAD (coronary artery disease), Cerebral artery occlusion with cerebral infarction ([address] CHF (congestive heart failure) ([address] Controlled type 2 diabetes mellitus without complication, without long-term current use of insulin ([facility] COPD (chronic obstructive pulmonary disease) ([facility] Depression, Former smoker, History of blood transfusion, Hyperlipidemia, Hypertension, Kidney stone, Myocardial infarction ([facility] Obesity, Class I, BMI 30.0-34.9 (see actual BMI), Restless leg syndrome, Skin abnormality, Type II or unspecified type diabetes mellitus without mention of complication, not stated as uncontrolled, Wears glasses, and Wears partial dentures. Past Surgical History:  has a past surgical history that includes Appendectomy; Hysterectomy; Cholecystectomy; joint replacement (Bilateral); pr office/outpt visit,procedure only (N/A, 2018); pr incis/drain thigh/knee abscess,deep (Right, 2018); Leg biopsy excision (Right, 2019); Cardiac surgery; Cardiac surgery; other surgical history (2020); cervical laminectomy (N/A, 10/14/2020); and bronchoscopy (N/A, 2021).     Assessment   Decision Making: Low Complexity  No Skilled PT: Independent with functional mobility   Requires PT Follow-Up: No  Activity Tolerance  Activity Tolerance: Patient tolerated evaluation without incident     Plan   Plan  Plan: 1 time a day 3-6 times a week  Safety Devices  Type of Devices: Gait belt,Call light within reach,Left in bed Restrictions  No  Subjective   Subjective  Subjective: Patient agreeable to therapy. No pain reported     Social/Functional History  Social/Functional History  Lives With:  (roommate)  Type of Home: House  Home Layout: One level  Home Access: Stairs to enter with rails  Entrance Stairs - Number of Steps: 5 front door  Entrance Stairs - Rails: Both  Bathroom Shower/Tub: Tub/Shower unit,Shower chair with back,Curtain  Bathroom Toilet: Standard  Home Equipment: Rollator,Walker, standard (2 rollators)  Has the patient had two or more falls in the past year or any fall with injury in the past year?: Yes  ADL Assistance: Independent  Homemaking Assistance: Independent  Homemaking Responsibilities: Yes  Ambulation Assistance: Independent  Transfer Assistance: Independent  Active : Yes (patient uses cab services or son and niece take her to store)  Vision/Hearing  Vision Exceptions: Wears glasses for reading  Hearing: Within functional limits    Cognition   Orientation  Overall Orientation Status: Within Normal Limits     Objective   Pulse: 64  Heart Rate Source: Monitor  BP: (!) 141/83  BP Location: Right upper arm  Patient Position: Sitting  MAP (Calculated): 102.33  Resp: 19  SpO2: 98 %  O2 Device: None (Room air)     AROM RLE (degrees)  RLE AROM: WFL  AROM LLE (degrees)  LLE AROM : WFL  AROM RUE (degrees)  RUE AROM : WFL  AROM LUE (degrees)  LUE AROM : WFL     Bed mobility  Rolling to Right: Independent  Supine to Sit: Independent  Sit to Supine: Independent  Scooting: Independent     Transfers  Sit to Stand: Independent  Stand to sit:  Independent  Lateral Transfers: Independent     Ambulation  Surface: level tile  Device: No Device  Other Apparatus: O2  Assistance: Independent  Gait Deviations: None  Distance: 8ft x4  More Ambulation?: No  Stairs/Curb  Stairs?: No     Balance  Sitting - Static: Good  Sitting - Dynamic: Good  Standing - Static: Good  Standing - Dynamic: Good  A/AROM Exercises: completed bilateral standing LE exercises [id number]    Therapy Time   Individual Concurrent Group Co-treatment   Time In 1442         Time Out 1506         Minutes 150 Dion Silva, PT
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
## 2022-05-14 NOTE — ED NOTES
Patient placed on cardiac, SPO2, and NIBP. IV established, and blood work labeled with correct patient label and sent to lab.        Mino Bergeron RN  05/14/22 0129

## 2022-05-15 ENCOUNTER — APPOINTMENT (OUTPATIENT)
Dept: GENERAL RADIOLOGY | Age: 72
DRG: 291 | End: 2022-05-15
Payer: COMMERCIAL

## 2022-05-15 LAB
ANION GAP SERPL CALCULATED.3IONS-SCNC: 12 MMOL/L (ref 9–17)
BUN BLDV-MCNC: 24 MG/DL (ref 8–23)
CALCIUM SERPL-MCNC: 8.7 MG/DL (ref 8.6–10.4)
CHLORIDE BLD-SCNC: 94 MMOL/L (ref 98–107)
CO2: 32 MMOL/L (ref 20–31)
CREAT SERPL-MCNC: 1.42 MG/DL (ref 0.5–0.9)
GFR AFRICAN AMERICAN: 44 ML/MIN
GFR NON-AFRICAN AMERICAN: 36 ML/MIN
GFR SERPL CREATININE-BSD FRML MDRD: ABNORMAL ML/MIN/{1.73_M2}
GLUCOSE BLD-MCNC: 203 MG/DL (ref 65–105)
GLUCOSE BLD-MCNC: 250 MG/DL (ref 70–99)
GLUCOSE BLD-MCNC: 263 MG/DL (ref 65–105)
GLUCOSE BLD-MCNC: 292 MG/DL (ref 65–105)
GLUCOSE BLD-MCNC: 367 MG/DL (ref 65–105)
POTASSIUM SERPL-SCNC: 3.6 MMOL/L (ref 3.7–5.3)
PRO-BNP: ABNORMAL PG/ML
SODIUM BLD-SCNC: 138 MMOL/L (ref 135–144)

## 2022-05-15 PROCEDURE — 94640 AIRWAY INHALATION TREATMENT: CPT

## 2022-05-15 PROCEDURE — 2580000003 HC RX 258: Performed by: INTERNAL MEDICINE

## 2022-05-15 PROCEDURE — 2060000000 HC ICU INTERMEDIATE R&B

## 2022-05-15 PROCEDURE — 83880 ASSAY OF NATRIURETIC PEPTIDE: CPT

## 2022-05-15 PROCEDURE — 6370000000 HC RX 637 (ALT 250 FOR IP): Performed by: INTERNAL MEDICINE

## 2022-05-15 PROCEDURE — 6360000002 HC RX W HCPCS: Performed by: INTERNAL MEDICINE

## 2022-05-15 PROCEDURE — 71045 X-RAY EXAM CHEST 1 VIEW: CPT

## 2022-05-15 PROCEDURE — 80048 BASIC METABOLIC PNL TOTAL CA: CPT

## 2022-05-15 PROCEDURE — 6360000002 HC RX W HCPCS

## 2022-05-15 PROCEDURE — 6370000000 HC RX 637 (ALT 250 FOR IP)

## 2022-05-15 PROCEDURE — 99232 SBSQ HOSP IP/OBS MODERATE 35: CPT | Performed by: INTERNAL MEDICINE

## 2022-05-15 PROCEDURE — 94761 N-INVAS EAR/PLS OXIMETRY MLT: CPT

## 2022-05-15 PROCEDURE — 6360000002 HC RX W HCPCS: Performed by: STUDENT IN AN ORGANIZED HEALTH CARE EDUCATION/TRAINING PROGRAM

## 2022-05-15 PROCEDURE — 82947 ASSAY GLUCOSE BLOOD QUANT: CPT

## 2022-05-15 PROCEDURE — 2700000000 HC OXYGEN THERAPY PER DAY

## 2022-05-15 PROCEDURE — 36415 COLL VENOUS BLD VENIPUNCTURE: CPT

## 2022-05-15 RX ORDER — INSULIN GLARGINE 100 [IU]/ML
5 INJECTION, SOLUTION SUBCUTANEOUS 2 TIMES DAILY
Status: DISCONTINUED | OUTPATIENT
Start: 2022-05-15 | End: 2022-05-16 | Stop reason: HOSPADM

## 2022-05-15 RX ORDER — TIZANIDINE 2 MG/1
2 TABLET ORAL NIGHTLY PRN
Status: DISCONTINUED | OUTPATIENT
Start: 2022-05-15 | End: 2022-05-16 | Stop reason: HOSPADM

## 2022-05-15 RX ORDER — POTASSIUM CHLORIDE 20 MEQ/1
40 TABLET, EXTENDED RELEASE ORAL PRN
Status: DISCONTINUED | OUTPATIENT
Start: 2022-05-15 | End: 2022-05-16 | Stop reason: HOSPADM

## 2022-05-15 RX ORDER — POTASSIUM CHLORIDE 7.45 MG/ML
10 INJECTION INTRAVENOUS PRN
Status: DISCONTINUED | OUTPATIENT
Start: 2022-05-15 | End: 2022-05-16 | Stop reason: HOSPADM

## 2022-05-15 RX ORDER — LIDOCAINE 4 G/G
1 PATCH TOPICAL DAILY
Status: DISCONTINUED | OUTPATIENT
Start: 2022-05-15 | End: 2022-05-16 | Stop reason: HOSPADM

## 2022-05-15 RX ORDER — FUROSEMIDE 10 MG/ML
20 INJECTION INTRAMUSCULAR; INTRAVENOUS 2 TIMES DAILY
Status: DISCONTINUED | OUTPATIENT
Start: 2022-05-15 | End: 2022-05-16

## 2022-05-15 RX ADMIN — IPRATROPIUM BROMIDE AND ALBUTEROL SULFATE 1 AMPULE: .5; 3 SOLUTION RESPIRATORY (INHALATION) at 15:13

## 2022-05-15 RX ADMIN — GUAIFENESIN 600 MG: 600 TABLET, EXTENDED RELEASE ORAL at 09:26

## 2022-05-15 RX ADMIN — INSULIN LISPRO 3 UNITS: 100 INJECTION, SOLUTION INTRAVENOUS; SUBCUTANEOUS at 09:16

## 2022-05-15 RX ADMIN — ROPINIROLE HYDROCHLORIDE 1 MG: 1 TABLET, FILM COATED ORAL at 21:01

## 2022-05-15 RX ADMIN — SODIUM CHLORIDE, PRESERVATIVE FREE 10 ML: 5 INJECTION INTRAVENOUS at 21:02

## 2022-05-15 RX ADMIN — ATORVASTATIN CALCIUM 80 MG: 80 TABLET, FILM COATED ORAL at 09:27

## 2022-05-15 RX ADMIN — CLOPIDOGREL BISULFATE 75 MG: 75 TABLET ORAL at 09:26

## 2022-05-15 RX ADMIN — SODIUM CHLORIDE, PRESERVATIVE FREE 10 ML: 5 INJECTION INTRAVENOUS at 09:28

## 2022-05-15 RX ADMIN — IPRATROPIUM BROMIDE AND ALBUTEROL SULFATE 1 AMPULE: .5; 3 SOLUTION RESPIRATORY (INHALATION) at 11:04

## 2022-05-15 RX ADMIN — GUAIFENESIN 600 MG: 600 TABLET, EXTENDED RELEASE ORAL at 21:01

## 2022-05-15 RX ADMIN — HEPARIN SODIUM 5000 UNITS: 5000 INJECTION INTRAVENOUS; SUBCUTANEOUS at 21:01

## 2022-05-15 RX ADMIN — METOPROLOL SUCCINATE 50 MG: 50 TABLET, EXTENDED RELEASE ORAL at 09:26

## 2022-05-15 RX ADMIN — METHYLPREDNISOLONE SODIUM SUCCINATE 60 MG: 125 INJECTION, POWDER, FOR SOLUTION INTRAMUSCULAR; INTRAVENOUS at 17:41

## 2022-05-15 RX ADMIN — BUDESONIDE AND FORMOTEROL FUMARATE DIHYDRATE 2 PUFF: 160; 4.5 AEROSOL RESPIRATORY (INHALATION) at 20:13

## 2022-05-15 RX ADMIN — HEPARIN SODIUM 5000 UNITS: 5000 INJECTION INTRAVENOUS; SUBCUTANEOUS at 05:57

## 2022-05-15 RX ADMIN — METHYLPREDNISOLONE SODIUM SUCCINATE 60 MG: 125 INJECTION, POWDER, FOR SOLUTION INTRAMUSCULAR; INTRAVENOUS at 12:32

## 2022-05-15 RX ADMIN — INSULIN LISPRO 3 UNITS: 100 INJECTION, SOLUTION INTRAVENOUS; SUBCUTANEOUS at 12:32

## 2022-05-15 RX ADMIN — BUDESONIDE AND FORMOTEROL FUMARATE DIHYDRATE 2 PUFF: 160; 4.5 AEROSOL RESPIRATORY (INHALATION) at 07:37

## 2022-05-15 RX ADMIN — CITALOPRAM 20 MG: 20 TABLET, FILM COATED ORAL at 09:26

## 2022-05-15 RX ADMIN — TIZANIDINE 2 MG: 2 TABLET ORAL at 20:59

## 2022-05-15 RX ADMIN — INSULIN LISPRO 2 UNITS: 100 INJECTION, SOLUTION INTRAVENOUS; SUBCUTANEOUS at 17:41

## 2022-05-15 RX ADMIN — METHYLPREDNISOLONE SODIUM SUCCINATE 60 MG: 125 INJECTION, POWDER, FOR SOLUTION INTRAMUSCULAR; INTRAVENOUS at 00:22

## 2022-05-15 RX ADMIN — TRAZODONE HYDROCHLORIDE 50 MG: 50 TABLET ORAL at 20:59

## 2022-05-15 RX ADMIN — INSULIN GLARGINE 5 UNITS: 100 INJECTION, SOLUTION SUBCUTANEOUS at 12:41

## 2022-05-15 RX ADMIN — INSULIN LISPRO 2 UNITS: 100 INJECTION, SOLUTION INTRAVENOUS; SUBCUTANEOUS at 21:01

## 2022-05-15 RX ADMIN — ISOSORBIDE MONONITRATE 30 MG: 30 TABLET, EXTENDED RELEASE ORAL at 09:26

## 2022-05-15 RX ADMIN — HEPARIN SODIUM 5000 UNITS: 5000 INJECTION INTRAVENOUS; SUBCUTANEOUS at 15:58

## 2022-05-15 RX ADMIN — METHYLPREDNISOLONE SODIUM SUCCINATE 60 MG: 125 INJECTION, POWDER, FOR SOLUTION INTRAMUSCULAR; INTRAVENOUS at 05:57

## 2022-05-15 RX ADMIN — FERROUS SULFATE TAB 325 MG (65 MG ELEMENTAL FE) 325 MG: 325 (65 FE) TAB at 09:26

## 2022-05-15 RX ADMIN — ASPIRIN 81 MG: 81 TABLET, COATED ORAL at 09:42

## 2022-05-15 RX ADMIN — IPRATROPIUM BROMIDE AND ALBUTEROL SULFATE 1 AMPULE: .5; 3 SOLUTION RESPIRATORY (INHALATION) at 20:13

## 2022-05-15 RX ADMIN — FUROSEMIDE 20 MG: 10 INJECTION, SOLUTION INTRAMUSCULAR; INTRAVENOUS at 17:41

## 2022-05-15 RX ADMIN — INSULIN GLARGINE 5 UNITS: 100 INJECTION, SOLUTION SUBCUTANEOUS at 21:01

## 2022-05-15 RX ADMIN — IPRATROPIUM BROMIDE AND ALBUTEROL SULFATE 1 AMPULE: .5; 3 SOLUTION RESPIRATORY (INHALATION) at 07:37

## 2022-05-15 RX ADMIN — CEFTRIAXONE SODIUM 1000 MG: 1 INJECTION, POWDER, FOR SOLUTION INTRAMUSCULAR; INTRAVENOUS at 13:30

## 2022-05-15 RX ADMIN — AZITHROMYCIN 500 MG: 250 TABLET, FILM COATED ORAL at 09:26

## 2022-05-15 ASSESSMENT — ENCOUNTER SYMPTOMS
SORE THROAT: 0
VOMITING: 0
COUGH: 1
SHORTNESS OF BREATH: 1
NAUSEA: 0
ABDOMINAL PAIN: 0

## 2022-05-15 ASSESSMENT — PAIN DESCRIPTION - FREQUENCY: FREQUENCY: CONTINUOUS

## 2022-05-15 ASSESSMENT — PAIN SCALES - GENERAL: PAINLEVEL_OUTOF10: 7

## 2022-05-15 ASSESSMENT — PAIN DESCRIPTION - DESCRIPTORS: DESCRIPTORS: ACHING

## 2022-05-15 ASSESSMENT — PAIN - FUNCTIONAL ASSESSMENT: PAIN_FUNCTIONAL_ASSESSMENT: ACTIVITIES ARE NOT PREVENTED

## 2022-05-15 ASSESSMENT — PAIN DESCRIPTION - ONSET: ONSET: ON-GOING

## 2022-05-15 ASSESSMENT — PAIN DESCRIPTION - LOCATION: LOCATION: NECK

## 2022-05-15 ASSESSMENT — PAIN DESCRIPTION - PAIN TYPE: TYPE: CHRONIC PAIN

## 2022-05-15 NOTE — CARE COORDINATION
CASE MANAGEMENT NOTE:    Admission Date:  5/14/2022 Landry Soliz is a 66 y.o.  female    Admitted for : Peripheral edema [R60.9]  COPD exacerbation ([facility] [J44.1]  Symptomatic congestive heart failure, pre-operative cardiovascular examination ([facility] [Z01.810, I50.9]    Met with:  Patient    PCP:  Narcisa Juárez                                Insurance:  Manuel Lai      Is patient alert and oriented at time of discussion:  Yes    Current Residence/ Living Arrangements:  independently at home w/ Roommate            Current Services PTA:  No    Does patient go to outpatient dialysis: No  If yes, location and chair time: NA    Is patient agreeable to VNS: No    Freedom of choice provided:  No    List of [address] provided: No    VNS chosen:  No, Has Had [employer] [place] in past, denies the need    DME:  walker    Home Oxygen: Yes, Wears, 2LNC, HS & PRN, Has Port/Concentrator    Nebulizer: Yes    CPAP/BIPAP: No    Supplier: [employer]    Potential Assistance Needed: No    SNF needed: No    Freedom of choice and list provided: NA    Pharmacy:  [address] [place] [address],[address]    Is patient currently receiving oral anticoagulation therapy? No    Is the Patient an [facility] with Readmission Risk Score greater than 14%? No  If yes, pt needs a follow up appointment made within 7 days. Family Members/Caregivers that pt would like involved in their care:    Yes    If yes, list name here:  Son, Zena Fisher    Transportation Provider:  Zenon Corley             Discharge Plan:  5/15/22 [place] Pt. Lives in 1 story home w/ Roommate. DME- [employer], Oxygen, wears 2LNC, HS/PRN, Has Port/Concentrator, [facility], Nebulizer. Denies VNS- Has Had OL, in past. IV Rocephin, Steroids, 60 Q6, Cardio, IV Lasix. PT/OT.  Follow for ride home//KB                 Electronically signed by: Won Mir RN on 5/15/2022 at 1:54 PM

## 2022-05-15 NOTE — PLAN OF CARE
Problem: ABCDS Injury Assessment  Goal: Absence of physical injury  Outcome: Progressing  Note: No falls noted this shift. Patient ambulates with x1 standby staff assistance without difficulty. Bed kept in low position. Safe environment maintained. Bedside table & call light in reach. Uses call light appropriately when needing assistance.

## 2022-05-15 NOTE — ACP (ADVANCE CARE PLANNING)
Advance Care Planning     Advance Care Planning Activator (Inpatient)  Conversation Note      Date of ACP Conversation: 5/15/2022     Conversation Conducted with: Patient with Decision Making Capacity    ACP Activator: Catrachito Pack RN      Health Care Decision Maker:     Current Designated Health Care Decision Maker:     Primary Decision Maker: Milton Ferrara, Child, [phone]    Care Preferences    Ventilation: \"If you were in your present state of health and suddenly became very ill and were unable to breathe on your own, what would your preference be about the use of a ventilator (breathing machine) if it were available to you? \"      Would the patient desire the use of ventilator (breathing machine)?: yes    \"If your health worsens and it becomes clear that your chance of recovery is unlikely, what would your preference be about the use of a ventilator (breathing machine) if it were available to you? \"     Would the patient desire the use of ventilator (breathing machine)?: No      Resuscitation  \"CPR works best to restart the heart when there is a sudden event, like a heart attack, in someone who is otherwise healthy. Unfortunately, CPR does not typically restart the heart for people who have serious health conditions or who are very sick. \"    \"In the event your heart stopped as a result of an underlying serious health condition, would you want attempts to be made to restart your heart (answer \"yes\" for attempt to resuscitate) or would you prefer a natural death (answer \"no\" for do not attempt to resuscitate)? \" yes       [] Yes   [] No   Educated Patient / Tonya Alves regarding differences between Advance Directives and portable DNR orders.     Length of ACP Conversation in minutes:      Conversation Outcomes:  [x] ACP discussion completed  [] Existing advance directive reviewed with patient; no changes to patient's previously recorded wishes  [] New Advance Directive completed  [] Portable Do Not Rescitate prepared for Provider review and signature  [] POLST/POST/MOLST/MOST prepared for Provider review and signature      Follow-up plan:    [] Schedule follow-up conversation to continue planning  [] Referred individual to Provider for additional questions/concerns   [] Advised patient/agent/surrogate to review completed ACP document and update if needed with changes in condition, patient preferences or care setting    [x] This note routed to one or more involved healthcare providers

## 2022-05-15 NOTE — PROGRESS NOTES
[address]    PROGRESS NOTE             5/15/2022    6:57 AM    Name:   Mike Ty  MRN:     [id number]     Acct:      [de-identified]   Room:   2106/2106-01   Day:  1  Admit Date:  5/14/2022  8:50 AM    PCP:  Noemi Sandra MD  Code Status:  Full Code    Subjective:     C/C:   Chief Complaint   Patient presents with    Shortness of Breath    Leg Swelling     Interval History Status: improved. Patient seen and examined today bedside. Doing well. No acute events overnight. Remains afebrile. Shortness of breath improved. Denies chest pain. No acute change in bowel or urinary habits. No other acute concerns at this time. Brief History:     Ms. Willow Sanders, 66year old female, with previous medical history of combined heart failure, CAD s/p CABG x4 (2005, and 2018), COPD, DM Type II, HTN, CKD Stage 3b, and history of CVA s/p stent to left carotid in 07/2020.     Presented to the ED at [facility] on 05/14/2022 with complaints of dyspnea. Progressively worsening over the past week. Patient is compliant with medication. Has home O2, however, only uses it as needed which is about 3-4 nights/week. Uses at 2L. Was using O2 at home when symptoms began with minimal relief. Patient's shortness of breath worsened today morning and called EMS. Also has new cough for the past week. Denies recent fevers or sick contacts. Denies chest pain. No acute change in bowel or urinary habits. Mentions weight is 132-135 lbs usually. 05/14: Admitted. Received one dose of methylprednisolone 125 mg IV.  1 dose of 40 mg IV furosemide. D-dimer was elevated; CT PE negative. Review of Systems:     Review of Systems   Constitutional: Negative for chills and fever. HENT: Negative for congestion and sore throat. Respiratory: Positive for cough and shortness of breath. Cardiovascular: Negative for chest pain and leg swelling. Gastrointestinal: Negative for abdominal pain, nausea and vomiting. Genitourinary: Negative for difficulty urinating and dysuria. Neurological: Negative for syncope and headaches. Psychiatric/Behavioral: Negative for agitation, behavioral problems and confusion. Medications: Allergies: Allergies   Allergen Reactions    Codeine Other (See Comments)     Constipation.  Lisinopril      hyperkalemia    Morphine Other (See Comments)     Hallucinations.     Potassium-Containing Compounds        Current Meds:   Scheduled Meds:    sodium chloride flush  5-40 mL IntraVENous 2 times per day    ipratropium-albuterol  1 ampule Inhalation Q4H WA    cefTRIAXone (ROCEPHIN) IV  1,000 mg IntraVENous Q24H    azithromycin  500 mg Oral Daily    atorvastatin  80 mg Oral Daily    budesonide-formoterol  2 puff Inhalation BID    citalopram  20 mg Oral Daily    clopidogrel  75 mg Oral Daily    ferrous sulfate  325 mg Oral Daily with breakfast    guaiFENesin  600 mg Oral BID    isosorbide mononitrate  30 mg Oral Daily    metoprolol succinate  50 mg Oral Daily    nicotine  1 patch TransDERmal Daily    aspirin  81 mg Oral Daily    insulin glargine  5 Units SubCUTAneous Nightly    insulin lispro  0-6 Units SubCUTAneous TID WC    insulin lispro  0-3 Units SubCUTAneous Nightly    heparin (porcine)  5,000 Units SubCUTAneous 3 times per day    furosemide  20 mg IntraVENous Daily    methylPREDNISolone  60 mg IntraVENous Q6H    rOPINIRole  1 mg Oral Nightly    traZODone  50 mg Oral Nightly     Continuous Infusions:    sodium chloride 75 mL/hr at 05/14/22 1847    sodium chloride      dextrose       PRN Meds: sodium chloride flush, sodium chloride flush, sodium chloride, ondansetron **OR** ondansetron, polyethylene glycol, acetaminophen **OR** acetaminophen, albuterol, glucose, dextrose bolus **OR** dextrose bolus, glucagon (rDNA), dextrose    Data:     Past Medical History:   has a past medical history of Allergic rhinitis, Arthritis, CAD (coronary artery disease), Cerebral artery occlusion with cerebral infarction ([facility] [address] CHF (congestive heart failure) ([facility] [address] Controlled type 2 diabetes mellitus without complication, without long-term current use of insulin ([facility] [address] COPD (chronic obstructive pulmonary disease) ([facility] [address] Depression, Former smoker, History of blood transfusion, Hyperlipidemia, Hypertension, Kidney stone, Myocardial infarction ([facility] [address] Obesity, Class I, BMI 30.0-34.9 (see actual BMI), Restless leg syndrome, Skin abnormality, Type II or unspecified type diabetes mellitus without mention of complication, not stated as uncontrolled, Wears glasses, and Wears partial dentures. Social History:   reports that she has been smoking cigarettes. She has a 14.00 pack-year smoking history. She has never used smokeless tobacco. She reports current drug use. Drug: Marijuana [place]). She reports that she does not drink alcohol. Family History:   Family History   Problem Relation Age of Onset    Heart Disease Father        Vitals:  /75   Pulse 75   Temp 97.5 °F (36.4 °C) (Oral)   Resp 20   Ht 5' 4\" (1.626 m)   Wt 126 lb 8.7 oz (57.4 kg)   SpO2 97%   BMI 21.72 kg/m²   Temp (24hrs), Av.8 °F (36.6 °C), Min:97.5 °F (36.4 °C), Max:98.3 °F (36.8 °C)    Recent Labs     22  1949 05/15/22  0604   POCGLU 473* 263*     I/O(24Hr):     Intake/Output Summary (Last 24 hours) at 5/15/2022 0657  Last data filed at 5/15/2022 0558  Gross per 24 hour   Intake 1397 ml   Output 500 ml   Net 897 ml     Labs:    Recent Results (from the past 48 hour(s))   EKG 12 Lead    Collection Time: 22  8:54 AM   Result Value Ref Range    Ventricular Rate 58 BPM    Atrial Rate 58 BPM    P-R Interval 158 ms    QRS Duration 104 ms    Q-T Interval 486 ms    QTc Calculation (Bazett) 477 ms    P Axis 25 degrees    R Axis -4 degrees    T Axis 150 degrees   Troponin    Collection Time: 22  9:08 AM   Result Value Ref Range Troponin, High Sensitivity 45 (H) 0 - 14 ng/L   CBC with Auto Differential    Collection Time: 05/14/22  9:08 AM   Result Value Ref Range    WBC 7.6 3.5 - 11.0 k/uL    RBC 4.10 4.0 - 5.2 m/uL    Hemoglobin 10.3 (L) 12.0 - 16.0 g/dL    Hematocrit 32.2 (L) 36 - 46 %    MCV 78.5 (L) 80 - 100 fL    MCH 25.0 (L) 26 - 34 pg    MCHC 31.9 31 - 37 g/dL    RDW 17.0 (H) 11.5 - 14.9 %    Platelets 713 297 - 842 k/uL    MPV 7.7 6.0 - 12.0 fL    Seg Neutrophils 65 36 - 66 %    Lymphocytes 18 (L) 24 - 44 %    Monocytes 13 (H) 1 - 7 %    Eosinophils % 3 0 - 4 %    Basophils 1 0 - 2 %    Segs Absolute 5.00 1.3 - 9.1 k/uL    Absolute Lymph # 1.40 1.0 - 4.8 k/uL    Absolute Mono # 1.00 0.1 - 1.3 k/uL    Absolute Eos # 0.20 0.0 - 0.4 k/uL    Basophils Absolute 0.10 0.0 - 0.2 k/uL   Basic Metabolic Panel    Collection Time: 05/14/22  9:08 AM   Result Value Ref Range    Glucose 174 (H) 70 - 99 mg/dL    BUN 20 8 - 23 mg/dL    CREATININE 1.22 (H) 0.50 - 0.90 mg/dL    Calcium 9.0 8.6 - 10.4 mg/dL    Sodium 138 135 - 144 mmol/L    Potassium 3.5 (L) 3.7 - 5.3 mmol/L    Chloride 97 (L) 98 - 107 mmol/L    CO2 30 20 - 31 mmol/L    Anion Gap 11 9 - 17 mmol/L    GFR Non-African American 43 (L) >60 mL/min    GFR  52 (L) >60 mL/min    GFR Comment         Brain Natriuretic Peptide    Collection Time: 05/14/22  9:08 AM   Result Value Ref Range    Pro-BNP 5,969 (H) <300 pg/mL   D-Dimer, Quantitative    Collection Time: 05/14/22  9:08 AM   Result Value Ref Range    D-Dimer, Quant 1.32 (H) 0.00 - 0.59 mg/L FEU   COVID-19 & Influenza Combo    Collection Time: 05/14/22  9:08 AM    Specimen: Nasopharyngeal Swab   Result Value Ref Range    Specimen Description . NASOPHARYNGEAL SWAB     Source . NASOPHARYNGEAL SWAB     SARS-CoV-2 RNA, RT PCR Not Detected Not Detected    INFLUENZA A Not Detected Not Detected    INFLUENZA B Not Detected Not Detected   Procalcitonin    Collection Time: 05/14/22  9:08 AM   Result Value Ref Range    Procalcitonin 0.04 <0.09 ng/mL   Troponin    Collection Time: 05/14/22 11:05 AM   Result Value Ref Range    Troponin, High Sensitivity 42 (H) 0 - 14 ng/L   POC Glucose Fingerstick    Collection Time: 05/14/22  7:47 PM   Result Value Ref Range    POC Glucose 473 (HH) 65 - 105 mg/dL   STREP PNEUMONIAE ANTIGEN    Collection Time: 05/14/22  8:19 PM    Specimen: Urine, clean catch   Result Value Ref Range    Source . URINE     Strep pneumo Ag NEGATIVE    Legionella Ag, Ur    Collection Time: 05/14/22  8:20 PM    Specimen: Urine, clean catch   Result Value Ref Range    Legionella Pneumophilia Ag, Urine NEGATIVE NEGATIVE   Basic Metabolic Panel w/ Reflex to MG    Collection Time: 05/15/22  5:48 AM   Result Value Ref Range    Glucose 250 (H) 70 - 99 mg/dL    BUN 24 (H) 8 - 23 mg/dL    CREATININE 1.42 (H) 0.50 - 0.90 mg/dL    Calcium 8.7 8.6 - 10.4 mg/dL    Sodium 138 135 - 144 mmol/L    Potassium 3.6 (L) 3.7 - 5.3 mmol/L    Chloride 94 (L) 98 - 107 mmol/L    CO2 32 (H) 20 - 31 mmol/L    Anion Gap 12 9 - 17 mmol/L    GFR Non-African American 36 (L) >60 mL/min    GFR  44 (L) >60 mL/min    GFR Comment         Brain Natriuretic Peptide    Collection Time: 05/15/22  5:48 AM   Result Value Ref Range    Pro-BNP 10,789 (H) <300 pg/mL   POC Glucose Fingerstick    Collection Time: 05/15/22  6:04 AM   Result Value Ref Range    POC Glucose 263 (H) 65 - 105 mg/dL       Lab Results   Component Value Date/Time    SPECIAL NOT REPORTED 10/05/2021 01:20 PM     Lab Results   Component Value Date/Time    CULTURE NO GROWTH 7 DAYS 10/05/2021 01:20 PM       Radiology:    XR CHEST PORTABLE  Result Date: 5/14/2022     Mild vascular congestion, improved from the previous chest radiograph dated 11/26/2021. Previously seen pleural effusions and bibasilar opacities have resolved. CT CHEST PULMONARY EMBOLISM W CONTRAST  Result Date: 5/14/2022    No evidence of pulmonary embolism or acute pulmonary abnormality.   Some platelike atelectasis or scarring in both lung bases. Physical Examination:        Physical Exam  Vitals and nursing note reviewed. Constitutional:       General: She is not in acute distress. Appearance: Normal appearance. She is not ill-appearing or toxic-appearing. Cardiovascular:      Rate and Rhythm: Normal rate and regular rhythm. Heart sounds: No murmur heard. Pulmonary:      Effort: No respiratory distress. Breath sounds: Wheezing (Mild end expiratory bilateral) present. Abdominal:      Palpations: Abdomen is soft. Tenderness: There is no abdominal tenderness. There is no guarding or rebound. Musculoskeletal:         General: No tenderness. Right lower le+ Pitting Edema present. Left lower le+ Pitting Edema present. Neurological:      General: No focal deficit present. Mental Status: She is alert and oriented to person, place, and time. Sensory: No sensory deficit. Motor: No weakness. Assessment:        Primary Problem  Acute on chronic systolic CHF (congestive heart failure) [facility])    Active Hospital Problems    Diagnosis Date Noted    Symptomatic congestive heart failure, pre-operative cardiovascular examination ([facility] [Z01.810, I50.9] 2022     Priority: Medium    Acute on chronic systolic CHF (congestive heart failure) ([facility] [I50.23] 2022     Priority: Medium    Type 2 diabetes mellitus with chronic kidney disease [E11.22] 02/10/2022    Iron deficiency anemia [D50.9] 02/10/2022    S/P CABG x 4 [Z95.1]     Chronic obstructive pulmonary disease ([facility] [J44.9] 09/10/2015     Plan:        Acute on chronic CHF vs COPD exacerbation  Worsened lower limb edema on admission  On 2 L via NC  Afebrile. WBC 7.6 on admission  On 40 mg oral home lasix  Pro-BNP 5,668>01,300  Troponin unremarkable  Echo 2021   LVEF 35%. Moderately increased LV wall thickness.  Increased RV systolic pressure at 60 mmHg  Pneumonia work up - negative thus far  Pro-reyes unremarkable  Resp culture no result  Daily weight  I's and O's  Proventil, Symbicort, DuoNeb  IV Methylprednisolone 60 mg every 6 hours. May decrease dose/frequency later in day  Azithromycin and ceftriaxone at this time  Furosemide 20 mg IV daily>BID   Cr around baseline. Continue to monitor.     HTN  Isosorbide mononitrate 30 mg oral daily  Metoprolol 50 mg oral Daily     DM type II  HbA1c in 03/2022 was 6.5  Lantus 5 units BID  Low-dose sliding scale  Hypoglycemia protocol in place  POCT glucose     CKD stage IIIb  Baseline creatinine around 1.2  Cr today 1.42  Continue lasix  Continue to monitor     H/o CVA with left carotid stent in 2020  Continue atorvastatin 80 mg oral daily  Continue aspirin 81 mg oral daily  Continue clopidogrel 75 mg oral daily. May discontinue later.     DVT prophylaxis: Heparin 5000 units subcutaneous TID  GI prophylaxis: None at this time  Diet: Regular diabetic-cardiac diet  Dispo: Progressive     OT/PT/social work consult in place     CODE STATUS full    Jv Bertrand MD  5/15/2022     6:57 AM     Attending Physician Statement  I have discussed the care of Nery Lynn and I have examined the patient myselft and taken ros and hpi , including pertinent history and exam findings,  with the resident. I have reviewed the key elements of all parts of the encounter with the resident. I agree with the assessment, plan and orders as documented by the resident.   Iv lasix 20  Copd exa  Dc in am   Steroid taper over three weeks      Electronically signed by Gina Cat MD

## 2022-05-15 NOTE — FLOWSHEET NOTE
05/14/22 2026   Treatment Team Notification   Reason for Communication Patient/Family request   Team Member Name Dr. Secundino Dance Attending Provider   Method of Communication Call   Response See orders   Notification Time 2027     RN updated that pt concerned that her home dose of 1mg PO Requip and 50mg PO trazodone were not restarted. RN ok;d to restart for tonight. See MAr/orders.

## 2022-05-15 NOTE — CONSULTS
Date:   5/15/2022  Patient name: Mario Samaniego  Date of admission:  5/14/2022  8:50 AM  MRN:   [id number]  YOB: 1950  PCP: Mikie Mead MD    Reason for Admission: Peripheral edema [R60.9]  COPD exacerbation ([facility] [J44.1]  Symptomatic congestive heart failure, pre-operative cardiovascular examination ([facility] [Z01.810, I50.9]    Cardiology consult       Referring physician: Dr Juan Allen    Coronary artery disease, CABG 2018, LIMA to LAD and diagonal 1, SVG to OM and PDA  Congestive heart failure systolic and diastolic acute on chronic, ejection fraction 35%  Moderate pulmonary hypertension,  Moderate LVH  Carotid artery disease/stent placement left internal carotid artery  CVA  Hypertension  Upper lipidemia  2 diabetes mellitus  COPD on oxygen at night, history of smoking  Chronic kidney disease    2D echo 2/12/2021 normal LV size, moderate LVH asymmetrical septal hypertrophy  Moderate global hypokinesis, akinetic apex, ejection fraction 35%  Dilated LA and RA  Moderate TR, RVSP 60 mmHg  Dilated IVC diam,  impaired respiratory variation    ECG 5/14/2022  Sinus bradycardia heart rate 58, ST-T abnormalities lateral leads and precordial leads, prolonged QT    Chest x-ray 5/14/2022  Sternotomy wires noted, mild cardiomegaly, mild vascular congestion, no significant pleural effusion    CTA chest 5/14/2022  No evidence of pulmonary embolism or acute pulmonary abnormality    History of present illness  68-year-old female with past medical history of CAD, CABG, CHF systolic and diastolic, diabetes mellitus got hospitalized on 5/14/2022 with chief complaints of shortness of breath and creasing swelling over the legs. She has been symptomatic for more than 5 days. She said she has gained about 5 pounds weight. No fever no chills. She states 2 weeks ago she was on Z-Tim for upper respiratory tract infection. No chest pain no syncope. He is on oxygen mostly at night.   On admission electrocardiogram showed sinus bradycardia, ST-T abnormalities lateral leads and precordial leads. Borderline abnormal high-sensitivity troponin. CTA chest was negative for pulmonary embolism or acute pulmonary process. On admission her blood pressure was 120/88, heart rate 58, oxygen saturation 97% room air, temperature 98.3  Lives with her roommate and it normally she is independent in her activities of daily living.   Continues to smoke    Lab work 5/14/2022  Sodium 138, potassium 3.5, BUN 20, creatinine 1.22  proBNP 5969, high-sensitivity troponin 42  Hemoglobin 10.3, platelets 355 WBC 7.6  Lab work 5/15/2022  Sodium 138, potassium 3.6, BUN 24, creatinine 1.42, glucose 250 proBNP 10,789    Current evaluation  Patient seen and examined  Elderly frail looking female looks much older than her age  She was resting in upright position on oxygen via nasal cannula  She was having dry cough  Denied any chest pain or palpitation  Patient said her shortness of breath is better and the swelling over the legs has gone down  She is eating okay  ECG monitor sinus rhythm      Blood pressure 140/80, heart rate 64, respiratory rate 20, temperature 97.6, oxygen saturation 90% on 2 L    Medications:   Scheduled Meds:   insulin glargine  5 Units SubCUTAneous BID    furosemide  20 mg IntraVENous BID    sodium chloride flush  5-40 mL IntraVENous 2 times per day    ipratropium-albuterol  1 ampule Inhalation Q4H WA    cefTRIAXone (ROCEPHIN) IV  1,000 mg IntraVENous Q24H    azithromycin  500 mg Oral Daily    atorvastatin  80 mg Oral Daily    budesonide-formoterol  2 puff Inhalation BID    citalopram  20 mg Oral Daily    clopidogrel  75 mg Oral Daily    ferrous sulfate  325 mg Oral Daily with breakfast    guaiFENesin  600 mg Oral BID    isosorbide mononitrate  30 mg Oral Daily    metoprolol succinate  50 mg Oral Daily    nicotine  1 patch TransDERmal Daily    aspirin  81 mg Oral Daily    insulin lispro  0-6 Units SubCUTAneous TID WC    insulin lispro  0-3 Units SubCUTAneous Nightly    heparin (porcine)  5,000 Units SubCUTAneous 3 times per day    methylPREDNISolone  60 mg IntraVENous Q6H    rOPINIRole  1 mg Oral Nightly    traZODone  50 mg Oral Nightly     Continuous Infusions:   sodium chloride      dextrose       CBC:   Recent Labs     05/14/22  0908   WBC 7.6   HGB 10.3*        BMP:    Recent Labs     05/14/22  0908 05/15/22  0548    138   K 3.5* 3.6*   CL 97* 94*   CO2 30 32*   BUN 20 24*   CREATININE 1.22* 1.42*   GLUCOSE 174* 250*     Hepatic: No results for input(s): AST, ALT, ALB, BILITOT, ALKPHOS in the last 72 hours. Troponin: No results for input(s): TROPONINI in the last 72 hours. BNP: No results for input(s): BNP in the last 72 hours. Lipids: No results for input(s): CHOL, HDL in the last 72 hours. Invalid input(s): LDLCALCU  INR: No results for input(s): INR in the last 72 hours. Objective:   Vitals: BP (!) 142/79   Pulse 65   Temp 97.6 °F (36.4 °C) (Axillary)   Resp 20   Ht 5' 4\" (1.626 m)   Wt 126 lb 8.7 oz (57.4 kg)   SpO2 (!) 86% Comment: o2 out of nose  BMI 21.72 kg/m²   General appearance: alert and cooperative with exam  HEENT: Head: Normal, normocephalic, atraumatic. Neck: Neck is supple, neck veins appears distended  Lungs: Expansion is poor, diminished breath sounds both sides, no sign of pleural effusion  Heart: regular rate and rhythm  Abdomen: soft, non-tender; bowel sounds normal; no masses,  no organomegaly  Extremities: Homans sign is negative, no sign of DVT  Neurologic: Mental status: Alert, oriented, thought content appropriate    EKG: normal sinus rhythm.,  ST-T abnormalities lateral leads and precordial leads  ECHO: reviewed. Ejection fraction: 35%, akinetic apex, moderate LVH RVSP 60 mmHg  Stress Test: not obtained. Cardiac Angiography: reviewed.         Assessment / Acute Cardiac Problems:   Congestive heart failure systolic and diastolic acute on chronic  Ejection fraction 35%  CAD, CABG LIMA to LAD and diagonal 1, SVG to OM and PDA  Borderline abnormal high-sensitivity troponin significance not clear  Here COPD, current smoker  Hypertension is stable  Diabetes mellitus type 2  Carotid artery disease, stent placement left internal carotid artery    Patient Active Problem List:     Chronic pain     Controlled type 2 diabetes mellitus without complication, without long-term current use of insulin (HCC)     Allergic rhinitis     Hypertension goal BP (blood pressure) < 140/90     Mixed hyperlipidemia     Chronic obstructive pulmonary disease (HCC)     Coronary artery disease involving native coronary artery of native heart without angina pectoris     Primary insomnia     Diarrhea in adult patient     Restless leg syndrome     Atherosclerosis of superior mesenteric artery (HCC)     Left adrenal mass (HCC)     Former smoker     Chronic bilateral low back pain with left-sided sciatica     Hypothyroidism     S/P CABG x 4     Acute pain of right knee     Abnormal stress test     Tobacco use disorder     Neck pain     Acute ischemic left MCA stroke ([facility])     Internal carotid artery occlusion     Stenosis of left internal carotid artery     Acquired spondylolisthesis of cervical vertebra     Stenosis of cervical spine with myelopathy ([facility])     Abnormal EKG     COPD exacerbation ([facility])     Multifocal pneumonia     Hypoxia     Pneumonia     Iron deficiency anemia     Chronic combined systolic and diastolic congestive heart failure (HCC)     Type 2 diabetes mellitus with chronic kidney disease     Symptomatic congestive heart failure, pre-operative cardiovascular examination ([facility]     Acute on chronic systolic CHF (congestive heart failure) ([facility]      Plan of Treatment:   Medications reviewed  Continue current dose of IV Lasix 20 mg twice daily  Continue with the nitrates, beta-blockers, Plavix, Lipitor  Patient is also on Rocephin and Zithromax  Replace potassium keep potassium above 4  Change IV Lasix to Bumex 2 mg p.o. tomorrow  BMP and magnesium tomorrow  Probable home tomorrow    Electronically signed by Jacquelyn Cohen MD on 5/15/2022 at 9:48 AM

## 2022-05-16 VITALS
TEMPERATURE: 97.4 F | WEIGHT: 123.68 LBS | SYSTOLIC BLOOD PRESSURE: 116 MMHG | BODY MASS INDEX: 21.11 KG/M2 | OXYGEN SATURATION: 98 % | DIASTOLIC BLOOD PRESSURE: 59 MMHG | HEIGHT: 64 IN | HEART RATE: 82 BPM | RESPIRATION RATE: 18 BRPM

## 2022-05-16 LAB
ABSOLUTE BANDS #: 0.2 K/UL (ref 0–1)
ABSOLUTE EOS #: 0 K/UL (ref 0–0.4)
ABSOLUTE LYMPH #: 0.51 K/UL (ref 1–4.8)
ABSOLUTE MONO #: 0.1 K/UL (ref 0.1–1.3)
ANION GAP SERPL CALCULATED.3IONS-SCNC: 6 MMOL/L (ref 9–17)
BANDS: 2 % (ref 0–10)
BASOPHILS # BLD: 0 % (ref 0–2)
BASOPHILS ABSOLUTE: 0 K/UL (ref 0–0.2)
BUN BLDV-MCNC: 33 MG/DL (ref 8–23)
CALCIUM SERPL-MCNC: 8.8 MG/DL (ref 8.6–10.4)
CHLORIDE BLD-SCNC: 97 MMOL/L (ref 98–107)
CO2: 31 MMOL/L (ref 20–31)
CREAT SERPL-MCNC: 1.23 MG/DL (ref 0.5–0.9)
EKG ATRIAL RATE: 58 BPM
EKG P AXIS: 25 DEGREES
EKG P-R INTERVAL: 158 MS
EKG Q-T INTERVAL: 486 MS
EKG QRS DURATION: 104 MS
EKG QTC CALCULATION (BAZETT): 477 MS
EKG R AXIS: -4 DEGREES
EKG T AXIS: 150 DEGREES
EKG VENTRICULAR RATE: 58 BPM
EOSINOPHILS RELATIVE PERCENT: 0 % (ref 0–4)
GFR AFRICAN AMERICAN: 52 ML/MIN
GFR NON-AFRICAN AMERICAN: 43 ML/MIN
GFR SERPL CREATININE-BSD FRML MDRD: ABNORMAL ML/MIN/{1.73_M2}
GLUCOSE BLD-MCNC: 219 MG/DL (ref 65–105)
GLUCOSE BLD-MCNC: 244 MG/DL (ref 70–99)
GLUCOSE BLD-MCNC: 251 MG/DL (ref 65–105)
HCT VFR BLD CALC: 28.9 % (ref 36–46)
HEMOGLOBIN: 9.3 G/DL (ref 12–16)
LYMPHOCYTES # BLD: 5 % (ref 24–44)
MCH RBC QN AUTO: 25.2 PG (ref 26–34)
MCHC RBC AUTO-ENTMCNC: 32.3 G/DL (ref 31–37)
MCV RBC AUTO: 77.8 FL (ref 80–100)
MONOCYTES # BLD: 1 % (ref 1–7)
MORPHOLOGY: ABNORMAL
MORPHOLOGY: ABNORMAL
MYCOPLASMA PNEUMONIAE IGM: 0.31
PDW BLD-RTO: 17.1 % (ref 11.5–14.9)
PLATELET # BLD: 271 K/UL (ref 150–450)
PMV BLD AUTO: 7.9 FL (ref 6–12)
POTASSIUM SERPL-SCNC: 4.4 MMOL/L (ref 3.7–5.3)
RBC # BLD: 3.71 M/UL (ref 4–5.2)
SEG NEUTROPHILS: 92 % (ref 36–66)
SEGMENTED NEUTROPHILS ABSOLUTE COUNT: 9.39 K/UL (ref 1.3–9.1)
SODIUM BLD-SCNC: 134 MMOL/L (ref 135–144)
WBC # BLD: 10.2 K/UL (ref 3.5–11)

## 2022-05-16 PROCEDURE — 6370000000 HC RX 637 (ALT 250 FOR IP)

## 2022-05-16 PROCEDURE — 6360000002 HC RX W HCPCS

## 2022-05-16 PROCEDURE — 2700000000 HC OXYGEN THERAPY PER DAY

## 2022-05-16 PROCEDURE — 6370000000 HC RX 637 (ALT 250 FOR IP): Performed by: STUDENT IN AN ORGANIZED HEALTH CARE EDUCATION/TRAINING PROGRAM

## 2022-05-16 PROCEDURE — 80048 BASIC METABOLIC PNL TOTAL CA: CPT

## 2022-05-16 PROCEDURE — 6370000000 HC RX 637 (ALT 250 FOR IP): Performed by: INTERNAL MEDICINE

## 2022-05-16 PROCEDURE — 94640 AIRWAY INHALATION TREATMENT: CPT

## 2022-05-16 PROCEDURE — 85025 COMPLETE CBC W/AUTO DIFF WBC: CPT

## 2022-05-16 PROCEDURE — 2580000003 HC RX 258: Performed by: INTERNAL MEDICINE

## 2022-05-16 PROCEDURE — 36415 COLL VENOUS BLD VENIPUNCTURE: CPT

## 2022-05-16 PROCEDURE — 6360000002 HC RX W HCPCS: Performed by: INTERNAL MEDICINE

## 2022-05-16 PROCEDURE — 82947 ASSAY GLUCOSE BLOOD QUANT: CPT

## 2022-05-16 PROCEDURE — 99239 HOSP IP/OBS DSCHRG MGMT >30: CPT | Performed by: INTERNAL MEDICINE

## 2022-05-16 PROCEDURE — 94761 N-INVAS EAR/PLS OXIMETRY MLT: CPT

## 2022-05-16 PROCEDURE — 6360000002 HC RX W HCPCS: Performed by: STUDENT IN AN ORGANIZED HEALTH CARE EDUCATION/TRAINING PROGRAM

## 2022-05-16 RX ORDER — BUMETANIDE 2 MG/1
2 TABLET ORAL DAILY
Qty: 30 TABLET | Refills: 1 | Status: SHIPPED | OUTPATIENT
Start: 2022-05-16

## 2022-05-16 RX ORDER — PREDNISONE 1 MG/1
5 TABLET ORAL DAILY
Qty: 7 TABLET | Refills: 0 | Status: SHIPPED | OUTPATIENT
Start: 2022-06-01 | End: 2022-06-08

## 2022-05-16 RX ORDER — PREDNISONE 20 MG/1
20 TABLET ORAL DAILY
Qty: 7 TABLET | Refills: 0 | Status: SHIPPED | OUTPATIENT
Start: 2022-05-16 | End: 2022-05-23

## 2022-05-16 RX ORDER — IPRATROPIUM BROMIDE AND ALBUTEROL SULFATE 2.5; .5 MG/3ML; MG/3ML
3 SOLUTION RESPIRATORY (INHALATION) EVERY 4 HOURS PRN
Qty: 60 ML | Refills: 0 | Status: SHIPPED | OUTPATIENT
Start: 2022-05-16

## 2022-05-16 RX ORDER — ALBUTEROL SULFATE 90 UG/1
2 AEROSOL, METERED RESPIRATORY (INHALATION) EVERY 6 HOURS PRN
Qty: 18 G | Refills: 0 | Status: SHIPPED | OUTPATIENT
Start: 2022-05-16

## 2022-05-16 RX ORDER — PREDNISONE 10 MG/1
10 TABLET ORAL DAILY
Qty: 7 TABLET | Refills: 0 | Status: SHIPPED | OUTPATIENT
Start: 2022-05-24 | End: 2022-05-31

## 2022-05-16 RX ORDER — BUMETANIDE 1 MG/1
2 TABLET ORAL DAILY
Status: DISCONTINUED | OUTPATIENT
Start: 2022-05-16 | End: 2022-05-16 | Stop reason: HOSPADM

## 2022-05-16 RX ORDER — BUDESONIDE AND FORMOTEROL FUMARATE DIHYDRATE 160; 4.5 UG/1; UG/1
2 AEROSOL RESPIRATORY (INHALATION) 2 TIMES DAILY
Qty: 10.2 G | Refills: 0 | Status: SHIPPED | OUTPATIENT
Start: 2022-05-16

## 2022-05-16 RX ADMIN — METHYLPREDNISOLONE SODIUM SUCCINATE 60 MG: 125 INJECTION, POWDER, FOR SOLUTION INTRAMUSCULAR; INTRAVENOUS at 12:45

## 2022-05-16 RX ADMIN — GUAIFENESIN 600 MG: 600 TABLET, EXTENDED RELEASE ORAL at 08:29

## 2022-05-16 RX ADMIN — AZITHROMYCIN 500 MG: 250 TABLET, FILM COATED ORAL at 08:30

## 2022-05-16 RX ADMIN — ACETAMINOPHEN 650 MG: 325 TABLET ORAL at 08:35

## 2022-05-16 RX ADMIN — HEPARIN SODIUM 5000 UNITS: 5000 INJECTION INTRAVENOUS; SUBCUTANEOUS at 12:53

## 2022-05-16 RX ADMIN — IPRATROPIUM BROMIDE AND ALBUTEROL SULFATE 1 AMPULE: .5; 3 SOLUTION RESPIRATORY (INHALATION) at 11:10

## 2022-05-16 RX ADMIN — IPRATROPIUM BROMIDE AND ALBUTEROL SULFATE 1 AMPULE: .5; 3 SOLUTION RESPIRATORY (INHALATION) at 08:15

## 2022-05-16 RX ADMIN — INSULIN LISPRO 3 UNITS: 100 INJECTION, SOLUTION INTRAVENOUS; SUBCUTANEOUS at 12:41

## 2022-05-16 RX ADMIN — HEPARIN SODIUM 5000 UNITS: 5000 INJECTION INTRAVENOUS; SUBCUTANEOUS at 05:44

## 2022-05-16 RX ADMIN — METHYLPREDNISOLONE SODIUM SUCCINATE 60 MG: 125 INJECTION, POWDER, FOR SOLUTION INTRAMUSCULAR; INTRAVENOUS at 05:44

## 2022-05-16 RX ADMIN — BUDESONIDE AND FORMOTEROL FUMARATE DIHYDRATE 2 PUFF: 160; 4.5 AEROSOL RESPIRATORY (INHALATION) at 08:17

## 2022-05-16 RX ADMIN — ASPIRIN 81 MG: 81 TABLET, COATED ORAL at 08:29

## 2022-05-16 RX ADMIN — IPRATROPIUM BROMIDE AND ALBUTEROL SULFATE 1 AMPULE: .5; 3 SOLUTION RESPIRATORY (INHALATION) at 15:22

## 2022-05-16 RX ADMIN — INSULIN LISPRO 2 UNITS: 100 INJECTION, SOLUTION INTRAVENOUS; SUBCUTANEOUS at 08:32

## 2022-05-16 RX ADMIN — CEFTRIAXONE SODIUM 1000 MG: 1 INJECTION, POWDER, FOR SOLUTION INTRAMUSCULAR; INTRAVENOUS at 12:43

## 2022-05-16 RX ADMIN — ISOSORBIDE MONONITRATE 30 MG: 30 TABLET, EXTENDED RELEASE ORAL at 08:29

## 2022-05-16 RX ADMIN — INSULIN GLARGINE 5 UNITS: 100 INJECTION, SOLUTION SUBCUTANEOUS at 08:30

## 2022-05-16 RX ADMIN — FERROUS SULFATE TAB 325 MG (65 MG ELEMENTAL FE) 325 MG: 325 (65 FE) TAB at 08:29

## 2022-05-16 RX ADMIN — METHYLPREDNISOLONE SODIUM SUCCINATE 60 MG: 125 INJECTION, POWDER, FOR SOLUTION INTRAMUSCULAR; INTRAVENOUS at 00:47

## 2022-05-16 RX ADMIN — SODIUM CHLORIDE, PRESERVATIVE FREE 10 ML: 5 INJECTION INTRAVENOUS at 08:37

## 2022-05-16 RX ADMIN — BUMETANIDE 2 MG: 1 TABLET ORAL at 08:28

## 2022-05-16 RX ADMIN — METOPROLOL SUCCINATE 50 MG: 50 TABLET, EXTENDED RELEASE ORAL at 08:29

## 2022-05-16 RX ADMIN — CITALOPRAM 20 MG: 20 TABLET, FILM COATED ORAL at 08:29

## 2022-05-16 RX ADMIN — CLOPIDOGREL BISULFATE 75 MG: 75 TABLET ORAL at 08:29

## 2022-05-16 ASSESSMENT — ENCOUNTER SYMPTOMS
SHORTNESS OF BREATH: 1
COUGH: 1
SORE THROAT: 0
VOMITING: 0
NAUSEA: 0
ABDOMINAL PAIN: 0

## 2022-05-16 ASSESSMENT — PAIN SCALES - GENERAL: PAINLEVEL_OUTOF10: 4

## 2022-05-16 NOTE — PLAN OF CARE
Problem: Discharge Planning  Goal: Discharge to home or other facility with appropriate resources  Outcome: Progressing  Flowsheets (Taken 5/15/2022 2020)  Discharge to home or other facility with appropriate resources: Identify barriers to discharge with patient and caregiver     Problem: ABCDS Injury Assessment  Goal: Absence of physical injury  Outcome: Progressing     Problem: Pain  Goal: Verbalizes/displays adequate comfort level or baseline comfort level  Outcome: Progressing

## 2022-05-16 NOTE — DISCHARGE INSTR - COC
Continuity of Care Form    Patient Name: Mario Samaniego   :  1950  MRN:  [id number]    Admit date:  2022  Discharge date:  ***    Code Status Order: Full Code   Advance Directives:      Admitting Physician:  Sera Bennett MD  PCP: Mikie Mead MD    Discharging Nurse: [facility] Unit/Room#: 2106/2106-01  Discharging Unit Phone Number: ***    Emergency Contact:   Extended Emergency Contact Information  Primary Emergency Contact: Guanakito Zheng  Address: [address] [facility], [address] [place], [place] [address] [address] Phone: [phone]  Work Phone: [phone]  Mobile Phone: [phone]  Relation: Child  Secondary Emergency Contact: Mathieu Oh  Mobile Phone: [phone]  Relation: Child   needed?  No    Past Surgical History:  Past Surgical History:   Procedure Laterality Date    APPENDECTOMY      BRONCHOSCOPY N/A 2021    BRONCHOSCOPY w/ WASHINGS performed by Wm Ho MD at [address] [place] [address]      cath x 2/ stent x 1    CARDIAC SURGERY      bypass 4 vessel 2018    CERVICAL LAMINECTOMY N/A 10/14/2020    C3-C7 POSTERIOR CERVICAL DECOMPRESSION FUSION performed by Noris Negro MD at [address] Bilateral     knees    LEG BIOPSY EXCISION Right 2019    LEG LESION PUNCH BIOPSY performed by Justin Stiles MD at [address]  [facility]  2020    cerebral angiogram    CT INCIS/DRAIN THIGH/KNEE ABSCESS,DEEP Right 2018    DEBRIDEMENT INCISION AND DRAINAGE THIGH ABSCESS performed by Jolie Severe, DO at [facility] OFFICE/OUTPT VISIT,PROCEDURE ONLY N/A 2018    CABG X 3 LIMA-LAD-DIAG,SVG-PDA,CORONARY ARTERY BYPASS REDO, PUMP ASSIST, SWAN, JARRED, REDO STERNOTOMY performed by Sanna Glasgow MD at [address] [facility]       Immunization History:   Immunization History   Administered Date(s) Administered    Influenza Virus Vaccine 2016, 2017    Influenza Whole 11/11/2015    Influenza, High Dose (Fluzone 65 yrs and older) 11/14/2014, 11/11/2015, 10/24/2018    Influenza, [place], IM, PF (6 mo and older Fluzone, Flulaval, Fluarix, and 3 yrs and older Afluria) 08/27/2019, 11/13/2020    Influenza, Triv, inactivated, subunit, adjuvanted, IM (Fluad 65 yrs and older) 09/29/2017    Pneumococcal Conjugate 13-valent ([id number]) 11/14/2016    Pneumococcal Polysaccharide ([id number]) 11/01/2017    Tdap (Boostrix, Adacel) 07/02/2020       Active Problems:  Patient Active Problem List   Diagnosis Code    Chronic pain G89.29    Controlled type 2 diabetes mellitus without complication, without long-term current use of insulin ([facility]) E11.9    Allergic rhinitis J30.9    Hypertension goal BP (blood pressure) < 140/90 I10    Mixed hyperlipidemia E78.2    Chronic obstructive pulmonary disease ([facility]) J44.9    Coronary artery disease involving native coronary artery of native heart without angina pectoris I25.10    Primary insomnia F51.01    Diarrhea in adult patient R19.7    Restless leg syndrome G25.81    Atherosclerosis of superior mesenteric artery ([facility]) K55.1    Left adrenal mass ([facility]) E27.8    Former smoker Z87.891    Chronic bilateral low back pain with left-sided sciatica M54.42, G89.29    Hypothyroidism E03.9    S/P CABG x 4 Z95.1    Acute pain of right knee M25.561    Abnormal stress test R94.39    Tobacco use disorder F17.200    Neck pain M54.2    Acute ischemic left MCA stroke ([facility]) F48.340    Internal carotid artery occlusion I65.29    Stenosis of left internal carotid artery I65.22    Acquired spondylolisthesis of cervical vertebra M43.12    Stenosis of cervical spine with myelopathy ([facility]) M48.02, G99.2    Abnormal EKG R94.31    COPD exacerbation ([facility]) J44.1    Multifocal pneumonia J18.9    Hypoxia R09.02    Pneumonia J18.9    Iron deficiency anemia D50.9    Chronic combined systolic and diastolic congestive heart failure ([facility]) I50.42    Type 2 diabetes mellitus with chronic kidney disease E11.22    Symptomatic congestive heart failure, pre-operative cardiovascular examination ([facility] Z01.810, I50.9    Acute on chronic systolic CHF (congestive heart failure) ([facility] I50.23       Isolation/Infection:   Isolation            No Isolation          Patient Infection Status       Infection Onset Added Last Indicated Last Indicated By Review Planned Expiration Resolved Resolved By    None active    Resolved    COVID-19 (Rule Out) 05/14/22 05/14/22 05/14/22 Respiratory Panel, Molecular, with COVID-19 (Restricted: peds pts or suitable admitted adults) (Ordered)   05/16/22 Lola Grimes RN    5/14 COVID -    COVID-19 (Rule Out) 05/14/22 05/14/22 05/14/22 COVID-19 & Influenza Combo (Ordered)   05/14/22 Rule-Out Test Resulted    COVID-19 (Rule Out) 11/26/21 11/26/21 11/26/21 COVID-19 & Influenza Combo (Ordered)   11/26/21 Rule-Out Test Resulted    COVID-19 (Rule Out) 10/03/21 10/03/21 10/03/21 COVID-19 & Influenza Combo (Ordered)   10/03/21 Rule-Out Test Resulted    COVID-19 (Rule Out) 10/02/21 10/02/21 10/02/21 COVID-19 & Influenza Combo (Ordered)   10/02/21 Rule-Out Test Resulted    COVID-19 (Rule Out) 08/11/21 08/11/21 08/11/21 COVID-19, Rapid (Ordered)   08/11/21 Rule-Out Test Resulted    COVID-19 (Rule Out) 10/10/20 10/10/20 10/10/20 COVID-19 (Ordered)   10/11/20 Rule-Out Test Resulted            Nurse Assessment:  Last Vital Signs: BP (!) 147/79   Pulse 66   Temp 97.6 °F (36.4 °C) (Oral)   Resp 18   Ht 5' 4\" (1.626 m)   Wt 123 lb 10.9 oz (56.1 kg)   SpO2 96%   BMI 21.23 kg/m²     Last documented pain score (0-10 scale): Pain Level: 4  Last Weight:   Wt Readings from Last 1 Encounters:   05/15/22 123 lb 10.9 oz (56.1 kg)     Mental Status:  {IP PT MENTAL STATUS:20030}    IV Access:  {[facility] IV ACCESS:372056168}    Nursing Mobility/ADLs:  Walking   {P DME HPAE:873738935}  Transfer  {P DME NMPJ:381964447}  Bathing  {P DME ZGFD:860178580}  Dressing  {P DME UBKA:351420832}  Toileting  {P DME ZJVP:576677632}  Feeding  {P DME PMTM:022254018}  Med Admin  {P DME UYOH:885465329}  Med Delivery   { CRIST[facility]AL MED Delivery:676117182}    Wound Care Documentation and Therapy:  Incision 10/14/20 Neck Posterior (Active)   Number of days: 579        Elimination:  Continence: Bowel: {YES / LF:06998}  Bladder: {YES / QK:24512}  Urinary Catheter: {Urinary Catheter:362136609}   Colostomy/Ileostomy/Ileal Conduit: {YES / MA:34184}       Date of Last BM: ***    Intake/Output Summary (Last 24 hours) at 2022 1104  Last data filed at 2022 1029  Gross per 24 hour   Intake 687 ml   Output 1250 ml   Net -563 ml     I/O last 3 completed shifts:   In: 5195 [P.O.:947; I.V.:767; IV Piggyback:50]  Out: 850 [Urine:850]    Safety Concerns:     [address] [email or web address] Safety Concerns:344843715}    Impairments/Disabilities:      [address] [email or web address] Impairments/Disabilities:482488760}    Nutrition Therapy:  Current Nutrition Therapy:   [address] [email or web address] Diet List:137296090}    Routes of Feeding: {[facility] DME Other Feedings:952931257}  Liquids: {Slp liquid thickness:63680}  Daily Fluid Restriction: {P DME Yes amt example:590652611}  Last Modified Barium Swallow with Video (Video Swallowing Test): {Done Not Done JNAT:134935433}    Treatments at the Time of Hospital Discharge:   Respiratory Treatments: ***  Oxygen Therapy:  {Therapy; copd oxygen:02313}  Ventilator:    { CC Vent IUH}    Rehab Therapies: {THERAPEUTIC INTERVENTION:4567973928}  Weight Bearing Status/Restrictions: [address] [employer] Weight Bearin}  Other Medical Equipment (for information only, NOT a DME order):  {EQUIPMENT:841351918}  Other Treatments: ***    Patient's personal belongings (please select all that are sent with patient):  {[facility] DME Belongings:812496356}    RN SIGNATURE:  {Esignature:930503795}    CASE MANAGEMENT/SOCIAL WORK SECTION    Inpatient Status Date: ***    Readmission Risk Assessment Score:  Readmission Risk              Risk of Unplanned Readmission:  32 Discharging to Facility/ Agency   Name:   Address:  Phone:  Fax:    Dialysis Facility (if applicable)   Name:  Address:  Dialysis Schedule:  Phone:  Fax:    / signature: {Esignature:849863731}    PHYSICIAN SECTION    Prognosis: {Prognosis:9702438086}    Condition at Discharge: Navya Silva Patient Condition:770613322}    Rehab Potential (if transferring to Rehab): {Prognosis:0559855984}    Recommended Labs or Other Treatments After Discharge: ***    Physician Certification: I certify the above information and transfer of Balwinder Dallas  is necessary for the continuing treatment of the diagnosis listed and that she requires {Admit to Appropriate Level of Care:63423} for {GREATER/LESS:371188541} 30 days.      Update Admission H&P: {CHP DME Changes in YDMLA:930602834}    PHYSICIAN SIGNATURE:  Electronically signed by Ananya Ventura MD on 5/16/22 at 11:05 AM EDT

## 2022-05-16 NOTE — DISCHARGE INSTR - DIET

## 2022-05-16 NOTE — FLOWSHEET NOTE
Pt was waiting to be d/c.      05/16/22 1600   Encounter Summary   Encounter Overview/Reason  Initial Encounter   Service Provided For: Patient   Referral/Consult From: Rounding   Last Encounter  05/16/22   Complexity of Encounter Low   Encounter    Type Initial Screen/Assessment   Assessment/Intervention/Outcome   Assessment Calm   Intervention Active listening;Explored/Affirmed feelings, thoughts, concerns;Sustaining Presence/Ministry of presence;Prayer (assurance of)/[place]   Outcome Coping;Engaged in conversation;Expressed feelings, needs, and concerns;Expressed Gratitude

## 2022-05-16 NOTE — PROGRESS NOTES
CLINICAL PHARMACY NOTE: MEDS TO BEDS    Total # of Prescriptions Filled: 1   The following medications were delivered to the patient:  · Prednisione 10mg    Additional Documentation:  Delivered Medication to Patients Room     Refill(s) Too Soon + Profiled Rx(s)   - Albuterol MDI: 5/24   - Ipratropium/Albuterol Neb: 6/2    Combined 3 Prednisone Rx's into 1 Rx    Profiled Symbicort and Bumetanide - Patient only wants prescriptions with $0 copay

## 2022-05-16 NOTE — CARE COORDINATION
ONGOING DISCHARGE PLAN:    Patient is alert and oriented x4. Spoke with patient regarding discharge plan and patient confirms that plan is still to discahrge to home with no needs  Patient is on iv methylprednisolone 60 mg Q 6 hours  Possible discharge to home today       Will continue to follow for additional discharge needs.     Electronically signed by Stephanie Russell RN on 5/16/2022 at 11:57 AM

## 2022-05-16 NOTE — PROGRESS NOTES
[address]    PROGRESS NOTE             5/16/2022    7:07 AM    Name:   Jarocho Roe  MRN:     [id number]     Acct:      [de-identified]   Room:   [facility] Day:  2  Admit Date:  5/14/2022  8:50 AM    PCP:  Coni Anderson MD  Code Status:  Full Code    Subjective:     C/C:   Chief Complaint   Patient presents with    Shortness of Breath    Leg Swelling     Interval History Status: improved. Patient seen and examined today bedside. Doing well. No acute events overnight. Remains afebrile. Eating and drinking fine. Alert and oriented x4. Brief History:     Ms. Ibeth Yoder, 66year old female, with previous medical history of combined heart failure, CAD s/p CABG x4 (2005, and 2018), COPD, DM Type II, HTN, CKD Stage 3b, and history of CVA s/p stent to left carotid in 07/2020.     Presented to the ED at [facility] on 05/14/2022 with complaints of dyspnea. Progressively worsening over the past week. Patient is compliant with medication. Has home O2, however, only uses it as needed which is about 3-4 nights/week. Uses at 2L. Was using O2 at home when symptoms began with minimal relief. Patient's shortness of breath worsened today morning and called EMS. Rinku Bonds has new cough for the past week.  Denies recent fevers or sick contacts. Denies chest pain. No acute change in bowel or urinary habits. Mentions weight is 132-135 lbs usually.     05/14: Admitted. Received one dose of methylprednisolone 125 mg IV.  1 dose of 40 mg IV furosemide. D-dimer was elevated; CT PE negative. 05/15: Condition improved. No other significant intervention. Review of Systems:     Review of Systems   Constitutional: Negative for chills and fever. HENT: Negative for congestion and sore throat. Respiratory: Positive for cough and shortness of breath. Cardiovascular: Negative for chest pain and leg swelling.    Gastrointestinal: Negative for abdominal pain, nausea and vomiting. Genitourinary: Negative for difficulty urinating and dysuria. Neurological: Negative for syncope and headaches. Psychiatric/Behavioral: Negative for agitation, behavioral problems and confusion. Medications: Allergies: Allergies   Allergen Reactions    Codeine Other (See Comments)     Constipation.  Lisinopril      hyperkalemia    Morphine Other (See Comments)     Hallucinations.     Potassium-Containing Compounds      Current Meds:   Scheduled Meds:    insulin glargine  5 Units SubCUTAneous BID    furosemide  20 mg IntraVENous BID    lidocaine  1 patch TransDERmal Daily    sodium chloride flush  5-40 mL IntraVENous 2 times per day    ipratropium-albuterol  1 ampule Inhalation Q4H WA    cefTRIAXone (ROCEPHIN) IV  1,000 mg IntraVENous Q24H    azithromycin  500 mg Oral Daily    atorvastatin  80 mg Oral Daily    budesonide-formoterol  2 puff Inhalation BID    citalopram  20 mg Oral Daily    clopidogrel  75 mg Oral Daily    ferrous sulfate  325 mg Oral Daily with breakfast    guaiFENesin  600 mg Oral BID    isosorbide mononitrate  30 mg Oral Daily    metoprolol succinate  50 mg Oral Daily    nicotine  1 patch TransDERmal Daily    aspirin  81 mg Oral Daily    insulin lispro  0-6 Units SubCUTAneous TID WC    insulin lispro  0-3 Units SubCUTAneous Nightly    heparin (porcine)  5,000 Units SubCUTAneous 3 times per day    methylPREDNISolone  60 mg IntraVENous Q6H    rOPINIRole  1 mg Oral Nightly    traZODone  50 mg Oral Nightly     Continuous Infusions:    sodium chloride      dextrose       PRN Meds: potassium chloride **OR** potassium alternative oral replacement **OR** potassium chloride, tiZANidine, sodium chloride flush, sodium chloride flush, sodium chloride, ondansetron **OR** ondansetron, polyethylene glycol, acetaminophen **OR** acetaminophen, albuterol, glucose, dextrose bolus **OR** dextrose bolus, glucagon (rDNA), dextrose    Data:     Past Medical History:   has a past medical history of Allergic rhinitis, Arthritis, CAD (coronary artery disease), Cerebral artery occlusion with cerebral infarction ([facility]) [facility] Controlled type 2 diabetes mellitus without complication, without long-term current use of insulin ([facility] COPD (chronic obstructive pulmonary disease) ([facility] Depression, Former smoker, History of blood transfusion, Hyperlipidemia, Hypertension, Kidney stone, Myocardial infarction ([facility] Obesity, Class I, BMI 30.0-34.9 (see actual BMI), Restless leg syndrome, Skin abnormality, Type II or unspecified type diabetes mellitus without mention of complication, not stated as uncontrolled, Wears glasses, and Wears partial dentures. Social History:   reports that she has been smoking cigarettes. She has a 14.00 pack-year smoking history. She has never used smokeless tobacco. She reports current drug use. Drug: Marijuana [place]). She reports that she does not drink alcohol. Family History:   Family History   Problem Relation Age of Onset    Heart Disease Father      Vitals:  /66   Pulse 66   Temp 97.7 °F (36.5 °C) (Axillary)   Resp 20   Ht 5' 4\" (1.626 m)   Wt 123 lb 10.9 oz (56.1 kg)   SpO2 96%   BMI 21.23 kg/m²   Temp (24hrs), Av.7 °F (36.5 °C), Min:97.6 °F (36.4 °C), Max:97.9 °F (36.6 °C)    Recent Labs     05/15/22  1125 05/15/22  1612 05/15/22  1941 22  0627   POCGLU 292* 203* 367* 219*     I/O(24Hr):     Intake/Output Summary (Last 24 hours) at 2022 0707  Last data filed at 5/15/2022 2013  Gross per 24 hour   Intake 447 ml   Output 350 ml   Net 97 ml     Labs:    Recent Results (from the past 48 hour(s))   EKG 12 Lead    Collection Time: 22  8:54 AM   Result Value Ref Range    Ventricular Rate 58 BPM    Atrial Rate 58 BPM    P-R Interval 158 ms    QRS Duration 104 ms    Q-T Interval 486 ms    QTc Calculation (Bazett) 477 ms    P Axis 25 degrees    R Axis -4 degrees    T Axis 150 degrees   Troponin    Collection Time: 05/14/22  9:08 AM   Result Value Ref Range    Troponin, High Sensitivity 45 (H) 0 - 14 ng/L   CBC with Auto Differential    Collection Time: 05/14/22  9:08 AM   Result Value Ref Range    WBC 7.6 3.5 - 11.0 k/uL    RBC 4.10 4.0 - 5.2 m/uL    Hemoglobin 10.3 (L) 12.0 - 16.0 g/dL    Hematocrit 32.2 (L) 36 - 46 %    MCV 78.5 (L) 80 - 100 fL    MCH 25.0 (L) 26 - 34 pg    MCHC 31.9 31 - 37 g/dL    RDW 17.0 (H) 11.5 - 14.9 %    Platelets 957 324 - 542 k/uL    MPV 7.7 6.0 - 12.0 fL    Seg Neutrophils 65 36 - 66 %    Lymphocytes 18 (L) 24 - 44 %    Monocytes 13 (H) 1 - 7 %    Eosinophils % 3 0 - 4 %    Basophils 1 0 - 2 %    Segs Absolute 5.00 1.3 - 9.1 k/uL    Absolute Lymph # 1.40 1.0 - 4.8 k/uL    Absolute Mono # 1.00 0.1 - 1.3 k/uL    Absolute Eos # 0.20 0.0 - 0.4 k/uL    Basophils Absolute 0.10 0.0 - 0.2 k/uL   Basic Metabolic Panel    Collection Time: 05/14/22  9:08 AM   Result Value Ref Range    Glucose 174 (H) 70 - 99 mg/dL    BUN 20 8 - 23 mg/dL    CREATININE 1.22 (H) 0.50 - 0.90 mg/dL    Calcium 9.0 8.6 - 10.4 mg/dL    Sodium 138 135 - 144 mmol/L    Potassium 3.5 (L) 3.7 - 5.3 mmol/L    Chloride 97 (L) 98 - 107 mmol/L    CO2 30 20 - 31 mmol/L    Anion Gap 11 9 - 17 mmol/L    GFR Non-African American 43 (L) >60 mL/min    GFR  52 (L) >60 mL/min    GFR Comment         Brain Natriuretic Peptide    Collection Time: 05/14/22  9:08 AM   Result Value Ref Range    Pro-BNP 5,969 (H) <300 pg/mL   D-Dimer, Quantitative    Collection Time: 05/14/22  9:08 AM   Result Value Ref Range    D-Dimer, Quant 1.32 (H) 0.00 - 0.59 mg/L FEU   COVID-19 & Influenza Combo    Collection Time: 05/14/22  9:08 AM    Specimen: Nasopharyngeal Swab   Result Value Ref Range    Specimen Description . NASOPHARYNGEAL SWAB     Source . NASOPHARYNGEAL SWAB     SARS-CoV-2 RNA, RT PCR Not Detected Not Detected    INFLUENZA A Not Detected Not Detected    INFLUENZA B Not Detected Not Detected   Procalcitonin    Collection Time: 05/14/22  9:08 AM   Result Value Ref Range    Procalcitonin 0.04 <0.09 ng/mL   Troponin    Collection Time: 05/14/22 11:05 AM   Result Value Ref Range    Troponin, High Sensitivity 42 (H) 0 - 14 ng/L   POC Glucose Fingerstick    Collection Time: 05/14/22  7:47 PM   Result Value Ref Range    POC Glucose 473 (HH) 65 - 105 mg/dL   STREP PNEUMONIAE ANTIGEN    Collection Time: 05/14/22  8:19 PM    Specimen: Urine, clean catch   Result Value Ref Range    Source . URINE     Strep pneumo Ag NEGATIVE    Legionella Ag, Ur    Collection Time: 05/14/22  8:20 PM    Specimen: Urine, clean catch   Result Value Ref Range    Legionella Pneumophilia Ag, Urine NEGATIVE NEGATIVE   Basic Metabolic Panel w/ Reflex to MG    Collection Time: 05/15/22  5:48 AM   Result Value Ref Range    Glucose 250 (H) 70 - 99 mg/dL    BUN 24 (H) 8 - 23 mg/dL    CREATININE 1.42 (H) 0.50 - 0.90 mg/dL    Calcium 8.7 8.6 - 10.4 mg/dL    Sodium 138 135 - 144 mmol/L    Potassium 3.6 (L) 3.7 - 5.3 mmol/L    Chloride 94 (L) 98 - 107 mmol/L    CO2 32 (H) 20 - 31 mmol/L    Anion Gap 12 9 - 17 mmol/L    GFR Non-African American 36 (L) >60 mL/min    GFR  44 (L) >60 mL/min    GFR Comment         Brain Natriuretic Peptide    Collection Time: 05/15/22  5:48 AM   Result Value Ref Range    Pro-BNP 10,789 (H) <300 pg/mL   POC Glucose Fingerstick    Collection Time: 05/15/22  6:04 AM   Result Value Ref Range    POC Glucose 263 (H) 65 - 105 mg/dL   POC Glucose Fingerstick    Collection Time: 05/15/22 11:25 AM   Result Value Ref Range    POC Glucose 292 (H) 65 - 105 mg/dL   POC Glucose Fingerstick    Collection Time: 05/15/22  4:12 PM   Result Value Ref Range    POC Glucose 203 (H) 65 - 105 mg/dL   POC Glucose Fingerstick    Collection Time: 05/15/22  7:41 PM   Result Value Ref Range    POC Glucose 367 (H) 65 - 105 mg/dL   Basic Metabolic Panel w/ Reflex to MG    Collection Time: 05/16/22  5:08 AM   Result Value Ref Range    Glucose 244 (H) 70 - 99 mg/dL    BUN 33 (H) 8 - 23 mg/dL    CREATININE 1.23 (H) 0.50 - 0.90 mg/dL    Calcium 8.8 8.6 - 10.4 mg/dL    Sodium 134 (L) 135 - 144 mmol/L    Potassium 4.4 3.7 - 5.3 mmol/L    Chloride 97 (L) 98 - 107 mmol/L    CO2 31 20 - 31 mmol/L    Anion Gap 6 (L) 9 - 17 mmol/L    GFR Non-African American 43 (L) >60 mL/min    GFR  52 (L) >60 mL/min    GFR Comment         CBC with Auto Differential    Collection Time: 05/16/22  5:08 AM   Result Value Ref Range    WBC 10.2 3.5 - 11.0 k/uL    RBC 3.71 (L) 4.0 - 5.2 m/uL    Hemoglobin 9.3 (L) 12.0 - 16.0 g/dL    Hematocrit 28.9 (L) 36 - 46 %    MCV 77.8 (L) 80 - 100 fL    MCH 25.2 (L) 26 - 34 pg    MCHC 32.3 31 - 37 g/dL    RDW 17.1 (H) 11.5 - 14.9 %    Platelets 674 361 - 951 k/uL    MPV 7.9 6.0 - 12.0 fL    Seg Neutrophils 92 (H) 36 - 66 %    Lymphocytes 5 (L) 24 - 44 %    Monocytes 1 1 - 7 %    Eosinophils % 0 0 - 4 %    Basophils 0 0 - 2 %    Bands 2 0 - 10 %    Segs Absolute 9.39 (H) 1.3 - 9.1 k/uL    Absolute Lymph # 0.51 (L) 1.0 - 4.8 k/uL    Absolute Mono # 0.10 0.1 - 1.3 k/uL    Absolute Eos # 0.00 0.0 - 0.4 k/uL    Basophils Absolute 0.00 0.0 - 0.2 k/uL    Absolute Bands # 0.20 0.0 - 1.0 k/uL    Morphology ANISOCYTOSIS PRESENT     Morphology 1+ ELLIPTOCYTES    POC Glucose Fingerstick    Collection Time: 05/16/22  6:27 AM   Result Value Ref Range    POC Glucose 219 (H) 65 - 105 mg/dL       Lab Results   Component Value Date/Time    SPECIAL NOT REPORTED 10/05/2021 01:20 PM     Lab Results   Component Value Date/Time    CULTURE NO GROWTH 7 DAYS 10/05/2021 01:20 PM       Radiology:    XR CHEST PORTABLE  Result Date: 5/15/2022    Stable exam.     XR CHEST PORTABLE  Result Date: 5/15/2022    Mild vascular congestion, improved from the previous chest radiograph dated 11/26/2021. Previously seen pleural effusions and bibasilar opacities have resolved.      CT CHEST PULMONARY EMBOLISM W CONTRAST  Result Date: 5/14/2022    No evidence of pulmonary embolism or acute pulmonary abnormality. Some platelike atelectasis or scarring in both lung bases. Physical Examination:        Physical Exam  Vitals and nursing note reviewed. Constitutional:       General: She is not in acute distress. Appearance: Normal appearance. She is not ill-appearing or toxic-appearing. Cardiovascular:      Rate and Rhythm: Normal rate and regular rhythm. Heart sounds: No murmur heard. Pulmonary:      Effort: No respiratory distress. Breath sounds: Wheezing (Mild end expiratory bilateral, more on the left side) present. Abdominal:      Palpations: Abdomen is soft. Tenderness: There is no abdominal tenderness. There is no guarding or rebound. Musculoskeletal:         General: No tenderness. Right lower le+ Pitting Edema (Improved. 1+) present. Left lower le+ Pitting Edema (Improved. 1+) present. Neurological:      General: No focal deficit present. Mental Status: She is alert and oriented to person, place, and time. Sensory: No sensory deficit. Motor: No weakness. Psychiatric:         Mood and Affect: Mood normal.         Behavior: Behavior normal.       Assessment:        Primary Problem  Acute on chronic systolic CHF (congestive heart failure) [facility])    Active Hospital Problems    Diagnosis Date Noted    Symptomatic congestive heart failure, pre-operative cardiovascular examination ([facility] [Z01.810, I50.9] 2022     Priority: Medium    Acute on chronic systolic CHF (congestive heart failure) ([facility] [I50.23] 2022     Priority: Medium    Type 2 diabetes mellitus with chronic kidney disease [E11.22] 02/10/2022    Iron deficiency anemia [D50.9] 02/10/2022    S/P CABG x 4 [Z95.1]     Chronic obstructive pulmonary disease ([facility] [J44.9] 09/10/2015     Plan:        Acute on chronic CHF vs COPD exacerbation  Worsened lower limb edema on admission  On 2 L via NC  Afebrile.  WBC 7.6 on admission  On 40 mg oral home lasix  Pro-BNP 4,645>84,014  Troponin unremarkable  Echo 08/2021              LVEF 35%. Moderately increased LV wall thickness. Increased RV systolic pressure at 60 mmHg  Pneumonia work up - negative thus far  Pro-reyes unremarkable  Resp culture no result  Daily weight  I's and O's  Proventil, Symbicort, DuoNeb  IV Methylprednisolone 60 mg Q6h  Azithromycin and ceftriaxone at this time  Oral bumex 2 mg oral daily     HTN  Isosorbide mononitrate 30 mg oral daily  Metoprolol 50 mg oral Daily     DM type II  HbA1c in 03/2022 was 6.5  Lantus 5 units BID  Low-dose sliding scale  Hypoglycemia protocol in place  POCT glucose     CKD stage IIIb  Baseline creatinine around 1.2  Cr today 1.23  Continue to monitor     H/o CVA with left carotid stent in 2020  Continue atorvastatin 80 mg oral daily  Continue aspirin 81 mg oral daily  Continue clopidogrel 75 mg oral daily. May discontinue later.     DVT prophylaxis: Heparin 5000 units subcutaneous TID  GI prophylaxis: None at this time  Diet: Regular diabetic-cardiac diet  Dispo: Progressive     OT/PT/social work consult in place     CODE STATUS full    Will likely discharge today pending rounds with the attending physician. Dominick Romero MD  5/16/2022  7:07 AM       I have discussed the care of [facility] , including pertinent history and exam findings,    today with the resident. I have seen and examined the patient and the key elements of all parts of the encounter have been performed by me . I agree with the assessment, plan and orders as documented by the resident.      Principal Problem:    Acute on chronic systolic CHF (congestive heart failure) ([facility])  Active Problems:    Symptomatic congestive heart failure, pre-operative cardiovascular examination (HCC)    Chronic obstructive pulmonary disease (HCC)    S/P CABG x 4    Iron deficiency anemia    Type 2 diabetes mellitus with chronic kidney disease  Resolved Problems:    * No resolved hospital problems. *        Overall  course ;                                   are improving over time.         DC plan   Evaluated by cardiologist           Electronically signed by Hilary Beck MD

## 2022-05-16 NOTE — PROGRESS NOTES
Date:   5/16/2022  Patient name: Chico Marcos  Date of admission:  5/14/2022  8:50 AM  MRN:   [id number]  YOB: 1950  PCP: Buster Spence MD    Reason for Admission: Peripheral edema [R60.9]  COPD exacerbation ([facility] [J44.1]  Symptomatic congestive heart failure, pre-operative cardiovascular examination ([facility] [Z01.810, I50.9]    Cardiology follow-up: Multivessel coronary artery disease, CHF diastolic and systolic acute on chronic       Referring physician: Dr Butch Spangler  Coronary artery disease, CABG 2018, LIMA to LAD and diagonal 1, SVG to OM and PDA  Congestive heart failure systolic and diastolic acute on chronic, ejection fraction 35%  Moderate pulmonary hypertension,  Moderate LVH  Carotid artery disease/stent placement left internal carotid artery  CVA  Hypertension  Upper lipidemia  2 diabetes mellitus  COPD on oxygen at night, history of smoking  Chronic kidney disease     2D echo 2/12/2021 normal LV size, moderate LVH asymmetrical septal hypertrophy  Moderate global hypokinesis, akinetic apex, ejection fraction 35%  Dilated LA and RA  Moderate TR, RVSP 60 mmHg  Dilated IVC diam,  impaired respiratory variation     ECG 5/14/2022  Sinus bradycardia heart rate 58, ST-T abnormalities lateral leads and precordial leads, prolonged QT     Chest x-ray 5/14/2022  Sternotomy wires noted, mild cardiomegaly, mild vascular congestion, no significant pleural effusion     CTA chest 5/14/2022  No evidence of pulmonary embolism or acute pulmonary abnormality    History of present illness  68-year-old female with past medical history of CAD, CABG, CHF systolic and diastolic, diabetes mellitus got hospitalized on 5/14/2022 with chief complaints of shortness of breath and creasing swelling over the legs. She has been symptomatic for more than 5 days. She said she has gained about 5 pounds weight. No fever no chills.   She states 2 weeks ago she was on Z-Tim for upper respiratory tract infection. No chest pain no syncope. He is on oxygen mostly at night. On admission electrocardiogram showed sinus bradycardia, ST-T abnormalities lateral leads and precordial leads. Borderline abnormal high-sensitivity troponin. CTA chest was negative for pulmonary embolism or acute pulmonary process. On admission her blood pressure was 120/88, heart rate 58, oxygen saturation 97% room air, temperature 98.3  Lives with her roommate and it normally she is independent in her activities of daily living.   Continues to smoke     Lab work 5/14/2022  Sodium 138, potassium 3.5, BUN 20, creatinine 1.22  proBNP 5969, high-sensitivity troponin 42  Hemoglobin 10.3, platelets 965 WBC 7.6  Lab work 5/15/2022  Sodium 138, potassium 3.6, BUN 24, creatinine 1.42, glucose 250 proBNP 10,789     Current evaluation  Patient seen and examined  Elderly frail looking female she was resting with couple of pillows  Nasal oxygen by nasal cannula  Patient said she is a feeling a lot better  Denies any chest pain no palpitation  She said she wants to go home  Febrile hemodynamically stable  ECG monitor sinus rhythm    Sodium 134, potassium 4.4, creatinine 1.23, BUN 33, glucose 251  Hemoglobin 9.3, WBC 10.2, platelets 316    Medications:   Scheduled Meds:   bumetanide  2 mg Oral Daily    insulin glargine  5 Units SubCUTAneous BID    lidocaine  1 patch TransDERmal Daily    sodium chloride flush  5-40 mL IntraVENous 2 times per day    ipratropium-albuterol  1 ampule Inhalation Q4H WA    cefTRIAXone (ROCEPHIN) IV  1,000 mg IntraVENous Q24H    atorvastatin  80 mg Oral Daily    budesonide-formoterol  2 puff Inhalation BID    citalopram  20 mg Oral Daily    clopidogrel  75 mg Oral Daily    ferrous sulfate  325 mg Oral Daily with breakfast    guaiFENesin  600 mg Oral BID    isosorbide mononitrate  30 mg Oral Daily    metoprolol succinate  50 mg Oral Daily    nicotine  1 patch TransDERmal Daily    aspirin  81 mg Oral Daily    insulin lispro  0-6 Units SubCUTAneous TID     insulin lispro  0-3 Units SubCUTAneous Nightly    heparin (porcine)  5,000 Units SubCUTAneous 3 times per day    methylPREDNISolone  60 mg IntraVENous Q6H    rOPINIRole  1 mg Oral Nightly    traZODone  50 mg Oral Nightly     Continuous Infusions:   sodium chloride      dextrose       CBC:   Recent Labs     05/14/22  0908 05/16/22  0508   WBC 7.6 10.2   HGB 10.3* 9.3*    271     BMP:    Recent Labs     05/14/22  0908 05/15/22  0548 05/16/22  0508    138 134*   K 3.5* 3.6* 4.4   CL 97* 94* 97*   CO2 30 32* 31   BUN 20 24* 33*   CREATININE 1.22* 1.42* 1.23*   GLUCOSE 174* 250* 244*     Hepatic: No results for input(s): AST, ALT, ALB, BILITOT, ALKPHOS in the last 72 hours. Troponin: No results for input(s): TROPONINI in the last 72 hours. BNP: No results for input(s): BNP in the last 72 hours. Lipids: No results for input(s): CHOL, HDL in the last 72 hours. Invalid input(s): LDLCALCU  INR: No results for input(s): INR in the last 72 hours. Objective:   Vitals: BP (!) 116/59   Pulse 82   Temp 97.4 °F (36.3 °C) (Oral)   Resp 18   Ht 5' 4\" (1.626 m)   Wt 123 lb 10.9 oz (56.1 kg)   SpO2 94%   BMI 21.23 kg/m²   General appearance: alert and cooperative with exam  HEENT: Head: Normal, normocephalic, atraumatic. Neck: supple, symmetrical, trachea midline  Lungs: Expansion is poor breath sounds are diminished both sides  Heart: regular rate and rhythm  Abdomen: soft, non-tender; bowel sounds normal; no masses,  no organomegaly  Extremities: Homans sign is negative, no sign of DVT  Neurologic: Mental status: Alert, oriented, thought content appropriate    EKG: normal sinus rhythm.,  ST-T abnormalities lateral leads and precordial leads  ECHO: reviewed. Ejection fraction: 35%, akinetic apex, moderate LVH RVSP 60 mmHg  Stress Test: not obtained. Cardiac Angiography: reviewed.       Assessment / Acute Cardiac Problems:     Admission with congestive heart failure systolic and diastolic acute on chronic  Ejection fraction 35%  CAD, CABG LIMA to LAD and diagonal 1, SVG to OM and PDA  Borderline abnormal high-sensitivity troponin significance not clear  Here COPD, current smoker  Hypertension is stable  Diabetes mellitus type 2  Carotid artery disease, stent placement left internal carotid artery    5/16/2022 significant improvement in the symptoms and peripheral edema      Patient Active Problem List:     Chronic pain     Controlled type 2 diabetes mellitus without complication, without long-term current use of insulin (HCC)     Allergic rhinitis     Hypertension goal BP (blood pressure) < 140/90     Mixed hyperlipidemia     Chronic obstructive pulmonary disease (HCC)     Coronary artery disease involving native coronary artery of native heart without angina pectoris     Primary insomnia     Diarrhea in adult patient     Restless leg syndrome     Atherosclerosis of superior mesenteric artery (HCC)     Left adrenal mass (HCC)     Former smoker     Chronic bilateral low back pain with left-sided sciatica     Hypothyroidism     S/P CABG x 4     Acute pain of right knee     Abnormal stress test     Tobacco use disorder     Neck pain     Acute ischemic left MCA stroke ([address]     Internal carotid artery occlusion     Stenosis of left internal carotid artery     Acquired spondylolisthesis of cervical vertebra     Stenosis of cervical spine with myelopathy (HCC)     Abnormal EKG     COPD exacerbation (HCC)     Multifocal pneumonia     Hypoxia     Pneumonia     Iron deficiency anemia     Chronic combined systolic and diastolic congestive heart failure (HCC)     Type 2 diabetes mellitus with chronic kidney disease     Symptomatic congestive heart failure, pre-operative cardiovascular examination ([address]     Acute on chronic systolic CHF (congestive heart failure) ([address]      Plan of Treatment:   Medications reviewed  Change IV diuretics to p.o.  Bumex 2 mg a day  Reducing dose of steroids  Continue current dose of Plavix, Lipitor, isosorbide mononitrate, metoprolol 50 mg twice daily and aspirin  Cussed with the patient to stop smoking  Okay to discharge home  Follow-up in 4 to 6 weeks    Electronically signed by Belinda Hancokc MD on 5/16/2022 at 2:44 PM

## 2022-05-17 NOTE — DISCHARGE SUMMARY
furosemide.  D-dimer was elevated; CT PE negative. 05/15: Continued to receive methylprednisolone 60 mg Q6h. Condition improved. No other significant intervention. 05/16: Discharged on prednisone taper. Diuretic changed to Bumex 2 mg oral daily.     Significant therapeutic interventions: None    Significant Diagnostic Studies: None    Labs / Micro:    Recent Results (from the past 48 hour(s))   POC Glucose Fingerstick    Collection Time: 05/15/22  4:12 PM   Result Value Ref Range    POC Glucose 203 (H) 65 - 105 mg/dL   POC Glucose Fingerstick    Collection Time: 05/15/22  7:41 PM   Result Value Ref Range    POC Glucose 367 (H) 65 - 105 mg/dL   Basic Metabolic Panel w/ Reflex to MG    Collection Time: 05/16/22  5:08 AM   Result Value Ref Range    Glucose 244 (H) 70 - 99 mg/dL    BUN 33 (H) 8 - 23 mg/dL    CREATININE 1.23 (H) 0.50 - 0.90 mg/dL    Calcium 8.8 8.6 - 10.4 mg/dL    Sodium 134 (L) 135 - 144 mmol/L    Potassium 4.4 3.7 - 5.3 mmol/L    Chloride 97 (L) 98 - 107 mmol/L    CO2 31 20 - 31 mmol/L    Anion Gap 6 (L) 9 - 17 mmol/L    GFR Non-African American 43 (L) >60 mL/min    GFR  52 (L) >60 mL/min    GFR Comment         CBC with Auto Differential    Collection Time: 05/16/22  5:08 AM   Result Value Ref Range    WBC 10.2 3.5 - 11.0 k/uL    RBC 3.71 (L) 4.0 - 5.2 m/uL    Hemoglobin 9.3 (L) 12.0 - 16.0 g/dL    Hematocrit 28.9 (L) 36 - 46 %    MCV 77.8 (L) 80 - 100 fL    MCH 25.2 (L) 26 - 34 pg    MCHC 32.3 31 - 37 g/dL    RDW 17.1 (H) 11.5 - 14.9 %    Platelets 444 470 - 182 k/uL    MPV 7.9 6.0 - 12.0 fL    Seg Neutrophils 92 (H) 36 - 66 %    Lymphocytes 5 (L) 24 - 44 %    Monocytes 1 1 - 7 %    Eosinophils % 0 0 - 4 %    Basophils 0 0 - 2 %    Bands 2 0 - 10 %    Segs Absolute 9.39 (H) 1.3 - 9.1 k/uL    Absolute Lymph # 0.51 (L) 1.0 - 4.8 k/uL    Absolute Mono # 0.10 0.1 - 1.3 k/uL    Absolute Eos # 0.00 0.0 - 0.4 k/uL    Basophils Absolute 0.00 0.0 - 0.2 k/uL    Absolute Bands # 0.20 0.0 - 1.0 k/uL    Morphology ANISOCYTOSIS PRESENT     Morphology 1+ ELLIPTOCYTES    POC Glucose Fingerstick    Collection Time: 05/16/22  6:27 AM   Result Value Ref Range    POC Glucose 219 (H) 65 - 105 mg/dL   POC Glucose Fingerstick    Collection Time: 05/16/22 11:20 AM   Result Value Ref Range    POC Glucose 251 (H) 65 - 105 mg/dL       Radiology:    XR CHEST PORTABLE  Result Date: 5/15/2022    Stable exam.     XR CHEST PORTABLE  Result Date: 5/15/2022    Mild vascular congestion, improved from the previous chest radiograph dated 11/26/2021. Previously seen pleural effusions and bibasilar opacities have resolved. CT CHEST PULMONARY EMBOLISM W CONTRAST  Result Date: 5/14/2022    No evidence of pulmonary embolism or acute pulmonary abnormality. Some platelike atelectasis or scarring in both lung bases. Consultations:    Consults:     Final Specialist Recommendations/Findings:   IP CONSULT TO INTERNAL MEDICINE  IP CONSULT TO SOCIAL WORK  IP CONSULT TO CARDIOLOGY      The patient was seen and examined on day of discharge and this discharge summary is in conjunction with any daily progress note from day of discharge. Discharge plan:     Disposition: Home    Physician Follow Up:     Car Bunch MD  [address] [facility]  [address] [facility] [address] [phone]    Schedule an appointment as soon as possible for a visit  PCP. Post hospital stay    Patricio Tineo MD  [address]  [address] [facility] [address]  [phone]    Schedule an appointment as soon as possible for a visit  Cardiology. History of heart failure    Guillaume Ortiz MD  [address], [address]  [address] [facility] [address] [phone]    In 2 weeks       Requiring Further Evaluation/Follow Up POST HOSPITALIZATION/Incidental Findings: None    Diet: cardiac diet    Activity: As tolerated    Instructions to Patient: Take medications as prescribed. Follow up with outpatient appointments.  Return to ED if symptoms recur or worsen, or new concerning symptoms appear. Discharge Medications:      Medication List      START taking these medications    bumetanide 2 MG tablet  Commonly known as: BUMEX  Take 1 tablet by mouth daily     potassium chloride 10 MEQ extended release tablet  Commonly known as: KLOR-CON  TAKE 2 TABLETS BY MOUTH DAILY     * predniSONE 20 MG tablet  Commonly known as: DELTASONE  Take 1 tablet by mouth daily for 7 days     * predniSONE 10 MG tablet  Commonly known as: DELTASONE  Take 1 tablet by mouth daily for 7 days  Start taking on: May 24, 2022     * predniSONE 5 MG tablet  Commonly known as: DELTASONE  Take 1 tablet by mouth daily for 7 days  Start taking on: June 1, 2022         * This list has 3 medication(s) that are the same as other medications prescribed for you. Read the directions carefully, and ask your doctor or other care provider to review them with you. CHANGE how you take these medications    albuterol sulfate  (90 Base) MCG/ACT inhaler  Inhale 2 puffs into the lungs every 6 hours as needed for Wheezing or Shortness of Breath  What changed: See the new instructions. atorvastatin 40 MG tablet  Commonly known as: LIPITOR  TAKE 2 TABLETS BY MOUTH DAILY  What changed: Another medication with the same name was removed. Continue taking this medication, and follow the directions you see here. CONTINUE taking these medications    Blood Pressure Kit  Dx: HTN. Needs automatic blood pressure machine to monitor her blood pressure.      budesonide-formoterol 160-4.5 MCG/ACT Aero  Commonly known as: SYMBICORT  Inhale 2 puffs into the lungs 2 times daily     citalopram 20 MG tablet  Commonly known as: CELEXA  Take 1 tablet by mouth daily     clopidogrel 75 MG tablet  Commonly known as: PLAVIX  TAKE 1 TABLET BY MOUTH DAILY     diclofenac sodium 1 % Gel  Commonly known as: VOLTAREN     ferrous sulfate 325 (65 Fe) MG tablet  Commonly known as: IRON 325  Take 1 tablet by mouth daily (with breakfast) fluticasone 50 MCG/ACT nasal spray  Commonly known as: FLONASE  USE 2 SPRAYS IN EACH NOSTRIL ONCE DAILY     guaiFENesin 600 MG extended release tablet  Commonly known as: MUCINEX  Take 1 tablet by mouth 2 times daily     ipratropium-albuterol 0.5-2.5 (3) MG/3ML Soln nebulizer solution  Commonly known as: DUONEB  Inhale 3 mLs into the lungs every 4 hours as needed for Shortness of Breath     isosorbide mononitrate 30 MG extended release tablet  Commonly known as: IMDUR  Take 1 tablet by mouth daily     magnesium oxide 400 (241.3 Mg) MG Tabs tablet  Commonly known as: MAG-OX  Take 1 tablet by mouth 2 times daily     metFORMIN 500 MG extended release tablet  Commonly known as: GLUCOPHAGE-XR  Take 1 tablet by mouth daily (with breakfast)     metoprolol succinate 50 MG extended release tablet  Commonly known as: TOPROL XL  Take 1 tablet by mouth daily     nicotine 14 MG/24HR  Commonly known as: NICODERM CQ  Place 1 patch onto the skin daily     nitroGLYCERIN 0.4 MG SL tablet  Commonly known as: NITROSTAT  Place 1 tablet under the tongue every 5 minutes as needed for Chest pain up to max of 3 total doses. If no relief after 1 dose, call 911.      ONE TOUCH ULTRA TEST strip  Generic drug: blood glucose test strips  TEST ONCE DAILY AS NEEDED     pantoprazole 40 MG tablet  Commonly known as: PROTONIX  TAKE 1 TABLET BY MOUTH EVERY MORNING (BEFORE BREAKFAST)     rOPINIRole 1 MG tablet  Commonly known as: REQUIP  TAKE 1 TABLET BY MOUTH EVERY NIGHT AS NEEDED     SM Aspirin Adult Low Strength 81 MG EC tablet  Generic drug: aspirin  TAKE 1 TABLET BY MOUTH DAILY     tiZANidine 2 MG tablet  Commonly known as: ZANAFLEX  TAKE 1 TABLET BY MOUTH NIGHTLY AS NEEDED (PAIN)     traZODone 50 MG tablet  Commonly known as: DESYREL  Take 1 tablet by mouth nightly as needed for Sleep        STOP taking these medications    furosemide 40 MG tablet  Commonly known as: LASIX           Where to Get Your Medications      These medications were sent to [facility]  [address]  [address], [address] [facility] [address]    Hours: LUISITO OCAMPO (Mon-Fri 9AM-5:30PM) Phone: [phone]   · albuterol sulfate  (90 Base) MCG/ACT inhaler  · budesonide-formoterol 160-4.5 MCG/ACT Aero  · bumetanide 2 MG tablet  · ipratropium-albuterol 0.5-2.5 (3) MG/3ML Soln nebulizer solution  · predniSONE 10 MG tablet  · predniSONE 20 MG tablet  · predniSONE 5 MG tablet     These medications were sent to [place], [address] [place] [address],[facility], [address] [facility].    Phone: [phone]   · clopidogrel 75 MG tablet  · potassium chloride 10 MEQ extended release tablet       Time Spent on discharge is  30 minutes in patient examination, evaluation, counseling as well as medication reconciliation, prescriptions for required medications, discharge plan and follow up. Electronically signed by   Ana Corrales MD  5/17/2022  3:05 PM    Thank you Dr. James Enriquez MD for the opportunity to be involved in this patient's care. Attending Physician Statement  I have discussed the [facility] and I have examined the patient myselft and taken ros and hpi , including pertinent history and exam findings,  with the resident. I have reviewed the key elements of all parts of the encounter with the resident. I agree with the assessment, plan and orders as documented by the resident. I spent over 35 mins in direct patient care as above and reviewing medications and counseling for discharge .         Electronically signed by Ruth De Dios MD

## 2022-05-25 ENCOUNTER — CARE COORDINATION (OUTPATIENT)
Dept: CARE COORDINATION | Age: 72
End: 2022-05-25

## 2022-05-25 NOTE — CARE COORDINATION
Heart Failure Education outreach Date/Time: 2022 12:54 PM    Ambulatory Care Manager (ACM) contacted the patient by telephone to perform Ambulatory Care Coordination. Verified name and  with patient as identifiers. Provided introduction to self, and explanation of the Ambulatory Care Manager's role. ACM reviewed that a Health Healthy tips for the Summer packet has been mailed to the them. ACM reviewed CHF zones, daily weights, fluid restriction, the importance of low sodium diet and healthy tips packet with the patient. Instructed patient to call their Cardiologist or PCP if they have a weight gain of 3 lbs in 2 days or 5 lbs in a week. Patient reminded that there is a physician on call 24 hours a day / 7 days a week should the patient have questions or concerns. The patient verbalized understanding. POC:  Patient recently discharged from hospital.  Patient will follow YELLOW zone for both CHF and COPD.

## 2022-06-27 ENCOUNTER — CARE COORDINATION (OUTPATIENT)
Dept: CARE COORDINATION | Age: 72
End: 2022-06-27

## 2022-06-27 NOTE — CARE COORDINATION
Follow up CC call attempted. Patient was not available. Message left requesting return call. Call back info provided. Will attempt follow up with patient later this week if no response received.

## 2022-06-30 ENCOUNTER — CARE COORDINATION (OUTPATIENT)
Dept: CARE COORDINATION | Age: 72
End: 2022-06-30

## 2022-06-30 NOTE — LETTER
6/30/2022    [address]    Mrs. Sachin Teran     I have been unsuccessful in contacting you regarding your health. I would like to continue to provide you support. However, I have been unable to reach you at,  [phone] (mobile). Please let me know if I can answer any questions or provide any additional assistance for you. .      Dr. Terrance Winters is interested in your health and hopes to hear from you soon. If you would like continued access to Care Coordination, you can reach me at [phone]. I will wait to hear from you and will no longer reach out to you. Dr. Terrance Winters and her team will continue to provide care and be available for questions.       In good health,     Lucy Shrestha, [address]
spouse

## 2022-06-30 NOTE — CARE COORDINATION
Second attempt at [address] [facility] follow up call attempted. Patient was not available. Voicemail received. Message left requesting return call. [facility]. Will attempt follow up with patient next week if no response received.

## 2022-07-07 ENCOUNTER — CARE COORDINATION (OUTPATIENT)
Dept: CARE COORDINATION | Age: 72
End: 2022-07-07

## 2022-07-18 ENCOUNTER — CARE COORDINATION (OUTPATIENT)
Dept: CARE COORDINATION | Age: 72
End: 2022-07-18

## 2022-07-18 NOTE — CARE COORDINATION
Final attempt at [address] [facility] follow up call. Patient was not available. Voicemail received. Message left advising care coordination would be discontinued. Patient invited to call ACM with any questions. No response to letter sent via [address] [place] [address] on 07 July 2022, requesting patient contact CC.

## 2022-07-19 ENCOUNTER — OFFICE VISIT (OUTPATIENT)
Dept: ORTHOPEDIC SURGERY | Age: 72
End: 2022-07-19
Payer: COMMERCIAL

## 2022-07-19 VITALS — WEIGHT: 123 LBS | HEIGHT: 64 IN | BODY MASS INDEX: 21 KG/M2 | RESPIRATION RATE: 14 BRPM

## 2022-07-19 DIAGNOSIS — S22.070S CLOSED WEDGE COMPRESSION FRACTURE OF T9 VERTEBRA, SEQUELA: ICD-10-CM

## 2022-07-19 DIAGNOSIS — G99.2 STENOSIS OF CERVICAL SPINE WITH MYELOPATHY (HCC): ICD-10-CM

## 2022-07-19 DIAGNOSIS — M43.12 ACQUIRED SPONDYLOLISTHESIS OF CERVICAL VERTEBRA: ICD-10-CM

## 2022-07-19 DIAGNOSIS — S22.060S CLOSED WEDGE COMPRESSION FRACTURE OF T8 VERTEBRA, SEQUELA: ICD-10-CM

## 2022-07-19 DIAGNOSIS — G56.03 BILATERAL CARPAL TUNNEL SYNDROME: ICD-10-CM

## 2022-07-19 DIAGNOSIS — M48.02 STENOSIS OF CERVICAL SPINE WITH MYELOPATHY (HCC): ICD-10-CM

## 2022-07-19 DIAGNOSIS — M75.41 IMPINGEMENT SYNDROME OF RIGHT SHOULDER: ICD-10-CM

## 2022-07-19 DIAGNOSIS — M48.02 SPINAL STENOSIS IN CERVICAL REGION: Primary | ICD-10-CM

## 2022-07-19 PROCEDURE — 99213 OFFICE O/P EST LOW 20 MIN: CPT | Performed by: ORTHOPAEDIC SURGERY

## 2022-07-19 PROCEDURE — 1123F ACP DISCUSS/DSCN MKR DOCD: CPT | Performed by: ORTHOPAEDIC SURGERY
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
## 2022-07-28 ENCOUNTER — TELEPHONE (OUTPATIENT)
Dept: FAMILY MEDICINE CLINIC | Age: 72
End: 2022-07-28

## 2022-09-06 ENCOUNTER — OFFICE VISIT (OUTPATIENT)
Dept: ORTHOPEDIC SURGERY | Age: 72
End: 2022-09-06
Payer: COMMERCIAL

## 2022-09-06 VITALS — RESPIRATION RATE: 14 BRPM | HEIGHT: 64 IN | BODY MASS INDEX: 20.14 KG/M2 | WEIGHT: 118 LBS

## 2022-09-06 DIAGNOSIS — M43.12 ACQUIRED SPONDYLOLISTHESIS OF CERVICAL VERTEBRA: ICD-10-CM

## 2022-09-06 DIAGNOSIS — M48.02 SPINAL STENOSIS IN CERVICAL REGION: Primary | ICD-10-CM

## 2022-09-06 DIAGNOSIS — M54.2 NECK PAIN: ICD-10-CM

## 2022-09-06 PROCEDURE — 1123F ACP DISCUSS/DSCN MKR DOCD: CPT | Performed by: ORTHOPAEDIC SURGERY

## 2022-09-06 PROCEDURE — 99213 OFFICE O/P EST LOW 20 MIN: CPT | Performed by: ORTHOPAEDIC SURGERY
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
## 2022-09-19 ENCOUNTER — HOSPITAL ENCOUNTER (OUTPATIENT)
Dept: MRI IMAGING | Age: 72
Discharge: HOME | End: 2022-09-21
Payer: COMMERCIAL

## 2022-09-19 DIAGNOSIS — M43.12 ACQUIRED SPONDYLOLISTHESIS OF CERVICAL VERTEBRA: ICD-10-CM

## 2022-09-19 DIAGNOSIS — M54.2 NECK PAIN: ICD-10-CM

## 2022-09-19 DIAGNOSIS — M48.02 SPINAL STENOSIS IN CERVICAL REGION: ICD-10-CM

## 2022-09-19 PROCEDURE — 72141 MRI NECK SPINE W/O DYE: CPT

## 2022-09-23 ENCOUNTER — TELEPHONE (OUTPATIENT)
Dept: ADMINISTRATIVE | Age: 72
End: 2022-09-23

## 2022-09-23 NOTE — TELEPHONE ENCOUNTER
Pt had MRI on 9/19/22 as requested by Dr. Stone Sauer. Please call pt regarding results and next steps.

## 2022-09-23 NOTE — TELEPHONE ENCOUNTER
Called mobile number  had to leave a message to call us back to schedule appointment, recorder stated it was [employer] phone (she on the communication consent). Patient needs appointment with Dr. Lul Lacey to go over results.

## 2022-09-27 RX ORDER — PANTOPRAZOLE SODIUM 40 MG/1
40 TABLET, DELAYED RELEASE ORAL
Qty: 120 TABLET | Refills: 0 | Status: SHIPPED | OUTPATIENT
Start: 2022-09-27

## 2022-09-27 NOTE — TELEPHONE ENCOUNTER
LM w/woman for pt to return call to reschedule Medicare AWV. Please Approve or Refuse.   Send to Pharmacy per Pt's Request: [facility]     Next Visit Date:  Visit date not found   Last Visit Date: 2/10/2022    Hemoglobin A1C (%)   Date Value   03/28/2022 6.5   02/10/2022 6.4   08/11/2021 6.7 (H)             ( goal A1C is < 7)   BP Readings from Last 3 Encounters:   05/16/22 (!) 116/59   02/10/22 (!) 84/48   11/30/21 121/67          (goal 120/80)  BUN   Date Value Ref Range Status   05/16/2022 33 (H) 8 - 23 mg/dL Final     Creatinine   Date Value Ref Range Status   05/16/2022 1.23 (H) 0.50 - 0.90 mg/dL Final   03/28/2022 1.1 mg/dL Final     Potassium   Date Value Ref Range Status   05/16/2022 4.4 3.7 - 5.3 mmol/L Final

## 2022-10-04 ENCOUNTER — APPOINTMENT (OUTPATIENT)
Dept: GENERAL RADIOLOGY | Age: 72
DRG: 291 | End: 2022-10-04
Payer: COMMERCIAL

## 2022-10-04 ENCOUNTER — HOSPITAL ENCOUNTER (INPATIENT)
Age: 72
LOS: 3 days | Discharge: HOME | DRG: 291 | End: 2022-10-07
Attending: EMERGENCY MEDICINE | Admitting: INTERNAL MEDICINE
Payer: COMMERCIAL

## 2022-10-04 DIAGNOSIS — J44.1 COPD EXACERBATION (HCC): Primary | ICD-10-CM

## 2022-10-04 LAB
ABSOLUTE EOS #: 0.1 K/UL (ref 0–0.4)
ABSOLUTE LYMPH #: 1.6 K/UL (ref 1–4.8)
ABSOLUTE MONO #: 0.6 K/UL (ref 0.1–1.3)
ANION GAP SERPL CALCULATED.3IONS-SCNC: 10 MMOL/L (ref 9–17)
BASOPHILS # BLD: 1 % (ref 0–2)
BASOPHILS ABSOLUTE: 0 K/UL (ref 0–0.2)
BUN BLDV-MCNC: 16 MG/DL (ref 8–23)
CALCIUM SERPL-MCNC: 8.8 MG/DL (ref 8.6–10.4)
CARBOXYHEMOGLOBIN: 3.6 % (ref 0–5)
CHLORIDE BLD-SCNC: 105 MMOL/L (ref 98–107)
CO2: 25 MMOL/L (ref 20–31)
CREAT SERPL-MCNC: 1.09 MG/DL (ref 0.5–0.9)
EOSINOPHILS RELATIVE PERCENT: 2 % (ref 0–4)
GFR SERPL CREATININE-BSD FRML MDRD: 53 ML/MIN/1.73M2
GLUCOSE BLD-MCNC: 165 MG/DL (ref 70–99)
HCO3 VENOUS: 26.9 MMOL/L (ref 24–30)
HCT VFR BLD CALC: 31.5 % (ref 36–46)
HEMOGLOBIN: 10.1 G/DL (ref 12–16)
INFLUENZA A: NOT DETECTED
INFLUENZA B: NOT DETECTED
LYMPHOCYTES # BLD: 22 % (ref 24–44)
MCH RBC QN AUTO: 26.7 PG (ref 26–34)
MCHC RBC AUTO-ENTMCNC: 32 G/DL (ref 31–37)
MCV RBC AUTO: 83.5 FL (ref 80–100)
METHEMOGLOBIN: 0.8 % (ref 0–1.9)
MONOCYTES # BLD: 9 % (ref 1–7)
O2 SAT, VEN: 43.7 % (ref 60–85)
PATIENT TEMP: 37
PCO2, VEN: 45.6 MM HG (ref 39–55)
PDW BLD-RTO: 18.6 % (ref 11.5–14.9)
PH VENOUS: 7.38 (ref 7.32–7.42)
PLATELET # BLD: 226 K/UL (ref 150–450)
PMV BLD AUTO: 8.3 FL (ref 6–12)
POSITIVE BASE EXCESS, VEN: 1.8 MMOL/L (ref 0–2)
POTASSIUM SERPL-SCNC: 3.6 MMOL/L (ref 3.7–5.3)
PRO-BNP: ABNORMAL PG/ML
RBC # BLD: 3.77 M/UL (ref 4–5.2)
SARS-COV-2 RNA, RT PCR: NOT DETECTED
SEG NEUTROPHILS: 66 % (ref 36–66)
SEGMENTED NEUTROPHILS ABSOLUTE COUNT: 4.8 K/UL (ref 1.3–9.1)
SODIUM BLD-SCNC: 140 MMOL/L (ref 135–144)
SOURCE: NORMAL
SPECIMEN DESCRIPTION: NORMAL
TEXT FOR RESPIRATORY: ABNORMAL
TROPONIN, HIGH SENSITIVITY: 64 NG/L (ref 0–14)
TROPONIN, HIGH SENSITIVITY: 70 NG/L (ref 0–14)
WBC # BLD: 7.2 K/UL (ref 3.5–11)

## 2022-10-04 PROCEDURE — 93005 ELECTROCARDIOGRAM TRACING: CPT | Performed by: STUDENT IN AN ORGANIZED HEALTH CARE EDUCATION/TRAINING PROGRAM

## 2022-10-04 PROCEDURE — 85025 COMPLETE CBC W/AUTO DIFF WBC: CPT

## 2022-10-04 PROCEDURE — 94640 AIRWAY INHALATION TREATMENT: CPT

## 2022-10-04 PROCEDURE — 2500000003 HC RX 250 WO HCPCS: Performed by: STUDENT IN AN ORGANIZED HEALTH CARE EDUCATION/TRAINING PROGRAM

## 2022-10-04 PROCEDURE — 83880 ASSAY OF NATRIURETIC PEPTIDE: CPT

## 2022-10-04 PROCEDURE — 71045 X-RAY EXAM CHEST 1 VIEW: CPT

## 2022-10-04 PROCEDURE — 82800 BLOOD PH: CPT

## 2022-10-04 PROCEDURE — 2060000000 HC ICU INTERMEDIATE R&B

## 2022-10-04 PROCEDURE — 36415 COLL VENOUS BLD VENIPUNCTURE: CPT

## 2022-10-04 PROCEDURE — 99285 EMERGENCY DEPT VISIT HI MDM: CPT

## 2022-10-04 PROCEDURE — 82805 BLOOD GASES W/O2 SATURATION: CPT

## 2022-10-04 PROCEDURE — 80048 BASIC METABOLIC PNL TOTAL CA: CPT

## 2022-10-04 PROCEDURE — 87636 SARSCOV2 & INF A&B AMP PRB: CPT

## 2022-10-04 PROCEDURE — 2700000000 HC OXYGEN THERAPY PER DAY

## 2022-10-04 PROCEDURE — 6370000000 HC RX 637 (ALT 250 FOR IP): Performed by: STUDENT IN AN ORGANIZED HEALTH CARE EDUCATION/TRAINING PROGRAM

## 2022-10-04 PROCEDURE — 94660 CPAP INITIATION&MGMT: CPT

## 2022-10-04 PROCEDURE — 84484 ASSAY OF TROPONIN QUANT: CPT

## 2022-10-04 PROCEDURE — 6370000000 HC RX 637 (ALT 250 FOR IP): Performed by: EMERGENCY MEDICINE

## 2022-10-04 RX ORDER — ASPIRIN 81 MG/1
324 TABLET, CHEWABLE ORAL ONCE
Status: COMPLETED | OUTPATIENT
Start: 2022-10-04 | End: 2022-10-04

## 2022-10-04 RX ORDER — ALBUTEROL SULFATE 2.5 MG/3ML
2.5 SOLUTION RESPIRATORY (INHALATION)
Status: DISCONTINUED | OUTPATIENT
Start: 2022-10-04 | End: 2022-10-05

## 2022-10-04 RX ORDER — IPRATROPIUM BROMIDE AND ALBUTEROL SULFATE 2.5; .5 MG/3ML; MG/3ML
SOLUTION RESPIRATORY (INHALATION)
Status: DISPENSED
Start: 2022-10-04 | End: 2022-10-05

## 2022-10-04 RX ORDER — BUMETANIDE 0.25 MG/ML
2 INJECTION, SOLUTION INTRAMUSCULAR; INTRAVENOUS ONCE
Status: COMPLETED | OUTPATIENT
Start: 2022-10-04 | End: 2022-10-04

## 2022-10-04 RX ORDER — IPRATROPIUM BROMIDE AND ALBUTEROL SULFATE 2.5; .5 MG/3ML; MG/3ML
1 SOLUTION RESPIRATORY (INHALATION)
Status: DISCONTINUED | OUTPATIENT
Start: 2022-10-04 | End: 2022-10-07 | Stop reason: HOSPADM

## 2022-10-04 RX ADMIN — ASPIRIN 324 MG: 81 TABLET, CHEWABLE ORAL at 22:05

## 2022-10-04 RX ADMIN — IPRATROPIUM BROMIDE AND ALBUTEROL SULFATE 1 AMPULE: 2.5; .5 SOLUTION RESPIRATORY (INHALATION) at 19:27

## 2022-10-04 RX ADMIN — BUMETANIDE 2 MG: 0.25 INJECTION, SOLUTION INTRAMUSCULAR; INTRAVENOUS at 23:35

## 2022-10-04 ASSESSMENT — ENCOUNTER SYMPTOMS
PHOTOPHOBIA: 0
ABDOMINAL DISTENTION: 0
SHORTNESS OF BREATH: 1
COUGH: 1
DIARRHEA: 0
ANAL BLEEDING: 0
WHEEZING: 1
RHINORRHEA: 0
CHEST TIGHTNESS: 0
SORE THROAT: 0
CONSTIPATION: 0
VOMITING: 0
NAUSEA: 0

## 2022-10-04 ASSESSMENT — PAIN DESCRIPTION - LOCATION: LOCATION: NECK

## 2022-10-04 ASSESSMENT — PAIN SCALES - GENERAL: PAINLEVEL_OUTOF10: 10

## 2022-10-04 NOTE — PROGRESS NOTES
PROVIDE ADEQUATE OXYGENATION WITH ACCEPTABLE SP02/ABG'S    [x]  IDENTIFY APPROPRIATE OXYGEN THERAPY  [x]   MONITOR SP02/ABG'S AS NEEDED   [x]   PATIENT EDUCATION AS NEEDED   BRONCHOSPASM/BRONCHOCONSTRICTION     [x]         IMPROVE AERATION/BREATH SOUNDS  [x]   ADMINISTER BRONCHODILATOR THERAPY AS APPROPRIATE  [x]   ASSESS BREATH SOUNDS  [x]   IMPLEMENT AEROSOL/MDI PROTOCOL  [x]   PATIENT EDUCATION AS NEEDED   NONINVASIVE VENTILATION    PROVIDE OPTIMAL VENTILATION/ACCEPTABLE SPO2   IMPLEMENT NONINVASIVE VENTILATION PROTOCOL   MAINTAIN ACCEPTABLE SPO2   ASSESS SKIN INTEGRITY/BREAKDOWN SCORE   PATIENT EDUCATION AS NEEDED   BIPAP AS NEEDED
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
## 2022-10-04 NOTE — ED PROVIDER NOTES
[address] ED  eMERGENCY dEPARTMENT eNCOUnter   Attending Attestation     Pt Name: Esther Johnston  MRN: [id number]  Skyegfgraciela 1950  Date of evaluation: 10/4/22       Esther Johnston is a 74 y.o. female who presents with Shortness of Breath and Chest Pain (Pt arrives from home by EMS with chest pain and shortness of breath. Pt has hx of COPD. )      History:   Patient came in by EMS for severe respiratory distress. They noted that her oxygen was low put her on a nonrebreather gave her breathing treatments and eventually put her on CPAP at which point she did start feeling better. Patient does have a history of COPD and CHF. Exam: Vitals:   Vitals:    10/04/22 1923 10/04/22 1932   BP:  136/85   Pulse:  88   Resp: 30 (!) 31   Temp:  98.3 °F (36.8 °C)   TempSrc:  Axillary   SpO2:  100%   Weight:  120 lb (54.4 kg)   Height:  5' 4\" (1.626 m)     Heart regular rate rhythm no murmurs. Lungs diffuse wheezing with some crackles. Patient is in moderate respiratory distress. Patient does have swelling of bilateral lower extremities with edema. I performed a history and physical examination of the patient and discussed management with the resident. I reviewed the residents note and agree with the documented findings and plan of care. Any areas of disagreement are noted on the chart. I was personally present for the key portions of any procedures. I have documented in the chart those procedures where I was not present during the key portions. I have personally reviewed all images and agree with the resident's interpretation. I have reviewed the emergency nurses triage note. I agree with the chief complaint, past medical history, past surgical history, allergies, medications, social and family history as documented unless otherwise noted below.  Documentation of the HPI, Physical Exam and Medical Decision Making performed by medical students or scribes is based on my personal performance of the HPI, PE and MDM. I personally evaluated and examined the patient in conjunction with the APC and agree with the assessment, treatment plan, and disposition of the patient as recorded by the APC. Additional findings are as noted.     Ramiro Amaya MD  Attending Emergency  Physician             Robyn Alvarado MD  10/04/22 2010

## 2022-10-04 NOTE — ED TRIAGE NOTES
Mode of arrival (squad #, walk in, police, etc) : EMS        Chief complaint(s): Shortness of breath, Chest pain. Arrival Note (brief scenario, treatment PTA, etc). : Pt arrives by EMS with chest pain and shortness of breath. Pt was given duoneb tx and solumedrol. Pt placed on bipap. Pt has wheezes on arrival. Pt resp are tachycardic. Pt tolerating better on ER bipap. Pt has an IV in left AC on arrival.         C= \"Have you ever felt that you should Cut down on your drinking? \"  No  A= \"Have people Annoyed you by criticizing your drinking? \"  No  G= \"Have you ever felt bad or Guilty about your drinking? \"  No  E= \"Have you ever had a drink as an Eye-opener first thing in the morning to steady your nerves or to help a hangover? \"  No      Deferred []      Reason for deferring: N/A    *If yes to two or more: probable alcohol abuse. *

## 2022-10-04 NOTE — ED PROVIDER NOTES
[address]  Emergency Department Encounter  EmergencyMedicine Resident     Pt Name:Graciela Summers  MRN: [id number]  [place] 1950  Date of evaluation: 10/4/22  PCP:  Kenn Prather MD    [address] [place] [address]       Chief Complaint   Patient presents with    Shortness of Breath    Chest Pain     Pt arrives from home by EMS with chest pain and shortness of breath. Pt has hx of COPD. HISTORY OF PRESENT ILLNESS  (Location/Symptom, Timing/Onset, Context/Setting, Quality, Duration, Modifying Factors, Severity.)      Francois Alfred is a 74 y.o. female who presents with worsening shortness of breath the last several days. Denies any chest pain. Does wear 2 L nasal cannula at night. Was hypoxic for EMS comes in on BiPAP. PAST MEDICAL / SURGICAL / SOCIAL / FAMILY HISTORY      has a past medical history of Allergic rhinitis, Arthritis, CAD (coronary artery disease), Cerebral artery occlusion with cerebral infarction ([address] CHF (congestive heart failure) ([address] Controlled type 2 diabetes mellitus without complication, without long-term current use of insulin ([address] COPD (chronic obstructive pulmonary disease) ([address] Depression, Former smoker, History of blood transfusion, Hyperlipidemia, Hypertension, Kidney stone, Myocardial infarction ([address] Obesity, Class I, BMI 30.0-34.9 (see actual BMI), Restless leg syndrome, Skin abnormality, Type II or unspecified type diabetes mellitus without mention of complication, not stated as uncontrolled, Wears glasses, and Wears partial dentures. has a past surgical history that includes Appendectomy; Hysterectomy; Cholecystectomy; joint replacement (Bilateral); pr office/outpt visit,procedure only (N/A, 2/6/2018); pr incis/drain thigh/knee abscess,deep (Right, 5/7/2018); Leg biopsy excision (Right, 8/29/2019);  Cardiac surgery; Cardiac surgery; other surgical history (07/26/2020); cervical laminectomy (N/A, 10/14/2020); and bronchoscopy (N/A, 8/16/2021). Social History     Socioeconomic History    Marital status:      Spouse name: Not on file    Number of children: Not on file    Years of education: Not on file    Highest education level: Not on file   Occupational History    Not on file   Tobacco Use    Smoking status: Some Days     Packs/day: 0.50     Years: 56.00     Pack years: 28.00     Types: Cigarettes    Smokeless tobacco: Never    Tobacco comments:     4-5 cigarettes a day   Vaping Use    Vaping Use: Former   Substance and Sexual Activity    Alcohol use: No     Alcohol/week: 0.0 standard drinks    Drug use: Yes     Types: Marijuana Marcelino Passy)     Comment: ocassionally    Sexual activity: Not on file   Other Topics Concern    Not on file   Social History Narrative    Not on file     Social Determinants of Health     Financial Resource Strain: Low Risk     Difficulty of Paying Living Expenses: Not hard at all   Food Insecurity: No Food Insecurity    Worried About Running Out of Food in the Last Year: Never true    [address] in the Last Year: Never true   Transportation Needs: No Transportation Needs    Lack of Transportation (Medical): No    Lack of Transportation (Non-Medical): No   Physical Activity: Inactive    Days of Exercise per Week: 0 days    Minutes of Exercise per Session: 0 min   Stress: No Stress Concern Present    Feeling of Stress : Only a little   Social Connections: Moderately Isolated    Frequency of Communication with Friends and Family: More than three times a week    Frequency of Social Gatherings with Friends and Family: More than three times a week    Attends Sabianism Services: Never    Active Member of Clubs or Organizations: Yes    Attends Club or Organization Meetings: More than 4 times per year    Marital Status:     Intimate Partner Violence: Not on file   Housing Stability: Low Risk     Unable to Pay for Housing in the Last Year: No    Number of Places Lived in the Last Year: 1    Unstable Housing in the Last Year: No       Family History   Problem Relation Age of Onset    Heart Disease Father        Allergies:  Codeine, Lisinopril, Morphine, and Potassium-containing compounds    Home Medications:  Prior to Admission medications    Medication Sig Start Date End Date Taking? Authorizing Provider   pantoprazole (PROTONIX) 40 MG tablet Take 1 tablet by mouth every morning (before breakfast) 9/27/22   Radha Deal MD   albuterol sulfate  (90 Base) MCG/ACT inhaler Inhale 2 puffs into the lungs every 6 hours as needed for Wheezing or Shortness of Breath 5/16/22   Rashida Castillo MD   budesonide-formoterol [facility]) 160-4.5 MCG/ACT AERO Inhale 2 puffs into the lungs 2 times daily 5/16/22   Rashida Castillo MD   ipratropium-albuterol (DUONEB) 0.5-2.5 (3) MG/3ML SOLN nebulizer solution Inhale 3 mLs into the lungs every 4 hours as needed for Shortness of Breath 5/16/22   Rashida Castillo MD   bumetanide (BUMEX) 2 MG tablet Take 1 tablet by mouth daily 5/16/22   Rashida Castillo MD   potassium chloride (KLOR-CON) 10 MEQ extended release tablet TAKE 2 TABLETS BY MOUTH DAILY 5/16/22   Manuel Dumont MD   clopidogrel (PLAVIX) 75 MG tablet TAKE 1 TABLET BY MOUTH DAILY 5/16/22   Manuel Dumont MD   tiZANidine (ZANAFLEX) 2 MG tablet TAKE 1 TABLET BY MOUTH NIGHTLY AS NEEDED (PAIN) 4/16/22   Manuel Dumont MD   rOPINIRole (REQUIP) 1 MG tablet TAKE 1 TABLET BY MOUTH EVERY NIGHT AS NEEDED 3/30/22   JAMES Javier NP   atorvastatin (LIPITOR) 40 MG tablet TAKE 2 TABLETS BY MOUTH DAILY 2/17/22   Manuel Dumont MD   ferrous sulfate (IRON 325) 325 (65 Fe) MG tablet Take 1 tablet by mouth daily (with breakfast) 2/10/22   Radha Deal MD   Blood Pressure KIT Dx: HTN. Needs automatic blood pressure machine to monitor her blood pressure.  2/10/22   Radha Deal MD   isosorbide mononitrate (IMDUR) 30 MG extended release tablet Take 1 tablet by mouth daily 2/4/22   MD Melissa Leigh (DESYREL) 50 MG tablet Take 1 tablet by mouth nightly as needed for Sleep 1/17/22   Raúl Blanco MD   metoprolol succinate (TOPROL XL) 50 MG extended release tablet Take 1 tablet by mouth daily 11/30/21   Raúl Blanco MD   nicotine (NICODERM CQ) 14 MG/24HR Place 1 patch onto the skin daily 11/30/21   Raúl Blanco MD   guaiFENesin [facility]) 600 MG extended release tablet Take 1 tablet by mouth 2 times daily 8/17/21   Raúl Blanco MD   magnesium oxide (MAG-OX) 400 (241.3 Mg) MG TABS tablet Take 1 tablet by mouth 2 times daily 8/17/21   Raúl Blanco MD   diclofenac sodium (VOLTAREN) 1 % GEL Apply 2 g topically 2 times daily as needed for Pain     Historical Provider, MD   metFORMIN (GLUCOPHAGE-XR) 500 MG extended release tablet Take 1 tablet by mouth daily (with breakfast) 8/9/21   Raúl Blanco MD   citalopram (CELEXA) 20 MG tablet Take 1 tablet by mouth daily 8/9/21   Raúl Blanco MD   nitroGLYCERIN (NITROSTAT) 0.4 MG SL tablet Place 1 tablet under the tongue every 5 minutes as needed for Chest pain up to max of 3 total doses. If no relief after 1 dose, call 911. 8/9/21   Raúl Blanco MD   SM ASPIRIN ADULT LOW STRENGTH 81 MG EC tablet TAKE 1 TABLET BY MOUTH DAILY 5/21/21   Raúl Blanco MD   fluticasone [facility]) 50 MCG/ACT nasal spray USE 2 SPRAYS IN [facility] NOSTRIL ONCE DAILY 5/21/21   Raúl Blanco MD   ONE TOUCH ULTRA TEST strip TEST ONCE DAILY AS NEEDED 4/6/20   Raúl Blanco MD       REVIEW OF SYSTEMS    (2-9 systems for level 4, 10 or more for level 5)      Review of Systems   Constitutional:  Negative for chills and fever. HENT:  Negative for rhinorrhea and sore throat. Eyes:  Negative for photophobia. Respiratory:  Positive for cough, shortness of breath and wheezing. Negative for chest tightness. Cardiovascular:  Negative for chest pain.    Gastrointestinal:  Negative for abdominal distention, anal bleeding, constipation, diarrhea, nausea and vomiting. Endocrine: Negative for polyuria. Genitourinary:  Negative for difficulty urinating and flank pain. Musculoskeletal:  Negative for arthralgias. Skin:  Negative for rash. Neurological:  Negative for weakness and headaches. PHYSICAL EXAM   (up to 7 for level 4, 8 or more for level 5)      INITIAL VITALS:   BP (!) 141/96   Pulse 86   Temp 98.3 °F (36.8 °C) (Axillary)   Resp 17   Ht 5' 4\" (1.626 m)   Wt 120 lb (54.4 kg)   SpO2 99%   BMI 20.60 kg/m²     Physical Exam  Constitutional:       General: She is not in acute distress. Appearance: She is not ill-appearing. HENT:      Head: Normocephalic and atraumatic. Nose: Nose normal.      Mouth/Throat:      Mouth: Mucous membranes are moist.   Eyes:      Extraocular Movements: Extraocular movements intact. Pupils: Pupils are equal, round, and reactive to light. Cardiovascular:      Rate and Rhythm: Tachycardia present. Pulses: Normal pulses. Heart sounds: Normal heart sounds. Pulmonary:      Effort: No respiratory distress. Breath sounds: Wheezing present. No rales. Abdominal:      Palpations: Abdomen is soft. Tenderness: no abdominal tenderness   Musculoskeletal:      Cervical back: No tenderness. Right lower leg: No edema. Left lower leg: No edema. Skin:     Capillary Refill: Capillary refill takes less than 2 seconds. Coloration: Skin is not jaundiced. Findings: No rash. Neurological:      General: No focal deficit present. Mental Status: She is oriented to person, place, and time.        DIFFERENTIAL  DIAGNOSIS     PLAN (LABS / IMAGING / EKG):  Orders Placed This Encounter   Procedures    COVID-19 & Influenza Combo    XR CHEST PORTABLE    BMP    CBC with Auto Differential    Troponin    Brain Natriuretic Peptide    Blood Gas, Venous    Troponin    Inpatient consult to Internal Medicine    Initiate ED RT Aerosol protocol    Adult NIV/Positive Airway Pressure    EKG 12 Lead       MEDICATIONS ORDERED:  Orders Placed This Encounter   Medications    ipratropium-albuterol (DUONEB) nebulizer solution 1 ampule     Order Specific Question:   Initiate RT Bronchodilator Protocol     Answer:   Yes - ED protocol    albuterol (PROVENTIL) nebulizer solution 2.5 mg     Order Specific Question:   Initiate RT Bronchodilator Protocol     Answer:   Yes - ED protocol    ipratropium-albuterol (DUONEB) 0.5-2.5 (3) MG/3ML nebulizer solution     Arthurine Rotunda: cabinet override    aspirin chewable tablet 324 mg    bumetanide (BUMEX) injection 2 mg       DIAGNOSTIC RESULTS / EMERGENCY DEPARTMENT COURSE / MDM   LAB RESULTS:  Results for orders placed or performed during the hospital encounter of 10/04/22   COVID-19 & Influenza Combo    Specimen: Nasopharyngeal Swab   Result Value Ref Range    Specimen Description . NASOPHARYNGEAL SWAB     Source . NASOPHARYNGEAL SWAB     SARS-CoV-2 RNA, RT PCR Not Detected Not Detected    INFLUENZA A Not Detected Not Detected    INFLUENZA B Not Detected Not Detected   BMP   Result Value Ref Range    Glucose 165 (H) 70 - 99 mg/dL    BUN 16 8 - 23 mg/dL    Creatinine 1.09 (H) 0.50 - 0.90 mg/dL    Calcium 8.8 8.6 - 10.4 mg/dL    Sodium 140 135 - 144 mmol/L    Potassium 3.6 (L) 3.7 - 5.3 mmol/L    Chloride 105 98 - 107 mmol/L    CO2 25 20 - 31 mmol/L    Anion Gap 10 9 - 17 mmol/L    Est, Glom Filt Rate 53 (L) >60 mL/min/1.73m2   CBC with Auto Differential   Result Value Ref Range    WBC 7.2 3.5 - 11.0 k/uL    RBC 3.77 (L) 4.0 - 5.2 m/uL    Hemoglobin 10.1 (L) 12.0 - 16.0 g/dL    Hematocrit 31.5 (L) 36 - 46 %    MCV 83.5 80 - 100 fL    MCH 26.7 26 - 34 pg    MCHC 32.0 31 - 37 g/dL    RDW 18.6 (H) 11.5 - 14.9 %    Platelets 098 229 - 380 k/uL    MPV 8.3 6.0 - 12.0 fL    Seg Neutrophils 66 36 - 66 %    Lymphocytes 22 (L) 24 - 44 %    Monocytes 9 (H) 1 - 7 %    Eosinophils % 2 0 - 4 %    Basophils 1 0 - 2 %    Segs Absolute 4.80 1.3 - 9.1 k/uL    Absolute Lymph # 1.60 1.0 - 4.8 k/uL    Absolute Mono # 0.60 0.1 - 1.3 k/uL    Absolute Eos # 0.10 0.0 - 0.4 k/uL    Basophils Absolute 0.00 0.0 - 0.2 k/uL   Troponin   Result Value Ref Range    Troponin, High Sensitivity 64 (HH) 0 - 14 ng/L   Brain Natriuretic Peptide   Result Value Ref Range    Pro-BNP 46,149 (H) <300 pg/mL   Blood Gas, Venous   Result Value Ref Range    pH, Feliberto 7.380 7.320 - 7.420    pCO2, Feliberto 45.6 39.0 - 55.0 mm Hg    HCO3, Venous 26.9 24.0 - 30.0 mmol/L    Positive Base Excess, Feliberto 1.8 0.0 - 2.0 mmol/L    O2 Sat, Feliberto 43.7 (L) 60.0 - 85.0 %    Carboxyhemoglobin 3.6 0 - 5 %    Methemoglobin 0.8 0.0 - 1.9 %    Pt Temp 37.0     Text for Respiratory RESULTS TO MD    Troponin   Result Value Ref Range    Troponin, High Sensitivity 70 (HH) 0 - 14 ng/L   EKG 12 Lead   Result Value Ref Range    Ventricular Rate 87 BPM    Atrial Rate 87 BPM    P-R Interval 134 ms    QRS Duration 92 ms    Q-T Interval 382 ms    QTc Calculation (Bazett) 459 ms    P Axis 28 degrees    R Axis -12 degrees    T Axis 164 degrees       RADIOLOGY:  XR CHEST PORTABLE    Result Date: 10/4/2022  Mild pulmonary vascular congestion        EKG Interpretation    Interpreted by myself    Rhythm: normal sinus   Rate: normal  Axis: normal  Ectopy: none  Conduction: normal  ST Segments: normal  T Waves: non specific changes  Q Waves: none    Clinical Impression: non-specific EKG    All EKG's are interpreted by the Emergency Department Physician who either signs or Co-signs this chart in the absence of a cardiologist.    EMERGENCY DEPARTMENT COURSE:  ED Course as of 10/04/22 2228   Tue Oct 04, 2022   Eliseo Wallace Patient presents to the emergency department on BiPAP history of COPD wears 2 L of nasal cannula at night. Was given Solu-Medrol duo nebs in route. Will give further duo nebs we will keep BiPAP on. Will get labs EKG chest x-ray [ZE]   2053 Mild pulmonary vascular congestion grossly elevated BNP.   Troponin is elevated above her baseline awaiting second troponin. Will admit patient [ZE]   4956 Second troponin elevated at 70 will recommend admitting team continue to trend. She is not actively having chest pain. We did give patient 2 mg of Bumex IV which is her home dose. Patient to be admitted to medicine service [ZE]      ED Course User Index  [ZE] Marlen Diana DO       PROCEDURES:  Procedures     CONSULTS:  IP CONSULT TO INTERNAL MEDICINE    CRITICAL CARE:  none    MDM  Here for respiratory distress known history of COPD. Requiring BiPAP nontoxic VBG unremarkable. Patient did have elevation in cardiac enzymes likely related to mild acute on chronic CHF exacerbation. Patient given IV diuresis. Patient has symptomatic improvement with BiPAP. She was admitted for further BiPAP duo nebs and steroids well as IV diuresis    FINAL IMPRESSION      1. COPD exacerbation ([address]          DISPOSITION / PLAN     DISPOSITION Decision To Admit 10/04/2022 09:16:32 PM      PATIENT REFERRED TO:  No follow-up provider specified.     DISCHARGE MEDICATIONS:  New Prescriptions    No medications on file       Moreno Morrow DO  Emergency Medicine Resident    (Please note that portions of thisnote were completed with a voice recognition program.  Efforts were made to edit the dictations but occasionally words are mis-transcribed.)        Marlen Diana DO  Resident  10/04/22 [id number] Jose J Sims DO  Resident  10/04/22 [id number]

## 2022-10-05 ENCOUNTER — APPOINTMENT (OUTPATIENT)
Dept: GENERAL RADIOLOGY | Age: 72
DRG: 291 | End: 2022-10-05
Payer: COMMERCIAL

## 2022-10-05 ENCOUNTER — APPOINTMENT (OUTPATIENT)
Dept: NON INVASIVE DIAGNOSTICS | Age: 72
DRG: 291 | End: 2022-10-05
Payer: COMMERCIAL

## 2022-10-05 LAB
AMPHETAMINE SCREEN URINE: NEGATIVE
ANION GAP SERPL CALCULATED.3IONS-SCNC: 10 MMOL/L (ref 9–17)
BARBITURATE SCREEN URINE: NEGATIVE
BENZODIAZEPINE SCREEN, URINE: NEGATIVE
BUN BLDV-MCNC: 16 MG/DL (ref 8–23)
CALCIUM SERPL-MCNC: 8.5 MG/DL (ref 8.6–10.4)
CANNABINOID SCREEN URINE: NEGATIVE
CHLORIDE BLD-SCNC: 106 MMOL/L (ref 98–107)
CO2: 24 MMOL/L (ref 20–31)
COCAINE METABOLITE, URINE: POSITIVE
CREAT SERPL-MCNC: 1.16 MG/DL (ref 0.5–0.9)
EKG ATRIAL RATE: 87 BPM
EKG P AXIS: 28 DEGREES
EKG P-R INTERVAL: 134 MS
EKG Q-T INTERVAL: 382 MS
EKG QRS DURATION: 92 MS
EKG QTC CALCULATION (BAZETT): 459 MS
EKG R AXIS: -12 DEGREES
EKG T AXIS: 164 DEGREES
EKG VENTRICULAR RATE: 87 BPM
FENTANYL URINE: NEGATIVE
GFR SERPL CREATININE-BSD FRML MDRD: 49 ML/MIN/1.73M2
GLUCOSE BLD-MCNC: 107 MG/DL (ref 65–105)
GLUCOSE BLD-MCNC: 167 MG/DL (ref 65–105)
GLUCOSE BLD-MCNC: 177 MG/DL (ref 65–105)
GLUCOSE BLD-MCNC: 222 MG/DL (ref 70–99)
GLUCOSE BLD-MCNC: 296 MG/DL (ref 65–105)
HCT VFR BLD CALC: 31.5 % (ref 36–46)
HEMOGLOBIN: 10.1 G/DL (ref 12–16)
LV EF: 15 %
LVEF MODALITY: NORMAL
MCH RBC QN AUTO: 26.9 PG (ref 26–34)
MCHC RBC AUTO-ENTMCNC: 32 G/DL (ref 31–37)
MCV RBC AUTO: 84.1 FL (ref 80–100)
METHADONE SCREEN, URINE: NEGATIVE
OPIATES, URINE: NEGATIVE
OXYCODONE SCREEN URINE: NEGATIVE
PDW BLD-RTO: 18.2 % (ref 11.5–14.9)
PHENCYCLIDINE, URINE: NEGATIVE
PLATELET # BLD: 201 K/UL (ref 150–450)
PMV BLD AUTO: 8.6 FL (ref 6–12)
POTASSIUM SERPL-SCNC: 4 MMOL/L (ref 3.7–5.3)
PROCALCITONIN: 0.05 NG/ML
RBC # BLD: 3.75 M/UL (ref 4–5.2)
SODIUM BLD-SCNC: 140 MMOL/L (ref 135–144)
TEST INFORMATION: ABNORMAL
WBC # BLD: 4.8 K/UL (ref 3.5–11)

## 2022-10-05 PROCEDURE — 94640 AIRWAY INHALATION TREATMENT: CPT

## 2022-10-05 PROCEDURE — 6370000000 HC RX 637 (ALT 250 FOR IP): Performed by: STUDENT IN AN ORGANIZED HEALTH CARE EDUCATION/TRAINING PROGRAM

## 2022-10-05 PROCEDURE — 97166 OT EVAL MOD COMPLEX 45 MIN: CPT

## 2022-10-05 PROCEDURE — 2700000000 HC OXYGEN THERAPY PER DAY

## 2022-10-05 PROCEDURE — 6360000002 HC RX W HCPCS: Performed by: STUDENT IN AN ORGANIZED HEALTH CARE EDUCATION/TRAINING PROGRAM

## 2022-10-05 PROCEDURE — 97535 SELF CARE MNGMENT TRAINING: CPT

## 2022-10-05 PROCEDURE — 6360000002 HC RX W HCPCS: Performed by: NURSE PRACTITIONER

## 2022-10-05 PROCEDURE — 71045 X-RAY EXAM CHEST 1 VIEW: CPT

## 2022-10-05 PROCEDURE — 97530 THERAPEUTIC ACTIVITIES: CPT

## 2022-10-05 PROCEDURE — 2580000003 HC RX 258: Performed by: STUDENT IN AN ORGANIZED HEALTH CARE EDUCATION/TRAINING PROGRAM

## 2022-10-05 PROCEDURE — 6360000002 HC RX W HCPCS: Performed by: INTERNAL MEDICINE

## 2022-10-05 PROCEDURE — 2580000003 HC RX 258: Performed by: NURSE PRACTITIONER

## 2022-10-05 PROCEDURE — 82947 ASSAY GLUCOSE BLOOD QUANT: CPT

## 2022-10-05 PROCEDURE — 80307 DRUG TEST PRSMV CHEM ANLYZR: CPT

## 2022-10-05 PROCEDURE — 94660 CPAP INITIATION&MGMT: CPT

## 2022-10-05 PROCEDURE — 84145 PROCALCITONIN (PCT): CPT

## 2022-10-05 PROCEDURE — 6370000000 HC RX 637 (ALT 250 FOR IP): Performed by: NURSE PRACTITIONER

## 2022-10-05 PROCEDURE — 94761 N-INVAS EAR/PLS OXIMETRY MLT: CPT

## 2022-10-05 PROCEDURE — 85027 COMPLETE CBC AUTOMATED: CPT

## 2022-10-05 PROCEDURE — 36415 COLL VENOUS BLD VENIPUNCTURE: CPT

## 2022-10-05 PROCEDURE — 2060000000 HC ICU INTERMEDIATE R&B

## 2022-10-05 PROCEDURE — 99223 1ST HOSP IP/OBS HIGH 75: CPT | Performed by: INTERNAL MEDICINE

## 2022-10-05 PROCEDURE — 80048 BASIC METABOLIC PNL TOTAL CA: CPT

## 2022-10-05 PROCEDURE — 93306 TTE W/DOPPLER COMPLETE: CPT

## 2022-10-05 PROCEDURE — 93010 ELECTROCARDIOGRAM REPORT: CPT | Performed by: INTERNAL MEDICINE

## 2022-10-05 PROCEDURE — 97162 PT EVAL MOD COMPLEX 30 MIN: CPT

## 2022-10-05 PROCEDURE — 6370000000 HC RX 637 (ALT 250 FOR IP): Performed by: EMERGENCY MEDICINE

## 2022-10-05 RX ORDER — INSULIN LISPRO 100 [IU]/ML
0-8 INJECTION, SOLUTION INTRAVENOUS; SUBCUTANEOUS
Status: DISCONTINUED | OUTPATIENT
Start: 2022-10-05 | End: 2022-10-07 | Stop reason: HOSPADM

## 2022-10-05 RX ORDER — ACETAMINOPHEN 650 MG/1
650 SUPPOSITORY RECTAL EVERY 6 HOURS PRN
Status: DISCONTINUED | OUTPATIENT
Start: 2022-10-05 | End: 2022-10-07 | Stop reason: HOSPADM

## 2022-10-05 RX ORDER — ASPIRIN 81 MG/1
81 TABLET ORAL DAILY
Status: DISCONTINUED | OUTPATIENT
Start: 2022-10-05 | End: 2022-10-07 | Stop reason: HOSPADM

## 2022-10-05 RX ORDER — SODIUM CHLORIDE 0.9 % (FLUSH) 0.9 %
5-40 SYRINGE (ML) INJECTION PRN
Status: DISCONTINUED | OUTPATIENT
Start: 2022-10-05 | End: 2022-10-07 | Stop reason: HOSPADM

## 2022-10-05 RX ORDER — ENOXAPARIN SODIUM 100 MG/ML
40 INJECTION SUBCUTANEOUS DAILY
Status: DISCONTINUED | OUTPATIENT
Start: 2022-10-05 | End: 2022-10-05

## 2022-10-05 RX ORDER — METOPROLOL SUCCINATE 50 MG/1
50 TABLET, EXTENDED RELEASE ORAL DAILY
Status: DISCONTINUED | OUTPATIENT
Start: 2022-10-05 | End: 2022-10-05

## 2022-10-05 RX ORDER — POTASSIUM CHLORIDE 20 MEQ/1
20 TABLET, EXTENDED RELEASE ORAL DAILY
Status: DISCONTINUED | OUTPATIENT
Start: 2022-10-05 | End: 2022-10-07 | Stop reason: HOSPADM

## 2022-10-05 RX ORDER — FUROSEMIDE 10 MG/ML
40 INJECTION INTRAMUSCULAR; INTRAVENOUS 2 TIMES DAILY
Status: DISCONTINUED | OUTPATIENT
Start: 2022-10-05 | End: 2022-10-05

## 2022-10-05 RX ORDER — METHYLPREDNISOLONE SODIUM SUCCINATE 40 MG/ML
40 INJECTION, POWDER, LYOPHILIZED, FOR SOLUTION INTRAMUSCULAR; INTRAVENOUS EVERY 8 HOURS
Status: DISCONTINUED | OUTPATIENT
Start: 2022-10-05 | End: 2022-10-07

## 2022-10-05 RX ORDER — SODIUM CHLORIDE 9 MG/ML
INJECTION, SOLUTION INTRAVENOUS CONTINUOUS
Status: DISCONTINUED | OUTPATIENT
Start: 2022-10-05 | End: 2022-10-05

## 2022-10-05 RX ORDER — METHYLPREDNISOLONE SODIUM SUCCINATE 125 MG/2ML
60 INJECTION, POWDER, LYOPHILIZED, FOR SOLUTION INTRAMUSCULAR; INTRAVENOUS EVERY 6 HOURS
Status: DISCONTINUED | OUTPATIENT
Start: 2022-10-05 | End: 2022-10-05

## 2022-10-05 RX ORDER — ALBUTEROL SULFATE 2.5 MG/3ML
2.5 SOLUTION RESPIRATORY (INHALATION)
Status: DISCONTINUED | OUTPATIENT
Start: 2022-10-05 | End: 2022-10-07 | Stop reason: HOSPADM

## 2022-10-05 RX ORDER — NICOTINE 21 MG/24HR
1 PATCH, TRANSDERMAL 24 HOURS TRANSDERMAL DAILY
Status: DISCONTINUED | OUTPATIENT
Start: 2022-10-05 | End: 2022-10-07 | Stop reason: HOSPADM

## 2022-10-05 RX ORDER — SPIRONOLACTONE 25 MG/1
25 TABLET ORAL DAILY
Status: DISCONTINUED | OUTPATIENT
Start: 2022-10-05 | End: 2022-10-07 | Stop reason: HOSPADM

## 2022-10-05 RX ORDER — NITROGLYCERIN 0.4 MG/1
0.4 TABLET SUBLINGUAL EVERY 5 MIN PRN
Status: DISCONTINUED | OUTPATIENT
Start: 2022-10-05 | End: 2022-10-07 | Stop reason: HOSPADM

## 2022-10-05 RX ORDER — CITALOPRAM 20 MG/1
20 TABLET ORAL DAILY
Status: DISCONTINUED | OUTPATIENT
Start: 2022-10-05 | End: 2022-10-07 | Stop reason: HOSPADM

## 2022-10-05 RX ORDER — LANOLIN ALCOHOL/MO/W.PET/CERES
400 CREAM (GRAM) TOPICAL 2 TIMES DAILY
Status: DISCONTINUED | OUTPATIENT
Start: 2022-10-05 | End: 2022-10-07 | Stop reason: HOSPADM

## 2022-10-05 RX ORDER — INSULIN LISPRO 100 [IU]/ML
0-4 INJECTION, SOLUTION INTRAVENOUS; SUBCUTANEOUS NIGHTLY
Status: DISCONTINUED | OUTPATIENT
Start: 2022-10-05 | End: 2022-10-07 | Stop reason: HOSPADM

## 2022-10-05 RX ORDER — ENOXAPARIN SODIUM 100 MG/ML
1 INJECTION SUBCUTANEOUS 2 TIMES DAILY
Status: DISCONTINUED | OUTPATIENT
Start: 2022-10-05 | End: 2022-10-07

## 2022-10-05 RX ORDER — ONDANSETRON 2 MG/ML
4 INJECTION INTRAMUSCULAR; INTRAVENOUS EVERY 6 HOURS PRN
Status: DISCONTINUED | OUTPATIENT
Start: 2022-10-05 | End: 2022-10-07 | Stop reason: HOSPADM

## 2022-10-05 RX ORDER — SODIUM CHLORIDE 0.9 % (FLUSH) 0.9 %
5-40 SYRINGE (ML) INJECTION EVERY 12 HOURS SCHEDULED
Status: DISCONTINUED | OUTPATIENT
Start: 2022-10-05 | End: 2022-10-07 | Stop reason: HOSPADM

## 2022-10-05 RX ORDER — ACETAMINOPHEN 325 MG/1
650 TABLET ORAL EVERY 6 HOURS PRN
Status: DISCONTINUED | OUTPATIENT
Start: 2022-10-05 | End: 2022-10-07 | Stop reason: HOSPADM

## 2022-10-05 RX ORDER — BUDESONIDE AND FORMOTEROL FUMARATE DIHYDRATE 160; 4.5 UG/1; UG/1
2 AEROSOL RESPIRATORY (INHALATION) 2 TIMES DAILY
Status: DISCONTINUED | OUTPATIENT
Start: 2022-10-05 | End: 2022-10-07 | Stop reason: HOSPADM

## 2022-10-05 RX ORDER — ISOSORBIDE MONONITRATE 30 MG/1
30 TABLET, EXTENDED RELEASE ORAL DAILY
Status: DISCONTINUED | OUTPATIENT
Start: 2022-10-05 | End: 2022-10-07 | Stop reason: HOSPADM

## 2022-10-05 RX ORDER — DEXTROSE MONOHYDRATE 100 MG/ML
INJECTION, SOLUTION INTRAVENOUS CONTINUOUS PRN
Status: DISCONTINUED | OUTPATIENT
Start: 2022-10-05 | End: 2022-10-07 | Stop reason: HOSPADM

## 2022-10-05 RX ORDER — SODIUM CHLORIDE 9 MG/ML
INJECTION, SOLUTION INTRAVENOUS PRN
Status: DISCONTINUED | OUTPATIENT
Start: 2022-10-05 | End: 2022-10-07 | Stop reason: HOSPADM

## 2022-10-05 RX ORDER — TRAZODONE HYDROCHLORIDE 50 MG/1
50 TABLET ORAL NIGHTLY PRN
Status: DISCONTINUED | OUTPATIENT
Start: 2022-10-05 | End: 2022-10-07 | Stop reason: HOSPADM

## 2022-10-05 RX ORDER — PANTOPRAZOLE SODIUM 40 MG/1
40 TABLET, DELAYED RELEASE ORAL
Status: DISCONTINUED | OUTPATIENT
Start: 2022-10-05 | End: 2022-10-07 | Stop reason: HOSPADM

## 2022-10-05 RX ORDER — TIZANIDINE 2 MG/1
2 TABLET ORAL NIGHTLY PRN
Status: DISCONTINUED | OUTPATIENT
Start: 2022-10-05 | End: 2022-10-07 | Stop reason: HOSPADM

## 2022-10-05 RX ORDER — ONDANSETRON 4 MG/1
4 TABLET, ORALLY DISINTEGRATING ORAL EVERY 8 HOURS PRN
Status: DISCONTINUED | OUTPATIENT
Start: 2022-10-05 | End: 2022-10-07 | Stop reason: HOSPADM

## 2022-10-05 RX ORDER — ATORVASTATIN CALCIUM 80 MG/1
80 TABLET, FILM COATED ORAL NIGHTLY
Status: DISCONTINUED | OUTPATIENT
Start: 2022-10-05 | End: 2022-10-07 | Stop reason: HOSPADM

## 2022-10-05 RX ORDER — CARVEDILOL 6.25 MG/1
6.25 TABLET ORAL 2 TIMES DAILY WITH MEALS
Status: DISCONTINUED | OUTPATIENT
Start: 2022-10-05 | End: 2022-10-07 | Stop reason: HOSPADM

## 2022-10-05 RX ORDER — ROPINIROLE 1 MG/1
1 TABLET, FILM COATED ORAL NIGHTLY
Status: DISCONTINUED | OUTPATIENT
Start: 2022-10-05 | End: 2022-10-07 | Stop reason: HOSPADM

## 2022-10-05 RX ORDER — CLOPIDOGREL BISULFATE 75 MG/1
75 TABLET ORAL DAILY
Status: DISCONTINUED | OUTPATIENT
Start: 2022-10-05 | End: 2022-10-07 | Stop reason: HOSPADM

## 2022-10-05 RX ORDER — FUROSEMIDE 10 MG/ML
40 INJECTION INTRAMUSCULAR; INTRAVENOUS 3 TIMES DAILY
Status: DISCONTINUED | OUTPATIENT
Start: 2022-10-05 | End: 2022-10-06

## 2022-10-05 RX ORDER — GUAIFENESIN 600 MG/1
600 TABLET, EXTENDED RELEASE ORAL 2 TIMES DAILY
Status: DISCONTINUED | OUTPATIENT
Start: 2022-10-05 | End: 2022-10-06

## 2022-10-05 RX ORDER — POLYETHYLENE GLYCOL 3350 17 G/17G
17 POWDER, FOR SOLUTION ORAL DAILY PRN
Status: DISCONTINUED | OUTPATIENT
Start: 2022-10-05 | End: 2022-10-07 | Stop reason: HOSPADM

## 2022-10-05 RX ORDER — BUMETANIDE 0.25 MG/ML
1 INJECTION, SOLUTION INTRAMUSCULAR; INTRAVENOUS 2 TIMES DAILY
Status: DISCONTINUED | OUTPATIENT
Start: 2022-10-05 | End: 2022-10-05

## 2022-10-05 RX ADMIN — BUDESONIDE AND FORMOTEROL FUMARATE DIHYDRATE 2 PUFF: 160; 4.5 AEROSOL RESPIRATORY (INHALATION) at 11:10

## 2022-10-05 RX ADMIN — CARVEDILOL 6.25 MG: 6.25 TABLET, FILM COATED ORAL at 17:57

## 2022-10-05 RX ADMIN — FUROSEMIDE 40 MG: 10 INJECTION, SOLUTION INTRAMUSCULAR; INTRAVENOUS at 08:57

## 2022-10-05 RX ADMIN — IPRATROPIUM BROMIDE AND ALBUTEROL SULFATE 1 AMPULE: 2.5; .5 SOLUTION RESPIRATORY (INHALATION) at 20:40

## 2022-10-05 RX ADMIN — IPRATROPIUM BROMIDE AND ALBUTEROL SULFATE 1 AMPULE: 2.5; .5 SOLUTION RESPIRATORY (INHALATION) at 15:15

## 2022-10-05 RX ADMIN — MAGNESIUM OXIDE TAB 400 MG (241.3 MG ELEMENTAL MG) 400 MG: 400 (241.3 MG) TAB at 08:47

## 2022-10-05 RX ADMIN — METHYLPREDNISOLONE SODIUM SUCCINATE 40 MG: 40 INJECTION, POWDER, FOR SOLUTION INTRAMUSCULAR; INTRAVENOUS at 16:13

## 2022-10-05 RX ADMIN — FUROSEMIDE 40 MG: 10 INJECTION, SOLUTION INTRAMUSCULAR; INTRAVENOUS at 16:13

## 2022-10-05 RX ADMIN — ENOXAPARIN SODIUM 60 MG: 100 INJECTION SUBCUTANEOUS at 21:30

## 2022-10-05 RX ADMIN — GUAIFENESIN 600 MG: 600 TABLET, EXTENDED RELEASE ORAL at 08:47

## 2022-10-05 RX ADMIN — SODIUM CHLORIDE, PRESERVATIVE FREE 10 ML: 5 INJECTION INTRAVENOUS at 21:32

## 2022-10-05 RX ADMIN — IPRATROPIUM BROMIDE AND ALBUTEROL SULFATE 1 AMPULE: 2.5; .5 SOLUTION RESPIRATORY (INHALATION) at 11:09

## 2022-10-05 RX ADMIN — GUAIFENESIN 600 MG: 600 TABLET, EXTENDED RELEASE ORAL at 21:30

## 2022-10-05 RX ADMIN — FUROSEMIDE 40 MG: 10 INJECTION, SOLUTION INTRAMUSCULAR; INTRAVENOUS at 21:30

## 2022-10-05 RX ADMIN — BUDESONIDE AND FORMOTEROL FUMARATE DIHYDRATE 2 PUFF: 160; 4.5 AEROSOL RESPIRATORY (INHALATION) at 20:49

## 2022-10-05 RX ADMIN — PANTOPRAZOLE SODIUM 40 MG: 40 TABLET, DELAYED RELEASE ORAL at 08:57

## 2022-10-05 RX ADMIN — CITALOPRAM HYDROBROMIDE 20 MG: 20 TABLET ORAL at 08:47

## 2022-10-05 RX ADMIN — CLOPIDOGREL BISULFATE 75 MG: 75 TABLET ORAL at 08:46

## 2022-10-05 RX ADMIN — TRAZODONE HYDROCHLORIDE 50 MG: 50 TABLET ORAL at 21:45

## 2022-10-05 RX ADMIN — ASPIRIN 81 MG: 81 TABLET, COATED ORAL at 08:47

## 2022-10-05 RX ADMIN — SODIUM CHLORIDE, PRESERVATIVE FREE 10 ML: 5 INJECTION INTRAVENOUS at 08:47

## 2022-10-05 RX ADMIN — ENOXAPARIN SODIUM 40 MG: 100 INJECTION SUBCUTANEOUS at 08:46

## 2022-10-05 RX ADMIN — ATORVASTATIN CALCIUM 80 MG: 80 TABLET, FILM COATED ORAL at 21:30

## 2022-10-05 RX ADMIN — METHYLPREDNISOLONE SODIUM SUCCINATE 40 MG: 40 INJECTION, POWDER, FOR SOLUTION INTRAMUSCULAR; INTRAVENOUS at 21:30

## 2022-10-05 RX ADMIN — METOPROLOL SUCCINATE 50 MG: 50 TABLET, EXTENDED RELEASE ORAL at 08:47

## 2022-10-05 RX ADMIN — IPRATROPIUM BROMIDE AND ALBUTEROL SULFATE 1 AMPULE: 2.5; .5 SOLUTION RESPIRATORY (INHALATION) at 07:05

## 2022-10-05 RX ADMIN — SODIUM CHLORIDE: 9 INJECTION, SOLUTION INTRAVENOUS at 01:33

## 2022-10-05 RX ADMIN — ISOSORBIDE MONONITRATE 30 MG: 30 TABLET, EXTENDED RELEASE ORAL at 08:46

## 2022-10-05 RX ADMIN — AZITHROMYCIN DIHYDRATE 500 MG: 500 INJECTION, POWDER, LYOPHILIZED, FOR SOLUTION INTRAVENOUS at 09:05

## 2022-10-05 RX ADMIN — SPIRONOLACTONE 25 MG: 25 TABLET ORAL at 16:13

## 2022-10-05 RX ADMIN — TIZANIDINE 2 MG: 2 TABLET ORAL at 21:45

## 2022-10-05 RX ADMIN — MAGNESIUM OXIDE TAB 400 MG (241.3 MG ELEMENTAL MG) 400 MG: 400 (241.3 MG) TAB at 21:30

## 2022-10-05 RX ADMIN — ROPINIROLE HYDROCHLORIDE 1 MG: 1 TABLET, FILM COATED ORAL at 21:30

## 2022-10-05 RX ADMIN — POTASSIUM CHLORIDE 20 MEQ: 1500 TABLET, EXTENDED RELEASE ORAL at 08:47

## 2022-10-05 ASSESSMENT — PAIN SCALES - GENERAL
PAINLEVEL_OUTOF10: 5
PAINLEVEL_OUTOF10: 0

## 2022-10-05 ASSESSMENT — PAIN DESCRIPTION - LOCATION: LOCATION: OTHER (COMMENT)

## 2022-10-05 ASSESSMENT — ENCOUNTER SYMPTOMS
ABDOMINAL PAIN: 0
BACK PAIN: 0
BLOOD IN STOOL: 0
NAUSEA: 0
VOMITING: 0
COUGH: 1
SORE THROAT: 1
CHEST TIGHTNESS: 1
SHORTNESS OF BREATH: 1
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
## 2022-10-05 NOTE — PROGRESS NOTES
[address]    Occupational Therapy Evaluation  Date: 10/5/22  Patient Name: Alley Mitchell       Room:   MRN: [id number]  Account: [de-identified]   : 1950  (74 y.o.) Gender: female     Discharge Recommendations: The patient's needs are being met with no further Occupational Therapy recommended at discharge. OT Equipment Recommendations  Other: TBD    Referring Practitioner: JAMES Amado CNP  Diagnosis: COPD exacerbation Additional Pertinent Hx: 78-year-old female with a history of CHF with reduced ejection fraction, COPD, CAD s/p CABG, CKD PE also came in with complaints of shortness of breath. Patient mentioned that she is experiencing shortness of breath for almost 2 days, patient stated lately she is using more inhalers but somehow was not able to breathe. Patient uses 2 L oxygen at baseline and lately mention that she is using more often. Patient also mentioned that she had missed couple of doses here in May. Patient is admitted for further management of acute on chronic respiratory failure likely CHF exacerbation and COPD. Treatment Diagnosis: Impaired self-care status    Past Medical History:  has a past medical history of Allergic rhinitis, Arthritis, CAD (coronary artery disease), Cerebral artery occlusion with cerebral infarction ([address] CHF (congestive heart failure) ([address] Controlled type 2 diabetes mellitus without complication, without long-term current use of insulin ([address] COPD (chronic obstructive pulmonary disease) ([address] Depression, Former smoker, History of blood transfusion, Hyperlipidemia, Hypertension, Kidney stone, Myocardial infarction ([address] Obesity, Class I, BMI 30.0-34.9 (see actual BMI), Restless leg syndrome, Skin abnormality, Type II or unspecified type diabetes mellitus without mention of complication, not stated as uncontrolled, Wears glasses, and Wears partial dentures.     Past Surgical History:   has a past surgical history that includes Appendectomy; Hysterectomy; Cholecystectomy; joint replacement (Bilateral); pr office/outpt visit,procedure only (N/A, 2/6/2018); pr incis/drain thigh/knee abscess,deep (Right, 5/7/2018); Leg biopsy excision (Right, 8/29/2019); Cardiac surgery; Cardiac surgery; other surgical history (07/26/2020); cervical laminectomy (N/A, 10/14/2020); and bronchoscopy (N/A, 8/16/2021). Restrictions  Restrictions/Precautions  Restrictions/Precautions: General Precautions; Fall Risk;Up as Tolerated  Required Braces or Orthoses?: No  Implants present? : Metal implants (Bilat TKAs, Metal in neck from cervical surgery)  Position Activity Restriction  Other position/activity restrictions: PT/OT Eval and treat      Vitals  Vitals  Heart Rate: 79  Heart Rate Source: Monitor  BP: 128/82  BP Location: Right upper arm  MAP (Calculated): 97.33  Resp: 19  SpO2: 97 %  O2 Device: None (Room air)     Subjective  Subjective: \"I'm not your typical 74year old. \"  Comments: Okay for OT PT eval and treat per LIVE Pedersen  Subjective  Pain: pt denies pain at current      Social/Functional History  Social/Functional History  Lives With: Other (comment) (2 Roommates)  Type of Home: [address]: One level, Laundry in basement  Home Access: Stairs to enter with rails  Entrance Stairs - Number of Steps: 5  Entrance Stairs - Rails: Both (Cannot reach both at same time)  Bathroom Shower/Tub: Tub/Shower unit, Curtain, Shower chair with back  [employer]: Standard  Bathroom Equipment: Shower chair, Hand-held shower  Bathroom Accessibility: Walker accessible  Home Equipment: Farias Sames, [place], Walker, 4 wheeled  [address] Help From: Friend(s) (roommate)  ADL Assistance: Independent (Pt reported that they go out for meals)  Homemaking Assistance: Independent  Homemaking Responsibilities: Yes  Ambulation Assistance: Independent (no DME)  Transfer Assistance: Independent  Active : No (could drive but no vehicle)  Mode of Transportation: Walk, Friends  Leisure & Hobbies: Cares for 3 dogs at home  IADL Comments: pt sleeps in a flat bed  Additional Comments: Per pt ; One roommate is currently in ICU in poor condition. Other roommate is in 63's, in good physical condition, is retired and stays at house most of the time able to provide assist PRN. Pt reported that they have running water but no gas/electricity. Objective  Cognition  Orientation  Overall Orientation Status: Within Functional Limits  Orientation Level: Oriented X4  Cognition  Overall Cognitive Status: WFL   Sensation  Overall Sensation Status: Impaired (pt reports L hand fingertips N/T)    Activities of Daily Living  ADL  Feeding: Modified independent   Grooming: Modified independent   UE Bathing: Supervision  LE Bathing: Contact guard assistance  UE Dressing: Supervision  LE Dressing: Contact guard assistance  Toileting: Contact guard assistance  Additional Comments: ADL scores based on skilled observation and clinical reasoning, unless otherwise noted. Pt donned B socks sitting EOB, using figure four technique. Pt requested to go to the bathroom, completed pericare seated on toilet post void and BM.  Pt is currently limited due to impaired balance and endurance, impacting safety with self care tasks         UE Function  LUE AROM (degrees)  LUE AROM : WFL  Left Hand AROM (degrees)  Left Hand AROM: WFL  Tone LUE  LUE Tone: Normotonic  LUE Strength  L Hand General: 4/5  LUE Strength Comment: Overall 4/5    RUE AROM (degrees)  RUE AROM : WFL  Right Hand AROM (degrees)  Right Hand AROM: WFL  Tone RUE  RUE Tone: Normotonic  RUE Strength  R Hand General: 4/5  RUE Strength Comment: Overall 4/5         Fine Motor Skills/Coordination  Coordination  Movements Are Fluid And Coordinated: No  Coordination and Movement Description: Decreased accuracy, Right UE, Left UE                Mobility  Bed Mobility  Bed mobility  Supine to Sit: Stand by assistance  Scooting: Stand by assistance  Bed Mobility Comments: HOB partially Elevated with minimal use of bed rails. Balance  Balance  Sitting Balance: Stand by assistance  Standing Balance: Contact guard assistance  Standing Balance  Time: 1-2 minutes x2  Activity: functional transfers, functional mobility, toileting  Comment: UE support as needed    Transfers  Transfers  Sit to stand: Stand by assistance  Stand to sit: Stand by assistance  Toilet Transfers  Toilet - Technique: Ambulating  Equipment Used: Grab bars  Toilet Transfer: Stand by assistance    Functional Mobility  Functional - Mobility Device: No device  Activity: To/from bathroom  Assist Level: Contact guard assistance  Functional Mobility Comments: Assist with line mgmt; initially used RW to the bathroom, progressed to no device exiting the bathroom and walking towards the chair    Assessment  Assessment  Performance deficits / Impairments: Decreased functional mobility , Decreased ADL status, Decreased strength, Decreased endurance, Decreased balance, Decreased high-level IADLs  Treatment Diagnosis: Impaired self-care status  Prognosis: Good  Decision Making: Medium Complexity  Discharge Recommendations: Home with assist PRN    Activity Tolerance  Activity Tolerance: Patient Tolerated treatment well    Safety Devices  Type of Devices:  All fall risk precautions in place, Call light within reach, Gait belt, Patient at risk for falls, Left in chair, Nurse notified    Patient Education  Patient Education  Education Given To: Patient  Education Provided: Role of Therapy, Plan of Care  Education Method: Verbal  Barriers to Learning: None  Education Outcome: Verbalized understanding, Continued education needed      Functional Outcome Measures  AM-PAC Daily Activity Inpatient   How much help for putting on and taking off regular lower body clothing?: A Little  How much help for Bathing?: A Little  How much help for Toileting?: A Little  How much help

## 2022-10-05 NOTE — PROGRESS NOTES
Axel Coburn is a 74 y.o. Non- / non  female who presents with Shortness of Breath and Chest Pain (Pt arrives from home by EMS with chest pain and shortness of breath. Pt has hx of COPD. )   and is admitted to the hospital for the management of COPD exacerbation ([address] At time of exam, patient is on BiPAP in the ED and is unable to provide much input into HPI. According to ED provider, patient presented to the ED in acute respiratory distress. EMS reported that patient's SPO2 was in the 70s upon their arrival and patient was placed on CPAP during transport. Patient states that shortness of breath that started earlier in the day. There is reports of home O2 use of 2 L per nasal cannula nightly. Symptoms are associated with hypoxia and wheezing. Denies fever, chills, chest pain, abdominal pain, nausea, vomiting, diarrhea, and urinary symptoms. There are no reported aggravating or alleviating factors. Symptoms are reported as constant and severe; improved on BiPAP in ED. Of note, patient's roommate was treated at the same time. Roommate reported that their home is without electricity for the past 2 weeks and patient is unable to use oxygen concentrator and nebulizer machine. Roommate reports that patient has been using cocaine. We will consult social service for discharge planning      Patient status inpatient in the Medical ICU-intermediate ICU    COPD Exacerbation  -IV Solumedrol initiated per EMS  -Continue on admission  -Mucinex BID  -Respiratory care eval   -Duoneb aerosols 4x/ day  -Albuterol aerosols PRN  -Pulmonology consult  --Patient has seen Dr. Theron Lincoln as an outpatient    Diabetes Mellitus  -POCT ac and hs with sliding scale coverage     Tobacco use   -smoking cessation education  -nicotine patch daily    2.  Disposition 3 days      Consultations:   IP CONSULT TO INTERNAL MEDICINE  IP CONSULT TO PULMONOLOGY    Patient is admitted as inpatient status because of co-morbidities listed above, severity of signs and symptoms as outlined, requirement for current medical therapies and most importantly because of direct risk to patient if care not provided in a hospital setting. Expected length of stay > 48 hours.     JAMES Vigil CNP  10/5/2022  7:37 AM

## 2022-10-05 NOTE — PROGRESS NOTES
Pt admitted to 2014 cardiac monitor, nibp and sao2 applied. Pt oriented to surroundings. Vs obtained. Routine of the unit and call light explained. Belongings in closet. Pt on bipap.  RT at bedside

## 2022-10-05 NOTE — DISCHARGE INSTR - COC
Continuity of Care Form    Patient Name: Barrett Rock   :  1950  MRN:  [id number]    Admit date:  10/4/2022  Discharge date:  ***    Code Status Order: Full Code   Advance Directives:     Admitting Physician:  Lien Wallace MD  PCP: Josseline Edmonds MD    Discharging Nurse: [facility] Unit/Room#:   Discharging Unit Phone Number: ***    Emergency Contact:   Extended Emergency Contact Information  Primary Emergency Contact: Guanakito Zheng  Address: [address] [facility], [address] [place] [address] Phone: [phone]  Work Phone: [phone]  Mobile Phone: [phone]  Relation: Child  Secondary Emergency Contact: ArisAnali  Mobile Phone: [phone]  Relation: Other   needed?  No    Past Surgical History:  Past Surgical History:   Procedure Laterality Date    APPENDECTOMY      BRONCHOSCOPY N/A 2021    BRONCHOSCOPY w/ WASHINGS performed by Destiny Robles MD at [address] [place] [address]      cath x 2/ stent x 1    CARDIAC SURGERY      bypass 4 vessel 2018    CERVICAL LAMINECTOMY N/A 10/14/2020    C3-C7 POSTERIOR CERVICAL DECOMPRESSION FUSION performed by Britton Louie MD at [facility] ([facility])      JOINT REPLACEMENT Bilateral     knees    LEG BIOPSY EXCISION Right 2019    LEG LESION PUNCH BIOPSY performed by Danilo Farooq MD at [address] [facility]  2020    cerebral angiogram    N[place] INCIS/DRAIN THIGH/KNEE ABSCESS,DEEP Right 2018    DEBRIDEMENT INCISION AND DRAINAGE THIGH ABSCESS performed by Yogesh Ramsay DO at [address] OFFICE/OUTPT VISIT,PROCEDURE ONLY N/A 2018    CABG X 3 LIMA-LAD-DIAG,SVG-PDA,CORONARY ARTERY BYPASS REDO, PUMP ASSIST, SWAN, JARRED, REDO STERNOTOMY performed by Katie Shetty MD at [address]       Immunization History:   Immunization History   Administered Date(s) Administered    Influenza Virus Vaccine 2016, 2017 Influenza Whole 11/11/2015    Influenza, FLUARIX, FLULAVAL, FLUZONE (age 10 mo+) AND AFLURIA, (age 1 y+), PF, 0.5mL 08/27/2019, 11/13/2020    Influenza, High Dose (Fluzone 65 yrs and older) 11/14/2014, 11/11/2015, 10/24/2018    Influenza, Triv, inactivated, subunit, adjuvanted, IM (Fluad 65 yrs and older) 09/29/2017    Pneumococcal Conjugate 13-valent ([id number]) 11/14/2016    Pneumococcal Polysaccharide ([id number]) 11/01/2017    Tdap (Boostrix, Adacel) 07/02/2020       Active Problems:  Patient Active Problem List   Diagnosis Code    Chronic pain G89.29    Controlled type 2 diabetes mellitus without complication, without long-term current use of insulin ([facility]) E11.9    Allergic rhinitis J30.9    Hypertension goal BP (blood pressure) < 140/90 I10    Mixed hyperlipidemia E78.2    Chronic obstructive pulmonary disease ([facility]) J44.9    Coronary artery disease involving native coronary artery of native heart without angina pectoris I25.10    Primary insomnia F51.01    Diarrhea in adult patient R19.7    Restless leg syndrome G25.81    Atherosclerosis of superior mesenteric artery ([facility]) K55.1    Left adrenal mass ([facility]) E27.8    Former smoker Z87.891    Chronic bilateral low back pain with left-sided sciatica M54.42, G89.29    Hypothyroidism E03.9    S/P CABG x 4 Z95.1    Acute pain of right knee M25.561    Abnormal stress test R94.39    Tobacco use disorder F17.200    Neck pain M54.2    Acute ischemic left MCA stroke ([facility]) S06.973    Internal carotid artery occlusion I65.29    Stenosis of left internal carotid artery I65.22    Acquired spondylolisthesis of cervical vertebra M43.12    Stenosis of cervical spine with myelopathy ([facility]) M48.02, G99.2    Abnormal EKG R94.31    COPD exacerbation ([facility]) J44.1    Multifocal pneumonia J18.9    Hypoxia R09.02    Pneumonia J18.9    Iron deficiency anemia D50.9    Chronic combined systolic and diastolic congestive heart failure ([facility]) I50.42    Type 2 diabetes mellitus with chronic kidney disease E11.22    Symptomatic congestive heart failure, pre-operative cardiovascular examination ([facility] Z01.810, I50.9    Acute on chronic systolic CHF (congestive heart failure) ([facility] I50.23       Isolation/Infection:   Isolation            No Isolation          Patient Infection Status       Infection Onset Added Last Indicated Last Indicated By Review Planned Expiration Resolved Resolved By    None active    Resolved    COVID-19 (Rule Out) 10/04/22 10/04/22 10/04/22 COVID-19 & Influenza Combo (Ordered)   10/04/22 Rule-Out Test Resulted    COVID-19 (Rule Out) 05/14/22 05/14/22 05/14/22 Respiratory Panel, Molecular, with COVID-19 (Restricted: peds pts or suitable admitted adults) (Ordered)   05/16/22 Margy Hernandez RN    5/14 COVID -    COVID-19 (Rule Out) 05/14/22 05/14/22 05/14/22 COVID-19 & Influenza Combo (Ordered)   05/14/22 Rule-Out Test Resulted    COVID-19 (Rule Out) 11/26/21 11/26/21 11/26/21 COVID-19 & Influenza Combo (Ordered)   11/26/21 Rule-Out Test Resulted    COVID-19 (Rule Out) 10/03/21 10/03/21 10/03/21 COVID-19 & Influenza Combo (Ordered)   10/03/21 Rule-Out Test Resulted    COVID-19 (Rule Out) 10/02/21 10/02/21 10/02/21 COVID-19 & Influenza Combo (Ordered)   10/02/21 Rule-Out Test Resulted    COVID-19 (Rule Out) 08/11/21 08/11/21 08/11/21 COVID-19, Rapid (Ordered)   08/11/21 Rule-Out Test Resulted    COVID-19 (Rule Out) 10/10/20 10/10/20 10/10/20 COVID-19 (Ordered)   10/11/20 Rule-Out Test Resulted            Nurse Assessment:  Last Vital Signs: /79   Pulse 77   Temp 97.8 °F (36.6 °C) (Oral)   Resp 23   Ht 5' 4\" (1.626 m)   Wt 134 lb 7.7 oz (61 kg)   SpO2 98%   BMI 23.08 kg/m²     Last documented pain score (0-10 scale): Pain Level: 0  Last Weight:   Wt Readings from Last 1 Encounters:   10/05/22 134 lb 7.7 oz (61 kg)     Mental Status:  {IP PT MENTAL STATUS:20030}    IV Access:  {[facility] IV ACCESS:231109132}    Nursing Mobility/ADLs:  Walking   {[facility] VLNQ:352551203}  Transfer {CHP DME MTMQ:644642518}  Bathing  {CHP DME PEAT:431395522}  Dressing  {CHP DME HHNE:900944632}  Toileting  {CHP DME MHIZ:790486517}  Feeding  {CHP DME VXXJ:286622492}  Med Admin  {CHP DME RNCN:689700826}  Med Delivery   { [facility] MED Delivery:274758619}    Wound Care Documentation and Therapy:        Elimination:  Continence:    Bowel: {YES / US:06744}  Bladder: {YES / VJ:30890}  Urinary Catheter: {Urinary Catheter:397589271}   Colostomy/Ileostomy/Ileal Conduit: {YES / VD:02548}       Date of Last BM: ***    Intake/Output Summary (Last 24 hours) at 10/5/2022 1143  Last data filed at 10/5/2022 0845  Gross per 24 hour   Intake --   Output 1425 ml   Net -1425 ml     I/O last 3 completed shifts:  In: -   Out: 600 [Urine:600]    Safety Concerns:     [employer] Safety Concerns:617113653}    Impairments/Disabilities:      [employer] Impairments/Disabilities:956760232}    Nutrition Therapy:  Current Nutrition Therapy:   [employer] Diet List:571894024}    Routes of Feeding: {CHP DME Other Feedings:769185539}  Liquids: {Slp liquid thickness:13413}  Daily Fluid Restriction: {CHP DME Yes amt example:891441477}  Last Modified Barium Swallow with Video (Video Swallowing Test): {Done Not Done ERVV:389603665}    Treatments at the Time of Hospital Discharge:   Respiratory Treatments: ***  Oxygen Therapy:  {Therapy; copd oxygen:23526}  Ventilator:    { CC Vent LLHP:730630767}    Rehab Therapies: {THERAPEUTIC INTERVENTION:1356691323}  Weight Bearing Status/Restrictions: [address] [employer] Weight Bearin}  Other Medical Equipment (for information only, NOT a DME order):  {EQUIPMENT:210883078}  Other Treatments: ***    Patient's personal belongings (please select all that are sent with patient):  {P DME Belongings:061095782}    RN SIGNATURE:  {Esignature:052213872}    CASE MANAGEMENT/SOCIAL WORK SECTION    Inpatient Status Date: ***    Readmission Risk Assessment Score:  Readmission Risk              Risk of Unplanned Readmission:  31 Discharging to Facility/ Agency   Name:   Address:  Phone:  Fax:    Dialysis Facility (if applicable)   Name:  Address:  Dialysis Schedule:  Phone:  Fax:    / signature: {Esignature:443327949}    PHYSICIAN SECTION    Prognosis: {Prognosis:5033084000}    Condition at Discharge: [address] Patient Condition:510791263}    Rehab Potential (if transferring to Rehab): {Prognosis:8994113421}    Recommended Labs or Other Treatments After Discharge: ***    Physician Certification: I certify the above information and transfer of Deanna Hunter  is necessary for the continuing treatment of the diagnosis listed and that she requires {Admit to Appropriate Level of Care:61126} for {GREATER/LESS:474508319} 30 days.      Update Admission H&P: {CHP DME Changes in [facility]}    PHYSICIAN SIGNATURE:  Electronically signed by Solitario Giang MD on 10/7/22 at 12:24 PM EDT

## 2022-10-05 NOTE — PROGRESS NOTES
Physical Therapy  Facility/Department: [address] [place] [address]  Physical Therapy Initial Assessment    Name: Esther Johnston  : 1950  MRN: [id number]  Date of Service: 10/5/2022    Discharge Recommendations:  Patient would benefit from continued therapy after discharge          Patient Diagnosis(es): The encounter diagnosis was COPD exacerbation ([facility] [address] Past Medical History:  has a past medical history of Allergic rhinitis, Arthritis, CAD (coronary artery disease), Cerebral artery occlusion with cerebral infarction ([facility] [address] CHF (congestive heart failure) ([facility] [address] Controlled type 2 diabetes mellitus without complication, without long-term current use of insulin ([facility] [address] COPD (chronic obstructive pulmonary disease) ([facility] [address] Depression, Former smoker, History of blood transfusion, Hyperlipidemia, Hypertension, Kidney stone, Myocardial infarction ([facility] [address] Obesity, Class I, BMI 30.0-34.9 (see actual BMI), Restless leg syndrome, Skin abnormality, Type II or unspecified type diabetes mellitus without mention of complication, not stated as uncontrolled, Wears glasses, and Wears partial dentures. Past Surgical History:  has a past surgical history that includes Appendectomy; Hysterectomy; Cholecystectomy; joint replacement (Bilateral); pr office/outpt visit,procedure only (N/A, 2018); pr incis/drain thigh/knee abscess,deep (Right, 2018); Leg biopsy excision (Right, 2019); Cardiac surgery; Cardiac surgery; other surgical history (2020); cervical laminectomy (N/A, 10/14/2020); and bronchoscopy (N/A, 2021). Assessment   Assessment: Pt presents with mild endurance and balance impairments 2* COPD exacerbation; Requires 1 PersonA for safe mobility without need for assistive device at this time. The patient will benefit from continued PT to address deficits and maximize independence prior to d/c home.   Treatment Diagnosis: Impaired endurance 2* COPD Exacerbation  Specific Instructions for Next Treatment: Progress ambulation; Stair climbing  Therapy Prognosis: Good  Decision Making: Medium Complexity  Exam: ROM, MMT, Balance, and functional mobility assessments  Clinical Presentation: pt is alert, cooperative, and pleasant  Barriers to Learning: Hearing/Vision deficits  Requires PT Follow-Up: Yes  Activity Tolerance  Activity Tolerance: Patient limited by endurance     Plan   Physcial Therapy Plan  General Plan: 5-7 times per week  Specific Instructions for Next Treatment: Progress ambulation; Stair climbing  Current Treatment Recommendations: Balance training, Functional mobility training, Gait training, Stair training, Endurance training, Therapeutic activities  Safety Devices  Type of Devices: All fall risk precautions in place, Call light within reach, Gait belt, Patient at risk for falls, Left in chair, Nurse notified     Restrictions  Restrictions/Precautions  Restrictions/Precautions: General Precautions, Fall Risk, Up as Tolerated  Required Braces or Orthoses?: No  Implants present? : Metal implants (Bilat TKAs, Metal in neck from cervical surgery)  Position Activity Restriction  Other position/activity restrictions: PT/OT Eval and treat     Subjective   Pain: pt denies pain at current  General  Chart Reviewed: Yes  Patient assessed for rehabilitation services?: Yes  Additional Pertinent Hx: The patient is a 74 y.o. Non- / non  female who presents withShortness of Breath and Chest Pain (Pt arrives from home by EMS with chest pain and shortness of breath. Pt has hx of COPD. )   and she is admitted to the hospital for the management of acute acute on chronic respiratory failure.   Response To Previous Treatment: Not applicable  Family / Caregiver Present: No  Referring Practitioner: Greta RAMIREZ CNP  Referral Date : 10/05/22  Diagnosis: COPD Exacerbation  Follows Commands: Within Functional Limits  General Comment  Comments: Ltanya Pill per nurse Nuvia to proceed with therapy assessments  Subjective  Subjective: \"Up until I got this Cold, I was walking about 5 blocks every day to eat lunch at this restaurant\", \"I'm not your average 74year old woman\" - pt is agreeable to therapy assessments. Social/Functional History  Social/Functional History  Lives With: Other (comment) (2 Roommates)  Type of Home: House  Home Layout: One level, Laundry in basement  Home Access: Stairs to enter with rails  Entrance Stairs - Number of Steps: 5  Entrance Stairs - Rails: Both (Cannot reach both at same time)  Bathroom Shower/Tub: Tub/Shower unit, Curtain, Shower chair with back  [employer]: Standard  Bathroom Equipment: Shower chair, Hand-held shower  Bathroom Accessibility: Walker accessible  Home Equipment: afsaneh Merrill, Walker, 4 wheeled  Has the patient had two or more falls in the past year or any fall with injury in the past year?: No  Receives Help From: Friend(s) (roommate)  ADL Assistance: Independent (Pt reported that they go out for meals)  Homemaking Assistance: Independent  Homemaking Responsibilities: Yes  Ambulation Assistance: Independent (no DME)  Transfer Assistance: Independent  Active : No (could drive but no vehicle)  Mode of Transportation: Walk, Friends  Leisure & Hobbies: Cares for 3 dogs at home  IADL Comments: pt sleeps in a flat bed  Additional Comments: Per pt ; One roommate is currently in ICU in poor condition. Other roommate is in 63's, in good physical condition, is retired and stays at house most of the time able to provide assist PRN. Pt reported that they have running water but no gas/electricity.   Vision/Hearing  Vision  Vision: Impaired  Vision Exceptions: Wears glasses at all times  Hearing  Hearing: Exceptions to [facility]  Hearing Exceptions: Hard of hearing/hearing concerns    Cognition   Orientation  Overall Orientation Status: Within Functional Limits  Orientation Level: Oriented X4  Cognition  Overall Cognitive Status: WFL     Objective   Heart Rate: 73  Heart Rate Source: Monitor  BP: 128/82  BP Location: Right upper arm  MAP (Calculated): 97.33  Resp: 25  SpO2: 98 %  O2 Device: None (Room air)     Observation/Palpation  Observation: Peripheral IV Left antecubital        AROM RLE (degrees)  RLE AROM: WFL  AROM LLE (degrees)  LLE AROM : WFL  AROM RUE (degrees)  RUE General AROM: See OT  AROM LUE (degrees)  LUE General AROM: See OT  Strength RLE  Strength RLE: WFL  Comment: Grossly 4/5 strength  Strength LLE  Strength LLE: WFL  Comment: Grossly 4/5 strength  Strength RUE  Comment: See OT  Strength LUE  Comment: See OT     Sensation  Overall Sensation Status: Impaired (pt reports L hand fingertips N/T)     Bed mobility  Supine to Sit: Stand by assistance  Scooting: Stand by assistance  Bed Mobility Comments: HOB partially Elevated with minimal use of bed rails. Transfers  Sit to Stand: Stand by assistance  Stand to Sit: Stand by assistance  Comment: Initially performed with RW however device is uneccessary at this time, further transfers performed without device with same assist level. Ambulation  Surface: Level tile  Device: Rolling Walker  Assistance: Contact guard assistance  Quality of Gait: quick pace, steady overall, forward trunk posture  Gait Deviations: Decreased step length;Decreased step height  Distance: 10'  More Ambulation?: Yes  Ambulation 2  Surface - 2: level tile  Device 2: No device  Other Apparatus 2:  (IV pole managed by writer)  Assistance 2: Contact guard assistance  Gait Deviations: Decreased step length;Decreased step height; Increased MARIN; Slow Lindsay  Distance: 20'  Stairs/Curb  Stairs?: No     Balance  Posture: Fair  Sitting - Static: Good  Sitting - Dynamic: Good  Standing - Static: Fair (no AD)  Standing - Dynamic: Fair (no AD)  Comments: No LOB to report; balance assessed with out use of device  Dynamic Standing Balance Exercises: pt participates in brief management and standing post-void hygiene + hand washing with CGA-SBA for standing balance          AM-PAC Score  AM-PAC Inpatient Mobility Raw Score : 18 (10/05/22 1141)  AM-PAC Inpatient T-Scale Score : 43.63 (10/05/22 1141)  Mobility Inpatient CMS 0-100% Score: 46.58 (10/05/22 1141)  Mobility Inpatient CMS G-Code Modifier : CK (10/05/22 1141)    Goals  Short Term Goals  Time Frame for Short Term Goals: 6 visits  Short Term Goal 1: Pt to demo independent bed mobility  Short Term Goal 2: pt to demo all transfers independently  Short Term Goal 3: pt to ambulate 150' with device as needed Mod I or independent  Short Term Goal 4: pt to negotiate 5 stairs steps with single rail and supervision to allow for safe home access  Patient Goals   Patient Goals : To go home       Education  Patient Education  Education Given To: Patient  Education Provided: Role of Therapy;Plan of Care;Energy Conservation;Equipment  Education Method: Verbal  Barriers to Learning: Vision; Hearing  Education Outcome: Demonstrated understanding;Verbalized understanding;Continued education needed      Therapy Time   Individual Concurrent Group Co-treatment   Time In 1141         Time Out 1226         Minutes 45         Timed Code Treatment Minutes: [address], PT   Electronically signed by Akanksha Power PT on 10/5/2022 at 4:42 PM

## 2022-10-05 NOTE — ACP (ADVANCE CARE PLANNING)
Advance Care Planning   Healthcare Decision Maker:    Primary Decision Maker: Fide Ellington - [phone]    Primary Decision Maker: Valeriacharisse De Anda - Child    Writer explained to pt that her son and daughter would both be primary healthcare decision makers under [place] law until pt completes healthcare power of . Click here to complete Healthcare Decision Makers including selection of the Healthcare Decision Maker Relationship (ie \"Primary\").

## 2022-10-05 NOTE — CARE COORDINATION
CASE MANAGEMENT NOTE:    Admission Date:  10/4/2022 Erwin Cotton is a 74 y.o.  female    Admitted for : COPD exacerbation ([facility] [address] [J44.1]    Met with:  Patient    PCP:  Cedric Knight                                Insurance:  [address]       Is patient alert and oriented at time of discussion:  Yes    Current Residence/ Living Arrangements:  independently at home with roommates            Current Services PTA:  No    Does patient go to outpatient dialysis: No  If yes, location and chair time: NA  Who is their nephrologist? NA    Is patient agreeable to VNS: No    Freedom of choice provided:  No    List of [address] provided: No    VNS chosen:  No    DME:  none    Home Oxygen: Yes- port tanks with O2 conserver and concentrator     Nebulizer: No    CPAP/BIPAP: No    Supplier:[facility]    Potential Assistance Needed: Yes- pt has no electricity and gas at the home she is living in     SNF needed: Yes    Freedom of choice and list provided: No    Pharmacy:  [facility] on [address]       Is patient currently receiving oral anticoagulation therapy? No    Is the Patient an BROOKE CHILD [facility] with Readmission Risk Score greater than 14%? No  If yes, pt needs a follow up appointment made within 7 days. Family Members/Caregivers that pt would like involved in their care:    Yes    If yes, list name here:  son Zakiya Lopez     Transportation Provider:  Family             Discharge Plan:  10/5/22 [address] From home with others and 3 dogs. Pt lives in a house that is owned by a landlord and is currently being evicted from the home. She went to court 9/13/22 and is waiting for police to come to evict them. Pt has not had gas or electricity since July 2022. Pt stated she does have water. Pt has used medical exemption in the past to help with utilities but she has already used this three times and was denied. Pt walks to all appointments or uses transportation thru Insurance for appointments.   Pt goes to a [facility] [address] , which is down the street for meals. Pt gets SS/disability. DME:O2 at 2lpm [facility] at [facility]  has concentrator and port tanks with conserver. Pt has not refilled tanks since Jan 2022. VNS:none . APS was referred by ED . Pt positive for cocaine in UDS. LSW asked to follow and assist with utilities and drug counseling. IV zithromax, Solumedrol 40 mg Q8, aerosols , bipap.  Pulm consult , PT/OT eval. Following for needs//JF                           Electronically signed by: Norbert Mckeon RN on 10/5/2022 at 8:27 AM

## 2022-10-05 NOTE — PROGRESS NOTES
Patient gave writer update, noting she is breathing much better. She also gave details on her living situation, where there is no electricity or water. She lives there with 2 roommates, one of which is also in the hospital. Pt has son Zakiya Lopez who is  and who she would want to be here primary HPOA; she would want Henrry Dials to her 2nd HPOA. (Alexis Wilson is the one also currently in the hospital.) Pt notes Alexis Wilson knows the patient's wishes for healthcare end of life decisions as well as  arrangements. Pt does have a daughter who lives in [place] but they have not had contact in 8 years. Writer explained to pt that she would need to complete HPOA documents, otherwise, according to [place] law, Zakiya Lopez and Nina Ferrell would be the decision makers. She wants to talk to Zakiya Lopez prior to completing documents. Chaplains will follow up with pt.    10/05/22 1425   Encounter Summary   Encounter Overview/Reason  Spiritual/Emotional Needs   Service Provided For: Patient   Referral/Consult From: [facility] Children;Friends/neighbors   Last Encounter  10/05/22   Complexity of Encounter Moderate   Spiritual/Emotional needs   Type Spiritual Support   Advance Care Planning   Type ACP conversation   Assessment/Intervention/Outcome   Assessment Calm   Intervention Active listening;Discussed illness injury and its impact; Explored/Affirmed feelings, thoughts, concerns;Explored Coping Skills/Resources;Sustaining Presence/Ministry of presence;Prayer (assurance of)/[place]   Outcome Engaged in conversation;Expressed feelings, needs, and concerns;Expressed Gratitude;Receptive

## 2022-10-05 NOTE — CONSULTS
Date:   10/5/2022  Patient name: Gaetano Weathers  Date of admission:  10/4/2022  7:25 PM  MRN:   [id number]  YOB: 1950  PCP: Cayla Evans MD    Reason for Admission: COPD exacerbation [facility]) [J44.1]    Cardiology follow-up: Multivessel coronary artery disease, CHF diastolic and systolic acute on chronic       Referring physician: Dr Kiana Cosme     Admission 9/67/8785 CHF systolic and diastolic acute on chronic  Coronary artery disease, CABG 2018, LIMA to LAD and diagonal 1, SVG to OM and PDA  Congestive heart failure systolic and diastolic acute on chronic, ejection fraction 35%  Moderate pulmonary hypertension,  Moderate LVH  Carotid artery disease/stent placement left internal carotid artery  CVA  Hypertension  Upper lipidemia  2 diabetes mellitus  COPD on oxygen at night, history of smoking  Chronic kidney disease    2D echo 2/12/2021 normal LV size, moderate LVH asymmetrical septal hypertrophy  Moderate global hypokinesis, akinetic apex, ejection fraction 35%  Dilated LA and RA  Moderate TR, RVSP 60 mmHg  Dilated IVC diam,  impaired respiratory variation    2D echo 10/5/2022  Moderately dilated LV, mildly increased LV wall thickness  Ejection fraction 15%, severe global hypokinesis  Severely dilated LA  Mild to moderate MR, moderate TR, RVSP 47, dilated IVC    ECG 10/04/2022  Sinus rhythm, heart rate 87, nonspecific T wave changes, high voltage  X-rays 10/4/2022  Mild pulmonary congestion    History of present illness  75-year-old female with past medical history of CAD, CABG, CHF systolic and diastolic, diabetes mellitus got hospitalized on 10/04/2022 with chief complaints of shortness of breath and creasing swelling over the legs.   No chest pain no syncope  Electrocardiogram showed sinus rhythm, high voltage, nonspecific T wave changes  Chest x-ray showed mild pulmonary congestion    Labs on admission sodium 140, potassium 3.6, BUN 16, creatinine 1.09  High-sensitivity troponin 70, proBNP 46,001 49  Hemoglobin 10.9, WBC 7.2, platelets 048    Patient seen and examined  Frail looking elderly female  She was having her food  At present afebrile hemodynamically stable      Medications:   Scheduled Meds:   sodium chloride flush  5-40 mL IntraVENous 2 times per day    enoxaparin  40 mg SubCUTAneous Daily    atorvastatin  80 mg Oral Nightly    budesonide-formoterol  2 puff Inhalation BID    citalopram  20 mg Oral Daily    clopidogrel  75 mg Oral Daily    aspirin  81 mg Oral Daily    potassium chloride  20 mEq Oral Daily    pantoprazole  40 mg Oral QAM AC    nicotine  1 patch TransDERmal Daily    magnesium oxide  400 mg Oral BID    isosorbide mononitrate  30 mg Oral Daily    guaiFENesin  600 mg Oral BID    insulin lispro  0-8 Units SubCUTAneous TID WC    insulin lispro  0-4 Units SubCUTAneous Nightly    methylPREDNISolone  40 mg IntraVENous Q8H    rOPINIRole  1 mg Oral Nightly    azithromycin  500 mg IntraVENous Q24H    furosemide  40 mg IntraVENous TID    carvedilol  6.25 mg Oral BID WC    spironolactone  25 mg Oral Daily    ipratropium-albuterol  1 ampule Inhalation Q4H WA     Continuous Infusions:   sodium chloride      dextrose       CBC:   Recent Labs     10/04/22  1933 10/05/22  0330   WBC 7.2 4.8   HGB 10.1* 10.1*    201     BMP:    Recent Labs     10/04/22  1933 10/05/22  0330    140   K 3.6* 4.0    106   CO2 25 24   BUN 16 16   CREATININE 1.09* 1.16*   GLUCOSE 165* 222*     Hepatic: No results for input(s): AST, ALT, ALB, BILITOT, ALKPHOS in the last 72 hours. Troponin: No results for input(s): TROPONINI in the last 72 hours. BNP: No results for input(s): BNP in the last 72 hours. Lipids: No results for input(s): CHOL, HDL in the last 72 hours. Invalid input(s): LDLCALCU  INR: No results for input(s): INR in the last 72 hours.     Objective:   Vitals: /81   Pulse 73   Temp 98.4 °F (36.9 °C) (Oral)   Resp 18   Ht 5' 4\" (1.626 m)   Wt 134 lb 7.7 oz (61 kg)   SpO2 97%   BMI 23.08 kg/m²   General appearance: alert and cooperative with exam  HEENT: Head: Normal, normocephalic, atraumatic. Neck: supple, symmetrical, trachea midline  Lungs: diminished breath sounds bibasilar and rales bibasilar  Heart: regular rate and rhythm  Abdomen:  Soft bowel sounds present  Extremities: Homans sign is negative, no sign of DVT  Neurologic: Mental status: Awake and alert    EKG: normal sinus rhythm.,  ST-T abnormalities lateral leads and precordial leads  ECHO: reviewed. Ejection fraction: 35%, akinetic apex, moderate LVH RVSP 60 mmHg  Stress Test: not obtained. Cardiac Angiography: reviewed.     Assessment / Acute Cardiac Problems:   Admission with congestive heart failure systolic and diastolic acute on chronic  Ejection fraction 15%  Significant deterioration of LV function in comparison to echo in May 2022  Abnormal high-sensitivity troponin most likely type II  CAD, CABG LIMA to LAD and diagonal 1, SVG to OM and PDA  Borderline abnormal high-sensitivity troponin significance not clear  Here COPD, current smoker  Hypertension is stable  Diabetes mellitus type 2  Carotid artery disease, stent placement left internal carotid artery    Patient Active Problem List:     Chronic pain     Controlled type 2 diabetes mellitus without complication, without long-term current use of insulin (HCC)     Allergic rhinitis     Hypertension goal BP (blood pressure) < 140/90     Mixed hyperlipidemia     Chronic obstructive pulmonary disease (HCC)     Coronary artery disease involving native coronary artery of native heart without angina pectoris     Primary insomnia     Diarrhea in adult patient     Restless leg syndrome     Atherosclerosis of superior mesenteric artery (HCC)     Left adrenal mass (HCC)     Former smoker     Chronic bilateral low back pain with left-sided sciatica     Hypothyroidism     S/P CABG x 4     Acute pain of right knee     Abnormal stress test     Tobacco use disorder     Neck pain     Acute ischemic left MCA stroke ([facility] [address]     Internal carotid artery occlusion     Stenosis of left internal carotid artery     Acquired spondylolisthesis of cervical vertebra     Stenosis of cervical spine with myelopathy (HCC)     Abnormal EKG     COPD exacerbation (HCC)     Multifocal pneumonia     Hypoxia     Pneumonia     Iron deficiency anemia     Chronic combined systolic and diastolic congestive heart failure (HCC)     Type 2 diabetes mellitus with chronic kidney disease     Symptomatic congestive heart failure, pre-operative cardiovascular examination ([facility])     Acute on chronic systolic CHF (congestive heart failure) ([facility] [address]      Plan of Treatment:   Medications reviewed  Continue current dose of Lipitor, carvedilol, Aldactone, Lasix IV, Plavix, aspirin, potassium supplement, magnesium, isosorbide mononitrate  Patient is also on Zithromax, Protonix, insulin, bronchodilators and subcu Lovenox 60 mg twice a day  BMP daily  Will review patient again tomorrow  Thank you for letting me to participate in health care of your patient    Electronically signed by Nori Edwards MD on 10/5/2022 at 5:22 PM

## 2022-10-05 NOTE — ACP (ADVANCE CARE PLANNING)
Advance Care Planning     Advance Care Planning Activator (Inpatient)  Conversation Note      Date of ACP Conversation: 10/5/2022     Conversation Conducted with: Patient with Decision Making Capacity    ACP Activator: Jennifer Lauren RN        Health Care Decision Maker:     Current Designated Health Care Decision Maker:     Primary Decision Maker: Nikita Melania Ellington - [phone]  Click here to complete Healthcare Decision Makers including section of the Healthcare Decision Maker Relationship (ie \"Primary\")  Today we documented Decision Maker(s) consistent with Legal Next of Kin hierarchy. Care Preferences    Ventilation: \"If you were in your present state of health and suddenly became very ill and were unable to breathe on your own, what would your preference be about the use of a ventilator (breathing machine) if it were available to you? \"      Would the patient desire the use of ventilator (breathing machine)?: yes    \"If your health worsens and it becomes clear that your chance of recovery is unlikely, what would your preference be about the use of a ventilator (breathing machine) if it were available to you? \"     Would the patient desire the use of ventilator (breathing machine)?: No      Resuscitation  \"CPR works best to restart the heart when there is a sudden event, like a heart attack, in someone who is otherwise healthy. Unfortunately, CPR does not typically restart the heart for people who have serious health conditions or who are very sick. \"    \"In the event your heart stopped as a result of an underlying serious health condition, would you want attempts to be made to restart your heart (answer \"yes\" for attempt to resuscitate) or would you prefer a natural death (answer \"no\" for do not attempt to resuscitate)? \" yes       [x] Yes   [] No   Educated Patient / Janeth Lauren regarding differences between Advance Directives and portable DNR orders.     Length of ACP Conversation in minutes:

## 2022-10-05 NOTE — PLAN OF CARE
Problem: Discharge Planning  Goal: Discharge to home or other facility with appropriate resources  Outcome: Progressing     Problem: Pain  Goal: Verbalizes/displays adequate comfort level or baseline comfort level  Outcome: Progressing  Flowsheets (Taken 10/5/2022 0024 by Urvashi Nation RN)  Verbalizes/displays adequate comfort level or baseline comfort level: Assess pain using appropriate pain scale     Problem: Safety - Adult  Goal: Free from fall injury  Outcome: Progressing     Problem: Skin/Tissue Integrity  Goal: Absence of new skin breakdown  Description: 1. Monitor for areas of redness and/or skin breakdown  2. Assess vascular access sites hourly  3. Every 4-6 hours minimum:  Change oxygen saturation probe site  4. Every 4-6 hours:  If on nasal continuous positive airway pressure, respiratory therapy assess nares and determine need for appliance change or resting period.   Outcome: Progressing

## 2022-10-06 ENCOUNTER — TELEPHONE (OUTPATIENT)
Dept: ORTHOPEDIC SURGERY | Age: 72
End: 2022-10-06

## 2022-10-06 DIAGNOSIS — M48.02 SPINAL STENOSIS IN CERVICAL REGION: Primary | ICD-10-CM

## 2022-10-06 DIAGNOSIS — M54.2 NECK PAIN: ICD-10-CM

## 2022-10-06 DIAGNOSIS — M43.12 ACQUIRED SPONDYLOLISTHESIS OF CERVICAL VERTEBRA: ICD-10-CM

## 2022-10-06 PROBLEM — Z51.5 ENCOUNTER FOR PALLIATIVE CARE: Status: ACTIVE | Noted: 2022-10-06

## 2022-10-06 PROBLEM — Z71.89 ACP (ADVANCE CARE PLANNING): Status: ACTIVE | Noted: 2022-10-06

## 2022-10-06 PROBLEM — Z71.89 GOALS OF CARE, COUNSELING/DISCUSSION: Status: ACTIVE | Noted: 2022-10-06

## 2022-10-06 LAB
ANION GAP SERPL CALCULATED.3IONS-SCNC: 8 MMOL/L (ref 9–17)
BUN BLDV-MCNC: 26 MG/DL (ref 8–23)
CALCIUM SERPL-MCNC: 8.4 MG/DL (ref 8.6–10.4)
CHLORIDE BLD-SCNC: 101 MMOL/L (ref 98–107)
CO2: 28 MMOL/L (ref 20–31)
CREAT SERPL-MCNC: 1.46 MG/DL (ref 0.5–0.9)
GFR SERPL CREATININE-BSD FRML MDRD: 38 ML/MIN/1.73M2
GLUCOSE BLD-MCNC: 204 MG/DL (ref 65–105)
GLUCOSE BLD-MCNC: 265 MG/DL (ref 65–105)
GLUCOSE BLD-MCNC: 265 MG/DL (ref 65–105)
GLUCOSE BLD-MCNC: 270 MG/DL (ref 65–105)
GLUCOSE BLD-MCNC: 277 MG/DL (ref 70–99)
GLUCOSE BLD-MCNC: 282 MG/DL (ref 65–105)
HCT VFR BLD CALC: 27.6 % (ref 36–46)
HEMOGLOBIN: 8.9 G/DL (ref 12–16)
MCH RBC QN AUTO: 27.2 PG (ref 26–34)
MCHC RBC AUTO-ENTMCNC: 32.4 G/DL (ref 31–37)
MCV RBC AUTO: 83.8 FL (ref 80–100)
PDW BLD-RTO: 18.3 % (ref 11.5–14.9)
PLATELET # BLD: 188 K/UL (ref 150–450)
PMV BLD AUTO: 8.9 FL (ref 6–12)
POTASSIUM SERPL-SCNC: 4.1 MMOL/L (ref 3.7–5.3)
RBC # BLD: 3.3 M/UL (ref 4–5.2)
SODIUM BLD-SCNC: 137 MMOL/L (ref 135–144)
WBC # BLD: 4.4 K/UL (ref 3.5–11)

## 2022-10-06 PROCEDURE — 94640 AIRWAY INHALATION TREATMENT: CPT

## 2022-10-06 PROCEDURE — 99233 SBSQ HOSP IP/OBS HIGH 50: CPT | Performed by: INTERNAL MEDICINE

## 2022-10-06 PROCEDURE — 85027 COMPLETE CBC AUTOMATED: CPT

## 2022-10-06 PROCEDURE — 94761 N-INVAS EAR/PLS OXIMETRY MLT: CPT

## 2022-10-06 PROCEDURE — 94660 CPAP INITIATION&MGMT: CPT

## 2022-10-06 PROCEDURE — 6370000000 HC RX 637 (ALT 250 FOR IP): Performed by: NURSE PRACTITIONER

## 2022-10-06 PROCEDURE — 6360000002 HC RX W HCPCS: Performed by: INTERNAL MEDICINE

## 2022-10-06 PROCEDURE — 2580000003 HC RX 258: Performed by: NURSE PRACTITIONER

## 2022-10-06 PROCEDURE — 99222 1ST HOSP IP/OBS MODERATE 55: CPT | Performed by: NURSE PRACTITIONER

## 2022-10-06 PROCEDURE — 2700000000 HC OXYGEN THERAPY PER DAY

## 2022-10-06 PROCEDURE — 2060000000 HC ICU INTERMEDIATE R&B

## 2022-10-06 PROCEDURE — 82947 ASSAY GLUCOSE BLOOD QUANT: CPT

## 2022-10-06 PROCEDURE — 80048 BASIC METABOLIC PNL TOTAL CA: CPT

## 2022-10-06 PROCEDURE — 2580000003 HC RX 258: Performed by: STUDENT IN AN ORGANIZED HEALTH CARE EDUCATION/TRAINING PROGRAM

## 2022-10-06 PROCEDURE — 6370000000 HC RX 637 (ALT 250 FOR IP): Performed by: STUDENT IN AN ORGANIZED HEALTH CARE EDUCATION/TRAINING PROGRAM

## 2022-10-06 PROCEDURE — 36415 COLL VENOUS BLD VENIPUNCTURE: CPT

## 2022-10-06 PROCEDURE — 6370000000 HC RX 637 (ALT 250 FOR IP): Performed by: EMERGENCY MEDICINE

## 2022-10-06 PROCEDURE — 6360000002 HC RX W HCPCS: Performed by: STUDENT IN AN ORGANIZED HEALTH CARE EDUCATION/TRAINING PROGRAM

## 2022-10-06 RX ORDER — INSULIN GLARGINE 100 [IU]/ML
5 INJECTION, SOLUTION SUBCUTANEOUS NIGHTLY
Status: DISCONTINUED | OUTPATIENT
Start: 2022-10-06 | End: 2022-10-07

## 2022-10-06 RX ORDER — FUROSEMIDE 10 MG/ML
40 INJECTION INTRAMUSCULAR; INTRAVENOUS 2 TIMES DAILY
Status: DISCONTINUED | OUTPATIENT
Start: 2022-10-06 | End: 2022-10-06

## 2022-10-06 RX ORDER — MAGNESIUM SULFATE 1 G/100ML
1000 INJECTION INTRAVENOUS ONCE
Status: COMPLETED | OUTPATIENT
Start: 2022-10-06 | End: 2022-10-06

## 2022-10-06 RX ORDER — FUROSEMIDE 10 MG/ML
40 INJECTION INTRAMUSCULAR; INTRAVENOUS DAILY
Status: DISCONTINUED | OUTPATIENT
Start: 2022-10-07 | End: 2022-10-07

## 2022-10-06 RX ORDER — BENZONATATE 100 MG/1
100 CAPSULE ORAL 3 TIMES DAILY PRN
Status: DISCONTINUED | OUTPATIENT
Start: 2022-10-06 | End: 2022-10-07 | Stop reason: HOSPADM

## 2022-10-06 RX ORDER — GUAIFENESIN DEXTROMETHORPHAN HYDROBROMIDE ORAL SOLUTION 10; 100 MG/5ML; MG/5ML
5 SOLUTION ORAL EVERY 6 HOURS
Status: DISCONTINUED | OUTPATIENT
Start: 2022-10-06 | End: 2022-10-07 | Stop reason: HOSPADM

## 2022-10-06 RX ADMIN — DEXTROMETHORPHAN HYDROBROMIDE, GUAIFENESIN 5 ML: 10; 100 LIQUID ORAL at 14:39

## 2022-10-06 RX ADMIN — INSULIN LISPRO 2 UNITS: 100 INJECTION, SOLUTION INTRAVENOUS; SUBCUTANEOUS at 17:52

## 2022-10-06 RX ADMIN — TRAZODONE HYDROCHLORIDE 50 MG: 50 TABLET ORAL at 21:54

## 2022-10-06 RX ADMIN — IPRATROPIUM BROMIDE AND ALBUTEROL SULFATE 1 AMPULE: 2.5; .5 SOLUTION RESPIRATORY (INHALATION) at 15:02

## 2022-10-06 RX ADMIN — DEXTROMETHORPHAN HYDROBROMIDE, GUAIFENESIN 5 ML: 10; 100 LIQUID ORAL at 09:09

## 2022-10-06 RX ADMIN — MAGNESIUM OXIDE TAB 400 MG (241.3 MG ELEMENTAL MG) 400 MG: 400 (241.3 MG) TAB at 08:31

## 2022-10-06 RX ADMIN — ISOSORBIDE MONONITRATE 30 MG: 30 TABLET, EXTENDED RELEASE ORAL at 08:31

## 2022-10-06 RX ADMIN — INSULIN LISPRO 4 UNITS: 100 INJECTION, SOLUTION INTRAVENOUS; SUBCUTANEOUS at 08:31

## 2022-10-06 RX ADMIN — PANTOPRAZOLE SODIUM 40 MG: 40 TABLET, DELAYED RELEASE ORAL at 05:48

## 2022-10-06 RX ADMIN — AZITHROMYCIN DIHYDRATE 500 MG: 500 INJECTION, POWDER, LYOPHILIZED, FOR SOLUTION INTRAVENOUS at 09:17

## 2022-10-06 RX ADMIN — DEXTROMETHORPHAN HYDROBROMIDE, GUAIFENESIN 5 ML: 10; 100 LIQUID ORAL at 21:54

## 2022-10-06 RX ADMIN — TIZANIDINE 2 MG: 2 TABLET ORAL at 21:53

## 2022-10-06 RX ADMIN — IPRATROPIUM BROMIDE AND ALBUTEROL SULFATE 1 AMPULE: 2.5; .5 SOLUTION RESPIRATORY (INHALATION) at 19:36

## 2022-10-06 RX ADMIN — METHYLPREDNISOLONE SODIUM SUCCINATE 40 MG: 40 INJECTION, POWDER, FOR SOLUTION INTRAMUSCULAR; INTRAVENOUS at 15:06

## 2022-10-06 RX ADMIN — IPRATROPIUM BROMIDE AND ALBUTEROL SULFATE 1 AMPULE: 2.5; .5 SOLUTION RESPIRATORY (INHALATION) at 07:28

## 2022-10-06 RX ADMIN — CITALOPRAM HYDROBROMIDE 20 MG: 20 TABLET ORAL at 08:31

## 2022-10-06 RX ADMIN — CARVEDILOL 6.25 MG: 6.25 TABLET, FILM COATED ORAL at 17:51

## 2022-10-06 RX ADMIN — BUDESONIDE AND FORMOTEROL FUMARATE DIHYDRATE 2 PUFF: 160; 4.5 AEROSOL RESPIRATORY (INHALATION) at 19:38

## 2022-10-06 RX ADMIN — MAGNESIUM SULFATE HEPTAHYDRATE 1000 MG: 1 INJECTION, SOLUTION INTRAVENOUS at 13:44

## 2022-10-06 RX ADMIN — SPIRONOLACTONE 25 MG: 25 TABLET ORAL at 08:31

## 2022-10-06 RX ADMIN — IPRATROPIUM BROMIDE AND ALBUTEROL SULFATE 1 AMPULE: 2.5; .5 SOLUTION RESPIRATORY (INHALATION) at 10:53

## 2022-10-06 RX ADMIN — CARVEDILOL 6.25 MG: 6.25 TABLET, FILM COATED ORAL at 08:18

## 2022-10-06 RX ADMIN — POTASSIUM CHLORIDE 20 MEQ: 1500 TABLET, EXTENDED RELEASE ORAL at 08:31

## 2022-10-06 RX ADMIN — ATORVASTATIN CALCIUM 80 MG: 80 TABLET, FILM COATED ORAL at 21:53

## 2022-10-06 RX ADMIN — SODIUM CHLORIDE, PRESERVATIVE FREE 10 ML: 5 INJECTION INTRAVENOUS at 21:54

## 2022-10-06 RX ADMIN — BUDESONIDE AND FORMOTEROL FUMARATE DIHYDRATE 2 PUFF: 160; 4.5 AEROSOL RESPIRATORY (INHALATION) at 07:35

## 2022-10-06 RX ADMIN — MAGNESIUM OXIDE TAB 400 MG (241.3 MG ELEMENTAL MG) 400 MG: 400 (241.3 MG) TAB at 21:53

## 2022-10-06 RX ADMIN — SODIUM CHLORIDE, PRESERVATIVE FREE 10 ML: 5 INJECTION INTRAVENOUS at 08:47

## 2022-10-06 RX ADMIN — ROPINIROLE HYDROCHLORIDE 1 MG: 1 TABLET, FILM COATED ORAL at 21:53

## 2022-10-06 RX ADMIN — FUROSEMIDE 40 MG: 10 INJECTION, SOLUTION INTRAMUSCULAR; INTRAVENOUS at 15:06

## 2022-10-06 RX ADMIN — INSULIN GLARGINE 5 UNITS: 100 INJECTION, SOLUTION SUBCUTANEOUS at 21:54

## 2022-10-06 RX ADMIN — ENOXAPARIN SODIUM 60 MG: 100 INJECTION SUBCUTANEOUS at 08:46

## 2022-10-06 RX ADMIN — METHYLPREDNISOLONE SODIUM SUCCINATE 40 MG: 40 INJECTION, POWDER, FOR SOLUTION INTRAMUSCULAR; INTRAVENOUS at 21:53

## 2022-10-06 RX ADMIN — INSULIN LISPRO 4 UNITS: 100 INJECTION, SOLUTION INTRAVENOUS; SUBCUTANEOUS at 12:10

## 2022-10-06 RX ADMIN — GUAIFENESIN 600 MG: 600 TABLET, EXTENDED RELEASE ORAL at 08:30

## 2022-10-06 RX ADMIN — ENOXAPARIN SODIUM 60 MG: 100 INJECTION SUBCUTANEOUS at 21:53

## 2022-10-06 RX ADMIN — FUROSEMIDE 40 MG: 10 INJECTION, SOLUTION INTRAMUSCULAR; INTRAVENOUS at 08:31

## 2022-10-06 RX ADMIN — ASPIRIN 81 MG: 81 TABLET, COATED ORAL at 08:31

## 2022-10-06 RX ADMIN — CLOPIDOGREL BISULFATE 75 MG: 75 TABLET ORAL at 08:31

## 2022-10-06 RX ADMIN — METHYLPREDNISOLONE SODIUM SUCCINATE 40 MG: 40 INJECTION, POWDER, FOR SOLUTION INTRAMUSCULAR; INTRAVENOUS at 05:48

## 2022-10-06 ASSESSMENT — ENCOUNTER SYMPTOMS
NAUSEA: 0
VOMITING: 0
ABDOMINAL PAIN: 0
COUGH: 1
RHINORRHEA: 0

## 2022-10-06 ASSESSMENT — PAIN SCALES - GENERAL: PAINLEVEL_OUTOF10: 7

## 2022-10-06 NOTE — CARE COORDINATION
ONGOING DISCHARGE PLAN:    Pt is alert and oriented x4. Discussed plans for discharge, and pt states \"I just have to take things one day at a time. \"      Writer highly encouraged patient to go to a homeless shelter upon discharge so that she will be able to use her oxygen. Explained that we are very concerned with her ability to use O2 on discharge as she does not have electricity. Pt verbalized understanding that her oxygen level could drop so low that she could die. Pt states she has used her 3 medical exemptions with the electric company, and that her roommate James Moreno is working to get everything put into her name, however James Moreno is currently hospitalized. Pt states she will think about going to a shelter, but does not think she is going to be agreeable to this as she is very worried about having to surrender to her dog. Writer spoke with Edilia Fischer, who spoke with pt's landlord. Per luislorelkin, pt tried to pay the $1200 rent on 10/3. Writer inquired about if pt still has that money so that she could possibly stay at a hotel for the time being since she is going to be evicted soon. She states \"no that money was used for other bills. \"    [facility] worker did come and speak with patient today. At this point, pt is planning to go back the home she is being evicted from and understands that it is not a safe decision since she requires oxygen and does not have the electricity to power the oxygen.     Electronically signed by Garcia Conway RN on 10/6/2022 at 4:45 PM

## 2022-10-06 NOTE — PROGRESS NOTES
Attempted to see patient x 2, following request from [facility] to complete HPOA with patient. Patient was transferred from ICU to [facility] room 2114. Patient unavailable x 2 due to patient's in therapy and patient's care being performed. SC will remain available for emotional and spiritual support.      10/06/22 1000   Encounter Summary   Encounter Overview/Reason  Attempted Encounter  (x2, patient unavailable)   Service Provided For: Patient   Referral/Consult From: Other   (To complete HPOA)   Spiritual/Emotional needs   Type Spiritual Support   Assessment/Intervention/Outcome   Assessment Unable to assess  (Unavailable)

## 2022-10-06 NOTE — PLAN OF CARE
Problem: Safety - Adult  Goal: Free from fall injury  Outcome: Progressing  Note: No falls noted this shift. Patient ambulates with x1 staff assistance without difficulty. Bed kept in low position. Safe environment maintained. Bedside table & call light in reach. Uses call light appropriately when needing assistance. Problem: Skin/Tissue Integrity  Goal: Absence of new skin breakdown  Description: 1. Monitor for areas of redness and/or skin breakdown  2. Assess vascular access sites hourly  3. Every 4-6 hours minimum:  Change oxygen saturation probe site  4. Every 4-6 hours:  If on nasal continuous positive airway pressure, respiratory therapy assess nares and determine need for appliance change or resting period. Outcome: Progressing  Note: No signs of new skin breakdown noted this shift. Problem: Chronic Conditions and Co-morbidities  Goal: Patient's chronic conditions and co-morbidity symptoms are monitored and maintained or improved  Outcome: Progressing     Problem: Pain  Goal: Verbalizes/displays adequate comfort level or baseline comfort level  Outcome: Progressing  Note: Generalized discomfort reported this shift. Zanaflex nightly given.      Problem: Discharge Planning  Goal: Discharge to home or other facility with appropriate resources  Outcome: Progressing

## 2022-10-06 NOTE — PROGRESS NOTES
Physical Therapy        Physical Therapy Cancel Note      DATE: 10/6/2022    NAME: Rogelio Ellsworth  MRN: [id number]   : 1950      Patient not seen this date for Physical Therapy due to:    Patient Declined: Pt declines therapy at this time d/t increase fatigue. Edu pt on the benefits of participation. Will continue to check on pt for PT services.        Electronically signed by Claudia Morales PTA on 10/6/2022 at 9:50 AM

## 2022-10-06 NOTE — PROGRESS NOTES
Patient had a difficult day with some healthcare updates, her living situation, etc. She is worried about her friend in ICU. Writer provided listening presence and emotional support. Writer had spoken to pt previously about healthcare power of , and pt expressed that she did not want her daughter involved in any way. Today pt completed HPOA naming son Sylvia Mccall as primary and friend [phone] as 2nd. A copy was made for pt's chart and original given to pt. Pt was relieved to have this completed. 10/06/22 1856   Encounter Summary   Encounter Overview/Reason  Advance Care Planning;Palliative Care   Service Provided For: Patient   Referral/Consult From: [address] [facility];Friends/neighbors   Last Encounter  10/06/22   Complexity of Encounter Moderate   Begin Time 1715   End Time  1750   Total Time Calculated 35 min   Palliative Care   Type Palliative Care, Follow-up   Advance Care Planning   Type Completed AD/ACP document(s)   Assessment/Intervention/Outcome   Assessment Calm; Concerns with suffering   Intervention Active listening;Explored/Affirmed feelings, thoughts, concerns;Explored Coping Skills/Resources;Sustaining Presence/Ministry of presence   Outcome Engaged in conversation;Expressed feelings, needs, and concerns;Expressed Gratitude;Venting emotion

## 2022-10-06 NOTE — FLOWSHEET NOTE
10/06/22 1159   Treatment Team Notification   Reason for Communication Abnormal vitals   Team Member Name Dr Yanci Anaya Team Role Consulting Provider   Method of Communication Call   Response En route   Notification Time 1157   Notified Dr Nathaniel Benoit of 5 beat V tach.  en route to to unit, orders received. See MAR.

## 2022-10-06 NOTE — PROGRESS NOTES
Date:   10/6/2022  Patient name: Alley Mitchell  Date of admission:  10/4/2022  7:25 PM  MRN:   [id number]  YOB: 1950  PCP: Fahad Murillo MD    Reason for Admission: COPD exacerbation [facility]) [J44.1]    Cardiology follow-up: Multivessel coronary artery disease, CHF diastolic and systolic acute on chronic          Referring physician: Dr Katiana Mackay     Admission 10/2/9223 CHF systolic and diastolic acute on chronic, ejection fraction 15% significant deterioration in comparison to the previous echo  Tox screen positive for cocaine metabolites  Mild to moderate pulmonary hypertension RVSP 47    Admission 0/50/3049 CHF systolic and diastolic acute on chronic, ejection fraction 35%  Coronary artery disease, CABG 2018, LIMA to LAD and diagonal 1, SVG to OM and PDA  Congestive heart failure systolic and diastolic acute on chronic, ejection fraction 35%  Moderate pulmonary hypertension,  Moderate LVH  Carotid artery disease/stent placement left internal carotid artery  CVA  Hypertension  Upper lipidemia  2 diabetes mellitus  COPD on oxygen at night, history of smoking  Chronic kidney disease     2D echo 2/12/2021 normal LV size, moderate LVH asymmetrical septal hypertrophy  Moderate global hypokinesis, akinetic apex, ejection fraction 35%  Dilated LA and RA  Moderate TR, RVSP 60 mmHg  Dilated IVC diam,  impaired respiratory variation    2D echo 10/5/2022  Moderately dilated LV, mildly increased LV wall thickness  Ejection fraction 15%, severe global hypokinesis  Severely dilated LA  Mild to moderate MR, moderate TR, RVSP 47, dilated IVC     ECG 10/04/2022  Sinus rhythm, heart rate 87, nonspecific T wave changes, high voltage  X-rays 10/4/2022  Mild pulmonary congestion     History of present illness  78-year-old female with past medical history of CAD, CABG, CHF systolic and diastolic, diabetes mellitus got hospitalized on 10/04/2022 with chief complaints of shortness of breath and creasing swelling over the legs. No chest pain no syncope  Electrocardiogram showed sinus rhythm, high voltage, nonspecific T wave changes  Chest x-ray showed mild pulmonary congestion.   Repeat 2D echo examination showed ejection fraction 15%, severely dilated LA, mild to moderate MR     Labs on admission sodium 140, potassium 3.6, BUN 16, creatinine 1.09  High-sensitivity troponin 70, proBNP 46,001 49  Hemoglobin 10.9, WBC 7.2, platelets 820    Current evaluation  Patient seen and examined  Frail looking elderly female  She was resting on bed in upright position  Continue to have cough but no fever no chills  No chest pain  She had 5 beat V. tach earlier today  ECG monitor sinus rhythm, occasional PAC and PVC      Blood pressure 112/62, heart rate 64, oxygen saturation 94% room air  Today's lab work potassium 4.1, sodium 137, BUN 26, creatinine 1.46, glucose 270  Hemoglobin 8.9, WBC 4.4, platelet 962  Toxic screen positive for cocaine metabolites    Medications:   Scheduled Meds:   dextromethorphan-guaiFENesin  5 mL Oral Q6H    insulin glargine  5 Units SubCUTAneous Nightly    furosemide  40 mg IntraVENous BID    sodium chloride flush  5-40 mL IntraVENous 2 times per day    atorvastatin  80 mg Oral Nightly    budesonide-formoterol  2 puff Inhalation BID    citalopram  20 mg Oral Daily    clopidogrel  75 mg Oral Daily    aspirin  81 mg Oral Daily    potassium chloride  20 mEq Oral Daily    pantoprazole  40 mg Oral QAM AC    nicotine  1 patch TransDERmal Daily    magnesium oxide  400 mg Oral BID    isosorbide mononitrate  30 mg Oral Daily    insulin lispro  0-8 Units SubCUTAneous TID WC    insulin lispro  0-4 Units SubCUTAneous Nightly    methylPREDNISolone  40 mg IntraVENous Q8H    rOPINIRole  1 mg Oral Nightly    azithromycin  500 mg IntraVENous Q24H    carvedilol  6.25 mg Oral BID WC    spironolactone  25 mg Oral Daily    enoxaparin  1 mg/kg SubCUTAneous BID    ipratropium-albuterol  1 ampule Inhalation Q4H WA Continuous Infusions:   sodium chloride      dextrose       CBC:   Recent Labs     10/04/22  1933 10/05/22  0330 10/06/22  0442   WBC 7.2 4.8 4.4   HGB 10.1* 10.1* 8.9*    201 188     BMP:    Recent Labs     10/04/22  1933 10/05/22  0330 10/06/22  0442    140 137   K 3.6* 4.0 4.1    106 101   CO2 25 24 28   BUN 16 16 26*   CREATININE 1.09* 1.16* 1.46*   GLUCOSE 165* 222* 277*     Hepatic: No results for input(s): AST, ALT, ALB, BILITOT, ALKPHOS in the last 72 hours. Troponin: No results for input(s): TROPONINI in the last 72 hours. BNP: No results for input(s): BNP in the last 72 hours. Lipids: No results for input(s): CHOL, HDL in the last 72 hours. Invalid input(s): LDLCALCU  INR: No results for input(s): INR in the last 72 hours. Objective:   Vitals: /62   Pulse 64   Temp 98 °F (36.7 °C) (Oral)   Resp 18   Ht 5' 4\" (1.626 m)   Wt 145 lb 1 oz (65.8 kg)   SpO2 94%   BMI 24.90 kg/m²   General appearance: alert and cooperative with exam  HEENT: Head: Normal, normocephalic, atraumatic. Neck: supple, symmetrical, trachea midline  Lungs:  Expansion is poor, bilateral crackles  Heart:  Cardiac apical impulse not palpable, heart sounds are distant  Abdomen: Bowel sounds present  Extremities: Homans sign is negative, no sign of DVT  Neurologic: Mental status: Alert, oriented, thought content appropriate    EKG: normal sinus rhythm.,  ST-T abnormalities lateral leads and precordial leads, prolonged QT, high voltage  ECHO: reviewed. Ejection fraction: 15%, severe global hypokinesis, mild LVH RVSP 47mmHg, mild to moderate MR, moderate TR 2D echo 10/5/2022  Stress Test: not obtained. Cardiac Angiography: reviewed.     Assessment / Acute Cardiac Problems:     Admission with congestive heart failure systolic and diastolic acute on chronic  Ejection fraction 15%  Significant deterioration of LV function in comparison to echo in May 2022 drop in ejection fraction from 35% to 15%  Abnormal high-sensitivity troponin most likely type II  CAD, CABG LIMA to LAD and diagonal 1, SVG to OM and PDA  Borderline abnormal high-sensitivity troponin significance not clear  Severe COPD, current smoker  Hypertension is stable  Diabetes mellitus type 2  Carotid artery disease, stent placement left internal carotid artery    10/6/2022 episode of nonsustained V. tach 5 beats      Patient Active Problem List:     Chronic pain     Controlled type 2 diabetes mellitus without complication, without long-term current use of insulin (HCC)     Allergic rhinitis     Hypertension goal BP (blood pressure) < 140/90     Mixed hyperlipidemia     Chronic obstructive pulmonary disease (HCC)     Coronary artery disease involving native coronary artery of native heart without angina pectoris     Primary insomnia     Diarrhea in adult patient     Restless leg syndrome     Atherosclerosis of superior mesenteric artery (HCC)     Left adrenal mass (HCC)     Former smoker     Chronic bilateral low back pain with left-sided sciatica     Hypothyroidism     S/P CABG x 4     Acute pain of right knee     Abnormal stress test     Tobacco use disorder     Neck pain     Acute ischemic left MCA stroke ([address]     Internal carotid artery occlusion     Stenosis of left internal carotid artery     Acquired spondylolisthesis of cervical vertebra     Stenosis of cervical spine with myelopathy (HCC)     Abnormal EKG     COPD exacerbation (HCC)     Multifocal pneumonia     Hypoxia     Pneumonia     Iron deficiency anemia     Chronic combined systolic and diastolic congestive heart failure (HCC)     Type 2 diabetes mellitus with chronic kidney disease     Symptomatic congestive heart failure, pre-operative cardiovascular examination ([address]     Acute on chronic systolic CHF (congestive heart failure) ([address]      Plan of Treatment:   Medications reviewed  1 g IV magnesium today  Continue current dose of carvedilol, Lipitor, isosorbide mononitrate, Aldactone  Continue oral magnesium supplements  Reduce IV Lasix to 40 mg once a day tomorrow  BMP and magnesium daily    Long term prognosis guarded    Electronically signed by Nelia Wood MD on 10/6/2022 at 12:02 PM

## 2022-10-06 NOTE — PLAN OF CARE
Problem: Discharge Planning  Goal: Discharge to home or other facility with appropriate resources  Outcome: Progressing  Flowsheets (Taken 10/6/2022 1743)  Discharge to home or other facility with appropriate resources:   Identify barriers to discharge with patient and caregiver   Arrange for needed discharge resources and transportation as appropriate   Arrange for interpreters to assist at discharge as needed   Refer to discharge planning if patient needs post-hospital services based on physician order or complex needs related to functional status, cognitive ability or social support system   Identify discharge learning needs (meds, wound care, etc)     Problem: Pain  Goal: Verbalizes/displays adequate comfort level or baseline comfort level  Outcome: Progressing  Flowsheets (Taken 10/6/2022 1743)  Verbalizes/displays adequate comfort level or baseline comfort level:   Encourage patient to monitor pain and request assistance   Assess pain using appropriate pain scale   Implement non-pharmacological measures as appropriate and evaluate response  Note: Patient denies pain this shift. Problem: ABCDS Injury Assessment  Goal: Absence of physical injury  Outcome: Progressing  Flowsheets (Taken 10/6/2022 1743)  Absence of Physical Injury: Implement safety measures based on patient assessment  Note: Fall assessment performed and appropriate measures implemented. Room freed from clutter. Bed in lowest position with wheels locked. Call light in place. ID band in place.        Problem: Safety - Adult  Goal: Free from fall injury  Outcome: Progressing  Flowsheets (Taken 10/6/2022 1743)  Free From Fall Injury:   Instruct family/caregiver on patient safety   Based on caregiver fall risk screen, instruct family/caregiver to ask for assistance with transferring infant if caregiver noted to have fall risk factors  Note: The patient remained free from falls this shift, call light within reach, bed in locked and lowest position. Side rails up x2. Continue to monitor closely. Problem: Chronic Conditions and Co-morbidities  Goal: Patient's chronic conditions and co-morbidity symptoms are monitored and maintained or improved  Outcome: Progressing  Flowsheets (Taken 10/6/2022 1743)  Care Plan - Patient's Chronic Conditions and Co-Morbidity Symptoms are Monitored and Maintained or Improved:   Monitor and assess patient's chronic conditions and comorbid symptoms for stability, deterioration, or improvement   Collaborate with multidisciplinary team to address chronic and comorbid conditions and prevent exacerbation or deterioration   Update acute care plan with appropriate goals if chronic or comorbid symptoms are exacerbated and prevent overall improvement and discharge     Problem: Skin/Tissue Integrity  Goal: Absence of new skin breakdown  Description: 1. Monitor for areas of redness and/or skin breakdown  2. Assess vascular access sites hourly  3. Every 4-6 hours minimum:  Change oxygen saturation probe site  4. Every 4-6 hours:  If on nasal continuous positive airway pressure, respiratory therapy assess nares and determine need for appliance change or resting period. Outcome: Progressing  Note: Skin assessment complete. Pt turns self. Area kept free from moisture. Proper nourishment and fluids encouraged, as appropriate. Skin remains clean, dry with small scattered abrasions. See LDAs. Will continue to monitor for additional needs and changes in skin breakdown.

## 2022-10-06 NOTE — CARE COORDINATION
SW had a long in depth conversation about discharge plans with patient. SW asked patient where she will discharge to. Patient reported that she did not know, and was unsure if she will have a home at discharge. SW asked patient if she had any family/friends involved in care. Patient reported that her son Erin Johnson is moving to [place]. Erin Johnson is a truck-, and \"has his own problems\". There are no other family members involved in care of patient. SW explained to patient that she could go to a skilled nursing facility (if they would be agreeable to accept her) like  or [facility] who have a contract with her insurance. Patient reported that she has pets (dog and cat). Patient reported that she \"will not give up dogs\" and \"will sleep on the street before giving up dog\", \"take clothes on back, and that's what we'll do\". Patient reported that she would be agreeable to give up the cat, but not the dog because the dog \"supported me with death of second  and since a puppy\". SW asked patient what she will do about her oxygen needs, and patient reported \"I don't know\". Patient reported that she has two roommates. One roommate is in the hospital, and the other one is at home watching the pets. Patient informed SW that her roommate who is in the hospital has been in communication with the landlord. The roommate is attempting to buy the home they are being evicted from and has been communicating with the landlord. Patient then informed SW that she knows another person who is a \"landlord\" but they \"do not have anything available\". Patient stated that she would discharge to the home, but \"if stuff is out by the curb, we'll go from there\". SW offered a homeless shelter to patient, but patient informed SW that she has her dogs. SW asked patient if she (MELISSA) could contact Erin Johnson to see if he is aware of patients situation. Patient informed MELISSA that Erin Johnson is aware of situation.  Patient provided MELISSA with the phone number of Mariana Belle who worked with [employer]. Patient provided LSW with verbal consent to communicate with Bruce Baker ([employer]) about being in hospital and medical condition. SW was able to get Ray's number from Mariana Belle without disclosing any patient information. MELISSA spoke with Bruce arriola about patients housing. Patient went to open court with [employer] and patient had to pay $1200 on the 9/29. Patient did not pay the money on 9/29 and tried to pay the money 10/3. Bruce Baker reported that he did not accept payment on the date, because it has already been three months. Bruce Baker reported that he is still waiting to hear back from the bailess. Not 100% when the exact date of eviction will be, but it may be sometime next week. The property has been sold and patient was being charged $407/month to rent.  Electronically signed by ZOHAIB Apaircio on 10/6/2022 at 2:16 PM

## 2022-10-06 NOTE — ACP (ADVANCE CARE PLANNING)
Advance Care Planning   Healthcare Decision Maker:    Primary Decision Maker: Lloyd Mary Child - [phone]    Secondary Decision Maker: Ellen Carl - [phone]    Patient completed healthcare power of .

## 2022-10-06 NOTE — ACP (ADVANCE CARE PLANNING)
Advance Care Planning     Advance Care Planning (ACP) Physician/NP/PA Conversation    Date of Conversation: 10/6/2022  Conducted with: Patient with Decision Making Capacity    Healthcare Decision Maker:    Primary Decision Maker: Austin Brooks - Chandana - [phone]    Primary Decision Maker: Floyd Canas - Chandana  Click here to complete Healthcare Decision Makers including selection of the Healthcare Decision Maker Relationship (ie \"Primary\"). Patient is a  and has 2 biological children Christiano Evans and Amelia. Patient daughter lives in [place] and has not seen or spoken with her mom in over 10 years. Patient wants to complete [facility] [employer]. POA and wants Amelia POA#1 and Anali POA#2 Anali is on ventilator in ICU presently. I notified spiritual care. Care Preferences:    Hospitalization: \"If your health worsens and it becomes clear that your chance of recovery is unlikely, what would be your preference regarding hospitalization? \"  Hospitalized     Ventilation: \"If you were unable to breath on your own and your chance of recovery was unlikely, what would be your preference about the use of a ventilator (breathing machine) if it was available to you? \"  [facility]-A no intubation     Resuscitation: \"In the event your heart stopped as a result of an underlying serious health condition, would you want attempts made to restart your heart, or would you prefer a natural death? \"  DNR[facility]-A no intubation         Conversation Outcomes / Follow-Up Plan:  Palliative Interaction:  I went to bedside and spoke with patient and introduced myself and my palliative care role. Patient is a  and has 2 biological children Christiano Evans and Amelia. Patient daughter lives in [place] and has not seen or spoken with her mom in over 10 years. Patient wants to complete [facility] [employer]. POA and wants Amelia POA#1 and Anali POA#2 Anali is on ventilator in ICU presently. I notified spiritual care.      We discussed code status levels and I explained each level completely to patient. She decided on [facility] no intubation and paperwork completed and order in computer. We discussed patient condition and her decreased functioning of her heart and lungs. I discussed her smoking and that would cause increased stress on her body. Patient drug screen from yesterday was positive for cocaine metabolites but patient denies any drug use. Patient states she plans to be discharged home with room mate [place] José Miguel and denies need for palliative or VNS assistance. APS in to evaluate patient.      Palliative care will continue to follow        Length of Voluntary ACP Conversation in minutes:  16 minutes    JAMES Hill - NP

## 2022-10-06 NOTE — TELEPHONE ENCOUNTER
Dr. Floridalma Mott, pt called in for MRI results. She was scheduled to see you on 10/4 but missed due to being hospitalized and is still in patient at [facility] and not sure how soon she will be discharged. She stated that she just wants to know if she will need surgery or not. Please advise. She currently does not have a phone so she will call the office back for direction.

## 2022-10-06 NOTE — PROGRESS NOTES
10/06/22 1250   Encounter Summary   Encounter Overview/Reason  Palliative Care   Service Provided For: Patient   Referral/Consult From: Palliative Care   Last Encounter  10/06/22   Spiritual/Emotional needs   Type Spiritual Support   Assessment/Intervention/Outcome   Assessment Unable to assess   Intervention Prayer (assurance of)/[place]
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
## 2022-10-06 NOTE — PROGRESS NOTES
PULMONARY PROGRESS NOTE:    REASON FOR VISIT:copd exac  Interval History:    Shortness of Breath: improved   Cough: yes   Sputum: no          Hemoptysis: no  Chest Pain: no  Fever: no                   Swelling Feet: no  Headache: no                                           Nausea, Emesis, Abdominal Pain: no  Diarrhea: no         Constipation: no    Events since last visit: Transferred out of [facility] yesterday. Wore bipap on/off per RN. Currently on room air.      PAST MEDICAL HISTORY:      Scheduled Meds:   dextromethorphan-guaiFENesin  5 mL Oral Q6H    insulin glargine  5 Units SubCUTAneous Nightly    sodium chloride flush  5-40 mL IntraVENous 2 times per day    atorvastatin  80 mg Oral Nightly    budesonide-formoterol  2 puff Inhalation BID    citalopram  20 mg Oral Daily    clopidogrel  75 mg Oral Daily    aspirin  81 mg Oral Daily    potassium chloride  20 mEq Oral Daily    pantoprazole  40 mg Oral QAM AC    nicotine  1 patch TransDERmal Daily    magnesium oxide  400 mg Oral BID    isosorbide mononitrate  30 mg Oral Daily    insulin lispro  0-8 Units SubCUTAneous TID WC    insulin lispro  0-4 Units SubCUTAneous Nightly    methylPREDNISolone  40 mg IntraVENous Q8H    rOPINIRole  1 mg Oral Nightly    azithromycin  500 mg IntraVENous Q24H    furosemide  40 mg IntraVENous TID    carvedilol  6.25 mg Oral BID WC    spironolactone  25 mg Oral Daily    enoxaparin  1 mg/kg SubCUTAneous BID    ipratropium-albuterol  1 ampule Inhalation Q4H WA     Continuous Infusions:   sodium chloride      dextrose       PRN Meds:benzonatate, sodium chloride flush, sodium chloride, ondansetron **OR** ondansetron, polyethylene glycol, acetaminophen **OR** acetaminophen, albuterol, traZODone, nitroGLYCERIN, glucose, dextrose bolus **OR** dextrose bolus, glucagon (rDNA), dextrose, tiZANidine        PHYSICAL EXAMINATION:  /62   Pulse 82   Temp 98 °F (36.7 °C) (Oral)   Resp 18   Ht 5' 4\" (1.626 m)   Wt 145 lb 1 oz (65.8 kg) SpO2 95%   BMI 24.90 kg/m²     General : sleeping, responds to voice, NAD  Neck - supple, no lymphadenopathy, JVD not raised  Heart - regular rhythm, S1 and S2 normal; no additional sounds heard  Lungs - Air Entry- fair bilaterally; breath sounds : vesicular; +rhonchi,   rales/crackles - absent  Abdomen - soft, no tenderness  Upper Extremities  - no cyanosis, mottling; edema : absent  Lower Extremities: no cyanosis, mottling; edema : absent    Current Laboratory, Radiologic, Microbiologic, and Diagnostic studies reviewed  Data ReviewCBC:   Recent Labs     10/04/22  1933 10/05/22  0330 10/06/22  0442   WBC 7.2 4.8 4.4   RBC 3.77* 3.75* 3.30*   HGB 10.1* 10.1* 8.9*   HCT 31.5* 31.5* 27.6*    201 188     BMP:   Recent Labs     10/04/22  1933 10/05/22  0330 10/06/22  0442   GLUCOSE 165* 222* 277*    140 137   K 3.6* 4.0 4.1   BUN 16 16 26*   CREATININE 1.09* 1.16* 1.46*   CALCIUM 8.8 8.5* 8.4*     ABGs: No results for input(s): PHART, PO2ART, CIP1SZD, NSJ4YSJ, BEART, Y5HRAKBW, CZM6XYE in the last 72 hours.    PT/INR:  No results found for: PTINR    ASSESSMENT / PLAN:    COPD exac - BD, steroids  Robitussin added, acapella   Acute hypoxic resp failure - O2  BPAP- prn  CHF - diuresis  Palliative care consult noted   Discussed with Dr. Óscar Kc     Electronically signed by JAMES Villanueva - INNA on 10/06/22 at 10:49 AM.

## 2022-10-06 NOTE — PROGRESS NOTES
[address]    PROGRESS NOTE             10/6/2022    11:12 AM    Name:   Keegan Bernardo  MRN:     [id number]     Acct:      [de-identified]   Room:   2114/2114-01  IP Day:  2  Admit Date:  10/4/2022  7:25 PM    PCP:  Michelle Henning MD  Code Status:  Full Code    Subjective:     C/C:   Chief Complaint   Patient presents with    Shortness of Breath    Chest Pain     Pt arrives from home by EMS with chest pain and shortness of breath. Pt has hx of COPD. Interval History Status: significantly improved. Patient was seen examined at the bedside. no acute events overnight. Patient has cough and congestion, not bringing up any phlegm. Reports mistiness is not working, needs another cough medication. She got up around 3:30 AM this morning to use restroom. Took her BiPAP off and has not been using it since then. Reports breathing well without it denies shortness of breath. On 2 L oxygen via nasal cannula. Nursing notes and consult notes reviewed. Brief History:     The patient is a 74 y.o. Non- / non  female who presents withShortness of Breath and Chest Pain (Pt arrives from home by EMS with chest pain and shortness of breath. Pt has hx of COPD. )   and she is admitted to the hospital for the management of acute acute on chronic respiratory failure. This is a 49-year-old female with a history of CHF with reduced ejection fraction, COPD, CAD s/p CABG, CKD PE also came in with complaints of shortness of breath. Patient mentioned that she is experiencing shortness of breath for almost 2 days, patient stated lately she is using more inhalers but somehow was not able to breathe. Patient uses 2 L oxygen at baseline and lately mention that she is using more often. Patient also mentioned that she had missed couple of doses here in May. In the ED patient came in hypoxic and called EMS.   Patient was put on the BiPAP because of the concern of respiratory distress. VBG's were unremarkable. Initial CBC and BMP was unremarkable. Troponin is mildly elevated from the baseline, repeat check in the morning. Chest x-ray showed pulmonary congestion. proBNP around 40,000 markedly elevated from baseline. Diabetes     Patient is admitted for further management of acute on chronic respiratory failure likely CHF exacerbation and COPD. Review of Systems:     Review of Systems   Constitutional:  Negative for chills and fever. HENT:  Negative for rhinorrhea. Eyes:  Negative for visual disturbance. Respiratory:  Positive for cough. Cardiovascular:  Negative for chest pain, palpitations and leg swelling. Gastrointestinal:  Negative for abdominal pain, nausea and vomiting. Genitourinary:  Negative for dysuria, hematuria and urgency. Musculoskeletal:  Positive for neck pain. Psychiatric/Behavioral:  Negative for agitation and confusion. Medications: Allergies: Allergies   Allergen Reactions    Codeine Other (See Comments)     Constipation. Lisinopril      hyperkalemia    Morphine Other (See Comments)     Hallucinations.     Potassium-Containing Compounds        Current Meds:   Scheduled Meds:    dextromethorphan-guaiFENesin  5 mL Oral Q6H    insulin glargine  5 Units SubCUTAneous Nightly    sodium chloride flush  5-40 mL IntraVENous 2 times per day    atorvastatin  80 mg Oral Nightly    budesonide-formoterol  2 puff Inhalation BID    citalopram  20 mg Oral Daily    clopidogrel  75 mg Oral Daily    aspirin  81 mg Oral Daily    potassium chloride  20 mEq Oral Daily    pantoprazole  40 mg Oral QAM AC    nicotine  1 patch TransDERmal Daily    magnesium oxide  400 mg Oral BID    isosorbide mononitrate  30 mg Oral Daily    insulin lispro  0-8 Units SubCUTAneous TID WC    insulin lispro  0-4 Units SubCUTAneous Nightly    methylPREDNISolone  40 mg IntraVENous Q8H    rOPINIRole  1 mg Oral Nightly Estimated ejection fraction is 15 %. There is severe global hypokinesis with minor regional variation. Evidence of diastolic dysfunction. Left atrium is severely dilated. Right ventricle is normal in size with mildly reduced function. Trivial aortic insufficiency. Mitral annular calcification is seen. Mild to moderate mitral regurgitation. Moderate tricuspid regurgitation. Mild pulmonic insufficiency. Estimated right ventricular systolic pressure is 47 mmHg, suggests mild Pulm HTN. IVC Increased diameter, but still has inspiratory variation suggesting upper normal or mildly elevated RA filling pressure (i.e. CVP). Signature ----------------------------------------------------------------------------  Electronically signed by Barbie Johnson(Sonographer) on 10/05/2022 11:33  AM ---------------------------------------------------------------------------- ----------------------------------------------------------------------------  Electronically signed by Main Mann(Interpreting physician) on 10/05/2022  12:37 PM ---------------------------------------------------------------------------- FINDINGS Left Atrium Left atrium is severely dilated. Left Ventricle Left ventricle is moderately enlarged with mild left ventricular hypertrophy. Global left ventricular systolic function is severely reduced. Estimated ejection fraction is 15 %. There is severe global hypokinesis with minor regional variation. Evidence of diastolic dysfunction. Right Atrium Right atrium is normal size . Right Ventricle Right ventricle is normal in size with mildly reduced function. Mitral Valve Mitral valve sclerosis without stenosis. Mitral annular calcification is seen. Mild to moderate mitral regurgitation. Aortic Valve Trivial aortic insufficiency. Aortic valve structure normal. No aortic stenosis. Tricuspid Valve Moderate tricuspid regurgitation. Estimated right ventricular systolic pressure is 47 mmHg.  Mildly elevated right ventricular systolic pressure. Pulmonic Valve Mild pulmonic insufficiency. Pulmonic valve structure normal. Pericardial Effusion No pericardial effusion seen. Miscellaneous IVC Increased diameter, but still has inspiratory variation suggesting upper normal or mildly elevated RA filling pressure (i.e. CVP). E/E' average = 19.  M-mode / 2D Measurements & Calculations:   LVIDd:5.91 cm(3.7 - 5.6 cm)             Diastolic VMILEH:516 ml  XKPZM:3.01 cm(2.2 - 4.0 cm)             Systolic UZTIMD:111 ml  TBDK:7.00 cm(0.6 - 1.1 cm)  LVPWd:1.05 cm(0.6 - 1.1 cm)  Fractional Shortenin.44 %           LVOT:2 cm  Calculated LVEF (%): 7.28 %   Mitral:                                  Aortic   Valve Area (P1/2-Time): 1.56 cm^2        Peak Velocity: 1.21 m/s  Peak E-Wave: 82.00 m/s                   Mean Velocity: 0.74 m/s  Peak A-Wave: 60.10 m/s                   Peak Gradient: 5.86 mmHg  E/A Ratio: 1.36                          Mean Gradient: 3 mmHg  Peak Gradient: 74686 mmHg   P1/2t: 141 msec                          Area (continuity): 1.53 cm^2                                           AV VTI: 26.9 cm  MR Velocity: 418.00 m/s   Tricuspid:                               Pulmonic:   Estimated RVSP: 47 mmHg                  Peak Velocity: 1.78 m/s  Peak TR Velocity: 3.12 m/s               Peak Gradient: 12.67 mmHg  Peak TR Gradient: 38.9376 mmHg  Estimated RA Pressure: 8 mmHg                                            Estimated PASP: 46.94 mmHg      MRI CERVICAL SPINE WO CONTRAST    Result Date: 2022  EXAMINATION: MRI OF THE CERVICAL SPINE WITHOUT CONTRAST 2022 1:10 pm TECHNIQUE: Multiplanar multisequence MRI of the cervical spine was performed without the administration of intravenous contrast. COMPARISON: 2020 HISTORY: ORDERING SYSTEM PROVIDED HISTORY: Spinal stenosis in cervical region TECHNOLOGIST PROVIDED HISTORY: Reason for Exam: Spinal stenosis in cervical region, Acquired spondylolisthesis of cervical vertebra, Neck pain FINDINGS: BONES/ALIGNMENT: There is anterolisthesis at C2-3 and C3-4. There is also anterolisthesis at C5-6. There is mild height loss at C3 with irregular contour at C2. There is edema in the C2 and C3 vertebral bodies. Degenerative marrow signal changes are noted at multiple levels. There is also height loss at T3 with edema. Posterior fixation from C3-C6. SPINAL CORD: No abnormal cord signal is seen. SOFT TISSUES: No paraspinal mass identified. C2-C3: Posterior disc osteophyte complex ligamentum flavum hypertrophy with moderate spinal canal stenosis. Mild left neural foraminal narrowing. C3-C4: Posterior disc osteophyte complex with mild spinal canal stenosis. Mild bilateral neural foraminal narrowing. C4-C5: Posterior decompression without significant spinal canal stenosis. Mild-to-moderate bilateral neural foraminal narrowing. C5-C6: Posterior decompression without significant spinal canal stenosis. Moderate right and mild left neural foraminal narrowing. C6-C7: Posterior decompression without significant spinal canal stenosis. Moderate bilateral neural foraminal stenosis. C7-T1: No significant spinal canal or right neural foraminal stenosis. Mild-to-moderate left neural foraminal narrowing. 1. Height loss at C3 and T3 with associated edema, which may represent acute/subacute compression fracture. 2. There is also edema and irregular contour at C2. 3. Multilevel degenerative and postoperative changes. There is mild spinal canal narrowing at C3-4 and moderate spinal canal narrowing at C2-3. 4. Multilevel neural foraminal narrowing. 5. Multilevel listhesis.      XR CHEST PORTABLE    Result Date: 10/5/2022  EXAMINATION: ONE XRAY VIEW OF THE CHEST 10/5/2022 5:30 am COMPARISON: 10/04/2022 HISTORY: ORDERING SYSTEM PROVIDED HISTORY: AECOPD TECHNOLOGIST PROVIDED HISTORY: AECOPD Reason for Exam: AECOPD Additional signs and symptoms: AECOPD Relevant Medical/Surgical History: AECOPD FINDINGS: Probable cardiomegaly unchanged. Patient markedly rotated. No focal consolidation. Mild pulmonary vascular congestion similar to prior. Bandlike subsegmental atelectasis left lower lung. No pleural effusion. No appreciable pneumothorax. Median sternotomy. Markedly rotated patient. Cardiomegaly. Mild vascular congestion. Bandlike subsegmental atelectasis left lower. Stable. XR CHEST PORTABLE    Result Date: 10/4/2022  EXAMINATION: ONE XRAY VIEW OF THE CHEST 10/4/2022 4:29 pm COMPARISON: 05/15/2022 HISTORY: ORDERING SYSTEM PROVIDED HISTORY: dyspnea TECHNOLOGIST PROVIDED HISTORY: dyspnea Reason for Exam: dyspnea Additional signs and symptoms: Chest pain, shortness of breath. Hx copd Relevant Medical/Surgical History: Chest pain, shortness of breath. Hx copd FINDINGS: Cardiac size is enlarged. No acute infiltrates are seen. Patchy areas of scarring in the left mid lower lung zones. The pulmonary vascularity is slightly hazy and indistinct. No pneumothorax. No pleural effusions identified. Posttraumatic deformity of the left humeral head and neck. Postsurgical changes overlying the mediastinum. Mild pulmonary vascular congestion         Physical Examination:        Physical Exam  Vitals and nursing note reviewed. HENT:      Head: Normocephalic and atraumatic. Nose: No rhinorrhea. Mouth/Throat:      Mouth: Mucous membranes are moist.   Cardiovascular:      Rate and Rhythm: Normal rate and regular rhythm. Pulses: Normal pulses. Heart sounds: Normal heart sounds. No murmur heard. Pulmonary:      Effort: Pulmonary effort is normal.      Breath sounds: Normal breath sounds. Musculoskeletal:      Right lower leg: No edema. Left lower leg: No edema. Neurological:      Mental Status: She is alert and oriented to person, place, and time.    Psychiatric:         Mood and Affect: Mood normal.         Behavior: Behavior normal.         Assessment:        Primary Problem  COPD exacerbation [facility])    Active Hospital Problems    Diagnosis Date Noted    COPD exacerbation ([facility] [J44.1] 08/11/2021    Tobacco use disorder [F17.200] 01/10/2019    Controlled type 2 diabetes mellitus without complication, without long-term current use of insulin ([facility] [E11.9] 09/10/2015       Plan:        Acute on chronic respiratory failure likely secondary to CHF, low suspicion of COPD:  -Baseline oxygen 2 L, on BiPAP using accessory muscles. Pt is not using BiPAP since 3:30 am this morning. On 2 L O2 via nasal cannula. SpO2 95.  -COVID-19 negative. -VTCHCI:[id number] (baseline 15k), Chest x-ray showed pulmonary congestion.  - Echo done in 2021 showed EF of 45%, repeat shows Moderately dilated LV, mildly increased LV wall thickness, Ejection fraction 15%, severe global hypokinesis, Severely dilated LA, Mild to moderate MR, moderate TR, RVSP 47, dilated IVC  - WBC - WNL  -Received 2 mg BUMEX in ED. switched to Lasix 40 mg BID. -Solumedrol 40 mg q8h. -Duo neb every 4h while awake. - Zithromax 500 mg  -Pulm consulted - recommended Bipap prn, COPD exac - BD, steroids, Acute hypoxic resp failure - O2, BPAP- prn, CHF - diuresis, OK for transfer out of ICU  - Cardiology consulted -recommended continuing home meds, BMP daily. -         CAD s/p CABG  Continue Plavix, aspirin, Imdur , lipitor, and Lopressor. Hx of RLS, anxiety  - Continue celexa, trazadone, requip. Reactionary hyperglycemia:   - POCT check q6h, with low dose sliding scale and hypoglycemic protocol.         Dispo: Home vs SNF  GI prophylaxis: Protonix 40 mg  DVT prophylaxis: Lovenox 60 mg  Diet: Regular adult      Consultations:   IP CONSULT TO INTERNAL MEDICINE  IP CONSULT TO PULMONOLOGY  IP CONSULT TO SOCIAL WORK    Ling Mac MD  10/6/2022  11:12 AM     Attending Physician Statement  I have discussed the care of [address] [place] [address] and I have examined the patient myselft and taken ros and hpi , including pertinent history and exam findings,  with the resident. I have reviewed the key elements of all parts of the encounter with the resident. I agree with the assessment, plan and orders as documented by the resident.   57-year-old patient looks older for her age history of systolic congestive heart failure ejection fraction 15% history of COPD on home oxygen chronic respiratory failure admitted with increasing dyspnea no fever or chills since yesterday chest x-ray shows pulmonary edema proBNP very high diagnosed with acute on chronic systolic congestive heart failure with underlying chronic respiratory failure due to COPD admitted to stepdown started on BiPAP IV diuresis pulmonary critical care consulted  Empiric antibiotic for community-acquired pneumonia  Atrial fibrillation cardiology consult with a EF of 15% with EF A. fib overall prognosis is very poor palliative care consult      Cut back lasix to 40 bid  ENRRIQUE stocking thigh high both  legs  And check orthostatic bp    Electronically signed by Mary Healy MD

## 2022-10-06 NOTE — PROGRESS NOTES
[facility]   OCCUPATIONAL THERAPY MISSED TREATMENT NOTE   INPATIENT   Date: 10/6/22  Patient Name: Terra Camara       Room:   MRN: [id number]   Account #: [de-identified]    : 1950  (74 y.o.)  Gender: female   Referring Practitioner: JAMES Cole CNP  Diagnosis: COPD exacerbation             REASON FOR MISSED TREATMENT:  Patient refusal   -    Pt politely declined, stating \"I'm really not up for it now. I have been having an awful day. \" Despite encouragement, pt continued to politely decline.  OT to continue to follow  [address], OT

## 2022-10-06 NOTE — CARE COORDINATION
MELISSA contacted [employer] ([employer]) to see how they can assist patient. ... SW left phone number, and call back to speak with director of agency. Electronically signed by Caryle Ang, LSW on 10/6/2022 at 11:39 AM     Robert Paz from [employer] contacted MELISSA requesting updates about patient. MELISSA referred to the ED MLSW note from patients admission. MELISSA informed [employer] that patient allegedly has no running, water, or electricity. Robert Paz reported that she will attempt to come see patient today. MELISSA informed Robert Paz that she is unsure of patients discharge plans since patient needs O2.  Robert Paz [employer]: [phone] Electronically signed by Caryle Ang, LSW on 10/6/2022 at 12:42 PM

## 2022-10-07 VITALS
HEIGHT: 64 IN | TEMPERATURE: 98.1 F | DIASTOLIC BLOOD PRESSURE: 75 MMHG | HEART RATE: 77 BPM | SYSTOLIC BLOOD PRESSURE: 113 MMHG | RESPIRATION RATE: 16 BRPM | WEIGHT: 143.3 LBS | OXYGEN SATURATION: 96 % | BODY MASS INDEX: 24.46 KG/M2

## 2022-10-07 LAB
ANION GAP SERPL CALCULATED.3IONS-SCNC: 6 MMOL/L (ref 9–17)
BUN BLDV-MCNC: 27 MG/DL (ref 8–23)
CALCIUM SERPL-MCNC: 8.8 MG/DL (ref 8.6–10.4)
CHLORIDE BLD-SCNC: 99 MMOL/L (ref 98–107)
CO2: 31 MMOL/L (ref 20–31)
CREAT SERPL-MCNC: 1.49 MG/DL (ref 0.5–0.9)
GFR SERPL CREATININE-BSD FRML MDRD: 37 ML/MIN/1.73M2
GLUCOSE BLD-MCNC: 279 MG/DL (ref 65–105)
GLUCOSE BLD-MCNC: 295 MG/DL (ref 65–105)
GLUCOSE BLD-MCNC: 324 MG/DL (ref 65–105)
GLUCOSE BLD-MCNC: 348 MG/DL (ref 70–99)
HCT VFR BLD CALC: 28.5 % (ref 36–46)
HEMOGLOBIN: 9.4 G/DL (ref 12–16)
MAGNESIUM: 1.9 MG/DL (ref 1.6–2.6)
MCH RBC QN AUTO: 27.5 PG (ref 26–34)
MCHC RBC AUTO-ENTMCNC: 33 G/DL (ref 31–37)
MCV RBC AUTO: 83.4 FL (ref 80–100)
PDW BLD-RTO: 18.1 % (ref 11.5–14.9)
PLATELET # BLD: 215 K/UL (ref 150–450)
PMV BLD AUTO: 8.9 FL (ref 6–12)
POTASSIUM SERPL-SCNC: 4.3 MMOL/L (ref 3.7–5.3)
RBC # BLD: 3.42 M/UL (ref 4–5.2)
SODIUM BLD-SCNC: 136 MMOL/L (ref 135–144)
WBC # BLD: 5.9 K/UL (ref 3.5–11)

## 2022-10-07 PROCEDURE — 6370000000 HC RX 637 (ALT 250 FOR IP): Performed by: STUDENT IN AN ORGANIZED HEALTH CARE EDUCATION/TRAINING PROGRAM

## 2022-10-07 PROCEDURE — 99239 HOSP IP/OBS DSCHRG MGMT >30: CPT | Performed by: INTERNAL MEDICINE

## 2022-10-07 PROCEDURE — 6360000002 HC RX W HCPCS: Performed by: INTERNAL MEDICINE

## 2022-10-07 PROCEDURE — 6370000000 HC RX 637 (ALT 250 FOR IP): Performed by: NURSE PRACTITIONER

## 2022-10-07 PROCEDURE — 80048 BASIC METABOLIC PNL TOTAL CA: CPT

## 2022-10-07 PROCEDURE — 85027 COMPLETE CBC AUTOMATED: CPT

## 2022-10-07 PROCEDURE — 82947 ASSAY GLUCOSE BLOOD QUANT: CPT

## 2022-10-07 PROCEDURE — 97110 THERAPEUTIC EXERCISES: CPT

## 2022-10-07 PROCEDURE — 2580000003 HC RX 258: Performed by: NURSE PRACTITIONER

## 2022-10-07 PROCEDURE — 2700000000 HC OXYGEN THERAPY PER DAY

## 2022-10-07 PROCEDURE — 83735 ASSAY OF MAGNESIUM: CPT

## 2022-10-07 PROCEDURE — 6360000002 HC RX W HCPCS: Performed by: STUDENT IN AN ORGANIZED HEALTH CARE EDUCATION/TRAINING PROGRAM

## 2022-10-07 PROCEDURE — 2580000003 HC RX 258: Performed by: STUDENT IN AN ORGANIZED HEALTH CARE EDUCATION/TRAINING PROGRAM

## 2022-10-07 PROCEDURE — 97535 SELF CARE MNGMENT TRAINING: CPT

## 2022-10-07 PROCEDURE — 6370000000 HC RX 637 (ALT 250 FOR IP): Performed by: EMERGENCY MEDICINE

## 2022-10-07 PROCEDURE — 94761 N-INVAS EAR/PLS OXIMETRY MLT: CPT

## 2022-10-07 PROCEDURE — 36415 COLL VENOUS BLD VENIPUNCTURE: CPT

## 2022-10-07 PROCEDURE — 94640 AIRWAY INHALATION TREATMENT: CPT

## 2022-10-07 RX ORDER — PREDNISONE 10 MG/1
10 TABLET ORAL DAILY
Qty: 5 TABLET | Refills: 0 | Status: SHIPPED | OUTPATIENT
Start: 2022-10-07 | End: 2022-10-12

## 2022-10-07 RX ORDER — PREDNISONE 20 MG/1
40 TABLET ORAL DAILY
Qty: 10 TABLET | Refills: 0 | Status: SHIPPED | OUTPATIENT
Start: 2022-10-07 | End: 2022-10-12

## 2022-10-07 RX ORDER — METHYLPREDNISOLONE SODIUM SUCCINATE 40 MG/ML
40 INJECTION, POWDER, LYOPHILIZED, FOR SOLUTION INTRAMUSCULAR; INTRAVENOUS EVERY 12 HOURS
Status: DISCONTINUED | OUTPATIENT
Start: 2022-10-07 | End: 2022-10-07 | Stop reason: HOSPADM

## 2022-10-07 RX ORDER — SPIRONOLACTONE 25 MG/1
25 TABLET ORAL DAILY
Qty: 30 TABLET | Refills: 3 | Status: SHIPPED | OUTPATIENT
Start: 2022-10-08

## 2022-10-07 RX ORDER — PREDNISONE 20 MG/1
20 TABLET ORAL DAILY
Qty: 5 TABLET | Refills: 0 | Status: SHIPPED | OUTPATIENT
Start: 2022-10-07 | End: 2022-10-12

## 2022-10-07 RX ORDER — ENOXAPARIN SODIUM 100 MG/ML
1 INJECTION SUBCUTANEOUS DAILY
Status: DISCONTINUED | OUTPATIENT
Start: 2022-10-08 | End: 2022-10-07 | Stop reason: HOSPADM

## 2022-10-07 RX ORDER — INSULIN GLARGINE 100 [IU]/ML
10 INJECTION, SOLUTION SUBCUTANEOUS NIGHTLY
Status: DISCONTINUED | OUTPATIENT
Start: 2022-10-07 | End: 2022-10-07 | Stop reason: HOSPADM

## 2022-10-07 RX ORDER — PREDNISONE 1 MG/1
5 TABLET ORAL DAILY
Qty: 5 TABLET | Refills: 0 | Status: SHIPPED | OUTPATIENT
Start: 2022-10-07 | End: 2022-10-12

## 2022-10-07 RX ADMIN — ASPIRIN 81 MG: 81 TABLET, COATED ORAL at 09:49

## 2022-10-07 RX ADMIN — INSULIN LISPRO 6 UNITS: 100 INJECTION, SOLUTION INTRAVENOUS; SUBCUTANEOUS at 11:30

## 2022-10-07 RX ADMIN — METHYLPREDNISOLONE SODIUM SUCCINATE 40 MG: 40 INJECTION, POWDER, FOR SOLUTION INTRAMUSCULAR; INTRAVENOUS at 06:04

## 2022-10-07 RX ADMIN — BUDESONIDE AND FORMOTEROL FUMARATE DIHYDRATE 2 PUFF: 160; 4.5 AEROSOL RESPIRATORY (INHALATION) at 07:32

## 2022-10-07 RX ADMIN — CARVEDILOL 6.25 MG: 6.25 TABLET, FILM COATED ORAL at 10:03

## 2022-10-07 RX ADMIN — IPRATROPIUM BROMIDE AND ALBUTEROL SULFATE 1 AMPULE: 2.5; .5 SOLUTION RESPIRATORY (INHALATION) at 11:37

## 2022-10-07 RX ADMIN — AZITHROMYCIN DIHYDRATE 500 MG: 500 INJECTION, POWDER, LYOPHILIZED, FOR SOLUTION INTRAVENOUS at 09:43

## 2022-10-07 RX ADMIN — INSULIN LISPRO 4 UNITS: 100 INJECTION, SOLUTION INTRAVENOUS; SUBCUTANEOUS at 09:50

## 2022-10-07 RX ADMIN — CARVEDILOL 6.25 MG: 6.25 TABLET, FILM COATED ORAL at 17:07

## 2022-10-07 RX ADMIN — DEXTROMETHORPHAN HYDROBROMIDE, GUAIFENESIN 5 ML: 10; 100 LIQUID ORAL at 17:07

## 2022-10-07 RX ADMIN — IPRATROPIUM BROMIDE AND ALBUTEROL SULFATE 1 AMPULE: 2.5; .5 SOLUTION RESPIRATORY (INHALATION) at 07:32

## 2022-10-07 RX ADMIN — CITALOPRAM HYDROBROMIDE 20 MG: 20 TABLET ORAL at 09:49

## 2022-10-07 RX ADMIN — SODIUM CHLORIDE, PRESERVATIVE FREE 10 ML: 5 INJECTION INTRAVENOUS at 09:48

## 2022-10-07 RX ADMIN — ISOSORBIDE MONONITRATE 30 MG: 30 TABLET, EXTENDED RELEASE ORAL at 09:49

## 2022-10-07 RX ADMIN — INSULIN LISPRO 4 UNITS: 100 INJECTION, SOLUTION INTRAVENOUS; SUBCUTANEOUS at 17:07

## 2022-10-07 RX ADMIN — POTASSIUM CHLORIDE 20 MEQ: 1500 TABLET, EXTENDED RELEASE ORAL at 09:50

## 2022-10-07 RX ADMIN — MAGNESIUM OXIDE TAB 400 MG (241.3 MG ELEMENTAL MG) 400 MG: 400 (241.3 MG) TAB at 09:49

## 2022-10-07 RX ADMIN — FUROSEMIDE 40 MG: 10 INJECTION, SOLUTION INTRAMUSCULAR; INTRAVENOUS at 09:49

## 2022-10-07 RX ADMIN — PANTOPRAZOLE SODIUM 40 MG: 40 TABLET, DELAYED RELEASE ORAL at 06:04

## 2022-10-07 RX ADMIN — ENOXAPARIN SODIUM 60 MG: 100 INJECTION SUBCUTANEOUS at 09:49

## 2022-10-07 RX ADMIN — SPIRONOLACTONE 25 MG: 25 TABLET ORAL at 09:50

## 2022-10-07 RX ADMIN — CLOPIDOGREL BISULFATE 75 MG: 75 TABLET ORAL at 09:50

## 2022-10-07 RX ADMIN — DEXTROMETHORPHAN HYDROBROMIDE, GUAIFENESIN 5 ML: 10; 100 LIQUID ORAL at 09:50

## 2022-10-07 ASSESSMENT — ENCOUNTER SYMPTOMS
COUGH: 1
RHINORRHEA: 0
SHORTNESS OF BREATH: 1
VOMITING: 0
NAUSEA: 0

## 2022-10-07 NOTE — PROGRESS NOTES
PULMONARY PROGRESS NOTE:    REASON FOR VISIT:copd exac  Interval History:    Shortness of Breath: improved   Cough: +++ non productive  Sputum: no          Hemoptysis: no  Chest Pain: no  Fever: no                   Swelling Feet: no  Headache: no                                           Nausea, Emesis, Abdominal Pain: no  Diarrhea: no         Constipation: no    Events since last visit: no acute overnight events    PAST MEDICAL HISTORY:      Scheduled Meds:   dextromethorphan-guaiFENesin  5 mL Oral Q6H    insulin glargine  5 Units SubCUTAneous Nightly    furosemide  40 mg IntraVENous Daily    sodium chloride flush  5-40 mL IntraVENous 2 times per day    atorvastatin  80 mg Oral Nightly    budesonide-formoterol  2 puff Inhalation BID    citalopram  20 mg Oral Daily    clopidogrel  75 mg Oral Daily    aspirin  81 mg Oral Daily    potassium chloride  20 mEq Oral Daily    pantoprazole  40 mg Oral QAM AC    nicotine  1 patch TransDERmal Daily    magnesium oxide  400 mg Oral BID    isosorbide mononitrate  30 mg Oral Daily    insulin lispro  0-8 Units SubCUTAneous TID WC    insulin lispro  0-4 Units SubCUTAneous Nightly    methylPREDNISolone  40 mg IntraVENous Q8H    rOPINIRole  1 mg Oral Nightly    azithromycin  500 mg IntraVENous Q24H    carvedilol  6.25 mg Oral BID WC    spironolactone  25 mg Oral Daily    enoxaparin  1 mg/kg SubCUTAneous BID    ipratropium-albuterol  1 ampule Inhalation Q4H WA     Continuous Infusions:   sodium chloride      dextrose       PRN Meds:benzonatate, sodium chloride flush, sodium chloride, ondansetron **OR** ondansetron, polyethylene glycol, acetaminophen **OR** acetaminophen, albuterol, traZODone, nitroGLYCERIN, glucose, dextrose bolus **OR** dextrose bolus, glucagon (rDNA), dextrose, tiZANidine        PHYSICAL EXAMINATION:  /82   Pulse 73   Temp 97.3 °F (36.3 °C) (Oral)   Resp 18   Ht 5' 4\" (1.626 m)   Wt 143 lb 4.8 oz (65 kg)   SpO2 96%   BMI 24.60 kg/m²     General : awake, alert, NAD  Neck - supple, no lymphadenopathy, JVD not raised  Heart - regular rhythm, S1 and S2 normal; no additional sounds heard  Lungs - Air Entry- fair bilaterally; breath sounds : mild expiratory wheezes, 96% on 2 l nc  Abdomen - soft, no tenderness  Upper Extremities  - no cyanosis, mottling; edema : absent  Lower Extremities: no cyanosis, mottling; edema : absent    Current Laboratory, Radiologic, Microbiologic, and Diagnostic studies reviewed  Data ReviewCBC:   Recent Labs     10/05/22  0330 10/06/22  0442 10/07/22  0518   WBC 4.8 4.4 5.9   RBC 3.75* 3.30* 3.42*   HGB 10.1* 8.9* 9.4*   HCT 31.5* 27.6* 28.5*    188 215       BMP:   Recent Labs     10/05/22  0330 10/06/22  0442 10/07/22  0518   GLUCOSE 222* 277* 348*    137 136   K 4.0 4.1 4.3   BUN 16 26* 27*   CREATININE 1.16* 1.46* 1.49*   CALCIUM 8.5* 8.4* 8.8       ABGs: No results for input(s): PHART, PO2ART, EWQ2NQR, HWQ5VGY, BEART, W6CDSUZD, HVE8SZO in the last 72 hours. PT/INR:  No results found for: PTINR    ASSESSMENT / PLAN:    COPD exac - BD, decrease steroids to 40 q 12  Robitussin, acapella   Acute hypoxic resp failure - O2  BPAP- prn  CHF - diuresis  Palliative care on consult    This is a late note on patient seen by me earlier today.   Electronically signed by Abhijit Savage MD on 10/07/22 at 5:19 PM

## 2022-10-07 NOTE — CARE COORDINATION
MELISSA spoke with [employer] [place] from [place] [id number]. [employer] [place] did not have any updates. Reported that there was nothing \"she could do\" to fix patients situation.  Electronically signed by ZOHAIB Og on 10/7/2022 at 9:40 AM

## 2022-10-07 NOTE — CARE COORDINATION
ONGOING DISCHARGE PLAN:    Patient is alert and oriented x4. Spoke with patient regarding discharge plan and patient confirms that plan is still home without needs. Pt continues to decline VNS, SNF, or homeless shelter. Pt is aware that by going to the home she is going to be evicted from very soon without any electricity is not a safe d/c plan as she will not be able to use the oxygen she requires. Pt verbalized understanding of this, and still plans to be discharged home. Pt will be discharged home today on steroids taper. Will continue to follow for additional discharge needs.     Electronically signed by Zoraida Mccormack RN on 10/7/2022 at 1:34 PM

## 2022-10-07 NOTE — PLAN OF CARE
Problem: Safety - Adult  Goal: Free from fall injury  10/7/2022 0314 by Karlos Cabrera RN  Outcome: Progressing  Note: No falls noted this shift. Patient ambulates with x1 staff assistance without difficulty. Bed kept in low position. Safe environment maintained. Bedside table & call light in reach. Uses call light appropriately when needing assistance. Problem: Chronic Conditions and Co-morbidities  Goal: Patient's chronic conditions and co-morbidity symptoms are monitored and maintained or improved  10/7/2022 0314 by Karlos Cabrera RN  Outcome: Progressing     Problem: Skin/Tissue Integrity  Goal: Absence of new skin breakdown  Description: 1. Monitor for areas of redness and/or skin breakdown  2. Assess vascular access sites hourly  3. Every 4-6 hours minimum:  Change oxygen saturation probe site  4. Every 4-6 hours:  If on nasal continuous positive airway pressure, respiratory therapy assess nares and determine need for appliance change or resting period. 10/7/2022 0314 by Karlos Cabrera RN  Outcome: Progressing  Note: No new signs of skin breakdown noted this shift. Problem: Pain  Goal: Verbalizes/displays adequate comfort level or baseline comfort level  10/7/2022 0314 by Karlos Cabrera RN  Outcome: Progressing  Note: Generalized pain reported as a 7/10. Nightly PRN pain medication given for pain control.       Problem: Discharge Planning  Goal: Discharge to home or other facility with appropriate resources  10/7/2022 0314 by Karlos Cabrera RN  Outcome: Progressing

## 2022-10-07 NOTE — PROGRESS NOTES
[facility]   INPATIENT OCCUPATIONAL THERAPY  PROGRESS NOTE  Date: 10/7/2022  Patient Name: Barrett Rock       Room:   MRN: [id number]    : 1950  (74 y.o.)  Gender: female   Referring Practitioner: JAMES Vega CNP  Diagnosis: COPD exacerbation    Restrictions/Precautions  Restrictions/Precautions  Restrictions/Precautions: General Precautions; Fall Risk;Up as Tolerated  Required Braces or Orthoses?: No  Implants present? : Metal implants (Bilat TKAs, metal in neck from cervical surgery)    Vitals  O2 Device: Nasal cannula  Comment: 1.5-2L via NC, O2 WNL throughout session. Subjective  Subjective  Pain: pt denies pain at this time  Comments: Pt sitting upright finishing breakfast upon writer arrival, pt pleasant and cooperative to participate. Pt presents with frequent coughing at start of session however improves throughout session. Objective  Orientation  Overall Orientation Status: Within Functional Limits  Orientation Level: Oriented X4  Cognition  Overall Cognitive Status: WFL  ADL  Feeding: Modified independent   Grooming: Supervision  Grooming Skilled Clinical Factors: Pt completes tasks while standing sinkside. UE Bathing: Supervision  LE Bathing: Stand by assistance  UE Dressing: Supervision  LE Dressing: Stand by assistance  LE Dressing Skilled Clinical Factors: Pt demonstrated donning B footies socks, clinical reasoning for other areas. Toileting: Stand by assistance  Additional Comments: ADL scores based on skilled observation and clinical reasoning, unless otherwise noted. Pt is currently limited due to impaired balance and endurance, impacting safety with self care tasks.          Balance  Balance  Sitting Balance: Supervision  Standing Balance: Stand by assistance  Standing Balance  Time: ~1 min, 5-6 min  Activity: functional transfers, functional mobility, toileting, sinkside ADL's  Comment: intermittent UE support prn, most of tasks tolerated without. Transfers/Mobility  Bed mobility  Supine to Sit: Supervision  Scooting: Supervision  Transfers  Sit to stand: Supervision  Stand to sit: Supervision  Toilet Transfers  Toilet - Technique: Ambulating  Equipment Used: Standard toilet (B toilet rails.)  Toilet Transfer: Supervision    Functional Mobility  Functional - Mobility Device: No device  Activity: To/from bathroom  Assist Level: Stand by assistance  Functional Mobility Comments: Pt does well with line mgmt safety. No noted unsteadiness, no reported dizziness. Functional Activities  OT Exercises  Exercise Treatment: Writer engaged pt in seated minimal resistance ex's to address/improve overall strength and endurance for functional tasks. Pt tolelerates x10 reps through all available planes of mvmt with rest breaks in between reps. Cueing for proper breathing technique, some noted SOB however O2 WNL on 2L.        Patient Education  Patient Education  Education Given To: Patient  Education Provided: Role of Therapy, Plan of Care  Education Method: Verbal  Barriers to Learning: None  Education Outcome: Verbalized understanding, Continued education needed      Goals  Short Term Goals  Time Frame for Short Term Goals: By discharge  Short Term Goal 1: Pt will perform BADLs IND and Good safety  Short Term Goal 2: Pt will perform transfers/functional mobility IND during self care tasks  Short Term Goal 3: Pt will tolerate standing 10+ minutes while maintaining SpO2 90% or higher  Short Term Goal 4: Pt will V/D use of at least two EC/WS techniques to increase IND during her daily routine  Short Term Goal 5: Pt will actively participate in 15+ minutes of therapeutic exercise/functional activity to promote safety and independence with self care and mobility  Occupational Therapy Plan  Times Per Week: 3-4  Current Treatment Recommendations: Strengthening, ROM, Balance training, Functional mobility training, Endurance training, Self-Care / ADL, Equipment evaluation, education, & procurement, Patient/Caregiver education & training, Safety education & training      Assessment  Activity Tolerance  Activity Tolerance: Patient Tolerated treatment well  Assessment  Performance deficits / Impairments: Decreased functional mobility , Decreased ADL status, Decreased strength, Decreased endurance, Decreased balance, Decreased high-level IADLs  Treatment Diagnosis: Impaired self-care status  Prognosis: Good  Decision Making: Medium Complexity  Discharge Recommendations: Home with assist PRN  OT Equipment Recommendations  Other: TBD  Safety Devices  Type of Devices:  All fall risk precautions in place, Call light within reach, Gait belt, Patient at risk for falls, Left in chair, Nurse notified    Safety measures utilized: Call light within reach, Sitting in chair, Chair alarm, and Nurse notified     AM-[facility] Daily Activities Inpatient  AM-PAC Daily Activity Inpatient   How much help for putting on and taking off regular lower body clothing?: A Little  How much help for Bathing?: A Little  How much help for Toileting?: A Little  How much help for putting on and taking off regular upper body clothing?: A Little  How much help for taking care of personal grooming?: A Little  How much help for eating meals?: None  AM-PAC Inpatient Daily Activity Raw Score: 19  AM-PAC Inpatient ADL T-Scale Score : 40.22  ADL Inpatient CMS 0-100% Score: 42.8  ADL Inpatient CMS G-Code Modifier : CK    OT Minutes  OT Individual Minutes  Time In: 8447  Time Out: 2915  Minutes: 52      Electronically signed by LA Shaver on 10/7/2022 at 10:13 AM

## 2022-10-07 NOTE — PLAN OF CARE
Problem: Discharge Planning  Goal: Discharge to home or other facility with appropriate resources  10/7/2022 1641 by Price Severance, RN  Outcome: Completed     Problem: Pain  Goal: Verbalizes/displays adequate comfort level or baseline comfort level  10/7/2022 1641 by Price Severance, RN  Outcome: Completed     Problem: ABCDS Injury Assessment  Goal: Absence of physical injury  10/7/2022 1641 by Price Severance, RN  Outcome: Completed     Problem: Safety - Adult  Goal: Free from fall injury  10/7/2022 1641 by Price Severance, RN  Outcome: Completed     Problem: Chronic Conditions and Co-morbidities  Goal: Patient's chronic conditions and co-morbidity symptoms are monitored and maintained or improved  10/7/2022 1641 by Price Severance, RN  Outcome: Completed     Problem: Skin/Tissue Integrity  Goal: Absence of new skin breakdown  Description: 1. Monitor for areas of redness and/or skin breakdown  2. Assess vascular access sites hourly  3. Every 4-6 hours minimum:  Change oxygen saturation probe site  4. Every 4-6 hours:  If on nasal continuous positive airway pressure, respiratory therapy assess nares and determine need for appliance change or resting period.   10/7/2022 1641 by Price Severance, RN  Outcome: Completed

## 2022-10-07 NOTE — PROGRESS NOTES
Pt expressed gratitude for Sierra Ogden who helped pt complete HPOA paperwork yesterday  Pt feels relief that her son is named as HPOA and that her daughter who pt has not seen in 10 yrs will not be involved in any decisions in the future  Pt expressed desire to talk to son about all the details of her HPOA.  Pt expressed that pt's son is reluctant to have \"serious conversations because he does not want to think about me dying\"        10/07/22 1213   Encounter Summary   Encounter Overview/Reason  Palliative Care   Service Provided For: Patient   Referral/Consult From: Palliative Care   Support System Children   Last Encounter  10/07/22   Complexity of Encounter Moderate   Begin Time 1100   End Time  1120   Total Time Calculated 20 min   Spiritual/Emotional needs   Type Spiritual Support   Palliative Care   Type Palliative Care, Follow-up   Assessment/Intervention/Outcome   Assessment Calm;Coping   Intervention Active listening;Empowerment;Sustaining Presence/Ministry of presence   Outcome Engaged in conversation;Expressed feelings, needs, and concerns;Expressed feelings of Gilda, Peace and/or Love;Expressed Gratitude;Receptive

## 2022-10-07 NOTE — PROGRESS NOTES
Date:   10/7/2022  Patient name: Malka Hughes  Date of admission:  10/4/2022  7:25 PM  MRN:   [id number]  YOB: 1950  PCP: Fareed Rascon MD    Reason for Admission: COPD exacerbation [facility]) [J44.1]    Cardiology follow-up: Multivessel coronary artery disease, CHF diastolic and systolic acute on chronic       Referring physician: Dr Concepcion Brady     Admission 60/6/9831 CHF systolic and diastolic acute on chronic, ejection fraction 15% significant deterioration in comparison to the previous echo  Tox screen positive for cocaine metabolites  Mild to moderate pulmonary hypertension RVSP 47     Admission 7/37/3791 CHF systolic and diastolic acute on chronic, ejection fraction 35%  Coronary artery disease, CABG 2018, LIMA to LAD and diagonal 1, SVG to OM and PDA  Congestive heart failure systolic and diastolic acute on chronic, ejection fraction 35%  Moderate pulmonary hypertension,  Moderate LVH  Carotid artery disease/stent placement left internal carotid artery  CVA  Hypertension  Upper lipidemia  2 diabetes mellitus  COPD on oxygen at night, history of smoking  Chronic kidney disease     2D echo 2/12/2021 normal LV size, moderate LVH asymmetrical septal hypertrophy  Moderate global hypokinesis, akinetic apex, ejection fraction 35%  Dilated LA and RA  Moderate TR, RVSP 60 mmHg  Dilated IVC diam,  impaired respiratory variation     2D echo 10/5/2022  Moderately dilated LV, mildly increased LV wall thickness  Ejection fraction 15%, severe global hypokinesis  Severely dilated LA  Mild to moderate MR, moderate TR, RVSP 47, dilated IVC     ECG 10/04/2022  Sinus rhythm, heart rate 87, nonspecific T wave changes, high voltage  X-rays 10/4/2022  Mild pulmonary congestion    History of present illness  77-year-old female with past medical history of CAD, CABG, CHF systolic and diastolic, diabetes mellitus got hospitalized on 10/04/2022 with chief complaints of shortness of breath and creasing swelling over the legs. No chest pain no syncope  Electrocardiogram showed sinus rhythm, high voltage, nonspecific T wave changes  Chest x-ray showed mild pulmonary congestion.   Repeat 2D echo examination showed ejection fraction 15%, severely dilated LA, mild to moderate MR     Labs on admission sodium 140, potassium 3.6, BUN 16, creatinine 1.09  High-sensitivity troponin 70, proBNP 46,001 49  Hemoglobin 10.9, WBC 7.2, platelets 328     Current evaluation  Patient seen and examined  Frail looking elderly female  She was resting at the edge of bed  She is ambulating no dizziness today  No chest pain  CG monitor heart rate 80, sinus rhythm      Blood pressure 113/75, heart rate 77, temperature 98.1, oxygen saturation 96% on 3 L    Hemoglobin 9.4, WBC 5.9,   BUN 27, creatinine 1.49, glucose 324, sodium 136, potassium 4.3    Medications:   Scheduled Meds:   methylPREDNISolone  40 mg IntraVENous Q12H    insulin glargine  10 Units SubCUTAneous Nightly    dextromethorphan-guaiFENesin  5 mL Oral Q6H    furosemide  40 mg IntraVENous Daily    sodium chloride flush  5-40 mL IntraVENous 2 times per day    atorvastatin  80 mg Oral Nightly    budesonide-formoterol  2 puff Inhalation BID    citalopram  20 mg Oral Daily    clopidogrel  75 mg Oral Daily    aspirin  81 mg Oral Daily    potassium chloride  20 mEq Oral Daily    pantoprazole  40 mg Oral QAM AC    nicotine  1 patch TransDERmal Daily    magnesium oxide  400 mg Oral BID    isosorbide mononitrate  30 mg Oral Daily    insulin lispro  0-8 Units SubCUTAneous TID WC    insulin lispro  0-4 Units SubCUTAneous Nightly    rOPINIRole  1 mg Oral Nightly    carvedilol  6.25 mg Oral BID WC    spironolactone  25 mg Oral Daily    enoxaparin  1 mg/kg SubCUTAneous BID    ipratropium-albuterol  1 ampule Inhalation Q4H WA     Continuous Infusions:   sodium chloride      dextrose       CBC:   Recent Labs     10/05/22  0330 10/06/22  0442 10/07/22  0518   WBC 4.8 4.4 5.9   HGB 10.1* 8.9* 9.4*    188 215     BMP:    Recent Labs     10/05/22  0330 10/06/22  0442 10/07/22  0518    137 136   K 4.0 4.1 4.3    101 99   CO2 24 28 31   BUN 16 26* 27*   CREATININE 1.16* 1.46* 1.49*   GLUCOSE 222* 277* 348*     Hepatic: No results for input(s): AST, ALT, ALB, BILITOT, ALKPHOS in the last 72 hours. Troponin: No results for input(s): TROPONINI in the last 72 hours. BNP: No results for input(s): BNP in the last 72 hours. Lipids: No results for input(s): CHOL, HDL in the last 72 hours. Invalid input(s): LDLCALCU  INR: No results for input(s): INR in the last 72 hours. Objective:   Vitals: /75   Pulse 77   Temp 98.1 °F (36.7 °C) (Oral)   Resp 16   Ht 5' 4\" (1.626 m)   Wt 143 lb 4.8 oz (65 kg)   SpO2 96%   BMI 24.60 kg/m²   General appearance: alert and cooperative with exam  HEENT: Head: Normal, normocephalic, atraumatic. Neck: supple, symmetrical, trachea midline  Lungs: diminished breath sounds bibasilar  Heart:  Cardiac apical impulse not palpable  Abdomen:  Soft bowel sounds present  Extremities: Homans sign is negative, no sign of DVT  Neurologic: Mental status: Awake communicating well    EKG: normal sinus rhythm.,  ST-T abnormalities lateral leads and precordial leads, prolonged QT, high voltage  ECHO: reviewed. Ejection fraction: 15%, severe global hypokinesis, mild LVH RVSP 47mmHg, mild to moderate MR, moderate TR 2D echo 10/5/2022  Stress Test: not obtained. Cardiac Angiography: reviewed.     Assessment / Acute Cardiac Problems:   Admission with congestive heart failure systolic and diastolic acute on chronic  Ejection fraction 15%  Significant deterioration of LV function in comparison to echo in May 2022 drop in ejection fraction from 35% to 15%  Abnormal high-sensitivity troponin most likely type II  CAD, CABG LIMA to LAD and diagonal 1, SVG to OM and PDA  Borderline abnormal high-sensitivity troponin significance not clear  Severe COPD, current smoker  Hypertension is stable  Diabetes mellitus type 2  Carotid artery disease, stent placement left internal carotid artery     10/6/2022 episode of nonsustained V. tach 5 beats    Patient Active Problem List:     Chronic pain     Controlled type 2 diabetes mellitus without complication, without long-term current use of insulin ([facility])     Allergic rhinitis     Hypertension goal BP (blood pressure) < 140/90     Mixed hyperlipidemia     Chronic obstructive pulmonary disease (HCC)     Coronary artery disease involving native coronary artery of native heart without angina pectoris     Primary insomnia     Diarrhea in adult patient     Restless leg syndrome     Atherosclerosis of superior mesenteric artery ([facility])     Left adrenal mass ([facility])     Former smoker     Chronic bilateral low back pain with left-sided sciatica     Hypothyroidism     S/P CABG x 4     Acute pain of right knee     Abnormal stress test     Tobacco use disorder     Neck pain     Acute ischemic left MCA stroke ([address]     Internal carotid artery occlusion     Stenosis of left internal carotid artery     Acquired spondylolisthesis of cervical vertebra     Stenosis of cervical spine with myelopathy ([facility])     Abnormal EKG     COPD exacerbation (HCC)     Multifocal pneumonia     Hypoxia     Pneumonia     Iron deficiency anemia     Chronic combined systolic and diastolic congestive heart failure (HCC)     Type 2 diabetes mellitus with chronic kidney disease     Symptomatic congestive heart failure, pre-operative cardiovascular examination ([address]     Acute on chronic systolic congestive heart failure ([address]     Encounter for palliative care     Goals of care, counseling/discussion     ACP (advance care planning)      Plan of Treatment:   Medications reviewed  Continue current dose Aldactone 25 mg, carvedilol 6.25 mg twice daily, Imdur, magnesium, bronchodilators, Lipitor, patient is also on steroids  Change IV Lasix to Bumex 2 mg a day  Physiotherapy as tolerated  Continue ECG monitoring    Electronically signed by Nori Edwards MD on 10/7/2022 at 3:09 PM

## 2022-10-07 NOTE — PROGRESS NOTES
CLINICAL PHARMACY NOTE: MEDS TO BEDS    Total # of Prescriptions Filled: 2   The following medications were delivered to the patient:  Prednisone 10mg  Spironolactone 25mg    Additional Documentation:  Delivered medications to patients room

## 2022-10-07 NOTE — PROGRESS NOTES
[address]    PROGRESS NOTE             10/7/2022    10:14 AM    Name:   Nicole Briones  MRN:     [id number]     [place]:      [de-identified]   Room:   2114/2114-01  IP Day:  3  Admit Date:  10/4/2022  7:25 PM    PCP:  Malone Mohs, MD  Code Status:  DNR-CCA    Subjective:     C/C:   Chief Complaint   Patient presents with    Shortness of Breath    Chest Pain     Pt arrives from home by EMS with chest pain and shortness of breath. Pt has hx of COPD. Interval History Status: improved. Patient was seen examined at the bedside. no acute events overnight. She is feeling better today but still complains of non productive cough, shortness of breath and congestion. Reports feeling dizzy on standing up after starting on Coreg. patient declined using BiPAP last night. on 2 L oxygen via nasal cannula. Nursing notes and consult notes reviewed. Brief History:     The patient is a 74 y.o. Non- / non  female who presents withShortness of Breath and Chest Pain (Pt arrives from home by EMS with chest pain and shortness of breath. Pt has hx of COPD. )   and she is admitted to the hospital for the management of acute acute on chronic respiratory failure. This is a 66-year-old female with a history of CHF with reduced ejection fraction, COPD, CAD s/p CABG, CKD PE also came in with complaints of shortness of breath. Patient mentioned that she is experiencing shortness of breath for almost 2 days, patient stated lately she is using more inhalers but somehow was not able to breathe. Patient uses 2 L oxygen at baseline and lately mention that she is using more often. Patient also mentioned that she had missed couple of doses here in May. In the ED patient came in hypoxic and called EMS. Patient was put on the BiPAP because of the concern of respiratory distress. VBG's were unremarkable.   Initial CBC and BMP was unremarkable. Troponin is mildly elevated from the baseline, repeat check in the morning. Chest x-ray showed pulmonary congestion. proBNP around 40,000 markedly elevated from baseline. Diabetes     Patient is admitted for further management of acute on chronic respiratory failure likely CHF exacerbation and COPD. Review of Systems:     Review of Systems   Constitutional:  Negative for chills and fever. HENT:  Negative for rhinorrhea. Eyes:  Negative for visual disturbance. Respiratory:  Positive for cough and shortness of breath. Cardiovascular:  Negative for chest pain. Gastrointestinal:  Negative for nausea and vomiting. Genitourinary:  Negative for dysuria. Musculoskeletal:  Positive for neck pain. Neurological:  Positive for dizziness. Negative for headaches. Psychiatric/Behavioral:  Negative for agitation and confusion. Medications: Allergies: Allergies   Allergen Reactions    Codeine Other (See Comments)     Constipation. Lisinopril      hyperkalemia    Morphine Other (See Comments)     Hallucinations.     Potassium-Containing Compounds        Current Meds:   Scheduled Meds:    methylPREDNISolone  40 mg IntraVENous Q12H    insulin glargine  10 Units SubCUTAneous Nightly    dextromethorphan-guaiFENesin  5 mL Oral Q6H    furosemide  40 mg IntraVENous Daily    sodium chloride flush  5-40 mL IntraVENous 2 times per day    atorvastatin  80 mg Oral Nightly    budesonide-formoterol  2 puff Inhalation BID    citalopram  20 mg Oral Daily    clopidogrel  75 mg Oral Daily    aspirin  81 mg Oral Daily    potassium chloride  20 mEq Oral Daily    pantoprazole  40 mg Oral QAM AC    nicotine  1 patch TransDERmal Daily    magnesium oxide  400 mg Oral BID    isosorbide mononitrate  30 mg Oral Daily    insulin lispro  0-8 Units SubCUTAneous TID WC    insulin lispro  0-4 Units SubCUTAneous Nightly    rOPINIRole  1 mg Oral Nightly    azithromycin  500 mg IntraVENous Q24H    carvedilol 6.25 mg Oral BID     spironolactone  25 mg Oral Daily    enoxaparin  1 mg/kg SubCUTAneous BID    ipratropium-albuterol  1 ampule Inhalation Q4H WA     Continuous Infusions:    sodium chloride      dextrose       PRN Meds: benzonatate, sodium chloride flush, sodium chloride, ondansetron **OR** ondansetron, polyethylene glycol, acetaminophen **OR** acetaminophen, albuterol, traZODone, nitroGLYCERIN, glucose, dextrose bolus **OR** dextrose bolus, glucagon (rDNA), dextrose, tiZANidine    Data:     Past Medical History:   has a past medical history of Allergic rhinitis, Arthritis, CAD (coronary artery disease), Cerebral artery occlusion with cerebral infarction ([facility] [address] CHF (congestive heart failure) ([facility] [address] Controlled type 2 diabetes mellitus without complication, without long-term current use of insulin ([facility] [address] COPD (chronic obstructive pulmonary disease) ([facility] [address] Depression, Former smoker, History of blood transfusion, Hyperlipidemia, Hypertension, Kidney stone, Myocardial infarction ([facility] [address] Obesity, Class I, BMI 30.0-34.9 (see actual BMI), Restless leg syndrome, Skin abnormality, Type II or unspecified type diabetes mellitus without mention of complication, not stated as uncontrolled, Wears glasses, and Wears partial dentures. Social History:   reports that she has been smoking cigarettes. She has a 28.00 pack-year smoking history. She has never used smokeless tobacco. She reports current drug use. Drug: Marijuana [facility]). She reports that she does not drink alcohol.      Family History:   Family History   Problem Relation Age of Onset    Heart Disease Father        Vitals:  /82   Pulse 73   Temp 97.3 °F (36.3 °C) (Oral)   Resp 18   Ht 5' 4\" (1.626 m)   Wt 143 lb 4.8 oz (65 kg)   SpO2 96%   BMI 24.60 kg/m²   Temp (24hrs), Av.8 °F (36.6 °C), Min:97.3 °F (36.3 °C), Max:98.4 °F (36.9 °C)    Recent Labs     10/06/22  1140 10/06/22  1606 10/06/22  2006 10/07/22  0633   POCGLU 265* 204* 282* 279* I/O(24Hr): Intake/Output Summary (Last 24 hours) at 10/7/2022 1014  Last data filed at 10/7/2022 1002  Gross per 24 hour   Intake 480 ml   Output --   Net 480 ml       Labs:  [unfilled]    Lab Results   Component Value Date/Time    SPECIAL NOT REPORTED 10/05/2021 01:20 PM     Lab Results   Component Value Date/Time    CULTURE NO GROWTH 7 DAYS 10/05/2021 01:20 PM       [unfilled]    Radiology:    ECHO Complete 2D W Doppler W Color    Result Date: 10/5/2022  [address] Transthoracic Echocardiography Report (TTE)  Patient Name [facility]     Date of Study               10/05/2022               [facility]   Date of      1950  Gender                      Female  Birth   Age          74 year(s)  Race                           Room Number  2014        Height:                     64 inch, 162.56 cm   Corporate ID [id number]    Weight:                     134 pounds, 60.8 kg  #   Patient Acct [de-identified]   BSA:          1.65 m^2      BMI:      23 kg/m^2  #   MR #         [id number]      Sonographer                 Savi Shock   Accession #  [id number]  Interpreting Physician      [address] [place] [address]   Fellow                   Referring Nurse                           Practitioner   Interpreting             Referring Physician         Harsh Arnett MD  Fellow  Type of Study   TTE procedure:2D Echocardiogram, M-Mode, Doppler, Color Doppler. Procedure Date Date: 10/05/2022 Start: 10:18 AM Study Location: [address] [place] [address] Technical Quality: Adequate visualization Indications:LV Function. Patient Status: Inpatient Height: 64 inches Weight: 134 pounds BSA: 1.65 m^2 BMI: 23 kg/m^2 Rhythm: Within normal limits BP: 117/79 mmHg Allergies   - Morphine:(Codeine). CONCLUSIONS Summary Left ventricle is moderately enlarged with mild left ventricular hypertrophy. Global left ventricular systolic function is severely reduced. Estimated ejection fraction is 15 %.  There is severe global hypokinesis with minor regional variation. Evidence of diastolic dysfunction. Left atrium is severely dilated. Right ventricle is normal in size with mildly reduced function. Trivial aortic insufficiency. Mitral annular calcification is seen. Mild to moderate mitral regurgitation. Moderate tricuspid regurgitation. Mild pulmonic insufficiency. Estimated right ventricular systolic pressure is 47 mmHg, suggests mild Pulm HTN. IVC Increased diameter, but still has inspiratory variation suggesting upper normal or mildly elevated RA filling pressure (i.e. CVP). Signature ----------------------------------------------------------------------------  Electronically signed by Curly Johnson(Sonographer) on 10/05/2022 11:33  AM ---------------------------------------------------------------------------- ----------------------------------------------------------------------------  Electronically signed by Main Mann(Interpreting physician) on 10/05/2022  12:37 PM ---------------------------------------------------------------------------- FINDINGS Left Atrium Left atrium is severely dilated. Left Ventricle Left ventricle is moderately enlarged with mild left ventricular hypertrophy. Global left ventricular systolic function is severely reduced. Estimated ejection fraction is 15 %. There is severe global hypokinesis with minor regional variation. Evidence of diastolic dysfunction. Right Atrium Right atrium is normal size . Right Ventricle Right ventricle is normal in size with mildly reduced function. Mitral Valve Mitral valve sclerosis without stenosis. Mitral annular calcification is seen. Mild to moderate mitral regurgitation. Aortic Valve Trivial aortic insufficiency. Aortic valve structure normal. No aortic stenosis. Tricuspid Valve Moderate tricuspid regurgitation. Estimated right ventricular systolic pressure is 47 mmHg. Mildly elevated right ventricular systolic pressure. Pulmonic Valve Mild pulmonic insufficiency.  Pulmonic valve structure normal. Pericardial Effusion No pericardial effusion seen. Miscellaneous IVC Increased diameter, but still has inspiratory variation suggesting upper normal or mildly elevated RA filling pressure (i.e. CVP). E/E' average = 19. M-mode / 2D Measurements & Calculations:   LVIDd:5.91 cm(3.7 - 5.6 cm)             Diastolic EJLIAL:553 ml  ECLPB:2.32 cm(2.2 - 4.0 cm)             Systolic BPRRKN:539 ml  GTJL:8.48 cm(0.6 - 1.1 cm)  LVPWd:1.05 cm(0.6 - 1.1 cm)  Fractional Shortenin.44 %           LVOT:2 cm  Calculated LVEF (%): 7.28 %   Mitral:                                  Aortic   Valve Area (P1/2-Time): 1.56 cm^2        Peak Velocity: 1.21 m/s  Peak E-Wave: 82.00 m/s                   Mean Velocity: 0.74 m/s  Peak A-Wave: 60.10 m/s                   Peak Gradient: 5.86 mmHg  E/A Ratio: 1.36                          Mean Gradient: 3 mmHg  Peak Gradient: 33877 mmHg   P1/2t: 141 msec                          Area (continuity): 1.53 cm^2                                           AV VTI: 26.9 cm  MR Velocity: 418.00 m/s   Tricuspid:                               Pulmonic:   Estimated RVSP: 47 mmHg                  Peak Velocity: 1.78 m/s  Peak TR Velocity: 3.12 m/s               Peak Gradient: 12.67 mmHg  Peak TR Gradient: 38.9376 mmHg  Estimated RA Pressure: 8 mmHg                                            Estimated PASP: 46.94 mmHg      MRI CERVICAL SPINE WO CONTRAST    Result Date: 2022  EXAMINATION: MRI OF THE CERVICAL SPINE WITHOUT CONTRAST 2022 1:10 pm TECHNIQUE: Multiplanar multisequence MRI of the cervical spine was performed without the administration of intravenous contrast. COMPARISON: 2020 HISTORY: ORDERING SYSTEM PROVIDED HISTORY: Spinal stenosis in cervical region TECHNOLOGIST PROVIDED HISTORY: Reason for Exam: Spinal stenosis in cervical region, Acquired spondylolisthesis of cervical vertebra, Neck pain FINDINGS: BONES/ALIGNMENT: There is anterolisthesis at C2-3 and C3-4.   There is also anterolisthesis at C5-6. There is mild height loss at C3 with irregular contour at C2. There is edema in the C2 and C3 vertebral bodies. Degenerative marrow signal changes are noted at multiple levels. There is also height loss at T3 with edema. Posterior fixation from C3-C6. SPINAL CORD: No abnormal cord signal is seen. SOFT TISSUES: No paraspinal mass identified. C2-C3: Posterior disc osteophyte complex ligamentum flavum hypertrophy with moderate spinal canal stenosis. Mild left neural foraminal narrowing. C3-C4: Posterior disc osteophyte complex with mild spinal canal stenosis. Mild bilateral neural foraminal narrowing. C4-C5: Posterior decompression without significant spinal canal stenosis. Mild-to-moderate bilateral neural foraminal narrowing. C5-C6: Posterior decompression without significant spinal canal stenosis. Moderate right and mild left neural foraminal narrowing. C6-C7: Posterior decompression without significant spinal canal stenosis. Moderate bilateral neural foraminal stenosis. C7-T1: No significant spinal canal or right neural foraminal stenosis. Mild-to-moderate left neural foraminal narrowing. 1. Height loss at C3 and T3 with associated edema, which may represent acute/subacute compression fracture. 2. There is also edema and irregular contour at C2. 3. Multilevel degenerative and postoperative changes. There is mild spinal canal narrowing at C3-4 and moderate spinal canal narrowing at C2-3. 4. Multilevel neural foraminal narrowing. 5. Multilevel listhesis. XR CHEST PORTABLE    Result Date: 10/5/2022  EXAMINATION: ONE XRAY VIEW OF THE CHEST 10/5/2022 5:30 am COMPARISON: 10/04/2022 HISTORY: ORDERING SYSTEM PROVIDED HISTORY: AECOPD TECHNOLOGIST PROVIDED HISTORY: AECOPD Reason for Exam: AECOPD Additional signs and symptoms: AECOPD Relevant Medical/Surgical History: AECOPD FINDINGS: Probable cardiomegaly unchanged. Patient markedly rotated. No focal consolidation.   Mild pulmonary vascular congestion similar to prior. Bandlike subsegmental atelectasis left lower lung. No pleural effusion. No appreciable pneumothorax. Median sternotomy. Markedly rotated patient. Cardiomegaly. Mild vascular congestion. Bandlike subsegmental atelectasis left lower. Stable. XR CHEST PORTABLE    Result Date: 10/4/2022  EXAMINATION: ONE XRAY VIEW OF THE CHEST 10/4/2022 4:29 pm COMPARISON: 05/15/2022 HISTORY: ORDERING SYSTEM PROVIDED HISTORY: dyspnea TECHNOLOGIST PROVIDED HISTORY: dyspnea Reason for Exam: dyspnea Additional signs and symptoms: Chest pain, shortness of breath. Hx copd Relevant Medical/Surgical History: Chest pain, shortness of breath. Hx copd FINDINGS: Cardiac size is enlarged. No acute infiltrates are seen. Patchy areas of scarring in the left mid lower lung zones. The pulmonary vascularity is slightly hazy and indistinct. No pneumothorax. No pleural effusions identified. Posttraumatic deformity of the left humeral head and neck. Postsurgical changes overlying the mediastinum. Mild pulmonary vascular congestion         Physical Examination:        Physical Exam  Vitals and nursing note reviewed. Constitutional:       General: She is not in acute distress. HENT:      Head: Normocephalic and atraumatic. Mouth/Throat:      Mouth: Mucous membranes are dry. Cardiovascular:      Rate and Rhythm: Normal rate and regular rhythm. Pulses: Normal pulses. Heart sounds: Normal heart sounds. No murmur heard. Pulmonary:      Effort: No respiratory distress. Breath sounds: Normal breath sounds. Abdominal:      General: Bowel sounds are normal.      Palpations: Abdomen is soft. Musculoskeletal:      Right lower leg: No edema. Left lower leg: No edema. Neurological:      Mental Status: She is alert and oriented to person, place, and time.    Psychiatric:         Mood and Affect: Mood normal.         Behavior: Behavior normal.         Assessment: Primary Problem  Acute on chronic systolic congestive heart failure [facility])    Active Hospital Problems    Diagnosis Date Noted    Encounter for palliative care [Z51.5] 10/06/2022     Priority: Medium    Goals of care, counseling/discussion [Z71.89] 10/06/2022     Priority: Medium    ACP (advance care planning) [Z71.89] 10/06/2022     Priority: Medium    Acute on chronic systolic congestive heart failure ([facility] [address] [I50.23] 05/14/2022     Priority: Medium    COPD exacerbation ([facility] [address] [J44.1] 08/11/2021    Tobacco use disorder [F17.200] 01/10/2019    Controlled type 2 diabetes mellitus without complication, without long-term current use of insulin ([facility] [address] [E11.9] 09/10/2015       Plan:        Acute on chronic respiratory failure likely secondary to CHF, low suspicion of COPD:  -Baseline oxygen 2 L,  Pt is not using BiPAP. On 2 L O2 via nasal cannula. SpO2 96.  -COVID-19 negative. -[id number]:[id number] (baseline 15k), Chest x-ray showed pulmonary congestion.  - Echo done in 2021 showed EF of 45%, repeat shows Moderately dilated LV, mildly increased LV wall thickness, Ejection fraction 15%, severe global hypokinesis, Severely dilated LA, Mild to moderate MR, moderate TR, RVSP 47, dilated IVC  - WBC - WNL  -Solumedrol 40 mg q12h. Robitussin, acapella   -Duo neb every 4h while awake. - Zithromax 500 mg  -Pulm consulted - recommended Bipap prn, COPD exac - BD, steroids, Acute hypoxic resp failure - O2, BPAP- prn, CHF - diuresis, OK for transfer out of ICU  - Cardiology consulted -recommended continuing home meds, BMP daily.  - Palliative care and SW consulted for placement    KELSEY  - Cr 1.46-->1.49 , BUN 27  -Received 2 mg BUMEX in ED. switched to Lasix 40 mg. CAD s/p CABG  Continue Plavix, aspirin, Imdur , lipitor, and Lopressor. Hx of RLS, anxiety  - Continue celexa, trazadone, requip. Reactionary hyperglycemia:   - POCT check q6h, with low dose sliding scale and hypoglycemic protocol.         Margi Maciel, MD  10/7/2022  10:14 AM     Attending Physician Statement  I have discussed the care of Sukh Pichardo and I have examined the patient myselft and taken ros and hpi , including pertinent history and exam findings,  with the resident. I have reviewed the key elements of all parts of the encounter with the resident. I agree with the assessment, plan and orders as documented by the resident.   Patient breathing is improved now on 2 L of home oxygen which is her baseline  Patient is vehemently refusing to go to any extended-care facility wants to go home I was told at home she does not have power she has used all 3 medical certificates by telemedicine I want her if she goes home in this condition with no heat electricity she may not get enough oxygen that could lead to worsening of her situation or even death patient has insight and wants to go home in the current situation  Discharged to home  Overall prognosis is poor    Electronically signed by Irene Almeida MD

## 2022-10-08 NOTE — DISCHARGE SUMMARY
[address]    Discharge Summary     Patient ID: Jolie Baumgarten  :  1950   MRN: [id number]     ACCOUNT:  [de-identified]   Patient's PCP: Nancy Lundberg MD  Admit Date: 10/4/2022   Discharge Date: 10/7/2022    Length of Stay: 3  Code Status:  Prior  Admitting Physician: Carollynn Duane, MD  Discharge Physician: Ling Mac MD     Active Discharge Diagnoses:       Primary Problem  Acute on chronic systolic congestive heart failure [facility] Problems    Diagnosis Date Noted    Encounter for palliative care [Z51.5] 10/06/2022     Priority: Medium    Goals of care, counseling/discussion [Z71.89] 10/06/2022     Priority: Medium    ACP (advance care planning) [Z71.89] 10/06/2022     Priority: Medium    Acute on chronic systolic congestive heart failure ([address] [I50.23] 2022     Priority: Medium    COPD exacerbation ([address] [J44.1] 2021    Tobacco use disorder [F17.200] 01/10/2019    Controlled type 2 diabetes mellitus without complication, without long-term current use of insulin ([address] [E11.9] 09/10/2015       Admission Condition:  poor     Discharged Condition: good    Hospital Stay:       Hospital Course:  Jolie Baumgarten is a 74 y.o. female who was admitted for the management of   Acute on chronic systolic congestive heart failure (HCC) , presented to ER with Shortness of Breath and Chest Pain (Pt arrives from home by EMS with chest pain and shortness of breath. Pt has hx of COPD. )  Initial management included breathing treatments, IV steroids, IV antibiotics, BiPAP. patient urine drug screen came back positive for cocaine. Her status has significantly improved with therapeutic interventions. despite her poor living conditions patient insisting on going back home. Patient understands she might not be able to use her oxygen when she goes back home.   Discharge instruction and medication education provided.      Significant therapeutic interventions: IV fluids, analgesia, breathing treatments, IV steroids, IV antibiotics, oxygen, BiPAP, antitussives, Acapella, diuresis    Significant Diagnostic Studies: Chest x-ray, labs  Labs / Micro:  CBC:   Lab Results   Component Value Date/Time    WBC 5.9 10/07/2022 05:18 AM    RBC 3.42 10/07/2022 05:18 AM    HGB 9.4 10/07/2022 05:18 AM    HCT 28.5 10/07/2022 05:18 AM    MCV 83.4 10/07/2022 05:18 AM    MCH 27.5 10/07/2022 05:18 AM    MCHC 33.0 10/07/2022 05:18 AM    RDW 18.1 10/07/2022 05:18 AM     10/07/2022 05:18 AM     BMP:    Lab Results   Component Value Date/Time    GLUCOSE 348 10/07/2022 05:18 AM     10/07/2022 05:18 AM    K 4.3 10/07/2022 05:18 AM    CL 99 10/07/2022 05:18 AM    CO2 31 10/07/2022 05:18 AM    ANIONGAP 6 10/07/2022 05:18 AM    BUN 27 10/07/2022 05:18 AM    CREATININE 1.49 10/07/2022 05:18 AM    CREATININE 1.1 03/28/2022 12:00 AM    BUNCRER NOT REPORTED 11/30/2021 06:18 AM    CALCIUM 8.8 10/07/2022 05:18 AM    LABGLOM 37 10/07/2022 05:18 AM    GFRAA 52 05/16/2022 05:08 AM    GFR      05/16/2022 05:08 AM       Radiology:    ECHO Complete 2D W Doppler W Color    Result Date: 10/5/2022  [address] Transthoracic Echocardiography Report (TTE)  Patient Name [facility]     Date of Study               10/05/2022               IVETT WILLIAMSON   Date of      1950  Gender                      Female  Birth   Age          74 year(s)  Race                           Room Number  2014        Height:                     64 inch, 162.56 cm   Corporate ID [id number]    Weight:                     134 pounds, 60.8 kg  #   Patient Acct [de-identified]   BSA:          1.65 m^2      BMI:      23 kg/m^2  #   MR #         [id number]      Sonographer                 Lisa Sauceda   Accession #  [id number]  Interpreting Physician      [address] [place] [address]   Fellow                   Referring Nurse                           Practitioner   Interpreting Referring Physician         Cindy Clemente MD  Fellow  Type of Study   TTE procedure:2D Echocardiogram, M-Mode, Doppler, Color Doppler. Procedure Date Date: 10/05/2022 Start: 10:18 AM Study Location: [address] [place] [address] Technical Quality: Adequate visualization Indications:LV Function. Patient Status: Inpatient Height: 64 inches Weight: 134 pounds BSA: 1.65 m^2 BMI: 23 kg/m^2 Rhythm: Within normal limits BP: 117/79 mmHg Allergies   - Morphine:(Codeine). CONCLUSIONS Summary Left ventricle is moderately enlarged with mild left ventricular hypertrophy. Global left ventricular systolic function is severely reduced. Estimated ejection fraction is 15 %. There is severe global hypokinesis with minor regional variation. Evidence of diastolic dysfunction. Left atrium is severely dilated. Right ventricle is normal in size with mildly reduced function. Trivial aortic insufficiency. Mitral annular calcification is seen. Mild to moderate mitral regurgitation. Moderate tricuspid regurgitation. Mild pulmonic insufficiency. Estimated right ventricular systolic pressure is 47 mmHg, suggests mild Pulm HTN. IVC Increased diameter, but still has inspiratory variation suggesting upper normal or mildly elevated RA filling pressure (i.e. CVP). Signature ----------------------------------------------------------------------------  Electronically signed by Comfort Johnson(Sonographer) on 10/05/2022 11:33  AM ---------------------------------------------------------------------------- ----------------------------------------------------------------------------  Electronically signed by Johnathan,Main(Interpreting physician) on 10/05/2022  12:37 PM ---------------------------------------------------------------------------- FINDINGS Left Atrium Left atrium is severely dilated. Left Ventricle Left ventricle is moderately enlarged with mild left ventricular hypertrophy. Global left ventricular systolic function is severely reduced. Estimated ejection fraction is 15 %. There is severe global hypokinesis with minor regional variation. Evidence of diastolic dysfunction. Right Atrium Right atrium is normal size . Right Ventricle Right ventricle is normal in size with mildly reduced function. Mitral Valve Mitral valve sclerosis without stenosis. Mitral annular calcification is seen. Mild to moderate mitral regurgitation. Aortic Valve Trivial aortic insufficiency. Aortic valve structure normal. No aortic stenosis. Tricuspid Valve Moderate tricuspid regurgitation. Estimated right ventricular systolic pressure is 47 mmHg. Mildly elevated right ventricular systolic pressure. Pulmonic Valve Mild pulmonic insufficiency. Pulmonic valve structure normal. Pericardial Effusion No pericardial effusion seen. Miscellaneous IVC Increased diameter, but still has inspiratory variation suggesting upper normal or mildly elevated RA filling pressure (i.e. CVP). E/E' average = 19.  M-mode / 2D Measurements & Calculations:   LVIDd:5.91 cm(3.7 - 5.6 cm)             Diastolic KWMQVP:619 ml  PFARQ:3.98 cm(2.2 - 4.0 cm)             Systolic AOQVMQ:243 ml  YCFW:1.86 cm(0.6 - 1.1 cm)  LVPWd:1.05 cm(0.6 - 1.1 cm)  Fractional Shortenin.44 %           LVOT:2 cm  Calculated LVEF (%): 7.28 %   Mitral:                                  Aortic   Valve Area (P1/2-Time): 1.56 cm^2        Peak Velocity: 1.21 m/s  Peak E-Wave: 82.00 m/s                   Mean Velocity: 0.74 m/s  Peak A-Wave: 60.10 m/s                   Peak Gradient: 5.86 mmHg  E/A Ratio: 1.36                          Mean Gradient: 3 mmHg  Peak Gradient: 05667 mmHg   P1/2t: 141 msec                          Area (continuity): 1.53 cm^2                                           AV VTI: 26.9 cm  MR Velocity: 418.00 m/s   Tricuspid:                               Pulmonic:   Estimated RVSP: 47 mmHg                  Peak Velocity: 1.78 m/s  Peak TR Velocity: 3.12 m/s               Peak Gradient: 12.67 mmHg  Peak TR Gradient: 38.9376 mmHg  Estimated RA Pressure: 8 mmHg                                            Estimated PASP: 46.94 mmHg      MRI CERVICAL SPINE WO CONTRAST    Result Date: 9/20/2022  EXAMINATION: MRI OF THE CERVICAL SPINE WITHOUT CONTRAST 9/19/2022 1:10 pm TECHNIQUE: Multiplanar multisequence MRI of the cervical spine was performed without the administration of intravenous contrast. COMPARISON: 07/30/2020 HISTORY: ORDERING SYSTEM PROVIDED HISTORY: Spinal stenosis in cervical region TECHNOLOGIST PROVIDED HISTORY: Reason for Exam: Spinal stenosis in cervical region, Acquired spondylolisthesis of cervical vertebra, Neck pain FINDINGS: BONES/ALIGNMENT: There is anterolisthesis at C2-3 and C3-4. There is also anterolisthesis at C5-6. There is mild height loss at C3 with irregular contour at C2. There is edema in the C2 and C3 vertebral bodies. Degenerative marrow signal changes are noted at multiple levels. There is also height loss at T3 with edema. Posterior fixation from C3-C6. SPINAL CORD: No abnormal cord signal is seen. SOFT TISSUES: No paraspinal mass identified. C2-C3: Posterior disc osteophyte complex ligamentum flavum hypertrophy with moderate spinal canal stenosis. Mild left neural foraminal narrowing. C3-C4: Posterior disc osteophyte complex with mild spinal canal stenosis. Mild bilateral neural foraminal narrowing. C4-C5: Posterior decompression without significant spinal canal stenosis. Mild-to-moderate bilateral neural foraminal narrowing. C5-C6: Posterior decompression without significant spinal canal stenosis. Moderate right and mild left neural foraminal narrowing. C6-C7: Posterior decompression without significant spinal canal stenosis. Moderate bilateral neural foraminal stenosis. C7-T1: No significant spinal canal or right neural foraminal stenosis. Mild-to-moderate left neural foraminal narrowing.      1. Height loss at C3 and T3 with associated edema, which may represent acute/subacute compression fracture. 2. There is also edema and irregular contour at C2. 3. Multilevel degenerative and postoperative changes. There is mild spinal canal narrowing at C3-4 and moderate spinal canal narrowing at C2-3. 4. Multilevel neural foraminal narrowing. 5. Multilevel listhesis. XR CHEST PORTABLE    Result Date: 10/5/2022  EXAMINATION: ONE XRAY VIEW OF THE CHEST 10/5/2022 5:30 am COMPARISON: 10/04/2022 HISTORY: ORDERING SYSTEM PROVIDED HISTORY: AECOPD TECHNOLOGIST PROVIDED HISTORY: AECOPD Reason for Exam: AECOPD Additional signs and symptoms: AECOPD Relevant Medical/Surgical History: AECOPD FINDINGS: Probable cardiomegaly unchanged. Patient markedly rotated. No focal consolidation. Mild pulmonary vascular congestion similar to prior. Bandlike subsegmental atelectasis left lower lung. No pleural effusion. No appreciable pneumothorax. Median sternotomy. Markedly rotated patient. Cardiomegaly. Mild vascular congestion. Bandlike subsegmental atelectasis left lower. Stable. XR CHEST PORTABLE    Result Date: 10/4/2022  EXAMINATION: ONE XRAY VIEW OF THE CHEST 10/4/2022 4:29 pm COMPARISON: 05/15/2022 HISTORY: ORDERING SYSTEM PROVIDED HISTORY: dyspnea TECHNOLOGIST PROVIDED HISTORY: dyspnea Reason for Exam: dyspnea Additional signs and symptoms: Chest pain, shortness of breath. Hx copd Relevant Medical/Surgical History: Chest pain, shortness of breath. Hx copd FINDINGS: Cardiac size is enlarged. No acute infiltrates are seen. Patchy areas of scarring in the left mid lower lung zones. The pulmonary vascularity is slightly hazy and indistinct. No pneumothorax. No pleural effusions identified. Posttraumatic deformity of the left humeral head and neck. Postsurgical changes overlying the mediastinum.      Mild pulmonary vascular congestion         Consultations:    Consults:     Final Specialist Recommendations/Findings:   IP CONSULT TO INTERNAL MEDICINE  IP CONSULT TO SOCIAL WORK  IP CONSULT TO PULMONOLOGY  IP CONSULT TO PALLIATIVE CARE  IP CONSULT TO CARDIOLOGY  IP CONSULT TO PALLIATIVE CARE      The patient was seen and examined on day of discharge and this discharge summary is in conjunction with any daily progress note from day of discharge. Discharge plan:       Disposition: Home  patient plans to still go home without needs. Pt continues to decline VNS, SNF, or homeless shelter. Pt is aware that by going to the home she is going to be evicted from very soon without any electricity is not a safe d/c plan as she will not be able to use the oxygen she requires. Pt verbalized understanding of this, and still plans to be discharged home. Physician Follow Up:     Jose Menchaca MD  [address] [facility] [address] [phone]    Follow up      Jose Menchaca MD  [address]  [address] [facility] [address] [phone]           Requiring Further Evaluation/Follow Up POST HOSPITALIZATION/Incidental Findings: na    Diet: regular diet    Activity: As tolerated    Instructions to Patient: Take your medications as instructed by the doctor. Return to the emergency department if symptoms worsen. Discharge Medications:      Medication List        START taking these medications      * predniSONE 20 MG tablet  Commonly known as: DELTASONE  Take 2 tablets by mouth daily for 5 days     * predniSONE 20 MG tablet  Commonly known as: DELTASONE  Take 1 tablet by mouth daily for 5 days     * predniSONE 10 MG tablet  Commonly known as: DELTASONE  Take 1 tablet by mouth daily for 5 days     * predniSONE 5 MG tablet  Commonly known as: DELTASONE  Take 1 tablet by mouth daily for 5 days     spironolactone 25 MG tablet  Commonly known as: ALDACTONE  Take 1 tablet by mouth daily           * This list has 4 medication(s) that are the same as other medications prescribed for you. Read the directions carefully, and ask your doctor or other care provider to review them with you. CONTINUE taking these medications      albuterol sulfate  (90 Base) MCG/ACT inhaler  Commonly known as: PROVENTIL;VENTOLIN;PROAIR  Inhale 2 puffs into the lungs every 6 hours as needed for Wheezing or Shortness of Breath     atorvastatin 40 MG tablet  Commonly known as: LIPITOR  TAKE 2 TABLETS BY MOUTH DAILY     Blood Pressure Kit  Dx: HTN. Needs automatic blood pressure machine to monitor her blood pressure.      budesonide-formoterol 160-4.5 MCG/ACT Aero  Commonly known as: SYMBICORT  Inhale 2 puffs into the lungs 2 times daily     bumetanide 2 MG tablet  Commonly known as: BUMEX  Take 1 tablet by mouth daily     citalopram 20 MG tablet  Commonly known as: CELEXA  Take 1 tablet by mouth daily     clopidogrel 75 MG tablet  Commonly known as: PLAVIX  TAKE 1 TABLET BY MOUTH DAILY     diclofenac sodium 1 % Gel  Commonly known as: VOLTAREN     ferrous sulfate 325 (65 Fe) MG tablet  Commonly known as: IRON 325  Take 1 tablet by mouth daily (with breakfast)     fluticasone 50 MCG/ACT nasal spray  Commonly known as: FLONASE  USE 2 SPRAYS IN EACH NOSTRIL ONCE DAILY     guaiFENesin 600 MG extended release tablet  Commonly known as: MUCINEX  Take 1 tablet by mouth 2 times daily     ipratropium-albuterol 0.5-2.5 (3) MG/3ML Soln nebulizer solution  Commonly known as: DUONEB  Inhale 3 mLs into the lungs every 4 hours as needed for Shortness of Breath     isosorbide mononitrate 30 MG extended release tablet  Commonly known as: IMDUR  Take 1 tablet by mouth daily     magnesium oxide 400 (241.3 Mg) MG Tabs tablet  Commonly known as: MAG-OX  Take 1 tablet by mouth 2 times daily     metFORMIN 500 MG extended release tablet  Commonly known as: GLUCOPHAGE-XR  Take 1 tablet by mouth daily (with breakfast)     metoprolol succinate 50 MG extended release tablet  Commonly known as: TOPROL XL  Take 1 tablet by mouth daily     nicotine 14 MG/24HR  Commonly known as: NICODERM CQ  Place 1 patch onto the skin daily     nitroGLYCERIN 0.4 MG SL tablet  Commonly known as: NITROSTAT  Place 1 tablet under the tongue every 5 minutes as needed for Chest pain up to max of 3 total doses. If no relief after 1 dose, call 911. ONE TOUCH ULTRA TEST strip  Generic drug: blood glucose test strips  TEST ONCE DAILY AS NEEDED     pantoprazole 40 MG tablet  Commonly known as: PROTONIX  Take 1 tablet by mouth every morning (before breakfast)     potassium chloride 10 MEQ extended release tablet  Commonly known as: KLOR-CON  TAKE 2 TABLETS BY MOUTH DAILY     rOPINIRole 1 MG tablet  Commonly known as: REQUIP  TAKE 1 TABLET BY MOUTH EVERY NIGHT AS NEEDED     SM Aspirin Adult Low Strength 81 MG EC tablet  Generic drug: aspirin  TAKE 1 TABLET BY MOUTH DAILY     tiZANidine 2 MG tablet  Commonly known as: ZANAFLEX  TAKE 1 TABLET BY MOUTH NIGHTLY AS NEEDED (PAIN)     traZODone 50 MG tablet  Commonly known as: DESYREL  Take 1 tablet by mouth nightly as needed for Sleep               Where to Get Your Medications        These medications were sent to [facility] [address], [address] [facility] [address]      Phone: [phone]   predniSONE 10 MG tablet  predniSONE 20 MG tablet  predniSONE 20 MG tablet  predniSONE 5 MG tablet  spironolactone 25 MG tablet         Electronically signed by   Beatrice Hodge MD  10/8/2022  5:52 PM      Thank you Dr. Jose Menchaca MD for the opportunity to be involved in this patient's care.

## 2022-10-08 NOTE — PROGRESS NOTES
Discharge instructions and med education given to patient. Patient stated understanding. Patient wheeled down to meet cab by W/C.

## 2022-10-10 NOTE — TELEPHONE ENCOUNTER
Order in for neurosurgery at [facility].  Attempted to contact pt unable to lvm on either phone

## 2023-05-21 ENCOUNTER — TELEPHONE (OUTPATIENT)
Dept: FAMILY MEDICINE CLINIC | Age: 73
End: 2023-05-21

## 2023-05-23 ENCOUNTER — TELEPHONE (OUTPATIENT)
Dept: FAMILY MEDICINE CLINIC | Age: 73
End: 2023-05-23

## 2023-05-23 NOTE — TELEPHONE ENCOUNTER
Pts daughter called in stating that her mom needs new orders for the following medications. Writer adv that pt needs to be seen and she needs to keep the next appointment. Daughter states she will make sure she is here for the appointment. atorvastatin (LIPITOR) 40 MG tablet  furosemide (LASIX) tablet 40 mg    Please Approve or Refuse.   Send to Pharmacy per Pt's Request:     [facility]     Next Visit Date:  7/14/2023   Last Visit Date: 2/10/2022    Hemoglobin A1C (%)   Date Value   03/28/2022 6.5   02/10/2022 6.4   08/11/2021 6.7 (H)             ( goal A1C is < 7)   BP Readings from Last 3 Encounters:   10/07/22 113/75   05/16/22 (!) 116/59   02/10/22 (!) 84/48          (goal 120/80)  BUN   Date Value Ref Range Status   10/07/2022 27 (H) 8 - 23 mg/dL Final     Creatinine   Date Value Ref Range Status   10/07/2022 1.49 (H) 0.50 - 0.90 mg/dL Final   03/28/2022 1.1 mg/dL Final     Potassium   Date Value Ref Range Status   10/07/2022 4.3 3.7 - 5.3 mmol/L Final

## 2023-05-24 DIAGNOSIS — E11.9 CONTROLLED TYPE 2 DIABETES MELLITUS WITHOUT COMPLICATION, WITHOUT LONG-TERM CURRENT USE OF INSULIN (HCC): Chronic | ICD-10-CM

## 2023-05-24 RX ORDER — ATORVASTATIN CALCIUM 40 MG/1
80 TABLET, FILM COATED ORAL DAILY
Qty: 90 TABLET | Refills: 3 | Status: SHIPPED | OUTPATIENT
Start: 2023-05-24 | End: 2023-05-26

## 2023-05-24 RX ORDER — BUMETANIDE 2 MG/1
2 TABLET ORAL DAILY
Qty: 30 TABLET | Refills: 1 | Status: SHIPPED | OUTPATIENT
Start: 2023-05-24

## 2023-05-26 DIAGNOSIS — E11.9 CONTROLLED TYPE 2 DIABETES MELLITUS WITHOUT COMPLICATION, WITHOUT LONG-TERM CURRENT USE OF INSULIN (HCC): Chronic | ICD-10-CM

## 2023-05-26 RX ORDER — ATORVASTATIN CALCIUM 80 MG/1
80 TABLET, FILM COATED ORAL DAILY
Qty: 90 TABLET | Refills: 2 | Status: SHIPPED | OUTPATIENT
Start: 2023-05-26

## 2023-06-02 ENCOUNTER — TELEPHONE (OUTPATIENT)
Dept: FAMILY MEDICINE CLINIC | Age: 73
End: 2023-06-02

## 2023-06-02 NOTE — TELEPHONE ENCOUNTER
Paperwork received requesting documentation to be faxed. Pt has not been seen since 2/10/2022 when she established care and no showed her last 2 appointments. Pt is scheduled to come in on 7/14/2023. Pt does not have a valid phone number on file. Attempted to call phone number on paperwork received and it is invalid. Faxed message back to [facility] advising them of this, did send medication list. Paperwork in drawer up front.

## 2023-06-12 NOTE — PLAN OF CARE
Problem: Falls - Risk of:  Goal: Will remain free from falls  Description: Will remain free from falls  8/12/2021 0414 by Brad Deshpande RN  Outcome: Ongoing  Note: The patient remained free from falls this shift, call light within reach, bed in locked and lowest position. Side rails up x2. Continue to monitor closely. Problem: Pain:  Goal: Pain level will decrease  Description: Pain level will decrease  8/12/2021 0414 by Brad Deshpande RN  Outcome: Ongoing  Note: Patient able to verbalize pain throughout this shift, given PRN medication. Will continue to monitor. Refill approved as requested.

## 2023-08-29 RX ORDER — BUMETANIDE 2 MG/1
2 TABLET ORAL DAILY
Qty: 30 TABLET | Refills: 1 | OUTPATIENT
Start: 2023-08-29

## 2023-09-07 ENCOUNTER — HOSPITAL ENCOUNTER (INPATIENT)
Age: 73
LOS: 4 days | Discharge: HOME | DRG: 291 | End: 2023-09-11
Attending: EMERGENCY MEDICINE | Admitting: INTERNAL MEDICINE
Payer: COMMERCIAL

## 2023-09-07 ENCOUNTER — APPOINTMENT (OUTPATIENT)
Dept: GENERAL RADIOLOGY | Age: 73
DRG: 291 | End: 2023-09-07
Payer: COMMERCIAL

## 2023-09-07 DIAGNOSIS — I50.23 ACUTE ON CHRONIC SYSTOLIC CONGESTIVE HEART FAILURE (HCC): ICD-10-CM

## 2023-09-07 DIAGNOSIS — J44.1 COPD EXACERBATION (HCC): ICD-10-CM

## 2023-09-07 DIAGNOSIS — Z87.09 HISTORY OF COPD: ICD-10-CM

## 2023-09-07 DIAGNOSIS — E11.22 TYPE 2 DIABETES MELLITUS WITH STAGE 3A CHRONIC KIDNEY DISEASE, WITHOUT LONG-TERM CURRENT USE OF INSULIN (HCC): ICD-10-CM

## 2023-09-07 DIAGNOSIS — N18.31 TYPE 2 DIABETES MELLITUS WITH STAGE 3A CHRONIC KIDNEY DISEASE, WITHOUT LONG-TERM CURRENT USE OF INSULIN (HCC): ICD-10-CM

## 2023-09-07 DIAGNOSIS — I50.9 CONGESTIVE HEART FAILURE, UNSPECIFIED HF CHRONICITY, UNSPECIFIED HEART FAILURE TYPE (HCC): ICD-10-CM

## 2023-09-07 DIAGNOSIS — R06.02 SHORTNESS OF BREATH: Primary | ICD-10-CM

## 2023-09-07 DIAGNOSIS — I50.42 CHRONIC COMBINED SYSTOLIC AND DIASTOLIC CONGESTIVE HEART FAILURE (HCC): ICD-10-CM

## 2023-09-07 DIAGNOSIS — I50.43 CHF (CONGESTIVE HEART FAILURE), NYHA CLASS I, ACUTE ON CHRONIC, COMBINED (HCC): ICD-10-CM

## 2023-09-07 DIAGNOSIS — Z71.89 GOALS OF CARE, COUNSELING/DISCUSSION: ICD-10-CM

## 2023-09-07 LAB
ANION GAP SERPL CALCULATED.3IONS-SCNC: 12 MMOL/L (ref 9–17)
BASOPHILS # BLD: 0.1 K/UL (ref 0–0.2)
BASOPHILS NFR BLD: 1 % (ref 0–2)
BNP SERPL-MCNC: ABNORMAL PG/ML
BUN SERPL-MCNC: 17 MG/DL (ref 8–23)
CALCIUM SERPL-MCNC: 9.7 MG/DL (ref 8.6–10.4)
CHLORIDE SERPL-SCNC: 105 MMOL/L (ref 98–107)
CO2 SERPL-SCNC: 23 MMOL/L (ref 20–31)
CREAT SERPL-MCNC: 0.9 MG/DL (ref 0.5–0.9)
EOSINOPHIL # BLD: 0 K/UL (ref 0–0.4)
EOSINOPHILS RELATIVE PERCENT: 1 % (ref 0–4)
ERYTHROCYTE [DISTWIDTH] IN BLOOD BY AUTOMATED COUNT: 15.4 % (ref 11.5–14.9)
GFR SERPL CREATININE-BSD FRML MDRD: >60 ML/MIN/1.73M2
GLUCOSE BLD-MCNC: 200 MG/DL (ref 65–105)
GLUCOSE BLD-MCNC: 69 MG/DL (ref 65–105)
GLUCOSE SERPL-MCNC: 86 MG/DL (ref 70–99)
HCT VFR BLD AUTO: 42.9 % (ref 36–46)
HGB BLD-MCNC: 13.8 G/DL (ref 12–16)
INR PPP: 1
LYMPHOCYTES NFR BLD: 0.7 K/UL (ref 1–4.8)
LYMPHOCYTES RELATIVE PERCENT: 7 % (ref 24–44)
MAGNESIUM SERPL-MCNC: 1.9 MG/DL (ref 1.6–2.6)
MCH RBC QN AUTO: 28.4 PG (ref 26–34)
MCHC RBC AUTO-ENTMCNC: 32.3 G/DL (ref 31–37)
MCV RBC AUTO: 88 FL (ref 80–100)
MONOCYTES NFR BLD: 0.4 K/UL (ref 0.1–1.3)
MONOCYTES NFR BLD: 4 % (ref 1–7)
NEUTROPHILS NFR BLD: 87 % (ref 36–66)
NEUTS SEG NFR BLD: 8.5 K/UL (ref 1.3–9.1)
PARTIAL THROMBOPLASTIN TIME: 25.9 SEC (ref 24–36)
PHOSPHATE SERPL-MCNC: 3.1 MG/DL (ref 2.6–4.5)
PLATELET # BLD AUTO: 246 K/UL (ref 150–450)
PMV BLD AUTO: 8.5 FL (ref 6–12)
POTASSIUM SERPL-SCNC: 4.2 MMOL/L (ref 3.7–5.3)
PROTHROMBIN TIME: 13.2 SEC (ref 11.8–14.6)
RBC # BLD AUTO: 4.88 M/UL (ref 4–5.2)
SODIUM SERPL-SCNC: 140 MMOL/L (ref 135–144)
TROPONIN I SERPL HS-MCNC: 40 NG/L (ref 0–14)
TROPONIN I SERPL HS-MCNC: 41 NG/L (ref 0–14)
WBC OTHER # BLD: 9.8 K/UL (ref 3.5–11)

## 2023-09-07 PROCEDURE — 80048 BASIC METABOLIC PNL TOTAL CA: CPT

## 2023-09-07 PROCEDURE — 85610 PROTHROMBIN TIME: CPT

## 2023-09-07 PROCEDURE — 6360000002 HC RX W HCPCS: Performed by: EMERGENCY MEDICINE

## 2023-09-07 PROCEDURE — 6370000000 HC RX 637 (ALT 250 FOR IP): Performed by: NURSE PRACTITIONER

## 2023-09-07 PROCEDURE — 84100 ASSAY OF PHOSPHORUS: CPT

## 2023-09-07 PROCEDURE — 83735 ASSAY OF MAGNESIUM: CPT

## 2023-09-07 PROCEDURE — 85730 THROMBOPLASTIN TIME PARTIAL: CPT

## 2023-09-07 PROCEDURE — 82947 ASSAY GLUCOSE BLOOD QUANT: CPT

## 2023-09-07 PROCEDURE — 84484 ASSAY OF TROPONIN QUANT: CPT

## 2023-09-07 PROCEDURE — 2580000003 HC RX 258: Performed by: NURSE PRACTITIONER

## 2023-09-07 PROCEDURE — 85025 COMPLETE CBC W/AUTO DIFF WBC: CPT

## 2023-09-07 PROCEDURE — 93005 ELECTROCARDIOGRAM TRACING: CPT | Performed by: EMERGENCY MEDICINE

## 2023-09-07 PROCEDURE — 83880 ASSAY OF NATRIURETIC PEPTIDE: CPT

## 2023-09-07 PROCEDURE — 36415 COLL VENOUS BLD VENIPUNCTURE: CPT

## 2023-09-07 PROCEDURE — 71045 X-RAY EXAM CHEST 1 VIEW: CPT

## 2023-09-07 PROCEDURE — 99285 EMERGENCY DEPT VISIT HI MDM: CPT

## 2023-09-07 PROCEDURE — 6360000002 HC RX W HCPCS: Performed by: NURSE PRACTITIONER

## 2023-09-07 PROCEDURE — 1200000000 HC SEMI PRIVATE

## 2023-09-07 RX ORDER — ACETAMINOPHEN 650 MG/1
650 SUPPOSITORY RECTAL EVERY 6 HOURS PRN
Status: DISCONTINUED | OUTPATIENT
Start: 2023-09-07 | End: 2023-09-11 | Stop reason: HOSPADM

## 2023-09-07 RX ORDER — TRAZODONE HYDROCHLORIDE 50 MG/1
50 TABLET ORAL NIGHTLY PRN
Status: DISCONTINUED | OUTPATIENT
Start: 2023-09-07 | End: 2023-09-11 | Stop reason: HOSPADM

## 2023-09-07 RX ORDER — SODIUM CHLORIDE 0.9 % (FLUSH) 0.9 %
5-40 SYRINGE (ML) INJECTION PRN
Status: DISCONTINUED | OUTPATIENT
Start: 2023-09-07 | End: 2023-09-11 | Stop reason: HOSPADM

## 2023-09-07 RX ORDER — FUROSEMIDE 10 MG/ML
20 INJECTION INTRAMUSCULAR; INTRAVENOUS ONCE
Status: COMPLETED | OUTPATIENT
Start: 2023-09-07 | End: 2023-09-07

## 2023-09-07 RX ORDER — POTASSIUM CHLORIDE 750 MG/1
20 CAPSULE, EXTENDED RELEASE ORAL DAILY
Status: DISCONTINUED | OUTPATIENT
Start: 2023-09-08 | End: 2023-09-07

## 2023-09-07 RX ORDER — POTASSIUM CHLORIDE 750 MG/1
20 CAPSULE, EXTENDED RELEASE ORAL DAILY
Status: DISCONTINUED | OUTPATIENT
Start: 2023-09-08 | End: 2023-09-11 | Stop reason: HOSPADM

## 2023-09-07 RX ORDER — NICOTINE 21 MG/24HR
1 PATCH, TRANSDERMAL 24 HOURS TRANSDERMAL DAILY PRN
Status: DISCONTINUED | OUTPATIENT
Start: 2023-09-07 | End: 2023-09-11 | Stop reason: HOSPADM

## 2023-09-07 RX ORDER — ATORVASTATIN CALCIUM 80 MG/1
80 TABLET, FILM COATED ORAL DAILY
Status: DISCONTINUED | OUTPATIENT
Start: 2023-09-08 | End: 2023-09-11 | Stop reason: HOSPADM

## 2023-09-07 RX ORDER — FERROUS SULFATE 325(65) MG
325 TABLET ORAL
Status: DISCONTINUED | OUTPATIENT
Start: 2023-09-08 | End: 2023-09-11 | Stop reason: HOSPADM

## 2023-09-07 RX ORDER — CITALOPRAM 20 MG/1
20 TABLET ORAL DAILY
Status: DISCONTINUED | OUTPATIENT
Start: 2023-09-08 | End: 2023-09-11 | Stop reason: HOSPADM

## 2023-09-07 RX ORDER — MAGNESIUM SULFATE HEPTAHYDRATE 40 MG/ML
2000 INJECTION, SOLUTION INTRAVENOUS PRN
Status: DISCONTINUED | OUTPATIENT
Start: 2023-09-07 | End: 2023-09-11 | Stop reason: HOSPADM

## 2023-09-07 RX ORDER — POLYETHYLENE GLYCOL 3350 17 G/17G
17 POWDER, FOR SOLUTION ORAL DAILY PRN
Status: DISCONTINUED | OUTPATIENT
Start: 2023-09-07 | End: 2023-09-11 | Stop reason: HOSPADM

## 2023-09-07 RX ORDER — ENOXAPARIN SODIUM 100 MG/ML
30 INJECTION SUBCUTANEOUS DAILY
Status: DISCONTINUED | OUTPATIENT
Start: 2023-09-07 | End: 2023-09-09

## 2023-09-07 RX ORDER — POTASSIUM CHLORIDE 7.45 MG/ML
10 INJECTION INTRAVENOUS PRN
Status: DISCONTINUED | OUTPATIENT
Start: 2023-09-07 | End: 2023-09-11 | Stop reason: HOSPADM

## 2023-09-07 RX ORDER — ASPIRIN 81 MG/1
81 TABLET ORAL DAILY
Status: DISCONTINUED | OUTPATIENT
Start: 2023-09-07 | End: 2023-09-11 | Stop reason: HOSPADM

## 2023-09-07 RX ORDER — SODIUM CHLORIDE 0.9 % (FLUSH) 0.9 %
5-40 SYRINGE (ML) INJECTION EVERY 12 HOURS SCHEDULED
Status: DISCONTINUED | OUTPATIENT
Start: 2023-09-07 | End: 2023-09-11 | Stop reason: HOSPADM

## 2023-09-07 RX ORDER — DEXTROSE MONOHYDRATE 100 MG/ML
INJECTION, SOLUTION INTRAVENOUS CONTINUOUS PRN
Status: DISCONTINUED | OUTPATIENT
Start: 2023-09-07 | End: 2023-09-11 | Stop reason: HOSPADM

## 2023-09-07 RX ORDER — ACETAMINOPHEN 325 MG/1
650 TABLET ORAL EVERY 6 HOURS PRN
Status: DISCONTINUED | OUTPATIENT
Start: 2023-09-07 | End: 2023-09-11 | Stop reason: HOSPADM

## 2023-09-07 RX ORDER — SODIUM CHLORIDE 9 MG/ML
INJECTION, SOLUTION INTRAVENOUS PRN
Status: DISCONTINUED | OUTPATIENT
Start: 2023-09-07 | End: 2023-09-11 | Stop reason: HOSPADM

## 2023-09-07 RX ORDER — IPRATROPIUM BROMIDE AND ALBUTEROL SULFATE 2.5; .5 MG/3ML; MG/3ML
1 SOLUTION RESPIRATORY (INHALATION) EVERY 4 HOURS PRN
Status: DISCONTINUED | OUTPATIENT
Start: 2023-09-07 | End: 2023-09-11 | Stop reason: HOSPADM

## 2023-09-07 RX ORDER — CLOPIDOGREL BISULFATE 75 MG/1
75 TABLET ORAL DAILY
Status: DISCONTINUED | OUTPATIENT
Start: 2023-09-08 | End: 2023-09-11 | Stop reason: HOSPADM

## 2023-09-07 RX ORDER — ONDANSETRON 4 MG/1
4 TABLET, ORALLY DISINTEGRATING ORAL EVERY 8 HOURS PRN
Status: DISCONTINUED | OUTPATIENT
Start: 2023-09-07 | End: 2023-09-11 | Stop reason: HOSPADM

## 2023-09-07 RX ORDER — FUROSEMIDE 10 MG/ML
40 INJECTION INTRAMUSCULAR; INTRAVENOUS DAILY
Status: DISCONTINUED | OUTPATIENT
Start: 2023-09-08 | End: 2023-09-08

## 2023-09-07 RX ORDER — INSULIN LISPRO 100 [IU]/ML
0-8 INJECTION, SOLUTION INTRAVENOUS; SUBCUTANEOUS
Status: DISCONTINUED | OUTPATIENT
Start: 2023-09-08 | End: 2023-09-08 | Stop reason: SDUPTHER

## 2023-09-07 RX ORDER — INSULIN LISPRO 100 [IU]/ML
0-4 INJECTION, SOLUTION INTRAVENOUS; SUBCUTANEOUS NIGHTLY
Status: DISCONTINUED | OUTPATIENT
Start: 2023-09-07 | End: 2023-09-08 | Stop reason: SDUPTHER

## 2023-09-07 RX ORDER — POTASSIUM CHLORIDE 20 MEQ/1
40 TABLET, EXTENDED RELEASE ORAL PRN
Status: DISCONTINUED | OUTPATIENT
Start: 2023-09-07 | End: 2023-09-11 | Stop reason: HOSPADM

## 2023-09-07 RX ORDER — ONDANSETRON 2 MG/ML
4 INJECTION INTRAMUSCULAR; INTRAVENOUS EVERY 6 HOURS PRN
Status: DISCONTINUED | OUTPATIENT
Start: 2023-09-07 | End: 2023-09-11 | Stop reason: HOSPADM

## 2023-09-07 RX ADMIN — FUROSEMIDE 20 MG: 10 INJECTION, SOLUTION INTRAMUSCULAR; INTRAVENOUS at 17:14

## 2023-09-07 RX ADMIN — SODIUM CHLORIDE, PRESERVATIVE FREE 10 ML: 5 INJECTION INTRAVENOUS at 21:31

## 2023-09-07 RX ADMIN — ASPIRIN 81 MG: 81 TABLET, COATED ORAL at 21:31

## 2023-09-07 RX ADMIN — ENOXAPARIN SODIUM 30 MG: 100 INJECTION SUBCUTANEOUS at 21:31

## 2023-09-07 ASSESSMENT — PAIN - FUNCTIONAL ASSESSMENT: PAIN_FUNCTIONAL_ASSESSMENT: NONE - DENIES PAIN

## 2023-09-07 ASSESSMENT — ENCOUNTER SYMPTOMS
CHEST TIGHTNESS: 0
SHORTNESS OF BREATH: 1
ABDOMINAL PAIN: 0
VOICE CHANGE: 0
TROUBLE SWALLOWING: 0
VOMITING: 0
BACK PAIN: 0
PHOTOPHOBIA: 0
EYE PAIN: 0
FACIAL SWELLING: 0
NAUSEA: 0
COLOR CHANGE: 0

## 2023-09-07 ASSESSMENT — HEART SCORE: ECG: 0

## 2023-09-07 NOTE — ED TRIAGE NOTES
Mode of arrival (squad #, walk in, police, etc) : EMS        Chief complaint(s): SOB        Arrival Note (brief scenario, treatment PTA, etc). : Pt reports worsening SOB x1 day. Pt reports hx of CHF. C= \"Have you ever felt that you should Cut down on your drinking? \"  No  A= \"Have people Annoyed you by criticizing your drinking? \"  No  G= \"Have you ever felt bad or Guilty about your drinking? \"  No  E= \"Have you ever had a drink as an Eye-opener first thing in the morning to steady your nerves or to help a hangover? \"  No      Deferred []      Reason for deferring: N/A    *If yes to two or more: probable alcohol abuse. *

## 2023-09-08 ENCOUNTER — APPOINTMENT (OUTPATIENT)
Age: 73
DRG: 291 | End: 2023-09-08
Attending: INTERNAL MEDICINE
Payer: COMMERCIAL

## 2023-09-08 PROBLEM — E46 PROTEIN-CALORIE MALNUTRITION (HCC): Status: ACTIVE | Noted: 2023-09-08

## 2023-09-08 PROBLEM — Z87.09 HISTORY OF COPD: Status: ACTIVE | Noted: 2023-09-08

## 2023-09-08 PROBLEM — R06.02 SHORTNESS OF BREATH: Status: ACTIVE | Noted: 2023-09-08

## 2023-09-08 PROBLEM — Z91.199 NONCOMPLIANCE: Status: ACTIVE | Noted: 2023-09-08

## 2023-09-08 LAB
ANION GAP SERPL CALCULATED.3IONS-SCNC: 11 MMOL/L (ref 9–17)
BASOPHILS # BLD: 0.1 K/UL (ref 0–0.2)
BASOPHILS NFR BLD: 1 % (ref 0–2)
BUN SERPL-MCNC: 20 MG/DL (ref 8–23)
CALCIUM SERPL-MCNC: 8.7 MG/DL (ref 8.6–10.4)
CHLORIDE SERPL-SCNC: 103 MMOL/L (ref 98–107)
CHOLEST SERPL-MCNC: 127 MG/DL
CHOLESTEROL/HDL RATIO: 3.2
CO2 SERPL-SCNC: 27 MMOL/L (ref 20–31)
CREAT SERPL-MCNC: 1 MG/DL (ref 0.5–0.9)
ECHO AO ROOT DIAM: 3.4 CM
ECHO AO ROOT INDEX: 2.28 CM/M2
ECHO AV AREA PEAK VELOCITY: 2 CM2
ECHO AV AREA VTI: 1.9 CM2
ECHO AV AREA/BSA PEAK VELOCITY: 1.3 CM2/M2
ECHO AV AREA/BSA VTI: 1.3 CM2/M2
ECHO AV MEAN GRADIENT: 5 MMHG
ECHO AV MEAN VELOCITY: 1.1 M/S
ECHO AV PEAK GRADIENT: 9 MMHG
ECHO AV PEAK VELOCITY: 1.5 M/S
ECHO AV VELOCITY RATIO: 0.6
ECHO AV VTI: 32.1 CM
ECHO BSA: 1.47 M2
ECHO EST RA PRESSURE: 3 MMHG
ECHO LA AREA 2C: 24.7 CM2
ECHO LA AREA 4C: 29.1 CM2
ECHO LA DIAMETER INDEX: 3.69 CM/M2
ECHO LA DIAMETER: 5.5 CM
ECHO LA MAJOR AXIS: 6.7 CM
ECHO LA MINOR AXIS: 6.7 CM
ECHO LA TO AORTIC ROOT RATIO: 1.62
ECHO LA VOL 2C: 77 ML (ref 22–52)
ECHO LA VOL 4C: 108 ML (ref 22–52)
ECHO LA VOL BP: 91 ML (ref 22–52)
ECHO LA VOL/BSA BIPLANE: 61 ML/M2 (ref 16–34)
ECHO LA VOLUME INDEX A2C: 52 ML/M2 (ref 16–34)
ECHO LA VOLUME INDEX A4C: 72 ML/M2 (ref 16–34)
ECHO LV E' LATERAL VELOCITY: 10 CM/S
ECHO LV E' SEPTAL VELOCITY: 4 CM/S
ECHO LV EDV A2C: 199 ML
ECHO LV EDV A4C: 158 ML
ECHO LV EDV INDEX A4C: 106 ML/M2
ECHO LV EDV NDEX A2C: 134 ML/M2
ECHO LV EJECTION FRACTION A2C: 37 %
ECHO LV EJECTION FRACTION A4C: 27 %
ECHO LV EJECTION FRACTION BIPLANE: 35 % (ref 55–100)
ECHO LV ESV A2C: 124 ML
ECHO LV ESV A4C: 115 ML
ECHO LV ESV INDEX A2C: 83 ML/M2
ECHO LV ESV INDEX A4C: 77 ML/M2
ECHO LV FRACTIONAL SHORTENING: 12 % (ref 28–44)
ECHO LV INTERNAL DIMENSION DIASTOLE INDEX: 3.36 CM/M2
ECHO LV INTERNAL DIMENSION DIASTOLIC: 5 CM (ref 3.9–5.3)
ECHO LV INTERNAL DIMENSION SYSTOLIC INDEX: 2.95 CM/M2
ECHO LV INTERNAL DIMENSION SYSTOLIC: 4.4 CM
ECHO LV IVSD: 1.4 CM (ref 0.6–0.9)
ECHO LV MASS 2D: 291.4 G (ref 67–162)
ECHO LV MASS INDEX 2D: 195.6 G/M2 (ref 43–95)
ECHO LV POSTERIOR WALL DIASTOLIC: 1.4 CM (ref 0.6–0.9)
ECHO LV RELATIVE WALL THICKNESS RATIO: 0.56
ECHO LVOT AREA: 3.5 CM2
ECHO LVOT AV VTI INDEX: 0.54
ECHO LVOT DIAM: 2.1 CM
ECHO LVOT MEAN GRADIENT: 2 MMHG
ECHO LVOT PEAK GRADIENT: 3 MMHG
ECHO LVOT PEAK VELOCITY: 0.9 M/S
ECHO LVOT STROKE VOLUME INDEX: 40.4 ML/M2
ECHO LVOT SV: 60.2 ML
ECHO LVOT VTI: 17.4 CM
ECHO MV A VELOCITY: 0.56 M/S
ECHO MV E DECELERATION TIME (DT): 166 MS
ECHO MV E VELOCITY: 0.8 M/S
ECHO MV E/A RATIO: 1.43
ECHO MV E/E' LATERAL: 8
ECHO MV E/E' RATIO (AVERAGED): 14
ECHO MV E/E' SEPTAL: 20
ECHO RA AREA 4C: 14.8 CM2
ECHO RA END SYSTOLIC VOLUME APICAL 4 CHAMBER INDEX BSA: 25 ML/M2
ECHO RA VOLUME: 37 ML
ECHO RIGHT VENTRICULAR SYSTOLIC PRESSURE (RVSP): 29 MMHG
ECHO RV TAPSE: 1.3 CM (ref 1.7–?)
ECHO TV REGURGITANT MAX VELOCITY: 2.55 M/S
ECHO TV REGURGITANT PEAK GRADIENT: 24 MMHG
EKG ATRIAL RATE: 83 BPM
EKG P AXIS: 45 DEGREES
EKG P-R INTERVAL: 148 MS
EKG Q-T INTERVAL: 378 MS
EKG QRS DURATION: 92 MS
EKG QTC CALCULATION (BAZETT): 444 MS
EKG R AXIS: -6 DEGREES
EKG T AXIS: 157 DEGREES
EKG VENTRICULAR RATE: 83 BPM
EOSINOPHIL # BLD: 0.1 K/UL (ref 0–0.4)
EOSINOPHILS RELATIVE PERCENT: 3 % (ref 0–4)
ERYTHROCYTE [DISTWIDTH] IN BLOOD BY AUTOMATED COUNT: 15.3 % (ref 11.5–14.9)
GFR SERPL CREATININE-BSD FRML MDRD: 59 ML/MIN/1.73M2
GLUCOSE BLD-MCNC: 106 MG/DL (ref 65–105)
GLUCOSE BLD-MCNC: 173 MG/DL (ref 65–105)
GLUCOSE BLD-MCNC: 173 MG/DL (ref 65–105)
GLUCOSE BLD-MCNC: 190 MG/DL (ref 65–105)
GLUCOSE SERPL-MCNC: 108 MG/DL (ref 70–99)
HCT VFR BLD AUTO: 38.7 % (ref 36–46)
HDLC SERPL-MCNC: 40 MG/DL
HGB BLD-MCNC: 12.4 G/DL (ref 12–16)
LDLC SERPL CALC-MCNC: 75 MG/DL (ref 0–130)
LYMPHOCYTES NFR BLD: 0.7 K/UL (ref 1–4.8)
LYMPHOCYTES RELATIVE PERCENT: 14 % (ref 24–44)
MAGNESIUM SERPL-MCNC: 1.9 MG/DL (ref 1.6–2.6)
MCH RBC QN AUTO: 28.3 PG (ref 26–34)
MCHC RBC AUTO-ENTMCNC: 32.1 G/DL (ref 31–37)
MCV RBC AUTO: 88.1 FL (ref 80–100)
MONOCYTES NFR BLD: 0.5 K/UL (ref 0.1–1.3)
MONOCYTES NFR BLD: 11 % (ref 1–7)
NEUTROPHILS NFR BLD: 71 % (ref 36–66)
NEUTS SEG NFR BLD: 3.5 K/UL (ref 1.3–9.1)
PLATELET # BLD AUTO: 222 K/UL (ref 150–450)
PMV BLD AUTO: 8.6 FL (ref 6–12)
POTASSIUM SERPL-SCNC: 4 MMOL/L (ref 3.7–5.3)
RBC # BLD AUTO: 4.39 M/UL (ref 4–5.2)
SODIUM SERPL-SCNC: 141 MMOL/L (ref 135–144)
TRIGL SERPL-MCNC: 62 MG/DL
TSH SERPL DL<=0.05 MIU/L-ACNC: 1.11 UIU/ML (ref 0.3–5)
WBC OTHER # BLD: 4.9 K/UL (ref 3.5–11)

## 2023-09-08 PROCEDURE — 80048 BASIC METABOLIC PNL TOTAL CA: CPT

## 2023-09-08 PROCEDURE — 6370000000 HC RX 637 (ALT 250 FOR IP): Performed by: NURSE PRACTITIONER

## 2023-09-08 PROCEDURE — 83735 ASSAY OF MAGNESIUM: CPT

## 2023-09-08 PROCEDURE — 99223 1ST HOSP IP/OBS HIGH 75: CPT | Performed by: NURSE PRACTITIONER

## 2023-09-08 PROCEDURE — 6360000002 HC RX W HCPCS: Performed by: NURSE PRACTITIONER

## 2023-09-08 PROCEDURE — 2580000003 HC RX 258: Performed by: NURSE PRACTITIONER

## 2023-09-08 PROCEDURE — 94761 N-INVAS EAR/PLS OXIMETRY MLT: CPT

## 2023-09-08 PROCEDURE — 82947 ASSAY GLUCOSE BLOOD QUANT: CPT

## 2023-09-08 PROCEDURE — 97161 PT EVAL LOW COMPLEX 20 MIN: CPT

## 2023-09-08 PROCEDURE — 93010 ELECTROCARDIOGRAM REPORT: CPT | Performed by: INTERNAL MEDICINE

## 2023-09-08 PROCEDURE — 1200000000 HC SEMI PRIVATE

## 2023-09-08 PROCEDURE — 97165 OT EVAL LOW COMPLEX 30 MIN: CPT

## 2023-09-08 PROCEDURE — 85025 COMPLETE CBC W/AUTO DIFF WBC: CPT

## 2023-09-08 PROCEDURE — 93306 TTE W/DOPPLER COMPLETE: CPT | Performed by: INTERNAL MEDICINE

## 2023-09-08 PROCEDURE — 80061 LIPID PANEL: CPT

## 2023-09-08 PROCEDURE — 84443 ASSAY THYROID STIM HORMONE: CPT

## 2023-09-08 PROCEDURE — 99223 1ST HOSP IP/OBS HIGH 75: CPT | Performed by: INTERNAL MEDICINE

## 2023-09-08 PROCEDURE — 2700000000 HC OXYGEN THERAPY PER DAY

## 2023-09-08 PROCEDURE — 36415 COLL VENOUS BLD VENIPUNCTURE: CPT

## 2023-09-08 PROCEDURE — 6370000000 HC RX 637 (ALT 250 FOR IP): Performed by: INTERNAL MEDICINE

## 2023-09-08 PROCEDURE — C8929 TTE W OR WO FOL WCON,DOPPLER: HCPCS

## 2023-09-08 PROCEDURE — 94640 AIRWAY INHALATION TREATMENT: CPT

## 2023-09-08 PROCEDURE — 6360000004 HC RX CONTRAST MEDICATION: Performed by: INTERNAL MEDICINE

## 2023-09-08 RX ORDER — INSULIN LISPRO 100 [IU]/ML
0-4 INJECTION, SOLUTION INTRAVENOUS; SUBCUTANEOUS NIGHTLY
Status: DISCONTINUED | OUTPATIENT
Start: 2023-09-08 | End: 2023-09-11 | Stop reason: HOSPADM

## 2023-09-08 RX ORDER — INSULIN LISPRO 100 [IU]/ML
0-8 INJECTION, SOLUTION INTRAVENOUS; SUBCUTANEOUS
Status: DISCONTINUED | OUTPATIENT
Start: 2023-09-08 | End: 2023-09-11 | Stop reason: HOSPADM

## 2023-09-08 RX ORDER — FUROSEMIDE 40 MG/1
40 TABLET ORAL DAILY
Status: DISCONTINUED | OUTPATIENT
Start: 2023-09-09 | End: 2023-09-10

## 2023-09-08 RX ORDER — SPIRONOLACTONE 25 MG/1
25 TABLET ORAL DAILY
Status: DISCONTINUED | OUTPATIENT
Start: 2023-09-08 | End: 2023-09-11 | Stop reason: HOSPADM

## 2023-09-08 RX ORDER — BUDESONIDE AND FORMOTEROL FUMARATE DIHYDRATE 160; 4.5 UG/1; UG/1
2 AEROSOL RESPIRATORY (INHALATION)
Status: DISCONTINUED | OUTPATIENT
Start: 2023-09-08 | End: 2023-09-11 | Stop reason: HOSPADM

## 2023-09-08 RX ADMIN — PERFLUTREN 3 ML: 6.52 INJECTION, SUSPENSION INTRAVENOUS at 13:43

## 2023-09-08 RX ADMIN — BUDESONIDE AND FORMOTEROL FUMARATE DIHYDRATE 2 PUFF: 160; 4.5 AEROSOL RESPIRATORY (INHALATION) at 09:41

## 2023-09-08 RX ADMIN — FUROSEMIDE 40 MG: 10 INJECTION, SOLUTION INTRAMUSCULAR; INTRAVENOUS at 08:23

## 2023-09-08 RX ADMIN — FERROUS SULFATE TAB 325 MG (65 MG ELEMENTAL FE) 325 MG: 325 (65 FE) TAB at 08:23

## 2023-09-08 RX ADMIN — ENOXAPARIN SODIUM 30 MG: 100 INJECTION SUBCUTANEOUS at 08:23

## 2023-09-08 RX ADMIN — ACETAMINOPHEN 650 MG: 325 TABLET ORAL at 08:27

## 2023-09-08 RX ADMIN — METOPROLOL TARTRATE 12.5 MG: 25 TABLET, FILM COATED ORAL at 21:16

## 2023-09-08 RX ADMIN — SODIUM CHLORIDE, PRESERVATIVE FREE 10 ML: 5 INJECTION INTRAVENOUS at 08:23

## 2023-09-08 RX ADMIN — ATORVASTATIN CALCIUM 80 MG: 80 TABLET, FILM COATED ORAL at 08:23

## 2023-09-08 RX ADMIN — METOPROLOL TARTRATE 12.5 MG: 25 TABLET, FILM COATED ORAL at 11:06

## 2023-09-08 RX ADMIN — BUDESONIDE AND FORMOTEROL FUMARATE DIHYDRATE 2 PUFF: 160; 4.5 AEROSOL RESPIRATORY (INHALATION) at 19:21

## 2023-09-08 RX ADMIN — ASPIRIN 81 MG: 81 TABLET, COATED ORAL at 08:23

## 2023-09-08 RX ADMIN — ACETAMINOPHEN 650 MG: 325 TABLET ORAL at 21:11

## 2023-09-08 RX ADMIN — EMPAGLIFLOZIN 10 MG: 10 TABLET, FILM COATED ORAL at 11:15

## 2023-09-08 RX ADMIN — CLOPIDOGREL BISULFATE 75 MG: 75 TABLET ORAL at 08:23

## 2023-09-08 RX ADMIN — SODIUM CHLORIDE, PRESERVATIVE FREE 10 ML: 5 INJECTION INTRAVENOUS at 21:16

## 2023-09-08 RX ADMIN — CITALOPRAM HYDROBROMIDE 20 MG: 20 TABLET ORAL at 08:23

## 2023-09-08 RX ADMIN — TRAZODONE HYDROCHLORIDE 50 MG: 50 TABLET ORAL at 21:12

## 2023-09-08 RX ADMIN — POTASSIUM CHLORIDE 20 MEQ: 10 CAPSULE, COATED, EXTENDED RELEASE ORAL at 08:23

## 2023-09-08 RX ADMIN — SPIRONOLACTONE 25 MG: 25 TABLET ORAL at 11:15

## 2023-09-08 ASSESSMENT — PAIN SCALES - GENERAL
PAINLEVEL_OUTOF10: 10
PAINLEVEL_OUTOF10: 8
PAINLEVEL_OUTOF10: 10
PAINLEVEL_OUTOF10: 5
PAINLEVEL_OUTOF10: 10
PAINLEVEL_OUTOF10: 10

## 2023-09-08 ASSESSMENT — PAIN DESCRIPTION - FREQUENCY
FREQUENCY: CONTINUOUS
FREQUENCY: CONTINUOUS

## 2023-09-08 ASSESSMENT — PAIN DESCRIPTION - PAIN TYPE
TYPE: CHRONIC PAIN

## 2023-09-08 ASSESSMENT — PAIN DESCRIPTION - LOCATION
LOCATION: NECK

## 2023-09-08 ASSESSMENT — PAIN DESCRIPTION - ONSET
ONSET: ON-GOING
ONSET: ON-GOING

## 2023-09-08 ASSESSMENT — PAIN DESCRIPTION - ORIENTATION
ORIENTATION: POSTERIOR

## 2023-09-08 ASSESSMENT — PAIN DESCRIPTION - DESCRIPTORS
DESCRIPTORS: THROBBING

## 2023-09-08 NOTE — ACP (ADVANCE CARE PLANNING)
Advance Care Planning     Advance Care Planning Inpatient Note  [facility] Department    Today's Date: 9/8/2023  Unit: [address] MED SURG    Received request from patient. Upon review of chart and communication with care team, patient's decision making abilities are not in question. . Patient was/were present in the room during visit. Goals of ACP Conversation:  Discuss advance care planning documents  Facilitate a discussion related to patient's goals of care as they align with the patient's values and beliefs. Health Care Decision Makers:       Primary Decision Maker: Lindsey Pérez Child - [phone]  Summary:  Completed [address] Decision Maker  Updated Healthcare Decision [facility]    Advance Care Planning Documents (Patient Wishes):  Healthcare Power of /Advance Directive Appointment of Health Care Agent  Living Will/Advance Directive     Assessment:  Patient confirmed her DNR/CC/A code status; see writer's chart not this date. Interventions:  Provided education on documents for clarity and greater understanding  Assisted in the completion of documents according to patient's wishes at this time  Encouraged ongoing ACP conversation with future decision makers and loved ones    Care Preferences Communicated:     Hospitalization:  If the patient's health worsens and it becomes clear that the chance of recovery is unlikely,     the patient wants hospitalization. Ventilation:   If the patient, in their present state of health, suddenly became very ill and unable to breathe on their own,     the patient would NOT desire the use of a ventilator (breathing machine). If their health worsens and it becomes clear that the change of recovery is unlikely,     the patient would NOT desire the use of a ventilator (breathing machine).     Resuscitation:  In the event the patient's heart stopped as a result of an underlying serious health condition, the patient communicates a

## 2023-09-08 NOTE — CONSULTS
Date:   9/8/2023  Patient name: Luis Méndez  Date of admission:  9/7/2023  2:25 PM  MRN:   [id number]  YOB: 1950  PCP: Alo Crowe MD    Reason for Admission: Shortness of breath [R06.02]  CHF (congestive heart failure), NYHA class I, acute on chronic, combined ([address]) [I50.43]  Congestive heart failure, unspecified HF chronicity, unspecified heart failure type [facility]) [I50.9]    Cardiology consult: CHF systolic and diastolic, multivessel CAD, CABG       Referring physician: Dr. George Myers  CHF systolic and diastolic acute on chronic ejection fraction 25%  LV apical defect 1.3 cm x 1.01 cm, 2D echo 9/8/2023, when compared to the previous echo 10/5/2022 without contrast did not show any significant change in apical abnormality  Chronically elevated high-sensitivity troponin  Coronary artery disease, CABG 2018, LIMA to LAD and diagonal 1, SVG to OM and PDA  Moderate pulmonary hypertension,  Moderate LVH  Carotid artery disease/stent placement left internal carotid artery  CVA  Hypertension  Upper lipidemia  2 diabetes mellitus  COPD on oxygen at night, history of smoking  Chronic kidney disease    Admission 48/7/4916 CHF systolic and diastolic acute on chronic, ejection fraction 15% significant deterioration in comparison to the previous echo  Tox screen positive for cocaine metabolites  High-sensitivity troponin 70, proBNP 46,049  Mild to moderate pulmonary hypertension RVSP 47     Admission 0/47/2359 CHF systolic and diastolic acute on chronic, ejection fraction 35%    Investigation work-up    2D echo 10/5/2022  Moderately dilated LV, mildly increased LV wall thickness  Ejection fraction 15%, severe global hypokinesis  Severely dilated LA  Mild to moderate MR, moderate TR, RVSP 47, dilated IVC    2D echo 9/8/2023  Ejection fraction 25 to 30%, global hypokinesis, moderate LVH, severely hypokinetic apex, apical filling defect 1.3 x 1.03 cm  Normal RV size and function  Mild to moderate
Type II or unspecified type diabetes mellitus without mention of complication, not stated as uncontrolled     Wears glasses     Wears partial dentures     upper plate       PAST SURGICAL HISTORY  Past Surgical History:   Procedure Laterality Date    APPENDECTOMY      BRONCHOSCOPY N/A 8/16/2021    BRONCHOSCOPY w/ WASHINGS performed by Juan Carlos Guerra MD at [address] [facility]      cath x 2/ stent x 1    CARDIAC SURGERY      bypass 4 vessel 2/2018    CERVICAL LAMINECTOMY N/A 10/14/2020    C3-C7 POSTERIOR CERVICAL DECOMPRESSION FUSION performed by Alpa Simpson MD at [address] [facility] ([address] [facility])      JOINT REPLACEMENT Bilateral     knees    LEG BIOPSY EXCISION Right 8/29/2019    LEG LESION PUNCH BIOPSY performed by Claude De La Fuente MD at [address]  07/26/2020    cerebral angiogram    PA INCIS/DRAIN THIGH/KNEE ABSCESS,DEEP Right 5/7/2018    DEBRIDEMENT INCISION AND DRAINAGE THIGH ABSCESS performed by Mundo Hayward DO at [address] [facility] OFFICE/OUTPT VISIT,PROCEDURE ONLY N/A 2/6/2018    CABG X 3 LIMA-LAD-DIAG,SVG-PDA,CORONARY ARTERY BYPASS REDO, PUMP ASSIST, SWAN, JARRED, REDO STERNOTOMY performed by Shabnam Gutierrez MD at [address] [facility] History     Tobacco Use    Smoking status: Some Days     Packs/day: 0.50     Years: 56.00     Pack years: 28.00     Types: Cigarettes    Smokeless tobacco: Never    Tobacco comments:     4-5 cigarettes a day   Vaping Use    Vaping Use: Former   Substance Use Topics    Alcohol use: No     Alcohol/week: 0.0 standard drinks    Drug use: Yes     Types: Marijuana Adolm Sang)     Comment: ocassionally       FAMILY HISTORY  . Leonides Lagunas Family History   Problem Relation Age of Onset    Heart Disease Father         ALLERGIES  Allergies   Allergen Reactions    Codeine Other (See Comments)     Constipation. Lisinopril      hyperkalemia    Morphine Other (See Comments)     Hallucinations.     Potassium-Containing

## 2023-09-08 NOTE — CARE COORDINATION
Case Management Assessment  Initial Evaluation    Date/Time of Evaluation: 9/8/2023 4:07 PM  Assessment Completed by: Gideon Lemon RN    If patient is discharged prior to next notation, then this note serves as note for discharge by case management. Patient Name: Mina Bermudez                   YOB: 1950  Diagnosis: Shortness of breath [R06.02]  CHF (congestive heart failure), NYHA class I, acute on chronic, combined ([address]) [I50.43]  Congestive heart failure, unspecified HF chronicity, unspecified heart failure type ([address]) [I50.9]                   Date / Time: 9/7/2023  2:25 PM    Patient Admission Status: Inpatient   Readmission Risk (Low < 19, Mod (19-27), High > 27): Readmission Risk Score: 14.1    Current PCP: Mahad Brooks MD  PCP verified by CM? Yes    Chart Reviewed: Yes      History Provided by: Patient  Patient Orientation: Alert and Oriented    Patient Cognition: Alert    Hospitalization in the last 30 days (Readmission):  No    If yes, Readmission Assessment in CM Navigator will be completed. Advance Directives:      Code Status: DNR-CCA   Patient's Primary Decision Maker is: Legal Next of Kin    [facility]  [place]and[place] - [phone]    Discharge Planning:    Patient lives with: Friends Type of Home: House  Primary Care Giver: Self  Patient Support Systems include: Children, Friends/Neighbors   Current Financial resources: Franca Silvia  Current community resources: Transportation (Medical cab)  Current services prior to admission: None            Current DME:              Type of Home Care services:  None    ADLS  Prior functional level: Independent in ADLs/IADLs, Assistance with the following:, Cooking, Housework, Shopping  Current functional level: Independent in ADLs/IADLs, Assistance with the following:, Cooking, Housework, Shopping    PT AM-PAC:   /24  OT AM-PAC: 21 /24    Family can provide assistance at DC:  Yes  Would you like Case Management to

## 2023-09-08 NOTE — ACP (ADVANCE CARE PLANNING)
Advance Care Planning     Advance Care Planning (ACP) Physician/NP/PA Conversation    Date of Conversation: 9/7/2023  Conducted with: Patient with Decision Making Capacity    Healthcare Decision Maker:      Primary Decision Maker: India Ellington - [phone]    Secondary Decision Maker: Jordi Brooks - [phone]    Click here to complete Healthcare Decision Makers including selection of the Healthcare Decision Maker Relationship (ie \"Primary\")  Today we documented Decision Maker(s) consistent with ACP documents on file. Care Preferences:    Hospitalization: \"If your health worsens and it becomes clear that your chance of recovery is unlikely, what would be your preference regarding hospitalization? \"  The patient would prefer hospitalization. Ventilation: \"If you were unable to breath on your own and your chance of recovery was unlikely, what would be your preference about the use of a ventilator (breathing machine) if it was available to you? \"  The patient would desire the use of a ventilator. Resuscitation: \"In the event your heart stopped as a result of an underlying serious health condition, would you want attempts made to restart your heart, or would you prefer a natural death? \"  No, do NOT attempt to resuscitate.     treatment goals, benefit/burden of treatment options, ventilation preferences, hospitalization preferences, resuscitation preferences, and hospice care    Conversation Outcomes / Follow-Up Plan:  ACP complete - no further action today  Reviewed DNR/DNI and patient confirms current DNR status - completed forms on file (place new order if needed)    Length of Voluntary ACP Conversation in minutes:  <16 minutes (Non-Billable)    Kiah Walden, APRN - CNP
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
## 2023-09-09 LAB
ANION GAP SERPL CALCULATED.3IONS-SCNC: 6 MMOL/L (ref 9–17)
BASOPHILS # BLD: 0.1 K/UL (ref 0–0.2)
BASOPHILS NFR BLD: 1 % (ref 0–2)
BUN SERPL-MCNC: 35 MG/DL (ref 8–23)
CALCIUM SERPL-MCNC: 8.6 MG/DL (ref 8.6–10.4)
CHLORIDE SERPL-SCNC: 105 MMOL/L (ref 98–107)
CO2 SERPL-SCNC: 28 MMOL/L (ref 20–31)
CREAT SERPL-MCNC: 1 MG/DL (ref 0.5–0.9)
EOSINOPHIL # BLD: 0.2 K/UL (ref 0–0.4)
EOSINOPHILS RELATIVE PERCENT: 3 % (ref 0–4)
ERYTHROCYTE [DISTWIDTH] IN BLOOD BY AUTOMATED COUNT: 15.2 % (ref 11.5–14.9)
GFR SERPL CREATININE-BSD FRML MDRD: 59 ML/MIN/1.73M2
GLUCOSE BLD-MCNC: 127 MG/DL (ref 65–105)
GLUCOSE BLD-MCNC: 194 MG/DL (ref 65–105)
GLUCOSE BLD-MCNC: 271 MG/DL (ref 65–105)
GLUCOSE SERPL-MCNC: 126 MG/DL (ref 70–99)
HCT VFR BLD AUTO: 34.9 % (ref 36–46)
HGB BLD-MCNC: 11.4 G/DL (ref 12–16)
LYMPHOCYTES NFR BLD: 1.1 K/UL (ref 1–4.8)
LYMPHOCYTES RELATIVE PERCENT: 24 % (ref 24–44)
MAGNESIUM SERPL-MCNC: 2 MG/DL (ref 1.6–2.6)
MCH RBC QN AUTO: 28.4 PG (ref 26–34)
MCHC RBC AUTO-ENTMCNC: 32.5 G/DL (ref 31–37)
MCV RBC AUTO: 87.2 FL (ref 80–100)
MONOCYTES NFR BLD: 0.5 K/UL (ref 0.1–1.3)
MONOCYTES NFR BLD: 12 % (ref 1–7)
NEUTROPHILS NFR BLD: 60 % (ref 36–66)
NEUTS SEG NFR BLD: 2.7 K/UL (ref 1.3–9.1)
PLATELET # BLD AUTO: 215 K/UL (ref 150–450)
PMV BLD AUTO: 8.9 FL (ref 6–12)
POTASSIUM SERPL-SCNC: 4 MMOL/L (ref 3.7–5.3)
RBC # BLD AUTO: 4 M/UL (ref 4–5.2)
SODIUM SERPL-SCNC: 139 MMOL/L (ref 135–144)
WBC OTHER # BLD: 4.5 K/UL (ref 3.5–11)

## 2023-09-09 PROCEDURE — 6370000000 HC RX 637 (ALT 250 FOR IP): Performed by: INTERNAL MEDICINE

## 2023-09-09 PROCEDURE — 94640 AIRWAY INHALATION TREATMENT: CPT

## 2023-09-09 PROCEDURE — 83735 ASSAY OF MAGNESIUM: CPT

## 2023-09-09 PROCEDURE — 36415 COLL VENOUS BLD VENIPUNCTURE: CPT

## 2023-09-09 PROCEDURE — 6370000000 HC RX 637 (ALT 250 FOR IP): Performed by: NURSE PRACTITIONER

## 2023-09-09 PROCEDURE — 1200000000 HC SEMI PRIVATE

## 2023-09-09 PROCEDURE — 94761 N-INVAS EAR/PLS OXIMETRY MLT: CPT

## 2023-09-09 PROCEDURE — 80048 BASIC METABOLIC PNL TOTAL CA: CPT

## 2023-09-09 PROCEDURE — 2580000003 HC RX 258: Performed by: NURSE PRACTITIONER

## 2023-09-09 PROCEDURE — 6360000002 HC RX W HCPCS: Performed by: NURSE PRACTITIONER

## 2023-09-09 PROCEDURE — 2700000000 HC OXYGEN THERAPY PER DAY

## 2023-09-09 PROCEDURE — 99233 SBSQ HOSP IP/OBS HIGH 50: CPT | Performed by: INTERNAL MEDICINE

## 2023-09-09 PROCEDURE — 82947 ASSAY GLUCOSE BLOOD QUANT: CPT

## 2023-09-09 PROCEDURE — 85025 COMPLETE CBC W/AUTO DIFF WBC: CPT

## 2023-09-09 RX ORDER — PREDNISONE 20 MG/1
40 TABLET ORAL DAILY
Status: DISCONTINUED | OUTPATIENT
Start: 2023-09-09 | End: 2023-09-11 | Stop reason: HOSPADM

## 2023-09-09 RX ORDER — ENOXAPARIN SODIUM 100 MG/ML
40 INJECTION SUBCUTANEOUS DAILY
Status: DISCONTINUED | OUTPATIENT
Start: 2023-09-10 | End: 2023-09-11 | Stop reason: HOSPADM

## 2023-09-09 RX ADMIN — METOPROLOL TARTRATE 12.5 MG: 25 TABLET, FILM COATED ORAL at 21:40

## 2023-09-09 RX ADMIN — BUDESONIDE AND FORMOTEROL FUMARATE DIHYDRATE 2 PUFF: 160; 4.5 AEROSOL RESPIRATORY (INHALATION) at 07:59

## 2023-09-09 RX ADMIN — FERROUS SULFATE TAB 325 MG (65 MG ELEMENTAL FE) 325 MG: 325 (65 FE) TAB at 09:02

## 2023-09-09 RX ADMIN — ACETAMINOPHEN 650 MG: 325 TABLET ORAL at 17:53

## 2023-09-09 RX ADMIN — ENOXAPARIN SODIUM 30 MG: 100 INJECTION SUBCUTANEOUS at 09:02

## 2023-09-09 RX ADMIN — EMPAGLIFLOZIN 10 MG: 10 TABLET, FILM COATED ORAL at 09:02

## 2023-09-09 RX ADMIN — SODIUM CHLORIDE, PRESERVATIVE FREE 10 ML: 5 INJECTION INTRAVENOUS at 19:39

## 2023-09-09 RX ADMIN — PREDNISONE 40 MG: 20 TABLET ORAL at 16:09

## 2023-09-09 RX ADMIN — ASPIRIN 81 MG: 81 TABLET, COATED ORAL at 09:02

## 2023-09-09 RX ADMIN — CLOPIDOGREL BISULFATE 75 MG: 75 TABLET ORAL at 09:02

## 2023-09-09 RX ADMIN — ATORVASTATIN CALCIUM 80 MG: 80 TABLET, FILM COATED ORAL at 09:02

## 2023-09-09 RX ADMIN — ACETAMINOPHEN 650 MG: 325 TABLET ORAL at 09:02

## 2023-09-09 RX ADMIN — SODIUM CHLORIDE, PRESERVATIVE FREE 10 ML: 5 INJECTION INTRAVENOUS at 09:01

## 2023-09-09 RX ADMIN — SPIRONOLACTONE 25 MG: 25 TABLET ORAL at 09:02

## 2023-09-09 RX ADMIN — CITALOPRAM HYDROBROMIDE 20 MG: 20 TABLET ORAL at 09:02

## 2023-09-09 RX ADMIN — POTASSIUM CHLORIDE 20 MEQ: 10 CAPSULE, COATED, EXTENDED RELEASE ORAL at 09:02

## 2023-09-09 RX ADMIN — TRAZODONE HYDROCHLORIDE 50 MG: 50 TABLET ORAL at 21:44

## 2023-09-09 RX ADMIN — BUDESONIDE AND FORMOTEROL FUMARATE DIHYDRATE 2 PUFF: 160; 4.5 AEROSOL RESPIRATORY (INHALATION) at 19:29

## 2023-09-09 RX ADMIN — FUROSEMIDE 40 MG: 40 TABLET ORAL at 09:02

## 2023-09-09 ASSESSMENT — PAIN SCALES - GENERAL
PAINLEVEL_OUTOF10: 10
PAINLEVEL_OUTOF10: 10

## 2023-09-09 ASSESSMENT — PAIN DESCRIPTION - LOCATION: LOCATION: NECK

## 2023-09-09 NOTE — PLAN OF CARE
Problem: Discharge Planning  Goal: Discharge to home or other facility with appropriate resources  9/9/2023 0013 by Isabella Espinosa RN  Outcome: Progressing  9/8/2023 1812 by Karley West RN  Outcome: Progressing     Problem: Chronic Conditions and Co-morbidities  Goal: Patient's chronic conditions and co-morbidity symptoms are monitored and maintained or improved  9/9/2023 0013 by Isabella Espinosa RN  Outcome: Progressing  9/8/2023 1812 by Karley West RN  Outcome: Progressing     Problem: Nutrition Deficit:  Goal: Optimize nutritional status  Outcome: Progressing     Problem: Safety - Adult  Goal: Free from fall injury  Outcome: Progressing     Problem: ABCDS Injury Assessment  Goal: Absence of physical injury  Outcome: Progressing     Problem: Pain  Goal: Verbalizes/displays adequate comfort level or baseline comfort level  Outcome: Progressing

## 2023-09-09 NOTE — CARE COORDINATION
ONGOING DISCHARGE PLAN:    Patient is alert and oriented x4. Spoke with patient regarding discharge plan and patient confirms that plan is still to discharge to home with no needs    40 mg Lasix to po daily    BMP tomorrow     Possible discharge to home tomorrow     Will continue to follow for additional discharge needs. If patient is discharged prior to next notation, then this note serves as note for discharge by case management.     Electronically signed by Maile Pereyra RN on 9/9/2023 at 3:43 PM

## 2023-09-09 NOTE — PLAN OF CARE
Problem: Discharge Planning  Goal: Discharge to home or other facility with appropriate resources  9/9/2023 1012 by Miranda Lopez RN  Outcome: Progressing     Problem: Chronic Conditions and Co-morbidities  Goal: Patient's chronic conditions and co-morbidity symptoms are monitored and maintained or improved  9/9/2023 1012 by Miranda Lopez RN  Outcome: Progressing     Problem: Safety - Adult  Goal: Free from fall injury  9/9/2023 1012 by Miranda Lopez RN  Outcome: Progressing     Problem: ABCDS Injury Assessment  Goal: Absence of physical injury  9/9/2023 1012 by Miranda Lopez RN  Outcome: Progressing     Problem: Pain  Goal: Verbalizes/displays adequate comfort level or baseline comfort level  9/9/2023 1012 by Miranda Lopez RN  Outcome: Progressing

## 2023-09-10 LAB
ANION GAP SERPL CALCULATED.3IONS-SCNC: 11 MMOL/L (ref 9–17)
BASOPHILS # BLD: 0 K/UL (ref 0–0.2)
BASOPHILS NFR BLD: 0 % (ref 0–2)
BNP SERPL-MCNC: 4112 PG/ML
BUN SERPL-MCNC: 31 MG/DL (ref 8–23)
CALCIUM SERPL-MCNC: 8.9 MG/DL (ref 8.6–10.4)
CHLORIDE SERPL-SCNC: 107 MMOL/L (ref 98–107)
CO2 SERPL-SCNC: 27 MMOL/L (ref 20–31)
CREAT SERPL-MCNC: 1.2 MG/DL (ref 0.5–0.9)
EOSINOPHIL # BLD: 0 K/UL (ref 0–0.4)
EOSINOPHILS RELATIVE PERCENT: 0 % (ref 0–4)
ERYTHROCYTE [DISTWIDTH] IN BLOOD BY AUTOMATED COUNT: 15.1 % (ref 11.5–14.9)
GFR SERPL CREATININE-BSD FRML MDRD: 47 ML/MIN/1.73M2
GLUCOSE BLD-MCNC: 156 MG/DL (ref 65–105)
GLUCOSE BLD-MCNC: 234 MG/DL (ref 65–105)
GLUCOSE BLD-MCNC: 242 MG/DL (ref 65–105)
GLUCOSE BLD-MCNC: 303 MG/DL (ref 65–105)
GLUCOSE SERPL-MCNC: 192 MG/DL (ref 70–99)
HCT VFR BLD AUTO: 37.2 % (ref 36–46)
HGB BLD-MCNC: 12 G/DL (ref 12–16)
LYMPHOCYTES NFR BLD: 0.6 K/UL (ref 1–4.8)
LYMPHOCYTES RELATIVE PERCENT: 11 % (ref 24–44)
MAGNESIUM SERPL-MCNC: 2.1 MG/DL (ref 1.6–2.6)
MCH RBC QN AUTO: 28.6 PG (ref 26–34)
MCHC RBC AUTO-ENTMCNC: 32.4 G/DL (ref 31–37)
MCV RBC AUTO: 88.5 FL (ref 80–100)
MONOCYTES NFR BLD: 0.2 K/UL (ref 0.1–1.3)
MONOCYTES NFR BLD: 4 % (ref 1–7)
NEUTROPHILS NFR BLD: 85 % (ref 36–66)
NEUTS SEG NFR BLD: 4.5 K/UL (ref 1.3–9.1)
PLATELET # BLD AUTO: 216 K/UL (ref 150–450)
PMV BLD AUTO: 8.6 FL (ref 6–12)
POTASSIUM SERPL-SCNC: 4.9 MMOL/L (ref 3.7–5.3)
RBC # BLD AUTO: 4.2 M/UL (ref 4–5.2)
SODIUM SERPL-SCNC: 145 MMOL/L (ref 135–144)
WBC OTHER # BLD: 5.2 K/UL (ref 3.5–11)

## 2023-09-10 PROCEDURE — 94761 N-INVAS EAR/PLS OXIMETRY MLT: CPT

## 2023-09-10 PROCEDURE — 83880 ASSAY OF NATRIURETIC PEPTIDE: CPT

## 2023-09-10 PROCEDURE — 85025 COMPLETE CBC W/AUTO DIFF WBC: CPT

## 2023-09-10 PROCEDURE — 2580000003 HC RX 258: Performed by: NURSE PRACTITIONER

## 2023-09-10 PROCEDURE — 6370000000 HC RX 637 (ALT 250 FOR IP): Performed by: INTERNAL MEDICINE

## 2023-09-10 PROCEDURE — 80048 BASIC METABOLIC PNL TOTAL CA: CPT

## 2023-09-10 PROCEDURE — 99239 HOSP IP/OBS DSCHRG MGMT >30: CPT | Performed by: INTERNAL MEDICINE

## 2023-09-10 PROCEDURE — 36415 COLL VENOUS BLD VENIPUNCTURE: CPT

## 2023-09-10 PROCEDURE — 94640 AIRWAY INHALATION TREATMENT: CPT

## 2023-09-10 PROCEDURE — 1200000000 HC SEMI PRIVATE

## 2023-09-10 PROCEDURE — 82947 ASSAY GLUCOSE BLOOD QUANT: CPT

## 2023-09-10 PROCEDURE — 6370000000 HC RX 637 (ALT 250 FOR IP): Performed by: NURSE PRACTITIONER

## 2023-09-10 PROCEDURE — 6360000002 HC RX W HCPCS: Performed by: INTERNAL MEDICINE

## 2023-09-10 PROCEDURE — 2700000000 HC OXYGEN THERAPY PER DAY

## 2023-09-10 PROCEDURE — 83735 ASSAY OF MAGNESIUM: CPT

## 2023-09-10 PROCEDURE — 51798 US URINE CAPACITY MEASURE: CPT

## 2023-09-10 RX ORDER — PREDNISONE 20 MG/1
40 TABLET ORAL DAILY
Qty: 8 TABLET | Refills: 0 | Status: SHIPPED | OUTPATIENT
Start: 2023-09-11 | End: 2023-09-15

## 2023-09-10 RX ORDER — FUROSEMIDE 20 MG/1
20 TABLET ORAL DAILY
Qty: 60 TABLET | Refills: 3 | Status: SHIPPED | OUTPATIENT
Start: 2023-09-11

## 2023-09-10 RX ORDER — FUROSEMIDE 20 MG/1
20 TABLET ORAL DAILY
Status: DISCONTINUED | OUTPATIENT
Start: 2023-09-11 | End: 2023-09-11 | Stop reason: HOSPADM

## 2023-09-10 RX ADMIN — FERROUS SULFATE TAB 325 MG (65 MG ELEMENTAL FE) 325 MG: 325 (65 FE) TAB at 08:52

## 2023-09-10 RX ADMIN — ASPIRIN 81 MG: 81 TABLET, COATED ORAL at 08:52

## 2023-09-10 RX ADMIN — ACETAMINOPHEN 650 MG: 325 TABLET ORAL at 22:32

## 2023-09-10 RX ADMIN — SODIUM CHLORIDE, PRESERVATIVE FREE 5 ML: 5 INJECTION INTRAVENOUS at 21:00

## 2023-09-10 RX ADMIN — METOPROLOL TARTRATE 12.5 MG: 25 TABLET, FILM COATED ORAL at 08:52

## 2023-09-10 RX ADMIN — CITALOPRAM HYDROBROMIDE 20 MG: 20 TABLET ORAL at 08:52

## 2023-09-10 RX ADMIN — ENOXAPARIN SODIUM 40 MG: 100 INJECTION SUBCUTANEOUS at 08:51

## 2023-09-10 RX ADMIN — BUDESONIDE AND FORMOTEROL FUMARATE DIHYDRATE 2 PUFF: 160; 4.5 AEROSOL RESPIRATORY (INHALATION) at 19:20

## 2023-09-10 RX ADMIN — INSULIN LISPRO 2 UNITS: 100 INJECTION, SOLUTION INTRAVENOUS; SUBCUTANEOUS at 11:40

## 2023-09-10 RX ADMIN — SPIRONOLACTONE 25 MG: 25 TABLET ORAL at 08:52

## 2023-09-10 RX ADMIN — ATORVASTATIN CALCIUM 80 MG: 80 TABLET, FILM COATED ORAL at 08:52

## 2023-09-10 RX ADMIN — BUDESONIDE AND FORMOTEROL FUMARATE DIHYDRATE 2 PUFF: 160; 4.5 AEROSOL RESPIRATORY (INHALATION) at 08:15

## 2023-09-10 RX ADMIN — ACETAMINOPHEN 650 MG: 325 TABLET ORAL at 07:10

## 2023-09-10 RX ADMIN — EMPAGLIFLOZIN 10 MG: 10 TABLET, FILM COATED ORAL at 09:02

## 2023-09-10 RX ADMIN — METOPROLOL TARTRATE 12.5 MG: 25 TABLET, FILM COATED ORAL at 20:34

## 2023-09-10 RX ADMIN — CLOPIDOGREL BISULFATE 75 MG: 75 TABLET ORAL at 08:52

## 2023-09-10 RX ADMIN — INSULIN LISPRO 4 UNITS: 100 INJECTION, SOLUTION INTRAVENOUS; SUBCUTANEOUS at 20:37

## 2023-09-10 RX ADMIN — PREDNISONE 40 MG: 20 TABLET ORAL at 08:52

## 2023-09-10 RX ADMIN — INSULIN LISPRO 2 UNITS: 100 INJECTION, SOLUTION INTRAVENOUS; SUBCUTANEOUS at 17:33

## 2023-09-10 RX ADMIN — TRAZODONE HYDROCHLORIDE 50 MG: 50 TABLET ORAL at 22:32

## 2023-09-10 RX ADMIN — FUROSEMIDE 40 MG: 40 TABLET ORAL at 08:52

## 2023-09-10 RX ADMIN — SODIUM CHLORIDE, PRESERVATIVE FREE 10 ML: 5 INJECTION INTRAVENOUS at 09:02

## 2023-09-10 RX ADMIN — POTASSIUM CHLORIDE 20 MEQ: 10 CAPSULE, COATED, EXTENDED RELEASE ORAL at 08:52

## 2023-09-10 ASSESSMENT — PAIN DESCRIPTION - LOCATION: LOCATION: BACK

## 2023-09-10 ASSESSMENT — PAIN DESCRIPTION - DESCRIPTORS: DESCRIPTORS: ACHING

## 2023-09-10 ASSESSMENT — PAIN SCALES - GENERAL: PAINLEVEL_OUTOF10: 8

## 2023-09-10 NOTE — PLAN OF CARE
Problem: Discharge Planning  Goal: Discharge to home or other facility with appropriate resources  Outcome: Progressing     Problem: Chronic Conditions and Co-morbidities  Goal: Patient's chronic conditions and co-morbidity symptoms are monitored and maintained or improved  Outcome: Progressing     Problem: Nutrition Deficit:  Goal: Optimize nutritional status  Outcome: Progressing     Problem: Safety - Adult  Goal: Free from fall injury  Outcome: Progressing     Problem: ABCDS Injury Assessment  Goal: Absence of physical injury  Outcome: Progressing     Problem: Pain  Goal: Verbalizes/displays adequate comfort level or baseline comfort level  Outcome: Progressing

## 2023-09-10 NOTE — PLAN OF CARE
Problem: Discharge Planning  Goal: Discharge to home or other facility with appropriate resources  9/10/2023 0010 by Milo Martinez RN  Outcome: Progressing  9/9/2023 1012 by Jamin Mcdonald RN  Outcome: Progressing     Problem: Chronic Conditions and Co-morbidities  Goal: Patient's chronic conditions and co-morbidity symptoms are monitored and maintained or improved  9/10/2023 0010 by Milo Martinez RN  Outcome: Progressing  Note: Blood sugars monitored. VS stable. Telemetry NSR and SB. Oxygen sat on 2liters in mid 90s.  9/9/2023 1012 by Jamin Mcdonald RN  Outcome: Progressing     Problem: Nutrition Deficit:  Goal: Optimize nutritional status  9/10/2023 0010 by Milo Martinez RN  Outcome: Progressing  9/9/2023 1012 by Jamin Mcdonald RN  Outcome: Progressing     Problem: Safety - Adult  Goal: Free from fall injury  9/10/2023 0010 by Milo Martinez RN  Outcome: Progressing  Note: Patient alert and oriented Up per self to bathroom. Gait steady. Instructed to reyes nurse if help needed. 9/9/2023 1012 by Jamin Mcdonald RN  Outcome: Progressing     Problem: ABCDS Injury Assessment  Goal: Absence of physical injury  9/10/2023 0010 by Milo Martinez RN  Outcome: Progressing  9/9/2023 1012 by Jamin Mcdonald RN  Outcome: Progressing     Problem: Pain  Goal: Verbalizes/displays adequate comfort level or baseline comfort level  9/10/2023 0010 by Milo Martinez RN  Outcome: Progressing  Note: Denied pain this evening. Positioning self comfortably.   9/9/2023 1012 by Jamin Mcdonald RN  Outcome: Progressing

## 2023-09-10 NOTE — PLAN OF CARE
Problem: Chronic Conditions and Co-morbidities  Goal: Patient's chronic conditions and co-morbidity symptoms are monitored and maintained or improved  9/10/2023 1752 by Tara Eid RN  Outcome: Progressing     Problem: Nutrition Deficit:  Goal: Optimize nutritional status  9/10/2023 1752 by Tara Eid RN  Outcome: Progressing     Problem: Safety - Adult  Goal: Free from fall injury  9/10/2023 1752 by Tara Eid RN  Outcome: Progressing     Problem: ABCDS Injury Assessment  Goal: Absence of physical injury  9/10/2023 1752 by Tara Eid RN  Outcome: Progressing     Problem: Pain  Goal: Verbalizes/displays adequate comfort level or baseline comfort level  9/10/2023 1752 by Tara Eid RN  Outcome: Progressing

## 2023-09-11 VITALS
HEART RATE: 72 BPM | WEIGHT: 121.25 LBS | DIASTOLIC BLOOD PRESSURE: 80 MMHG | HEIGHT: 64 IN | BODY MASS INDEX: 20.7 KG/M2 | SYSTOLIC BLOOD PRESSURE: 130 MMHG | TEMPERATURE: 97.9 F | OXYGEN SATURATION: 96 % | RESPIRATION RATE: 16 BRPM

## 2023-09-11 LAB
ANION GAP SERPL CALCULATED.3IONS-SCNC: 7 MMOL/L (ref 9–17)
BUN SERPL-MCNC: 34 MG/DL (ref 8–23)
CALCIUM SERPL-MCNC: 9.2 MG/DL (ref 8.6–10.4)
CHLORIDE SERPL-SCNC: 103 MMOL/L (ref 98–107)
CO2 SERPL-SCNC: 30 MMOL/L (ref 20–31)
CREAT SERPL-MCNC: 1.1 MG/DL (ref 0.5–0.9)
GFR SERPL CREATININE-BSD FRML MDRD: 52 ML/MIN/1.73M2
GLUCOSE BLD-MCNC: 119 MG/DL (ref 65–105)
GLUCOSE BLD-MCNC: 138 MG/DL (ref 65–105)
GLUCOSE SERPL-MCNC: 135 MG/DL (ref 70–99)
MAGNESIUM SERPL-MCNC: 2.2 MG/DL (ref 1.6–2.6)
POTASSIUM SERPL-SCNC: 4.4 MMOL/L (ref 3.7–5.3)
SODIUM SERPL-SCNC: 140 MMOL/L (ref 135–144)

## 2023-09-11 PROCEDURE — 83735 ASSAY OF MAGNESIUM: CPT

## 2023-09-11 PROCEDURE — 94640 AIRWAY INHALATION TREATMENT: CPT

## 2023-09-11 PROCEDURE — 6370000000 HC RX 637 (ALT 250 FOR IP): Performed by: INTERNAL MEDICINE

## 2023-09-11 PROCEDURE — 82947 ASSAY GLUCOSE BLOOD QUANT: CPT

## 2023-09-11 PROCEDURE — 6360000002 HC RX W HCPCS: Performed by: INTERNAL MEDICINE

## 2023-09-11 PROCEDURE — 6370000000 HC RX 637 (ALT 250 FOR IP): Performed by: NURSE PRACTITIONER

## 2023-09-11 PROCEDURE — 80048 BASIC METABOLIC PNL TOTAL CA: CPT

## 2023-09-11 PROCEDURE — 2580000003 HC RX 258: Performed by: NURSE PRACTITIONER

## 2023-09-11 PROCEDURE — 99239 HOSP IP/OBS DSCHRG MGMT >30: CPT | Performed by: INTERNAL MEDICINE

## 2023-09-11 PROCEDURE — 36415 COLL VENOUS BLD VENIPUNCTURE: CPT

## 2023-09-11 RX ADMIN — PREDNISONE 40 MG: 20 TABLET ORAL at 09:12

## 2023-09-11 RX ADMIN — POTASSIUM CHLORIDE 20 MEQ: 10 CAPSULE, COATED, EXTENDED RELEASE ORAL at 09:12

## 2023-09-11 RX ADMIN — ASPIRIN 81 MG: 81 TABLET, COATED ORAL at 09:12

## 2023-09-11 RX ADMIN — SPIRONOLACTONE 25 MG: 25 TABLET ORAL at 09:12

## 2023-09-11 RX ADMIN — BUDESONIDE AND FORMOTEROL FUMARATE DIHYDRATE 2 PUFF: 160; 4.5 AEROSOL RESPIRATORY (INHALATION) at 08:18

## 2023-09-11 RX ADMIN — FUROSEMIDE 20 MG: 20 TABLET ORAL at 09:13

## 2023-09-11 RX ADMIN — FERROUS SULFATE TAB 325 MG (65 MG ELEMENTAL FE) 325 MG: 325 (65 FE) TAB at 09:12

## 2023-09-11 RX ADMIN — ACETAMINOPHEN 650 MG: 325 TABLET ORAL at 09:13

## 2023-09-11 RX ADMIN — ATORVASTATIN CALCIUM 80 MG: 80 TABLET, FILM COATED ORAL at 09:11

## 2023-09-11 RX ADMIN — CLOPIDOGREL BISULFATE 75 MG: 75 TABLET ORAL at 09:12

## 2023-09-11 RX ADMIN — ENOXAPARIN SODIUM 40 MG: 100 INJECTION SUBCUTANEOUS at 09:13

## 2023-09-11 RX ADMIN — METOPROLOL TARTRATE 12.5 MG: 25 TABLET, FILM COATED ORAL at 09:12

## 2023-09-11 RX ADMIN — EMPAGLIFLOZIN 10 MG: 10 TABLET, FILM COATED ORAL at 09:12

## 2023-09-11 RX ADMIN — SODIUM CHLORIDE, PRESERVATIVE FREE 10 ML: 5 INJECTION INTRAVENOUS at 09:00

## 2023-09-11 RX ADMIN — CITALOPRAM HYDROBROMIDE 20 MG: 20 TABLET ORAL at 09:12

## 2023-09-11 ASSESSMENT — PAIN DESCRIPTION - LOCATION: LOCATION: NECK

## 2023-09-11 ASSESSMENT — PAIN SCALES - GENERAL: PAINLEVEL_OUTOF10: 8

## 2023-09-11 ASSESSMENT — PAIN DESCRIPTION - DESCRIPTORS: DESCRIPTORS: ACHING

## 2023-09-11 NOTE — CARE COORDINATION
IMM letter provided to patient. Patient offered four hours to make informed decision regarding appeal process; patient agreeable to discharge.      Electronically signed by Madina Baum RN on 9/11/2023 at 10:55 AM

## 2023-09-11 NOTE — CARE COORDINATION
ONGOING DISCHARGE PLAN:    Patient is alert and oriented x4. Spoke with patient regarding discharge plan and patient confirms that plan is still home without needs. Declined VNS. OP CHF Clinic referral placed. Pt will be discharged home today. Home 02 eval completed and pt does NOT qualify. Will continue to follow for additional discharge needs. If patient is discharged prior to next notation, then this note serves as note for discharge by case management.     Electronically signed by Radha Thornton RN on 9/11/2023 at 10:55 AM

## 2023-09-11 NOTE — PLAN OF CARE
Problem: Discharge Planning  Goal: Discharge to home or other facility with appropriate resources  9/11/2023 0335 by Kavya Kellogg RN  Outcome: Progressing  9/10/2023 1752 by Alo Jaquez RN  Outcome: Progressing  9/10/2023 1751 by Alo Jaquez RN  Outcome: Progressing     Problem: Chronic Conditions and Co-morbidities  Goal: Patient's chronic conditions and co-morbidity symptoms are monitored and maintained or improved  9/11/2023 0335 by Kavya Kellogg RN  Outcome: Progressing  9/10/2023 1752 by Alo Jaquez RN  Outcome: Progressing  9/10/2023 1751 by Alo Jaquez RN  Outcome: Progressing     Problem: Nutrition Deficit:  Goal: Optimize nutritional status  9/11/2023 0335 by Kavya Kellogg RN  Outcome: Progressing  9/10/2023 1752 by Alo Jaquez RN  Outcome: Progressing  9/10/2023 1751 by Alo Jaquez RN  Outcome: Progressing     Problem: Safety - Adult  Goal: Free from fall injury  9/11/2023 0335 by Kavya Kellogg RN  Outcome: Progressing  9/10/2023 1752 by Alo Jaquez RN  Outcome: Progressing  9/10/2023 1751 by Alo Jaquez RN  Outcome: Progressing     Problem: ABCDS Injury Assessment  Goal: Absence of physical injury  9/11/2023 0335 by Kavya Kellogg RN  Outcome: Progressing  9/10/2023 1752 by Alo Jaquez RN  Outcome: Progressing  9/10/2023 1751 by Alo Jaquez RN  Outcome: Progressing     Problem: Pain  Goal: Verbalizes/displays adequate comfort level or baseline comfort level  9/11/2023 0335 by Kavya Kellogg RN  Outcome: Progressing  9/10/2023 1752 by Alo Jaquez RN  Outcome: Progressing  9/10/2023 1751 by Alo Jaquez RN  Outcome: Progressing

## 2023-09-11 NOTE — DISCHARGE SUMMARY
Simon Vela MD  9/11/2023  10:21 AM      Thank you Dr. Asim Laws MD for the opportunity to be involved in this patient's care. Please note that this chart was generated using voice recognition Dragon dictation software. Although every effort was made to ensure the accuracy of this automated transcription, some errors in transcription may have occurred.

## 2023-09-11 NOTE — PLAN OF CARE
Problem: Discharge Planning  Goal: Discharge to home or other facility with appropriate resources  9/11/2023 1456 by Harjinder De La Fuente RN  Outcome: Completed     Problem: Chronic Conditions and Co-morbidities  Goal: Patient's chronic conditions and co-morbidity symptoms are monitored and maintained or improved  9/11/2023 1456 by Harjinder De La Fuente RN  Outcome: Completed     Problem: Nutrition Deficit:  Goal: Optimize nutritional status  9/11/2023 1456 by Harjinder De La Fuente RN  Outcome: Completed     Problem: Safety - Adult  Goal: Free from fall injury  9/11/2023 1456 by Harjinder De La Fuente RN  Outcome: Completed     Problem: Pain  Goal: Verbalizes/displays adequate comfort level or baseline comfort level  9/11/2023 1456 by Harjinder De La Fuente RN  Outcome: Completed

## 2023-09-13 ENCOUNTER — TELEPHONE (OUTPATIENT)
Dept: FAMILY MEDICINE CLINIC | Age: 73
End: 2023-09-13

## 2023-09-13 NOTE — TELEPHONE ENCOUNTER
[id number] [facility] Transitions Initial Follow Up Call    Outreach made within 2 business days of discharge: Yes    Patient: Reno Frias Patient : 1950   MRN: [id number]  Reason for Admission: There are no discharge diagnoses documented for the most recent discharge. Discharge Date: 23       Spoke with: UNABLE TO REACH PATIENT/SENT LETTER    If Virtual visit made for hospital follow up, advise patient to make sure to have family member present to help assist with visit. Please make sure mobile number is updated in patient chart. Discharge department/facility: [address] [place] [address] Interactive Patient Contact:  Was patient able to fill all prescriptions:   Was patient instructed to bring all medications to the follow-up visit:   Is patient taking all medications as directed in the discharge summary? Does patient understand their discharge instructions:   Does patient have questions or concerns that need addressed prior to 7-14 day follow up office visit:     Scheduled appointment with PCP within 7-14 days    Follow Up  No future appointments.     Rosendo Solo MA

## 2024-01-15 ENCOUNTER — APPOINTMENT (OUTPATIENT)
Dept: GENERAL RADIOLOGY | Age: 74
End: 2024-01-15
Payer: COMMERCIAL

## 2024-01-15 ENCOUNTER — HOSPITAL ENCOUNTER (INPATIENT)
Age: 74
LOS: 5 days | Discharge: HOME | End: 2024-01-20
Attending: EMERGENCY MEDICINE | Admitting: INTERNAL MEDICINE
Payer: COMMERCIAL

## 2024-01-15 DIAGNOSIS — I50.9 ACUTE DECOMPENSATED HEART FAILURE (HCC): ICD-10-CM

## 2024-01-15 DIAGNOSIS — R06.82 TACHYPNEA: ICD-10-CM

## 2024-01-15 DIAGNOSIS — J18.9 COMMUNITY ACQUIRED PNEUMONIA OF RIGHT MIDDLE LOBE OF LUNG: Primary | ICD-10-CM

## 2024-01-15 PROBLEM — J96.00 ACUTE RESPIRATORY FAILURE (HCC): Status: ACTIVE | Noted: 2024-01-15

## 2024-01-15 LAB
ANION GAP SERPL CALCULATED.3IONS-SCNC: 9 MMOL/L (ref 9–17)
BASOPHILS # BLD: 0 K/UL (ref 0–0.2)
BASOPHILS NFR BLD: 1 % (ref 0–2)
BNP SERPL-MCNC: 6765 PG/ML
BUN SERPL-MCNC: 39 MG/DL (ref 8–23)
CALCIUM SERPL-MCNC: 9 MG/DL (ref 8.6–10.4)
CHLORIDE SERPL-SCNC: 103 MMOL/L (ref 98–107)
CO2 SERPL-SCNC: 28 MMOL/L (ref 20–31)
CREAT SERPL-MCNC: 0.9 MG/DL (ref 0.5–0.9)
EKG ATRIAL RATE: 79 BPM
EKG P AXIS: 40 DEGREES
EKG P-R INTERVAL: 148 MS
EKG Q-T INTERVAL: 432 MS
EKG QRS DURATION: 110 MS
EKG QTC CALCULATION (BAZETT): 495 MS
EKG R AXIS: -23 DEGREES
EKG T AXIS: 105 DEGREES
EKG VENTRICULAR RATE: 79 BPM
EOSINOPHIL # BLD: 0.2 K/UL (ref 0–0.4)
EOSINOPHILS RELATIVE PERCENT: 3 % (ref 0–4)
ERYTHROCYTE [DISTWIDTH] IN BLOOD BY AUTOMATED COUNT: 14.8 % (ref 11.5–14.9)
FLUAV RNA RESP QL NAA+PROBE: NOT DETECTED
FLUBV RNA RESP QL NAA+PROBE: NOT DETECTED
GFR SERPL CREATININE-BSD FRML MDRD: >60 ML/MIN/1.73M2
GLUCOSE BLD-MCNC: 150 MG/DL (ref 65–105)
GLUCOSE BLD-MCNC: 210 MG/DL (ref 65–105)
GLUCOSE BLD-MCNC: 232 MG/DL (ref 65–105)
GLUCOSE BLD-MCNC: 289 MG/DL (ref 65–105)
GLUCOSE SERPL-MCNC: 159 MG/DL (ref 70–99)
HCT VFR BLD AUTO: 35.7 % (ref 36–46)
HGB BLD-MCNC: 11.8 G/DL (ref 12–16)
INR PPP: 1.1
LYMPHOCYTES NFR BLD: 2.5 K/UL (ref 1–4.8)
LYMPHOCYTES RELATIVE PERCENT: 36 % (ref 24–44)
MAGNESIUM SERPL-MCNC: 2.1 MG/DL (ref 1.6–2.6)
MCH RBC QN AUTO: 28.7 PG (ref 26–34)
MCHC RBC AUTO-ENTMCNC: 33.1 G/DL (ref 31–37)
MCV RBC AUTO: 86.7 FL (ref 80–100)
MONOCYTES NFR BLD: 0.6 K/UL (ref 0.1–1.3)
MONOCYTES NFR BLD: 9 % (ref 1–7)
NEUTROPHILS NFR BLD: 51 % (ref 36–66)
NEUTS SEG NFR BLD: 3.6 K/UL (ref 1.3–9.1)
PARTIAL THROMBOPLASTIN TIME: 24.2 SEC (ref 24–36)
PHOSPHATE SERPL-MCNC: 2.4 MG/DL (ref 2.6–4.5)
PLATELET # BLD AUTO: 201 K/UL (ref 150–450)
PMV BLD AUTO: 8.7 FL (ref 6–12)
POTASSIUM SERPL-SCNC: 3.5 MMOL/L (ref 3.7–5.3)
PROTHROMBIN TIME: 14.1 SEC (ref 11.8–14.6)
RBC # BLD AUTO: 4.12 M/UL (ref 4–5.2)
SARS-COV-2 RNA RESP QL NAA+PROBE: NOT DETECTED
SODIUM SERPL-SCNC: 140 MMOL/L (ref 135–144)
SOURCE: NORMAL
SPECIMEN DESCRIPTION: NORMAL
TROPONIN I SERPL HS-MCNC: 33 NG/L (ref 0–14)
TROPONIN I SERPL HS-MCNC: 35 NG/L (ref 0–14)
WBC OTHER # BLD: 6.9 K/UL (ref 3.5–11)

## 2024-01-15 PROCEDURE — 85730 THROMBOPLASTIN TIME PARTIAL: CPT

## 2024-01-15 PROCEDURE — 94664 DEMO&/EVAL PT USE INHALER: CPT

## 2024-01-15 PROCEDURE — 84100 ASSAY OF PHOSPHORUS: CPT

## 2024-01-15 PROCEDURE — 83735 ASSAY OF MAGNESIUM: CPT

## 2024-01-15 PROCEDURE — 93005 ELECTROCARDIOGRAM TRACING: CPT | Performed by: EMERGENCY MEDICINE

## 2024-01-15 PROCEDURE — 99285 EMERGENCY DEPT VISIT HI MDM: CPT

## 2024-01-15 PROCEDURE — 2700000000 HC OXYGEN THERAPY PER DAY

## 2024-01-15 PROCEDURE — 85025 COMPLETE CBC W/AUTO DIFF WBC: CPT

## 2024-01-15 PROCEDURE — 6370000000 HC RX 637 (ALT 250 FOR IP): Performed by: INTERNAL MEDICINE

## 2024-01-15 PROCEDURE — 6370000000 HC RX 637 (ALT 250 FOR IP): Performed by: NURSE PRACTITIONER

## 2024-01-15 PROCEDURE — 87636 SARSCOV2 & INF A&B AMP PRB: CPT

## 2024-01-15 PROCEDURE — 93010 ELECTROCARDIOGRAM REPORT: CPT | Performed by: INTERNAL MEDICINE

## 2024-01-15 PROCEDURE — 83880 ASSAY OF NATRIURETIC PEPTIDE: CPT

## 2024-01-15 PROCEDURE — 36415 COLL VENOUS BLD VENIPUNCTURE: CPT

## 2024-01-15 PROCEDURE — 6370000000 HC RX 637 (ALT 250 FOR IP): Performed by: EMERGENCY MEDICINE

## 2024-01-15 PROCEDURE — 2580000003 HC RX 258: Performed by: EMERGENCY MEDICINE

## 2024-01-15 PROCEDURE — 99223 1ST HOSP IP/OBS HIGH 75: CPT | Performed by: INTERNAL MEDICINE

## 2024-01-15 PROCEDURE — 84484 ASSAY OF TROPONIN QUANT: CPT

## 2024-01-15 PROCEDURE — 6360000002 HC RX W HCPCS: Performed by: NURSE PRACTITIONER

## 2024-01-15 PROCEDURE — 80048 BASIC METABOLIC PNL TOTAL CA: CPT

## 2024-01-15 PROCEDURE — 2580000003 HC RX 258: Performed by: NURSE PRACTITIONER

## 2024-01-15 PROCEDURE — 85610 PROTHROMBIN TIME: CPT

## 2024-01-15 PROCEDURE — 82947 ASSAY GLUCOSE BLOOD QUANT: CPT

## 2024-01-15 PROCEDURE — 2060000000 HC ICU INTERMEDIATE R&B

## 2024-01-15 PROCEDURE — 71045 X-RAY EXAM CHEST 1 VIEW: CPT

## 2024-01-15 PROCEDURE — 94640 AIRWAY INHALATION TREATMENT: CPT

## 2024-01-15 PROCEDURE — 6360000002 HC RX W HCPCS: Performed by: EMERGENCY MEDICINE

## 2024-01-15 RX ORDER — ONDANSETRON 4 MG/1
4 TABLET, ORALLY DISINTEGRATING ORAL EVERY 8 HOURS PRN
Status: DISCONTINUED | OUTPATIENT
Start: 2024-01-15 | End: 2024-01-20 | Stop reason: HOSPADM

## 2024-01-15 RX ORDER — ACETAMINOPHEN 650 MG/1
650 SUPPOSITORY RECTAL EVERY 6 HOURS PRN
Status: DISCONTINUED | OUTPATIENT
Start: 2024-01-15 | End: 2024-01-20 | Stop reason: HOSPADM

## 2024-01-15 RX ORDER — POTASSIUM CHLORIDE 20 MEQ/1
40 TABLET, EXTENDED RELEASE ORAL 2 TIMES DAILY
Status: DISCONTINUED | OUTPATIENT
Start: 2024-01-15 | End: 2024-01-15

## 2024-01-15 RX ORDER — SODIUM CHLORIDE 9 MG/ML
INJECTION, SOLUTION INTRAVENOUS PRN
Status: DISCONTINUED | OUTPATIENT
Start: 2024-01-15 | End: 2024-01-20 | Stop reason: HOSPADM

## 2024-01-15 RX ORDER — DEXTROSE MONOHYDRATE 100 MG/ML
INJECTION, SOLUTION INTRAVENOUS CONTINUOUS PRN
Status: DISCONTINUED | OUTPATIENT
Start: 2024-01-15 | End: 2024-01-20 | Stop reason: HOSPADM

## 2024-01-15 RX ORDER — ASPIRIN 81 MG/1
81 TABLET ORAL DAILY
Status: DISCONTINUED | OUTPATIENT
Start: 2024-01-15 | End: 2024-01-20 | Stop reason: HOSPADM

## 2024-01-15 RX ORDER — INSULIN LISPRO 100 [IU]/ML
0-4 INJECTION, SOLUTION INTRAVENOUS; SUBCUTANEOUS NIGHTLY
Status: DISCONTINUED | OUTPATIENT
Start: 2024-01-15 | End: 2024-01-20 | Stop reason: HOSPADM

## 2024-01-15 RX ORDER — LANOLIN ALCOHOL/MO/W.PET/CERES
3 CREAM (GRAM) TOPICAL NIGHTLY
Status: DISCONTINUED | OUTPATIENT
Start: 2024-01-15 | End: 2024-01-20 | Stop reason: HOSPADM

## 2024-01-15 RX ORDER — MAGNESIUM SULFATE HEPTAHYDRATE 40 MG/ML
2000 INJECTION, SOLUTION INTRAVENOUS PRN
Status: DISCONTINUED | OUTPATIENT
Start: 2024-01-15 | End: 2024-01-20 | Stop reason: HOSPADM

## 2024-01-15 RX ORDER — SODIUM CHLORIDE 0.9 % (FLUSH) 0.9 %
5-40 SYRINGE (ML) INJECTION EVERY 12 HOURS SCHEDULED
Status: DISCONTINUED | OUTPATIENT
Start: 2024-01-15 | End: 2024-01-20 | Stop reason: HOSPADM

## 2024-01-15 RX ORDER — ONDANSETRON 2 MG/ML
4 INJECTION INTRAMUSCULAR; INTRAVENOUS EVERY 6 HOURS PRN
Status: DISCONTINUED | OUTPATIENT
Start: 2024-01-15 | End: 2024-01-20 | Stop reason: HOSPADM

## 2024-01-15 RX ORDER — IPRATROPIUM BROMIDE AND ALBUTEROL SULFATE 2.5; .5 MG/3ML; MG/3ML
1 SOLUTION RESPIRATORY (INHALATION)
Status: DISCONTINUED | OUTPATIENT
Start: 2024-01-15 | End: 2024-01-20 | Stop reason: HOSPADM

## 2024-01-15 RX ORDER — FUROSEMIDE 10 MG/ML
20 INJECTION INTRAMUSCULAR; INTRAVENOUS DAILY
Status: DISCONTINUED | OUTPATIENT
Start: 2024-01-15 | End: 2024-01-18

## 2024-01-15 RX ORDER — INSULIN LISPRO 100 [IU]/ML
0-8 INJECTION, SOLUTION INTRAVENOUS; SUBCUTANEOUS
Status: DISCONTINUED | OUTPATIENT
Start: 2024-01-15 | End: 2024-01-20 | Stop reason: HOSPADM

## 2024-01-15 RX ORDER — POTASSIUM CHLORIDE 20 MEQ/1
40 TABLET, EXTENDED RELEASE ORAL ONCE
Status: COMPLETED | OUTPATIENT
Start: 2024-01-15 | End: 2024-01-15

## 2024-01-15 RX ORDER — ENOXAPARIN SODIUM 100 MG/ML
40 INJECTION SUBCUTANEOUS DAILY
Status: DISCONTINUED | OUTPATIENT
Start: 2024-01-15 | End: 2024-01-20 | Stop reason: HOSPADM

## 2024-01-15 RX ORDER — IPRATROPIUM BROMIDE AND ALBUTEROL SULFATE 2.5; .5 MG/3ML; MG/3ML
1 SOLUTION RESPIRATORY (INHALATION)
Status: DISCONTINUED | OUTPATIENT
Start: 2024-01-15 | End: 2024-01-15

## 2024-01-15 RX ORDER — TRAZODONE HYDROCHLORIDE 50 MG/1
50 TABLET ORAL NIGHTLY PRN
Status: DISCONTINUED | OUTPATIENT
Start: 2024-01-15 | End: 2024-01-20 | Stop reason: HOSPADM

## 2024-01-15 RX ORDER — FUROSEMIDE 10 MG/ML
20 INJECTION INTRAMUSCULAR; INTRAVENOUS ONCE
Status: COMPLETED | OUTPATIENT
Start: 2024-01-15 | End: 2024-01-15

## 2024-01-15 RX ORDER — BUDESONIDE AND FORMOTEROL FUMARATE DIHYDRATE 160; 4.5 UG/1; UG/1
2 AEROSOL RESPIRATORY (INHALATION)
Status: DISCONTINUED | OUTPATIENT
Start: 2024-01-15 | End: 2024-01-20 | Stop reason: HOSPADM

## 2024-01-15 RX ORDER — NITROGLYCERIN 0.4 MG/1
0.4 TABLET SUBLINGUAL EVERY 5 MIN PRN
Status: DISCONTINUED | OUTPATIENT
Start: 2024-01-15 | End: 2024-01-20 | Stop reason: HOSPADM

## 2024-01-15 RX ORDER — CITALOPRAM 20 MG/1
20 TABLET ORAL DAILY
Status: DISCONTINUED | OUTPATIENT
Start: 2024-01-15 | End: 2024-01-20 | Stop reason: HOSPADM

## 2024-01-15 RX ORDER — POTASSIUM CHLORIDE 750 MG/1
20 CAPSULE, EXTENDED RELEASE ORAL 2 TIMES DAILY
Status: DISCONTINUED | OUTPATIENT
Start: 2024-01-15 | End: 2024-01-17

## 2024-01-15 RX ORDER — CLOPIDOGREL BISULFATE 75 MG/1
75 TABLET ORAL DAILY
Status: DISCONTINUED | OUTPATIENT
Start: 2024-01-15 | End: 2024-01-20 | Stop reason: HOSPADM

## 2024-01-15 RX ORDER — ACETAMINOPHEN 325 MG/1
650 TABLET ORAL EVERY 6 HOURS PRN
Status: DISCONTINUED | OUTPATIENT
Start: 2024-01-15 | End: 2024-01-20 | Stop reason: HOSPADM

## 2024-01-15 RX ORDER — SPIRONOLACTONE 25 MG/1
25 TABLET ORAL DAILY
Status: DISCONTINUED | OUTPATIENT
Start: 2024-01-15 | End: 2024-01-20 | Stop reason: HOSPADM

## 2024-01-15 RX ORDER — SODIUM CHLORIDE 0.9 % (FLUSH) 0.9 %
10 SYRINGE (ML) INJECTION PRN
Status: DISCONTINUED | OUTPATIENT
Start: 2024-01-15 | End: 2024-01-20 | Stop reason: HOSPADM

## 2024-01-15 RX ORDER — ATORVASTATIN CALCIUM 80 MG/1
80 TABLET, FILM COATED ORAL DAILY
Status: DISCONTINUED | OUTPATIENT
Start: 2024-01-15 | End: 2024-01-20 | Stop reason: HOSPADM

## 2024-01-15 RX ORDER — POTASSIUM CHLORIDE 750 MG/1
20 CAPSULE, EXTENDED RELEASE ORAL DAILY
Status: DISCONTINUED | OUTPATIENT
Start: 2024-01-15 | End: 2024-01-15

## 2024-01-15 RX ADMIN — ACETAMINOPHEN 650 MG: 325 TABLET ORAL at 10:59

## 2024-01-15 RX ADMIN — FUROSEMIDE 20 MG: 10 INJECTION, SOLUTION INTRAMUSCULAR; INTRAVENOUS at 10:52

## 2024-01-15 RX ADMIN — IPRATROPIUM BROMIDE AND ALBUTEROL SULFATE 1 DOSE: 2.5; .5 SOLUTION RESPIRATORY (INHALATION) at 03:53

## 2024-01-15 RX ADMIN — POTASSIUM CHLORIDE 40 MEQ: 1500 TABLET, EXTENDED RELEASE ORAL at 17:51

## 2024-01-15 RX ADMIN — SODIUM CHLORIDE, PRESERVATIVE FREE 10 ML: 5 INJECTION INTRAVENOUS at 21:21

## 2024-01-15 RX ADMIN — POTASSIUM CHLORIDE 20 MEQ: 10 CAPSULE, COATED, EXTENDED RELEASE ORAL at 10:52

## 2024-01-15 RX ADMIN — ACETAMINOPHEN 650 MG: 325 TABLET ORAL at 17:51

## 2024-01-15 RX ADMIN — IPRATROPIUM BROMIDE AND ALBUTEROL SULFATE 1 DOSE: 2.5; .5 SOLUTION RESPIRATORY (INHALATION) at 15:10

## 2024-01-15 RX ADMIN — CEFTRIAXONE SODIUM 1000 MG: 1 INJECTION, POWDER, FOR SOLUTION INTRAMUSCULAR; INTRAVENOUS at 03:39

## 2024-01-15 RX ADMIN — INSULIN LISPRO 2 UNITS: 100 INJECTION, SOLUTION INTRAVENOUS; SUBCUTANEOUS at 17:51

## 2024-01-15 RX ADMIN — METOPROLOL TARTRATE 12.5 MG: 25 TABLET, FILM COATED ORAL at 21:21

## 2024-01-15 RX ADMIN — ASPIRIN 81 MG: 81 TABLET, COATED ORAL at 10:52

## 2024-01-15 RX ADMIN — SODIUM CHLORIDE, PRESERVATIVE FREE 10 ML: 5 INJECTION INTRAVENOUS at 10:51

## 2024-01-15 RX ADMIN — AZITHROMYCIN MONOHYDRATE 500 MG: 500 INJECTION, POWDER, LYOPHILIZED, FOR SOLUTION INTRAVENOUS at 05:01

## 2024-01-15 RX ADMIN — TRAZODONE HYDROCHLORIDE 50 MG: 50 TABLET ORAL at 21:26

## 2024-01-15 RX ADMIN — BUDESONIDE AND FORMOTEROL FUMARATE DIHYDRATE 2 PUFF: 160; 4.5 AEROSOL RESPIRATORY (INHALATION) at 11:08

## 2024-01-15 RX ADMIN — IPRATROPIUM BROMIDE AND ALBUTEROL SULFATE 1 DOSE: 2.5; .5 SOLUTION RESPIRATORY (INHALATION) at 20:14

## 2024-01-15 RX ADMIN — FUROSEMIDE 20 MG: 10 INJECTION, SOLUTION INTRAMUSCULAR; INTRAVENOUS at 03:38

## 2024-01-15 RX ADMIN — CITALOPRAM HYDROBROMIDE 20 MG: 20 TABLET ORAL at 10:52

## 2024-01-15 RX ADMIN — SPIRONOLACTONE 25 MG: 25 TABLET, FILM COATED ORAL at 10:53

## 2024-01-15 RX ADMIN — ENOXAPARIN SODIUM 40 MG: 100 INJECTION SUBCUTANEOUS at 10:52

## 2024-01-15 RX ADMIN — INSULIN LISPRO 4 UNITS: 100 INJECTION, SOLUTION INTRAVENOUS; SUBCUTANEOUS at 12:35

## 2024-01-15 RX ADMIN — METOPROLOL TARTRATE 12.5 MG: 25 TABLET, FILM COATED ORAL at 10:52

## 2024-01-15 RX ADMIN — ATORVASTATIN CALCIUM 80 MG: 80 TABLET, FILM COATED ORAL at 10:52

## 2024-01-15 RX ADMIN — POTASSIUM CHLORIDE 20 MEQ: 10 CAPSULE, COATED, EXTENDED RELEASE ORAL at 21:21

## 2024-01-15 RX ADMIN — IPRATROPIUM BROMIDE AND ALBUTEROL SULFATE 1 DOSE: 2.5; .5 SOLUTION RESPIRATORY (INHALATION) at 11:02

## 2024-01-15 RX ADMIN — CLOPIDOGREL BISULFATE 75 MG: 75 TABLET ORAL at 10:52

## 2024-01-15 RX ADMIN — BUDESONIDE AND FORMOTEROL FUMARATE DIHYDRATE 2 PUFF: 160; 4.5 AEROSOL RESPIRATORY (INHALATION) at 20:14

## 2024-01-15 ASSESSMENT — ENCOUNTER SYMPTOMS
EYE PAIN: 0
COUGH: 1
BACK PAIN: 0
COLOR CHANGE: 0
NAUSEA: 0
ABDOMINAL PAIN: 0
TROUBLE SWALLOWING: 0
VOMITING: 0
FACIAL SWELLING: 0
VOICE CHANGE: 0
SHORTNESS OF BREATH: 1
PHOTOPHOBIA: 0
CHEST TIGHTNESS: 0

## 2024-01-15 ASSESSMENT — LIFESTYLE VARIABLES
HOW OFTEN DO YOU HAVE A DRINK CONTAINING ALCOHOL: NEVER
HOW MANY STANDARD DRINKS CONTAINING ALCOHOL DO YOU HAVE ON A TYPICAL DAY: PATIENT DOES NOT DRINK

## 2024-01-15 ASSESSMENT — PAIN SCALES - GENERAL
PAINLEVEL_OUTOF10: 10
PAINLEVEL_OUTOF10: 10
PAINLEVEL_OUTOF10: 8

## 2024-01-15 ASSESSMENT — PAIN DESCRIPTION - PAIN TYPE: TYPE: CHRONIC PAIN

## 2024-01-15 ASSESSMENT — PAIN - FUNCTIONAL ASSESSMENT: PAIN_FUNCTIONAL_ASSESSMENT: 0-10

## 2024-01-15 ASSESSMENT — PAIN DESCRIPTION - LOCATION
LOCATION: NECK
LOCATION: NECK

## 2024-01-15 ASSESSMENT — HEART SCORE: ECG: 0

## 2024-01-15 NOTE — PROGRESS NOTES
Writer spoke with Dr. Anderson regarding 2.4 phosphorus level. No need to replace today per Dr. Anderson and will recheck tomorrow morning.

## 2024-01-15 NOTE — PROGRESS NOTES
Graciela Holt is a 73 y.o. Non- / non  female who presents with Shortness of Breath   and is admitted to the hospital for the management of Acute decompensated heart failure (HCC).    According to patient,she developed shortness of breath and had a hard time breathing that started this afternoon.  Symptoms began while at rest watching TV and progressively worsened with time.  Patient reports significant history for COPD and CHF.  States that she was evicted from her residence approximately 1 year ago and in the process lost her oxygen concentrator and nebulizer machine.  Additionally, is currently residing with a friend, but she has no phone to call and make any appointments or to try to get the equipment at, medications, and services she needs.  Patient reports having an occasional dry cough and has 1+ edema of Bilateral lower legs.  Denies wheezing.  States that she quit smoking last September while admitted to the hospital.  Reports that she has been out of many of her medications since she was last admitted in September.    Patient status inpatient in the Progressive Unit/Step down    Acute decompensated CHF  -CXR-left basilar atelectasis, otherwise lungs are clear  --Cardiomediastinal silhouette is enlarged, unchanged  -proBNP 6765; troponin HS 35, then 33 - denies chest pain  -Rapid swab negative for COVID and influenza  -Takes Lasix 20 mg oral daily- reports compliance  --Changed to IV for now; first dose administered in ED  -Resume previously ordered Aldactone 25 mg daily  -Ace Inhibitor - lisinopril contraindicated - on allergy list  -Beta blocker -continue home dose metoprolol  -Daily weights; monitor I&O  -Low sodium diet   -Check 2D echo September 2023 -  EF 25-30%  -Consider cardiology consult    Pneumonia   --concern for Right mid lobe pneumonia seen on film by ED provider  -Zithromax and Rocephin initiated in ED  --continue on admission  -Recheck CXR in am  -Pneumovax before  discharge if applicable       Consult  for discharge planning  -Patient supposed to be on home oxygen - does not have  -Patient supposed to be using nebulizer treatments - does not have machine  -Patient needs help getting medications      Disposition 3 days      Consultations:   IP CONSULT TO INTERNAL MEDICINE    Patient is admitted as inpatient status because of co-morbidities listed above, severity of signs and symptoms as outlined, requirement for current medical therapies and most importantly because of direct risk to patient if care not provided in a hospital setting.  Expected length of stay > 48 hours.    Yue Dee, JAMES - CNP  1/15/2024  4:54 AM

## 2024-01-15 NOTE — PROGRESS NOTES
Patient had a 5 beat run of Vtach while sleeping. Dr. Flynn on the unit and updated and he ordered her to have 40 meq of Potassium extra today on top of her scheduled 20meq daily that she already takes.

## 2024-01-15 NOTE — ED PROVIDER NOTES
EMERGENCY DEPARTMENT ENCOUNTER    Pt Name: Graciela Holt  MRN: [id number]  Birthdate 1950  Date of evaluation: 1/15/24  CHIEF COMPLAINT       Chief Complaint   Patient presents with    Shortness of Breath     HISTORY OF PRESENT ILLNESS   73-year-old female presenting to the ER complaining of shortness of breath.  Patient has been prescribed oxygen at home for the last year.  Patient however has not been able to use it at home because of the fact that she had an infection.  The patient is also had trouble with her medications that she uses for COPD as well as her heart failure.  Patient called EMS today because of increased shortness of breath that started earlier in the day.    The history is provided by the patient.   Shortness of Breath  Severity:  Moderate  Onset quality:  Gradual  Duration:  1 day  Associated symptoms: cough    Associated symptoms: no abdominal pain, no chest pain, no headaches, no rash and no vomiting            REVIEW OF SYSTEMS     Review of Systems   Constitutional:  Negative for activity change, appetite change and fatigue.   HENT:  Negative for facial swelling, trouble swallowing and voice change.    Eyes:  Negative for photophobia and pain.   Respiratory:  Positive for cough and shortness of breath. Negative for chest tightness.    Cardiovascular:  Negative for chest pain and palpitations.   Gastrointestinal:  Negative for abdominal pain, nausea and vomiting.   Genitourinary:  Negative for dysuria and urgency.   Musculoskeletal:  Negative for arthralgias and back pain.   Skin:  Negative for color change and rash.   Neurological:  Negative for dizziness, syncope and headaches.   Psychiatric/Behavioral:  Negative for behavioral problems and hallucinations.      PASTMEDICAL HISTORY     Past Medical History:   Diagnosis Date    Allergic rhinitis     Arthritis     general    CAD (coronary artery disease)     Dr. Fowler/ [place]    Cerebral artery occlusion with cerebral infarction (HCC)  is no distension.      Palpations: Abdomen is soft.      Tenderness: There is no abdominal tenderness.   Musculoskeletal:         General: No deformity or signs of injury. Normal range of motion.      Cervical back: No rigidity or tenderness.   Skin:     General: Skin is warm and dry.   Neurological:      Mental Status: She is alert and oriented to person, place, and time. Mental status is at baseline.   Psychiatric:         Mood and Affect: Mood normal.         Behavior: Behavior normal.         MEDICAL DECISION MAKING / ED COURSE:         1)  Number and Complexity of Problems Addressed at this Encounter      2)  Data Reviewed and Analyzed  (Lab and radiology tests/orders below in next section)    History: 0  EC  Patient Age: 1  Risk Factors: 1  Troponin: 1  Heart Score Total: 4        3)  Treatment and Disposition         Patient with shortness of breath.  The patient was tachypneic with no O2 support.  The patient was placed on nasal cannula.  Patient became less tachypneic with nasal cannula support.  The patient was supposed to be on home O2 but has had issues with infections and is therefore not on home O2 and has been this way for the last year.  Cardiac workup did not display positive signs of acute cardiac ischemia.  EKG was reviewed and interpreted by myself, the ED physician.  Patient with elevated BNP and a dose of Lasix was provided to the patient in the ER.  Patient was started on IV antibiotics because of evidence of pneumonia on x-ray imaging on the right middle lobe.  Patient was admitted after speaking with the admitting team.  Patient understands and agrees with the plan.    CRITICAL CARE:       PROCEDURES:    Procedures      DATA FOR LAB AND RADIOLOGY TESTS ORDERED BELOW ARE REVIEWED BY THE ED CLINICIAN:    RADIOLOGY: All x-rays, CT, MRI, and formal ultrasound images (except ED bedside ultrasound) are read by the radiologist, see reports below, unless otherwise noted in MDM or here.  Reports

## 2024-01-15 NOTE — PROGRESS NOTES
Patient admitted to U 2110 no signs of distress noted. She is asking for breakfast. She was oriented to the room and the call light is within reach.

## 2024-01-15 NOTE — CONSULTS
Date:   1/15/2024  Patient name: Graciela Holt  Date of admission:  1/15/2024 12:54 AM  MRN:   [id number]  YOB: 1950  PCP: Travis Mason MD    Reason for Admission: Acute respiratory failure (HCC) [J96.00]  Tachypnea [R06.82]  Community acquired pneumonia of right middle lobe of lung [J18.9]    Cardiology consult: Broad complex tachycardia       Referring physician: Dr Chip Anderson    Impression    1 episode of 5 beat V. Tach  at 1637 1-15-24, K 3.5 slightly prolonged QT interval  Congestive heart failure systolic and diastolic  Multivessel coronary artery disease  CABG LIMA to LAD and diagonal 1, SVG to OM, SVG to PDA February 2018 at [facility]  History of left MCA stroke  Occluded internal carotid artery  Diabetes mellitus  Hyperlipidemia      Investigation workup    ECG 1/15/2024  Sinus rhythm heart rate 79, voltage criteria for LVH with repolarization changes, borderline R wave progression, prolonged QT interval    Chest x-ray 1/15/2024  Left basilar atelectasis otherwise lungs are clear  Cardiomegaly unchanged    2D echo 9/8/2023  Severely reduced LV systolic function ejection fraction 25 to 30%, dilated LV  Moderately increased LV wall thickness, global hypokinesis  Akinesis of the apical lateral anterolateral walls akinesis of apex  Probable apical thrombus  Dilated LA    Cardiac cath 1/25/2018  Left main 0%, LAD mid 80%, ostial diagonal 170%, proximal obtuse marginal 170%, RCA proximal 80%, mid RCA 80%    History of present illness    73-year-old female who lives with her son with a past medical history of multivessel coronary artery disease, coronary artery bypass surgery, carotid surgery, COPD got hospitalized on 1/15/2024 with increasing shortness of breath.  She is having cough but he she denied any fever or chills, no hemoptysis no peripheral edema.  No chest pain no syncope  Normally she is independent in her activities of daily living at slow pace    Lab work on

## 2024-01-16 ENCOUNTER — APPOINTMENT (OUTPATIENT)
Dept: GENERAL RADIOLOGY | Age: 74
End: 2024-01-16
Payer: COMMERCIAL

## 2024-01-16 LAB
ANION GAP SERPL CALCULATED.3IONS-SCNC: 7 MMOL/L (ref 9–17)
BASOPHILS # BLD: 0 K/UL (ref 0–0.2)
BASOPHILS NFR BLD: 1 % (ref 0–2)
BUN SERPL-MCNC: 50 MG/DL (ref 8–23)
CALCIUM SERPL-MCNC: 9.4 MG/DL (ref 8.6–10.4)
CHLORIDE SERPL-SCNC: 108 MMOL/L (ref 98–107)
CO2 SERPL-SCNC: 29 MMOL/L (ref 20–31)
CREAT SERPL-MCNC: 1.4 MG/DL (ref 0.5–0.9)
EOSINOPHIL # BLD: 0.1 K/UL (ref 0–0.4)
EOSINOPHILS RELATIVE PERCENT: 1 % (ref 0–4)
ERYTHROCYTE [DISTWIDTH] IN BLOOD BY AUTOMATED COUNT: 14.8 % (ref 11.5–14.9)
GFR SERPL CREATININE-BSD FRML MDRD: 39 ML/MIN/1.73M2
GLUCOSE BLD-MCNC: 133 MG/DL (ref 65–105)
GLUCOSE BLD-MCNC: 139 MG/DL (ref 65–105)
GLUCOSE BLD-MCNC: 159 MG/DL (ref 65–105)
GLUCOSE BLD-MCNC: 169 MG/DL (ref 65–105)
GLUCOSE SERPL-MCNC: 108 MG/DL (ref 70–99)
HCT VFR BLD AUTO: 33 % (ref 36–46)
HGB BLD-MCNC: 10.8 G/DL (ref 12–16)
LYMPHOCYTES NFR BLD: 1.2 K/UL (ref 1–4.8)
LYMPHOCYTES RELATIVE PERCENT: 17 % (ref 24–44)
MCH RBC QN AUTO: 28.7 PG (ref 26–34)
MCHC RBC AUTO-ENTMCNC: 32.8 G/DL (ref 31–37)
MCV RBC AUTO: 87.5 FL (ref 80–100)
MONOCYTES NFR BLD: 0.6 K/UL (ref 0.1–1.3)
MONOCYTES NFR BLD: 8 % (ref 1–7)
NEUTROPHILS NFR BLD: 73 % (ref 36–66)
NEUTS SEG NFR BLD: 5.3 K/UL (ref 1.3–9.1)
PLATELET # BLD AUTO: 178 K/UL (ref 150–450)
PMV BLD AUTO: 8.6 FL (ref 6–12)
POTASSIUM SERPL-SCNC: 5 MMOL/L (ref 3.7–5.3)
RBC # BLD AUTO: 3.77 M/UL (ref 4–5.2)
SODIUM SERPL-SCNC: 144 MMOL/L (ref 135–144)
WBC OTHER # BLD: 7.2 K/UL (ref 3.5–11)

## 2024-01-16 PROCEDURE — 99232 SBSQ HOSP IP/OBS MODERATE 35: CPT | Performed by: INTERNAL MEDICINE

## 2024-01-16 PROCEDURE — 6360000002 HC RX W HCPCS: Performed by: NURSE PRACTITIONER

## 2024-01-16 PROCEDURE — 94640 AIRWAY INHALATION TREATMENT: CPT

## 2024-01-16 PROCEDURE — 2060000000 HC ICU INTERMEDIATE R&B

## 2024-01-16 PROCEDURE — 6370000000 HC RX 637 (ALT 250 FOR IP): Performed by: NURSE PRACTITIONER

## 2024-01-16 PROCEDURE — 85025 COMPLETE CBC W/AUTO DIFF WBC: CPT

## 2024-01-16 PROCEDURE — 71045 X-RAY EXAM CHEST 1 VIEW: CPT

## 2024-01-16 PROCEDURE — 6370000000 HC RX 637 (ALT 250 FOR IP): Performed by: INTERNAL MEDICINE

## 2024-01-16 PROCEDURE — 94761 N-INVAS EAR/PLS OXIMETRY MLT: CPT

## 2024-01-16 PROCEDURE — 2700000000 HC OXYGEN THERAPY PER DAY

## 2024-01-16 PROCEDURE — 2580000003 HC RX 258: Performed by: NURSE PRACTITIONER

## 2024-01-16 PROCEDURE — 82947 ASSAY GLUCOSE BLOOD QUANT: CPT

## 2024-01-16 PROCEDURE — 36415 COLL VENOUS BLD VENIPUNCTURE: CPT

## 2024-01-16 PROCEDURE — 80048 BASIC METABOLIC PNL TOTAL CA: CPT

## 2024-01-16 PROCEDURE — 94664 DEMO&/EVAL PT USE INHALER: CPT

## 2024-01-16 RX ORDER — HYDROCODONE BITARTRATE AND ACETAMINOPHEN 5; 325 MG/1; MG/1
1 TABLET ORAL EVERY 6 HOURS PRN
Status: DISCONTINUED | OUTPATIENT
Start: 2024-01-16 | End: 2024-01-20 | Stop reason: HOSPADM

## 2024-01-16 RX ORDER — FLUTICASONE PROPIONATE 50 MCG
2 SPRAY, SUSPENSION (ML) NASAL DAILY
Status: DISCONTINUED | OUTPATIENT
Start: 2024-01-16 | End: 2024-01-20 | Stop reason: HOSPADM

## 2024-01-16 RX ADMIN — FUROSEMIDE 20 MG: 10 INJECTION, SOLUTION INTRAMUSCULAR; INTRAVENOUS at 09:26

## 2024-01-16 RX ADMIN — CITALOPRAM HYDROBROMIDE 20 MG: 20 TABLET ORAL at 09:25

## 2024-01-16 RX ADMIN — METOPROLOL TARTRATE 12.5 MG: 25 TABLET, FILM COATED ORAL at 09:24

## 2024-01-16 RX ADMIN — ENOXAPARIN SODIUM 40 MG: 100 INJECTION SUBCUTANEOUS at 09:25

## 2024-01-16 RX ADMIN — CEFTRIAXONE SODIUM 1000 MG: 1 INJECTION, POWDER, FOR SOLUTION INTRAMUSCULAR; INTRAVENOUS at 01:25

## 2024-01-16 RX ADMIN — ASPIRIN 81 MG: 81 TABLET, COATED ORAL at 09:24

## 2024-01-16 RX ADMIN — METOPROLOL TARTRATE 12.5 MG: 25 TABLET, FILM COATED ORAL at 22:12

## 2024-01-16 RX ADMIN — SODIUM CHLORIDE, PRESERVATIVE FREE 10 ML: 5 INJECTION INTRAVENOUS at 09:36

## 2024-01-16 RX ADMIN — IPRATROPIUM BROMIDE AND ALBUTEROL SULFATE 1 DOSE: 2.5; .5 SOLUTION RESPIRATORY (INHALATION) at 20:00

## 2024-01-16 RX ADMIN — BUDESONIDE AND FORMOTEROL FUMARATE DIHYDRATE 2 PUFF: 160; 4.5 AEROSOL RESPIRATORY (INHALATION) at 20:05

## 2024-01-16 RX ADMIN — IPRATROPIUM BROMIDE AND ALBUTEROL SULFATE 1 DOSE: 2.5; .5 SOLUTION RESPIRATORY (INHALATION) at 14:54

## 2024-01-16 RX ADMIN — HYDROCODONE BITARTRATE AND ACETAMINOPHEN 1 TABLET: 5; 325 TABLET ORAL at 17:50

## 2024-01-16 RX ADMIN — ATORVASTATIN CALCIUM 80 MG: 80 TABLET, FILM COATED ORAL at 09:24

## 2024-01-16 RX ADMIN — BUDESONIDE AND FORMOTEROL FUMARATE DIHYDRATE 2 PUFF: 160; 4.5 AEROSOL RESPIRATORY (INHALATION) at 07:29

## 2024-01-16 RX ADMIN — POTASSIUM CHLORIDE 20 MEQ: 10 CAPSULE, COATED, EXTENDED RELEASE ORAL at 22:12

## 2024-01-16 RX ADMIN — TRAZODONE HYDROCHLORIDE 50 MG: 50 TABLET ORAL at 22:12

## 2024-01-16 RX ADMIN — AZITHROMYCIN MONOHYDRATE 500 MG: 500 INJECTION, POWDER, LYOPHILIZED, FOR SOLUTION INTRAVENOUS at 05:21

## 2024-01-16 RX ADMIN — POTASSIUM CHLORIDE 20 MEQ: 10 CAPSULE, COATED, EXTENDED RELEASE ORAL at 09:26

## 2024-01-16 RX ADMIN — IPRATROPIUM BROMIDE AND ALBUTEROL SULFATE 1 DOSE: 2.5; .5 SOLUTION RESPIRATORY (INHALATION) at 07:28

## 2024-01-16 RX ADMIN — SODIUM CHLORIDE, PRESERVATIVE FREE 10 ML: 5 INJECTION INTRAVENOUS at 22:12

## 2024-01-16 RX ADMIN — IPRATROPIUM BROMIDE AND ALBUTEROL SULFATE 1 DOSE: 2.5; .5 SOLUTION RESPIRATORY (INHALATION) at 11:11

## 2024-01-16 RX ADMIN — FLUTICASONE PROPIONATE 2 SPRAY: 50 SPRAY, METERED NASAL at 17:48

## 2024-01-16 RX ADMIN — CLOPIDOGREL BISULFATE 75 MG: 75 TABLET ORAL at 09:24

## 2024-01-16 RX ADMIN — SPIRONOLACTONE 25 MG: 25 TABLET, FILM COATED ORAL at 09:24

## 2024-01-16 ASSESSMENT — PAIN SCALES - GENERAL: PAINLEVEL_OUTOF10: 10

## 2024-01-16 NOTE — H&P
[facility] Internal Medicine  Johnny Samuel MD; Stan Michaud MD; Juan Carlos Todd MD; MD Andra Weaver MD; Chip Anderson MD    [facility] Internal Medicine   IN-PATIENT SERVICE   [facility]    HISTORY AND PHYSICAL EXAMINATION            Date:   1/15/2024  Patient name:  Graciela Holt  Date of admission:  1/15/2024 12:54 AM  MRN:   [id number]  Account:  [id number]  YOB: 1950  PCP:    Travis Mason MD  Room:   2110/2110-01  Code Status:    Full Code    Chief Complaint:     Chief Complaint   Patient presents with    Shortness of Breath       History Obtained From:     Patient/EMR/Bedside RN    History of Present Illness:     Graciela Holt is a 73 y.o. Non- / non  female who presents with Shortness of Breath   and is admitted to the hospital for the management of Acute decompensated heart failure (HCC).  According to patient,she developed shortness of breath and had a hard time breathing that started this afternoon.  Symptoms began while at rest watching TV and progressively worsened with time.  Patient reports significant history for COPD and CHF.  States that she was evicted from her residence approximately 1 year ago and in the process lost her oxygen concentrator and nebulizer machine.  Additionally, is currently residing with a friend, but she has no phone to call and make any appointments or to try to get the equipment at, medications, and services she needs.  Patient reports having an occasional dry cough and has 1+ edema of Bilateral lower legs.  Denies wheezing.  States that she quit smoking last September while admitted to the hospital.  Reports that she has been out of many of her medications since she was last admitted in September.     Patient status inpatient in the Progressive Unit/Step down      Past Medical History:     Past Medical History:   Diagnosis Date    Allergic rhinitis     Arthritis

## 2024-01-16 NOTE — CARE COORDINATION
Case Management Assessment  Initial Evaluation    Date/Time of Evaluation: 1/16/2024 1:56 PM  Assessment Completed by: Cheryl Randle RN    If patient is discharged prior to next notation, then this note serves as note for discharge by case management.    Patient Name: Graciela Holt                   YOB: 1950  Diagnosis: Acute respiratory failure (HCC) [J96.00]  Tachypnea [R06.82]  Community acquired pneumonia of right middle lobe of lung [J18.9]                   Date / Time: 1/15/2024 12:54 AM    Patient Admission Status: Inpatient   Readmission Risk (Low < 19, Mod (19-27), High > 27): Readmission Risk Score: 17    Current PCP: Travis Mason MD  PCP verified by CM? No    Chart Reviewed: Yes      History Provided by: Patient  Patient Orientation: Alert and Oriented    Patient Cognition: Alert    Hospitalization in the last 30 days (Readmission):  No    If yes, Readmission Assessment in CM Navigator will be completed.    Advance Directives:      Code Status: Full Code   Patient's Primary Decision Maker is: Legal Next of Kin    Primary Decision Maker: Breezy Zhengson - Child - [phone]    Discharge Planning:    Patient lives with: Friends Type of Home: House  Primary Care Giver: Self  Patient Support Systems include: Friends/Neighbors   Current Financial resources: Medicare  Current community resources: None  Current services prior to admission: None            Current DME:              Type of Home Care services:  None    ADLS  Prior functional level: Independent in ADLs/IADLs  Current functional level: Independent in ADLs/IADLs    PT AM-PAC:   /24  OT AM-PAC:   /24    Family can provide assistance at DC: Yes  Would you like Case Management to discuss the discharge plan with any other family members/significant others, and if so, who? Yes  Plans to Return to Present Housing: Yes  Other Identified Issues/Barriers to RETURNING to current housing: no  Potential Assistance needed at discharge:

## 2024-01-16 NOTE — PLAN OF CARE
Problem: Safety - Adult  Goal: Free from fall injury  1/16/2024 0405 by Shauna Puri, RN  Outcome: Progressing  Note: No falls noted this shift. Patient ambulates with x1 staff assistance without difficulty.  Bed kept in low position. Safe environment maintained. Bedside table & call light in reach. Uses call light appropriately when needing assistance.       Problem: Pain  Goal: Verbalizes/displays adequate comfort level or baseline comfort level  1/16/2024 0405 by Shauna Puri, RN  Outcome: Progressing     Problem: Chronic Conditions and Co-morbidities  Goal: Patient's chronic conditions and co-morbidity symptoms are monitored and maintained or improved  Outcome: Progressing     Problem: Discharge Planning  Goal: Discharge to home or other facility with appropriate resources  1/16/2024 0405 by Shauna Puri, RN  Outcome: Progressing

## 2024-01-16 NOTE — PLAN OF CARE
Problem: Discharge Planning  Goal: Discharge to home or other facility with appropriate resources  Outcome: Progressing     Problem: Safety - Adult  Goal: Free from fall injury  Outcome: Progressing     Problem: Pain  Goal: Verbalizes/displays adequate comfort level or baseline comfort level  Outcome: Progressing     Problem: ABCDS Injury Assessment  Goal: Absence of physical injury  Outcome: Progressing     Problem: Chronic Conditions and Co-morbidities  Goal: Patient's chronic conditions and co-morbidity symptoms are monitored and maintained or improved  Outcome: Progressing

## 2024-01-16 NOTE — PROGRESS NOTES
atelectasis, otherwise lungs are clear. Cardiomediastinal silhouette is enlarged, unchanged.       Assessment :      Hospital Problems             Last Modified POA    * (Principal) Acute decompensated heart failure (HCC) 1/15/2024 Yes     Plan:     Patient status inpatient in the Progressive Unit/Step down    1.  73-year-old female with underlying history of coronary disease, hypertension hyperlipidemia admitted with shortness of breath secondary to acute decompensated heart failure, continue Lasix twice daily, telemetry, cardiology consulted,  BNP 6765, mildly elevated troponin with no chest pain,  Type II MI,  Community-acquired pneumonia, started on Rocephin Zithromax,  Acute respiratory failure patient needing 2 to 4 L of oxygen secondary to pneumonia  Hypertension, continue to monitor blood pressure,  Diabetes mellitus, sugars ACHS, insulin sliding scale  Hypokalemia, will replace  Anemia of chronic disease, monitor hemoglobin,  DVT prophylaxis with Lovenox,  Full CODE STATUS  2. Disposition 2d    1/16  Seen in face-to-face,  Acute on chronic respiratory failure,  Acute on chronic CHF, systolic and diastolic, ejection fraction 25% on echo done in September 2023 cardiology on board, Lasix,  Creatinine trending up, reduce the Lasix dose, check tomorrow morning,  Community-acquired pneumonia, COPD exacerbation, continue inhalers and breathing treatments, Rocephin and Zithromax,  Monitor,  PT OT,  Possible discharge soon,        Consultations:   IP CONSULT TO INTERNAL MEDICINE  IP CONSULT TO SOCIAL WORK  IP CONSULT TO CARDIOLOGY     Patient is admitted as inpatient status because of co-morbidities listed above, severity of signs and symptoms as outlined, requirement for current medical therapies and most importantly because of direct risk to patient if care not provided in a hospital setting.  Expected length of stay > 48 hours.    Chip Anderson MD  1/16/2024  5:11 PM    Copy sent to Travis Vaughn,

## 2024-01-17 LAB
ALBUMIN SERPL-MCNC: 3.4 G/DL (ref 3.5–5.2)
ALP SERPL-CCNC: 129 U/L (ref 35–104)
ALT SERPL-CCNC: 66 U/L (ref 5–33)
ANION GAP SERPL CALCULATED.3IONS-SCNC: 6 MMOL/L (ref 9–17)
ANION GAP SERPL CALCULATED.3IONS-SCNC: 7 MMOL/L (ref 9–17)
AST SERPL-CCNC: 42 U/L
BASOPHILS # BLD: 0.1 K/UL (ref 0–0.2)
BASOPHILS NFR BLD: 1 % (ref 0–2)
BILIRUB SERPL-MCNC: 0.2 MG/DL (ref 0.3–1.2)
BUN SERPL-MCNC: 47 MG/DL (ref 8–23)
BUN SERPL-MCNC: 49 MG/DL (ref 8–23)
CALCIUM SERPL-MCNC: 9.5 MG/DL (ref 8.6–10.4)
CALCIUM SERPL-MCNC: 9.6 MG/DL (ref 8.6–10.4)
CHLORIDE SERPL-SCNC: 104 MMOL/L (ref 98–107)
CHLORIDE SERPL-SCNC: 99 MMOL/L (ref 98–107)
CO2 SERPL-SCNC: 29 MMOL/L (ref 20–31)
CO2 SERPL-SCNC: 32 MMOL/L (ref 20–31)
CREAT SERPL-MCNC: 1.2 MG/DL (ref 0.5–0.9)
CREAT SERPL-MCNC: 1.2 MG/DL (ref 0.5–0.9)
EOSINOPHIL # BLD: 0.3 K/UL (ref 0–0.4)
EOSINOPHILS RELATIVE PERCENT: 4 % (ref 0–4)
ERYTHROCYTE [DISTWIDTH] IN BLOOD BY AUTOMATED COUNT: 15.1 % (ref 11.5–14.9)
GFR SERPL CREATININE-BSD FRML MDRD: 47 ML/MIN/1.73M2
GFR SERPL CREATININE-BSD FRML MDRD: 47 ML/MIN/1.73M2
GLUCOSE BLD-MCNC: 114 MG/DL (ref 65–105)
GLUCOSE BLD-MCNC: 132 MG/DL (ref 65–105)
GLUCOSE BLD-MCNC: 173 MG/DL (ref 65–105)
GLUCOSE BLD-MCNC: 185 MG/DL (ref 65–105)
GLUCOSE SERPL-MCNC: 116 MG/DL (ref 70–99)
GLUCOSE SERPL-MCNC: 128 MG/DL (ref 70–99)
HCT VFR BLD AUTO: 33.6 % (ref 36–46)
HGB BLD-MCNC: 11 G/DL (ref 12–16)
LYMPHOCYTES NFR BLD: 1.2 K/UL (ref 1–4.8)
LYMPHOCYTES RELATIVE PERCENT: 18 % (ref 24–44)
MCH RBC QN AUTO: 29 PG (ref 26–34)
MCHC RBC AUTO-ENTMCNC: 32.8 G/DL (ref 31–37)
MCV RBC AUTO: 88.4 FL (ref 80–100)
MONOCYTES NFR BLD: 0.6 K/UL (ref 0.1–1.3)
MONOCYTES NFR BLD: 9 % (ref 1–7)
NEUTROPHILS NFR BLD: 68 % (ref 36–66)
NEUTS SEG NFR BLD: 4.6 K/UL (ref 1.3–9.1)
PLATELET # BLD AUTO: 196 K/UL (ref 150–450)
PMV BLD AUTO: 8.8 FL (ref 6–12)
POTASSIUM SERPL-SCNC: 5 MMOL/L (ref 3.7–5.3)
POTASSIUM SERPL-SCNC: 5.5 MMOL/L (ref 3.7–5.3)
PROT SERPL-MCNC: 5.7 G/DL (ref 6.4–8.3)
RBC # BLD AUTO: 3.8 M/UL (ref 4–5.2)
SODIUM SERPL-SCNC: 138 MMOL/L (ref 135–144)
SODIUM SERPL-SCNC: 139 MMOL/L (ref 135–144)
WBC OTHER # BLD: 6.8 K/UL (ref 3.5–11)

## 2024-01-17 PROCEDURE — 82947 ASSAY GLUCOSE BLOOD QUANT: CPT

## 2024-01-17 PROCEDURE — 85025 COMPLETE CBC W/AUTO DIFF WBC: CPT

## 2024-01-17 PROCEDURE — 2700000000 HC OXYGEN THERAPY PER DAY

## 2024-01-17 PROCEDURE — 94761 N-INVAS EAR/PLS OXIMETRY MLT: CPT

## 2024-01-17 PROCEDURE — 2580000003 HC RX 258: Performed by: NURSE PRACTITIONER

## 2024-01-17 PROCEDURE — 80053 COMPREHEN METABOLIC PANEL: CPT

## 2024-01-17 PROCEDURE — 6360000002 HC RX W HCPCS: Performed by: NURSE PRACTITIONER

## 2024-01-17 PROCEDURE — 94640 AIRWAY INHALATION TREATMENT: CPT

## 2024-01-17 PROCEDURE — 36415 COLL VENOUS BLD VENIPUNCTURE: CPT

## 2024-01-17 PROCEDURE — 80048 BASIC METABOLIC PNL TOTAL CA: CPT

## 2024-01-17 PROCEDURE — 6370000000 HC RX 637 (ALT 250 FOR IP): Performed by: INTERNAL MEDICINE

## 2024-01-17 PROCEDURE — 2060000000 HC ICU INTERMEDIATE R&B

## 2024-01-17 PROCEDURE — 6370000000 HC RX 637 (ALT 250 FOR IP): Performed by: NURSE PRACTITIONER

## 2024-01-17 PROCEDURE — 99232 SBSQ HOSP IP/OBS MODERATE 35: CPT | Performed by: INTERNAL MEDICINE

## 2024-01-17 RX ADMIN — IPRATROPIUM BROMIDE AND ALBUTEROL SULFATE 1 DOSE: 2.5; .5 SOLUTION RESPIRATORY (INHALATION) at 18:56

## 2024-01-17 RX ADMIN — HYDROCODONE BITARTRATE AND ACETAMINOPHEN 1 TABLET: 5; 325 TABLET ORAL at 06:13

## 2024-01-17 RX ADMIN — ATORVASTATIN CALCIUM 80 MG: 80 TABLET, FILM COATED ORAL at 10:41

## 2024-01-17 RX ADMIN — ENOXAPARIN SODIUM 40 MG: 100 INJECTION SUBCUTANEOUS at 10:40

## 2024-01-17 RX ADMIN — IPRATROPIUM BROMIDE AND ALBUTEROL SULFATE 1 DOSE: 2.5; .5 SOLUTION RESPIRATORY (INHALATION) at 14:41

## 2024-01-17 RX ADMIN — CITALOPRAM HYDROBROMIDE 20 MG: 20 TABLET ORAL at 10:41

## 2024-01-17 RX ADMIN — BUDESONIDE AND FORMOTEROL FUMARATE DIHYDRATE 2 PUFF: 160; 4.5 AEROSOL RESPIRATORY (INHALATION) at 19:05

## 2024-01-17 RX ADMIN — SODIUM CHLORIDE, PRESERVATIVE FREE 10 ML: 5 INJECTION INTRAVENOUS at 23:04

## 2024-01-17 RX ADMIN — FUROSEMIDE 20 MG: 10 INJECTION, SOLUTION INTRAMUSCULAR; INTRAVENOUS at 10:41

## 2024-01-17 RX ADMIN — FLUTICASONE PROPIONATE 2 SPRAY: 50 SPRAY, METERED NASAL at 10:40

## 2024-01-17 RX ADMIN — CLOPIDOGREL BISULFATE 75 MG: 75 TABLET ORAL at 10:41

## 2024-01-17 RX ADMIN — IPRATROPIUM BROMIDE AND ALBUTEROL SULFATE 1 DOSE: 2.5; .5 SOLUTION RESPIRATORY (INHALATION) at 07:07

## 2024-01-17 RX ADMIN — HYDROCODONE BITARTRATE AND ACETAMINOPHEN 1 TABLET: 5; 325 TABLET ORAL at 12:53

## 2024-01-17 RX ADMIN — IPRATROPIUM BROMIDE AND ALBUTEROL SULFATE 1 DOSE: 2.5; .5 SOLUTION RESPIRATORY (INHALATION) at 11:10

## 2024-01-17 RX ADMIN — SODIUM CHLORIDE, PRESERVATIVE FREE 10 ML: 5 INJECTION INTRAVENOUS at 10:41

## 2024-01-17 RX ADMIN — AZITHROMYCIN MONOHYDRATE 500 MG: 500 INJECTION, POWDER, LYOPHILIZED, FOR SOLUTION INTRAVENOUS at 05:54

## 2024-01-17 RX ADMIN — BUDESONIDE AND FORMOTEROL FUMARATE DIHYDRATE 2 PUFF: 160; 4.5 AEROSOL RESPIRATORY (INHALATION) at 07:07

## 2024-01-17 RX ADMIN — HYDROCODONE BITARTRATE AND ACETAMINOPHEN 1 TABLET: 5; 325 TABLET ORAL at 19:47

## 2024-01-17 RX ADMIN — ASPIRIN 81 MG: 81 TABLET, COATED ORAL at 10:41

## 2024-01-17 RX ADMIN — ACETAMINOPHEN 650 MG: 325 TABLET ORAL at 10:46

## 2024-01-17 RX ADMIN — METOPROLOL TARTRATE 12.5 MG: 25 TABLET, FILM COATED ORAL at 10:41

## 2024-01-17 RX ADMIN — METOPROLOL TARTRATE 25 MG: 25 TABLET, FILM COATED ORAL at 23:04

## 2024-01-17 RX ADMIN — Medication 3 MG: at 23:04

## 2024-01-17 RX ADMIN — TRAZODONE HYDROCHLORIDE 50 MG: 50 TABLET ORAL at 23:04

## 2024-01-17 RX ADMIN — SPIRONOLACTONE 25 MG: 25 TABLET, FILM COATED ORAL at 10:41

## 2024-01-17 ASSESSMENT — PAIN DESCRIPTION - ORIENTATION: ORIENTATION: POSTERIOR

## 2024-01-17 ASSESSMENT — PAIN DESCRIPTION - LOCATION
LOCATION: NECK
LOCATION: NECK;BACK
LOCATION: NECK

## 2024-01-17 ASSESSMENT — PAIN SCALES - GENERAL
PAINLEVEL_OUTOF10: 0
PAINLEVEL_OUTOF10: 10
PAINLEVEL_OUTOF10: 10
PAINLEVEL_OUTOF10: 8

## 2024-01-17 ASSESSMENT — PAIN DESCRIPTION - DESCRIPTORS
DESCRIPTORS: ACHING;DISCOMFORT
DESCRIPTORS: DULL

## 2024-01-17 ASSESSMENT — PAIN - FUNCTIONAL ASSESSMENT: PAIN_FUNCTIONAL_ASSESSMENT: ACTIVITIES ARE NOT PREVENTED

## 2024-01-17 ASSESSMENT — PAIN DESCRIPTION - PAIN TYPE: TYPE: CHRONIC PAIN

## 2024-01-17 NOTE — PROGRESS NOTES
01/17/24 1447   Encounter Summary   Encounter Overview/Reason  Attempted Encounter   Service Provided For: Patient not available  (patient with RT)

## 2024-01-17 NOTE — PLAN OF CARE
BRONCHOSPASM/BRONCHOCONSTRICTION   [x]  IMPROVE AERATION/BREATH SOUNDS  [x]   ADMINISTER BRONCHODILATOR THERAPY AS APPROPRIATE  [x]   ASSESS BREATH SOUNDS  []   IMPLEMENT AEROSOL/MDI PROTOCOL  [x]   PATIENT EDUCATION AS NEEDED    PROVIDE ADEQUATE OXYGENATION WITH ACCEPTABLE SP02/ABG'S  [x]  IDENTIFY APPROPRIATE OXYGEN THERAPY  [x]   MONITOR SP02/ABG'S AS NEEDED   [x]   PATIENT EDUCATION AS NEEDED

## 2024-01-17 NOTE — PROGRESS NOTES
Date:   1/16/2024  Patient name: Graciela Holt  Date of admission:  1/15/2024 12:54 AM  MRN:   [id number]  YOB: 1950  PCP: Travis Mason MD    Reason for Admission: Acute respiratory failure (HCC) [J96.00]  Tachypnea [R06.82]  Community acquired pneumonia of right middle lobe of lung [J18.9]    Cardiology consult: Broad complex tachycardia       Referring physician: Dr Chip Anderson     Impression     1 episode of 5 beat V. Tach  at 1637 1-15-24, K 3.5 slightly prolonged QT interval  Congestive heart failure systolic and diastolic, ejection fraction 25 to 30%  Multivessel coronary artery disease  CABG LIMA to LAD and diagonal 1, SVG to OM, SVG to PDA February 2018 at [facility]  History of left MCA stroke  Occluded internal carotid artery  Diabetes mellitus  Hyperlipidemia    Investigation workup     ECG 1/15/2024  Sinus rhythm heart rate 79, voltage criteria for LVH with repolarization changes, borderline R wave progression, prolonged QT interval     Chest x-ray 1/15/2024  Left basilar atelectasis otherwise lungs are clear  Cardiomegaly unchanged     2D echo 9/8/2023  Severely reduced LV systolic function ejection fraction 25 to 30%, dilated LV  Moderately increased LV wall thickness, global hypokinesis  Akinesis of the apical lateral anterolateral walls akinesis of apex  Probable apical thrombus  Dilated LA     Cardiac cath 1/25/2018  Left main 0%, LAD mid 80%, ostial diagonal 170%, proximal obtuse marginal 170%, RCA proximal 80%, mid RCA 80%    History of present illness     73-year-old female who lives with her son with a past medical history of multivessel coronary artery disease, coronary artery bypass surgery, carotid surgery, COPD got hospitalized on 1/15/2024 with increasing shortness of breath.  She is having cough but  she denied any fever or chills, no hemoptysis no peripheral edema.  No chest pain no syncope  Normally she is independent in her activities of daily living

## 2024-01-17 NOTE — PROGRESS NOTES
[facility] Internal Medicine  Johnny Samuel MD; Stan Michaud MD; Juan Carlos Todd MD; MD Andra Weaver MD; Chip Anderson MD    [facility] Internal Medicine   IN-PATIENT SERVICE   [facility]    Progress note             Date:   1/17/2024  Patient name:  Graciela Holt  Date of admission:  1/15/2024 12:54 AM  MRN:   [id number]  Account:  [id number]  YOB: 1950  PCP:    Travis Mason MD  Room:   2110/2110-01  Code Status:    Full Code    Chief Complaint:     Chief Complaint   Patient presents with    Shortness of Breath       History Obtained From:     Patient/EMR/Bedside RN    History of Present Illness:     Graciela Holt is a 73 y.o. Non- / non  female who presents with Shortness of Breath   and is admitted to the hospital for the management of Acute decompensated heart failure (HCC).  According to patient,she developed shortness of breath and had a hard time breathing that started this afternoon.  Symptoms began while at rest watching TV and progressively worsened with time.  Patient reports significant history for COPD and CHF.  States that she was evicted from her residence approximately 1 year ago and in the process lost her oxygen concentrator and nebulizer machine.  Additionally, is currently residing with a friend, but she has no phone to call and make any appointments or to try to get the equipment at, medications, and services she needs.  Patient reports having an occasional dry cough and has 1+ edema of Bilateral lower legs.  Denies wheezing.  States that she quit smoking last September while admitted to the hospital.  Reports that she has been out of many of her medications since she was last admitted in September.     Patient status inpatient in the Progressive Unit/Step down      Past Medical History:     Past Medical History:   Diagnosis Date    Allergic rhinitis     Arthritis     general    CAD (coronary  Fingerstick    Collection Time: 01/17/24  3:31 PM   Result Value Ref Range    POC Glucose 132 (H) 65 - 105 mg/dL       Imaging/Diagnostics:  XR CHEST PORTABLE    Result Date: 1/15/2024  Left basilar atelectasis, otherwise lungs are clear. Cardiomediastinal silhouette is enlarged, unchanged.       Assessment :      Hospital Problems             Last Modified POA    * (Principal) Acute decompensated heart failure (HCC) 1/15/2024 Yes     Plan:     Patient status inpatient in the Progressive Unit/Step down    1.  73-year-old female with underlying history of coronary disease, hypertension hyperlipidemia admitted with shortness of breath secondary to acute decompensated heart failure, continue Lasix twice daily, telemetry, cardiology consulted,  BNP 6765, mildly elevated troponin with no chest pain,  Type II MI,  Community-acquired pneumonia, started on Rocephin Zithromax,  Acute respiratory failure patient needing 2 to 4 L of oxygen secondary to pneumonia  Hypertension, continue to monitor blood pressure,  Diabetes mellitus, sugars ACHS, insulin sliding scale  Hypokalemia, will replace  Anemia of chronic disease, monitor hemoglobin,  DVT prophylaxis with Lovenox,  Full CODE STATUS  2. Disposition 2d    1/17  Seen in face-to-face,  Acute on chronic respiratory failure,  Acute on chronic CHF, systolic and diastolic, ejection fraction 25% on echo done in September 2023 cardiology on board, Lasix,  Creatinine trending up, reduce the Lasix dose, check tomorrow morning,  High K, recheck this pm, stopped all potassium  Community-acquired pneumonia, COPD exacerbation, continue inhalers and breathing treatments, Rocephin and Zithromax,  Monitor,  PT OT,  Possible discharge soon,        Consultations:   IP CONSULT TO INTERNAL MEDICINE  IP CONSULT TO SOCIAL WORK  IP CONSULT TO CARDIOLOGY     Patient is admitted as inpatient status because of co-morbidities listed above, severity of signs and symptoms as outlined, requirement for

## 2024-01-17 NOTE — PROGRESS NOTES
Date:   1/17/2024  Patient name: Graciela Holt  Date of admission:  1/15/2024 12:54 AM  MRN:   [id number]  YOB: 1950  PCP: Travis Mason MD    Reason for Admission: Acute respiratory failure (HCC) [J96.00]  Tachypnea [R06.82]  Community acquired pneumonia of right middle lobe of lung [J18.9]    Cardiology follow-up: Nonsustained V. tach, CAD, CABG, severely reduced LV systolic function       Referring physician: Dr Chip Anderson     Impression     1 episode of 5 beat V. Tach  at 1637 1-15-24, K 3.5 slightly prolonged QT interval  Congestive heart failure systolic and diastolic, ejection fraction 25 to 30%  Multivessel coronary artery disease  CABG LIMA to LAD and diagonal 1, SVG to OM, SVG to PDA February 2018 at [facility]  History of left MCA stroke  Occluded internal carotid artery  Diabetes mellitus  Hyperlipidemia     Investigation workup     ECG 1/15/2024  Sinus rhythm heart rate 79, voltage criteria for LVH with repolarization changes, borderline R wave progression, prolonged QT interval     Chest x-ray 1/15/2024  Left basilar atelectasis otherwise lungs are clear  Cardiomegaly unchanged     2D echo 9/8/2023  Severely reduced LV systolic function ejection fraction 25 to 30%, dilated LV  Moderately increased LV wall thickness, global hypokinesis  Akinesis of the apical lateral anterolateral walls akinesis of apex  Probable apical thrombus  Dilated LA     Cardiac cath 1/25/2018  Left main 0%, LAD mid 80%, ostial diagonal 170%, proximal obtuse marginal 170%, RCA proximal 80%, mid RCA 80%    History of present illness     73-year-old female who lives with her son with a past medical history of multivessel coronary artery disease, coronary artery bypass surgery, carotid surgery, COPD got hospitalized on 1/15/2024 with increasing shortness of breath.  She is having cough but  she denied any fever or chills, no hemoptysis no peripheral edema.  No chest pain no syncope  Normally she

## 2024-01-17 NOTE — PLAN OF CARE
Problem: Safety - Adult  Goal: Free from fall injury  1/17/2024 0243 by Shauna Puri, RN  Outcome: Progressing  Note: No falls noted this shift. Patient ambulates with x1 staff assistance without difficulty.  Bed kept in low position. Safe environment maintained. Bedside table & call light in reach. Uses call light appropriately when needing assistance.       Problem: Chronic Conditions and Co-morbidities  Goal: Patient's chronic conditions and co-morbidity symptoms are monitored and maintained or improved  1/17/2024 0243 by Shauna Puri RN  Outcome: Progressing     Problem: ABCDS Injury Assessment  Goal: Absence of physical injury  1/16/2024 1820 by Chantel Lew RN  Outcome: Progressing     Problem: Pain  Goal: Verbalizes/displays adequate comfort level or baseline comfort level  1/17/2024 0243 by Shauna Puri RN  Outcome: Progressing  Note: Assessed all pain characteristics including level, type, location, frequency, and onset.  Non-pharmacologic interventions offered to pt as well.  Pt states pain is tolerable at this time.       Problem: Discharge Planning  Goal: Discharge to home or other facility with appropriate resources  1/17/2024 0243 by Shauna Puri, RN  Outcome: Progressing

## 2024-01-17 NOTE — CARE COORDINATION
ONGOING DISCHARGE PLAN:    Patient is alert and oriented x4.    Spoke with patient regarding discharge plan and patient confirms that plan is still to discharge to home     Patient will need a home ox eval, Pt states that she owes money to [employer] will need to use a new company    Home ox eval on discharge    Lasix      Will continue to follow for additional discharge needs.    If patient is discharged prior to next notation, then this note serves as note for discharge by case management.    Electronically signed by Cheryl Randle RN on 1/17/2024 at 11:48 AM

## 2024-01-18 LAB
ALBUMIN SERPL-MCNC: 3.3 G/DL (ref 3.5–5.2)
ALP SERPL-CCNC: 125 U/L (ref 35–104)
ALT SERPL-CCNC: 47 U/L (ref 5–33)
ANION GAP SERPL CALCULATED.3IONS-SCNC: 7 MMOL/L (ref 9–17)
AST SERPL-CCNC: 28 U/L
BASOPHILS # BLD: 0.1 K/UL (ref 0–0.2)
BASOPHILS NFR BLD: 1 % (ref 0–2)
BILIRUB SERPL-MCNC: 0.3 MG/DL (ref 0.3–1.2)
BUN SERPL-MCNC: 48 MG/DL (ref 8–23)
CALCIUM SERPL-MCNC: 9 MG/DL (ref 8.6–10.4)
CHLORIDE SERPL-SCNC: 101 MMOL/L (ref 98–107)
CO2 SERPL-SCNC: 29 MMOL/L (ref 20–31)
CREAT SERPL-MCNC: 1.2 MG/DL (ref 0.5–0.9)
EOSINOPHIL # BLD: 0.3 K/UL (ref 0–0.4)
EOSINOPHILS RELATIVE PERCENT: 4 % (ref 0–4)
ERYTHROCYTE [DISTWIDTH] IN BLOOD BY AUTOMATED COUNT: 14.8 % (ref 11.5–14.9)
GFR SERPL CREATININE-BSD FRML MDRD: 47 ML/MIN/1.73M2
GLUCOSE BLD-MCNC: 105 MG/DL (ref 65–105)
GLUCOSE BLD-MCNC: 121 MG/DL (ref 65–105)
GLUCOSE BLD-MCNC: 130 MG/DL (ref 65–105)
GLUCOSE SERPL-MCNC: 100 MG/DL (ref 70–99)
HCT VFR BLD AUTO: 33.8 % (ref 36–46)
HGB BLD-MCNC: 11.2 G/DL (ref 12–16)
LYMPHOCYTES NFR BLD: 1.3 K/UL (ref 1–4.8)
LYMPHOCYTES RELATIVE PERCENT: 20 % (ref 24–44)
MCH RBC QN AUTO: 29 PG (ref 26–34)
MCHC RBC AUTO-ENTMCNC: 33 G/DL (ref 31–37)
MCV RBC AUTO: 88 FL (ref 80–100)
MONOCYTES NFR BLD: 0.6 K/UL (ref 0.1–1.3)
MONOCYTES NFR BLD: 10 % (ref 1–7)
NEUTROPHILS NFR BLD: 65 % (ref 36–66)
NEUTS SEG NFR BLD: 4 K/UL (ref 1.3–9.1)
PLATELET # BLD AUTO: 225 K/UL (ref 150–450)
PMV BLD AUTO: 8.4 FL (ref 6–12)
POTASSIUM SERPL-SCNC: 5.4 MMOL/L (ref 3.7–5.3)
PROT SERPL-MCNC: 5.6 G/DL (ref 6.4–8.3)
RBC # BLD AUTO: 3.85 M/UL (ref 4–5.2)
SODIUM SERPL-SCNC: 137 MMOL/L (ref 135–144)
WBC OTHER # BLD: 6.2 K/UL (ref 3.5–11)

## 2024-01-18 PROCEDURE — 80053 COMPREHEN METABOLIC PANEL: CPT

## 2024-01-18 PROCEDURE — 6370000000 HC RX 637 (ALT 250 FOR IP): Performed by: INTERNAL MEDICINE

## 2024-01-18 PROCEDURE — 36415 COLL VENOUS BLD VENIPUNCTURE: CPT

## 2024-01-18 PROCEDURE — 97165 OT EVAL LOW COMPLEX 30 MIN: CPT

## 2024-01-18 PROCEDURE — 99232 SBSQ HOSP IP/OBS MODERATE 35: CPT | Performed by: INTERNAL MEDICINE

## 2024-01-18 PROCEDURE — 94640 AIRWAY INHALATION TREATMENT: CPT

## 2024-01-18 PROCEDURE — 2060000000 HC ICU INTERMEDIATE R&B

## 2024-01-18 PROCEDURE — 6370000000 HC RX 637 (ALT 250 FOR IP): Performed by: NURSE PRACTITIONER

## 2024-01-18 PROCEDURE — 6360000002 HC RX W HCPCS: Performed by: NURSE PRACTITIONER

## 2024-01-18 PROCEDURE — 85025 COMPLETE CBC W/AUTO DIFF WBC: CPT

## 2024-01-18 PROCEDURE — 94760 N-INVAS EAR/PLS OXIMETRY 1: CPT

## 2024-01-18 PROCEDURE — 97161 PT EVAL LOW COMPLEX 20 MIN: CPT

## 2024-01-18 PROCEDURE — 2700000000 HC OXYGEN THERAPY PER DAY

## 2024-01-18 PROCEDURE — 2580000003 HC RX 258: Performed by: NURSE PRACTITIONER

## 2024-01-18 PROCEDURE — 82947 ASSAY GLUCOSE BLOOD QUANT: CPT

## 2024-01-18 RX ADMIN — Medication 3 MG: at 22:58

## 2024-01-18 RX ADMIN — METOPROLOL TARTRATE 25 MG: 25 TABLET, FILM COATED ORAL at 08:39

## 2024-01-18 RX ADMIN — FUROSEMIDE 20 MG: 10 INJECTION, SOLUTION INTRAMUSCULAR; INTRAVENOUS at 08:38

## 2024-01-18 RX ADMIN — FLUTICASONE PROPIONATE 2 SPRAY: 50 SPRAY, METERED NASAL at 08:45

## 2024-01-18 RX ADMIN — HYDROCODONE BITARTRATE AND ACETAMINOPHEN 1 TABLET: 5; 325 TABLET ORAL at 08:38

## 2024-01-18 RX ADMIN — SODIUM ZIRCONIUM CYCLOSILICATE 10 G: 5 POWDER, FOR SUSPENSION ORAL at 10:47

## 2024-01-18 RX ADMIN — CLOPIDOGREL BISULFATE 75 MG: 75 TABLET ORAL at 08:39

## 2024-01-18 RX ADMIN — BUDESONIDE AND FORMOTEROL FUMARATE DIHYDRATE 2 PUFF: 160; 4.5 AEROSOL RESPIRATORY (INHALATION) at 07:07

## 2024-01-18 RX ADMIN — IPRATROPIUM BROMIDE AND ALBUTEROL SULFATE 1 DOSE: 2.5; .5 SOLUTION RESPIRATORY (INHALATION) at 19:39

## 2024-01-18 RX ADMIN — TRAZODONE HYDROCHLORIDE 50 MG: 50 TABLET ORAL at 22:58

## 2024-01-18 RX ADMIN — IPRATROPIUM BROMIDE AND ALBUTEROL SULFATE 1 DOSE: 2.5; .5 SOLUTION RESPIRATORY (INHALATION) at 07:07

## 2024-01-18 RX ADMIN — IPRATROPIUM BROMIDE AND ALBUTEROL SULFATE 1 DOSE: 2.5; .5 SOLUTION RESPIRATORY (INHALATION) at 11:27

## 2024-01-18 RX ADMIN — IPRATROPIUM BROMIDE AND ALBUTEROL SULFATE 1 DOSE: 2.5; .5 SOLUTION RESPIRATORY (INHALATION) at 15:15

## 2024-01-18 RX ADMIN — CITALOPRAM HYDROBROMIDE 20 MG: 20 TABLET ORAL at 08:39

## 2024-01-18 RX ADMIN — ATORVASTATIN CALCIUM 80 MG: 80 TABLET, FILM COATED ORAL at 08:39

## 2024-01-18 RX ADMIN — ENOXAPARIN SODIUM 40 MG: 100 INJECTION SUBCUTANEOUS at 08:38

## 2024-01-18 RX ADMIN — METOPROLOL TARTRATE 25 MG: 25 TABLET, FILM COATED ORAL at 22:59

## 2024-01-18 RX ADMIN — HYDROCODONE BITARTRATE AND ACETAMINOPHEN 1 TABLET: 5; 325 TABLET ORAL at 19:08

## 2024-01-18 RX ADMIN — ASPIRIN 81 MG: 81 TABLET, COATED ORAL at 08:38

## 2024-01-18 RX ADMIN — BUDESONIDE AND FORMOTEROL FUMARATE DIHYDRATE 2 PUFF: 160; 4.5 AEROSOL RESPIRATORY (INHALATION) at 19:44

## 2024-01-18 RX ADMIN — SODIUM CHLORIDE, PRESERVATIVE FREE 10 ML: 5 INJECTION INTRAVENOUS at 08:40

## 2024-01-18 RX ADMIN — SODIUM CHLORIDE, PRESERVATIVE FREE 10 ML: 5 INJECTION INTRAVENOUS at 23:01

## 2024-01-18 ASSESSMENT — PAIN DESCRIPTION - ONSET: ONSET: ON-GOING

## 2024-01-18 ASSESSMENT — PAIN SCALES - GENERAL
PAINLEVEL_OUTOF10: 7
PAINLEVEL_OUTOF10: 1
PAINLEVEL_OUTOF10: 10
PAINLEVEL_OUTOF10: 6
PAINLEVEL_OUTOF10: 0
PAINLEVEL_OUTOF10: 0
PAINLEVEL_OUTOF10: 10

## 2024-01-18 ASSESSMENT — PAIN DESCRIPTION - DESCRIPTORS
DESCRIPTORS: ACHING;DISCOMFORT
DESCRIPTORS: ACHING

## 2024-01-18 ASSESSMENT — PAIN DESCRIPTION - LOCATION
LOCATION: NECK

## 2024-01-18 ASSESSMENT — PAIN DESCRIPTION - ORIENTATION
ORIENTATION: MID
ORIENTATION: MID

## 2024-01-18 ASSESSMENT — PAIN DESCRIPTION - FREQUENCY: FREQUENCY: CONTINUOUS

## 2024-01-18 ASSESSMENT — PAIN DESCRIPTION - PAIN TYPE
TYPE: CHRONIC PAIN
TYPE: CHRONIC PAIN

## 2024-01-18 NOTE — PROGRESS NOTES
[facility] Internal Medicine  Johnny Samuel MD; Stan Michaud MD; Juan Carlos Todd MD; MD Andra Weaver MD; Chip Anderson MD    [facility] ER Internal Medicine   IN-PATIENT SERVICE   [facility]    Progress note             Date:   1/18/2024  Patient name:  Graciela Holt  Date of admission:  1/15/2024 12:54 AM  MRN:   [id number]  Account:  [id number]  YOB: 1950  PCP:    Travis Mason MD  Room:   2110/2110-01  Code Status:    Full Code    Chief Complaint:     Chief Complaint   Patient presents with    Shortness of Breath       History Obtained From:     Patient/EMR/Bedside RN    History of Present Illness:     Graciela Holt is a 73 y.o. Non- / non  female who presents with Shortness of Breath   and is admitted to the hospital for the management of Acute decompensated heart failure (HCC).  According to patient,she developed shortness of breath and had a hard time breathing that started this afternoon.  Symptoms began while at rest watching TV and progressively worsened with time.  Patient reports significant history for COPD and CHF.  States that she was evicted from her residence approximately 1 year ago and in the process lost her oxygen concentrator and nebulizer machine.  Additionally, is currently residing with a friend, but she has no phone to call and make any appointments or to try to get the equipment at, medications, and services she needs.  Patient reports having an occasional dry cough and has 1+ edema of Bilateral lower legs.  Denies wheezing.  States that she quit smoking last September while admitted to the hospital.  Reports that she has been out of many of her medications since she was last admitted in September.     Patient status inpatient in the Progressive Unit/Step down      Past Medical History:     Past Medical History:   Diagnosis Date    Allergic rhinitis     Arthritis     general    CAD (coronary

## 2024-01-18 NOTE — PROGRESS NOTES
CHF clinic referral placed for outpt and they were notified of the consult. See orders for details

## 2024-01-18 NOTE — PROGRESS NOTES
Method: Automatic  Patient Position: Sitting  MAP (Calculated): 88  Respirations: 16  SpO2: 95 %  O2 Device: Nasal cannula (1L)  Temp: 98 °F (36.7 °C)  Comment: pt at 94% at rest, and 94% after mobility/activity     Observation/Palpation  Posture: Good  Gross Assessment  AROM: Generally decreased, functional  PROM: Within functional limits  Strength: Generally decreased, functional  Coordination: Within functional limits  Tone: Normal  Sensation: Impaired        Strength RLE  Strength RLE: WFL  Strength LLE  Strength LLE: WFL  Tone RLE  RLE Tone: Normotonic  Tone LLE  LLE Tone: Normotonic  Coordination  Movements Are Fluid And Coordinated: Yes  Motor Control  Gross Motor?: WFL  Sensation  Overall Sensation Status:  (reports some tingling in toes)     Bed mobility  Supine to Sit: Independent  Sit to Supine: Independent  Scooting: Independent  Bed Mobility Comments: flat HOB  Transfers  Sit to Stand: Independent  Stand to Sit: Independent  Bed to Chair: Independent  Stand Pivot Transfers: Independent  Comment: Indep, no LOB, performed transfer to bathroom and from bathroom to chair  Ambulation  Surface: Level tile  Device: No Device  Other Apparatus: O2  Assistance: Independent  Gait Deviations: None  Distance: 40ft in room, on O2  Comments: pt with no concerns with ambulation, O2 remained stable  Stairs/Curb  Stairs?: No     Balance  Posture: Good  Sitting - Static: Good  Sitting - Dynamic: Good  Standing - Static: Good  Standing - Dynamic: Good  Exercise Treatment: performed evaluation          AM-PAC - Mobility    AM-PAC Basic Mobility - Inpatient   How much help is needed turning from your back to your side while in a flat bed without using bedrails?: None  How much help is needed moving from lying on your back to sitting on the side of a flat bed without using bedrails?: None  How much help is needed moving to and from a bed to a chair?: None  How much help is needed standing up from a chair using your arms?:  None  How much help is needed walking in hospital room?: None  How much help is needed climbing 3-5 steps with a railing?: None  AM-PAC Inpatient Mobility Raw Score : 24  AM-PAC Inpatient T-Scale Score : 61.14  Mobility Inpatient CMS 0-100% Score: 0  Mobility Inpatient CMS G-Code Modifier : CH           Goals  Patient Goals   Patient Goals : to go home       Education  Patient Education  Education Given To: Patient  Education Provided: Role of Therapy;Plan of Care;Home Exercise Program  Education Method: Verbal  Barriers to Learning: None  Education Outcome: Verbalized understanding      Therapy Time   Individual Concurrent Group Co-treatment   Time In 1028         Time Out 1046         Minutes 18                 Rowena Moore PT

## 2024-01-18 NOTE — PROGRESS NOTES
Occupational Therapy  [facility]   Occupational Therapy Evaluation  Date: 24  Patient Name: Graciela Holt       Room: 0-01  MRN: [id number]  Account: [id number]   : 1950  (73 y.o.) Gender: female     Discharge Recommendations:  The patient's needs are being met with no further Occupational Therapy recommended at discharge.     OT Equipment Recommendations  Equipment Needed: No    Referring Practitioner: Chip Anderson MD  Diagnosis: Acute decompensated heart failure Additional Pertinent Hx: Per chart: 73-year-old female presenting to the ER complaining of shortness of breath.  Patient has been prescribed oxygen at home for the last year.  Patient however has not been able to use it at home because of the fact that she had an infection.  The patient is also had trouble with her medications that she uses for COPD as well as her heart failure.  Patient called EMS today because of increased shortness of breath that started earlier in the day.         Past Medical History:  has a past medical history of Allergic rhinitis, Arthritis, CAD (coronary artery disease), Cerebral artery occlusion with cerebral infarction (HCC), CHF (congestive heart failure) (HCC), Controlled type 2 diabetes mellitus without complication, without long-term current use of insulin (HCC), COPD (chronic obstructive pulmonary disease) (HCC), Depression, Former smoker, History of blood transfusion, Hyperlipidemia, Hypertension, Kidney stone, Myocardial infarction (HCC), Obesity, Class I, BMI 30.0-34.9 (see actual BMI), Restless leg syndrome, Skin abnormality, Type II or unspecified type diabetes mellitus without mention of complication, not stated as uncontrolled, Wears glasses, and Wears partial dentures.    Past Surgical History:   has a past surgical history that includes Appendectomy; Hysterectomy; Cholecystectomy; joint replacement (Bilateral); pr office/outpt visit,procedure only (N/A, 2018);  EST

## 2024-01-18 NOTE — PLAN OF CARE
Problem: Discharge Planning  Goal: Discharge to home or other facility with appropriate resources  Outcome: Progressing  Flowsheets (Taken 1/18/2024 0241)  Discharge to home or other facility with appropriate resources:   Identify barriers to discharge with patient and caregiver   Refer to discharge planning if patient needs post-hospital services based on physician order or complex needs related to functional status, cognitive ability or social support system     Problem: Safety - Adult  Goal: Free from fall injury  Outcome: Progressing  Note: Patient instructed on safety measures.     Problem: Pain  Goal: Verbalizes/displays adequate comfort level or baseline comfort level  Outcome: Progressing  Flowsheets (Taken 1/18/2024 0241)  Verbalizes/displays adequate comfort level or baseline comfort level:   Encourage patient to monitor pain and request assistance   Assess pain using appropriate pain scale   Administer analgesics based on type and severity of pain and evaluate response   Implement non-pharmacological measures as appropriate and evaluate response     Problem: ABCDS Injury Assessment  Goal: Absence of physical injury  Outcome: Progressing  Flowsheets (Taken 1/18/2024 0241)  Absence of Physical Injury: Implement safety measures based on patient assessment     Problem: Chronic Conditions and Co-morbidities  Goal: Patient's chronic conditions and co-morbidity symptoms are monitored and maintained or improved  Outcome: Progressing  Flowsheets (Taken 1/18/2024 0241)  Care Plan - Patient's Chronic Conditions and Co-Morbidity Symptoms are Monitored and Maintained or Improved: Monitor and assess patient's chronic conditions and comorbid symptoms for stability, deterioration, or improvement

## 2024-01-18 NOTE — PLAN OF CARE
Problem: Discharge Planning  Goal: Discharge to home or other facility with appropriate resources  Outcome: Progressing  Flowsheets (Taken 1/18/2024 0241 by Codi Thomas, RN)  Discharge to home or other facility with appropriate resources:   Identify barriers to discharge with patient and caregiver   Refer to discharge planning if patient needs post-hospital services based on physician order or complex needs related to functional status, cognitive ability or social support system     Problem: Safety - Adult  Goal: Free from fall injury  Outcome: Progressing  Flowsheets (Taken 1/18/2024 1836)  Free From Fall Injury: Instruct family/caregiver on patient safety     Problem: Pain  Goal: Verbalizes/displays adequate comfort level or baseline comfort level  Outcome: Progressing  Flowsheets (Taken 1/18/2024 0241 by Codi Thomas, RN)  Verbalizes/displays adequate comfort level or baseline comfort level:   Encourage patient to monitor pain and request assistance   Assess pain using appropriate pain scale   Administer analgesics based on type and severity of pain and evaluate response   Implement non-pharmacological measures as appropriate and evaluate response     Problem: ABCDS Injury Assessment  Goal: Absence of physical injury  Outcome: Progressing  Flowsheets (Taken 1/18/2024 0853)  Absence of Physical Injury: Implement safety measures based on patient assessment     Problem: Chronic Conditions and Co-morbidities  Goal: Patient's chronic conditions and co-morbidity symptoms are monitored and maintained or improved  Outcome: Progressing  Flowsheets (Taken 1/18/2024 0241 by Codi Thomas, RN)  Care Plan - Patient's Chronic Conditions and Co-Morbidity Symptoms are Monitored and Maintained or Improved: Monitor and assess patient's chronic conditions and comorbid symptoms for stability, deterioration, or improvement

## 2024-01-18 NOTE — PROGRESS NOTES
01/18/24 1544   Encounter Summary   Encounter Overview/Reason  Spiritual/Emotional Needs   Service Provided For: Patient   Referral/Consult From: [facility], [place]   Support System Friends/neighbors   Last Encounter  01/18/24   Complexity of Encounter Moderate   Spiritual/Emotional needs   Type Spiritual Support   Assessment/Intervention/Outcome   Assessment Calm;Coping;Hopeful;Peaceful   Intervention Active listening;Explored/Affirmed feelings, thoughts, concerns;Prayer (assurance of)/[place];Sustaining Presence/Ministry of presence   Outcome Acceptance;Coping;Engaged in conversation;Receptive;Peace

## 2024-01-18 NOTE — CARE COORDINATION
ONGOING DISCHARGE PLAN:    Patient is alert and oriented x4.    Spoke with patient regarding discharge plan and patient confirms that plan is still to Return to home w/ her Friends.     Denies VNS.     Following for Home Oxygen Pt. States \"she owes money to [facility] & does NOT have any Oxygen currently at home, for in Sept, Her Landlord took EVERYTHING out of her Home\".     Writer spoke to Komal from [facility], \"they are current with pt & even though she does owe Money, they will follow for Oxygen for her \", Will need new orders/Eval.    Currently sating 95% on 1LNC.     K+ today 5.4.    Will continue to follow for additional discharge needs.    If patient is discharged prior to next notation, then this note serves as note for discharge by case management.    Electronically signed by Aster Pelaez RN on 1/18/2024 at 12:49 PM

## 2024-01-18 NOTE — PROGRESS NOTES
Date:   2024  Patient name: Graciela Holt  Date of admission:  1/15/2024 12:54 AM  MRN:   [id number]  YOB: 1950  PCP: Travis Mason MD    Reason for Admission: Acute respiratory failure (HCC) [J96.00]  Tachypnea [R06.82]  Community acquired pneumonia of right middle lobe of lung [J18.9]    Cardiology follow-up: Nonsustained V. tach, CAD, CABG, severely reduced LV systolic function        Referring physician: Dr Chip Anderson     Impression     1 episode of 5 beat V. Tach  at 1637 1-15-24, K 3.5 slightly prolonged QT interval  Congestive heart failure systolic and diastolic, ejection fraction 25 to 30%  Multivessel coronary artery disease  CABG LIMA to LAD and diagonal 1, SVG to OM, SVG to PDA 2018 at [facility]  History of left MCA stroke  Occluded internal carotid artery  Diabetes mellitus  Hyperlipidemia    Past surgical history  Bilateral knee replacement, C-spine surgery, cholecystectomy,  section, CABG    Drug allergies codeine, lisinopril     Investigation workup     ECG 1/15/2024  Sinus rhythm heart rate 79, voltage criteria for LVH with repolarization changes, borderline R wave progression, prolonged QT interval     Chest x-ray 1/15/2024  Left basilar atelectasis otherwise lungs are clear  Cardiomegaly unchanged     2D echo 2023  Severely reduced LV systolic function ejection fraction 25 to 30%, dilated LV  Moderately increased LV wall thickness, global hypokinesis  Akinesis of the apical lateral anterolateral walls akinesis of apex  Probable apical thrombus  Dilated LA    Cardiac cath 2018  Left main 0%, LAD mid 80%, ostial diagonal 170%, proximal obtuse marginal 170%, RCA proximal 80%, mid RCA 80%     History of present illness     73-year-old female who lives with her son with a past medical history of multivessel coronary artery disease, coronary artery bypass surgery, carotid surgery, COPD got hospitalized on 1/15/2024 with increasing

## 2024-01-19 LAB
ALBUMIN SERPL-MCNC: 3.4 G/DL (ref 3.5–5.2)
ALP SERPL-CCNC: 126 U/L (ref 35–104)
ALT SERPL-CCNC: 38 U/L (ref 5–33)
ANION GAP SERPL CALCULATED.3IONS-SCNC: 7 MMOL/L (ref 9–17)
AST SERPL-CCNC: 21 U/L
BASOPHILS # BLD: 0.1 K/UL (ref 0–0.2)
BASOPHILS NFR BLD: 1 % (ref 0–2)
BILIRUB SERPL-MCNC: 0.3 MG/DL (ref 0.3–1.2)
BUN SERPL-MCNC: 58 MG/DL (ref 8–23)
CALCIUM SERPL-MCNC: 9.3 MG/DL (ref 8.6–10.4)
CHLORIDE SERPL-SCNC: 99 MMOL/L (ref 98–107)
CO2 SERPL-SCNC: 30 MMOL/L (ref 20–31)
CREAT SERPL-MCNC: 1.2 MG/DL (ref 0.5–0.9)
EOSINOPHIL # BLD: 0.2 K/UL (ref 0–0.4)
EOSINOPHILS RELATIVE PERCENT: 3 % (ref 0–4)
ERYTHROCYTE [DISTWIDTH] IN BLOOD BY AUTOMATED COUNT: 14.9 % (ref 11.5–14.9)
GFR SERPL CREATININE-BSD FRML MDRD: 47 ML/MIN/1.73M2
GLUCOSE BLD-MCNC: 127 MG/DL (ref 65–105)
GLUCOSE BLD-MCNC: 133 MG/DL (ref 65–105)
GLUCOSE BLD-MCNC: 140 MG/DL (ref 65–105)
GLUCOSE BLD-MCNC: 143 MG/DL (ref 65–105)
GLUCOSE SERPL-MCNC: 120 MG/DL (ref 70–99)
HCT VFR BLD AUTO: 36.8 % (ref 36–46)
HGB BLD-MCNC: 12 G/DL (ref 12–16)
LYMPHOCYTES NFR BLD: 1.1 K/UL (ref 1–4.8)
LYMPHOCYTES RELATIVE PERCENT: 14 % (ref 24–44)
MCH RBC QN AUTO: 28.8 PG (ref 26–34)
MCHC RBC AUTO-ENTMCNC: 32.7 G/DL (ref 31–37)
MCV RBC AUTO: 87.9 FL (ref 80–100)
MONOCYTES NFR BLD: 0.8 K/UL (ref 0.1–1.3)
MONOCYTES NFR BLD: 10 % (ref 1–7)
NEUTROPHILS NFR BLD: 72 % (ref 36–66)
NEUTS SEG NFR BLD: 5.5 K/UL (ref 1.3–9.1)
PLATELET # BLD AUTO: 254 K/UL (ref 150–450)
PMV BLD AUTO: 8.1 FL (ref 6–12)
POTASSIUM SERPL-SCNC: 5.1 MMOL/L (ref 3.7–5.3)
PROT SERPL-MCNC: 6 G/DL (ref 6.4–8.3)
RBC # BLD AUTO: 4.19 M/UL (ref 4–5.2)
SODIUM SERPL-SCNC: 136 MMOL/L (ref 135–144)
WBC OTHER # BLD: 7.6 K/UL (ref 3.5–11)

## 2024-01-19 PROCEDURE — 6370000000 HC RX 637 (ALT 250 FOR IP): Performed by: NURSE PRACTITIONER

## 2024-01-19 PROCEDURE — 2060000000 HC ICU INTERMEDIATE R&B

## 2024-01-19 PROCEDURE — 94761 N-INVAS EAR/PLS OXIMETRY MLT: CPT

## 2024-01-19 PROCEDURE — 6360000002 HC RX W HCPCS: Performed by: NURSE PRACTITIONER

## 2024-01-19 PROCEDURE — 2580000003 HC RX 258: Performed by: NURSE PRACTITIONER

## 2024-01-19 PROCEDURE — 99232 SBSQ HOSP IP/OBS MODERATE 35: CPT | Performed by: INTERNAL MEDICINE

## 2024-01-19 PROCEDURE — 80053 COMPREHEN METABOLIC PANEL: CPT

## 2024-01-19 PROCEDURE — 2700000000 HC OXYGEN THERAPY PER DAY

## 2024-01-19 PROCEDURE — 6370000000 HC RX 637 (ALT 250 FOR IP): Performed by: INTERNAL MEDICINE

## 2024-01-19 PROCEDURE — 85025 COMPLETE CBC W/AUTO DIFF WBC: CPT

## 2024-01-19 PROCEDURE — 94640 AIRWAY INHALATION TREATMENT: CPT

## 2024-01-19 PROCEDURE — 36415 COLL VENOUS BLD VENIPUNCTURE: CPT

## 2024-01-19 PROCEDURE — 82947 ASSAY GLUCOSE BLOOD QUANT: CPT

## 2024-01-19 RX ORDER — ALBUTEROL SULFATE 2.5 MG/3ML
2.5 SOLUTION RESPIRATORY (INHALATION) EVERY 4 HOURS PRN
Status: DISCONTINUED | OUTPATIENT
Start: 2024-01-19 | End: 2024-01-20 | Stop reason: HOSPADM

## 2024-01-19 RX ORDER — ALBUTEROL SULFATE 2.5 MG/3ML
SOLUTION RESPIRATORY (INHALATION)
Status: DISPENSED
Start: 2024-01-19 | End: 2024-01-19

## 2024-01-19 RX ADMIN — HYDROCODONE BITARTRATE AND ACETAMINOPHEN 1 TABLET: 5; 325 TABLET ORAL at 19:07

## 2024-01-19 RX ADMIN — FLUTICASONE PROPIONATE 2 SPRAY: 50 SPRAY, METERED NASAL at 08:29

## 2024-01-19 RX ADMIN — IPRATROPIUM BROMIDE AND ALBUTEROL SULFATE 1 DOSE: 2.5; .5 SOLUTION RESPIRATORY (INHALATION) at 08:04

## 2024-01-19 RX ADMIN — ALBUTEROL SULFATE 2.5 MG: 2.5 SOLUTION RESPIRATORY (INHALATION) at 04:50

## 2024-01-19 RX ADMIN — ATORVASTATIN CALCIUM 80 MG: 80 TABLET, FILM COATED ORAL at 08:28

## 2024-01-19 RX ADMIN — HYDROCODONE BITARTRATE AND ACETAMINOPHEN 1 TABLET: 5; 325 TABLET ORAL at 01:15

## 2024-01-19 RX ADMIN — ASPIRIN 81 MG: 81 TABLET, COATED ORAL at 08:28

## 2024-01-19 RX ADMIN — SODIUM CHLORIDE, PRESERVATIVE FREE 10 ML: 5 INJECTION INTRAVENOUS at 08:33

## 2024-01-19 RX ADMIN — SODIUM CHLORIDE, PRESERVATIVE FREE 5 ML: 5 INJECTION INTRAVENOUS at 19:43

## 2024-01-19 RX ADMIN — BUDESONIDE AND FORMOTEROL FUMARATE DIHYDRATE 2 PUFF: 160; 4.5 AEROSOL RESPIRATORY (INHALATION) at 08:18

## 2024-01-19 RX ADMIN — IPRATROPIUM BROMIDE AND ALBUTEROL SULFATE 1 DOSE: 2.5; .5 SOLUTION RESPIRATORY (INHALATION) at 11:03

## 2024-01-19 RX ADMIN — ENOXAPARIN SODIUM 40 MG: 100 INJECTION SUBCUTANEOUS at 08:29

## 2024-01-19 RX ADMIN — METOPROLOL TARTRATE 25 MG: 25 TABLET, FILM COATED ORAL at 19:07

## 2024-01-19 RX ADMIN — Medication 3 MG: at 19:07

## 2024-01-19 RX ADMIN — CITALOPRAM HYDROBROMIDE 20 MG: 20 TABLET ORAL at 08:29

## 2024-01-19 RX ADMIN — IPRATROPIUM BROMIDE AND ALBUTEROL SULFATE 1 DOSE: 2.5; .5 SOLUTION RESPIRATORY (INHALATION) at 14:59

## 2024-01-19 RX ADMIN — HYDROCODONE BITARTRATE AND ACETAMINOPHEN 1 TABLET: 5; 325 TABLET ORAL at 08:41

## 2024-01-19 RX ADMIN — CLOPIDOGREL BISULFATE 75 MG: 75 TABLET ORAL at 08:29

## 2024-01-19 RX ADMIN — TRAZODONE HYDROCHLORIDE 50 MG: 50 TABLET ORAL at 22:37

## 2024-01-19 RX ADMIN — METOPROLOL TARTRATE 25 MG: 25 TABLET, FILM COATED ORAL at 08:28

## 2024-01-19 ASSESSMENT — PAIN SCALES - GENERAL
PAINLEVEL_OUTOF10: 10
PAINLEVEL_OUTOF10: 0
PAINLEVEL_OUTOF10: 10
PAINLEVEL_OUTOF10: 0
PAINLEVEL_OUTOF10: 10

## 2024-01-19 ASSESSMENT — PAIN DESCRIPTION - LOCATION
LOCATION: NECK

## 2024-01-19 ASSESSMENT — PAIN DESCRIPTION - PAIN TYPE: TYPE: CHRONIC PAIN

## 2024-01-19 ASSESSMENT — PAIN DESCRIPTION - FREQUENCY: FREQUENCY: CONTINUOUS

## 2024-01-19 ASSESSMENT — PAIN DESCRIPTION - ONSET: ONSET: ON-GOING

## 2024-01-19 NOTE — CARE COORDINATION
ONGOING DISCHARGE PLAN:    Patient is alert and oriented x4.    Spoke with patient regarding discharge plan and patient confirms that plan is still to return to home w/ her Friends.    Denies VNS.     Pt. Had Home O2 Eval. Did NOT qualify. Was 93% on RA w/ Exercise.     Pt. Is Current w/ [facility],  \"she owes money to [facility] & does NOT have any Oxygen currently at home, for in Sept, Her Landlord took EVERYTHING out of her Home\".     Per Komal, they would still follow for Oxygen, if needed.     ?DC to home today.     Will continue to follow for additional discharge needs.    If patient is discharged prior to next notation, then this note serves as note for discharge by case management.    Electronically signed by Aster Pelaez RN on 1/19/2024 at 2:35 PM

## 2024-01-19 NOTE — PROGRESS NOTES
Home Oxygen Evaluation    Room air SpO2 at Rest = 94%    Room air with exercise/exertion = 93%    SpO2 on prescribed O2 level at  0  LPM  at rest = N/A     with exercise/exertion = N/A    Nocturnal Oximetry with patient on room air recommended if the resting SpO2 is 89% to 95%. (Requires additional order)

## 2024-01-19 NOTE — PROGRESS NOTES
Savanna Mata MD at [facility]        Medications Prior to Admission:     Prior to Admission medications    Medication Sig Start Date End Date Taking? Authorizing Provider   furosemide (LASIX) 20 MG tablet Take 1 tablet by mouth daily 9/11/23  Yes Pearl Choudhary MD   potassium chloride (KLOR-CON) 10 MEQ extended release tablet TAKE 2 TABLETS BY MOUTH DAILY 5/24/23  Yes Travis Mason MD   clopidogrel (PLAVIX) 75 MG tablet Take 1 tablet by mouth daily 5/22/23  Yes Travis Mason MD   albuterol sulfate  (90 Base) MCG/ACT inhaler Inhale 2 puffs into the lungs every 6 hours as needed for Wheezing or Shortness of Breath 5/16/22  Yes Kisha Wagner MD   nitroGLYCERIN (NITROSTAT) 0.4 MG SL tablet Place 1 tablet under the tongue every 5 minutes as needed for Chest pain up to max of 3 total doses. If no relief after 1 dose, call 911. 8/9/21  Yes Aurora Escalona MD   fluticasone (FLONASE) 50 MCG/ACT nasal spray USE 2 SPRAYS IN EACH NOSTRIL ONCE DAILY  Patient taking differently: 1 spray by Nasal route daily as needed for Rhinitis 5/21/21  Yes Aurora Escalona MD   metoprolol tartrate (LOPRESSOR) 25 MG tablet Take 0.5 tablets by mouth 2 times daily 9/10/23   Pearl Choudhary MD   atorvastatin (LIPITOR) 80 MG tablet Take 1 tablet by mouth daily 5/26/23   Travis Mason MD   traZODone (DESYREL) 50 MG tablet TAKE 1 TABLET BY MOUTH EVERY NIGHT AT BEDTIME AS NEEDED FOR SLEEP 2/13/23   Aurora Escalona MD   citalopram (CELEXA) 20 MG tablet TAKE 1 TABLET BY MOUTH EVERY DAY 11/9/22   Aurora Escalona MD   spironolactone (ALDACTONE) 25 MG tablet Take 1 tablet by mouth daily 10/8/22   Shae Flores MD   pantoprazole (PROTONIX) 40 MG tablet Take 1 tablet by mouth every morning (before breakfast) 9/27/22   Travis Mason MD   budesonide-formoterol (SYMBICORT) 160-4.5 MCG/ACT AERO Inhale 2 puffs into the lungs 2 times daily 5/16/22   Kisha Wagner MD   ipratropium-albuterol (DUONEB) 0.5-2.5 (3)

## 2024-01-19 NOTE — PLAN OF CARE
Problem: Discharge Planning  Goal: Discharge to home or other facility with appropriate resources  1/19/2024 0041 by Codi Thomas RN  Outcome: Progressing  Flowsheets (Taken 1/18/2024 0241)  Discharge to home or other facility with appropriate resources:   Identify barriers to discharge with patient and caregiver   Refer to discharge planning if patient needs post-hospital services based on physician order or complex needs related to functional status, cognitive ability or social support system  1/18/2024 1836 by Juliet Dejesus RN  Outcome: Progressing  Flowsheets (Taken 1/18/2024 0241 by Codi Thomas, RN)  Discharge to home or other facility with appropriate resources:   Identify barriers to discharge with patient and caregiver   Refer to discharge planning if patient needs post-hospital services based on physician order or complex needs related to functional status, cognitive ability or social support system     Problem: Safety - Adult  Goal: Free from fall injury  1/19/2024 0041 by Codi Thomas RN  Outcome: Progressing  Note: Patient instructed on safety measures.  1/18/2024 1836 by Juliet Dejesus RN  Outcome: Progressing  Flowsheets (Taken 1/18/2024 1836)  Free From Fall Injury: Instruct family/caregiver on patient safety     Problem: Pain  Goal: Verbalizes/displays adequate comfort level or baseline comfort level  1/19/2024 0041 by Codi Thomas RN  Outcome: Progressing  Flowsheets (Taken 1/18/2024 0241)  Verbalizes/displays adequate comfort level or baseline comfort level:   Encourage patient to monitor pain and request assistance   Assess pain using appropriate pain scale   Administer analgesics based on type and severity of pain and evaluate response   Implement non-pharmacological measures as appropriate and evaluate response  1/18/2024 1836 by Juliet Dejesus, RN  Outcome: Progressing  Flowsheets (Taken 1/18/2024 0241 by Codi Thomas, RN)  Verbalizes/displays adequate comfort level

## 2024-01-19 NOTE — PROGRESS NOTES
Date:   2024  Patient name: Graciela Holt  Date of admission:  1/15/2024 12:54 AM  MRN:   [id number]  YOB: 1950  PCP: Travis Mason MD    Reason for Admission: Acute respiratory failure (HCC) [J96.00]  Tachypnea [R06.82]  Community acquired pneumonia of right middle lobe of lung [J18.9]    Cardiology follow-up: Nonsustained V. tach, CAD, CABG, severely reduced LV systolic function     Referring physician: Dr Chip Anderson     Impression     1 episode of 5 beat V. Tach  at 1637 1-15-24, K 3.5 slightly prolonged QT interval  Congestive heart failure systolic and diastolic, ejection fraction 25 to 30%  Multivessel coronary artery disease  CABG LIMA to LAD and diagonal 1, SVG to OM, SVG to PDA 2018 at [facility]  History of left MCA stroke  Occluded internal carotid artery  Diabetes mellitus  Hyperlipidemia  Increased serum creatinine with the diuretic       Past surgical history  Bilateral knee replacement, C-spine surgery, cholecystectomy,  section, CABG     Drug allergies codeine, lisinopril      Investigation workup     ECG 1/15/2024  Sinus rhythm heart rate 79, voltage criteria for LVH with repolarization changes, borderline R wave progression, prolonged QT interval     Chest x-ray 1/15/2024  Left basilar atelectasis otherwise lungs are clear  Cardiomegaly unchanged     2D echo 2023  Severely reduced LV systolic function ejection fraction 25 to 30%, dilated LV  Moderately increased LV wall thickness, global hypokinesis  Akinesis of the apical lateral anterolateral walls akinesis of apex  Probable apical thrombus  Dilated LA     Cardiac cath 2018  Left main 0%, LAD mid 80%, ostial diagonal 170%, proximal obtuse marginal 170%, RCA proximal 80%, mid RCA 80%     History of present illness  73-year-old female who lives with her son with a past medical history of multivessel coronary artery disease, coronary artery bypass surgery, carotid surgery, COPD got  COPD on oxygen, DuoNeb  5: CAD, CABG continue current dose of Plavix 75 mg, current dose of beta-blocker, aspirin 81 mg  Continue to hold diuretics and Aldactone  Check BMP tomorrow    Electronically signed by Tommy Flynn MD on 1/19/2024 at 6:03 PM

## 2024-01-20 VITALS
BODY MASS INDEX: 20.21 KG/M2 | TEMPERATURE: 97.7 F | WEIGHT: 118.39 LBS | DIASTOLIC BLOOD PRESSURE: 70 MMHG | HEART RATE: 75 BPM | SYSTOLIC BLOOD PRESSURE: 111 MMHG | HEIGHT: 64 IN | RESPIRATION RATE: 17 BRPM | OXYGEN SATURATION: 94 %

## 2024-01-20 LAB
ANION GAP SERPL CALCULATED.3IONS-SCNC: 9 MMOL/L (ref 9–17)
BASOPHILS # BLD: 0.1 K/UL (ref 0–0.2)
BASOPHILS NFR BLD: 1 % (ref 0–2)
BUN SERPL-MCNC: 44 MG/DL (ref 8–23)
CALCIUM SERPL-MCNC: 9.2 MG/DL (ref 8.6–10.4)
CHLORIDE SERPL-SCNC: 103 MMOL/L (ref 98–107)
CO2 SERPL-SCNC: 26 MMOL/L (ref 20–31)
CREAT SERPL-MCNC: 1 MG/DL (ref 0.5–0.9)
EOSINOPHIL # BLD: 0.2 K/UL (ref 0–0.4)
EOSINOPHILS RELATIVE PERCENT: 3 % (ref 0–4)
ERYTHROCYTE [DISTWIDTH] IN BLOOD BY AUTOMATED COUNT: 14.8 % (ref 11.5–14.9)
GFR SERPL CREATININE-BSD FRML MDRD: 59 ML/MIN/1.73M2
GLUCOSE BLD-MCNC: 109 MG/DL (ref 65–105)
GLUCOSE BLD-MCNC: 138 MG/DL (ref 65–105)
GLUCOSE SERPL-MCNC: 122 MG/DL (ref 70–99)
HCT VFR BLD AUTO: 38 % (ref 36–46)
HGB BLD-MCNC: 12.5 G/DL (ref 12–16)
LYMPHOCYTES NFR BLD: 1.2 K/UL (ref 1–4.8)
LYMPHOCYTES RELATIVE PERCENT: 16 % (ref 24–44)
MAGNESIUM SERPL-MCNC: 2.3 MG/DL (ref 1.6–2.6)
MCH RBC QN AUTO: 28.8 PG (ref 26–34)
MCHC RBC AUTO-ENTMCNC: 32.8 G/DL (ref 31–37)
MCV RBC AUTO: 87.8 FL (ref 80–100)
MONOCYTES NFR BLD: 0.9 K/UL (ref 0.1–1.3)
MONOCYTES NFR BLD: 12 % (ref 1–7)
NEUTROPHILS NFR BLD: 68 % (ref 36–66)
NEUTS SEG NFR BLD: 5 K/UL (ref 1.3–9.1)
PLATELET # BLD AUTO: 253 K/UL (ref 150–450)
PMV BLD AUTO: 8.2 FL (ref 6–12)
POTASSIUM SERPL-SCNC: 4.8 MMOL/L (ref 3.7–5.3)
RBC # BLD AUTO: 4.33 M/UL (ref 4–5.2)
SODIUM SERPL-SCNC: 138 MMOL/L (ref 135–144)
WBC OTHER # BLD: 7.3 K/UL (ref 3.5–11)

## 2024-01-20 PROCEDURE — 80048 BASIC METABOLIC PNL TOTAL CA: CPT

## 2024-01-20 PROCEDURE — 6370000000 HC RX 637 (ALT 250 FOR IP): Performed by: INTERNAL MEDICINE

## 2024-01-20 PROCEDURE — 83735 ASSAY OF MAGNESIUM: CPT

## 2024-01-20 PROCEDURE — 94761 N-INVAS EAR/PLS OXIMETRY MLT: CPT

## 2024-01-20 PROCEDURE — 94640 AIRWAY INHALATION TREATMENT: CPT

## 2024-01-20 PROCEDURE — 36415 COLL VENOUS BLD VENIPUNCTURE: CPT

## 2024-01-20 PROCEDURE — 2580000003 HC RX 258: Performed by: NURSE PRACTITIONER

## 2024-01-20 PROCEDURE — 6360000002 HC RX W HCPCS: Performed by: NURSE PRACTITIONER

## 2024-01-20 PROCEDURE — 82947 ASSAY GLUCOSE BLOOD QUANT: CPT

## 2024-01-20 PROCEDURE — 6370000000 HC RX 637 (ALT 250 FOR IP): Performed by: NURSE PRACTITIONER

## 2024-01-20 PROCEDURE — 85025 COMPLETE CBC W/AUTO DIFF WBC: CPT

## 2024-01-20 RX ORDER — POTASSIUM CHLORIDE 750 MG/1
10 TABLET, FILM COATED, EXTENDED RELEASE ORAL DAILY
Qty: 180 TABLET | Refills: 3 | Status: SHIPPED | OUTPATIENT
Start: 2024-01-20

## 2024-01-20 RX ORDER — FAMOTIDINE 40 MG/1
40 TABLET, FILM COATED ORAL EVERY EVENING
Qty: 30 TABLET | Refills: 3 | Status: SHIPPED | OUTPATIENT
Start: 2024-01-20

## 2024-01-20 RX ADMIN — BUDESONIDE AND FORMOTEROL FUMARATE DIHYDRATE 2 PUFF: 160; 4.5 AEROSOL RESPIRATORY (INHALATION) at 07:39

## 2024-01-20 RX ADMIN — ENOXAPARIN SODIUM 40 MG: 100 INJECTION SUBCUTANEOUS at 08:08

## 2024-01-20 RX ADMIN — SODIUM CHLORIDE, PRESERVATIVE FREE 10 ML: 5 INJECTION INTRAVENOUS at 08:09

## 2024-01-20 RX ADMIN — FLUTICASONE PROPIONATE 2 SPRAY: 50 SPRAY, METERED NASAL at 08:09

## 2024-01-20 RX ADMIN — IPRATROPIUM BROMIDE AND ALBUTEROL SULFATE 1 DOSE: 2.5; .5 SOLUTION RESPIRATORY (INHALATION) at 07:29

## 2024-01-20 RX ADMIN — ASPIRIN 81 MG: 81 TABLET, COATED ORAL at 08:08

## 2024-01-20 RX ADMIN — CITALOPRAM HYDROBROMIDE 20 MG: 20 TABLET ORAL at 08:08

## 2024-01-20 RX ADMIN — IPRATROPIUM BROMIDE AND ALBUTEROL SULFATE 1 DOSE: 2.5; .5 SOLUTION RESPIRATORY (INHALATION) at 10:54

## 2024-01-20 RX ADMIN — METOPROLOL TARTRATE 25 MG: 25 TABLET, FILM COATED ORAL at 08:08

## 2024-01-20 RX ADMIN — HYDROCODONE BITARTRATE AND ACETAMINOPHEN 1 TABLET: 5; 325 TABLET ORAL at 06:42

## 2024-01-20 RX ADMIN — EMPAGLIFLOZIN 10 MG: 10 TABLET, FILM COATED ORAL at 12:10

## 2024-01-20 RX ADMIN — HYDROCODONE BITARTRATE AND ACETAMINOPHEN 1 TABLET: 5; 325 TABLET ORAL at 12:59

## 2024-01-20 RX ADMIN — ATORVASTATIN CALCIUM 80 MG: 80 TABLET, FILM COATED ORAL at 08:08

## 2024-01-20 RX ADMIN — CLOPIDOGREL BISULFATE 75 MG: 75 TABLET ORAL at 08:08

## 2024-01-20 ASSESSMENT — PAIN SCALES - GENERAL
PAINLEVEL_OUTOF10: 10

## 2024-01-20 ASSESSMENT — PAIN DESCRIPTION - LOCATION
LOCATION: NECK

## 2024-01-20 NOTE — DISCHARGE INSTRUCTIONS
Discussed establishing with a new cardiologist with your primary care physician  Low-sodium diet, less than 2 g a day  Use Lasix as directed  Please note changes have been made to your medications, follow new medication list    Follow-up with the PCP and check BMP in couple of weeks  Patient should check weight every day.

## 2024-01-20 NOTE — PLAN OF CARE
Problem: Discharge Planning  Goal: Discharge to home or other facility with appropriate resources  1/20/2024 0006 by Jak Levy RN  Outcome: Progressing     Problem: Safety - Adult  Goal: Free from fall injury  1/20/2024 0006 by Jak Levy RN  Outcome: Progressing     Problem: Pain  Goal: Verbalizes/displays adequate comfort level or baseline comfort level  1/20/2024 0006 by Jak Levy RN  Outcome: Progressing     Problem: ABCDS Injury Assessment  Goal: Absence of physical injury  1/20/2024 0006 by Jak Levy RN  Outcome: Progressing     Problem: Chronic Conditions and Co-morbidities  Goal: Patient's chronic conditions and co-morbidity symptoms are monitored and maintained or improved  1/20/2024 0006 by Jak Levy RN  Outcome: Progressing

## 2024-01-20 NOTE — PROGRESS NOTES
Date:   2024  Patient name: Graciela Holt  Date of admission:  1/15/2024 12:54 AM  MRN:   [id number]  YOB: 1950  PCP: Travis Mason MD    Reason for Admission: Acute respiratory failure (HCC) [J96.00]  Tachypnea [R06.82]  Community acquired pneumonia of right middle lobe of lung [J18.9]    Cardiology follow-up: Nonsustained V. tach, CAD, CABG, severely reduced LV systolic function          Referring physician: Dr Chip Anderson     Impression     1 episode of 5 beat V. Tach  at 1637 1-15-24, K 3.5 slightly prolonged QT interval  Congestive heart failure systolic and diastolic, ejection fraction 25 to 30%  Multivessel coronary artery disease  CABG LIMA to LAD and diagonal 1, SVG to OM, SVG to PDA 2018 at [facility] (cardiologist Dr. Fowler)  History of left MCA stroke  Occluded internal carotid artery  Hyperlipidemia  Increased serum creatinine with the diuretic, improved after holding diuretics  Hyperkalemia improved after discontinuation of Aldactone    Past surgical history  Bilateral knee replacement, C-spine surgery, cholecystectomy,  section, CABG     Drug allergies codeine, lisinopril                     Investigation workup     ECG 1/15/2024  Sinus rhythm heart rate 79, voltage criteria for LVH with repolarization changes, borderline R wave progression, prolonged QT interval     Chest x-ray 1/15/2024  Left basilar atelectasis otherwise lungs are clear  Cardiomegaly unchanged     2D echo 2023  Severely reduced LV systolic function ejection fraction 25 to 30%, dilated LV  Moderately increased LV wall thickness, global hypokinesis  Akinesis of the apical lateral anterolateral walls akinesis of apex  Probable apical thrombus  Dilated LA     Cardiac cath 2018  Left main 0%, LAD mid 80%, ostial diagonal 170%, proximal obtuse marginal 170%, RCA proximal 80%, mid RCA 80%    History of present illness  73-year-old female who lives with her 2 roommates  fraction 25 to 30%  Multivessel CAD, CABG LIMA to LAD and diagonal 1 SVG to OM, SVG to PDA February 2018 at [facility] 2018  History of left MCA CVA  Occluded internal carotid artery  Diabetes mellitus  Hyperlipidemia  COPD secondary to smoking    1/20/2024 improvement in the BUN/creatinine patient is ambulating well diuretics and Aldactone on hold.  CHF compensated.  No further episode of V. tach    Patient Active Problem List:     Chronic pain     Controlled type 2 diabetes mellitus without complication, without long-term current use of insulin ([facility])     Allergic rhinitis     Hypertension goal BP (blood pressure) < 140/90     Mixed hyperlipidemia     Chronic obstructive pulmonary disease (HCC)     Coronary artery disease involving native coronary artery of native heart without angina pectoris     Primary insomnia     Diarrhea in adult patient     Restless leg syndrome     Atherosclerosis of superior mesenteric artery ([facility])     Left adrenal mass ([facility])     Former smoker     Chronic bilateral low back pain with left-sided sciatica     Hypothyroidism     S/P CABG x 4     Acute pain of right knee     Abnormal stress test     Tobacco use disorder     Neck pain     Acute ischemic left MCA stroke ([facility])     Internal carotid artery occlusion     Stenosis of left internal carotid artery     Acquired spondylolisthesis of cervical vertebra     Stenosis of cervical spine with myelopathy ([facility])     Abnormal EKG     COPD exacerbation (HCC)     Multifocal pneumonia     Hypoxia     Pneumonia     Iron deficiency anemia     Chronic combined systolic and diastolic congestive heart failure (HCC)     Type 2 diabetes mellitus with chronic kidney disease     Symptomatic congestive heart failure, pre-operative cardiovascular examination ([facility])     Acute on chronic HFrEF (heart failure with reduced ejection fraction) ([facility])     Encounter for palliative care     Goals of care, counseling/discussion     ACP (advance care planning)

## 2024-01-23 ENCOUNTER — TELEPHONE (OUTPATIENT)
Dept: FAMILY MEDICINE CLINIC | Age: 74
End: 2024-01-23

## 2024-01-23 NOTE — TELEPHONE ENCOUNTER
Care Transitions Initial Follow Up Call    Outreach made within 2 business days of discharge: Yes    Patient: Graciela Holt Patient : 1950   MRN: [id number]  Reason for Admission: There are no discharge diagnoses documented for the most recent discharge.  Discharge Date: 24       Spoke with: NO CONTACT/NO VOICEMAIL/NUMBER ON FILE INVALID     Discharge department/facility: [facility] Interactive Patient Contact:  Was patient able to fill all prescriptions:   Was patient instructed to bring all medications to the follow-up visit:   Is patient taking all medications as directed in the discharge summary?   Does patient understand their discharge instructions:   Does patient have questions or concerns that need addressed prior to 7-14 day follow up office visit:     Scheduled appointment with PCP within 7-14 days    Follow Up  No future appointments.    Verona Montenegro MA

## 2024-01-31 ENCOUNTER — TELEPHONE (OUTPATIENT)
Dept: OTHER | Age: 74
End: 2024-01-31

## 2024-01-31 NOTE — PROGRESS NOTES
Unable to reach pt several times for CHF Clinic follow-up. Called pt son, Guanakito and discussed CHF Clinic follow up needs. He informs that pt does not have a phone but he will assist her with calling back.

## 2024-02-13 ENCOUNTER — APPOINTMENT (OUTPATIENT)
Dept: GENERAL RADIOLOGY | Age: 74
End: 2024-02-13
Attending: EMERGENCY MEDICINE
Payer: COMMERCIAL

## 2024-02-13 ENCOUNTER — HOSPITAL ENCOUNTER (EMERGENCY)
Age: 74
Discharge: HOME | End: 2024-02-13
Attending: EMERGENCY MEDICINE
Payer: COMMERCIAL

## 2024-02-13 VITALS
DIASTOLIC BLOOD PRESSURE: 81 MMHG | WEIGHT: 118 LBS | TEMPERATURE: 97.6 F | SYSTOLIC BLOOD PRESSURE: 138 MMHG | OXYGEN SATURATION: 92 % | RESPIRATION RATE: 25 BRPM | BODY MASS INDEX: 20.25 KG/M2 | HEART RATE: 62 BPM

## 2024-02-13 DIAGNOSIS — I50.9 CONGESTIVE HEART FAILURE, UNSPECIFIED HF CHRONICITY, UNSPECIFIED HEART FAILURE TYPE (HCC): Primary | ICD-10-CM

## 2024-02-13 LAB
ANION GAP SERPL CALCULATED.3IONS-SCNC: 10 MMOL/L (ref 9–17)
BASOPHILS # BLD: 0.1 K/UL (ref 0–0.2)
BASOPHILS NFR BLD: 1 % (ref 0–2)
BNP SERPL-MCNC: 6809 PG/ML
BUN SERPL-MCNC: 23 MG/DL (ref 8–23)
CALCIUM SERPL-MCNC: 9.3 MG/DL (ref 8.6–10.4)
CHLORIDE SERPL-SCNC: 104 MMOL/L (ref 98–107)
CO2 SERPL-SCNC: 28 MMOL/L (ref 20–31)
CREAT SERPL-MCNC: 0.9 MG/DL (ref 0.5–0.9)
EOSINOPHIL # BLD: 0.2 K/UL (ref 0–0.4)
EOSINOPHILS RELATIVE PERCENT: 4 % (ref 0–4)
ERYTHROCYTE [DISTWIDTH] IN BLOOD BY AUTOMATED COUNT: 14.8 % (ref 11.5–14.9)
FLUAV RNA RESP QL NAA+PROBE: NOT DETECTED
FLUBV RNA RESP QL NAA+PROBE: NOT DETECTED
GFR SERPL CREATININE-BSD FRML MDRD: >60 ML/MIN/1.73M2
GLUCOSE SERPL-MCNC: 120 MG/DL (ref 70–99)
HCT VFR BLD AUTO: 41.4 % (ref 36–46)
INR PPP: 1
LYMPHOCYTES NFR BLD: 2.2 K/UL (ref 1–4.8)
LYMPHOCYTES RELATIVE PERCENT: 36 % (ref 24–44)
MCH RBC QN AUTO: 28.4 PG (ref 26–34)
MCHC RBC AUTO-ENTMCNC: 32.6 G/DL (ref 31–37)
MCV RBC AUTO: 87.1 FL (ref 80–100)
MONOCYTES NFR BLD: 0.6 K/UL (ref 0.1–1.3)
MONOCYTES NFR BLD: 10 % (ref 1–7)
MYOGLOBIN SERPL-MCNC: 49 NG/ML (ref 25–58)
MYOGLOBIN SERPL-MCNC: 49 NG/ML (ref 25–58)
NEUTROPHILS NFR BLD: 49 % (ref 36–66)
NEUTS SEG NFR BLD: 3.2 K/UL (ref 1.3–9.1)
PARTIAL THROMBOPLASTIN TIME: 25.3 SEC (ref 24–36)
PLATELET # BLD AUTO: 206 K/UL (ref 150–450)
PMV BLD AUTO: 8.4 FL (ref 6–12)
POTASSIUM SERPL-SCNC: 3.6 MMOL/L (ref 3.7–5.3)
PROTHROMBIN TIME: 13.3 SEC (ref 11.8–14.6)
RBC # BLD AUTO: 4.76 M/UL (ref 4–5.2)
SARS-COV-2 RNA RESP QL NAA+PROBE: NOT DETECTED
SODIUM SERPL-SCNC: 142 MMOL/L (ref 135–144)
SOURCE: NORMAL
SPECIMEN DESCRIPTION: NORMAL
T4 FREE SERPL-MCNC: 1.3 NG/DL (ref 0.9–1.7)
TROPONIN I SERPL HS-MCNC: 33 NG/L (ref 0–14)
TROPONIN I SERPL HS-MCNC: 38 NG/L (ref 0–14)
TSH SERPL DL<=0.05 MIU/L-ACNC: 2.02 UIU/ML (ref 0.3–5)
WBC OTHER # BLD: 6.3 K/UL (ref 3.5–11)

## 2024-02-13 PROCEDURE — 71045 X-RAY EXAM CHEST 1 VIEW: CPT

## 2024-02-13 PROCEDURE — 85025 COMPLETE CBC W/AUTO DIFF WBC: CPT

## 2024-02-13 PROCEDURE — 85730 THROMBOPLASTIN TIME PARTIAL: CPT

## 2024-02-13 PROCEDURE — 84443 ASSAY THYROID STIM HORMONE: CPT

## 2024-02-13 PROCEDURE — 87636 SARSCOV2 & INF A&B AMP PRB: CPT

## 2024-02-13 PROCEDURE — 85610 PROTHROMBIN TIME: CPT

## 2024-02-13 PROCEDURE — 83874 ASSAY OF MYOGLOBIN: CPT

## 2024-02-13 PROCEDURE — 83880 ASSAY OF NATRIURETIC PEPTIDE: CPT

## 2024-02-13 PROCEDURE — 93005 ELECTROCARDIOGRAM TRACING: CPT | Performed by: EMERGENCY MEDICINE

## 2024-02-13 PROCEDURE — 84439 ASSAY OF FREE THYROXINE: CPT

## 2024-02-13 PROCEDURE — 80048 BASIC METABOLIC PNL TOTAL CA: CPT

## 2024-02-13 PROCEDURE — 99285 EMERGENCY DEPT VISIT HI MDM: CPT

## 2024-02-13 PROCEDURE — 36415 COLL VENOUS BLD VENIPUNCTURE: CPT

## 2024-02-13 PROCEDURE — 84484 ASSAY OF TROPONIN QUANT: CPT

## 2024-02-13 RX ORDER — FUROSEMIDE 20 MG/1
20 TABLET ORAL 2 TIMES DAILY
Qty: 10 TABLET | Refills: 0 | Status: SHIPPED | OUTPATIENT
Start: 2024-02-13 | End: 2024-02-18

## 2024-02-13 NOTE — PROGRESS NOTES
Reported on 2/13/2024   budesonide-formoterol (SYMBICORT) 160-4.5 MCG/ACT AERO Not Taking  No No   Sig: Inhale 2 puffs into the lungs 2 times daily   Patient not taking: Reported on 2/13/2024   citalopram (CELEXA) 20 MG tablet   No No   Sig: TAKE 1 TABLET BY MOUTH EVERY DAY   clopidogrel (PLAVIX) 75 MG tablet 2/13/2024  No No   Sig: Take 1 tablet by mouth daily   diclofenac sodium (VOLTAREN) 1 % GEL   Yes No   Sig: Apply 2 g topically 2 times daily as needed for Pain   empagliflozin (JARDIANCE) 10 MG tablet Not Taking  No No   Sig: Take 1 tablet by mouth daily   Patient not taking: Reported on 2/13/2024   famotidine (PEPCID) 40 MG tablet 2/12/2024  No No   Sig: Take 1 tablet by mouth every evening   ferrous sulfate (IRON 325) 325 (65 Fe) MG tablet Not Taking  No No   Sig: Take 1 tablet by mouth daily (with breakfast)   Patient not taking: Reported on 2/13/2024   fluticasone (FLONASE) 50 MCG/ACT nasal spray 2/13/2024  No No   Sig: USE 2 SPRAYS IN EACH NOSTRIL ONCE DAILY   Patient taking differently: 1 spray by Nasal route daily as needed for Rhinitis   furosemide (LASIX) 20 MG tablet 2/13/2024  No No   Sig: Take 1 tablet by mouth daily   furosemide (LASIX) 20 MG tablet 2/13/2024  No Yes   Sig: Take 1 tablet by mouth 2 times daily for 5 days   ipratropium-albuterol (DUONEB) 0.5-2.5 (3) MG/3ML SOLN nebulizer solution Not Taking  No No   Sig: Inhale 3 mLs into the lungs every 4 hours as needed for Shortness of Breath   Patient not taking: Reported on 2/13/2024   magnesium oxide (MAG-OX) 400 (241.3 Mg) MG TABS tablet Not Taking  No No   Sig: Take 1 tablet by mouth 2 times daily   Patient not taking: Reported on 2/13/2024   metoprolol tartrate (LOPRESSOR) 25 MG tablet 2/13/2024  No No   Sig: Take 1 tablet by mouth 2 times daily   nitroGLYCERIN (NITROSTAT) 0.4 MG SL tablet   No No   Sig: Place 1 tablet under the tongue every 5 minutes as needed for Chest pain up to max of 3 total doses. If no relief after 1 dose, call 911.

## 2024-02-13 NOTE — ED PROVIDER NOTES
[facility]  eMERGENCY dEPARTMENT eNCOUnter      Pt Name: Graciela Hotl  MRN: [id number]  Birthdate 1950  Date of evaluation: 2/13/24      CHIEF COMPLAINT       Chief Complaint   Patient presents with    Shortness of Breath         HISTORY OF PRESENT ILLNESS    Graciela Holt is a 73 y.o. female who presents complaining of shortness of breath.  Patient states that about 3 days ago she started having increasing shortness of breath.  Patient states she is not normally on oxygen at home but EMS put her on oxygen and did feel better with that even though her oxygen saturation was normal.  Patient states she has a history of COPD CAD with congestive heart failure.  Patient states she has had maybe a little bit of swelling in her legs.  Patient has no chest pain no palpitations no fever no cough.      REVIEW OF SYSTEMS       Review of Systems   Constitutional:  Negative for activity change, appetite change, chills, diaphoresis and fever.   HENT:  Negative for congestion, ear pain, facial swelling, nosebleeds, rhinorrhea, sinus pressure, sore throat and trouble swallowing.    Eyes:  Negative for pain, discharge and redness.   Respiratory:  Positive for shortness of breath. Negative for cough, chest tightness and wheezing.    Cardiovascular:  Positive for leg swelling. Negative for chest pain and palpitations.   Gastrointestinal:  Negative for abdominal pain, blood in stool, constipation, diarrhea, nausea and vomiting.   Genitourinary:  Negative for difficulty urinating, dysuria, flank pain, frequency, genital sores and hematuria.   Musculoskeletal:  Negative for arthralgias, back pain, gait problem, joint swelling, myalgias and neck pain.   Skin:  Negative for color change, pallor, rash and wound.   Neurological:  Negative for dizziness, tremors, seizures, syncope, speech difficulty, weakness, numbness and headaches.   Psychiatric/Behavioral:  Negative for confusion, decreased concentration, hallucinations,

## 2024-02-13 NOTE — ED NOTES
Mode of arrival (squad #, walk in, police, etc) : ems         Chief complaint(s): SOB         Arrival Note (brief scenario, treatment PTA, etc).: Pt states SOB x 2 days. Pt states hx of COPD.         C= \"Have you ever felt that you should Cut down on your drinking?\"  No  A= \"Have people Annoyed you by criticizing your drinking?\"  No  G= \"Have you ever felt bad or Guilty about your drinking?\"  No  E= \"Have you ever had a drink as an Eye-opener first thing in the morning to steady your nerves or to help a hangover?\"  No      Deferred []      Reason for deferring: N/A    *If yes to two or more: probable alcohol abuse.*

## 2024-02-14 LAB
EKG ATRIAL RATE: 62 BPM
EKG P AXIS: 75 DEGREES
EKG P-R INTERVAL: 172 MS
EKG Q-T INTERVAL: 456 MS
EKG QRS DURATION: 110 MS
EKG QTC CALCULATION (BAZETT): 462 MS
EKG R AXIS: -25 DEGREES
EKG T AXIS: 147 DEGREES
EKG VENTRICULAR RATE: 62 BPM

## 2024-02-16 ENCOUNTER — TELEPHONE (OUTPATIENT)
Dept: FAMILY MEDICINE CLINIC | Age: 74
End: 2024-02-16

## 2024-02-16 NOTE — TELEPHONE ENCOUNTER
[facility] ED Follow up Call    Reason for ED visit:  CHF       FU appts/Provider:    No future appointments.    UNABLE TO LMOM NO CONTACT INFORMATION PROVIDED TO CALL PATIENT WILL SEND LETTER TO HOME ADDRESS   VOICEMAIL DOCUMENTATION - ERASE IF NOT USED  Hi, this message is for  COLLEED  This is WALLACE from Travis Gutierrez office. Just calling to see how you are doing after your recent visit to the Emergency Room. Travis Gutierrez wants to make sure you were able to fill any prescriptions and that you understand your discharge instructions. Please return our call if you need to make a follow up appointment with your provider or have any further needs.   Our phone number is [phone]*. Have a great day.

## 2024-04-04 ENCOUNTER — HOSPITAL ENCOUNTER (INPATIENT)
Age: 74
LOS: 6 days | Discharge: HOME HEALTH | DRG: 280 | End: 2024-04-11
Attending: STUDENT IN AN ORGANIZED HEALTH CARE EDUCATION/TRAINING PROGRAM | Admitting: INTERNAL MEDICINE
Payer: COMMERCIAL

## 2024-04-04 ENCOUNTER — APPOINTMENT (OUTPATIENT)
Dept: CT IMAGING | Age: 74
DRG: 280 | End: 2024-04-04
Payer: COMMERCIAL

## 2024-04-04 ENCOUNTER — APPOINTMENT (OUTPATIENT)
Dept: GENERAL RADIOLOGY | Age: 74
DRG: 280 | End: 2024-04-04
Payer: COMMERCIAL

## 2024-04-04 DIAGNOSIS — I50.43 CHF (CONGESTIVE HEART FAILURE), NYHA CLASS I, ACUTE ON CHRONIC, COMBINED (HCC): ICD-10-CM

## 2024-04-04 DIAGNOSIS — E87.6 HYPOKALEMIA: ICD-10-CM

## 2024-04-04 DIAGNOSIS — I50.9 ACUTE ON CHRONIC CONGESTIVE HEART FAILURE, UNSPECIFIED HEART FAILURE TYPE (HCC): ICD-10-CM

## 2024-04-04 DIAGNOSIS — R06.02 SHORTNESS OF BREATH: ICD-10-CM

## 2024-04-04 DIAGNOSIS — I21.4 NSTEMI (NON-ST ELEVATED MYOCARDIAL INFARCTION) (HCC): Primary | ICD-10-CM

## 2024-04-04 DIAGNOSIS — I50.23 ACUTE ON CHRONIC HFREF (HEART FAILURE WITH REDUCED EJECTION FRACTION) (HCC): ICD-10-CM

## 2024-04-04 LAB
ALBUMIN SERPL-MCNC: 3.6 G/DL (ref 3.5–5.2)
ALP SERPL-CCNC: 145 U/L (ref 35–104)
ALT SERPL-CCNC: 21 U/L (ref 5–33)
ANION GAP SERPL CALCULATED.3IONS-SCNC: 13 MMOL/L (ref 9–17)
AST SERPL-CCNC: 29 U/L
BASOPHILS # BLD: 0.1 K/UL (ref 0–0.2)
BASOPHILS NFR BLD: 1 % (ref 0–2)
BILIRUB SERPL-MCNC: 0.5 MG/DL (ref 0.3–1.2)
BNP SERPL-MCNC: ABNORMAL PG/ML
BUN SERPL-MCNC: 20 MG/DL (ref 8–23)
CALCIUM SERPL-MCNC: 8.9 MG/DL (ref 8.6–10.4)
CHLORIDE SERPL-SCNC: 99 MMOL/L (ref 98–107)
CO2 SERPL-SCNC: 27 MMOL/L (ref 20–31)
CREAT SERPL-MCNC: 0.9 MG/DL (ref 0.5–0.9)
EOSINOPHIL # BLD: 0.1 K/UL (ref 0–0.4)
EOSINOPHILS RELATIVE PERCENT: 1 % (ref 0–4)
ERYTHROCYTE [DISTWIDTH] IN BLOOD BY AUTOMATED COUNT: 15 % (ref 11.5–14.9)
FLUAV RNA RESP QL NAA+PROBE: NOT DETECTED
FLUBV RNA RESP QL NAA+PROBE: NOT DETECTED
GFR SERPL CREATININE-BSD FRML MDRD: 67 ML/MIN/1.73M2
GLUCOSE SERPL-MCNC: 167 MG/DL (ref 70–99)
HCT VFR BLD AUTO: 39.7 % (ref 36–46)
HGB BLD-MCNC: 12.8 G/DL (ref 12–16)
LYMPHOCYTES NFR BLD: 0.9 K/UL (ref 1–4.8)
LYMPHOCYTES RELATIVE PERCENT: 8 % (ref 24–44)
MAGNESIUM SERPL-MCNC: 1.7 MG/DL (ref 1.6–2.6)
MCH RBC QN AUTO: 28 PG (ref 26–34)
MCHC RBC AUTO-ENTMCNC: 32.2 G/DL (ref 31–37)
MCV RBC AUTO: 87.1 FL (ref 80–100)
MONOCYTES NFR BLD: 0.8 K/UL (ref 0.1–1.3)
MONOCYTES NFR BLD: 8 % (ref 1–7)
NEUTROPHILS NFR BLD: 82 % (ref 36–66)
NEUTS SEG NFR BLD: 9 K/UL (ref 1.3–9.1)
PLATELET # BLD AUTO: 284 K/UL (ref 150–450)
PMV BLD AUTO: 8.8 FL (ref 6–12)
POTASSIUM SERPL-SCNC: 2.8 MMOL/L (ref 3.7–5.3)
PROT SERPL-MCNC: 6.5 G/DL (ref 6.4–8.3)
RBC # BLD AUTO: 4.56 M/UL (ref 4–5.2)
SARS-COV-2 RNA RESP QL NAA+PROBE: NOT DETECTED
SODIUM SERPL-SCNC: 139 MMOL/L (ref 135–144)
SOURCE: NORMAL
SPECIMEN DESCRIPTION: NORMAL
TROPONIN I SERPL HS-MCNC: 119 NG/L (ref 0–14)
TROPONIN I SERPL HS-MCNC: 136 NG/L (ref 0–14)
WBC OTHER # BLD: 11 K/UL (ref 3.5–11)

## 2024-04-04 PROCEDURE — 93005 ELECTROCARDIOGRAM TRACING: CPT

## 2024-04-04 PROCEDURE — 96360 HYDRATION IV INFUSION INIT: CPT

## 2024-04-04 PROCEDURE — 2580000003 HC RX 258: Performed by: STUDENT IN AN ORGANIZED HEALTH CARE EDUCATION/TRAINING PROGRAM

## 2024-04-04 PROCEDURE — 84484 ASSAY OF TROPONIN QUANT: CPT

## 2024-04-04 PROCEDURE — 6360000004 HC RX CONTRAST MEDICATION: Performed by: STUDENT IN AN ORGANIZED HEALTH CARE EDUCATION/TRAINING PROGRAM

## 2024-04-04 PROCEDURE — 36415 COLL VENOUS BLD VENIPUNCTURE: CPT

## 2024-04-04 PROCEDURE — 83735 ASSAY OF MAGNESIUM: CPT

## 2024-04-04 PROCEDURE — 87636 SARSCOV2 & INF A&B AMP PRB: CPT

## 2024-04-04 PROCEDURE — 83880 ASSAY OF NATRIURETIC PEPTIDE: CPT

## 2024-04-04 PROCEDURE — 80053 COMPREHEN METABOLIC PANEL: CPT

## 2024-04-04 PROCEDURE — 85025 COMPLETE CBC W/AUTO DIFF WBC: CPT

## 2024-04-04 PROCEDURE — 71045 X-RAY EXAM CHEST 1 VIEW: CPT

## 2024-04-04 PROCEDURE — 99285 EMERGENCY DEPT VISIT HI MDM: CPT

## 2024-04-04 PROCEDURE — 6370000000 HC RX 637 (ALT 250 FOR IP): Performed by: STUDENT IN AN ORGANIZED HEALTH CARE EDUCATION/TRAINING PROGRAM

## 2024-04-04 PROCEDURE — 70450 CT HEAD/BRAIN W/O DYE: CPT

## 2024-04-04 PROCEDURE — 70498 CT ANGIOGRAPHY NECK: CPT

## 2024-04-04 RX ORDER — SODIUM CHLORIDE 0.9 % (FLUSH) 0.9 %
10 SYRINGE (ML) INJECTION PRN
Status: COMPLETED | OUTPATIENT
Start: 2024-04-04 | End: 2024-04-04

## 2024-04-04 RX ORDER — 0.9 % SODIUM CHLORIDE 0.9 %
80 INTRAVENOUS SOLUTION INTRAVENOUS ONCE
Status: COMPLETED | OUTPATIENT
Start: 2024-04-04 | End: 2024-04-05

## 2024-04-04 RX ADMIN — POTASSIUM BICARBONATE 40 MEQ: 782 TABLET, EFFERVESCENT ORAL at 23:19

## 2024-04-04 RX ADMIN — SODIUM CHLORIDE 80 ML: 9 INJECTION, SOLUTION INTRAVENOUS at 23:37

## 2024-04-04 RX ADMIN — IOPAMIDOL 75 ML: 755 INJECTION, SOLUTION INTRAVENOUS at 23:41

## 2024-04-04 RX ADMIN — SODIUM CHLORIDE, PRESERVATIVE FREE 10 ML: 5 INJECTION INTRAVENOUS at 23:41

## 2024-04-04 ASSESSMENT — LIFESTYLE VARIABLES
HOW MANY STANDARD DRINKS CONTAINING ALCOHOL DO YOU HAVE ON A TYPICAL DAY: PATIENT DOES NOT DRINK
HOW OFTEN DO YOU HAVE A DRINK CONTAINING ALCOHOL: NEVER

## 2024-04-04 ASSESSMENT — PAIN SCALES - GENERAL: PAINLEVEL_OUTOF10: 7

## 2024-04-04 ASSESSMENT — PAIN DESCRIPTION - LOCATION: LOCATION: CHEST

## 2024-04-04 ASSESSMENT — PAIN DESCRIPTION - PAIN TYPE: TYPE: ACUTE PAIN

## 2024-04-04 ASSESSMENT — PAIN - FUNCTIONAL ASSESSMENT: PAIN_FUNCTIONAL_ASSESSMENT: 0-10

## 2024-04-04 ASSESSMENT — PAIN DESCRIPTION - DESCRIPTORS: DESCRIPTORS: ACHING

## 2024-04-04 ASSESSMENT — PAIN DESCRIPTION - ORIENTATION: ORIENTATION: MID

## 2024-04-05 PROBLEM — I21.4 NSTEMI (NON-ST ELEVATED MYOCARDIAL INFARCTION) (HCC): Status: ACTIVE | Noted: 2024-04-05

## 2024-04-05 LAB
ANION GAP SERPL CALCULATED.3IONS-SCNC: 10 MMOL/L (ref 9–17)
ANTI-XA UNFRAC HEPARIN: 0.15 IU/L (ref 0.3–0.7)
ANTI-XA UNFRAC HEPARIN: 0.21 IU/L (ref 0.3–0.7)
ANTI-XA UNFRAC HEPARIN: <0.1 IU/L (ref 0.3–0.7)
BUN SERPL-MCNC: 18 MG/DL (ref 8–23)
CALCIUM SERPL-MCNC: 8.9 MG/DL (ref 8.6–10.4)
CHLORIDE SERPL-SCNC: 103 MMOL/L (ref 98–107)
CHOLEST SERPL-MCNC: 140 MG/DL
CHOLESTEROL/HDL RATIO: 2.7
CO2 SERPL-SCNC: 30 MMOL/L (ref 20–31)
CREAT SERPL-MCNC: 0.8 MG/DL (ref 0.5–0.9)
ERYTHROCYTE [DISTWIDTH] IN BLOOD BY AUTOMATED COUNT: 14.8 % (ref 11.5–14.9)
EST. AVERAGE GLUCOSE BLD GHB EST-MCNC: 160 MG/DL
GFR SERPL CREATININE-BSD FRML MDRD: 77 ML/MIN/1.73M2
GLUCOSE SERPL-MCNC: 160 MG/DL (ref 70–99)
HBA1C MFR BLD: 7.2 % (ref 4–6)
HCT VFR BLD AUTO: 39.1 % (ref 36–46)
HDLC SERPL-MCNC: 51 MG/DL
HGB BLD-MCNC: 12.7 G/DL (ref 12–16)
LDLC SERPL CALC-MCNC: 78 MG/DL (ref 0–130)
MAGNESIUM SERPL-MCNC: 1.8 MG/DL (ref 1.6–2.6)
MCH RBC QN AUTO: 28.1 PG (ref 26–34)
MCHC RBC AUTO-ENTMCNC: 32.6 G/DL (ref 31–37)
MCV RBC AUTO: 86.1 FL (ref 80–100)
PLATELET # BLD AUTO: 263 K/UL (ref 150–450)
PMV BLD AUTO: 8.4 FL (ref 6–12)
POTASSIUM SERPL-SCNC: 3.4 MMOL/L (ref 3.7–5.3)
RBC # BLD AUTO: 4.54 M/UL (ref 4–5.2)
SODIUM SERPL-SCNC: 143 MMOL/L (ref 135–144)
TRIGL SERPL-MCNC: 54 MG/DL
TROPONIN I SERPL HS-MCNC: 134 NG/L (ref 0–14)
TROPONIN I SERPL HS-MCNC: 136 NG/L (ref 0–14)
TSH SERPL DL<=0.05 MIU/L-ACNC: 0.82 UIU/ML (ref 0.3–5)
WBC OTHER # BLD: 9.6 K/UL (ref 3.5–11)

## 2024-04-05 PROCEDURE — 6370000000 HC RX 637 (ALT 250 FOR IP): Performed by: NURSE PRACTITIONER

## 2024-04-05 PROCEDURE — 6360000002 HC RX W HCPCS: Performed by: STUDENT IN AN ORGANIZED HEALTH CARE EDUCATION/TRAINING PROGRAM

## 2024-04-05 PROCEDURE — 84443 ASSAY THYROID STIM HORMONE: CPT

## 2024-04-05 PROCEDURE — 2580000003 HC RX 258: Performed by: NURSE PRACTITIONER

## 2024-04-05 PROCEDURE — 2060000000 HC ICU INTERMEDIATE R&B

## 2024-04-05 PROCEDURE — 80061 LIPID PANEL: CPT

## 2024-04-05 PROCEDURE — 80048 BASIC METABOLIC PNL TOTAL CA: CPT

## 2024-04-05 PROCEDURE — 99223 1ST HOSP IP/OBS HIGH 75: CPT | Performed by: INTERNAL MEDICINE

## 2024-04-05 PROCEDURE — 85027 COMPLETE CBC AUTOMATED: CPT

## 2024-04-05 PROCEDURE — 83036 HEMOGLOBIN GLYCOSYLATED A1C: CPT

## 2024-04-05 PROCEDURE — 36415 COLL VENOUS BLD VENIPUNCTURE: CPT

## 2024-04-05 PROCEDURE — 84484 ASSAY OF TROPONIN QUANT: CPT

## 2024-04-05 PROCEDURE — 83735 ASSAY OF MAGNESIUM: CPT

## 2024-04-05 PROCEDURE — 85520 HEPARIN ASSAY: CPT

## 2024-04-05 RX ORDER — POTASSIUM CHLORIDE 7.45 MG/ML
10 INJECTION INTRAVENOUS PRN
Status: DISCONTINUED | OUTPATIENT
Start: 2024-04-05 | End: 2024-04-11 | Stop reason: HOSPADM

## 2024-04-05 RX ORDER — HYDROCODONE BITARTRATE AND ACETAMINOPHEN 5; 325 MG/1; MG/1
1 TABLET ORAL EVERY 12 HOURS PRN
Status: DISCONTINUED | OUTPATIENT
Start: 2024-04-05 | End: 2024-04-11 | Stop reason: HOSPADM

## 2024-04-05 RX ORDER — CLOPIDOGREL BISULFATE 75 MG/1
75 TABLET ORAL DAILY
Status: DISCONTINUED | OUTPATIENT
Start: 2024-04-05 | End: 2024-04-08

## 2024-04-05 RX ORDER — POLYETHYLENE GLYCOL 3350 17 G/17G
17 POWDER, FOR SOLUTION ORAL DAILY PRN
Status: DISCONTINUED | OUTPATIENT
Start: 2024-04-05 | End: 2024-04-11 | Stop reason: HOSPADM

## 2024-04-05 RX ORDER — SODIUM CHLORIDE 9 MG/ML
INJECTION, SOLUTION INTRAVENOUS PRN
Status: DISCONTINUED | OUTPATIENT
Start: 2024-04-05 | End: 2024-04-11 | Stop reason: HOSPADM

## 2024-04-05 RX ORDER — MAGNESIUM SULFATE HEPTAHYDRATE 40 MG/ML
2000 INJECTION, SOLUTION INTRAVENOUS PRN
Status: DISCONTINUED | OUTPATIENT
Start: 2024-04-05 | End: 2024-04-11 | Stop reason: HOSPADM

## 2024-04-05 RX ORDER — ATORVASTATIN CALCIUM 80 MG/1
80 TABLET, FILM COATED ORAL DAILY
Status: DISCONTINUED | OUTPATIENT
Start: 2024-04-05 | End: 2024-04-11 | Stop reason: HOSPADM

## 2024-04-05 RX ORDER — SODIUM CHLORIDE 0.9 % (FLUSH) 0.9 %
5-40 SYRINGE (ML) INJECTION EVERY 12 HOURS SCHEDULED
Status: DISCONTINUED | OUTPATIENT
Start: 2024-04-05 | End: 2024-04-11 | Stop reason: HOSPADM

## 2024-04-05 RX ORDER — IPRATROPIUM BROMIDE AND ALBUTEROL SULFATE 2.5; .5 MG/3ML; MG/3ML
1 SOLUTION RESPIRATORY (INHALATION) EVERY 4 HOURS PRN
Status: DISCONTINUED | OUTPATIENT
Start: 2024-04-05 | End: 2024-04-11 | Stop reason: HOSPADM

## 2024-04-05 RX ORDER — SODIUM CHLORIDE 0.9 % (FLUSH) 0.9 %
5-40 SYRINGE (ML) INJECTION PRN
Status: DISCONTINUED | OUTPATIENT
Start: 2024-04-05 | End: 2024-04-11 | Stop reason: HOSPADM

## 2024-04-05 RX ORDER — FUROSEMIDE 40 MG/1
40 TABLET ORAL DAILY
Status: DISCONTINUED | OUTPATIENT
Start: 2024-04-05 | End: 2024-04-11 | Stop reason: HOSPADM

## 2024-04-05 RX ORDER — ACETAMINOPHEN 650 MG/1
650 SUPPOSITORY RECTAL EVERY 6 HOURS PRN
Status: DISCONTINUED | OUTPATIENT
Start: 2024-04-05 | End: 2024-04-08

## 2024-04-05 RX ORDER — ASPIRIN 81 MG/1
81 TABLET ORAL DAILY
Status: DISCONTINUED | OUTPATIENT
Start: 2024-04-05 | End: 2024-04-11 | Stop reason: HOSPADM

## 2024-04-05 RX ORDER — HEPARIN SODIUM 1000 [USP'U]/ML
30 INJECTION, SOLUTION INTRAVENOUS; SUBCUTANEOUS PRN
Status: DISCONTINUED | OUTPATIENT
Start: 2024-04-05 | End: 2024-04-06

## 2024-04-05 RX ORDER — HEPARIN SODIUM 1000 [USP'U]/ML
60 INJECTION, SOLUTION INTRAVENOUS; SUBCUTANEOUS PRN
Status: DISCONTINUED | OUTPATIENT
Start: 2024-04-05 | End: 2024-04-06

## 2024-04-05 RX ORDER — HEPARIN SODIUM 1000 [USP'U]/ML
60 INJECTION, SOLUTION INTRAVENOUS; SUBCUTANEOUS ONCE
Status: COMPLETED | OUTPATIENT
Start: 2024-04-05 | End: 2024-04-05

## 2024-04-05 RX ORDER — POTASSIUM CHLORIDE 20 MEQ/1
40 TABLET, EXTENDED RELEASE ORAL PRN
Status: DISCONTINUED | OUTPATIENT
Start: 2024-04-05 | End: 2024-04-11 | Stop reason: HOSPADM

## 2024-04-05 RX ORDER — ACETAMINOPHEN 325 MG/1
650 TABLET ORAL EVERY 6 HOURS PRN
Status: DISCONTINUED | OUTPATIENT
Start: 2024-04-05 | End: 2024-04-08

## 2024-04-05 RX ORDER — POTASSIUM CHLORIDE 750 MG/1
10 CAPSULE, EXTENDED RELEASE ORAL DAILY
Status: DISCONTINUED | OUTPATIENT
Start: 2024-04-05 | End: 2024-04-11 | Stop reason: HOSPADM

## 2024-04-05 RX ORDER — ROPINIROLE 1 MG/1
1 TABLET, FILM COATED ORAL NIGHTLY PRN
Status: DISCONTINUED | OUTPATIENT
Start: 2024-04-05 | End: 2024-04-11 | Stop reason: HOSPADM

## 2024-04-05 RX ORDER — HEPARIN SODIUM 10000 [USP'U]/100ML
5-30 INJECTION, SOLUTION INTRAVENOUS CONTINUOUS
Status: DISCONTINUED | OUTPATIENT
Start: 2024-04-05 | End: 2024-04-06

## 2024-04-05 RX ADMIN — ASPIRIN 81 MG: 81 TABLET, COATED ORAL at 08:10

## 2024-04-05 RX ADMIN — HEPARIN SODIUM 12 UNITS/KG/HR: 10000 INJECTION, SOLUTION INTRAVENOUS at 01:13

## 2024-04-05 RX ADMIN — HEPARIN SODIUM 1610 UNITS: 1000 INJECTION INTRAVENOUS; SUBCUTANEOUS at 10:23

## 2024-04-05 RX ADMIN — FUROSEMIDE 40 MG: 40 TABLET ORAL at 08:10

## 2024-04-05 RX ADMIN — SODIUM CHLORIDE, PRESERVATIVE FREE 10 ML: 5 INJECTION INTRAVENOUS at 08:10

## 2024-04-05 RX ADMIN — POTASSIUM CHLORIDE 10 MEQ: 10 CAPSULE, COATED, EXTENDED RELEASE ORAL at 08:10

## 2024-04-05 RX ADMIN — ATORVASTATIN CALCIUM 80 MG: 80 TABLET, FILM COATED ORAL at 08:10

## 2024-04-05 RX ADMIN — HYDROCODONE BITARTRATE AND ACETAMINOPHEN 1 TABLET: 5; 325 TABLET ORAL at 16:32

## 2024-04-05 RX ADMIN — HYDROCODONE BITARTRATE AND ACETAMINOPHEN 1 TABLET: 5; 325 TABLET ORAL at 04:12

## 2024-04-05 RX ADMIN — HEPARIN SODIUM 3210 UNITS: 1000 INJECTION INTRAVENOUS; SUBCUTANEOUS at 01:14

## 2024-04-05 RX ADMIN — SODIUM CHLORIDE, PRESERVATIVE FREE 10 ML: 5 INJECTION INTRAVENOUS at 08:14

## 2024-04-05 RX ADMIN — METOPROLOL TARTRATE 12.5 MG: 25 TABLET, FILM COATED ORAL at 08:10

## 2024-04-05 RX ADMIN — ACETAMINOPHEN 650 MG: 325 TABLET ORAL at 08:23

## 2024-04-05 RX ADMIN — HEPARIN SODIUM 1610 UNITS: 1000 INJECTION INTRAVENOUS; SUBCUTANEOUS at 18:25

## 2024-04-05 RX ADMIN — METOPROLOL TARTRATE 12.5 MG: 25 TABLET, FILM COATED ORAL at 20:49

## 2024-04-05 RX ADMIN — CLOPIDOGREL BISULFATE 75 MG: 75 TABLET ORAL at 08:10

## 2024-04-05 RX ADMIN — SODIUM CHLORIDE, PRESERVATIVE FREE 10 ML: 5 INJECTION INTRAVENOUS at 20:49

## 2024-04-05 ASSESSMENT — PAIN DESCRIPTION - LOCATION
LOCATION: NECK

## 2024-04-05 ASSESSMENT — PAIN DESCRIPTION - FREQUENCY: FREQUENCY: CONTINUOUS

## 2024-04-05 ASSESSMENT — PAIN SCALES - GENERAL
PAINLEVEL_OUTOF10: 2
PAINLEVEL_OUTOF10: 10
PAINLEVEL_OUTOF10: 7
PAINLEVEL_OUTOF10: 3
PAINLEVEL_OUTOF10: 0
PAINLEVEL_OUTOF10: 4

## 2024-04-05 ASSESSMENT — PAIN DESCRIPTION - ONSET: ONSET: ON-GOING

## 2024-04-05 ASSESSMENT — PAIN DESCRIPTION - PAIN TYPE: TYPE: CHRONIC PAIN

## 2024-04-05 NOTE — ED TRIAGE NOTES
Mode of arrival (squad #, walk in, police, etc) : EMS        Chief complaint(s): leg swelling/shortness of breath        Arrival Note (brief scenario, treatment PTA, etc).: patient presents to ED today from home with friends for leg/ankle swelling and shortness of breath. Was recently treated for pneumonia. Patient has chest pain, mid sternal when she takes a deep breath.         C= \"Have you ever felt that you should Cut down on your drinking?\"  No  A= \"Have people Annoyed you by criticizing your drinking?\"  No  G= \"Have you ever felt bad or Guilty about your drinking?\"  No  E= \"Have you ever had a drink as an Eye-opener first thing in the morning to steady your nerves or to help a hangover?\"  No      Deferred []      Reason for deferring: N/A    *If yes to two or more: probable alcohol abuse.*

## 2024-04-05 NOTE — H&P
[facility] Internal Medicine  Juan Carlos Todd MD; Lewis Castro MD, Stan Michaud MD, Pearl Choudhary MD, Surjit Germain MD; Chip Anderson MD    [facility] Internal Medicine   IN-PATIENT SERVICE   [facility]    HISTORY AND PHYSICAL EXAMINATION            Date:   4/5/2024  Patient name:  Graciela Holt  Date of admission:  4/4/2024  8:54 PM  MRN:   [id number]  Account:  [id number]  YOB: 1950  PCP:    Travis Mason MD  Room:   2092/2092-01  Code Status:    DNR-CCA    Chief Complaint:     Chief Complaint   Patient presents with    Leg Swelling    Shortness of Breath       History Obtained From:     Patient/EMR/Bedside RN    History of Present Illness:   Graciela Holt is a 73 y.o. Non- / non  female who presents with Leg Swelling and Shortness of Breath and is admitted to the hospital for the management of NSTEMI (non-ST elevated myocardial infarction) (HCC). Medical history significant for CAD s/p CABG, CHF with EF 25 to 30%, HTN, HLD, COPD, left MCA stroke, and DM type II.  Presented to ED with reports of chest pain, shortness of breath dizziness and headaches.  Reports increasing symptoms over the past 2 days.  States that chest pain is nonradiating and associated with increased shortness of breath and cough.  Reports compliance with home medications but admits to increased edema to bilateral lower extremities.  EKG sinus bradycardia with new T-wave inversion in V2 and V3.  Prolonged QTc 510.  Troponin 119 uptrend to 136.  BNP 32,000.  Cardiology consulted.  Heparin drip initiated.  Magnesium 1.7, potassium 2.8. Chest x-ray consistent with congestive heart failure.  CT head no acute intracranial abnormality.  Global parenchymal volume loss with nonspecific white matter changes.  Chronic C1 and C2 fracture.  CTA head and neck no significant vascular abnormality. Denies fever, chills, abdominal pain, nausea, vomiting,

## 2024-04-05 NOTE — ACP (ADVANCE CARE PLANNING)
Advance Care Planning     Advance Care Planning Activator (Inpatient)  Conversation Note      Date of ACP Conversation: 4/5/2024     Conversation Conducted with: Patient with Decision Making Capacity    ACP Activator: Robyn Andrews RN    Health Care Decision Maker:     Current Designated Health Care Decision Maker:     Primary Decision Maker: Guanakito Zheng - Child - [phone]  Click here to complete Healthcare Decision Makers including section of the Healthcare Decision Maker Relationship (ie \"Primary\")  Today we documented Decision Maker(s) consistent with Legal Next of Kin hierarchy.    Care Preferences    Ventilation:  \"If you were in your present state of health and suddenly became very ill and were unable to breathe on your own, what would your preference be about the use of a ventilator (breathing machine) if it were available to you?\"      Would the patient desire the use of ventilator (breathing machine)?: no    \"If your health worsens and it becomes clear that your chance of recovery is unlikely, what would your preference be about the use of a ventilator (breathing machine) if it were available to you?\"     Would the patient desire the use of ventilator (breathing machine)?: No      Resuscitation  \"CPR works best to restart the heart when there is a sudden event, like a heart attack, in someone who is otherwise healthy. Unfortunately, CPR does not typically restart the heart for people who have serious health conditions or who are very sick.\"    \"In the event your heart stopped as a result of an underlying serious health condition, would you want attempts to be made to restart your heart (answer \"yes\" for attempt to resuscitate) or would you prefer a natural death (answer \"no\" for do not attempt to resuscitate)?\" no       [] Yes   [x] No   Educated Patient / Decision Maker regarding differences between Advance Directives and portable DNR orders.    Length of ACP Conversation in minutes:

## 2024-04-05 NOTE — ED PROVIDER NOTES
EMERGENCY DEPARTMENT ENCOUNTER   ATTENDING ATTESTATION     Pt Name: Graciela Holt  MRN: [id number]  Birthdate 1950  Date of evaluation: 4/4/24       Graciela Holt is a 73 y.o. female who presents with Leg Swelling and Shortness of Breath    History of HTN, HLD, CAD with CABG, CHF EF 25-30%    Chest pain shortness of breath    Cardiac workup and likely admission     MDM:       Patient's workup reveals elevated troponins and elevated BNP suspect CHF starting on heparin for NSTEMI    Discussed with cardiology    We did do a CT head and CTA for her headache    These were unremarkable but they commented on possible C1 and C2 fractures    We called directly to radiology and they report that they are all chronic and were seen on previous MRI 2 years ago    Attempted to reach Dr. Guerra but unavailable overnight given there are chronic fractures she has no pain no numbness no weakness very low suspicion for acute fracture or cord issue    Will go ahead and admit for her cardiac issues    Vitals:   Vitals:    04/04/24 2207 04/04/24 2218 04/04/24 2301 04/04/24 2341   BP:  132/70 137/80    Pulse:    82   Resp:    29   Temp:       TempSrc:       SpO2: 96% 96%  94%   Weight:       Height:             I personally saw and examined the patient. I have reviewed and agree with the resident's findings, including all diagnostic interpretations and treatment plan as written. I was present for the key portions of any procedures performed and the inclusive time noted for any critical care statement.    Don Moscoso MD  Attending Emergency Physician           Don Moscoso MD  04/05/24 0047    
  Monocytes Absolute 0.80   Eosinophils Absolute 0.10   Basophils Absolute 0.10  unremarkable [BRUCE]   2243 CMP with hypokalemia potassium 2.8 reflected on her EKG with T wave changes.  Otherwise unremarkable [GC]   2245 Troponin, High Sensitivity(!!): 119  Baseline troponin in the 30s.  Trending. [GC]   2245 Cardiology consulted.  Spoke with Dr. Andrews, cardiologist, who recommended starting heparin drip if the CT imaging was unremarkable [GC]   2245 SARS-CoV-2 RNA, RT PCR: Not Detected [GC]   2245 INFLUENZA A: Not Detected [GC]   2245 INFLUENZA B: Not Detected [GC]   2306 XR CHEST PORTABLE  IMPRESSION:  Congestive heart failure.  Mild bilateral bibasal and perihilar airspace  opacities are favored to represent alveolar edema.   [GC]   2306 Pro-BNP(!): 31,990  Baseline of 6K [GC]   2307 Magnesium: 1.7 [GC]   2314 CMP with a potassium of 2.8.  Per chart review, patient has an allergy to potassium containing compounds however she has received potassium replacements in the past.  Will give 40 mill equivalents of oral K replacement [GC]   2350 Troponin, High Sensitivity(!!): 136  Rising. Will start heparin if CT imaging is negative for bleed [GC]   2358 Will CBC with differential unremarkable [GC]   Fri Apr 05, 2024   0011 CT HEAD WO CONTRAST  IMPRESSION:  1. No acute intracranial abnormality. Encephalomalacia changes are seen in  the left parietal, temporal and occipital lobes.  Please correlate MR brain  examination.  2. Global parenchymal volume loss with nonspecific white matter changes  likely related to chronic microvascular ischemic change.  3. No significant vascular abnormality in the head or neck.  4. C1 and C2 fractures are noted as described above.  No evidence of  vertebral artery dissection or compression.   [GC]   0011 CTA HEAD NECK W CONTRAST [GC]   0046 Called [facility] and spoke directly with covering radiologist.  He reviewed the CT imaging from [facility] and also the most recent MRI in 2022.

## 2024-04-05 NOTE — PLAN OF CARE
Problem: Discharge Planning  Goal: Discharge to home or other facility with appropriate resources  Outcome: Progressing     Problem: Pain  Goal: Verbalizes/displays adequate comfort level or baseline comfort level  Outcome: Progressing     Problem: Respiratory - Adult  Goal: Achieves optimal ventilation and oxygenation  Outcome: Progressing     Problem: Cardiovascular - Adult  Goal: Maintains optimal cardiac output and hemodynamic stability  Outcome: Progressing     Problem: Cardiovascular - Adult  Goal: Absence of cardiac dysrhythmias or at baseline  Outcome: Progressing     Problem: Musculoskeletal - Adult  Goal: Return mobility to safest level of function  Outcome: Progressing     Problem: Metabolic/Fluid and Electrolytes - Adult  Goal: Electrolytes maintained within normal limits  Outcome: Progressing

## 2024-04-05 NOTE — ED NOTES
The following labs labeled with pt sticker and tubed to lab:     [x] Blue     [x] Lavender     [x] Green/yellow

## 2024-04-06 LAB
ALBUMIN SERPL-MCNC: 3 G/DL (ref 3.5–5.2)
ALP SERPL-CCNC: 110 U/L (ref 35–104)
ALT SERPL-CCNC: 13 U/L (ref 5–33)
ANION GAP SERPL CALCULATED.3IONS-SCNC: 10 MMOL/L (ref 9–17)
ANTI-XA UNFRAC HEPARIN: 0.25 IU/L (ref 0.3–0.7)
ANTI-XA UNFRAC HEPARIN: 0.31 IU/L (ref 0.3–0.7)
ANTI-XA UNFRAC HEPARIN: 0.42 IU/L (ref 0.3–0.7)
AST SERPL-CCNC: 20 U/L
BASOPHILS # BLD: 0 K/UL (ref 0–0.2)
BASOPHILS NFR BLD: 1 % (ref 0–2)
BILIRUB SERPL-MCNC: 0.3 MG/DL (ref 0.3–1.2)
BUN SERPL-MCNC: 38 MG/DL (ref 8–23)
CALCIUM SERPL-MCNC: 8.3 MG/DL (ref 8.6–10.4)
CHLORIDE SERPL-SCNC: 104 MMOL/L (ref 98–107)
CO2 SERPL-SCNC: 28 MMOL/L (ref 20–31)
CREAT SERPL-MCNC: 1.1 MG/DL (ref 0.5–0.9)
EOSINOPHIL # BLD: 0.2 K/UL (ref 0–0.4)
EOSINOPHILS RELATIVE PERCENT: 4 % (ref 0–4)
ERYTHROCYTE [DISTWIDTH] IN BLOOD BY AUTOMATED COUNT: 15.3 % (ref 11.5–14.9)
GFR SERPL CREATININE-BSD FRML MDRD: 52 ML/MIN/1.73M2
GLUCOSE SERPL-MCNC: 212 MG/DL (ref 70–99)
HCT VFR BLD AUTO: 34.9 % (ref 36–46)
HGB BLD-MCNC: 11.1 G/DL (ref 12–16)
LYMPHOCYTES NFR BLD: 1.2 K/UL (ref 1–4.8)
LYMPHOCYTES RELATIVE PERCENT: 25 % (ref 24–44)
MCH RBC QN AUTO: 27.7 PG (ref 26–34)
MCHC RBC AUTO-ENTMCNC: 31.8 G/DL (ref 31–37)
MCV RBC AUTO: 87.3 FL (ref 80–100)
MONOCYTES NFR BLD: 0.5 K/UL (ref 0.1–1.3)
MONOCYTES NFR BLD: 10 % (ref 1–7)
NEUTROPHILS NFR BLD: 60 % (ref 36–66)
NEUTS SEG NFR BLD: 2.9 K/UL (ref 1.3–9.1)
PLATELET # BLD AUTO: 217 K/UL (ref 150–450)
PMV BLD AUTO: 9.3 FL (ref 6–12)
POTASSIUM SERPL-SCNC: 4.2 MMOL/L (ref 3.7–5.3)
PROT SERPL-MCNC: 5.5 G/DL (ref 6.4–8.3)
RBC # BLD AUTO: 4 M/UL (ref 4–5.2)
SODIUM SERPL-SCNC: 142 MMOL/L (ref 135–144)
TROPONIN I SERPL HS-MCNC: 108 NG/L (ref 0–14)
WBC OTHER # BLD: 4.8 K/UL (ref 3.5–11)

## 2024-04-06 PROCEDURE — 85025 COMPLETE CBC W/AUTO DIFF WBC: CPT

## 2024-04-06 PROCEDURE — 80053 COMPREHEN METABOLIC PANEL: CPT

## 2024-04-06 PROCEDURE — 97162 PT EVAL MOD COMPLEX 30 MIN: CPT

## 2024-04-06 PROCEDURE — 6370000000 HC RX 637 (ALT 250 FOR IP): Performed by: INTERNAL MEDICINE

## 2024-04-06 PROCEDURE — 2060000000 HC ICU INTERMEDIATE R&B

## 2024-04-06 PROCEDURE — 97530 THERAPEUTIC ACTIVITIES: CPT

## 2024-04-06 PROCEDURE — 94640 AIRWAY INHALATION TREATMENT: CPT

## 2024-04-06 PROCEDURE — 99232 SBSQ HOSP IP/OBS MODERATE 35: CPT | Performed by: INTERNAL MEDICINE

## 2024-04-06 PROCEDURE — 6370000000 HC RX 637 (ALT 250 FOR IP): Performed by: NURSE PRACTITIONER

## 2024-04-06 PROCEDURE — 2580000003 HC RX 258: Performed by: NURSE PRACTITIONER

## 2024-04-06 PROCEDURE — 6360000002 HC RX W HCPCS: Performed by: STUDENT IN AN ORGANIZED HEALTH CARE EDUCATION/TRAINING PROGRAM

## 2024-04-06 PROCEDURE — 36415 COLL VENOUS BLD VENIPUNCTURE: CPT

## 2024-04-06 PROCEDURE — 84484 ASSAY OF TROPONIN QUANT: CPT

## 2024-04-06 PROCEDURE — 97116 GAIT TRAINING THERAPY: CPT

## 2024-04-06 PROCEDURE — 85520 HEPARIN ASSAY: CPT

## 2024-04-06 RX ORDER — LOSARTAN POTASSIUM 25 MG/1
25 TABLET ORAL DAILY
Status: DISCONTINUED | OUTPATIENT
Start: 2024-04-06 | End: 2024-04-11 | Stop reason: HOSPADM

## 2024-04-06 RX ORDER — LIDOCAINE 4 G/G
1 PATCH TOPICAL DAILY
Status: DISCONTINUED | OUTPATIENT
Start: 2024-04-07 | End: 2024-04-11 | Stop reason: HOSPADM

## 2024-04-06 RX ADMIN — METOPROLOL TARTRATE 12.5 MG: 25 TABLET, FILM COATED ORAL at 10:01

## 2024-04-06 RX ADMIN — IPRATROPIUM BROMIDE AND ALBUTEROL SULFATE 1 DOSE: 2.5; .5 SOLUTION RESPIRATORY (INHALATION) at 06:27

## 2024-04-06 RX ADMIN — FUROSEMIDE 40 MG: 40 TABLET ORAL at 10:01

## 2024-04-06 RX ADMIN — ACETAMINOPHEN 650 MG: 325 TABLET ORAL at 23:32

## 2024-04-06 RX ADMIN — HEPARIN SODIUM 18 UNITS/KG/HR: 10000 INJECTION, SOLUTION INTRAVENOUS at 02:14

## 2024-04-06 RX ADMIN — ACETAMINOPHEN 650 MG: 325 TABLET ORAL at 18:38

## 2024-04-06 RX ADMIN — METOPROLOL TARTRATE 12.5 MG: 25 TABLET, FILM COATED ORAL at 19:26

## 2024-04-06 RX ADMIN — CLOPIDOGREL BISULFATE 75 MG: 75 TABLET ORAL at 10:01

## 2024-04-06 RX ADMIN — ASPIRIN 81 MG: 81 TABLET, COATED ORAL at 10:01

## 2024-04-06 RX ADMIN — ACETAMINOPHEN 650 MG: 325 TABLET ORAL at 01:44

## 2024-04-06 RX ADMIN — POTASSIUM CHLORIDE 10 MEQ: 10 CAPSULE, COATED, EXTENDED RELEASE ORAL at 10:01

## 2024-04-06 RX ADMIN — ACETAMINOPHEN 650 MG: 325 TABLET ORAL at 11:02

## 2024-04-06 RX ADMIN — ATORVASTATIN CALCIUM 80 MG: 80 TABLET, FILM COATED ORAL at 10:02

## 2024-04-06 RX ADMIN — LOSARTAN POTASSIUM 25 MG: 25 TABLET, FILM COATED ORAL at 14:48

## 2024-04-06 RX ADMIN — HEPARIN SODIUM 1610 UNITS: 1000 INJECTION INTRAVENOUS; SUBCUTANEOUS at 02:10

## 2024-04-06 RX ADMIN — EMPAGLIFLOZIN 10 MG: 10 TABLET, FILM COATED ORAL at 14:48

## 2024-04-06 RX ADMIN — SODIUM CHLORIDE, PRESERVATIVE FREE 10 ML: 5 INJECTION INTRAVENOUS at 19:27

## 2024-04-06 ASSESSMENT — PAIN DESCRIPTION - ONSET: ONSET: ON-GOING

## 2024-04-06 ASSESSMENT — PAIN SCALES - GENERAL
PAINLEVEL_OUTOF10: 10
PAINLEVEL_OUTOF10: 10
PAINLEVEL_OUTOF10: 3
PAINLEVEL_OUTOF10: 6
PAINLEVEL_OUTOF10: 0

## 2024-04-06 ASSESSMENT — PAIN DESCRIPTION - LOCATION
LOCATION: NECK

## 2024-04-06 ASSESSMENT — PAIN DESCRIPTION - FREQUENCY: FREQUENCY: CONTINUOUS

## 2024-04-06 ASSESSMENT — PAIN DESCRIPTION - PAIN TYPE: TYPE: CHRONIC PAIN

## 2024-04-06 ASSESSMENT — PAIN DESCRIPTION - DESCRIPTORS
DESCRIPTORS: ACHING

## 2024-04-06 ASSESSMENT — PAIN - FUNCTIONAL ASSESSMENT: PAIN_FUNCTIONAL_ASSESSMENT: PREVENTS OR INTERFERES SOME ACTIVE ACTIVITIES AND ADLS

## 2024-04-06 ASSESSMENT — PAIN DESCRIPTION - ORIENTATION: ORIENTATION: MID

## 2024-04-06 NOTE — CONSULTS
Date:   2024  Patient name: Graciela Holt  Date of admission:  2024  8:54 PM  MRN:   [id number]  YOB: 1950  PCP: Travis Mason MD    Reason for Admission: Hypokalemia [E87.6]  NSTEMI (non-ST elevated myocardial infarction) ([facility]) [I21.4]  Acute on chronic congestive heart failure, unspecified heart failure type ([facility]) [I50.9]    Cardiology consult: Abnormal troponin       Referring physician: Dr Chip Anderson    Impression  Admission 2024 increasing shortness of breath, increasing swelling over the leg, elevated troponin 119, 136 proBNP 31,990, chest x-ray showed pulmonary edema  Admission 2024 with increasing shortness of breath, high-sensitivity troponin 39, ECG showed sinus rhythm voltage criteria for LVH with repolarization changes, prolonged QT  1 episode of 5 beat V. Tach  at 1637 1-15-24, K 3.5 slightly prolonged QT interval  Congestive heart failure systolic and diastolic, ejection fraction 25 to 30%  Multivessel coronary artery disease  CABG LIMA to LAD and diagonal 1, SVG to OM, SVG to PDA 2018 at [facility] (cardiologist Dr. Fowler)  History of left MCA stroke  Occluded internal carotid artery  Hyperlipidemia  Diabetes mellitus ,hemoglobin A1c 7.2  Increased serum creatinine with the diuretic, improved after holding diuretics  Hyperkalemia improved after discontinuation of Aldactone    Past surgical history  Bilateral knee replacement, C-spine surgery, cholecystectomy,  section, CABG     Drug allergies codeine, lisinopril     History of present illness  73-year-old female who lives with her 2 roommates has 1 son and 1 daughter with a past medical history of multivessel coronary artery disease, coronary artery bypass surgery, carotid surgery, COPD, quit smoking about 1 year of got hospitalized on 2024 with increasing shortness of breath and peripheral edema.  She started having symptoms couple of weeks prior to admission.  She was also

## 2024-04-06 NOTE — PLAN OF CARE
Problem: Discharge Planning  Goal: Discharge to home or other facility with appropriate resources  4/6/2024 0321 by Ann Ho RN  Outcome: Progressing       Problem: Pain  Goal: Verbalizes/displays adequate comfort level or baseline comfort level  4/6/2024 0321 by Ann Ho RN  Outcome: Progressing    Problem: Cardiovascular - Adult  Goal: Maintains optimal cardiac output and hemodynamic stability  4/6/2024 0321 by Ann Ho RN  Outcome: Progressing     Problem: Cardiovascular - Adult  Goal: Absence of cardiac dysrhythmias or at baseline  4/6/2024 0321 by Ann Ho RN  Outcome: Progressing     Problem: Musculoskeletal - Adult  Goal: Return mobility to safest level of function  4/6/2024 0321 by Ann Ho RN  Outcome: Progressing     Problem: Safety - Adult  Goal: Free from fall injury  4/6/2024 0321 by Ann Ho RN  Outcome: Progressing    Pt remains free of falls or injruies. Pt on heparin drip, hemodynamically stable with absence of cardiac dysrhytmias. Pt displaying adequate pain management with pharmaceutical measures,

## 2024-04-06 NOTE — PLAN OF CARE
Problem: Discharge Planning  Goal: Discharge to home or other facility with appropriate resources  Outcome: Progressing     Problem: Pain  Goal: Verbalizes/displays adequate comfort level or baseline comfort level  Outcome: Progressing     Problem: Respiratory - Adult  Goal: Achieves optimal ventilation and oxygenation  Outcome: Progressing     Problem: Cardiovascular - Adult  Goal: Maintains optimal cardiac output and hemodynamic stability  Outcome: Progressing  Goal: Absence of cardiac dysrhythmias or at baseline  Outcome: Progressing     Problem: Musculoskeletal - Adult  Goal: Return mobility to safest level of function  Outcome: Progressing     Problem: Metabolic/Fluid and Electrolytes - Adult  Goal: Electrolytes maintained within normal limits  Outcome: Progressing     Problem: Chronic Conditions and Co-morbidities  Goal: Patient's chronic conditions and co-morbidity symptoms are monitored and maintained or improved  Outcome: Progressing     Problem: Safety - Adult  Goal: Free from fall injury  Outcome: Progressing

## 2024-04-06 NOTE — PLAN OF CARE
Problem: Discharge Planning  Goal: Discharge to home or other facility with appropriate resources  4/6/2024 1659 by Diana Pierce RN  Outcome: Progressing     Problem: Pain  Goal: Verbalizes/displays adequate comfort level or baseline comfort level  4/6/2024 1659 by Diana Pierce RN  Outcome: Progressing  Note: Continues with regular discomfort due to chronic underlying conditions     Problem: Respiratory - Adult  Goal: Achieves optimal ventilation and oxygenation  Outcome: Progressing     Problem: Cardiovascular - Adult  Goal: Maintains optimal cardiac output and hemodynamic stability  4/6/2024 1659 by Diana Pierce RN  Outcome: Progressing     Problem: Cardiovascular - Adult  Goal: Absence of cardiac dysrhythmias or at baseline  4/6/2024 1659 by Diana Pierce RN  Outcome: Progressing     Problem: Musculoskeletal - Adult  Goal: Return mobility to safest level of function  4/6/2024 1659 by Diana Pierce RN  Outcome: Progressing     Problem: Metabolic/Fluid and Electrolytes - Adult  Goal: Electrolytes maintained within normal limits  Outcome: Progressing     Problem: Chronic Conditions and Co-morbidities  Goal: Patient's chronic conditions and co-morbidity symptoms are monitored and maintained or improved  Outcome: Progressing     Problem: Safety - Adult  Goal: Free from fall injury  4/6/2024 1659 by Diana Pierce RN  Outcome: Progressing

## 2024-04-07 LAB
ANION GAP SERPL CALCULATED.3IONS-SCNC: 10 MMOL/L (ref 9–17)
BUN SERPL-MCNC: 39 MG/DL (ref 8–23)
CALCIUM SERPL-MCNC: 8.7 MG/DL (ref 8.6–10.4)
CHLORIDE SERPL-SCNC: 103 MMOL/L (ref 98–107)
CO2 SERPL-SCNC: 26 MMOL/L (ref 20–31)
CREAT SERPL-MCNC: 1 MG/DL (ref 0.5–0.9)
ERYTHROCYTE [DISTWIDTH] IN BLOOD BY AUTOMATED COUNT: 15.4 % (ref 11.5–14.9)
GFR SERPL CREATININE-BSD FRML MDRD: 59 ML/MIN/1.73M2
GLUCOSE SERPL-MCNC: 168 MG/DL (ref 70–99)
HCT VFR BLD AUTO: 37.1 % (ref 36–46)
HGB BLD-MCNC: 11.9 G/DL (ref 12–16)
MCH RBC QN AUTO: 27.7 PG (ref 26–34)
MCHC RBC AUTO-ENTMCNC: 32 G/DL (ref 31–37)
MCV RBC AUTO: 86.6 FL (ref 80–100)
PLATELET # BLD AUTO: 252 K/UL (ref 150–450)
PMV BLD AUTO: 9.4 FL (ref 6–12)
POTASSIUM SERPL-SCNC: 4.5 MMOL/L (ref 3.7–5.3)
RBC # BLD AUTO: 4.28 M/UL (ref 4–5.2)
SODIUM SERPL-SCNC: 139 MMOL/L (ref 135–144)
WBC OTHER # BLD: 7.3 K/UL (ref 3.5–11)

## 2024-04-07 PROCEDURE — 6370000000 HC RX 637 (ALT 250 FOR IP): Performed by: NURSE PRACTITIONER

## 2024-04-07 PROCEDURE — 36415 COLL VENOUS BLD VENIPUNCTURE: CPT

## 2024-04-07 PROCEDURE — 2580000003 HC RX 258: Performed by: NURSE PRACTITIONER

## 2024-04-07 PROCEDURE — 80048 BASIC METABOLIC PNL TOTAL CA: CPT

## 2024-04-07 PROCEDURE — 6370000000 HC RX 637 (ALT 250 FOR IP): Performed by: INTERNAL MEDICINE

## 2024-04-07 PROCEDURE — 85027 COMPLETE CBC AUTOMATED: CPT

## 2024-04-07 PROCEDURE — 2060000000 HC ICU INTERMEDIATE R&B

## 2024-04-07 PROCEDURE — 99232 SBSQ HOSP IP/OBS MODERATE 35: CPT | Performed by: INTERNAL MEDICINE

## 2024-04-07 RX ADMIN — SODIUM CHLORIDE, PRESERVATIVE FREE 10 ML: 5 INJECTION INTRAVENOUS at 20:45

## 2024-04-07 RX ADMIN — CLOPIDOGREL BISULFATE 75 MG: 75 TABLET ORAL at 08:26

## 2024-04-07 RX ADMIN — POTASSIUM CHLORIDE 10 MEQ: 10 CAPSULE, COATED, EXTENDED RELEASE ORAL at 08:25

## 2024-04-07 RX ADMIN — METOPROLOL TARTRATE 12.5 MG: 25 TABLET, FILM COATED ORAL at 20:45

## 2024-04-07 RX ADMIN — ACETAMINOPHEN 650 MG: 325 TABLET ORAL at 05:26

## 2024-04-07 RX ADMIN — ACETAMINOPHEN 650 MG: 325 TABLET ORAL at 11:26

## 2024-04-07 RX ADMIN — METOPROLOL TARTRATE 12.5 MG: 25 TABLET, FILM COATED ORAL at 08:25

## 2024-04-07 RX ADMIN — LOSARTAN POTASSIUM 25 MG: 25 TABLET, FILM COATED ORAL at 08:26

## 2024-04-07 RX ADMIN — ACETAMINOPHEN 650 MG: 325 TABLET ORAL at 17:50

## 2024-04-07 RX ADMIN — SODIUM CHLORIDE, PRESERVATIVE FREE 10 ML: 5 INJECTION INTRAVENOUS at 08:31

## 2024-04-07 RX ADMIN — ATORVASTATIN CALCIUM 80 MG: 80 TABLET, FILM COATED ORAL at 08:25

## 2024-04-07 RX ADMIN — FUROSEMIDE 40 MG: 40 TABLET ORAL at 08:25

## 2024-04-07 RX ADMIN — EMPAGLIFLOZIN 10 MG: 10 TABLET, FILM COATED ORAL at 08:25

## 2024-04-07 RX ADMIN — ASPIRIN 81 MG: 81 TABLET, COATED ORAL at 08:25

## 2024-04-07 ASSESSMENT — PAIN DESCRIPTION - LOCATION
LOCATION: NECK

## 2024-04-07 ASSESSMENT — PAIN DESCRIPTION - ORIENTATION: ORIENTATION: MID

## 2024-04-07 ASSESSMENT — PAIN - FUNCTIONAL ASSESSMENT: PAIN_FUNCTIONAL_ASSESSMENT: PREVENTS OR INTERFERES SOME ACTIVE ACTIVITIES AND ADLS

## 2024-04-07 ASSESSMENT — PAIN DESCRIPTION - DESCRIPTORS: DESCRIPTORS: ACHING

## 2024-04-07 ASSESSMENT — PAIN SCALES - GENERAL
PAINLEVEL_OUTOF10: 6
PAINLEVEL_OUTOF10: 10
PAINLEVEL_OUTOF10: 0
PAINLEVEL_OUTOF10: 2
PAINLEVEL_OUTOF10: 10

## 2024-04-07 ASSESSMENT — PAIN DESCRIPTION - ONSET: ONSET: ON-GOING

## 2024-04-07 ASSESSMENT — PAIN DESCRIPTION - PAIN TYPE: TYPE: CHRONIC PAIN

## 2024-04-07 ASSESSMENT — PAIN DESCRIPTION - FREQUENCY: FREQUENCY: CONTINUOUS

## 2024-04-07 ASSESSMENT — PAIN DESCRIPTION - DIRECTION: RADIATING_TOWARDS: NON-RADIATING

## 2024-04-07 NOTE — PLAN OF CARE
Problem: Discharge Planning  Goal: Discharge to home or other facility with appropriate resources  Outcome: Progressing     Problem: Pain  Goal: Verbalizes/displays adequate comfort level or baseline comfort level  Outcome: Progressing  Note: Chronic pain but Tylenol is effective in making same manageable     Problem: Respiratory - Adult  Goal: Achieves optimal ventilation and oxygenation  Outcome: Progressing  Note: Intermittent O2 supplement     Problem: Cardiovascular - Adult  Goal: Maintains optimal cardiac output and hemodynamic stability  Outcome: Progressing  Goal: Absence of cardiac dysrhythmias or at baseline  Outcome: Progressing     Problem: Musculoskeletal - Adult  Goal: Return mobility to safest level of function  Outcome: Progressing  Note: Pt SBA     Problem: Metabolic/Fluid and Electrolytes - Adult  Goal: Electrolytes maintained within normal limits  Outcome: Progressing     Problem: Chronic Conditions and Co-morbidities  Goal: Patient's chronic conditions and co-morbidity symptoms are monitored and maintained or improved  Outcome: Progressing     Problem: Safety - Adult  Goal: Free from fall injury  Outcome: Progressing  Note: No falls

## 2024-04-07 NOTE — PLAN OF CARE
Problem: Discharge Planning  Goal: Discharge to home or other facility with appropriate resources  4/7/2024 0003 by Jak Levy RN  Outcome: Progressing  4/6/2024 1659 by Diana Pierce RN  Outcome: Progressing     Problem: Pain  Goal: Verbalizes/displays adequate comfort level or baseline comfort level  4/7/2024 0003 by Jak Levy RN  Outcome: Progressing  4/6/2024 1659 by Diana Pierce RN  Outcome: Progressing  Note: Continues with regular discomfort due to chronic underlying conditions     Problem: Respiratory - Adult  Goal: Achieves optimal ventilation and oxygenation  4/7/2024 0003 by Jak Levy RN  Outcome: Progressing  4/6/2024 1659 by Diana Pierce RN  Outcome: Progressing     Problem: Cardiovascular - Adult  Goal: Maintains optimal cardiac output and hemodynamic stability  4/7/2024 0003 by Jak Levy RN  Outcome: Progressing  4/6/2024 1659 by Diana Pierce RN  Outcome: Progressing  Goal: Absence of cardiac dysrhythmias or at baseline  4/7/2024 0003 by Jak Levy RN  Outcome: Progressing  4/6/2024 1659 by Diana Pierec RN  Outcome: Progressing     Problem: Musculoskeletal - Adult  Goal: Return mobility to safest level of function  4/7/2024 0003 by Jak Levy RN  Outcome: Progressing  4/6/2024 1659 by Diana Pierce RN  Outcome: Progressing

## 2024-04-08 ENCOUNTER — HOSPITAL ENCOUNTER (INPATIENT)
Age: 74
Discharge: HOME | DRG: 280 | End: 2024-04-10
Attending: INTERNAL MEDICINE
Payer: COMMERCIAL

## 2024-04-08 VITALS
WEIGHT: 147 LBS | HEIGHT: 64 IN | HEART RATE: 66 BPM | SYSTOLIC BLOOD PRESSURE: 129 MMHG | DIASTOLIC BLOOD PRESSURE: 73 MMHG | BODY MASS INDEX: 25.1 KG/M2

## 2024-04-08 LAB
ECHO AO ROOT DIAM: 3.5 CM
ECHO AO ROOT INDEX: 2.03 CM/M2
ECHO AV AREA PEAK VELOCITY: 3.5 CM2
ECHO AV AREA VTI: 3.6 CM2
ECHO AV AREA/BSA PEAK VELOCITY: 2 CM2/M2
ECHO AV AREA/BSA VTI: 2.1 CM2/M2
ECHO AV MEAN GRADIENT: 2 MMHG
ECHO AV MEAN VELOCITY: 0.6 M/S
ECHO AV PEAK GRADIENT: 4 MMHG
ECHO AV PEAK VELOCITY: 1 M/S
ECHO AV VELOCITY RATIO: 0.8
ECHO AV VTI: 19.6 CM
ECHO BSA: 1.74 M2
ECHO EST RA PRESSURE: 8 MMHG
ECHO LA AREA 2C: 28.2 CM2
ECHO LA AREA 4C: 33.6 CM2
ECHO LA DIAMETER INDEX: 2.97 CM/M2
ECHO LA DIAMETER: 5.1 CM
ECHO LA MAJOR AXIS: 7.4 CM
ECHO LA MINOR AXIS: 6.8 CM
ECHO LA TO AORTIC ROOT RATIO: 1.46
ECHO LA VOL BP: 114 ML (ref 22–52)
ECHO LA VOL MOD A2C: 96 ML (ref 22–52)
ECHO LA VOL MOD A4C: 125 ML (ref 22–52)
ECHO LA VOL/BSA BIPLANE: 66 ML/M2 (ref 16–34)
ECHO LA VOLUME INDEX MOD A2C: 56 ML/M2 (ref 16–34)
ECHO LA VOLUME INDEX MOD A4C: 73 ML/M2 (ref 16–34)
ECHO LV E' LATERAL VELOCITY: 6 CM/S
ECHO LV E' SEPTAL VELOCITY: 5 CM/S
ECHO LV EDV A2C: 148 ML
ECHO LV EDV A4C: 217 ML
ECHO LV EDV INDEX A4C: 126 ML/M2
ECHO LV EDV NDEX A2C: 86 ML/M2
ECHO LV EJECTION FRACTION A2C: 34 %
ECHO LV EJECTION FRACTION A4C: 30 %
ECHO LV EJECTION FRACTION BIPLANE: 35 % (ref 55–100)
ECHO LV ESV A2C: 98 ML
ECHO LV ESV A4C: 152 ML
ECHO LV ESV INDEX A2C: 57 ML/M2
ECHO LV ESV INDEX A4C: 88 ML/M2
ECHO LV FRACTIONAL SHORTENING: 16 % (ref 28–44)
ECHO LV INTERNAL DIMENSION DIASTOLE INDEX: 3.26 CM/M2
ECHO LV INTERNAL DIMENSION DIASTOLIC: 5.6 CM (ref 3.9–5.3)
ECHO LV INTERNAL DIMENSION SYSTOLIC INDEX: 2.73 CM/M2
ECHO LV INTERNAL DIMENSION SYSTOLIC: 4.7 CM
ECHO LV IVSD: 1.4 CM (ref 0.6–0.9)
ECHO LV MASS 2D: 347.6 G (ref 67–162)
ECHO LV MASS INDEX 2D: 202.1 G/M2 (ref 43–95)
ECHO LV POSTERIOR WALL DIASTOLIC: 1.4 CM (ref 0.6–0.9)
ECHO LV RELATIVE WALL THICKNESS RATIO: 0.5
ECHO LVOT AREA: 4.2 CM2
ECHO LVOT AV VTI INDEX: 0.87
ECHO LVOT DIAM: 2.3 CM
ECHO LVOT MEAN GRADIENT: 1 MMHG
ECHO LVOT PEAK GRADIENT: 3 MMHG
ECHO LVOT PEAK VELOCITY: 0.8 M/S
ECHO LVOT STROKE VOLUME INDEX: 41.3 ML/M2
ECHO LVOT SV: 71 ML
ECHO LVOT VTI: 17.1 CM
ECHO MV A VELOCITY: 0.78 M/S
ECHO MV AREA VTI: 2.3 CM2
ECHO MV E DECELERATION TIME (DT): 183 MS
ECHO MV E VELOCITY: 0.88 M/S
ECHO MV E/A RATIO: 1.13
ECHO MV E/E' LATERAL: 14.67
ECHO MV E/E' RATIO (AVERAGED): 16.13
ECHO MV LVOT VTI INDEX: 1.81
ECHO MV MAX VELOCITY: 0.9 M/S
ECHO MV MEAN GRADIENT: 1 MMHG
ECHO MV MEAN VELOCITY: 0.5 M/S
ECHO MV PEAK GRADIENT: 3 MMHG
ECHO MV VTI: 31 CM
ECHO RA AREA 4C: 17.4 CM2
ECHO RA END SYSTOLIC VOLUME APICAL 4 CHAMBER INDEX BSA: 26 ML/M2
ECHO RA VOLUME: 45 ML
ECHO RIGHT VENTRICULAR SYSTOLIC PRESSURE (RVSP): 33 MMHG
ECHO RV TAPSE: 1.1 CM (ref 1.7–?)
ECHO TV REGURGITANT MAX VELOCITY: 2.48 M/S
ECHO TV REGURGITANT PEAK GRADIENT: 19 MMHG
EKG ATRIAL RATE: 50 BPM
EKG P AXIS: 75 DEGREES
EKG P-R INTERVAL: 142 MS
EKG Q-T INTERVAL: 560 MS
EKG QRS DURATION: 118 MS
EKG QTC CALCULATION (BAZETT): 510 MS
EKG R AXIS: -11 DEGREES
EKG T AXIS: -178 DEGREES
EKG VENTRICULAR RATE: 50 BPM

## 2024-04-08 PROCEDURE — 6370000000 HC RX 637 (ALT 250 FOR IP): Performed by: INTERNAL MEDICINE

## 2024-04-08 PROCEDURE — 99232 SBSQ HOSP IP/OBS MODERATE 35: CPT | Performed by: INTERNAL MEDICINE

## 2024-04-08 PROCEDURE — 6370000000 HC RX 637 (ALT 250 FOR IP): Performed by: NURSE PRACTITIONER

## 2024-04-08 PROCEDURE — 97535 SELF CARE MNGMENT TRAINING: CPT

## 2024-04-08 PROCEDURE — 6360000004 HC RX CONTRAST MEDICATION: Performed by: INTERNAL MEDICINE

## 2024-04-08 PROCEDURE — 2060000000 HC ICU INTERMEDIATE R&B

## 2024-04-08 PROCEDURE — 97165 OT EVAL LOW COMPLEX 30 MIN: CPT

## 2024-04-08 PROCEDURE — C8929 TTE W OR WO FOL WCON,DOPPLER: HCPCS

## 2024-04-08 PROCEDURE — 2580000003 HC RX 258: Performed by: NURSE PRACTITIONER

## 2024-04-08 RX ORDER — ACETAMINOPHEN 650 MG/1
650 SUPPOSITORY RECTAL EVERY 6 HOURS PRN
Status: DISCONTINUED | OUTPATIENT
Start: 2024-04-08 | End: 2024-04-11 | Stop reason: HOSPADM

## 2024-04-08 RX ORDER — ACETAMINOPHEN 325 MG/1
650 TABLET ORAL EVERY 6 HOURS PRN
Status: DISCONTINUED | OUTPATIENT
Start: 2024-04-08 | End: 2024-04-11 | Stop reason: HOSPADM

## 2024-04-08 RX ADMIN — APIXABAN 5 MG: 5 TABLET, FILM COATED ORAL at 21:14

## 2024-04-08 RX ADMIN — ATORVASTATIN CALCIUM 80 MG: 80 TABLET, FILM COATED ORAL at 09:13

## 2024-04-08 RX ADMIN — EMPAGLIFLOZIN 10 MG: 10 TABLET, FILM COATED ORAL at 09:13

## 2024-04-08 RX ADMIN — PERFLUTREN 4 ML: 6.52 INJECTION, SUSPENSION INTRAVENOUS at 11:20

## 2024-04-08 RX ADMIN — PERFLUTREN 1.5 ML: 6.52 INJECTION, SUSPENSION INTRAVENOUS at 12:05

## 2024-04-08 RX ADMIN — METOPROLOL TARTRATE 12.5 MG: 25 TABLET, FILM COATED ORAL at 21:14

## 2024-04-08 RX ADMIN — SODIUM CHLORIDE, PRESERVATIVE FREE 10 ML: 5 INJECTION INTRAVENOUS at 09:17

## 2024-04-08 RX ADMIN — FUROSEMIDE 40 MG: 40 TABLET ORAL at 09:13

## 2024-04-08 RX ADMIN — CLOPIDOGREL BISULFATE 75 MG: 75 TABLET ORAL at 09:13

## 2024-04-08 RX ADMIN — ACETAMINOPHEN 650 MG: 325 TABLET ORAL at 16:21

## 2024-04-08 RX ADMIN — LOSARTAN POTASSIUM 25 MG: 25 TABLET, FILM COATED ORAL at 09:13

## 2024-04-08 RX ADMIN — METOPROLOL TARTRATE 12.5 MG: 25 TABLET, FILM COATED ORAL at 09:13

## 2024-04-08 RX ADMIN — ASPIRIN 81 MG: 81 TABLET, COATED ORAL at 09:13

## 2024-04-08 RX ADMIN — ACETAMINOPHEN 650 MG: 325 TABLET ORAL at 05:47

## 2024-04-08 RX ADMIN — POTASSIUM CHLORIDE 10 MEQ: 10 CAPSULE, COATED, EXTENDED RELEASE ORAL at 09:13

## 2024-04-08 RX ADMIN — SODIUM CHLORIDE, PRESERVATIVE FREE 10 ML: 5 INJECTION INTRAVENOUS at 21:14

## 2024-04-08 ASSESSMENT — PAIN DESCRIPTION - PAIN TYPE: TYPE: CHRONIC PAIN

## 2024-04-08 ASSESSMENT — PAIN SCALES - GENERAL
PAINLEVEL_OUTOF10: 8
PAINLEVEL_OUTOF10: 10
PAINLEVEL_OUTOF10: 0
PAINLEVEL_OUTOF10: 10

## 2024-04-08 ASSESSMENT — PAIN DESCRIPTION - ONSET: ONSET: ON-GOING

## 2024-04-08 ASSESSMENT — PAIN DESCRIPTION - LOCATION
LOCATION: NECK

## 2024-04-08 ASSESSMENT — PAIN DESCRIPTION - DESCRIPTORS
DESCRIPTORS: ACHING;DISCOMFORT
DESCRIPTORS: THROBBING

## 2024-04-08 ASSESSMENT — PAIN DESCRIPTION - FREQUENCY: FREQUENCY: CONTINUOUS

## 2024-04-08 ASSESSMENT — PAIN DESCRIPTION - ORIENTATION: ORIENTATION: MID

## 2024-04-08 NOTE — FLOWSHEET NOTE
04/08/24 1447   Treatment Team Notification   Reason for Communication Evaluate   Name of Team Member Notified dr casey   Treatment Team Role Consulting Provider   Method of Communication Secure Message   Response Waiting for response     critical abnormal echo called: \"apical thrombus\"

## 2024-04-08 NOTE — PLAN OF CARE
Problem: Discharge Planning  Goal: Discharge to home or other facility with appropriate resources  4/8/2024 1356 by Nasrin Coburn RN  Outcome: Progressing  dc barrier: echo needing resulted     Problem: Pain  Goal: Verbalizes/displays adequate comfort level or baseline comfort level  4/8/2024 1356 by Nasrin Coburn RN  Outcome: Progressing  Pt medicated with pain medication prn.  Assessed all pain characteristics including level, type, location, frequency, and onset.  Non-pharmacologic interventions offered to pt as well.  Pt states pain is tolerable at this time.      Problem: Cardiovascular - Adult  Goal: Maintains optimal cardiac output and hemodynamic stability  4/8/2024 1356 by Nasrin Coburn RN  Outcome: Progressing  No rhythm issues     Problem: Safety - Adult  Goal: Free from fall injury  4/8/2024 1356 by Nasrin Coburn RN  Outcome: Progressing  Pt assessed as a fall risk this shift. Remains free from falls and accidental injury at this time. Fall precautions in place, including falling star sign and fall risk band on pt. Floor free from obstacles, and bed is locked and in lowest position. Adequate lighting provided.  Pt encouraged to call before getting OOB for any need.  Bed alarm activated. Will continue to monitor needs during hourly rounding, and reinforce education on use of call light.

## 2024-04-08 NOTE — PLAN OF CARE
Problem: Discharge Planning  Goal: Discharge to home or other facility with appropriate resources  4/8/2024 0046 by Jak Levy RN  Outcome: Progressing  4/7/2024 1644 by Diana Pierce RN  Outcome: Progressing     Problem: Pain  Goal: Verbalizes/displays adequate comfort level or baseline comfort level  4/8/2024 0046 by Jak Levy RN  Outcome: Progressing  4/7/2024 1644 by Diana Pierce RN  Outcome: Progressing  Note: Chronic pain but Tylenol is effective in making same manageable     Problem: Respiratory - Adult  Goal: Achieves optimal ventilation and oxygenation  4/8/2024 0046 by Jak Levy RN  Outcome: Progressing  4/7/2024 1644 by Diana Pierce RN  Outcome: Progressing  Note: Intermittent O2 supplement     Problem: Cardiovascular - Adult  Goal: Maintains optimal cardiac output and hemodynamic stability  4/8/2024 0046 by Jak Levy RN  Outcome: Progressing  4/7/2024 1644 by Diana Pierce RN  Outcome: Progressing  Goal: Absence of cardiac dysrhythmias or at baseline  4/8/2024 0046 by Jak Levy RN  Outcome: Progressing  4/7/2024 1644 by Diana Pierce RN  Outcome: Progressing     Problem: Musculoskeletal - Adult  Goal: Return mobility to safest level of function  4/8/2024 0046 by Jak Levy RN  Outcome: Progressing  4/7/2024 1644 by Diana Pierce RN  Outcome: Progressing  Note: Pt SBA

## 2024-04-08 NOTE — FLOWSHEET NOTE
04/08/24 1445   Treatment Team Notification   Reason for Communication Evaluate   Name of Team Member Notified dr rodrigez   Treatment Team Role Attending Provider   Method of Communication Secure Message   Response Waiting for response     critical abnormal echo called: \"apical thrombus\"

## 2024-04-09 LAB
ALBUMIN SERPL-MCNC: 3.4 G/DL (ref 3.5–5.2)
ALP SERPL-CCNC: 102 U/L (ref 35–104)
ALT SERPL-CCNC: 25 U/L (ref 5–33)
ANION GAP SERPL CALCULATED.3IONS-SCNC: 10 MMOL/L (ref 9–17)
AST SERPL-CCNC: 34 U/L
BASOPHILS # BLD: 0.1 K/UL (ref 0–0.2)
BASOPHILS NFR BLD: 1 % (ref 0–2)
BILIRUB SERPL-MCNC: 0.3 MG/DL (ref 0.3–1.2)
BUN SERPL-MCNC: 33 MG/DL (ref 8–23)
CALCIUM SERPL-MCNC: 8.7 MG/DL (ref 8.6–10.4)
CHLORIDE SERPL-SCNC: 104 MMOL/L (ref 98–107)
CO2 SERPL-SCNC: 28 MMOL/L (ref 20–31)
CREAT SERPL-MCNC: 1.1 MG/DL (ref 0.5–0.9)
EOSINOPHIL # BLD: 0.2 K/UL (ref 0–0.4)
EOSINOPHILS RELATIVE PERCENT: 3 % (ref 0–4)
ERYTHROCYTE [DISTWIDTH] IN BLOOD BY AUTOMATED COUNT: 15.2 % (ref 11.5–14.9)
GFR SERPL CREATININE-BSD FRML MDRD: 52 ML/MIN/1.73M2
GLUCOSE SERPL-MCNC: 225 MG/DL (ref 70–99)
HCT VFR BLD AUTO: 37.5 % (ref 36–46)
HGB BLD-MCNC: 12 G/DL (ref 12–16)
LYMPHOCYTES NFR BLD: 0.9 K/UL (ref 1–4.8)
LYMPHOCYTES RELATIVE PERCENT: 14 % (ref 24–44)
MAGNESIUM SERPL-MCNC: 1.9 MG/DL (ref 1.6–2.6)
MCH RBC QN AUTO: 27.8 PG (ref 26–34)
MCHC RBC AUTO-ENTMCNC: 31.9 G/DL (ref 31–37)
MCV RBC AUTO: 86.9 FL (ref 80–100)
MONOCYTES NFR BLD: 0.4 K/UL (ref 0.1–1.3)
MONOCYTES NFR BLD: 7 % (ref 1–7)
NEUTROPHILS NFR BLD: 75 % (ref 36–66)
NEUTS SEG NFR BLD: 4.9 K/UL (ref 1.3–9.1)
PLATELET # BLD AUTO: 287 K/UL (ref 150–450)
PMV BLD AUTO: 8.7 FL (ref 6–12)
POTASSIUM SERPL-SCNC: 4.5 MMOL/L (ref 3.7–5.3)
PROT SERPL-MCNC: 6.1 G/DL (ref 6.4–8.3)
RBC # BLD AUTO: 4.31 M/UL (ref 4–5.2)
SODIUM SERPL-SCNC: 142 MMOL/L (ref 135–144)
WBC OTHER # BLD: 6.4 K/UL (ref 3.5–11)

## 2024-04-09 PROCEDURE — 2580000003 HC RX 258: Performed by: NURSE PRACTITIONER

## 2024-04-09 PROCEDURE — 6370000000 HC RX 637 (ALT 250 FOR IP): Performed by: NURSE PRACTITIONER

## 2024-04-09 PROCEDURE — 99232 SBSQ HOSP IP/OBS MODERATE 35: CPT | Performed by: INTERNAL MEDICINE

## 2024-04-09 PROCEDURE — 36415 COLL VENOUS BLD VENIPUNCTURE: CPT

## 2024-04-09 PROCEDURE — 2060000000 HC ICU INTERMEDIATE R&B

## 2024-04-09 PROCEDURE — 80053 COMPREHEN METABOLIC PANEL: CPT

## 2024-04-09 PROCEDURE — 6370000000 HC RX 637 (ALT 250 FOR IP): Performed by: INTERNAL MEDICINE

## 2024-04-09 PROCEDURE — 83735 ASSAY OF MAGNESIUM: CPT

## 2024-04-09 PROCEDURE — 85025 COMPLETE CBC W/AUTO DIFF WBC: CPT

## 2024-04-09 RX ORDER — LIDOCAINE 4 G/G
1 PATCH TOPICAL DAILY
Qty: 5 EACH | Refills: 0 | Status: SHIPPED | OUTPATIENT
Start: 2024-04-10

## 2024-04-09 RX ORDER — FUROSEMIDE 40 MG/1
40 TABLET ORAL DAILY
Qty: 60 TABLET | Refills: 3 | Status: SHIPPED | OUTPATIENT
Start: 2024-04-10

## 2024-04-09 RX ORDER — LOSARTAN POTASSIUM 25 MG/1
25 TABLET ORAL DAILY
Qty: 30 TABLET | Refills: 3 | Status: SHIPPED | OUTPATIENT
Start: 2024-04-10

## 2024-04-09 RX ORDER — AMIODARONE HYDROCHLORIDE 200 MG/1
200 TABLET ORAL 2 TIMES DAILY
Status: DISCONTINUED | OUTPATIENT
Start: 2024-04-09 | End: 2024-04-11 | Stop reason: HOSPADM

## 2024-04-09 RX ORDER — AMIODARONE HYDROCHLORIDE 200 MG/1
200 TABLET ORAL 2 TIMES DAILY
Qty: 60 TABLET | Refills: 0 | Status: SHIPPED | OUTPATIENT
Start: 2024-04-09

## 2024-04-09 RX ADMIN — METOPROLOL TARTRATE 12.5 MG: 25 TABLET, FILM COATED ORAL at 21:10

## 2024-04-09 RX ADMIN — APIXABAN 5 MG: 5 TABLET, FILM COATED ORAL at 08:11

## 2024-04-09 RX ADMIN — APIXABAN 5 MG: 5 TABLET, FILM COATED ORAL at 21:10

## 2024-04-09 RX ADMIN — ACETAMINOPHEN 650 MG: 325 TABLET ORAL at 21:09

## 2024-04-09 RX ADMIN — ACETAMINOPHEN 650 MG: 325 TABLET ORAL at 08:31

## 2024-04-09 RX ADMIN — METOPROLOL TARTRATE 12.5 MG: 25 TABLET, FILM COATED ORAL at 08:12

## 2024-04-09 RX ADMIN — FUROSEMIDE 40 MG: 40 TABLET ORAL at 08:11

## 2024-04-09 RX ADMIN — EMPAGLIFLOZIN 10 MG: 10 TABLET, FILM COATED ORAL at 08:11

## 2024-04-09 RX ADMIN — SODIUM CHLORIDE, PRESERVATIVE FREE 10 ML: 5 INJECTION INTRAVENOUS at 08:30

## 2024-04-09 RX ADMIN — ATORVASTATIN CALCIUM 80 MG: 80 TABLET, FILM COATED ORAL at 08:13

## 2024-04-09 RX ADMIN — AMIODARONE HYDROCHLORIDE 200 MG: 200 TABLET ORAL at 14:33

## 2024-04-09 RX ADMIN — AMIODARONE HYDROCHLORIDE 200 MG: 200 TABLET ORAL at 21:10

## 2024-04-09 RX ADMIN — POTASSIUM CHLORIDE 10 MEQ: 10 CAPSULE, COATED, EXTENDED RELEASE ORAL at 08:11

## 2024-04-09 RX ADMIN — SODIUM CHLORIDE, PRESERVATIVE FREE 10 ML: 5 INJECTION INTRAVENOUS at 21:10

## 2024-04-09 RX ADMIN — LOSARTAN POTASSIUM 25 MG: 25 TABLET, FILM COATED ORAL at 08:11

## 2024-04-09 RX ADMIN — ACETAMINOPHEN 650 MG: 325 TABLET ORAL at 14:37

## 2024-04-09 RX ADMIN — ACETAMINOPHEN 650 MG: 325 TABLET ORAL at 00:57

## 2024-04-09 RX ADMIN — ASPIRIN 81 MG: 81 TABLET, COATED ORAL at 08:11

## 2024-04-09 ASSESSMENT — PAIN SCALES - GENERAL
PAINLEVEL_OUTOF10: 5
PAINLEVEL_OUTOF10: 7
PAINLEVEL_OUTOF10: 8
PAINLEVEL_OUTOF10: 8
PAINLEVEL_OUTOF10: 0
PAINLEVEL_OUTOF10: 10
PAINLEVEL_OUTOF10: 0
PAINLEVEL_OUTOF10: 3
PAINLEVEL_OUTOF10: 3
PAINLEVEL_OUTOF10: 0

## 2024-04-09 ASSESSMENT — PAIN DESCRIPTION - DESCRIPTORS
DESCRIPTORS: SHARP
DESCRIPTORS: ACHING
DESCRIPTORS: DISCOMFORT;THROBBING
DESCRIPTORS: DISCOMFORT;THROBBING
DESCRIPTORS: ACHING;DISCOMFORT
DESCRIPTORS: ACHING;THROBBING

## 2024-04-09 ASSESSMENT — PAIN DESCRIPTION - LOCATION
LOCATION: NECK

## 2024-04-09 ASSESSMENT — PAIN DESCRIPTION - ORIENTATION
ORIENTATION: MID
ORIENTATION: MID

## 2024-04-09 ASSESSMENT — PAIN - FUNCTIONAL ASSESSMENT
PAIN_FUNCTIONAL_ASSESSMENT: ACTIVITIES ARE NOT PREVENTED
PAIN_FUNCTIONAL_ASSESSMENT: ACTIVITIES ARE NOT PREVENTED

## 2024-04-09 NOTE — DISCHARGE INSTR - COC
Continuity of Care Form    Patient does not have a phone. Call Bobo Solitario, friend, at [phone].     Patient Name: Graciela Holt   :  1950  MRN:  [id number]    Admit date:  2024  Discharge date:  04/10/2024    Code Status Order: [id number]   Advance Directives:     Admitting Physician:  Lewis Castro MD  PCP: Travis Mason MD    Discharging Nurse:   Discharging Hospital Unit/Room#: /-01  Discharging Unit Phone Number: [phone]    Emergency Contact:   Extended Emergency Contact Information  Primary Emergency Contact: Guanakito Zheng  Address: [address] [place] [address]  Home Phone: [phone]  Work Phone: [phone]  Mobile Phone: [phone]  Relation: Child    Past Surgical History:  Past Surgical History:   Procedure Laterality Date    APPENDECTOMY      BRONCHOSCOPY N/A 2021    BRONCHOSCOPY w/ WASHINGS performed by Toy Cheatham MD at [facility] ENDO    CARDIAC SURGERY      cath x 2/ stent x 1    CARDIAC SURGERY      bypass 4 vessel 2018    CERVICAL LAMINECTOMY N/A 10/14/2020    C3-C7 POSTERIOR CERVICAL DECOMPRESSION FUSION performed by Armando Guerra MD at [facility] OR    CHOLECYSTECTOMY      HYSTERECTOMY (CERVIX STATUS UNKNOWN)      JOINT REPLACEMENT Bilateral     knees    LEG BIOPSY EXCISION Right 2019    LEG LESION PUNCH BIOPSY performed by Chuckie Snow MD at [facility] OR    OTHER SURGICAL HISTORY  2020    cerebral angiogram    CO I&D DEEP ABSC BURSA/HEMATOMA THIGH/KNEE REGION Right 2018    DEBRIDEMENT INCISION AND DRAINAGE THIGH ABSCESS performed by Amanda Hernandez DO at [facility] OR    CO OFFICE/OUTPT VISIT,PROCEDURE ONLY N/A 2018    CABG X 3 LIMA-LAD-DIAG,SVG-PDA,CORONARY ARTERY BYPASS REDO, PUMP ASSIST, SWAN, JARRED, REDO STERNOTOMY performed by Savanna Mata MD at [facility]       Immunization History:   Immunization History   Administered Date(s) Administered    Influenza Virus Vaccine 2016, 2017    Influenza Whole

## 2024-04-09 NOTE — PLAN OF CARE
Problem: Discharge Planning  Goal: Discharge to home or other facility with appropriate resources  Outcome: Progressing     Problem: Pain  Goal: Verbalizes/displays adequate comfort level or baseline comfort level  Outcome: Progressing     Problem: Respiratory - Adult  Goal: Achieves optimal ventilation and oxygenation  Outcome: Progressing     Problem: Cardiovascular - Adult  Goal: Maintains optimal cardiac output and hemodynamic stability  Outcome: Progressing     Problem: Cardiovascular - Adult  Goal: Absence of cardiac dysrhythmias or at baseline  Outcome: Progressing     Problem: Musculoskeletal - Adult  Goal: Return mobility to safest level of function  Outcome: Progressing     Problem: Metabolic/Fluid and Electrolytes - Adult  Goal: Electrolytes maintained within normal limits  Outcome: Progressing     Problem: Chronic Conditions and Co-morbidities  Goal: Patient's chronic conditions and co-morbidity symptoms are monitored and maintained or improved  Outcome: Progressing     Problem: Safety - Adult  Goal: Free from fall injury  Outcome: Progressing

## 2024-04-09 NOTE — DISCHARGE INSTR - DIET
Good nutrition is important when healing from an illness, injury, or surgery.  Follow any nutrition recommendations given to you during your hospital stay.   If you were given an oral nutrition supplement while in the hospital, continue to take this supplement at home.  You can take it with meals, in-between meals, and/or before bedtime. These supplements can be purchased at most local grocery stores, pharmacies, and chain [employer]-stores.   If you have any questions about your diet or nutrition, call the hospital and ask for the dietitian.    Healthy balanced meals

## 2024-04-09 NOTE — DISCHARGE SUMMARY
1 TABLET BY MOUTH EVERY NIGHT AS NEEDED     SM Aspirin Adult Low Strength 81 MG EC tablet  Generic drug: aspirin  TAKE 1 TABLET BY MOUTH DAILY            STOP taking these medications      clopidogrel 75 MG tablet  Commonly known as: PLAVIX     ferrous sulfate 325 (65 Fe) MG tablet  Commonly known as: IRON 325     magnesium oxide 400 (241.3 Mg) MG Tabs tablet  Commonly known as: MAG-OX               Where to Get Your Medications        These medications were sent to [id number] - [facility] [address] [place] [address] -  [phone] - F [phone]  [address] [place] [address]      Phone: [phone]   apixaban 5 MG Tabs tablet  empagliflozin 10 MG tablet  furosemide 40 MG tablet  lidocaine 4 % external patch  losartan 25 MG tablet  metoprolol tartrate 25 MG tablet         No discharge procedures on file.    Time Spent on discharge is  35 mins in patient examination, evaluation, counseling as well as medication reconciliation, prescriptions for required medications, discharge plan and follow up.    Electronically signed by   Chip Anderson MD  4/9/2024  9:38 AM      Thank you Travis Vaughn MD for the opportunity to be involved in this patient's care.    Please note that this chart was generated using voice recognition Dragon dictation software.  Although every effort was made to ensure the accuracy of this automated transcription, some errors in transcription may have occurred.

## 2024-04-09 NOTE — FLOWSHEET NOTE
04/09/24 1829   Treatment Team Notification   Reason for Communication Review case   Name of Team Member Notified dr rincon   Treatment Team Role Attending Provider   Method of Communication Secure Message   Response Waiting for response   Notification Time 1829     dr casey ordered amiodarone for her and I noticed it is not on her dc paperwork.

## 2024-04-10 PROCEDURE — 99239 HOSP IP/OBS DSCHRG MGMT >30: CPT | Performed by: INTERNAL MEDICINE

## 2024-04-10 PROCEDURE — 6370000000 HC RX 637 (ALT 250 FOR IP): Performed by: INTERNAL MEDICINE

## 2024-04-10 PROCEDURE — 6370000000 HC RX 637 (ALT 250 FOR IP): Performed by: NURSE PRACTITIONER

## 2024-04-10 PROCEDURE — 2580000003 HC RX 258: Performed by: NURSE PRACTITIONER

## 2024-04-10 PROCEDURE — 2060000000 HC ICU INTERMEDIATE R&B

## 2024-04-10 RX ORDER — AMIODARONE HYDROCHLORIDE 200 MG/1
200 TABLET ORAL 2 TIMES DAILY
Qty: 180 TABLET | OUTPATIENT
Start: 2024-04-10

## 2024-04-10 RX ADMIN — EMPAGLIFLOZIN 10 MG: 10 TABLET, FILM COATED ORAL at 08:49

## 2024-04-10 RX ADMIN — APIXABAN 5 MG: 5 TABLET, FILM COATED ORAL at 08:49

## 2024-04-10 RX ADMIN — SODIUM CHLORIDE, PRESERVATIVE FREE 10 ML: 5 INJECTION INTRAVENOUS at 08:49

## 2024-04-10 RX ADMIN — ATORVASTATIN CALCIUM 80 MG: 80 TABLET, FILM COATED ORAL at 08:49

## 2024-04-10 RX ADMIN — ASPIRIN 81 MG: 81 TABLET, COATED ORAL at 08:49

## 2024-04-10 RX ADMIN — ACETAMINOPHEN 650 MG: 325 TABLET ORAL at 16:13

## 2024-04-10 RX ADMIN — FUROSEMIDE 40 MG: 40 TABLET ORAL at 08:48

## 2024-04-10 RX ADMIN — ACETAMINOPHEN 650 MG: 325 TABLET ORAL at 08:59

## 2024-04-10 RX ADMIN — AMIODARONE HYDROCHLORIDE 200 MG: 200 TABLET ORAL at 20:52

## 2024-04-10 RX ADMIN — APIXABAN 5 MG: 5 TABLET, FILM COATED ORAL at 20:52

## 2024-04-10 RX ADMIN — METOPROLOL TARTRATE 12.5 MG: 25 TABLET, FILM COATED ORAL at 20:52

## 2024-04-10 RX ADMIN — METOPROLOL TARTRATE 12.5 MG: 25 TABLET, FILM COATED ORAL at 08:49

## 2024-04-10 RX ADMIN — SODIUM CHLORIDE, PRESERVATIVE FREE 10 ML: 5 INJECTION INTRAVENOUS at 20:52

## 2024-04-10 RX ADMIN — POTASSIUM CHLORIDE 10 MEQ: 10 CAPSULE, COATED, EXTENDED RELEASE ORAL at 08:49

## 2024-04-10 RX ADMIN — ACETAMINOPHEN 650 MG: 325 TABLET ORAL at 22:20

## 2024-04-10 RX ADMIN — AMIODARONE HYDROCHLORIDE 200 MG: 200 TABLET ORAL at 08:49

## 2024-04-10 RX ADMIN — LOSARTAN POTASSIUM 25 MG: 25 TABLET, FILM COATED ORAL at 08:49

## 2024-04-10 ASSESSMENT — PAIN DESCRIPTION - ORIENTATION
ORIENTATION: LOWER
ORIENTATION: LOWER
ORIENTATION: MID

## 2024-04-10 ASSESSMENT — PAIN SCALES - GENERAL
PAINLEVEL_OUTOF10: 3
PAINLEVEL_OUTOF10: 9
PAINLEVEL_OUTOF10: 8
PAINLEVEL_OUTOF10: 4
PAINLEVEL_OUTOF10: 0
PAINLEVEL_OUTOF10: 10
PAINLEVEL_OUTOF10: 3
PAINLEVEL_OUTOF10: 4

## 2024-04-10 ASSESSMENT — PAIN DESCRIPTION - DESCRIPTORS
DESCRIPTORS: ACHING
DESCRIPTORS: ACHING
DESCRIPTORS: SHARP
DESCRIPTORS: ACHING

## 2024-04-10 ASSESSMENT — PAIN DESCRIPTION - ONSET: ONSET: ON-GOING

## 2024-04-10 ASSESSMENT — PAIN - FUNCTIONAL ASSESSMENT
PAIN_FUNCTIONAL_ASSESSMENT: ACTIVITIES ARE NOT PREVENTED

## 2024-04-10 ASSESSMENT — PAIN DESCRIPTION - LOCATION
LOCATION: NECK
LOCATION: BACK
LOCATION: HEAD
LOCATION: BACK
LOCATION: HEAD

## 2024-04-10 ASSESSMENT — PAIN DESCRIPTION - PAIN TYPE
TYPE: CHRONIC PAIN
TYPE: CHRONIC PAIN

## 2024-04-10 ASSESSMENT — PAIN DESCRIPTION - FREQUENCY: FREQUENCY: CONTINUOUS

## 2024-04-10 NOTE — PLAN OF CARE
Problem: Discharge Planning  Goal: Discharge to home or other facility with appropriate resources  4/10/2024 0503 by Becca Albright RN  Outcome: Progressing     Problem: Pain  Goal: Verbalizes/displays adequate comfort level or baseline comfort level  4/10/2024 0503 by Becca Albright RN  Outcome: Progressing     Problem: Respiratory - Adult  Goal: Achieves optimal ventilation and oxygenation  4/10/2024 0503 by Becca Albright RN  Outcome: Progressing     Problem: Cardiovascular - Adult  Goal: Maintains optimal cardiac output and hemodynamic stability  4/10/2024 0503 by Becca Albright RN  Outcome: Progressing     Problem: Cardiovascular - Adult  Goal: Absence of cardiac dysrhythmias or at baseline  4/10/2024 0503 by Becca Albright RN  Outcome: Progressing     Problem: Musculoskeletal - Adult  Goal: Return mobility to safest level of function  4/10/2024 0503 by Becca Albright RN  Outcome: Progressing     Problem: Metabolic/Fluid and Electrolytes - Adult  Goal: Electrolytes maintained within normal limits  4/10/2024 0503 by Becca Albright RN  Outcome: Progressing     Problem: Chronic Conditions and Co-morbidities  Goal: Patient's chronic conditions and co-morbidity symptoms are monitored and maintained or improved  4/10/2024 0503 by Becca Albright RN  Outcome: Progressing     Problem: Safety - Adult  Goal: Free from fall injury  4/10/2024 0503 by Becca Albright RN  Outcome: Progressing

## 2024-04-10 NOTE — PLAN OF CARE
Problem: Discharge Planning  Goal: Discharge to home or other facility with appropriate resources  4/10/2024 1628 by Rory Paredes RN  Outcome: Adequate for Discharge  Flowsheets (Taken 4/10/2024 0900)  Discharge to home or other facility with appropriate resources:   Identify barriers to discharge with patient and caregiver   Arrange for needed discharge resources and transportation as appropriate   Identify discharge learning needs (meds, wound care, etc)  4/10/2024 0503 by Becca Albright RN  Outcome: Progressing     Problem: Pain  Goal: Verbalizes/displays adequate comfort level or baseline comfort level  4/10/2024 1628 by Rory Paredes RN  Outcome: Adequate for Discharge  Flowsheets (Taken 4/10/2024 0859)  Verbalizes/displays adequate comfort level or baseline comfort level:   Encourage patient to monitor pain and request assistance   Assess pain using appropriate pain scale  4/10/2024 0503 by Becca Albright RN  Outcome: Progressing     Problem: Respiratory - Adult  Goal: Achieves optimal ventilation and oxygenation  4/10/2024 1628 by Rory Paredes RN  Outcome: Adequate for Discharge  Flowsheets (Taken 4/10/2024 0900)  Achieves optimal ventilation and oxygenation:   Assess for changes in respiratory status   Assess for changes in mentation and behavior   Position to facilitate oxygenation and minimize respiratory effort  4/10/2024 0503 by Becca Albright RN  Outcome: Progressing     Problem: Cardiovascular - Adult  Goal: Maintains optimal cardiac output and hemodynamic stability  4/10/2024 1628 by Rory Paredes RN  Outcome: Adequate for Discharge  Flowsheets (Taken 4/10/2024 0900)  Maintains optimal cardiac output and hemodynamic stability: Monitor blood pressure and heart rate  4/10/2024 0503 by Becca Albright RN  Outcome: Progressing  Goal: Absence of cardiac dysrhythmias or at baseline  4/10/2024 1628 by Rory Paredes RN  Outcome: Adequate for Discharge  Flowsheets (Taken 4/10/2024 0900)  Absence of

## 2024-04-11 ENCOUNTER — TELEPHONE (OUTPATIENT)
Dept: FAMILY MEDICINE CLINIC | Age: 74
End: 2024-04-11

## 2024-04-11 VITALS
RESPIRATION RATE: 16 BRPM | WEIGHT: 143.3 LBS | TEMPERATURE: 98 F | HEART RATE: 68 BPM | DIASTOLIC BLOOD PRESSURE: 85 MMHG | BODY MASS INDEX: 24.46 KG/M2 | OXYGEN SATURATION: 95 % | SYSTOLIC BLOOD PRESSURE: 123 MMHG | HEIGHT: 64 IN

## 2024-04-11 PROCEDURE — 6370000000 HC RX 637 (ALT 250 FOR IP): Performed by: NURSE PRACTITIONER

## 2024-04-11 PROCEDURE — 6370000000 HC RX 637 (ALT 250 FOR IP): Performed by: INTERNAL MEDICINE

## 2024-04-11 PROCEDURE — 2580000003 HC RX 258: Performed by: NURSE PRACTITIONER

## 2024-04-11 RX ADMIN — EMPAGLIFLOZIN 10 MG: 10 TABLET, FILM COATED ORAL at 08:11

## 2024-04-11 RX ADMIN — ASPIRIN 81 MG: 81 TABLET, COATED ORAL at 08:11

## 2024-04-11 RX ADMIN — ACETAMINOPHEN 650 MG: 325 TABLET ORAL at 08:10

## 2024-04-11 RX ADMIN — LOSARTAN POTASSIUM 25 MG: 25 TABLET, FILM COATED ORAL at 08:11

## 2024-04-11 RX ADMIN — SODIUM CHLORIDE, PRESERVATIVE FREE 10 ML: 5 INJECTION INTRAVENOUS at 08:11

## 2024-04-11 RX ADMIN — POTASSIUM CHLORIDE 10 MEQ: 10 CAPSULE, COATED, EXTENDED RELEASE ORAL at 08:11

## 2024-04-11 RX ADMIN — FUROSEMIDE 40 MG: 40 TABLET ORAL at 08:10

## 2024-04-11 RX ADMIN — ATORVASTATIN CALCIUM 80 MG: 80 TABLET, FILM COATED ORAL at 08:10

## 2024-04-11 RX ADMIN — AMIODARONE HYDROCHLORIDE 200 MG: 200 TABLET ORAL at 08:10

## 2024-04-11 RX ADMIN — METOPROLOL TARTRATE 12.5 MG: 25 TABLET, FILM COATED ORAL at 08:10

## 2024-04-11 RX ADMIN — APIXABAN 5 MG: 5 TABLET, FILM COATED ORAL at 08:11

## 2024-04-11 ASSESSMENT — PAIN - FUNCTIONAL ASSESSMENT: PAIN_FUNCTIONAL_ASSESSMENT: PREVENTS OR INTERFERES SOME ACTIVE ACTIVITIES AND ADLS

## 2024-04-11 ASSESSMENT — PAIN DESCRIPTION - LOCATION: LOCATION: NECK

## 2024-04-11 ASSESSMENT — PAIN DESCRIPTION - DESCRIPTORS: DESCRIPTORS: ACHING

## 2024-04-11 ASSESSMENT — PAIN DESCRIPTION - ORIENTATION: ORIENTATION: POSTERIOR

## 2024-04-11 ASSESSMENT — PAIN SCALES - GENERAL: PAINLEVEL_OUTOF10: 10

## 2024-04-11 NOTE — CARE COORDINATION
ONGOING DISCHARGE PLAN:    Patient is alert and oriented x4.    Spoke with patient regarding discharge plan and patient confirms that plan is still home with friend. Denies needs for VNS.     Eliquis is $0 co pay per [facility] pharmacy.     Jardiance is $42/month. Patient stated affordable. She stated she has no way to pay for it today. She does want it transferred to her pharmacy. [facility] on [address].     Echo= ef 25-30%    [facility] [place].    Will continue to follow for additional discharge needs.    If patient is discharged prior to next notation, then this note serves as note for discharge by case management.    Electronically signed by Bonita Pierce RN on 4/9/2024 at 11:31 AM   
ONGOING DISCHARGE PLAN:    Patient is alert and oriented x4.    Spoke with patient regarding discharge plan and patient confirms that plan is still home.  [facility]-accepted    Life Vest on patient.    States she needs a cab ride home.    Face sheet, [facility], discharge orders faxed to [facility].       IMM letter provided to patient.  Patient offered four hours to make informed decision regarding appeal process; patient agreeable to discharge.      Will continue to follow for additional discharge needs.    If patient is discharged prior to next notation, then this note serves as note for discharge by case management.    Electronically signed by Bonita Pierce RN on 4/11/2024 at 9:02 AM   
ONGOING DISCHARGE PLAN:    Patient is alert and oriented x4.    Spoke with patient regarding discharge plan and patient confirms that plan is still home. Discussed VNS for discharge. Agreeable for  [facility].referral sent.     Lives with friends Bobo Solitario and Luana Cerda.     Patient states she does not have a phone but can call Bobo at [phone].    Faxed orders, echo. H&P, cardiology progress notes, face sheet to Glynn with [facility] at [phone].    Will continue to follow for additional discharge needs.    If patient is discharged prior to next notation, then this note serves as note for discharge by case management.    Electronically signed by Bonita Pierce RN on 4/10/2024 at 2:02 PM   
ONGOING DISCHARGE PLAN:    Patient is alert and oriented x4.    Spoke with patient regarding discharge plan and patient confirms that plan is still to discharge to home with friends. The patient states that her friends can assist her 24/7. It was explained that PT/OT recommend home with assistance and we can set her up with visiting nurse services if she would like. Patient states she will think it over, but she doesn't feel the need.    DME: None.    VNS: Thinking about it.    Echocardiogram being performed at bedside currently. Cardiology on board; patient reluctant for heart cath.    IMM letter provided to patient.  Patient offered four hours to make informed decision regarding appeal process; patient agreeable to discharge.     Will continue to follow for additional discharge needs.    If patient is discharged prior to next notation, then this note serves as note for discharge by case management.    Electronically signed by Robyn Andrews RN on 4/8/2024 at 10:59 AM    
ONGOING DISCHARGE PLAN:    Patient is alert and oriented x4.    Spoke with patient regarding discharge plan and patient confirms that plan is still to return to friends home when medically ready.    DME: None.    VNS being recommended by PT. Patient states she will think about it.     Cardiology following-repeat 2D echo.     Will continue to follow for additional discharge needs.    If patient is discharged prior to next notation, then this note serves as note for discharge by case management.    Electronically signed by Robyn Andrews RN on 4/7/2024 at 4:27 PM    
Writer received a call from Aster MANCINI, clinical lead about Dr Flynn wants to order patient a Life Vest for patient for discharge. Will follow up to see if patient has been informed and agreeable and submit for orders. Electronically signed by Bonita Pierce RN on 4/9/2024 at 2:17 PM   
Writer received a call from Beth with [facility]. PCP will not follow for home care or make follow  up appt.  called and made appt with Dr Anderson for May 2nd at noon.     Beth updated on new appt and she will call patient's room mate Bobo Solitario to let them know about follow up appt. Electronically signed by Bonita Pierce RN on 4/11/2024 at 2:51 PM   
    CSN: [id number]      Medication List    START taking these medications    START taking these medications  amiodarone 200 MG tablet  Commonly known as: CORDARONE  Take 1 tablet by mouth 2 times daily   apixaban 5 MG Tabs tablet  Commonly known as: ELIQUIS  Take 1 tablet by mouth 2 times daily   atorvastatin 80 MG tablet  Commonly known as: LIPITOR  Take 1 tablet by mouth daily   budesonide-formoterol 160-4.5 MCG/ACT Aero  Commonly known as: SYMBICORT  Inhale 2 puffs into the lungs 2 times daily   ipratropium 0.5 mg-albuterol 2.5 mg 0.5-2.5 (3) MG/3ML Soln nebulizer solution  Commonly known as: DUONEB  Inhale 3 mLs into the lungs every 4 hours as needed for Shortness of Breath   lidocaine 4 % external patch  Place 1 patch onto the skin daily   losartan 25 MG tablet  Commonly known as: COZAAR  Take 1 tablet by mouth daily     CHANGE how you take these medications    CHANGE how you take these medications  fluticasone 50 MCG/ACT nasal spray  Commonly known as: FLONASE  USE 2 SPRAYS IN EACH NOSTRIL ONCE DAILY  What changed:  how much to take  how to take this  when to take this  reasons to take this   furosemide 40 MG tablet  Commonly known as: LASIX  Take 1 tablet by mouth daily  What changed:  medication strength  how much to take  when to take this     CONTINUE taking these medications    CONTINUE taking these medications  albuterol sulfate  (90 Base) MCG/ACT inhaler  Commonly known as: PROVENTIL;VENTOLIN;PROAIR  Inhale 2 puffs into the lungs every 6 hours as needed for Wheezing or Shortness of Breath   Blood Pressure Kit  Dx: HTN. Needs automatic blood pressure machine to monitor her blood pressure.   diclofenac sodium 1 % Gel  Commonly known as: VOLTAREN  Apply 2 g topically 2 times daily as needed for Pain   empagliflozin 10 MG tablet  Commonly known as: JARDIANCE  Take 1 tablet by mouth daily   famotidine 40 MG tablet  Commonly known as: PEPCID  Take 1 tablet by mouth every evening   metoprolol tartrate 
Issues/Barriers to RETURNING to current housing: Dizziness.  Potential Assistance needed at discharge: N/A            Potential DME:    Patient expects to discharge to: Trailer/mobile home  Plan for transportation at discharge: Friends    Financial    Payor: [employer] / Plan: [employer] / Product Type: *No Product type* /     Does insurance require precert for SNF: Yes    Potential assistance Purchasing Medications: No  Meds-to-Beds request:        [place], [place] - [address] [facility] -  [phone] - F [phone]  [facility] [facility] [address]  Phone: [phone] Fax: [phone]    [facility] - [place], [place] - [address] - P [phone] - F [phone]  [address] [facility] [address]  Phone: [phone] Fax: [phone]    [id number] [place], [place] - [address] [facility] - P [phone] -  [phone]  [address] [place] [id number]  Phone: [phone] Fax: [phone]      Notes:    Factors facilitating achievement of predicted outcomes: Friend support, Cooperative, and Pleasant    Barriers to discharge: Pain    Additional Case Management Notes: The patient is from a 1 story mobile home with friends, independent, friends or cab transport.    DME: None.    VNS: Denies.     Cardiology consulted for elevated troponin level, 134. PT/OT ordered.    The Plan for Transition of Care is related to the following treatment goals of Hypokalemia [E87.6]  NSTEMI (non-ST elevated myocardial infarction) (HCC) [I21.4]  Acute on chronic congestive heart failure, unspecified heart failure type (HCC) [I50.9]    IF APPLICABLE: The Patient and/or patient representative Graciela and her family were provided with a choice of provider and agrees with the discharge plan. Freedom of choice list with basic dialogue that supports the patient's individualized plan of care/goals and shares the quality data associated with the providers was provided to:

## 2024-04-11 NOTE — TELEPHONE ENCOUNTER
Incoming call came from Home Health Care Facility:  [facility].   Nurse is calling to see, if provider.  will be able to sign off on home health care orders PT/OT order's for patient.     Recent Hospital Discharge:    Recommended Disposition per Home Health facility: Writer adv pt needs to be seen with PCP before she will follow, pt has not been seen since 2/10/22    Thank you!      No future appointments.

## 2024-04-11 NOTE — TELEPHONE ENCOUNTER
Care Transitions Initial Follow Up Call    Outreach made within 2 business days of discharge: Yes    Patient: Graciela Holt Patient : [id number]   MRN: [id number]  Reason for Admission: There are no discharge diagnoses documented for the most recent discharge.  Discharge Date: 24       Spoke with: N/A/SEND LETTER    Discharge department/facility: [facility] Interactive Patient Contact:  Was patient able to fill all prescriptions:   Was patient instructed to bring all medications to the follow-up visit:   Is patient taking all medications as directed in the discharge summary?   Does patient understand their discharge instructions:   Does patient have questions or concerns that need addressed prior to 7-14 day follow up office visit:     Scheduled appointment with PCP within 7-14 days    Follow Up  No future appointments.    Verona Montenegro MA

## 2024-04-12 NOTE — PROGRESS NOTES
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 or bleeding on exposed skin area  Extremities: peripheral pulses palpable, no pedal edema or calf pain with palpation  Psych: normal affect    Investigations:      Laboratory Testing:  No results found for this or any previous visit (from the past 24 hour(s)).      Imaging/Diagnostics:  CT HEAD WO CONTRAST    Result Date: 4/5/2024  1. No acute intracranial abnormality. Encephalomalacia changes are seen in the left parietal, temporal and occipital lobes.  Please correlate MR brain examination. 2. Global parenchymal volume loss with nonspecific white matter changes likely related to chronic microvascular ischemic change. 3. No significant vascular abnormality in the head or neck. 4. C1 and C2 fractures are noted as described above.  No evidence of vertebral artery dissection or compression.     CTA HEAD NECK W CONTRAST    Result Date: 4/5/2024  1. No acute intracranial abnormality. Encephalomalacia changes are seen in the left parietal, temporal and occipital lobes.  Please correlate MR brain examination. 2. Global parenchymal volume loss with nonspecific white matter changes likely related to chronic microvascular ischemic change. 3. No significant vascular abnormality in the head or neck. 4. C1 and C2 fractures are noted as described above.  No evidence of vertebral artery dissection or compression.     XR CHEST 
1.0 (H) 0.5 - 0.9 mg/dL    Est, Glom Filt Rate 59 (L) >60 mL/min/1.73m2    Calcium 8.7 8.6 - 10.4 mg/dL       Imaging/Diagnostics:  CT HEAD WO CONTRAST    Result Date: 4/5/2024  1. No acute intracranial abnormality. Encephalomalacia changes are seen in the left parietal, temporal and occipital lobes.  Please correlate MR brain examination. 2. Global parenchymal volume loss with nonspecific white matter changes likely related to chronic microvascular ischemic change. 3. No significant vascular abnormality in the head or neck. 4. C1 and C2 fractures are noted as described above.  No evidence of vertebral artery dissection or compression.     CTA HEAD NECK W CONTRAST    Result Date: 4/5/2024  1. No acute intracranial abnormality. Encephalomalacia changes are seen in the left parietal, temporal and occipital lobes.  Please correlate MR brain examination. 2. Global parenchymal volume loss with nonspecific white matter changes likely related to chronic microvascular ischemic change. 3. No significant vascular abnormality in the head or neck. 4. C1 and C2 fractures are noted as described above.  No evidence of vertebral artery dissection or compression.     XR CHEST PORTABLE    Result Date: 4/4/2024  Congestive heart failure.  Mild bilateral bibasal and perihilar airspace opacities are favored to represent alveolar edema.       Assessment :      Hospital Problems             Last Modified POA    * (Principal) NSTEMI (non-ST elevated myocardial infarction) ([facility]) 4/5/2024 Yes     Plan:     Patient status inpatient in the Progressive Unit/Step down    1.  73 female admitted with chest pain, non-ST elevation MI, troponin trending up, cardiology on board, EKG reviewed, QTc prolongation, 510, repeat EKG,  BNP 32,000, heparin drip, electrolytes corrected,  Headaches and dizziness, CT did not show any acute abnormalities, no headaches at this time, CTA head and neck no significant vascular abnormalities, will get neurology if 
of cervical vertebra     Stenosis of cervical spine with myelopathy ([facility])     Abnormal EKG     COPD exacerbation ([facility])     Multifocal pneumonia     Hypoxia     Pneumonia     Iron deficiency anemia     Chronic combined systolic and diastolic congestive heart failure ([facility])     Type 2 diabetes mellitus with chronic kidney disease     Symptomatic congestive heart failure, pre-operative cardiovascular examination ([facility])     Acute on chronic HFrEF (heart failure with reduced ejection fraction) ([facility])     Encounter for palliative care     Goals of care, counseling/discussion     ACP (advance care planning)     CHF (congestive heart failure), NYHA class I, acute on chronic, combined ([facility])     Shortness of breath     History of COPD     Noncompliance     Protein-calorie malnutrition ([facility])     Acute decompensated heart failure ([facility])     NSTEMI (non-ST elevated myocardial infarction) ([facility])      Plan of Treatment:   Medications reviewed  1: CHF systolic and diastolic ejection fraction 25 to 30% significant improvement since admission continue Lasix 40 mg a day, add Jardiance 10 mg daily, losartan 25 mg a day  2: CAD, CABG continue current dose of  Lipitor 80 mg a day, aspirin 81 mg, metoprolol 12.5 mg twice a day day  3: Elevated high-sensitivity troponin most likely type II, repeat high-sensitivity troponin 108 trend downward  4: 2D echo 4/8/2024 showed akinetic LV apex, apical thrombus, PFO left-to-right shunt start Eliquis 5 mg twice daily, DC Plavix  Previous study 9/8/2023 showed probable apical thrombus she was not on oral anticoagulants   5: Patient had 2 episodes of broad complex tachycardia heart rate 120 -160 on 4/7/2024 checked  magnesium today 1.9, I disc rest case with electrophysiologist for probable AICD.  Plan is to arrange for LifeVest and start amiodarone 200 mg twice daily. Dr Abrams will see her in 4 weeks     I had a lengthy discussion with the patient and explained to her that she has multivessel coronary 
abnormality in the head or neck. 4. C1 and C2 fractures are noted as described above.  No evidence of vertebral artery dissection or compression.     CTA HEAD NECK W CONTRAST    Result Date: 4/5/2024  1. No acute intracranial abnormality. Encephalomalacia changes are seen in the left parietal, temporal and occipital lobes.  Please correlate MR brain examination. 2. Global parenchymal volume loss with nonspecific white matter changes likely related to chronic microvascular ischemic change. 3. No significant vascular abnormality in the head or neck. 4. C1 and C2 fractures are noted as described above.  No evidence of vertebral artery dissection or compression.     XR CHEST PORTABLE    Result Date: 4/4/2024  Congestive heart failure.  Mild bilateral bibasal and perihilar airspace opacities are favored to represent alveolar edema.       Assessment :      Hospital Problems             Last Modified POA    * (Principal) NSTEMI (non-ST elevated myocardial infarction) ([facility]) 4/5/2024 Yes   Plan:     Patient status inpatient in the Progressive Unit/Step down    1.  73 female admitted with chest pain, non-ST elevation MI, troponin trending up, cardiology on board, EKG reviewed, QTc prolongation, 510, repeat EKG,  BNP 32,000, heparin drip, electrolytes corrected,  Headaches and dizziness, CT did not show any acute abnormalities, no headaches at this time, CTA head and neck no significant vascular abnormalities, will get neurology if needed  Hypertension, monitor blood pressure, running on the lower side,  Diabetes mellitus, sugars ACHS,  DVT prophylaxis, on heparin drip,  DNR CCA  2. Disposition 2d      April 8 73 female admitted with NSTEMI, cardiology on board, was on heparin drip  Prolonged QTc,better  1.8, potassium 4.2  CKD 3 with GFR 50,  Elevated troponin, cardiology on board  Echo done , discussed with cardiology   Apical thrombus noted as per cardiology   Will start eliquis   Meds adjustment   Headaches and dizziness,

## 2024-04-15 ENCOUNTER — TELEPHONE (OUTPATIENT)
Dept: PHARMACY | Age: 74
End: 2024-04-15

## 2024-05-02 LAB
EKG ATRIAL RATE: 68 BPM
EKG P AXIS: 22 DEGREES
EKG P-R INTERVAL: 148 MS
EKG Q-T INTERVAL: 438 MS
EKG QRS DURATION: 114 MS
EKG QTC CALCULATION (BAZETT): 465 MS
EKG R AXIS: -13 DEGREES
EKG T AXIS: 159 DEGREES
EKG VENTRICULAR RATE: 68 BPM

## 2024-05-07 ENCOUNTER — TELEPHONE (OUTPATIENT)
Dept: INTERNAL MEDICINE CLINIC | Age: 74
End: 2024-05-07

## 2024-06-11 RX ORDER — LOSARTAN POTASSIUM 25 MG/1
25 TABLET ORAL DAILY
Qty: 90 TABLET | OUTPATIENT
Start: 2024-06-11

## 2024-06-17 ENCOUNTER — HOSPITAL ENCOUNTER (OUTPATIENT)
Age: 74
Setting detail: OBSERVATION
Discharge: HOME | End: 2024-06-19
Attending: EMERGENCY MEDICINE | Admitting: INTERNAL MEDICINE
Payer: COMMERCIAL

## 2024-06-17 ENCOUNTER — APPOINTMENT (OUTPATIENT)
Dept: GENERAL RADIOLOGY | Age: 74
End: 2024-06-17
Payer: COMMERCIAL

## 2024-06-17 DIAGNOSIS — R07.9 CHEST PAIN, UNSPECIFIED TYPE: Primary | ICD-10-CM

## 2024-06-17 DIAGNOSIS — R06.02 SHORTNESS OF BREATH: ICD-10-CM

## 2024-06-17 DIAGNOSIS — I21.4 NSTEMI (NON-ST ELEVATED MYOCARDIAL INFARCTION) (HCC): ICD-10-CM

## 2024-06-17 LAB
ANION GAP SERPL CALCULATED.3IONS-SCNC: 15 MMOL/L (ref 9–17)
BASOPHILS # BLD: 0.1 K/UL (ref 0–0.2)
BASOPHILS NFR BLD: 1 % (ref 0–2)
BUN SERPL-MCNC: 23 MG/DL (ref 8–23)
CALCIUM SERPL-MCNC: 9.3 MG/DL (ref 8.6–10.4)
CHLORIDE SERPL-SCNC: 107 MMOL/L (ref 98–107)
CO2 SERPL-SCNC: 22 MMOL/L (ref 20–31)
CREAT SERPL-MCNC: 1.1 MG/DL (ref 0.5–0.9)
EOSINOPHIL # BLD: 0.1 K/UL (ref 0–0.4)
EOSINOPHILS RELATIVE PERCENT: 2 % (ref 0–4)
ERYTHROCYTE [DISTWIDTH] IN BLOOD BY AUTOMATED COUNT: 14.6 % (ref 11.5–14.9)
GFR, ESTIMATED: 52 ML/MIN/1.73M2
GLUCOSE BLD-MCNC: 222 MG/DL (ref 65–105)
GLUCOSE SERPL-MCNC: 124 MG/DL (ref 70–99)
HCT VFR BLD AUTO: 38.8 % (ref 36–46)
HGB BLD-MCNC: 13 G/DL (ref 12–16)
LYMPHOCYTES NFR BLD: 1.4 K/UL (ref 1–4.8)
LYMPHOCYTES RELATIVE PERCENT: 20 % (ref 24–44)
MAGNESIUM SERPL-MCNC: 2 MG/DL (ref 1.6–2.6)
MCH RBC QN AUTO: 28.7 PG (ref 26–34)
MCHC RBC AUTO-ENTMCNC: 33.3 G/DL (ref 31–37)
MCV RBC AUTO: 86.1 FL (ref 80–100)
MONOCYTES NFR BLD: 0.6 K/UL (ref 0.1–1.3)
MONOCYTES NFR BLD: 9 % (ref 1–7)
NEUTROPHILS NFR BLD: 68 % (ref 36–66)
NEUTS SEG NFR BLD: 4.9 K/UL (ref 1.3–9.1)
PLATELET # BLD AUTO: 257 K/UL (ref 150–450)
PMV BLD AUTO: 7.9 FL (ref 6–12)
POTASSIUM SERPL-SCNC: 3.6 MMOL/L (ref 3.7–5.3)
RBC # BLD AUTO: 4.51 M/UL (ref 4–5.2)
SODIUM SERPL-SCNC: 144 MMOL/L (ref 135–144)
TROPONIN I SERPL HS-MCNC: 45 NG/L (ref 0–14)
TROPONIN I SERPL HS-MCNC: 47 NG/L (ref 0–14)
WBC OTHER # BLD: 7.1 K/UL (ref 3.5–11)

## 2024-06-17 PROCEDURE — 82947 ASSAY GLUCOSE BLOOD QUANT: CPT

## 2024-06-17 PROCEDURE — 84484 ASSAY OF TROPONIN QUANT: CPT

## 2024-06-17 PROCEDURE — G0378 HOSPITAL OBSERVATION PER HR: HCPCS

## 2024-06-17 PROCEDURE — 6370000000 HC RX 637 (ALT 250 FOR IP): Performed by: NURSE PRACTITIONER

## 2024-06-17 PROCEDURE — 2580000003 HC RX 258: Performed by: NURSE PRACTITIONER

## 2024-06-17 PROCEDURE — 36415 COLL VENOUS BLD VENIPUNCTURE: CPT

## 2024-06-17 PROCEDURE — 85025 COMPLETE CBC W/AUTO DIFF WBC: CPT

## 2024-06-17 PROCEDURE — 99285 EMERGENCY DEPT VISIT HI MDM: CPT

## 2024-06-17 PROCEDURE — 93005 ELECTROCARDIOGRAM TRACING: CPT | Performed by: STUDENT IN AN ORGANIZED HEALTH CARE EDUCATION/TRAINING PROGRAM

## 2024-06-17 PROCEDURE — 83735 ASSAY OF MAGNESIUM: CPT

## 2024-06-17 PROCEDURE — 71046 X-RAY EXAM CHEST 2 VIEWS: CPT

## 2024-06-17 PROCEDURE — 80048 BASIC METABOLIC PNL TOTAL CA: CPT

## 2024-06-17 RX ORDER — ASPIRIN 81 MG/1
81 TABLET ORAL DAILY
Status: DISCONTINUED | OUTPATIENT
Start: 2024-06-18 | End: 2024-06-19 | Stop reason: HOSPADM

## 2024-06-17 RX ORDER — FAMOTIDINE 20 MG/1
20 TABLET, FILM COATED ORAL EVERY EVENING
Status: DISCONTINUED | OUTPATIENT
Start: 2024-06-17 | End: 2024-06-19 | Stop reason: HOSPADM

## 2024-06-17 RX ORDER — NITROGLYCERIN 0.4 MG/1
0.4 TABLET SUBLINGUAL EVERY 5 MIN PRN
Status: DISCONTINUED | OUTPATIENT
Start: 2024-06-17 | End: 2024-06-19 | Stop reason: HOSPADM

## 2024-06-17 RX ORDER — FUROSEMIDE 40 MG/1
40 TABLET ORAL DAILY
Status: DISCONTINUED | OUTPATIENT
Start: 2024-06-18 | End: 2024-06-19 | Stop reason: HOSPADM

## 2024-06-17 RX ORDER — BISACODYL 10 MG
10 SUPPOSITORY, RECTAL RECTAL DAILY PRN
Status: DISCONTINUED | OUTPATIENT
Start: 2024-06-17 | End: 2024-06-19 | Stop reason: HOSPADM

## 2024-06-17 RX ORDER — ONDANSETRON 2 MG/ML
4 INJECTION INTRAMUSCULAR; INTRAVENOUS EVERY 6 HOURS PRN
Status: DISCONTINUED | OUTPATIENT
Start: 2024-06-17 | End: 2024-06-19 | Stop reason: HOSPADM

## 2024-06-17 RX ORDER — POLYETHYLENE GLYCOL 3350 17 G/17G
17 POWDER, FOR SOLUTION ORAL DAILY PRN
Status: DISCONTINUED | OUTPATIENT
Start: 2024-06-17 | End: 2024-06-19 | Stop reason: HOSPADM

## 2024-06-17 RX ORDER — ACETAMINOPHEN 650 MG/1
650 SUPPOSITORY RECTAL EVERY 6 HOURS PRN
Status: DISCONTINUED | OUTPATIENT
Start: 2024-06-17 | End: 2024-06-19 | Stop reason: HOSPADM

## 2024-06-17 RX ORDER — ACETAMINOPHEN 325 MG/1
650 TABLET ORAL EVERY 6 HOURS PRN
Status: DISCONTINUED | OUTPATIENT
Start: 2024-06-17 | End: 2024-06-19 | Stop reason: HOSPADM

## 2024-06-17 RX ORDER — AMIODARONE HYDROCHLORIDE 200 MG/1
200 TABLET ORAL 2 TIMES DAILY
Status: DISCONTINUED | OUTPATIENT
Start: 2024-06-17 | End: 2024-06-18

## 2024-06-17 RX ORDER — BUDESONIDE AND FORMOTEROL FUMARATE DIHYDRATE 160; 4.5 UG/1; UG/1
2 AEROSOL RESPIRATORY (INHALATION)
Status: DISCONTINUED | OUTPATIENT
Start: 2024-06-17 | End: 2024-06-19 | Stop reason: HOSPADM

## 2024-06-17 RX ORDER — LOSARTAN POTASSIUM 25 MG/1
25 TABLET ORAL DAILY
Status: DISCONTINUED | OUTPATIENT
Start: 2024-06-18 | End: 2024-06-19 | Stop reason: HOSPADM

## 2024-06-17 RX ORDER — INSULIN LISPRO 100 [IU]/ML
0-8 INJECTION, SOLUTION INTRAVENOUS; SUBCUTANEOUS
Status: DISCONTINUED | OUTPATIENT
Start: 2024-06-18 | End: 2024-06-19 | Stop reason: HOSPADM

## 2024-06-17 RX ORDER — ONDANSETRON 4 MG/1
4 TABLET, ORALLY DISINTEGRATING ORAL EVERY 8 HOURS PRN
Status: DISCONTINUED | OUTPATIENT
Start: 2024-06-17 | End: 2024-06-19 | Stop reason: HOSPADM

## 2024-06-17 RX ORDER — POTASSIUM CHLORIDE 750 MG/1
10 CAPSULE, EXTENDED RELEASE ORAL DAILY
Status: DISCONTINUED | OUTPATIENT
Start: 2024-06-18 | End: 2024-06-19 | Stop reason: HOSPADM

## 2024-06-17 RX ORDER — LANOLIN ALCOHOL/MO/W.PET/CERES
3 CREAM (GRAM) TOPICAL NIGHTLY PRN
Status: DISCONTINUED | OUTPATIENT
Start: 2024-06-17 | End: 2024-06-19 | Stop reason: HOSPADM

## 2024-06-17 RX ORDER — FAMOTIDINE 20 MG/1
40 TABLET, FILM COATED ORAL EVERY EVENING
Status: DISCONTINUED | OUTPATIENT
Start: 2024-06-17 | End: 2024-06-17 | Stop reason: DRUGHIGH

## 2024-06-17 RX ORDER — INSULIN LISPRO 100 [IU]/ML
0-4 INJECTION, SOLUTION INTRAVENOUS; SUBCUTANEOUS NIGHTLY
Status: DISCONTINUED | OUTPATIENT
Start: 2024-06-17 | End: 2024-06-19 | Stop reason: HOSPADM

## 2024-06-17 RX ORDER — ATORVASTATIN CALCIUM 80 MG/1
80 TABLET, FILM COATED ORAL DAILY
Status: DISCONTINUED | OUTPATIENT
Start: 2024-06-18 | End: 2024-06-19 | Stop reason: HOSPADM

## 2024-06-17 RX ORDER — ALBUTEROL SULFATE 90 UG/1
2 AEROSOL, METERED RESPIRATORY (INHALATION) EVERY 6 HOURS PRN
Status: DISCONTINUED | OUTPATIENT
Start: 2024-06-17 | End: 2024-06-19 | Stop reason: HOSPADM

## 2024-06-17 RX ORDER — SODIUM CHLORIDE 0.9 % (FLUSH) 0.9 %
5-40 SYRINGE (ML) INJECTION EVERY 12 HOURS SCHEDULED
Status: DISCONTINUED | OUTPATIENT
Start: 2024-06-17 | End: 2024-06-19 | Stop reason: HOSPADM

## 2024-06-17 RX ORDER — DEXTROSE MONOHYDRATE 100 MG/ML
INJECTION, SOLUTION INTRAVENOUS CONTINUOUS PRN
Status: DISCONTINUED | OUTPATIENT
Start: 2024-06-17 | End: 2024-06-19 | Stop reason: HOSPADM

## 2024-06-17 RX ORDER — SODIUM CHLORIDE 0.9 % (FLUSH) 0.9 %
10 SYRINGE (ML) INJECTION PRN
Status: DISCONTINUED | OUTPATIENT
Start: 2024-06-17 | End: 2024-06-19 | Stop reason: HOSPADM

## 2024-06-17 RX ORDER — SODIUM CHLORIDE 9 MG/ML
INJECTION, SOLUTION INTRAVENOUS PRN
Status: DISCONTINUED | OUTPATIENT
Start: 2024-06-17 | End: 2024-06-19 | Stop reason: HOSPADM

## 2024-06-17 RX ORDER — MAGNESIUM SULFATE HEPTAHYDRATE 40 MG/ML
2000 INJECTION, SOLUTION INTRAVENOUS PRN
Status: DISCONTINUED | OUTPATIENT
Start: 2024-06-17 | End: 2024-06-19 | Stop reason: HOSPADM

## 2024-06-17 RX ORDER — ROPINIROLE 1 MG/1
1 TABLET, FILM COATED ORAL NIGHTLY PRN
Status: DISCONTINUED | OUTPATIENT
Start: 2024-06-17 | End: 2024-06-19 | Stop reason: HOSPADM

## 2024-06-17 RX ORDER — LIDOCAINE 4 G/G
1 PATCH TOPICAL DAILY
Status: DISCONTINUED | OUTPATIENT
Start: 2024-06-18 | End: 2024-06-19 | Stop reason: HOSPADM

## 2024-06-17 RX ADMIN — FAMOTIDINE 20 MG: 20 TABLET, FILM COATED ORAL at 23:32

## 2024-06-17 RX ADMIN — SODIUM CHLORIDE, PRESERVATIVE FREE 10 ML: 5 INJECTION INTRAVENOUS at 23:33

## 2024-06-17 RX ADMIN — AMIODARONE HYDROCHLORIDE 200 MG: 200 TABLET ORAL at 23:33

## 2024-06-17 RX ADMIN — APIXABAN 5 MG: 5 TABLET, FILM COATED ORAL at 23:33

## 2024-06-17 RX ADMIN — ACETAMINOPHEN 650 MG: 325 TABLET ORAL at 23:32

## 2024-06-17 RX ADMIN — METOPROLOL TARTRATE 12.5 MG: 25 TABLET, FILM COATED ORAL at 23:32

## 2024-06-17 ASSESSMENT — PAIN DESCRIPTION - DESCRIPTORS: DESCRIPTORS: DISCOMFORT

## 2024-06-17 ASSESSMENT — PAIN DESCRIPTION - ORIENTATION: ORIENTATION: MID

## 2024-06-17 ASSESSMENT — PAIN DESCRIPTION - LOCATION: LOCATION: NECK

## 2024-06-17 ASSESSMENT — PAIN - FUNCTIONAL ASSESSMENT
PAIN_FUNCTIONAL_ASSESSMENT: ACTIVITIES ARE NOT PREVENTED
PAIN_FUNCTIONAL_ASSESSMENT: NONE - DENIES PAIN

## 2024-06-17 ASSESSMENT — PAIN SCALES - GENERAL: PAINLEVEL_OUTOF10: 8

## 2024-06-17 NOTE — ED PROVIDER NOTES
[facility] ED  Emergency Department Encounter  Emergency Medicine Resident     Pt Name:Graciela Holt  MRN: [id number]  Birthdate 1950  Date of evaluation: 6/17/24  PCP:  Travis Mason MD  Note Started: 5:20 PM EDT      CHIEF COMPLAINT       Chief Complaint   Patient presents with    Chest Pain       HISTORY OF PRESENT ILLNESS  (Location/Symptom, Timing/Onset, Context/Setting, Quality, Duration, Modifying Factors, Severity.)      Graciela Holt is a 73 y.o. female past medical history of CAD, CVA, A-fib on Eliquis, congestive heart failure (EF 25-30% 4/2024), type 2 diabetes, COPD, presenting for assessment of chest pain.  Onset was approximately 4 PM.  Patient seated at onset of symptom recurrence.  Contacted EMS who provided aspirin and nitroglycerin with relief of symptoms.  At time of assessment, patient stating that she is not having any symptoms.      PAST MEDICAL / SURGICAL / SOCIAL / FAMILY HISTORY      has a past medical history of Allergic rhinitis, Arthritis, CAD (coronary artery disease), Cerebral artery occlusion with cerebral infarction (HCC), CHF (congestive heart failure) (HCC), Controlled type 2 diabetes mellitus without complication, without long-term current use of insulin (HCC), COPD (chronic obstructive pulmonary disease) (HCC), Depression, Former smoker, History of blood transfusion, Hyperlipidemia, Hypertension, Kidney stone, Myocardial infarction (HCC), Obesity, Class I, BMI 30.0-34.9 (see actual BMI), Restless leg syndrome, Skin abnormality, Type II or unspecified type diabetes mellitus without mention of complication, not stated as uncontrolled, Wears glasses, and Wears partial dentures.       has a past surgical history that includes Appendectomy; Hysterectomy; Cholecystectomy; joint replacement (Bilateral); pr office/outpt visit,procedure only (N/A, 2/6/2018); pr i&d deep absc bursa/hematoma thigh/knee region (Right, 5/7/2018); Leg biopsy excision (Right, 8/29/2019);

## 2024-06-17 NOTE — ED NOTES
Pt is brought to this ER by [facility] from her home after she developed chest pain while sitting on her porch.  [facility] reports they administered ASA 325mg PO and NTG x 1.  Pt denies chest pain after being medicated by EMS.

## 2024-06-17 NOTE — ED PROVIDER NOTES
EMERGENCY DEPARTMENT ENCOUNTER   ATTENDING ATTESTATION     Pt Name: Graciela Holt  MRN: [id number]  Birthdate 1950  Date of evaluation: 6/17/24       Graciela Holt is a 73 y.o. female who presents with Chest Pain      MDM:   Chest tightness today, resolved with medications.  Currently comfortable, no symptoms.  Reviewed EKG she has some EKG LVH changes slightly more pronounced than previous.  Planning to admit her for cardiac observation.  Do not suspect aortic dissection or PE or pneumothorax    Vitals:   Vitals:    06/17/24 1719   BP: 112/66   Pulse: 76   Resp: 21   Temp: 98 °F (36.7 °C)   TempSrc: Oral   SpO2: 94%   Weight: 59 kg (130 lb)   Height: 1.626 m (5' 4\")         I personally saw and examined the patient. I have reviewed and agree with the resident's findings, including all diagnostic interpretations and treatment plan as written. I was present for the key portions of any procedures performed and the inclusive time noted for any critical care statement.    Esteban Calix MD  Attending Emergency Physician            Esteban Calix MD  06/17/24 3986

## 2024-06-18 ENCOUNTER — APPOINTMENT (OUTPATIENT)
Age: 74
End: 2024-06-18
Attending: INTERNAL MEDICINE
Payer: COMMERCIAL

## 2024-06-18 LAB
ANION GAP SERPL CALCULATED.3IONS-SCNC: 10 MMOL/L (ref 9–17)
BASOPHILS # BLD: 0.1 K/UL (ref 0–0.2)
BASOPHILS NFR BLD: 1 % (ref 0–2)
BUN SERPL-MCNC: 23 MG/DL (ref 8–23)
CALCIUM SERPL-MCNC: 9 MG/DL (ref 8.6–10.4)
CHLORIDE SERPL-SCNC: 108 MMOL/L (ref 98–107)
CO2 SERPL-SCNC: 24 MMOL/L (ref 20–31)
CREAT SERPL-MCNC: 1.1 MG/DL (ref 0.5–0.9)
ECHO AO ROOT DIAM: 3.5 CM
ECHO AO ROOT INDEX: 2.15 CM/M2
ECHO AV MEAN GRADIENT: 4 MMHG
ECHO AV MEAN VELOCITY: 1 M/S
ECHO AV PEAK GRADIENT: 7 MMHG
ECHO AV PEAK VELOCITY: 1.4 M/S
ECHO AV VELOCITY RATIO: 0.57
ECHO AV VTI: 24.4 CM
ECHO BSA: 1.63 M2
ECHO EST RA PRESSURE: 3 MMHG
ECHO LA AREA 2C: 18.6 CM2
ECHO LA AREA 4C: 34.8 CM2
ECHO LA DIAMETER INDEX: 2.52 CM/M2
ECHO LA DIAMETER: 4.1 CM
ECHO LA MAJOR AXIS: 7.3 CM
ECHO LA MINOR AXIS: 6.4 CM
ECHO LA TO AORTIC ROOT RATIO: 1.17
ECHO LA VOL BP: 84 ML (ref 22–52)
ECHO LA VOL MOD A2C: 44 ML (ref 22–52)
ECHO LA VOL MOD A4C: 142 ML (ref 22–52)
ECHO LA VOL/BSA BIPLANE: 52 ML/M2 (ref 16–34)
ECHO LA VOLUME INDEX MOD A2C: 27 ML/M2 (ref 16–34)
ECHO LA VOLUME INDEX MOD A4C: 87 ML/M2 (ref 16–34)
ECHO LV E' LATERAL VELOCITY: 11 CM/S
ECHO LV E' SEPTAL VELOCITY: 3 CM/S
ECHO LV FRACTIONAL SHORTENING: 21 % (ref 28–44)
ECHO LV INTERNAL DIMENSION DIASTOLE INDEX: 3.44 CM/M2
ECHO LV INTERNAL DIMENSION DIASTOLIC: 5.6 CM (ref 3.9–5.3)
ECHO LV INTERNAL DIMENSION SYSTOLIC INDEX: 2.7 CM/M2
ECHO LV INTERNAL DIMENSION SYSTOLIC: 4.4 CM
ECHO LV IVSD: 1.7 CM (ref 0.6–0.9)
ECHO LV MASS 2D: 383.7 G (ref 67–162)
ECHO LV MASS INDEX 2D: 235.4 G/M2 (ref 43–95)
ECHO LV POSTERIOR WALL DIASTOLIC: 1.3 CM (ref 0.6–0.9)
ECHO LV RELATIVE WALL THICKNESS RATIO: 0.46
ECHO LVOT AV VTI INDEX: 0.55
ECHO LVOT MEAN GRADIENT: 1 MMHG
ECHO LVOT PEAK GRADIENT: 2 MMHG
ECHO LVOT PEAK VELOCITY: 0.8 M/S
ECHO LVOT VTI: 13.4 CM
ECHO MV A VELOCITY: 0.8 M/S
ECHO MV E DECELERATION TIME (DT): 264 MS
ECHO MV E VELOCITY: 0.41 M/S
ECHO MV E/A RATIO: 0.51
ECHO MV E/E' LATERAL: 3.73
ECHO MV E/E' RATIO (AVERAGED): 8.7
ECHO MV E/E' SEPTAL: 13.67
ECHO RA AREA 4C: 11.2 CM2
ECHO RA END SYSTOLIC VOLUME APICAL 4 CHAMBER INDEX BSA: 15 ML/M2
ECHO RA VOLUME: 24 ML
ECHO RIGHT VENTRICULAR SYSTOLIC PRESSURE (RVSP): 15 MMHG
ECHO RV TAPSE: 1.7 CM (ref 1.7–?)
ECHO TV REGURGITANT MAX VELOCITY: 1.72 M/S
ECHO TV REGURGITANT PEAK GRADIENT: 12 MMHG
EKG ATRIAL RATE: 72 BPM
EKG P AXIS: 17 DEGREES
EKG P-R INTERVAL: 164 MS
EKG Q-T INTERVAL: 444 MS
EKG QRS DURATION: 114 MS
EKG QTC CALCULATION (BAZETT): 486 MS
EKG R AXIS: -29 DEGREES
EKG T AXIS: 142 DEGREES
EKG VENTRICULAR RATE: 72 BPM
EOSINOPHIL # BLD: 0.2 K/UL (ref 0–0.4)
EOSINOPHILS RELATIVE PERCENT: 4 % (ref 0–4)
ERYTHROCYTE [DISTWIDTH] IN BLOOD BY AUTOMATED COUNT: 14.6 % (ref 11.5–14.9)
GFR, ESTIMATED: 52 ML/MIN/1.73M2
GLUCOSE BLD-MCNC: 110 MG/DL (ref 65–105)
GLUCOSE BLD-MCNC: 182 MG/DL (ref 65–105)
GLUCOSE BLD-MCNC: 219 MG/DL (ref 65–105)
GLUCOSE BLD-MCNC: 94 MG/DL (ref 65–105)
GLUCOSE SERPL-MCNC: 100 MG/DL (ref 70–99)
HCT VFR BLD AUTO: 38.8 % (ref 36–46)
HGB BLD-MCNC: 12.6 G/DL (ref 12–16)
LYMPHOCYTES NFR BLD: 1.6 K/UL (ref 1–4.8)
LYMPHOCYTES RELATIVE PERCENT: 28 % (ref 24–44)
MAGNESIUM SERPL-MCNC: 1.9 MG/DL (ref 1.6–2.6)
MCH RBC QN AUTO: 28.4 PG (ref 26–34)
MCHC RBC AUTO-ENTMCNC: 32.5 G/DL (ref 31–37)
MCV RBC AUTO: 87.3 FL (ref 80–100)
MONOCYTES NFR BLD: 0.5 K/UL (ref 0.1–1.3)
MONOCYTES NFR BLD: 9 % (ref 1–7)
NEUTROPHILS NFR BLD: 58 % (ref 36–66)
NEUTS SEG NFR BLD: 3.3 K/UL (ref 1.3–9.1)
PLATELET # BLD AUTO: 232 K/UL (ref 150–450)
PMV BLD AUTO: 7.7 FL (ref 6–12)
POTASSIUM SERPL-SCNC: 3.5 MMOL/L (ref 3.7–5.3)
RBC # BLD AUTO: 4.45 M/UL (ref 4–5.2)
SODIUM SERPL-SCNC: 142 MMOL/L (ref 135–144)
WBC OTHER # BLD: 5.8 K/UL (ref 3.5–11)

## 2024-06-18 PROCEDURE — 93306 TTE W/DOPPLER COMPLETE: CPT | Performed by: INTERNAL MEDICINE

## 2024-06-18 PROCEDURE — 99223 1ST HOSP IP/OBS HIGH 75: CPT | Performed by: INTERNAL MEDICINE

## 2024-06-18 PROCEDURE — G0378 HOSPITAL OBSERVATION PER HR: HCPCS

## 2024-06-18 PROCEDURE — 94664 DEMO&/EVAL PT USE INHALER: CPT

## 2024-06-18 PROCEDURE — 85025 COMPLETE CBC W/AUTO DIFF WBC: CPT

## 2024-06-18 PROCEDURE — 94640 AIRWAY INHALATION TREATMENT: CPT

## 2024-06-18 PROCEDURE — 94760 N-INVAS EAR/PLS OXIMETRY 1: CPT

## 2024-06-18 PROCEDURE — 6370000000 HC RX 637 (ALT 250 FOR IP): Performed by: NURSE PRACTITIONER

## 2024-06-18 PROCEDURE — 2580000003 HC RX 258: Performed by: NURSE PRACTITIONER

## 2024-06-18 PROCEDURE — 82947 ASSAY GLUCOSE BLOOD QUANT: CPT

## 2024-06-18 PROCEDURE — 93010 ELECTROCARDIOGRAM REPORT: CPT | Performed by: INTERNAL MEDICINE

## 2024-06-18 PROCEDURE — 83735 ASSAY OF MAGNESIUM: CPT

## 2024-06-18 PROCEDURE — 36415 COLL VENOUS BLD VENIPUNCTURE: CPT

## 2024-06-18 PROCEDURE — 93306 TTE W/DOPPLER COMPLETE: CPT

## 2024-06-18 PROCEDURE — 80048 BASIC METABOLIC PNL TOTAL CA: CPT

## 2024-06-18 RX ORDER — AMIODARONE HYDROCHLORIDE 200 MG/1
200 TABLET ORAL DAILY
Status: DISCONTINUED | OUTPATIENT
Start: 2024-06-19 | End: 2024-06-19 | Stop reason: HOSPADM

## 2024-06-18 RX ADMIN — EMPAGLIFLOZIN 10 MG: 10 TABLET, FILM COATED ORAL at 09:20

## 2024-06-18 RX ADMIN — SODIUM CHLORIDE, PRESERVATIVE FREE 10 ML: 5 INJECTION INTRAVENOUS at 22:18

## 2024-06-18 RX ADMIN — SODIUM CHLORIDE, PRESERVATIVE FREE 10 ML: 5 INJECTION INTRAVENOUS at 09:26

## 2024-06-18 RX ADMIN — FAMOTIDINE 20 MG: 20 TABLET, FILM COATED ORAL at 17:23

## 2024-06-18 RX ADMIN — INSULIN LISPRO 2 UNITS: 100 INJECTION, SOLUTION INTRAVENOUS; SUBCUTANEOUS at 17:23

## 2024-06-18 RX ADMIN — AMIODARONE HYDROCHLORIDE 200 MG: 200 TABLET ORAL at 09:20

## 2024-06-18 RX ADMIN — ACETAMINOPHEN 650 MG: 325 TABLET ORAL at 16:03

## 2024-06-18 RX ADMIN — APIXABAN 5 MG: 5 TABLET, FILM COATED ORAL at 22:18

## 2024-06-18 RX ADMIN — ASPIRIN 81 MG: 81 TABLET, COATED ORAL at 09:20

## 2024-06-18 RX ADMIN — APIXABAN 5 MG: 5 TABLET, FILM COATED ORAL at 09:20

## 2024-06-18 RX ADMIN — BUDESONIDE AND FORMOTEROL FUMARATE DIHYDRATE 2 PUFF: 160; 4.5 AEROSOL RESPIRATORY (INHALATION) at 19:27

## 2024-06-18 RX ADMIN — ATORVASTATIN CALCIUM 80 MG: 80 TABLET, FILM COATED ORAL at 09:20

## 2024-06-18 RX ADMIN — FUROSEMIDE 40 MG: 40 TABLET ORAL at 09:20

## 2024-06-18 RX ADMIN — METOPROLOL TARTRATE 12.5 MG: 25 TABLET, FILM COATED ORAL at 22:18

## 2024-06-18 RX ADMIN — BUDESONIDE AND FORMOTEROL FUMARATE DIHYDRATE 2 PUFF: 160; 4.5 AEROSOL RESPIRATORY (INHALATION) at 07:16

## 2024-06-18 RX ADMIN — LOSARTAN POTASSIUM 25 MG: 25 TABLET, FILM COATED ORAL at 09:20

## 2024-06-18 RX ADMIN — POTASSIUM CHLORIDE 10 MEQ: 10 CAPSULE, COATED, EXTENDED RELEASE ORAL at 09:20

## 2024-06-18 RX ADMIN — METOPROLOL TARTRATE 12.5 MG: 25 TABLET, FILM COATED ORAL at 09:31

## 2024-06-18 ASSESSMENT — PAIN DESCRIPTION - LOCATION: LOCATION: NECK

## 2024-06-18 ASSESSMENT — PAIN SCALES - GENERAL
PAINLEVEL_OUTOF10: 1
PAINLEVEL_OUTOF10: 10

## 2024-06-18 ASSESSMENT — PAIN SCALES - WONG BAKER: WONGBAKER_NUMERICALRESPONSE: NO HURT

## 2024-06-18 ASSESSMENT — PAIN DESCRIPTION - DESCRIPTORS: DESCRIPTORS: CRUSHING;GNAWING

## 2024-06-18 ASSESSMENT — PAIN DESCRIPTION - ORIENTATION: ORIENTATION: MID

## 2024-06-18 NOTE — PLAN OF CARE
Problem: Discharge Planning  Goal: Discharge to home or other facility with appropriate resources  Outcome: Progressing  Flowsheets (Taken 6/18/2024 1805)  Discharge to home or other facility with appropriate resources:   Identify barriers to discharge with patient and caregiver   Identify discharge learning needs (meds, wound care, etc)     Problem: Safety - Adult  Goal: Free from fall injury  Outcome: Progressing     Problem: ABCDS Injury Assessment  Goal: Absence of physical injury  Flowsheets (Taken 6/18/2024 1805)  Absence of Physical Injury: Implement safety measures based on patient assessment     Problem: Cardiovascular - Adult  Goal: Maintains optimal cardiac output and hemodynamic stability  Outcome: Progressing  Flowsheets (Taken 6/18/2024 1805)  Maintains optimal cardiac output and hemodynamic stability:   Monitor blood pressure and heart rate   Assess for signs of decreased cardiac output   Administer fluid and/or volume expanders as ordered

## 2024-06-18 NOTE — CONSULTS
Date:   2024  Patient name: Graciela Holt  Date of admission:  2024  5:18 PM  MRN:   [id number]  YOB: 1950  PCP: Travis Mason MD    Reason for Admission: Chest pain [R07.9]  Chest pain, unspecified type [R07.9]    Cardiology consult: Chest pain, abnormal troponin, multivessel CAD, CABG, systolic CHF       Referring physician: Dr Juan Carlos Todd    Impression    Admission 2024 chest pain like heaviness no shortness of breath no radiation ECG showed LVH with repolarization seen changes no change from previous ECG borderline abnormal troponin most likely type II  Multivessel CAD  CABG LIMA to LAD and diagonal 1, SVG to OM, SVG to PDA [facility]  Severely reduced LV systolic function ejection fraction 25 to 30%, akinetic LV apex, apical thrombus 2D echo 2024  Episodes of nonsustained V. Tach  Diabetes mellitus  Hyperlipidemia  History of left MCA stroke occluded internal carotid  Admission 2020 for increasing shortness of breath, increasing swelling over the legs, elevated troponin 119, 136,  chest x-ray showed pulmonary edema proBNP 31,990    Past surgical history  Bilateral knee replacement, C-spine surgery, cholecystectomy,  section, CABG     Drug allergies codeine, lisinopril     Investigation workup    ECG 2024  Sinus rhythm heart rate 72, LVH with repeat polarization changes, left axis  No diagnostic change from 2024    Chest x-ray 2024  Cardiomegaly, no acute process, sternotomy wires noted    2D echo 2024     The left ventricle appears borderline enlarged  Severe global hypokinesis, apical akinesis, contrast study positive for apical thrombus ejection fraction about 25%  Moderately increased LV wall thickness  Dilated LA, Doppler study positive for left-to-right interatrial shunt  RV appears normal in size and function RVSP 33 mmHg  Mitral annular calcification mild regurgitation no stenosis  IVC appears dilated,

## 2024-06-18 NOTE — H&P
[facility]   IN-PATIENT SERVICE   [facility]    HISTORY AND PHYSICAL EXAMINATION            Date:   6/18/2024  Patient name:  Graciela Holt  Date of admission:  6/17/2024  5:18 PM  MRN:   [id number]  Account:  [id number]  YOB: 1950  PCP:    Travis Mason MD  Room:   [address] [place] [address]  Code Status:    DNR-CCA    Chief Complaint:     Chief Complaint   Patient presents with    Chest Pain       History Obtained From:     patient    History of Present Illness:     The patient is a 73 y.o.  Non- / non  female who presents with Chest Pain   and she is admitted to the hospital for the management of      Graciela Holt is a 73 y.o. Non- / non  female who presents with Chest Pain   and is admitted to the hospital for the management of Chest pain.     Patient's medical history significant for decompensated heart failure, chronic heart failure with reduced ejection fraction 25-30%, COPD, DM, HTN, left adrenal mass, NSTEMI, CABG x 4, stenosis of left internal carotid artery.     According to patient, she developed constant, nonradiating, midsternal chest pain while at rest today, \"sitting on the porch trying to keep cool.\"  Pain is described as a pressure sensation.  Pain was associated with mild shortness of breath.  Denied dizziness/lightheadedness, diaphoresis, nausea, vomiting.  Pain was relieved after receiving ASA and NTG.  Patient has extensive cardiac history.  Echo in April shows EF 25 to 30%.  Patient was reportedly prescribed a LifeVest however she is not currently wearing it.  When asked, patient states that the LifeVest is not in keeping with her CODE STATUS, which she reports is being a DO NOT RESUSCITATE Comfort Care arrest with no intubation.  Review of home medications indicates the patient is prescribed Eliquis twice daily; however, patient reports that she ran out of it a while ago and did not get it refilled because it cost $20.  In

## 2024-06-18 NOTE — ACP (ADVANCE CARE PLANNING)
Advance Care Planning     Advance Care Planning Activator (Inpatient)  Conversation Note      Date of ACP Conversation: 6/18/2024     Conversation Conducted with: Patient with Decision Making Capacity    ACP Activator: Aster Pelaez RN        Health Care Decision Maker:     Current Designated Health Care Decision Maker:     Primary Decision Maker: Guanakito Zheng - [facility] - [phone]        Care Preferences    Ventilation:  \"If you were in your present state of health and suddenly became very ill and were unable to breathe on your own, what would your preference be about the use of a ventilator (breathing machine) if it were available to you?\"      Would the patient desire the use of ventilator (breathing machine)?: no    \"If your health worsens and it becomes clear that your chance of recovery is unlikely, what would your preference be about the use of a ventilator (breathing machine) if it were available to you?\"     Would the patient desire the use of ventilator (breathing machine)?: No      Resuscitation  \"CPR works best to restart the heart when there is a sudden event, like a heart attack, in someone who is otherwise healthy. Unfortunately, CPR does not typically restart the heart for people who have serious health conditions or who are very sick.\"    \"In the event your heart stopped as a result of an underlying serious health condition, would you want attempts to be made to restart your heart (answer \"yes\" for attempt to resuscitate) or would you prefer a natural death (answer \"no\" for do not attempt to resuscitate)?\" no       [] Yes   [] No   Educated Patient / Decision Maker regarding differences between Advance Directives and portable DNR orders.    Length of ACP Conversation in minutes:      Conversation Outcomes:  ACP discussion completed    Follow-up plan:    [] Schedule follow-up conversation to continue planning  [] Referred individual to Provider for additional questions/concerns   [] Advised

## 2024-06-18 NOTE — ED NOTES
Report given to LIVE Oh from [facility].   Report method by phone   The following was reviewed with receiving RN:   Current vital signs:  /73   Pulse 75   Temp 98 °F (36.7 °C) (Oral)   Resp 18   Ht 1.626 m (5' 4\")   Wt 59 kg (130 lb)   SpO2 94%   BMI 22.31 kg/m²                MEWS Score: 1     Any medication or safety alerts were reviewed. Any pending diagnostics and notifications were also reviewed, as well as any safety concerns or issues, abnormal labs, abnormal imaging, and abnormal assessment findings. Questions were answered.

## 2024-06-18 NOTE — PLAN OF CARE
Problem: Discharge Planning  Goal: Discharge to home or other facility with appropriate resources  Outcome: Progressing     Problem: Safety - Adult  Goal: Free from fall injury  6/18/2024 0259 by Ann Ho RN  Outcome: Progressing     Problem: ABCDS Injury Assessment  Goal: Absence of physical injury  6/18/2024 0259 by Ann Ho RN  Outcome: Progressing     Problem: Cardiovascular - Adult  Goal: Maintains optimal cardiac output and hemodynamic stability  Outcome: Progressing     Problem: Cardiovascular - Adult  Goal: Absence of cardiac dysrhythmias or at baseline  Outcome: Progressing     Pt remains free of falls or injury this shift. Pt displays adequate comfort levels this shift. Pt remains at cardiac baseline this shift.

## 2024-06-19 VITALS
SYSTOLIC BLOOD PRESSURE: 138 MMHG | RESPIRATION RATE: 20 BRPM | HEART RATE: 69 BPM | TEMPERATURE: 99 F | WEIGHT: 130 LBS | DIASTOLIC BLOOD PRESSURE: 96 MMHG | HEIGHT: 64 IN | OXYGEN SATURATION: 95 % | BODY MASS INDEX: 22.2 KG/M2

## 2024-06-19 LAB
ANION GAP SERPL CALCULATED.3IONS-SCNC: 12 MMOL/L (ref 9–17)
BASOPHILS # BLD: 0.1 K/UL (ref 0–0.2)
BASOPHILS NFR BLD: 1 % (ref 0–2)
BUN SERPL-MCNC: 27 MG/DL (ref 8–23)
CALCIUM SERPL-MCNC: 9.3 MG/DL (ref 8.6–10.4)
CHLORIDE SERPL-SCNC: 105 MMOL/L (ref 98–107)
CO2 SERPL-SCNC: 24 MMOL/L (ref 20–31)
CREAT SERPL-MCNC: 1.3 MG/DL (ref 0.5–0.9)
EOSINOPHIL # BLD: 0.1 K/UL (ref 0–0.4)
EOSINOPHILS RELATIVE PERCENT: 2 % (ref 0–4)
ERYTHROCYTE [DISTWIDTH] IN BLOOD BY AUTOMATED COUNT: 14.2 % (ref 11.5–14.9)
GFR, ESTIMATED: 43 ML/MIN/1.73M2
GLUCOSE BLD-MCNC: 187 MG/DL (ref 65–105)
GLUCOSE BLD-MCNC: 192 MG/DL (ref 65–105)
GLUCOSE SERPL-MCNC: 198 MG/DL (ref 70–99)
HCT VFR BLD AUTO: 38.9 % (ref 36–46)
HGB BLD-MCNC: 12.8 G/DL (ref 12–16)
LYMPHOCYTES NFR BLD: 1.2 K/UL (ref 1–4.8)
LYMPHOCYTES RELATIVE PERCENT: 16 % (ref 24–44)
MCH RBC QN AUTO: 28.3 PG (ref 26–34)
MCHC RBC AUTO-ENTMCNC: 32.8 G/DL (ref 31–37)
MCV RBC AUTO: 86.3 FL (ref 80–100)
MONOCYTES NFR BLD: 0.6 K/UL (ref 0.1–1.3)
MONOCYTES NFR BLD: 8 % (ref 1–7)
NEUTROPHILS NFR BLD: 73 % (ref 36–66)
NEUTS SEG NFR BLD: 5.1 K/UL (ref 1.3–9.1)
PLATELET # BLD AUTO: 237 K/UL (ref 150–450)
PMV BLD AUTO: 8.1 FL (ref 6–12)
POTASSIUM SERPL-SCNC: 3.6 MMOL/L (ref 3.7–5.3)
RBC # BLD AUTO: 4.51 M/UL (ref 4–5.2)
SODIUM SERPL-SCNC: 141 MMOL/L (ref 135–144)
WBC OTHER # BLD: 7 K/UL (ref 3.5–11)

## 2024-06-19 PROCEDURE — 99239 HOSP IP/OBS DSCHRG MGMT >30: CPT | Performed by: INTERNAL MEDICINE

## 2024-06-19 PROCEDURE — G0378 HOSPITAL OBSERVATION PER HR: HCPCS

## 2024-06-19 PROCEDURE — 85025 COMPLETE CBC W/AUTO DIFF WBC: CPT

## 2024-06-19 PROCEDURE — 94640 AIRWAY INHALATION TREATMENT: CPT

## 2024-06-19 PROCEDURE — 6370000000 HC RX 637 (ALT 250 FOR IP): Performed by: NURSE PRACTITIONER

## 2024-06-19 PROCEDURE — 36415 COLL VENOUS BLD VENIPUNCTURE: CPT

## 2024-06-19 PROCEDURE — 82947 ASSAY GLUCOSE BLOOD QUANT: CPT

## 2024-06-19 PROCEDURE — 6370000000 HC RX 637 (ALT 250 FOR IP): Performed by: INTERNAL MEDICINE

## 2024-06-19 PROCEDURE — 80048 BASIC METABOLIC PNL TOTAL CA: CPT

## 2024-06-19 PROCEDURE — 2580000003 HC RX 258: Performed by: NURSE PRACTITIONER

## 2024-06-19 RX ADMIN — EMPAGLIFLOZIN 10 MG: 10 TABLET, FILM COATED ORAL at 08:14

## 2024-06-19 RX ADMIN — ASPIRIN 81 MG: 81 TABLET, COATED ORAL at 08:14

## 2024-06-19 RX ADMIN — BUDESONIDE AND FORMOTEROL FUMARATE DIHYDRATE 2 PUFF: 160; 4.5 AEROSOL RESPIRATORY (INHALATION) at 07:57

## 2024-06-19 RX ADMIN — SODIUM CHLORIDE, PRESERVATIVE FREE 10 ML: 5 INJECTION INTRAVENOUS at 08:09

## 2024-06-19 RX ADMIN — METOPROLOL TARTRATE 12.5 MG: 25 TABLET, FILM COATED ORAL at 08:15

## 2024-06-19 RX ADMIN — ATORVASTATIN CALCIUM 80 MG: 80 TABLET, FILM COATED ORAL at 08:14

## 2024-06-19 RX ADMIN — ACETAMINOPHEN 650 MG: 325 TABLET ORAL at 05:07

## 2024-06-19 RX ADMIN — FUROSEMIDE 40 MG: 40 TABLET ORAL at 08:15

## 2024-06-19 RX ADMIN — POTASSIUM CHLORIDE 10 MEQ: 10 CAPSULE, COATED, EXTENDED RELEASE ORAL at 08:14

## 2024-06-19 RX ADMIN — APIXABAN 5 MG: 5 TABLET, FILM COATED ORAL at 08:14

## 2024-06-19 RX ADMIN — LOSARTAN POTASSIUM 25 MG: 25 TABLET, FILM COATED ORAL at 08:15

## 2024-06-19 RX ADMIN — AMIODARONE HYDROCHLORIDE 200 MG: 200 TABLET ORAL at 08:14

## 2024-06-19 ASSESSMENT — PAIN DESCRIPTION - DESCRIPTORS: DESCRIPTORS: ACHING

## 2024-06-19 ASSESSMENT — PAIN DESCRIPTION - LOCATION: LOCATION: NECK

## 2024-06-19 ASSESSMENT — PAIN - FUNCTIONAL ASSESSMENT: PAIN_FUNCTIONAL_ASSESSMENT: ACTIVITIES ARE NOT PREVENTED

## 2024-06-19 ASSESSMENT — PAIN SCALES - GENERAL: PAINLEVEL_OUTOF10: 8
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
## 2024-06-19 NOTE — CARE COORDINATION
ONGOING DISCHARGE PLAN:    Patient is alert and oriented x4.    Spoke with patient regarding discharge plan and patient confirms that plan is still home. Denies needs for VNS.    States she has information for transportation.     Waiting for a call to her friends about current address.     Will need a cab to discharge.     Will continue to follow for additional discharge needs.    If patient is discharged prior to next notation, then this note serves as note for discharge by case management.    Electronically signed by Bonita Pierce RN on 6/19/2024 at 9:49 AM   
Representative Name:       The Patient and/or Patient Representative Agree with the Discharge Plan? Yes    Aster Pelaez RN  Case Management Department  Ph:  Fax:

## 2024-06-19 NOTE — PLAN OF CARE
Problem: Discharge Planning  Goal: Discharge to home or other facility with appropriate resources  6/19/2024 0235 by Ann Ho RN  Outcome: Progressing     Problem: Safety - Adult  Goal: Free from fall injury  6/19/2024 0235 by Ann Ho RN  Outcome: Progressing     Problem: ABCDS Injury Assessment  Goal: Absence of physical injury  6/19/2024 0235 by Ann Ho RN  Outcome: Progressing     Problem: Cardiovascular - Adult  Goal: Maintains optimal cardiac output and hemodynamic stability  6/19/2024 0235 by Ann Ho RN  Outcome: Progressing     Problem: Chronic Conditions and Co-morbidities  Goal: Patient's chronic conditions and co-morbidity symptoms are monitored and maintained or improved  Outcome: Progressing     Pt remains free of falls or injury this shift. Pt displays adequate comfort levels this shift. Pt remains free of new skin breakdown this shift.

## 2024-06-21 NOTE — DISCHARGE SUMMARY
IN-PATIENT SERVICE   [facility] Internal Medicine    Discharge Summary     Patient ID: Graciela Holt  :  1950   MRN: [id number]     ACCOUNT:  [id number]   Patient's PCP: Travis Mason MD  Admit Date: 2024   Discharge Date: 2024    Length of Stay: 0  Code Status:  Prior  Admitting Physician: Juan Carlos Todd MD  Discharge Physician: Pearl Choudhary MD     Active Discharge Diagnoses:     Primary Problem  Chest pain      Hospital Problems  Active Hospital Problems    Diagnosis Date Noted    Chest pain [R07.9] 2024       Admission Condition:  fair     Discharged Condition: fair    Hospital Stay:     Hospital Course:  Graciela Holt is a 73 y.o. female who was admitted for the management of Chest pain , presented to ER with Chest Pain    Graciela Holt is a 73 y.o. Non- / non  female who presents with Chest Pain   and is admitted to the hospital for the management of Chest pain.     Patient's medical history significant for decompensated heart failure, chronic heart failure with reduced ejection fraction 25-30%, COPD, DM, HTN, left adrenal mass, NSTEMI, CABG x 4, stenosis of left internal carotid artery.     According to patient, she developed constant, nonradiating, midsternal chest pain while at rest today, \"sitting on the porch trying to keep cool.\"  Pain is described as a pressure sensation.  Pain was associated with mild shortness of breath.  Denied dizziness/lightheadedness, diaphoresis, nausea, vomiting.  Pain was relieved after receiving ASA and NTG.  Patient has extensive cardiac history.  Echo in April shows EF 25 to 30%.  Patient was reportedly prescribed a LifeVest however she is not currently wearing it.  When asked, patient states that the LifeVest is not in keeping with her CODE STATUS, which she reports is being a DO NOT RESUSCITATE Comfort Care arrest with no intubation.  Review of home medications indicates the patient is prescribed Eliquis

## 2024-07-10 ENCOUNTER — APPOINTMENT (OUTPATIENT)
Dept: GENERAL RADIOLOGY | Age: 74
End: 2024-07-10
Payer: COMMERCIAL

## 2024-07-10 ENCOUNTER — HOSPITAL ENCOUNTER (EMERGENCY)
Age: 74
Discharge: HOME | End: 2024-07-10
Attending: EMERGENCY MEDICINE
Payer: COMMERCIAL

## 2024-07-10 VITALS
BODY MASS INDEX: 22.2 KG/M2 | SYSTOLIC BLOOD PRESSURE: 138 MMHG | HEIGHT: 64 IN | RESPIRATION RATE: 20 BRPM | WEIGHT: 130 LBS | HEART RATE: 65 BPM | TEMPERATURE: 97.6 F | OXYGEN SATURATION: 96 % | DIASTOLIC BLOOD PRESSURE: 75 MMHG

## 2024-07-10 DIAGNOSIS — R06.02 SHORTNESS OF BREATH: Primary | ICD-10-CM

## 2024-07-10 LAB
ANION GAP SERPL CALCULATED.3IONS-SCNC: 10 MMOL/L (ref 9–17)
BASOPHILS # BLD: 0.1 K/UL (ref 0–0.2)
BASOPHILS NFR BLD: 1 % (ref 0–2)
BNP SERPL-MCNC: 2802 PG/ML
BUN SERPL-MCNC: 17 MG/DL (ref 8–23)
CALCIUM SERPL-MCNC: 9 MG/DL (ref 8.6–10.4)
CHLORIDE SERPL-SCNC: 102 MMOL/L (ref 98–107)
CO2 SERPL-SCNC: 29 MMOL/L (ref 20–31)
CREAT SERPL-MCNC: 1.1 MG/DL (ref 0.5–0.9)
EOSINOPHIL # BLD: 0.1 K/UL (ref 0–0.4)
EOSINOPHILS RELATIVE PERCENT: 2 % (ref 0–4)
ERYTHROCYTE [DISTWIDTH] IN BLOOD BY AUTOMATED COUNT: 14.8 % (ref 11.5–14.9)
GFR, ESTIMATED: 52 ML/MIN/1.73M2
GLUCOSE SERPL-MCNC: 175 MG/DL (ref 70–99)
HCT VFR BLD AUTO: 38.5 % (ref 36–46)
HGB BLD-MCNC: 12.5 G/DL (ref 12–16)
LYMPHOCYTES NFR BLD: 1.1 K/UL (ref 1–4.8)
LYMPHOCYTES RELATIVE PERCENT: 19 % (ref 24–44)
MCH RBC QN AUTO: 27.9 PG (ref 26–34)
MCHC RBC AUTO-ENTMCNC: 32.4 G/DL (ref 31–37)
MCV RBC AUTO: 86.2 FL (ref 80–100)
MONOCYTES NFR BLD: 0.6 K/UL (ref 0.1–1.3)
MONOCYTES NFR BLD: 11 % (ref 1–7)
NEUTROPHILS NFR BLD: 67 % (ref 36–66)
NEUTS SEG NFR BLD: 3.7 K/UL (ref 1.3–9.1)
PLATELET # BLD AUTO: 206 K/UL (ref 150–450)
PMV BLD AUTO: 8.1 FL (ref 6–12)
POTASSIUM SERPL-SCNC: 3.8 MMOL/L (ref 3.7–5.3)
RBC # BLD AUTO: 4.47 M/UL (ref 4–5.2)
SODIUM SERPL-SCNC: 141 MMOL/L (ref 135–144)
TROPONIN I SERPL HS-MCNC: 46 NG/L (ref 0–14)
TROPONIN I SERPL HS-MCNC: 49 NG/L (ref 0–14)
WBC OTHER # BLD: 5.6 K/UL (ref 3.5–11)

## 2024-07-10 PROCEDURE — 71045 X-RAY EXAM CHEST 1 VIEW: CPT

## 2024-07-10 PROCEDURE — 99285 EMERGENCY DEPT VISIT HI MDM: CPT

## 2024-07-10 PROCEDURE — 85025 COMPLETE CBC W/AUTO DIFF WBC: CPT

## 2024-07-10 PROCEDURE — 93005 ELECTROCARDIOGRAM TRACING: CPT

## 2024-07-10 PROCEDURE — 84484 ASSAY OF TROPONIN QUANT: CPT

## 2024-07-10 PROCEDURE — 80048 BASIC METABOLIC PNL TOTAL CA: CPT

## 2024-07-10 PROCEDURE — 83880 ASSAY OF NATRIURETIC PEPTIDE: CPT

## 2024-07-10 PROCEDURE — 36415 COLL VENOUS BLD VENIPUNCTURE: CPT
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
## 2024-07-10 NOTE — ED TRIAGE NOTES
Mode of arrival (squad #, walk in, police, etc) : walk-in        Chief complaint(s): shortness of breath        Arrival Note (brief scenario, treatment PTA, etc).: Pt reports increased shortness of breath oer the last couple days,        C= \"Have you ever felt that you should Cut down on your drinking?\"  No  A= \"Have people Annoyed you by criticizing your drinking?\"  No  G= \"Have you ever felt bad or Guilty about your drinking?\"  No  E= \"Have you ever had a drink as an Eye-opener first thing in the morning to steady your nerves or to help a hangover?\"  No      Deferred []      Reason for deferring: N/A    *If yes to two or more: probable alcohol abuse.*

## 2024-07-10 NOTE — DISCHARGE INSTRUCTIONS
You are seen and evaluated at [facility] for shortness of breath.  You had a cardiac workup done including twelve-lead EKG, chest x-ray, troponin x 2, BNP, CBC, BMP.  These were all stable showing no acute process.    Please follow-up with your PCP by calling to schedule an appointment.    Please return to the ED with any new or worsening symptoms including worsening shortness of breath, chest pain, lightheadedness, dizziness, passing out, or any other concerns.
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
## 2024-07-11 LAB
EKG ATRIAL RATE: 59 BPM
EKG P AXIS: 18 DEGREES
EKG P-R INTERVAL: 172 MS
EKG Q-T INTERVAL: 492 MS
EKG QRS DURATION: 116 MS
EKG QTC CALCULATION (BAZETT): 487 MS
EKG R AXIS: -20 DEGREES
EKG T AXIS: 136 DEGREES
EKG VENTRICULAR RATE: 59 BPM

## 2024-07-11 PROCEDURE — 93010 ELECTROCARDIOGRAM REPORT: CPT | Performed by: INTERNAL MEDICINE

## 2024-08-12 ENCOUNTER — APPOINTMENT (OUTPATIENT)
Dept: GENERAL RADIOLOGY | Age: 74
DRG: 303 | End: 2024-08-12
Payer: COMMERCIAL

## 2024-08-12 ENCOUNTER — HOSPITAL ENCOUNTER (INPATIENT)
Age: 74
LOS: 1 days | Discharge: HOME | DRG: 303 | End: 2024-08-13
Attending: EMERGENCY MEDICINE | Admitting: INTERNAL MEDICINE
Payer: COMMERCIAL

## 2024-08-12 DIAGNOSIS — R07.9 CHEST PAIN, UNSPECIFIED TYPE: Primary | ICD-10-CM

## 2024-08-12 PROBLEM — I20.9 ANGINA PECTORIS (HCC): Status: ACTIVE | Noted: 2024-08-12

## 2024-08-12 LAB
ANION GAP SERPL CALCULATED.3IONS-SCNC: 13 MMOL/L (ref 9–17)
BASOPHILS # BLD: 0.1 K/UL (ref 0–0.2)
BASOPHILS NFR BLD: 1 % (ref 0–2)
BUN SERPL-MCNC: 17 MG/DL (ref 8–23)
CALCIUM SERPL-MCNC: 9.4 MG/DL (ref 8.6–10.4)
CHLORIDE SERPL-SCNC: 109 MMOL/L (ref 98–107)
CO2 SERPL-SCNC: 24 MMOL/L (ref 20–31)
CREAT SERPL-MCNC: 1 MG/DL (ref 0.5–0.9)
EOSINOPHIL # BLD: 0.1 K/UL (ref 0–0.4)
EOSINOPHILS RELATIVE PERCENT: 2 % (ref 0–4)
ERYTHROCYTE [DISTWIDTH] IN BLOOD BY AUTOMATED COUNT: 16.7 % (ref 11.5–14.9)
GFR, ESTIMATED: 59 ML/MIN/1.73M2
GLUCOSE SERPL-MCNC: 119 MG/DL (ref 70–99)
HCT VFR BLD AUTO: 42 % (ref 36–46)
HGB BLD-MCNC: 13.9 G/DL (ref 12–16)
INR PPP: 1.1
LYMPHOCYTES NFR BLD: 1 K/UL (ref 1–4.8)
LYMPHOCYTES RELATIVE PERCENT: 19 % (ref 24–44)
MAGNESIUM SERPL-MCNC: 1.9 MG/DL (ref 1.6–2.6)
MCH RBC QN AUTO: 29.1 PG (ref 26–34)
MCHC RBC AUTO-ENTMCNC: 33.1 G/DL (ref 31–37)
MCV RBC AUTO: 87.8 FL (ref 80–100)
MONOCYTES NFR BLD: 0.4 K/UL (ref 0.1–1.3)
MONOCYTES NFR BLD: 9 % (ref 1–7)
NEUTROPHILS NFR BLD: 69 % (ref 36–66)
NEUTS SEG NFR BLD: 3.6 K/UL (ref 1.3–9.1)
PARTIAL THROMBOPLASTIN TIME: 25.7 SEC (ref 24–36)
PHOSPHATE SERPL-MCNC: 3.5 MG/DL (ref 2.6–4.5)
PLATELET # BLD AUTO: 203 K/UL (ref 150–450)
PMV BLD AUTO: 8.4 FL (ref 6–12)
POTASSIUM SERPL-SCNC: 3.4 MMOL/L (ref 3.7–5.3)
PROTHROMBIN TIME: 14 SEC (ref 11.8–14.6)
RBC # BLD AUTO: 4.78 M/UL (ref 4–5.2)
SODIUM SERPL-SCNC: 146 MMOL/L (ref 135–144)
TROPONIN I SERPL HS-MCNC: 28 NG/L (ref 0–14)
TROPONIN I SERPL HS-MCNC: 32 NG/L (ref 0–14)
WBC OTHER # BLD: 5.2 K/UL (ref 3.5–11)

## 2024-08-12 PROCEDURE — G0378 HOSPITAL OBSERVATION PER HR: HCPCS

## 2024-08-12 PROCEDURE — 85610 PROTHROMBIN TIME: CPT

## 2024-08-12 PROCEDURE — 36415 COLL VENOUS BLD VENIPUNCTURE: CPT

## 2024-08-12 PROCEDURE — 99285 EMERGENCY DEPT VISIT HI MDM: CPT

## 2024-08-12 PROCEDURE — 80048 BASIC METABOLIC PNL TOTAL CA: CPT

## 2024-08-12 PROCEDURE — 6370000000 HC RX 637 (ALT 250 FOR IP): Performed by: INTERNAL MEDICINE

## 2024-08-12 PROCEDURE — 84100 ASSAY OF PHOSPHORUS: CPT

## 2024-08-12 PROCEDURE — 84484 ASSAY OF TROPONIN QUANT: CPT

## 2024-08-12 PROCEDURE — 94761 N-INVAS EAR/PLS OXIMETRY MLT: CPT

## 2024-08-12 PROCEDURE — 94640 AIRWAY INHALATION TREATMENT: CPT

## 2024-08-12 PROCEDURE — 94664 DEMO&/EVAL PT USE INHALER: CPT

## 2024-08-12 PROCEDURE — 93005 ELECTROCARDIOGRAM TRACING: CPT | Performed by: EMERGENCY MEDICINE

## 2024-08-12 PROCEDURE — 2060000000 HC ICU INTERMEDIATE R&B

## 2024-08-12 PROCEDURE — 85730 THROMBOPLASTIN TIME PARTIAL: CPT

## 2024-08-12 PROCEDURE — 2580000003 HC RX 258: Performed by: INTERNAL MEDICINE

## 2024-08-12 PROCEDURE — 71045 X-RAY EXAM CHEST 1 VIEW: CPT

## 2024-08-12 PROCEDURE — 99223 1ST HOSP IP/OBS HIGH 75: CPT | Performed by: INTERNAL MEDICINE

## 2024-08-12 PROCEDURE — 6370000000 HC RX 637 (ALT 250 FOR IP): Performed by: EMERGENCY MEDICINE

## 2024-08-12 PROCEDURE — 83735 ASSAY OF MAGNESIUM: CPT

## 2024-08-12 PROCEDURE — 85025 COMPLETE CBC W/AUTO DIFF WBC: CPT

## 2024-08-12 RX ORDER — HYDROCODONE BITARTRATE AND ACETAMINOPHEN 5; 325 MG/1; MG/1
1 TABLET ORAL ONCE
Status: COMPLETED | OUTPATIENT
Start: 2024-08-12 | End: 2024-08-12

## 2024-08-12 RX ORDER — LOSARTAN POTASSIUM 25 MG/1
25 TABLET ORAL DAILY
Status: DISCONTINUED | OUTPATIENT
Start: 2024-08-12 | End: 2024-08-13 | Stop reason: HOSPADM

## 2024-08-12 RX ORDER — SODIUM CHLORIDE 0.9 % (FLUSH) 0.9 %
5-40 SYRINGE (ML) INJECTION EVERY 12 HOURS SCHEDULED
Status: DISCONTINUED | OUTPATIENT
Start: 2024-08-12 | End: 2024-08-13 | Stop reason: HOSPADM

## 2024-08-12 RX ORDER — ACETAMINOPHEN 325 MG/1
650 TABLET ORAL EVERY 6 HOURS PRN
Status: DISCONTINUED | OUTPATIENT
Start: 2024-08-12 | End: 2024-08-13 | Stop reason: HOSPADM

## 2024-08-12 RX ORDER — ONDANSETRON 2 MG/ML
4 INJECTION INTRAMUSCULAR; INTRAVENOUS EVERY 6 HOURS PRN
Status: DISCONTINUED | OUTPATIENT
Start: 2024-08-12 | End: 2024-08-13 | Stop reason: HOSPADM

## 2024-08-12 RX ORDER — FLUTICASONE PROPIONATE 50 MCG
2 SPRAY, SUSPENSION (ML) NASAL DAILY
Status: DISCONTINUED | OUTPATIENT
Start: 2024-08-12 | End: 2024-08-13 | Stop reason: HOSPADM

## 2024-08-12 RX ORDER — FUROSEMIDE 40 MG/1
40 TABLET ORAL DAILY
Status: DISCONTINUED | OUTPATIENT
Start: 2024-08-12 | End: 2024-08-13 | Stop reason: HOSPADM

## 2024-08-12 RX ORDER — MAGNESIUM SULFATE 1 G/100ML
1000 INJECTION INTRAVENOUS PRN
Status: DISCONTINUED | OUTPATIENT
Start: 2024-08-12 | End: 2024-08-13 | Stop reason: HOSPADM

## 2024-08-12 RX ORDER — POTASSIUM CHLORIDE 20 MEQ/1
40 TABLET, EXTENDED RELEASE ORAL PRN
Status: DISCONTINUED | OUTPATIENT
Start: 2024-08-12 | End: 2024-08-13 | Stop reason: HOSPADM

## 2024-08-12 RX ORDER — BUDESONIDE AND FORMOTEROL FUMARATE DIHYDRATE 160; 4.5 UG/1; UG/1
2 AEROSOL RESPIRATORY (INHALATION)
Status: DISCONTINUED | OUTPATIENT
Start: 2024-08-12 | End: 2024-08-13 | Stop reason: HOSPADM

## 2024-08-12 RX ORDER — ASPIRIN 81 MG/1
81 TABLET ORAL DAILY
Status: DISCONTINUED | OUTPATIENT
Start: 2024-08-12 | End: 2024-08-13 | Stop reason: HOSPADM

## 2024-08-12 RX ORDER — POTASSIUM CHLORIDE 7.45 MG/ML
10 INJECTION INTRAVENOUS PRN
Status: DISCONTINUED | OUTPATIENT
Start: 2024-08-12 | End: 2024-08-13 | Stop reason: HOSPADM

## 2024-08-12 RX ORDER — ACETAMINOPHEN 650 MG/1
650 SUPPOSITORY RECTAL EVERY 6 HOURS PRN
Status: DISCONTINUED | OUTPATIENT
Start: 2024-08-12 | End: 2024-08-13 | Stop reason: HOSPADM

## 2024-08-12 RX ORDER — AMIODARONE HYDROCHLORIDE 200 MG/1
200 TABLET ORAL 2 TIMES DAILY
Status: DISCONTINUED | OUTPATIENT
Start: 2024-08-12 | End: 2024-08-13

## 2024-08-12 RX ORDER — ALBUTEROL SULFATE 90 UG/1
2 AEROSOL, METERED RESPIRATORY (INHALATION) EVERY 6 HOURS PRN
Status: DISCONTINUED | OUTPATIENT
Start: 2024-08-12 | End: 2024-08-13 | Stop reason: HOSPADM

## 2024-08-12 RX ORDER — SODIUM CHLORIDE 9 MG/ML
INJECTION, SOLUTION INTRAVENOUS PRN
Status: DISCONTINUED | OUTPATIENT
Start: 2024-08-12 | End: 2024-08-13 | Stop reason: HOSPADM

## 2024-08-12 RX ORDER — ONDANSETRON 4 MG/1
4 TABLET, ORALLY DISINTEGRATING ORAL EVERY 8 HOURS PRN
Status: DISCONTINUED | OUTPATIENT
Start: 2024-08-12 | End: 2024-08-13 | Stop reason: HOSPADM

## 2024-08-12 RX ORDER — ATORVASTATIN CALCIUM 80 MG/1
80 TABLET, FILM COATED ORAL DAILY
Status: DISCONTINUED | OUTPATIENT
Start: 2024-08-12 | End: 2024-08-13 | Stop reason: HOSPADM

## 2024-08-12 RX ORDER — POLYETHYLENE GLYCOL 3350 17 G/17G
17 POWDER, FOR SOLUTION ORAL DAILY PRN
Status: DISCONTINUED | OUTPATIENT
Start: 2024-08-12 | End: 2024-08-13 | Stop reason: HOSPADM

## 2024-08-12 RX ORDER — SODIUM CHLORIDE 0.9 % (FLUSH) 0.9 %
10 SYRINGE (ML) INJECTION PRN
Status: DISCONTINUED | OUTPATIENT
Start: 2024-08-12 | End: 2024-08-13 | Stop reason: HOSPADM

## 2024-08-12 RX ORDER — ROPINIROLE 1 MG/1
1 TABLET, FILM COATED ORAL NIGHTLY
Status: DISCONTINUED | OUTPATIENT
Start: 2024-08-12 | End: 2024-08-13 | Stop reason: HOSPADM

## 2024-08-12 RX ADMIN — SODIUM CHLORIDE, PRESERVATIVE FREE 10 ML: 5 INJECTION INTRAVENOUS at 20:49

## 2024-08-12 RX ADMIN — BUDESONIDE AND FORMOTEROL FUMARATE DIHYDRATE 2 PUFF: 160; 4.5 AEROSOL RESPIRATORY (INHALATION) at 19:51

## 2024-08-12 RX ADMIN — APIXABAN 5 MG: 5 TABLET, FILM COATED ORAL at 20:49

## 2024-08-12 RX ADMIN — ACETAMINOPHEN 650 MG: 325 TABLET ORAL at 20:48

## 2024-08-12 RX ADMIN — HYDROCODONE BITARTRATE AND ACETAMINOPHEN 1 TABLET: 5; 325 TABLET ORAL at 14:50

## 2024-08-12 RX ADMIN — ASPIRIN 81 MG: 81 TABLET, COATED ORAL at 14:13

## 2024-08-12 RX ADMIN — FUROSEMIDE 40 MG: 40 TABLET ORAL at 14:13

## 2024-08-12 RX ADMIN — METOPROLOL TARTRATE 12.5 MG: 25 TABLET, FILM COATED ORAL at 20:48

## 2024-08-12 RX ADMIN — ROPINIROLE HYDROCHLORIDE 1 MG: 1 TABLET, FILM COATED ORAL at 20:49

## 2024-08-12 RX ADMIN — AMIODARONE HYDROCHLORIDE 200 MG: 200 TABLET ORAL at 20:49

## 2024-08-12 RX ADMIN — ATORVASTATIN CALCIUM 80 MG: 80 TABLET, FILM COATED ORAL at 14:13

## 2024-08-12 ASSESSMENT — PAIN DESCRIPTION - LOCATION
LOCATION: NECK
LOCATION: NECK;CHEST

## 2024-08-12 ASSESSMENT — PAIN DESCRIPTION - DESCRIPTORS
DESCRIPTORS: ACHING;DISCOMFORT
DESCRIPTORS: SHARP;SHOOTING

## 2024-08-12 ASSESSMENT — PAIN - FUNCTIONAL ASSESSMENT: PAIN_FUNCTIONAL_ASSESSMENT: ACTIVITIES ARE NOT PREVENTED

## 2024-08-12 ASSESSMENT — HEART SCORE
ECG: NORMAL
ECG: NORMAL

## 2024-08-12 ASSESSMENT — ENCOUNTER SYMPTOMS
BACK PAIN: 0
FACIAL SWELLING: 0
ABDOMINAL PAIN: 0
EYE PAIN: 0
TROUBLE SWALLOWING: 0
PHOTOPHOBIA: 0
SHORTNESS OF BREATH: 0
COLOR CHANGE: 0
CHEST TIGHTNESS: 0
VOICE CHANGE: 0
NAUSEA: 0
VOMITING: 0

## 2024-08-12 ASSESSMENT — PAIN DESCRIPTION - PAIN TYPE: TYPE: CHRONIC PAIN

## 2024-08-12 ASSESSMENT — PAIN SCALES - GENERAL
PAINLEVEL_OUTOF10: 7
PAINLEVEL_OUTOF10: 3

## 2024-08-12 ASSESSMENT — PAIN DESCRIPTION - ONSET: ONSET: ON-GOING

## 2024-08-12 ASSESSMENT — PAIN DESCRIPTION - ORIENTATION: ORIENTATION: POSTERIOR
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
## 2024-08-12 NOTE — ED NOTES
Report given to LIVE Harrison from [facility].   Report method by phone.   The following was reviewed with receiving RN:   Current vital signs:  /71   Pulse 66   Temp 97.9 °F (36.6 °C) (Oral)   Resp 22   Ht 1.626 m (5' 4\")   Wt 59 kg (130 lb)   SpO2 94%   BMI 22.31 kg/m²                      Any medication or safety alerts were reviewed. Any pending diagnostics and notifications were also reviewed, as well as any safety concerns or issues, abnormal labs, abnormal imaging, and abnormal assessment findings. Questions were answered.

## 2024-08-12 NOTE — ED PROVIDER NOTES
EMERGENCY DEPARTMENT ENCOUNTER    Pt Name: Graciela Holt  MRN: [id number]  Birthdate 1950  Date of evaluation: 8/12/24  CHIEF COMPLAINT       Chief Complaint   Patient presents with    Chest Pain     HISTORY OF PRESENT ILLNESS   73-year-old female presenting to the ER complaining of left-sided chest pain that radiates to the left upper extremity.  The patient does have an underlying history of CAD.  Patient states it started at roughly 7:30 AM today.    The history is provided by the patient.   Chest Pain  Pain location:  L chest  Pain quality: aching    Pain radiates to:  L shoulder  Associated symptoms: no abdominal pain, no back pain, no dizziness, no dysphagia, no fatigue, no headache, no nausea, no palpitations, no shortness of breath and no vomiting            REVIEW OF SYSTEMS     Review of Systems   Constitutional:  Negative for activity change, appetite change and fatigue.   HENT:  Negative for facial swelling, trouble swallowing and voice change.    Eyes:  Negative for photophobia and pain.   Respiratory:  Negative for chest tightness and shortness of breath.    Cardiovascular:  Positive for chest pain. Negative for palpitations.   Gastrointestinal:  Negative for abdominal pain, nausea and vomiting.   Genitourinary:  Negative for dysuria and urgency.   Musculoskeletal:  Negative for arthralgias and back pain.   Skin:  Negative for color change and rash.   Neurological:  Negative for dizziness, syncope and headaches.   Psychiatric/Behavioral:  Negative for behavioral problems and hallucinations.      PASTMEDICAL HISTORY     Past Medical History:   Diagnosis Date    Allergic rhinitis     Arthritis     general    CAD (coronary artery disease)     Dr. Fowler/ [place]    Cerebral artery occlusion with cerebral infarction (HCC) 07/2020    pt states mild stroke    CHF (congestive heart failure) (HCC)     Controlled type 2 diabetes mellitus without complication, without long-term current use of insulin (HCC)

## 2024-08-12 NOTE — ED TRIAGE NOTES
Detail Level: Generalized Mode of arrival (squad #, walk in, police, etc) : Medic 6        Chief complaint(s): Chest pain radiating to arm        Arrival Note (brief scenario, treatment PTA, etc).: Pt arrives to ED via squad w/ c/o chest pain radiating to left arm. Pt states she woke up around 0730 this am when chest pain started around 0740. Pt is currently not experiencing chest pain or sob. Pt has extensive cardiac hx.        C= \"Have you ever felt that you should Cut down on your drinking?\"  No  A= \"Have people Annoyed you by criticizing your drinking?\"  No  G= \"Have you ever felt bad or Guilty about your drinking?\"  No  E= \"Have you ever had a drink as an Eye-opener first thing in the morning to steady your nerves or to help a hangover?\"  No      Deferred []      Reason for deferring: N/A    *If yes to two or more: probable alcohol abuse.*

## 2024-08-12 NOTE — CONSULTS
Date:   2024  Patient name: Graciela Holt  Date of admission:  2024  9:25 AM  MRN:   [id number]  YOB: 1950  PCP: Travis Mason MD    Reason for Admission: Angina pectoris (HCC) [I20.9]  Chest pain [R07.9]  Chest pain, unspecified type [R07.9]    Cardiology consult       Referring physician: Dr Chip Anderson    Impression     Admission 2024 chest pain like a heavy pressure radiating to the left arm, no new ECG changes, high-sensitivity troponin 28  Multivessel CAD cardiac cath 2018  CABG LIMA to LAD and diagonal 1, SVG to OM, SVG to PDA 2018 at [facility]  Severely reduced LV systolic function ejection fraction 25 to 30%, akinetic LV apex, apical thrombus 2D echo 2024  Episodes of nonsustained V. Tach  Diabetes mellitus  Hyperlipidemia  History of left MCA stroke occluded internal carotid    Admission 2024 chest pain like heaviness no shortness of breath no radiation ECG showed LVH with repolarization seen changes no change from previous ECG borderline abnormal troponin most likely type II    Admission 2024 for increasing shortness of breath, increasing swelling over the legs, elevated troponin 119, 136,  chest x-ray showed pulmonary edema proBNP 31,990     Past surgical history  Bilateral knee replacement, C-spine surgery, cholecystectomy,  section, CABG    Drug allergies codeine, lisinopril      Investigation workup    ECG 2024  Sinus rhythm heart rate 62, LVH with QRS widening and repolarization changes     ECG 2024  Sinus rhythm heart rate 72, LVH with repeat polarization changes, left axis  No diagnostic change from 2024     Chest x-ray 2024  Reticular opacities projecting at the central right heart border and right costophrenic angle favors atelectasis/scarring  Chest x-ray 2024  Cardiomegaly, no acute process, sternotomy wires noted     2D echo 2024  Normal LV size  Moderately increased LV wall thickness,

## 2024-08-12 NOTE — PLAN OF CARE
Problem: Respiratory - Adult  Goal: Achieves optimal ventilation and oxygenation  Outcome: Progressing  Flowsheets (Taken 8/12/2024 1918)  Achieves optimal ventilation and oxygenation:   Assess for changes in respiratory status   Assess for changes in mentation and behavior     Problem: Cardiovascular - Adult  Goal: Maintains optimal cardiac output and hemodynamic stability  Outcome: Progressing  Flowsheets (Taken 8/12/2024 1918)  Maintains optimal cardiac output and hemodynamic stability:   Monitor blood pressure and heart rate   Monitor urine output and notify Licensed Independent Practitioner for values outside of normal range

## 2024-08-13 VITALS
RESPIRATION RATE: 19 BRPM | BODY MASS INDEX: 22.85 KG/M2 | TEMPERATURE: 98.4 F | HEIGHT: 64 IN | WEIGHT: 133.82 LBS | SYSTOLIC BLOOD PRESSURE: 149 MMHG | OXYGEN SATURATION: 95 % | HEART RATE: 62 BPM | DIASTOLIC BLOOD PRESSURE: 80 MMHG

## 2024-08-13 LAB
ANION GAP SERPL CALCULATED.3IONS-SCNC: 10 MMOL/L (ref 9–17)
BUN SERPL-MCNC: 15 MG/DL (ref 8–23)
CALCIUM SERPL-MCNC: 8.8 MG/DL (ref 8.6–10.4)
CHLORIDE SERPL-SCNC: 106 MMOL/L (ref 98–107)
CO2 SERPL-SCNC: 27 MMOL/L (ref 20–31)
CREAT SERPL-MCNC: 1.2 MG/DL (ref 0.5–0.9)
EKG ATRIAL RATE: 62 BPM
EKG P AXIS: 33 DEGREES
EKG P-R INTERVAL: 164 MS
EKG Q-T INTERVAL: 454 MS
EKG QRS DURATION: 120 MS
EKG QTC CALCULATION (BAZETT): 460 MS
EKG R AXIS: -28 DEGREES
EKG T AXIS: 140 DEGREES
EKG VENTRICULAR RATE: 62 BPM
GFR, ESTIMATED: 47 ML/MIN/1.73M2
GLUCOSE SERPL-MCNC: 248 MG/DL (ref 70–99)
INR PPP: 1.2
POTASSIUM SERPL-SCNC: 3.4 MMOL/L (ref 3.7–5.3)
PROTHROMBIN TIME: 15.3 SEC (ref 11.8–14.6)
SODIUM SERPL-SCNC: 143 MMOL/L (ref 135–144)
TSH SERPL DL<=0.05 MIU/L-ACNC: 2.57 UIU/ML (ref 0.3–5)

## 2024-08-13 PROCEDURE — 6370000000 HC RX 637 (ALT 250 FOR IP): Performed by: INTERNAL MEDICINE

## 2024-08-13 PROCEDURE — 6370000000 HC RX 637 (ALT 250 FOR IP): Performed by: NURSE PRACTITIONER

## 2024-08-13 PROCEDURE — 2580000003 HC RX 258: Performed by: INTERNAL MEDICINE

## 2024-08-13 PROCEDURE — 84443 ASSAY THYROID STIM HORMONE: CPT

## 2024-08-13 PROCEDURE — 85610 PROTHROMBIN TIME: CPT

## 2024-08-13 PROCEDURE — 80048 BASIC METABOLIC PNL TOTAL CA: CPT

## 2024-08-13 PROCEDURE — 36415 COLL VENOUS BLD VENIPUNCTURE: CPT

## 2024-08-13 PROCEDURE — 99239 HOSP IP/OBS DSCHRG MGMT >30: CPT | Performed by: INTERNAL MEDICINE

## 2024-08-13 PROCEDURE — 94761 N-INVAS EAR/PLS OXIMETRY MLT: CPT

## 2024-08-13 PROCEDURE — G0378 HOSPITAL OBSERVATION PER HR: HCPCS

## 2024-08-13 PROCEDURE — 94640 AIRWAY INHALATION TREATMENT: CPT

## 2024-08-13 RX ORDER — CARVEDILOL 3.12 MG/1
3.12 TABLET ORAL 2 TIMES DAILY WITH MEALS
Status: DISCONTINUED | OUTPATIENT
Start: 2024-08-13 | End: 2024-08-13 | Stop reason: HOSPADM

## 2024-08-13 RX ORDER — HYDROCODONE BITARTRATE AND ACETAMINOPHEN 5; 325 MG/1; MG/1
1 TABLET ORAL ONCE
Status: COMPLETED | OUTPATIENT
Start: 2024-08-13 | End: 2024-08-13

## 2024-08-13 RX ORDER — AMIODARONE HYDROCHLORIDE 200 MG/1
200 TABLET ORAL DAILY
Status: DISCONTINUED | OUTPATIENT
Start: 2024-08-14 | End: 2024-08-13 | Stop reason: HOSPADM

## 2024-08-13 RX ORDER — CARVEDILOL 3.12 MG/1
3.12 TABLET ORAL 2 TIMES DAILY WITH MEALS
Qty: 60 TABLET | Refills: 3 | Status: SHIPPED | OUTPATIENT
Start: 2024-08-13

## 2024-08-13 RX ORDER — AMIODARONE HYDROCHLORIDE 200 MG/1
200 TABLET ORAL DAILY
Qty: 30 TABLET | Refills: 0 | Status: SHIPPED | OUTPATIENT
Start: 2024-08-14

## 2024-08-13 RX ORDER — LIDOCAINE 4 G/G
1 PATCH TOPICAL EVERY 24 HOURS
Status: DISCONTINUED | OUTPATIENT
Start: 2024-08-13 | End: 2024-08-13 | Stop reason: HOSPADM

## 2024-08-13 RX ADMIN — APIXABAN 5 MG: 5 TABLET, FILM COATED ORAL at 09:50

## 2024-08-13 RX ADMIN — EMPAGLIFLOZIN 10 MG: 10 TABLET, FILM COATED ORAL at 09:50

## 2024-08-13 RX ADMIN — FUROSEMIDE 40 MG: 40 TABLET ORAL at 09:50

## 2024-08-13 RX ADMIN — LOSARTAN POTASSIUM 25 MG: 25 TABLET, FILM COATED ORAL at 09:50

## 2024-08-13 RX ADMIN — ATORVASTATIN CALCIUM 80 MG: 80 TABLET, FILM COATED ORAL at 09:50

## 2024-08-13 RX ADMIN — POTASSIUM CHLORIDE 40 MEQ: 1500 TABLET, EXTENDED RELEASE ORAL at 14:07

## 2024-08-13 RX ADMIN — HYDROCODONE BITARTRATE AND ACETAMINOPHEN 1 TABLET: 5; 325 TABLET ORAL at 14:38

## 2024-08-13 RX ADMIN — ASPIRIN 81 MG: 81 TABLET, COATED ORAL at 09:50

## 2024-08-13 RX ADMIN — BUDESONIDE AND FORMOTEROL FUMARATE DIHYDRATE 2 PUFF: 160; 4.5 AEROSOL RESPIRATORY (INHALATION) at 08:09

## 2024-08-13 RX ADMIN — SODIUM CHLORIDE, PRESERVATIVE FREE 10 ML: 5 INJECTION INTRAVENOUS at 09:45

## 2024-08-13 RX ADMIN — AMIODARONE HYDROCHLORIDE 200 MG: 200 TABLET ORAL at 14:08

## 2024-08-13 RX ADMIN — FLUTICASONE PROPIONATE 2 SPRAY: 50 SPRAY, METERED NASAL at 09:47

## 2024-08-13 ASSESSMENT — PAIN DESCRIPTION - ORIENTATION
ORIENTATION: MID
ORIENTATION: MID

## 2024-08-13 ASSESSMENT — PAIN DESCRIPTION - LOCATION
LOCATION: NECK
LOCATION: NECK

## 2024-08-13 ASSESSMENT — PAIN DESCRIPTION - DESCRIPTORS
DESCRIPTORS: ACHING;DISCOMFORT
DESCRIPTORS: ACHING

## 2024-08-13 ASSESSMENT — PAIN SCALES - GENERAL
PAINLEVEL_OUTOF10: 10
PAINLEVEL_OUTOF10: 10

## 2024-08-13 ASSESSMENT — PAIN DESCRIPTION - PAIN TYPE: TYPE: CHRONIC PAIN
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
## 2024-08-13 NOTE — FLOWSHEET NOTE
08/13/24 0254   Treatment Team Notification   Reason for Communication Review case   Name of Team Member Notified Sumaya Navas APN   Treatment Team Role Advanced Practice Nurse   Method of Communication Secure Message   Response See orders   Notification Time 0254     Pt complaining of chronic posterior neck pain of 9/10 that's dull. SIRI Shell notified. See new orders.

## 2024-08-13 NOTE — PROGRESS NOTES
CLINICAL PHARMACY NOTE: MEDS TO BEDS    Total # of Prescriptions Filled: 2   The following medications were delivered to the patient:  Amiodarone 200mg  Carvedilol 3.125mg    Additional Documentation:  Medications picked up by Christy at pharmacy  
Dr. Flynn (Cardiology) put on patient list for consult.  
Pharmacy Medication History Note      List of current medications patient is taking is complete.    Source of information: [employer], [facility], [facility] ([phone])    Changes made to medication list:  Medications removed (include reason, ex. therapy complete or physician discontinued, noncompliance):  Lidocaine patches - course complete     Medications flagged for provider review:  Ropinirole - last filled in 2023    Medications added/doses adjusted:  None     Other notes (ex. Recent course of antibiotics, Coumadin dosing):  OARRS negative       Current Home Medication List at Time of Admission:  Prior to Admission medications    Medication Sig   apixaban (ELIQUIS) 5 MG TABS tablet Take 1 tablet by mouth 2 times daily   empagliflozin (JARDIANCE) 10 MG tablet Take 1 tablet by mouth daily   losartan (COZAAR) 25 MG tablet Take 1 tablet by mouth daily   metoprolol tartrate (LOPRESSOR) 25 MG tablet Take 0.5 tablets by mouth 2 times daily   furosemide (LASIX) 40 MG tablet Take 1 tablet by mouth daily   amiodarone (CORDARONE) 200 MG tablet Take 1 tablet by mouth 2 times daily   potassium chloride (KLOR-CON) 10 MEQ extended release tablet Take 1 tablet by mouth daily   famotidine (PEPCID) 40 MG tablet Take 1 tablet by mouth every evening   atorvastatin (LIPITOR) 80 MG tablet Take 1 tablet by mouth daily   albuterol sulfate  (90 Base) MCG/ACT inhaler Inhale 2 puffs into the lungs every 6 hours as needed for Wheezing or Shortness of Breath   budesonide-formoterol (SYMBICORT) 160-4.5 MCG/ACT AERO Inhale 2 puffs into the lungs 2 times daily   rOPINIRole (REQUIP) 1 MG tablet TAKE 1 TABLET BY MOUTH EVERY NIGHT AS NEEDED  Patient not taking: Reported on 8/12/2024   nitroGLYCERIN (NITROSTAT) 0.4 MG SL tablet Place 1 tablet under the tongue every 5 minutes as needed for Chest pain up to max of 3 total doses. If no relief after 1 dose, call 911.    ASPIRIN ADULT LOW STRENGTH 81 MG EC tablet TAKE 1 TABLET BY MOUTH DAILY 
Physical Therapy        Physical Therapy Cancel Note      DATE: 2024    NAME: Graciela Holt  MRN: [id number]   : 1950      Patient not seen this date for Physical Therapy due to:    Patient independent with functional mobility. Will defer PT evaluation at this time. Please reorder PT if future needs arise.       Electronically signed by Alla Willams PT on 2024 at 10:01 AM     
Spiritual Health Assessment/Progress Note  [facility]    Spiritual/Emotional Needs,  ,  ,      Name: Graciela Holt MRN: [id number]    Age: 73 y.o.     Sex: female   Language: English   Mandaeism: None   Chest pain     Date: 8/12/2024                           Spiritual Assessment began in [facility]        Referral/Consult From: Rounding, Nurse   Encounter Overview/Reason: Spiritual/Emotional Needs  Service Provided For: Patient    Writer responded to consult to see patient for spiritual support; patient welcomed visit and asked writer to confirm that she was a \"full code\"; patient stated she did not \"want a DNR\"; full code confirmed; patient stated \"now my son will be happy\"; listening presence and support; welcomed prayer    Yareli, Belief, Meaning:   Patient is connected with a yareli tradition or spiritual practice  Family/Friends No family/friends present      Importance and Influence:  Patient Other: unknown  Family/Friends no family/friends present    Community:  Patient feels well-supported. Support system includes: Children  Family/Friends Other: unknown    Assessment and Plan of Care:     Patient Interventions include: Facilitated expression of thoughts and feelings and Affirmed coping skills/support systems  Family/Friends Interventions include: Other: unknown    Patient Plan of Care: Spiritual Care available upon further referral  Family/Friends Plan of Care: Spiritual Care available upon further referral    Electronically signed by Chaplain Areli on 8/12/2024 at 7:51 PM   
[facility]   OCCUPATIONAL THERAPY MISSED TREATMENT NOTE   INPATIENT   Date: 24  Patient Name: Graciela Holt       Room:   MRN: [id number]   Account #: [id number]    : 1950  (73 y.o.)  Gender: female                 REASON FOR MISSED TREATMENT:  Patient independent with ADLs functional mobility. Pt states taking self to restroom and no concerns regarding completion of ADLs currently or upon d/c home.. Will defer OT evaluation at this time. Please reorder OT if future needs arise.       Electronically signed by Jessica Vidal OT on 24 at 10:28 AM EDT    
diastolic congestive heart failure (HCC)     Type 2 diabetes mellitus with chronic kidney disease     Symptomatic congestive heart failure, pre-operative cardiovascular examination ([facility])     Acute on chronic HFrEF (heart failure with reduced ejection fraction) ([facility])     Encounter for palliative care     Goals of care, counseling/discussion     ACP (advance care planning)     CHF (congestive heart failure), NYHA class I, acute on chronic, combined ([facility])     Shortness of breath     History of COPD     Noncompliance     Protein-calorie malnutrition ([facility])     Acute decompensated heart failure ([facility])     NSTEMI (non-ST elevated myocardial infarction) ([facility])     Chest pain     Angina pectoris ([facility])      Plan of Treatment:   Medications reviewed  1: Admitted with chest pain at rest high-sensitivity troponin 25, no new ECG changes does not need any further cardiac workup  2: History of ventricular arrhythmia continue current dose of amiodarone 200 daily  3: Ischemic cardiomyopathy, LV apical thrombus continue with Eliquis 5 mg twice daily, Lipitor 80 mg a day, Jardiance 10 mg a day, Lasix 40 mg a day, losartan 25 mg and aspirin 81 mg  4: COPD, continue with bronchodilators  Reduce amiodarone to 200 mg a day, check TSH patient having bradycardia        Electronically signed by Tommy Flynn MD on 8/13/2024 at 8:56 AM

## 2024-08-13 NOTE — ACP (ADVANCE CARE PLANNING)
Advance Care Planning     Advance Care Planning Activator (Inpatient)  Conversation Note      Date of ACP Conversation: 8/13/2024     Conversation Conducted with: Patient with Decision Making Capacity    ACP Activator: Aster Pelaez RN        Health Care Decision Maker:     Current Designated Health Care Decision Maker:     Primary Decision Maker: Guanakito Zheng - [facility] - [phone]        Care Preferences    Ventilation:  \"If you were in your present state of health and suddenly became very ill and were unable to breathe on your own, what would your preference be about the use of a ventilator (breathing machine) if it were available to you?\"      Would the patient desire the use of ventilator (breathing machine)?: yes    \"If your health worsens and it becomes clear that your chance of recovery is unlikely, what would your preference be about the use of a ventilator (breathing machine) if it were available to you?\"     Would the patient desire the use of ventilator (breathing machine)?: \"I Don't Know\"/      Resuscitation  \"CPR works best to restart the heart when there is a sudden event, like a heart attack, in someone who is otherwise healthy. Unfortunately, CPR does not typically restart the heart for people who have serious health conditions or who are very sick.\"    \"In the event your heart stopped as a result of an underlying serious health condition, would you want attempts to be made to restart your heart (answer \"yes\" for attempt to resuscitate) or would you prefer a natural death (answer \"no\" for do not attempt to resuscitate)?\" yes       [] Yes   [] No   Educated Patient / Decision Maker regarding differences between Advance Directives and portable DNR orders.    Length of ACP Conversation in minutes:      Conversation Outcomes:  ACP discussion completed    Follow-up plan:    [] Schedule follow-up conversation to continue planning  [] Referred individual to Provider for additional questions/concerns

## 2024-08-13 NOTE — PLAN OF CARE
Problem: Respiratory - Adult  Goal: Achieves optimal ventilation and oxygenation  8/13/2024 0407 by Jenn Leonard RN  Outcome: Progressing    Problem: Cardiovascular - Adult  Goal: Maintains optimal cardiac output and hemodynamic stability  8/13/2024 0407 by Jenn Leonard RN  Outcome: Progressing    Goal: Absence of cardiac dysrhythmias or at baseline  8/13/2024 0407 by Jenn Leonard RN  Outcome: Progressing    Problem: Pain  Goal: Verbalizes/displays adequate comfort level or baseline comfort level  8/13/2024 0407 by Jenn Leonard RN  Outcome: Progressing    Problem: ABCDS Injury Assessment  Goal: Absence of physical injury  8/13/2024 0407 by Jenn Leonard RN  Outcome: Progressing    Problem: Safety - Adult  Goal: Free from fall injury  Outcome: Progressing     Problem: Skin/Tissue Integrity  Goal: Absence of new skin breakdown  Outcome: Progressing

## 2024-08-13 NOTE — PLAN OF CARE
Problem: Discharge Planning  Goal: Discharge to home or other facility with appropriate resources  Outcome: Adequate for Discharge     Problem: Respiratory - Adult  Goal: Achieves optimal ventilation and oxygenation  8/13/2024 1310 by Christy Cadena RN  Outcome: Adequate for Discharge     Problem: Cardiovascular - Adult  Goal: Maintains optimal cardiac output and hemodynamic stability  8/13/2024 1310 by Christy Cadena RN  Outcome: Adequate for Discharge     Problem: Cardiovascular - Adult  Goal: Absence of cardiac dysrhythmias or at baseline  8/13/2024 1310 by Christy Cadena RN  Outcome: Adequate for Discharge     Problem: Pain  Goal: Verbalizes/displays adequate comfort level or baseline comfort level  8/13/2024 1310 by Christy Cadena RN  Outcome: Adequate for Discharge     Problem: ABCDS Injury Assessment  Goal: Absence of physical injury  8/13/2024 1310 by Christy Cadena RN  Outcome: Adequate for Discharge     Problem: Safety - Adult  Goal: Free from fall injury  8/13/2024 1310 by Christy Cadena RN  Outcome: Adequate for Discharge     Problem: Skin/Tissue Integrity  Goal: Absence of new skin breakdown  Description: 1.  Monitor for areas of redness and/or skin breakdown  2.  Assess vascular access sites hourly  3.  Every 4-6 hours minimum:  Change oxygen saturation probe site  4.  Every 4-6 hours:  If on nasal continuous positive airway pressure, respiratory therapy assess nares and determine need for appliance change or resting period.  8/13/2024 1310 by Christy Cadena RN  Outcome: Adequate for Discharge

## 2024-08-13 NOTE — CARE COORDINATION
Case Management Assessment  Initial Evaluation    Date/Time of Evaluation: 8/13/2024 11:31 AM  Assessment Completed by: Aster Pelaez RN    If patient is discharged prior to next notation, then this note serves as note for discharge by case management.    Patient Name: Graciela Holt                   YOB: 1950  Diagnosis: Angina pectoris (HCC) [I20.9]  Chest pain [R07.9]  Chest pain, unspecified type [R07.9]                   Date / Time: 8/12/2024  9:25 AM    Patient Admission Status: Inpatient   Readmission Risk (Low < 19, Mod (19-27), High > 27): Readmission Risk Score: 17.9    Current PCP: Travis Mason MD  PCP verified by CM? Yes    Chart Reviewed: Yes      History Provided by: Patient  Patient Orientation: Alert and Oriented    Patient Cognition: Alert    Hospitalization in the last 30 days (Readmission):  No    If yes, Readmission Assessment in CM Navigator will be completed.    Advance Directives:      Code Status: Full Code   Patient's Primary Decision Maker is:      Primary Decision Maker: Guanakito Zheng - Child - [phone]    Discharge Planning:    Patient lives with: Friends Type of Home: House  Primary Care Giver: Self  Patient Support Systems include: Children   Current Financial resources: Medicare  Current community resources: None  Current services prior to admission: None            Current DME:              Type of Home Care services:  None    ADLS  Prior functional level: Independent in ADLs/IADLs  Current functional level: Independent in ADLs/IADLs    PT AM-PAC:   /24  OT AM-PAC:   /24    Family can provide assistance at DC: No  Would you like Case Management to discuss the discharge plan with any other family members/significant others, and if so, who?    Plans to Return to Present Housing: Yes  Other Identified Issues/Barriers to RETURNING to current housing: None  Potential Assistance needed at discharge: N/A            Potential DME:    Patient expects to discharge

## 2024-08-13 NOTE — DISCHARGE INSTR - COC
Continuity of Care Form    Patient Name: Graciela Holt   :  1950  MRN:  [id number]    Admit date:  2024  Discharge date:  ***    Code Status Order: Full Code   Advance Directives:   Advance Care Flowsheet Documentation             Admitting Physician:  Chip Anderson MD  PCP: Travis Mason MD    Discharging Nurse: ***  Discharging Hospital Unit/Room#: /-  Discharging Unit Phone Number: ***    Emergency Contact:   Extended Emergency Contact Information  Primary Emergency Contact: Guanakito Zheng  Address: [address] [place] [address] [place] [facility]  Home Phone: [phone]  Work Phone: [phone]  Mobile Phone: [phone]  Relation: Child    Past Surgical History:  Past Surgical History:   Procedure Laterality Date    APPENDECTOMY      BRONCHOSCOPY N/A 2021    BRONCHOSCOPY w/ WASHINGS performed by Toy Cheatham MD at [facility] ENDO    CARDIAC SURGERY      cath x 2/ stent x 1    CARDIAC SURGERY      bypass 4 vessel 2018    CERVICAL LAMINECTOMY N/A 10/14/2020    C3-C7 POSTERIOR CERVICAL DECOMPRESSION FUSION performed by Armando Guerra MD at [facility] OR    CHOLECYSTECTOMY      HYSTERECTOMY (CERVIX STATUS UNKNOWN)      JOINT REPLACEMENT Bilateral     knees    LEG BIOPSY EXCISION Right 2019    LEG LESION PUNCH BIOPSY performed by Chuckie Snow MD at [facility] OR    OTHER SURGICAL HISTORY  2020    cerebral angiogram    NH I&D DEEP ABSC BURSA/HEMATOMA THIGH/KNEE REGION Right 2018    DEBRIDEMENT INCISION AND DRAINAGE THIGH ABSCESS performed by Amanda Hernandez DO at [facility] OR    NH OFFICE/OUTPT VISIT,PROCEDURE ONLY N/A 2018    CABG X 3 LIMA-LAD-DIAG,SVG-PDA,CORONARY ARTERY BYPASS REDO, PUMP ASSIST, SWAN, JARRED, REDO STERNOTOMY performed by Savanna Mata MD at [facility] CVOR       Immunization History:   Immunization History   Administered Date(s) Administered    Influenza Virus Vaccine 2016, 2017    Influenza Whole 2015    Influenza, FLUARIX,

## 2024-08-14 ENCOUNTER — HOSPITAL ENCOUNTER (EMERGENCY)
Age: 74
Discharge: HOME | End: 2024-08-14
Attending: EMERGENCY MEDICINE
Payer: COMMERCIAL

## 2024-08-14 ENCOUNTER — APPOINTMENT (OUTPATIENT)
Dept: GENERAL RADIOLOGY | Age: 74
End: 2024-08-14
Payer: COMMERCIAL

## 2024-08-14 VITALS
OXYGEN SATURATION: 97 % | WEIGHT: 130 LBS | TEMPERATURE: 97.7 F | HEIGHT: 64 IN | RESPIRATION RATE: 12 BRPM | SYSTOLIC BLOOD PRESSURE: 145 MMHG | DIASTOLIC BLOOD PRESSURE: 71 MMHG | HEART RATE: 58 BPM | BODY MASS INDEX: 22.2 KG/M2

## 2024-08-14 DIAGNOSIS — R07.9 CHEST PAIN, UNSPECIFIED TYPE: Primary | ICD-10-CM

## 2024-08-14 LAB
ALBUMIN SERPL-MCNC: 3.8 G/DL (ref 3.5–5.2)
ALBUMIN/GLOB SERPL: 2 {RATIO} (ref 1–2.5)
ALP SERPL-CCNC: 84 U/L (ref 35–104)
ALT SERPL-CCNC: 11 U/L (ref 10–35)
ANION GAP SERPL CALCULATED.3IONS-SCNC: 13 MMOL/L (ref 9–16)
AST SERPL-CCNC: 23 U/L (ref 10–35)
BASOPHILS # BLD: 0.07 K/UL (ref 0–0.2)
BASOPHILS NFR BLD: 1 % (ref 0–2)
BILIRUB SERPL-MCNC: 0.6 MG/DL (ref 0–1.2)
BUN SERPL-MCNC: 17 MG/DL (ref 8–23)
CALCIUM SERPL-MCNC: 8.9 MG/DL (ref 8.6–10.4)
CHLORIDE SERPL-SCNC: 107 MMOL/L (ref 98–107)
CO2 SERPL-SCNC: 23 MMOL/L (ref 20–31)
CREAT SERPL-MCNC: 1.3 MG/DL (ref 0.5–0.9)
EOSINOPHIL # BLD: 0.18 K/UL (ref 0–0.44)
EOSINOPHILS RELATIVE PERCENT: 3 % (ref 1–4)
ERYTHROCYTE [DISTWIDTH] IN BLOOD BY AUTOMATED COUNT: 15.4 % (ref 11.8–14.4)
GFR, ESTIMATED: 41 ML/MIN/1.73M2
GLUCOSE SERPL-MCNC: 112 MG/DL (ref 74–99)
HCT VFR BLD AUTO: 39.7 % (ref 36.3–47.1)
HGB BLD-MCNC: 12.3 G/DL (ref 11.9–15.1)
IMM GRANULOCYTES # BLD AUTO: <0.03 K/UL (ref 0–0.3)
IMM GRANULOCYTES NFR BLD: 0 %
LYMPHOCYTES NFR BLD: 1.54 K/UL (ref 1.1–3.7)
LYMPHOCYTES RELATIVE PERCENT: 22 % (ref 24–43)
MCH RBC QN AUTO: 28.2 PG (ref 25.2–33.5)
MCHC RBC AUTO-ENTMCNC: 31 G/DL (ref 28.4–34.8)
MCV RBC AUTO: 91.1 FL (ref 82.6–102.9)
MONOCYTES NFR BLD: 0.59 K/UL (ref 0.1–1.2)
MONOCYTES NFR BLD: 8 % (ref 3–12)
NEUTROPHILS NFR BLD: 66 % (ref 36–65)
NEUTS SEG NFR BLD: 4.63 K/UL (ref 1.5–8.1)
NRBC BLD-RTO: 0 PER 100 WBC
PLATELET # BLD AUTO: 216 K/UL (ref 138–453)
PMV BLD AUTO: 10.7 FL (ref 8.1–13.5)
POTASSIUM SERPL-SCNC: 3.7 MMOL/L (ref 3.7–5.3)
PROT SERPL-MCNC: 6.3 G/DL (ref 6.6–8.7)
RBC # BLD AUTO: 4.36 M/UL (ref 3.95–5.11)
RBC # BLD: ABNORMAL 10*6/UL
SODIUM SERPL-SCNC: 143 MMOL/L (ref 136–145)
TROPONIN I SERPL HS-MCNC: 33 NG/L (ref 0–14)
TROPONIN I SERPL HS-MCNC: 33 NG/L (ref 0–14)
WBC OTHER # BLD: 7 K/UL (ref 3.5–11.3)

## 2024-08-14 PROCEDURE — 99284 EMERGENCY DEPT VISIT MOD MDM: CPT

## 2024-08-14 PROCEDURE — 71045 X-RAY EXAM CHEST 1 VIEW: CPT

## 2024-08-14 PROCEDURE — 84484 ASSAY OF TROPONIN QUANT: CPT

## 2024-08-14 PROCEDURE — 85025 COMPLETE CBC W/AUTO DIFF WBC: CPT

## 2024-08-14 PROCEDURE — 80053 COMPREHEN METABOLIC PANEL: CPT

## 2024-08-14 PROCEDURE — 93005 ELECTROCARDIOGRAM TRACING: CPT | Performed by: EMERGENCY MEDICINE

## 2024-08-14 RX ORDER — NITROGLYCERIN 0.3 MG/1
0.3 TABLET SUBLINGUAL PRN
Qty: 30 TABLET | Refills: 0 | Status: SHIPPED | OUTPATIENT
Start: 2024-08-14 | End: 2024-09-13

## 2024-08-14 ASSESSMENT — ENCOUNTER SYMPTOMS
SHORTNESS OF BREATH: 0
ABDOMINAL PAIN: 0
VOMITING: 0
NAUSEA: 0

## 2024-08-14 ASSESSMENT — PAIN SCALES - GENERAL: PAINLEVEL_OUTOF10: 3

## 2024-08-14 ASSESSMENT — PAIN - FUNCTIONAL ASSESSMENT: PAIN_FUNCTIONAL_ASSESSMENT: 0-10

## 2024-08-14 ASSESSMENT — PAIN DESCRIPTION - LOCATION: LOCATION: CHEST

## 2024-08-14 NOTE — ED NOTES
Pt to ed via ems to room with complaints of chest pain that has been an ongoing thing since Monday  Pt states she was seen at another facility   Pt states she was started on two new medications  Pt states she is compliant with most medications except the ones she is told to eat before taking some due to lack of food supply  Pt states pain 3/10 aching cont  Pts cardiac hx reviewed   Pt placed on cont cardiac monitor, ekg completed, iv established, labs drawn, labeled and will send to lab via orders  Pt alert and oriented x4, talking in complete sentences, respirations even and unlabored. Pt acting age appropriate. White board updated, will continue to plan of care

## 2024-08-14 NOTE — ED PROVIDER NOTES
[facility] ED  Emergency Department Encounter  Emergency Medicine Resident     Pt Name:Graciela Holt  MRN: [id number]  Birthdate 1950  Date of evaluation: 8/14/24  PCP:  Travis Mason MD  Note Started: 5:23 PM EDT      CHIEF COMPLAINT       Chief Complaint   Patient presents with    Chest Pain    Anxiety       HISTORY OF PRESENT ILLNESS  (Location/Symptom, Timing/Onset, Context/Setting, Quality, Duration, Modifying Factors, Severity.)      Graciela Holt is a 73 y.o. female with past medical history of diabetes, hypertension, COPD, coronary artery disease status post CABG, CHF with ejection fraction 25 to 30% who presents with chest pain.  Patient was discharged from the hospital yesterday after an inpatient stay for this chest pain.  Patient is also currently on amiodarone due to nonsustained V. Tach, was switched from twice daily to once daily during this recent admission.  Also switched beta-blocker from metoprolol to Coreg 3.125 during admission. Patient was also seen by cardiology during the admission and they were comfortable with discharge home following their evaluation.    Today patient states that she had chest pain occur suddenly approximately an hour ago while she was sitting at rest.  She denies shortness of breath, she devised diaphoresis, she did but denies nausea, vomiting.  Has not had a cough, has not felt sick lately.  Denies any lower extremity swelling.    Per EMS there is a known anxiety component to her chest pain. Patient currently lives with 2 roommates that she is having difficulties with and is arguing with them frequently.  Patient herself admits to having a difficult living situation with her roommates.      PAST MEDICAL / SURGICAL / SOCIAL / FAMILY HISTORY      has a past medical history of Allergic rhinitis, Arthritis, CAD (coronary artery disease), Cerebral artery occlusion with cerebral infarction (HCC), CHF (congestive heart failure) ([facility]), Controlled

## 2024-08-14 NOTE — DISCHARGE INSTRUCTIONS
You were seen in the emergency department today for chest pain.    Your EKG and lab work showed you are not having an acute heart attack or acute problem with your heart at this time.  I have refilled a prescription of nitro for you to use when you have chest pain.    Please follow-up with your primary care provider and return to the emergency department with any new or worsening symptoms.    Thank you for choosing [facility] for your medical care today.

## 2024-08-14 NOTE — CARE COORDINATION
6:40 Met with pt to discuss discharge planning, pt states she has good support with her friends that she lives with, denies any hc needs, is feeling much better.

## 2024-08-16 ENCOUNTER — TELEPHONE (OUTPATIENT)
Dept: FAMILY MEDICINE CLINIC | Age: 74
End: 2024-08-16

## 2024-08-16 LAB
EKG ATRIAL RATE: 67 BPM
EKG P AXIS: 41 DEGREES
EKG P-R INTERVAL: 178 MS
EKG Q-T INTERVAL: 446 MS
EKG QRS DURATION: 114 MS
EKG QTC CALCULATION (BAZETT): 471 MS
EKG R AXIS: -10 DEGREES
EKG T AXIS: 145 DEGREES
EKG VENTRICULAR RATE: 67 BPM

## 2024-08-16 NOTE — TELEPHONE ENCOUNTER
[facility] ED Follow up Call    Reason for ED visit:    8/14/2024 (3 hours)  [facility] ED     Margarito Austin MD  Last attending  Treatment team Chest pain, unspecified type  Clinical impression Chest Pain  Anxiety  Chief complaint          Atilio Owens ,     This is Violeta Mazariegos from Travis Vaughn's office, just calling to see how you are doing after your recent ED visit.         FU appts/Provider:      No future appointments.    VOICEMAIL DOCUMENTATION - ERASE IF NOT USED  Hi, this message is for  Graciela    This is Violeta Mazariegos from Travis Gutierrez office. Just calling to see how you are doing after your recent visit to the Emergency Room. Travis Gutierrez wants to make sure you were able to fill any prescriptions and that you understand your discharge instructions. Please return our call if you need to make a follow up appointment with your provider or have any further needs.   Our phone number is [phone].     Have a great day!

## 2024-10-10 ENCOUNTER — HOSPITAL ENCOUNTER (EMERGENCY)
Age: 74
Discharge: HOME | End: 2024-10-10
Attending: EMERGENCY MEDICINE
Payer: COMMERCIAL

## 2024-10-10 ENCOUNTER — APPOINTMENT (OUTPATIENT)
Dept: CT IMAGING | Age: 74
End: 2024-10-10
Payer: COMMERCIAL

## 2024-10-10 VITALS
HEART RATE: 83 BPM | RESPIRATION RATE: 18 BRPM | TEMPERATURE: 97.5 F | HEIGHT: 64 IN | OXYGEN SATURATION: 97 % | SYSTOLIC BLOOD PRESSURE: 130 MMHG | WEIGHT: 130 LBS | DIASTOLIC BLOOD PRESSURE: 81 MMHG | BODY MASS INDEX: 22.2 KG/M2

## 2024-10-10 DIAGNOSIS — S01.81XA FACIAL LACERATION, INITIAL ENCOUNTER: ICD-10-CM
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
## 2024-10-10 LAB
ANION GAP SERPL CALCULATED.3IONS-SCNC: 11 MMOL/L (ref 9–16)
BASOPHILS # BLD: 0.1 K/UL (ref 0–0.2)
BASOPHILS NFR BLD: 1 % (ref 0–2)
BUN SERPL-MCNC: 20 MG/DL (ref 8–23)
CALCIUM SERPL-MCNC: 9.3 MG/DL (ref 8.6–10.4)
CHLORIDE SERPL-SCNC: 104 MMOL/L (ref 98–107)
CO2 SERPL-SCNC: 27 MMOL/L (ref 20–31)
CREAT SERPL-MCNC: 1.3 MG/DL (ref 0.7–1.2)
EOSINOPHIL # BLD: 0 K/UL (ref 0–0.4)
EOSINOPHILS RELATIVE PERCENT: 0 % (ref 0–4)
ERYTHROCYTE [DISTWIDTH] IN BLOOD BY AUTOMATED COUNT: 15.2 % (ref 11.5–14.9)
GFR, ESTIMATED: 43 ML/MIN/1.73M2
GLUCOSE SERPL-MCNC: 161 MG/DL (ref 74–99)
HCT VFR BLD AUTO: 42.2 % (ref 36–46)
HGB BLD-MCNC: 14.3 G/DL (ref 12–16)
LYMPHOCYTES NFR BLD: 0.6 K/UL (ref 1–4.8)
LYMPHOCYTES RELATIVE PERCENT: 7 % (ref 24–44)
MCH RBC QN AUTO: 30.4 PG (ref 26–34)
MCHC RBC AUTO-ENTMCNC: 33.9 G/DL (ref 31–37)
MCV RBC AUTO: 89.7 FL (ref 80–100)
MONOCYTES NFR BLD: 0.6 K/UL (ref 0.1–1.3)
MONOCYTES NFR BLD: 7 % (ref 1–7)
NEUTROPHILS NFR BLD: 85 % (ref 36–66)
NEUTS SEG NFR BLD: 8.5 K/UL (ref 1.3–9.1)
PLATELET # BLD AUTO: 212 K/UL (ref 150–450)
PMV BLD AUTO: 8.4 FL (ref 6–12)
POTASSIUM SERPL-SCNC: 3.7 MMOL/L (ref 3.7–5.3)
RBC # BLD AUTO: 4.7 M/UL (ref 4–5.2)
SODIUM SERPL-SCNC: 142 MMOL/L (ref 136–145)
WBC OTHER # BLD: 9.9 K/UL (ref 3.5–11)

## 2024-10-10 PROCEDURE — 80048 BASIC METABOLIC PNL TOTAL CA: CPT

## 2024-10-10 PROCEDURE — 99284 EMERGENCY DEPT VISIT MOD MDM: CPT

## 2024-10-10 PROCEDURE — 70450 CT HEAD/BRAIN W/O DYE: CPT

## 2024-10-10 PROCEDURE — 2500000003 HC RX 250 WO HCPCS

## 2024-10-10 PROCEDURE — 90715 TDAP VACCINE 7 YRS/> IM: CPT

## 2024-10-10 PROCEDURE — 72125 CT NECK SPINE W/O DYE: CPT

## 2024-10-10 PROCEDURE — 90471 IMMUNIZATION ADMIN: CPT

## 2024-10-10 PROCEDURE — 36415 COLL VENOUS BLD VENIPUNCTURE: CPT

## 2024-10-10 PROCEDURE — 85025 COMPLETE CBC W/AUTO DIFF WBC: CPT

## 2024-10-10 PROCEDURE — 6360000002 HC RX W HCPCS

## 2024-10-10 PROCEDURE — 12011 RPR F/E/E/N/L/M 2.5 CM/<: CPT

## 2024-10-10 RX ORDER — LIDOCAINE HYDROCHLORIDE 10 MG/ML
20 INJECTION, SOLUTION INFILTRATION; PERINEURAL ONCE
Status: COMPLETED | OUTPATIENT
Start: 2024-10-10 | End: 2024-10-10

## 2024-10-10 RX ADMIN — TETANUS TOXOID, REDUCED DIPHTHERIA TOXOID AND ACELLULAR PERTUSSIS VACCINE, ADSORBED 0.5 ML: 5; 2.5; 8; 8; 2.5 SUSPENSION INTRAMUSCULAR at 20:11

## 2024-10-10 RX ADMIN — LIDOCAINE HYDROCHLORIDE 20 ML: 10 INJECTION, SOLUTION INFILTRATION; PERINEURAL at 22:30

## 2024-10-10 ASSESSMENT — PAIN - FUNCTIONAL ASSESSMENT
PAIN_FUNCTIONAL_ASSESSMENT: 0-10
PAIN_FUNCTIONAL_ASSESSMENT: NONE - DENIES PAIN

## 2024-10-10 ASSESSMENT — PAIN SCALES - GENERAL: PAINLEVEL_OUTOF10: 5

## 2024-10-10 NOTE — ED PROVIDER NOTES
[facility] ED  Emergency Department Encounter  Emergency Medicine Resident     Pt Name:Graciela Holt  MRN: [id number]  Birthdate 1950  Date of evaluation: 10/10/24  PCP:  Travis Mason MD  Note Started: 6:50 PM EDT      CHIEF COMPLAINT       Chief Complaint   Patient presents with    Fall     Pt was walking up the steps and tripped on the first step and hit her head on the second one. Pt denies LOC. Pt c/o neck pain and head pain. Pt not on blood thinners. Unknown tetanus status. Pt in ccollar    Head Injury    Neck Pain       HISTORY OF PRESENT ILLNESS  (Location/Symptom, Timing/Onset, Context/Setting, Quality, Duration, Modifying Factors, Severity.)      Graciela Holt is a 74 y.o. female no significant pertinent history who presents with fall, mechanical, from standing height.  Patient states that she was walking up the stairs, she fell and tripped over the bottom step, and hit her head on the second step with the left frontal side of her head.  She does not have any nausea or vomiting, no changes in vision, no numbness or weakness, no focal neurological deficits.    Will assess patient at this time for any traumatic injury.  Will obtain basic labs.  Patient is on Plavix.    Patient is stable at this time, slightly hypertensive, saturating well on room air.    PAST MEDICAL / SURGICAL / SOCIAL / FAMILY HISTORY      has a past medical history of Allergic rhinitis, Arthritis, CAD (coronary artery disease), Cerebral artery occlusion with cerebral infarction (HCC), CHF (congestive heart failure) (HCC), Controlled type 2 diabetes mellitus without complication, without long-term current use of insulin (HCC), COPD (chronic obstructive pulmonary disease) (HCC), Depression, Former smoker, History of blood transfusion, Hyperlipidemia, Hypertension, Kidney stone, Myocardial infarction (HCC), Obesity, Class I, BMI 30.0-34.9 (see actual BMI), Restless leg syndrome, Skin abnormality, Type II or

## 2024-10-11 NOTE — ED PROVIDER NOTES
EMERGENCY DEPARTMENT ENCOUNTER   ATTENDING ATTESTATION     Pt Name: Graciela Holt  MRN: [id number]  Birthdate 1950  Date of evaluation: 10/10/24       Graciela Holt is a 74 y.o. female who presents with Fall (Pt was walking up the steps and tripped on the first step and hit her head on the second one. Pt denies LOC. Pt c/o neck pain and head pain. Pt not on blood thinners. Unknown tetanus status. Pt in ccollar), Head Injury, and Neck Pain  Mechanical fall while walking up stairs. On plavix. No LOC. She has a lac on her forehead.     MDM:   Plan CT head and neck. Update tetanus. Repair lac.     Vitals:   Vitals:    10/10/24 1831   BP: (!) 155/98   Pulse: 83   Resp: 18   Temp: 97.5 °F (36.4 °C)   TempSrc: Oral   SpO2: 97%   Weight: 59 kg (130 lb)   Height: 1.626 m (5' 4\")         I personally saw and examined the patient. I have reviewed and agree with the resident's findings, including all diagnostic interpretations and treatment plan as written. I was present for the key portions of any procedures performed and the inclusive time noted for any critical care statement.    Trang Walker MD  Attending Emergency Physician            Trang Walker MD  10/10/24 2007

## 2024-10-11 NOTE — DISCHARGE INSTRUCTIONS
You are seen here for a fall.    We evaluated you, and found that you did not have any acute fracture.  Or any acute injury.    We have sutured the laceration on your face.  You will need to return in 7 to 14 days to have it removed.    Please follow-up with your primary care provider.    You may return to the emergency department with any new or worsening symptoms.  You may return if your symptoms not improved

## 2024-11-05 ENCOUNTER — HOSPITAL ENCOUNTER (INPATIENT)
Age: 74
LOS: 6 days | Discharge: HOME | DRG: 190 | End: 2024-11-11
Attending: EMERGENCY MEDICINE | Admitting: INTERNAL MEDICINE
Payer: COMMERCIAL

## 2024-11-05 ENCOUNTER — APPOINTMENT (OUTPATIENT)
Dept: GENERAL RADIOLOGY | Age: 74
DRG: 190 | End: 2024-11-05
Payer: COMMERCIAL

## 2024-11-05 DIAGNOSIS — R79.89 ELEVATED TROPONIN: ICD-10-CM

## 2024-11-05 DIAGNOSIS — R06.02 SHORTNESS OF BREATH: ICD-10-CM

## 2024-11-05 DIAGNOSIS — I50.9 ACUTE ON CHRONIC CONGESTIVE HEART FAILURE, UNSPECIFIED HEART FAILURE TYPE (HCC): Primary | ICD-10-CM

## 2024-11-05 PROBLEM — I50.33 ACUTE ON CHRONIC DIASTOLIC (CONGESTIVE) HEART FAILURE (HCC): Status: ACTIVE | Noted: 2024-11-05

## 2024-11-05 LAB
ANION GAP SERPL CALCULATED.3IONS-SCNC: 11 MMOL/L (ref 9–16)
ANTI-XA UNFRAC HEPARIN: <0.1 IU/L (ref 0.3–0.7)
BASOPHILS # BLD: 0.1 K/UL (ref 0–0.2)
BASOPHILS NFR BLD: 1 % (ref 0–2)
BNP SERPL-MCNC: ABNORMAL PG/ML (ref 0–300)
BUN SERPL-MCNC: 23 MG/DL (ref 8–23)
CALCIUM SERPL-MCNC: 9 MG/DL (ref 8.6–10.4)
CHLORIDE SERPL-SCNC: 107 MMOL/L (ref 98–107)
CO2 SERPL-SCNC: 24 MMOL/L (ref 20–31)
CREAT SERPL-MCNC: 1.1 MG/DL (ref 0.7–1.2)
EKG ATRIAL RATE: 78 BPM
EKG P AXIS: 2 DEGREES
EKG P-R INTERVAL: 156 MS
EKG Q-T INTERVAL: 432 MS
EKG QRS DURATION: 110 MS
EKG QTC CALCULATION (BAZETT): 492 MS
EKG R AXIS: -31 DEGREES
EKG T AXIS: 129 DEGREES
EKG VENTRICULAR RATE: 78 BPM
EOSINOPHIL # BLD: 0.1 K/UL (ref 0–0.4)
EOSINOPHILS RELATIVE PERCENT: 1 % (ref 0–4)
ERYTHROCYTE [DISTWIDTH] IN BLOOD BY AUTOMATED COUNT: 14.5 % (ref 11.5–14.9)
FLUAV RNA RESP QL NAA+PROBE: NOT DETECTED
FLUBV RNA RESP QL NAA+PROBE: NOT DETECTED
GFR, ESTIMATED: 52 ML/MIN/1.73M2
GLUCOSE BLD-MCNC: 414 MG/DL (ref 65–105)
GLUCOSE SERPL-MCNC: 159 MG/DL (ref 74–99)
HCT VFR BLD AUTO: 40.8 % (ref 36–46)
HGB BLD-MCNC: 13.3 G/DL (ref 12–16)
INR PPP: 1
LYMPHOCYTES NFR BLD: 0.9 K/UL (ref 1–4.8)
LYMPHOCYTES RELATIVE PERCENT: 12 % (ref 24–44)
MAGNESIUM SERPL-MCNC: 1.9 MG/DL (ref 1.6–2.4)
MCH RBC QN AUTO: 29.7 PG (ref 26–34)
MCHC RBC AUTO-ENTMCNC: 32.6 G/DL (ref 31–37)
MCV RBC AUTO: 91.1 FL (ref 80–100)
MONOCYTES NFR BLD: 0.6 K/UL (ref 0.1–1.3)
MONOCYTES NFR BLD: 8 % (ref 1–7)
NEUTROPHILS NFR BLD: 78 % (ref 36–66)
NEUTS SEG NFR BLD: 5.9 K/UL (ref 1.3–9.1)
PARTIAL THROMBOPLASTIN TIME: 25.5 SEC (ref 24–36)
PLATELET # BLD AUTO: 203 K/UL (ref 150–450)
PMV BLD AUTO: 8.7 FL (ref 6–12)
POTASSIUM SERPL-SCNC: 4 MMOL/L (ref 3.7–5.3)
PROTHROMBIN TIME: 13.6 SEC (ref 11.8–14.6)
RBC # BLD AUTO: 4.47 M/UL (ref 4–5.2)
SARS-COV-2 RNA RESP QL NAA+PROBE: NOT DETECTED
SODIUM SERPL-SCNC: 142 MMOL/L (ref 136–145)
SOURCE: NORMAL
SPECIMEN DESCRIPTION: NORMAL
TROPONIN I SERPL HS-MCNC: 42 NG/L (ref 0–14)
TROPONIN I SERPL HS-MCNC: 48 NG/L (ref 0–14)
WBC OTHER # BLD: 7.6 K/UL (ref 3.5–11)

## 2024-11-05 PROCEDURE — 6370000000 HC RX 637 (ALT 250 FOR IP)

## 2024-11-05 PROCEDURE — 85520 HEPARIN ASSAY: CPT

## 2024-11-05 PROCEDURE — 71045 X-RAY EXAM CHEST 1 VIEW: CPT

## 2024-11-05 PROCEDURE — 94640 AIRWAY INHALATION TREATMENT: CPT

## 2024-11-05 PROCEDURE — 6370000000 HC RX 637 (ALT 250 FOR IP): Performed by: NURSE PRACTITIONER

## 2024-11-05 PROCEDURE — 96375 TX/PRO/DX INJ NEW DRUG ADDON: CPT

## 2024-11-05 PROCEDURE — 85610 PROTHROMBIN TIME: CPT

## 2024-11-05 PROCEDURE — 36415 COLL VENOUS BLD VENIPUNCTURE: CPT

## 2024-11-05 PROCEDURE — 85730 THROMBOPLASTIN TIME PARTIAL: CPT

## 2024-11-05 PROCEDURE — 80048 BASIC METABOLIC PNL TOTAL CA: CPT

## 2024-11-05 PROCEDURE — 87636 SARSCOV2 & INF A&B AMP PRB: CPT

## 2024-11-05 PROCEDURE — 84484 ASSAY OF TROPONIN QUANT: CPT

## 2024-11-05 PROCEDURE — 2580000003 HC RX 258

## 2024-11-05 PROCEDURE — 2060000000 HC ICU INTERMEDIATE R&B

## 2024-11-05 PROCEDURE — 93010 ELECTROCARDIOGRAM REPORT: CPT | Performed by: INTERNAL MEDICINE

## 2024-11-05 PROCEDURE — 83735 ASSAY OF MAGNESIUM: CPT

## 2024-11-05 PROCEDURE — 83880 ASSAY OF NATRIURETIC PEPTIDE: CPT

## 2024-11-05 PROCEDURE — 99285 EMERGENCY DEPT VISIT HI MDM: CPT

## 2024-11-05 PROCEDURE — 96374 THER/PROPH/DIAG INJ IV PUSH: CPT

## 2024-11-05 PROCEDURE — 93005 ELECTROCARDIOGRAM TRACING: CPT

## 2024-11-05 PROCEDURE — 6360000002 HC RX W HCPCS

## 2024-11-05 PROCEDURE — 85025 COMPLETE CBC W/AUTO DIFF WBC: CPT

## 2024-11-05 PROCEDURE — 82947 ASSAY GLUCOSE BLOOD QUANT: CPT

## 2024-11-05 PROCEDURE — 94761 N-INVAS EAR/PLS OXIMETRY MLT: CPT

## 2024-11-05 RX ORDER — ACETAMINOPHEN 650 MG/1
650 SUPPOSITORY RECTAL EVERY 6 HOURS PRN
Status: DISCONTINUED | OUTPATIENT
Start: 2024-11-05 | End: 2024-11-11 | Stop reason: HOSPADM

## 2024-11-05 RX ORDER — HEPARIN SODIUM 1000 [USP'U]/ML
60 INJECTION, SOLUTION INTRAVENOUS; SUBCUTANEOUS PRN
Status: DISCONTINUED | OUTPATIENT
Start: 2024-11-05 | End: 2024-11-07

## 2024-11-05 RX ORDER — BUDESONIDE AND FORMOTEROL FUMARATE DIHYDRATE 160; 4.5 UG/1; UG/1
2 AEROSOL RESPIRATORY (INHALATION)
Status: DISCONTINUED | OUTPATIENT
Start: 2024-11-05 | End: 2024-11-11 | Stop reason: HOSPADM

## 2024-11-05 RX ORDER — CARVEDILOL 3.12 MG/1
3.12 TABLET ORAL 2 TIMES DAILY WITH MEALS
Status: DISCONTINUED | OUTPATIENT
Start: 2024-11-06 | End: 2024-11-11 | Stop reason: HOSPADM

## 2024-11-05 RX ORDER — HEPARIN SODIUM 10000 [USP'U]/100ML
5-30 INJECTION, SOLUTION INTRAVENOUS CONTINUOUS
Status: DISCONTINUED | OUTPATIENT
Start: 2024-11-05 | End: 2024-11-07

## 2024-11-05 RX ORDER — ALBUTEROL SULFATE 0.83 MG/ML
2.5 SOLUTION RESPIRATORY (INHALATION) EVERY 4 HOURS PRN
Status: DISCONTINUED | OUTPATIENT
Start: 2024-11-05 | End: 2024-11-11 | Stop reason: HOSPADM

## 2024-11-05 RX ORDER — POTASSIUM CHLORIDE 750 MG/1
10 CAPSULE, EXTENDED RELEASE ORAL DAILY
Status: DISCONTINUED | OUTPATIENT
Start: 2024-11-06 | End: 2024-11-11 | Stop reason: HOSPADM

## 2024-11-05 RX ORDER — IPRATROPIUM BROMIDE AND ALBUTEROL SULFATE 2.5; .5 MG/3ML; MG/3ML
1 SOLUTION RESPIRATORY (INHALATION) EVERY 4 HOURS PRN
Status: DISCONTINUED | OUTPATIENT
Start: 2024-11-05 | End: 2024-11-11 | Stop reason: HOSPADM

## 2024-11-05 RX ORDER — NITROGLYCERIN 0.4 MG/1
0.4 TABLET SUBLINGUAL EVERY 5 MIN PRN
Status: DISCONTINUED | OUTPATIENT
Start: 2024-11-05 | End: 2024-11-11 | Stop reason: HOSPADM

## 2024-11-05 RX ORDER — BISACODYL 10 MG
10 SUPPOSITORY, RECTAL RECTAL DAILY PRN
Status: DISCONTINUED | OUTPATIENT
Start: 2024-11-05 | End: 2024-11-11 | Stop reason: HOSPADM

## 2024-11-05 RX ORDER — LOSARTAN POTASSIUM 25 MG/1
25 TABLET ORAL DAILY
Status: DISCONTINUED | OUTPATIENT
Start: 2024-11-06 | End: 2024-11-11 | Stop reason: HOSPADM

## 2024-11-05 RX ORDER — FUROSEMIDE 10 MG/ML
40 INJECTION INTRAMUSCULAR; INTRAVENOUS DAILY
Status: DISCONTINUED | OUTPATIENT
Start: 2024-11-06 | End: 2024-11-07

## 2024-11-05 RX ORDER — AMIODARONE HYDROCHLORIDE 200 MG/1
200 TABLET ORAL DAILY
Status: DISCONTINUED | OUTPATIENT
Start: 2024-11-06 | End: 2024-11-11 | Stop reason: HOSPADM

## 2024-11-05 RX ORDER — IPRATROPIUM BROMIDE AND ALBUTEROL SULFATE 2.5; .5 MG/3ML; MG/3ML
1 SOLUTION RESPIRATORY (INHALATION) ONCE
Status: COMPLETED | OUTPATIENT
Start: 2024-11-05 | End: 2024-11-05

## 2024-11-05 RX ORDER — ACETAMINOPHEN 325 MG/1
650 TABLET ORAL EVERY 6 HOURS PRN
Status: DISCONTINUED | OUTPATIENT
Start: 2024-11-05 | End: 2024-11-11 | Stop reason: HOSPADM

## 2024-11-05 RX ORDER — ACETAMINOPHEN 325 MG/1
650 TABLET ORAL ONCE
Status: COMPLETED | OUTPATIENT
Start: 2024-11-05 | End: 2024-11-05

## 2024-11-05 RX ORDER — FUROSEMIDE 10 MG/ML
40 INJECTION INTRAMUSCULAR; INTRAVENOUS ONCE
Status: COMPLETED | OUTPATIENT
Start: 2024-11-05 | End: 2024-11-05

## 2024-11-05 RX ORDER — ASPIRIN 81 MG/1
81 TABLET ORAL DAILY
Status: DISCONTINUED | OUTPATIENT
Start: 2024-11-06 | End: 2024-11-11 | Stop reason: HOSPADM

## 2024-11-05 RX ORDER — ATORVASTATIN CALCIUM 80 MG/1
80 TABLET, FILM COATED ORAL DAILY
Status: DISCONTINUED | OUTPATIENT
Start: 2024-11-06 | End: 2024-11-11 | Stop reason: HOSPADM

## 2024-11-05 RX ORDER — SODIUM CHLORIDE 0.9 % (FLUSH) 0.9 %
5-40 SYRINGE (ML) INJECTION EVERY 12 HOURS SCHEDULED
Status: DISCONTINUED | OUTPATIENT
Start: 2024-11-05 | End: 2024-11-11 | Stop reason: HOSPADM

## 2024-11-05 RX ORDER — SODIUM CHLORIDE 9 MG/ML
INJECTION, SOLUTION INTRAVENOUS PRN
Status: DISCONTINUED | OUTPATIENT
Start: 2024-11-05 | End: 2024-11-11 | Stop reason: HOSPADM

## 2024-11-05 RX ORDER — POLYETHYLENE GLYCOL 3350 17 G/17G
17 POWDER, FOR SOLUTION ORAL DAILY PRN
Status: DISCONTINUED | OUTPATIENT
Start: 2024-11-05 | End: 2024-11-11 | Stop reason: HOSPADM

## 2024-11-05 RX ORDER — FAMOTIDINE 20 MG/1
40 TABLET, FILM COATED ORAL EVERY EVENING
Status: DISCONTINUED | OUTPATIENT
Start: 2024-11-05 | End: 2024-11-06

## 2024-11-05 RX ORDER — HEPARIN SODIUM 1000 [USP'U]/ML
30 INJECTION, SOLUTION INTRAVENOUS; SUBCUTANEOUS PRN
Status: DISCONTINUED | OUTPATIENT
Start: 2024-11-05 | End: 2024-11-07

## 2024-11-05 RX ORDER — HEPARIN SODIUM 1000 [USP'U]/ML
60 INJECTION, SOLUTION INTRAVENOUS; SUBCUTANEOUS ONCE
Status: COMPLETED | OUTPATIENT
Start: 2024-11-05 | End: 2024-11-05

## 2024-11-05 RX ORDER — SODIUM CHLORIDE 0.9 % (FLUSH) 0.9 %
10 SYRINGE (ML) INJECTION PRN
Status: DISCONTINUED | OUTPATIENT
Start: 2024-11-05 | End: 2024-11-11 | Stop reason: HOSPADM

## 2024-11-05 RX ORDER — MAGNESIUM SULFATE HEPTAHYDRATE 40 MG/ML
2000 INJECTION, SOLUTION INTRAVENOUS PRN
Status: DISCONTINUED | OUTPATIENT
Start: 2024-11-05 | End: 2024-11-11 | Stop reason: HOSPADM

## 2024-11-05 RX ORDER — ROPINIROLE 1 MG/1
1 TABLET, FILM COATED ORAL NIGHTLY PRN
Status: DISCONTINUED | OUTPATIENT
Start: 2024-11-05 | End: 2024-11-11 | Stop reason: HOSPADM

## 2024-11-05 RX ADMIN — FUROSEMIDE 40 MG: 10 INJECTION, SOLUTION INTRAMUSCULAR; INTRAVENOUS at 19:40

## 2024-11-05 RX ADMIN — ACETAMINOPHEN 650 MG: 325 TABLET ORAL at 19:40

## 2024-11-05 RX ADMIN — HEPARIN SODIUM 12 UNITS/KG/HR: 10000 INJECTION, SOLUTION INTRAVENOUS at 17:59

## 2024-11-05 RX ADMIN — IPRATROPIUM BROMIDE AND ALBUTEROL SULFATE 1 DOSE: 2.5; .5 SOLUTION RESPIRATORY (INHALATION) at 17:01

## 2024-11-05 RX ADMIN — HEPARIN SODIUM 3500 UNITS: 1000 INJECTION INTRAVENOUS; SUBCUTANEOUS at 17:56

## 2024-11-05 RX ADMIN — FAMOTIDINE 40 MG: 20 TABLET, FILM COATED ORAL at 22:28

## 2024-11-05 RX ADMIN — WATER 125 MG: 1 INJECTION INTRAMUSCULAR; INTRAVENOUS; SUBCUTANEOUS at 17:20

## 2024-11-05 ASSESSMENT — PAIN - FUNCTIONAL ASSESSMENT: PAIN_FUNCTIONAL_ASSESSMENT: NONE - DENIES PAIN

## 2024-11-05 NOTE — ED PROVIDER NOTES
[facility]  Emergency Department Encounter  Emergency Medicine Resident     Pt Name:Graciela Holt  MRN: [id number]  Birthdate 1950  Date of evaluation: 11/5/24  PCP:  Travis Mason MD  Note Started: 4:24 PM EST      CHIEF COMPLAINT       Chief Complaint   Patient presents with    Shortness of Breath       HISTORY OF PRESENT ILLNESS  (Location/Symptom, Timing/Onset, Context/Setting, Quality, Duration, Modifying Factors, Severity.)      Graciela Holt is a 74 y.o. female who presents with complaints of shortness of breath ongoing for the past 2 days and progressively worsening.  Patient has extensive medical history including previous CABG, CAD, HFrEF, COPD.  Patient states that her shortness of breath has been getting to the point where it is hard for her to walk.  She denies any chest pain.  She denies any dizziness or lightheadedness.  She denies any abdominal pain, nausea or vomiting or diaphoresis.  She feels like she has been wheezing.  She has been compliant on her daily inhalers for COPD per patient, she also reports that she has been compliant on all of her cardiac medications including Lasix 40 mg daily which she is supposed to be taking at home.  Patient also reports that she has noticed some increased swelling in her lower legs.  She denies any recent URI-like symptoms.  Denies any fevers or chills.    PAST MEDICAL / SURGICAL / SOCIAL / FAMILY HISTORY      has a past medical history of Allergic rhinitis, Arthritis, CAD (coronary artery disease), Cerebral artery occlusion with cerebral infarction ([facility]), CHF (congestive heart failure) ([facility]), Controlled type 2 diabetes mellitus without complication, without long-term current use of insulin (HCC), COPD (chronic obstructive pulmonary disease) ([facility]), Depression, Former smoker, History of blood transfusion, Hyperlipidemia, Hypertension, Kidney stone, Myocardial infarction ([facility]), Obesity, Class I, BMI 30.0-34.9 (see actual BMI), Restless

## 2024-11-05 NOTE — PROGRESS NOTES
Pharmacy monitoring of Heparin infusion  Heparin Infusion New Order    Patient on heparin for:   elevated troponins    Was patient on previous oral anticoagulant/dose?:  no , Confirmed with RN/Pt/Pts family/Surescripts?: yes    Factor Xa inhibitor/LMWH use within the past 72 hours? NO  If yes, date of last administration: n/a    Recent Labs     11/05/24  1645   HGB 13.3   INR 1.0          Heparin to be monitored using anti-Xa for duration of therapy.(aPTT should be used for patients who have received a DOAC in the past 72 hours)?      Have admin instructions been updated to reflect appropriate protocol? YES    Have appropriate labs been ordered for corresponding protocol? YES   *Discontinue anti-Xa lab monitoring if using aPTT protocol    Is the starting rate/last rate adjustment appropriate? yes    Concurrent anticoagulants: none  (Note it is not appropriate to be on most anticoagulants while on a heparin drip)    Review platelets from the past few days.  Any concerns?: No    Please record any appropriate additional notes here:

## 2024-11-05 NOTE — PROGRESS NOTES
Pharmacy Medication History Note      List of current medications patient is taking is complete.    Source of information: OARRS, Sure Scripts, Care Everywhere, [facility] ([phone]), Patient    Changes made to medication list:  Medications removed (include reason, ex. therapy complete or physician discontinued, noncompliance):  Blood pressure kit (list clean up)  One Touch Test Strip (list clean up)    Medications flagged for provider review:  Nitroglycerin SL - last filled 04/18/22  Amiodarone - last filled 08/13/24 for 30 days  Losartan - last filled 08/20/24 for 30 days  Furosemide - last filled 06/13/24 for 60 days  Potassium chloride - last filled 06/13/24 for 100 days  Atorvastatin - last filled 05/06/23 for 90 days  Albuterol - last filled 04/28/22 for 30 days  Ropinirole - last filled 02/11/23 for 90 days  Carvedilol - last filled 08/13/24 for 30 days   Apixaban - patient reports not taking - no fill history in dispense report or [facility]  Jardiance - patient reports not taking  Symbicort - patient reports not taking - states she only receives while admitted to hospital   Flonase - patient reports not taking - states she only receives while admitted to hospital     Medications added/doses adjusted:  none    Other notes (ex. Recent course of antibiotics, Coumadin dosing):  OARRS negative  Patient reports taking all of her medications on her list, and confirms the only pharmacy she uses is [facility] on [address]. Based on when all of her medications have been filled, all medications would be out.   Patient states she had a little chest pain last night and took a SL nitroglycerin and it went away.      Current Home Medication List at Time of Admission:  Prior to Admission medications    Medication Sig   nitroGLYCERIN (NITROSTAT) 0.3 MG SL tablet Place 1 tablet under the tongue as needed for Chest pain up to max of 3 total doses. If no relief after 1 dose, call 911.   amiodarone (CORDARONE) 200 MG tablet  Take 1 tablet by mouth daily   losartan (COZAAR) 25 MG tablet Take 1 tablet by mouth daily   furosemide (LASIX) 40 MG tablet Take 1 tablet by mouth daily   potassium chloride (KLOR-CON) 10 MEQ extended release tablet Take 1 tablet by mouth daily   famotidine (PEPCID) 40 MG tablet Take 1 tablet by mouth every evening   atorvastatin (LIPITOR) 80 MG tablet Take 1 tablet by mouth daily   albuterol sulfate  (90 Base) MCG/ACT inhaler Inhale 2 puffs into the lungs every 6 hours as needed for Wheezing or Shortness of Breath   rOPINIRole (REQUIP) 1 MG tablet TAKE 1 TABLET BY MOUTH EVERY NIGHT AS NEEDED   SM ASPIRIN ADULT LOW STRENGTH 81 MG EC tablet TAKE 1 TABLET BY MOUTH DAILY   carvedilol (COREG) 3.125 MG tablet Take 1 tablet by mouth 2 times daily (with meals)   apixaban (ELIQUIS) 5 MG TABS tablet Take 1 tablet by mouth 2 times daily  Patient not taking: Reported on 11/5/2024   empagliflozin (JARDIANCE) 10 MG tablet Take 1 tablet by mouth daily  Patient not taking: Reported on 11/5/2024   budesonide-formoterol (SYMBICORT) 160-4.5 MCG/ACT AERO Inhale 2 puffs into the lungs 2 times daily  Patient not taking: Reported on 11/5/2024   fluticasone (FLONASE) 50 MCG/ACT nasal spray USE 2 SPRAYS IN EACH NOSTRIL ONCE DAILY  Patient not taking: Reported on 11/5/2024         Please let me know if you have any questions about this encounter. Thank you!    Zenobia Glass, PharmD  PGY1 Pharmacy Resident    11/5/2024  6:54 PM

## 2024-11-05 NOTE — ED PROVIDER NOTES
[facility] ED  eMERGENCY dEPARTMENT eNCOUnter   Attending Attestation     Pt Name: Graciela Holt  MRN: [id number]  Birthdate 1950  Date of evaluation: 11/5/24    History, EXAM, MDM:    Graciela Holt is a 74 y.o. female who presents with Shortness of Breath    Sob, no chest pain.  No runny nose or fever.   On exam, hemodynamically stable.  Bilateral crackles.  Bilateral leg edema.    EKG: All EKG's are interpreted by the Emergency Department Physician who either signs or Co-signs this chart in the absence of a cardiologist.  EKG #1 is performed at 4:31 PM it shows a sinus rhythm heart rate of 78  QRS of 110 QTc of 492 there is PVCs there is some ST segment depression in leads I and aVL some T wave inversions the axis is leftwards.      We repeated the EKG second EKG performed at 5:40 PM normal sinus rhythm heart rate 73  QRS of 112 QTc of 456 there is ST segment elevations in leads V1 and V2 and T wave flattening ST depression in aVL and lead I.  Starting patient on heparin infusion possible NSTEMI    Laboratory studies show white blood cell count of 7.6 hemoglobin of 13 troponins are elevated initial is 40 8 repeat is 42 proBNP is elevated 13,000 chest x-ray shows no acute abnormalities..     The EKGs were reviewed with cardiology, she is not having any active chest pain the troponins are downtrending reviewing old EKGs the ST segment changes are present I do not think she needs to go to Cath Lab at this time.  Admitting to the hospital for further cardiac evaluation patient is in agreement with the treatment plan    Vitals:   Vitals:    11/05/24 1629   BP: (!) 147/104   Pulse: 80   Resp: 29   Temp: 98.4 °F (36.9 °C)   TempSrc: Oral   SpO2: 93%   Weight: 59 kg (130 lb)   Height: 1.626 m (5' 4\")     I performed a history and physical examination of the patient and discussed management with the resident. I reviewed the resident’s note and agree with the documented findings and plan of care.

## 2024-11-06 LAB
ABSOLUTE BANDS: 0.04 K/UL (ref 0–1)
ANION GAP SERPL CALCULATED.3IONS-SCNC: 10 MMOL/L (ref 9–16)
ANTI-XA UNFRAC HEPARIN: 0.27 IU/L (ref 0.3–0.7)
ANTI-XA UNFRAC HEPARIN: 0.44 IU/L (ref 0.3–0.7)
ANTI-XA UNFRAC HEPARIN: 0.48 IU/L (ref 0.3–0.7)
BANDS: 1 % (ref 0–10)
BASOPHILS # BLD: 0 K/UL (ref 0–0.2)
BASOPHILS NFR BLD: 0 % (ref 0–2)
BNP SERPL-MCNC: ABNORMAL PG/ML (ref 0–300)
BUN SERPL-MCNC: 24 MG/DL (ref 8–23)
CALCIUM SERPL-MCNC: 9.1 MG/DL (ref 8.6–10.4)
CHLORIDE SERPL-SCNC: 106 MMOL/L (ref 98–107)
CHOLEST SERPL-MCNC: 159 MG/DL (ref 0–199)
CHOLESTEROL/HDL RATIO: 2.9
CO2 SERPL-SCNC: 24 MMOL/L (ref 20–31)
CREAT SERPL-MCNC: 1.1 MG/DL (ref 0.7–1.2)
EKG ATRIAL RATE: 73 BPM
EKG P AXIS: 6 DEGREES
EKG P-R INTERVAL: 158 MS
EKG Q-T INTERVAL: 414 MS
EKG QRS DURATION: 112 MS
EKG QTC CALCULATION (BAZETT): 456 MS
EKG R AXIS: -33 DEGREES
EKG T AXIS: 140 DEGREES
EKG VENTRICULAR RATE: 73 BPM
EOSINOPHIL # BLD: 0 K/UL (ref 0–0.4)
EOSINOPHILS RELATIVE PERCENT: 0 % (ref 0–4)
ERYTHROCYTE [DISTWIDTH] IN BLOOD BY AUTOMATED COUNT: 14.3 % (ref 11.5–14.9)
GFR, ESTIMATED: 52 ML/MIN/1.73M2
GLUCOSE BLD-MCNC: 213 MG/DL (ref 65–105)
GLUCOSE BLD-MCNC: 253 MG/DL (ref 65–105)
GLUCOSE BLD-MCNC: 300 MG/DL (ref 65–105)
GLUCOSE BLD-MCNC: 329 MG/DL (ref 65–105)
GLUCOSE BLD-MCNC: 391 MG/DL (ref 65–105)
GLUCOSE SERPL-MCNC: 230 MG/DL (ref 74–99)
HCT VFR BLD AUTO: 37.7 % (ref 36–46)
HDLC SERPL-MCNC: 55 MG/DL
HGB BLD-MCNC: 12.7 G/DL (ref 12–16)
LDLC SERPL CALC-MCNC: 95 MG/DL (ref 0–100)
LYMPHOCYTES NFR BLD: 0.27 K/UL (ref 1–4.8)
LYMPHOCYTES RELATIVE PERCENT: 7 % (ref 24–44)
MCH RBC QN AUTO: 30 PG (ref 26–34)
MCHC RBC AUTO-ENTMCNC: 33.6 G/DL (ref 31–37)
MCV RBC AUTO: 89.3 FL (ref 80–100)
METAMYELOCYTES ABSOLUTE COUNT: 0.04 K/UL
METAMYELOCYTES: 1 %
MONOCYTES NFR BLD: 0.12 K/UL (ref 0.1–1.3)
MONOCYTES NFR BLD: 3 % (ref 1–7)
MORPHOLOGY: ABNORMAL
NEUTROPHILS NFR BLD: 88 % (ref 36–66)
NEUTS SEG NFR BLD: 3.43 K/UL (ref 1.3–9.1)
PLATELET # BLD AUTO: 170 K/UL (ref 150–450)
PMV BLD AUTO: 9.1 FL (ref 6–12)
POTASSIUM SERPL-SCNC: 4.4 MMOL/L (ref 3.7–5.3)
RBC # BLD AUTO: 4.23 M/UL (ref 4–5.2)
SODIUM SERPL-SCNC: 140 MMOL/L (ref 136–145)
TRIGL SERPL-MCNC: 41 MG/DL (ref 0–149)
TROPONIN I SERPL HS-MCNC: 29 NG/L (ref 0–14)
TSH SERPL DL<=0.05 MIU/L-ACNC: 0.96 UIU/ML (ref 0.27–4.2)
WBC OTHER # BLD: 3.9 K/UL (ref 3.5–11)

## 2024-11-06 PROCEDURE — 97166 OT EVAL MOD COMPLEX 45 MIN: CPT

## 2024-11-06 PROCEDURE — 99223 1ST HOSP IP/OBS HIGH 75: CPT | Performed by: INTERNAL MEDICINE

## 2024-11-06 PROCEDURE — 6370000000 HC RX 637 (ALT 250 FOR IP): Performed by: NURSE PRACTITIONER

## 2024-11-06 PROCEDURE — 84484 ASSAY OF TROPONIN QUANT: CPT

## 2024-11-06 PROCEDURE — 94761 N-INVAS EAR/PLS OXIMETRY MLT: CPT

## 2024-11-06 PROCEDURE — 6360000002 HC RX W HCPCS: Performed by: NURSE PRACTITIONER

## 2024-11-06 PROCEDURE — 93010 ELECTROCARDIOGRAM REPORT: CPT | Performed by: INTERNAL MEDICINE

## 2024-11-06 PROCEDURE — 94664 DEMO&/EVAL PT USE INHALER: CPT

## 2024-11-06 PROCEDURE — 2060000000 HC ICU INTERMEDIATE R&B

## 2024-11-06 PROCEDURE — 80048 BASIC METABOLIC PNL TOTAL CA: CPT

## 2024-11-06 PROCEDURE — 36415 COLL VENOUS BLD VENIPUNCTURE: CPT

## 2024-11-06 PROCEDURE — 97116 GAIT TRAINING THERAPY: CPT

## 2024-11-06 PROCEDURE — 94640 AIRWAY INHALATION TREATMENT: CPT

## 2024-11-06 PROCEDURE — 80061 LIPID PANEL: CPT

## 2024-11-06 PROCEDURE — 83880 ASSAY OF NATRIURETIC PEPTIDE: CPT

## 2024-11-06 PROCEDURE — 97162 PT EVAL MOD COMPLEX 30 MIN: CPT

## 2024-11-06 PROCEDURE — 84443 ASSAY THYROID STIM HORMONE: CPT

## 2024-11-06 PROCEDURE — 97530 THERAPEUTIC ACTIVITIES: CPT

## 2024-11-06 PROCEDURE — 2580000003 HC RX 258: Performed by: NURSE PRACTITIONER

## 2024-11-06 PROCEDURE — 85025 COMPLETE CBC W/AUTO DIFF WBC: CPT

## 2024-11-06 PROCEDURE — 85520 HEPARIN ASSAY: CPT

## 2024-11-06 RX ORDER — DEXTROSE MONOHYDRATE 100 MG/ML
INJECTION, SOLUTION INTRAVENOUS CONTINUOUS PRN
Status: DISCONTINUED | OUTPATIENT
Start: 2024-11-06 | End: 2024-11-11 | Stop reason: HOSPADM

## 2024-11-06 RX ORDER — INSULIN LISPRO 100 [IU]/ML
0-8 INJECTION, SOLUTION INTRAVENOUS; SUBCUTANEOUS
Status: DISCONTINUED | OUTPATIENT
Start: 2024-11-06 | End: 2024-11-08

## 2024-11-06 RX ORDER — FAMOTIDINE 20 MG/1
20 TABLET, FILM COATED ORAL EVERY EVENING
Status: DISCONTINUED | OUTPATIENT
Start: 2024-11-06 | End: 2024-11-11 | Stop reason: HOSPADM

## 2024-11-06 RX ADMIN — ATORVASTATIN CALCIUM 80 MG: 80 TABLET, FILM COATED ORAL at 09:06

## 2024-11-06 RX ADMIN — INSULIN LISPRO 6 UNITS: 100 INJECTION, SOLUTION INTRAVENOUS; SUBCUTANEOUS at 11:12

## 2024-11-06 RX ADMIN — WATER 40 MG: 1 INJECTION INTRAMUSCULAR; INTRAVENOUS; SUBCUTANEOUS at 09:07

## 2024-11-06 RX ADMIN — AMIODARONE HYDROCHLORIDE 200 MG: 200 TABLET ORAL at 09:06

## 2024-11-06 RX ADMIN — INSULIN LISPRO 4 UNITS: 100 INJECTION, SOLUTION INTRAVENOUS; SUBCUTANEOUS at 21:49

## 2024-11-06 RX ADMIN — ACETAMINOPHEN 650 MG: 325 TABLET ORAL at 21:40

## 2024-11-06 RX ADMIN — INSULIN LISPRO 2 UNITS: 100 INJECTION, SOLUTION INTRAVENOUS; SUBCUTANEOUS at 07:33

## 2024-11-06 RX ADMIN — LOSARTAN POTASSIUM 25 MG: 25 TABLET, FILM COATED ORAL at 09:06

## 2024-11-06 RX ADMIN — SODIUM CHLORIDE, PRESERVATIVE FREE 10 ML: 5 INJECTION INTRAVENOUS at 09:16

## 2024-11-06 RX ADMIN — WATER 40 MG: 1 INJECTION INTRAMUSCULAR; INTRAVENOUS; SUBCUTANEOUS at 16:25

## 2024-11-06 RX ADMIN — CARVEDILOL 3.12 MG: 3.12 TABLET, FILM COATED ORAL at 07:33

## 2024-11-06 RX ADMIN — FAMOTIDINE 20 MG: 20 TABLET, FILM COATED ORAL at 17:20

## 2024-11-06 RX ADMIN — WATER 40 MG: 1 INJECTION INTRAMUSCULAR; INTRAVENOUS; SUBCUTANEOUS at 01:00

## 2024-11-06 RX ADMIN — BUDESONIDE AND FORMOTEROL FUMARATE DIHYDRATE 2 PUFF: 160; 4.5 AEROSOL RESPIRATORY (INHALATION) at 19:00

## 2024-11-06 RX ADMIN — ASPIRIN 81 MG: 81 TABLET, COATED ORAL at 09:06

## 2024-11-06 RX ADMIN — INSULIN LISPRO 8 UNITS: 100 INJECTION, SOLUTION INTRAVENOUS; SUBCUTANEOUS at 00:27

## 2024-11-06 RX ADMIN — ACETAMINOPHEN 650 MG: 325 TABLET ORAL at 07:34

## 2024-11-06 RX ADMIN — FUROSEMIDE 40 MG: 10 INJECTION, SOLUTION INTRAMUSCULAR; INTRAVENOUS at 09:07

## 2024-11-06 RX ADMIN — INSULIN LISPRO 6 UNITS: 100 INJECTION, SOLUTION INTRAVENOUS; SUBCUTANEOUS at 16:26

## 2024-11-06 RX ADMIN — BUDESONIDE AND FORMOTEROL FUMARATE DIHYDRATE 2 PUFF: 160; 4.5 AEROSOL RESPIRATORY (INHALATION) at 08:16

## 2024-11-06 RX ADMIN — POTASSIUM CHLORIDE 10 MEQ: 750 CAPSULE, EXTENDED RELEASE ORAL at 09:06

## 2024-11-06 RX ADMIN — CARVEDILOL 3.12 MG: 3.12 TABLET, FILM COATED ORAL at 16:26

## 2024-11-06 ASSESSMENT — PAIN SCALES - GENERAL
PAINLEVEL_OUTOF10: 0
PAINLEVEL_OUTOF10: 3
PAINLEVEL_OUTOF10: 6
PAINLEVEL_OUTOF10: 0

## 2024-11-06 ASSESSMENT — PAIN - FUNCTIONAL ASSESSMENT
PAIN_FUNCTIONAL_ASSESSMENT: PREVENTS OR INTERFERES SOME ACTIVE ACTIVITIES AND ADLS
PAIN_FUNCTIONAL_ASSESSMENT: ACTIVITIES ARE NOT PREVENTED

## 2024-11-06 ASSESSMENT — PAIN DESCRIPTION - PAIN TYPE: TYPE: ACUTE PAIN

## 2024-11-06 ASSESSMENT — PAIN DESCRIPTION - DESCRIPTORS
DESCRIPTORS: ACHING
DESCRIPTORS: ACHING

## 2024-11-06 ASSESSMENT — PAIN DESCRIPTION - LOCATION
LOCATION: NECK
LOCATION: HEAD

## 2024-11-06 ASSESSMENT — PAIN DESCRIPTION - ORIENTATION
ORIENTATION: MID
ORIENTATION: POSTERIOR

## 2024-11-06 ASSESSMENT — PAIN DESCRIPTION - FREQUENCY: FREQUENCY: INTERMITTENT

## 2024-11-06 ASSESSMENT — PAIN DESCRIPTION - DIRECTION: RADIATING_TOWARDS: DENIES

## 2024-11-06 ASSESSMENT — PAIN DESCRIPTION - ONSET: ONSET: ON-GOING

## 2024-11-06 ASSESSMENT — PAIN SCALES - WONG BAKER: WONGBAKER_NUMERICALRESPONSE: NO HURT

## 2024-11-06 NOTE — ED NOTES
Report given to LIVE Ibarra from [facility].   Report method by phone   The following was reviewed with receiving RN:   Current vital signs:  BP (!) 154/92   Pulse 78   Temp 98 °F (36.7 °C)   Resp 20   Ht 1.626 m (5' 4\")   Wt 59 kg (130 lb)   SpO2 95%   BMI 22.31 kg/m²                MEWS Score: 1     Any medication or safety alerts were reviewed. Any pending diagnostics and notifications were also reviewed, as well as any safety concerns or issues, abnormal labs, abnormal imaging, and abnormal assessment findings. Questions were answered.

## 2024-11-06 NOTE — PROGRESS NOTES
[facility]   Occupational Therapy Evaluation  Date: 24  Patient Name: Graciela Holt       Room: -  MRN: [id number]  Account: [id number]   : 1950  (74 y.o.) Gender: female     Discharge Recommendations:  The patient's needs are being met with no further Occupational Therapy recommended at discharge.          Referring Practitioner: Mallorie Choe APRN - NP  Diagnosis: acute on chronic diastolic congestive heart failure   Additional Pertinent Hx: per medical chart: \"74 y.o. female who presents with complaints of shortness of breath ongoing for the past 2 days and progressively worsening.  Patient has extensive medical history including previous CABG, CAD, HFrEF, COPD.  Patient states that her shortness of breath has been getting to the point where it is hard for her to walk.  She denies any chest pain.  She denies any dizziness or lightheadedness.  She denies any abdominal pain, nausea or vomiting or diaphoresis.  She feels like she has been wheezing.  She has been compliant on her daily inhalers for COPD per patient, she also reports that she has been compliant on all of her cardiac medications including Lasix 40 mg daily which she is supposed to be taking at home.  Patient also reports that she has noticed some increased swelling in her lower legs. \"       Past Medical History:  has a past medical history of Allergic rhinitis, Arthritis, CAD (coronary artery disease), Cerebral artery occlusion with cerebral infarction ([facility]), CHF (congestive heart failure) ([facility]), Controlled type 2 diabetes mellitus without complication, without long-term current use of insulin ([facility]), COPD (chronic obstructive pulmonary disease) ([facility]), Depression, Former smoker, History of blood transfusion, Hyperlipidemia, Hypertension, Kidney stone, Myocardial infarction ([facility]), Obesity, Class I, BMI 30.0-34.9 (see actual BMI), Restless leg syndrome, Skin abnormality, Type II or unspecified type

## 2024-11-06 NOTE — PROGRESS NOTES
11/06/24 1136   Encounter Summary   Encounter Overview/Reason Spiritual/Emotional Needs   Service Provided For Patient   Complexity of Encounter Low   Spiritual/Emotional needs   Type Spiritual Support   Assessment/Intervention/Outcome   Assessment Unable to assess  (sleeping)   Intervention Prayer (assurance of)/[place]

## 2024-11-06 NOTE — PLAN OF CARE
Problem: Chronic Conditions and Co-morbidities  Goal: Patient's chronic conditions and co-morbidity symptoms are monitored and maintained or improved  11/6/2024 1710 by Chana Collins RN  Outcome: Progressing     Problem: Discharge Planning  Goal: Discharge to home or other facility with appropriate resources  Outcome: Progressing     Problem: Safety - Adult  Goal: Free from fall injury  11/6/2024 1710 by Chana Collins RN  Outcome: Progressing     Problem: Pain  Goal: Verbalizes/displays adequate comfort level or baseline comfort level  11/6/2024 1710 by Chana Collins RN  Outcome: Progressing

## 2024-11-06 NOTE — PROGRESS NOTES
Pt arrived to floor via stretcher from ED and was transfered to bed.  Vitals taken, telemetry applied. Admission and assessment complete. No distress noted. See doc flowsheet and admission navigator for details. POC and education initiated and reviewed with patient. Call light within reach, and pt educated on its use. Bed in lowest position, and locked. Side rails up x 2. Denied further questions or needs at this time. Will continue to monitor.

## 2024-11-06 NOTE — PROGRESS NOTES
[facility] Internal Medicine  Johnny Samuel MD; Stan Michaud MD; Juan Carlos Todd MD; MD Andra Weaver MD; Chip Anderson MD  [facility] Internal Medicine   IN-PATIENT SERVICE  [facility]                 Date:   11/5/2024  Patientname:  Graciela Holt  Date of admission:  11/5/2024  4:24 PM  MRN:   [id number]  Account:  [id number]  YOB: 1950  PCP:    Travis Mason MD  Room:   2120/2120-01  Code Status:    Full Code       Chief Complaint:     Chief Complaint   Patient presents with    Shortness of Breath       History of Present Illness:     Graciela Holt is a 74 y.o. Non- / non  female who presents with Shortness of Breath   and is admitted to the hospital for the management of Acute on chronic diastolic (congestive) heart failure (HCC). Medical history significant for CAD s/p CABG, HFpEF, HTN, HLD, COPD, hx of cerebral infarction, DM type 2. Presented to Ed complaining of increased shortness of breath and chest pressure, increasing over the past 2 days. CXR shows atelectasis. States prescribed inhalers have not helped. Treated with IV solu-medrol and nebulizer treatments. Reports increased swelling of lower eternities despite compliance with taking lasix 40 mg daily. EKG SR with PVCs, ST depression in lead I and aVL. Troponin 48 repeat 42. BNP significantly elevated 13.000. Last Echo June 2024 EF 55-60%, EF was previously reduced to 35%. Denies fever, chills, abdominal pain, nausea, vomiting, diarrhea, and urinary symptoms.    Admit patient to PCU.  Cardiology consulted, started on heparin gtt, given 40 mg lasix IV.  SOB treated as COPD exacerbation, IV solumedrol and nebulizer treatments.   Conformed code status as Full Code.    Past Medical History:     Past Medical History:   Diagnosis Date    Allergic rhinitis     Arthritis     general    CAD (coronary artery disease)     Dr. Fowler/ [place]    Cerebral artery occlusion with  (L) 1.0 - 4.8 k/uL    Monocytes Absolute 0.60 0.1 - 1.3 k/uL    Eosinophils Absolute 0.10 0.0 - 0.4 k/uL    Basophils Absolute 0.10 0.0 - 0.2 k/uL   BMP    Collection Time: 11/05/24  4:45 PM   Result Value Ref Range    Sodium 142 136 - 145 mmol/L    Potassium 4.0 3.7 - 5.3 mmol/L    Chloride 107 98 - 107 mmol/L    CO2 24 20 - 31 mmol/L    Anion Gap 11 9 - 16 mmol/L    Glucose 159 (H) 74 - 99 mg/dL    BUN 23 8 - 23 mg/dL    Creatinine 1.1 0.7 - 1.2 mg/dL    Est, Glom Filt Rate 52 (L) >60 mL/min/1.73m2    Calcium 9.0 8.6 - 10.4 mg/dL   Brain Natriuretic Peptide    Collection Time: 11/05/24  4:45 PM   Result Value Ref Range    NT Pro-BNP 13,352 (H) 0 - 300 pg/mL   Protime-INR    Collection Time: 11/05/24  4:45 PM   Result Value Ref Range    Protime 13.6 11.8 - 14.6 sec    INR 1.0    Magnesium    Collection Time: 11/05/24  4:45 PM   Result Value Ref Range    Magnesium 1.9 1.6 - 2.4 mg/dL   Troponin    Collection Time: 11/05/24  4:45 PM   Result Value Ref Range    Troponin, High Sensitivity 48 (H) 0 - 14 ng/L   EKG 12 Lead Repeat    Collection Time: 11/05/24  5:40 PM   Result Value Ref Range    Ventricular Rate 73 BPM    Atrial Rate 73 BPM    P-R Interval 158 ms    QRS Duration 112 ms    Q-T Interval 414 ms    QTc Calculation (Bazett) 456 ms    P Axis 6 degrees    R Axis -33 degrees    T Axis 140 degrees   Troponin    Collection Time: 11/05/24  5:45 PM   Result Value Ref Range    Troponin, High Sensitivity 42 (H) 0 - 14 ng/L   Anti-Xa, Unfractionated Heparin    Collection Time: 11/05/24  5:45 PM   Result Value Ref Range    Anti-XA Unfrac Heparin <0.10 (L) 0.30 - 0.70 IU/L   APTT    Collection Time: 11/05/24  5:45 PM   Result Value Ref Range    APTT 25.5 24.0 - 36.0 sec       Imaging/Diagnostics:  XR CHEST PORTABLE    Result Date: 11/5/2024  Mild right basilar curvilinear atelectasis or scarring. No lobar consolidation.         Plan:     Patient status inpatient in the Progressive Unit/Step down    JAMES MURILLO -

## 2024-11-06 NOTE — CONSULTS
Date:   11/6/2024  Patient name: Graciela Holt  Date of admission:  11/5/2024  4:24 PM  MRN:   [id number]  YOB: 1950  PCP: Travis Mason MD    Reason for Admission: Elevated troponin [R79.89]  Acute on chronic diastolic (congestive) heart failure (HCC) [I50.33]  Acute on chronic congestive heart failure, unspecified heart failure type (HCC) [I50.9]           Date:                            8/13/2024  Patient name:  Graciela Holt  Date of admission:      8/12/2024  9:25 AM  MRN:                           [id number]  YOB: 1950  PCP: Travis Mason MD     Reason for Admission: Angina pectoris (HCC) [I20.9]  Chest pain [R07.9]  Chest pain, unspecified type [R07.9]        Cardiology consult: Multivessel coronary artery disease,, CHF reduced ejection fraction                             Referring physician: Dr Chip Anderson     Impression  Admission 11/5/2024  High-sensitivity troponin 42 and 29, proBNP 13,352    Multivessel CAD cardiac cath 1/25/2018  CABG LIMA to LAD and diagonal 1, SVG to OM, SVG to PDA February 2018 at [facility]  Severely reduced LV systolic function ejection fraction 25 to 30%, akinetic LV apex, apical thrombus 2D echo 4/8/2024  Moderately increased LV wall thickness  Episodes of nonsustained V. Tach  Diabetes mellitus  Hyperlipidemia lipid profile 11/6/2024 cholesterol 159, HDL 55, LDL 95, triglyceride 41  History of left MCA stroke occluded internal carotid    Admission 8/12/2024 chest pain like a heavy pressure radiating to the left arm, no new ECG changes, high-sensitivity troponin 28     Admission 6/17/2024 chest pain like heaviness no shortness of breath no radiation ECG showed LVH with repolarization seen changes no change from previous ECG borderline abnormal troponin most likely type II     Admission 4/4/2024 for increasing shortness of breath, increasing swelling over the legs, elevated troponin 119, 136,  chest x-ray showed   Diarrhea in adult patient     Restless leg syndrome     Atherosclerosis of superior mesenteric artery ([facility])     Left adrenal mass ([facility])     Former smoker     Chronic bilateral low back pain with left-sided sciatica     Hypothyroidism     S/P CABG x 4     Acute pain of right knee     Abnormal stress test     Tobacco use disorder     Neck pain     Acute ischemic left MCA stroke ([facility])     Internal carotid artery occlusion     Stenosis of left internal carotid artery     Acquired spondylolisthesis of cervical vertebra     Stenosis of cervical spine with myelopathy ([facility])     Abnormal EKG     COPD exacerbation ([facility])     Multifocal pneumonia     Hypoxia     Pneumonia     Iron deficiency anemia     Chronic combined systolic and diastolic congestive heart failure ([facility])     Type 2 diabetes mellitus with chronic kidney disease     Symptomatic congestive heart failure, pre-operative cardiovascular examination ([facility])     Acute on chronic HFrEF (heart failure with reduced ejection fraction) ([facility])     Encounter for palliative care     Goals of care, counseling/discussion     ACP (advance care planning)     CHF (congestive heart failure), NYHA class I, acute on chronic, combined ([facility])     Shortness of breath     History of COPD     Noncompliance     Protein-calorie malnutrition ([facility])     Acute decompensated heart failure ([facility])     NSTEMI (non-ST elevated myocardial infarction) ([facility])     Chest pain     Angina pectoris ([facility])     Acute on chronic diastolic (congestive) heart failure ([facility])      Plan of Treatment:   Medications reviewed  1: Admitted with severe shortness of breath , cardiomyopathy, severely reduced LV systolic function elevated proBNP> 13,000 ,abnormal high-sensitivity troponin continue with the Lasix 40 mg IV daily, losartan 25 mg, Lipitor 80 mg at  2: History of ventricular arrhythmia continue current dose of amiodarone 200 daily carvedilol 3.125 mg twice daily  3: COPD, continue with bronchodilators  Continue ECG

## 2024-11-06 NOTE — CARE COORDINATION
Case Management Assessment  Initial Evaluation    Date/Time of Evaluation: 11/6/2024 3:20 PM  Assessment Completed by: Cheryl Randle RN    If patient is discharged prior to next notation, then this note serves as note for discharge by case management.    Patient Name: Graciela Holt                   YOB: 1950  Diagnosis: Elevated troponin [R79.89]  Acute on chronic diastolic (congestive) heart failure (HCC) [I50.33]  Acute on chronic congestive heart failure, unspecified heart failure type (HCC) [I50.9]                   Date / Time: 11/5/2024  4:24 PM    Patient Admission Status: Inpatient   Readmission Risk (Low < 19, Mod (19-27), High > 27): Readmission Risk Score: 18.3    Current PCP: Travis Mason MD  PCP verified by CM? No    Chart Reviewed: Yes      History Provided by: Patient  Patient Orientation: Alert and Oriented    Patient Cognition: Alert    Hospitalization in the last 30 days (Readmission):  No    If yes, Readmission Assessment in CM Navigator will be completed.    Advance Directives:      Code Status: Full Code   Patient's Primary Decision Maker is: Legal Next of Kin    Primary Decision Maker: Guanakito Zheng - Child - [phone]    Discharge Planning:    Patient lives with: Friends Type of Home: House  Primary Care Giver: Self  Patient Support Systems include: Children   Current Financial resources: Medicare  Current community resources: None  Current services prior to admission: None            Current DME:              Type of Home Care services:  None    ADLS  Prior functional level: Independent in ADLs/IADLs  Current functional level: Independent in ADLs/IADLs    PT AM-PAC: 18 /24  OT AM-PAC: 20 /24    Family can provide assistance at DC: Yes  Would you like Case Management to discuss the discharge plan with any other family members/significant others, and if so, who? Yes  Plans to Return to Present Housing: Yes  Other Identified Issues/Barriers to RETURNING to current  housing: no  Potential Assistance needed at discharge: Other (Comment) (none)            Potential DME:    Patient expects to discharge to: House  Plan for transportation at discharge: Family    Financial    Payor: [employer] / Plan: [employer] / Product Type: *No Product type* /     Does insurance require precert for SNF: No    Potential assistance Purchasing Medications: No  Meds-to-Beds request:        [place] - [address] [facility] -  [phone] - F [phone]  [address] [facility] [address]  Phone: [phone] Fax: [phone]    [facility] - [place], [place] - [address] - P [phone] - F [phone]  [address]  [facility] [address]  Phone: [phone] Fax: [phone]    [id number] [place] [id number] - P [phone] - F [phone]  [address] [place] [id number]  Phone: [phone] Fax: [phone]      Notes:    Factors facilitating achievement of predicted outcomes: Family support, Motivated, Cooperative, and Pleasant    Barriers to discharge: Medical complications    Additional Case Management Notes: 11/6/24 [employer] Pt is from home with her room mates in a one story home DME none VNS denies. Plan is to discharge to home with no needs. Will follow for needs. .//tv       The Plan for Transition of Care is related to the following treatment goals of Elevated troponin [R79.89]  Acute on chronic diastolic (congestive) heart failure (HCC) [I50.33]  Acute on chronic congestive heart failure, unspecified heart failure type (HCC) [I50.9]    IF APPLICABLE: The Patient and/or patient representative Graciela and her family were provided with a choice of provider and agrees with the discharge plan. Freedom of choice list with basic dialogue that supports the patient's individualized plan of care/goals and shares the quality data associated with the providers was provided to: Patient   Patient

## 2024-11-06 NOTE — PROGRESS NOTES
demonstrate independent bed mobility from a flat bed for position change  Short Term Goal 4: pt to demonstrate independent transfers from various surfaces  Short Term Goal 5: pt to demonstrate independent gait 150' - 200' for community distances  Short Term Goal 6: pt to demonstrate good ambulatory balance  Short Term Goal 7: pt to demonstrate ability to ascend/ descend 4- 5 steps w/ bilateral rails independently for home entry  Patient Goals   Patient Goals : return home       Education  Patient Education  Education Given To: Patient  Education Provided: Role of Therapy;Plan of Care  Education Method: Demonstration;Verbal  Barriers to Learning: None  Education Outcome: Verbalized understanding;Demonstrated understanding      Therapy Time   Individual Concurrent Group Co-treatment   Time In 1325         Time Out 1404         Minutes 39         Timed Code Treatment Minutes: 24 Minutes       Diya Ngo, PT

## 2024-11-06 NOTE — PLAN OF CARE
Problem: Chronic Conditions and Co-morbidities  Goal: Patient's chronic conditions and co-morbidity symptoms are monitored and maintained or improved  Outcome: Progressing  Note:   Monitored and assessed patient's chronic conditions and comorbid symptoms for stability, deterioration, or improvement; Collaborate with multidisciplinary team to address chronic and comorbid conditions and prevent exacerbation or deterioration.      Problem: Safety - Adult  Goal: Free from fall injury  Outcome: Progressing  Note: No falls noted this shift. Patient ambulates with x1 staff assistance without difficulty.  Bed kept in low position. Safe environment maintained. Bedside table & call light in reach. Uses call light appropriately when needing assistance.       Problem: Pain  Goal: Verbalizes/displays adequate comfort level or baseline comfort level  Outcome: Progressing  Note: No pain at this time.  0/10 pain scale.

## 2024-11-06 NOTE — H&P
[facility] Internal Medicine  Juan Carlos Todd MD; Lewsi Castro MD, Stan Michaud MD, Pearl Choudhary MD, Surjit Germain MD; Chip Anderson MD    [facility] Internal Medicine   IN-PATIENT SERVICE   [facility]    HISTORY AND PHYSICAL EXAMINATION            Date:   11/6/2024  Patient name:  Graciela Holt  Date of admission:  11/5/2024  4:24 PM  MRN:   [id number]  Account:  [id number]  YOB: 1950  PCP:    Travis Mason MD  Room:   2120/2120-01  Code Status:    Full Code    Chief Complaint:     Chief Complaint   Patient presents with    Shortness of Breath       History Obtained From:     Patient/EMR/Bedside RN    History of Present Illness:     Graciela Holt is a 74 y.o. Non- / non  female who presents with Shortness of Breath   and is admitted to the hospital for the management of Acute on chronic diastolic (congestive) heart failure (HCC). Medical history significant for CAD s/p CABG, HFpEF, HTN, HLD, COPD, hx of cerebral infarction, DM type 2. Presented to Ed complaining of increased shortness of breath and chest pressure, increasing over the past 2 days. CXR shows atelectasis. States prescribed inhalers have not helped. Treated with IV solu-medrol and nebulizer treatments. Reports increased swelling of lower eternities despite compliance with taking lasix 40 mg daily. EKG SR with PVCs, ST depression in lead I and aVL. Troponin 48 repeat 42. BNP significantly elevated 13.000. Last Echo June 2024 EF 55-60%, EF was previously reduced to 35%. Denies fever, chills, abdominal pain, nausea, vomiting, diarrhea, and urinary symptoms.     Admit patient to PCU.  Cardiology consulted, started on heparin gtt, given 40 mg lasix IV.  SOB treated as COPD exacerbation, IV solumedrol and nebulizer treatments.   Conformed code status as Full Code.      Past Medical History:     Past Medical History:   Diagnosis Date    Allergic rhinitis   obstructive pulmonary disease (HCC) (Chronic) 11/6/2024 Yes    Coronary artery disease involving native coronary artery of native heart without angina pectoris (Chronic) 11/6/2024 Yes    S/P CABG x 4 (Chronic) 11/6/2024 Yes    Type 2 diabetes mellitus with chronic kidney disease 11/6/2024 Yes       Plan:     Patient status inpatient in the Progressive Unit/Step down    1.  74-year-old female admitted with acute on chronic diastolic heart failure, started on IV Lasix, cardiology on board,  COPD exacerbation, IV Solu-Medrol, nebulizer treatment,  Current smoker, advised to quit smoking,  Hypertension, continue monitor blood pressure,  Hyperlipidemia, continue statins,  Type 2 diabetes, sugars ACHS,  DVT prophylaxis with Lovenox  Full CODE STATUS  2. Disposition 3d      Consultations:   IP CONSULT TO HOSPITALIST  IP CONSULT TO CARDIOLOGY     Patient is admitted as inpatient status because of co-morbidities listed above, severity of signs and symptoms as outlined, requirement for current medical therapies and most importantly because of direct risk to patient if care not provided in a hospital setting.  Expected length of stay > 48 hours.    Chip Anderson MD  11/6/2024  5:29 PM    Copy sent to Travis Vaughn MD    Please note that this chart was generated using voice recognition Dragon dictation software.  Although every effort was made to ensure the accuracy of this automated transcription, some errors in transcription may have occurred.

## 2024-11-07 LAB
ABSOLUTE BANDS: 0.67 K/UL (ref 0–1)
ANION GAP SERPL CALCULATED.3IONS-SCNC: 7 MMOL/L (ref 9–16)
ANTI-XA UNFRAC HEPARIN: 0.43 IU/L (ref 0.3–0.7)
BANDS: 7 % (ref 0–10)
BASOPHILS # BLD: 0 K/UL (ref 0–0.2)
BASOPHILS NFR BLD: 0 % (ref 0–2)
BUN SERPL-MCNC: 40 MG/DL (ref 8–23)
CALCIUM SERPL-MCNC: 9.2 MG/DL (ref 8.6–10.4)
CHLORIDE SERPL-SCNC: 105 MMOL/L (ref 98–107)
CO2 SERPL-SCNC: 26 MMOL/L (ref 20–31)
CREAT SERPL-MCNC: 1.4 MG/DL (ref 0.7–1.2)
EOSINOPHIL # BLD: 0 K/UL (ref 0–0.4)
EOSINOPHILS RELATIVE PERCENT: 0 % (ref 0–4)
ERYTHROCYTE [DISTWIDTH] IN BLOOD BY AUTOMATED COUNT: 14.2 % (ref 11.5–14.9)
GFR, ESTIMATED: 39 ML/MIN/1.73M2
GLUCOSE BLD-MCNC: 213 MG/DL (ref 65–105)
GLUCOSE BLD-MCNC: 247 MG/DL (ref 65–105)
GLUCOSE BLD-MCNC: 284 MG/DL (ref 65–105)
GLUCOSE BLD-MCNC: 357 MG/DL (ref 65–105)
GLUCOSE SERPL-MCNC: 290 MG/DL (ref 74–99)
HCT VFR BLD AUTO: 36.6 % (ref 36–46)
HGB BLD-MCNC: 12.7 G/DL (ref 12–16)
LYMPHOCYTES NFR BLD: 0.48 K/UL (ref 1–4.8)
LYMPHOCYTES RELATIVE PERCENT: 5 % (ref 24–44)
MAGNESIUM SERPL-MCNC: 1.9 MG/DL (ref 1.6–2.4)
MCH RBC QN AUTO: 30.4 PG (ref 26–34)
MCHC RBC AUTO-ENTMCNC: 34.7 G/DL (ref 31–37)
MCV RBC AUTO: 87.8 FL (ref 80–100)
MONOCYTES NFR BLD: 0 % (ref 1–7)
MONOCYTES NFR BLD: 0 K/UL (ref 0.1–1.3)
MORPHOLOGY: ABNORMAL
NEUTROPHILS NFR BLD: 88 % (ref 36–66)
NEUTS SEG NFR BLD: 8.45 K/UL (ref 1.3–9.1)
PLATELET # BLD AUTO: 182 K/UL (ref 150–450)
PMV BLD AUTO: 9.4 FL (ref 6–12)
POTASSIUM SERPL-SCNC: 4.8 MMOL/L (ref 3.7–5.3)
RBC # BLD AUTO: 4.17 M/UL (ref 4–5.2)
SODIUM SERPL-SCNC: 138 MMOL/L (ref 136–145)
WBC OTHER # BLD: 9.6 K/UL (ref 3.5–11)

## 2024-11-07 PROCEDURE — 85025 COMPLETE CBC W/AUTO DIFF WBC: CPT

## 2024-11-07 PROCEDURE — 99232 SBSQ HOSP IP/OBS MODERATE 35: CPT | Performed by: INTERNAL MEDICINE

## 2024-11-07 PROCEDURE — 36415 COLL VENOUS BLD VENIPUNCTURE: CPT

## 2024-11-07 PROCEDURE — 80048 BASIC METABOLIC PNL TOTAL CA: CPT

## 2024-11-07 PROCEDURE — 83735 ASSAY OF MAGNESIUM: CPT

## 2024-11-07 PROCEDURE — 82947 ASSAY GLUCOSE BLOOD QUANT: CPT

## 2024-11-07 PROCEDURE — 2580000003 HC RX 258: Performed by: NURSE PRACTITIONER

## 2024-11-07 PROCEDURE — 97110 THERAPEUTIC EXERCISES: CPT

## 2024-11-07 PROCEDURE — 97116 GAIT TRAINING THERAPY: CPT

## 2024-11-07 PROCEDURE — 6360000002 HC RX W HCPCS: Performed by: NURSE PRACTITIONER

## 2024-11-07 PROCEDURE — 6370000000 HC RX 637 (ALT 250 FOR IP): Performed by: NURSE PRACTITIONER

## 2024-11-07 PROCEDURE — 2060000000 HC ICU INTERMEDIATE R&B

## 2024-11-07 PROCEDURE — 94760 N-INVAS EAR/PLS OXIMETRY 1: CPT

## 2024-11-07 PROCEDURE — 6370000000 HC RX 637 (ALT 250 FOR IP): Performed by: INTERNAL MEDICINE

## 2024-11-07 PROCEDURE — 94640 AIRWAY INHALATION TREATMENT: CPT

## 2024-11-07 PROCEDURE — 85520 HEPARIN ASSAY: CPT

## 2024-11-07 RX ORDER — BUMETANIDE 1 MG/1
2 TABLET ORAL DAILY
Status: DISCONTINUED | OUTPATIENT
Start: 2024-11-08 | End: 2024-11-08

## 2024-11-07 RX ADMIN — FAMOTIDINE 20 MG: 20 TABLET, FILM COATED ORAL at 18:37

## 2024-11-07 RX ADMIN — WATER 40 MG: 1 INJECTION INTRAMUSCULAR; INTRAVENOUS; SUBCUTANEOUS at 00:35

## 2024-11-07 RX ADMIN — INSULIN LISPRO 8 UNITS: 100 INJECTION, SOLUTION INTRAVENOUS; SUBCUTANEOUS at 18:37

## 2024-11-07 RX ADMIN — SODIUM CHLORIDE, PRESERVATIVE FREE 10 ML: 5 INJECTION INTRAVENOUS at 08:59

## 2024-11-07 RX ADMIN — BUDESONIDE AND FORMOTEROL FUMARATE DIHYDRATE 2 PUFF: 160; 4.5 AEROSOL RESPIRATORY (INHALATION) at 20:00

## 2024-11-07 RX ADMIN — SODIUM CHLORIDE, PRESERVATIVE FREE 10 ML: 5 INJECTION INTRAVENOUS at 21:24

## 2024-11-07 RX ADMIN — POTASSIUM CHLORIDE 10 MEQ: 750 CAPSULE, EXTENDED RELEASE ORAL at 08:58

## 2024-11-07 RX ADMIN — WATER 40 MG: 1 INJECTION INTRAMUSCULAR; INTRAVENOUS; SUBCUTANEOUS at 08:59

## 2024-11-07 RX ADMIN — LOSARTAN POTASSIUM 25 MG: 25 TABLET, FILM COATED ORAL at 08:58

## 2024-11-07 RX ADMIN — WATER 40 MG: 1 INJECTION INTRAMUSCULAR; INTRAVENOUS; SUBCUTANEOUS at 18:37

## 2024-11-07 RX ADMIN — AMIODARONE HYDROCHLORIDE 200 MG: 200 TABLET ORAL at 08:58

## 2024-11-07 RX ADMIN — CARVEDILOL 3.12 MG: 3.12 TABLET, FILM COATED ORAL at 18:37

## 2024-11-07 RX ADMIN — BUDESONIDE AND FORMOTEROL FUMARATE DIHYDRATE 2 PUFF: 160; 4.5 AEROSOL RESPIRATORY (INHALATION) at 08:07

## 2024-11-07 RX ADMIN — CARVEDILOL 3.12 MG: 3.12 TABLET, FILM COATED ORAL at 08:58

## 2024-11-07 RX ADMIN — APIXABAN 5 MG: 5 TABLET, FILM COATED ORAL at 21:23

## 2024-11-07 RX ADMIN — INSULIN LISPRO 2 UNITS: 100 INJECTION, SOLUTION INTRAVENOUS; SUBCUTANEOUS at 08:59

## 2024-11-07 RX ADMIN — HEPARIN SODIUM 14 UNITS/KG/HR: 10000 INJECTION, SOLUTION INTRAVENOUS at 01:36

## 2024-11-07 RX ADMIN — ACETAMINOPHEN 650 MG: 325 TABLET ORAL at 11:49

## 2024-11-07 RX ADMIN — INSULIN LISPRO 2 UNITS: 100 INJECTION, SOLUTION INTRAVENOUS; SUBCUTANEOUS at 21:23

## 2024-11-07 RX ADMIN — ASPIRIN 81 MG: 81 TABLET, COATED ORAL at 08:58

## 2024-11-07 RX ADMIN — ATORVASTATIN CALCIUM 80 MG: 80 TABLET, FILM COATED ORAL at 08:58

## 2024-11-07 RX ADMIN — FUROSEMIDE 40 MG: 10 INJECTION, SOLUTION INTRAMUSCULAR; INTRAVENOUS at 08:59

## 2024-11-07 RX ADMIN — ROPINIROLE HYDROCHLORIDE 1 MG: 1 TABLET, FILM COATED ORAL at 21:23

## 2024-11-07 RX ADMIN — INSULIN LISPRO 4 UNITS: 100 INJECTION, SOLUTION INTRAVENOUS; SUBCUTANEOUS at 12:14

## 2024-11-07 ASSESSMENT — PAIN DESCRIPTION - LOCATION: LOCATION: HEAD;NECK

## 2024-11-07 ASSESSMENT — PAIN SCALES - GENERAL: PAINLEVEL_OUTOF10: 8

## 2024-11-07 NOTE — PROGRESS NOTES
[facility] Internal Medicine  Juan Carlos Todd MD; Lewis Castro MD, Stan Michaud MD, Pearl Choudhary MD, Surjit Germain MD; Chip Anderson MD    [facility] Internal Medicine   IN-PATIENT SERVICE   [facility]    Progress note            Date:   11/7/2024  Patient name:  Graciela Holt  Date of admission:  11/5/2024  4:24 PM  MRN:   [id number]  Account:  [id number]  YOB: 1950  PCP:    Travis Mason MD  Room:   2120/2120-01  Code Status:    Full Code    Chief Complaint:     Chief Complaint   Patient presents with    Shortness of Breath       History Obtained From:     Patient/EMR/Bedside RN    History of Present Illness:     Graciela Holt is a 74 y.o. Non- / non  female who presents with Shortness of Breath   and is admitted to the hospital for the management of Acute on chronic diastolic (congestive) heart failure (HCC). Medical history significant for CAD s/p CABG, HFpEF, HTN, HLD, COPD, hx of cerebral infarction, DM type 2. Presented to Ed complaining of increased shortness of breath and chest pressure, increasing over the past 2 days. CXR shows atelectasis. States prescribed inhalers have not helped. Treated with IV solu-medrol and nebulizer treatments. Reports increased swelling of lower eternities despite compliance with taking lasix 40 mg daily. EKG SR with PVCs, ST depression in lead I and aVL. Troponin 48 repeat 42. BNP significantly elevated 13.000. Last Echo June 2024 EF 55-60%, EF was previously reduced to 35%. Denies fever, chills, abdominal pain, nausea, vomiting, diarrhea, and urinary symptoms.     Admit patient to PCU.  Cardiology consulted, started on heparin gtt, given 40 mg lasix IV.  SOB treated as COPD exacerbation, IV solumedrol and nebulizer treatments.   Conformed code status as Full Code.      Past Medical History:     Past Medical History:   Diagnosis Date    Allergic rhinitis     Arthritis      ejection fraction) (HCC) 11/6/2024 Yes    Controlled type 2 diabetes mellitus without complication, without long-term current use of insulin (HCC) (Chronic) 11/6/2024 Yes    Hypertension goal BP (blood pressure) < 140/90 (Chronic) 11/6/2024 Yes    Mixed hyperlipidemia (Chronic) 11/6/2024 Yes    Chronic obstructive pulmonary disease (HCC) (Chronic) 11/6/2024 Yes    Coronary artery disease involving native coronary artery of native heart without angina pectoris (Chronic) 11/6/2024 Yes    S/P CABG x 4 (Chronic) 11/6/2024 Yes    Type 2 diabetes mellitus with chronic kidney disease 11/6/2024 Yes       Plan:     Patient status inpatient in the Progressive Unit/Step down    1.  74-year-old female admitted with acute on chronic diastolic heart failure, started on IV Lasix, cardiology on board,  COPD exacerbation, IV Solu-Medrol, nebulizer treatment,  Current smoker, advised to quit smoking,  Hypertension, continue monitor blood pressure,  Hyperlipidemia, continue statins,  Type 2 diabetes, sugars ACHS,  DVT prophylaxis with Lovenox  Full CODE STATUS  2. Disposition 3d    11/7  Seen examined face-to-face,  65 female admitted with acute on chronic diastolic heart failure, on IV Lasix, cardiology on board,  COPD exacerbation, patient current smoker, Solu-Medrol, nebulizer,  Hypertension, blood pressure running good,  PT OT,  Possible discharge soon if okay with cardiology      Consultations:   IP CONSULT TO HOSPITALIST  IP CONSULT TO CARDIOLOGY     Patient is admitted as inpatient status because of co-morbidities listed above, severity of signs and symptoms as outlined, requirement for current medical therapies and most importantly because of direct risk to patient if care not provided in a hospital setting.  Expected length of stay > 48 hours.    Chip Anderson MD  11/7/2024  6:57 PM    Copy sent to Travis Vaughn MD    Please note that this chart was generated using voice recognition Dragon dictation software.

## 2024-11-07 NOTE — PROGRESS NOTES
Date:   2024  Patient name: Graciela Holt  Date of admission:  2024  4:24 PM  MRN:   [id number]  YOB: 1950  PCP: Travis Mason MD    Reason for Admission: Elevated troponin [R79.89]  Acute on chronic diastolic (congestive) heart failure (HCC) [I50.33]  Acute on chronic congestive heart failure, unspecified heart failure type (HCC) [I50.9]    Cardiology follow-up: Multivessel CAD, CHF with reduced ejection fraction, EF 25 to 30%       Referring physician: Dr Chip Anderson     Impression  Admission 2024  High-sensitivity troponin 42 and 29, proBNP 13,352     Multivessel CAD cardiac cath 2018  CABG LIMA to LAD and diagonal 1, SVG to OM, SVG to PDA 2018 at [facility]  Severely reduced LV systolic function ejection fraction 25 to 30%, akinetic LV apex, apical thrombus 2D echo 2024  Moderately increased LV wall thickness  Episodes of nonsustained V. Tach  Diabetes mellitus  Hyperlipidemia lipid profile 2024 cholesterol 159, HDL 55, LDL 95, triglyceride 41  History of left MCA stroke occluded internal carotid     Admission 2024 chest pain like a heavy pressure radiating to the left arm, no new ECG changes, high-sensitivity troponin 28  Admission 2024 chest pain like heaviness no shortness of breath no radiation ECG showed LVH with repolarization seen changes no change from previous ECG borderline abnormal troponin most likely type II  Admission 2024 for increasing shortness of breath, increasing swelling over the legs, elevated troponin 119, 136,  chest x-ray showed pulmonary edema proBNP 31,990  Past surgical history  Bilateral knee replacement, C-spine surgery, cholecystectomy,  section, CABG  Drug allergies codeine, lisinopril     History of present illness  73-year-old female who lives with her 2 roommates mother of 1 son and 1 daughter with a past medical history of multivessel coronary artery disease, coronary artery bypass  knee     Abnormal stress test     Tobacco use disorder     Neck pain     Acute ischemic left MCA stroke ([facility])     Internal carotid artery occlusion     Stenosis of left internal carotid artery     Acquired spondylolisthesis of cervical vertebra     Stenosis of cervical spine with myelopathy ([facility])     Abnormal EKG     COPD exacerbation ([facility])     Multifocal pneumonia     Hypoxia     Pneumonia     Iron deficiency anemia     Chronic combined systolic and diastolic congestive heart failure ([facility])     Type 2 diabetes mellitus with chronic kidney disease     Symptomatic congestive heart failure, pre-operative cardiovascular examination ([facility])     Acute on chronic HFrEF (heart failure with reduced ejection fraction) ([facility])     Encounter for palliative care     Goals of care, counseling/discussion     ACP (advance care planning)     CHF (congestive heart failure), NYHA class I, acute on chronic, combined ([facility])     Shortness of breath     History of COPD     Noncompliance     Protein-calorie malnutrition ([facility])     Acute decompensated heart failure ([facility])     NSTEMI (non-ST elevated myocardial infarction) ([facility])     Chest pain     Angina pectoris ([facility])     Acute on chronic diastolic (congestive) heart failure ([facility])      Plan of Treatment:   Medications reviewed  1: Admitted with severe shortness of breath , cardiomyopathy, severely reduced LV systolic function elevated proBNP> 13,000 ,abnormal high-sensitivity troponin , CHF improved with IV Lasix   Continue losartan 25 mg, Lipitor 80 mg   DC IV Lasix changed to Bumex 1 mg p.o. tomorrow  2: History of ventricular arrhythmia continue current dose of amiodarone 200 daily carvedilol 3.125 mg twice daily  3: LV apical thrombus, DC IV heparin, changed to Eliquis 5 mg twice daily  Episode of 7 beat V. tach she is on amiodarone and carvedilol  Patient has refused for AICD  3: COPD, continue with bronchodilators  BMP tomorrow  Continue ECG monitor    Electronically signed by Tommy SERNA

## 2024-11-07 NOTE — CARE COORDINATION
ONGOING DISCHARGE PLAN:    Patient is alert and oriented x4.    Spoke with patient regarding discharge plan and patient confirms that plan is still to discharge to home with no needs    Pt has a 8 beat run of [facility]     Cardiology consult    IV solu-medrol     Will continue to follow for additional discharge needs.    If patient is discharged prior to next notation, then this note serves as note for discharge by case management.    Electronically signed by Cheryl Randle RN on 11/7/2024 at 3:50 PM

## 2024-11-07 NOTE — PROGRESS NOTES
Physical Therapy  Facility/Department: [facility] PROGRESSIVE CARE  Daily Treatment Note  NAME: Graciela Holt  : 1950  MRN: [id number]    Date of Service: 2024    Discharge Recommendations:  Patient would benefit from continued therapy after discharge, Therapy recommended at discharge        Patient Diagnosis(es): The primary encounter diagnosis was Acute on chronic congestive heart failure, unspecified heart failure type (HCC). A diagnosis of Elevated troponin was also pertinent to this visit.    Assessment  Activity Tolerance: Patient tolerated treatment well    Plan  Physical Therapy Plan  General Plan:  (5-7 treatments/ week)  Specific Instructions for Next Treatment: advance gait distance and progress to 4-5 steps w/ 2 rails, instruct in HEP  Current Treatment Recommendations: Strengthening;Balance training;Functional mobility training;Transfer training;Endurance training;Gait training;Stair training;Home exercise program;Safety education & training;Patient/Caregiver education & training;Equipment evaluation, education, & procurement;Therapeutic activities    Restrictions  Restrictions/Precautions  Restrictions/Precautions: Fall Risk, Up as Tolerated  Required Braces or Orthoses?: No  Implants present? : Metal implants (sternal wires from CABG, C3-C7 cervical fusion, bilateral TKRs)     Subjective   Subjective  Subjective: Pt resting in bed, pleasant and cooperative with therapy. Reports needing to use bathroom on start of session  Pain: Reports 8/10 cervical pain  Orientation  Overall Orientation Status: Within Functional Limits  Orientation Level: Oriented X4  Cognition  Overall Cognitive Status: WFL    Objective  Vitals     Bed Mobility Training  Bed Mobility Training: Yes  Overall Level of Assistance: Modified independent  Supine to Sit: Modified independent  Sit to Supine: Modified independent  Scooting: Modified independent  Balance  Sitting: Intact  Standing: Intact  Transfer Training  Transfer

## 2024-11-07 NOTE — PROGRESS NOTES
Patient had 8 beat run of vtach, patient sleeping during episode.    Nurse called Dr. Flynn to update. Order received to check magnesium level.

## 2024-11-07 NOTE — PROGRESS NOTES
11/07/24 1721   Encounter Summary   Encounter Overview/Reason Spiritual/Emotional Needs   Service Provided For Patient   Complexity of Encounter Low   Spiritual/Emotional needs   Type Spiritual Support   Assessment/Intervention/Outcome   Assessment Unable to assess  (pt sleeping)   Intervention Prayer (assurance of)/[place]

## 2024-11-08 LAB
ANION GAP SERPL CALCULATED.3IONS-SCNC: 10 MMOL/L (ref 9–16)
BASOPHILS # BLD: 0 K/UL (ref 0–0.2)
BASOPHILS NFR BLD: 0 % (ref 0–2)
BNP SERPL-MCNC: 5306 PG/ML (ref 0–300)
BUN SERPL-MCNC: 51 MG/DL (ref 8–23)
CALCIUM SERPL-MCNC: 8.8 MG/DL (ref 8.6–10.4)
CHLORIDE SERPL-SCNC: 102 MMOL/L (ref 98–107)
CO2 SERPL-SCNC: 25 MMOL/L (ref 20–31)
CREAT SERPL-MCNC: 1.5 MG/DL (ref 0.7–1.2)
EOSINOPHIL # BLD: 0 K/UL (ref 0–0.4)
EOSINOPHILS RELATIVE PERCENT: 0 % (ref 0–4)
ERYTHROCYTE [DISTWIDTH] IN BLOOD BY AUTOMATED COUNT: 14.7 % (ref 11.5–14.9)
GFR, ESTIMATED: 36 ML/MIN/1.73M2
GLUCOSE BLD-MCNC: 225 MG/DL (ref 65–105)
GLUCOSE BLD-MCNC: 247 MG/DL (ref 65–105)
GLUCOSE BLD-MCNC: 338 MG/DL (ref 65–105)
GLUCOSE BLD-MCNC: 394 MG/DL (ref 65–105)
GLUCOSE SERPL-MCNC: 380 MG/DL (ref 74–99)
HCT VFR BLD AUTO: 38.7 % (ref 36–46)
HGB BLD-MCNC: 12.8 G/DL (ref 12–16)
LYMPHOCYTES NFR BLD: 0.3 K/UL (ref 1–4.8)
LYMPHOCYTES RELATIVE PERCENT: 4 % (ref 24–44)
MCH RBC QN AUTO: 30.1 PG (ref 26–34)
MCHC RBC AUTO-ENTMCNC: 33.1 G/DL (ref 31–37)
MCV RBC AUTO: 91.1 FL (ref 80–100)
MONOCYTES NFR BLD: 0.1 K/UL (ref 0.1–1.3)
MONOCYTES NFR BLD: 2 % (ref 1–7)
NEUTROPHILS NFR BLD: 94 % (ref 36–66)
NEUTS SEG NFR BLD: 9.1 K/UL (ref 1.3–9.1)
PLATELET # BLD AUTO: 194 K/UL (ref 150–450)
PMV BLD AUTO: 9.4 FL (ref 6–12)
POTASSIUM SERPL-SCNC: 4.4 MMOL/L (ref 3.7–5.3)
RBC # BLD AUTO: 4.24 M/UL (ref 4–5.2)
SODIUM SERPL-SCNC: 137 MMOL/L (ref 136–145)
WBC OTHER # BLD: 9.6 K/UL (ref 3.5–11)

## 2024-11-08 PROCEDURE — 94640 AIRWAY INHALATION TREATMENT: CPT

## 2024-11-08 PROCEDURE — 97116 GAIT TRAINING THERAPY: CPT

## 2024-11-08 PROCEDURE — 94761 N-INVAS EAR/PLS OXIMETRY MLT: CPT

## 2024-11-08 PROCEDURE — 6360000002 HC RX W HCPCS: Performed by: NURSE PRACTITIONER

## 2024-11-08 PROCEDURE — 6370000000 HC RX 637 (ALT 250 FOR IP): Performed by: NURSE PRACTITIONER

## 2024-11-08 PROCEDURE — 6370000000 HC RX 637 (ALT 250 FOR IP): Performed by: INTERNAL MEDICINE

## 2024-11-08 PROCEDURE — 80048 BASIC METABOLIC PNL TOTAL CA: CPT

## 2024-11-08 PROCEDURE — 83880 ASSAY OF NATRIURETIC PEPTIDE: CPT

## 2024-11-08 PROCEDURE — 36415 COLL VENOUS BLD VENIPUNCTURE: CPT

## 2024-11-08 PROCEDURE — 2060000000 HC ICU INTERMEDIATE R&B

## 2024-11-08 PROCEDURE — 2580000003 HC RX 258: Performed by: NURSE PRACTITIONER

## 2024-11-08 PROCEDURE — 97110 THERAPEUTIC EXERCISES: CPT

## 2024-11-08 PROCEDURE — 6370000000 HC RX 637 (ALT 250 FOR IP)

## 2024-11-08 PROCEDURE — 85025 COMPLETE CBC W/AUTO DIFF WBC: CPT

## 2024-11-08 PROCEDURE — 82947 ASSAY GLUCOSE BLOOD QUANT: CPT

## 2024-11-08 PROCEDURE — 99232 SBSQ HOSP IP/OBS MODERATE 35: CPT

## 2024-11-08 RX ORDER — INSULIN GLARGINE 100 [IU]/ML
5 INJECTION, SOLUTION SUBCUTANEOUS NIGHTLY
Status: DISCONTINUED | OUTPATIENT
Start: 2024-11-08 | End: 2024-11-11 | Stop reason: HOSPADM

## 2024-11-08 RX ORDER — INSULIN LISPRO 100 [IU]/ML
6 INJECTION, SOLUTION INTRAVENOUS; SUBCUTANEOUS ONCE
Status: DISCONTINUED | OUTPATIENT
Start: 2024-11-08 | End: 2024-11-08

## 2024-11-08 RX ORDER — INSULIN LISPRO 100 [IU]/ML
0-16 INJECTION, SOLUTION INTRAVENOUS; SUBCUTANEOUS
Status: DISCONTINUED | OUTPATIENT
Start: 2024-11-08 | End: 2024-11-11 | Stop reason: HOSPADM

## 2024-11-08 RX ORDER — PREDNISONE 20 MG/1
20 TABLET ORAL DAILY
Status: DISCONTINUED | OUTPATIENT
Start: 2024-11-09 | End: 2024-11-11 | Stop reason: HOSPADM

## 2024-11-08 RX ADMIN — ACETAMINOPHEN 650 MG: 325 TABLET ORAL at 12:39

## 2024-11-08 RX ADMIN — FAMOTIDINE 20 MG: 20 TABLET, FILM COATED ORAL at 20:42

## 2024-11-08 RX ADMIN — BUDESONIDE AND FORMOTEROL FUMARATE DIHYDRATE 2 PUFF: 160; 4.5 AEROSOL RESPIRATORY (INHALATION) at 20:18

## 2024-11-08 RX ADMIN — CARVEDILOL 3.12 MG: 3.12 TABLET, FILM COATED ORAL at 09:04

## 2024-11-08 RX ADMIN — ASPIRIN 81 MG: 81 TABLET, COATED ORAL at 09:04

## 2024-11-08 RX ADMIN — WATER 40 MG: 1 INJECTION INTRAMUSCULAR; INTRAVENOUS; SUBCUTANEOUS at 00:21

## 2024-11-08 RX ADMIN — APIXABAN 5 MG: 5 TABLET, FILM COATED ORAL at 20:42

## 2024-11-08 RX ADMIN — INSULIN GLARGINE 5 UNITS: 100 INJECTION, SOLUTION SUBCUTANEOUS at 22:21

## 2024-11-08 RX ADMIN — SODIUM CHLORIDE, PRESERVATIVE FREE 10 ML: 5 INJECTION INTRAVENOUS at 09:11

## 2024-11-08 RX ADMIN — INSULIN LISPRO 8 UNITS: 100 INJECTION, SOLUTION INTRAVENOUS; SUBCUTANEOUS at 06:32

## 2024-11-08 RX ADMIN — ATORVASTATIN CALCIUM 80 MG: 80 TABLET, FILM COATED ORAL at 09:04

## 2024-11-08 RX ADMIN — LOSARTAN POTASSIUM 25 MG: 25 TABLET, FILM COATED ORAL at 09:04

## 2024-11-08 RX ADMIN — INSULIN LISPRO 8 UNITS: 100 INJECTION, SOLUTION INTRAVENOUS; SUBCUTANEOUS at 12:40

## 2024-11-08 RX ADMIN — INSULIN LISPRO 4 UNITS: 100 INJECTION, SOLUTION INTRAVENOUS; SUBCUTANEOUS at 16:49

## 2024-11-08 RX ADMIN — AMIODARONE HYDROCHLORIDE 200 MG: 200 TABLET ORAL at 09:03

## 2024-11-08 RX ADMIN — ACETAMINOPHEN 650 MG: 325 TABLET ORAL at 05:08

## 2024-11-08 RX ADMIN — APIXABAN 5 MG: 5 TABLET, FILM COATED ORAL at 09:04

## 2024-11-08 RX ADMIN — BUMETANIDE 2 MG: 1 TABLET ORAL at 09:03

## 2024-11-08 RX ADMIN — WATER 40 MG: 1 INJECTION INTRAMUSCULAR; INTRAVENOUS; SUBCUTANEOUS at 09:04

## 2024-11-08 RX ADMIN — BUDESONIDE AND FORMOTEROL FUMARATE DIHYDRATE 2 PUFF: 160; 4.5 AEROSOL RESPIRATORY (INHALATION) at 07:52

## 2024-11-08 RX ADMIN — POTASSIUM CHLORIDE 10 MEQ: 750 CAPSULE, EXTENDED RELEASE ORAL at 09:04

## 2024-11-08 RX ADMIN — INSULIN LISPRO 4 UNITS: 100 INJECTION, SOLUTION INTRAVENOUS; SUBCUTANEOUS at 22:22

## 2024-11-08 RX ADMIN — SODIUM CHLORIDE, PRESERVATIVE FREE 10 ML: 5 INJECTION INTRAVENOUS at 20:43

## 2024-11-08 ASSESSMENT — PAIN SCALES - GENERAL
PAINLEVEL_OUTOF10: 4
PAINLEVEL_OUTOF10: 8

## 2024-11-08 ASSESSMENT — PAIN DESCRIPTION - LOCATION: LOCATION: HEAD

## 2024-11-08 ASSESSMENT — PAIN DESCRIPTION - DESCRIPTORS: DESCRIPTORS: THROBBING

## 2024-11-08 NOTE — PROGRESS NOTES
[facility] Internal Medicine  Juan Carlos Todd MD; Lewis Castro MD, Satn Michaud MD, Pearl Choudhary MD, Surjit Germain MD; Cihp Anderson MD    [facility] Internal Medicine   IN-PATIENT SERVICE   [facility]    Progress note            Date:   11/8/2024  Patient name:  Graciela Holt  Date of admission:  11/5/2024  4:24 PM  MRN:   [id number]  Account:  [id number]  YOB: 1950  PCP:    Travis Mason MD  Room:   2120/2120-01  Code Status:    Full Code    Chief Complaint:     Chief Complaint   Patient presents with    Shortness of Breath       History Obtained From:     Patient/EMR/Bedside RN    History of Present Illness:     Graciela Holt is a 74 y.o. Non- / non  female who presents with Shortness of Breath   and is admitted to the hospital for the management of Acute on chronic diastolic (congestive) heart failure (HCC). Medical history significant for CAD s/p CABG, HFpEF, HTN, HLD, COPD, hx of cerebral infarction, DM type 2. Presented to Ed complaining of increased shortness of breath and chest pressure, increasing over the past 2 days. CXR shows atelectasis. States prescribed inhalers have not helped. Treated with IV solu-medrol and nebulizer treatments. Reports increased swelling of lower eternities despite compliance with taking lasix 40 mg daily. EKG SR with PVCs, ST depression in lead I and aVL. Troponin 48 repeat 42. BNP significantly elevated 13.000. Last Echo June 2024 EF 55-60%, EF was previously reduced to 35%. Denies fever, chills, abdominal pain, nausea, vomiting, diarrhea, and urinary symptoms.     Admit patient to PCU.  Cardiology consulted, started on heparin gtt, given 40 mg lasix IV.  SOB treated as COPD exacerbation, IV solumedrol and nebulizer treatments.   Conformed code status as Full Code.      Past Medical History:     Past Medical History:   Diagnosis Date    Allergic rhinitis     Arthritis      signs and symptoms as outlined, requirement for current medical therapies and most importantly because of direct risk to patient if care not provided in a hospital setting.  Expected length of stay > 48 hours.    Brian Min MD  11/8/2024  1:51 PM    Please note that this chart was generated using voice recognition Dragon dictation software.  Although every effort was made to ensure the accuracy of this automated transcription, some errors in transcription may have occurred.

## 2024-11-08 NOTE — PLAN OF CARE
Problem: Chronic Conditions and Co-morbidities  Goal: Patient's chronic conditions and co-morbidity symptoms are monitored and maintained or improved  11/8/2024 0426 by Nathan Crain RN  Outcome: Progressing  Flowsheets (Taken 11/7/2024 2000)  Care Plan - Patient's Chronic Conditions and Co-Morbidity Symptoms are Monitored and Maintained or Improved:   Monitor and assess patient's chronic conditions and comorbid symptoms for stability, deterioration, or improvement   Collaborate with multidisciplinary team to address chronic and comorbid conditions and prevent exacerbation or deterioration  11/7/2024 1955 by Jacquie Luz RN  Outcome: Progressing     Problem: Discharge Planning  Goal: Discharge to home or other facility with appropriate resources  11/8/2024 0426 by Nathan Crain RN  Outcome: Progressing  Flowsheets (Taken 11/7/2024 2000)  Discharge to home or other facility with appropriate resources:   Identify barriers to discharge with patient and caregiver   Arrange for needed discharge resources and transportation as appropriate  11/7/2024 1955 by Jacquie Luz RN  Outcome: Progressing     Problem: Safety - Adult  Goal: Free from fall injury  11/8/2024 0426 by Nathan Crain RN  Outcome: Progressing  Flowsheets (Taken 11/7/2024 2000)  Free From Fall Injury: Instruct family/caregiver on patient safety  11/7/2024 1955 by Jacquie Luz RN  Outcome: Progressing     Problem: Pain  Goal: Verbalizes/displays adequate comfort level or baseline comfort level  11/8/2024 0426 by Nathan Crain RN  Outcome: Progressing  Flowsheets (Taken 11/7/2024 2018)  Verbalizes/displays adequate comfort level or baseline comfort level:   Encourage patient to monitor pain and request assistance   Assess pain using appropriate pain scale  11/7/2024 1955 by Jacquie Luz RN  Outcome: Progressing

## 2024-11-08 NOTE — PROGRESS NOTES
Spiritual Health History and Assessment/Progress Note  [facility]    (P) Loneliness/Social Isolation,  ,  ,      Name: Graciela Holt MRN: [id number]    Age: 74 y.o.     Sex: female   Language: English   Restoration: None   Acute on chronic diastolic (congestive) heart failure (HCC)     Date: 11/8/2024                           Spiritual Assessment began in [facility] PROGRESSIVE CARE            Encounter Overview/Reason: (P) Loneliness/Social Isolation  Service Provided For: (P) Patient    Pt misses her cat named baby.  Pt explained that she has three loves: her cat, yogurt and ice cream.  Pt explained that pt does have children and does have a relationship with her son but she loves him after her love of her cat, yogurt and ice cream.  Pt's son lives in [place] but not able to visit pt often due to son not being able to drive at this time.     Yareli, Belief, Meaning:   Patient identifies as spiritual  Family/Friends No family/friends present      Importance and Influence:  Patient has no beliefs influential to healthcare decision-making identified during this visit  Family/Friends No family/friends present    Community:  Patient is connected with a spiritual community  Family/Friends No family/friends present    Assessment and Plan of Care:     Patient Interventions include: Facilitated expression of thoughts and feelings  Family/Friends Interventions include: No family/friends present    Patient Plan of Care: Spiritual Care available upon further referral  Family/Friends Plan of Care: No family/friends present    Electronically signed by Chaplain DANIEL on 11/8/2024 at 12:28 PM

## 2024-11-08 NOTE — CARE COORDINATION
ONGOING DISCHARGE PLAN:    Patient is alert and oriented x4.    Spoke with patient regarding discharge plan and patient confirms that plan is still to discharge to home with no needs    Patient refused AICD    DC [employer]    Possible discharge to home tomorrow    Will continue to follow for additional discharge needs.    If patient is discharged prior to next notation, then this note serves as note for discharge by case management.    Electronically signed by Cheryl Randle RN on 11/8/2024 at 4:07 PM

## 2024-11-08 NOTE — PROGRESS NOTES
Date:   2024  Patient name: Graciela Holt  Date of admission:  2024  4:24 PM  MRN:   [id number]  YOB: 1950  PCP: Travis Mason MD    Reason for Admission: Elevated troponin [R79.89]  Acute on chronic diastolic (congestive) heart failure (HCC) [I50.33]  Acute on chronic congestive heart failure, unspecified heart failure type (HCC) [I50.9]       Cardiology follow-up: Multivessel CAD, CHF with reduced ejection fraction, EF 25 to 30%          Referring physician: Dr Chip Anderson     Impression  Admission 2024  High-sensitivity troponin 42 and 29, proBNP 13,352     Multivessel CAD cardiac cath 2018  CABG LIMA to LAD and diagonal 1, SVG to OM, SVG to PDA 2018 at [facility]  Severely reduced LV systolic function ejection fraction 25 to 30%, akinetic LV apex, apical thrombus 2D echo 2024  Moderately increased LV wall thickness  Episodes of nonsustained V. Tach  Diabetes mellitus  Hyperlipidemia lipid profile 2024 cholesterol 159, HDL 55, LDL 95, triglyceride 41  History of left MCA stroke occluded internal carotid    Admission 2024 chest pain like a heavy pressure radiating to the left arm, no new ECG changes, high-sensitivity troponin 28  Admission 2024 chest pain like heaviness no shortness of breath no radiation ECG showed LVH with repolarization seen changes no change from previous ECG borderline abnormal troponin most likely type II  Admission 2024 for increasing shortness of breath, increasing swelling over the legs, elevated troponin 119, 136,  chest x-ray showed pulmonary edema proBNP 31,990  Past surgical history  Bilateral knee replacement, C-spine surgery, cholecystectomy,  section, CABG  Drug allergies codeine, lisinopril      History of present illness  73-year-old female who lives with her 2 roommates mother of 1 son and 1 daughter with a past medical history of multivessel coronary artery disease, coronary artery bypass    Vitals: /77   Pulse 58   Temp 98.5 °F (36.9 °C) (Oral)   Resp 18   Ht 1.626 m (5' 4\")   Wt 59.4 kg (131 lb)   SpO2 95%   BMI 22.49 kg/m²   General appearance: alert and cooperative with exam  HEENT: Head: Normal, normocephalic, atraumatic.  Neck: supple, symmetrical, trachea midline  Lungs:  Very poor chest expansion diminished breath sounds  Heart: regular rate and rhythm  Abdomen:  Soft bowel sounds breath  Extremities: Homans sign is negative, no sign of DVT  Neurologic: Mental status: Alert, oriented, thought content appropriate    KG: Sinus rhythm, voltage criteria for LVH, repolarization changes borderline R wave progression.  ECHO: reviewed.   Ejection fraction: 25% dilated LV, moderate increased LV wall thickness Global hypokinesis, akinesis of the apical lateral and anterolateral walls akinetic apex dilated LA  Stress Test: reviewed.  Cardiac Angiography: reviewed.      Assessment / Acute Cardiac Problems:   Admission 11/5/2024 with severe shortness of breath, elevated proBNP 13,352, borderline abnormal high-sensitivity troponin 42 and 29 down funk trend  Multivessel CAD, CABG LIMA to LAD and diagonal, SVG to OM and PDA February 2018 at [facility]  Severely reduced LV function ejection fraction 25%, akinetic apex, apical thrombus  Previous admission nonsustained ventricular arrhythmias   Patient  denied using LifeVest, does not want AICD    11/8/2020 for worsening of the renal function, diuretic on hold    Patient Active Problem List:     Chronic pain     Controlled type 2 diabetes mellitus without complication, without long-term current use of insulin (HCC)     Allergic rhinitis     Hypertension goal BP (blood pressure) < 140/90     Mixed hyperlipidemia     Chronic obstructive pulmonary disease (HCC)     Coronary artery disease involving native coronary artery of native heart without angina pectoris     Primary insomnia     Diarrhea in adult patient     Restless leg syndrome

## 2024-11-08 NOTE — PROGRESS NOTES
Physical Therapy  Facility/Department: [facility] PROGRESSIVE CARE  Daily Treatment Note  NAME: Graciela Holt  : 1950  MRN: [id number]    Date of Service: 2024    Discharge Recommendations:  Patient would benefit from continued therapy after discharge, Therapy recommended at discharge        Patient Diagnosis(es): The primary encounter diagnosis was Acute on chronic congestive heart failure, unspecified heart failure type (HCC). A diagnosis of Elevated troponin was also pertinent to this visit.    Assessment  Activity Tolerance: Patient tolerated treatment well (Denies SOB, throughout)    Plan  Physical Therapy Plan  General Plan:  (5-7 treatments/ week)  Specific Instructions for Next Treatment: advance gait distance and progress to 4-5 steps w/ 2 rails, instruct in HEP  Current Treatment Recommendations: Strengthening;Balance training;Functional mobility training;Transfer training;Endurance training;Gait training;Stair training;Home exercise program;Safety education & training;Patient/Caregiver education & training;Equipment evaluation, education, & procurement;Therapeutic activities    Restrictions  Restrictions/Precautions  Restrictions/Precautions: Fall Risk, Up as Tolerated  Required Braces or Orthoses?: No  Implants present? : Metal implants (sternal wires from CABG, C3-C7 cervical fusion, bilateral TKRs)     Subjective   Subjective  Subjective: Pt up in recliner chair at start of session. Pleasant and agreeable to therapy.  Pain: 8/10 Cervical pain, left side  Orientation  Overall Orientation Status: Within Functional Limits  Orientation Level: Oriented X4  Cognition  Overall Cognitive Status: WFL    Objective  Vitals     Bed Mobility Training  Bed Mobility Training: No (Pt was up in chair to begin and end session)  Balance  Sitting: Intact  Standing: Intact  Transfer Training  Transfer Training: Yes  Overall Level of Assistance: Stand-by assistance (Transfers without AD)  Interventions: Safety

## 2024-11-09 LAB
ANION GAP SERPL CALCULATED.3IONS-SCNC: 8 MMOL/L (ref 9–16)
BUN SERPL-MCNC: 50 MG/DL (ref 8–23)
CALCIUM SERPL-MCNC: 8.7 MG/DL (ref 8.6–10.4)
CHLORIDE SERPL-SCNC: 103 MMOL/L (ref 98–107)
CO2 SERPL-SCNC: 26 MMOL/L (ref 20–31)
CREAT SERPL-MCNC: 1.5 MG/DL (ref 0.7–1.2)
GFR, ESTIMATED: 36 ML/MIN/1.73M2
GLUCOSE BLD-MCNC: 125 MG/DL (ref 65–105)
GLUCOSE BLD-MCNC: 198 MG/DL (ref 65–105)
GLUCOSE BLD-MCNC: 285 MG/DL (ref 65–105)
GLUCOSE BLD-MCNC: 300 MG/DL (ref 65–105)
GLUCOSE SERPL-MCNC: 217 MG/DL (ref 74–99)
POTASSIUM SERPL-SCNC: 4.3 MMOL/L (ref 3.7–5.3)
SODIUM SERPL-SCNC: 137 MMOL/L (ref 136–145)

## 2024-11-09 PROCEDURE — 2060000000 HC ICU INTERMEDIATE R&B

## 2024-11-09 PROCEDURE — 94640 AIRWAY INHALATION TREATMENT: CPT

## 2024-11-09 PROCEDURE — 6370000000 HC RX 637 (ALT 250 FOR IP): Performed by: INTERNAL MEDICINE

## 2024-11-09 PROCEDURE — 6370000000 HC RX 637 (ALT 250 FOR IP): Performed by: NURSE PRACTITIONER

## 2024-11-09 PROCEDURE — 99232 SBSQ HOSP IP/OBS MODERATE 35: CPT

## 2024-11-09 PROCEDURE — 6370000000 HC RX 637 (ALT 250 FOR IP)

## 2024-11-09 PROCEDURE — 82947 ASSAY GLUCOSE BLOOD QUANT: CPT

## 2024-11-09 PROCEDURE — 2580000003 HC RX 258: Performed by: NURSE PRACTITIONER

## 2024-11-09 PROCEDURE — 80048 BASIC METABOLIC PNL TOTAL CA: CPT

## 2024-11-09 PROCEDURE — 94760 N-INVAS EAR/PLS OXIMETRY 1: CPT

## 2024-11-09 PROCEDURE — 36415 COLL VENOUS BLD VENIPUNCTURE: CPT

## 2024-11-09 RX ADMIN — APIXABAN 5 MG: 5 TABLET, FILM COATED ORAL at 21:03

## 2024-11-09 RX ADMIN — INSULIN LISPRO 8 UNITS: 100 INJECTION, SOLUTION INTRAVENOUS; SUBCUTANEOUS at 21:03

## 2024-11-09 RX ADMIN — CARVEDILOL 3.12 MG: 3.12 TABLET, FILM COATED ORAL at 17:35

## 2024-11-09 RX ADMIN — ACETAMINOPHEN 650 MG: 325 TABLET ORAL at 12:22

## 2024-11-09 RX ADMIN — BUDESONIDE AND FORMOTEROL FUMARATE DIHYDRATE 2 PUFF: 160; 4.5 AEROSOL RESPIRATORY (INHALATION) at 08:02

## 2024-11-09 RX ADMIN — SODIUM CHLORIDE, PRESERVATIVE FREE 10 ML: 5 INJECTION INTRAVENOUS at 09:58

## 2024-11-09 RX ADMIN — PREDNISONE 20 MG: 20 TABLET ORAL at 09:58

## 2024-11-09 RX ADMIN — INSULIN LISPRO 4 UNITS: 100 INJECTION, SOLUTION INTRAVENOUS; SUBCUTANEOUS at 09:58

## 2024-11-09 RX ADMIN — POTASSIUM CHLORIDE 10 MEQ: 750 CAPSULE, EXTENDED RELEASE ORAL at 09:58

## 2024-11-09 RX ADMIN — AMIODARONE HYDROCHLORIDE 200 MG: 200 TABLET ORAL at 09:58

## 2024-11-09 RX ADMIN — INSULIN LISPRO 12 UNITS: 100 INJECTION, SOLUTION INTRAVENOUS; SUBCUTANEOUS at 17:35

## 2024-11-09 RX ADMIN — BUDESONIDE AND FORMOTEROL FUMARATE DIHYDRATE 2 PUFF: 160; 4.5 AEROSOL RESPIRATORY (INHALATION) at 20:08

## 2024-11-09 RX ADMIN — APIXABAN 5 MG: 5 TABLET, FILM COATED ORAL at 09:58

## 2024-11-09 RX ADMIN — ATORVASTATIN CALCIUM 80 MG: 80 TABLET, FILM COATED ORAL at 09:58

## 2024-11-09 RX ADMIN — INSULIN GLARGINE 5 UNITS: 100 INJECTION, SOLUTION SUBCUTANEOUS at 21:03

## 2024-11-09 RX ADMIN — CARVEDILOL 3.12 MG: 3.12 TABLET, FILM COATED ORAL at 09:58

## 2024-11-09 RX ADMIN — FAMOTIDINE 20 MG: 20 TABLET, FILM COATED ORAL at 17:35

## 2024-11-09 RX ADMIN — ASPIRIN 81 MG: 81 TABLET, COATED ORAL at 09:58

## 2024-11-09 ASSESSMENT — PAIN SCALES - GENERAL
PAINLEVEL_OUTOF10: 0
PAINLEVEL_OUTOF10: 7

## 2024-11-09 ASSESSMENT — PAIN SCALES - WONG BAKER
WONGBAKER_NUMERICALRESPONSE: NO HURT
WONGBAKER_NUMERICALRESPONSE: NO HURT

## 2024-11-09 ASSESSMENT — PAIN DESCRIPTION - DESCRIPTORS: DESCRIPTORS: ACHING

## 2024-11-09 ASSESSMENT — PAIN - FUNCTIONAL ASSESSMENT: PAIN_FUNCTIONAL_ASSESSMENT: ACTIVITIES ARE NOT PREVENTED

## 2024-11-09 ASSESSMENT — PAIN DESCRIPTION - LOCATION: LOCATION: HEAD

## 2024-11-09 NOTE — PROGRESS NOTES
Pt misses her cat Baby.    11/09/24 1521   Encounter Summary   Encounter Overview/Reason Loneliness/Social Isolation   Service Provided For Patient   Last Encounter  11/09/24   Complexity of Encounter Low   Begin Time 1340   End Time  1345   Total Time Calculated 5 min   Spiritual/Emotional needs   Type Spiritual Support   Assessment/Intervention/Outcome   Assessment Calm;Coping   Intervention Active listening;Sustaining Presence/Ministry of presence   Outcome Comfort

## 2024-11-09 NOTE — PROGRESS NOTES
Date:   2024  Patient name: Graciela Holt  Date of admission:  2024  4:24 PM  MRN:   [id number]  YOB: 1950  PCP: Travis Mason MD    Reason for Admission: Elevated troponin [R79.89]  Acute on chronic diastolic (congestive) heart failure (HCC) [I50.33]  Acute on chronic congestive heart failure, unspecified heart failure type (HCC) [I50.9]    Cardiology follow-up: Multivessel coronary artery disease, CHF with reduced ejection fraction ejection fraction 25 to 30%       Referring physician: Dr Chip Anderson     Impression  Admission 2024 with a severe shortness of breath, CHF systolic acute on chronic  High-sensitivity troponin 42 and 29, proBNP 13,352     Multivessel CAD cardiac cath 2018  CABG LIMA to LAD and diagonal 1, SVG to OM, SVG to PDA 2018 at [facility]  Severely reduced LV systolic function ejection fraction 25 to 30%, akinetic LV apex, apical thrombus 2D echo 2024  Moderately increased LV wall thickness  Episodes of nonsustained V. Tach  Diabetes mellitus  Hyperlipidemia lipid profile 2024 cholesterol 159, HDL 55, LDL 95, triglyceride 41  History of left MCA stroke occluded internal carotid     Admission 2024 chest pain like a heavy pressure radiating to the left arm, no new ECG changes, high-sensitivity troponin 28  Admission 2024 chest pain like heaviness no shortness of breath no radiation ECG showed LVH with repolarization seen changes no change from previous ECG borderline abnormal troponin most likely type II  Admission 2024 for increasing shortness of breath, increasing swelling over the legs, elevated troponin 119, 136,  chest x-ray showed pulmonary edema proBNP 31,990  Past surgical history  Bilateral knee replacement, C-spine surgery, cholecystectomy,  section, CABG  Drug allergies codeine, lisinopril     History of present illness  73-year-old female who lives with her 2 roommates mother of 1 son and 1  LV apical thrombus,  on Eliquis 5 mg twice daily  Episode of 7 beat V. tach she is on amiodarone and carvedilol  Patient has refused for AICD  3: COPD, continue with bronchodilators  Check BMP tomorrow  Patient may benefit with Jardiance once renal function improves and  to check that insurance is going to cover for Jardiance    Electronically signed by Tommy Flynn MD on 11/9/2024 at 1:04 PM

## 2024-11-09 NOTE — PROGRESS NOTES
[facility] Internal Medicine  Juan Carlos Todd MD; Lewis Castro MD, Stan Michaud MD, Pearl Choudhary MD, Surjit eGrmain MD; Chip Anderson MD    [facility] Internal Medicine   IN-PATIENT SERVICE   [facility]    Progress note            Date:   11/9/2024  Patient name:  Graciela Holt  Date of admission:  11/5/2024  4:24 PM  MRN:   [id number]  Account:  [id number]  YOB: 1950  PCP:    Travis Mason MD  Room:   2120/2120-01  Code Status:    Full Code    Chief Complaint:     Chief Complaint   Patient presents with    Shortness of Breath       History Obtained From:     Patient/EMR/Bedside RN    History of Present Illness:     Graciela Holt is a 74 y.o. Non- / non  female who presents with Shortness of Breath   and is admitted to the hospital for the management of Acute on chronic diastolic (congestive) heart failure (HCC). Medical history significant for CAD s/p CABG, HFpEF, HTN, HLD, COPD, hx of cerebral infarction, DM type 2. Presented to Ed complaining of increased shortness of breath and chest pressure, increasing over the past 2 days. CXR shows atelectasis. States prescribed inhalers have not helped. Treated with IV solu-medrol and nebulizer treatments. Reports increased swelling of lower eternities despite compliance with taking lasix 40 mg daily. EKG SR with PVCs, ST depression in lead I and aVL. Troponin 48 repeat 42. BNP significantly elevated 13.000. Last Echo June 2024 EF 55-60%, EF was previously reduced to 35%. Denies fever, chills, abdominal pain, nausea, vomiting, diarrhea, and urinary symptoms.     Admit patient to PCU.  Cardiology consulted, started on heparin gtt, given 40 mg lasix IV.  SOB treated as COPD exacerbation, IV solumedrol and nebulizer treatments.   Conformed code status as Full Code.      Past Medical History:     Past Medical History:   Diagnosis Date    Allergic rhinitis     Arthritis      (24hrs), Av.2 °F (36.8 °C), Min:97.8 °F (36.6 °C), Max:98.5 °F (36.9 °C)    Recent Labs     24  1201 24  1636 24  1943 24  0558   POCGLU 338* 247* 225* 198*       Intake/Output Summary (Last 24 hours) at 2024 0853  Last data filed at 2024 0613  Gross per 24 hour   Intake 400 ml   Output 1350 ml   Net -950 ml       General Appearance: alert, well appearing, and in no acute distress, obese  Mental status: oriented to person, place, and time  Head: normocephalic, atraumatic  Eye: no icterus, redness, pupils equal and reactive, extraocular eye movements intact, conjunctiva clear  Ear: normal external ear, no discharge, hearing intact  Nose: no drainage noted  Mouth: mucous membranes moist  Neck: supple, no carotid bruits, thyroid not palpable  Lungs: Bilateral equal air entry, clear to ausculation, no wheezing, rales or rhonchi, normal effort  Cardiovascular: normal rate, regular rhythm, no murmur, gallop, rub  Abdomen: Soft, nontender, nondistended, normal bowel sounds, no hepatomegaly or splenomegaly  Neurologic: There are no new focal motor or sensory deficits, normal muscle tone and bulk, no abnormal sensation, normal speech, cranial nerves II through XII grossly intact  Skin: No gross lesions, rashes, bruising or bleeding on exposed skin area  Extremities: peripheral pulses palpable, no pedal edema or calf pain with palpation  Psych: normal affect    Investigations:      Laboratory Testing:  Recent Results (from the past 24 hour(s))   POC Glucose Fingerstick    Collection Time: 24 12:01 PM   Result Value Ref Range    POC Glucose 338 (H) 65 - 105 mg/dL   POC Glucose Fingerstick    Collection Time: 24  4:36 PM   Result Value Ref Range    POC Glucose 247 (H) 65 - 105 mg/dL   POC Glucose Fingerstick    Collection Time: 24  7:43 PM   Result Value Ref Range    POC Glucose 225 (H) 65 - 105 mg/dL   Basic Metabolic Panel    Collection Time: 24  5:24 AM   Result

## 2024-11-09 NOTE — CARE COORDINATION
ONGOING DISCHARGE PLAN:    Patient is alert and oriented x4.    Spoke with patient regarding discharge plan and patient confirms that plan is still to DC to home w/ Roommates.    Denies VNS.    Per Cardio Notes, May Benefit from Jardiance, when Renal Function improves. CR today 1.5. Will need to follow.    Will continue to follow for additional discharge needs.    If patient is discharged prior to next notation, then this note serves as note for discharge by case management.    Electronically signed by Aster Pelaez RN on 11/9/2024 at 2:10 PM

## 2024-11-10 ENCOUNTER — APPOINTMENT (OUTPATIENT)
Dept: CT IMAGING | Age: 74
DRG: 190 | End: 2024-11-10
Payer: COMMERCIAL

## 2024-11-10 LAB
ANION GAP SERPL CALCULATED.3IONS-SCNC: 5 MMOL/L (ref 9–16)
BACTERIA URNS QL MICRO: ABNORMAL
BILIRUB UR QL STRIP: NEGATIVE
BNP SERPL-MCNC: 3472 PG/ML (ref 0–300)
BUN SERPL-MCNC: 48 MG/DL (ref 8–23)
CALCIUM SERPL-MCNC: 8.9 MG/DL (ref 8.6–10.4)
CASTS #/AREA URNS LPF: ABNORMAL /LPF
CHLORIDE SERPL-SCNC: 105 MMOL/L (ref 98–107)
CLARITY UR: CLEAR
CO2 SERPL-SCNC: 29 MMOL/L (ref 20–31)
COLOR UR: YELLOW
CREAT SERPL-MCNC: 1.6 MG/DL (ref 0.7–1.2)
CREAT UR-MCNC: 22 MG/DL (ref 28–217)
EPI CELLS #/AREA URNS HPF: ABNORMAL /HPF
GFR, ESTIMATED: 33 ML/MIN/1.73M2
GLUCOSE BLD-MCNC: 122 MG/DL (ref 65–105)
GLUCOSE BLD-MCNC: 224 MG/DL (ref 65–105)
GLUCOSE BLD-MCNC: 242 MG/DL (ref 65–105)
GLUCOSE BLD-MCNC: 289 MG/DL (ref 65–105)
GLUCOSE SERPL-MCNC: 132 MG/DL (ref 74–99)
GLUCOSE UR STRIP-MCNC: NEGATIVE MG/DL
HGB UR QL STRIP.AUTO: NEGATIVE
KETONES UR STRIP-MCNC: NEGATIVE MG/DL
LEUKOCYTE ESTERASE UR QL STRIP: ABNORMAL
NITRITE UR QL STRIP: NEGATIVE
PH UR STRIP: 5.5 [PH] (ref 5–8)
POTASSIUM SERPL-SCNC: 5.1 MMOL/L (ref 3.7–5.3)
PROT UR STRIP-MCNC: NEGATIVE MG/DL
RBC #/AREA URNS HPF: ABNORMAL /HPF
SODIUM SERPL-SCNC: 139 MMOL/L (ref 136–145)
SODIUM UR-SCNC: 45 MMOL/L
SP GR UR STRIP: 1.01 (ref 1–1.03)
UROBILINOGEN UR STRIP-ACNC: NORMAL EU/DL (ref 0–1)
WBC #/AREA URNS HPF: ABNORMAL /HPF

## 2024-11-10 PROCEDURE — 99232 SBSQ HOSP IP/OBS MODERATE 35: CPT

## 2024-11-10 PROCEDURE — 80048 BASIC METABOLIC PNL TOTAL CA: CPT

## 2024-11-10 PROCEDURE — 2580000003 HC RX 258

## 2024-11-10 PROCEDURE — 94760 N-INVAS EAR/PLS OXIMETRY 1: CPT

## 2024-11-10 PROCEDURE — 94640 AIRWAY INHALATION TREATMENT: CPT

## 2024-11-10 PROCEDURE — 82947 ASSAY GLUCOSE BLOOD QUANT: CPT

## 2024-11-10 PROCEDURE — 36415 COLL VENOUS BLD VENIPUNCTURE: CPT

## 2024-11-10 PROCEDURE — 84300 ASSAY OF URINE SODIUM: CPT

## 2024-11-10 PROCEDURE — 81001 URINALYSIS AUTO W/SCOPE: CPT

## 2024-11-10 PROCEDURE — 2580000003 HC RX 258: Performed by: NURSE PRACTITIONER

## 2024-11-10 PROCEDURE — 6370000000 HC RX 637 (ALT 250 FOR IP): Performed by: NURSE PRACTITIONER

## 2024-11-10 PROCEDURE — 6370000000 HC RX 637 (ALT 250 FOR IP)

## 2024-11-10 PROCEDURE — 83880 ASSAY OF NATRIURETIC PEPTIDE: CPT

## 2024-11-10 PROCEDURE — 2060000000 HC ICU INTERMEDIATE R&B

## 2024-11-10 PROCEDURE — 51798 US URINE CAPACITY MEASURE: CPT

## 2024-11-10 PROCEDURE — 82570 ASSAY OF URINE CREATININE: CPT

## 2024-11-10 PROCEDURE — 6360000002 HC RX W HCPCS

## 2024-11-10 PROCEDURE — 70450 CT HEAD/BRAIN W/O DYE: CPT

## 2024-11-10 PROCEDURE — 6370000000 HC RX 637 (ALT 250 FOR IP): Performed by: INTERNAL MEDICINE

## 2024-11-10 RX ORDER — BUTALBITAL, ACETAMINOPHEN AND CAFFEINE 300; 40; 50 MG/1; MG/1; MG/1
1 CAPSULE ORAL ONCE
Status: COMPLETED | OUTPATIENT
Start: 2024-11-10 | End: 2024-11-10

## 2024-11-10 RX ADMIN — FAMOTIDINE 20 MG: 20 TABLET, FILM COATED ORAL at 17:16

## 2024-11-10 RX ADMIN — POTASSIUM CHLORIDE 10 MEQ: 750 CAPSULE, EXTENDED RELEASE ORAL at 07:53

## 2024-11-10 RX ADMIN — INSULIN LISPRO 4 UNITS: 100 INJECTION, SOLUTION INTRAVENOUS; SUBCUTANEOUS at 21:47

## 2024-11-10 RX ADMIN — INSULIN GLARGINE 5 UNITS: 100 INJECTION, SOLUTION SUBCUTANEOUS at 21:46

## 2024-11-10 RX ADMIN — INSULIN LISPRO 4 UNITS: 100 INJECTION, SOLUTION INTRAVENOUS; SUBCUTANEOUS at 13:37

## 2024-11-10 RX ADMIN — SODIUM CHLORIDE, PRESERVATIVE FREE 10 ML: 5 INJECTION INTRAVENOUS at 08:13

## 2024-11-10 RX ADMIN — CARVEDILOL 3.12 MG: 3.12 TABLET, FILM COATED ORAL at 17:16

## 2024-11-10 RX ADMIN — BUTALBITA,ACETAMINOPHEN AND CAFFEINE 1 CAPSULE: 50; 300; 40 CAPSULE ORAL at 08:13

## 2024-11-10 RX ADMIN — BUDESONIDE AND FORMOTEROL FUMARATE DIHYDRATE 2 PUFF: 160; 4.5 AEROSOL RESPIRATORY (INHALATION) at 20:26

## 2024-11-10 RX ADMIN — BUDESONIDE AND FORMOTEROL FUMARATE DIHYDRATE 2 PUFF: 160; 4.5 AEROSOL RESPIRATORY (INHALATION) at 07:29

## 2024-11-10 RX ADMIN — ASPIRIN 81 MG: 81 TABLET, COATED ORAL at 07:52

## 2024-11-10 RX ADMIN — INSULIN LISPRO 8 UNITS: 100 INJECTION, SOLUTION INTRAVENOUS; SUBCUTANEOUS at 17:16

## 2024-11-10 RX ADMIN — APIXABAN 5 MG: 5 TABLET, FILM COATED ORAL at 21:46

## 2024-11-10 RX ADMIN — AMIODARONE HYDROCHLORIDE 200 MG: 200 TABLET ORAL at 07:53

## 2024-11-10 RX ADMIN — ATORVASTATIN CALCIUM 80 MG: 80 TABLET, FILM COATED ORAL at 07:53

## 2024-11-10 RX ADMIN — CARVEDILOL 3.12 MG: 3.12 TABLET, FILM COATED ORAL at 07:52

## 2024-11-10 RX ADMIN — ACETAMINOPHEN 650 MG: 325 TABLET ORAL at 05:43

## 2024-11-10 RX ADMIN — PREDNISONE 20 MG: 20 TABLET ORAL at 07:53

## 2024-11-10 RX ADMIN — Medication 3 MG: at 21:46

## 2024-11-10 RX ADMIN — WATER 1000 MG: 1 INJECTION INTRAMUSCULAR; INTRAVENOUS; SUBCUTANEOUS at 13:37

## 2024-11-10 RX ADMIN — APIXABAN 5 MG: 5 TABLET, FILM COATED ORAL at 07:53

## 2024-11-10 ASSESSMENT — PAIN DESCRIPTION - LOCATION
LOCATION: NECK
LOCATION: HEAD

## 2024-11-10 ASSESSMENT — PAIN SCALES - GENERAL
PAINLEVEL_OUTOF10: 10
PAINLEVEL_OUTOF10: 5
PAINLEVEL_OUTOF10: 0

## 2024-11-10 ASSESSMENT — PAIN DESCRIPTION - DESCRIPTORS
DESCRIPTORS: ACHING
DESCRIPTORS: ACHING

## 2024-11-10 NOTE — PROGRESS NOTES
Pt not straight cathed at this time as pt bladder scan showed 330ml, >400ml id required for straight cath

## 2024-11-10 NOTE — CARE COORDINATION
ONGOING DISCHARGE PLAN:    Patient is alert and oriented x4.    Spoke with patient regarding discharge plan and patient confirms that plan is still to DC to home w/ Roommates.     Denies VNS.     Per Cardio Notes, May Benefit from Jardiance, when Renal Function improves. CR today 1.6. New Consult for Nephro to see.     Consult for Urology for UR.     IV Rocephin.    Per Previous CM notes, Eliquis is 0 copay.     Will continue to follow for additional discharge needs.    If patient is discharged prior to next notation, then this note serves as note for discharge by case management.    Electronically signed by Aster Pelaez RN on 11/10/2024 at 12:31 PM
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
## 2024-11-10 NOTE — PROGRESS NOTES
[facility] Internal Medicine  Juan Carlos Todd MD; Lewis Castro MD, Stan Michaud MD, Pearl Choudhary MD, Surjit Germain MD; Chip Anderson MD    [facility] Internal Medicine   IN-PATIENT SERVICE   [facility]    Progress note            Date:   11/10/2024  Patient name:  Graciela Holt  Date of admission:  11/5/2024  4:24 PM  MRN:   [id number]  Account:  [id number]  YOB: 1950  PCP:    Travis Mason MD  Room:   2120/2120-01  Code Status:    Full Code    Chief Complaint:     Chief Complaint   Patient presents with    Shortness of Breath       History Obtained From:     Patient/EMR/Bedside RN    History of Present Illness:     Graciela Holt is a 74 y.o. Non- / non  female who presents with Shortness of Breath   and is admitted to the hospital for the management of Acute on chronic diastolic (congestive) heart failure (HCC). Medical history significant for CAD s/p CABG, HFpEF, HTN, HLD, COPD, hx of cerebral infarction, DM type 2. Presented to Ed complaining of increased shortness of breath and chest pressure, increasing over the past 2 days. CXR shows atelectasis. States prescribed inhalers have not helped. Treated with IV solu-medrol and nebulizer treatments. Reports increased swelling of lower eternities despite compliance with taking lasix 40 mg daily. EKG SR with PVCs, ST depression in lead I and aVL. Troponin 48 repeat 42. BNP significantly elevated 13.000. Last Echo June 2024 EF 55-60%, EF was previously reduced to 35%. Denies fever, chills, abdominal pain, nausea, vomiting, diarrhea, and urinary symptoms.     Admit patient to PCU.  Cardiology consulted, started on heparin gtt, given 40 mg lasix IV.  SOB treated as COPD exacerbation, IV solumedrol and nebulizer treatments.   Conformed code status as Full Code.      Past Medical History:     Past Medical History:   Diagnosis Date    Allergic rhinitis     Arthritis      Kisha RIVAS MD   fluticasone (FLONASE) 50 MCG/ACT nasal spray USE 2 SPRAYS IN EACH NOSTRIL ONCE DAILY  Patient not taking: Reported on 11/5/2024 5/21/21   Aurora Escalona MD        Allergies:     Codeine, Lisinopril, Morphine, and Potassium-containing compounds    Social History:     Tobacco:    reports that she has been smoking cigarettes. She started smoking about 57 years ago. She has a 56 pack-year smoking history. She has never used smokeless tobacco.  Alcohol:      reports no history of alcohol use.  Drug Use:  reports that she does not currently use drugs after having used the following drugs: Marijuana (Weed).    Family History:     Family History   Problem Relation Age of Onset    Heart Disease Father        Review of Systems:     Positive and Negative as described in HPI.    CONSTITUTIONAL:  negative for fevers, chills, sweats, fatigue, weight loss  HEENT:  negative for vision, hearing changes, runny nose, throat pain  RESPIRATORY:  negative for shortness of breath, cough, congestion, wheezing  CARDIOVASCULAR:  negative for chest pain, palpitations  GASTROINTESTINAL:  negative for nausea, vomiting, diarrhea, constipation, change in bowel habits, abdominal pain   GENITOURINARY:  negative for difficulty of urination, burning with urination, frequency   INTEGUMENT:  negative for rash, skin lesions, easy bruising   HEMATOLOGIC/LYMPHATIC:  negative for swelling/edema   ALLERGIC/IMMUNOLOGIC:  negative for urticaria , itching  ENDOCRINE:  negative increase in drinking, increase in urination, hot or cold intolerance  MUSCULOSKELETAL:  negative joint pains, muscle aches, swelling of joints  NEUROLOGICAL:  negative for headaches, dizziness, lightheadedness, numbness, pain, tingling extremities  BEHAVIOR/PSYCH:  negative for depression, anxiety    Physical Exam:   BP (!) 144/94   Pulse 57   Temp 97.7 °F (36.5 °C) (Oral)   Resp 18   Ht 1.626 m (5' 4\")   Wt 59.4 kg (131 lb)   SpO2 99%   BMI 22.49 kg/m²   Temp
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
## 2024-11-10 NOTE — CONSULTS
NEPHROLOGY CONSULT     Patient :  Graciela Holt; 74 y.o. MRN# [id number]  Location:  2120/2120-01  Attending:  Chip Anderson MD  Admit Date:  11/5/2024   Hospital Day: 5      Reason for Consult: Acute kidney injury      Chief Complaint: Shortness of breath, cough  History Obtained From:  patient    History of Present Illness:    This is a 74 y.o. female with past medical history of coronary artery disease, status post CABG 2018, CHF with reduced EF of 25 to 30% repeat echo in June 2024 showed EF of 55 to 60% moderately increased wall thickness diastolic dysfunction, history of type 2 diabetes, history of left MCA stroke.  History of COPD  Patient presented to the hospital on 11/5/2024 with complaints of severe shortness of breath, cough no fever chills no chest pain.  Chest x-ray showed mild right basilar atelectasis or scarring no lobar consolidation  Patient was started on loop diuretics, serum creatinine on 11/5/2024 was 1.1 mg/dL, serum creatinine increased to 1.5 mg/dL on 11/9/2024, most recent serum creatinine today is 1.6 mg/dL--diuretics and losartan were held on 11/8/2024  Patient complains of difficulty urination with postvoid residual of 330 mL requiring straight cath, urology was consulted      Pt denies any history of  prolonged NSAID use.  Patient denies dysuria, gross hematuria, flank pain, nocturia, urgency, passing frothy urine or urinary incontinence.  There has been no recent exposure to IV contrast.   There is no history  of paraprotein disease.   Pt denies any history of recurrent UTI or kidney stones.  Medication review shows use of diuretics and ARB    Past Medical History:        Diagnosis Date    Allergic rhinitis     Arthritis     general    CAD (coronary artery disease)     Dr. Fowler/ [place]    Cerebral artery occlusion with cerebral infarction (HCC) 07/2020    pt states mild stroke    CHF (congestive heart failure) (HCC)     Controlled type 2 diabetes mellitus without complication,  Labs     11/08/24  0518 11/09/24  0524 11/10/24  0516    137 139   K 4.4 4.3 5.1    103 105   CO2 25 26 29   BUN 51* 50* 48*   CREATININE 1.5* 1.5* 1.6*   GLUCOSE 380* 217* 132*   CALCIUM 8.8 8.7 8.9        Recent Labs     11/10/24  1023   NITRU NEGATIVE   COLORU Yellow   PHUR 5.5   WBCUA 3 to 5*   RBCUA 0 TO 2   BACTERIA FEW*   LEUKOCYTESUR TRACE*   UROBILINOGEN Normal   BILIRUBINUR NEGATIVE   GLUCOSEU NEGATIVE   KETUA NEGATIVE         Radiology:  Reviewed as available.    Assessment:  Patient admitted with shortness of breath likely cause includes CHF with preserved EF versus COPD exacerbation   KELSEY nonoliguric likely cause includes prerenal azotemia versus cardiorenal syndrome use of diuretics also rule out urinary retention, serum creatinine 1.1 mg/dL on admission which has increased to 1.6 mg/dL, UA shows trace leukocyte esterase no protein no blood RBC 0-2  Essential hypertension  History of CHF with reduced EF in the past most recent echo showing preserved EF  History of COPD  Type 2 diabetes          Plan:  Urine electrolytes  Postvoid bladder scan  Hold Lasix, and losartan and Jardiance  Strict I's and O  Check proBNP      Thank you for the consultation.      Electronically signed by Tor Eaton MD on 11/10/2024 at 5:18 PM

## 2024-11-10 NOTE — PROGRESS NOTES
59.4 kg (131 lb)   SpO2 99%   BMI 22.49 kg/m²   General appearance: alert and cooperative with exam  HEENT: Head: Normal, normocephalic, atraumatic.  Neck: no JVD and supple, symmetrical, trachea midline  Lungs:  Poor chest expansion diminished breath sound both side  Heart: regular rate and rhythm  Abdomen:  Soft bowel sounds present  Extremities: Homans sign is negative, no sign of DVT  Neurologic: Mental status: Alert, oriented, thought content appropriate    EKG: Sinus rhythm, voltage criteria for LVH, repolarization changes borderline R wave progression.  ECHO: reviewed.   Ejection fraction: 25% dilated LV, moderate increased LV wall thickness Global hypokinesis, akinesis of the apical lateral and anterolateral walls akinetic apex dilated LA  Stress Test: reviewed.  Cardiac Angiography: reviewed.    Assessment / Acute Cardiac Problems:   Admission 11/5/2024 with severe shortness of breath, elevated proBNP 13,352, borderline abnormal high-sensitivity troponin 42 and 29 down funk trend  Multivessel CAD, CABG LIMA to LAD and diagonal, SVG to OM and PDA February 2018 at [facility]  Severely reduced LV function ejection fraction 25%, akinetic apex, apical thrombus  Previous admission nonsustained ventricular arrhythmias   Patient  denied using LifeVest, does not want AICD     11/8/2024 for worsening of the renal function, diuretic on hold, losartan on hold  11/10/2024 no change in the renal function in spite of stopping diuretic and losartan    Patient Active Problem List:     Chronic pain     Controlled type 2 diabetes mellitus without complication, without long-term current use of insulin (HCC)     Allergic rhinitis     Hypertension goal BP (blood pressure) < 140/90     Mixed hyperlipidemia     Chronic obstructive pulmonary disease (HCC)     Coronary artery disease involving native coronary artery of native heart without angina pectoris     Primary insomnia     Diarrhea in adult patient     Restless  leg syndrome     Atherosclerosis of superior mesenteric artery ([facility])     Left adrenal mass ([facility])     Former smoker     Chronic bilateral low back pain with left-sided sciatica     Hypothyroidism     S/P CABG x 4     Acute pain of right knee     Abnormal stress test     Tobacco use disorder     Neck pain     Acute ischemic left MCA stroke ([facility])     Internal carotid artery occlusion     Stenosis of left internal carotid artery     Acquired spondylolisthesis of cervical vertebra     Stenosis of cervical spine with myelopathy ([facility])     Abnormal EKG     COPD exacerbation ([facility])     Multifocal pneumonia     Hypoxia     Pneumonia     Iron deficiency anemia     Chronic combined systolic and diastolic congestive heart failure ([facility])     Type 2 diabetes mellitus with chronic kidney disease     Symptomatic congestive heart failure, pre-operative cardiovascular examination ([facility])     Acute on chronic HFrEF (heart failure with reduced ejection fraction) ([facility])     Encounter for palliative care     Goals of care, counseling/discussion     ACP (advance care planning)     CHF (congestive heart failure), NYHA class I, acute on chronic, combined ([facility])     Shortness of breath     History of COPD     Noncompliance     Protein-calorie malnutrition ([facility])     Acute decompensated heart failure ([facility])     NSTEMI (non-ST elevated myocardial infarction) ([facility])     Chest pain     Angina pectoris ([facility])     Acute on chronic diastolic (congestive) heart failure ([facility])      Plan of Treatment:   Medications reviewed  1: Admitted with severe shortness of breath , cardiomyopathy, severely reduced LV systolic function elevated proBNP> 13,000 ,abnormal high-sensitivity troponin , CHF improved with IV Lasix   Worsening of the renal function with diuretics  Continue to hold losartan  DC Bumex  2: History of ventricular arrhythmia continue current dose of amiodarone 200 daily carvedilol 3.125 mg twice daily  3: LV apical thrombus,  on Eliquis 5 mg twice

## 2024-11-11 ENCOUNTER — APPOINTMENT (OUTPATIENT)
Dept: ULTRASOUND IMAGING | Age: 74
DRG: 190 | End: 2024-11-11
Payer: COMMERCIAL

## 2024-11-11 ENCOUNTER — APPOINTMENT (OUTPATIENT)
Age: 74
DRG: 190 | End: 2024-11-11
Payer: COMMERCIAL

## 2024-11-11 VITALS
TEMPERATURE: 97.7 F | SYSTOLIC BLOOD PRESSURE: 118 MMHG | HEIGHT: 64 IN | DIASTOLIC BLOOD PRESSURE: 75 MMHG | BODY MASS INDEX: 22.36 KG/M2 | OXYGEN SATURATION: 92 % | HEART RATE: 68 BPM | WEIGHT: 131 LBS | RESPIRATION RATE: 18 BRPM

## 2024-11-11 LAB
ANION GAP SERPL CALCULATED.3IONS-SCNC: 7 MMOL/L (ref 9–16)
BUN SERPL-MCNC: 37 MG/DL (ref 8–23)
CALCIUM SERPL-MCNC: 8.6 MG/DL (ref 8.6–10.4)
CHLORIDE SERPL-SCNC: 105 MMOL/L (ref 98–107)
CO2 SERPL-SCNC: 28 MMOL/L (ref 20–31)
CREAT SERPL-MCNC: 1.4 MG/DL (ref 0.7–1.2)
ECHO AO ROOT DIAM: 3.5 CM
ECHO AO ROOT INDEX: 2.15 CM/M2
ECHO AV AREA PEAK VELOCITY: 2.6 CM2
ECHO AV AREA VTI: 2.7 CM2
ECHO AV AREA/BSA PEAK VELOCITY: 1.6 CM2/M2
ECHO AV AREA/BSA VTI: 1.7 CM2/M2
ECHO AV MEAN GRADIENT: 5 MMHG
ECHO AV MEAN VELOCITY: 1 M/S
ECHO AV PEAK GRADIENT: 9 MMHG
ECHO AV PEAK VELOCITY: 1.5 M/S
ECHO AV VELOCITY RATIO: 0.67
ECHO AV VTI: 32.8 CM
ECHO BSA: 1.64 M2
ECHO EST RA PRESSURE: 3 MMHG
ECHO LA AREA 2C: 29.2 CM2
ECHO LA AREA 4C: 26.7 CM2
ECHO LA DIAMETER INDEX: 3.62 CM/M2
ECHO LA DIAMETER: 5.9 CM
ECHO LA MAJOR AXIS: 7.1 CM
ECHO LA MINOR AXIS: 7.1 CM
ECHO LA TO AORTIC ROOT RATIO: 1.69
ECHO LA VOL BP: 90 ML (ref 22–52)
ECHO LA VOL MOD A2C: 99 ML (ref 22–52)
ECHO LA VOL MOD A4C: 83 ML (ref 22–52)
ECHO LA VOL/BSA BIPLANE: 55 ML/M2 (ref 16–34)
ECHO LA VOLUME INDEX MOD A2C: 61 ML/M2 (ref 16–34)
ECHO LA VOLUME INDEX MOD A4C: 51 ML/M2 (ref 16–34)
ECHO LV E' LATERAL VELOCITY: 8.5 CM/S
ECHO LV E' SEPTAL VELOCITY: 3.2 CM/S
ECHO LV EDV A2C: 146 ML
ECHO LV EDV A4C: 200 ML
ECHO LV EDV INDEX A4C: 123 ML/M2
ECHO LV EDV NDEX A2C: 90 ML/M2
ECHO LV EF PHYSICIAN: 20 %
ECHO LV EJECTION FRACTION A2C: 19 %
ECHO LV EJECTION FRACTION A4C: 24 %
ECHO LV EJECTION FRACTION BIPLANE: 18 % (ref 55–100)
ECHO LV ESV A2C: 118 ML
ECHO LV ESV A4C: 152 ML
ECHO LV ESV INDEX A2C: 72 ML/M2
ECHO LV ESV INDEX A4C: 93 ML/M2
ECHO LV FRACTIONAL SHORTENING: 10 % (ref 28–44)
ECHO LV INTERNAL DIMENSION DIASTOLE INDEX: 3.01 CM/M2
ECHO LV INTERNAL DIMENSION DIASTOLIC: 4.9 CM (ref 3.9–5.3)
ECHO LV INTERNAL DIMENSION SYSTOLIC INDEX: 2.7 CM/M2
ECHO LV INTERNAL DIMENSION SYSTOLIC: 4.4 CM
ECHO LV IVSD: 1.4 CM (ref 0.6–0.9)
ECHO LV MASS 2D: 297.5 G (ref 67–162)
ECHO LV MASS INDEX 2D: 182.5 G/M2 (ref 43–95)
ECHO LV POSTERIOR WALL DIASTOLIC: 1.5 CM (ref 0.6–0.9)
ECHO LV RELATIVE WALL THICKNESS RATIO: 0.61
ECHO LVOT AREA: 3.8 CM2
ECHO LVOT AV VTI INDEX: 0.7
ECHO LVOT DIAM: 2.2 CM
ECHO LVOT MEAN GRADIENT: 3 MMHG
ECHO LVOT PEAK GRADIENT: 4 MMHG
ECHO LVOT PEAK VELOCITY: 1 M/S
ECHO LVOT STROKE VOLUME INDEX: 53.4 ML/M2
ECHO LVOT SV: 87 ML
ECHO LVOT VTI: 22.9 CM
ECHO MV A VELOCITY: 0.71 M/S
ECHO MV AREA VTI: 2.8 CM2
ECHO MV E DECELERATION TIME (DT): 201 MS
ECHO MV E VELOCITY: 0.84 M/S
ECHO MV E/A RATIO: 1.18
ECHO MV E/E' LATERAL: 9.88
ECHO MV E/E' RATIO (AVERAGED): 18.07
ECHO MV E/E' SEPTAL: 26.25
ECHO MV LVOT VTI INDEX: 1.36
ECHO MV MAX VELOCITY: 1 M/S
ECHO MV MEAN GRADIENT: 1 MMHG
ECHO MV MEAN VELOCITY: 0.5 M/S
ECHO MV PEAK GRADIENT: 4 MMHG
ECHO MV VTI: 31.1 CM
ECHO RA AREA 4C: 16.5 CM2
ECHO RA END SYSTOLIC VOLUME APICAL 4 CHAMBER INDEX BSA: 27 ML/M2
ECHO RA VOLUME: 44 ML
ECHO RIGHT VENTRICULAR SYSTOLIC PRESSURE (RVSP): 32 MMHG
ECHO RV TAPSE: 1.4 CM (ref 1.7–?)
ECHO TV REGURGITANT MAX VELOCITY: 2.7 M/S
ECHO TV REGURGITANT PEAK GRADIENT: 16 MMHG
GFR, ESTIMATED: 39 ML/MIN/1.73M2
GLUCOSE BLD-MCNC: 145 MG/DL (ref 65–105)
GLUCOSE BLD-MCNC: 181 MG/DL (ref 65–105)
GLUCOSE BLD-MCNC: 293 MG/DL (ref 65–105)
GLUCOSE SERPL-MCNC: 145 MG/DL (ref 74–99)
POTASSIUM SERPL-SCNC: 4.3 MMOL/L (ref 3.7–5.3)
SODIUM SERPL-SCNC: 140 MMOL/L (ref 136–145)

## 2024-11-11 PROCEDURE — 99239 HOSP IP/OBS DSCHRG MGMT >30: CPT

## 2024-11-11 PROCEDURE — 2580000003 HC RX 258

## 2024-11-11 PROCEDURE — 94761 N-INVAS EAR/PLS OXIMETRY MLT: CPT

## 2024-11-11 PROCEDURE — 6370000000 HC RX 637 (ALT 250 FOR IP): Performed by: NURSE PRACTITIONER

## 2024-11-11 PROCEDURE — 6360000004 HC RX CONTRAST MEDICATION

## 2024-11-11 PROCEDURE — 2580000003 HC RX 258: Performed by: NURSE PRACTITIONER

## 2024-11-11 PROCEDURE — C8929 TTE W OR WO FOL WCON,DOPPLER: HCPCS

## 2024-11-11 PROCEDURE — 6360000002 HC RX W HCPCS

## 2024-11-11 PROCEDURE — 6370000000 HC RX 637 (ALT 250 FOR IP): Performed by: INTERNAL MEDICINE

## 2024-11-11 PROCEDURE — 80048 BASIC METABOLIC PNL TOTAL CA: CPT

## 2024-11-11 PROCEDURE — 36415 COLL VENOUS BLD VENIPUNCTURE: CPT

## 2024-11-11 PROCEDURE — 82947 ASSAY GLUCOSE BLOOD QUANT: CPT

## 2024-11-11 PROCEDURE — 76775 US EXAM ABDO BACK WALL LIM: CPT

## 2024-11-11 PROCEDURE — 94640 AIRWAY INHALATION TREATMENT: CPT

## 2024-11-11 PROCEDURE — 51798 US URINE CAPACITY MEASURE: CPT

## 2024-11-11 PROCEDURE — 6370000000 HC RX 637 (ALT 250 FOR IP)

## 2024-11-11 RX ORDER — BUMETANIDE 1 MG/1
1 TABLET ORAL DAILY PRN
Qty: 30 TABLET | Refills: 0 | Status: SHIPPED | OUTPATIENT
Start: 2024-11-11

## 2024-11-11 RX ORDER — FUROSEMIDE 20 MG/1
20 TABLET ORAL DAILY
Status: DISCONTINUED | OUTPATIENT
Start: 2024-11-11 | End: 2024-11-11 | Stop reason: HOSPADM

## 2024-11-11 RX ORDER — PREDNISONE 20 MG/1
20 TABLET ORAL DAILY
Qty: 2 TABLET | Refills: 0 | Status: SHIPPED | OUTPATIENT
Start: 2024-11-12 | End: 2024-11-14

## 2024-11-11 RX ORDER — CEPHALEXIN 500 MG/1
500 CAPSULE ORAL 3 TIMES DAILY
Qty: 15 CAPSULE | Refills: 0 | Status: SHIPPED | OUTPATIENT
Start: 2024-11-11 | End: 2024-11-16

## 2024-11-11 RX ADMIN — ASPIRIN 81 MG: 81 TABLET, COATED ORAL at 07:42

## 2024-11-11 RX ADMIN — FUROSEMIDE 20 MG: 20 TABLET ORAL at 16:19

## 2024-11-11 RX ADMIN — WATER 1000 MG: 1 INJECTION INTRAMUSCULAR; INTRAVENOUS; SUBCUTANEOUS at 12:18

## 2024-11-11 RX ADMIN — CARVEDILOL 3.12 MG: 3.12 TABLET, FILM COATED ORAL at 07:42

## 2024-11-11 RX ADMIN — INSULIN LISPRO 4 UNITS: 100 INJECTION, SOLUTION INTRAVENOUS; SUBCUTANEOUS at 16:18

## 2024-11-11 RX ADMIN — PERFLUTREN 3.5 ML: 6.52 INJECTION, SUSPENSION INTRAVENOUS at 11:50

## 2024-11-11 RX ADMIN — SODIUM CHLORIDE, PRESERVATIVE FREE 10 ML: 5 INJECTION INTRAVENOUS at 07:42

## 2024-11-11 RX ADMIN — AMIODARONE HYDROCHLORIDE 200 MG: 200 TABLET ORAL at 07:42

## 2024-11-11 RX ADMIN — BUDESONIDE AND FORMOTEROL FUMARATE DIHYDRATE 2 PUFF: 160; 4.5 AEROSOL RESPIRATORY (INHALATION) at 08:04

## 2024-11-11 RX ADMIN — PREDNISONE 20 MG: 20 TABLET ORAL at 07:41

## 2024-11-11 RX ADMIN — ATORVASTATIN CALCIUM 80 MG: 80 TABLET, FILM COATED ORAL at 07:41

## 2024-11-11 RX ADMIN — APIXABAN 5 MG: 5 TABLET, FILM COATED ORAL at 07:41

## 2024-11-11 RX ADMIN — EMPAGLIFLOZIN 10 MG: 10 TABLET, FILM COATED ORAL at 16:19

## 2024-11-11 RX ADMIN — POTASSIUM CHLORIDE 10 MEQ: 750 CAPSULE, EXTENDED RELEASE ORAL at 07:42

## 2024-11-11 RX ADMIN — INSULIN LISPRO 8 UNITS: 100 INJECTION, SOLUTION INTRAVENOUS; SUBCUTANEOUS at 12:18

## 2024-11-11 NOTE — PROGRESS NOTES
Date:   2024  Patient name: Graciela Holt  Date of admission:  2024  4:24 PM  MRN:   [id number]  YOB: 1950  PCP: Travis Mason MD    Reason for Admission: Elevated troponin [R79.89]  Acute on chronic diastolic (congestive) heart failure (HCC) [I50.33]  Acute on chronic congestive heart failure, unspecified heart failure type (HCC) [I50.9]    Cardiology follow-up: Multivessel coronary artery disease, CHF with reduced ejection fraction ejection fraction 25 to 30%        Referring physician: Dr Chip Anderson     Impression  Admission 2024 with a severe shortness of breath, CHF systolic acute on chronic  High-sensitivity troponin 42 and 29, proBNP 13,352     Multivessel CAD cardiac cath 2018  CABG LIMA to LAD and diagonal 1, SVG to OM, SVG to PDA 2018 at [facility]  Severely reduced LV systolic function ejection fraction 25 to 30%, akinetic LV apex, apical thrombus 2D echo 2024  Moderately increased LV wall thickness  Episodes of nonsustained V. Tach  Diabetes mellitus  Hyperlipidemia lipid profile 2024 cholesterol 159, HDL 55, LDL 95, triglyceride 41  History of left MCA stroke occluded internal carotid     Admission 2024 chest pain like a heavy pressure radiating to the left arm, no new ECG changes, high-sensitivity troponin 28  Admission 2024 chest pain like heaviness no shortness of breath no radiation ECG showed LVH with repolarization seen changes no change from previous ECG borderline abnormal troponin most likely type II  Admission 2024 for increasing shortness of breath, increasing swelling over the legs, elevated troponin 119, 136,  chest x-ray showed pulmonary edema proBNP 31,990  Past surgical history  Bilateral knee replacement, C-spine surgery, cholecystectomy,  section, CABG  Drug allergies codeine, lisinopril     History of present illness  73-year-old female who lives with her 2 roommates mother of 1 son and 1

## 2024-11-11 NOTE — PROGRESS NOTES
Physician Progress Note      PATIENT:               IVETT ANDRE  CSN #:                  [id number]  :                       1950  ADMIT DATE:       2024 4:24 PM  DISCH DATE:  RESPONDING  PROVIDER #:        Brian Min MD          QUERY TEXT:    Patient presented with c/o shortness of breath and chest pressure. Noted to   have COPD exacerbation and acute on chronic HF.  Documentation of CKD. If   possible, please document in progress notes and discharge summary if you are   evaluating and/or treating any of the following:    The medical record reflects the following:  Risk Factors: CKD, DM, HTN  Clinical Indicators: GFR ranging 39-52  Treatment: Lab monitoring  Options provided:  -- CKD Stage 3a GFR 45-59  -- CKD Stage 3b GFR 30-44  -- Other - I will add my own diagnosis  -- Disagree - Not applicable / Not valid  -- Disagree - Clinically unable to determine / Unknown  -- Refer to Clinical Documentation Reviewer    PROVIDER RESPONSE TEXT:    This patient has CKD Stage 3b.    Query created by: RENETTA RENDON on 2024 11:52 AM      Electronically signed by:  Brian Min MD 2024 2:40 PM

## 2024-11-11 NOTE — CARE COORDINATION
ONGOING DISCHARGE PLAN:    Patient is alert and oriented x4.    Spoke with patient regarding discharge plan and patient confirms that plan is still home no needs.    Cardio Consult-CHF/Elevated trops. Trops-48,29. Echo-EF 20-25%. Renal US. Cr 1.6 now 1.4. IV Rocephin.     Will continue to follow for additional discharge needs.    If patient is discharged prior to next notation, then this note serves as note for discharge by case management.    Electronically signed by Yuko Pizarro RN on 11/11/2024 at 1:49 PM

## 2024-11-11 NOTE — PROGRESS NOTES
infusion, Continuous PRN  famotidine (PEPCID) tablet 20 mg, QPM  albuterol (PROVENTIL) (2.5 MG/3ML) 0.083% nebulizer solution 2.5 mg, Q4H PRN  ipratropium 0.5 mg-albuterol 2.5 mg (DUONEB) nebulizer solution 1 Dose, Q4H PRN  amiodarone (CORDARONE) tablet 200 mg, Daily  atorvastatin (LIPITOR) tablet 80 mg, Daily  budesonide-formoterol (SYMBICORT) 160-4.5 MCG/ACT inhaler 2 puff, BID RT  carvedilol (COREG) tablet 3.125 mg, BID WC  [Held by provider] losartan (COZAAR) tablet 25 mg, Daily  nitroGLYCERIN (NITROSTAT) SL tablet 0.4 mg, Q5 Min PRN  potassium chloride (MICRO-K) extended release capsule 10 mEq, Daily  rOPINIRole (REQUIP) tablet 1 mg, Nightly PRN  aspirin EC tablet 81 mg, Daily  sodium chloride flush 0.9 % injection 5-40 mL, 2 times per day  sodium chloride flush 0.9 % injection 10 mL, PRN  0.9 % sodium chloride infusion, PRN  magnesium sulfate 2000 mg in water 50 mL IVPB, PRN  melatonin tablet 3 mg, Nightly PRN  polyethylene glycol (GLYCOLAX) packet 17 g, Daily PRN  bisacodyl (DULCOLAX) suppository 10 mg, Daily PRN  acetaminophen (TYLENOL) tablet 650 mg, Q6H PRN   Or  acetaminophen (TYLENOL) suppository 650 mg, Q6H PRN      Objective:  CURRENT TEMPERATURE:  Temp: 97.7 °F (36.5 °C)  MAXIMUM TEMPERATURE OVER 24HRS:  Temp (24hrs), Av °F (36.7 °C), Min:97.7 °F (36.5 °C), Max:98.6 °F (37 °C)    CURRENT RESPIRATORY RATE:  Respirations: 18  CURRENT PULSE:  Pulse: 68  CURRENT BLOOD PRESSURE:  BP: 118/72  24HR BLOOD PRESSURE RANGE:  Systolic (24hrs), Av , Min:98 , Max:157   ; Diastolic (24hrs), Av, Min:57, Max:94    24HR INTAKE/OUTPUT:    Intake/Output Summary (Last 24 hours) at 2024 1456  Last data filed at 2024 1223  Gross per 24 hour   Intake 765 ml   Output 1800 ml   Net -1035 ml     Patient Vitals for the past 96 hrs (Last 3 readings):   Weight   24 1150 59.4 kg (131 lb)   24 0348 59.4 kg (131 lb)     Physical Exam:  GENERAL APPEARANCE: Alert and cooperative, and appears to be  in no acute distress.  HEAD: normocephalic and atraumatic.  EYES: EOMI. Not pale, anicteric   NOSE:  No nasal discharge.    THROAT:  Oral cavity and pharynx normal. Moist  CARDIAC: Normal S1 and S2. No S3, S4 or murmurs. Rhythm is regular.  LUNGS: diminished breath sounds.  No wheezing no accessory muscle use NECK: Neck supple, non-tender trachea midline  ABDOMEN: soft nontender nondistended abdomen  EXTREMITIES: No edema. Peripheral pulses intact.   NEURO: Nonfocal    Labs:   CBC:  No results for input(s): \"WBC\", \"RBC\", \"HGB\", \"HCT\", \"MCV\", \"MCH\", \"MCHC\", \"RDW\", \"PLT\", \"MPV\" in the last 72 hours.     BMP:   Recent Labs     11/09/24  0524 11/10/24  0516 11/11/24  0539    139 140   K 4.3 5.1 4.3    105 105   CO2 26 29 28   BUN 50* 48* 37*   CREATININE 1.5* 1.6* 1.4*   GLUCOSE 217* 132* 145*   CALCIUM 8.7 8.9 8.6      Recent Labs     11/10/24  1023   NITRU NEGATIVE   COLORU Yellow   PHUR 5.5   WBCUA 3 to 5*   RBCUA 0 TO 2   BACTERIA FEW*   LEUKOCYTESUR TRACE*   UROBILINOGEN Normal   BILIRUBINUR NEGATIVE   GLUCOSEU NEGATIVE   KETUA NEGATIVE     Assessment/plan:    1.  Acute kidney injury - most consistent with prerenal azotemia/cardiorenal syndrome from diuretics.  Baseline serum creatinine is 1.1 mg/dL but patient has indications for diuretics.  Renal function is stable with serum creatinine 1.4 mg/dL today.  Continue to hold angiotensin receptor blocker.    2.  Systemic hypertension - blood pressure control is adequate.    3.  Heart failure with reduced ejection fraction - okay to resume low-dose Lasix 20 mg p.o. daily as well as Jardiance.    Prognosis is guarded.    No renal objection to discharge the patient to follow-up with me in 4 weeks.    Electronically signed by Deacon Hart MD on 11/11/2024 at 2:56 PM

## 2024-11-11 NOTE — PLAN OF CARE
Problem: Chronic Conditions and Co-morbidities  Goal: Patient's chronic conditions and co-morbidity symptoms are monitored and maintained or improved  11/11/2024 1456 by Francy Pozo RN  Outcome: Adequate for Discharge     Problem: Discharge Planning  Goal: Discharge to home or other facility with appropriate resources  11/11/2024 1456 by Francy Pozo RN  Outcome: Adequate for Discharge     Problem: Safety - Adult  Goal: Free from fall injury  11/11/2024 1456 by Francy Pozo RN  Outcome: Adequate for Discharge     Problem: Pain  Goal: Verbalizes/displays adequate comfort level or baseline comfort level  11/11/2024 1456 by Francy Pozo RN  Outcome: Adequate for Discharge     Problem: ABCDS Injury Assessment  Goal: Absence of physical injury  11/11/2024 1456 by Francy Pozo RN  Outcome: Adequate for Discharge

## 2024-11-11 NOTE — DISCHARGE SUMMARY
IN-PATIENT SERVICE   [facility]    Discharge Summary     Patient ID: Graciela Holt  :  1950   MRN: [id number]     ACCOUNT:  [id number]   Patient's PCP: Travis Mason MD  Admit Date: 2024   Discharge Date: 24   Length of Stay: 6  Code Status:  Full Code  Admitting Physician: Chip Anderson MD  Discharge Physician: Brian Min MD     Active Discharge Diagnoses:     Primary Problem  Acute on chronic diastolic (congestive) heart failure (HCC)      Hospital Problems  Active Hospital Problems    Diagnosis Date Noted    Acute on chronic HFrEF (heart failure with reduced ejection fraction) ([facility]) [I50.23] 2022     Priority: Medium    Acute on chronic diastolic (congestive) heart failure (HCC) [I50.33] 2024    Type 2 diabetes mellitus with chronic kidney disease [E11.22] 02/10/2022    S/P CABG x 4 [Z95.1]     Controlled type 2 diabetes mellitus without complication, without long-term current use of insulin ([facility]) [E11.9] 09/10/2015    Hypertension goal BP (blood pressure) < 140/90 [I10] 09/10/2015    Mixed hyperlipidemia [E78.2] 09/10/2015    Chronic obstructive pulmonary disease (HCC) [J44.9] 09/10/2015    Coronary artery disease involving native coronary artery of native heart without angina pectoris [I25.10] 09/10/2015         Admission Condition:  fair     Discharged Condition: fair    Hospital Stay:     Hospital Course:  Graciela Holt is a 74 y.o. female who was admitted for the management of Acute on chronic diastolic (congestive) heart failure (HCC) , presented to ER with Shortness of Breath    74-year-old female admitted for acute on chronic diastolic heart failure.  Started on IV diuresis.  Cardiology was on board.  Continued IV diuresis, creatinine increased.  It was held.  Nephrology consulted.  Repeat echo showed EF of 20 to 25%.  Patient to be taking Bumex daily as needed. COPD improved with IV steroids and hence p.o. steroids.  UTI  FINDINGS: Stable enlargement of the cardiac silhouette status post median sternotomy. There is mild right basilar curvilinear atelectasis or scarring.  No lobar consolidation.  No pleural effusion or pneumothorax.  No acute osseous abnormality.     Mild right basilar curvilinear atelectasis or scarring. No lobar consolidation.         Consultations:    Consults:     Final Specialist Recommendations/Findings:   IP CONSULT TO HOSPITALIST  IP CONSULT TO CARDIOLOGY  IP CONSULT TO NEPHROLOGY  IP CONSULT TO UROLOGY      The patient was seen and examined on day of discharge and this discharge summary is in conjunction with any daily progress note from day of discharge.    Discharge plan:     Disposition: home      Instructions to Patient:       Keep follow-up appointments      Requiring Further Evaluation/Follow Up POST HOSPITALIZATION/Incidental Findings:     Physician Follow Up:     Travis Masno MD  [address]  [address]  [facility] [id number] [phone]    Follow up  post-hospital follow-up    Tommy Flynn MD  [address]  [address]  [facility] [address]  [phone]    Follow up in 2 week(s)  post-hospital follow-up    Tor Eaton MD  [address]  [address]  [facility] [id number] [phone]    Follow up in 1 month(s)  post-hospital follow-up       Activity: Resume as directed    Discharge Medications:      Medication List        START taking these medications      apixaban 5 MG Tabs tablet  Commonly known as: ELIQUIS  Take 1 tablet by mouth 2 times daily     bumetanide 1 MG tablet  Commonly known as: BUMEX  Take 1 tablet by mouth daily as needed (if weight increase by >2lbs in a day)     cephALEXin 500 MG capsule  Commonly known as: KEFLEX  Take 1 capsule by mouth 3 times daily for 5 days     empagliflozin 10 MG tablet  Commonly known as: JARDIANCE  Take 1 tablet by mouth daily     fluticasone 50 MCG/ACT nasal spray  Commonly known as: FLONASE  USE 2 SPRAYS IN EACH NOSTRIL ONCE DAILY

## 2024-11-11 NOTE — PROGRESS NOTES
Physical Therapy Cancel Note      DATE: 2024    NAME: Graciela Holt  MRN: [id number]   : 1950      Patient not seen this date for Physical Therapy due to:    Pt declines PT at this time. Per RN Prachi pt has been ambulating independently around room and in the hallways.  Pt states she will be going home today.      Electronically signed by Bonita Bran PTA on 2024 at 10:39 AM

## 2024-11-11 NOTE — PROGRESS NOTES
[facility]        Medications Prior to Admission:     Prior to Admission medications    Medication Sig Start Date End Date Taking? Authorizing Provider   nitroGLYCERIN (NITROSTAT) 0.3 MG SL tablet Place 1 tablet under the tongue as needed for Chest pain up to max of 3 total doses. If no relief after 1 dose, call 911. 8/14/24 11/5/24 Yes Shahla Sadler DO   amiodarone (CORDARONE) 200 MG tablet Take 1 tablet by mouth daily 8/14/24  Yes Chip Anderson MD   losartan (COZAAR) 25 MG tablet Take 1 tablet by mouth daily 4/10/24  Yes Chip Anderson MD   furosemide (LASIX) 40 MG tablet Take 1 tablet by mouth daily 4/10/24  Yes Chip Anderson MD   potassium chloride (KLOR-CON) 10 MEQ extended release tablet Take 1 tablet by mouth daily 1/20/24  Yes Surjit Allen MD   famotidine (PEPCID) 40 MG tablet Take 1 tablet by mouth every evening 1/20/24  Yes Surjit Allen MD   atorvastatin (LIPITOR) 80 MG tablet Take 1 tablet by mouth daily 5/26/23  Yes Travis Mason MD   albuterol sulfate  (90 Base) MCG/ACT inhaler Inhale 2 puffs into the lungs every 6 hours as needed for Wheezing or Shortness of Breath 5/16/22  Yes Kisha Wagner MD   rOPINIRole (REQUIP) 1 MG tablet TAKE 1 TABLET BY MOUTH EVERY NIGHT AS NEEDED 3/30/22  Yes Sammy Ramos APRN - NP   SM ASPIRIN ADULT LOW STRENGTH 81 MG EC tablet TAKE 1 TABLET BY MOUTH DAILY 5/21/21  Yes Aurora Escalona MD   carvedilol (COREG) 3.125 MG tablet Take 1 tablet by mouth 2 times daily (with meals) 8/13/24   Chip Anderson MD   apixaban (ELIQUIS) 5 MG TABS tablet Take 1 tablet by mouth 2 times daily  Patient not taking: Reported on 11/5/2024 4/9/24   Chip Anderson MD   empagliflozin (JARDIANCE) 10 MG tablet Take 1 tablet by mouth daily  Patient not taking: Reported on 11/5/2024 4/10/24   Chip Anderson MD   budesonide-formoterol (SYMBICORT) 160-4.5 MCG/ACT AERO Inhale 2 puffs into the lungs 2 times daily  Patient not taking: Reported on 11/5/2024 5/16/22   Bernard  discontinued yesterday, continue holding losartan.  Will repeat BMP tomorrow.  No wheezing noted.  No lower extremity swelling.  Possible discharge tomorrow.  Glucose levels stable.    11/10  Patient seen and examined at bedside.  Patient sitting up on chair.  Creatinine slightly high from yesterday at 1.6.  Bladder scan showed postvoid residual of 330 mL, will do straight cath.  Continue intermittent bladder scan, patient has been voiding well though.  Urology, nephrology consult.  UA shows UTI.  Will start patient on Rocephin.  Will continue to monitor inpatient.  Discussed plan with RN.    11/11.  Patient seen and examined at bedside.  Creatinine much improved today.  Discussed with cardiologist.  Repeat echo shows decreased LV systolic function to 20 to 25%.  Currently okay for discharging patient.  Creatinine improving.  Will start on Bumex daily as needed for weight gain for more than 2 pounds.  Patient needs to weigh herself daily at home.  Follow-up with PCP.  Follow-up with cardiologist and nephrologist.  Postvoid residual is 0 mL.  Switch to po keflex    Consultations:   IP CONSULT TO HOSPITALIST  IP CONSULT TO CARDIOLOGY  IP CONSULT TO NEPHROLOGY  IP CONSULT TO UROLOGY    Patient is admitted as inpatient status because of co-morbidities listed above, severity of signs and symptoms as outlined, requirement for current medical therapies and most importantly because of direct risk to patient if care not provided in a hospital setting.  Expected length of stay > 48 hours.    Brian Min MD  11/11/2024  8:45 AM    Please note that this chart was generated using voice recognition Dragon dictation software.  Although every effort was made to ensure the accuracy of this automated transcription, some errors in transcription may have occurred.

## 2024-11-11 NOTE — PLAN OF CARE
Problem: Chronic Conditions and Co-morbidities  Goal: Patient's chronic conditions and co-morbidity symptoms are monitored and maintained or improved  11/11/2024 0236 by Gail Patel RN  Outcome: Progressing  Flowsheets (Taken 11/7/2024 2000 by Nathan Crain, RN)  Care Plan - Patient's Chronic Conditions and Co-Morbidity Symptoms are Monitored and Maintained or Improved:   Monitor and assess patient's chronic conditions and comorbid symptoms for stability, deterioration, or improvement   Collaborate with multidisciplinary team to address chronic and comorbid conditions and prevent exacerbation or deterioration  11/10/2024 1340 by Ann Ho RN  Outcome: Progressing     Problem: Discharge Planning  Goal: Discharge to home or other facility with appropriate resources  11/11/2024 0236 by Gail Patel RN  Outcome: Progressing  Flowsheets (Taken 11/7/2024 2000 by Nathan Crain, RN)  Discharge to home or other facility with appropriate resources:   Identify barriers to discharge with patient and caregiver   Arrange for needed discharge resources and transportation as appropriate  11/10/2024 1340 by Ann Ho RN  Outcome: Progressing     Problem: Safety - Adult  Goal: Free from fall injury  11/10/2024 1340 by Ann Ho RN  Outcome: Progressing     Problem: Pain  Goal: Verbalizes/displays adequate comfort level or baseline comfort level  11/11/2024 0236 by Gail Patel RN  Outcome: Progressing  Flowsheets (Taken 11/9/2024 2100)  Verbalizes/displays adequate comfort level or baseline comfort level:   Encourage patient to monitor pain and request assistance   Assess pain using appropriate pain scale   Administer analgesics based on type and severity of pain and evaluate response   Implement non-pharmacological measures as appropriate and evaluate response   Consider cultural and social influences on pain and pain management   Notify Licensed Independent Practitioner if interventions  unsuccessful or patient reports new pain  11/10/2024 1340 by Ann Ho, RN  Outcome: Progressing

## 2024-11-11 NOTE — PROGRESS NOTES
CLINICAL PHARMACY NOTE: MEDS TO BEDS    Total # of Prescriptions Filled: 2   The following medications were delivered to the patient:  Prednisone 20mg  Cephalexin 500mg    Additional Documentation: 11/11/24 4:10pm kbg delivered to pt in room Nurse Prachi riojas pt does not want Bumetanide at this time due to $3.22 copay pt wants to leave does not want to wait to see if Care Coordination can offer copay assistance no lock-up pt discharging     -Bumex on file at [facility] [phone] [address]

## 2024-11-11 NOTE — PROGRESS NOTES
Dr. Tommy Flynn reviewed PT last echo from 6/18/2024; requesting that it be re-assessed for EF finding. Dr. GOYO Mann contacted for updated read. New/verified findings to be shared ASAP upon update.     Electronically signed by Melissa Mckeon on 11/11/2024 at 9:01 AM

## 2024-11-14 ENCOUNTER — TELEPHONE (OUTPATIENT)
Dept: FAMILY MEDICINE CLINIC | Age: 74
End: 2024-11-14

## 2024-11-14 NOTE — TELEPHONE ENCOUNTER
Care Transitions Initial Follow Up Call    Outreach made within 2 business days of discharge: Yes    Patient: Graciela Holt Patient : 1950   MRN: [id number]  Reason for Admission: chf  Discharge Date: 24       Spoke with: PHONE NUMBER IS BAD     Discharge department/facility: [facility]           Follow Up  No future appointments.    Keira Cantu MA

## 2024-12-03 ENCOUNTER — HOSPITAL ENCOUNTER (EMERGENCY)
Age: 74
Discharge: HOME | End: 2024-12-03
Attending: EMERGENCY MEDICINE
Payer: COMMERCIAL

## 2024-12-03 VITALS
DIASTOLIC BLOOD PRESSURE: 91 MMHG | HEART RATE: 77 BPM | TEMPERATURE: 98.1 F | WEIGHT: 130 LBS | RESPIRATION RATE: 18 BRPM | HEIGHT: 64 IN | SYSTOLIC BLOOD PRESSURE: 155 MMHG | OXYGEN SATURATION: 94 % | BODY MASS INDEX: 22.2 KG/M2

## 2024-12-03 DIAGNOSIS — E87.6 HYPOKALEMIA: ICD-10-CM

## 2024-12-03 DIAGNOSIS — I50.9 ACUTE ON CHRONIC CONGESTIVE HEART FAILURE, UNSPECIFIED HEART FAILURE TYPE (HCC): Primary | ICD-10-CM

## 2024-12-03 LAB
ALBUMIN SERPL-MCNC: 3.6 G/DL (ref 3.5–5.2)
ALP SERPL-CCNC: 110 U/L (ref 35–104)
ALT SERPL-CCNC: 26 U/L (ref 10–35)
ANION GAP SERPL CALCULATED.3IONS-SCNC: 8 MMOL/L (ref 9–16)
AST SERPL-CCNC: 21 U/L (ref 10–35)
BASOPHILS # BLD: 0.1 K/UL (ref 0–0.2)
BASOPHILS NFR BLD: 1 % (ref 0–2)
BILIRUB SERPL-MCNC: 0.2 MG/DL (ref 0–1.2)
BNP SERPL-MCNC: 7647 PG/ML (ref 0–300)
BUN SERPL-MCNC: 18 MG/DL (ref 8–23)
CALCIUM SERPL-MCNC: 9.2 MG/DL (ref 8.6–10.4)
CHLORIDE SERPL-SCNC: 109 MMOL/L (ref 98–107)
CO2 SERPL-SCNC: 28 MMOL/L (ref 20–31)
CREAT SERPL-MCNC: 0.9 MG/DL (ref 0.7–1.2)
EOSINOPHIL # BLD: 0.1 K/UL (ref 0–0.4)
EOSINOPHILS RELATIVE PERCENT: 3 % (ref 0–4)
ERYTHROCYTE [DISTWIDTH] IN BLOOD BY AUTOMATED COUNT: 14.7 % (ref 11.5–14.9)
GFR, ESTIMATED: 66 ML/MIN/1.73M2
GLUCOSE SERPL-MCNC: 133 MG/DL (ref 74–99)
HCT VFR BLD AUTO: 37.6 % (ref 36–46)
HGB BLD-MCNC: 12.6 G/DL (ref 12–16)
LYMPHOCYTES NFR BLD: 0.9 K/UL (ref 1–4.8)
LYMPHOCYTES RELATIVE PERCENT: 19 % (ref 24–44)
MCH RBC QN AUTO: 29.9 PG (ref 26–34)
MCHC RBC AUTO-ENTMCNC: 33.6 G/DL (ref 31–37)
MCV RBC AUTO: 89 FL (ref 80–100)
MONOCYTES NFR BLD: 0.6 K/UL (ref 0.1–1.3)
MONOCYTES NFR BLD: 13 % (ref 1–7)
NEUTROPHILS NFR BLD: 64 % (ref 36–66)
NEUTS SEG NFR BLD: 3.1 K/UL (ref 1.3–9.1)
PLATELET # BLD AUTO: 202 K/UL (ref 150–450)
PMV BLD AUTO: 7.9 FL (ref 6–12)
POTASSIUM SERPL-SCNC: 3 MMOL/L (ref 3.7–5.3)
PROT SERPL-MCNC: 5.7 G/DL (ref 6.6–8.7)
RBC # BLD AUTO: 4.22 M/UL (ref 4–5.2)
SODIUM SERPL-SCNC: 145 MMOL/L (ref 136–145)
TROPONIN I SERPL HS-MCNC: 37 NG/L (ref 0–14)
WBC OTHER # BLD: 4.8 K/UL (ref 3.5–11)

## 2024-12-03 PROCEDURE — 99284 EMERGENCY DEPT VISIT MOD MDM: CPT

## 2024-12-03 PROCEDURE — 83880 ASSAY OF NATRIURETIC PEPTIDE: CPT

## 2024-12-03 PROCEDURE — 84484 ASSAY OF TROPONIN QUANT: CPT

## 2024-12-03 PROCEDURE — 85025 COMPLETE CBC W/AUTO DIFF WBC: CPT

## 2024-12-03 PROCEDURE — 36415 COLL VENOUS BLD VENIPUNCTURE: CPT

## 2024-12-03 PROCEDURE — 80053 COMPREHEN METABOLIC PANEL: CPT

## 2024-12-03 PROCEDURE — 93005 ELECTROCARDIOGRAM TRACING: CPT | Performed by: EMERGENCY MEDICINE

## 2024-12-03 PROCEDURE — 6370000000 HC RX 637 (ALT 250 FOR IP): Performed by: EMERGENCY MEDICINE

## 2024-12-03 RX ORDER — ACETAMINOPHEN 325 MG/1
650 TABLET ORAL ONCE
Status: COMPLETED | OUTPATIENT
Start: 2024-12-03 | End: 2024-12-03

## 2024-12-03 RX ORDER — POTASSIUM CHLORIDE 750 MG/1
20 TABLET, EXTENDED RELEASE ORAL 2 TIMES DAILY
Qty: 30 TABLET | Refills: 0 | Status: SHIPPED | OUTPATIENT
Start: 2024-12-03

## 2024-12-03 RX ADMIN — ACETAMINOPHEN 650 MG: 325 TABLET ORAL at 14:42

## 2024-12-03 ASSESSMENT — ENCOUNTER SYMPTOMS
SHORTNESS OF BREATH: 1
WHEEZING: 0
COUGH: 1
ABDOMINAL PAIN: 0
VOMITING: 0

## 2024-12-03 ASSESSMENT — PAIN - FUNCTIONAL ASSESSMENT: PAIN_FUNCTIONAL_ASSESSMENT: NONE - DENIES PAIN

## 2024-12-03 ASSESSMENT — PAIN DESCRIPTION - LOCATION: LOCATION: NECK

## 2024-12-03 ASSESSMENT — PAIN SCALES - GENERAL: PAINLEVEL_OUTOF10: 10

## 2024-12-03 NOTE — ED PROVIDER NOTES
[place] [address]  [phone]    If symptoms worsen      DISCHARGE MEDICATIONS:  Discharge Medication List as of 12/3/2024  4:03 PM            Samm Alvarez MD  Attending Emergency Physician                     Samm Alvarez MD  12/03/24 [id number]

## 2024-12-05 LAB
EKG ATRIAL RATE: 77 BPM
EKG P-R INTERVAL: 132 MS
EKG Q-T INTERVAL: 428 MS
EKG QRS DURATION: 116 MS
EKG QTC CALCULATION (BAZETT): 484 MS
EKG R AXIS: -19 DEGREES
EKG T AXIS: 156 DEGREES
EKG VENTRICULAR RATE: 77 BPM

## 2024-12-05 PROCEDURE — 93010 ELECTROCARDIOGRAM REPORT: CPT | Performed by: INTERNAL MEDICINE

## 2024-12-28 ENCOUNTER — APPOINTMENT (OUTPATIENT)
Dept: GENERAL RADIOLOGY | Age: 74
DRG: 291 | End: 2024-12-28
Payer: COMMERCIAL

## 2024-12-28 ENCOUNTER — HOSPITAL ENCOUNTER (INPATIENT)
Age: 74
LOS: 2 days | Discharge: HOME | DRG: 291 | End: 2024-12-30
Attending: EMERGENCY MEDICINE | Admitting: INTERNAL MEDICINE
Payer: COMMERCIAL

## 2024-12-28 DIAGNOSIS — E87.6 HYPOKALEMIA: ICD-10-CM

## 2024-12-28 DIAGNOSIS — I50.9 ACUTE CONGESTIVE HEART FAILURE, UNSPECIFIED HEART FAILURE TYPE (HCC): Primary | ICD-10-CM

## 2024-12-28 LAB
ALBUMIN SERPL-MCNC: 3.5 G/DL (ref 3.5–5.2)
ALP SERPL-CCNC: 124 U/L (ref 35–104)
ALT SERPL-CCNC: 22 U/L (ref 10–35)
ANION GAP SERPL CALCULATED.3IONS-SCNC: 10 MMOL/L (ref 9–16)
AST SERPL-CCNC: 31 U/L (ref 10–35)
B PARAP IS1001 DNA NPH QL NAA+NON-PROBE: NOT DETECTED
B PERT DNA SPEC QL NAA+PROBE: NOT DETECTED
BASOPHILS # BLD: 0.1 K/UL (ref 0–0.2)
BASOPHILS NFR BLD: 1 % (ref 0–2)
BILIRUB SERPL-MCNC: 0.7 MG/DL (ref 0–1.2)
BNP SERPL-MCNC: ABNORMAL PG/ML (ref 0–300)
BUN SERPL-MCNC: 18 MG/DL (ref 8–23)
C PNEUM DNA NPH QL NAA+NON-PROBE: NOT DETECTED
CALCIUM SERPL-MCNC: 9.1 MG/DL (ref 8.6–10.4)
CHLORIDE SERPL-SCNC: 106 MMOL/L (ref 98–107)
CO2 SERPL-SCNC: 27 MMOL/L (ref 20–31)
CREAT SERPL-MCNC: 1 MG/DL (ref 0.7–1.2)
EOSINOPHIL # BLD: 0.2 K/UL (ref 0–0.4)
EOSINOPHILS RELATIVE PERCENT: 3 % (ref 0–4)
ERYTHROCYTE [DISTWIDTH] IN BLOOD BY AUTOMATED COUNT: 14.3 % (ref 11.5–14.9)
FLUAV RNA NPH QL NAA+NON-PROBE: NOT DETECTED
FLUBV RNA NPH QL NAA+NON-PROBE: NOT DETECTED
GFR, ESTIMATED: 58 ML/MIN/1.73M2
GLUCOSE BLD-MCNC: 110 MG/DL (ref 65–105)
GLUCOSE SERPL-MCNC: 174 MG/DL (ref 74–99)
HADV DNA NPH QL NAA+NON-PROBE: NOT DETECTED
HCOV 229E RNA NPH QL NAA+NON-PROBE: NOT DETECTED
HCOV HKU1 RNA NPH QL NAA+NON-PROBE: NOT DETECTED
HCOV NL63 RNA NPH QL NAA+NON-PROBE: NOT DETECTED
HCOV OC43 RNA NPH QL NAA+NON-PROBE: NOT DETECTED
HCT VFR BLD AUTO: 40.5 % (ref 36–46)
HGB BLD-MCNC: 13.1 G/DL (ref 12–16)
HMPV RNA NPH QL NAA+NON-PROBE: NOT DETECTED
HPIV1 RNA NPH QL NAA+NON-PROBE: NOT DETECTED
HPIV2 RNA NPH QL NAA+NON-PROBE: NOT DETECTED
HPIV3 RNA NPH QL NAA+NON-PROBE: NOT DETECTED
HPIV4 RNA NPH QL NAA+NON-PROBE: NOT DETECTED
LIPASE SERPL-CCNC: 17 U/L (ref 13–60)
LYMPHOCYTES NFR BLD: 1 K/UL (ref 1–4.8)
LYMPHOCYTES RELATIVE PERCENT: 17 % (ref 24–44)
M PNEUMO DNA NPH QL NAA+NON-PROBE: NOT DETECTED
MAGNESIUM SERPL-MCNC: 1.9 MG/DL (ref 1.6–2.4)
MCH RBC QN AUTO: 28.4 PG (ref 26–34)
MCHC RBC AUTO-ENTMCNC: 32.4 G/DL (ref 31–37)
MCV RBC AUTO: 87.4 FL (ref 80–100)
MONOCYTES NFR BLD: 0.6 K/UL (ref 0.1–1.3)
MONOCYTES NFR BLD: 10 % (ref 1–7)
NEUTROPHILS NFR BLD: 69 % (ref 36–66)
NEUTS SEG NFR BLD: 4.3 K/UL (ref 1.3–9.1)
PLATELET # BLD AUTO: 238 K/UL (ref 150–450)
PMV BLD AUTO: 8.6 FL (ref 6–12)
POTASSIUM SERPL-SCNC: 2.9 MMOL/L (ref 3.7–5.3)
POTASSIUM SERPL-SCNC: 3.7 MMOL/L (ref 3.7–5.3)
PROT SERPL-MCNC: 6.1 G/DL (ref 6.6–8.7)
RBC # BLD AUTO: 4.63 M/UL (ref 4–5.2)
RSV RNA NPH QL NAA+NON-PROBE: NOT DETECTED
RV+EV RNA NPH QL NAA+NON-PROBE: NOT DETECTED
SARS-COV-2 RNA NPH QL NAA+NON-PROBE: NOT DETECTED
SODIUM SERPL-SCNC: 143 MMOL/L (ref 136–145)
SPECIMEN DESCRIPTION: NORMAL
TROPONIN I SERPL HS-MCNC: 46 NG/L (ref 0–14)
TROPONIN I SERPL HS-MCNC: 47 NG/L (ref 0–14)
TROPONIN I SERPL HS-MCNC: 51 NG/L (ref 0–14)
WBC OTHER # BLD: 6.1 K/UL (ref 3.5–11)

## 2024-12-28 PROCEDURE — 6360000002 HC RX W HCPCS

## 2024-12-28 PROCEDURE — 6360000002 HC RX W HCPCS: Performed by: EMERGENCY MEDICINE

## 2024-12-28 PROCEDURE — 36415 COLL VENOUS BLD VENIPUNCTURE: CPT

## 2024-12-28 PROCEDURE — 85025 COMPLETE CBC W/AUTO DIFF WBC: CPT

## 2024-12-28 PROCEDURE — 71045 X-RAY EXAM CHEST 1 VIEW: CPT

## 2024-12-28 PROCEDURE — 6370000000 HC RX 637 (ALT 250 FOR IP)

## 2024-12-28 PROCEDURE — 94640 AIRWAY INHALATION TREATMENT: CPT

## 2024-12-28 PROCEDURE — 6370000000 HC RX 637 (ALT 250 FOR IP): Performed by: EMERGENCY MEDICINE

## 2024-12-28 PROCEDURE — 80053 COMPREHEN METABOLIC PANEL: CPT

## 2024-12-28 PROCEDURE — 83880 ASSAY OF NATRIURETIC PEPTIDE: CPT

## 2024-12-28 PROCEDURE — 99285 EMERGENCY DEPT VISIT HI MDM: CPT

## 2024-12-28 PROCEDURE — 2500000003 HC RX 250 WO HCPCS

## 2024-12-28 PROCEDURE — 96365 THER/PROPH/DIAG IV INF INIT: CPT

## 2024-12-28 PROCEDURE — 2060000000 HC ICU INTERMEDIATE R&B

## 2024-12-28 PROCEDURE — 2700000000 HC OXYGEN THERAPY PER DAY

## 2024-12-28 PROCEDURE — 93005 ELECTROCARDIOGRAM TRACING: CPT | Performed by: EMERGENCY MEDICINE

## 2024-12-28 PROCEDURE — 83735 ASSAY OF MAGNESIUM: CPT

## 2024-12-28 PROCEDURE — 0202U NFCT DS 22 TRGT SARS-COV-2: CPT

## 2024-12-28 PROCEDURE — 84132 ASSAY OF SERUM POTASSIUM: CPT

## 2024-12-28 PROCEDURE — 99223 1ST HOSP IP/OBS HIGH 75: CPT | Performed by: INTERNAL MEDICINE

## 2024-12-28 PROCEDURE — 83690 ASSAY OF LIPASE: CPT

## 2024-12-28 PROCEDURE — 84484 ASSAY OF TROPONIN QUANT: CPT

## 2024-12-28 PROCEDURE — 82947 ASSAY GLUCOSE BLOOD QUANT: CPT

## 2024-12-28 RX ORDER — POLYETHYLENE GLYCOL 3350 17 G/17G
17 POWDER, FOR SOLUTION ORAL DAILY PRN
Status: DISCONTINUED | OUTPATIENT
Start: 2024-12-28 | End: 2024-12-30 | Stop reason: HOSPADM

## 2024-12-28 RX ORDER — NITROGLYCERIN 0.4 MG/1
0.4 TABLET SUBLINGUAL EVERY 5 MIN PRN
Status: DISCONTINUED | OUTPATIENT
Start: 2024-12-28 | End: 2024-12-30 | Stop reason: HOSPADM

## 2024-12-28 RX ORDER — BUMETANIDE 1 MG/1
1 TABLET ORAL DAILY PRN
Status: DISCONTINUED | OUTPATIENT
Start: 2024-12-28 | End: 2024-12-30

## 2024-12-28 RX ORDER — ALBUTEROL SULFATE 90 UG/1
2 INHALANT RESPIRATORY (INHALATION) EVERY 4 HOURS PRN
Status: DISCONTINUED | OUTPATIENT
Start: 2024-12-28 | End: 2024-12-30 | Stop reason: HOSPADM

## 2024-12-28 RX ORDER — ATORVASTATIN CALCIUM 80 MG/1
80 TABLET, FILM COATED ORAL DAILY
Status: DISCONTINUED | OUTPATIENT
Start: 2024-12-29 | End: 2024-12-30 | Stop reason: HOSPADM

## 2024-12-28 RX ORDER — BUDESONIDE AND FORMOTEROL FUMARATE DIHYDRATE 160; 4.5 UG/1; UG/1
2 AEROSOL RESPIRATORY (INHALATION)
Status: DISCONTINUED | OUTPATIENT
Start: 2024-12-28 | End: 2024-12-30 | Stop reason: HOSPADM

## 2024-12-28 RX ORDER — SODIUM CHLORIDE 0.9 % (FLUSH) 0.9 %
5-40 SYRINGE (ML) INJECTION PRN
Status: DISCONTINUED | OUTPATIENT
Start: 2024-12-28 | End: 2024-12-30 | Stop reason: HOSPADM

## 2024-12-28 RX ORDER — POTASSIUM CHLORIDE 1500 MG/1
40 TABLET, EXTENDED RELEASE ORAL PRN
Status: DISCONTINUED | OUTPATIENT
Start: 2024-12-28 | End: 2024-12-30 | Stop reason: HOSPADM

## 2024-12-28 RX ORDER — BUMETANIDE 0.25 MG/ML
2 INJECTION, SOLUTION INTRAMUSCULAR; INTRAVENOUS DAILY
Status: DISCONTINUED | OUTPATIENT
Start: 2024-12-28 | End: 2024-12-30

## 2024-12-28 RX ORDER — FAMOTIDINE 20 MG/1
40 TABLET, FILM COATED ORAL EVERY EVENING
Status: DISCONTINUED | OUTPATIENT
Start: 2024-12-28 | End: 2024-12-28

## 2024-12-28 RX ORDER — AMIODARONE HYDROCHLORIDE 200 MG/1
200 TABLET ORAL DAILY
Status: DISCONTINUED | OUTPATIENT
Start: 2024-12-29 | End: 2024-12-30 | Stop reason: HOSPADM

## 2024-12-28 RX ORDER — SODIUM CHLORIDE 9 MG/ML
INJECTION, SOLUTION INTRAVENOUS PRN
Status: DISCONTINUED | OUTPATIENT
Start: 2024-12-28 | End: 2024-12-30 | Stop reason: HOSPADM

## 2024-12-28 RX ORDER — NICOTINE 21 MG/24HR
1 PATCH, TRANSDERMAL 24 HOURS TRANSDERMAL DAILY
Status: DISCONTINUED | OUTPATIENT
Start: 2024-12-28 | End: 2024-12-30 | Stop reason: HOSPADM

## 2024-12-28 RX ORDER — INSULIN LISPRO 100 [IU]/ML
0-4 INJECTION, SOLUTION INTRAVENOUS; SUBCUTANEOUS
Status: DISCONTINUED | OUTPATIENT
Start: 2024-12-28 | End: 2024-12-30 | Stop reason: HOSPADM

## 2024-12-28 RX ORDER — SODIUM CHLORIDE 0.9 % (FLUSH) 0.9 %
5-40 SYRINGE (ML) INJECTION EVERY 12 HOURS SCHEDULED
Status: DISCONTINUED | OUTPATIENT
Start: 2024-12-28 | End: 2024-12-30 | Stop reason: HOSPADM

## 2024-12-28 RX ORDER — FAMOTIDINE 20 MG/1
20 TABLET, FILM COATED ORAL EVERY EVENING
Status: DISCONTINUED | OUTPATIENT
Start: 2024-12-29 | End: 2024-12-30 | Stop reason: HOSPADM

## 2024-12-28 RX ORDER — KETOROLAC TROMETHAMINE 30 MG/ML
15 INJECTION, SOLUTION INTRAMUSCULAR; INTRAVENOUS EVERY 6 HOURS PRN
Status: DISCONTINUED | OUTPATIENT
Start: 2024-12-28 | End: 2024-12-30 | Stop reason: HOSPADM

## 2024-12-28 RX ORDER — MAGNESIUM SULFATE HEPTAHYDRATE 40 MG/ML
2000 INJECTION, SOLUTION INTRAVENOUS PRN
Status: DISCONTINUED | OUTPATIENT
Start: 2024-12-28 | End: 2024-12-30 | Stop reason: HOSPADM

## 2024-12-28 RX ORDER — ONDANSETRON 2 MG/ML
4 INJECTION INTRAMUSCULAR; INTRAVENOUS EVERY 6 HOURS PRN
Status: DISCONTINUED | OUTPATIENT
Start: 2024-12-28 | End: 2024-12-30 | Stop reason: HOSPADM

## 2024-12-28 RX ORDER — LOSARTAN POTASSIUM 25 MG/1
25 TABLET ORAL DAILY
Status: DISCONTINUED | OUTPATIENT
Start: 2024-12-29 | End: 2024-12-30 | Stop reason: HOSPADM

## 2024-12-28 RX ORDER — ASPIRIN 81 MG/1
81 TABLET ORAL DAILY
Status: DISCONTINUED | OUTPATIENT
Start: 2024-12-29 | End: 2024-12-30 | Stop reason: HOSPADM

## 2024-12-28 RX ORDER — ACETAMINOPHEN 325 MG/1
650 TABLET ORAL EVERY 6 HOURS PRN
Status: DISCONTINUED | OUTPATIENT
Start: 2024-12-28 | End: 2024-12-30 | Stop reason: HOSPADM

## 2024-12-28 RX ORDER — DEXTROSE MONOHYDRATE 100 MG/ML
INJECTION, SOLUTION INTRAVENOUS CONTINUOUS PRN
Status: DISCONTINUED | OUTPATIENT
Start: 2024-12-28 | End: 2024-12-30 | Stop reason: HOSPADM

## 2024-12-28 RX ORDER — CARVEDILOL 3.12 MG/1
3.12 TABLET ORAL 2 TIMES DAILY WITH MEALS
Status: DISCONTINUED | OUTPATIENT
Start: 2024-12-28 | End: 2024-12-30 | Stop reason: HOSPADM

## 2024-12-28 RX ORDER — POTASSIUM CHLORIDE 7.45 MG/ML
10 INJECTION INTRAVENOUS
Status: COMPLETED | OUTPATIENT
Start: 2024-12-28 | End: 2024-12-28

## 2024-12-28 RX ORDER — POTASSIUM CHLORIDE 7.45 MG/ML
10 INJECTION INTRAVENOUS PRN
Status: DISCONTINUED | OUTPATIENT
Start: 2024-12-28 | End: 2024-12-30 | Stop reason: HOSPADM

## 2024-12-28 RX ORDER — ONDANSETRON 4 MG/1
4 TABLET, ORALLY DISINTEGRATING ORAL EVERY 8 HOURS PRN
Status: DISCONTINUED | OUTPATIENT
Start: 2024-12-28 | End: 2024-12-30 | Stop reason: HOSPADM

## 2024-12-28 RX ORDER — ACETAMINOPHEN 650 MG/1
650 SUPPOSITORY RECTAL EVERY 6 HOURS PRN
Status: DISCONTINUED | OUTPATIENT
Start: 2024-12-28 | End: 2024-12-30 | Stop reason: HOSPADM

## 2024-12-28 RX ADMIN — CARVEDILOL 3.12 MG: 3.12 TABLET, FILM COATED ORAL at 20:27

## 2024-12-28 RX ADMIN — KETOROLAC TROMETHAMINE 15 MG: 30 INJECTION, SOLUTION INTRAMUSCULAR at 23:27

## 2024-12-28 RX ADMIN — APIXABAN 5 MG: 5 TABLET, FILM COATED ORAL at 20:27

## 2024-12-28 RX ADMIN — BUDESONIDE AND FORMOTEROL FUMARATE DIHYDRATE 2 PUFF: 160; 4.5 AEROSOL RESPIRATORY (INHALATION) at 20:42

## 2024-12-28 RX ADMIN — POTASSIUM CHLORIDE 10 MEQ: 7.46 INJECTION, SOLUTION INTRAVENOUS at 12:59

## 2024-12-28 RX ADMIN — POTASSIUM BICARBONATE 40 MEQ: 782 TABLET, EFFERVESCENT ORAL at 12:50

## 2024-12-28 RX ADMIN — BUMETANIDE 2 MG: 0.25 INJECTION INTRAMUSCULAR; INTRAVENOUS at 20:28

## 2024-12-28 RX ADMIN — SODIUM CHLORIDE, PRESERVATIVE FREE 10 ML: 5 INJECTION INTRAVENOUS at 20:28

## 2024-12-28 RX ADMIN — POTASSIUM CHLORIDE 10 MEQ: 7.46 INJECTION, SOLUTION INTRAVENOUS at 14:48

## 2024-12-28 RX ADMIN — FAMOTIDINE 40 MG: 20 TABLET, FILM COATED ORAL at 20:27

## 2024-12-28 RX ADMIN — ACETAMINOPHEN 650 MG: 325 TABLET ORAL at 20:27

## 2024-12-28 ASSESSMENT — PAIN SCALES - GENERAL
PAINLEVEL_OUTOF10: 0
PAINLEVEL_OUTOF10: 10
PAINLEVEL_OUTOF10: 9
PAINLEVEL_OUTOF10: 9

## 2024-12-28 ASSESSMENT — PAIN DESCRIPTION - DESCRIPTORS
DESCRIPTORS: DISCOMFORT
DESCRIPTORS: DISCOMFORT

## 2024-12-28 ASSESSMENT — PAIN DESCRIPTION - LOCATION
LOCATION: HEAD;NECK
LOCATION: NECK

## 2024-12-28 ASSESSMENT — PAIN DESCRIPTION - ORIENTATION
ORIENTATION: LEFT;RIGHT
ORIENTATION: LEFT

## 2024-12-28 NOTE — H&P
PM    Copy sent to Travis Vaughn MD       Attending Physician Statement  I have discussed the care of Graciela Ceronman and I have examined the patient myselft and taken ros and hpi , including pertinent history and exam findings,  with the resident. I have reviewed the key elements of all parts of the encounter with the resident.  I agree with the assessment, plan and orders as documented by the resident.      Electronically signed by Chip Anderson MD
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
## 2024-12-28 NOTE — ED TRIAGE NOTES
Mode of arrival (squad #, walk in, police, etc) : walk-in        Chief complaint(s): shortness of breath/leg swelling         Arrival Note (brief scenario, treatment PTA, etc).: Pt reports above symptoms x 4-5 days.        C= \"Have you ever felt that you should Cut down on your drinking?\"  No  A= \"Have people Annoyed you by criticizing your drinking?\"  No  G= \"Have you ever felt bad or Guilty about your drinking?\"  No  E= \"Have you ever had a drink as an Eye-opener first thing in the morning to steady your nerves or to help a hangover?\"  No      Deferred []      Reason for deferring: N/A    *If yes to two or more: probable alcohol abuse.*

## 2024-12-28 NOTE — ED NOTES
Report given to LIVE Sebastian from [facility].   Report method by phone   The following was reviewed with receiving RN:   Current vital signs:  BP (!) 148/87   Pulse 73   Temp 98.2 °F (36.8 °C)   Resp 18   Ht 1.626 m (5' 4\")   Wt 59 kg (130 lb)   SpO2 94%   BMI 22.31 kg/m²                      Any medication or safety alerts were reviewed. Any pending diagnostics and notifications were also reviewed, as well as any safety concerns or issues, abnormal labs, abnormal imaging, and abnormal assessment findings. Questions were answered.

## 2024-12-29 LAB
ANION GAP SERPL CALCULATED.3IONS-SCNC: 8 MMOL/L (ref 9–16)
BASOPHILS # BLD: 0.1 K/UL (ref 0–0.2)
BASOPHILS NFR BLD: 1 % (ref 0–2)
BUN SERPL-MCNC: 17 MG/DL (ref 8–23)
CALCIUM SERPL-MCNC: 8.8 MG/DL (ref 8.6–10.4)
CHLORIDE SERPL-SCNC: 104 MMOL/L (ref 98–107)
CO2 SERPL-SCNC: 33 MMOL/L (ref 20–31)
CREAT SERPL-MCNC: 1 MG/DL (ref 0.7–1.2)
EOSINOPHIL # BLD: 0.3 K/UL (ref 0–0.4)
EOSINOPHILS RELATIVE PERCENT: 5 % (ref 0–4)
ERYTHROCYTE [DISTWIDTH] IN BLOOD BY AUTOMATED COUNT: 14.4 % (ref 11.5–14.9)
GFR, ESTIMATED: 58 ML/MIN/1.73M2
GLUCOSE BLD-MCNC: 133 MG/DL (ref 65–105)
GLUCOSE BLD-MCNC: 167 MG/DL (ref 65–105)
GLUCOSE BLD-MCNC: 194 MG/DL (ref 65–105)
GLUCOSE BLD-MCNC: 256 MG/DL (ref 65–105)
GLUCOSE SERPL-MCNC: 144 MG/DL (ref 74–99)
HCT VFR BLD AUTO: 39 % (ref 36–46)
HGB BLD-MCNC: 12.9 G/DL (ref 12–16)
LYMPHOCYTES NFR BLD: 0.8 K/UL (ref 1–4.8)
LYMPHOCYTES RELATIVE PERCENT: 12 % (ref 24–44)
MAGNESIUM SERPL-MCNC: 1.8 MG/DL (ref 1.6–2.4)
MCH RBC QN AUTO: 28.7 PG (ref 26–34)
MCHC RBC AUTO-ENTMCNC: 33.1 G/DL (ref 31–37)
MCV RBC AUTO: 87 FL (ref 80–100)
MONOCYTES NFR BLD: 0.6 K/UL (ref 0.1–1.3)
MONOCYTES NFR BLD: 10 % (ref 1–7)
NEUTROPHILS NFR BLD: 72 % (ref 36–66)
NEUTS SEG NFR BLD: 4.6 K/UL (ref 1.3–9.1)
PLATELET # BLD AUTO: 231 K/UL (ref 150–450)
PMV BLD AUTO: 8.6 FL (ref 6–12)
POTASSIUM SERPL-SCNC: 3.5 MMOL/L (ref 3.7–5.3)
RBC # BLD AUTO: 4.49 M/UL (ref 4–5.2)
SODIUM SERPL-SCNC: 145 MMOL/L (ref 136–145)
WBC OTHER # BLD: 6.4 K/UL (ref 3.5–11)

## 2024-12-29 PROCEDURE — 6370000000 HC RX 637 (ALT 250 FOR IP)

## 2024-12-29 PROCEDURE — 36415 COLL VENOUS BLD VENIPUNCTURE: CPT

## 2024-12-29 PROCEDURE — 2500000003 HC RX 250 WO HCPCS

## 2024-12-29 PROCEDURE — 94640 AIRWAY INHALATION TREATMENT: CPT

## 2024-12-29 PROCEDURE — 85025 COMPLETE CBC W/AUTO DIFF WBC: CPT

## 2024-12-29 PROCEDURE — 82947 ASSAY GLUCOSE BLOOD QUANT: CPT

## 2024-12-29 PROCEDURE — 93005 ELECTROCARDIOGRAM TRACING: CPT

## 2024-12-29 PROCEDURE — 83735 ASSAY OF MAGNESIUM: CPT

## 2024-12-29 PROCEDURE — 6360000002 HC RX W HCPCS

## 2024-12-29 PROCEDURE — 2060000000 HC ICU INTERMEDIATE R&B

## 2024-12-29 PROCEDURE — 99223 1ST HOSP IP/OBS HIGH 75: CPT | Performed by: INTERNAL MEDICINE

## 2024-12-29 PROCEDURE — 80048 BASIC METABOLIC PNL TOTAL CA: CPT

## 2024-12-29 RX ADMIN — FAMOTIDINE 20 MG: 20 TABLET, FILM COATED ORAL at 18:07

## 2024-12-29 RX ADMIN — LOSARTAN POTASSIUM 25 MG: 25 TABLET, FILM COATED ORAL at 07:34

## 2024-12-29 RX ADMIN — INSULIN LISPRO 2 UNITS: 100 INJECTION, SOLUTION INTRAVENOUS; SUBCUTANEOUS at 18:07

## 2024-12-29 RX ADMIN — KETOROLAC TROMETHAMINE 15 MG: 30 INJECTION, SOLUTION INTRAMUSCULAR at 13:26

## 2024-12-29 RX ADMIN — EMPAGLIFLOZIN 10 MG: 10 TABLET, FILM COATED ORAL at 07:34

## 2024-12-29 RX ADMIN — APIXABAN 5 MG: 5 TABLET, FILM COATED ORAL at 07:34

## 2024-12-29 RX ADMIN — APIXABAN 5 MG: 5 TABLET, FILM COATED ORAL at 19:47

## 2024-12-29 RX ADMIN — ATORVASTATIN CALCIUM 80 MG: 80 TABLET, FILM COATED ORAL at 07:34

## 2024-12-29 RX ADMIN — CARVEDILOL 3.12 MG: 3.12 TABLET, FILM COATED ORAL at 07:34

## 2024-12-29 RX ADMIN — KETOROLAC TROMETHAMINE 15 MG: 30 INJECTION, SOLUTION INTRAMUSCULAR at 19:47

## 2024-12-29 RX ADMIN — SODIUM CHLORIDE, PRESERVATIVE FREE 5 ML: 5 INJECTION INTRAVENOUS at 07:48

## 2024-12-29 RX ADMIN — AMIODARONE HYDROCHLORIDE 200 MG: 200 TABLET ORAL at 07:34

## 2024-12-29 RX ADMIN — ASPIRIN 81 MG: 81 TABLET, COATED ORAL at 07:34

## 2024-12-29 RX ADMIN — SODIUM CHLORIDE, PRESERVATIVE FREE 10 ML: 5 INJECTION INTRAVENOUS at 19:48

## 2024-12-29 RX ADMIN — BUMETANIDE 2 MG: 0.25 INJECTION INTRAMUSCULAR; INTRAVENOUS at 07:35

## 2024-12-29 RX ADMIN — POTASSIUM CHLORIDE 40 MEQ: 1500 TABLET, EXTENDED RELEASE ORAL at 06:31

## 2024-12-29 RX ADMIN — CARVEDILOL 3.12 MG: 3.12 TABLET, FILM COATED ORAL at 18:07

## 2024-12-29 RX ADMIN — BUDESONIDE AND FORMOTEROL FUMARATE DIHYDRATE 2 PUFF: 160; 4.5 AEROSOL RESPIRATORY (INHALATION) at 09:20

## 2024-12-29 RX ADMIN — ACETAMINOPHEN 650 MG: 325 TABLET ORAL at 03:41

## 2024-12-29 RX ADMIN — KETOROLAC TROMETHAMINE 15 MG: 30 INJECTION, SOLUTION INTRAMUSCULAR at 05:54

## 2024-12-29 ASSESSMENT — PAIN SCALES - GENERAL
PAINLEVEL_OUTOF10: 10
PAINLEVEL_OUTOF10: 0
PAINLEVEL_OUTOF10: 7
PAINLEVEL_OUTOF10: 7
PAINLEVEL_OUTOF10: 2
PAINLEVEL_OUTOF10: 10

## 2024-12-29 ASSESSMENT — PAIN DESCRIPTION - LOCATION
LOCATION: NECK
LOCATION: NECK;HEAD
LOCATION: NECK
LOCATION: NECK;HEAD

## 2024-12-29 ASSESSMENT — PAIN DESCRIPTION - DESCRIPTORS
DESCRIPTORS: DISCOMFORT
DESCRIPTORS: CRAMPING;ACHING;DISCOMFORT
DESCRIPTORS: DISCOMFORT

## 2024-12-29 ASSESSMENT — PAIN DESCRIPTION - ORIENTATION
ORIENTATION: MID;RIGHT;LEFT
ORIENTATION: LEFT;RIGHT
ORIENTATION: RIGHT;LEFT

## 2024-12-29 NOTE — CARE COORDINATION
Case Management Assessment  Initial Evaluation    Date/Time of Evaluation: 12/29/2024 1:50 PM  Assessment Completed by: Yuko Krause RN    If patient is discharged prior to next notation, then this note serves as note for discharge by case management.    Patient Name: Graciela Holt                   YOB: 1950  Diagnosis: Shortness of breath [R06.02]  Hypokalemia [E87.6]  Acute congestive heart failure, unspecified heart failure type (HCC) [I50.9]                   Date / Time: 12/28/2024 11:20 AM    Patient Admission Status: Inpatient   Readmission Risk (Low < 19, Mod (19-27), High > 27): Readmission Risk Score: 25.5    Current PCP: Travis Mason MD  PCP verified by CM? Yes    Chart Reviewed: Yes      History Provided by: Patient  Patient Orientation: Alert and Oriented    Patient Cognition: Alert    Hospitalization in the last 30 days (Readmission):  No    If yes, Readmission Assessment in CM Navigator will be completed.    Advance Directives:      Code Status: Full Code   Patient's Primary Decision Maker is: Legal Next of Kin    Primary Decision Maker: Guanakito Zheng - Child - [phone]    Discharge Planning:    Patient lives with: Other (Comment) (2 roommates) Type of Home: House  Primary Care Giver: Self  Patient Support Systems include: Children, Friends/Neighbors   Current Financial resources: Medicare  Current community resources: None  Current services prior to admission: None            Current DME:              Type of Home Care services:  None    ADLS  Prior functional level: Independent in ADLs/IADLs  Current functional level: Independent in ADLs/IADLs    PT AM-PAC:   /24  OT AM-PAC:   /24    Family can provide assistance at DC: No  Would you like Case Management to discuss the discharge plan with any other family members/significant others, and if so, who? No  Plans to Return to Present Housing: Yes  Other Identified Issues/Barriers to RETURNING to current housing:

## 2024-12-29 NOTE — CONSULTS
Hepatic:   Recent Labs     12/28/24  1149   AST 31   ALT 22   BILITOT 0.7   ALKPHOS 124*     Troponin: No results for input(s): \"TROPONINI\" in the last 72 hours.  BNP: No results for input(s): \"BNP\" in the last 72 hours.  Lipids: No results for input(s): \"CHOL\", \"HDL\" in the last 72 hours.    Invalid input(s): \"LDLCALCU\"  INR: No results for input(s): \"INR\" in the last 72 hours.    Objective:   Vitals: /83   Pulse 59   Temp 98.3 °F (36.8 °C) (Oral)   Resp 20   Ht 1.626 m (5' 4\")   Wt 58.9 kg (129 lb 13.6 oz)   SpO2 95%   BMI 22.29 kg/m²   General appearance: alert and cooperative with exam  HEENT: Head: Normal, normocephalic, atraumatic.  Neck: no JVD and supple, symmetrical, trachea midline  Lungs:  Poor chest expansion diminished breath sound both side  Heart: regular rate and rhythm, S1, S2 normal, no murmur, click, rub or gallop and irregularly irregular rhythm  Abdomen:  Soft bowel sounds present  Extremities: Homans sign is negative, no sign of DVT  Neurologic: Mental status: Alert, oriented, thought content appropriate    EKG: Sinus rhythm, voltage criteria for LVH, repolarization changes borderline R wave progression.  ECHO: reviewed.   Ejection fraction: 25% dilated LV, moderate increased LV wall thickness Global hypokinesis, akinesis of the apical lateral and anterolateral walls akinetic apex dilated LA  Stress Test: reviewed.  Cardiac Angiography: reviewed.    Assessment / Acute Cardiac Problems:     Admission 1 12/28/2024 with severe shortness of breath, elevated proBNP 12,221 borderline abnormal high-sensitivity troponin 46 and 51, ECG showed sinus rhythm, LVH with repolarization changes no significant change from previous admission, improvement with IV diuretic  Multivessel CAD, CABG LIMA to LAD and diagonal, SVG to OM and PDA February 2018 at [facility]  Severely reduced LV function ejection fraction 25%, akinetic apex, apical thrombus  Previous admission nonsustained

## 2024-12-29 NOTE — PLAN OF CARE
Problem: Chronic Conditions and Co-morbidities  Goal: Patient's chronic conditions and co-morbidity symptoms are monitored and maintained or improved  Outcome: Progressing     Problem: Discharge Planning  Goal: Discharge to home or other facility with appropriate resources  Outcome: Progressing     Problem: Pain  Goal: Verbalizes/displays adequate comfort level or baseline comfort level  Outcome: Progressing     Problem: Cardiovascular - Adult  Goal: Maintains optimal cardiac output and hemodynamic stability  Outcome: Progressing  Flowsheets (Taken 12/29/2024 0043)  Maintains optimal cardiac output and hemodynamic stability:   Monitor blood pressure and heart rate   Monitor urine output and notify Licensed Independent Practitioner for values outside of normal range   Assess for signs of decreased cardiac output   Administer fluid and/or volume expanders as ordered     Problem: Skin/Tissue Integrity - Adult  Goal: Skin integrity remains intact  Outcome: Progressing  Flowsheets (Taken 12/29/2024 0043)  Skin Integrity Remains Intact: Monitor for areas of redness and/or skin breakdown     Problem: Metabolic/Fluid and Electrolytes - Adult  Goal: Electrolytes maintained within normal limits  Outcome: Progressing  Flowsheets (Taken 12/29/2024 0043)  Electrolytes maintained within normal limits:   Administer electrolyte replacement as ordered   Monitor labs and assess patient for signs and symptoms of electrolyte imbalances   Monitor response to electrolyte replacements, including repeat lab results as appropriate     Problem: Safety - Adult  Goal: Free from fall injury  Outcome: Progressing  Flowsheets (Taken 12/29/2024 0043)  Free From Fall Injury: Instruct family/caregiver on patient safety

## 2024-12-30 ENCOUNTER — APPOINTMENT (OUTPATIENT)
Dept: GENERAL RADIOLOGY | Age: 74
DRG: 291 | End: 2024-12-30
Payer: COMMERCIAL

## 2024-12-30 VITALS
TEMPERATURE: 98 F | WEIGHT: 129.85 LBS | BODY MASS INDEX: 22.17 KG/M2 | DIASTOLIC BLOOD PRESSURE: 79 MMHG | RESPIRATION RATE: 18 BRPM | OXYGEN SATURATION: 93 % | HEART RATE: 55 BPM | SYSTOLIC BLOOD PRESSURE: 104 MMHG | HEIGHT: 64 IN

## 2024-12-30 LAB
ANION GAP SERPL CALCULATED.3IONS-SCNC: 9 MMOL/L (ref 9–16)
BASOPHILS # BLD: 0.1 K/UL (ref 0–0.2)
BASOPHILS NFR BLD: 1 % (ref 0–2)
BUN SERPL-MCNC: 26 MG/DL (ref 8–23)
CALCIUM SERPL-MCNC: 9.1 MG/DL (ref 8.6–10.4)
CHLORIDE SERPL-SCNC: 106 MMOL/L (ref 98–107)
CO2 SERPL-SCNC: 29 MMOL/L (ref 20–31)
CREAT SERPL-MCNC: 1.2 MG/DL (ref 0.7–1.2)
EOSINOPHIL # BLD: 0.4 K/UL (ref 0–0.4)
EOSINOPHILS RELATIVE PERCENT: 6 % (ref 0–4)
ERYTHROCYTE [DISTWIDTH] IN BLOOD BY AUTOMATED COUNT: 14.2 % (ref 11.5–14.9)
GFR, ESTIMATED: 47 ML/MIN/1.73M2
GLUCOSE BLD-MCNC: 140 MG/DL (ref 65–105)
GLUCOSE BLD-MCNC: 179 MG/DL (ref 65–105)
GLUCOSE SERPL-MCNC: 132 MG/DL (ref 74–99)
HCT VFR BLD AUTO: 40.4 % (ref 36–46)
HGB BLD-MCNC: 13.1 G/DL (ref 12–16)
LYMPHOCYTES NFR BLD: 0.9 K/UL (ref 1–4.8)
LYMPHOCYTES RELATIVE PERCENT: 13 % (ref 24–44)
MCH RBC QN AUTO: 28.9 PG (ref 26–34)
MCHC RBC AUTO-ENTMCNC: 32.5 G/DL (ref 31–37)
MCV RBC AUTO: 88.9 FL (ref 80–100)
MONOCYTES NFR BLD: 0.8 K/UL (ref 0.1–1.3)
MONOCYTES NFR BLD: 10 % (ref 1–7)
NEUTROPHILS NFR BLD: 70 % (ref 36–66)
NEUTS SEG NFR BLD: 5.2 K/UL (ref 1.3–9.1)
PLATELET # BLD AUTO: 230 K/UL (ref 150–450)
PMV BLD AUTO: 8.2 FL (ref 6–12)
POTASSIUM SERPL-SCNC: 4.1 MMOL/L (ref 3.7–5.3)
RBC # BLD AUTO: 4.54 M/UL (ref 4–5.2)
SODIUM SERPL-SCNC: 144 MMOL/L (ref 136–145)
WBC OTHER # BLD: 7.4 K/UL (ref 3.5–11)

## 2024-12-30 PROCEDURE — 6360000002 HC RX W HCPCS

## 2024-12-30 PROCEDURE — 85025 COMPLETE CBC W/AUTO DIFF WBC: CPT

## 2024-12-30 PROCEDURE — 94640 AIRWAY INHALATION TREATMENT: CPT

## 2024-12-30 PROCEDURE — 6370000000 HC RX 637 (ALT 250 FOR IP)

## 2024-12-30 PROCEDURE — 97165 OT EVAL LOW COMPLEX 30 MIN: CPT

## 2024-12-30 PROCEDURE — 36415 COLL VENOUS BLD VENIPUNCTURE: CPT

## 2024-12-30 PROCEDURE — 2500000003 HC RX 250 WO HCPCS

## 2024-12-30 PROCEDURE — 2700000000 HC OXYGEN THERAPY PER DAY

## 2024-12-30 PROCEDURE — 99232 SBSQ HOSP IP/OBS MODERATE 35: CPT | Performed by: INTERNAL MEDICINE

## 2024-12-30 PROCEDURE — 71045 X-RAY EXAM CHEST 1 VIEW: CPT

## 2024-12-30 PROCEDURE — 97530 THERAPEUTIC ACTIVITIES: CPT

## 2024-12-30 PROCEDURE — 82947 ASSAY GLUCOSE BLOOD QUANT: CPT

## 2024-12-30 PROCEDURE — 97161 PT EVAL LOW COMPLEX 20 MIN: CPT

## 2024-12-30 PROCEDURE — 94760 N-INVAS EAR/PLS OXIMETRY 1: CPT

## 2024-12-30 PROCEDURE — 80048 BASIC METABOLIC PNL TOTAL CA: CPT

## 2024-12-30 RX ORDER — BUMETANIDE 1 MG/1
2 TABLET ORAL DAILY PRN
Status: DISCONTINUED | OUTPATIENT
Start: 2024-12-30 | End: 2024-12-30

## 2024-12-30 RX ORDER — BUMETANIDE 1 MG/1
2 TABLET ORAL DAILY
Status: DISCONTINUED | OUTPATIENT
Start: 2024-12-30 | End: 2024-12-30 | Stop reason: HOSPADM

## 2024-12-30 RX ORDER — BUMETANIDE 2 MG/1
2 TABLET ORAL DAILY
Qty: 30 TABLET | Refills: 3 | Status: ON HOLD | OUTPATIENT
Start: 2024-12-31

## 2024-12-30 RX ADMIN — ASPIRIN 81 MG: 81 TABLET, COATED ORAL at 08:05

## 2024-12-30 RX ADMIN — LOSARTAN POTASSIUM 25 MG: 25 TABLET, FILM COATED ORAL at 08:05

## 2024-12-30 RX ADMIN — KETOROLAC TROMETHAMINE 15 MG: 30 INJECTION, SOLUTION INTRAMUSCULAR at 04:25

## 2024-12-30 RX ADMIN — BUMETANIDE 2 MG: 1 TABLET ORAL at 08:05

## 2024-12-30 RX ADMIN — AMIODARONE HYDROCHLORIDE 200 MG: 200 TABLET ORAL at 08:05

## 2024-12-30 RX ADMIN — APIXABAN 5 MG: 5 TABLET, FILM COATED ORAL at 08:05

## 2024-12-30 RX ADMIN — SODIUM CHLORIDE, PRESERVATIVE FREE 10 ML: 5 INJECTION INTRAVENOUS at 04:26

## 2024-12-30 RX ADMIN — ATORVASTATIN CALCIUM 80 MG: 80 TABLET, FILM COATED ORAL at 08:05

## 2024-12-30 RX ADMIN — ACETAMINOPHEN 650 MG: 325 TABLET ORAL at 08:13

## 2024-12-30 RX ADMIN — CARVEDILOL 3.12 MG: 3.12 TABLET, FILM COATED ORAL at 08:05

## 2024-12-30 RX ADMIN — EMPAGLIFLOZIN 10 MG: 10 TABLET, FILM COATED ORAL at 08:05

## 2024-12-30 RX ADMIN — SODIUM CHLORIDE, PRESERVATIVE FREE 10 ML: 5 INJECTION INTRAVENOUS at 08:06

## 2024-12-30 RX ADMIN — BUDESONIDE AND FORMOTEROL FUMARATE DIHYDRATE 2 PUFF: 160; 4.5 AEROSOL RESPIRATORY (INHALATION) at 09:11

## 2024-12-30 ASSESSMENT — PAIN SCALES - GENERAL
PAINLEVEL_OUTOF10: 0
PAINLEVEL_OUTOF10: 10
PAINLEVEL_OUTOF10: 10

## 2024-12-30 ASSESSMENT — ENCOUNTER SYMPTOMS
ABDOMINAL PAIN: 0
SHORTNESS OF BREATH: 0
COUGH: 0
WHEEZING: 0

## 2024-12-30 ASSESSMENT — PAIN DESCRIPTION - ORIENTATION: ORIENTATION: RIGHT;LEFT

## 2024-12-30 ASSESSMENT — PAIN DESCRIPTION - LOCATION
LOCATION: HEAD
LOCATION: NECK

## 2024-12-30 ASSESSMENT — PAIN DESCRIPTION - DESCRIPTORS: DESCRIPTORS: CRAMPING;HEAVINESS

## 2024-12-30 NOTE — PLAN OF CARE
FACE TO FACE FOR OXYGEN DME ORDER    Patient was evaluated today for the diagnosis of CHF.  I entered a DME order for home oxygen at 2 lpm because the diagnosis and testing require the patient to have supplemental oxygen.  Condition will improve or be benefited by oxygen use.  The patient is not able to perform good mobility in a home setting and therefore does require the use of a portable oxygen system.  The need for this equipment was discussed with the patient and she understands and is in agreement.     FACE TO FACE FOR WALKER DME ORDER    Graciela Holt was evaluated today and a DME order was entered for a standard walker because she requires this to successfully complete daily living tasks of bathing, toileting, personal cares, and ambulating.  A standard walker is necessary due to the patient's impaired ambulation and mobility restrictions and she can ambulate only by using a walker instead of a lesser assistive device, such as a cane or crutch.  The need for this equipment was discussed with the patient and she understands and is in agreement.      This face-to-face will be co-signed by the attending, Dr. Anderson

## 2024-12-30 NOTE — CARE COORDINATION
[facility] Encounter Date/Time: 2024 1120    Hospital Account: [id number]    MRN: [id number]    Patient: Graciela Andre    Contact Serial #: [id number]      ENCOUNTER          Patient Class: I Private Enc?  No Unit RM BD: KATHY PROG    Hospital Service: MED   Encounter DX: Shortness of breath [R06*   ADM Provider: Brian Min MD   Procedure:     ATT Provider: Brian Min MD   REF Provider:        Admission DX: Shortness of breath, Hypokalemia, Acute congestive heart failure, unspecified heart failure type (HCC) and DX codes: R06.02, E87.6, I50.9      PATIENT             Name: Graciela Andre : 1950 (74 yrs)   Address: [address] [place] [address] Sex: Female   City: [place] [address]         Marital Status:    Employer: [employer]         Sikh: None   Primary Care Provider: Travis Mason MD         Primary Phone: [phone]   EMERGENCY CONTACT   Name Home Phone Work Phone Mobile Phone Relationship Lgl Grd   AMY BROWN [phone] Child           GUARANTOR            Guarantor: Graciela Andre     : 1950   Address: [address] [place] [address] Sex: [place] [address]     Relation to Patient: Self       Home Phone: [phone]   Guarantor ID: [id number]       Work Phone:     Guarantor Employer: [employer]         Status: RETIRED      COVERAGE        PRIMARY INSURANCE   Payor: [employer] HE[employer] PLAN Plan: [employer]   Payor Address: [address] [address]       Group Number: . Insurance Type: INDEMNITY   Subscriber Name: ALISHA ANDREKEVIN WILLIAMSON Subscriber : 1950   Subscriber ID: [id number] Pat. Rel. to Sub: Self   SECONDARY INSURANCE   Payor:   Plan:     Payor Address:  ,           Group Number:   Insurance Type:     Subscriber Name:   Subscriber :     Subscriber ID:   Pat. Rel. to Sub:          CSN: [id number]       [id number]        CSN                                    Req/Control # [Problem

## 2024-12-30 NOTE — CARE COORDINATION
DISCHARGE PLANNING NOTE:    HOME OXYGEN:    Portable 02 tank delivered per [employer].  02 tank turned on and noted to be full.  Patient understands to call [employer] immediately upon arrival home to have 02 concentrator delivered.    Electronically signed by Yuko Pizarro RN on 12/30/2024 at 2:37 PM

## 2024-12-30 NOTE — PLAN OF CARE
Problem: Chronic Conditions and Co-morbidities  Goal: Patient's chronic conditions and co-morbidity symptoms are monitored and maintained or improved  Outcome: Progressing     Problem: Pain  Goal: Verbalizes/displays adequate comfort level or baseline comfort level  Outcome: Progressing     Problem: Skin/Tissue Integrity - Adult  Goal: Skin integrity remains intact  Outcome: Progressing

## 2024-12-30 NOTE — CARE COORDINATION
DISCHARGE PLANNING NOTE:    IMM letter provided to patient.  Patient offered four hours to make informed decision regarding appeal process; patient agreeable to discharge.       Electronically signed by Yuko Pizarro RN on 12/30/2024 at 3:03 PM

## 2024-12-30 NOTE — CARE COORDINATION
ONGOING DISCHARGE PLAN:    Patient is alert and oriented x4.    Spoke with patient regarding discharge plan and patient confirms that plan is still home no needs.    Home O2 evaluation before discharge.    SpO2 96% 2L.    Planning for possible discharge today.    Will continue to follow for additional discharge needs.    If patient is discharged prior to next notation, then this note serves as note for discharge by case management.    Electronically signed by Yuko Pizarro RN on 12/30/2024 at 1:44 PM    Orders for Walker and Oxygen faxed to [employer] with [employer] Face to face and physician note.    Electronically signed by Yuko Pizarro RN on 12/30/2024 at 2:00 PM    [facility] doesn't take patient insurance. Working on finding company that does.    Electronically signed by Yuko Pizarro RN on 12/30/2024 at 2:12 PM    [employer] takes [employer] and everything was faxed over.    Electronically signed by Yuko Pizarro RN on 12/30/2024 at 2:24 PM

## 2024-12-31 ENCOUNTER — APPOINTMENT (OUTPATIENT)
Dept: GENERAL RADIOLOGY | Age: 74
DRG: 884 | End: 2024-12-31
Payer: COMMERCIAL

## 2024-12-31 ENCOUNTER — HOSPITAL ENCOUNTER (EMERGENCY)
Age: 74
Discharge: HOME | DRG: 884 | End: 2024-12-31
Attending: EMERGENCY MEDICINE
Payer: COMMERCIAL

## 2024-12-31 VITALS
HEIGHT: 59 IN | WEIGHT: 129 LBS | OXYGEN SATURATION: 93 % | SYSTOLIC BLOOD PRESSURE: 122 MMHG | HEART RATE: 70 BPM | TEMPERATURE: 97 F | DIASTOLIC BLOOD PRESSURE: 77 MMHG | RESPIRATION RATE: 30 BRPM | BODY MASS INDEX: 26 KG/M2

## 2024-12-31 DIAGNOSIS — Z99.81 OXYGEN DEPENDENT: Primary | ICD-10-CM

## 2024-12-31 DIAGNOSIS — R06.00 DYSPNEA, UNSPECIFIED TYPE: ICD-10-CM

## 2024-12-31 DIAGNOSIS — I50.9 CHRONIC CONGESTIVE HEART FAILURE, UNSPECIFIED HEART FAILURE TYPE (HCC): ICD-10-CM

## 2024-12-31 LAB
ALBUMIN SERPL-MCNC: 3.3 G/DL (ref 3.5–5.2)
ALP SERPL-CCNC: 109 U/L (ref 35–104)
ALT SERPL-CCNC: 15 U/L (ref 10–35)
ANION GAP SERPL CALCULATED.3IONS-SCNC: 10 MMOL/L (ref 9–16)
AST SERPL-CCNC: 24 U/L (ref 10–35)
BASOPHILS # BLD: 0.1 K/UL (ref 0–0.2)
BASOPHILS NFR BLD: 1 % (ref 0–2)
BILIRUB SERPL-MCNC: 0.5 MG/DL (ref 0–1.2)
BNP SERPL-MCNC: 4176 PG/ML (ref 0–300)
BUN SERPL-MCNC: 26 MG/DL (ref 8–23)
CALCIUM SERPL-MCNC: 9 MG/DL (ref 8.6–10.4)
CHLORIDE SERPL-SCNC: 104 MMOL/L (ref 98–107)
CO2 SERPL-SCNC: 28 MMOL/L (ref 20–31)
CREAT SERPL-MCNC: 1.3 MG/DL (ref 0.7–1.2)
EKG ATRIAL RATE: 56 BPM
EKG ATRIAL RATE: 76 BPM
EKG P AXIS: 32 DEGREES
EKG P AXIS: 56 DEGREES
EKG P-R INTERVAL: 164 MS
EKG P-R INTERVAL: 174 MS
EKG Q-T INTERVAL: 436 MS
EKG Q-T INTERVAL: 508 MS
EKG QRS DURATION: 110 MS
EKG QRS DURATION: 114 MS
EKG QTC CALCULATION (BAZETT): 490 MS
EKG QTC CALCULATION (BAZETT): 490 MS
EKG R AXIS: -16 DEGREES
EKG R AXIS: -22 DEGREES
EKG T AXIS: 143 DEGREES
EKG T AXIS: 171 DEGREES
EKG VENTRICULAR RATE: 56 BPM
EKG VENTRICULAR RATE: 76 BPM
EOSINOPHIL # BLD: 0.4 K/UL (ref 0–0.4)
EOSINOPHILS RELATIVE PERCENT: 6 % (ref 0–4)
ERYTHROCYTE [DISTWIDTH] IN BLOOD BY AUTOMATED COUNT: 14.1 % (ref 11.5–14.9)
GFR, ESTIMATED: 43 ML/MIN/1.73M2
GLUCOSE SERPL-MCNC: 125 MG/DL (ref 74–99)
HCT VFR BLD AUTO: 38.8 % (ref 36–46)
HGB BLD-MCNC: 12.8 G/DL (ref 12–16)
LYMPHOCYTES NFR BLD: 1 K/UL (ref 1–4.8)
LYMPHOCYTES RELATIVE PERCENT: 16 % (ref 24–44)
MCH RBC QN AUTO: 28.7 PG (ref 26–34)
MCHC RBC AUTO-ENTMCNC: 33.1 G/DL (ref 31–37)
MCV RBC AUTO: 86.9 FL (ref 80–100)
MONOCYTES NFR BLD: 0.7 K/UL (ref 0.1–1.3)
MONOCYTES NFR BLD: 11 % (ref 1–7)
NEUTROPHILS NFR BLD: 66 % (ref 36–66)
NEUTS SEG NFR BLD: 4.2 K/UL (ref 1.3–9.1)
PLATELET # BLD AUTO: 264 K/UL (ref 150–450)
PMV BLD AUTO: 8.6 FL (ref 6–12)
POTASSIUM SERPL-SCNC: 4 MMOL/L (ref 3.7–5.3)
PROT SERPL-MCNC: 5.8 G/DL (ref 6.6–8.7)
RBC # BLD AUTO: 4.46 M/UL (ref 4–5.2)
SODIUM SERPL-SCNC: 142 MMOL/L (ref 136–145)
TROPONIN I SERPL HS-MCNC: 48 NG/L (ref 0–14)
TROPONIN I SERPL HS-MCNC: 50 NG/L (ref 0–14)
WBC OTHER # BLD: 6.3 K/UL (ref 3.5–11)

## 2024-12-31 PROCEDURE — 84484 ASSAY OF TROPONIN QUANT: CPT

## 2024-12-31 PROCEDURE — 99285 EMERGENCY DEPT VISIT HI MDM: CPT

## 2024-12-31 PROCEDURE — 93010 ELECTROCARDIOGRAM REPORT: CPT | Performed by: INTERNAL MEDICINE

## 2024-12-31 PROCEDURE — 85025 COMPLETE CBC W/AUTO DIFF WBC: CPT

## 2024-12-31 PROCEDURE — 71046 X-RAY EXAM CHEST 2 VIEWS: CPT

## 2024-12-31 PROCEDURE — 80053 COMPREHEN METABOLIC PANEL: CPT

## 2024-12-31 PROCEDURE — 6370000000 HC RX 637 (ALT 250 FOR IP): Performed by: EMERGENCY MEDICINE

## 2024-12-31 PROCEDURE — 36415 COLL VENOUS BLD VENIPUNCTURE: CPT

## 2024-12-31 PROCEDURE — 83880 ASSAY OF NATRIURETIC PEPTIDE: CPT

## 2024-12-31 RX ORDER — AMIODARONE HYDROCHLORIDE 200 MG/1
200 TABLET ORAL ONCE
Status: COMPLETED | OUTPATIENT
Start: 2024-12-31 | End: 2024-12-31

## 2024-12-31 RX ORDER — BUMETANIDE 1 MG/1
2 TABLET ORAL ONCE
Status: COMPLETED | OUTPATIENT
Start: 2024-12-31 | End: 2024-12-31

## 2024-12-31 RX ORDER — CARVEDILOL 3.12 MG/1
3.12 TABLET ORAL ONCE
Status: COMPLETED | OUTPATIENT
Start: 2024-12-31 | End: 2024-12-31

## 2024-12-31 RX ORDER — ACETAMINOPHEN 500 MG
1000 TABLET ORAL ONCE
Status: COMPLETED | OUTPATIENT
Start: 2024-12-31 | End: 2024-12-31

## 2024-12-31 RX ORDER — LOSARTAN POTASSIUM 25 MG/1
25 TABLET ORAL ONCE
Status: COMPLETED | OUTPATIENT
Start: 2024-12-31 | End: 2024-12-31

## 2024-12-31 RX ADMIN — AMIODARONE HYDROCHLORIDE 200 MG: 200 TABLET ORAL at 15:16

## 2024-12-31 RX ADMIN — CARVEDILOL 3.12 MG: 3.12 TABLET, FILM COATED ORAL at 15:17

## 2024-12-31 RX ADMIN — LOSARTAN POTASSIUM 25 MG: 25 TABLET, FILM COATED ORAL at 15:15

## 2024-12-31 RX ADMIN — BUMETANIDE 2 MG: 1 TABLET ORAL at 15:14

## 2024-12-31 RX ADMIN — ACETAMINOPHEN 1000 MG: 500 TABLET, FILM COATED ORAL at 11:42

## 2024-12-31 ASSESSMENT — PAIN - FUNCTIONAL ASSESSMENT: PAIN_FUNCTIONAL_ASSESSMENT: NONE - DENIES PAIN

## 2024-12-31 NOTE — ED NOTES
Spoke with [facility] to set up portable oxygen.  to drop a tank off at [facility] prior to patient discharge. Granddaughter notified of oxygen delivery and discharge

## 2024-12-31 NOTE — ED NOTES
States discharged from [facility] yesterday and room mates concerned because she didn't want to eat last night and was not acting her normal self. States she was being mean which is out of her norm.

## 2024-12-31 NOTE — ED PROVIDER NOTES
EMERGENCY DEPARTMENT ENCOUNTER    Pt Name: Graciela Holt  MRN: [id number]  Birthdate 1950  Date of evaluation: 12/31/24  CHIEF COMPLAINT       Chief Complaint   Patient presents with    Shortness of Breath     HISTORY OF PRESENT ILLNESS   HPI  Some shortness of breath this morning.  She was not wearing her oxygen.  Her oxygen concentrator was delivered last night.  She did not set it up yet.  She had a portable oxygen tank that ran out of oxygen.  She has not been on oxygen in several years.  She was on it in 2022 but then never went back on it after she was evicted from the house.  She was admitted to the hospital here this week for treatment of congestive heart failure.  She was released yesterday, her roommate drove her home.  She did not want home care because she did not want anybody coming into her house.  She did want to go home.  The oxygen concentrator was delivered last night.  It is at her house.  However she has not been using it.  She took her medications last night.  She did not take her medications yet this morning.  She does not smoke.  Since has been here on her oxygen now she feels much better.  Currently comfortable with no distress no complaints now.      REVIEW OF SYSTEMS     Review of Systems   All other systems reviewed and are negative.    PASTMEDICAL HISTORY     Past Medical History:   Diagnosis Date    Allergic rhinitis     Arthritis     general    CAD (coronary artery disease)     Dr. Fowler/ [place]    Cerebral artery occlusion with cerebral infarction ([facility]) 07/2020    pt states mild stroke    CHF (congestive heart failure) ([facility])     Controlled type 2 diabetes mellitus without complication, without long-term current use of insulin ([facility]) 9/10/2015    COPD (chronic obstructive pulmonary disease) ([facility])     Depression     Former smoker 10/6/2015    History of blood transfusion     no reaction    Hyperlipidemia     Hypertension     Kidney stone     Myocardial infarction ([facility])     Obesity,

## 2025-01-02 ENCOUNTER — APPOINTMENT (OUTPATIENT)
Dept: CT IMAGING | Age: 75
DRG: 884 | End: 2025-01-02
Payer: COMMERCIAL

## 2025-01-02 ENCOUNTER — HOSPITAL ENCOUNTER (INPATIENT)
Age: 75
LOS: 4 days | Discharge: ANOTHER HOSPITAL | DRG: 884 | End: 2025-01-06
Attending: EMERGENCY MEDICINE | Admitting: INTERNAL MEDICINE
Payer: COMMERCIAL

## 2025-01-02 DIAGNOSIS — R53.1 GENERAL WEAKNESS: Primary | ICD-10-CM

## 2025-01-02 DIAGNOSIS — Z78.9 DECREASED ACTIVITIES OF DAILY LIVING (ADL): ICD-10-CM

## 2025-01-02 LAB
ALBUMIN SERPL-MCNC: 3.4 G/DL (ref 3.5–5.2)
ALP SERPL-CCNC: 99 U/L (ref 35–104)
ALT SERPL-CCNC: 14 U/L (ref 10–35)
ANION GAP SERPL CALCULATED.3IONS-SCNC: 9 MMOL/L (ref 9–16)
AST SERPL-CCNC: 23 U/L (ref 10–35)
BASOPHILS # BLD: 0.1 K/UL (ref 0–0.2)
BASOPHILS NFR BLD: 1 % (ref 0–2)
BILIRUB SERPL-MCNC: 0.3 MG/DL (ref 0–1.2)
BUN SERPL-MCNC: 21 MG/DL (ref 8–23)
CALCIUM SERPL-MCNC: 9.1 MG/DL (ref 8.6–10.4)
CHLORIDE SERPL-SCNC: 104 MMOL/L (ref 98–107)
CO2 SERPL-SCNC: 30 MMOL/L (ref 20–31)
CREAT SERPL-MCNC: 1.2 MG/DL (ref 0.7–1.2)
EOSINOPHIL # BLD: 0.4 K/UL (ref 0–0.4)
EOSINOPHILS RELATIVE PERCENT: 6 % (ref 0–4)
ERYTHROCYTE [DISTWIDTH] IN BLOOD BY AUTOMATED COUNT: 14.3 % (ref 11.5–14.9)
GFR, ESTIMATED: 47 ML/MIN/1.73M2
GLUCOSE SERPL-MCNC: 257 MG/DL (ref 74–99)
HCT VFR BLD AUTO: 38.1 % (ref 36–46)
HGB BLD-MCNC: 12.3 G/DL (ref 12–16)
LYMPHOCYTES NFR BLD: 1.1 K/UL (ref 1–4.8)
LYMPHOCYTES RELATIVE PERCENT: 18 % (ref 24–44)
MAGNESIUM SERPL-MCNC: 2.2 MG/DL (ref 1.6–2.4)
MCH RBC QN AUTO: 28.4 PG (ref 26–34)
MCHC RBC AUTO-ENTMCNC: 32.3 G/DL (ref 31–37)
MCV RBC AUTO: 87.9 FL (ref 80–100)
MONOCYTES NFR BLD: 0.7 K/UL (ref 0.1–1.3)
MONOCYTES NFR BLD: 12 % (ref 1–7)
NEUTROPHILS NFR BLD: 63 % (ref 36–66)
NEUTS SEG NFR BLD: 3.8 K/UL (ref 1.3–9.1)
PLATELET # BLD AUTO: 264 K/UL (ref 150–450)
PMV BLD AUTO: 8.7 FL (ref 6–12)
POTASSIUM SERPL-SCNC: 3.5 MMOL/L (ref 3.7–5.3)
PROT SERPL-MCNC: 5.8 G/DL (ref 6.6–8.7)
RBC # BLD AUTO: 4.33 M/UL (ref 4–5.2)
SODIUM SERPL-SCNC: 143 MMOL/L (ref 136–145)
WBC OTHER # BLD: 6.1 K/UL (ref 3.5–11)

## 2025-01-02 PROCEDURE — 83735 ASSAY OF MAGNESIUM: CPT

## 2025-01-02 PROCEDURE — 70450 CT HEAD/BRAIN W/O DYE: CPT

## 2025-01-02 PROCEDURE — 36415 COLL VENOUS BLD VENIPUNCTURE: CPT

## 2025-01-02 PROCEDURE — 81001 URINALYSIS AUTO W/SCOPE: CPT

## 2025-01-02 PROCEDURE — 80053 COMPREHEN METABOLIC PANEL: CPT

## 2025-01-02 PROCEDURE — 99285 EMERGENCY DEPT VISIT HI MDM: CPT

## 2025-01-02 PROCEDURE — 1200000000 HC SEMI PRIVATE

## 2025-01-02 PROCEDURE — 85025 COMPLETE CBC W/AUTO DIFF WBC: CPT

## 2025-01-03 ENCOUNTER — APPOINTMENT (OUTPATIENT)
Dept: MRI IMAGING | Age: 75
DRG: 884 | End: 2025-01-03
Payer: COMMERCIAL

## 2025-01-03 LAB
ANION GAP SERPL CALCULATED.3IONS-SCNC: 8 MMOL/L (ref 9–16)
BACTERIA URNS QL MICRO: ABNORMAL
BASOPHILS # BLD: 0 K/UL (ref 0–0.2)
BASOPHILS NFR BLD: 1 % (ref 0–2)
BILIRUB UR QL STRIP: NEGATIVE
BUN SERPL-MCNC: 16 MG/DL (ref 8–23)
CALCIUM SERPL-MCNC: 8.8 MG/DL (ref 8.6–10.4)
CASTS #/AREA URNS LPF: ABNORMAL /LPF
CHLORIDE SERPL-SCNC: 106 MMOL/L (ref 98–107)
CLARITY UR: ABNORMAL
CO2 SERPL-SCNC: 30 MMOL/L (ref 20–31)
COLOR UR: YELLOW
CREAT SERPL-MCNC: 1 MG/DL (ref 0.7–1.2)
EOSINOPHIL # BLD: 0.4 K/UL (ref 0–0.4)
EOSINOPHILS RELATIVE PERCENT: 7 % (ref 0–4)
EPI CELLS #/AREA URNS HPF: ABNORMAL /HPF
ERYTHROCYTE [DISTWIDTH] IN BLOOD BY AUTOMATED COUNT: 14 % (ref 11.5–14.9)
GFR, ESTIMATED: 58 ML/MIN/1.73M2
GLUCOSE BLD-MCNC: 128 MG/DL (ref 65–105)
GLUCOSE BLD-MCNC: 133 MG/DL (ref 65–105)
GLUCOSE BLD-MCNC: 153 MG/DL (ref 65–105)
GLUCOSE BLD-MCNC: 155 MG/DL (ref 65–105)
GLUCOSE SERPL-MCNC: 155 MG/DL (ref 74–99)
GLUCOSE UR STRIP-MCNC: ABNORMAL MG/DL
HCT VFR BLD AUTO: 36.4 % (ref 36–46)
HGB BLD-MCNC: 12 G/DL (ref 12–16)
HGB UR QL STRIP.AUTO: ABNORMAL
KETONES UR STRIP-MCNC: ABNORMAL MG/DL
LEUKOCYTE ESTERASE UR QL STRIP: NEGATIVE
LYMPHOCYTES NFR BLD: 0.9 K/UL (ref 1–4.8)
LYMPHOCYTES RELATIVE PERCENT: 15 % (ref 24–44)
MAGNESIUM SERPL-MCNC: 2 MG/DL (ref 1.6–2.4)
MCH RBC QN AUTO: 28.9 PG (ref 26–34)
MCHC RBC AUTO-ENTMCNC: 33.1 G/DL (ref 31–37)
MCV RBC AUTO: 87.3 FL (ref 80–100)
MONOCYTES NFR BLD: 0.7 K/UL (ref 0.1–1.3)
MONOCYTES NFR BLD: 11 % (ref 1–7)
NEUTROPHILS NFR BLD: 66 % (ref 36–66)
NEUTS SEG NFR BLD: 4.1 K/UL (ref 1.3–9.1)
NITRITE UR QL STRIP: POSITIVE
PH UR STRIP: 6 [PH] (ref 5–8)
PLATELET # BLD AUTO: 230 K/UL (ref 150–450)
PMV BLD AUTO: 8.3 FL (ref 6–12)
POTASSIUM SERPL-SCNC: 3.4 MMOL/L (ref 3.7–5.3)
PROT UR STRIP-MCNC: ABNORMAL MG/DL
RBC # BLD AUTO: 4.17 M/UL (ref 4–5.2)
RBC #/AREA URNS HPF: ABNORMAL /HPF
SODIUM SERPL-SCNC: 144 MMOL/L (ref 136–145)
SP GR UR STRIP: 1.04 (ref 1–1.03)
UROBILINOGEN UR STRIP-ACNC: NORMAL EU/DL (ref 0–1)
WBC #/AREA URNS HPF: ABNORMAL /HPF
WBC OTHER # BLD: 6.2 K/UL (ref 3.5–11)

## 2025-01-03 PROCEDURE — 99223 1ST HOSP IP/OBS HIGH 75: CPT | Performed by: INTERNAL MEDICINE

## 2025-01-03 PROCEDURE — 97162 PT EVAL MOD COMPLEX 30 MIN: CPT

## 2025-01-03 PROCEDURE — 85025 COMPLETE CBC W/AUTO DIFF WBC: CPT

## 2025-01-03 PROCEDURE — 72141 MRI NECK SPINE W/O DYE: CPT

## 2025-01-03 PROCEDURE — 1200000000 HC SEMI PRIVATE

## 2025-01-03 PROCEDURE — 80048 BASIC METABOLIC PNL TOTAL CA: CPT

## 2025-01-03 PROCEDURE — 82947 ASSAY GLUCOSE BLOOD QUANT: CPT

## 2025-01-03 PROCEDURE — 6370000000 HC RX 637 (ALT 250 FOR IP): Performed by: NURSE PRACTITIONER

## 2025-01-03 PROCEDURE — 2500000003 HC RX 250 WO HCPCS: Performed by: NURSE PRACTITIONER

## 2025-01-03 PROCEDURE — 6360000002 HC RX W HCPCS: Performed by: NURSE PRACTITIONER

## 2025-01-03 PROCEDURE — 83735 ASSAY OF MAGNESIUM: CPT

## 2025-01-03 PROCEDURE — 94640 AIRWAY INHALATION TREATMENT: CPT

## 2025-01-03 PROCEDURE — 94761 N-INVAS EAR/PLS OXIMETRY MLT: CPT

## 2025-01-03 PROCEDURE — 97166 OT EVAL MOD COMPLEX 45 MIN: CPT

## 2025-01-03 PROCEDURE — 36415 COLL VENOUS BLD VENIPUNCTURE: CPT

## 2025-01-03 PROCEDURE — 2700000000 HC OXYGEN THERAPY PER DAY

## 2025-01-03 RX ORDER — ROPINIROLE 0.5 MG/1
1 TABLET, FILM COATED ORAL NIGHTLY
Status: DISCONTINUED | OUTPATIENT
Start: 2025-01-03 | End: 2025-01-06 | Stop reason: HOSPADM

## 2025-01-03 RX ORDER — FAMOTIDINE 20 MG/1
40 TABLET, FILM COATED ORAL EVERY EVENING
Status: DISCONTINUED | OUTPATIENT
Start: 2025-01-03 | End: 2025-01-03

## 2025-01-03 RX ORDER — POLYETHYLENE GLYCOL 3350 17 G/17G
17 POWDER, FOR SOLUTION ORAL DAILY PRN
Status: DISCONTINUED | OUTPATIENT
Start: 2025-01-03 | End: 2025-01-06 | Stop reason: HOSPADM

## 2025-01-03 RX ORDER — LOSARTAN POTASSIUM 25 MG/1
25 TABLET ORAL DAILY
Status: DISCONTINUED | OUTPATIENT
Start: 2025-01-03 | End: 2025-01-06 | Stop reason: HOSPADM

## 2025-01-03 RX ORDER — BISACODYL 10 MG
10 SUPPOSITORY, RECTAL RECTAL DAILY PRN
Status: DISCONTINUED | OUTPATIENT
Start: 2025-01-03 | End: 2025-01-06 | Stop reason: HOSPADM

## 2025-01-03 RX ORDER — ASPIRIN 81 MG/1
81 TABLET ORAL DAILY
Status: DISCONTINUED | OUTPATIENT
Start: 2025-01-03 | End: 2025-01-06 | Stop reason: HOSPADM

## 2025-01-03 RX ORDER — ACETAMINOPHEN 650 MG/1
650 SUPPOSITORY RECTAL EVERY 6 HOURS PRN
Status: DISCONTINUED | OUTPATIENT
Start: 2025-01-03 | End: 2025-01-06 | Stop reason: HOSPADM

## 2025-01-03 RX ORDER — BUMETANIDE 1 MG/1
2 TABLET ORAL DAILY
Status: DISCONTINUED | OUTPATIENT
Start: 2025-01-03 | End: 2025-01-06 | Stop reason: HOSPADM

## 2025-01-03 RX ORDER — CARVEDILOL 3.12 MG/1
3.12 TABLET ORAL 2 TIMES DAILY WITH MEALS
Status: DISCONTINUED | OUTPATIENT
Start: 2025-01-03 | End: 2025-01-06 | Stop reason: HOSPADM

## 2025-01-03 RX ORDER — SODIUM CHLORIDE 0.9 % (FLUSH) 0.9 %
10 SYRINGE (ML) INJECTION PRN
Status: DISCONTINUED | OUTPATIENT
Start: 2025-01-03 | End: 2025-01-06 | Stop reason: HOSPADM

## 2025-01-03 RX ORDER — SODIUM CHLORIDE 9 MG/ML
INJECTION, SOLUTION INTRAVENOUS PRN
Status: DISCONTINUED | OUTPATIENT
Start: 2025-01-03 | End: 2025-01-06 | Stop reason: HOSPADM

## 2025-01-03 RX ORDER — FAMOTIDINE 20 MG/1
20 TABLET, FILM COATED ORAL EVERY EVENING
Status: DISCONTINUED | OUTPATIENT
Start: 2025-01-03 | End: 2025-01-06 | Stop reason: HOSPADM

## 2025-01-03 RX ORDER — SODIUM CHLORIDE 0.9 % (FLUSH) 0.9 %
5-40 SYRINGE (ML) INJECTION EVERY 12 HOURS SCHEDULED
Status: DISCONTINUED | OUTPATIENT
Start: 2025-01-03 | End: 2025-01-06 | Stop reason: HOSPADM

## 2025-01-03 RX ORDER — POTASSIUM CHLORIDE 1500 MG/1
20 TABLET, EXTENDED RELEASE ORAL 2 TIMES DAILY
Status: DISCONTINUED | OUTPATIENT
Start: 2025-01-03 | End: 2025-01-06 | Stop reason: HOSPADM

## 2025-01-03 RX ORDER — ALBUTEROL SULFATE 0.83 MG/ML
2.5 SOLUTION RESPIRATORY (INHALATION) EVERY 6 HOURS PRN
Status: DISCONTINUED | OUTPATIENT
Start: 2025-01-03 | End: 2025-01-06 | Stop reason: HOSPADM

## 2025-01-03 RX ORDER — ATORVASTATIN CALCIUM 80 MG/1
80 TABLET, FILM COATED ORAL DAILY
Status: DISCONTINUED | OUTPATIENT
Start: 2025-01-03 | End: 2025-01-06 | Stop reason: HOSPADM

## 2025-01-03 RX ORDER — ACETAMINOPHEN 325 MG/1
650 TABLET ORAL EVERY 6 HOURS PRN
Status: DISCONTINUED | OUTPATIENT
Start: 2025-01-03 | End: 2025-01-06 | Stop reason: HOSPADM

## 2025-01-03 RX ORDER — BUDESONIDE AND FORMOTEROL FUMARATE DIHYDRATE 160; 4.5 UG/1; UG/1
2 AEROSOL RESPIRATORY (INHALATION)
Status: DISCONTINUED | OUTPATIENT
Start: 2025-01-03 | End: 2025-01-06 | Stop reason: HOSPADM

## 2025-01-03 RX ORDER — AMIODARONE HYDROCHLORIDE 200 MG/1
200 TABLET ORAL DAILY
Status: DISCONTINUED | OUTPATIENT
Start: 2025-01-03 | End: 2025-01-06 | Stop reason: HOSPADM

## 2025-01-03 RX ORDER — MAGNESIUM SULFATE HEPTAHYDRATE 40 MG/ML
2000 INJECTION, SOLUTION INTRAVENOUS PRN
Status: DISCONTINUED | OUTPATIENT
Start: 2025-01-03 | End: 2025-01-06 | Stop reason: HOSPADM

## 2025-01-03 RX ADMIN — LOSARTAN POTASSIUM 25 MG: 25 TABLET, FILM COATED ORAL at 08:51

## 2025-01-03 RX ADMIN — APIXABAN 5 MG: 5 TABLET, FILM COATED ORAL at 20:58

## 2025-01-03 RX ADMIN — ACETAMINOPHEN 650 MG: 325 TABLET ORAL at 21:08

## 2025-01-03 RX ADMIN — ACETAMINOPHEN 650 MG: 325 TABLET ORAL at 12:35

## 2025-01-03 RX ADMIN — APIXABAN 5 MG: 5 TABLET, FILM COATED ORAL at 08:51

## 2025-01-03 RX ADMIN — CARVEDILOL 3.12 MG: 3.12 TABLET, FILM COATED ORAL at 17:58

## 2025-01-03 RX ADMIN — ROPINIROLE HYDROCHLORIDE 1 MG: 0.5 TABLET, FILM COATED ORAL at 20:58

## 2025-01-03 RX ADMIN — POTASSIUM CHLORIDE 20 MEQ: 1500 TABLET, EXTENDED RELEASE ORAL at 20:58

## 2025-01-03 RX ADMIN — SODIUM CHLORIDE, PRESERVATIVE FREE 10 ML: 5 INJECTION INTRAVENOUS at 08:51

## 2025-01-03 RX ADMIN — ASPIRIN 81 MG: 81 TABLET, COATED ORAL at 08:51

## 2025-01-03 RX ADMIN — POTASSIUM CHLORIDE 20 MEQ: 1500 TABLET, EXTENDED RELEASE ORAL at 08:51

## 2025-01-03 RX ADMIN — BUMETANIDE 2 MG: 1 TABLET ORAL at 08:51

## 2025-01-03 RX ADMIN — BUDESONIDE AND FORMOTEROL FUMARATE DIHYDRATE 2 PUFF: 160; 4.5 AEROSOL RESPIRATORY (INHALATION) at 08:43

## 2025-01-03 RX ADMIN — FAMOTIDINE 20 MG: 20 TABLET, FILM COATED ORAL at 17:58

## 2025-01-03 RX ADMIN — ATORVASTATIN CALCIUM 80 MG: 80 TABLET, FILM COATED ORAL at 08:51

## 2025-01-03 RX ADMIN — AMIODARONE HYDROCHLORIDE 200 MG: 200 TABLET ORAL at 08:51

## 2025-01-03 RX ADMIN — SODIUM CHLORIDE, PRESERVATIVE FREE 10 ML: 5 INJECTION INTRAVENOUS at 20:59

## 2025-01-03 RX ADMIN — CARVEDILOL 3.12 MG: 3.12 TABLET, FILM COATED ORAL at 08:51

## 2025-01-03 RX ADMIN — EMPAGLIFLOZIN 10 MG: 10 TABLET, FILM COATED ORAL at 08:51

## 2025-01-03 RX ADMIN — WATER 1000 MG: 1 INJECTION INTRAMUSCULAR; INTRAVENOUS; SUBCUTANEOUS at 04:51

## 2025-01-03 ASSESSMENT — PAIN SCALES - GENERAL
PAINLEVEL_OUTOF10: 10
PAINLEVEL_OUTOF10: 2
PAINLEVEL_OUTOF10: 3

## 2025-01-03 ASSESSMENT — PAIN DESCRIPTION - LOCATION
LOCATION: NECK
LOCATION: NECK

## 2025-01-03 NOTE — H&P
[facility]   IN-PATIENT SERVICE   [facility]    HISTORY AND PHYSICAL EXAMINATION            Date:   1/3/2025  Patient name:  Graciela Holt  Date of admission:  1/2/2025  7:07 PM  MRN:   [id number]  Account:  [id number]  YOB: 1950  PCP:    Travis Mason MD  Room:   [address] [place] [address]  Code Status:    Full Code    Chief Complaint:     Chief Complaint   Patient presents with    Fatigue     Pt states she has no strength. EMS states caregivers are struggling to care for patient.       History Obtained From:     patient    History of Present Illness:     The patient is a 74 y.o.  Non- / non  female who presents with Fatigue (Pt states she has no strength. EMS states caregivers are struggling to care for patient.)   and she is admitted to the hospital for the management of      Graciela Holt is a 74 y.o. Non- / non  female who presents with Fatigue (Pt states she has no strength. EMS states caregivers are struggling to care for patient.) and is admitted to the hospital for the management of Weakness.  Medical history significant for HTN, HLD, COPD, history of left MCA CVA with occluded ICA, CAD s/p stents X2, and CABG x 3.      Recent admission 12/28 to 12/30 for acute on chronic heart failure.       Patient was brought to ED today per EMS due to caregivers being unable to care for patient with increased weakness and fatigue.  Patient states that over the past few days she has had decreased mobility and is unable to care for herself.  States that fatigue and weakness is overwhelming unable to complete ADLs.      CT head is negative for intracranial abnormality but does show possible spinal canal stenosis and cord compression at C1.  Patient states that she does have a history of a C1 fracture approximately 3 years ago and has been having some increased neck pain.  Denies any falls or injuries.  Neurosurgeon Dr. Bro guillaume was consulted in ED and

## 2025-01-03 NOTE — ED PROVIDER NOTES
EMERGENCY DEPARTMENT ENCOUNTER    Pt Name: Graciela Holt  MRN: [id number]  Birthdate 1950  Date of evaluation: 1/2/25  CHIEF COMPLAINT       Chief Complaint   Patient presents with    Fatigue     Pt states she has no strength. EMS states caregivers are struggling to care for patient.     HISTORY OF PRESENT ILLNESS   Patient is presenting with chief complaint of fatigue.  She said that over the last few days she has had decreased ability to move her body.  She denies any focal deficits.  She has had generalized fatigue and having difficulty performing even the simplest of her ADLs.  She lives with somebody who takes care of her but this person goes to work and she does not feel like she can take care of herself.    The history is provided by the patient.           REVIEW OF SYSTEMS     Review of Systems   Constitutional:  Positive for fatigue. Negative for chills and fever.   Eyes:  Negative for visual disturbance.   Respiratory:  Negative for cough and shortness of breath.    Cardiovascular:  Negative for chest pain.   Gastrointestinal:  Negative for abdominal pain, nausea and vomiting.   Genitourinary:  Negative for dysuria, flank pain, frequency and urgency.   Neurological:  Positive for weakness (generalized). Negative for dizziness, tremors, seizures, syncope, facial asymmetry, speech difficulty, light-headedness, numbness and headaches.     PASTMEDICAL HISTORY     Past Medical History:   Diagnosis Date    Allergic rhinitis     Arthritis     general    CAD (coronary artery disease)     Dr. Fowler/ [place]    Cerebral artery occlusion with cerebral infarction ([facility]) 07/2020    pt states mild stroke    CHF (congestive heart failure) ([facility])     Controlled type 2 diabetes mellitus without complication, without long-term current use of insulin (HCC) 9/10/2015    COPD (chronic obstructive pulmonary disease) ([facility])     Depression     Former smoker 10/6/2015    History of blood transfusion     no reaction

## 2025-01-03 NOTE — PROGRESS NOTES
12/30/24 1318   Encounter Summary   Encounter Overview/Reason Loneliness/Social Isolation       
  [facility]  IN-PATIENT SERVICE  [facility]    PROGRESS NOTE             12/29/2024    8:04 AM    Name:   Graciela Holt  MRN:     [id number]     Acct:      [id number]   Room:   2113/2113-01   Day:  1  Admit Date:  12/28/2024 11:20 AM    PCP:  Travis Mason MD  Code Status:  Full Code    Subjective:     C/C:   Chief Complaint   Patient presents with    Shortness of Breath    Dizziness     Interval History Status: improved.    Patient seen and examined at bedside.  Has significantly improved.  On 1 L of oxygen saturating at 98%. Hemodynamically stable.  Troponin elevated to 51 instead of downtrending.  EKG ordered.  No complaints of chest pain.  Bumex 2 mg IV daily ordered  Potassium 3.5 replaced  Waiting for cardiology input.       Brief History:     The patient is a 74 y.o. female who has a significant past medical history of hypertension, hyperlipidemia, COPD.  History of left MCA stroke and occluded internal carotid artery. Patient has a history of coronary artery disease stents x 2 and CABG x 3 latest in 2018.  Patient also has CHF with recurrent CHF exacerbations.  Last echo in 11/2024 shows ejection fraction of 20 to 25% with severe symmetrical proximal septal hypertrophy and akinetic mid to apical anterior wall with akinetic left ventricular apex with thrombus.patient was started on Eliquis 5 mg twice daily.  On chart review patient also seems to have history of DVT/PE.  However patient is currently not on Eliquis as she was told by pharmacist that price would be increasing soon. patient also has a history of ventricular fibrillation and ventricular arrhythmias patient is on amiodarone.  On chart review patient refuses LifeVest and AICD placement for heart failure.  Patient is also not on complete GDMT.  Patient is also not taking her Jardiance 10 mg as she was told her price of Jardiance would be increasing in the coming year.     Patient woke up this 
  Physician Progress Note      PATIENT:               IVETT ANDRE  [facility] #:                  [id number]  :                       1950  ADMIT DATE:       2024 11:20 AM  DISCH DATE:        2024 4:19 PM  RESPONDING  PROVIDER #:        Chip Anderson MD          QUERY TEXT:    Patient admitted with acute on chronic systolic CHF.  Pt noted to have   troponins 47, 46, 51. If possible, please document in the progress notes and   discharge summary if you are evaluating and/or treating any of the following:    The medical record reflects the following:    Risk Factors: acute on chronic systolic CHF  Clinical Indicators: Troponin 47, 46, 51, Pro-BNP 12,221.0  Treatment: IV Bumex, Coreg, Lipitor  Options provided:  -- Demand Ischemia due to acute on chronic systolic CHF  -- Type 2 MI due to acute on chronic systolic CHF  -- Other - I will add my own diagnosis  -- Disagree - Not applicable / Not valid  -- Disagree - Clinically unable to determine / Unknown  -- Refer to Clinical Documentation Reviewer    PROVIDER RESPONSE TEXT:    This patient has demand ischemia due to acute on chronic systolic CHF.    Query created by: Gordo Gutierrez on 2024 3:35 PM      QUERY TEXT:    Pt admitted with acute on chronic systolic CHF. Pt noted to also have hx of   HTN, CAD and ischemic cardiomyopathy. If possible, please document in progress   notes and discharge summary the etiology of CHF, if able to be determined.    The medical record reflects the following:    Risk Factors: HTN, CAD, ischemic CMP, mitral and tricuspid regurgitation  Clinical Indicators: presents with SOB, Pro-BNP 12,221.0, trace BLE edema,   Echo 24 EF 25-30%, mild tricuspid and mitral valve regurgitation  Treatment: IV Bumex, Coreg, Lipitor, Cardio consult  Options provided:  -- CHF due to Hypertensive Heart Disease  -- CHF due to Hypertensive Heart Disease and CAD  -- CHF not due to Hypertension but due to CAD  -- CHF due to 
1/3/2025 Bumex 2mg never picked up from [facility] [phone] [address] when pt left prior to [id number] delivery per Nurse Aida pt has Bumex at home 12/30/24 3:22pm inez  
CLINICAL PHARMACY NOTE: MEDS TO BEDS    Total # of Prescriptions Filled: 1   The following medications IS READY FOR PICK-UP AT [facility] [phone] [address] Nieves[address] 2mg    Additional Documentation: Patient left prior to [id number] delivery per Nurse Aida pt has Bumex at home    Outpatient Pharmacy located at Surgery entrance- [facility] /[address]) Open Monday-Friday 9:00am-5:30pm closed from 1:00pm-1:30pm for lunch. Closed weekends and major holidays.   
[facility]   Occupational Therapy Evaluation  Date: 24  Patient Name: Graciela Holt       Room: 2113/2113-01  MRN: [id number]  Account: [id number]   : 1950  (74 y.o.) Gender: female     Discharge Recommendations:  Further Occupational Therapy is recommended upon facility discharge.    OT Equipment Recommendations  Equipment Needed: Yes  Mobility Devices: Walker, ADL Assistive Devices  Walker: Rolling  ADL Assistive Devices: Shower Chair with back, Grab Bars - shower    Referring Practitioner: Dr. Min  Diagnosis: SOB; Hypokalemia; Acute CHF     Past Medical History:  has a past medical history of Allergic rhinitis, Arthritis, CAD (coronary artery disease), Cerebral artery occlusion with cerebral infarction ([facility]), CHF (congestive heart failure) ([facility]), Controlled type 2 diabetes mellitus without complication, without long-term current use of insulin ([facility]), COPD (chronic obstructive pulmonary disease) ([facility]), Depression, Former smoker, History of blood transfusion, Hyperlipidemia, Hypertension, Kidney stone, Myocardial infarction ([facility]), Obesity, Class I, BMI 30.0-34.9 (see actual BMI), Restless leg syndrome, Skin abnormality, Type II or unspecified type diabetes mellitus without mention of complication, not stated as uncontrolled, Wears glasses, and Wears partial dentures.    Past Surgical History:   has a past surgical history that includes Appendectomy; Hysterectomy; Cholecystectomy; joint replacement (Bilateral); pr office/outpt visit,procedure only (N/A, 2018); pr i&d deep absc bursa/hematoma thigh/knee region (Right, 2018); Leg biopsy excision (Right, 2019); Cardiac surgery; Cardiac surgery; other surgical history (2020); cervical laminectomy (N/A, 10/14/2020); and bronchoscopy (N/A, 2021).    Restrictions  Restrictions/Precautions  Restrictions/Precautions: General Precautions  Activity Level: Up as Tolerated  Required Braces or Orthoses?: 
Date:   2024  Patient name: Graciela Holt  Date of admission:  2024 11:20 AM  MRN:   [id number]  YOB: 1950  PCP: Travis Mason MD    Reason for Admission: Shortness of breath [R06.02]  Hypokalemia [E87.6]  Acute congestive heart failure, unspecified heart failure type (HCC) [I50.9]    Cardiology follow-up: Multivessel coronary artery disease, CABG, severely reduced LV systolic function ejection fraction 25 to 30%       Referring physician: Dr Torres Mechelle     Impression  Admission 2024  Multivessel CAD   CABG LIMA to LAD and diagonal 1, SVG to OM, SVG to PDA 2018 at [facility]  Severely reduced LV systolic function ejection fraction 25 to 30%, akinetic LV apex, apical thrombus 2D echo 2024  Moderately increased LV wall thickness  Episodes of nonsustained V. Tach  Diabetes mellitus  Hyperlipidemia lipid profile 2024 cholesterol 159, HDL 55, LDL 95, triglyceride 41  History of left MCA stroke occluded internal carotid     Admission 2024 with a severe shortness of breath, CHF systolic acute on chronic  High-sensitivity troponin 42 and 29, proBNP 13,352  Admission 2024 chest pain like a heavy pressure radiating to the left arm, no new ECG changes, high-sensitivity troponin 28  Admission 2024 chest pain like heaviness no shortness of breath no radiation ECG showed LVH with repolarization seen changes no change from previous ECG borderline abnormal troponin most likely type II  Admission 2024 for increasing shortness of breath, increasing swelling over the legs, elevated troponin 119, 136,  chest x-ray showed pulmonary edema proBNP 31,990     Past surgical history  Bilateral knee replacement, C-spine surgery, cholecystectomy,  section, CABG     Drug allergies codeine, lisinopril     History of present illness  74-year-old female who lives with her 2 roommates mother of 1 son and 1 daughter with a past medical history of 
Home Oxygen Evaluation    Room air SpO2 at Rest =91%    Room air with exercise/exertion =84%    SpO2 on prescribed O2 level at  2  LPM  at rest =  95%    with exercise/exertion = 92%    Nocturnal Oximetry with patient on room air recommended if the resting SpO2 is 89% to 95%. (Requires additional order)  
Notified Bonita Escudero NP patient complaining of pain 9/10 patient received Tylenol with no relief. Bonita HORNER ordered Toradol q6 hr PRN.   
Notified Dr. Flynn Cardiology regarding new consult.   
Patient arrived from ED via a stretcher. Patient alert and oriented. Patient denies pain or distress.Patient oriented to unit procedures and plan of care.  
Physical Therapy  Facility/Department: [facility] PROGRESSIVE CARE  Physical Therapy Initial Assessment    Name: Graciela Holt  : 1950  MRN: [id number]  Date of Service: 2024    Discharge Recommendations:  Home with assist PRN, Home with Home health PT   PT Equipment Recommendations  Equipment Needed: Yes  Mobility Devices: Walker  Walker: Rolling      Patient Diagnosis(es): The primary encounter diagnosis was Acute congestive heart failure, unspecified heart failure type (HCC). A diagnosis of Hypokalemia was also pertinent to this visit.  Past Medical History:  has a past medical history of Allergic rhinitis, Arthritis, CAD (coronary artery disease), Cerebral artery occlusion with cerebral infarction (HCC), CHF (congestive heart failure) (HCC), Controlled type 2 diabetes mellitus without complication, without long-term current use of insulin (HCC), COPD (chronic obstructive pulmonary disease) (HCC), Depression, Former smoker, History of blood transfusion, Hyperlipidemia, Hypertension, Kidney stone, Myocardial infarction (HCC), Obesity, Class I, BMI 30.0-34.9 (see actual BMI), Restless leg syndrome, Skin abnormality, Type II or unspecified type diabetes mellitus without mention of complication, not stated as uncontrolled, Wears glasses, and Wears partial dentures.  Past Surgical History:  has a past surgical history that includes Appendectomy; Hysterectomy; Cholecystectomy; joint replacement (Bilateral); pr office/outpt visit,procedure only (N/A, 2018); pr i&d deep absc bursa/hematoma thigh/knee region (Right, 2018); Leg biopsy excision (Right, 2019); Cardiac surgery; Cardiac surgery; other surgical history (2020); cervical laminectomy (N/A, 10/14/2020); and bronchoscopy (N/A, 2021).    Assessment  Assessment: pt demonstrates functional mobility with O2 and RW  Decision Making: Low Complexity  History: CHF; COPD  Exam: WFLs  Clinical Presentation: stable  Requires PT Follow-Up: 
RN agree's with charting by LIVE Sebastian.   
Spiritual Health History and Assessment/Progress Note  [facility]    (P) Initial Encounter,  ,  ,      Name: Graciela Holt MRN: [id number]    Age: 74 y.o.     Sex: female   Language: English   Synagogue: None   Shortness of breath     Date: 12/29/2024                           Spiritual Assessment began in [facility] PROGRESSIVE CARE            Encounter Overview/Reason: (P) Initial Encounter  Service Provided For: (P) Patient     visited pt via consult for emotional distress. Pt was awake upon entry. Pt coping well with her care. Pt did not express and needs or concerns at this time.     Yareli, Belief, Meaning:   Patient unable to assess at this time  Family/Friends No family/friends present      Importance and Influence:  Patient unable to assess at this time  Family/Friends No family/friends present    Community:  Patient: none mentioned   Family/Friends No family/friends present    Assessment and Plan of Care:     Patient Interventions include: Facilitated expression of thoughts and feelings  Family/Friends Interventions include: Facilitated expression of thoughts and feelings    Patient Plan of Care: Spiritual Care available upon further referral  Family/Friends Plan of Care: Spiritual Care available upon further referral    Electronically signed by Chaplain Andres on 12/29/2024 at 3:29 PM   
sternotomy.  Cardiomegaly.  Pulmonary vascular congestion. Pulmonary edema.  No pneumothorax.     Findings suggest congestive heart failure         Physical Examination:        Physical Exam  Constitutional:       General: She is not in acute distress.     Appearance: She is not ill-appearing.   Cardiovascular:      Rate and Rhythm: Normal rate and regular rhythm.      Pulses: Normal pulses.      Heart sounds: Normal heart sounds.   Pulmonary:      Effort: Pulmonary effort is normal.      Breath sounds: Normal breath sounds.   Musculoskeletal:      Right lower leg: No edema.      Left lower leg: No edema.   Neurological:      Mental Status: She is alert.         Assessment:        Primary Problem  Shortness of breath    Active Hospital Problems    Diagnosis Date Noted    Shortness of breath [R06.02] 09/08/2023       Plan:        Acute on chronic CHF exacerbation with HFrEF   -Echo on 11/24 showing an EF of 20 to 25% with akinetic left ventricular apex thrombus   -Continue GDMT  -Transitioned IV to oral Bumex today  -still requiring oxygen, will repeat chest x-ray and home oxygen eval prior to discharge  -cardiology following; appreciate recs  -Refusing lifevest and AICD  -anticipate d/c later today    Non-sustained v. tach history   -Continue Amiodarone 200 mg daily   -cardiology on board  -asymptomatic during this admission     CAD   -S/p multivessel stent 2018   -CABG LIMA to LAD and diagonal, SVG to OM, SVG to PDA   -continue Aspirin 81 mg      HLD  -continue high dose Lipitor 80 mg      COPD, not in acute exacerbation  -continue Albuterol inhaler & Symbicort     DVT prophylaxis: Already on Eliquis 5 mg twice daily  GI prophylaxis: 40 mg Protonix daily  Diet: Cardiac  Disposition: Anticipate discharge later today; pending home oxygen eval and repeat cxr    OT/PT/SW    Code Status: Full       Prema Stephen MD  12/30/2024  7:54 AM      Attending Physician Statement  I have discussed the care of Graciela Holt

## 2025-01-03 NOTE — ACP (ADVANCE CARE PLANNING)
Advance Care Planning     Advance Care Planning Activator (Inpatient)  Conversation Note      Date of ACP Conversation: 1/3/2025     Conversation Conducted with: Patient with Decision Making Capacity    ACP Activator: Aster Pelaez RN        Health Care Decision Maker:     Current Designated Health Care Decision Maker:     Primary Decision Maker: Guanakito Zheng - [facility] - [phone]        Care Preferences    Ventilation:  \"If you were in your present state of health and suddenly became very ill and were unable to breathe on your own, what would your preference be about the use of a ventilator (breathing machine) if it were available to you?\"      Would the patient desire the use of ventilator (breathing machine)?: yes    \"If your health worsens and it becomes clear that your chance of recovery is unlikely, what would your preference be about the use of a ventilator (breathing machine) if it were available to you?\"     Would the patient desire the use of ventilator (breathing machine)?: Yes      Resuscitation  \"CPR works best to restart the heart when there is a sudden event, like a heart attack, in someone who is otherwise healthy. Unfortunately, CPR does not typically restart the heart for people who have serious health conditions or who are very sick.\"    \"In the event your heart stopped as a result of an underlying serious health condition, would you want attempts to be made to restart your heart (answer \"yes\" for attempt to resuscitate) or would you prefer a natural death (answer \"no\" for do not attempt to resuscitate)?\" yes       [] Yes   [] No   Educated Patient / Decision Maker regarding differences between Advance Directives and portable DNR orders.    Length of ACP Conversation in minutes:      Conversation Outcomes:  ACP discussion completed    Follow-up plan:    [] Schedule follow-up conversation to continue planning  [] Referred individual to Provider for additional questions/concerns   [] Advised

## 2025-01-03 NOTE — ED NOTES
The following labs labeled with pt sticker and tubed to lab:     [x] Urine Sample  [] Stool Sample   [] Occult Sample

## 2025-01-03 NOTE — CARE COORDINATION
Case Management Assessment  Initial Evaluation    Date/Time of Evaluation: 1/3/2025 1:03 PM  Assessment Completed by: Aster Pelaez RN    If patient is discharged prior to next notation, then this note serves as note for discharge by case management.    Patient Name: Graciela Holt                   YOB: 1950  Diagnosis: Weakness [R53.1]  General weakness [R53.1]  Decreased activities of daily living (ADL) [Z78.9]                   Date / Time: 1/2/2025  7:07 PM    Patient Admission Status: Inpatient   Readmission Risk (Low < 19, Mod (19-27), High > 27): Readmission Risk Score: 28.3    Current PCP: Travis Mason MD  PCP verified by CM? Yes    Chart Reviewed: Yes      History Provided by: Patient  Patient Orientation: Alert and Oriented    Patient Cognition: Alert    Hospitalization in the last 30 days (Readmission):  Yes    If yes, Readmission Assessment in CM Navigator will be completed.    Advance Directives:      Code Status: Full Code   Patient's Primary Decision Maker is: Legal Next of Kin    Primary Decision Maker: Guanakito Zheng - Child - [phone]    Discharge Planning:    Patient lives with: Other (Comment) (2 Roommates) Type of Home: House  Primary Care Giver: Self  Patient Support Systems include: Children, Friends/Neighbors   Current Financial resources: Medicare  Current community resources: None  Current services prior to admission: None            Current DME:              Type of Home Care services:  None    ADLS  Prior functional level: Independent in ADLs/IADLs  Current functional level: Independent in ADLs/IADLs    PT AM-PAC:   /24  OT AM-PAC: 10 /24    Family can provide assistance at DC: No  Would you like Case Management to discuss the discharge plan with any other family members/significant others, and if so, who? No  Plans to Return to Present Housing: No (Wants SNF)  Other Identified Issues/Barriers to RETURNING to current housing: Wants SNF  Potential Assistance

## 2025-01-03 NOTE — ED TRIAGE NOTES
Mode of arrival (squad #, walk in, police, etc) : EMS        Chief complaint(s): weakness        Arrival Note (brief scenario, treatment PTA, etc).: EMS states they were called out d/t weakness, pt unable to take care of herself. Caregivers are stuggling to care for her and need help. Pt states she is so weak she is unable to feed herself anymore.         C= \"Have you ever felt that you should Cut down on your drinking?\"  No  A= \"Have people Annoyed you by criticizing your drinking?\"  No  G= \"Have you ever felt bad or Guilty about your drinking?\"  No  E= \"Have you ever had a drink as an Eye-opener first thing in the morning to steady your nerves or to help a hangover?\"  No      Deferred []      Reason for deferring: N/A    *If yes to two or more: probable alcohol abuse.*

## 2025-01-03 NOTE — ED NOTES
Report given to LIVE Mcfarland from [facility].   Report method by phone   The following was reviewed with receiving RN:   Current vital signs:  BP (!) 141/86   Pulse 65   Temp 98.2 °F (36.8 °C) (Oral)   Resp 17   Ht 1.499 m (4' 11\")   Wt 58.5 kg (129 lb)   SpO2 98%   BMI 26.05 kg/m²                      Any medication or safety alerts were reviewed. Any pending diagnostics and notifications were also reviewed, as well as any safety concerns or issues, abnormal labs, abnormal imaging, and abnormal assessment findings. Questions were answered.

## 2025-01-04 ENCOUNTER — APPOINTMENT (OUTPATIENT)
Dept: GENERAL RADIOLOGY | Age: 75
DRG: 884 | End: 2025-01-04
Payer: COMMERCIAL

## 2025-01-04 LAB
ANION GAP SERPL CALCULATED.3IONS-SCNC: 7 MMOL/L (ref 9–16)
BASOPHILS # BLD: 0.1 K/UL (ref 0–0.2)
BASOPHILS NFR BLD: 1 % (ref 0–2)
BUN SERPL-MCNC: 15 MG/DL (ref 8–23)
CALCIUM SERPL-MCNC: 9.2 MG/DL (ref 8.6–10.4)
CHLORIDE SERPL-SCNC: 105 MMOL/L (ref 98–107)
CO2 SERPL-SCNC: 33 MMOL/L (ref 20–31)
CREAT SERPL-MCNC: 1.2 MG/DL (ref 0.7–1.2)
EOSINOPHIL # BLD: 0.5 K/UL (ref 0–0.4)
EOSINOPHILS RELATIVE PERCENT: 6 % (ref 0–4)
ERYTHROCYTE [DISTWIDTH] IN BLOOD BY AUTOMATED COUNT: 14.1 % (ref 11.5–14.9)
GFR, ESTIMATED: 47 ML/MIN/1.73M2
GLUCOSE SERPL-MCNC: 133 MG/DL (ref 74–99)
HCT VFR BLD AUTO: 40.9 % (ref 36–46)
HGB BLD-MCNC: 13.3 G/DL (ref 12–16)
LYMPHOCYTES NFR BLD: 1.2 K/UL (ref 1–4.8)
LYMPHOCYTES RELATIVE PERCENT: 15 % (ref 24–44)
MCH RBC QN AUTO: 28.3 PG (ref 26–34)
MCHC RBC AUTO-ENTMCNC: 32.4 G/DL (ref 31–37)
MCV RBC AUTO: 87.4 FL (ref 80–100)
MONOCYTES NFR BLD: 0.6 K/UL (ref 0.1–1.3)
MONOCYTES NFR BLD: 8 % (ref 1–7)
NEUTROPHILS NFR BLD: 70 % (ref 36–66)
NEUTS SEG NFR BLD: 5.6 K/UL (ref 1.3–9.1)
PLATELET # BLD AUTO: 255 K/UL (ref 150–450)
PMV BLD AUTO: 8.3 FL (ref 6–12)
POTASSIUM SERPL-SCNC: 4.3 MMOL/L (ref 3.7–5.3)
RBC # BLD AUTO: 4.68 M/UL (ref 4–5.2)
SODIUM SERPL-SCNC: 145 MMOL/L (ref 136–145)
WBC OTHER # BLD: 7.9 K/UL (ref 3.5–11)

## 2025-01-04 PROCEDURE — 2700000000 HC OXYGEN THERAPY PER DAY

## 2025-01-04 PROCEDURE — 2500000003 HC RX 250 WO HCPCS: Performed by: NURSE PRACTITIONER

## 2025-01-04 PROCEDURE — 94761 N-INVAS EAR/PLS OXIMETRY MLT: CPT

## 2025-01-04 PROCEDURE — 87086 URINE CULTURE/COLONY COUNT: CPT

## 2025-01-04 PROCEDURE — 99233 SBSQ HOSP IP/OBS HIGH 50: CPT

## 2025-01-04 PROCEDURE — 36415 COLL VENOUS BLD VENIPUNCTURE: CPT

## 2025-01-04 PROCEDURE — 71045 X-RAY EXAM CHEST 1 VIEW: CPT

## 2025-01-04 PROCEDURE — 6360000002 HC RX W HCPCS: Performed by: NURSE PRACTITIONER

## 2025-01-04 PROCEDURE — 80048 BASIC METABOLIC PNL TOTAL CA: CPT

## 2025-01-04 PROCEDURE — 85025 COMPLETE CBC W/AUTO DIFF WBC: CPT

## 2025-01-04 PROCEDURE — 94640 AIRWAY INHALATION TREATMENT: CPT

## 2025-01-04 PROCEDURE — 6370000000 HC RX 637 (ALT 250 FOR IP): Performed by: NURSE PRACTITIONER

## 2025-01-04 PROCEDURE — 6360000002 HC RX W HCPCS

## 2025-01-04 PROCEDURE — 1200000000 HC SEMI PRIVATE

## 2025-01-04 RX ORDER — DEXAMETHASONE SODIUM PHOSPHATE 4 MG/ML
4 INJECTION, SOLUTION INTRA-ARTICULAR; INTRALESIONAL; INTRAMUSCULAR; INTRAVENOUS; SOFT TISSUE EVERY 24 HOURS
Status: DISCONTINUED | OUTPATIENT
Start: 2025-01-04 | End: 2025-01-06

## 2025-01-04 RX ADMIN — APIXABAN 5 MG: 5 TABLET, FILM COATED ORAL at 20:02

## 2025-01-04 RX ADMIN — SODIUM CHLORIDE, PRESERVATIVE FREE 10 ML: 5 INJECTION INTRAVENOUS at 21:09

## 2025-01-04 RX ADMIN — DEXAMETHASONE SODIUM PHOSPHATE 4 MG: 4 INJECTION INTRA-ARTICULAR; INTRALESIONAL; INTRAMUSCULAR; INTRAVENOUS; SOFT TISSUE at 11:18

## 2025-01-04 RX ADMIN — Medication 3 MG: at 20:02

## 2025-01-04 RX ADMIN — BUDESONIDE AND FORMOTEROL FUMARATE DIHYDRATE 2 PUFF: 160; 4.5 AEROSOL RESPIRATORY (INHALATION) at 20:11

## 2025-01-04 RX ADMIN — ROPINIROLE HYDROCHLORIDE 1 MG: 0.5 TABLET, FILM COATED ORAL at 20:02

## 2025-01-04 RX ADMIN — ACETAMINOPHEN 650 MG: 325 TABLET ORAL at 04:20

## 2025-01-04 RX ADMIN — BUMETANIDE 2 MG: 1 TABLET ORAL at 07:45

## 2025-01-04 RX ADMIN — FAMOTIDINE 20 MG: 20 TABLET, FILM COATED ORAL at 17:38

## 2025-01-04 RX ADMIN — BUDESONIDE AND FORMOTEROL FUMARATE DIHYDRATE 2 PUFF: 160; 4.5 AEROSOL RESPIRATORY (INHALATION) at 11:16

## 2025-01-04 RX ADMIN — ACETAMINOPHEN 650 MG: 325 TABLET ORAL at 15:49

## 2025-01-04 RX ADMIN — ATORVASTATIN CALCIUM 80 MG: 80 TABLET, FILM COATED ORAL at 07:45

## 2025-01-04 RX ADMIN — CARVEDILOL 3.12 MG: 3.12 TABLET, FILM COATED ORAL at 07:45

## 2025-01-04 RX ADMIN — POTASSIUM CHLORIDE 20 MEQ: 1500 TABLET, EXTENDED RELEASE ORAL at 07:44

## 2025-01-04 RX ADMIN — SODIUM CHLORIDE, PRESERVATIVE FREE 10 ML: 5 INJECTION INTRAVENOUS at 07:48

## 2025-01-04 RX ADMIN — EMPAGLIFLOZIN 10 MG: 10 TABLET, FILM COATED ORAL at 07:44

## 2025-01-04 RX ADMIN — APIXABAN 5 MG: 5 TABLET, FILM COATED ORAL at 07:45

## 2025-01-04 RX ADMIN — LOSARTAN POTASSIUM 25 MG: 25 TABLET, FILM COATED ORAL at 07:45

## 2025-01-04 RX ADMIN — AMIODARONE HYDROCHLORIDE 200 MG: 200 TABLET ORAL at 07:45

## 2025-01-04 RX ADMIN — ASPIRIN 81 MG: 81 TABLET, COATED ORAL at 07:44

## 2025-01-04 RX ADMIN — CARVEDILOL 3.12 MG: 3.12 TABLET, FILM COATED ORAL at 17:38

## 2025-01-04 RX ADMIN — WATER 1000 MG: 1 INJECTION INTRAMUSCULAR; INTRAVENOUS; SUBCUTANEOUS at 04:17

## 2025-01-04 RX ADMIN — POTASSIUM CHLORIDE 20 MEQ: 1500 TABLET, EXTENDED RELEASE ORAL at 20:02

## 2025-01-04 ASSESSMENT — PAIN DESCRIPTION - LOCATION: LOCATION: NECK

## 2025-01-04 ASSESSMENT — PAIN SCALES - GENERAL: PAINLEVEL_OUTOF10: 10

## 2025-01-04 NOTE — CARE COORDINATION
ONGOING DISCHARGE PLAN:    Patient is alert and oriented x4.    Spoke with patient regarding discharge plan and patient confirms that plan is agreeable to SNF. States only preference is location. She prefers [place] area. Multiple referrals sent.       96% on 2L    IV rocephin-UTI    MRI cervical spine-focal cord edema w/ compression C1  IV decadron    Neurosurgery consult    PT/OT      Will continue to follow for additional discharge needs.    If patient is discharged prior to next notation, then this note serves as note for discharge by case management.    Electronically signed by Bonita Pierce RN on 1/4/2025 at 9:17 AM

## 2025-01-04 NOTE — CARE COORDINATION
Chelsie otra/ Emir  states they are OON with insurance. .Electronically signed by Bonita Pierce RN on 1/4/2025 at 1:06 PM

## 2025-01-05 LAB
ANION GAP SERPL CALCULATED.3IONS-SCNC: 11 MMOL/L (ref 9–16)
BASOPHILS # BLD: 0 K/UL (ref 0–0.2)
BASOPHILS NFR BLD: 0 % (ref 0–2)
BUN SERPL-MCNC: 25 MG/DL (ref 8–23)
CALCIUM SERPL-MCNC: 9.8 MG/DL (ref 8.6–10.4)
CHLORIDE SERPL-SCNC: 103 MMOL/L (ref 98–107)
CO2 SERPL-SCNC: 30 MMOL/L (ref 20–31)
CREAT SERPL-MCNC: 1.2 MG/DL (ref 0.7–1.2)
EOSINOPHIL # BLD: 0 K/UL (ref 0–0.4)
EOSINOPHILS RELATIVE PERCENT: 0 % (ref 0–4)
ERYTHROCYTE [DISTWIDTH] IN BLOOD BY AUTOMATED COUNT: 13.9 % (ref 11.5–14.9)
GFR, ESTIMATED: 47 ML/MIN/1.73M2
GLUCOSE SERPL-MCNC: 196 MG/DL (ref 74–99)
HCT VFR BLD AUTO: 43.2 % (ref 36–46)
HGB BLD-MCNC: 14.2 G/DL (ref 12–16)
LYMPHOCYTES NFR BLD: 1.2 K/UL (ref 1–4.8)
LYMPHOCYTES RELATIVE PERCENT: 10 % (ref 24–44)
MCH RBC QN AUTO: 28.4 PG (ref 26–34)
MCHC RBC AUTO-ENTMCNC: 32.8 G/DL (ref 31–37)
MCV RBC AUTO: 86.6 FL (ref 80–100)
MICROORGANISM SPEC CULT: NO GROWTH
MONOCYTES NFR BLD: 0.7 K/UL (ref 0.1–1.3)
MONOCYTES NFR BLD: 7 % (ref 1–7)
NEUTROPHILS NFR BLD: 83 % (ref 36–66)
NEUTS SEG NFR BLD: 9.4 K/UL (ref 1.3–9.1)
PLATELET # BLD AUTO: 338 K/UL (ref 150–450)
PMV BLD AUTO: 8.3 FL (ref 6–12)
POTASSIUM SERPL-SCNC: 4.8 MMOL/L (ref 3.7–5.3)
RBC # BLD AUTO: 4.99 M/UL (ref 4–5.2)
SERVICE CMNT-IMP: NORMAL
SODIUM SERPL-SCNC: 144 MMOL/L (ref 136–145)
SPECIMEN DESCRIPTION: NORMAL
WBC OTHER # BLD: 11.4 K/UL (ref 3.5–11)

## 2025-01-05 PROCEDURE — 94761 N-INVAS EAR/PLS OXIMETRY MLT: CPT

## 2025-01-05 PROCEDURE — 36415 COLL VENOUS BLD VENIPUNCTURE: CPT

## 2025-01-05 PROCEDURE — 2500000003 HC RX 250 WO HCPCS: Performed by: NURSE PRACTITIONER

## 2025-01-05 PROCEDURE — 99232 SBSQ HOSP IP/OBS MODERATE 35: CPT

## 2025-01-05 PROCEDURE — 6370000000 HC RX 637 (ALT 250 FOR IP): Performed by: NURSE PRACTITIONER

## 2025-01-05 PROCEDURE — 6360000002 HC RX W HCPCS

## 2025-01-05 PROCEDURE — 85025 COMPLETE CBC W/AUTO DIFF WBC: CPT

## 2025-01-05 PROCEDURE — 6360000002 HC RX W HCPCS: Performed by: NURSE PRACTITIONER

## 2025-01-05 PROCEDURE — 94640 AIRWAY INHALATION TREATMENT: CPT

## 2025-01-05 PROCEDURE — 80048 BASIC METABOLIC PNL TOTAL CA: CPT

## 2025-01-05 PROCEDURE — 1200000000 HC SEMI PRIVATE

## 2025-01-05 RX ADMIN — SODIUM CHLORIDE, PRESERVATIVE FREE 10 ML: 5 INJECTION INTRAVENOUS at 20:17

## 2025-01-05 RX ADMIN — ASPIRIN 81 MG: 81 TABLET, COATED ORAL at 07:48

## 2025-01-05 RX ADMIN — CARVEDILOL 3.12 MG: 3.12 TABLET, FILM COATED ORAL at 16:58

## 2025-01-05 RX ADMIN — LOSARTAN POTASSIUM 25 MG: 25 TABLET, FILM COATED ORAL at 07:48

## 2025-01-05 RX ADMIN — BUDESONIDE AND FORMOTEROL FUMARATE DIHYDRATE 2 PUFF: 160; 4.5 AEROSOL RESPIRATORY (INHALATION) at 19:45

## 2025-01-05 RX ADMIN — BUMETANIDE 2 MG: 1 TABLET ORAL at 07:48

## 2025-01-05 RX ADMIN — ACETAMINOPHEN 650 MG: 325 TABLET ORAL at 07:48

## 2025-01-05 RX ADMIN — APIXABAN 5 MG: 5 TABLET, FILM COATED ORAL at 07:48

## 2025-01-05 RX ADMIN — FAMOTIDINE 20 MG: 20 TABLET, FILM COATED ORAL at 16:58

## 2025-01-05 RX ADMIN — WATER 1000 MG: 1 INJECTION INTRAMUSCULAR; INTRAVENOUS; SUBCUTANEOUS at 03:58

## 2025-01-05 RX ADMIN — POTASSIUM CHLORIDE 20 MEQ: 1500 TABLET, EXTENDED RELEASE ORAL at 07:48

## 2025-01-05 RX ADMIN — ACETAMINOPHEN 650 MG: 325 TABLET ORAL at 19:03

## 2025-01-05 RX ADMIN — ROPINIROLE HYDROCHLORIDE 1 MG: 0.5 TABLET, FILM COATED ORAL at 20:32

## 2025-01-05 RX ADMIN — CARVEDILOL 3.12 MG: 3.12 TABLET, FILM COATED ORAL at 07:48

## 2025-01-05 RX ADMIN — EMPAGLIFLOZIN 10 MG: 10 TABLET, FILM COATED ORAL at 07:48

## 2025-01-05 RX ADMIN — BUDESONIDE AND FORMOTEROL FUMARATE DIHYDRATE 2 PUFF: 160; 4.5 AEROSOL RESPIRATORY (INHALATION) at 10:57

## 2025-01-05 RX ADMIN — Medication 3 MG: at 20:14

## 2025-01-05 RX ADMIN — AMIODARONE HYDROCHLORIDE 200 MG: 200 TABLET ORAL at 07:48

## 2025-01-05 RX ADMIN — ATORVASTATIN CALCIUM 80 MG: 80 TABLET, FILM COATED ORAL at 07:48

## 2025-01-05 RX ADMIN — POTASSIUM CHLORIDE 20 MEQ: 1500 TABLET, EXTENDED RELEASE ORAL at 20:14

## 2025-01-05 RX ADMIN — APIXABAN 5 MG: 5 TABLET, FILM COATED ORAL at 20:32

## 2025-01-05 RX ADMIN — DEXAMETHASONE SODIUM PHOSPHATE 4 MG: 4 INJECTION INTRA-ARTICULAR; INTRALESIONAL; INTRAMUSCULAR; INTRAVENOUS; SOFT TISSUE at 11:53

## 2025-01-05 RX ADMIN — SODIUM CHLORIDE, PRESERVATIVE FREE 10 ML: 5 INJECTION INTRAVENOUS at 07:48

## 2025-01-05 ASSESSMENT — PAIN SCALES - GENERAL
PAINLEVEL_OUTOF10: 3
PAINLEVEL_OUTOF10: 1
PAINLEVEL_OUTOF10: 3

## 2025-01-05 NOTE — CARE COORDINATION
ONGOING DISCHARGE PLAN:    Patient is alert and oriented x4.    Spoke with patient regarding discharge plan and patient confirms that plan is agreeable to SNF. Leyla Suarez to visit patient 1/6.      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    If patient is discharged prior to next notation, then this note serves as note for discharge by case management.    Electronically signed by Bonita Pierce RN on 1/5/2025 at 10:14 AM

## 2025-01-05 NOTE — CONSULTS
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

## 2025-01-06 ENCOUNTER — HOSPITAL ENCOUNTER (INPATIENT)
Age: 75
LOS: 7 days | Discharge: NURSING, REHAB OR LONG-TERM CARE | DRG: 471 | End: 2025-01-13
Attending: INTERNAL MEDICINE | Admitting: INTERNAL MEDICINE
Payer: COMMERCIAL

## 2025-01-06 VITALS
DIASTOLIC BLOOD PRESSURE: 80 MMHG | RESPIRATION RATE: 16 BRPM | HEART RATE: 63 BPM | BODY MASS INDEX: 24.84 KG/M2 | HEIGHT: 59 IN | SYSTOLIC BLOOD PRESSURE: 141 MMHG | OXYGEN SATURATION: 92 % | TEMPERATURE: 98.2 F | WEIGHT: 123.24 LBS

## 2025-01-06 DIAGNOSIS — G95.20 COMPRESSION OF SPINAL CORD WITH MYELOPATHY (HCC): ICD-10-CM

## 2025-01-06 DIAGNOSIS — M53.2X2 CERVICAL SPINE INSTABILITY: Primary | ICD-10-CM

## 2025-01-06 DIAGNOSIS — S12.090A COMPRESSION FRACTURE OF C1 VERTEBRA, INITIAL ENCOUNTER (HCC): ICD-10-CM

## 2025-01-06 LAB
ANION GAP SERPL CALCULATED.3IONS-SCNC: 10 MMOL/L (ref 9–16)
BASOPHILS # BLD: 0 K/UL (ref 0–0.2)
BASOPHILS # BLD: 0.08 K/UL (ref 0–0.2)
BASOPHILS NFR BLD: 0 % (ref 0–2)
BASOPHILS NFR BLD: 1 % (ref 0–2)
BUN SERPL-MCNC: 34 MG/DL (ref 8–23)
CALCIUM SERPL-MCNC: 9.4 MG/DL (ref 8.6–10.4)
CHLORIDE SERPL-SCNC: 102 MMOL/L (ref 98–107)
CO2 SERPL-SCNC: 27 MMOL/L (ref 20–31)
CREAT SERPL-MCNC: 1.4 MG/DL (ref 0.7–1.2)
EOSINOPHIL # BLD: 0 K/UL (ref 0–0.4)
EOSINOPHIL # BLD: 0.16 K/UL (ref 0–0.44)
EOSINOPHILS RELATIVE PERCENT: 0 % (ref 0–4)
EOSINOPHILS RELATIVE PERCENT: 1 % (ref 1–4)
ERYTHROCYTE [DISTWIDTH] IN BLOOD BY AUTOMATED COUNT: 13.2 % (ref 11.8–14.4)
ERYTHROCYTE [DISTWIDTH] IN BLOOD BY AUTOMATED COUNT: 14.2 % (ref 11.5–14.9)
GFR, ESTIMATED: 39 ML/MIN/1.73M2
GLUCOSE BLD-MCNC: 198 MG/DL (ref 65–105)
GLUCOSE SERPL-MCNC: 242 MG/DL (ref 74–99)
HCT VFR BLD AUTO: 40.4 % (ref 36–46)
HCT VFR BLD AUTO: 42.7 % (ref 36.3–47.1)
HGB BLD-MCNC: 13.2 G/DL (ref 11.9–15.1)
HGB BLD-MCNC: 13.2 G/DL (ref 12–16)
IMM GRANULOCYTES # BLD AUTO: 0.06 K/UL (ref 0–0.3)
IMM GRANULOCYTES NFR BLD: 0 %
LYMPHOCYTES NFR BLD: 1.01 K/UL (ref 1–4.8)
LYMPHOCYTES NFR BLD: 1.45 K/UL (ref 1.1–3.7)
LYMPHOCYTES RELATIVE PERCENT: 10 % (ref 24–43)
LYMPHOCYTES RELATIVE PERCENT: 5 % (ref 24–44)
MCH RBC QN AUTO: 27.7 PG (ref 25.2–33.5)
MCH RBC QN AUTO: 28.7 PG (ref 26–34)
MCHC RBC AUTO-ENTMCNC: 30.9 G/DL (ref 28.4–34.8)
MCHC RBC AUTO-ENTMCNC: 32.8 G/DL (ref 31–37)
MCV RBC AUTO: 87.4 FL (ref 80–100)
MCV RBC AUTO: 89.7 FL (ref 82.6–102.9)
MONOCYTES NFR BLD: 0.75 K/UL (ref 0.1–1.2)
MONOCYTES NFR BLD: 1.01 K/UL (ref 0.1–1.3)
MONOCYTES NFR BLD: 5 % (ref 1–7)
MONOCYTES NFR BLD: 5 % (ref 3–12)
MORPHOLOGY: ABNORMAL
NEUTROPHILS NFR BLD: 83 % (ref 36–65)
NEUTROPHILS NFR BLD: 90 % (ref 36–66)
NEUTS SEG NFR BLD: 12.05 K/UL (ref 1.5–8.1)
NEUTS SEG NFR BLD: 18.18 K/UL (ref 1.3–9.1)
NRBC BLD-RTO: 0 PER 100 WBC
PLATELET # BLD AUTO: 293 K/UL (ref 138–453)
PLATELET # BLD AUTO: 312 K/UL (ref 150–450)
PMV BLD AUTO: 10.5 FL (ref 8.1–13.5)
PMV BLD AUTO: 8.8 FL (ref 6–12)
POTASSIUM SERPL-SCNC: 4.5 MMOL/L (ref 3.7–5.3)
RBC # BLD AUTO: 4.62 M/UL (ref 4–5.2)
RBC # BLD AUTO: 4.76 M/UL (ref 3.95–5.11)
SODIUM SERPL-SCNC: 139 MMOL/L (ref 136–145)
WBC OTHER # BLD: 14.6 K/UL (ref 3.5–11.3)
WBC OTHER # BLD: 20.2 K/UL (ref 3.5–11)

## 2025-01-06 PROCEDURE — 85025 COMPLETE CBC W/AUTO DIFF WBC: CPT

## 2025-01-06 PROCEDURE — 36415 COLL VENOUS BLD VENIPUNCTURE: CPT

## 2025-01-06 PROCEDURE — 6370000000 HC RX 637 (ALT 250 FOR IP)

## 2025-01-06 PROCEDURE — 97530 THERAPEUTIC ACTIVITIES: CPT

## 2025-01-06 PROCEDURE — 2500000003 HC RX 250 WO HCPCS: Performed by: NURSE PRACTITIONER

## 2025-01-06 PROCEDURE — 99233 SBSQ HOSP IP/OBS HIGH 50: CPT | Performed by: INTERNAL MEDICINE

## 2025-01-06 PROCEDURE — 99222 1ST HOSP IP/OBS MODERATE 55: CPT | Performed by: INTERNAL MEDICINE

## 2025-01-06 PROCEDURE — 80048 BASIC METABOLIC PNL TOTAL CA: CPT

## 2025-01-06 PROCEDURE — 94640 AIRWAY INHALATION TREATMENT: CPT

## 2025-01-06 PROCEDURE — 6360000002 HC RX W HCPCS: Performed by: NURSE PRACTITIONER

## 2025-01-06 PROCEDURE — 82947 ASSAY GLUCOSE BLOOD QUANT: CPT

## 2025-01-06 PROCEDURE — 6370000000 HC RX 637 (ALT 250 FOR IP): Performed by: NURSE PRACTITIONER

## 2025-01-06 PROCEDURE — 97116 GAIT TRAINING THERAPY: CPT

## 2025-01-06 PROCEDURE — 1200000000 HC SEMI PRIVATE

## 2025-01-06 PROCEDURE — 94760 N-INVAS EAR/PLS OXIMETRY 1: CPT

## 2025-01-06 PROCEDURE — 6370000000 HC RX 637 (ALT 250 FOR IP): Performed by: INTERNAL MEDICINE

## 2025-01-06 RX ORDER — GLUCAGON 1 MG/ML
1 KIT INJECTION PRN
Status: DISCONTINUED | OUTPATIENT
Start: 2025-01-06 | End: 2025-01-13 | Stop reason: HOSPADM

## 2025-01-06 RX ORDER — ATORVASTATIN CALCIUM 80 MG/1
80 TABLET, FILM COATED ORAL DAILY
Status: DISCONTINUED | OUTPATIENT
Start: 2025-01-07 | End: 2025-01-13 | Stop reason: HOSPADM

## 2025-01-06 RX ORDER — FAMOTIDINE 20 MG/1
20 TABLET, FILM COATED ORAL EVERY EVENING
Status: CANCELLED | OUTPATIENT
Start: 2025-01-07

## 2025-01-06 RX ORDER — ONDANSETRON 4 MG/1
4 TABLET, ORALLY DISINTEGRATING ORAL EVERY 8 HOURS PRN
Status: DISCONTINUED | OUTPATIENT
Start: 2025-01-06 | End: 2025-01-13 | Stop reason: HOSPADM

## 2025-01-06 RX ORDER — LOSARTAN POTASSIUM 50 MG/1
25 TABLET ORAL DAILY
Status: DISCONTINUED | OUTPATIENT
Start: 2025-01-07 | End: 2025-01-13 | Stop reason: HOSPADM

## 2025-01-06 RX ORDER — DEXAMETHASONE SODIUM PHOSPHATE 4 MG/ML
4 INJECTION, SOLUTION INTRA-ARTICULAR; INTRALESIONAL; INTRAMUSCULAR; INTRAVENOUS; SOFT TISSUE EVERY 12 HOURS
Status: CANCELLED | OUTPATIENT
Start: 2025-01-06

## 2025-01-06 RX ORDER — FAMOTIDINE 20 MG/1
20 TABLET, FILM COATED ORAL EVERY EVENING
Status: DISCONTINUED | OUTPATIENT
Start: 2025-01-07 | End: 2025-01-13 | Stop reason: HOSPADM

## 2025-01-06 RX ORDER — CARVEDILOL 3.12 MG/1
3.12 TABLET ORAL 2 TIMES DAILY WITH MEALS
Status: DISCONTINUED | OUTPATIENT
Start: 2025-01-07 | End: 2025-01-13 | Stop reason: HOSPADM

## 2025-01-06 RX ORDER — DEXAMETHASONE SODIUM PHOSPHATE 4 MG/ML
4 INJECTION, SOLUTION INTRA-ARTICULAR; INTRALESIONAL; INTRAMUSCULAR; INTRAVENOUS; SOFT TISSUE EVERY 12 HOURS
Status: DISCONTINUED | OUTPATIENT
Start: 2025-01-06 | End: 2025-01-06 | Stop reason: HOSPADM

## 2025-01-06 RX ORDER — ATORVASTATIN CALCIUM 80 MG/1
80 TABLET, FILM COATED ORAL DAILY
Status: CANCELLED | OUTPATIENT
Start: 2025-01-07

## 2025-01-06 RX ORDER — AMIODARONE HYDROCHLORIDE 200 MG/1
200 TABLET ORAL DAILY
Status: DISCONTINUED | OUTPATIENT
Start: 2025-01-07 | End: 2025-01-13 | Stop reason: HOSPADM

## 2025-01-06 RX ORDER — DEXTROSE MONOHYDRATE 100 MG/ML
INJECTION, SOLUTION INTRAVENOUS CONTINUOUS PRN
Status: DISCONTINUED | OUTPATIENT
Start: 2025-01-06 | End: 2025-01-13 | Stop reason: HOSPADM

## 2025-01-06 RX ORDER — ONDANSETRON 2 MG/ML
4 INJECTION INTRAMUSCULAR; INTRAVENOUS EVERY 6 HOURS PRN
Status: DISCONTINUED | OUTPATIENT
Start: 2025-01-06 | End: 2025-01-13 | Stop reason: HOSPADM

## 2025-01-06 RX ORDER — AMIODARONE HYDROCHLORIDE 200 MG/1
200 TABLET ORAL DAILY
Status: CANCELLED | OUTPATIENT
Start: 2025-01-07

## 2025-01-06 RX ORDER — SODIUM CHLORIDE 0.9 % (FLUSH) 0.9 %
10 SYRINGE (ML) INJECTION PRN
Status: DISCONTINUED | OUTPATIENT
Start: 2025-01-06 | End: 2025-01-13 | Stop reason: HOSPADM

## 2025-01-06 RX ORDER — ENOXAPARIN SODIUM 100 MG/ML
30 INJECTION SUBCUTANEOUS DAILY
Status: DISCONTINUED | OUTPATIENT
Start: 2025-01-06 | End: 2025-01-07

## 2025-01-06 RX ORDER — ACETAMINOPHEN 650 MG/1
650 SUPPOSITORY RECTAL EVERY 6 HOURS PRN
Status: DISCONTINUED | OUTPATIENT
Start: 2025-01-06 | End: 2025-01-10

## 2025-01-06 RX ORDER — ACETAMINOPHEN 325 MG/1
650 TABLET ORAL EVERY 6 HOURS PRN
Status: DISCONTINUED | OUTPATIENT
Start: 2025-01-06 | End: 2025-01-10

## 2025-01-06 RX ORDER — BUMETANIDE 1 MG/1
2 TABLET ORAL DAILY
Status: CANCELLED | OUTPATIENT
Start: 2025-01-07

## 2025-01-06 RX ORDER — ALBUTEROL SULFATE 0.83 MG/ML
2.5 SOLUTION RESPIRATORY (INHALATION) EVERY 6 HOURS PRN
Status: DISCONTINUED | OUTPATIENT
Start: 2025-01-06 | End: 2025-01-13 | Stop reason: HOSPADM

## 2025-01-06 RX ORDER — ROPINIROLE 0.25 MG/1
1 TABLET, FILM COATED ORAL NIGHTLY
Status: DISCONTINUED | OUTPATIENT
Start: 2025-01-06 | End: 2025-01-13 | Stop reason: HOSPADM

## 2025-01-06 RX ORDER — ROPINIROLE 0.5 MG/1
1 TABLET, FILM COATED ORAL NIGHTLY
Status: CANCELLED | OUTPATIENT
Start: 2025-01-06

## 2025-01-06 RX ORDER — LOSARTAN POTASSIUM 25 MG/1
25 TABLET ORAL DAILY
Status: CANCELLED | OUTPATIENT
Start: 2025-01-07

## 2025-01-06 RX ORDER — INSULIN LISPRO 100 [IU]/ML
0-4 INJECTION, SOLUTION INTRAVENOUS; SUBCUTANEOUS
Status: DISCONTINUED | OUTPATIENT
Start: 2025-01-06 | End: 2025-01-13 | Stop reason: HOSPADM

## 2025-01-06 RX ORDER — SODIUM CHLORIDE 0.9 % (FLUSH) 0.9 %
5-40 SYRINGE (ML) INJECTION EVERY 12 HOURS SCHEDULED
Status: DISCONTINUED | OUTPATIENT
Start: 2025-01-06 | End: 2025-01-13 | Stop reason: HOSPADM

## 2025-01-06 RX ORDER — ALBUTEROL SULFATE 0.83 MG/ML
2.5 SOLUTION RESPIRATORY (INHALATION) EVERY 6 HOURS PRN
Status: CANCELLED | OUTPATIENT
Start: 2025-01-06

## 2025-01-06 RX ORDER — DEXAMETHASONE SODIUM PHOSPHATE 4 MG/ML
4 INJECTION, SOLUTION INTRA-ARTICULAR; INTRALESIONAL; INTRAMUSCULAR; INTRAVENOUS; SOFT TISSUE EVERY 12 HOURS
Status: DISCONTINUED | OUTPATIENT
Start: 2025-01-06 | End: 2025-01-10

## 2025-01-06 RX ORDER — BUMETANIDE 1 MG/1
2 TABLET ORAL DAILY
Status: DISCONTINUED | OUTPATIENT
Start: 2025-01-07 | End: 2025-01-13 | Stop reason: HOSPADM

## 2025-01-06 RX ORDER — POLYETHYLENE GLYCOL 3350 17 G/17G
17 POWDER, FOR SOLUTION ORAL DAILY PRN
Status: DISCONTINUED | OUTPATIENT
Start: 2025-01-06 | End: 2025-01-10

## 2025-01-06 RX ORDER — CARVEDILOL 3.12 MG/1
3.12 TABLET ORAL 2 TIMES DAILY WITH MEALS
Status: CANCELLED | OUTPATIENT
Start: 2025-01-07

## 2025-01-06 RX ORDER — SODIUM CHLORIDE 9 MG/ML
INJECTION, SOLUTION INTRAVENOUS PRN
Status: DISCONTINUED | OUTPATIENT
Start: 2025-01-06 | End: 2025-01-13 | Stop reason: HOSPADM

## 2025-01-06 RX ADMIN — CARVEDILOL 3.12 MG: 3.12 TABLET, FILM COATED ORAL at 16:48

## 2025-01-06 RX ADMIN — AMIODARONE HYDROCHLORIDE 200 MG: 200 TABLET ORAL at 09:06

## 2025-01-06 RX ADMIN — CARVEDILOL 3.12 MG: 3.12 TABLET, FILM COATED ORAL at 09:07

## 2025-01-06 RX ADMIN — INSULIN LISPRO 1 UNITS: 100 INJECTION, SOLUTION INTRAVENOUS; SUBCUTANEOUS at 23:10

## 2025-01-06 RX ADMIN — BUDESONIDE AND FORMOTEROL FUMARATE DIHYDRATE 2 PUFF: 160; 4.5 AEROSOL RESPIRATORY (INHALATION) at 10:06

## 2025-01-06 RX ADMIN — SODIUM CHLORIDE, PRESERVATIVE FREE 10 ML: 5 INJECTION INTRAVENOUS at 09:08

## 2025-01-06 RX ADMIN — POTASSIUM CHLORIDE 20 MEQ: 1500 TABLET, EXTENDED RELEASE ORAL at 09:07

## 2025-01-06 RX ADMIN — LOSARTAN POTASSIUM 25 MG: 25 TABLET, FILM COATED ORAL at 09:07

## 2025-01-06 RX ADMIN — WATER 1000 MG: 1 INJECTION INTRAMUSCULAR; INTRAVENOUS; SUBCUTANEOUS at 04:12

## 2025-01-06 RX ADMIN — ACETAMINOPHEN 650 MG: 325 TABLET ORAL at 22:14

## 2025-01-06 RX ADMIN — ENOXAPARIN SODIUM 30 MG: 100 INJECTION SUBCUTANEOUS at 22:16

## 2025-01-06 RX ADMIN — BUMETANIDE 2 MG: 1 TABLET ORAL at 09:07

## 2025-01-06 RX ADMIN — ACETAMINOPHEN 650 MG: 325 TABLET ORAL at 06:09

## 2025-01-06 RX ADMIN — FAMOTIDINE 20 MG: 20 TABLET, FILM COATED ORAL at 16:48

## 2025-01-06 RX ADMIN — EMPAGLIFLOZIN 10 MG: 10 TABLET, FILM COATED ORAL at 09:07

## 2025-01-06 RX ADMIN — ASPIRIN 81 MG: 81 TABLET, COATED ORAL at 09:07

## 2025-01-06 RX ADMIN — ROPINIROLE HYDROCHLORIDE 1 MG: 0.25 TABLET, FILM COATED ORAL at 22:14

## 2025-01-06 RX ADMIN — APIXABAN 5 MG: 5 TABLET, FILM COATED ORAL at 09:07

## 2025-01-06 RX ADMIN — ATORVASTATIN CALCIUM 80 MG: 80 TABLET, FILM COATED ORAL at 09:06

## 2025-01-06 ASSESSMENT — PAIN DESCRIPTION - ORIENTATION: ORIENTATION: POSTERIOR

## 2025-01-06 ASSESSMENT — PAIN DESCRIPTION - DESCRIPTORS
DESCRIPTORS: ACHING;DISCOMFORT;DULL
DESCRIPTORS: OTHER (COMMENT);PATIENT UNABLE TO DESCRIBE

## 2025-01-06 ASSESSMENT — PAIN SCALES - GENERAL
PAINLEVEL_OUTOF10: 10
PAINLEVEL_OUTOF10: 8
PAINLEVEL_OUTOF10: 10

## 2025-01-06 ASSESSMENT — PAIN DESCRIPTION - LOCATION
LOCATION: NECK
LOCATION: NECK

## 2025-01-06 ASSESSMENT — PAIN - FUNCTIONAL ASSESSMENT: PAIN_FUNCTIONAL_ASSESSMENT: PREVENTS OR INTERFERES SOME ACTIVE ACTIVITIES AND ADLS

## 2025-01-06 NOTE — CARE COORDINATION
Angela with [facility] Su[facility] came to visit patient. Patient can admit to [facility] upon discharge. Electronically signed by Bonita Pierce RN on 1/6/2025 at 1:10 PM

## 2025-01-06 NOTE — CARE COORDINATION
ONGOING DISCHARGE PLAN:    Patient is alert and oriented x4.    Spoke with patient regarding discharge plan and patient confirms that plan is agreeable to SNF. Leyla Suarez to visit patient 1/6.      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    Will continue to follow for additional discharge needs.    If patient is discharged prior to next notation, then this note serves as note for discharge by case management.    Electronically signed by Bonita Pierce RN on 1/6/2025 at 10:18 AM

## 2025-01-07 LAB
ABO + RH BLD: NORMAL
ANION GAP SERPL CALCULATED.3IONS-SCNC: 11 MMOL/L (ref 9–16)
ANION GAP SERPL CALCULATED.3IONS-SCNC: 11 MMOL/L (ref 9–16)
ANTI-XA UNFRAC HEPARIN: 1.85 IU/L
ARM BAND NUMBER: NORMAL
BASOPHILS # BLD: 0.07 K/UL (ref 0–0.2)
BASOPHILS NFR BLD: 1 % (ref 0–2)
BILIRUB UR QL STRIP: NEGATIVE
BLOOD BANK SAMPLE EXPIRATION: NORMAL
BLOOD GROUP ANTIBODIES SERPL: NEGATIVE
BUN SERPL-MCNC: 38 MG/DL (ref 8–23)
BUN SERPL-MCNC: 39 MG/DL (ref 8–23)
CALCIUM SERPL-MCNC: 9.2 MG/DL (ref 8.6–10.4)
CALCIUM SERPL-MCNC: 9.2 MG/DL (ref 8.6–10.4)
CHLORIDE SERPL-SCNC: 101 MMOL/L (ref 98–107)
CHLORIDE SERPL-SCNC: 102 MMOL/L (ref 98–107)
CLARITY UR: CLEAR
CO2 SERPL-SCNC: 24 MMOL/L (ref 20–31)
CO2 SERPL-SCNC: 26 MMOL/L (ref 20–31)
COLOR UR: YELLOW
COMMENT: ABNORMAL
CREAT SERPL-MCNC: 1.4 MG/DL (ref 0.6–0.9)
CREAT SERPL-MCNC: 1.4 MG/DL (ref 0.6–0.9)
EOSINOPHIL # BLD: 0.16 K/UL (ref 0–0.44)
EOSINOPHILS RELATIVE PERCENT: 1 % (ref 1–4)
ERYTHROCYTE [DISTWIDTH] IN BLOOD BY AUTOMATED COUNT: 13.2 % (ref 11.8–14.4)
GFR, ESTIMATED: 39 ML/MIN/1.73M2
GFR, ESTIMATED: 39 ML/MIN/1.73M2
GLUCOSE BLD-MCNC: 229 MG/DL (ref 65–105)
GLUCOSE BLD-MCNC: 343 MG/DL (ref 65–105)
GLUCOSE BLD-MCNC: 379 MG/DL (ref 65–105)
GLUCOSE BLD-MCNC: 400 MG/DL (ref 65–105)
GLUCOSE SERPL-MCNC: 179 MG/DL (ref 74–99)
GLUCOSE SERPL-MCNC: 196 MG/DL (ref 74–99)
GLUCOSE UR STRIP-MCNC: ABNORMAL MG/DL
HCT VFR BLD AUTO: 42.1 % (ref 36.3–47.1)
HGB BLD-MCNC: 13 G/DL (ref 11.9–15.1)
HGB UR QL STRIP.AUTO: NEGATIVE
IMM GRANULOCYTES # BLD AUTO: 0.08 K/UL (ref 0–0.3)
IMM GRANULOCYTES NFR BLD: 1 %
INR PPP: 1.1
INR PPP: 1.2
KETONES UR STRIP-MCNC: NEGATIVE MG/DL
LEUKOCYTE ESTERASE UR QL STRIP: NEGATIVE
LYMPHOCYTES NFR BLD: 0.98 K/UL (ref 1.1–3.7)
LYMPHOCYTES RELATIVE PERCENT: 8 % (ref 24–43)
MCH RBC QN AUTO: 27.6 PG (ref 25.2–33.5)
MCHC RBC AUTO-ENTMCNC: 30.9 G/DL (ref 28.4–34.8)
MCV RBC AUTO: 89.4 FL (ref 82.6–102.9)
MONOCYTES NFR BLD: 0.34 K/UL (ref 0.1–1.2)
MONOCYTES NFR BLD: 3 % (ref 3–12)
NEUTROPHILS NFR BLD: 86 % (ref 36–65)
NEUTS SEG NFR BLD: 10.77 K/UL (ref 1.5–8.1)
NITRITE UR QL STRIP: NEGATIVE
NRBC BLD-RTO: 0 PER 100 WBC
PARTIAL THROMBOPLASTIN TIME: 25 SEC (ref 23–36.5)
PARTIAL THROMBOPLASTIN TIME: 25.8 SEC (ref 23–36.5)
PH UR STRIP: 5.5 [PH] (ref 5–8)
PLATELET # BLD AUTO: 319 K/UL (ref 138–453)
PMV BLD AUTO: 10.6 FL (ref 8.1–13.5)
POTASSIUM SERPL-SCNC: 4.4 MMOL/L (ref 3.7–5.3)
POTASSIUM SERPL-SCNC: 4.9 MMOL/L (ref 3.7–5.3)
PROT UR STRIP-MCNC: NEGATIVE MG/DL
PROTHROMBIN TIME: 14.3 SEC (ref 11.7–14.9)
PROTHROMBIN TIME: 14.7 SEC (ref 11.7–14.9)
RBC # BLD AUTO: 4.71 M/UL (ref 3.95–5.11)
SODIUM SERPL-SCNC: 137 MMOL/L (ref 136–145)
SODIUM SERPL-SCNC: 138 MMOL/L (ref 136–145)
SP GR UR STRIP: 1.01 (ref 1–1.03)
UROBILINOGEN UR STRIP-ACNC: NORMAL EU/DL (ref 0–1)
WBC OTHER # BLD: 12.4 K/UL (ref 3.5–11.3)

## 2025-01-07 PROCEDURE — 86900 BLOOD TYPING SEROLOGIC ABO: CPT

## 2025-01-07 PROCEDURE — 82947 ASSAY GLUCOSE BLOOD QUANT: CPT

## 2025-01-07 PROCEDURE — 99233 SBSQ HOSP IP/OBS HIGH 50: CPT | Performed by: HOSPITALIST

## 2025-01-07 PROCEDURE — 6370000000 HC RX 637 (ALT 250 FOR IP): Performed by: INTERNAL MEDICINE

## 2025-01-07 PROCEDURE — 80048 BASIC METABOLIC PNL TOTAL CA: CPT

## 2025-01-07 PROCEDURE — 81003 URINALYSIS AUTO W/O SCOPE: CPT

## 2025-01-07 PROCEDURE — 6360000002 HC RX W HCPCS: Performed by: INTERNAL MEDICINE

## 2025-01-07 PROCEDURE — 85610 PROTHROMBIN TIME: CPT

## 2025-01-07 PROCEDURE — 86901 BLOOD TYPING SEROLOGIC RH(D): CPT

## 2025-01-07 PROCEDURE — 6370000000 HC RX 637 (ALT 250 FOR IP): Performed by: HOSPITALIST

## 2025-01-07 PROCEDURE — 86850 RBC ANTIBODY SCREEN: CPT

## 2025-01-07 PROCEDURE — 6360000002 HC RX W HCPCS

## 2025-01-07 PROCEDURE — 85025 COMPLETE CBC W/AUTO DIFF WBC: CPT

## 2025-01-07 PROCEDURE — 36415 COLL VENOUS BLD VENIPUNCTURE: CPT

## 2025-01-07 PROCEDURE — 6370000000 HC RX 637 (ALT 250 FOR IP)

## 2025-01-07 PROCEDURE — 1200000000 HC SEMI PRIVATE

## 2025-01-07 PROCEDURE — 6370000000 HC RX 637 (ALT 250 FOR IP): Performed by: NURSE PRACTITIONER

## 2025-01-07 PROCEDURE — 85520 HEPARIN ASSAY: CPT

## 2025-01-07 PROCEDURE — 2500000003 HC RX 250 WO HCPCS: Performed by: NURSE PRACTITIONER

## 2025-01-07 PROCEDURE — 85730 THROMBOPLASTIN TIME PARTIAL: CPT

## 2025-01-07 PROCEDURE — 99212 OFFICE O/P EST SF 10 MIN: CPT

## 2025-01-07 RX ORDER — IPRATROPIUM BROMIDE AND ALBUTEROL SULFATE 2.5; .5 MG/3ML; MG/3ML
1 SOLUTION RESPIRATORY (INHALATION) EVERY 4 HOURS PRN
Status: DISCONTINUED | OUTPATIENT
Start: 2025-01-07 | End: 2025-01-13 | Stop reason: HOSPADM

## 2025-01-07 RX ORDER — HEPARIN SODIUM 10000 [USP'U]/100ML
5-30 INJECTION, SOLUTION INTRAVENOUS CONTINUOUS
Status: DISCONTINUED | OUTPATIENT
Start: 2025-01-07 | End: 2025-01-07 | Stop reason: DRUGHIGH

## 2025-01-07 RX ORDER — HEPARIN SODIUM 1000 [USP'U]/ML
60 INJECTION, SOLUTION INTRAVENOUS; SUBCUTANEOUS PRN
Status: DISCONTINUED | OUTPATIENT
Start: 2025-01-07 | End: 2025-01-10

## 2025-01-07 RX ORDER — SODIUM CHLORIDE 9 MG/ML
INJECTION, SOLUTION INTRAVENOUS CONTINUOUS
Status: DISCONTINUED | OUTPATIENT
Start: 2025-01-08 | End: 2025-01-07

## 2025-01-07 RX ORDER — HEPARIN SODIUM 10000 [USP'U]/100ML
5-30 INJECTION, SOLUTION INTRAVENOUS CONTINUOUS
Status: DISPENSED | OUTPATIENT
Start: 2025-01-07 | End: 2025-01-10

## 2025-01-07 RX ORDER — HEPARIN SODIUM 1000 [USP'U]/ML
30 INJECTION, SOLUTION INTRAVENOUS; SUBCUTANEOUS PRN
Status: DISCONTINUED | OUTPATIENT
Start: 2025-01-07 | End: 2025-01-10

## 2025-01-07 RX ORDER — INSULIN GLARGINE 100 [IU]/ML
15 INJECTION, SOLUTION SUBCUTANEOUS NIGHTLY
Status: DISCONTINUED | OUTPATIENT
Start: 2025-01-07 | End: 2025-01-08

## 2025-01-07 RX ORDER — INSULIN LISPRO 100 [IU]/ML
5 INJECTION, SOLUTION INTRAVENOUS; SUBCUTANEOUS
Status: DISCONTINUED | OUTPATIENT
Start: 2025-01-07 | End: 2025-01-08

## 2025-01-07 RX ORDER — HEPARIN SODIUM 1000 [USP'U]/ML
60 INJECTION, SOLUTION INTRAVENOUS; SUBCUTANEOUS PRN
Status: DISCONTINUED | OUTPATIENT
Start: 2025-01-07 | End: 2025-01-07 | Stop reason: DRUGHIGH

## 2025-01-07 RX ORDER — HEPARIN SODIUM 1000 [USP'U]/ML
30 INJECTION, SOLUTION INTRAVENOUS; SUBCUTANEOUS PRN
Status: DISCONTINUED | OUTPATIENT
Start: 2025-01-07 | End: 2025-01-07 | Stop reason: DRUGHIGH

## 2025-01-07 RX ADMIN — SODIUM CHLORIDE, PRESERVATIVE FREE 10 ML: 5 INJECTION INTRAVENOUS at 08:32

## 2025-01-07 RX ADMIN — HEPARIN SODIUM 12 UNITS/KG/HR: 10000 INJECTION, SOLUTION INTRAVENOUS at 11:01

## 2025-01-07 RX ADMIN — ROPINIROLE HYDROCHLORIDE 1 MG: 0.25 TABLET, FILM COATED ORAL at 21:59

## 2025-01-07 RX ADMIN — AMIODARONE HYDROCHLORIDE 200 MG: 200 TABLET ORAL at 08:26

## 2025-01-07 RX ADMIN — EMPAGLIFLOZIN 10 MG: 10 TABLET, FILM COATED ORAL at 08:26

## 2025-01-07 RX ADMIN — INSULIN LISPRO 5 UNITS: 100 INJECTION, SOLUTION INTRAVENOUS; SUBCUTANEOUS at 17:43

## 2025-01-07 RX ADMIN — ACETAMINOPHEN 650 MG: 325 TABLET ORAL at 08:39

## 2025-01-07 RX ADMIN — HEPARIN SODIUM 3100 UNITS: 1000 INJECTION, SOLUTION INTRAVENOUS; SUBCUTANEOUS at 20:27

## 2025-01-07 RX ADMIN — INSULIN LISPRO 1 UNITS: 100 INJECTION, SOLUTION INTRAVENOUS; SUBCUTANEOUS at 08:27

## 2025-01-07 RX ADMIN — DEXAMETHASONE SODIUM PHOSPHATE 4 MG: 4 INJECTION INTRA-ARTICULAR; INTRALESIONAL; INTRAMUSCULAR; INTRAVENOUS; SOFT TISSUE at 01:21

## 2025-01-07 RX ADMIN — SODIUM CHLORIDE, PRESERVATIVE FREE 10 ML: 5 INJECTION INTRAVENOUS at 01:22

## 2025-01-07 RX ADMIN — INSULIN LISPRO 4 UNITS: 100 INJECTION, SOLUTION INTRAVENOUS; SUBCUTANEOUS at 11:59

## 2025-01-07 RX ADMIN — Medication: at 17:44

## 2025-01-07 RX ADMIN — TIZANIDINE 4 MG: 4 TABLET ORAL at 18:37

## 2025-01-07 RX ADMIN — INSULIN GLARGINE 15 UNITS: 100 INJECTION, SOLUTION SUBCUTANEOUS at 21:59

## 2025-01-07 RX ADMIN — INSULIN LISPRO 3 UNITS: 100 INJECTION, SOLUTION INTRAVENOUS; SUBCUTANEOUS at 22:00

## 2025-01-07 RX ADMIN — CARVEDILOL 3.12 MG: 6.25 TABLET, FILM COATED ORAL at 08:26

## 2025-01-07 RX ADMIN — BUMETANIDE 2 MG: 1 TABLET ORAL at 08:26

## 2025-01-07 RX ADMIN — FAMOTIDINE 20 MG: 20 TABLET, FILM COATED ORAL at 17:43

## 2025-01-07 RX ADMIN — ATORVASTATIN CALCIUM 80 MG: 80 TABLET, FILM COATED ORAL at 08:26

## 2025-01-07 RX ADMIN — DEXAMETHASONE SODIUM PHOSPHATE 4 MG: 4 INJECTION INTRA-ARTICULAR; INTRALESIONAL; INTRAMUSCULAR; INTRAVENOUS; SOFT TISSUE at 11:59

## 2025-01-07 RX ADMIN — CARVEDILOL 3.12 MG: 6.25 TABLET, FILM COATED ORAL at 17:43

## 2025-01-07 RX ADMIN — SODIUM CHLORIDE, PRESERVATIVE FREE 10 ML: 5 INJECTION INTRAVENOUS at 22:00

## 2025-01-07 RX ADMIN — DEXAMETHASONE SODIUM PHOSPHATE 4 MG: 4 INJECTION INTRA-ARTICULAR; INTRALESIONAL; INTRAMUSCULAR; INTRAVENOUS; SOFT TISSUE at 22:29

## 2025-01-07 RX ADMIN — LOSARTAN POTASSIUM 25 MG: 50 TABLET, FILM COATED ORAL at 08:26

## 2025-01-07 ASSESSMENT — PAIN SCALES - GENERAL
PAINLEVEL_OUTOF10: 5
PAINLEVEL_OUTOF10: 2
PAINLEVEL_OUTOF10: 3

## 2025-01-07 ASSESSMENT — PAIN DESCRIPTION - LOCATION: LOCATION: NECK

## 2025-01-07 NOTE — PROGRESS NOTES
Occupational Therapy    [facility]  Occupational Therapy Not Seen Note    DATE: 2025    NAME: Graciela Holt  MRN: [id number]   : 1950      Patient not seen this date for Occupational Therapy due to:    Other: Neurosurgical intervention pending review by attending neurosurgeon, hold OT eval at this time.    Next Scheduled Treatment: Attempt in pm or 8 as appropriate.    Electronically signed by Sb Perez OT on 2025 at 7:41 AM

## 2025-01-07 NOTE — CARE COORDINATION
01/07/25 1041   Readmission Assessment   Number of Days since last admission?   (Readmission N/A, transfer from [place])   Previous Disposition Other (comment)  (N/A)   Who is being Interviewed   (N/A)   What was the patient's/caregiver's perception as to why they think they needed to return back to the hospital? Other (Comment)  (N/A)   Did you visit your Primary Care Physician after you left the hospital, before you returned this time?   (N/A)   Why weren't you able to visit your PCP? Other (Comment)  (N/A)   Did you see a specialist, such as Cardiac, Pulmonary, Orthopedic Physician, etc. after you left the hospital? Other (Comment)  (N/A)   Who advised the patient to return to the hospital? Other (Comment)  (N/A)   Does the patient report anything that got in the way of taking their medications?   (N/A)   In our efforts to provide the best possible care to you and others like you, can you think of anything that we could have done to help you after you left the hospital the first time, so that you might not have needed to return so soon? Other (Comment)  (N/A)

## 2025-01-07 NOTE — PROGRESS NOTES
Physical Therapy        Physical Therapy Cancel Note      DATE: 2025    NAME: Graciela Holt  MRN: [id number]   : 1950      Patient not seen this date for Physical Therapy due to:    Other:  Neurosurgical intervention pending review by attending neurosurgeon, hold PT eval at this time      Electronically signed by Rich Dasilva PT on 2025 at 7:46 AM
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
## 2025-01-07 NOTE — PLAN OF CARE
Rounded on patient with Dr. Glass  Patient expressing frustration over her weakness getting worse  Will plan for surgery, possibly tomorrow  Will get cardiology clearance  Will get medicine clearance as well  NPO at midnight  Will order pre-op labs    Electronically signed by JAMES Rice CNP on 1/7/2025 at 10:47 AM

## 2025-01-07 NOTE — PROGRESS NOTES
Comprehensive Nutrition Assessment    Type and Reason for Visit:  Initial, Positive nutrition screen (Weight Loss; Poor Intakes/Appetite)    Nutrition Recommendations/Plan:   Continue current diet.  Provide Ensure oral supplements (in chocolate or strawberry) x 2 per day.  Encourage intakes.  Will monitor labs, weights, and plan of care.     Malnutrition Assessment:  Malnutrition Status:  Moderate malnutrition (at least) (01/07/25 4277)    Context:  Chronic Illness     Findings of the 6 clinical characteristics of malnutrition:  Energy Intake:  75% or less estimated energy requirements for 1 month or longer  Weight Loss:  Unable to assess - Possible significant weight loss x past 1 month - ? accuracy of weights.    Body Fat Loss:  Mild body fat loss Orbital, Triceps   Muscle Mass Loss:   (Moderate muscle mass loss) Temples (temporalis)  Fluid Accumulation:  No fluid accumulation   Strength:  Not Performed    Nutrition Assessment:    Admitted for chronic, progressive neck pain along with increasing fatigue and weakness.  PMH: DM2 COPD, h/o stroke; h/o C-spine myelopathy s/p decompression surgery (posterior C3 -7 cervical decompression with anterior lithosis C3 -4 in 2022).  Pt reports having a pretty good appetite and eating well since admission.  States she ate most of her lunch and that she enjoyed the meatloaf.  Reports also drinking Ensure supplement from lunch tray - would prefer strawberry or chocolate flavored supplements.  Pt reports at home her appetite would vary based on what was made for meals.  States  lb and that she has lost 1 lb recently.  Noted current bed scale weight of 112 lb.  Pt with visible wasting present.  Weight fluctuations also noted per chart review.  Labs reviewed: Glucose 179-379 mg/dL, BUN 38 mg/dL.  Meds include: Decadron, Humalog SS.    Nutrition Related Findings:    Labs/Meds reviewed. Wound Type: Multiple, Skin Tears (to bilateral arms)       Current Nutrition Intake &  Nutrition Focused Physical Findings    Discharge Planning:    Too soon to determine     Vanesa Ugalde RD, LD  Contact: [phone]
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
## 2025-01-07 NOTE — PROGRESS NOTES
[facility]   IN-PATIENT SERVICE   [facility]    HISTORY AND PHYSICAL EXAMINATION            Date:   1/4/2025  Patient name:  Graciela Holt  Date of admission:  1/2/2025  7:07 PM  MRN:   [id number]  Account:  [id number]  YOB: 1950  PCP:    Travis Mason MD  Room:   2056/2056-01  Code Status:    Full Code    Chief Complaint:     Chief Complaint   Patient presents with    Fatigue     Pt states she has no strength. EMS states caregivers are struggling to care for patient.       History Obtained From:     patient    History of Present Illness:     The patient is a 74 y.o.  Non- / non  female who presents with Fatigue (Pt states she has no strength. EMS states caregivers are struggling to care for patient.)   and she is admitted to the hospital for the management of      Graciela Holt is a 74 y.o. Non- / non  female who presents with Fatigue (Pt states she has no strength. EMS states caregivers are struggling to care for patient.) and is admitted to the hospital for the management of Weakness.  Medical history significant for HTN, HLD, COPD, history of left MCA CVA with occluded ICA, CAD s/p stents X2, and CABG x 3.      Recent admission 12/28 to 12/30 for acute on chronic heart failure.       Patient was brought to ED today per EMS due to caregivers being unable to care for patient with increased weakness and fatigue.  Patient states that over the past few days she has had decreased mobility and is unable to care for herself.  States that fatigue and weakness is overwhelming unable to complete ADLs.      CT head is negative for intracranial abnormality but does show possible spinal canal stenosis and cord compression at C1.  Patient states that she does have a history of a C1 fracture approximately 3 years ago and has been having some increased neck pain.  Denies any falls or injuries.  Neurosurgeon Dr. Bro guillaume was consulted in ED and 
    [facility]   IN-PATIENT SERVICE   [facility]    Progress Note            Date:   1/6/2025  Patient name:  Graciela Holt  Date of admission:  1/2/2025  7:07 PM  MRN:   [id number]  Account:  [id number]  YOB: 1950  PCP:    Travis Mason MD  Room:   2056/2056-01  Code Status:    Full Code    Chief Complaint:     Chief Complaint   Patient presents with    Fatigue     Pt states she has no strength. EMS states caregivers are struggling to care for patient.       History Obtained From:     patient    History of Present Illness:     The patient is a 74 y.o.  Non- / non  female who presents with Fatigue (Pt states she has no strength. EMS states caregivers are struggling to care for patient.)   and she is admitted to the hospital for the management of      Graciela Holt is a 74 y.o. Non- / non  female who presents with Fatigue (Pt states she has no strength. EMS states caregivers are struggling to care for patient.) and is admitted to the hospital for the management of Weakness.  Medical history significant for HTN, HLD, COPD, history of left MCA CVA with occluded ICA, CAD s/p stents X2, and CABG x 3.      Recent admission 12/28 to 12/30 for acute on chronic heart failure.       Patient was brought to ED today per EMS due to caregivers being unable to care for patient with increased weakness and fatigue.  Patient states that over the past few days she has had decreased mobility and is unable to care for herself.  States that fatigue and weakness is overwhelming unable to complete ADLs.      CT head is negative for intracranial abnormality but does show possible spinal canal stenosis and cord compression at C1.  Patient states that she does have a history of a C1 fracture approximately 3 years ago and has been having some increased neck pain.  Denies any falls or injuries.  Neurosurgeon Dr. Bro guillaume was consulted in ED and recommended MRI of cervical 
Bonita Escudero NP perfect served for discharge/readmit orders.   
[facility]   Occupational Therapy Evaluation  Date: 1/3/25  Patient Name: Graciela Holt       Room: 2106/2106-01  MRN: [id number]  Account: [id number]   : 1950  (74 y.o.) Gender: female     Discharge Recommendations:  Further Occupational Therapy is recommended upon facility discharge.    OT Equipment Recommendations  Other: TBD    Referring Practitioner: Dr. Choudhary  Diagnosis: General weakness; Decreased ADL     Treatment Diagnosis: Impaired self-care status    Past Medical History:  has a past medical history of Allergic rhinitis, Arthritis, CAD (coronary artery disease), Cerebral artery occlusion with cerebral infarction ([facility]), CHF (congestive heart failure) ([facility]), Controlled type 2 diabetes mellitus without complication, without long-term current use of insulin ([facility]), COPD (chronic obstructive pulmonary disease) ([facility]), Depression, Former smoker, History of blood transfusion, Hyperlipidemia, Hypertension, Kidney stone, Myocardial infarction ([facility]), Obesity, Class I, BMI 30.0-34.9 (see actual BMI), Restless leg syndrome, Skin abnormality, Type II or unspecified type diabetes mellitus without mention of complication, not stated as uncontrolled, Wears glasses, and Wears partial dentures.    Past Surgical History:   has a past surgical history that includes Appendectomy; Hysterectomy; Cholecystectomy; joint replacement (Bilateral); pr office/outpt visit,procedure only (N/A, 2018); pr i&d deep absc bursa/hematoma thigh/knee region (Right, 2018); Leg biopsy excision (Right, 2019); Cardiac surgery; Cardiac surgery; other surgical history (2020); cervical laminectomy (N/A, 10/14/2020); and bronchoscopy (N/A, 2021).    Restrictions  Restrictions/Precautions  Restrictions/Precautions: General Precautions, Fall Risk  Activity Level: Up with Assist  Required Braces or Orthoses?: No  Implants Present? : Metal implants (Sternal wires from CABG; C3-7 fusion, B 
Patient discharged with EMS, report given, all questions answered. Patient belongings given to patient.  
[facility] Called to transfer patient to [facility]  
Spiritual Health History and Assessment/Progress Note  [facility]    Spiritual/Emotional Needs,  ,  ,      Name: Graciela Holt MRN: [id number]    Age: 74 y.o.     Sex: female   Language: English   Catholic: None   Weakness     Date: 1/3/2025            Total Time Calculated: 15 min              Spiritual Assessment began in [facility]        Referral/Consult From: Nurse   Encounter Overview/Reason: Spiritual/Emotional Needs  Service Provided For: Patient    Yareli, Belief, Meaning:   Patient identifies as spiritual  Family/Friends No family/friends present      Importance and Influence:  Patient has spiritual/personal beliefs that influence decisions regarding their health  Family/Friends No family/friends present    Community:  Patient feels well-supported. Support system includes: Children and Extended family  Family/Friends No family/friends present    Assessment and Plan of Care:     Patient Interventions include: Facilitated expression of thoughts and feelings  Family/Friends Interventions include: No family/friends present    Patient Plan of Care: Spiritual Care available upon further referral  Family/Friends Plan of Care: No family/friends present    Electronically signed by Chaplain Becky on 1/3/2025 at 1:23 PM   
Writer calls report [facility] at this time. All questions answered.  
[facility] Will call if able to transport sooner.  Face sheet and medical necessity forms faxed.     1838: Writer notified transport switched to [facility] at 2000.  
Writer notified patient has room at [facility] room 235, number for report [phone].  Awaiting transport ETA.  
: Metal implants (Sternal wires from CABG; C3-7 fusion, B TKA)  Position Activity Restriction  Other Position/Activity Restrictions: Awaiting results of cervical MRI       Subjective  Pt sitting on toilet upon entering room     General Comments: Per Dr. Guerra patient may go to [facility]  d/t  1 cord edema/base skull     Pain  Pre-Pain: 0  Post-Pain: 0       Objective           Transfers  Sit to Stand: Minimal Assistance  Stand to Sit: Moderate Assistance (impulsively sat on EOB.)  Comment: Toilet transfer sit>stand MIN, hygiene SBA, donning/doffing brief dependent.     Ambulation  Surface: Level tile  Device: Rolling Walker  Assistance: Moderate assistance  Quality of Gait: quick, ataxic, impulsive, forward posture.  Gait Deviations: Decreased step height, Decreased step length (NBOS)  Distance: 10 ft from restroom to bed.          Bed Mobility  Sit to Supine: Minimal assistance (progresion of LE\"s onto bed.)  Scooting: Stand by assistance       PT Exercises  Functional Mobility Circuit Training: washing hands c wash cloth on toilet SBA.   Pt sat EOB ~ 2 minutes SBA for balance.    Activity Tolerance: Patient limited by endurance, Patient limited by fatigue     Patient Education  Patient Education  Education Given To: Patient  Education Provided: Role of Therapy, Transfer Training, Fall Prevention Strategies, Mobility Training  Education Method: Verbal  Barriers to Learning: Cognition  Education Outcome: Continued education needed     Functional Outcome Measures  AM-PAC Basic Mobility - Inpatient   How much help is needed turning from your back to your side while in a flat bed without using bedrails?: A Little  How much help is needed moving from lying on your back to sitting on the side of a flat bed without using bedrails?: A Little  How much help is needed moving to and from a bed to a chair?: A Lot  How much help is needed standing up from a chair using your arms?: A Little  How much help is needed walking in 
patient had two or more falls in the past year or any fall with injury in the past year?: Yes (Total of 4 falls in the last year; none recently)  Receives Help From: Friend(s)  Prior Level of Assist for ADLs: Independent (although reports needing A for the last couple days - unable to feed herself and requiring A for mobility (unable to hold onto RW))  Prior Level of Assist for Homemaking: Needs assistance (Roommates are primary)  Homemaking Responsibilities: No  Prior Level of Assist for Ambulation: Independent household ambulator, with or without device (Prior to recently was ambulating IND with no device; RW ordered last admission but pt reports UB weakness got so bad she could not hold onto it and was relying on A from roommates for mobility for last 2-3 days)  Prior Level of Assist for Transfers: Independent (although requiring A for last 2-3 days)  Active : No  Patient's  Info: medical cab or friends  Occupation: Retired  Leisure & Hobbies: 2 large dogs and 1 outdoor cat  IADL Comments: Sleeps in a flat bed  Additional Comments: Roommates are generally around but pt states that she spends most of her time in her room.  Was seen for PT/OT evaluation on 12/30/24 and ambulated 100 feet with RW and SBA.         Cognition   Orientation  Overall Orientation Status: Within Functional Limits    Objective  O2 Device: Nasal cannula  Comment: pt reports neck pain rating \"20/10\"           AROM RLE (degrees)  RLE AROM: WFL  AROM LLE (degrees)  LLE AROM : WFL  Strength RLE  Comment: grossly 3/3+/5  Strength LLE  Comment: grossly 3/3+/5           Bed mobility  Rolling to Left: Minimal assistance  Rolling to Right: Minimal assistance  Supine to Sit: Moderate assistance (for trunk)  Sit to Supine: Minimal assistance (for LEs)  Scooting: Contact guard assistance  Bed Mobility Comments: head of bed elevated  Transfers  Comment: Defered at this time due to pain        Balance  Sitting - Static: Fair;+ (SBA)  Sitting - 
   BUN 25 (H) 8 - 23 mg/dL    Creatinine 1.2 0.7 - 1.2 mg/dL    Est, Glom Filt Rate 47 (L) >60 mL/min/1.73m2    Calcium 9.8 8.6 - 10.4 mg/dL   CBC with auto differential    Collection Time: 01/05/25  7:47 AM   Result Value Ref Range    WBC 11.4 (H) 3.5 - 11.0 k/uL    RBC 4.99 4.0 - 5.2 m/uL    Hemoglobin 14.2 12.0 - 16.0 g/dL    Hematocrit 43.2 36 - 46 %    MCV 86.6 80 - 100 fL    MCH 28.4 26 - 34 pg    MCHC 32.8 31 - 37 g/dL    RDW 13.9 11.5 - 14.9 %    Platelets 338 150 - 450 k/uL    MPV 8.3 6.0 - 12.0 fL    Neutrophils % 83 (H) 36 - 66 %    Lymphocytes % 10 (L) 24 - 44 %    Monocytes % 7 1 - 7 %    Eosinophils % 0 0 - 4 %    Basophils % 0 0 - 2 %    Neutrophils Absolute 9.40 (H) 1.3 - 9.1 k/uL    Lymphocytes Absolute 1.20 1.0 - 4.8 k/uL    Monocytes Absolute 0.70 0.1 - 1.3 k/uL    Eosinophils Absolute 0.00 0.0 - 0.4 k/uL    Basophils Absolute 0.00 0.0 - 0.2 k/uL       Imaging/Diagnostics:    XR CHEST PORTABLE  Narrative: EXAMINATION:  ONE XRAY VIEW OF THE CHEST    1/4/2025 12:00 pm    COMPARISON:  12/31/2024    HISTORY:  ORDERING SYSTEM PROVIDED HISTORY: new o2  TECHNOLOGIST PROVIDED HISTORY:  new o2  Reason for Exam: new o2    FINDINGS:  Cardiomediastinal silhouette is stable.  Diffuse bronchial wall thickening  and mild pulmonary vascular congestion with apparent reticulonodular  opacities in the right mid lung.  No pleural effusion or pneumothorax.  No  gross bony abnormality.  Impression: 1. Suspect infectious/inflammatory airways process.  2. Mild pulmonary vascular congestion.       Assessment :      Primary Problem  Weakness    Active Hospital Problems    Diagnosis Date Noted    Weakness [R53.1] 01/02/2025       Plan:     Patient status Admit as inpatient in the  Progressive Unit/Step down    Patient is 74-year-old female with multiple comorbidities including CAD status post CABG, chronic systolic heart failure with expression of 20-25 % refused AICD and LifeVest, ventricular arrhythmia, diabetes 
12/28/2024  Findings suggest congestive heart failure         Plan:     Patient status inpatient in the Progressive Unit/Step down          JAMES MURILLO NP  1/3/2025  3:39 AM      Please note that this chart was generated using voice recognition Dragon dictation software.  Although every effort was made to ensure the accuracy of this automated transcription, some errors in transcription [place] [address].   Phone [phone]

## 2025-01-07 NOTE — PLAN OF CARE
Problem: Chronic Conditions and Co-morbidities  Goal: Patient's chronic conditions and co-morbidity symptoms are monitored and maintained or improved  Outcome: Progressing     Problem: Discharge Planning  Goal: Discharge to home or other facility with appropriate resources  Outcome: Progressing  Flowsheets (Taken 1/6/2025 2245)  Discharge to home or other facility with appropriate resources: Identify barriers to discharge with patient and caregiver     Problem: Safety - Adult  Goal: Free from fall injury  Outcome: Progressing  Flowsheets (Taken 1/6/2025 2233)  Free From Fall Injury:   Based on caregiver fall risk screen, instruct family/caregiver to ask for assistance with transferring infant if caregiver noted to have fall risk factors   Instruct family/caregiver on patient safety

## 2025-01-07 NOTE — CARE COORDINATION
Transition Planning:    BECKY spoke with nAgela Suarez. Facility will begin precert, and will submit updated PT/OT notes post surgery.

## 2025-01-07 NOTE — CARE COORDINATION
Case Management Assessment  Initial Evaluation    Date/Time of Evaluation: 1/7/2025 10:31 AM  Assessment Completed by: Becca Lopez RN    If patient is discharged prior to next notation, then this note serves as note for discharge by case management.    Patient Name: Graciela Holt                   YOB: 1950  Diagnosis: Compression fracture of C1 vertebra, initial encounter ([facility]) [S12.090A]                   Date / Time: 1/6/2025  7:57 PM    Patient Admission Status: Inpatient   Readmission Risk (Low < 19, Mod (19-27), High > 27): Readmission Risk Score: 29.5    Current PCP: Travis Mason MD  PCP verified by CM? Yes    Chart Reviewed: Yes      History Provided by: Patient  Patient Orientation: Alert and Oriented, Person, Place, Situation    Patient Cognition: Alert    Hospitalization in the last 30 days (Readmission):  No    If yes, Readmission Assessment in CM Navigator will be completed.    Advance Directives:      Code Status: Prior   Patient's Primary Decision Maker is: Named in Scanned ACP Document    Primary Decision Maker: Guanakito Zheng - Child - [phone]    Discharge Planning:    Patient lives with: Other (Comment) (2 roommates) Type of Home: House  Primary Care Giver: Self  Patient Support Systems include: Children   Current Financial resources: Medicare  Current community resources:    Current services prior to admission: None            Current DME:              Type of Home Care services:  None    ADLS  Prior functional level: Assistance with the following:, Housework, Shopping, Mobility, Cooking  Current functional level: Assistance with the following:, Mobility, Shopping, Housework, Cooking, Feeding, Toileting, Dressing, Bathing    PT AM-PAC:   /24  OT AM-PAC:   /24    Family can provide assistance at DC: No  Would you like Case Management to discuss the discharge plan with any other family members/significant others, and if so, who? No  Plans to Return to Present Housing:  No  Other Identified Issues/Barriers to RETURNING to current housing: n/a  Potential Assistance needed at discharge: Transportation, Skilled Nursing Facility            Potential DME:    Patient expects to discharge to: Skilled nursing facility  Plan for transportation at discharge: Other (see comment) (will need transport to SNF)    Financial    Payor: [employer] / Plan: [employer] / Product Type: *No Product type* /     Does insurance require precert for SNF: Yes    Potential assistance Purchasing Medications: No  Meds-to-Beds request: Yes      [facility] [address] -  [phone] - F [phone]  [facility] [facility] [address]  Phone: [phone] Fax: [phone]    [facility] - [place], [place] - [address] - P [phone] - F [phone]  [address]  [facility] [address]  Phone: [phone] Fax: [phone]    [id number] [address] - P [phone] - F [phone]  [address] [place] [id number]  Phone: [phone] Fax: [phone]      Notes:    Factors facilitating achievement of predicted outcomes: Motivated and Cooperative    Barriers to discharge: No caregiver support and medical clearance    Additional Case Management Notes: Plan is for spinal fusion per neurosurgery and     The Plan for Transition of Care is related to the following treatment goals of Compression fracture of C1 vertebra, initial encounter (HCC) [S12.090A]    IF APPLICABLE: The Patient and/or patient representative Graciela and her family were provided with a choice of provider and agrees with the discharge plan. Freedom of choice list with basic dialogue that supports the patient's individualized plan of care/goals and shares the quality data associated with the providers was provided to: Patient   Patient Representative Name:       The Patient and/or Patient Representative Agree with the Discharge Plan?

## 2025-01-07 NOTE — H&P
[facility]  Office: [phone]  Dario Sequeira DO, Gunner De La Paz DO, Christo Pizano DO, Don Geller DO, Raphael Rosales MD, Mariya Polk MD, Bienvenido Mason MD, Loretta Diaz MD,  Ruiz Marroquin MD, Nathalie Kenyon MD, Belén Bullard MD,  Rock Hugo DO, Betsy Ray MD, Abhijit Webster MD, Margarito Sequeira DO, Meghan Cote MD,  Jason Nunez DO, Velma Kumar MD, Irina French MD, Delfina Ely MD, Renetta Cody MD,  Moises Garnett MD, Chely Longo MD, Leif Mejia MD, Kevin Mccauley MD, Tyrese Du MD, Bhaskar Oswald MD, Max Mcgrath DO, Rico Todd MD, Rock Hicks MD, Mohsin Reza, MD, Shirley Waterhouse, CNP,  Amelia Leigh CNP, Max Bruno, INNA,  Beverly Martinez, JACK, Marianna Gleason, CNP, Delia Malcolm, CNP, Sumaya Navas, CNP, Claire Garnica, CNP, Jessica Pantoja, PA-C, Janeth Benjamin PA-C, Di Barger, CNP, Angelina Marcus, CNP,  Desirae Marie, CNP, Michelle Marquez, CNP, Chana Boo, CNP,  Chen Mackey CNP, Cathryn Dale, CNP         [facility]   IN-PATIENT SERVICE   [facility]    HISTORY AND PHYSICAL EXAMINATION            Date:   1/6/2025  Patient name:  Graciela Holt  Date of admission:  1/6/2025  7:57 PM  MRN:   [id number]  Account:  [id number]  YOB: 1950  PCP:    Travis Mason MD  Room:   [facility]0235-  Code Status:    Prior    Chief Complaint:     \"I thought he could help with my neck\"    History Obtained From:     patient    History of Present Illness:     Graciela Holt is a 74 y.o.  female w/ DM2 COPD, prior stroke, with history of C-spine myelopathy status post decompression surgery (posterior C3 -7 cervical decompression with anterior listhesis C3 -4 in 2022 by Dr. Sauer) who presented to [facility] 01/03 with chronic progressive neck pain, with increasing weakness over the past month.  Patient reevaluated by Dr. Sauer and was recommended for neurosurgerical evaluation and transferred  diabetes mellitus without mention of complication, not stated as uncontrolled     Wears glasses     Wears partial dentures     upper plate        Past Surgical History:     Past Surgical History:   Procedure Laterality Date    APPENDECTOMY      BRONCHOSCOPY N/A 8/16/2021    BRONCHOSCOPY w/ WASHINGS performed by Toy Cheatham MD at [facility] ENDO    CARDIAC SURGERY      cath x 2/ stent x 1    CARDIAC SURGERY      bypass 4 vessel 2/2018    CERVICAL LAMINECTOMY N/A 10/14/2020    C3-C7 POSTERIOR CERVICAL DECOMPRESSION FUSION performed by Armando Guerra MD at [facility] OR    CHOLECYSTECTOMY      HYSTERECTOMY (CERVIX STATUS UNKNOWN)      JOINT REPLACEMENT Bilateral     knees    LEG BIOPSY EXCISION Right 8/29/2019    LEG LESION PUNCH BIOPSY performed by Chuckie Snow MD at [facility] OR    OTHER SURGICAL HISTORY  07/26/2020    cerebral angiogram    AL I&D DEEP ABSC BURSA/HEMATOMA THIGH/KNEE REGION Right 5/7/2018    DEBRIDEMENT INCISION AND DRAINAGE THIGH ABSCESS performed by Amanda Hernandez DO at [facility] OR    AL OFFICE/OUTPT VISIT,PROCEDURE ONLY N/A 2/6/2018    CABG X 3 LIMA-LAD-DIAG,SVG-PDA,CORONARY ARTERY BYPASS REDO, PUMP ASSIST, SWAN, JARRED, REDO STERNOTOMY performed by Savanna Mata MD at [facility] CVOR        Medications Prior to Admission:     Prior to Admission medications    Medication Sig Start Date End Date Taking? Authorizing Provider   bumetanide (BUMEX) 2 MG tablet Take 1 tablet by mouth daily 12/31/24   Jocelyn Patel MD   potassium chloride (KLOR-CON) 10 MEQ extended release tablet Take 2 tablets by mouth 2 times daily 12/3/24   Samm Alvarez MD   nitroGLYCERIN (NITROSTAT) 0.3 MG SL tablet Place 1 tablet under the tongue as needed for Chest pain up to max of 3 total doses. If no relief after 1 dose, call 911. 8/14/24 1/2/25  Shahla Sadler DO   amiodarone (CORDARONE) 200 MG tablet Take 1 tablet by mouth daily 8/14/24   Chip Anderson MD   carvedilol (COREG) 3.125 MG tablet Take 1 tablet by mouth 2 times daily  anesthesia issues    Review of Systems:     Positive and Negative as described in HPI.    Review of Systems  CONSTITUTIONAL:  No fevers, chills, fatigue, denies recent weight loss.  Previously with significant weight loss 2 years ago, lost over 50 pounds.    CARDIOVASCULAR: No chest pain, palpitations, irregular heart beat, tachycardia.  No PND/ orthopnea.  No anginal symptoms. No prior MI/CHF  RESPIRATORY:  No shortness of breath, cough, URI symptoms, no recent bronchitis, pneumonia.  + COPD/ asthma.  Denies apnea  GASTROINTESTINAL:  Denies nausea, vomiting, diarrhea, constipation, indigestion, abdominal pain.  No dysphasia, no odynophagia.   GENITOURINARY: Denies difficulty urinating, hesitancy, dysuria, frequency,  hematuria.  denies fecal incontinence.    DERM:  negative for rash, skin lesions, easy bruising   HEMATOLOGIC/LYMPHATIC:  Denies easy bruising, bleeding issues.  Denies rectal bleeding.  Denies prior DVT/PE.  Denies prior history of malignancy.  ALLERGIC/IMMUNOLOGIC:  negative for urticaria , itching  ENDOCRINE:  + DM blood sugar stable, denies any issues with hypo or hyperglycemia.  denies heat/ cold intolerance    MUSCULOSKELETAL:  denies lower back pain or radicular symptoms, denies other joint pains, muscle aches  NEUROLOGICAL:+prior TIA/CVA.  Denies residual deficits.  +posterior occipital intermittent headaches, denies vertigo, lightheadedness  BEHAVIOR/PSYCH:  negative for depression, anxiety  denies insomnia.       Physical Exam:   There were no vitals taken for this visit.  Temp (24hrs), Av °F (36.7 °C), Min:97.7 °F (36.5 °C), Max:98.2 °F (36.8 °C)    No results for input(s): \"POCGLU\" in the last 72 hours.  No intake or output data in the 24 hours ending 25 0674    Physical Exam  General Appearance: awake and alert, appropriate, oriented, follows commands  Eye: non-icteric, conjunctiva clear, pupils equally round and reactive, normal horizontal gaze, no nystagmus  ENT: face

## 2025-01-07 NOTE — PROGRESS NOTES
[facility]  Office: [phone]  Dario Sequeira DO, Gunner De La Paz DO, Christo Pizano DO, Don Geller DO, Raphael Rosales MD, Mariya Polk MD, Bienvenido Mason MD, Loretta Diaz MD,  Ruiz Marroquin MD, Nathalie Kenyon MD, Earl Rosa DO, Belén Bullard MD,  Rock Hugo DO, Betsy Ray MD, Abhijit Webster MD, Margarito Sequeira DO, Meghan Cote MD, Rico Todd MD, Jason Nunez DO, Velma Kumar MD, Irina French MD, Delfina Ely MD, Renetta Cody MD,  John Martin DO, Yordan Samuel MD,  Shirley Waterhouse, CNP,  Amelia Leigh, CNP, Chana Boo, CNP, Max Bruno, CNP,  Beverly Martinez, DNP, Marianna Gleason, CNP, Delia Malcolm, CNP, Carlee Israel, CNP, Sumaya Navas, CNP, Claire Garnica, CNP, NATHAN Rodriguez-C, Rere Jarrett, CNS, Diya Card, CNP, Chen Mackey, CNP         [facility]   IN-PATIENT SERVICE   [facility]    Progress Note    1/7/2025    4:30 PM    Name:   Graciela Holt  MRN:     [id number]     Acct:      [id number]   Room:   0235/0235-01  IP Day:  1  Admit Date:  1/6/2025  7:57 PM    PCP:   Travis Mason MD  Code Status:  Prior    Subjective:     C/C: No chief complaint on file.    Interval History Status: not changed.     Patient states she is doing about the same.  No new complaints.    Brief History:     Graciela Holt is a 74 y.o.  female w/ DM2 COPD, prior stroke, with history of C-spine myelopathy status post decompression surgery (posterior C3 -7 cervical decompression with anterior listhesis C3 -4 in 2022 by Dr. Sauer) who presented to [facility] 01/03 with chronic progressive neck pain, with increasing weakness over the past month.  Patient reevaluated by Dr. Sauer and was recommended for neurosurgerical evaluation and transferred to [facility] for the management of Compression fracture of C1 vertebra, initial encounter (HCC).     Patient does describe chronic neck pain with    Scheduled Meds:    permethrin   Topical Once    insulin glargine  15 Units SubCUTAneous Nightly    insulin lispro  5 Units SubCUTAneous TID WC    sodium chloride flush  5-40 mL IntraVENous 2 times per day    amiodarone  200 mg Oral Daily    atorvastatin  80 mg Oral Daily    bumetanide  2 mg Oral Daily    carvedilol  3.125 mg Oral BID WC    dexAMETHasone  4 mg IntraVENous Q12H    empagliflozin  10 mg Oral Daily    famotidine  20 mg Oral QPM    losartan  25 mg Oral Daily    rOPINIRole  1 mg Oral Nightly    insulin lispro  0-4 Units SubCUTAneous 4x Daily AC & HS     Continuous Infusions:    heparin (PORCINE) Infusion 12 Units/kg/hr (01/07/25 1101)    sodium chloride      dextrose       PRN Meds: ipratropium 0.5 mg-albuterol 2.5 mg, heparin (porcine), heparin (porcine), sodium chloride flush, sodium chloride, ondansetron **OR** ondansetron, polyethylene glycol, acetaminophen **OR** acetaminophen, albuterol, tiZANidine, glucose, dextrose bolus **OR** dextrose bolus, glucagon (rDNA), dextrose    Data:     Past Medical History:   has a past medical history of Allergic rhinitis, Arthritis, CAD (coronary artery disease), Cerebral artery occlusion with cerebral infarction ([facility]), CHF (congestive heart failure) ([facility]), Controlled type 2 diabetes mellitus without complication, without long-term current use of insulin ([facility]), COPD (chronic obstructive pulmonary disease) ([facility]), Depression, Former smoker, History of blood transfusion, Hyperlipidemia, Hypertension, Kidney stone, Myocardial infarction ([facility]), Obesity, Class I, BMI 30.0-34.9 (see actual BMI), Restless leg syndrome, Skin abnormality, Type II or unspecified type diabetes mellitus without mention of complication, not stated as uncontrolled, Wears glasses, and Wears partial dentures.    Social History:   reports that she has been smoking cigarettes. She started smoking about 58 years ago. She has a 56 pack-year smoking history. She has never used smokeless tobacco. She

## 2025-01-07 NOTE — PLAN OF CARE
Problem: Chronic Conditions and Co-morbidities  Goal: Patient's chronic conditions and co-morbidity symptoms are monitored and maintained or improved  1/4/2025 0448 by Leisa Shell, RN  Outcome: Progressing  Flowsheets (Taken 1/3/2025 2158)  Care Plan - Patient's Chronic Conditions and Co-Morbidity Symptoms are Monitored and Maintained or Improved:   Monitor and assess patient's chronic conditions and comorbid symptoms for stability, deterioration, or improvement   Collaborate with multidisciplinary team to address chronic and comorbid conditions and prevent exacerbation or deterioration     Problem: Discharge Planning  Goal: Discharge to home or other facility with appropriate resources  1/4/2025 0448 by Leisa Shell, RN  Outcome: Progressing  Flowsheets (Taken 1/3/2025 2158)  Discharge to home or other facility with appropriate resources:   Identify barriers to discharge with patient and caregiver   Arrange for needed discharge resources and transportation as appropriate   Identify discharge learning needs (meds, wound care, etc)   Refer to discharge planning if patient needs post-hospital services based on physician order or complex needs related to functional status, cognitive ability or social support system     Problem: Pain  Goal: Verbalizes/displays adequate comfort level or baseline comfort level  1/4/2025 1713 by Pardeep Barahona, RN  Outcome: Progressing  Note: Assess the location, characteristics, onset, duration, frequency, quality, and severity of pain. Encourage immediate report of pain. Use appropriate pain scale to rate pain. Manage pain using nonpharmacologic/pharmacologic interventions.   1/4/2025 0448 by Leisa Shell RN  Outcome: Progressing  Flowsheets (Taken 1/4/2025 0448)  Verbalizes/displays adequate comfort level or baseline comfort level:   Encourage patient to monitor pain and request assistance   Assess pain using appropriate pain scale   Administer analgesics based on type and 
  Problem: Chronic Conditions and Co-morbidities  Goal: Patient's chronic conditions and co-morbidity symptoms are monitored and maintained or improved  1/6/2025 0325 by Jenn Leonard RN  Outcome: Progressing  Flowsheets (Taken 1/5/2025 1918)  Care Plan - Patient's Chronic Conditions and Co-Morbidity Symptoms are Monitored and Maintained or Improved: Monitor and assess patient's chronic conditions and comorbid symptoms for stability, deterioration, or improvement  Note:   Patient's Chronic Conditions and Co-Morbidity symptoms are monitored and maintained or improved; monitor and assess patient's chronic conditions and comorbid symptoms for stability, deterioration, or improvement; Collaborate with multidisciplinary team to address chronic and comorbid conditions and prevent exacerbation or deterioration.      Problem: Pain  Goal: Verbalizes/displays adequate comfort level or baseline comfort level  1/6/2025 1639 by Pardeep Barahona RN  Outcome: Progressing  Note: Assess the location, characteristics, onset, duration, frequency, quality, and severity of pain. Encourage immediate report of pain. Use appropriate pain scale to rate pain. Manage pain using nonpharmacologic/pharmacologic interventions.   1/6/2025 0325 by Jenn Leonard RN  Outcome: Progressing  Flowsheets  Taken 1/6/2025 0245  Verbalizes/displays adequate comfort level or baseline comfort level: Encourage patient to monitor pain and request assistance  Taken 1/5/2025 1946  Verbalizes/displays adequate comfort level or baseline comfort level:   Assess pain using appropriate pain scale   Encourage patient to monitor pain and request assistance  Note: Assessed all pain characteristics including level, type, location, frequency, and onset.  Non-pharmacologic interventions offered to pt as well.  Pt states pain is tolerable at this time.       Problem: Safety - Adult  Goal: Free from fall injury  1/6/2025 1639 by Pardeep Barahona RN  Outcome: Progressing  Note: 
  Problem: Chronic Conditions and Co-morbidities  Goal: Patient's chronic conditions and co-morbidity symptoms are monitored and maintained or improved  Outcome: Completed     Problem: Discharge Planning  Goal: Discharge to home or other facility with appropriate resources  Outcome: Completed     Problem: Pain  Goal: Verbalizes/displays adequate comfort level or baseline comfort level  1/6/2025 1926 by Dameon Cordoba RN  Outcome: Completed  1/6/2025 1639 by Pardeep Barahona RN  Outcome: Progressing  Note: Assess the location, characteristics, onset, duration, frequency, quality, and severity of pain. Encourage immediate report of pain. Use appropriate pain scale to rate pain. Manage pain using nonpharmacologic/pharmacologic interventions.      Problem: Safety - Adult  Goal: Free from fall injury  1/6/2025 1926 by Dameon Cordoba RN  Outcome: Completed  1/6/2025 1639 by Pardeep Barahona RN  Outcome: Progressing  Note: Patient remains free of falls and injuries throughout shift. Bed remains in the lowest position, wheels locked, call light and bedside table are within reach.      Problem: Respiratory - Adult  Goal: Achieves optimal ventilation and oxygenation  Outcome: Completed     Problem: Skin/Tissue Integrity  Goal: Absence of new skin breakdown  Description: 1.  Monitor for areas of redness and/or skin breakdown  2.  Assess vascular access sites hourly  3.  Every 4-6 hours minimum:  Change oxygen saturation probe site  4.  Every 4-6 hours:  If on nasal continuous positive airway pressure, respiratory therapy assess nares and determine need for appliance change or resting period.  Outcome: Completed     
  Problem: Chronic Conditions and Co-morbidities  Goal: Patient's chronic conditions and co-morbidity symptoms are monitored and maintained or improved  Outcome: Progressing     Problem: Discharge Planning  Goal: Discharge to home or other facility with appropriate resources  Outcome: Progressing     
  Problem: Chronic Conditions and Co-morbidities  Goal: Patient's chronic conditions and co-morbidity symptoms are monitored and maintained or improved  Outcome: Progressing     Problem: Discharge Planning  Goal: Discharge to home or other facility with appropriate resources  Outcome: Progressing     Problem: Pain  Goal: Verbalizes/displays adequate comfort level or baseline comfort level  Outcome: Progressing     Problem: Safety - Adult  Goal: Free from fall injury  Outcome: Progressing     
  Problem: Pain  Goal: Verbalizes/displays adequate comfort level or baseline comfort level  1/3/2025 1836 by Nikole Isabel, RN  Outcome: Progressing     Problem: Safety - Adult  Goal: Free from fall injury  1/3/2025 1836 by Nikole Isabel, RN  Outcome: Progressing     Problem: Discharge Planning  Goal: Discharge to home or other facility with appropriate resources  1/3/2025 1836 by Nikole Isabel, RN  Outcome: Progressing     
  Problem: Pain  Goal: Verbalizes/displays adequate comfort level or baseline comfort level  1/6/2025 0325 by Jenn Leonard RN  Outcome: Progressing   Note: Assessed all pain characteristics including level, type, location, frequency, and onset.  Non-pharmacologic interventions offered to pt as well.  Pt states pain is tolerable at this time.     Problem: Safety - Adult  Goal: Free from fall injury  1/6/2025 0325 by Jenn Leonard RN  Outcome: Progressing   Note: No falls noted this shift. Patient ambulates with x1 staff assistance without difficulty.  Bed kept in low position. Safe environment maintained. Bedside table & call light in reach. Uses call light appropriately when needing assistance.      Problem: Respiratory - Adult  Goal: Achieves optimal ventilation and oxygenation  Outcome: Progressing     Problem: Chronic Conditions and Co-morbidities  Goal: Patient's chronic conditions and co-morbidity symptoms are monitored and maintained or improved  Outcome: Progressing   Note: Patient's Chronic Conditions and Co-Morbidity symptoms are monitored and maintained or improved; monitor and assess patient's chronic conditions and comorbid symptoms for stability, deterioration, or improvement; Collaborate with multidisciplinary team to address chronic and comorbid conditions and prevent exacerbation or deterioration.     Problem: Skin/Tissue Integrity  Goal: Absence of new skin breakdown  Outcome: Progressing     
  Problem: Pain  Goal: Verbalizes/displays adequate comfort level or baseline comfort level  Outcome: Progressing  Note: Assess the location, characteristics, onset, duration, frequency, quality, and severity of pain. Encourage immediate report of pain. Use appropriate pain scale to rate pain. Manage pain using nonpharmacologic/pharmacologic interventions.      Problem: Safety - Adult  Goal: Free from fall injury  Outcome: Progressing  Note: Patient remains free of falls and injuries throughout shift. Bed remains in the lowest position, wheels locked, call light and bedside table are within reach.      
Please have patient or POA complete online MRI screening form in [facility]. The patient will need to be in hospital gown for their MRI. If there are any questions please call [phone]!  Thanks!   
instruct family/caregiver to ask for assistance with transferring infant if caregiver noted to have fall risk factors

## 2025-01-08 PROBLEM — R73.9 HYPERGLYCEMIA: Status: ACTIVE | Noted: 2025-01-08

## 2025-01-08 PROBLEM — D72.825 BANDEMIA: Status: ACTIVE | Noted: 2025-01-08

## 2025-01-08 PROBLEM — B85.0 HEAD LICE INFESTATION: Status: ACTIVE | Noted: 2025-01-08

## 2025-01-08 PROBLEM — I47.29 NSVT (NONSUSTAINED VENTRICULAR TACHYCARDIA) (HCC): Status: ACTIVE | Noted: 2025-01-08

## 2025-01-08 PROBLEM — I50.21 ACUTE SYSTOLIC CONGESTIVE HEART FAILURE (HCC): Status: ACTIVE | Noted: 2025-01-08

## 2025-01-08 LAB
ANION GAP SERPL CALCULATED.3IONS-SCNC: 11 MMOL/L (ref 9–16)
ANION GAP SERPL CALCULATED.3IONS-SCNC: 9 MMOL/L (ref 9–16)
BASOPHILS # BLD: <0.03 K/UL (ref 0–0.2)
BASOPHILS # BLD: <0.03 K/UL (ref 0–0.2)
BASOPHILS NFR BLD: 0 % (ref 0–2)
BASOPHILS NFR BLD: 0 % (ref 0–2)
BUN SERPL-MCNC: 47 MG/DL (ref 8–23)
BUN SERPL-MCNC: 49 MG/DL (ref 8–23)
CALCIUM SERPL-MCNC: 9.1 MG/DL (ref 8.6–10.4)
CALCIUM SERPL-MCNC: 9.2 MG/DL (ref 8.6–10.4)
CHLORIDE SERPL-SCNC: 98 MMOL/L (ref 98–107)
CHLORIDE SERPL-SCNC: 99 MMOL/L (ref 98–107)
CO2 SERPL-SCNC: 27 MMOL/L (ref 20–31)
CO2 SERPL-SCNC: 29 MMOL/L (ref 20–31)
CREAT SERPL-MCNC: 1.2 MG/DL (ref 0.6–0.9)
CREAT SERPL-MCNC: 1.3 MG/DL (ref 0.6–0.9)
EOSINOPHIL # BLD: <0.03 K/UL (ref 0–0.44)
EOSINOPHIL # BLD: <0.03 K/UL (ref 0–0.44)
EOSINOPHILS RELATIVE PERCENT: 0 % (ref 1–4)
EOSINOPHILS RELATIVE PERCENT: 0 % (ref 1–4)
ERYTHROCYTE [DISTWIDTH] IN BLOOD BY AUTOMATED COUNT: 13.2 % (ref 11.8–14.4)
ERYTHROCYTE [DISTWIDTH] IN BLOOD BY AUTOMATED COUNT: 13.3 % (ref 11.8–14.4)
GFR, ESTIMATED: 43 ML/MIN/1.73M2
GFR, ESTIMATED: 47 ML/MIN/1.73M2
GLUCOSE BLD-MCNC: 219 MG/DL (ref 65–105)
GLUCOSE BLD-MCNC: 299 MG/DL (ref 65–105)
GLUCOSE BLD-MCNC: 360 MG/DL (ref 65–105)
GLUCOSE BLD-MCNC: 389 MG/DL (ref 65–105)
GLUCOSE SERPL-MCNC: 302 MG/DL (ref 74–99)
GLUCOSE SERPL-MCNC: 391 MG/DL (ref 74–99)
HCT VFR BLD AUTO: 40.7 % (ref 36.3–47.1)
HCT VFR BLD AUTO: 41.2 % (ref 36.3–47.1)
HGB BLD-MCNC: 12.9 G/DL (ref 11.9–15.1)
HGB BLD-MCNC: 13 G/DL (ref 11.9–15.1)
IMM GRANULOCYTES # BLD AUTO: 0.1 K/UL (ref 0–0.3)
IMM GRANULOCYTES # BLD AUTO: 0.12 K/UL (ref 0–0.3)
IMM GRANULOCYTES NFR BLD: 1 %
IMM GRANULOCYTES NFR BLD: 1 %
LYMPHOCYTES NFR BLD: 0.76 K/UL (ref 1.1–3.7)
LYMPHOCYTES NFR BLD: 0.8 K/UL (ref 1.1–3.7)
LYMPHOCYTES RELATIVE PERCENT: 6 % (ref 24–43)
LYMPHOCYTES RELATIVE PERCENT: 7 % (ref 24–43)
MCH RBC QN AUTO: 27.9 PG (ref 25.2–33.5)
MCH RBC QN AUTO: 28 PG (ref 25.2–33.5)
MCHC RBC AUTO-ENTMCNC: 31.6 G/DL (ref 28.4–34.8)
MCHC RBC AUTO-ENTMCNC: 31.7 G/DL (ref 28.4–34.8)
MCV RBC AUTO: 88.4 FL (ref 82.6–102.9)
MCV RBC AUTO: 88.5 FL (ref 82.6–102.9)
MONOCYTES NFR BLD: 0.18 K/UL (ref 0.1–1.2)
MONOCYTES NFR BLD: 0.61 K/UL (ref 0.1–1.2)
MONOCYTES NFR BLD: 2 % (ref 3–12)
MONOCYTES NFR BLD: 4 % (ref 3–12)
NEUTROPHILS NFR BLD: 89 % (ref 36–65)
NEUTROPHILS NFR BLD: 90 % (ref 36–65)
NEUTS SEG NFR BLD: 10.22 K/UL (ref 1.5–8.1)
NEUTS SEG NFR BLD: 12.69 K/UL (ref 1.5–8.1)
NRBC BLD-RTO: 0 PER 100 WBC
NRBC BLD-RTO: 0 PER 100 WBC
PARTIAL THROMBOPLASTIN TIME: 101.5 SEC (ref 23–36.5)
PARTIAL THROMBOPLASTIN TIME: 59.8 SEC (ref 23–36.5)
PARTIAL THROMBOPLASTIN TIME: 61.6 SEC (ref 23–36.5)
PLATELET # BLD AUTO: 307 K/UL (ref 138–453)
PLATELET # BLD AUTO: 338 K/UL (ref 138–453)
PMV BLD AUTO: 10.5 FL (ref 8.1–13.5)
PMV BLD AUTO: 10.9 FL (ref 8.1–13.5)
POTASSIUM SERPL-SCNC: 4.5 MMOL/L (ref 3.7–5.3)
POTASSIUM SERPL-SCNC: 4.6 MMOL/L (ref 3.7–5.3)
RBC # BLD AUTO: 4.6 M/UL (ref 3.95–5.11)
RBC # BLD AUTO: 4.66 M/UL (ref 3.95–5.11)
SODIUM SERPL-SCNC: 136 MMOL/L (ref 136–145)
SODIUM SERPL-SCNC: 137 MMOL/L (ref 136–145)
WBC OTHER # BLD: 11.3 K/UL (ref 3.5–11.3)
WBC OTHER # BLD: 14.2 K/UL (ref 3.5–11.3)

## 2025-01-08 PROCEDURE — 2500000003 HC RX 250 WO HCPCS: Performed by: NURSE PRACTITIONER

## 2025-01-08 PROCEDURE — 99232 SBSQ HOSP IP/OBS MODERATE 35: CPT | Performed by: HOSPITALIST

## 2025-01-08 PROCEDURE — 6370000000 HC RX 637 (ALT 250 FOR IP): Performed by: NURSE PRACTITIONER

## 2025-01-08 PROCEDURE — 99223 1ST HOSP IP/OBS HIGH 75: CPT | Performed by: INTERNAL MEDICINE

## 2025-01-08 PROCEDURE — 85025 COMPLETE CBC W/AUTO DIFF WBC: CPT

## 2025-01-08 PROCEDURE — 6370000000 HC RX 637 (ALT 250 FOR IP): Performed by: HOSPITALIST

## 2025-01-08 PROCEDURE — 6370000000 HC RX 637 (ALT 250 FOR IP): Performed by: INTERNAL MEDICINE

## 2025-01-08 PROCEDURE — 82947 ASSAY GLUCOSE BLOOD QUANT: CPT

## 2025-01-08 PROCEDURE — 36415 COLL VENOUS BLD VENIPUNCTURE: CPT

## 2025-01-08 PROCEDURE — 6360000002 HC RX W HCPCS

## 2025-01-08 PROCEDURE — APPSS15 APP SPLIT SHARED TIME 0-15 MINUTES: Performed by: NURSE PRACTITIONER

## 2025-01-08 PROCEDURE — 1200000000 HC SEMI PRIVATE

## 2025-01-08 PROCEDURE — 6370000000 HC RX 637 (ALT 250 FOR IP)

## 2025-01-08 PROCEDURE — 85730 THROMBOPLASTIN TIME PARTIAL: CPT

## 2025-01-08 PROCEDURE — 6360000002 HC RX W HCPCS: Performed by: INTERNAL MEDICINE

## 2025-01-08 PROCEDURE — 80048 BASIC METABOLIC PNL TOTAL CA: CPT

## 2025-01-08 RX ORDER — INSULIN GLARGINE 100 [IU]/ML
25 INJECTION, SOLUTION SUBCUTANEOUS NIGHTLY
Status: DISCONTINUED | OUTPATIENT
Start: 2025-01-08 | End: 2025-01-13 | Stop reason: HOSPADM

## 2025-01-08 RX ORDER — INSULIN LISPRO 100 [IU]/ML
8 INJECTION, SOLUTION INTRAVENOUS; SUBCUTANEOUS
Status: DISCONTINUED | OUTPATIENT
Start: 2025-01-08 | End: 2025-01-13 | Stop reason: HOSPADM

## 2025-01-08 RX ADMIN — HEPARIN SODIUM 16 UNITS/KG/HR: 10000 INJECTION, SOLUTION INTRAVENOUS at 17:36

## 2025-01-08 RX ADMIN — INSULIN LISPRO 5 UNITS: 100 INJECTION, SOLUTION INTRAVENOUS; SUBCUTANEOUS at 08:42

## 2025-01-08 RX ADMIN — ACETAMINOPHEN 650 MG: 325 TABLET ORAL at 02:02

## 2025-01-08 RX ADMIN — INSULIN LISPRO 5 UNITS: 100 INJECTION, SOLUTION INTRAVENOUS; SUBCUTANEOUS at 12:30

## 2025-01-08 RX ADMIN — INSULIN LISPRO 1 UNITS: 100 INJECTION, SOLUTION INTRAVENOUS; SUBCUTANEOUS at 08:41

## 2025-01-08 RX ADMIN — INSULIN LISPRO 2 UNITS: 100 INJECTION, SOLUTION INTRAVENOUS; SUBCUTANEOUS at 16:34

## 2025-01-08 RX ADMIN — HEPARIN SODIUM 1500 UNITS: 1000 INJECTION INTRAVENOUS; SUBCUTANEOUS at 21:37

## 2025-01-08 RX ADMIN — FAMOTIDINE 20 MG: 20 TABLET, FILM COATED ORAL at 16:33

## 2025-01-08 RX ADMIN — ROPINIROLE HYDROCHLORIDE 1 MG: 0.25 TABLET, FILM COATED ORAL at 20:57

## 2025-01-08 RX ADMIN — DEXAMETHASONE SODIUM PHOSPHATE 4 MG: 4 INJECTION INTRA-ARTICULAR; INTRALESIONAL; INTRAMUSCULAR; INTRAVENOUS; SOFT TISSUE at 23:20

## 2025-01-08 RX ADMIN — INSULIN GLARGINE 25 UNITS: 100 INJECTION, SOLUTION SUBCUTANEOUS at 20:57

## 2025-01-08 RX ADMIN — AMIODARONE HYDROCHLORIDE 200 MG: 200 TABLET ORAL at 08:41

## 2025-01-08 RX ADMIN — BUMETANIDE 2 MG: 1 TABLET ORAL at 08:41

## 2025-01-08 RX ADMIN — INSULIN LISPRO 4 UNITS: 100 INJECTION, SOLUTION INTRAVENOUS; SUBCUTANEOUS at 20:57

## 2025-01-08 RX ADMIN — SODIUM CHLORIDE, PRESERVATIVE FREE 10 ML: 5 INJECTION INTRAVENOUS at 20:57

## 2025-01-08 RX ADMIN — HEPARIN SODIUM 1500 UNITS: 1000 INJECTION INTRAVENOUS; SUBCUTANEOUS at 13:20

## 2025-01-08 RX ADMIN — LOSARTAN POTASSIUM 25 MG: 50 TABLET, FILM COATED ORAL at 08:41

## 2025-01-08 RX ADMIN — Medication: at 21:02

## 2025-01-08 RX ADMIN — ATORVASTATIN CALCIUM 80 MG: 80 TABLET, FILM COATED ORAL at 08:41

## 2025-01-08 RX ADMIN — ACETAMINOPHEN 650 MG: 325 TABLET ORAL at 17:33

## 2025-01-08 RX ADMIN — DEXAMETHASONE SODIUM PHOSPHATE 4 MG: 4 INJECTION INTRA-ARTICULAR; INTRALESIONAL; INTRAMUSCULAR; INTRAVENOUS; SOFT TISSUE at 12:30

## 2025-01-08 RX ADMIN — CARVEDILOL 3.12 MG: 6.25 TABLET, FILM COATED ORAL at 08:41

## 2025-01-08 RX ADMIN — INSULIN LISPRO 4 UNITS: 100 INJECTION, SOLUTION INTRAVENOUS; SUBCUTANEOUS at 12:30

## 2025-01-08 RX ADMIN — INSULIN LISPRO 8 UNITS: 100 INJECTION, SOLUTION INTRAVENOUS; SUBCUTANEOUS at 16:35

## 2025-01-08 RX ADMIN — EMPAGLIFLOZIN 10 MG: 10 TABLET, FILM COATED ORAL at 08:41

## 2025-01-08 ASSESSMENT — PAIN SCALES - GENERAL
PAINLEVEL_OUTOF10: 2
PAINLEVEL_OUTOF10: 3
PAINLEVEL_OUTOF10: 3
PAINLEVEL_OUTOF10: 7
PAINLEVEL_OUTOF10: 3

## 2025-01-08 ASSESSMENT — PAIN DESCRIPTION - LOCATION
LOCATION: NECK

## 2025-01-08 ASSESSMENT — PAIN DESCRIPTION - DESCRIPTORS: DESCRIPTORS: ACHING

## 2025-01-08 NOTE — PROGRESS NOTES
[facility]  Office: [phone]  Dario Sequeira DO, Gunner De La Paz DO, Christo Pizano DO, Don Geller DO, Raphael Rosales MD, Mariya Polk MD, Bienvenido Mason MD, Loretta Diaz MD,  Ruiz Marroquin MD, Nathalie Kenyon MD, Earl Rosa DO, Belén Bullard MD,  Rock Hugo DO, Betsy Ray MD, Abhijit Webster MD, Margarito Sequeira DO, Meghan Cote MD, Rico Todd MD, Jason Nunez DO, Velma Kumar MD, Irina French MD, Delfina Ely MD, Renetta Cody MD,  John Martin DO, Yordan Samuel MD,  Shirley Waterhouse, CNP,  Amelia Leigh, CNP, Chana Boo, CNP, Max Bruno, CNP,  Beverly Martinez, DNP, Marianna Gleason, CNP, Delia Malcolm, CNP, Carlee Israel, CNP, Sumaya Navas, CNP, Claire Garnica, CNP, NATHAN Rodriguez-C, Rere Jarrett, CNS, Diya Crad, CNP, Chen Mackey, CNP         [facility]   IN-PATIENT SERVICE   [facility]    Progress Note    1/8/2025    5:36 PM    Name:   Graciela Holt  MRN:     [id number]     Acct:      [id number]   Room:   0235/0235-01  IP Day:  2  Admit Date:  1/6/2025  7:57 PM    PCP:   Travis Mason MD  Code Status:  Full Code    Subjective:     C/C: No chief complaint on file.    Interval History Status: not changed.     Patient states she is doing about the same.  No new complaints.    Brief History:     Graciela Holt is a 74 y.o.  female w/ DM2 COPD, prior stroke, with history of C-spine myelopathy status post decompression surgery (posterior C3 -7 cervical decompression with anterior listhesis C3 -4 in 2022 by Dr. Sauer) who presented to [facility] 01/03 with chronic progressive neck pain, with increasing weakness over the past month.  Patient reevaluated by Dr. Sauer and was recommended for neurosurgerical evaluation and transferred to [facility] for the management of Compression fracture of C1 vertebra, initial encounter (HCC).     Patient does describe chronic neck pain with

## 2025-01-08 NOTE — PROGRESS NOTES
Neurosurgery HEBER/Resident    Daily Progress Note   CC:No chief complaint on file.    1/8/2025  7:06 AM    Chart reviewed.  No acute events overnight.  No new complaints.  Currently being treated for head lice and is on heparin drip    Vitals:    01/07/25 1418 01/07/25 1743 01/07/25 2007 01/08/25 0600   BP:  118/69 102/61    Pulse:  65 52    Resp:   14    Temp:   97.4 °F (36.3 °C)    TempSrc:   Oral    SpO2:   96%    Weight:    51.7 kg (114 lb)   Height: 1.626 m (5' 4.02\")          PE:   AOx3   PERRL, EOMI  Motor   L deltoid 4/5; R deltoid 3/5  L biceps 4/5; R biceps 4/5  L triceps 3+/5; R triceps 3+/5  L wrist extension 3+/5; R wrist extension 3+/5  L intrinsics 4/5; R intrinsics 4/5      L iliopsoas 5/5 , R iliopsoas 5/5  L quadriceps 5/5; R quadriceps 5/5  L Dorsiflexion 5/5; R dorsiflexion 5/5  L Plantarflexion 5/5; R plantarflexion 5/5  L EHL 5/5; R EHL 5/5    Sensation: intact        Lab Results   Component Value Date    WBC 11.3 01/08/2025    HGB 13.0 01/08/2025    HCT 41.2 01/08/2025     01/08/2025    CHOL 159 11/06/2024    TRIG 41 11/06/2024    HDL 55 11/06/2024    ALT 14 01/02/2025    AST 23 01/02/2025     01/08/2025    K 4.5 01/08/2025    CL 99 01/08/2025    CREATININE 1.3 (H) 01/08/2025    BUN 47 (H) 01/08/2025    CO2 27 01/08/2025    TSH 0.96 11/06/2024    INR 1.1 01/07/2025    LABA1C 7.2 (H) 04/05/2024    CRP 13.1 (H) 10/02/2021    SEDRATE 34 (H) 05/21/2018         A/P  74 y.o. female who presents with progressive weakness, type 2 odontoid fracture with thickening of posterior ligamentous structure, abnormal spinal cord signal     Will need decompression and stabilization  Will need clearance from cardiology and medicine teams  She is currently on heparin drip for HX CABG , was previously on Eliquis with last dose known to be 1/5- earliest surgery can be is tomorrow Thursday pending clearance from medical staff   Neuro checks per floor protocol      Please contact neurosurgery with any

## 2025-01-08 NOTE — PROGRESS NOTES
Occupational Therapy    [facility]  Occupational Therapy Not Seen Note    DATE: 2025    NAME: Graciela Holt  MRN: [id number]   : 1950      Patient not seen this date for Occupational Therapy due to:    Surgery/Procedure: Neurosx planning sx today.    Next Scheduled Treatment: Attempt on  as appropriate.    Electronically signed by Sb Perez OT on 2025 at 7:39 AM

## 2025-01-08 NOTE — CONSULTS
[facility]    Consult / H&P               Today's Date: 1/8/2025  Patient Name: Graciela Holt  Date of admission: 1/6/2025  7:57 PM  Patient's age: 74 y.o., 1950  Admission Dx: Compression fracture of C1 vertebra, initial encounter ([facility]) [S12.090A]    Requesting Physician: Mariya Polk MD    Cardiac Evaluation Reason: Surgical clearance    History Obtained From: patient and chart review     History of Present Illness:    This patient 74 y.o. years old with PMH significant for CAD s/p CABG x4 2018 (LIMA to LAD and diagonal 1, SVG to OM, SVG to PDA) 2/2018, severely reduced EF 20-25% (refused AICD and LifeVest in the past), apical thrombus on 2D echo 4/2024, Hx of NSVT, CVA/left MCA stroke occluded internal carotid, COPD with chronic hypoxia on 2 L at home, C-spine myelopathy s/p decompression surgery posterior C3-7 cervical decompression with anterior listhesis C3-4 in 2022    Recently admitted for CHF exacerbation, presenting with generalized weakness, upper extremity tingling and numbness with paresthesia that has been progressing over the past 3 years, with slowly progressive weakness over the previous month, was initially evaluated at [facility] by Dr. Sauer who previously did her surgeries, MRI C-spine revealed severe C1 stenosis with cord compression and foraminal magnum and subluxation of C1-C2 with focal cord edema at C1 transferred to [facility] for neurosurgery eval, cardiology consulted for surgical clearance.    On my eval patient is VSS, on 2 L NC, laying flat in her bed, denies SOB or CP.  No signs of fluid overload.        Past Medical History:   has a past medical history of Allergic rhinitis, Arthritis, CAD (coronary artery disease), Cerebral artery occlusion with cerebral infarction ([facility]), CHF (congestive heart failure) ([facility]), Controlled type 2 diabetes mellitus without complication, without long-term current use of insulin ([facility]), COPD (chronic obstructive  the apex.  Hypokinesis within the  mid and basal segments of the anterior wall and septal wall..  Calculated  ejection fraction of 51%.  3. Risk stratification: Intermediate    Cardiac Angiography:   reviewed.    LABS:   CBC:   Recent Labs     01/07/25  0426 01/08/25  0350   WBC 12.4* 11.3   HGB 13.0 13.0   HCT 42.1 41.2    307     BMP:   Recent Labs     01/07/25  0426 01/08/25  0350    137   K 4.9 4.5   CO2 26 27   BUN 38* 47*   CREATININE 1.4* 1.3*   LABGLOM 39* 43*   GLUCOSE 179* 302*     BNP: No results for input(s): \"BNP\" in the last 72 hours.  PT/INR:   Recent Labs     01/07/25  0426 01/07/25  0905   PROTIME 14.7 14.3   INR 1.2 1.1     APTT:  Recent Labs     01/07/25  1907 01/08/25  0350   APTT 25.0 101.5*     CARDIAC ENZYMES:No results for input(s): \"CKTOTAL\", \"CKMB\", \"CKMBINDEX\", \"TROPONINI\" in the last 72 hours.    ASSESSMENT:  ICM, HFrEF 25 to 25% on TTE 11/24  Refused AICD and LifeVest in the past  CAD s/p CABG x 4, in 2018 ( LIMA in seq to LAD and Diagonal, SVG to OM1, SVG to PDA)  Hx of apical LV thrombus 4/2024 - on Eliquis at home  Hx of NSVT and ventricular arrhythmias -on amiodarone  C-spine myelopathy with C1 focal cord edema with Hx of C3-7 laminectomy and fusion  Neurosurgery planning on intervention, requesting surgical clearance  Hypertension, hyperlipidemia      RECOMMENDATIONS:  RCRI score for consistent with 15% risk of major cardiac events.  Patient can proceed with her planned moderate risk surgical intervention with intermediate to high risk from cardiac standpoint  Currently Euvolemic, resume home dose of Bumex when able  Replace electrolytes to maintain potassium > 4, magnesium > 2  Rest of care per primary and prophylaxis    Please wait for final attestation from rounding attending.        Vincent Cotton MD  Cardiology Service  [facility], [place], [place]      Attending Cardiologist Addendum: I have reviewed and performed the history, physical, subjective,

## 2025-01-08 NOTE — PROGRESS NOTES
Spiritual Health History and Assessment/Progress Note  [facility]    (P) Loneliness/Social Isolation    Name: Graciela Holt MRN: [id number]    Age: 74 y.o.     Sex: female   Language: English   Christian: None   Compression fracture of C1 vertebra, initial encounter ([facility])     Date: 1/7/2025            Total Time Calculated: (P) 21 min              Spiritual Assessment began in [facility] ORTHO/MED SURG        Referral/Consult From: (P) Multi-disciplinary team (Referral List)   Encounter Overview/Reason: (P) Loneliness/Social Isolation  Service Provided For: (P) Patient    Narrative:  visited patient per referral list for social isolation. Patient was resting in hospital bed, when   visited. Patient indicated that she came to the hospital due to her \"CHF.\" Patient was coping well and indicated no needs at time of visit. Per patient, she has good support in her son, Guanakito, and she resides in a home with two roommates/friends, Luana and Bobo. Patient thanked  for visit and care.     Yareli, Belief, Meaning:   Patient has beliefs or practices that help with coping during difficult times  Family/Friends No family/friends present      Importance and Influence:  Patient has spiritual/personal beliefs that influence decisions regarding their health  Family/Friends No family/friends present    Community:  Patient feels well-supported. Support system includes: Children and Friends  Family/Friends No family/friends present    Assessment and Plan of Care:     Patient Interventions include: Facilitated expression of thoughts and feelings and Affirmed coping skills/support systems  Family/Friends Interventions include: No family/friends present    Patient Plan of Care: Spiritual Care available upon further referral  Family/Friends Plan of Care: Spiritual Care available upon further referral    Electronically signed by JD Lombardi on 1/7/2025 at 9:34 PM

## 2025-01-08 NOTE — PLAN OF CARE
Problem: Chronic Conditions and Co-morbidities  Goal: Patient's chronic conditions and co-morbidity symptoms are monitored and maintained or improved  1/8/2025 0606 by Hailey Adler RN  Outcome: Progressing     Problem: Discharge Planning  Goal: Discharge to home or other facility with appropriate resources  1/8/2025 0606 by Hailey Adler RN  Outcome: Progressing     Problem: Safety - Adult  Goal: Free from fall injury  1/8/2025 0606 by Hailey Adler, RN  Outcome: Progressing

## 2025-01-08 NOTE — PLAN OF CARE
Problem: Chronic Conditions and Co-morbidities  Goal: Patient's chronic conditions and co-morbidity symptoms are monitored and maintained or improved  1/8/2025 1714 by Rosario Roberts RN  Outcome: Progressing  1/8/2025 0606 by Hailey Adler RN  Outcome: Progressing     Problem: Discharge Planning  Goal: Discharge to home or other facility with appropriate resources  1/8/2025 1714 by Rosario Roberts RN  Outcome: Progressing  1/8/2025 0606 by Hailey Adler RN  Outcome: Progressing     Problem: Safety - Adult  Goal: Free from fall injury  1/8/2025 1714 by Rosario Roberts RN  Outcome: Progressing  1/8/2025 0606 by Hailey Adler RN  Outcome: Progressing     Problem: Pain  Goal: Verbalizes/displays adequate comfort level or baseline comfort level  Outcome: Progressing     Problem: Nutrition Deficit:  Goal: Optimize nutritional status  Outcome: Progressing

## 2025-01-09 ENCOUNTER — ANESTHESIA EVENT (OUTPATIENT)
Dept: OPERATING ROOM | Age: 75
DRG: 471 | End: 2025-01-09
Payer: COMMERCIAL

## 2025-01-09 LAB
ANION GAP SERPL CALCULATED.3IONS-SCNC: 9 MMOL/L (ref 9–16)
BASOPHILS # BLD: 0.03 K/UL (ref 0–0.2)
BASOPHILS # BLD: <0.03 K/UL (ref 0–0.2)
BASOPHILS NFR BLD: 0 % (ref 0–2)
BASOPHILS NFR BLD: 0 % (ref 0–2)
BUN SERPL-MCNC: 53 MG/DL (ref 8–23)
CALCIUM SERPL-MCNC: 9.1 MG/DL (ref 8.6–10.4)
CHLORIDE SERPL-SCNC: 97 MMOL/L (ref 98–107)
CO2 SERPL-SCNC: 28 MMOL/L (ref 20–31)
CREAT SERPL-MCNC: 1.2 MG/DL (ref 0.6–0.9)
EOSINOPHIL # BLD: <0.03 K/UL (ref 0–0.44)
EOSINOPHIL # BLD: <0.03 K/UL (ref 0–0.44)
EOSINOPHILS RELATIVE PERCENT: 0 % (ref 1–4)
EOSINOPHILS RELATIVE PERCENT: 0 % (ref 1–4)
ERYTHROCYTE [DISTWIDTH] IN BLOOD BY AUTOMATED COUNT: 13.3 % (ref 11.8–14.4)
ERYTHROCYTE [DISTWIDTH] IN BLOOD BY AUTOMATED COUNT: 13.3 % (ref 11.8–14.4)
GFR, ESTIMATED: 47 ML/MIN/1.73M2
GLUCOSE BLD-MCNC: 180 MG/DL (ref 65–105)
GLUCOSE BLD-MCNC: 214 MG/DL (ref 65–105)
GLUCOSE BLD-MCNC: 248 MG/DL (ref 65–105)
GLUCOSE BLD-MCNC: 318 MG/DL (ref 65–105)
GLUCOSE SERPL-MCNC: 232 MG/DL (ref 74–99)
HCT VFR BLD AUTO: 38.8 % (ref 36.3–47.1)
HCT VFR BLD AUTO: 43.3 % (ref 36.3–47.1)
HGB BLD-MCNC: 12.5 G/DL (ref 11.9–15.1)
HGB BLD-MCNC: 13.9 G/DL (ref 11.9–15.1)
IMM GRANULOCYTES # BLD AUTO: 0.1 K/UL (ref 0–0.3)
IMM GRANULOCYTES # BLD AUTO: 0.12 K/UL (ref 0–0.3)
IMM GRANULOCYTES NFR BLD: 1 %
IMM GRANULOCYTES NFR BLD: 1 %
LYMPHOCYTES NFR BLD: 0.77 K/UL (ref 1.1–3.7)
LYMPHOCYTES NFR BLD: 0.78 K/UL (ref 1.1–3.7)
LYMPHOCYTES RELATIVE PERCENT: 4 % (ref 24–43)
LYMPHOCYTES RELATIVE PERCENT: 5 % (ref 24–43)
MCH RBC QN AUTO: 28 PG (ref 25.2–33.5)
MCH RBC QN AUTO: 28.1 PG (ref 25.2–33.5)
MCHC RBC AUTO-ENTMCNC: 32.1 G/DL (ref 28.4–34.8)
MCHC RBC AUTO-ENTMCNC: 32.2 G/DL (ref 28.4–34.8)
MCV RBC AUTO: 86.8 FL (ref 82.6–102.9)
MCV RBC AUTO: 87.5 FL (ref 82.6–102.9)
MONOCYTES NFR BLD: 0.36 K/UL (ref 0.1–1.2)
MONOCYTES NFR BLD: 0.59 K/UL (ref 0.1–1.2)
MONOCYTES NFR BLD: 2 % (ref 3–12)
MONOCYTES NFR BLD: 4 % (ref 3–12)
NEUTROPHILS NFR BLD: 90 % (ref 36–65)
NEUTROPHILS NFR BLD: 93 % (ref 36–65)
NEUTS SEG NFR BLD: 13.91 K/UL (ref 1.5–8.1)
NEUTS SEG NFR BLD: 16.68 K/UL (ref 1.5–8.1)
NRBC BLD-RTO: 0 PER 100 WBC
NRBC BLD-RTO: 0 PER 100 WBC
PARTIAL THROMBOPLASTIN TIME: 72.9 SEC (ref 23–36.5)
PARTIAL THROMBOPLASTIN TIME: 77 SEC (ref 23–36.5)
PARTIAL THROMBOPLASTIN TIME: 87 SEC (ref 23–36.5)
PLATELET # BLD AUTO: 324 K/UL (ref 138–453)
PLATELET # BLD AUTO: 380 K/UL (ref 138–453)
PMV BLD AUTO: 10.2 FL (ref 8.1–13.5)
PMV BLD AUTO: 10.7 FL (ref 8.1–13.5)
POTASSIUM SERPL-SCNC: 4.3 MMOL/L (ref 3.7–5.3)
RBC # BLD AUTO: 4.47 M/UL (ref 3.95–5.11)
RBC # BLD AUTO: 4.95 M/UL (ref 3.95–5.11)
SODIUM SERPL-SCNC: 134 MMOL/L (ref 136–145)
WBC OTHER # BLD: 15.4 K/UL (ref 3.5–11.3)
WBC OTHER # BLD: 18 K/UL (ref 3.5–11.3)

## 2025-01-09 PROCEDURE — 1200000000 HC SEMI PRIVATE

## 2025-01-09 PROCEDURE — 6360000002 HC RX W HCPCS

## 2025-01-09 PROCEDURE — 6370000000 HC RX 637 (ALT 250 FOR IP)

## 2025-01-09 PROCEDURE — 2500000003 HC RX 250 WO HCPCS: Performed by: NURSE PRACTITIONER

## 2025-01-09 PROCEDURE — 6360000002 HC RX W HCPCS: Performed by: INTERNAL MEDICINE

## 2025-01-09 PROCEDURE — 6370000000 HC RX 637 (ALT 250 FOR IP): Performed by: HOSPITALIST

## 2025-01-09 PROCEDURE — 85730 THROMBOPLASTIN TIME PARTIAL: CPT

## 2025-01-09 PROCEDURE — 6370000000 HC RX 637 (ALT 250 FOR IP): Performed by: NURSE PRACTITIONER

## 2025-01-09 PROCEDURE — 99232 SBSQ HOSP IP/OBS MODERATE 35: CPT | Performed by: HOSPITALIST

## 2025-01-09 PROCEDURE — 6370000000 HC RX 637 (ALT 250 FOR IP): Performed by: INTERNAL MEDICINE

## 2025-01-09 PROCEDURE — 82947 ASSAY GLUCOSE BLOOD QUANT: CPT

## 2025-01-09 PROCEDURE — 85025 COMPLETE CBC W/AUTO DIFF WBC: CPT

## 2025-01-09 PROCEDURE — 80048 BASIC METABOLIC PNL TOTAL CA: CPT

## 2025-01-09 PROCEDURE — 99233 SBSQ HOSP IP/OBS HIGH 50: CPT | Performed by: NURSE PRACTITIONER

## 2025-01-09 PROCEDURE — 36415 COLL VENOUS BLD VENIPUNCTURE: CPT

## 2025-01-09 RX ADMIN — BUMETANIDE 2 MG: 1 TABLET ORAL at 09:03

## 2025-01-09 RX ADMIN — INSULIN LISPRO 8 UNITS: 100 INJECTION, SOLUTION INTRAVENOUS; SUBCUTANEOUS at 18:12

## 2025-01-09 RX ADMIN — HEPARIN SODIUM 18 UNITS/KG/HR: 10000 INJECTION, SOLUTION INTRAVENOUS at 15:25

## 2025-01-09 RX ADMIN — INSULIN LISPRO 8 UNITS: 100 INJECTION, SOLUTION INTRAVENOUS; SUBCUTANEOUS at 09:12

## 2025-01-09 RX ADMIN — INSULIN LISPRO 1 UNITS: 100 INJECTION, SOLUTION INTRAVENOUS; SUBCUTANEOUS at 12:18

## 2025-01-09 RX ADMIN — ACETAMINOPHEN 650 MG: 325 TABLET ORAL at 09:03

## 2025-01-09 RX ADMIN — TIZANIDINE 4 MG: 4 TABLET ORAL at 10:03

## 2025-01-09 RX ADMIN — ATORVASTATIN CALCIUM 80 MG: 80 TABLET, FILM COATED ORAL at 09:03

## 2025-01-09 RX ADMIN — INSULIN LISPRO 1 UNITS: 100 INJECTION, SOLUTION INTRAVENOUS; SUBCUTANEOUS at 09:12

## 2025-01-09 RX ADMIN — SODIUM CHLORIDE, PRESERVATIVE FREE 10 ML: 5 INJECTION INTRAVENOUS at 09:05

## 2025-01-09 RX ADMIN — FAMOTIDINE 20 MG: 20 TABLET, FILM COATED ORAL at 18:11

## 2025-01-09 RX ADMIN — DEXAMETHASONE SODIUM PHOSPHATE 4 MG: 4 INJECTION INTRA-ARTICULAR; INTRALESIONAL; INTRAMUSCULAR; INTRAVENOUS; SOFT TISSUE at 22:42

## 2025-01-09 RX ADMIN — INSULIN LISPRO 3 UNITS: 100 INJECTION, SOLUTION INTRAVENOUS; SUBCUTANEOUS at 20:40

## 2025-01-09 RX ADMIN — SODIUM CHLORIDE, PRESERVATIVE FREE 10 ML: 5 INJECTION INTRAVENOUS at 20:41

## 2025-01-09 RX ADMIN — DEXAMETHASONE SODIUM PHOSPHATE 4 MG: 4 INJECTION INTRA-ARTICULAR; INTRALESIONAL; INTRAMUSCULAR; INTRAVENOUS; SOFT TISSUE at 12:19

## 2025-01-09 RX ADMIN — INSULIN LISPRO 1 UNITS: 100 INJECTION, SOLUTION INTRAVENOUS; SUBCUTANEOUS at 18:11

## 2025-01-09 RX ADMIN — LOSARTAN POTASSIUM 25 MG: 50 TABLET, FILM COATED ORAL at 09:03

## 2025-01-09 RX ADMIN — INSULIN LISPRO 8 UNITS: 100 INJECTION, SOLUTION INTRAVENOUS; SUBCUTANEOUS at 12:19

## 2025-01-09 RX ADMIN — INSULIN GLARGINE 25 UNITS: 100 INJECTION, SOLUTION SUBCUTANEOUS at 20:40

## 2025-01-09 RX ADMIN — EMPAGLIFLOZIN 10 MG: 10 TABLET, FILM COATED ORAL at 09:03

## 2025-01-09 RX ADMIN — ROPINIROLE HYDROCHLORIDE 1 MG: 0.25 TABLET, FILM COATED ORAL at 20:40

## 2025-01-09 ASSESSMENT — PAIN DESCRIPTION - LOCATION
LOCATION: NECK
LOCATION: NECK

## 2025-01-09 ASSESSMENT — PAIN DESCRIPTION - DESCRIPTORS
DESCRIPTORS: DISCOMFORT
DESCRIPTORS: DISCOMFORT

## 2025-01-09 ASSESSMENT — PAIN DESCRIPTION - ORIENTATION
ORIENTATION: MID
ORIENTATION: MID

## 2025-01-09 ASSESSMENT — PAIN SCALES - GENERAL
PAINLEVEL_OUTOF10: 3
PAINLEVEL_OUTOF10: 2
PAINLEVEL_OUTOF10: 5

## 2025-01-09 ASSESSMENT — PAIN SCALES - WONG BAKER: WONGBAKER_NUMERICALRESPONSE: NO HURT

## 2025-01-09 NOTE — PROGRESS NOTES
Neurosurgery HEBER/Resident    Daily Progress Note   No chief complaint on file.    1/9/2025  8:17 AM    Chart reviewed.  No acute events overnight.  No new complaints. Cleared from medicine and cardiology for surgery.     Vitals:    01/08/25 1632 01/08/25 2056 01/09/25 0600 01/09/25 0724   BP: (!) 99/58 102/61  127/78   Pulse: 63 59  57   Resp:  15  17   Temp:  97.9 °F (36.6 °C)  97.5 °F (36.4 °C)   TempSrc:  Oral  Oral   SpO2:  94%  96%   Weight:   52 kg (114 lb 10.2 oz)    Height:             PE:   AOx3   PERRL, EOMI  Motor   L deltoid 4/5; R deltoid 3/5  L biceps 4/5; R biceps 4/5  L triceps 3+/5; R triceps 3+/5  L wrist extension 3+/5; R wrist extension 3+/5  L intrinsics 4/5; R intrinsics 4/5      L iliopsoas 5/5 , R iliopsoas 5/5  L quadriceps 5/5; R quadriceps 5/5  L Dorsiflexion 5/5; R dorsiflexion 5/5  L Plantarflexion 5/5; R plantarflexion 5/5  L EHL 5/5; R EHL 5/5     Sensation: intact      Lab Results   Component Value Date    WBC 14.2 (H) 01/08/2025    HGB 12.9 01/08/2025    HCT 40.7 01/08/2025     01/08/2025    CHOL 159 11/06/2024    TRIG 41 11/06/2024    HDL 55 11/06/2024    ALT 14 01/02/2025    AST 23 01/02/2025     01/08/2025    K 4.6 01/08/2025    CL 98 01/08/2025    CREATININE 1.2 (H) 01/08/2025    BUN 49 (H) 01/08/2025    CO2 29 01/08/2025    TSH 0.96 11/06/2024    INR 1.1 01/07/2025    LABA1C 7.2 (H) 04/05/2024    CRP 13.1 (H) 10/02/2021    SEDRATE 34 (H) 05/21/2018       A/P  74 y.o. female who presents with progressive weakness, type 2 odontoid fracture with thickening of posterior ligamentous structure, abnormal spinal cord signal      Will need decompression and stabilization  Tentative OR tomorrow  She is currently on heparin drip for HX CABG , was previously on Eliquis with last dose known to be 1/6  Neuro checks per floor protocol  Decadron 4mg q12h  Cleared by cardiology and medicine for surgery      Please contact neurosurgery with any changes in patients neurologic status.

## 2025-01-09 NOTE — CONSULTS
Nutrition Note    Consulted for unintentional weight loss.  RD following with patient.  Pt reported eating well during admission with a good appetite.  Ensure oral supplements ordered x 2 per day for patient.      For additional information, refer to full RD assessment from 1/7/25.      Electronically signed by Vanesa Ugalde RD, LD on 1/9/25 at 8:10 AM EST    Contact: [phone]

## 2025-01-09 NOTE — PROGRESS NOTES
[facility]  Office: [phone]  Dario Sequeira DO, Gunner De La Paz DO, Christo Pizano DO, Don Geller DO, Raphael Rosales MD, Mariya Polk MD, Bienvenido Mason MD, Loretta Diaz MD,  Ruiz Marroquin MD, Nathalie Kenyon MD, Earl Rosa DO, Belén Bullard MD,  Rock Hugo DO, Betsy Ray MD, Abhijit Webster MD, Margarito Sequeira DO, Meghan Coet MD, Rico Todd MD, Jason Nunez DO, Velma Kumar MD, Irina French MD, Delfina Ely MD, Renetta Cody MD,  John Martin DO, Yordan Samuel MD,  Shirley Waterhouse, CNP,  Amelia Leigh, CNP, Chana Boo, CNP, Max Bruno, CNP,  Beverly Martinez, DNP, Marianna Gleason, CNP, Delia Malcolm, CNP, Carlee Israel, CNP, Sumaya Navas, CNP, Claire Garnica, CNP, NATHAN Rodriguez-C, Rere Jarrett, CNS, Diya Card, CNP, Chen Mackey, CNP         [facility]   IN-PATIENT SERVICE   [facility]    Progress Note    1/9/2025    4:23 PM    Name:   Graciela Holt  MRN:     [id number]     Acct:      [id number]   Room:   0235/0235-01  IP Day:  3  Admit Date:  1/6/2025  7:57 PM    PCP:   Travis Mason MD  Code Status:  Full Code    Subjective:     C/C: No chief complaint on file.    Interval History Status: not changed.     Patient states she is doing about the same.  No new complaints.    Brief History:     Graciela Holt is a 74 y.o.  female w/ DM2 COPD, prior stroke, with history of C-spine myelopathy status post decompression surgery (posterior C3 -7 cervical decompression with anterior listhesis C3 -4 in 2022 by Dr. Sauer) who presented to [facility] 01/03 with chronic progressive neck pain, with increasing weakness over the past month.  Patient reevaluated by Dr. Sauer and was recommended for neurosurgerical evaluation and transferred to [facility] for the management of Compression fracture of C1 vertebra, initial encounter (HCC).     Patient does describe chronic neck pain with

## 2025-01-09 NOTE — PROGRESS NOTES
[facility]   Progress Note                   Date:   1/9/2025  Patient name: Graciela Holt  Date of admission:  1/6/2025  7:57 PM  MRN:   [id number]  YOB: 1950  PCP: Travis Mason MD    Reason for Admission: Compression fracture of C1 vertebra, initial encounter ([facility]) [S12.090A]    Subjective:       Clinical Changes / Abnormalities: Pt seen and examined in the room.  Patient resting in bed. Pt denies any CP or sob.  Labs, vitals and tele reviewed-SR 62  Heparin drip infusing    Medications:   Scheduled Meds:   insulin glargine  25 Units SubCUTAneous Nightly    insulin lispro  8 Units SubCUTAneous TID WC    sodium chloride flush  5-40 mL IntraVENous 2 times per day    amiodarone  200 mg Oral Daily    atorvastatin  80 mg Oral Daily    bumetanide  2 mg Oral Daily    carvedilol  3.125 mg Oral BID WC    dexAMETHasone  4 mg IntraVENous Q12H    empagliflozin  10 mg Oral Daily    famotidine  20 mg Oral QPM    losartan  25 mg Oral Daily    rOPINIRole  1 mg Oral Nightly    insulin lispro  0-4 Units SubCUTAneous 4x Daily AC & HS     Continuous Infusions:   heparin (PORCINE) Infusion 18 Units/kg/hr (01/08/25 2138)    sodium chloride      dextrose       CBC:   Recent Labs     01/07/25  0426 01/08/25  0350 01/08/25  1116   WBC 12.4* 11.3 14.2*   HGB 13.0 13.0 12.9    307 338     BMP:    Recent Labs     01/07/25  0426 01/08/25  0350 01/08/25  1116    137 136   K 4.9 4.5 4.6    99 98   CO2 26 27 29   BUN 38* 47* 49*   CREATININE 1.4* 1.3* 1.2*   GLUCOSE 179* 302* 391*     Hepatic: No results for input(s): \"AST\", \"ALT\", \"BILITOT\", \"ALKPHOS\" in the last 72 hours.    Invalid input(s): \"ALB\"  Troponin: No results for input(s): \"TROPHS\" in the last 72 hours.  BNP: No results for input(s): \"BNP\" in the last 72 hours.  Lipids: No results for input(s): \"CHOL\", \"HDL\" in the last 72 hours.    Invalid input(s): \"LDLCALCU\"  INR:   Recent Labs     01/07/25  0426 01/07/25  0905   INR 1.2

## 2025-01-09 NOTE — ANESTHESIA PRE PROCEDURE
Department of Anesthesiology  Preprocedure Note       Name:  Graciela Holt   Age:  74 y.o.  :  1950                                          MRN:  [id number]         Date:  2025      Surgeon: Surgeon(s):  Armando Glass MD    Procedure: Procedure(s):  C1-C2 LAMINECTOMY, SPINAL CANAL DECOMPRESSION, SUBOCCIPITAL CRANIECTOMY (MEDTRONIC HARDWARE, STEALTH, O-ARM, MILES, CLARA TABLE)    Medications prior to admission:   Prior to Admission medications    Medication Sig Start Date End Date Taking? Authorizing Provider   bumetanide (BUMEX) 2 MG tablet Take 1 tablet by mouth daily 24   Jocelyn Patel MD   potassium chloride (KLOR-CON) 10 MEQ extended release tablet Take 2 tablets by mouth 2 times daily 12/3/24   Samm Alvarez MD   nitroGLYCERIN (NITROSTAT) 0.3 MG SL tablet Place 1 tablet under the tongue as needed for Chest pain up to max of 3 total doses. If no relief after 1 dose, call 911. 24  Shahla Sadler DO   amiodarone (CORDARONE) 200 MG tablet Take 1 tablet by mouth daily 24   Chip Anedrson MD   carvedilol (COREG) 3.125 MG tablet Take 1 tablet by mouth 2 times daily (with meals) 24   Chip Anderson MD   apixaban (ELIQUIS) 5 MG TABS tablet Take 1 tablet by mouth 2 times daily 24   Chip Anderson MD   empagliflozin (JARDIANCE) 10 MG tablet Take 1 tablet by mouth daily 4/10/24   Chip Anderson MD   losartan (COZAAR) 25 MG tablet Take 1 tablet by mouth daily 4/10/24   Chip Anderson MD   famotidine (PEPCID) 40 MG tablet Take 1 tablet by mouth every evening 24   Surjit Allen MD   atorvastatin (LIPITOR) 80 MG tablet Take 1 tablet by mouth daily 23   Travis Mason MD   albuterol sulfate  (90 Base) MCG/ACT inhaler Inhale 2 puffs into the lungs every 6 hours as needed for Wheezing or Shortness of Breath 22   Kisha Wagner MD   budesonide-formoterol (SYMBICORT) 160-4.5 MCG/ACT AERO Inhale 2 puffs into the lungs 2 times daily

## 2025-01-10 ENCOUNTER — APPOINTMENT (OUTPATIENT)
Dept: GENERAL RADIOLOGY | Age: 75
DRG: 471 | End: 2025-01-10
Attending: INTERNAL MEDICINE
Payer: COMMERCIAL

## 2025-01-10 ENCOUNTER — ANESTHESIA (OUTPATIENT)
Dept: OPERATING ROOM | Age: 75
DRG: 471 | End: 2025-01-10
Payer: COMMERCIAL

## 2025-01-10 PROBLEM — G82.50 QUADRIPARESIS (HCC): Status: ACTIVE | Noted: 2025-01-10

## 2025-01-10 PROBLEM — M53.2X2 CERVICAL SPINE INSTABILITY: Status: ACTIVE | Noted: 2025-01-10

## 2025-01-10 LAB
ANION GAP SERPL CALCULATED.3IONS-SCNC: 9 MMOL/L (ref 9–16)
BASOPHILS # BLD: <0.03 K/UL (ref 0–0.2)
BASOPHILS NFR BLD: 0 % (ref 0–2)
BUN SERPL-MCNC: 55 MG/DL (ref 8–23)
CALCIUM SERPL-MCNC: 9.1 MG/DL (ref 8.6–10.4)
CHLORIDE SERPL-SCNC: 99 MMOL/L (ref 98–107)
CO2 SERPL-SCNC: 27 MMOL/L (ref 20–31)
CREAT SERPL-MCNC: 1.2 MG/DL (ref 0.6–0.9)
EOSINOPHIL # BLD: <0.03 K/UL (ref 0–0.44)
EOSINOPHILS RELATIVE PERCENT: 0 % (ref 1–4)
ERYTHROCYTE [DISTWIDTH] IN BLOOD BY AUTOMATED COUNT: 13.3 % (ref 11.8–14.4)
GFR, ESTIMATED: 47 ML/MIN/1.73M2
GLUCOSE BLD-MCNC: 158 MG/DL (ref 65–105)
GLUCOSE BLD-MCNC: 174 MG/DL (ref 65–105)
GLUCOSE BLD-MCNC: 204 MG/DL (ref 65–105)
GLUCOSE BLD-MCNC: 207 MG/DL (ref 65–105)
GLUCOSE SERPL-MCNC: 175 MG/DL (ref 74–99)
HCT VFR BLD AUTO: 42.6 % (ref 36.3–47.1)
HGB BLD-MCNC: 13.3 G/DL (ref 11.9–15.1)
IMM GRANULOCYTES # BLD AUTO: 0.12 K/UL (ref 0–0.3)
IMM GRANULOCYTES NFR BLD: 1 %
LYMPHOCYTES NFR BLD: 1.02 K/UL (ref 1.1–3.7)
LYMPHOCYTES RELATIVE PERCENT: 6 % (ref 24–43)
MCH RBC QN AUTO: 27.8 PG (ref 25.2–33.5)
MCHC RBC AUTO-ENTMCNC: 31.2 G/DL (ref 28.4–34.8)
MCV RBC AUTO: 88.9 FL (ref 82.6–102.9)
MONOCYTES NFR BLD: 0.42 K/UL (ref 0.1–1.2)
MONOCYTES NFR BLD: 3 % (ref 3–12)
NEUTROPHILS NFR BLD: 90 % (ref 36–65)
NEUTS SEG NFR BLD: 15.25 K/UL (ref 1.5–8.1)
NRBC BLD-RTO: 0 PER 100 WBC
PARTIAL THROMBOPLASTIN TIME: 33.7 SEC (ref 23–36.5)
PLATELET # BLD AUTO: 410 K/UL (ref 138–453)
PMV BLD AUTO: 10.7 FL (ref 8.1–13.5)
POTASSIUM SERPL-SCNC: 4.8 MMOL/L (ref 3.7–5.3)
RBC # BLD AUTO: 4.79 M/UL (ref 3.95–5.11)
SODIUM SERPL-SCNC: 135 MMOL/L (ref 136–145)
WBC OTHER # BLD: 16.8 K/UL (ref 3.5–11.3)

## 2025-01-10 PROCEDURE — 6360000002 HC RX W HCPCS

## 2025-01-10 PROCEDURE — 0RG2071 FUSION OF 2 OR MORE CERVICAL VERTEBRAL JOINTS WITH AUTOLOGOUS TISSUE SUBSTITUTE, POSTERIOR APPROACH, POSTERIOR COLUMN, OPEN APPROACH: ICD-10-PCS | Performed by: NEUROLOGICAL SURGERY

## 2025-01-10 PROCEDURE — APPSS30 APP SPLIT SHARED TIME 16-30 MINUTES: Performed by: REGISTERED NURSE

## 2025-01-10 PROCEDURE — 85730 THROMBOPLASTIN TIME PARTIAL: CPT

## 2025-01-10 PROCEDURE — C1713 ANCHOR/SCREW BN/BN,TIS/BN: HCPCS | Performed by: NEUROLOGICAL SURGERY

## 2025-01-10 PROCEDURE — 0RP404Z REMOVAL OF INTERNAL FIXATION DEVICE FROM CERVICOTHORACIC VERTEBRAL JOINT, OPEN APPROACH: ICD-10-PCS | Performed by: NEUROLOGICAL SURGERY

## 2025-01-10 PROCEDURE — 2500000003 HC RX 250 WO HCPCS

## 2025-01-10 PROCEDURE — 0RG70K1 FUSION OF 2 TO 7 THORACIC VERTEBRAL JOINTS WITH NONAUTOLOGOUS TISSUE SUBSTITUTE, POSTERIOR APPROACH, POSTERIOR COLUMN, OPEN APPROACH: ICD-10-PCS | Performed by: NEUROLOGICAL SURGERY

## 2025-01-10 PROCEDURE — 6370000000 HC RX 637 (ALT 250 FOR IP): Performed by: NEUROLOGICAL SURGERY

## 2025-01-10 PROCEDURE — 0RG40K1 FUSION OF CERVICOTHORACIC VERTEBRAL JOINT WITH NONAUTOLOGOUS TISSUE SUBSTITUTE, POSTERIOR APPROACH, POSTERIOR COLUMN, OPEN APPROACH: ICD-10-PCS | Performed by: NEUROLOGICAL SURGERY

## 2025-01-10 PROCEDURE — 6360000002 HC RX W HCPCS: Performed by: PHYSICIAN ASSISTANT

## 2025-01-10 PROCEDURE — 3600000004 HC SURGERY LEVEL 4 BASE: Performed by: NEUROLOGICAL SURGERY

## 2025-01-10 PROCEDURE — 87086 URINE CULTURE/COLONY COUNT: CPT

## 2025-01-10 PROCEDURE — 0PP304Z REMOVAL OF INTERNAL FIXATION DEVICE FROM CERVICAL VERTEBRA, OPEN APPROACH: ICD-10-PCS | Performed by: NEUROLOGICAL SURGERY

## 2025-01-10 PROCEDURE — 22614 ARTHRD PST TQ 1NTRSPC EA ADD: CPT | Performed by: PHYSICIAN ASSISTANT

## 2025-01-10 PROCEDURE — 82947 ASSAY GLUCOSE BLOOD QUANT: CPT

## 2025-01-10 PROCEDURE — 0RG20K1 FUSION OF 2 OR MORE CERVICAL VERTEBRAL JOINTS WITH NONAUTOLOGOUS TISSUE SUBSTITUTE, POSTERIOR APPROACH, POSTERIOR COLUMN, OPEN APPROACH: ICD-10-PCS | Performed by: NEUROLOGICAL SURGERY

## 2025-01-10 PROCEDURE — 7100000001 HC PACU RECOVERY - ADDTL 15 MIN: Performed by: NEUROLOGICAL SURGERY

## 2025-01-10 PROCEDURE — 22843 INSERT SPINE FIXATION DEVICE: CPT | Performed by: NEUROLOGICAL SURGERY

## 2025-01-10 PROCEDURE — 22590 ARTHRD PST TQ CRANIOCERVICAL: CPT | Performed by: PHYSICIAN ASSISTANT

## 2025-01-10 PROCEDURE — 3600000014 HC SURGERY LEVEL 4 ADDTL 15MIN: Performed by: NEUROLOGICAL SURGERY

## 2025-01-10 PROCEDURE — 61343 CRNEC SOPL CRV LAM DCMPRN: CPT | Performed by: PHYSICIAN ASSISTANT

## 2025-01-10 PROCEDURE — 2580000003 HC RX 258: Performed by: PHYSICIAN ASSISTANT

## 2025-01-10 PROCEDURE — 2709999900 HC NON-CHARGEABLE SUPPLY: Performed by: NEUROLOGICAL SURGERY

## 2025-01-10 PROCEDURE — 94761 N-INVAS EAR/PLS OXIMETRY MLT: CPT

## 2025-01-10 PROCEDURE — 22600 ARTHRD PST TQ 1NTRSPC CRV: CPT | Performed by: PHYSICIAN ASSISTANT

## 2025-01-10 PROCEDURE — 0RG0071 FUSION OF OCCIPITAL-CERVICAL JOINT WITH AUTOLOGOUS TISSUE SUBSTITUTE, POSTERIOR APPROACH, POSTERIOR COLUMN, OPEN APPROACH: ICD-10-PCS | Performed by: NEUROLOGICAL SURGERY

## 2025-01-10 PROCEDURE — 6370000000 HC RX 637 (ALT 250 FOR IP): Performed by: PHYSICIAN ASSISTANT

## 2025-01-10 PROCEDURE — 7100000000 HC PACU RECOVERY - FIRST 15 MIN: Performed by: NEUROLOGICAL SURGERY

## 2025-01-10 PROCEDURE — 36415 COLL VENOUS BLD VENIPUNCTURE: CPT

## 2025-01-10 PROCEDURE — 22614 ARTHRD PST TQ 1NTRSPC EA ADD: CPT | Performed by: NEUROLOGICAL SURGERY

## 2025-01-10 PROCEDURE — 2500000003 HC RX 250 WO HCPCS: Performed by: NURSE ANESTHETIST, CERTIFIED REGISTERED

## 2025-01-10 PROCEDURE — 6360000002 HC RX W HCPCS: Performed by: NEUROLOGICAL SURGERY

## 2025-01-10 PROCEDURE — 22590 ARTHRD PST TQ CRANIOCERVICAL: CPT | Performed by: NEUROLOGICAL SURGERY

## 2025-01-10 PROCEDURE — 22600 ARTHRD PST TQ 1NTRSPC CRV: CPT | Performed by: NEUROLOGICAL SURGERY

## 2025-01-10 PROCEDURE — 99233 SBSQ HOSP IP/OBS HIGH 50: CPT | Performed by: NEUROLOGICAL SURGERY

## 2025-01-10 PROCEDURE — 0RH104Z INSERTION OF INTERNAL FIXATION DEVICE INTO CERVICAL VERTEBRAL JOINT, OPEN APPROACH: ICD-10-PCS | Performed by: NEUROLOGICAL SURGERY

## 2025-01-10 PROCEDURE — 61783 SCAN PROC SPINAL: CPT | Performed by: NEUROLOGICAL SURGERY

## 2025-01-10 PROCEDURE — 3700000000 HC ANESTHESIA ATTENDED CARE: Performed by: NEUROLOGICAL SURGERY

## 2025-01-10 PROCEDURE — 2500000003 HC RX 250 WO HCPCS: Performed by: NEUROLOGICAL SURGERY

## 2025-01-10 PROCEDURE — 80048 BASIC METABOLIC PNL TOTAL CA: CPT

## 2025-01-10 PROCEDURE — 6360000002 HC RX W HCPCS: Performed by: NURSE ANESTHETIST, CERTIFIED REGISTERED

## 2025-01-10 PROCEDURE — 61343 CRNEC SOPL CRV LAM DCMPRN: CPT | Performed by: NEUROLOGICAL SURGERY

## 2025-01-10 PROCEDURE — 00NW0ZZ RELEASE CERVICAL SPINAL CORD, OPEN APPROACH: ICD-10-PCS | Performed by: NEUROLOGICAL SURGERY

## 2025-01-10 PROCEDURE — 85025 COMPLETE CBC W/AUTO DIFF WBC: CPT

## 2025-01-10 PROCEDURE — 2060000000 HC ICU INTERMEDIATE R&B

## 2025-01-10 PROCEDURE — 0RG00K1 FUSION OF OCCIPITAL-CERVICAL JOINT WITH NONAUTOLOGOUS TISSUE SUBSTITUTE, POSTERIOR APPROACH, POSTERIOR COLUMN, OPEN APPROACH: ICD-10-PCS | Performed by: NEUROLOGICAL SURGERY

## 2025-01-10 PROCEDURE — 2500000003 HC RX 250 WO HCPCS: Performed by: NURSE PRACTITIONER

## 2025-01-10 PROCEDURE — 2720000010 HC SURG SUPPLY STERILE: Performed by: NEUROLOGICAL SURGERY

## 2025-01-10 PROCEDURE — 99232 SBSQ HOSP IP/OBS MODERATE 35: CPT | Performed by: HOSPITALIST

## 2025-01-10 PROCEDURE — 2580000003 HC RX 258

## 2025-01-10 PROCEDURE — 0RP104Z REMOVAL OF INTERNAL FIXATION DEVICE FROM CERVICAL VERTEBRAL JOINT, OPEN APPROACH: ICD-10-PCS | Performed by: NEUROLOGICAL SURGERY

## 2025-01-10 PROCEDURE — 3700000001 HC ADD 15 MINUTES (ANESTHESIA): Performed by: NEUROLOGICAL SURGERY

## 2025-01-10 PROCEDURE — L0190 CERV COLLAR SUPP ADJ CERV BA: HCPCS | Performed by: NEUROLOGICAL SURGERY

## 2025-01-10 PROCEDURE — 22843 INSERT SPINE FIXATION DEVICE: CPT | Performed by: PHYSICIAN ASSISTANT

## 2025-01-10 PROCEDURE — 6370000000 HC RX 637 (ALT 250 FOR IP)

## 2025-01-10 PROCEDURE — 0NB70ZZ EXCISION OF OCCIPITAL BONE, OPEN APPROACH: ICD-10-PCS | Performed by: NEUROLOGICAL SURGERY

## 2025-01-10 DEVICE — SYMPHONY OCT SYSTEM SET SCREW
Type: IMPLANTABLE DEVICE | Site: SPINE CERVICAL | Status: FUNCTIONAL
Brand: SYMPHONY

## 2025-01-10 DEVICE — IMPLANTABLE DEVICE: Type: IMPLANTABLE DEVICE | Site: SPINE CERVICAL | Status: FUNCTIONAL

## 2025-01-10 DEVICE — SCREW SPNL POLYAX 4X14 MM OCT SHFT FOR 4 MM ROD NS SYM: Type: IMPLANTABLE DEVICE | Site: SPINE CERVICAL | Status: FUNCTIONAL

## 2025-01-10 DEVICE — DBM 7509211 MAGNIFUSE 1 X 10CM
Type: IMPLANTABLE DEVICE | Site: SPINE CERVICAL | Status: FUNCTIONAL
Brand: MAGNIFUSE® BONE GRAFT

## 2025-01-10 DEVICE — SCREW SPNL POLYAX 4X16 MM OCT SHFT FOR 4 MM ROD NS SYM: Type: IMPLANTABLE DEVICE | Site: SPINE CERVICAL | Status: FUNCTIONAL

## 2025-01-10 DEVICE — SCREW SPINAL F/SYMPHONY OCT SYSTEM: Type: IMPLANTABLE DEVICE | Site: SPINE CERVICAL | Status: FUNCTIONAL

## 2025-01-10 DEVICE — DBM 7509215 MAGNIFUSE 1 X 5CM
Type: IMPLANTABLE DEVICE | Site: SPINE CERVICAL | Status: FUNCTIONAL
Brand: MAGNIFUSE® BONE GRAFT

## 2025-01-10 DEVICE — SYMPHONY OCT SYSTEM POLYAXIAL SCREW DIAMETER 4.0 ROD, 4.0X14MM 3.5 AND 4.0
Type: IMPLANTABLE DEVICE | Site: SPINE CERVICAL | Status: FUNCTIONAL
Brand: SYMPHONY

## 2025-01-10 RX ORDER — MAGNESIUM HYDROXIDE 1200 MG/15ML
LIQUID ORAL CONTINUOUS PRN
Status: COMPLETED | OUTPATIENT
Start: 2025-01-10 | End: 2025-01-10

## 2025-01-10 RX ORDER — SODIUM CHLORIDE 0.9 % (FLUSH) 0.9 %
5-40 SYRINGE (ML) INJECTION PRN
Status: DISCONTINUED | OUTPATIENT
Start: 2025-01-10 | End: 2025-01-13 | Stop reason: HOSPADM

## 2025-01-10 RX ORDER — DEXAMETHASONE SODIUM PHOSPHATE 10 MG/ML
INJECTION INTRAMUSCULAR; INTRAVENOUS
Status: DISCONTINUED | OUTPATIENT
Start: 2025-01-10 | End: 2025-01-10 | Stop reason: SDUPTHER

## 2025-01-10 RX ORDER — ENOXAPARIN SODIUM 100 MG/ML
40 INJECTION SUBCUTANEOUS DAILY
Status: DISCONTINUED | OUTPATIENT
Start: 2025-01-11 | End: 2025-01-13 | Stop reason: HOSPADM

## 2025-01-10 RX ORDER — SODIUM CHLORIDE 9 MG/ML
INJECTION, SOLUTION INTRAVENOUS CONTINUOUS
Status: DISCONTINUED | OUTPATIENT
Start: 2025-01-10 | End: 2025-01-11

## 2025-01-10 RX ORDER — LIDOCAINE HYDROCHLORIDE 10 MG/ML
INJECTION, SOLUTION EPIDURAL; INFILTRATION; INTRACAUDAL; PERINEURAL
Status: DISCONTINUED | OUTPATIENT
Start: 2025-01-10 | End: 2025-01-10 | Stop reason: SDUPTHER

## 2025-01-10 RX ORDER — SODIUM CHLORIDE 0.9 % (FLUSH) 0.9 %
5-40 SYRINGE (ML) INJECTION EVERY 12 HOURS SCHEDULED
Status: DISCONTINUED | OUTPATIENT
Start: 2025-01-10 | End: 2025-01-10 | Stop reason: HOSPADM

## 2025-01-10 RX ORDER — BISACODYL 5 MG/1
5 TABLET, DELAYED RELEASE ORAL DAILY PRN
Status: DISCONTINUED | OUTPATIENT
Start: 2025-01-10 | End: 2025-01-13 | Stop reason: HOSPADM

## 2025-01-10 RX ORDER — ONDANSETRON 2 MG/ML
INJECTION INTRAMUSCULAR; INTRAVENOUS
Status: DISCONTINUED | OUTPATIENT
Start: 2025-01-10 | End: 2025-01-10 | Stop reason: SDUPTHER

## 2025-01-10 RX ORDER — PHENYLEPHRINE HCL IN 0.9% NACL 1 MG/10 ML
SYRINGE (ML) INTRAVENOUS
Status: DISCONTINUED | OUTPATIENT
Start: 2025-01-10 | End: 2025-01-10 | Stop reason: SDUPTHER

## 2025-01-10 RX ORDER — PROPOFOL 10 MG/ML
INJECTION, EMULSION INTRAVENOUS
Status: DISCONTINUED | OUTPATIENT
Start: 2025-01-10 | End: 2025-01-10 | Stop reason: SDUPTHER

## 2025-01-10 RX ORDER — SODIUM CHLORIDE, SODIUM LACTATE, POTASSIUM CHLORIDE, CALCIUM CHLORIDE 600; 310; 30; 20 MG/100ML; MG/100ML; MG/100ML; MG/100ML
INJECTION, SOLUTION INTRAVENOUS
Status: DISCONTINUED | OUTPATIENT
Start: 2025-01-10 | End: 2025-01-10 | Stop reason: SDUPTHER

## 2025-01-10 RX ORDER — SODIUM CHLORIDE 9 MG/ML
INJECTION, SOLUTION INTRAVENOUS PRN
Status: DISCONTINUED | OUTPATIENT
Start: 2025-01-10 | End: 2025-01-13 | Stop reason: HOSPADM

## 2025-01-10 RX ORDER — KETOROLAC TROMETHAMINE 15 MG/ML
15 INJECTION, SOLUTION INTRAMUSCULAR; INTRAVENOUS EVERY 6 HOURS
Status: COMPLETED | OUTPATIENT
Start: 2025-01-10 | End: 2025-01-11

## 2025-01-10 RX ORDER — SODIUM CHLORIDE 9 MG/ML
INJECTION, SOLUTION INTRAVENOUS PRN
Status: DISCONTINUED | OUTPATIENT
Start: 2025-01-10 | End: 2025-01-10 | Stop reason: HOSPADM

## 2025-01-10 RX ORDER — SODIUM CHLORIDE 0.9 % (FLUSH) 0.9 %
5-40 SYRINGE (ML) INJECTION PRN
Status: DISCONTINUED | OUTPATIENT
Start: 2025-01-10 | End: 2025-01-10 | Stop reason: HOSPADM

## 2025-01-10 RX ORDER — LIDOCAINE HYDROCHLORIDE AND EPINEPHRINE 10; 10 MG/ML; UG/ML
INJECTION, SOLUTION INFILTRATION; PERINEURAL PRN
Status: DISCONTINUED | OUTPATIENT
Start: 2025-01-10 | End: 2025-01-10 | Stop reason: ALTCHOICE

## 2025-01-10 RX ORDER — POLYETHYLENE GLYCOL 3350 17 G/17G
17 POWDER, FOR SOLUTION ORAL DAILY
Status: DISCONTINUED | OUTPATIENT
Start: 2025-01-10 | End: 2025-01-13 | Stop reason: HOSPADM

## 2025-01-10 RX ORDER — NALOXONE HYDROCHLORIDE 0.4 MG/ML
INJECTION, SOLUTION INTRAMUSCULAR; INTRAVENOUS; SUBCUTANEOUS PRN
Status: DISCONTINUED | OUTPATIENT
Start: 2025-01-10 | End: 2025-01-10 | Stop reason: HOSPADM

## 2025-01-10 RX ORDER — CEFAZOLIN SODIUM 2 G/50ML
SOLUTION INTRAVENOUS
Status: DISCONTINUED | OUTPATIENT
Start: 2025-01-10 | End: 2025-01-10 | Stop reason: SDUPTHER

## 2025-01-10 RX ORDER — HYDRALAZINE HYDROCHLORIDE 20 MG/ML
10 INJECTION INTRAMUSCULAR; INTRAVENOUS
Status: DISCONTINUED | OUTPATIENT
Start: 2025-01-10 | End: 2025-01-10 | Stop reason: HOSPADM

## 2025-01-10 RX ORDER — FENTANYL CITRATE 50 UG/ML
INJECTION, SOLUTION INTRAMUSCULAR; INTRAVENOUS
Status: DISCONTINUED | OUTPATIENT
Start: 2025-01-10 | End: 2025-01-10 | Stop reason: SDUPTHER

## 2025-01-10 RX ORDER — OXYCODONE HYDROCHLORIDE 5 MG/1
5 TABLET ORAL EVERY 4 HOURS PRN
Status: DISCONTINUED | OUTPATIENT
Start: 2025-01-10 | End: 2025-01-13 | Stop reason: HOSPADM

## 2025-01-10 RX ORDER — SUCCINYLCHOLINE/SOD CL,ISO/PF 100 MG/5ML
SYRINGE (ML) INTRAVENOUS
Status: DISCONTINUED | OUTPATIENT
Start: 2025-01-10 | End: 2025-01-10 | Stop reason: SDUPTHER

## 2025-01-10 RX ORDER — CYCLOBENZAPRINE HCL 10 MG
10 TABLET ORAL 3 TIMES DAILY PRN
Status: DISCONTINUED | OUTPATIENT
Start: 2025-01-10 | End: 2025-01-13 | Stop reason: HOSPADM

## 2025-01-10 RX ORDER — ACETAMINOPHEN 325 MG/1
650 TABLET ORAL EVERY 6 HOURS
Status: DISCONTINUED | OUTPATIENT
Start: 2025-01-10 | End: 2025-01-13 | Stop reason: HOSPADM

## 2025-01-10 RX ORDER — LABETALOL HYDROCHLORIDE 5 MG/ML
10 INJECTION, SOLUTION INTRAVENOUS
Status: DISCONTINUED | OUTPATIENT
Start: 2025-01-10 | End: 2025-01-10 | Stop reason: HOSPADM

## 2025-01-10 RX ORDER — SENNOSIDES A AND B 8.6 MG/1
1 TABLET, FILM COATED ORAL DAILY PRN
Status: DISCONTINUED | OUTPATIENT
Start: 2025-01-10 | End: 2025-01-13 | Stop reason: HOSPADM

## 2025-01-10 RX ORDER — VANCOMYCIN HYDROCHLORIDE 1 G/20ML
INJECTION, POWDER, LYOPHILIZED, FOR SOLUTION INTRAVENOUS PRN
Status: DISCONTINUED | OUTPATIENT
Start: 2025-01-10 | End: 2025-01-10 | Stop reason: ALTCHOICE

## 2025-01-10 RX ORDER — GINSENG 100 MG
CAPSULE ORAL PRN
Status: DISCONTINUED | OUTPATIENT
Start: 2025-01-10 | End: 2025-01-10 | Stop reason: ALTCHOICE

## 2025-01-10 RX ORDER — SODIUM CHLORIDE 0.9 % (FLUSH) 0.9 %
5-40 SYRINGE (ML) INJECTION EVERY 12 HOURS SCHEDULED
Status: DISCONTINUED | OUTPATIENT
Start: 2025-01-10 | End: 2025-01-13 | Stop reason: HOSPADM

## 2025-01-10 RX ORDER — SODIUM CHLORIDE 9 MG/ML
INJECTION, SOLUTION INTRAVENOUS
Status: DISCONTINUED | OUTPATIENT
Start: 2025-01-10 | End: 2025-01-10 | Stop reason: SDUPTHER

## 2025-01-10 RX ORDER — ONDANSETRON 2 MG/ML
4 INJECTION INTRAMUSCULAR; INTRAVENOUS
Status: DISCONTINUED | OUTPATIENT
Start: 2025-01-10 | End: 2025-01-10 | Stop reason: HOSPADM

## 2025-01-10 RX ORDER — OXYCODONE HYDROCHLORIDE 5 MG/1
10 TABLET ORAL EVERY 4 HOURS PRN
Status: DISCONTINUED | OUTPATIENT
Start: 2025-01-10 | End: 2025-01-13 | Stop reason: HOSPADM

## 2025-01-10 RX ORDER — EPHEDRINE SULFATE/0.9% NACL/PF 25 MG/5 ML
SYRINGE (ML) INTRAVENOUS
Status: DISCONTINUED | OUTPATIENT
Start: 2025-01-10 | End: 2025-01-10 | Stop reason: SDUPTHER

## 2025-01-10 RX ADMIN — FENTANYL CITRATE 50 MCG: 50 INJECTION, SOLUTION INTRAMUSCULAR; INTRAVENOUS at 12:35

## 2025-01-10 RX ADMIN — KETOROLAC TROMETHAMINE 15 MG: 15 INJECTION, SOLUTION INTRAMUSCULAR; INTRAVENOUS at 20:13

## 2025-01-10 RX ADMIN — Medication 20 MG: at 14:20

## 2025-01-10 RX ADMIN — EPHEDRINE SULFATE 5 MG: 5 INJECTION INTRAVENOUS at 13:03

## 2025-01-10 RX ADMIN — FENTANYL CITRATE 25 MCG: 50 INJECTION, SOLUTION INTRAMUSCULAR; INTRAVENOUS at 14:22

## 2025-01-10 RX ADMIN — INSULIN GLARGINE 25 UNITS: 100 INJECTION, SOLUTION SUBCUTANEOUS at 22:02

## 2025-01-10 RX ADMIN — Medication 100 MCG: at 16:09

## 2025-01-10 RX ADMIN — SODIUM CHLORIDE: 9 INJECTION, SOLUTION INTRAVENOUS at 18:09

## 2025-01-10 RX ADMIN — FENTANYL CITRATE 25 MCG: 50 INJECTION, SOLUTION INTRAMUSCULAR; INTRAVENOUS at 13:15

## 2025-01-10 RX ADMIN — BUMETANIDE 2 MG: 1 TABLET ORAL at 08:55

## 2025-01-10 RX ADMIN — FENTANYL CITRATE 25 MCG: 50 INJECTION, SOLUTION INTRAMUSCULAR; INTRAVENOUS at 13:37

## 2025-01-10 RX ADMIN — OXYCODONE 10 MG: 5 TABLET ORAL at 22:43

## 2025-01-10 RX ADMIN — Medication 100 MCG: at 15:49

## 2025-01-10 RX ADMIN — Medication 100 MCG: at 18:02

## 2025-01-10 RX ADMIN — INSULIN LISPRO 1 UNITS: 100 INJECTION, SOLUTION INTRAVENOUS; SUBCUTANEOUS at 22:41

## 2025-01-10 RX ADMIN — Medication 100 MCG: at 16:24

## 2025-01-10 RX ADMIN — EPHEDRINE SULFATE 10 MG: 5 INJECTION INTRAVENOUS at 13:25

## 2025-01-10 RX ADMIN — Medication 100 MCG: at 15:17

## 2025-01-10 RX ADMIN — ONDANSETRON 4 MG: 2 INJECTION INTRAMUSCULAR; INTRAVENOUS at 18:06

## 2025-01-10 RX ADMIN — EPHEDRINE SULFATE 10 MG: 5 INJECTION INTRAVENOUS at 12:46

## 2025-01-10 RX ADMIN — PROPOFOL 100 MG: 10 INJECTION, EMULSION INTRAVENOUS at 12:35

## 2025-01-10 RX ADMIN — Medication 10 MG: at 15:58

## 2025-01-10 RX ADMIN — SODIUM CHLORIDE: 9 INJECTION, SOLUTION INTRAVENOUS at 12:26

## 2025-01-10 RX ADMIN — PROPOFOL 50 MCG/KG/MIN: 10 INJECTION, EMULSION INTRAVENOUS at 13:28

## 2025-01-10 RX ADMIN — Medication 200 MCG: at 13:00

## 2025-01-10 RX ADMIN — SODIUM CHLORIDE: 9 INJECTION, SOLUTION INTRAVENOUS at 22:23

## 2025-01-10 RX ADMIN — DEXAMETHASONE SODIUM PHOSPHATE 4 MG: 10 INJECTION INTRAMUSCULAR; INTRAVENOUS at 12:50

## 2025-01-10 RX ADMIN — LIDOCAINE HYDROCHLORIDE 50 MG: 10 INJECTION, SOLUTION EPIDURAL; INFILTRATION; INTRACAUDAL; PERINEURAL at 12:35

## 2025-01-10 RX ADMIN — LOSARTAN POTASSIUM 25 MG: 50 TABLET, FILM COATED ORAL at 08:55

## 2025-01-10 RX ADMIN — CEFAZOLIN SODIUM 2000 MG: 2 SOLUTION INTRAVENOUS at 14:09

## 2025-01-10 RX ADMIN — Medication 10 MG: at 17:24

## 2025-01-10 RX ADMIN — Medication 10 MG: at 17:19

## 2025-01-10 RX ADMIN — Medication 100 MCG: at 17:19

## 2025-01-10 RX ADMIN — Medication 50 MG: at 12:35

## 2025-01-10 RX ADMIN — FENTANYL CITRATE 75 MCG: 50 INJECTION, SOLUTION INTRAMUSCULAR; INTRAVENOUS at 17:24

## 2025-01-10 RX ADMIN — Medication 100 MCG: at 17:31

## 2025-01-10 RX ADMIN — SODIUM CHLORIDE, POTASSIUM CHLORIDE, SODIUM LACTATE AND CALCIUM CHLORIDE: 600; 310; 30; 20 INJECTION, SOLUTION INTRAVENOUS at 12:40

## 2025-01-10 RX ADMIN — CEFAZOLIN SODIUM 2000 MG: 2 SOLUTION INTRAVENOUS at 18:05

## 2025-01-10 RX ADMIN — Medication 100 MCG: at 16:57

## 2025-01-10 RX ADMIN — ROPINIROLE HYDROCHLORIDE 1 MG: 0.25 TABLET, FILM COATED ORAL at 21:58

## 2025-01-10 RX ADMIN — SODIUM CHLORIDE, PRESERVATIVE FREE 10 ML: 5 INJECTION INTRAVENOUS at 08:37

## 2025-01-10 RX ADMIN — ACETAMINOPHEN 650 MG: 325 TABLET ORAL at 21:58

## 2025-01-10 RX ADMIN — Medication 100 MCG: at 16:40

## 2025-01-10 RX ADMIN — SUGAMMADEX 200 MG: 100 INJECTION, SOLUTION INTRAVENOUS at 18:32

## 2025-01-10 RX ADMIN — PROPOFOL 100 MG: 10 INJECTION, EMULSION INTRAVENOUS at 13:15

## 2025-01-10 RX ADMIN — Medication 100 MCG: at 18:15

## 2025-01-10 ASSESSMENT — PAIN SCALES - GENERAL
PAINLEVEL_OUTOF10: 0
PAINLEVEL_OUTOF10: 9
PAINLEVEL_OUTOF10: 2

## 2025-01-10 ASSESSMENT — PAIN - FUNCTIONAL ASSESSMENT: PAIN_FUNCTIONAL_ASSESSMENT: NONE - DENIES PAIN

## 2025-01-10 NOTE — PROGRESS NOTES
Physician Progress Note      PATIENT:               IVETT ANDRE  CSN #:                  [id number]  :                       1950  ADMIT DATE:       2025 7:57 PM  DISCH DATE:  RESPONDING  PROVIDER #:        Meghan Cote MD          QUERY TEXT:    Pt admitted with compression fracture of C1 vertebrae and has moderate   malnutrition documented. Please further specify type of malnutrition with   documentation in the medical record.    The medical record reflects the following:  Risk Factors: History of protein calorie malnutrition, Adult failure to   thrive, CKD CHF HDL  Clinical Indicators:  BMI 19.6,  malnutrition assessment-moderate   malnutrition, weight loss over the last month 75% or less estimated energy   requirements for 1 month or longer.  Moderate muscle mass loss temples.  Mild   body fat loss orbital and triceps.  Treatment: Dietitian consult, adult high-fiber 2 g sodium diet    ASPEN Criteria:    https://aspenjournals.onlinelibrary.orr.com/doi/full/10.1177/[email or web address]  5  Options provided:  -- Moderate Malnutrition  -- Mild Protein calorie malnutrition  -- Moderate Protein calorie malnutrition  -- Other - I will add my own diagnosis  -- Disagree - Not applicable / Not valid  -- Disagree - Clinically unable to determine / Unknown  -- Refer to Clinical Documentation Reviewer    PROVIDER RESPONSE TEXT:    This patient has moderate protein calorie malnutrition.    Query created by: Cyndee Luis on 2025 12:04 PM      Electronically signed by:  Meghan Cote MD 2025 7:11 PM

## 2025-01-10 NOTE — DISCHARGE SUMMARY
central vascular congestion.     XR CHEST PORTABLE    Result Date: 12/30/2024  EXAMINATION: ONE XRAY VIEW OF THE CHEST 12/30/2024 8:08 am COMPARISON: Chest x-ray dated December 28, 2024 HISTORY: ORDERING SYSTEM PROVIDED HISTORY: still requiring oxygen TECHNOLOGIST PROVIDED HISTORY: still requiring oxygen Reason for Exam: Sob, o2 requirement FINDINGS: Stable cardiomegaly.  Status post median sternotomy.  No pneumothorax or pleural effusion.  Bibasilar airspace opacities     1.  Bibasilar airspace opacities.  This could reflect atelectasis and/or pneumonia. 2.  Stable cardiomegaly     XR CHEST PORTABLE    Result Date: 12/28/2024  EXAMINATION: ONE XRAY VIEW OF THE CHEST 12/28/2024 12:22 pm COMPARISON: 11/05/2024 HISTORY: ORDERING SYSTEM PROVIDED HISTORY: sob TECHNOLOGIST PROVIDED HISTORY: sob Reason for Exam: sob FINDINGS: Status post median sternotomy.  Cardiomegaly.  Pulmonary vascular congestion. Pulmonary edema.  No pneumothorax.     Findings suggest congestive heart failure         Consultations:    Consults:     Final Specialist Recommendations/Findings:   IP CONSULT TO SPIRITUAL SERVICES  IP CONSULT TO ORTHOPEDIC SURGERY      The patient was seen and examined on day of discharge and this discharge summary is in conjunction with any daily progress note from day of discharge.    Discharge plan:     Disposition: Home    Physician Follow Up:   [facility]  [address]  [place] [address]  [phone]           Requiring Further Evaluation/Follow Up POST HOSPITALIZATION/Incidental Findings:    Diet: cardiac diet    Activity: As tolerated    Instructions to Patient:     Discharge Medications:      Medication List        ASK your doctor about these medications      albuterol sulfate  (90 Base) MCG/ACT inhaler  Commonly known as: PROVENTIL;VENTOLIN;PROAIR  Inhale 2 puffs into the lungs every 6 hours as needed for Wheezing or Shortness of Breath     amiodarone 200 MG tablet  Commonly known as:

## 2025-01-10 NOTE — CARE COORDINATION
Transition Planning:    BECKY received call from Angela fournier of [facility], updated PT/OT needed to submit for precert.     1424: BECKY spoke with Angela fournier for [facility]. BECKY explained patient remains off floor for procedure at this time and unable to obtain PT/OT notes until tomorrow due to procedure. Angela is requesting notes to be faxed to [phone] once obtained for authorization. Angela's direct phone number is [phone].

## 2025-01-10 NOTE — PROGRESS NOTES
[facility]   Progress Note                   Date:   1/10/2025  Patient name: Graciela Holt  Date of admission:  1/6/2025  7:57 PM  MRN:   [id number]  YOB: 1950  PCP: Travis Mason MD    Reason for Admission: Compression fracture of C1 vertebra, initial encounter ([facility]) [S12.090A]    Subjective:       Clinical Changes / Abnormalities: Pt seen and examined in the room.  Patient resting in bed. Pt denies any CP or sob.  Labs, vitals and tele reviewed-SR 62  Plans for decompression fusion today possibly. Heparin gtt on hold since 6am      Medications:   Scheduled Meds:   insulin glargine  25 Units SubCUTAneous Nightly    insulin lispro  8 Units SubCUTAneous TID WC    sodium chloride flush  5-40 mL IntraVENous 2 times per day    amiodarone  200 mg Oral Daily    atorvastatin  80 mg Oral Daily    bumetanide  2 mg Oral Daily    carvedilol  3.125 mg Oral BID WC    dexAMETHasone  4 mg IntraVENous Q12H    empagliflozin  10 mg Oral Daily    famotidine  20 mg Oral QPM    losartan  25 mg Oral Daily    rOPINIRole  1 mg Oral Nightly    insulin lispro  0-4 Units SubCUTAneous 4x Daily AC & HS     Continuous Infusions:   sodium chloride      dextrose       CBC:   Recent Labs     01/09/25  1634 01/09/25  1851 01/10/25  0713   WBC 15.4* 18.0* 16.8*   HGB 12.5 13.9 13.3    380 410     BMP:    Recent Labs     01/08/25  1116 01/09/25  1851 01/10/25  0713    134* 135*   K 4.6 4.3 4.8   CL 98 97* 99   CO2 29 28 27   BUN 49* 53* 55*   CREATININE 1.2* 1.2* 1.2*   GLUCOSE 391* 232* 175*     Hepatic: No results for input(s): \"AST\", \"ALT\", \"BILITOT\", \"ALKPHOS\" in the last 72 hours.    Invalid input(s): \"ALB\"  Troponin: No results for input(s): \"TROPHS\" in the last 72 hours.  BNP: No results for input(s): \"BNP\" in the last 72 hours.  Lipids: No results for input(s): \"CHOL\", \"HDL\" in the last 72 hours.    Invalid input(s): \"LDLCALCU\"  INR:   No results for input(s): \"INR\" in the last 72  and septal wall..  Calculated  ejection fraction of 51%.  3. Risk stratification: Intermediate     Cardiac Angiography:   reviewed.    Assessment / Acute Cardiac Problems:   ICM, HFrEF 25 to 25% on TTE 11/24  Refused AICD and LifeVest in the past  CAD s/p CABG x 4, in 2018 ( LIMA in seq to LAD and Diagonal, SVG to OM1, SVG to PDA)  Hx of apical LV thrombus 4/2024 - on Eliquis at home  Hx of NSVT and ventricular arrhythmias -on amiodarone  C-spine myelopathy with C1 focal cord edema with Hx of C3-7 laminectomy and fusion  Neurosurgery planning on intervention, requesting surgical clearance  Hypertension, hyperlipidemia    Patient Active Problem List:     Chronic pain     Controlled type 2 diabetes mellitus without complication, without long-term current use of insulin (HCC)     Allergic rhinitis     Hypertension goal BP (blood pressure) < 140/90     Mixed hyperlipidemia     Chronic obstructive pulmonary disease (HCC)     Coronary artery disease involving native coronary artery of native heart without angina pectoris     Primary insomnia     Diarrhea in adult patient     Restless leg syndrome     Atherosclerosis of superior mesenteric artery (HCC)     Left adrenal mass (HCC)     Former smoker     Chronic bilateral low back pain with left-sided sciatica     Hypothyroidism     S/P CABG x 4     Acute pain of right knee     Abnormal stress test     Tobacco use disorder     Neck pain     Acute ischemic left MCA stroke (HCC)     Internal carotid artery occlusion     Stenosis of left internal carotid artery     Acquired spondylolisthesis of cervical vertebra     Stenosis of cervical spine with myelopathy (HCC)     Abnormal EKG     COPD exacerbation (HCC)     Multifocal pneumonia     Hypoxia     Pneumonia     Iron deficiency anemia     Chronic combined systolic and diastolic congestive heart failure (HCC)     Type 2 diabetes mellitus with chronic kidney disease     Symptomatic congestive heart failure, pre-operative

## 2025-01-10 NOTE — PROGRESS NOTES
[facility]  Office: [phone]  Dario Sequeira DO, Gunner De La Paz DO, Christo Pizano DO, Don Geller DO, Raphael Rosales MD, Mariya Polk MD, Bienvenido Mason MD, Loretta Diaz MD,  Ruiz Marroquin MD, aNthalie Kenyon MD, Earl Rosa DO, Belén Bullard MD,  Rock Hugo DO, Betsy Ray MD, Abhijit Webster MD, Margarito Sequeira DO, Meghan Cote MD, Rico Todd MD, Jason Nunez DO, Velma Kumar MD, Irina French MD, Delfina Ely MD, Renetta Cody MD,  John Martin DO, Yordan Samuel MD,  Shirley Waterhouse, CNP,  Amelia Leigh, CNP, Chana Boo, CNP, Max Bruno, CNP,  Beverly Martinez, [facility], Marianna Gleason, CNP, Delia Malcolm, CNP, Carlee Israel, CNP, Sumaya Navas, CNP, Claire Garnica, CNP, NATHAN Rodriguez-C, Rere Jarrett, CNS, Diya Card, CNP, Chen Mackey, CNP         [facility]   IN-PATIENT SERVICE   [facility]    Progress Note    1/10/2025    3:45 PM    Name:   Graciela Holt  MRN:     [id number]     Acct:      [id number]   Room:   [facility]  IP Day:  4  Admit Date:  1/6/2025  7:57 PM    PCP:   Travis Mason MD  Code Status:  Full Code    Subjective:     C/C: No chief complaint on file.    Interval History Status: not changed.     Patient states she is doing about the same.  No new complaints.    Brief History:     Graciela Holt is a 74 y.o.  female w/ DM2 COPD, prior stroke, with history of C-spine myelopathy status post decompression surgery (posterior C3 -7 cervical decompression with anterior listhesis C3 -4 in 2022 by Dr. Sauer) who presented to [facility] 01/03 with chronic progressive neck pain, with increasing weakness over the past month.  Patient reevaluated by Dr. Sauer and was recommended for neurosurgerical evaluation and transferred to [facility] for the management of Compression fracture of C1 vertebra, initial encounter (HCC).     Patient does describe chronic neck pain  appear to be stable  - Restart home meds    10. DVT and GI prophylaxis        ZHANG LÓPEZ MD  1/10/2025  3:45 PM

## 2025-01-10 NOTE — PROGRESS NOTES
Hair washed, detangled, and combed through.  Many dead lice present but writer did not see any alive.

## 2025-01-10 NOTE — PROGRESS NOTES
Neurosurgery HEBER/Resident    Daily Progress Note   No chief complaint on file.    1/10/2025  9:29 AM    Chart reviewed.  No acute events overnight.  No new complaints. Reports she is unable to feed herself due to dropping her fork all the time. She is unable to hold objects in her hand due to weakness and numbness.     Vitals:    01/09/25 1810 01/09/25 1927 01/10/25 0600 01/10/25 0711   BP: 129/75 109/60  139/73   Pulse: 56 63  58   Resp:  16  17   Temp:  97.2 °F (36.2 °C)  98 °F (36.7 °C)   TempSrc:  Oral  Oral   SpO2:  94%  96%   Weight:   50.8 kg (112 lb)    Height:             PE:   AOx3   Motor   L deltoid 4/5; R deltoid 4/5  L biceps 5/5; R biceps 5/5  L triceps 5/5; R triceps 5/5  L wrist extension 4/5; R wrist extension 4/5  L intrinsics 4/5; R intrinsics 4/5      L iliopsoas 5/5 , R iliopsoas 5/5  L quadriceps 5/5; R quadriceps 5/5  L Dorsiflexion 5/5; R dorsiflexion 5/5  L Plantarflexion 5/5; R plantarflexion 5/5  L EHL 5/5; R EHL 5/5    Sensation decreased sensation bilat hands        Lab Results   Component Value Date    WBC 16.8 (H) 01/10/2025    HGB 13.3 01/10/2025    HCT 42.6 01/10/2025     01/10/2025    CHOL 159 11/06/2024    TRIG 41 11/06/2024    HDL 55 11/06/2024    ALT 14 01/02/2025    AST 23 01/02/2025     (L) 01/10/2025    K 4.8 01/10/2025    CL 99 01/10/2025    CREATININE 1.2 (H) 01/10/2025    BUN 55 (H) 01/10/2025    CO2 27 01/10/2025    TSH 0.96 11/06/2024    INR 1.1 01/07/2025    LABA1C 7.2 (H) 04/05/2024    CRP 13.1 (H) 10/02/2021    SEDRATE 34 (H) 05/21/2018           A/P  74 y.o. female who presents with progressive weakness, type 2 odontoid fracture with thickening of posterior ligamentous structure, abnormal spinal cord signal      - NPO for decompression fusion   - heparin drip- stopped at 0600  - continue decadron 4mg q12h      Please contact neurosurgery with any changes in patients neurologic status.       Gordo Martinez CNP  1/10/25  9:29 AM

## 2025-01-10 NOTE — PROGRESS NOTES
Physical Therapy        Physical Therapy Cancel Note      DATE: 1/10/2025    NAME: Graciela Holt  MRN: [id number]   : 1950      Patient not seen this date for Physical Therapy due to:    Surgery/Procedure: OR today with neurosurgery. PT will check back post-op.      Electronically signed by Mecca Fitzpatrick PT on 1/10/2025 at 7:55 AM

## 2025-01-11 ENCOUNTER — APPOINTMENT (OUTPATIENT)
Dept: GENERAL RADIOLOGY | Age: 75
DRG: 471 | End: 2025-01-11
Attending: INTERNAL MEDICINE
Payer: COMMERCIAL

## 2025-01-11 PROBLEM — Z01.818 PRE-OPERATIVE CLEARANCE: Status: ACTIVE | Noted: 2025-01-11

## 2025-01-11 LAB
ANION GAP SERPL CALCULATED.3IONS-SCNC: 12 MMOL/L (ref 9–16)
BASOPHILS # BLD: 0 K/UL (ref 0–0.2)
BASOPHILS NFR BLD: 0 % (ref 0–2)
BUN SERPL-MCNC: 58 MG/DL (ref 8–23)
CALCIUM SERPL-MCNC: 8.9 MG/DL (ref 8.6–10.4)
CHLORIDE SERPL-SCNC: 100 MMOL/L (ref 98–107)
CO2 SERPL-SCNC: 26 MMOL/L (ref 20–31)
CREAT SERPL-MCNC: 1.4 MG/DL (ref 0.6–0.9)
EOSINOPHIL # BLD: 0 K/UL (ref 0–0.4)
EOSINOPHILS RELATIVE PERCENT: 0 % (ref 1–4)
ERYTHROCYTE [DISTWIDTH] IN BLOOD BY AUTOMATED COUNT: 13.6 % (ref 11.8–14.4)
GFR, ESTIMATED: 39 ML/MIN/1.73M2
GLUCOSE BLD-MCNC: 107 MG/DL (ref 65–105)
GLUCOSE BLD-MCNC: 119 MG/DL (ref 65–105)
GLUCOSE BLD-MCNC: 265 MG/DL (ref 65–105)
GLUCOSE SERPL-MCNC: 167 MG/DL (ref 74–99)
HCT VFR BLD AUTO: 44.4 % (ref 36.3–47.1)
HGB BLD-MCNC: 13.8 G/DL (ref 11.9–15.1)
IMM GRANULOCYTES # BLD AUTO: 0 K/UL (ref 0–0.3)
IMM GRANULOCYTES NFR BLD: 0 %
LYMPHOCYTES NFR BLD: 1.22 K/UL (ref 1–4.8)
LYMPHOCYTES RELATIVE PERCENT: 6 % (ref 24–44)
MCH RBC QN AUTO: 27.8 PG (ref 25.2–33.5)
MCHC RBC AUTO-ENTMCNC: 31.1 G/DL (ref 28.4–34.8)
MCV RBC AUTO: 89.3 FL (ref 82.6–102.9)
MICROORGANISM SPEC CULT: NO GROWTH
MONOCYTES NFR BLD: 1.84 K/UL (ref 0.1–0.8)
MONOCYTES NFR BLD: 9 % (ref 1–7)
MORPHOLOGY: NORMAL
NEUTROPHILS NFR BLD: 85 % (ref 36–66)
NEUTS SEG NFR BLD: 17.34 K/UL (ref 1.8–7.7)
NRBC BLD-RTO: 0 PER 100 WBC
PARTIAL THROMBOPLASTIN TIME: 21.1 SEC (ref 23–36.5)
PLATELET # BLD AUTO: 372 K/UL (ref 138–453)
PMV BLD AUTO: 10.3 FL (ref 8.1–13.5)
POTASSIUM SERPL-SCNC: 5 MMOL/L (ref 3.7–5.3)
RBC # BLD AUTO: 4.97 M/UL (ref 3.95–5.11)
SERVICE CMNT-IMP: NORMAL
SODIUM SERPL-SCNC: 138 MMOL/L (ref 136–145)
SPECIMEN DESCRIPTION: NORMAL
WBC OTHER # BLD: 20.4 K/UL (ref 3.5–11.3)

## 2025-01-11 PROCEDURE — 97116 GAIT TRAINING THERAPY: CPT

## 2025-01-11 PROCEDURE — 6370000000 HC RX 637 (ALT 250 FOR IP): Performed by: PHYSICIAN ASSISTANT

## 2025-01-11 PROCEDURE — 85025 COMPLETE CBC W/AUTO DIFF WBC: CPT

## 2025-01-11 PROCEDURE — 82947 ASSAY GLUCOSE BLOOD QUANT: CPT

## 2025-01-11 PROCEDURE — 6360000002 HC RX W HCPCS: Performed by: INTERNAL MEDICINE

## 2025-01-11 PROCEDURE — 2060000000 HC ICU INTERMEDIATE R&B

## 2025-01-11 PROCEDURE — 97535 SELF CARE MNGMENT TRAINING: CPT

## 2025-01-11 PROCEDURE — 2580000003 HC RX 258: Performed by: PHYSICIAN ASSISTANT

## 2025-01-11 PROCEDURE — 2700000000 HC OXYGEN THERAPY PER DAY

## 2025-01-11 PROCEDURE — 97166 OT EVAL MOD COMPLEX 45 MIN: CPT

## 2025-01-11 PROCEDURE — 2500000003 HC RX 250 WO HCPCS: Performed by: PHYSICIAN ASSISTANT

## 2025-01-11 PROCEDURE — APPSS15 APP SPLIT SHARED TIME 0-15 MINUTES: Performed by: NURSE PRACTITIONER

## 2025-01-11 PROCEDURE — 72040 X-RAY EXAM NECK SPINE 2-3 VW: CPT

## 2025-01-11 PROCEDURE — 99232 SBSQ HOSP IP/OBS MODERATE 35: CPT | Performed by: HOSPITALIST

## 2025-01-11 PROCEDURE — 36415 COLL VENOUS BLD VENIPUNCTURE: CPT

## 2025-01-11 PROCEDURE — 80048 BASIC METABOLIC PNL TOTAL CA: CPT

## 2025-01-11 PROCEDURE — 6360000002 HC RX W HCPCS: Performed by: PHYSICIAN ASSISTANT

## 2025-01-11 PROCEDURE — 99233 SBSQ HOSP IP/OBS HIGH 50: CPT | Performed by: NURSE PRACTITIONER

## 2025-01-11 PROCEDURE — 85730 THROMBOPLASTIN TIME PARTIAL: CPT

## 2025-01-11 PROCEDURE — 97162 PT EVAL MOD COMPLEX 30 MIN: CPT

## 2025-01-11 PROCEDURE — 6370000000 HC RX 637 (ALT 250 FOR IP): Performed by: HOSPITALIST

## 2025-01-11 PROCEDURE — 97530 THERAPEUTIC ACTIVITIES: CPT

## 2025-01-11 PROCEDURE — 94761 N-INVAS EAR/PLS OXIMETRY MLT: CPT

## 2025-01-11 RX ORDER — MIDODRINE HYDROCHLORIDE 5 MG/1
5 TABLET ORAL ONCE
Status: COMPLETED | OUTPATIENT
Start: 2025-01-11 | End: 2025-01-11

## 2025-01-11 RX ADMIN — Medication 2000 MG: at 02:20

## 2025-01-11 RX ADMIN — CARVEDILOL 3.12 MG: 6.25 TABLET, FILM COATED ORAL at 18:22

## 2025-01-11 RX ADMIN — MIDODRINE HYDROCHLORIDE 5 MG: 5 TABLET ORAL at 13:02

## 2025-01-11 RX ADMIN — BUMETANIDE 2 MG: 1 TABLET ORAL at 10:16

## 2025-01-11 RX ADMIN — CARVEDILOL 3.12 MG: 6.25 TABLET, FILM COATED ORAL at 10:16

## 2025-01-11 RX ADMIN — INSULIN LISPRO 8 UNITS: 100 INJECTION, SOLUTION INTRAVENOUS; SUBCUTANEOUS at 10:21

## 2025-01-11 RX ADMIN — Medication 2000 MG: at 10:57

## 2025-01-11 RX ADMIN — ACETAMINOPHEN 650 MG: 325 TABLET ORAL at 20:46

## 2025-01-11 RX ADMIN — SODIUM CHLORIDE: 9 INJECTION, SOLUTION INTRAVENOUS at 00:40

## 2025-01-11 RX ADMIN — SODIUM CHLORIDE, PRESERVATIVE FREE 10 ML: 5 INJECTION INTRAVENOUS at 10:25

## 2025-01-11 RX ADMIN — INSULIN LISPRO 2 UNITS: 100 INJECTION, SOLUTION INTRAVENOUS; SUBCUTANEOUS at 20:46

## 2025-01-11 RX ADMIN — FAMOTIDINE 20 MG: 20 TABLET, FILM COATED ORAL at 18:22

## 2025-01-11 RX ADMIN — KETOROLAC TROMETHAMINE 15 MG: 15 INJECTION, SOLUTION INTRAMUSCULAR; INTRAVENOUS at 14:27

## 2025-01-11 RX ADMIN — ENOXAPARIN SODIUM 40 MG: 100 INJECTION SUBCUTANEOUS at 10:21

## 2025-01-11 RX ADMIN — ATORVASTATIN CALCIUM 80 MG: 80 TABLET, FILM COATED ORAL at 10:16

## 2025-01-11 RX ADMIN — Medication 2000 MG: at 18:22

## 2025-01-11 RX ADMIN — LOSARTAN POTASSIUM 25 MG: 50 TABLET, FILM COATED ORAL at 10:22

## 2025-01-11 RX ADMIN — SODIUM CHLORIDE, PRESERVATIVE FREE 10 ML: 5 INJECTION INTRAVENOUS at 10:56

## 2025-01-11 RX ADMIN — EMPAGLIFLOZIN 10 MG: 10 TABLET, FILM COATED ORAL at 10:21

## 2025-01-11 RX ADMIN — KETOROLAC TROMETHAMINE 15 MG: 15 INJECTION, SOLUTION INTRAMUSCULAR; INTRAVENOUS at 02:20

## 2025-01-11 RX ADMIN — KETOROLAC TROMETHAMINE 15 MG: 15 INJECTION, SOLUTION INTRAMUSCULAR; INTRAVENOUS at 10:22

## 2025-01-11 RX ADMIN — ACETAMINOPHEN 650 MG: 325 TABLET ORAL at 02:19

## 2025-01-11 RX ADMIN — ACETAMINOPHEN 650 MG: 325 TABLET ORAL at 14:28

## 2025-01-11 RX ADMIN — ACETAMINOPHEN 650 MG: 325 TABLET ORAL at 10:14

## 2025-01-11 RX ADMIN — ROPINIROLE HYDROCHLORIDE 1 MG: 0.25 TABLET, FILM COATED ORAL at 20:46

## 2025-01-11 RX ADMIN — SODIUM CHLORIDE: 9 INJECTION, SOLUTION INTRAVENOUS at 03:10

## 2025-01-11 RX ADMIN — SODIUM CHLORIDE, PRESERVATIVE FREE 10 ML: 5 INJECTION INTRAVENOUS at 20:47

## 2025-01-11 RX ADMIN — INSULIN GLARGINE 25 UNITS: 100 INJECTION, SOLUTION SUBCUTANEOUS at 20:46

## 2025-01-11 ASSESSMENT — PAIN DESCRIPTION - DESCRIPTORS
DESCRIPTORS: ACHING;SORE
DESCRIPTORS: ACHING;SORE
DESCRIPTORS: SHARP

## 2025-01-11 ASSESSMENT — PAIN SCALES - GENERAL
PAINLEVEL_OUTOF10: 2
PAINLEVEL_OUTOF10: 0
PAINLEVEL_OUTOF10: 0
PAINLEVEL_OUTOF10: 5
PAINLEVEL_OUTOF10: 0
PAINLEVEL_OUTOF10: 7

## 2025-01-11 ASSESSMENT — PAIN DESCRIPTION - ORIENTATION
ORIENTATION: ANTERIOR
ORIENTATION: MID

## 2025-01-11 ASSESSMENT — PAIN DESCRIPTION - FREQUENCY: FREQUENCY: INTERMITTENT

## 2025-01-11 ASSESSMENT — PAIN DESCRIPTION - LOCATION
LOCATION: EYE
LOCATION: NECK
LOCATION: HEAD;EYE

## 2025-01-11 ASSESSMENT — PAIN DESCRIPTION - PAIN TYPE: TYPE: SURGICAL PAIN

## 2025-01-11 ASSESSMENT — PAIN - FUNCTIONAL ASSESSMENT: PAIN_FUNCTIONAL_ASSESSMENT: PREVENTS OR INTERFERES WITH MANY ACTIVE NOT PASSIVE ACTIVITIES

## 2025-01-11 NOTE — PROGRESS NOTES
Pt to room 2019 from 2C with nurse at bedside.   Patient A&OX4, connected to unit telemetry with belongings at bedside.   All questions and concerns answered.   Plan of care ongoing at this time.

## 2025-01-11 NOTE — PROGRESS NOTES
Neurosurgery Post op Progress Note      SUBJECTIVE: Status post: 1 decompression/fusion occiput to T2.  2.  Revision of hardware.    Patient seen while in PACU.  Rigid c-collar in place.  Patient appears to be alert and oriented.  Patient move all 4 extremities upon command. She does complain of intermittent numbness/decreased sensation and tingling to the right index and small finger, into the left ring and small finger.  This is not an apparent new finding.  This was present prior to the patient's surgery. her numbness and tingling is no worse than prior to surgery.  She denies the presence of any headache, nausea or emesis.      OBJECTIVE      Physical exam   VITALS:    Vitals:    01/10/25 1845   BP: 124/75   Pulse: 71   Resp: 13   Temp:    SpO2: 95%     INTAKE:    Intake/Output Summary (Last 24 hours) at 1/10/2025 1913  Last data filed at 1/10/2025 1816  Gross per 24 hour   Intake 2950 ml   Output 1225 ml   Net 1725 ml     URINARY CATHETER OUTPUT ([place]): 200 mL of yellow-colored urine in [place] reservoir.    DRAIN/TUBE OUTPUT: CAMPBELL drain appears to be holding suction and working.  [place] contains approximately 25 mL of red blood.     No evidence of DVT seen on physical exam.    Neurological exam reveals Awake and alert, Responds to voice, and Responds to tactile stimuli  alert, oriented x3, affect appropriate, no focal neurological deficits, moves all extremities well, and no involuntary movements  alert, oriented, normal speech, no focal findings or movement disorder noted, normal muscle tone, no tremors, strength 5/5.   the upper extremities no muscle wasting or atrophy, no fasciculations noted, no involuntary movements, no abnormalities of position, and normal resting muscle tone  normal light touch sensation and normal position sensation      Wound   Post op wound:  posterior cervical spine and thoracic spine   well approximated incision clean, dry, and no drainage Closed w/ staples with overlying  bacitracin ointment and island dressing.    Data      LABS:   Lab Results   Component Value Date    WBC 16.8 (H) 01/10/2025    HGB 13.3 01/10/2025    HCT 42.6 01/10/2025    MCV 88.9 01/10/2025     01/10/2025      Lab Results   Component Value Date     (L) 01/10/2025    K 4.8 01/10/2025    CL 99 01/10/2025    CO2 27 01/10/2025     Lab Results   Component Value Date    BUN 55 (H) 01/10/2025      Lab Results   Component Value Date    CREATININE 1.2 (H) 01/10/2025        ASSESSMENT AND PLAN  1.  Okay to transfer patient from PACU to floor if stable.  2.  IV antibiotics for 24 hours and appropriate analgesics for pain.  3.  Consultations to both physical therapy and Occupational Therapy.  4.  Internal medicine and cardiology on board.  5.  Will obtain radiographs of the cervical spine tomorrow.  Upright AP, lateral, swimmer's view.  6.  We will start chemical DVT prophylaxis, i.e. Lovenox postop day 1.  For now SCDs.  7.  We will continue to closely follow this patient while she remains in house

## 2025-01-11 NOTE — PLAN OF CARE
Problem: Chronic Conditions and Co-morbidities  Goal: Patient's chronic conditions and co-morbidity symptoms are monitored and maintained or improved  Outcome: Progressing     Problem: Discharge Planning  Goal: Discharge to home or other facility with appropriate resources  Outcome: Progressing     Problem: Safety - Adult  Goal: Free from fall injury  Outcome: Progressing     Problem: Pain  Goal: Verbalizes/displays adequate comfort level or baseline comfort level  Outcome: Progressing     Problem: Nutrition Deficit:  Goal: Optimize nutritional status  Outcome: Progressing     Problem: Skin/Tissue Integrity  Goal: Absence of new skin breakdown  Description: 1.  Monitor for areas of redness and/or skin breakdown  2.  Assess vascular access sites hourly  3.  Every 4-6 hours minimum:  Change oxygen saturation probe site  4.  Every 4-6 hours:  If on nasal continuous positive airway pressure, respiratory therapy assess nares and determine need for appliance change or resting period.  Outcome: Progressing

## 2025-01-11 NOTE — PROGRESS NOTES
[facility]   Progress Note                   Date:   1/11/2025  Patient name: Graciela Holt  Date of admission:  1/6/2025  7:57 PM  MRN:   [id number]  YOB: 1950  PCP: Travis Mason MD    Reason for Admission: Compression fracture of C1 vertebra, initial encounter ([facility], [facility]) [S12.090A]    Subjective:       Clinical Changes / Abnormalities:  Pt seen and examined in the room.  Patient resting in bed with therapy at bedside. Pt denies any CP or sob.  Labs, vitals and tele reviewed- SR 78  CAMPBELL in place    Medications:   Scheduled Meds:   ceFAZolin  2,000 mg IntraVENous Q8H    sodium chloride flush  5-40 mL IntraVENous 2 times per day    acetaminophen  650 mg Oral Q6H    polyethylene glycol  17 g Oral Daily    ketorolac  15 mg IntraVENous Q6H    enoxaparin  40 mg SubCUTAneous Daily    insulin glargine  25 Units SubCUTAneous Nightly    insulin lispro  8 Units SubCUTAneous TID WC    sodium chloride flush  5-40 mL IntraVENous 2 times per day    [Held by provider] amiodarone  200 mg Oral Daily    atorvastatin  80 mg Oral Daily    bumetanide  2 mg Oral Daily    carvedilol  3.125 mg Oral BID WC    empagliflozin  10 mg Oral Daily    famotidine  20 mg Oral QPM    losartan  25 mg Oral Daily    rOPINIRole  1 mg Oral Nightly    insulin lispro  0-4 Units SubCUTAneous 4x Daily AC & HS     Continuous Infusions:   sodium chloride 100 mL/hr at 01/11/25 0310    sodium chloride      sodium chloride      dextrose       CBC:   Recent Labs     01/09/25  1851 01/10/25  0713 01/11/25  0642   WBC 18.0* 16.8* 20.4*   HGB 13.9 13.3 13.8    410 372     BMP:    Recent Labs     01/09/25  1851 01/10/25  0713 01/11/25  0642   * 135* 138   K 4.3 4.8 5.0   CL 97* 99 100   CO2 28 27 26   BUN 53* 55* 58*   CREATININE 1.2* 1.2* 1.4*   GLUCOSE 232* 175* 167*     Hepatic: No results for input(s): \"AST\", \"ALT\", \"BILITOT\", \"ALKPHOS\" in the last 72 hours.    Invalid input(s): \"ALB\"  Troponin: No results for input(s):  pneumonia     Hypoxia     Pneumonia     Iron deficiency anemia     Chronic combined systolic and diastolic congestive heart failure (HCC)     Type 2 diabetes mellitus with chronic kidney disease     Symptomatic congestive heart failure, pre-operative cardiovascular examination ([facility])     Acute on chronic HFrEF (heart failure with reduced ejection fraction) ([facility])     Encounter for palliative care     Goals of care, counseling/discussion     ACP (advance care planning)     CHF (congestive heart failure), NYHA class I, acute on chronic, combined ([facility])     Shortness of breath     History of COPD     Noncompliance     Protein-calorie malnutrition ([facility])     Acute decompensated heart failure ([facility])     NSTEMI (non-ST elevated myocardial infarction) ([facility])     Chest pain     Angina pectoris ([facility])     Acute on chronic diastolic (congestive) heart failure ([facility])     Weakness     Compression fracture of C1 vertebra, initial encounter ([facility])     NSVT (nonsustained ventricular tachycardia) ([facility])     Acute systolic congestive heart failure ([facility])     Hyperglycemia     Bandemia     Head lice infestation      Plan of Treatment:   HR stable. Continue Amiodarone and BB  BP stable. Continue Coreg and losartan  Keep K >4 and Mg >2   Continue GDMT as tolerated. Continue Bumex, Coreg, Jardiance and Losartan.   ECHO from 11/11/24 shows continued LV thrombus. Checking with neurosurgery NP if ok to resume heparin drip or to transition to Eliquis  Patient doing well post operatively  Will continue to monitor.     Electronically signed by JAMES Knott CNP on 1/11/2025 at 10:56 AM  [facility].  [phone]

## 2025-01-11 NOTE — OP NOTE
Operative Note      Patient: Graciela Holt  YOB: 1950  MRN: [id number]    Date of Procedure: 1/10/2025    Preoperative diagnosis.  Craniocervical instability, cervical stenosis with myelopathy, osteopenia, quadriparesis    Post-Op Diagnosis: Same    Indications for the surgery.  This is a 74-year-old female with extensive prior fusion and significant instability of the occipital cervical fusion as evidenced by subluxation with cord compression along with significant phlegmon.  Patient had progressive quadriparesis along with subjective symptoms indicative of worsening myelopathy that was progressing over the short timeframe.  Imaging indicated by occipital cervical instability in the setting of prior subaxial fusion.  The patient was indicated for occipital cervical fixation fusion with revision of hardware down to T1-T2 and laminectomy at C1-C2 and decompression of the occipital cervical junction.  I reviewed the procedure in detail with the patient as well as indications which include instability and severe cord compression with deficit.  The patient was consented and we proceeded with surgical intervention       Procedure  Posterior lateral arthrodesis from occiput to T2  Occipital cervical fixation with plate  Segmental fixation from C3-T2 on the right side and C4-T2 on the left side in the setting of replacement of prior hardware from C4-C7  Explantation of left C3 lateral mass screw without replacement  Laminectomy and lysis of adhesions and epidural scar from occiput to C2  Use of spinal neuronavigation  22 modifier    Surgeon(s):  Yessica Flynn DO    Assistant:   Physician Assistant: Nathan Blevins PA-C    Anesthesia: General    Estimated Blood Loss (mL): 200     Complications: None    Specimens:   ID Type Source Tests Collected by Time Destination   1 : for ga insertion Urine Bladder CULTURE, URINE Yessica Flynn DO 1/10/2025 6697        Implants:  Implant Name Type Inv. Item

## 2025-01-11 NOTE — PROGRESS NOTES
Physical Therapy  Facility/Department: [facility] CAR 2- STEPDOWN  Physical Therapy Initial Assessment    Name: Graciela Holt  : 1950  MRN: [id number]  Date of Service: 2025    Discharge Recommendations:  Patient would benefit from continued therapy after discharge   PT Equipment Recommendations  Equipment Needed: No      Patient Diagnosis(es): The encounter diagnosis was Compression of spinal cord with myelopathy (HCC).  Past Medical History:  has a past medical history of Allergic rhinitis, Arthritis, CAD (coronary artery disease), Cerebral artery occlusion with cerebral infarction (HCC), CHF (congestive heart failure) (HCC), Controlled type 2 diabetes mellitus without complication, without long-term current use of insulin (HCC), COPD (chronic obstructive pulmonary disease) (HCC), Depression, Former smoker, History of blood transfusion, Hyperlipidemia, Hypertension, Kidney stone, Myocardial infarction (HCC), Obesity, Class I, BMI 30.0-34.9 (see actual BMI), Restless leg syndrome, Skin abnormality, Type II or unspecified type diabetes mellitus without mention of complication, not stated as uncontrolled, Wears glasses, and Wears partial dentures.  Past Surgical History:  has a past surgical history that includes Appendectomy; Hysterectomy; Cholecystectomy; joint replacement (Bilateral); pr office/outpt visit,procedure only (N/A, 2018); pr i&d deep absc bursa/hematoma thigh/knee region (Right, 2018); Leg biopsy excision (Right, 2019); Cardiac surgery; Cardiac surgery; other surgical history (2020); cervical laminectomy (N/A, 10/14/2020); bronchoscopy (N/A, 2021); and Thoracic Fusion (01/10/2025).    Assessment  Body Structures, Functions, Activity Limitations Requiring Skilled Therapeutic Intervention: Decreased functional mobility ;Decreased endurance;Decreased strength;Increased pain;Decreased balance;Decreased coordination;Decreased safe awareness  Assessment: Pt with mobility

## 2025-01-11 NOTE — PROGRESS NOTES
Neurosurgery HEBER/Resident    Daily Progress Note   CC:No chief complaint on file.    1/11/2025  6:49 AM    Chart reviewed.  No acute events overnight.  No new complaints. Reports that she feels her arms are stronger and she is able to grasp items much easier, minimal pain to surgical site, drain remains in place with 200ml/12 hours. Has not yet worked with PT yet, tolerating diet well    Vitals:    01/10/25 2347 01/10/25 2353 01/11/25 0033 01/11/25 0330   BP: 108/65 108/65  113/64   Pulse: 67 68 63 64   Resp: 14 20 18   Temp: 97.4 °F (36.3 °C)   97.8 °F (36.6 °C)   TempSrc: Oral Oral  Oral   SpO2: 98% 96%  98%   Weight:       Height:           PE:     AOx3   PERRL, EOMI  Motor   L deltoid 4/5; R deltoid 4/5  L biceps 5/5; R biceps 5/5  L triceps 4+/5; R triceps 4+/5  L wrist extension 5/5; R wrist extension 5/5  L intrinsics 4/5; R intrinsics 4/5      L iliopsoas 5/5 , R iliopsoas 5/5  L quadriceps 5/5; R quadriceps 5/5  L Dorsiflexion 5/5; R dorsiflexion 5/5  L Plantarflexion 5/5; R plantarflexion 5/5  L EHL 5/5; R EHL 5/5        Sensation: diminished sensation to both hands     Drain output: 200ml/12 hours   Incision: CDI      Lab Results   Component Value Date    WBC 16.8 (H) 01/10/2025    HGB 13.3 01/10/2025    HCT 42.6 01/10/2025     01/10/2025    CHOL 159 11/06/2024    TRIG 41 11/06/2024    HDL 55 11/06/2024    ALT 14 01/02/2025    AST 23 01/02/2025     (L) 01/10/2025    K 4.8 01/10/2025    CL 99 01/10/2025    CREATININE 1.2 (H) 01/10/2025    BUN 55 (H) 01/10/2025    CO2 27 01/10/2025    TSH 0.96 11/06/2024    INR 1.1 01/07/2025    LABA1C 7.2 (H) 04/05/2024    CRP 13.1 (H) 10/02/2021    SEDRATE 34 (H) 05/21/2018       Radiology   Pending post op cervical xrays       A/P  74 y.o. female who presents with craniocervical instability, cervical stenosis with myelopathy, osteopenia, quadriparesis  POD#1 s/p occiput to T2 posterior fixation     Continue drain and record output   PT and OT  Cervical

## 2025-01-11 NOTE — PROGRESS NOTES
Occupational Therapy    Occupational Therapy Initial Evaluation  Facility/Department: [facility] CAR 2- STEPDOWN   Patient Name: Graciela Holt        MRN: [id number]    : 1950    Date of Service: 2025    Past Medical History:  has a past medical history of Allergic rhinitis, Arthritis, CAD (coronary artery disease), Cerebral artery occlusion with cerebral infarction (HCC), CHF (congestive heart failure) ([facility]), Controlled type 2 diabetes mellitus without complication, without long-term current use of insulin (HCC), COPD (chronic obstructive pulmonary disease) (HCC), Depression, Former smoker, History of blood transfusion, Hyperlipidemia, Hypertension, Kidney stone, Myocardial infarction ([facility]), Obesity, Class I, BMI 30.0-34.9 (see actual BMI), Restless leg syndrome, Skin abnormality, Type II or unspecified type diabetes mellitus without mention of complication, not stated as uncontrolled, Wears glasses, and Wears partial dentures.  Past Surgical History:  has a past surgical history that includes Appendectomy; Hysterectomy; Cholecystectomy; joint replacement (Bilateral); pr office/outpt visit,procedure only (N/A, 2018); pr i&d deep absc bursa/hematoma thigh/knee region (Right, 2018); Leg biopsy excision (Right, 2019); Cardiac surgery; Cardiac surgery; other surgical history (2020); cervical laminectomy (N/A, 10/14/2020); bronchoscopy (N/A, 2021); and Thoracic Fusion (01/10/2025).    Discharge Recommendations  Discharge Recommendations: Patient would benefit from continued therapy after discharge  OT Equipment Recommendations  Equipment Needed: Yes  Mobility Devices: Walker  Walker: Rolling    Assessment  Performance deficits / Impairments: Decreased functional mobility ;Decreased ADL status;Decreased ROM;Decreased strength;Decreased endurance;Decreased sensation;Decreased balance;Decreased high-level IADLs;Decreased fine motor control;Decreased coordination  Assessment: Patient

## 2025-01-12 LAB
ANION GAP SERPL CALCULATED.3IONS-SCNC: 9 MMOL/L (ref 9–16)
BASOPHILS # BLD: 0 K/UL (ref 0–0.2)
BASOPHILS NFR BLD: 0 % (ref 0–2)
BUN SERPL-MCNC: 58 MG/DL (ref 8–23)
CALCIUM SERPL-MCNC: 8.4 MG/DL (ref 8.6–10.4)
CHLORIDE SERPL-SCNC: 103 MMOL/L (ref 98–107)
CO2 SERPL-SCNC: 28 MMOL/L (ref 20–31)
CREAT SERPL-MCNC: 1.6 MG/DL (ref 0.6–0.9)
EOSINOPHIL # BLD: 0.44 K/UL (ref 0–0.4)
EOSINOPHILS RELATIVE PERCENT: 3 % (ref 1–4)
ERYTHROCYTE [DISTWIDTH] IN BLOOD BY AUTOMATED COUNT: 13.8 % (ref 11.8–14.4)
GFR, ESTIMATED: 33 ML/MIN/1.73M2
GLUCOSE BLD-MCNC: 103 MG/DL (ref 65–105)
GLUCOSE BLD-MCNC: 104 MG/DL (ref 65–105)
GLUCOSE BLD-MCNC: 132 MG/DL (ref 65–105)
GLUCOSE BLD-MCNC: 151 MG/DL (ref 65–105)
GLUCOSE BLD-MCNC: 62 MG/DL (ref 65–105)
GLUCOSE BLD-MCNC: 73 MG/DL (ref 65–105)
GLUCOSE SERPL-MCNC: 71 MG/DL (ref 74–99)
HCT VFR BLD AUTO: 37.2 % (ref 36.3–47.1)
HGB BLD-MCNC: 11.7 G/DL (ref 11.9–15.1)
IMM GRANULOCYTES # BLD AUTO: 0 K/UL (ref 0–0.3)
IMM GRANULOCYTES NFR BLD: 0 %
LYMPHOCYTES NFR BLD: 1.92 K/UL (ref 1–4.8)
LYMPHOCYTES RELATIVE PERCENT: 13 % (ref 24–44)
MCH RBC QN AUTO: 28 PG (ref 25.2–33.5)
MCHC RBC AUTO-ENTMCNC: 31.5 G/DL (ref 28.4–34.8)
MCV RBC AUTO: 89 FL (ref 82.6–102.9)
MONOCYTES NFR BLD: 0.89 K/UL (ref 0.1–0.8)
MONOCYTES NFR BLD: 6 % (ref 1–7)
MORPHOLOGY: NORMAL
NEUTROPHILS NFR BLD: 78 % (ref 36–66)
NEUTS SEG NFR BLD: 11.55 K/UL (ref 1.8–7.7)
NRBC BLD-RTO: 0 PER 100 WBC
NUCLEATED RED BLOOD CELLS: 1 PER 100 WBC
PARTIAL THROMBOPLASTIN TIME: 24.7 SEC (ref 23–36.5)
PLATELET # BLD AUTO: 305 K/UL (ref 138–453)
PMV BLD AUTO: 10.5 FL (ref 8.1–13.5)
POTASSIUM SERPL-SCNC: 4.5 MMOL/L (ref 3.7–5.3)
RBC # BLD AUTO: 4.18 M/UL (ref 3.95–5.11)
SODIUM SERPL-SCNC: 140 MMOL/L (ref 136–145)
WBC OTHER # BLD: 14.8 K/UL (ref 3.5–11.3)

## 2025-01-12 PROCEDURE — 99233 SBSQ HOSP IP/OBS HIGH 50: CPT | Performed by: NURSE PRACTITIONER

## 2025-01-12 PROCEDURE — 85025 COMPLETE CBC W/AUTO DIFF WBC: CPT

## 2025-01-12 PROCEDURE — 36415 COLL VENOUS BLD VENIPUNCTURE: CPT

## 2025-01-12 PROCEDURE — 80048 BASIC METABOLIC PNL TOTAL CA: CPT

## 2025-01-12 PROCEDURE — 82947 ASSAY GLUCOSE BLOOD QUANT: CPT

## 2025-01-12 PROCEDURE — 6370000000 HC RX 637 (ALT 250 FOR IP): Performed by: NURSE PRACTITIONER

## 2025-01-12 PROCEDURE — 6370000000 HC RX 637 (ALT 250 FOR IP): Performed by: PHYSICIAN ASSISTANT

## 2025-01-12 PROCEDURE — 85730 THROMBOPLASTIN TIME PARTIAL: CPT

## 2025-01-12 PROCEDURE — 6360000002 HC RX W HCPCS: Performed by: PHYSICIAN ASSISTANT

## 2025-01-12 PROCEDURE — 99232 SBSQ HOSP IP/OBS MODERATE 35: CPT | Performed by: HOSPITALIST

## 2025-01-12 PROCEDURE — 2060000000 HC ICU INTERMEDIATE R&B

## 2025-01-12 PROCEDURE — 2500000003 HC RX 250 WO HCPCS: Performed by: PHYSICIAN ASSISTANT

## 2025-01-12 RX ORDER — OXYCODONE AND ACETAMINOPHEN 5; 325 MG/1; MG/1
TABLET ORAL
Qty: 56 TABLET | Refills: 0 | Status: SHIPPED | OUTPATIENT
Start: 2025-01-12 | End: 2025-01-13

## 2025-01-12 RX ADMIN — CARVEDILOL 3.12 MG: 6.25 TABLET, FILM COATED ORAL at 09:46

## 2025-01-12 RX ADMIN — FAMOTIDINE 20 MG: 20 TABLET, FILM COATED ORAL at 17:34

## 2025-01-12 RX ADMIN — EMPAGLIFLOZIN 10 MG: 10 TABLET, FILM COATED ORAL at 09:47

## 2025-01-12 RX ADMIN — CARVEDILOL 3.12 MG: 6.25 TABLET, FILM COATED ORAL at 17:34

## 2025-01-12 RX ADMIN — BUMETANIDE 2 MG: 1 TABLET ORAL at 09:46

## 2025-01-12 RX ADMIN — SODIUM CHLORIDE, PRESERVATIVE FREE 10 ML: 5 INJECTION INTRAVENOUS at 21:15

## 2025-01-12 RX ADMIN — ACETAMINOPHEN 650 MG: 325 TABLET ORAL at 03:51

## 2025-01-12 RX ADMIN — ROPINIROLE HYDROCHLORIDE 1 MG: 0.25 TABLET, FILM COATED ORAL at 21:14

## 2025-01-12 RX ADMIN — SODIUM CHLORIDE, PRESERVATIVE FREE 5 ML: 5 INJECTION INTRAVENOUS at 09:51

## 2025-01-12 RX ADMIN — INSULIN GLARGINE 25 UNITS: 100 INJECTION, SOLUTION SUBCUTANEOUS at 21:15

## 2025-01-12 RX ADMIN — OXYCODONE 10 MG: 5 TABLET ORAL at 10:58

## 2025-01-12 RX ADMIN — OXYCODONE 10 MG: 5 TABLET ORAL at 21:14

## 2025-01-12 RX ADMIN — OXYCODONE 5 MG: 5 TABLET ORAL at 06:27

## 2025-01-12 RX ADMIN — ACETAMINOPHEN 650 MG: 325 TABLET ORAL at 09:46

## 2025-01-12 RX ADMIN — LOSARTAN POTASSIUM 25 MG: 50 TABLET, FILM COATED ORAL at 09:50

## 2025-01-12 RX ADMIN — ATORVASTATIN CALCIUM 80 MG: 80 TABLET, FILM COATED ORAL at 09:46

## 2025-01-12 RX ADMIN — OXYCODONE 10 MG: 5 TABLET ORAL at 15:03

## 2025-01-12 RX ADMIN — INSULIN LISPRO 8 UNITS: 100 INJECTION, SOLUTION INTRAVENOUS; SUBCUTANEOUS at 11:11

## 2025-01-12 RX ADMIN — ACETAMINOPHEN 650 MG: 325 TABLET ORAL at 15:03

## 2025-01-12 RX ADMIN — AMIODARONE HYDROCHLORIDE 200 MG: 200 TABLET ORAL at 09:46

## 2025-01-12 RX ADMIN — ENOXAPARIN SODIUM 40 MG: 100 INJECTION SUBCUTANEOUS at 09:47

## 2025-01-12 RX ADMIN — ACETAMINOPHEN 650 MG: 325 TABLET ORAL at 21:15

## 2025-01-12 ASSESSMENT — PAIN DESCRIPTION - ONSET: ONSET: OTHER (COMMENT)

## 2025-01-12 ASSESSMENT — PAIN SCALES - GENERAL
PAINLEVEL_OUTOF10: 10
PAINLEVEL_OUTOF10: 7
PAINLEVEL_OUTOF10: 6
PAINLEVEL_OUTOF10: 7
PAINLEVEL_OUTOF10: 0
PAINLEVEL_OUTOF10: 5
PAINLEVEL_OUTOF10: 0

## 2025-01-12 ASSESSMENT — PAIN DESCRIPTION - LOCATION
LOCATION: HEAD;NECK
LOCATION: NECK
LOCATION: NECK
LOCATION: HEAD;NECK
LOCATION: NECK

## 2025-01-12 ASSESSMENT — PAIN - FUNCTIONAL ASSESSMENT
PAIN_FUNCTIONAL_ASSESSMENT: PREVENTS OR INTERFERES WITH MANY ACTIVE NOT PASSIVE ACTIVITIES
PAIN_FUNCTIONAL_ASSESSMENT: PREVENTS OR INTERFERES SOME ACTIVE ACTIVITIES AND ADLS

## 2025-01-12 ASSESSMENT — PAIN DESCRIPTION - ORIENTATION
ORIENTATION: MID;POSTERIOR
ORIENTATION: POSTERIOR
ORIENTATION: POSTERIOR

## 2025-01-12 ASSESSMENT — PAIN DESCRIPTION - PAIN TYPE: TYPE: SURGICAL PAIN

## 2025-01-12 ASSESSMENT — PAIN DESCRIPTION - DESCRIPTORS
DESCRIPTORS: STABBING;DISCOMFORT;SORE
DESCRIPTORS: SHARP;THROBBING
DESCRIPTORS: STABBING
DESCRIPTORS: ACHING
DESCRIPTORS: SHARP;THROBBING

## 2025-01-12 ASSESSMENT — PAIN SCALES - WONG BAKER: WONGBAKER_NUMERICALRESPONSE: NO HURT

## 2025-01-12 NOTE — PLAN OF CARE
Problem: Chronic Conditions and Co-morbidities  Goal: Patient's chronic conditions and co-morbidity symptoms are monitored and maintained or improved  Outcome: Progressing  Flowsheets (Taken 1/11/2025 0800 by Rahul Lira, RN)  Care Plan - Patient's Chronic Conditions and Co-Morbidity Symptoms are Monitored and Maintained or Improved:   Monitor and assess patient's chronic conditions and comorbid symptoms for stability, deterioration, or improvement   Collaborate with multidisciplinary team to address chronic and comorbid conditions and prevent exacerbation or deterioration     Problem: Discharge Planning  Goal: Discharge to home or other facility with appropriate resources  Outcome: Progressing  Flowsheets (Taken 1/11/2025 0800 by Rahul Lira, RN)  Discharge to home or other facility with appropriate resources:   Identify barriers to discharge with patient and caregiver   Arrange for needed discharge resources and transportation as appropriate   Identify discharge learning needs (meds, wound care, etc)     Problem: Safety - Adult  Goal: Free from fall injury  Outcome: Progressing     Problem: Pain  Goal: Verbalizes/displays adequate comfort level or baseline comfort level  Outcome: Progressing     Problem: Nutrition Deficit:  Goal: Optimize nutritional status  Outcome: Progressing     Problem: Skin/Tissue Integrity  Goal: Absence of new skin breakdown  Description: 1.  Monitor for areas of redness and/or skin breakdown  2.  Assess vascular access sites hourly  3.  Every 4-6 hours minimum:  Change oxygen saturation probe site  4.  Every 4-6 hours:  If on nasal continuous positive airway pressure, respiratory therapy assess nares and determine need for appliance change or resting period.  Outcome: Progressing

## 2025-01-12 NOTE — DISCHARGE INSTRUCTIONS
Posterior Cervical Decompression and Fusion Surgery Discharge Instructions     Thank you for choosing [facility] for your surgical needs. The following instructions will help to ensure your comfort and that you are well prepared after your surgery.     Post-Operative Visit:   The office is located at:    [facility]    [facility] [address], [address], main [place] [address]    [phone]     Please also call your primary care physician to schedule an appointment for further evaluation and care.     Diet:   You may resume your regular diet.   Be sure to eat a well-balanced diet. Protein promotes wound healing.   Pain medication and decreased activity can cause constipation. Drink 8-10 glasses of water a day, eat fresh fruits and vegetables, and add prunes, raisins and bran cereals to your diet if you do become constipated. A stool softener taken 1-2 times a day is helpful. Dulcolax suppositories or Fleets enemas are also available without a prescription. Call our office if the problem continues.     Activity and Exercise:   No driving until you are seen in the office.   Avoid riding in a car for the first two weeks until you come to the office to have your staples removed.   Start taking short, frequent walks in the beginning. [place], more frequent walks throughout the day are more beneficial than one long walk each day. You may gradually increase the distance; as tolerated. Your brace will help give support to your muscles while you walk.   If your pain increases, you may be walking too much or too far. Try backing off for a day or two and then resume slowly.   If physical therapy has been prescribed, you are not to perform range of motion, flexion, extension or lateral bending.   No lifting greater than 5 lbs (gallon of milk) for first few weeks after surgery.  No pushing, pulling, or overhead work.  odor, drainage or opening of the incision. Please check your incisions twice daily.   Fevers greater than 101.5 degrees   Flu-like symptoms, chills, shakes, chest pain, shortness of breath, nausea, vomiting, diarrhea   New or increased pain, numbness or tingling in the arms or legs, as well as new or increased balance or coordination issues.   New difficulty with urinating or holding your bladder or your bowels     *If you are unable to contact someone at the office and your symptoms persist or increase, call 911 or go to the emergency department.

## 2025-01-12 NOTE — PLAN OF CARE
NEUROSURGERY TO SIGN OFF     Please contact Neurosurgery with any questions or acute changes in neurological exam    PATIENT TO FOLLOW UP IN CLINIC:  Follow-up with Neurosurgery  [facility]  [address] [facility] [address] [facility] [address] (Medical Office Building 2)  [address]  Call [phone] for an appointment.          Patient can follow up in 2 weeks for post op wound check  No anticoagulation until post op day 5    Electronically signed by JAMES Gaxiola NP on 1/12/2025 at 2:56 PM

## 2025-01-12 NOTE — PROGRESS NOTES
[facility]  Office: [phone]  Dario Sequeira DO, Gunner De La Paz DO, Christo Pizano DO, Don Geller DO, Raphael Rosales MD, Mariya Polk MD, Bienvenido Mason MD, Loretta Diaz MD,  Ruiz Marroquin MD, Nathalie Kenyon MD, Earl Rosa DO, Belén Bullard MD,  Rock Hugo DO, Betsy Ray MD, Abhijit Webster MD, Margarito Sequeira DO, Meghan Cote MD, Rico Todd MD, Jason Nunez DO, Velma Kumar MD, Irina French MD, Delfina Ely MD, Renetta Cody MD,  John Martin DO, Yordan Samuel MD,  Shirley Waterhouse, CNP,  Amelia Leigh, CNP, Chana Boo, CNP, Max Bruno, CNP,  Beverly Martinez, DNP, Marianna Gleason, CNP, Delia Malcolm, CNP, Carlee Israel, CNP, Sumaya Navas, CNP, Claire Garnica, CNP, NATHAN Rodriguez-C, Rere Jarrett, CNS, Diya Card, CNP, Chen Mackey, CNP         [facility]   IN-PATIENT SERVICE   [facility]    Progress Note    1/11/2025    7:07 PM    Name:   Gracieal Holt  MRN:     [id number]     Acct:      [id number]   Room:   2019/2019-01  IP Day:  5  Admit Date:  1/6/2025  7:57 PM    PCP:   Travis Mason MD  Code Status:  Full Code    Subjective:     C/C: No chief complaint on file.    Interval History Status: not changed.     Patient states she is doing about the same.  No new complaints.    Brief History:     Graciela Holt is a 74 y.o.  female w/ DM2 COPD, prior stroke, with history of C-spine myelopathy status post decompression surgery (posterior C3 -7 cervical decompression with anterior listhesis C3 -4 in 2022 by Dr. Sauer) who presented to [facility] 01/03 with chronic progressive neck pain, with increasing weakness over the past month.  Patient reevaluated by Dr. Sauer and was recommended for neurosurgerical evaluation and transferred to [facility] for the management of Compression fracture of C1 vertebra, initial encounter (HCC).     Patient does describe chronic neck pain with  02/06/2018 10:16 PM    FIO2 40.0 02/06/2018 10:16 PM     Lab Results   Component Value Date/Time    SPECIAL Site: Urine 01/10/2025 07:08 PM     Lab Results   Component Value Date/Time    CULTURE NO GROWTH 01/10/2025 07:08 PM       Radiology:  XR CHEST PORTABLE    Result Date: 1/4/2025  1. Suspect infectious/inflammatory airways process. 2. Mild pulmonary vascular congestion.     MRI CERVICAL SPINE WO CONTRAST    Result Date: 1/3/2025  Cord compression at the foramen magnum/C1 level due to chronic nonunited odontoid fracture, associated severe chronic degenerative changes, and posterior subluxation of C1 on C2. Focal cord edema at C1.     CT HEAD WO CONTRAST    Result Date: 1/2/2025  No acute intracranial abnormality. Chronic nonunited odontoid fracture. Severe spinal canal stenosis and cord compression at C1.       Physical Examination:        Physical Exam  Vitals reviewed.   Constitutional:       Appearance: Normal appearance.   HENT:      Head: Normocephalic and atraumatic.      Mouth/Throat:      Mouth: Mucous membranes are moist.   Eyes:      Extraocular Movements: Extraocular movements intact.      Pupils: Pupils are equal, round, and reactive to light.   Cardiovascular:      Rate and Rhythm: Normal rate and regular rhythm.      Pulses: Normal pulses.      Heart sounds: Normal heart sounds.   Pulmonary:      Effort: Pulmonary effort is normal.      Breath sounds: Normal breath sounds.   Abdominal:      Palpations: Abdomen is soft.      Tenderness: There is no abdominal tenderness.   Musculoskeletal:         General: No swelling. Normal range of motion.      Cervical back: Tenderness present.   Skin:     General: Skin is warm and dry.   Neurological:      General: No focal deficit present.      Mental Status: She is alert and oriented to person, place, and time.         Assessment:        Hospital Problems             Last Modified POA    * (Principal) Compression fracture of C1 vertebra, initial encounter

## 2025-01-12 NOTE — PLAN OF CARE
Output by Drain (mL) 01/10/25 0701 - 01/10/25 1900 01/10/25 1901 - 01/11/25 0700 01/11/25 0701 - 01/11/25 1900 01/11/25 1901 - 01/12/25 0700 01/12/25 0701 - 01/12/25 1101   Closed/Suction Drain Posterior Neck Bulb 20 200 130 30       Drain Removal Note    [x]Subfascial Drain   []Subgaleal Drain  []Ventriculostomy Drain    Drain removed from suction, prepped with betadine and sterile towels placed to create sterile field. Drain suture cut and drain removed. A 2x2 and tegaderm  placed overhole with hemostasis.     No complications, patient tolerated procedure well.    --  Gordo Miller, CNP  11:01 AM EST

## 2025-01-12 NOTE — PROGRESS NOTES
Noted lab blood sugar 73, pt without symptoms, accu check 62, crackers and milk provided, recheck 15 min later, pt had 73, gave patient another pack of crackers. Checked again 15 minutes, came up to 104. Then breakfast provided. Pt. States feels better after eating crackers and meal.

## 2025-01-12 NOTE — PROGRESS NOTES
[facility]  Office: [phone]  Dario Sequeira DO, Gunner De La Paz DO, Christo Pizano DO, Don Geller DO, Raphael Rosales MD, Mariya Polk MD, Bienvenido Mason MD, Loretta Diaz MD,  Ruiz Marroquin MD, Nathalie Kenyon MD, Earl Rosa DO, Belén Bullard MD,  Rock Hugo DO, Betsy Ray MD, Abhijit Webster MD, Margarito Sequeira DO, Meghan Cote MD, Rico Todd MD, Jason Nunez DO, Velma Kumar MD, Irina French MD, Delifna Ely MD, Renetta Cody MD,  John Martin DO, Yordan Samuel MD,  Shirley Waterhouse, CNP,  Amelia Leigh, CNP, Chana Boo, CNP, Max Bruno, CNP,  Beverly Martinez, DNP, Marianna Gleason, CNP, Delia Malcolm, CNP, Carlee Israel, CNP, Sumaya Navas, CNP, Claire Garnica, CNP, NATHAN Rodriguez-C, Rere Jarrett, CNS, Diya Card, CNP, Chen Mackey, CNP         [facility]   IN-PATIENT SERVICE   [facility]    Progress Note    1/12/2025    6:47 PM    Name:   Graciela Holt  MRN:     [id number]     Acct:      [id number]   Room:   2019/2019-01  IP Day:  6  Admit Date:  1/6/2025  7:57 PM    PCP:   Travis Mason MD  Code Status:  Full Code    Subjective:     C/C: No chief complaint on file.    Interval History Status: not changed.     Patient states she is doing about the same.  No new complaints.    Brief History:     Graciela Holt is a 74 y.o.  female w/ DM2 COPD, prior stroke, with history of C-spine myelopathy status post decompression surgery (posterior C3 -7 cervical decompression with anterior listhesis C3 -4 in 2022 by Dr. Sauer) who presented to [facility] 01/03 with chronic progressive neck pain, with increasing weakness over the past month.  Patient reevaluated by Dr. Sauer and was recommended for neurosurgerical evaluation and transferred to [facility] for the management of Compression fracture of C1 vertebra, initial encounter (HCC).     Patient does describe chronic neck pain with  (Principal) Compression fracture of C1 vertebra, initial encounter ([facility]) 1/6/2025 Yes    NSVT (nonsustained ventricular tachycardia) ([facility]) 1/8/2025 Yes    Acute systolic congestive heart failure ([facility]) 1/8/2025 Yes    Pre-operative clearance 1/11/2025 Yes    Hyperglycemia 1/8/2025 Yes    Bandemia 1/8/2025 Yes    Head lice infestation 1/8/2025 Yes    Cervical spine instability 1/10/2025 Yes    Quadriparesis ([facility]) 1/10/2025 Yes       Plan:        C1 cord compression and edema sec to fracture  - Pain control  - Neurosurgery input appreciated  - MRI reviewed  - Plan for surgery noted  - Moderate risk for non cardiac surgery  - Pt understands risks and would like to proceed with surgery  - No further testing from Internal Medicine perspective  - Stop decadron  - Pt underwent surgery, POD # 1  - Neurosurgery has removed drain  - Discharge planning underway    2. Adult failure to thrive  - Multifactorial due to chronic medical conditions  - PT/OT eval and treat  - Might need placement into SNF  - Dietician consult    3. Head-lice  - Isolation precautions  - Ordered Permethrin  - If not better can consider oral meds    4. Leucocytosis  - Sec to steroids  - Monitor CBC daily    5. Hyperglycemia  - Sec to steroids  - Lantus, Lispro and ISS  - Accuchecks    6. Ischemic cardiomyopathy with HFrEF  - Cardiology input appreciated  - Cleared by cardiology for surgery    7. Hx of apical LV thrombus  - Pt was on Eliquis at home, hold now due to surgery  - Started on Heparin infusion to bridge  - Can on post op day 5 as per Neurosurgery    8. Hyponatremia and Hypochloremia  - Sec to hyperglycemia  - Monitor BMP daily  - Resolved    9. Anemia  - Watch for bleeding  - Monitor CBC daily    10. CAD, Ischemic cardiomyopathy, refused AICD and Lifevest, HFrEF EF 25%, DM 2, COPD, HTN, HLD, OA, Anxiety, Depression, CKD III   - All other conditions appear to be stable  - Restart home meds    11. DVT and GI prophylaxis        ZHANG MCCARTY

## 2025-01-12 NOTE — PROGRESS NOTES
[facility]   Progress Note                   Date:   1/12/2025  Patient name: Graciela Holt  Date of admission:  1/6/2025  7:57 PM  MRN:   [id number]  YOB: 1950  PCP: Travis Mason MD    Reason for Admission: Compression fracture of C1 vertebra, initial encounter ([facility]) [S12.090A]    Subjective:       Clinical Changes / Abnormalities:  Pt seen and examined in the room.  Patient resting in bed with SN at bedside. Pt denies any CP or sob.  Labs, vitals and tele reviewed- SR 78    Medications:   Scheduled Meds:   sodium chloride flush  5-40 mL IntraVENous 2 times per day    acetaminophen  650 mg Oral Q6H    polyethylene glycol  17 g Oral Daily    enoxaparin  40 mg SubCUTAneous Daily    insulin glargine  25 Units SubCUTAneous Nightly    insulin lispro  8 Units SubCUTAneous TID WC    sodium chloride flush  5-40 mL IntraVENous 2 times per day    amiodarone  200 mg Oral Daily    atorvastatin  80 mg Oral Daily    bumetanide  2 mg Oral Daily    carvedilol  3.125 mg Oral BID WC    empagliflozin  10 mg Oral Daily    famotidine  20 mg Oral QPM    losartan  25 mg Oral Daily    rOPINIRole  1 mg Oral Nightly    insulin lispro  0-4 Units SubCUTAneous 4x Daily AC & HS     Continuous Infusions:   sodium chloride      sodium chloride      dextrose       CBC:   Recent Labs     01/10/25  0713 01/11/25  0642 01/12/25  0647   WBC 16.8* 20.4* 14.8*   HGB 13.3 13.8 11.7*    372 305     BMP:    Recent Labs     01/10/25  0713 01/11/25  0642 01/12/25  0647   * 138 140   K 4.8 5.0 4.5   CL 99 100 103   CO2 27 26 28   BUN 55* 58* 58*   CREATININE 1.2* 1.4* 1.6*   GLUCOSE 175* 167* 71*     Hepatic: No results for input(s): \"AST\", \"ALT\", \"BILITOT\", \"ALKPHOS\" in the last 72 hours.    Invalid input(s): \"ALB\"  Troponin: No results for input(s): \"TROPHS\" in the last 72 hours.  BNP: No results for input(s): \"BNP\" in the last 72 hours.  Lipids: No results for input(s): \"CHOL\", \"HDL\" in the last 72  periosteal motion within the apex.  Hypokinesis within the  mid and basal segments of the anterior wall and septal wall..  Calculated  ejection fraction of 51%.  3. Risk stratification: Intermediate     Cardiac Angiography:   reviewed.    Assessment / Acute Cardiac Problems:   ICM, HFrEF 25 to 25% on TTE 11/24  Refused AICD and LifeVest in the past  CAD s/p CABG x 4, in 2018 ( LIMA in seq to LAD and Diagonal, SVG to OM1, SVG to PDA)  Hx of apical LV thrombus 4/2024 - on Eliquis at home  Hx of NSVT and ventricular arrhythmias -on amiodarone  C-spine myelopathy with C1 focal cord edema with Hx of C3-7 laminectomy and fusion  Neurosurgery planning on intervention, requesting surgical clearance  Hypertension, hyperlipidemia    Patient Active Problem List:     Chronic pain     Controlled type 2 diabetes mellitus without complication, without long-term current use of insulin (HCC)     Allergic rhinitis     Hypertension goal BP (blood pressure) < 140/90     Mixed hyperlipidemia     Chronic obstructive pulmonary disease (HCC)     Coronary artery disease involving native coronary artery of native heart without angina pectoris     Primary insomnia     Diarrhea in adult patient     Restless leg syndrome     Atherosclerosis of superior mesenteric artery (HCC)     Left adrenal mass (HCC)     Former smoker     Chronic bilateral low back pain with left-sided sciatica     Hypothyroidism     S/P CABG x 4     Acute pain of right knee     Abnormal stress test     Tobacco use disorder     Neck pain     Acute ischemic left MCA stroke (HCC)     Internal carotid artery occlusion     Stenosis of left internal carotid artery     Acquired spondylolisthesis of cervical vertebra     Stenosis of cervical spine with myelopathy (HCC)     Abnormal EKG     COPD exacerbation (HCC)     Multifocal pneumonia     Hypoxia     Pneumonia     Iron deficiency anemia     Chronic combined systolic and diastolic congestive heart failure (HCC)     Type 2

## 2025-01-12 NOTE — PROGRESS NOTES
Neurosurgery HEBER/Resident    Daily Progress Note   CC:No chief complaint on file.    1/12/2025  7:06 AM    Chart reviewed.  No acute events overnight.  No new complaints. Reports minimal pain, has a new cervical collar which does fit much better than previous, has drain that has minimal output 30ml/12 hours, tolerating diet well, worked with PT 18ft with RW     Vitals:    01/11/25 1938 01/11/25 2321 01/12/25 0336 01/12/25 0559   BP: 117/67 (!) 99/58 102/78    Pulse: 68 64 61    Resp: 16  15    Temp: 99 °F (37.2 °C) 98.6 °F (37 °C) 97.9 °F (36.6 °C)    TempSrc: Oral Axillary Axillary    SpO2: 91% 93% 94%    Weight:    52.3 kg (115 lb 4.8 oz)   Height:           PE:   AOx3   Motor   L deltoid 4+/5; R deltoid 4+/5  L biceps 5/5; R biceps 5/5  L triceps 4+/5; R triceps 4+/5  L wrist extension 5/5; R wrist extension 5/5  L intrinsics 4+/5; R intrinsics 4+/5      L iliopsoas 5/5 , R iliopsoas 5/5  L quadriceps 5/5; R quadriceps 5/5  L Dorsiflexion 5/5; R dorsiflexion 5/5  L Plantarflexion 5/5; R plantarflexion 5/5  L EHL 5/5; R EHL 5/5    Sensation: diminished in hands but reports improvement     Drain output: 30ml/12 hours   Incision: CDI      Lab Results   Component Value Date    WBC 20.4 (H) 01/11/2025    HGB 13.8 01/11/2025    HCT 44.4 01/11/2025     01/11/2025    CHOL 159 11/06/2024    TRIG 41 11/06/2024    HDL 55 11/06/2024    ALT 14 01/02/2025    AST 23 01/02/2025     01/11/2025    K 5.0 01/11/2025     01/11/2025    CREATININE 1.4 (H) 01/11/2025    BUN 58 (H) 01/11/2025    CO2 26 01/11/2025    TSH 0.96 11/06/2024    INR 1.1 01/07/2025    LABA1C 7.2 (H) 04/05/2024    CRP 13.1 (H) 10/02/2021    SEDRATE 34 (H) 05/21/2018       A/P  74 y.o. female who presents with  craniocervical instability, cervical stenosis with myelopathy, osteopenia, quadriparesis  POD#2 s/p occiput to T2 posterior fixation      Continue drain and record output- likely remove today    PT and OT  Cervical collar on while out of

## 2025-01-13 VITALS
HEART RATE: 76 BPM | BODY MASS INDEX: 19.68 KG/M2 | TEMPERATURE: 98.2 F | WEIGHT: 115.3 LBS | DIASTOLIC BLOOD PRESSURE: 74 MMHG | RESPIRATION RATE: 18 BRPM | HEIGHT: 64 IN | OXYGEN SATURATION: 95 % | SYSTOLIC BLOOD PRESSURE: 142 MMHG

## 2025-01-13 PROBLEM — Z01.818 PRE-OPERATIVE CLEARANCE: Status: RESOLVED | Noted: 2025-01-11 | Resolved: 2025-01-13

## 2025-01-13 PROBLEM — R73.9 HYPERGLYCEMIA: Status: RESOLVED | Noted: 2025-01-08 | Resolved: 2025-01-13

## 2025-01-13 PROBLEM — I50.21 ACUTE SYSTOLIC CONGESTIVE HEART FAILURE (HCC): Status: RESOLVED | Noted: 2025-01-08 | Resolved: 2025-01-13

## 2025-01-13 PROBLEM — G82.50 QUADRIPARESIS (HCC): Status: RESOLVED | Noted: 2025-01-10 | Resolved: 2025-01-13

## 2025-01-13 LAB
ANION GAP SERPL CALCULATED.3IONS-SCNC: 8 MMOL/L (ref 9–16)
BUN SERPL-MCNC: 48 MG/DL (ref 8–23)
CALCIUM SERPL-MCNC: 9.1 MG/DL (ref 8.6–10.4)
CHLORIDE SERPL-SCNC: 102 MMOL/L (ref 98–107)
CO2 SERPL-SCNC: 29 MMOL/L (ref 20–31)
CREAT SERPL-MCNC: 1.3 MG/DL (ref 0.6–0.9)
ERYTHROCYTE [DISTWIDTH] IN BLOOD BY AUTOMATED COUNT: 13.9 % (ref 11.8–14.4)
EST. AVERAGE GLUCOSE BLD GHB EST-MCNC: 171 MG/DL
GFR, ESTIMATED: 43 ML/MIN/1.73M2
GLUCOSE BLD-MCNC: 101 MG/DL (ref 65–105)
GLUCOSE BLD-MCNC: 112 MG/DL (ref 65–105)
GLUCOSE BLD-MCNC: 66 MG/DL (ref 65–105)
GLUCOSE SERPL-MCNC: 109 MG/DL (ref 74–99)
HBA1C MFR BLD: 7.6 % (ref 4–6)
HCT VFR BLD AUTO: 37.5 % (ref 36.3–47.1)
HGB BLD-MCNC: 11.9 G/DL (ref 11.9–15.1)
MCH RBC QN AUTO: 27.6 PG (ref 25.2–33.5)
MCHC RBC AUTO-ENTMCNC: 31.7 G/DL (ref 28.4–34.8)
MCV RBC AUTO: 87 FL (ref 82.6–102.9)
NRBC BLD-RTO: 0 PER 100 WBC
PLATELET # BLD AUTO: 300 K/UL (ref 138–453)
PMV BLD AUTO: 10.2 FL (ref 8.1–13.5)
POTASSIUM SERPL-SCNC: 4.5 MMOL/L (ref 3.7–5.3)
RBC # BLD AUTO: 4.31 M/UL (ref 3.95–5.11)
SODIUM SERPL-SCNC: 139 MMOL/L (ref 136–145)
WBC OTHER # BLD: 16.2 K/UL (ref 3.5–11.3)

## 2025-01-13 PROCEDURE — 99232 SBSQ HOSP IP/OBS MODERATE 35: CPT | Performed by: NURSE PRACTITIONER

## 2025-01-13 PROCEDURE — 82947 ASSAY GLUCOSE BLOOD QUANT: CPT

## 2025-01-13 PROCEDURE — 97110 THERAPEUTIC EXERCISES: CPT

## 2025-01-13 PROCEDURE — 6360000002 HC RX W HCPCS: Performed by: PHYSICIAN ASSISTANT

## 2025-01-13 PROCEDURE — 80048 BASIC METABOLIC PNL TOTAL CA: CPT

## 2025-01-13 PROCEDURE — 6370000000 HC RX 637 (ALT 250 FOR IP): Performed by: NURSE PRACTITIONER

## 2025-01-13 PROCEDURE — 97530 THERAPEUTIC ACTIVITIES: CPT

## 2025-01-13 PROCEDURE — 6370000000 HC RX 637 (ALT 250 FOR IP): Performed by: PHYSICIAN ASSISTANT

## 2025-01-13 PROCEDURE — 36415 COLL VENOUS BLD VENIPUNCTURE: CPT

## 2025-01-13 PROCEDURE — 83036 HEMOGLOBIN GLYCOSYLATED A1C: CPT

## 2025-01-13 PROCEDURE — 85027 COMPLETE CBC AUTOMATED: CPT

## 2025-01-13 PROCEDURE — 2500000003 HC RX 250 WO HCPCS: Performed by: PHYSICIAN ASSISTANT

## 2025-01-13 RX ORDER — CYCLOBENZAPRINE HCL 10 MG
10 TABLET ORAL 3 TIMES DAILY PRN
DISCHARGE
Start: 2025-01-13 | End: 2025-01-23

## 2025-01-13 RX ORDER — METFORMIN HYDROCHLORIDE 500 MG/1
500 TABLET, EXTENDED RELEASE ORAL
DISCHARGE
Start: 2025-01-13

## 2025-01-13 RX ORDER — OXYCODONE AND ACETAMINOPHEN 5; 325 MG/1; MG/1
1 TABLET ORAL EVERY 6 HOURS PRN
Qty: 28 TABLET | Refills: 0 | Status: SHIPPED | OUTPATIENT
Start: 2025-01-13 | End: 2025-01-20

## 2025-01-13 RX ORDER — OXYCODONE AND ACETAMINOPHEN 5; 325 MG/1; MG/1
TABLET ORAL
Status: SHIPPED | OUTPATIENT
Start: 2025-01-13 | End: 2025-01-13 | Stop reason: HOSPADM

## 2025-01-13 RX ORDER — OXYCODONE AND ACETAMINOPHEN 5; 325 MG/1; MG/1
1 TABLET ORAL EVERY 6 HOURS PRN
Status: SHIPPED | OUTPATIENT
Start: 2025-01-13 | End: 2025-01-13

## 2025-01-13 RX ADMIN — BUMETANIDE 2 MG: 1 TABLET ORAL at 08:10

## 2025-01-13 RX ADMIN — EMPAGLIFLOZIN 10 MG: 10 TABLET, FILM COATED ORAL at 08:10

## 2025-01-13 RX ADMIN — HYDROMORPHONE HYDROCHLORIDE 1 MG: 1 INJECTION, SOLUTION INTRAMUSCULAR; INTRAVENOUS; SUBCUTANEOUS at 13:22

## 2025-01-13 RX ADMIN — SODIUM CHLORIDE, PRESERVATIVE FREE 10 ML: 5 INJECTION INTRAVENOUS at 08:10

## 2025-01-13 RX ADMIN — ACETAMINOPHEN 650 MG: 325 TABLET ORAL at 08:10

## 2025-01-13 RX ADMIN — LOSARTAN POTASSIUM 25 MG: 50 TABLET, FILM COATED ORAL at 08:10

## 2025-01-13 RX ADMIN — CARVEDILOL 3.12 MG: 6.25 TABLET, FILM COATED ORAL at 08:10

## 2025-01-13 RX ADMIN — OXYCODONE 10 MG: 5 TABLET ORAL at 05:55

## 2025-01-13 RX ADMIN — OXYCODONE 5 MG: 5 TABLET ORAL at 10:44

## 2025-01-13 RX ADMIN — ATORVASTATIN CALCIUM 80 MG: 80 TABLET, FILM COATED ORAL at 08:10

## 2025-01-13 RX ADMIN — AMIODARONE HYDROCHLORIDE 200 MG: 200 TABLET ORAL at 08:10

## 2025-01-13 RX ADMIN — ENOXAPARIN SODIUM 40 MG: 100 INJECTION SUBCUTANEOUS at 08:09

## 2025-01-13 ASSESSMENT — PAIN DESCRIPTION - ORIENTATION
ORIENTATION: POSTERIOR
ORIENTATION: POSTERIOR

## 2025-01-13 ASSESSMENT — PAIN DESCRIPTION - LOCATION
LOCATION: NECK

## 2025-01-13 ASSESSMENT — PAIN DESCRIPTION - DESCRIPTORS
DESCRIPTORS: STABBING
DESCRIPTORS: STABBING

## 2025-01-13 ASSESSMENT — PAIN SCALES - GENERAL
PAINLEVEL_OUTOF10: 7
PAINLEVEL_OUTOF10: 10
PAINLEVEL_OUTOF10: 4
PAINLEVEL_OUTOF10: 3
PAINLEVEL_OUTOF10: 5

## 2025-01-13 ASSESSMENT — PAIN DESCRIPTION - PAIN TYPE
TYPE: SURGICAL PAIN
TYPE: SURGICAL PAIN

## 2025-01-13 NOTE — PROGRESS NOTES
[facility]   Progress Note                   Date:   1/13/2025  Patient name: Graciela Holt  Date of admission:  1/6/2025  7:57 PM  MRN:   [id number]  YOB: 1950  PCP: Travis Mason MD    Reason for Admission: Compression fracture of C1 vertebra, initial encounter ([facility]) [S12.090A]    Subjective:       Clinical Changes / Abnormalities:  Pt seen and examined in the room.  Patient resting in bed. Pt denies any CP or sob.  Labs, vitals and tele reviewed- SR 79    Medications:   Scheduled Meds:   sodium chloride flush  5-40 mL IntraVENous 2 times per day    acetaminophen  650 mg Oral Q6H    polyethylene glycol  17 g Oral Daily    enoxaparin  40 mg SubCUTAneous Daily    [Held by provider] insulin glargine  25 Units SubCUTAneous Nightly    [Held by provider] insulin lispro  8 Units SubCUTAneous TID WC    sodium chloride flush  5-40 mL IntraVENous 2 times per day    amiodarone  200 mg Oral Daily    atorvastatin  80 mg Oral Daily    bumetanide  2 mg Oral Daily    carvedilol  3.125 mg Oral BID WC    empagliflozin  10 mg Oral Daily    famotidine  20 mg Oral QPM    losartan  25 mg Oral Daily    rOPINIRole  1 mg Oral Nightly    [Held by provider] insulin lispro  0-4 Units SubCUTAneous 4x Daily AC & HS     Continuous Infusions:   sodium chloride      sodium chloride      dextrose       CBC:   Recent Labs     01/11/25  0642 01/12/25  0647 01/13/25  1012   WBC 20.4* 14.8* 16.2*   HGB 13.8 11.7* 11.9    305 300     BMP:    Recent Labs     01/11/25  0642 01/12/25  0647 01/13/25  1012    140 139   K 5.0 4.5 4.5    103 102   CO2 26 28 29   BUN 58* 58* 48*   CREATININE 1.4* 1.6* 1.3*   GLUCOSE 167* 71* 109*     Hepatic: No results for input(s): \"AST\", \"ALT\", \"BILITOT\", \"ALKPHOS\" in the last 72 hours.    Invalid input(s): \"ALB\"  Troponin: No results for input(s): \"TROPHS\" in the last 72 hours.  BNP: No results for input(s): \"BNP\" in the last 72 hours.  Lipids: No results for  cory-infarct ischemia.  2. Akinesis and periosteal motion within the apex.  Hypokinesis within the  mid and basal segments of the anterior wall and septal wall..  Calculated  ejection fraction of 51%.  3. Risk stratification: Intermediate     Cardiac Angiography:   reviewed.    Assessment / Acute Cardiac Problems:   ICM, HFrEF 25 to 25% on TTE 11/24  Refused AICD and LifeVest in the past  CAD s/p CABG x 4, in 2018 ( LIMA in seq to LAD and Diagonal, SVG to OM1, SVG to PDA)  Hx of apical LV thrombus 4/2024 - on Eliquis at home  Hx of NSVT and ventricular arrhythmias -on amiodarone  C-spine myelopathy with C1 focal cord edema with Hx of C3-7 laminectomy and fusion  Neurosurgery planning on intervention, requesting surgical clearance  Hypertension, hyperlipidemia    Patient Active Problem List:     Chronic pain     Controlled type 2 diabetes mellitus without complication, without long-term current use of insulin (HCC)     Allergic rhinitis     Hypertension goal BP (blood pressure) < 140/90     Mixed hyperlipidemia     Chronic obstructive pulmonary disease (HCC)     Coronary artery disease involving native coronary artery of native heart without angina pectoris     Primary insomnia     Diarrhea in adult patient     Restless leg syndrome     Atherosclerosis of superior mesenteric artery (HCC)     Left adrenal mass (HCC)     Former smoker     Chronic bilateral low back pain with left-sided sciatica     Hypothyroidism     S/P CABG x 4     Acute pain of right knee     Abnormal stress test     Tobacco use disorder     Neck pain     Acute ischemic left MCA stroke (HCC)     Internal carotid artery occlusion     Stenosis of left internal carotid artery     Acquired spondylolisthesis of cervical vertebra     Stenosis of cervical spine with myelopathy (HCC)     Abnormal EKG     COPD exacerbation (HCC)     Multifocal pneumonia     Hypoxia     Pneumonia     Iron deficiency anemia     Chronic combined systolic and diastolic

## 2025-01-13 NOTE — CARE COORDINATION
Angela from [facility] called to informed approved pre-cert for patient. Angela gave call back #[phone] for transport time. BECKY Brown clinical support sending [facility] for transport today. Patient and RN updated.    1136 [facility] faxed transport request    1206 Transport scheduled for 3pm . Angela at [facility] updated, HENS completed, 2nd IMM given by Stephanie, clinical support, AVS faxed to [facility].    1207 Discharge Report    [facility]  Clinical Case Management Department  Written by: Lauren Lopez    Patient Name: Graciela Holt  Attending Provider: Christo Pizano DO  Admit Date: 2025  7:57 PM  MRN: [id number]  Account: [id number]                     : 1950  Discharge Date: 2025      Disposition: [facility]    Lauren Lopez

## 2025-01-13 NOTE — DISCHARGE INSTR - COC
Continuity of Care Form    Patient Name: Graciela Holt   :  1950  MRN:  [id number]    Admit date:  2025  Discharge date:  25    Code Status Order: Full Code   Advance Directives:   Advance Care Flowsheet Documentation        Date/Time Healthcare Directive Type of Healthcare Directive Copy in Chart Healthcare Agent Appointed Healthcare Agent's Name Healthcare Agent's Phone Number    01/10/25 1206 Yes, patient has an advance directive for healthcare treatment  Durable power of  for health care  Yes, copy in chart  Healthcare power of   topher  --                     Admitting Physician:  Mariya Polk MD  PCP: Travis Mason MD    Discharging Nurse: ***  Discharging Hospital Unit/Room#:   Discharging Unit Phone Number: [phone]    Emergency Contact:   Extended Emergency Contact Information  Primary Emergency Contact: Topher Zheng  Address: [address] [place] [address]  Home Phone: [phone]  Work Phone: [phone]  Mobile Phone: [phone]  Relation: Child  Secondary Emergency Contact: Luana Cerda  Mobile Phone: [phone]  Relation: Friend    Past Surgical History:  Past Surgical History:   Procedure Laterality Date    APPENDECTOMY      BRONCHOSCOPY N/A 2021    BRONCHOSCOPY w/ WASHINGS performed by Toy Cheatham MD at [facility] ENDO    CARDIAC SURGERY      cath x 2/ stent x 1    CARDIAC SURGERY      bypass 4 vessel 2018    CERVICAL LAMINECTOMY N/A 10/14/2020    C3-C7 POSTERIOR CERVICAL DECOMPRESSION FUSION performed by Armando Guerra MD at [facility] OR    CHOLECYSTECTOMY      HYSTERECTOMY (CERVIX STATUS UNKNOWN)      JOINT REPLACEMENT Bilateral     knees    LEG BIOPSY EXCISION Right 2019    LEG LESION PUNCH BIOPSY performed by Chuckie Snow MD at [facility] OR    OTHER SURGICAL HISTORY  2020    cerebral angiogram    AL I&D DEEP ABSC BURSA/HEMATOMA THIGH/KNEE REGION Right 2018    DEBRIDEMENT INCISION AND  01/12/25 0400   Margins Other (Comment) 01/12/25 0400   Incision Assessment Other (Comment) 01/12/25 0400   Drainage Amount Moderate (25-50%) 01/12/25 0400   Drainage Description Sanguinous 01/12/25 0400   Odor None 01/12/25 0400   Number of days: 2        Elimination:  Continence:   Bowel: Yes  Bladder: Yes  Urinary Catheter: None and Removal Date 1/13/25    Colostomy/Ileostomy/Ileal Conduit: No       Date of Last BM: NA    Intake/Output Summary (Last 24 hours) at 1/13/2025 1055  Last data filed at 1/13/2025 0613  Gross per 24 hour   Intake --   Output 1550 ml   Net -1550 ml     I/O last 3 completed shifts:  In: 360 [P.O.:360]  Out: 2336 [Urine:2306; Drains:30]    Safety Concerns:     At Risk for Falls    Impairments/Disabilities:      None    Nutrition Therapy:  Current Nutrition Therapy:   - Oral Diet:  General    Routes of Feeding: Oral  Liquids: No Restrictions  Daily Fluid Restriction: no  Last Modified Barium Swallow with Video (Video Swallowing Test): not done    Treatments at the Time of Hospital Discharge:   Respiratory Treatments: NA    Oxygen Therapy:  is not on home oxygen therapy.  Ventilator:    - No ventilator support    Rehab Therapies: Physical Therapy and Occupational Therapy  Weight Bearing Status/Restrictions: No weight bearing restrictions  Other Medical Equipment (for information only, NOT a DME order):  walker  Other Treatments: NA    Patient's personal belongings (please select all that are sent with patient):  Clothing, slippers    RN SIGNATURE:  Electronically signed by BLAKE MACHADO RN on 1/13/25 at 11:44 AM EST    CASE MANAGEMENT/SOCIAL WORK SECTION    Inpatient Status Date: ***    Readmission Risk Assessment Score:  [facility] RISK OF UNPLANNED READMISSION 2.0             30.4 Total Score        Discharging to Facility/ Agency   Name: [facility]    / signature: Electronically signed by Lauren Lopez on 1/13/25 at 11:10 AM EST    PHYSICIAN

## 2025-01-13 NOTE — PROGRESS NOTES
[facility]  Office: [phone]  Dario Sequeira DO, Gunner De La Paz DO, Christo Pizano DO, Don Geller DO, Raphael Rosales MD, Mariya Polk MD, Bienvenido Mason MD, Loretta Diaz MD,  Ruiz Marroquin MD, Nathalie Kenyon MD, Belén Bullard MD,  Rock Hugo DO, Betsy Ray MD, Abhijit Webster MD, Margarito Sequeira DO, Meghan Cote MD,  Jason Nunez DO, Velma Kumar MD, Irina French MD, Delfina Ely MD, Renetta Cody MD,  Moises Garnett MD, Chely Longo MD, Leif Mejia MD, Kevin Mccauley MD, Tyrese Du MD, Bhaskar Oswald MD, Max Mcgrath DO, Rico Todd MD, Rock Hicks MD, Mohsin Reza, MD, Shirley Waterhouse, CNP,  Amelia Leigh CNP, Max Bruno, CNP,  Beverly Martinez, JACK, Marianna Gleason, CNP, Delia Malcolm, CNP, Sumaya Navas, CNP, Claire Garnica, CNP, Jessica Pantoja, PA-C, Janeth Benjamin PA-C, Di Barger, CNP, Angelina Marcus, CNP,  Desirae Marie, CNP, Michelle Marquez, CNP, Chana Boo, CNP,  Chen Mackey, CNP, Cathryn Dale, CNP         [facility]   IN-PATIENT SERVICE   [facility]    Progress Note    1/13/2025    9:44 AM    Name:   Graciela Holt  MRN:     [id number]     Acct:      [id number]   Room:   2019/2019-01  IP Day:  7  Admit Date:  1/6/2025  7:57 PM    PCP:   Travis Mason MD  Code Status:  Full Code    Subjective:     C/C: Neck pain, transferred from [facility]  Interval History Status: improved.     Patient seen and evaluated in room sitting in bed.  Cervical collar office patient is resting in bed.  No acute events overnight.  Awaiting placement.    Brief History:     74-year-old patient presents through [facility] for evaluation of chronic progressive neck pain.  Had posterior cervical decompression with anterior listhesis in 2022 by Dr. Sauer who recommended patient be sent to [facility] for neurosurgical evaluation after finding a compression fracture at C1.  She was developing progressive

## 2025-01-13 NOTE — PROGRESS NOTES
Physical Therapy  Facility/Department: [facility] CAR 2- STEPDOWN  Daily treatment note    Name: Graciela Holt  : 1950  MRN: [id number]  Date of Service: 2025    Discharge Recommendations:  Patient would benefit from continued therapy after discharge   PT Equipment Recommendations  Equipment Needed: No      Patient Diagnosis(es): The primary encounter diagnosis was Cervical spine instability. 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  Past Medical History:  has a past medical history of Allergic rhinitis, Arthritis, CAD (coronary artery disease), Cerebral artery occlusion with cerebral infarction (HCC), CHF (congestive heart failure) (HCC), Controlled type 2 diabetes mellitus without complication, without long-term current use of insulin (HCC), COPD (chronic obstructive pulmonary disease) (HCC), Depression, Former smoker, History of blood transfusion, Hyperlipidemia, Hypertension, Kidney stone, Myocardial infarction (HCC), Obesity, Class I, BMI 30.0-34.9 (see actual BMI), Restless leg syndrome, Skin abnormality, Type II or unspecified type diabetes mellitus without mention of complication, not stated as uncontrolled, Wears glasses, and Wears partial dentures.  Past Surgical History:  has a past surgical history that includes Appendectomy; Hysterectomy; Cholecystectomy; joint replacement (Bilateral); pr office/outpt visit,procedure only (N/A, 2018); pr i&d deep absc bursa/hematoma thigh/knee region (Right, 2018); Leg biopsy excision (Right, 2019); Cardiac surgery; Cardiac surgery; other surgical history (2020); cervical laminectomy (N/A, 10/14/2020); bronchoscopy (N/A, 2021); and Thoracic Fusion (01/10/2025).    Assessment  Body Structures, Functions, Activity Limitations Requiring Skilled Therapeutic Intervention: Decreased functional mobility ;Decreased endurance;Decreased strength;Increased pain;Decreased balance;Decreased  coordination;Decreased safe awareness  Assessment: Pt ambulates 15 ft x2 RW mod Ax1. Pt required urgent seated rest break d/t c/o dizziness between ambulation bouts. Pt currently is unsafe to return to prior living situation d/t need for skilled assistance with all functional mobility. pt would benefit from continued therapy to promote endurance, balance, and strengthening.  Therapy Prognosis: Good  Decision Making: Medium Complexity  Barriers to Learning: none  Requires PT Follow-Up: Yes  Activity Tolerance  Activity Tolerance: Patient limited by fatigue;Patient limited by endurance    Plan  Physical Therapy Plan  General Plan:  (5-6x/week)  Current Treatment Recommendations: Strengthening, Transfer training, Endurance training, Gait training, Therapeutic activities, Safety education & training, Balance training, Functional mobility training, Home exercise program, Patient/Caregiver education & training, Equipment evaluation, education, & procurement  Safety Devices  Type of Devices: Call light within reach, Gait belt, Nurse notified, All fall risk precautions in place, Left in chair, Chair alarm in place  Restraints  Restraints Initially in Place: No    Restrictions  Restrictions/Precautions  Restrictions/Precautions: Fall Risk  Activity Level: Up with Assist  Required Braces or Orthoses?: Yes  Required Braces or Orthoses  Cervical: c-collar (C-collar can be removed when eating or in bed. C-collar is to be placed on when patient is out of bed.)  Position Activity Restriction  Other Position/Activity Restrictions: s/p: s/p occiput to T2 posterior fixation on 1/10     Subjective  General  Patient assessed for rehabilitation services?: Yes  Response To Previous Treatment: Patient with no complaints from previous session.  Family/Caregiver Present: No  Follows Commands: Within Functional Limits  Subjective  Subjective: RN and pt agreeable to PT. pt agreeable and pleasant. pt seated in recliner at start of session, c/o  Inpatient   How much help is needed turning from your back to your side while in a flat bed without using bedrails?: A Little  How much help is needed moving from lying on your back to sitting on the side of a flat bed without using bedrails?: A Little  How much help is needed moving to and from a bed to a chair?: A Lot  How much help is needed standing up from a chair using your arms?: A Lot  How much help is needed walking in hospital room?: A Lot  How much help is needed climbing 3-5 steps with a railing?: Total  AM-PAC Inpatient Mobility Raw Score : 13  AM-PAC Inpatient T-Scale Score : 36.74  Mobility Inpatient CMS 0-100% Score: 64.91  Mobility Inpatient CMS G-Code Modifier : CL            Goals  Short Term Goals  Time Frame for Short Term Goals: 14 visits  Short Term Goal 1: Pt will perform sit<>stand transfer with supervision.  Short Term Goal 2: Pt will ambulate 100 feet with least restrictive AD and SBA.  Short Term Goal 3: Pt will demonstrate fair+ dynamic standing balance to decrease fall risk.  Short Term Goal 4: Pt will tolerate a 35 minute therapy session to promote increased endurance.  Short Term Goal 5: Pt will perform supine<>sit transfer with SBA.       Education  Patient Education  Education Given To: Patient  Education Provided: Role of Therapy;Plan of Care;Transfer Training;Mobility Training;Fall Prevention Strategies  Education Method: Verbal  Barriers to Learning: Cognition  Education Outcome: Continued education needed;Verbalized understanding      Therapy Time   Individual Concurrent Group Co-treatment   Time In 1130         Time Out 1203         Minutes 33         Timed Code Treatment Minutes: 30 Minutes       Yue Mcgowan PT

## 2025-01-13 NOTE — ANESTHESIA POSTPROCEDURE EVALUATION
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    Carlos Phase I: Carlos Score: 9    Carlos Phase II:      Anesthesia Post Evaluation    Patient location during evaluation: bedside  Patient participation: complete - patient participated  Level of consciousness: awake and awake and alert  Pain score: 2  Airway patency: patent  Nausea & Vomiting: no nausea and no vomiting  Cardiovascular status: blood pressure returned to baseline and hemodynamically stable  Respiratory status: acceptable  Hydration status: euvolemic  Pain management: adequate        No notable events documented.

## 2025-01-13 NOTE — DISCHARGE SUMMARY
Writer gave pt discharge education and paperwork. All question asked and answered at this time. Iv's have been removed. Pt was picked up by transport, with all of pt belongings. Report was called to [facility].

## 2025-01-13 NOTE — PLAN OF CARE
Problem: Chronic Conditions and Co-morbidities  Goal: Patient's chronic conditions and co-morbidity symptoms are monitored and maintained or improved  Outcome: Completed     Problem: Discharge Planning  Goal: Discharge to home or other facility with appropriate resources  Outcome: Completed  Flowsheets (Taken 1/13/2025 0800)  Discharge to home or other facility with appropriate resources:   Identify barriers to discharge with patient and caregiver   Arrange for needed discharge resources and transportation as appropriate   Identify discharge learning needs (meds, wound care, etc)   Refer to discharge planning if patient needs post-hospital services based on physician order or complex needs related to functional status, cognitive ability or social support system     Problem: Safety - Adult  Goal: Free from fall injury  Outcome: Completed     Problem: Pain  Goal: Verbalizes/displays adequate comfort level or baseline comfort level  Outcome: Completed     Problem: Nutrition Deficit:  Goal: Optimize nutritional status  Outcome: Completed     Problem: Skin/Tissue Integrity  Goal: Absence of new skin breakdown  Description: 1.  Monitor for areas of redness and/or skin breakdown  2.  Assess vascular access sites hourly  3.  Every 4-6 hours minimum:  Change oxygen saturation probe site  4.  Every 4-6 hours:  If on nasal continuous positive airway pressure, respiratory therapy assess nares and determine need for appliance change or resting period.  Outcome: Completed     Problem: Chronic Conditions and Co-morbidities  Goal: Patient's chronic conditions and co-morbidity symptoms are monitored and maintained or improved  Outcome: Completed     Problem: Discharge Planning  Goal: Discharge to home or other facility with appropriate resources  Outcome: Completed  Flowsheets (Taken 1/13/2025 0800)  Discharge to home or other facility with appropriate resources:   Identify barriers to discharge with patient and caregiver

## 2025-01-14 ENCOUNTER — TELEPHONE (OUTPATIENT)
Dept: FAMILY MEDICINE CLINIC | Age: 75
End: 2025-01-14

## 2025-01-14 NOTE — DISCHARGE SUMMARY
[facility]  Office: [phone]  Dario Sequeira DO, Gunner De La Paz DO, Christo Pizano DO, Don Geller DO, Raphael Rosales MD, Mariya Polk MD, Bienvenido Mason MD, Loretta Diaz MD,  Ruiz Marroquin MD, Nathalie Kenyon MD, Belén Bullard MD,  Rock Hugo DO, Betsy Ray MD, Abhijit Webster MD, Margarito Sequeira DO, Meghan Cote MD,  Jason Nunez DO, Velma Kumar MD, Irina French MD, Delfina Ely MD, Renetta Cody MD,  Moises Garnett MD, Chely Longo MD, Leif Mejia MD, Kevin Mccauley MD, Tyrese Du MD, Bhaskar Oswald MD, Max Mcgrath DO, Rico Todd MD, Rock Hicks MD, Mohsin Reza, MD, Shirley Waterhouse, CNP,  Amelia Leigh CNP, Max Bruno, CNP,  Beverly Martinez, JACK, Marianna Gleason, CNP, Delia Malcolm, CNP, Sumaya Navas, CNP, Claire Garnica, CNP, Jessica Pantoja, PA-C, Janeth Benjamin PA-C, Di Barger, CNP, Angelina Marcus, CNP,  Desirae Marie, CNP, Michelle Marquez, CNP, Chana Boo, CNP,  Rere Jarrett, CNS, Carlee Israel, CNP, Chen Mackey, CNP,   Cathryn Dale, CNP         [facility]   IN-PATIENT SERVICE   [facility]    Discharge Summary     Patient ID: Graciela Holt  :  1950   MRN: [id number]     ACCOUNT:  [id number]   Patient's PCP: Travis Mason MD  Admit Date: 2025   Discharge Date: 25  Length of Stay: 7  Code Status:  Prior  Admitting Physician: Mariya Polk MD  Discharge Physician: JAMES Rollins - NP     Active Discharge Diagnoses:     Hospital Problem Lists:  Principal Problem:    Compression fracture of C1 vertebra, initial encounter ([facility])  Active Problems:    NSVT (nonsustained ventricular tachycardia) ([facility])    Bandemia    Head lice infestation    Cervical spine instability  Resolved Problems:    Acute systolic congestive heart failure ([facility])    Pre-operative clearance    Hyperglycemia    Quadriparesis ([facility])      Admission Condition:  fair     Discharged Condition:  01/07/2025 03:28 PM    HGBUR NEGATIVE 01/07/2025 03:28 PM    PHUR 5.5 01/07/2025 03:28 PM    PHUR 5.5 05/02/2018 08:00 PM    PROTEINU NEGATIVE 01/07/2025 03:28 PM    GLUCOSEU 3+ 01/07/2025 03:28 PM    KETUA NEGATIVE 01/07/2025 03:28 PM    BILIRUBINUR NEGATIVE 01/07/2025 03:28 PM    UROBILINOGEN Normal 01/07/2025 03:28 PM    NITRU NEGATIVE 01/07/2025 03:28 PM    LEUKOCYTESUR NEGATIVE 01/07/2025 03:28 PM     TSH:    Lab Results   Component Value Date/Time    TSH 0.96 11/06/2024 05:44 AM        Radiology:  XR CERVICAL SPINE (2-3 VIEWS)    Result Date: 1/11/2025  Postoperative changes as described without radiographically evident hardware complication.  Evaluation limited by severe osteopenia.       Consultations:    Consults:     Final Specialist Recommendations/Findings:   IP CONSULT TO NEUROSURGERY  IP CONSULT TO NEUROSURGERY  IP CONSULT TO SOCIAL WORK  IP CONSULT TO CARDIOLOGY  IP CONSULT TO DIETITIAN      The patient was seen and examined on day of discharge and this discharge summary is in conjunction with any daily progress note from day of discharge.    Discharge plan:     Disposition: snf    Physician Follow Up:     Courtney Dominguez, APRN - CNP  [facility] [address] [facility] [address]  [facility] [address]  [phone]    Follow up in 2 week(s)  please follow up in 2 weeks for postop wound check       Requiring Further Evaluation/Follow Up POST HOSPITALIZATION/Incidental Findings: Continued follow-up with PCP for wound check.  Continue to wear cervical collar while up out of bed.  Okay to have cervical collar off low resting in bed    Diet: regular diet    Activity: As tolerated    Instructions to Patient:     Discharge Medications:      Medication List        START taking these medications      cyclobenzaprine 10 MG tablet  Commonly known as: FLEXERIL  Take 1 tablet by mouth 3 times daily as needed for Muscle spasms     metFORMIN 500 MG extended release tablet  Commonly known as: GLUCOPHAGE-XR  Take 1 tablet by mouth  daily (with breakfast)     oxyCODONE-acetaminophen 5-325 MG per tablet  Commonly known as: Percocet  Take 1 tablet by mouth every 6 hours as needed for Pain for up to 7 days. Intended supply: 7 days. Take lowest dose possible to manage pain Max Daily Amount: 4 tablets            CONTINUE taking these medications      albuterol sulfate  (90 Base) MCG/ACT inhaler  Commonly known as: PROVENTIL;VENTOLIN;PROAIR  Inhale 2 puffs into the lungs every 6 hours as needed for Wheezing or Shortness of Breath     amiodarone 200 MG tablet  Commonly known as: CORDARONE  Take 1 tablet by mouth daily     apixaban 5 MG Tabs tablet  Commonly known as: ELIQUIS  Take 1 tablet by mouth 2 times daily  Start taking on: January 15, 2025     atorvastatin 80 MG tablet  Commonly known as: LIPITOR  Take 1 tablet by mouth daily     budesonide-formoterol 160-4.5 MCG/ACT Aero  Commonly known as: SYMBICORT  Inhale 2 puffs into the lungs 2 times daily     bumetanide 2 MG tablet  Commonly known as: BUMEX  Take 1 tablet by mouth daily     carvedilol 3.125 MG tablet  Commonly known as: COREG  Take 1 tablet by mouth 2 times daily (with meals)     empagliflozin 10 MG tablet  Commonly known as: JARDIANCE  Take 1 tablet by mouth daily     famotidine 40 MG tablet  Commonly known as: PEPCID  Take 1 tablet by mouth every evening     fluticasone 50 MCG/ACT nasal spray  Commonly known as: FLONASE  USE 2 SPRAYS IN EACH NOSTRIL ONCE DAILY     losartan 25 MG tablet  Commonly known as: COZAAR  Take 1 tablet by mouth daily     nitroGLYCERIN 0.3 MG SL tablet  Commonly known as: Nitrostat  Place 1 tablet under the tongue as needed for Chest pain up to max of 3 total doses. If no relief after 1 dose, call 911.     potassium chloride 10 MEQ extended release tablet  Commonly known as: KLOR-CON  Take 2 tablets by mouth 2 times daily     rOPINIRole 1 MG tablet  Commonly known as: REQUIP  TAKE 1 TABLET BY MOUTH EVERY NIGHT AS NEEDED     SM Aspirin Adult Low Strength

## 2025-01-14 NOTE — TELEPHONE ENCOUNTER
Care Transitions Initial Follow Up Call    Outreach made within 2 business days of discharge: Yes    Patient: Graciela Holt Patient : 1950   MRN: [id number]  Reason for Admission: Compression fracture of C1 vertebra   Discharge Date: 25       Spoke with: pt is in a skilled facility     Discharge department/facility: [facility] Interactive Patient Contact:  Was patient able to fill all prescriptions:   Was patient instructed to bring all medications to the follow-up visit:   Is patient taking all medications as directed in the discharge summary?   Does patient understand their discharge instructions:   Does patient have questions or concerns that need addressed prior to 7-14 day follow up office visit:     Additional needs identified to be addressed with provider  Pt discharged to skilled facility              ZEINAB Cantu MA

## 2025-01-16 NOTE — TELEPHONE ENCOUNTER
Care Transitions Initial Follow Up Call    Outreach made within 2 business days of discharge: Yes    Patient: Graciela Holt Patient : 1950   MRN: [id number]  Reason for Admission:   Discharge Date: 25       Spoke with: SECOND ATTEMPT TO SCHEDULE PT     Discharge department/facility: [facility]        T[facility] Interactive Patient Contact:  Was patient able to fill all prescriptions:   Was patient instructed to bring all medications to the follow-up visit:   Is patient taking all medications as directed in the discharge summary?   Does patient understand their discharge instructions:   Does patient have questions or concerns that need addressed prior to 7-14 day follow up office visit:     Additional needs identified to be addressed with provider                 Keira Cantu MA

## 2025-01-17 ENCOUNTER — TELEPHONE (OUTPATIENT)
Dept: FAMILY MEDICINE CLINIC | Age: 75
End: 2025-01-17

## 2025-01-17 NOTE — TELEPHONE ENCOUNTER
Care Transitions Initial Follow Up Call    Outreach made within 2 business days of discharge: Yes    Patient: Graciela Holt Patient : 1950   MRN: [id number]  Reason for Admission:   Discharge Date: 25       Spoke with: pt is in a skilled facility     Discharge department/facility: [facility] Interactive Patient Contact:  Was patient able to fill all prescriptions:   Was patient instructed to bring all medications to the follow-up visit:   Is patient taking all medications as directed in the discharge summary?   Does patient understand their discharge instructions:   Does patient have questions or concerns that need addressed prior to 7-14 day follow up office visit:     Additional needs identified to be addressed with provider  pt is in a skilled facility                    Keira Cantu MA

## 2025-01-20 ENCOUNTER — HOSPITAL ENCOUNTER (OUTPATIENT)
Age: 75
Setting detail: SPECIMEN
Discharge: HOME | End: 2025-01-20

## 2025-01-20 LAB
ANION GAP SERPL CALCULATED.3IONS-SCNC: 10 MMOL/L (ref 9–16)
BUN SERPL-MCNC: 23 MG/DL (ref 8–23)
CALCIUM SERPL-MCNC: 8.9 MG/DL (ref 8.6–10.4)
CHLORIDE SERPL-SCNC: 102 MMOL/L (ref 98–107)
CO2 SERPL-SCNC: 31 MMOL/L (ref 20–31)
CREAT SERPL-MCNC: 1.3 MG/DL (ref 0.6–0.9)
ERYTHROCYTE [DISTWIDTH] IN BLOOD BY AUTOMATED COUNT: 14 % (ref 11.8–14.4)
GFR, ESTIMATED: 43 ML/MIN/1.73M2
GLUCOSE SERPL-MCNC: 117 MG/DL (ref 74–99)
HCT VFR BLD AUTO: 35.3 % (ref 36.3–47.1)
HGB BLD-MCNC: 11.2 G/DL (ref 11.9–15.1)
MCH RBC QN AUTO: 28 PG (ref 25.2–33.5)
MCHC RBC AUTO-ENTMCNC: 31.7 G/DL (ref 28.4–34.8)
MCV RBC AUTO: 88.3 FL (ref 82.6–102.9)
NRBC BLD-RTO: 0 PER 100 WBC
PLATELET # BLD AUTO: 310 K/UL (ref 138–453)
PMV BLD AUTO: 10.1 FL (ref 8.1–13.5)
POTASSIUM SERPL-SCNC: 3.7 MMOL/L (ref 3.7–5.3)
RBC # BLD AUTO: 4 M/UL (ref 3.95–5.11)
SODIUM SERPL-SCNC: 143 MMOL/L (ref 136–145)
WBC OTHER # BLD: 9.5 K/UL (ref 3.5–11.3)

## 2025-01-20 PROCEDURE — 36415 COLL VENOUS BLD VENIPUNCTURE: CPT

## 2025-01-20 PROCEDURE — 85027 COMPLETE CBC AUTOMATED: CPT

## 2025-01-20 PROCEDURE — 80048 BASIC METABOLIC PNL TOTAL CA: CPT

## 2025-01-20 PROCEDURE — P9603 ONE-WAY ALLOW PRORATED MILES: HCPCS

## 2025-01-22 ENCOUNTER — TELEPHONE (OUTPATIENT)
Dept: NEUROSURGERY | Age: 75
End: 2025-01-22

## 2025-01-24 ENCOUNTER — HOSPITAL ENCOUNTER (OUTPATIENT)
Age: 75
Setting detail: SPECIMEN
Discharge: HOME | End: 2025-01-24

## 2025-01-24 LAB
ANION GAP SERPL CALCULATED.3IONS-SCNC: 13 MMOL/L (ref 9–16)
BUN SERPL-MCNC: 23 MG/DL (ref 8–23)
CALCIUM SERPL-MCNC: 9.5 MG/DL (ref 8.6–10.4)
CHLORIDE SERPL-SCNC: 96 MMOL/L (ref 98–107)
CO2 SERPL-SCNC: 32 MMOL/L (ref 20–31)
CREAT SERPL-MCNC: 1.2 MG/DL (ref 0.6–0.9)
ERYTHROCYTE [DISTWIDTH] IN BLOOD BY AUTOMATED COUNT: 14.2 % (ref 11.8–14.4)
GFR, ESTIMATED: 47 ML/MIN/1.73M2
GLUCOSE SERPL-MCNC: 267 MG/DL (ref 74–99)
HCT VFR BLD AUTO: 39 % (ref 36.3–47.1)
HGB BLD-MCNC: 12.3 G/DL (ref 11.9–15.1)
MCH RBC QN AUTO: 27.6 PG (ref 25.2–33.5)
MCHC RBC AUTO-ENTMCNC: 31.5 G/DL (ref 28.4–34.8)
MCV RBC AUTO: 87.6 FL (ref 82.6–102.9)
NRBC BLD-RTO: 0 PER 100 WBC
PLATELET # BLD AUTO: 309 K/UL (ref 138–453)
PMV BLD AUTO: 10.2 FL (ref 8.1–13.5)
POTASSIUM SERPL-SCNC: 3 MMOL/L (ref 3.7–5.3)
RBC # BLD AUTO: 4.45 M/UL (ref 3.95–5.11)
SODIUM SERPL-SCNC: 141 MMOL/L (ref 136–145)
WBC OTHER # BLD: 13.7 K/UL (ref 3.5–11.3)

## 2025-01-24 PROCEDURE — 85027 COMPLETE CBC AUTOMATED: CPT

## 2025-01-24 PROCEDURE — 80048 BASIC METABOLIC PNL TOTAL CA: CPT

## 2025-01-24 PROCEDURE — P9603 ONE-WAY ALLOW PRORATED MILES: HCPCS

## 2025-01-24 PROCEDURE — 36415 COLL VENOUS BLD VENIPUNCTURE: CPT

## 2025-01-24 NOTE — DISCHARGE INSTR - COC
Continuity of Care Form    Patient Name: Graciela Holt   :  1950  MRN:  [id number]    Admit date:  2024  Discharge date:  ***    Code Status Order: Full Code   Advance Directives:   Advance Care Flowsheet Documentation             Admitting Physician:  Chip Anderson MD  PCP: Travis Mason MD    Discharging Nurse: ***  Discharging Hospital Unit/Room#: 2120/2120-01  Discharging Unit Phone Number: ***    Emergency Contact:   Extended Emergency Contact Information  Primary Emergency Contact: Guanakito Zheng  Address: [address] [place] [address] of [facility]  Home Phone: [phone]  Work Phone: [phone]  Mobile Phone: [phone]  Relation: Child    Past Surgical History:  Past Surgical History:   Procedure Laterality Date    APPENDECTOMY      BRONCHOSCOPY N/A 2021    BRONCHOSCOPY w/ WASHINGS performed by Toy Cheatham MD at [facility] ENDO    CARDIAC SURGERY      cath x 2/ stent x 1    CARDIAC SURGERY      bypass 4 vessel 2018    CERVICAL LAMINECTOMY N/A 10/14/2020    C3-C7 POSTERIOR CERVICAL DECOMPRESSION FUSION performed by Armando Guerra MD at [facility] OR    CHOLECYSTECTOMY      HYSTERECTOMY (CERVIX STATUS UNKNOWN)      JOINT REPLACEMENT Bilateral     knees    LEG BIOPSY EXCISION Right 2019    LEG LESION PUNCH BIOPSY performed by Chuckie Snow MD at [facility] OR    OTHER SURGICAL HISTORY  2020    cerebral angiogram    RI I&D DEEP ABSC BURSA/HEMATOMA THIGH/KNEE REGION Right 2018    DEBRIDEMENT INCISION AND DRAINAGE THIGH ABSCESS performed by Amanda Hernandez DO at [facility] OR    RI OFFICE/OUTPT VISIT,PROCEDURE ONLY N/A 2018    CABG X 3 LIMA-LAD-DIAG,SVG-PDA,CORONARY ARTERY BYPASS REDO, PUMP ASSIST, SWAN, JARRED, REDO STERNOTOMY performed by Savanna Mata MD at [facility] CVOR       Immunization History:   Immunization History   Administered Date(s) Administered    Influenza Virus Vaccine 2016, 2017    Influenza Whole 2015    Influenza, FLUAD, (age 63  output data in the 24 hours ending 24 1617  No intake/output data recorded.    Safety Concerns:     { CRISTOBAL Safety Concerns:051151448}    Impairments/Disabilities:      { CRISTOBAL Impairments/Disabilities:112555181}    Nutrition Therapy:  Current Nutrition Therapy:   { CRISTOBAL Diet List:608617048}    Routes of Feeding: {CHP DME Other Feedings:261168099}  Liquids: {Slp liquid thickness:85555}  Daily Fluid Restriction: {CHP DME Yes amt example:198615319}  Last Modified Barium Swallow with Video (Video Swallowing Test): {Done Not Done Date:}    Treatments at the Time of Hospital Discharge:   Respiratory Treatments: ***  Oxygen Therapy:  {Therapy; copd oxygen:74525}  Ventilator:    { CC Vent List:533570131}    Rehab Therapies: {THERAPEUTIC INTERVENTION:4026379187}  Weight Bearing Status/Restrictions: { CC Weight Bearin}  Other Medical Equipment (for information only, NOT a DME order):  {EQUIPMENT:179424424}  Other Treatments: ***    Patient's personal belongings (please select all that are sent with patient):  {[facility] DME Belongings:992123304}    RN SIGNATURE:  {Esignature:597836507}    CASE MANAGEMENT/SOCIAL WORK SECTION    Inpatient Status Date: ***    Readmission Risk Assessment Score:  Readmission Risk              Risk of Unplanned Readmission:  32           Discharging to Facility/ Agency   Name:   Address:  Phone:  Fax:    Dialysis Facility (if applicable)   Name:  Address:  Dialysis Schedule:  Phone:  Fax:    / signature: {Esignature:515708710}    PHYSICIAN SECTION    Prognosis: {Prognosis:2823705429}    Condition at Discharge: { Patient Condition:833029959}    Rehab Potential (if transferring to Rehab): {Prognosis:6649526668}    Recommended Labs or Other Treatments After Discharge: ***    Physician Certification: I certify the above information and transfer of Graciela Holt  is necessary for the continuing treatment of the diagnosis listed and that she  reviewed

## 2025-01-27 ENCOUNTER — TELEPHONE (OUTPATIENT)
Dept: NEUROSURGERY | Age: 75
End: 2025-01-27

## 2025-01-31 ENCOUNTER — HOSPITAL ENCOUNTER (OUTPATIENT)
Age: 75
Setting detail: SPECIMEN
Discharge: HOME | End: 2025-01-31

## 2025-02-03 NOTE — PROGRESS NOTES
Physician Progress Note      PATIENT:               IVETT ANDRE  [facility] #:                  [id number]  :                       1950  ADMIT DATE:       2025 7:57 PM  DISCH DATE:        2025 4:09 PM  RESPONDING  PROVIDER #:        Yessica Flynn DO          QUERY TEXT:    Pt. admitted for Anterior displaced Type II dens fracture. Per Op note dated   1/10 documentation of Bone graft material used. To accurately reflect   procedure performed please specify autograft, allograft or synthetic used with   the occiput to T2 posterior arthrodesis .    The medical record reflects the following:  Risk Factors: Craniocervical instability, cervical stenosis with myelopathy,   osteopenia, quadriparesis  Clinical Indicators:  Per Implant record on 1/10/25-Implant Name GRAFT BNE SUB   [id number] POSTEROLATERAL CERV DEMIN BNE MTRX -  [employer]   SPINALGRAFT TECH- -Status Implanted  Supply Type Bone graft extender or void fillers or substitutes -Area Of   Implantation Spine Cervical  Treatment: Procedure-Posterior lateral arthrodesis from occiput to   T2-Occipital cervical fixation with plate  Segmental fixation from C3-T2 on the right side and C4-T2 on the left side in   the setting of replacement of prior hardware from M5-N7-Sifsspoirucc of left   C3 lateral mass screw  Options provided:  -- Implanted Bone graft material used for this patient was allograft  -- Implanted Bone graft material used for this patient was autograft  -- Implanted Bone graft material used for this patient was synthetic.  -- Other - I will add my own diagnosis  -- Disagree - Not applicable / Not valid  -- Disagree - Clinically unable to determine / Unknown  -- Refer to Clinical Documentation Reviewer    PROVIDER RESPONSE TEXT:    Implanted Bone graft material used for this patient was allograft    Query created by: Cyndee Luis on 2025 10:00 AM      Electronically signed by:  Yessica Flynn DO 2/3/2025 11:12

## 2025-02-13 ENCOUNTER — TELEPHONE (OUTPATIENT)
Dept: FAMILY MEDICINE CLINIC | Age: 75
End: 2025-02-13

## 2025-02-13 NOTE — TELEPHONE ENCOUNTER
[facility] ED Follow up Call    Reason for ED visit:    1/6/2025 - 1/13/2025 (7 days)  [facility]     Alvina Denny MD  Last attending  Treatment team Compression fracture of C1 vertebra, initial encounter (HCC)       Atilio Owens ,     This is Violeta Mazariegos from Travis Vaughn's office, just calling to see how you are doing after your recent ED visit.       FU appts/Provider:      No future appointments.    VOICEMAIL DOCUMENTATION - ERASE IF NOT USED  Hi, this message is for  Graciela    This is Violeta Mazariegos from Travis Gutierrez office. Just calling to see how you are doing after your recent visit to the Emergency Room. Travis Gutierrez wants to make sure you were able to fill any prescriptions and that you understand your discharge instructions. Please return our call if you need to make a follow up appointment with your provider or have any further needs.   Our phone number is [phone].     Have a great day!

## 2025-02-19 ENCOUNTER — HOSPITAL ENCOUNTER (EMERGENCY)
Age: 75
Discharge: HOME | End: 2025-02-19
Attending: EMERGENCY MEDICINE
Payer: COMMERCIAL

## 2025-02-19 VITALS
TEMPERATURE: 98.4 F | HEIGHT: 64 IN | HEART RATE: 82 BPM | SYSTOLIC BLOOD PRESSURE: 161 MMHG | OXYGEN SATURATION: 96 % | BODY MASS INDEX: 19.63 KG/M2 | WEIGHT: 115 LBS | DIASTOLIC BLOOD PRESSURE: 103 MMHG | RESPIRATION RATE: 16 BRPM

## 2025-02-19 DIAGNOSIS — Z48.02 ENCOUNTER FOR STAPLE REMOVAL: Primary | ICD-10-CM

## 2025-02-19 PROCEDURE — 99282 EMERGENCY DEPT VISIT SF MDM: CPT

## 2025-02-19 ASSESSMENT — PAIN - FUNCTIONAL ASSESSMENT: PAIN_FUNCTIONAL_ASSESSMENT: NONE - DENIES PAIN

## 2025-02-20 NOTE — ED PROVIDER NOTES
[facility] EMERGENCY DEPARTMENT  eMERGENCY dEPARTMENT eNCOUnter   Independent Attestation     Pt Name: Graciela Holt  MRN: [id number]  Birthdate 1950  Date of evaluation: 2/19/25   Graciela Holt is a 74 y.o. female who presents with Suture / Staple Removal    Vitals:   Vitals:    02/19/25 1655   BP: (!) 161/103   Pulse: 82   Resp: 16   Temp: 98.4 °F (36.9 °C)   TempSrc: Oral   SpO2: 96%   Weight: 52.2 kg (115 lb)   Height: 1.626 m (5' 4\")     Impression:   1. Encounter for staple removal      I was personally available for consultation in the Emergency Department. I have reviewed the chart and agree with the documentation as recorded by the MLP, including the assessment, treatment plan and disposition.  Samm Alvarez MD  Attending Emergency  Physician                  Samm Alvarez MD  02/19/25 2100    
has a 56 pack-year smoking history. She has never used smokeless tobacco. She reports that she does not currently use drugs after having used the following drugs: Marijuana (Weed). She reports that she does not drink alcohol.    PHYSICAL EXAM     INITIAL VITALS: BP (!) 161/103   Pulse 82   Temp 98.4 °F (36.9 °C) (Oral)   Resp 16   Ht 1.626 m (5' 4\")   Wt 52.2 kg (115 lb)   SpO2 96%   BMI 19.74 kg/m²      Physical Exam  Vitals and nursing note reviewed.   Constitutional:       Appearance: She is not toxic-appearing.   HENT:      Head: Normocephalic.   Cardiovascular:      Rate and Rhythm: Normal rate and regular rhythm.      Pulses: Normal pulses.      Heart sounds: Normal heart sounds.   Pulmonary:      Effort: Pulmonary effort is normal.      Breath sounds: Normal breath sounds.   Skin:     General: Skin is warm.   Neurological:      Mental Status: She is alert.         MEDICAL DECISION MAKING:     suresh Miller NP contacted via [employer] states okay to remove her staples.  Staples removed.  She tolerated well. wound appears well-healed no redness swelling drainage signs of infection.   Have them follow-up with the neurosurgeon in 1 to 2 days.  Watch for infection.  Return for any concerns  DIAGNOSTIC RESULTS     EKG: All EKG's are interpreted by the Emergency Department Physician who either signs or Co-signs this chart in the absence of acardiologist.        RADIOLOGY:Allplain film, CT, MRI, and formal ultrasound images (except ED bedside ultrasound) are read by the radiologist and the images and interpretations are directly viewed by the emergency physician.           LABS:All lab results were reviewed by myself, and all abnormals are listed below.  Labs Reviewed - No data to display      EMERGENCY DEPARTMENT COURSE:   Vitals:    Vitals:    02/19/25 1655   BP: (!) 161/103   Pulse: 82   Resp: 16   Temp: 98.4 °F (36.9 °C)   TempSrc: Oral   SpO2: 96%   Weight: 52.2 kg (115 lb)   Height: 1.626 m (5' 4\")

## 2025-02-21 ENCOUNTER — TELEPHONE (OUTPATIENT)
Dept: FAMILY MEDICINE CLINIC | Age: 75
End: 2025-02-21

## 2025-02-21 NOTE — TELEPHONE ENCOUNTER
[facility] ED Follow up Call    Reason for ED visit:        2/19/2025 (39 minutes)  [facility] Emergency Department     Samm Alvarez MD  Last attending  Treatment team Encounter for staple removal  Clinical impression Suture / Staple Removal  Chief complaint       Atilio Owens ,     This is Violeta Mazariegos from Travis Vaughn's office, just calling to see how you are doing after your recent ED visit.     No future appointments.    VOICEMAIL DOCUMENTATION - ERASE IF NOT USED  Hi, this message is for  Graciela    This is Violeta Mazariegos from Travis Gutierrez office. Just calling to see how you are doing after your recent visit to the Emergency Room. Travis Gutierrez wants to make sure you were able to fill any prescriptions and that you understand your discharge instructions. Please return our call if you need to make a follow up appointment with your provider or have any further needs.   Our phone number is [phone].     Have a great day!

## 2025-02-27 ENCOUNTER — OFFICE VISIT (OUTPATIENT)
Dept: NEUROSURGERY | Age: 75
End: 2025-02-27

## 2025-02-27 VITALS
HEIGHT: 64 IN | SYSTOLIC BLOOD PRESSURE: 123 MMHG | WEIGHT: 117.6 LBS | DIASTOLIC BLOOD PRESSURE: 87 MMHG | BODY MASS INDEX: 20.08 KG/M2 | HEART RATE: 83 BPM

## 2025-02-27 DIAGNOSIS — Z98.1 S/P FUSION OF THORACIC SPINE: ICD-10-CM

## 2025-02-27 DIAGNOSIS — Z98.1 S/P CERVICAL SPINAL FUSION: Primary | ICD-10-CM

## 2025-02-27 DIAGNOSIS — S12.090D COMPRESSION FRACTURE OF C1 VERTEBRA WITH ROUTINE HEALING, SUBSEQUENT ENCOUNTER: ICD-10-CM

## 2025-02-27 PROCEDURE — 99024 POSTOP FOLLOW-UP VISIT: CPT

## 2025-02-27 RX ORDER — CYCLOBENZAPRINE HCL 10 MG
10 TABLET ORAL 3 TIMES DAILY PRN
Qty: 21 TABLET | Refills: 0 | Status: SHIPPED | OUTPATIENT
Start: 2025-02-27 | End: 2025-03-09

## 2025-02-27 NOTE — PROGRESS NOTES
[facility] NEUROSURGERY [facility]  [address]  [address]  [address]  [facility] [address]  Dept: [phone]    Patient:  Graciela Holt  YOB: 1950  Date: 2/27/25    The patient is a 74 y.o. female who presents today for consult of the following problems:     Chief Complaint   Patient presents with    Follow-up     Checked herslef out of nursing home, very yellow  Staples removed follow up         HPI:     Graciela Holt is a 74 y.o. female who presents to the office for post-op evaluation s/p Occipitocervical decompression and fusion with hardware revision from occiput to T2.      The patient had a history of extensive prior fusion and significant occipital-cervical instability, with subluxation and cord compression, along with significant phlegmon. She experienced progressive quadriparesis and worsening myelopathy. Imaging showed occipital-cervical instability following prior subaxial fusion. 1/10/25 she underwent occipitocervical decompression and fusion with hardware revision from occiput to T2. She was discharged to [facility] and signed out 3 days later. She came to [facility] ED 2/19 requesting staples to be removed. Patient presents today, she is not wearing the cervical collar, she had to dispose of it due to lice contamination. We discussed wearing the collar when she is out of bed, okay to remove for hygiene and eating. New collar fitted and given tot he patient.  Patient denies any pain. Denies any numbness, tingling or weakness. No fevers or chills. No redness or drainage from the incision. No bladder or bowel incontinence. No saddle anesthesia. Denies any headaches.     Motor 5/5  Sensation intact  Incision clean, dry, and intact. Sutures previously removed.    Date of surgery: 1/10/25  Dr. Flynn    Assessment and Plan:     1. S/P cervical spinal fusion    2. S/P fusion of thoracic spine    3.

## 2025-03-03 ENCOUNTER — OFFICE VISIT (OUTPATIENT)
Dept: FAMILY MEDICINE CLINIC | Age: 75
End: 2025-03-03
Payer: COMMERCIAL

## 2025-03-03 VITALS
HEART RATE: 94 BPM | BODY MASS INDEX: 20.14 KG/M2 | OXYGEN SATURATION: 99 % | WEIGHT: 118 LBS | HEIGHT: 64 IN | SYSTOLIC BLOOD PRESSURE: 156 MMHG | DIASTOLIC BLOOD PRESSURE: 93 MMHG

## 2025-03-03 DIAGNOSIS — E11.9 CONTROLLED TYPE 2 DIABETES MELLITUS WITHOUT COMPLICATION, WITHOUT LONG-TERM CURRENT USE OF INSULIN (HCC): Chronic | ICD-10-CM

## 2025-03-03 DIAGNOSIS — I25.10 CORONARY ARTERY DISEASE INVOLVING NATIVE CORONARY ARTERY OF NATIVE HEART WITHOUT ANGINA PECTORIS: Chronic | ICD-10-CM

## 2025-03-03 DIAGNOSIS — J44.9 CHRONIC OBSTRUCTIVE PULMONARY DISEASE, UNSPECIFIED COPD TYPE (HCC): ICD-10-CM

## 2025-03-03 DIAGNOSIS — I50.42 CHRONIC COMBINED SYSTOLIC AND DIASTOLIC CONGESTIVE HEART FAILURE (HCC): ICD-10-CM

## 2025-03-03 DIAGNOSIS — E78.2 MIXED HYPERLIPIDEMIA: Chronic | ICD-10-CM

## 2025-03-03 DIAGNOSIS — E55.9 VITAMIN D DEFICIENCY: ICD-10-CM

## 2025-03-03 DIAGNOSIS — Z87.891 PERSONAL HISTORY OF TOBACCO USE: ICD-10-CM

## 2025-03-03 DIAGNOSIS — I10 HYPERTENSION GOAL BP (BLOOD PRESSURE) < 140/90: Chronic | ICD-10-CM

## 2025-03-03 DIAGNOSIS — M53.2X2 CERVICAL SPINE INSTABILITY: ICD-10-CM

## 2025-03-03 DIAGNOSIS — Z98.890 H/O CERVICAL SPINE SURGERY: Primary | ICD-10-CM

## 2025-03-03 PROBLEM — R06.02 SHORTNESS OF BREATH: Status: RESOLVED | Noted: 2023-09-08 | Resolved: 2025-03-03

## 2025-03-03 PROBLEM — I20.9 ANGINA PECTORIS: Status: RESOLVED | Noted: 2024-08-12 | Resolved: 2025-03-03

## 2025-03-03 PROBLEM — B85.0 HEAD LICE INFESTATION: Status: RESOLVED | Noted: 2025-01-08 | Resolved: 2025-03-03

## 2025-03-03 PROBLEM — I63.512 ACUTE ISCHEMIC LEFT MCA STROKE (HCC): Status: RESOLVED | Noted: 2020-07-25 | Resolved: 2025-03-03

## 2025-03-03 PROBLEM — M25.561 ACUTE PAIN OF RIGHT KNEE: Status: RESOLVED | Noted: 2018-05-08 | Resolved: 2025-03-03

## 2025-03-03 PROBLEM — J18.9 MULTIFOCAL PNEUMONIA: Status: RESOLVED | Noted: 2021-10-02 | Resolved: 2025-03-03

## 2025-03-03 PROBLEM — I21.4 NSTEMI (NON-ST ELEVATED MYOCARDIAL INFARCTION) (HCC): Status: RESOLVED | Noted: 2024-04-05 | Resolved: 2025-03-03

## 2025-03-03 PROBLEM — Z71.89 GOALS OF CARE, COUNSELING/DISCUSSION: Status: RESOLVED | Noted: 2022-10-06 | Resolved: 2025-03-03

## 2025-03-03 PROBLEM — J44.1 COPD EXACERBATION (HCC): Status: RESOLVED | Noted: 2021-08-11 | Resolved: 2025-03-03

## 2025-03-03 PROBLEM — R07.9 CHEST PAIN: Status: RESOLVED | Noted: 2024-06-17 | Resolved: 2025-03-03

## 2025-03-03 PROBLEM — Z71.89 ACP (ADVANCE CARE PLANNING): Status: RESOLVED | Noted: 2022-10-06 | Resolved: 2025-03-03

## 2025-03-03 PROBLEM — Z51.5 ENCOUNTER FOR PALLIATIVE CARE: Status: RESOLVED | Noted: 2022-10-06 | Resolved: 2025-03-03

## 2025-03-03 PROBLEM — R94.31 ABNORMAL EKG: Status: RESOLVED | Noted: 2020-09-04 | Resolved: 2025-03-03

## 2025-03-03 PROBLEM — R94.39 ABNORMAL STRESS TEST: Status: RESOLVED | Noted: 2018-01-24 | Resolved: 2025-03-03

## 2025-03-03 PROBLEM — R53.1 WEAKNESS: Status: RESOLVED | Noted: 2025-01-02 | Resolved: 2025-03-03

## 2025-03-03 PROBLEM — J18.9 PNEUMONIA: Status: RESOLVED | Noted: 2021-11-26 | Resolved: 2025-03-03

## 2025-03-03 PROBLEM — I50.23 ACUTE ON CHRONIC HFREF (HEART FAILURE WITH REDUCED EJECTION FRACTION) (HCC): Status: RESOLVED | Noted: 2022-05-14 | Resolved: 2025-03-03

## 2025-03-03 PROBLEM — D72.825 BANDEMIA: Status: RESOLVED | Noted: 2025-01-08 | Resolved: 2025-03-03

## 2025-03-03 PROBLEM — I50.9 ACUTE DECOMPENSATED HEART FAILURE (HCC): Status: RESOLVED | Noted: 2024-01-15 | Resolved: 2025-03-03

## 2025-03-03 PROBLEM — I50.9 SYMPTOMATIC CONGESTIVE HEART FAILURE, PRE-OPERATIVE CARDIOVASCULAR EXAMINATION (HCC): Status: RESOLVED | Noted: 2022-05-14 | Resolved: 2025-03-03

## 2025-03-03 PROBLEM — Z01.810 SYMPTOMATIC CONGESTIVE HEART FAILURE, PRE-OPERATIVE CARDIOVASCULAR EXAMINATION (HCC): Status: RESOLVED | Noted: 2022-05-14 | Resolved: 2025-03-03

## 2025-03-03 PROBLEM — I47.29 NSVT (NONSUSTAINED VENTRICULAR TACHYCARDIA) (HCC): Status: RESOLVED | Noted: 2025-01-08 | Resolved: 2025-03-03

## 2025-03-03 PROBLEM — Z87.09 HISTORY OF COPD: Status: RESOLVED | Noted: 2023-09-08 | Resolved: 2025-03-03

## 2025-03-03 PROCEDURE — 3080F DIAST BP >= 90 MM HG: CPT | Performed by: FAMILY MEDICINE

## 2025-03-03 PROCEDURE — 3051F HG A1C>EQUAL 7.0%<8.0%: CPT | Performed by: FAMILY MEDICINE

## 2025-03-03 PROCEDURE — 3077F SYST BP >= 140 MM HG: CPT | Performed by: FAMILY MEDICINE

## 2025-03-03 PROCEDURE — 1159F MED LIST DOCD IN RCRD: CPT | Performed by: FAMILY MEDICINE

## 2025-03-03 PROCEDURE — 1123F ACP DISCUSS/DSCN MKR DOCD: CPT | Performed by: FAMILY MEDICINE

## 2025-03-03 PROCEDURE — 99215 OFFICE O/P EST HI 40 MIN: CPT | Performed by: FAMILY MEDICINE

## 2025-03-03 PROCEDURE — G0296 VISIT TO DETERM LDCT ELIG: HCPCS | Performed by: FAMILY MEDICINE

## 2025-03-03 PROCEDURE — 1160F RVW MEDS BY RX/DR IN RCRD: CPT | Performed by: FAMILY MEDICINE

## 2025-03-03 RX ORDER — POTASSIUM CHLORIDE 750 MG/1
20 TABLET, EXTENDED RELEASE ORAL 2 TIMES DAILY
Qty: 30 TABLET | Refills: 0 | Status: SHIPPED | OUTPATIENT
Start: 2025-03-03

## 2025-03-03 RX ORDER — FLUTICASONE PROPIONATE 50 MCG
2 SPRAY, SUSPENSION (ML) NASAL DAILY
Qty: 48 G | Refills: 3 | Status: SHIPPED | OUTPATIENT
Start: 2025-03-03

## 2025-03-03 RX ORDER — BUDESONIDE AND FORMOTEROL FUMARATE DIHYDRATE 160; 4.5 UG/1; UG/1
2 AEROSOL RESPIRATORY (INHALATION)
Qty: 10.2 G | Refills: 0 | Status: SHIPPED | OUTPATIENT
Start: 2025-03-03

## 2025-03-03 RX ORDER — LOSARTAN POTASSIUM 25 MG/1
25 TABLET ORAL DAILY
Qty: 90 TABLET | Refills: 0 | Status: SHIPPED | OUTPATIENT
Start: 2025-03-03

## 2025-03-03 RX ORDER — BUMETANIDE 2 MG/1
2 TABLET ORAL DAILY
Qty: 90 TABLET | Refills: 0 | Status: SHIPPED | OUTPATIENT
Start: 2025-03-03

## 2025-03-03 RX ORDER — AMIODARONE HYDROCHLORIDE 200 MG/1
200 TABLET ORAL DAILY
Qty: 30 TABLET | Refills: 0 | Status: SHIPPED | OUTPATIENT
Start: 2025-03-03

## 2025-03-03 RX ORDER — ATORVASTATIN CALCIUM 80 MG/1
80 TABLET, FILM COATED ORAL DAILY
Qty: 90 TABLET | Refills: 0 | Status: SHIPPED | OUTPATIENT
Start: 2025-03-03

## 2025-03-03 RX ORDER — CARVEDILOL 3.12 MG/1
3.12 TABLET ORAL 2 TIMES DAILY WITH MEALS
Qty: 60 TABLET | Refills: 1 | Status: SHIPPED | OUTPATIENT
Start: 2025-03-03

## 2025-03-03 RX ORDER — ALBUTEROL SULFATE 90 UG/1
2 INHALANT RESPIRATORY (INHALATION) EVERY 6 HOURS PRN
Qty: 18 G | Refills: 0 | Status: SHIPPED | OUTPATIENT
Start: 2025-03-03

## 2025-03-03 RX ORDER — FAMOTIDINE 40 MG/1
40 TABLET, FILM COATED ORAL EVERY EVENING
Qty: 30 TABLET | Refills: 3 | Status: SHIPPED | OUTPATIENT
Start: 2025-03-03

## 2025-03-03 SDOH — ECONOMIC STABILITY: FOOD INSECURITY: WITHIN THE PAST 12 MONTHS, YOU WORRIED THAT YOUR FOOD WOULD RUN OUT BEFORE YOU GOT MONEY TO BUY MORE.: PATIENT DECLINED

## 2025-03-03 SDOH — ECONOMIC STABILITY: FOOD INSECURITY: WITHIN THE PAST 12 MONTHS, THE FOOD YOU BOUGHT JUST DIDN'T LAST AND YOU DIDN'T HAVE MONEY TO GET MORE.: PATIENT DECLINED

## 2025-03-03 ASSESSMENT — ENCOUNTER SYMPTOMS
RHINORRHEA: 0
STRIDOR: 0
COUGH: 0
SINUS PRESSURE: 0
NAUSEA: 0
ABDOMINAL DISTENTION: 0
EYE REDNESS: 0
DIARRHEA: 0
SHORTNESS OF BREATH: 0
CONSTIPATION: 0
ABDOMINAL PAIN: 0
RECTAL PAIN: 0
WHEEZING: 1
CHEST TIGHTNESS: 0
BACK PAIN: 1
VOMITING: 0
BLOOD IN STOOL: 0
TROUBLE SWALLOWING: 0
SORE THROAT: 0
COLOR CHANGE: 0

## 2025-03-03 ASSESSMENT — PATIENT HEALTH QUESTIONNAIRE - PHQ9
3. TROUBLE FALLING OR STAYING ASLEEP: SEVERAL DAYS
10. IF YOU CHECKED OFF ANY PROBLEMS, HOW DIFFICULT HAVE THESE PROBLEMS MADE IT FOR YOU TO DO YOUR WORK, TAKE CARE OF THINGS AT HOME, OR GET ALONG WITH OTHER PEOPLE: VERY DIFFICULT
5. POOR APPETITE OR OVEREATING: SEVERAL DAYS
SUM OF ALL RESPONSES TO PHQ QUESTIONS 1-9: 5
1. LITTLE INTEREST OR PLEASURE IN DOING THINGS: SEVERAL DAYS
SUM OF ALL RESPONSES TO PHQ QUESTIONS 1-9: 5
SUM OF ALL RESPONSES TO PHQ QUESTIONS 1-9: 5
7. TROUBLE CONCENTRATING ON THINGS, SUCH AS READING THE NEWSPAPER OR WATCHING TELEVISION: NOT AT ALL
8. MOVING OR SPEAKING SO SLOWLY THAT OTHER PEOPLE COULD HAVE NOTICED. OR THE OPPOSITE, BEING SO FIGETY OR RESTLESS THAT YOU HAVE BEEN MOVING AROUND A LOT MORE THAN USUAL: NOT AT ALL
4. FEELING TIRED OR HAVING LITTLE ENERGY: SEVERAL DAYS
9. THOUGHTS THAT YOU WOULD BE BETTER OFF DEAD, OR OF HURTING YOURSELF: NOT AT ALL
2. FEELING DOWN, DEPRESSED OR HOPELESS: SEVERAL DAYS
6. FEELING BAD ABOUT YOURSELF - OR THAT YOU ARE A FAILURE OR HAVE LET YOURSELF OR YOUR FAMILY DOWN: NOT AT ALL
SUM OF ALL RESPONSES TO PHQ QUESTIONS 1-9: 5

## 2025-03-03 NOTE — PROGRESS NOTES
(ILIANA) vaccine  Aged Out    Diabetic foot exam  Discontinued    A1C test (Diabetic or Prediabetic)  Discontinued    Diabetic retinal exam  Discontinued    Breast cancer screen  Discontinued    Colorectal Cancer Screen  Discontinued    Lung Cancer Screening &/or Counseling  Discontinued               
corrected by editing.         Discussed with the patient the current USPSTF guidelines released March 9, 2021 for screening for lung cancer.    For adults aged 50 to 80 years who have a 20 pack-year smoking history and currently smoke or have quit within the past 15 years the grade B recommendation is to:  Screen for lung cancer with low-dose computed tomography (LDCT) every year.  Stop screening once a person has not smoked for 15 years or has a health problem that limits life expectancy or the ability to have lung surgery.    The patient  reports that she quit smoking about 2 years ago. Her smoking use included cigarettes. She started smoking about 58 years ago. She has a 56 pack-year smoking history. She has never used smokeless tobacco.. Discussed with patient the risks and benefits of screening, including over-diagnosis, false positive rate, and total radiation exposure.  The patient currently exhibits no signs or symptoms suggestive of lung cancer.  Discussed with patient the importance of compliance with yearly annual lung cancer screenings and willingness to undergo diagnosis and treatment if screening scan is positive.  In addition, the patient was counseled regarding the importance of remaining smoke free and/or total smoking cessation.    Also reviewed the following if the patient has Medicare that as of February 10, 2022, Medicare only covers LDCT screening in patients aged 50-77 with at least a 20 pack-year smoking history who currently smoke or have quit in the last 15 years

## 2025-03-10 ENCOUNTER — NURSE ONLY (OUTPATIENT)
Dept: FAMILY MEDICINE CLINIC | Age: 75
End: 2025-03-10
Payer: COMMERCIAL

## 2025-03-10 VITALS — DIASTOLIC BLOOD PRESSURE: 80 MMHG | SYSTOLIC BLOOD PRESSURE: 120 MMHG | OXYGEN SATURATION: 96 % | HEART RATE: 86 BPM

## 2025-03-10 DIAGNOSIS — J44.9 CHRONIC OBSTRUCTIVE PULMONARY DISEASE, UNSPECIFIED COPD TYPE (HCC): ICD-10-CM

## 2025-03-10 DIAGNOSIS — I10 HYPERTENSION GOAL BP (BLOOD PRESSURE) < 140/90: Primary | Chronic | ICD-10-CM

## 2025-03-10 DIAGNOSIS — Z98.890 H/O CERVICAL SPINE SURGERY: ICD-10-CM

## 2025-03-10 PROCEDURE — 3079F DIAST BP 80-89 MM HG: CPT | Performed by: FAMILY MEDICINE

## 2025-03-10 PROCEDURE — 99211 OFF/OP EST MAY X REQ PHY/QHP: CPT | Performed by: FAMILY MEDICINE

## 2025-03-10 PROCEDURE — 3074F SYST BP LT 130 MM HG: CPT | Performed by: FAMILY MEDICINE

## 2025-03-10 NOTE — PROGRESS NOTES
Chief Complaint   Patient presents with    Hypertension    Blood Pressure Check      Graciela CLAY Yanet is here for BP check    BP Readings from Last 3 Encounters:   03/10/25 120/80   03/03/25 (!) 156/93   02/27/25 123/87       BP is 120/80  Pt also needs handicap placard order       Future Appointments   Date Time Provider Department Center   3/10/2025  1:30 PM SCHEDULE, [facility] ECC DEP   5/5/2025 12:45 PM Travis Mason MD [facility] ECC DEP   5/7/2025  1:15 PM Main Mann DO [facility]   5/12/2025  3:00 PM Yessica Flynn DO [facility]        Blood pressure is running normal patient will continue same medications.

## 2025-04-07 ENCOUNTER — APPOINTMENT (OUTPATIENT)
Dept: GENERAL RADIOLOGY | Age: 75
DRG: 280 | End: 2025-04-07
Payer: COMMERCIAL

## 2025-04-07 ENCOUNTER — APPOINTMENT (OUTPATIENT)
Dept: CT IMAGING | Age: 75
DRG: 280 | End: 2025-04-07
Payer: COMMERCIAL

## 2025-04-07 ENCOUNTER — HOSPITAL ENCOUNTER (INPATIENT)
Age: 75
LOS: 3 days | Discharge: HOME | DRG: 280 | End: 2025-04-10
Attending: STUDENT IN AN ORGANIZED HEALTH CARE EDUCATION/TRAINING PROGRAM | Admitting: INTERNAL MEDICINE
Payer: COMMERCIAL

## 2025-04-07 DIAGNOSIS — I50.43 CHF (CONGESTIVE HEART FAILURE), NYHA CLASS I, ACUTE ON CHRONIC, COMBINED (HCC): ICD-10-CM

## 2025-04-07 DIAGNOSIS — I50.42 CHRONIC COMBINED SYSTOLIC AND DIASTOLIC CONGESTIVE HEART FAILURE (HCC): ICD-10-CM

## 2025-04-07 DIAGNOSIS — R06.09 DYSPNEA ON EXERTION: Primary | ICD-10-CM

## 2025-04-07 DIAGNOSIS — E11.22 TYPE 2 DIABETES MELLITUS WITH STAGE 3A CHRONIC KIDNEY DISEASE, WITHOUT LONG-TERM CURRENT USE OF INSULIN (HCC): ICD-10-CM

## 2025-04-07 DIAGNOSIS — J41.1 MUCOPURULENT CHRONIC BRONCHITIS (HCC): Chronic | ICD-10-CM

## 2025-04-07 DIAGNOSIS — I50.33 ACUTE ON CHRONIC DIASTOLIC (CONGESTIVE) HEART FAILURE: ICD-10-CM

## 2025-04-07 DIAGNOSIS — N18.31 TYPE 2 DIABETES MELLITUS WITH STAGE 3A CHRONIC KIDNEY DISEASE, WITHOUT LONG-TERM CURRENT USE OF INSULIN (HCC): ICD-10-CM

## 2025-04-07 DIAGNOSIS — R07.9 CHEST PAIN, UNSPECIFIED TYPE: ICD-10-CM

## 2025-04-07 DIAGNOSIS — I82.90 THROMBUS: ICD-10-CM

## 2025-04-07 PROBLEM — I21.4 NSTEMI (NON-ST ELEVATED MYOCARDIAL INFARCTION) (HCC): Status: ACTIVE | Noted: 2025-04-07

## 2025-04-07 LAB
ANION GAP SERPL CALCULATED.3IONS-SCNC: 12 MMOL/L (ref 9–16)
BASOPHILS # BLD: 0.1 K/UL (ref 0–0.2)
BASOPHILS NFR BLD: 1 % (ref 0–2)
BUN SERPL-MCNC: 15 MG/DL (ref 8–23)
CALCIUM SERPL-MCNC: 8.8 MG/DL (ref 8.6–10.4)
CHLORIDE SERPL-SCNC: 105 MMOL/L (ref 98–107)
CO2 SERPL-SCNC: 25 MMOL/L (ref 20–31)
CREAT SERPL-MCNC: 1.2 MG/DL (ref 0.7–1.2)
EOSINOPHIL # BLD: 0.1 K/UL (ref 0–0.4)
EOSINOPHILS RELATIVE PERCENT: 2 % (ref 0–4)
ERYTHROCYTE [DISTWIDTH] IN BLOOD BY AUTOMATED COUNT: 14.7 % (ref 11.5–14.9)
ERYTHROCYTE [DISTWIDTH] IN BLOOD BY AUTOMATED COUNT: 14.8 % (ref 11.5–14.9)
GFR, ESTIMATED: 47 ML/MIN/1.73M2
GLUCOSE BLD-MCNC: 238 MG/DL (ref 65–105)
GLUCOSE SERPL-MCNC: 188 MG/DL (ref 74–99)
HCT VFR BLD AUTO: 34.8 % (ref 36–46)
HCT VFR BLD AUTO: 36.8 % (ref 36–46)
HGB BLD-MCNC: 11.6 G/DL (ref 12–16)
HGB BLD-MCNC: 11.9 G/DL (ref 12–16)
INR PPP: 1.1
LYMPHOCYTES NFR BLD: 0.9 K/UL (ref 1–4.8)
LYMPHOCYTES RELATIVE PERCENT: 14 % (ref 24–44)
MAGNESIUM SERPL-MCNC: 1.6 MG/DL (ref 1.6–2.4)
MCH RBC QN AUTO: 27.2 PG (ref 26–34)
MCH RBC QN AUTO: 27.3 PG (ref 26–34)
MCHC RBC AUTO-ENTMCNC: 32.3 G/DL (ref 31–37)
MCHC RBC AUTO-ENTMCNC: 33.2 G/DL (ref 31–37)
MCV RBC AUTO: 82.1 FL (ref 80–100)
MCV RBC AUTO: 84.2 FL (ref 80–100)
MONOCYTES NFR BLD: 0.7 K/UL (ref 0.1–1.3)
MONOCYTES NFR BLD: 11 % (ref 1–7)
NEUTROPHILS NFR BLD: 72 % (ref 36–66)
NEUTS SEG NFR BLD: 4.5 K/UL (ref 1.3–9.1)
PLATELET # BLD AUTO: 236 K/UL (ref 150–450)
PLATELET # BLD AUTO: 238 K/UL (ref 150–450)
PMV BLD AUTO: 8.7 FL (ref 6–12)
PMV BLD AUTO: 9.6 FL (ref 6–12)
POTASSIUM SERPL-SCNC: 3 MMOL/L (ref 3.7–5.3)
PROTHROMBIN TIME: 14.8 SEC (ref 11.8–14.6)
RBC # BLD AUTO: 4.24 M/UL (ref 4–5.2)
RBC # BLD AUTO: 4.37 M/UL (ref 4–5.2)
SODIUM SERPL-SCNC: 142 MMOL/L (ref 136–145)
TROPONIN I SERPL HS-MCNC: 59 NG/L (ref 0–14)
TROPONIN I SERPL HS-MCNC: 61 NG/L (ref 0–14)
TROPONIN I SERPL HS-MCNC: 69 NG/L (ref 0–14)
WBC OTHER # BLD: 6.2 K/UL (ref 3.5–11)
WBC OTHER # BLD: 6.7 K/UL (ref 3.5–11)

## 2025-04-07 PROCEDURE — 6360000002 HC RX W HCPCS: Performed by: STUDENT IN AN ORGANIZED HEALTH CARE EDUCATION/TRAINING PROGRAM

## 2025-04-07 PROCEDURE — 99285 EMERGENCY DEPT VISIT HI MDM: CPT

## 2025-04-07 PROCEDURE — 82947 ASSAY GLUCOSE BLOOD QUANT: CPT

## 2025-04-07 PROCEDURE — 94761 N-INVAS EAR/PLS OXIMETRY MLT: CPT

## 2025-04-07 PROCEDURE — 70450 CT HEAD/BRAIN W/O DYE: CPT

## 2025-04-07 PROCEDURE — 2500000003 HC RX 250 WO HCPCS: Performed by: STUDENT IN AN ORGANIZED HEALTH CARE EDUCATION/TRAINING PROGRAM

## 2025-04-07 PROCEDURE — 2500000003 HC RX 250 WO HCPCS

## 2025-04-07 PROCEDURE — 80048 BASIC METABOLIC PNL TOTAL CA: CPT

## 2025-04-07 PROCEDURE — 2060000000 HC ICU INTERMEDIATE R&B

## 2025-04-07 PROCEDURE — 71045 X-RAY EXAM CHEST 1 VIEW: CPT

## 2025-04-07 PROCEDURE — 93005 ELECTROCARDIOGRAM TRACING: CPT | Performed by: INTERNAL MEDICINE

## 2025-04-07 PROCEDURE — 6370000000 HC RX 637 (ALT 250 FOR IP)

## 2025-04-07 PROCEDURE — 85610 PROTHROMBIN TIME: CPT

## 2025-04-07 PROCEDURE — 72125 CT NECK SPINE W/O DYE: CPT

## 2025-04-07 PROCEDURE — 84484 ASSAY OF TROPONIN QUANT: CPT

## 2025-04-07 PROCEDURE — 85025 COMPLETE CBC W/AUTO DIFF WBC: CPT

## 2025-04-07 PROCEDURE — 83735 ASSAY OF MAGNESIUM: CPT

## 2025-04-07 PROCEDURE — 85027 COMPLETE CBC AUTOMATED: CPT

## 2025-04-07 PROCEDURE — 94640 AIRWAY INHALATION TREATMENT: CPT

## 2025-04-07 PROCEDURE — 36415 COLL VENOUS BLD VENIPUNCTURE: CPT

## 2025-04-07 PROCEDURE — 6370000000 HC RX 637 (ALT 250 FOR IP): Performed by: STUDENT IN AN ORGANIZED HEALTH CARE EDUCATION/TRAINING PROGRAM

## 2025-04-07 PROCEDURE — 96365 THER/PROPH/DIAG IV INF INIT: CPT

## 2025-04-07 RX ORDER — SODIUM CHLORIDE 0.9 % (FLUSH) 0.9 %
5-40 SYRINGE (ML) INJECTION EVERY 12 HOURS SCHEDULED
Status: DISCONTINUED | OUTPATIENT
Start: 2025-04-07 | End: 2025-04-10 | Stop reason: HOSPADM

## 2025-04-07 RX ORDER — POTASSIUM CHLORIDE 750 MG/1
20 CAPSULE, EXTENDED RELEASE ORAL 2 TIMES DAILY
Status: DISCONTINUED | OUTPATIENT
Start: 2025-04-07 | End: 2025-04-10 | Stop reason: HOSPADM

## 2025-04-07 RX ORDER — BUDESONIDE AND FORMOTEROL FUMARATE DIHYDRATE 160; 4.5 UG/1; UG/1
2 AEROSOL RESPIRATORY (INHALATION)
Status: DISCONTINUED | OUTPATIENT
Start: 2025-04-07 | End: 2025-04-10 | Stop reason: HOSPADM

## 2025-04-07 RX ORDER — CARVEDILOL 3.12 MG/1
3.12 TABLET ORAL 2 TIMES DAILY WITH MEALS
Status: DISCONTINUED | OUTPATIENT
Start: 2025-04-07 | End: 2025-04-10 | Stop reason: HOSPADM

## 2025-04-07 RX ORDER — MAGNESIUM SULFATE 1 G/100ML
1000 INJECTION INTRAVENOUS PRN
Status: DISCONTINUED | OUTPATIENT
Start: 2025-04-07 | End: 2025-04-10 | Stop reason: HOSPADM

## 2025-04-07 RX ORDER — POTASSIUM CHLORIDE 7.45 MG/ML
10 INJECTION INTRAVENOUS PRN
Status: DISCONTINUED | OUTPATIENT
Start: 2025-04-07 | End: 2025-04-10 | Stop reason: HOSPADM

## 2025-04-07 RX ORDER — NITROGLYCERIN 0.4 MG/1
0.4 TABLET SUBLINGUAL EVERY 5 MIN PRN
Status: DISCONTINUED | OUTPATIENT
Start: 2025-04-07 | End: 2025-04-10 | Stop reason: HOSPADM

## 2025-04-07 RX ORDER — ACETAMINOPHEN 325 MG/1
650 TABLET ORAL EVERY 6 HOURS PRN
Status: DISCONTINUED | OUTPATIENT
Start: 2025-04-07 | End: 2025-04-10 | Stop reason: HOSPADM

## 2025-04-07 RX ORDER — BUMETANIDE 1 MG/1
2 TABLET ORAL DAILY
Status: DISCONTINUED | OUTPATIENT
Start: 2025-04-07 | End: 2025-04-08

## 2025-04-07 RX ORDER — POTASSIUM CHLORIDE 1500 MG/1
40 TABLET, EXTENDED RELEASE ORAL PRN
Status: DISCONTINUED | OUTPATIENT
Start: 2025-04-07 | End: 2025-04-10 | Stop reason: HOSPADM

## 2025-04-07 RX ORDER — HEPARIN SODIUM 10000 [USP'U]/100ML
5-30 INJECTION, SOLUTION INTRAVENOUS CONTINUOUS
Status: DISCONTINUED | OUTPATIENT
Start: 2025-04-08 | End: 2025-04-07

## 2025-04-07 RX ORDER — AMIODARONE HYDROCHLORIDE 200 MG/1
200 TABLET ORAL DAILY
Status: DISCONTINUED | OUTPATIENT
Start: 2025-04-07 | End: 2025-04-10 | Stop reason: HOSPADM

## 2025-04-07 RX ORDER — ENOXAPARIN SODIUM 100 MG/ML
1 INJECTION SUBCUTANEOUS 2 TIMES DAILY
Status: DISCONTINUED | OUTPATIENT
Start: 2025-04-07 | End: 2025-04-07

## 2025-04-07 RX ORDER — ACETAMINOPHEN 650 MG/1
650 SUPPOSITORY RECTAL EVERY 6 HOURS PRN
Status: DISCONTINUED | OUTPATIENT
Start: 2025-04-07 | End: 2025-04-10 | Stop reason: HOSPADM

## 2025-04-07 RX ORDER — LOSARTAN POTASSIUM 25 MG/1
25 TABLET ORAL DAILY
Status: DISCONTINUED | OUTPATIENT
Start: 2025-04-07 | End: 2025-04-10 | Stop reason: HOSPADM

## 2025-04-07 RX ORDER — MAGNESIUM SULFATE HEPTAHYDRATE 40 MG/ML
2000 INJECTION, SOLUTION INTRAVENOUS ONCE
Status: COMPLETED | OUTPATIENT
Start: 2025-04-07 | End: 2025-04-07

## 2025-04-07 RX ORDER — SODIUM CHLORIDE 0.9 % (FLUSH) 0.9 %
10 SYRINGE (ML) INJECTION PRN
Status: DISCONTINUED | OUTPATIENT
Start: 2025-04-07 | End: 2025-04-10 | Stop reason: HOSPADM

## 2025-04-07 RX ORDER — HEPARIN SODIUM 1000 [USP'U]/ML
30 INJECTION, SOLUTION INTRAVENOUS; SUBCUTANEOUS PRN
Status: DISCONTINUED | OUTPATIENT
Start: 2025-04-08 | End: 2025-04-07

## 2025-04-07 RX ORDER — HEPARIN SODIUM 1000 [USP'U]/ML
60 INJECTION, SOLUTION INTRAVENOUS; SUBCUTANEOUS ONCE
Status: DISCONTINUED | OUTPATIENT
Start: 2025-04-07 | End: 2025-04-07

## 2025-04-07 RX ORDER — ALBUTEROL SULFATE 90 UG/1
2 INHALANT RESPIRATORY (INHALATION) EVERY 6 HOURS PRN
Status: DISCONTINUED | OUTPATIENT
Start: 2025-04-07 | End: 2025-04-10 | Stop reason: HOSPADM

## 2025-04-07 RX ORDER — ONDANSETRON 4 MG/1
4 TABLET, ORALLY DISINTEGRATING ORAL EVERY 8 HOURS PRN
Status: DISCONTINUED | OUTPATIENT
Start: 2025-04-07 | End: 2025-04-10 | Stop reason: HOSPADM

## 2025-04-07 RX ORDER — ASPIRIN 81 MG/1
81 TABLET ORAL DAILY
Status: DISCONTINUED | OUTPATIENT
Start: 2025-04-07 | End: 2025-04-10 | Stop reason: HOSPADM

## 2025-04-07 RX ORDER — ENOXAPARIN SODIUM 100 MG/ML
50 INJECTION SUBCUTANEOUS 2 TIMES DAILY
Status: DISCONTINUED | OUTPATIENT
Start: 2025-04-08 | End: 2025-04-08

## 2025-04-07 RX ORDER — ONDANSETRON 2 MG/ML
4 INJECTION INTRAMUSCULAR; INTRAVENOUS EVERY 6 HOURS PRN
Status: DISCONTINUED | OUTPATIENT
Start: 2025-04-07 | End: 2025-04-10 | Stop reason: HOSPADM

## 2025-04-07 RX ORDER — SODIUM CHLORIDE 9 MG/ML
INJECTION, SOLUTION INTRAVENOUS PRN
Status: DISCONTINUED | OUTPATIENT
Start: 2025-04-07 | End: 2025-04-10 | Stop reason: HOSPADM

## 2025-04-07 RX ORDER — HEPARIN SODIUM 1000 [USP'U]/ML
60 INJECTION, SOLUTION INTRAVENOUS; SUBCUTANEOUS PRN
Status: DISCONTINUED | OUTPATIENT
Start: 2025-04-08 | End: 2025-04-07

## 2025-04-07 RX ORDER — ATORVASTATIN CALCIUM 80 MG/1
80 TABLET, FILM COATED ORAL DAILY
Status: DISCONTINUED | OUTPATIENT
Start: 2025-04-07 | End: 2025-04-10 | Stop reason: HOSPADM

## 2025-04-07 RX ORDER — BUMETANIDE 0.25 MG/ML
2 INJECTION, SOLUTION INTRAMUSCULAR; INTRAVENOUS ONCE
Status: COMPLETED | OUTPATIENT
Start: 2025-04-07 | End: 2025-04-07

## 2025-04-07 RX ADMIN — SODIUM CHLORIDE, PRESERVATIVE FREE 10 ML: 5 INJECTION INTRAVENOUS at 22:23

## 2025-04-07 RX ADMIN — ATORVASTATIN CALCIUM 80 MG: 80 TABLET, FILM COATED ORAL at 22:15

## 2025-04-07 RX ADMIN — MAGNESIUM SULFATE HEPTAHYDRATE 2000 MG: 40 INJECTION, SOLUTION INTRAVENOUS at 18:01

## 2025-04-07 RX ADMIN — ENOXAPARIN SODIUM 50 MG: 100 INJECTION SUBCUTANEOUS at 19:31

## 2025-04-07 RX ADMIN — ASPIRIN 81 MG: 81 TABLET, COATED ORAL at 22:15

## 2025-04-07 RX ADMIN — POTASSIUM CHLORIDE 20 MEQ: 750 CAPSULE, EXTENDED RELEASE ORAL at 22:15

## 2025-04-07 RX ADMIN — CARVEDILOL 3.12 MG: 3.12 TABLET, FILM COATED ORAL at 22:15

## 2025-04-07 RX ADMIN — BUMETANIDE 2 MG: 0.25 INJECTION INTRAMUSCULAR; INTRAVENOUS at 19:25

## 2025-04-07 RX ADMIN — AMIODARONE HYDROCHLORIDE 200 MG: 200 TABLET ORAL at 22:14

## 2025-04-07 RX ADMIN — POTASSIUM BICARBONATE 40 MEQ: 782 TABLET, EFFERVESCENT ORAL at 18:02

## 2025-04-07 RX ADMIN — BUDESONIDE AND FORMOTEROL FUMARATE DIHYDRATE 2 PUFF: 160; 4.5 AEROSOL RESPIRATORY (INHALATION) at 20:21

## 2025-04-07 ASSESSMENT — ENCOUNTER SYMPTOMS
NAUSEA: 0
VOMITING: 0
ABDOMINAL PAIN: 0
SHORTNESS OF BREATH: 1

## 2025-04-07 ASSESSMENT — PAIN SCALES - GENERAL: PAINLEVEL_OUTOF10: 5

## 2025-04-07 ASSESSMENT — HEART SCORE: ECG: NON-SPECIFC REPOLARIZATION DISTURBANCE/LBTB/PM

## 2025-04-07 ASSESSMENT — PAIN - FUNCTIONAL ASSESSMENT: PAIN_FUNCTIONAL_ASSESSMENT: 0-10

## 2025-04-07 NOTE — ED NOTES
Report given to LIVE Thomas from U.   Report method by phone   The following was reviewed with receiving RN:   Current vital signs:  BP (!) 147/89   Pulse 90   Temp 98.5 °F (36.9 °C) (Oral)   Resp 18   Ht 1.626 m (5' 4\")   Wt 53.5 kg (118 lb)   SpO2 96%   BMI 20.25 kg/m²                MEWS Score: 1     Any medication or safety alerts were reviewed. Any pending diagnostics and notifications were also reviewed, as well as any safety concerns or issues, abnormal labs, abnormal imaging, and abnormal assessment findings. Questions were answered.

## 2025-04-07 NOTE — ED PROVIDER NOTES
[facility]  Emergency Department Encounter  Emergency Medicine Physician     Pt Name: Graciela Holt  MRN: [id number]  Birthdate 1950  Date of evaluation: 4/7/25  PCP:  Travis Mason MD    CHIEF COMPLAINT       Chief Complaint   Patient presents with    Chest Pain    Shortness of Breath       HISTORY OF PRESENT ILLNESS  (Location/Symptom, Timing/Onset, Context/Setting, Quality, Duration, Modifying Factors, Severity.)    Graciela Holt is a 74 y.o. female who presents with chest pain, shortness of breath, and a fall.  Patient states she fell yesterday hit her face.  Did not pass out.  No loss of consciousness.  States she has a little bit of facial pain but nothing significant.  Denies any neck pain.  Also states that she has had significant cervical spine surgery including a fusion.  States that she had a recent revision completed in January of this year.  States she feels appears her heart rate is irregular.        PAST MEDICAL / SURGICAL / SOCIAL / FAMILY HISTORY    has a past medical history of Allergic rhinitis, Arthritis, CAD (coronary artery disease), Cerebral artery occlusion with cerebral infarction (HCC), CHF (congestive heart failure) (HCC), Controlled type 2 diabetes mellitus without complication, without long-term current use of insulin, COPD (chronic obstructive pulmonary disease) (HCC), Depression, Former smoker, History of blood transfusion, Hyperlipidemia, Hypertension, Kidney stone, Myocardial infarction (HCC), Obesity, Class I, BMI 30.0-34.9 (see actual BMI), Restless leg syndrome, Skin abnormality, Type II or unspecified type diabetes mellitus without mention of complication, not stated as uncontrolled, Wears glasses, and Wears partial dentures.     has a past surgical history that includes Appendectomy; Hysterectomy; Cholecystectomy; joint replacement (Bilateral); pr office/outpt visit,procedure only (N/A, 02/06/2018); pr i&d deep absc bursa/hematoma thigh/knee region

## 2025-04-07 NOTE — ED NOTES
Report given to LIVE Miller from ED.   Report method in person   The following was reviewed with receiving RN:   Current vital signs:  BP (!) 172/98   Pulse 88   Temp 97.9 °F (36.6 °C) (Oral)   Resp 19   Ht 1.626 m (5' 4\")   Wt 53.5 kg (118 lb)   SpO2 91%   BMI 20.25 kg/m²                MEWS Score: 1     Any medication or safety alerts were reviewed. Any pending diagnostics and notifications were also reviewed, as well as any safety concerns or issues, abnormal labs, abnormal imaging, and abnormal assessment findings. Questions were answered.

## 2025-04-08 PROBLEM — E44.0 MODERATE MALNUTRITION: Status: ACTIVE | Noted: 2023-09-08

## 2025-04-08 PROBLEM — E44.0 MODERATE MALNUTRITION: Chronic | Status: ACTIVE | Noted: 2025-04-08

## 2025-04-08 LAB
ANION GAP SERPL CALCULATED.3IONS-SCNC: 9 MMOL/L (ref 9–16)
BNP SERPL-MCNC: 9450 PG/ML (ref 0–300)
BUN SERPL-MCNC: 14 MG/DL (ref 8–23)
CALCIUM SERPL-MCNC: 8.1 MG/DL (ref 8.6–10.4)
CHLORIDE SERPL-SCNC: 107 MMOL/L (ref 98–107)
CHOLEST SERPL-MCNC: 132 MG/DL (ref 0–199)
CHOLESTEROL/HDL RATIO: 3.1
CO2 SERPL-SCNC: 28 MMOL/L (ref 20–31)
CREAT SERPL-MCNC: 1.1 MG/DL (ref 0.7–1.2)
EKG Q-T INTERVAL: 370 MS
EKG QRS DURATION: 106 MS
EKG QTC CALCULATION (BAZETT): 469 MS
EKG R AXIS: -22 DEGREES
EKG T AXIS: 142 DEGREES
EKG VENTRICULAR RATE: 97 BPM
ERYTHROCYTE [DISTWIDTH] IN BLOOD BY AUTOMATED COUNT: 14.3 % (ref 11.5–14.9)
GFR, ESTIMATED: 52 ML/MIN/1.73M2
GLUCOSE BLD-MCNC: 123 MG/DL (ref 65–105)
GLUCOSE BLD-MCNC: 199 MG/DL (ref 65–105)
GLUCOSE BLD-MCNC: 203 MG/DL (ref 65–105)
GLUCOSE BLD-MCNC: 240 MG/DL (ref 65–105)
GLUCOSE SERPL-MCNC: 127 MG/DL (ref 74–99)
HCT VFR BLD AUTO: 31.4 % (ref 36–46)
HDLC SERPL-MCNC: 42 MG/DL
HGB BLD-MCNC: 10.5 G/DL (ref 12–16)
LDLC SERPL CALC-MCNC: 79 MG/DL (ref 0–100)
MAGNESIUM SERPL-MCNC: 1.8 MG/DL (ref 1.6–2.4)
MCH RBC QN AUTO: 27.4 PG (ref 26–34)
MCHC RBC AUTO-ENTMCNC: 33.3 G/DL (ref 31–37)
MCV RBC AUTO: 82.4 FL (ref 80–100)
PLATELET # BLD AUTO: 204 K/UL (ref 150–450)
PMV BLD AUTO: 9.1 FL (ref 6–12)
POTASSIUM SERPL-SCNC: 3.2 MMOL/L (ref 3.7–5.3)
RBC # BLD AUTO: 3.81 M/UL (ref 4–5.2)
SODIUM SERPL-SCNC: 144 MMOL/L (ref 136–145)
TRIGL SERPL-MCNC: 56 MG/DL (ref 0–149)
WBC OTHER # BLD: 6.1 K/UL (ref 3.5–11)

## 2025-04-08 PROCEDURE — 82947 ASSAY GLUCOSE BLOOD QUANT: CPT

## 2025-04-08 PROCEDURE — 6360000002 HC RX W HCPCS: Performed by: INTERNAL MEDICINE

## 2025-04-08 PROCEDURE — 83880 ASSAY OF NATRIURETIC PEPTIDE: CPT

## 2025-04-08 PROCEDURE — 80061 LIPID PANEL: CPT

## 2025-04-08 PROCEDURE — 6370000000 HC RX 637 (ALT 250 FOR IP)

## 2025-04-08 PROCEDURE — 36415 COLL VENOUS BLD VENIPUNCTURE: CPT

## 2025-04-08 PROCEDURE — 83735 ASSAY OF MAGNESIUM: CPT

## 2025-04-08 PROCEDURE — 94640 AIRWAY INHALATION TREATMENT: CPT

## 2025-04-08 PROCEDURE — 2700000000 HC OXYGEN THERAPY PER DAY

## 2025-04-08 PROCEDURE — 99223 1ST HOSP IP/OBS HIGH 75: CPT | Performed by: INTERNAL MEDICINE

## 2025-04-08 PROCEDURE — 85027 COMPLETE CBC AUTOMATED: CPT

## 2025-04-08 PROCEDURE — 93010 ELECTROCARDIOGRAM REPORT: CPT | Performed by: INTERNAL MEDICINE

## 2025-04-08 PROCEDURE — 94761 N-INVAS EAR/PLS OXIMETRY MLT: CPT

## 2025-04-08 PROCEDURE — 80048 BASIC METABOLIC PNL TOTAL CA: CPT

## 2025-04-08 PROCEDURE — 6370000000 HC RX 637 (ALT 250 FOR IP): Performed by: INTERNAL MEDICINE

## 2025-04-08 PROCEDURE — 2500000003 HC RX 250 WO HCPCS

## 2025-04-08 PROCEDURE — 2060000000 HC ICU INTERMEDIATE R&B

## 2025-04-08 PROCEDURE — 94664 DEMO&/EVAL PT USE INHALER: CPT

## 2025-04-08 RX ORDER — BUMETANIDE 0.25 MG/ML
2 INJECTION, SOLUTION INTRAMUSCULAR; INTRAVENOUS 2 TIMES DAILY
Status: DISCONTINUED | OUTPATIENT
Start: 2025-04-08 | End: 2025-04-09

## 2025-04-08 RX ORDER — IPRATROPIUM BROMIDE AND ALBUTEROL SULFATE 2.5; .5 MG/3ML; MG/3ML
1 SOLUTION RESPIRATORY (INHALATION)
Status: DISCONTINUED | OUTPATIENT
Start: 2025-04-08 | End: 2025-04-10 | Stop reason: HOSPADM

## 2025-04-08 RX ORDER — ENOXAPARIN SODIUM 100 MG/ML
1 INJECTION SUBCUTANEOUS 2 TIMES DAILY
Status: DISCONTINUED | OUTPATIENT
Start: 2025-04-08 | End: 2025-04-10

## 2025-04-08 RX ADMIN — ASPIRIN 81 MG: 81 TABLET, COATED ORAL at 09:11

## 2025-04-08 RX ADMIN — CARVEDILOL 3.12 MG: 3.12 TABLET, FILM COATED ORAL at 18:05

## 2025-04-08 RX ADMIN — BUDESONIDE AND FORMOTEROL FUMARATE DIHYDRATE 2 PUFF: 160; 4.5 AEROSOL RESPIRATORY (INHALATION) at 08:37

## 2025-04-08 RX ADMIN — BUMETANIDE 2 MG: 1 TABLET ORAL at 09:10

## 2025-04-08 RX ADMIN — IPRATROPIUM BROMIDE AND ALBUTEROL SULFATE 1 DOSE: 2.5; .5 SOLUTION RESPIRATORY (INHALATION) at 15:42

## 2025-04-08 RX ADMIN — BUMETANIDE 2 MG: 0.25 INJECTION INTRAMUSCULAR; INTRAVENOUS at 18:05

## 2025-04-08 RX ADMIN — EMPAGLIFLOZIN 10 MG: 10 TABLET, FILM COATED ORAL at 09:10

## 2025-04-08 RX ADMIN — ACETAMINOPHEN 650 MG: 325 TABLET, FILM COATED ORAL at 02:15

## 2025-04-08 RX ADMIN — LOSARTAN POTASSIUM 25 MG: 25 TABLET, FILM COATED ORAL at 09:10

## 2025-04-08 RX ADMIN — ENOXAPARIN SODIUM 50 MG: 100 INJECTION SUBCUTANEOUS at 05:56

## 2025-04-08 RX ADMIN — BUDESONIDE AND FORMOTEROL FUMARATE DIHYDRATE 2 PUFF: 160; 4.5 AEROSOL RESPIRATORY (INHALATION) at 20:02

## 2025-04-08 RX ADMIN — AMIODARONE HYDROCHLORIDE 200 MG: 200 TABLET ORAL at 09:10

## 2025-04-08 RX ADMIN — POTASSIUM CHLORIDE 40 MEQ: 1500 TABLET, EXTENDED RELEASE ORAL at 09:10

## 2025-04-08 RX ADMIN — ENOXAPARIN SODIUM 60 MG: 100 INJECTION SUBCUTANEOUS at 21:50

## 2025-04-08 RX ADMIN — SODIUM CHLORIDE, PRESERVATIVE FREE 10 ML: 5 INJECTION INTRAVENOUS at 21:50

## 2025-04-08 RX ADMIN — SODIUM CHLORIDE, PRESERVATIVE FREE 10 ML: 5 INJECTION INTRAVENOUS at 09:10

## 2025-04-08 RX ADMIN — CARVEDILOL 3.12 MG: 3.12 TABLET, FILM COATED ORAL at 09:11

## 2025-04-08 RX ADMIN — POTASSIUM CHLORIDE 20 MEQ: 750 CAPSULE, EXTENDED RELEASE ORAL at 21:42

## 2025-04-08 RX ADMIN — IPRATROPIUM BROMIDE AND ALBUTEROL SULFATE 1 DOSE: 2.5; .5 SOLUTION RESPIRATORY (INHALATION) at 19:57

## 2025-04-08 RX ADMIN — POTASSIUM CHLORIDE 20 MEQ: 750 CAPSULE, EXTENDED RELEASE ORAL at 09:10

## 2025-04-08 RX ADMIN — ATORVASTATIN CALCIUM 80 MG: 80 TABLET, FILM COATED ORAL at 09:10

## 2025-04-08 ASSESSMENT — PAIN SCALES - GENERAL
PAINLEVEL_OUTOF10: 0
PAINLEVEL_OUTOF10: 6
PAINLEVEL_OUTOF10: 0

## 2025-04-08 ASSESSMENT — PAIN DESCRIPTION - DESCRIPTORS: DESCRIPTORS: ACHING

## 2025-04-08 ASSESSMENT — PAIN DESCRIPTION - LOCATION: LOCATION: HEAD

## 2025-04-08 NOTE — CONSULTS
Date:   2025  Patient name: Graciela Holt  Date of admission:  2025  4:07 PM  MRN:   [id number]  YOB: 1950  PCP: Travis Mason MD    Reason for Admission: Dyspnea on exertion [R06.09]  NSTEMI (non-ST elevated myocardial infarction) (HCC) [I21.4]  Chest pain, unspecified type [R07.9]    Cardiology consult       Referring physician: Dr Juan Carlos Dubonhore    Impression  Admission 2025 increasing shortness of breath, mild chest pain, A-fib with RVR new onset    Admission 2024  Multivessel CAD   CABG LIMA to LAD and diagonal 1, SVG to OM, SVG to PDA 2018 at [facility]  Severely reduced LV systolic function ejection fraction 25 to 30%, akinetic LV apex, apical thrombus 2D echo 2024  Moderately increased LV wall thickness  Episodes of nonsustained V. Tach  Diabetes mellitus  Hyperlipidemia lipid profile 2024 cholesterol 159, HDL 55, LDL 95, triglyceride 41  History of left MCA stroke occluded internal carotid     Admission 2024 with a severe shortness of breath, CHF systolic acute on chronic  High-sensitivity troponin 42 and 29, proBNP 13,352  Admission 2024 chest pain like a heavy pressure radiating to the left arm, no new ECG changes, high-sensitivity troponin 28  Admission 2024 chest pain like heaviness no shortness of breath no radiation ECG showed LVH with repolarization seen changes no change from previous ECG borderline abnormal troponin most likely type II  Admission 2024 for increasing shortness of breath, increasing swelling over the legs, elevated troponin 119, 136,  chest x-ray showed pulmonary edema proBNP 31,990    Past surgical history  Bilateral knee replacement, C-spine surgery, cholecystectomy,  section, CABG     Drug allergies codeine, lisinopril     History of present illness  74-year-old female who lives with her 2 roommates mother of 1 son and 1 daughter with a past medical history of multivessel coronary artery

## 2025-04-08 NOTE — ACP (ADVANCE CARE PLANNING)
Advance Care Planning     Advance Care Planning Activator (Inpatient)  Conversation Note      Date of ACP Conversation: 4/8/2025     Conversation Conducted with: Patient with Decision Making Capacity    ACP Activator: Aster Pelaez RN        Health Care Decision Maker:     Current Designated Health Care Decision Maker:     Primary Decision Maker: Guanakito Zheng - [facility] - [phone]        Care Preferences    Ventilation:  \"If you were in your present state of health and suddenly became very ill and were unable to breathe on your own, what would your preference be about the use of a ventilator (breathing machine) if it were available to you?\"      Would the patient desire the use of ventilator (breathing machine)?: yes    \"If your health worsens and it becomes clear that your chance of recovery is unlikely, what would your preference be about the use of a ventilator (breathing machine) if it were available to you?\"     Would the patient desire the use of ventilator (breathing machine)?: Yes      Resuscitation  \"CPR works best to restart the heart when there is a sudden event, like a heart attack, in someone who is otherwise healthy. Unfortunately, CPR does not typically restart the heart for people who have serious health conditions or who are very sick.\"    \"In the event your heart stopped as a result of an underlying serious health condition, would you want attempts to be made to restart your heart (answer \"yes\" for attempt to resuscitate) or would you prefer a natural death (answer \"no\" for do not attempt to resuscitate)?\" yes       [] Yes   [] No   Educated Patient / Decision Maker regarding differences between Advance Directives and portable DNR orders.    Length of ACP Conversation in minutes:      Conversation Outcomes:  ACP discussion completed    Follow-up plan:    [] Schedule follow-up conversation to continue planning  [] Referred individual to Provider for additional questions/concerns   [] Advised

## 2025-04-08 NOTE — PLAN OF CARE
Problem: Chronic Conditions and Co-morbidities  Goal: Patient's chronic conditions and co-morbidity symptoms are monitored and maintained or improved  4/8/2025 0152 by Sanna Rosales RN  Outcome: Progressing  4/8/2025 0150 by Sanna Rosales RN  Outcome: Progressing  Note: Blood sugars monitored.     Problem: Discharge Planning  Goal: Discharge to home or other facility with appropriate resources  4/8/2025 0152 by Sanna Rosales RN  Outcome: Progressing  4/8/2025 0150 by Sanna Rosales RN  Outcome: Progressing     Problem: Pain  Goal: Verbalizes/displays adequate comfort level or baseline comfort level  4/8/2025 0152 by Sanna Rosales RN  Outcome: Progressing  Note: Denies pain.    4/8/2025 0150 by Sanna Rosales RN  Outcome: Progressing  Note: Denies pain     Problem: Safety - Adult  Goal: Free from fall injury  4/8/2025 0152 by Sanna Rosales RN  Outcome: Progressing  Note: Alert and oriented.  Patient weak.  Bed alarm on.  4/8/2025 0150 by Sanna Rosales RN  Outcome: Progressing  Note: Patient weak.  Alert and oriented.  Bed alarm on.       Problem: ABCDS Injury Assessment  Goal: Absence of physical injury  4/8/2025 0152 by Sanna Rosales RN  Outcome: Progressing  4/8/2025 0150 by Sanna Rosales RN  Outcome: Progressing     Problem: Skin/Tissue Integrity  Goal: Skin integrity remains intact  Description: 1.  Monitor for areas of redness and/or skin breakdown  2.  Assess vascular access sites hourly  3.  Every 4-6 hours minimum:  Change oxygen saturation probe site  4.  Every 4-6 hours:  If on nasal continuous positive airway pressure, respiratory therapy assess nares and determine need for appliance change or resting period  4/8/2025 0152 by Sanna Rosales RN  Outcome: Progressing  Note: Coccyx reddened.  Waffle mattress in place.  Assisted with turning and repositioning.  4/8/2025 0150 by Sanna Rosales RN  Outcome: Progressing  Note:

## 2025-04-08 NOTE — H&P
therapies and most importantly because of direct risk to patient if care not provided in a hospital setting.  Expected length of stay > 48 hours.    Juan Carlos Todd MD  4/8/2025  1:43 PM    Copy sent to Travis Vaughn MD    Please note that this chart was generated using voice recognition Dragon dictation software.  Although every effort was made to ensure the accuracy of this automated transcription, some errors in transcription may have occurred.

## 2025-04-08 NOTE — PLAN OF CARE
Problem: Chronic Conditions and Co-morbidities  Goal: Patient's chronic conditions and co-morbidity symptoms are monitored and maintained or improved  4/8/2025 0217 by Martha Carrillo RN  Outcome: Progressing     Problem: Discharge Planning  Goal: Discharge to home or other facility with appropriate resources  4/8/2025 0217 by Martha Carrillo RN  Outcome: Progressing     Problem: Pain  Goal: Verbalizes/displays adequate comfort level or baseline comfort level  4/8/2025 0217 by Martha Carrillo RN  Outcome: Progressing     Problem: Safety - Adult  Goal: Free from fall injury  4/8/2025 0217 by Martha Carrillo RN  Outcome: Progressing     Problem: ABCDS Injury Assessment  Goal: Absence of physical injury  4/8/2025 0217 by Martha Carrillo RN  Outcome: Progressing     Problem: Skin/Tissue Integrity  Goal: Skin integrity remains intact  Description: 1.  Monitor for areas of redness and/or skin breakdown  2.  Assess vascular access sites hourly  3.  Every 4-6 hours minimum:  Change oxygen saturation probe site  4.  Every 4-6 hours:  If on nasal continuous positive airway pressure, respiratory therapy assess nares and determine need for appliance change or resting period  4/8/2025 0217 by Martha Carrillo RN  Outcome: Progressing     Problem: Cardiovascular - Adult  Goal: Maintains optimal cardiac output and hemodynamic stability  4/8/2025 0217 by Martha Carrillo RN  Outcome: Progressing     Problem: Cardiovascular - Adult  Goal: Absence of cardiac dysrhythmias or at baseline  4/8/2025 0152 by Sanna Rosales RN  Outcome: Progressing

## 2025-04-09 ENCOUNTER — APPOINTMENT (OUTPATIENT)
Age: 75
DRG: 280 | End: 2025-04-09
Payer: COMMERCIAL

## 2025-04-09 LAB
ECHO AO ASC DIAM: 4.2 CM
ECHO AO ASCENDING AORTA INDEX: 2.58 CM/M2
ECHO AO ROOT DIAM: 3.3 CM
ECHO AO ROOT INDEX: 2.02 CM/M2
ECHO AV AREA PEAK VELOCITY: 2 CM2
ECHO AV AREA VTI: 1.9 CM2
ECHO AV AREA/BSA PEAK VELOCITY: 1.2 CM2/M2
ECHO AV AREA/BSA VTI: 1.2 CM2/M2
ECHO AV MEAN GRADIENT: 6 MMHG
ECHO AV MEAN VELOCITY: 1.1 M/S
ECHO AV PEAK GRADIENT: 10 MMHG
ECHO AV PEAK VELOCITY: 1.6 M/S
ECHO AV VELOCITY RATIO: 0.56
ECHO AV VTI: 32.9 CM
ECHO BSA: 1.55 M2
ECHO EST RA PRESSURE: 8 MMHG
ECHO LA AREA 2C: 43.8 CM2
ECHO LA AREA 4C: 33.2 CM2
ECHO LA DIAMETER INDEX: 3.44 CM/M2
ECHO LA DIAMETER: 5.6 CM
ECHO LA MAJOR AXIS: 7.4 CM
ECHO LA MINOR AXIS: 7.2 CM
ECHO LA TO AORTIC ROOT RATIO: 1.7
ECHO LA VOL BP: 164 ML (ref 22–52)
ECHO LA VOL MOD A2C: 211 ML (ref 22–52)
ECHO LA VOL MOD A4C: 126 ML (ref 22–52)
ECHO LA VOL/BSA BIPLANE: 101 ML/M2 (ref 16–34)
ECHO LA VOLUME INDEX MOD A2C: 129 ML/M2 (ref 16–34)
ECHO LA VOLUME INDEX MOD A4C: 77 ML/M2 (ref 16–34)
ECHO LV E' LATERAL VELOCITY: 8.16 CM/S
ECHO LV E' SEPTAL VELOCITY: 3.48 CM/S
ECHO LV EF PHYSICIAN: 25 %
ECHO LV FRACTIONAL SHORTENING: 9 % (ref 28–44)
ECHO LV INTERNAL DIMENSION DIASTOLE INDEX: 4.05 CM/M2
ECHO LV INTERNAL DIMENSION DIASTOLIC: 6.6 CM (ref 3.9–5.3)
ECHO LV INTERNAL DIMENSION SYSTOLIC INDEX: 3.68 CM/M2
ECHO LV INTERNAL DIMENSION SYSTOLIC: 6 CM
ECHO LV IVSD: 1.1 CM (ref 0.6–0.9)
ECHO LV MASS 2D: 347.9 G (ref 67–162)
ECHO LV MASS INDEX 2D: 213.5 G/M2 (ref 43–95)
ECHO LV POSTERIOR WALL DIASTOLIC: 1.2 CM (ref 0.6–0.9)
ECHO LV RELATIVE WALL THICKNESS RATIO: 0.36
ECHO LVOT AREA: 3.5 CM2
ECHO LVOT AV VTI INDEX: 0.55
ECHO LVOT DIAM: 2.1 CM
ECHO LVOT MEAN GRADIENT: 2 MMHG
ECHO LVOT PEAK GRADIENT: 3 MMHG
ECHO LVOT PEAK VELOCITY: 0.9 M/S
ECHO LVOT STROKE VOLUME INDEX: 38.2 ML/M2
ECHO LVOT SV: 62.3 ML
ECHO LVOT VTI: 18 CM
ECHO MV A VELOCITY: 0.63 M/S
ECHO MV E DECELERATION TIME (DT): 145 MS
ECHO MV E VELOCITY: 0.94 M/S
ECHO MV E/A RATIO: 1.49
ECHO MV E/E' LATERAL: 11.52
ECHO MV E/E' RATIO (AVERAGED): 19.27
ECHO MV E/E' SEPTAL: 27.01
ECHO PV MAX VELOCITY: 0.9 M/S
ECHO PV PEAK GRADIENT: 3 MMHG
ECHO RA AREA 4C: 16.7 CM2
ECHO RA END SYSTOLIC VOLUME APICAL 4 CHAMBER INDEX BSA: 27 ML/M2
ECHO RA VOLUME: 44 ML
ECHO RIGHT VENTRICULAR SYSTOLIC PRESSURE (RVSP): 38 MMHG
ECHO RV INTERNAL DIMENSION: 3.9 CM
ECHO TV REGURGITANT MAX VELOCITY: 2.72 M/S
ECHO TV REGURGITANT PEAK GRADIENT: 30 MMHG
GLUCOSE BLD-MCNC: 112 MG/DL (ref 65–105)
GLUCOSE BLD-MCNC: 168 MG/DL (ref 65–105)
GLUCOSE BLD-MCNC: 190 MG/DL (ref 65–105)
GLUCOSE BLD-MCNC: 195 MG/DL (ref 65–105)

## 2025-04-09 PROCEDURE — 6370000000 HC RX 637 (ALT 250 FOR IP)

## 2025-04-09 PROCEDURE — 94761 N-INVAS EAR/PLS OXIMETRY MLT: CPT

## 2025-04-09 PROCEDURE — 6360000002 HC RX W HCPCS: Performed by: INTERNAL MEDICINE

## 2025-04-09 PROCEDURE — 2500000003 HC RX 250 WO HCPCS

## 2025-04-09 PROCEDURE — 1200000000 HC SEMI PRIVATE

## 2025-04-09 PROCEDURE — C8929 TTE W OR WO FOL WCON,DOPPLER: HCPCS

## 2025-04-09 PROCEDURE — 82947 ASSAY GLUCOSE BLOOD QUANT: CPT

## 2025-04-09 PROCEDURE — 99233 SBSQ HOSP IP/OBS HIGH 50: CPT | Performed by: INTERNAL MEDICINE

## 2025-04-09 PROCEDURE — 94640 AIRWAY INHALATION TREATMENT: CPT

## 2025-04-09 PROCEDURE — 93005 ELECTROCARDIOGRAM TRACING: CPT | Performed by: INTERNAL MEDICINE

## 2025-04-09 PROCEDURE — 2700000000 HC OXYGEN THERAPY PER DAY

## 2025-04-09 PROCEDURE — 6360000004 HC RX CONTRAST MEDICATION: Performed by: INTERNAL MEDICINE

## 2025-04-09 PROCEDURE — 6370000000 HC RX 637 (ALT 250 FOR IP): Performed by: INTERNAL MEDICINE

## 2025-04-09 RX ORDER — BUMETANIDE 1 MG/1
2 TABLET ORAL DAILY
Status: DISCONTINUED | OUTPATIENT
Start: 2025-04-09 | End: 2025-04-09

## 2025-04-09 RX ORDER — BUMETANIDE 1 MG/1
2 TABLET ORAL DAILY
Status: DISCONTINUED | OUTPATIENT
Start: 2025-04-10 | End: 2025-04-10 | Stop reason: HOSPADM

## 2025-04-09 RX ADMIN — IPRATROPIUM BROMIDE AND ALBUTEROL SULFATE 1 DOSE: 2.5; .5 SOLUTION RESPIRATORY (INHALATION) at 19:42

## 2025-04-09 RX ADMIN — BUDESONIDE AND FORMOTEROL FUMARATE DIHYDRATE 2 PUFF: 160; 4.5 AEROSOL RESPIRATORY (INHALATION) at 07:47

## 2025-04-09 RX ADMIN — SODIUM CHLORIDE, PRESERVATIVE FREE 10 ML: 5 INJECTION INTRAVENOUS at 08:16

## 2025-04-09 RX ADMIN — ENOXAPARIN SODIUM 60 MG: 100 INJECTION SUBCUTANEOUS at 08:01

## 2025-04-09 RX ADMIN — EMPAGLIFLOZIN 10 MG: 10 TABLET, FILM COATED ORAL at 08:01

## 2025-04-09 RX ADMIN — POTASSIUM CHLORIDE 20 MEQ: 750 CAPSULE, EXTENDED RELEASE ORAL at 21:12

## 2025-04-09 RX ADMIN — AMIODARONE HYDROCHLORIDE 200 MG: 200 TABLET ORAL at 09:03

## 2025-04-09 RX ADMIN — ASPIRIN 81 MG: 81 TABLET, COATED ORAL at 08:01

## 2025-04-09 RX ADMIN — BUDESONIDE AND FORMOTEROL FUMARATE DIHYDRATE 2 PUFF: 160; 4.5 AEROSOL RESPIRATORY (INHALATION) at 19:42

## 2025-04-09 RX ADMIN — ENOXAPARIN SODIUM 60 MG: 100 INJECTION SUBCUTANEOUS at 21:12

## 2025-04-09 RX ADMIN — POTASSIUM CHLORIDE 20 MEQ: 750 CAPSULE, EXTENDED RELEASE ORAL at 08:01

## 2025-04-09 RX ADMIN — SODIUM CHLORIDE, PRESERVATIVE FREE 10 ML: 5 INJECTION INTRAVENOUS at 21:12

## 2025-04-09 RX ADMIN — ATORVASTATIN CALCIUM 80 MG: 80 TABLET, FILM COATED ORAL at 08:01

## 2025-04-09 RX ADMIN — IPRATROPIUM BROMIDE AND ALBUTEROL SULFATE 1 DOSE: 2.5; .5 SOLUTION RESPIRATORY (INHALATION) at 07:47

## 2025-04-09 RX ADMIN — IPRATROPIUM BROMIDE AND ALBUTEROL SULFATE 1 DOSE: 2.5; .5 SOLUTION RESPIRATORY (INHALATION) at 11:22

## 2025-04-09 RX ADMIN — SULFUR HEXAFLUORIDE 5 ML: KIT at 09:34

## 2025-04-09 RX ADMIN — LOSARTAN POTASSIUM 25 MG: 25 TABLET, FILM COATED ORAL at 09:04

## 2025-04-09 NOTE — PLAN OF CARE
Problem: Chronic Conditions and Co-morbidities  Goal: Patient's chronic conditions and co-morbidity symptoms are monitored and maintained or improved  Outcome: Progressing  Flowsheets (Taken 4/9/2025 0800 by Paz Mota RN)  Care Plan - Patient's Chronic Conditions and Co-Morbidity Symptoms are Monitored and Maintained or Improved: Monitor and assess patient's chronic conditions and comorbid symptoms for stability, deterioration, or improvement     Problem: Discharge Planning  Goal: Discharge to home or other facility with appropriate resources  Outcome: Progressing  Flowsheets (Taken 4/9/2025 0800 by Paz Mota RN)  Discharge to home or other facility with appropriate resources: Identify barriers to discharge with patient and caregiver     Problem: Pain  Goal: Verbalizes/displays adequate comfort level or baseline comfort level  Outcome: Progressing     Problem: Safety - Adult  Goal: Free from fall injury  Outcome: Progressing  Flowsheets (Taken 4/9/2025 0800 by Paz Mota RN)  Free From Fall Injury: Instruct family/caregiver on patient safety     Problem: ABCDS Injury Assessment  Goal: Absence of physical injury  Outcome: Progressing  Flowsheets (Taken 4/9/2025 0800 by Paz Mota RN)  Absence of Physical Injury: Implement safety measures based on patient assessment     Problem: Skin/Tissue Integrity  Goal: Skin integrity remains intact  Description: 1.  Monitor for areas of redness and/or skin breakdown  2.  Assess vascular access sites hourly  3.  Every 4-6 hours minimum:  Change oxygen saturation probe site  4.  Every 4-6 hours:  If on nasal continuous positive airway pressure, respiratory therapy assess nares and determine need for appliance change or resting period  Outcome: Progressing  Flowsheets (Taken 4/9/2025 0800 by Paz Mota RN)  Skin Integrity Remains Intact: Monitor for areas of redness and/or skin breakdown     Problem: Cardiovascular - Adult  Goal: Maintains optimal

## 2025-04-09 NOTE — PLAN OF CARE
Problem: Chronic Conditions and Co-morbidities  Goal: Patient's chronic conditions and co-morbidity symptoms are monitored and maintained or improved  Outcome: Progressing  Flowsheets (Taken 4/8/2025 0910 by Paz Mota RN)  Care Plan - Patient's Chronic Conditions and Co-Morbidity Symptoms are Monitored and Maintained or Improved: Monitor and assess patient's chronic conditions and comorbid symptoms for stability, deterioration, or improvement     Problem: Discharge Planning  Goal: Discharge to home or other facility with appropriate resources  Outcome: Progressing  Flowsheets (Taken 4/8/2025 0910 by Paz Moat RN)  Discharge to home or other facility with appropriate resources: Identify barriers to discharge with patient and caregiver     Problem: Pain  Goal: Verbalizes/displays adequate comfort level or baseline comfort level  Outcome: Progressing     Problem: Safety - Adult  Goal: Free from fall injury  Outcome: Progressing  Flowsheets (Taken 4/8/2025 0910 by Paz Mota RN)  Free From Fall Injury: Instruct family/caregiver on patient safety     Problem: ABCDS Injury Assessment  Goal: Absence of physical injury  Outcome: Progressing  Flowsheets  Taken 4/8/2025 1900 by Martha Carrillo RN  Absence of Physical Injury: Implement safety measures based on patient assessment  Taken 4/8/2025 0910 by Paz Mota RN  Absence of Physical Injury: Implement safety measures based on patient assessment     Problem: Skin/Tissue Integrity  Goal: Skin integrity remains intact  Description: 1.  Monitor for areas of redness and/or skin breakdown  2.  Assess vascular access sites hourly  3.  Every 4-6 hours minimum:  Change oxygen saturation probe site  4.  Every 4-6 hours:  If on nasal continuous positive airway pressure, respiratory therapy assess nares and determine need for appliance change or resting period  Outcome: Progressing  Flowsheets (Taken 4/8/2025 0910 by Paz Mota RN)  Skin Integrity Remains

## 2025-04-10 ENCOUNTER — TELEPHONE (OUTPATIENT)
Age: 75
End: 2025-04-10

## 2025-04-10 ENCOUNTER — TELEPHONE (OUTPATIENT)
Dept: FAMILY MEDICINE CLINIC | Age: 75
End: 2025-04-10

## 2025-04-10 ENCOUNTER — TELEPHONE (OUTPATIENT)
Dept: OTHER | Age: 75
End: 2025-04-10

## 2025-04-10 VITALS
BODY MASS INDEX: 22.21 KG/M2 | HEIGHT: 64 IN | RESPIRATION RATE: 18 BRPM | SYSTOLIC BLOOD PRESSURE: 131 MMHG | OXYGEN SATURATION: 97 % | TEMPERATURE: 98.1 F | DIASTOLIC BLOOD PRESSURE: 82 MMHG | HEART RATE: 95 BPM | WEIGHT: 130.07 LBS

## 2025-04-10 LAB
ANION GAP SERPL CALCULATED.3IONS-SCNC: 9 MMOL/L (ref 9–16)
BUN SERPL-MCNC: 20 MG/DL (ref 8–23)
CALCIUM SERPL-MCNC: 8.7 MG/DL (ref 8.6–10.4)
CHLORIDE SERPL-SCNC: 107 MMOL/L (ref 98–107)
CO2 SERPL-SCNC: 26 MMOL/L (ref 20–31)
CREAT SERPL-MCNC: 1.2 MG/DL (ref 0.7–1.2)
ERYTHROCYTE [DISTWIDTH] IN BLOOD BY AUTOMATED COUNT: 15 % (ref 11.5–14.9)
GFR, ESTIMATED: 47 ML/MIN/1.73M2
GLUCOSE BLD-MCNC: 160 MG/DL (ref 65–105)
GLUCOSE BLD-MCNC: 217 MG/DL (ref 65–105)
GLUCOSE SERPL-MCNC: 168 MG/DL (ref 74–99)
HCT VFR BLD AUTO: 35.3 % (ref 36–46)
HGB BLD-MCNC: 11.5 G/DL (ref 12–16)
MCH RBC QN AUTO: 27.4 PG (ref 26–34)
MCHC RBC AUTO-ENTMCNC: 32.7 G/DL (ref 31–37)
MCV RBC AUTO: 83.9 FL (ref 80–100)
PLATELET # BLD AUTO: 228 K/UL (ref 150–450)
PMV BLD AUTO: 9.3 FL (ref 6–12)
POTASSIUM SERPL-SCNC: 4.9 MMOL/L (ref 3.7–5.3)
RBC # BLD AUTO: 4.21 M/UL (ref 4–5.2)
SODIUM SERPL-SCNC: 142 MMOL/L (ref 136–145)
WBC OTHER # BLD: 6.2 K/UL (ref 3.5–11)

## 2025-04-10 PROCEDURE — 6370000000 HC RX 637 (ALT 250 FOR IP): Performed by: INTERNAL MEDICINE

## 2025-04-10 PROCEDURE — 6370000000 HC RX 637 (ALT 250 FOR IP)

## 2025-04-10 PROCEDURE — 94761 N-INVAS EAR/PLS OXIMETRY MLT: CPT

## 2025-04-10 PROCEDURE — 2700000000 HC OXYGEN THERAPY PER DAY

## 2025-04-10 PROCEDURE — 2500000003 HC RX 250 WO HCPCS

## 2025-04-10 PROCEDURE — 6360000002 HC RX W HCPCS: Performed by: INTERNAL MEDICINE

## 2025-04-10 PROCEDURE — 99239 HOSP IP/OBS DSCHRG MGMT >30: CPT | Performed by: INTERNAL MEDICINE

## 2025-04-10 PROCEDURE — 82947 ASSAY GLUCOSE BLOOD QUANT: CPT

## 2025-04-10 PROCEDURE — 94640 AIRWAY INHALATION TREATMENT: CPT

## 2025-04-10 PROCEDURE — 80048 BASIC METABOLIC PNL TOTAL CA: CPT

## 2025-04-10 PROCEDURE — 85027 COMPLETE CBC AUTOMATED: CPT

## 2025-04-10 PROCEDURE — 36415 COLL VENOUS BLD VENIPUNCTURE: CPT

## 2025-04-10 RX ORDER — WARFARIN SODIUM 2.5 MG/1
TABLET ORAL
Qty: 30 TABLET | Refills: 3 | Status: SHIPPED | OUTPATIENT
Start: 2025-04-10

## 2025-04-10 RX ORDER — ENOXAPARIN SODIUM 100 MG/ML
60 INJECTION SUBCUTANEOUS 2 TIMES DAILY
Qty: 10 EACH | Refills: 0 | Status: SHIPPED | OUTPATIENT
Start: 2025-04-10 | End: 2025-04-15

## 2025-04-10 RX ADMIN — IPRATROPIUM BROMIDE AND ALBUTEROL SULFATE 1 DOSE: 2.5; .5 SOLUTION RESPIRATORY (INHALATION) at 06:52

## 2025-04-10 RX ADMIN — ASPIRIN 81 MG: 81 TABLET, COATED ORAL at 07:47

## 2025-04-10 RX ADMIN — LOSARTAN POTASSIUM 25 MG: 25 TABLET, FILM COATED ORAL at 07:48

## 2025-04-10 RX ADMIN — ENOXAPARIN SODIUM 60 MG: 100 INJECTION SUBCUTANEOUS at 07:48

## 2025-04-10 RX ADMIN — SODIUM CHLORIDE, PRESERVATIVE FREE 10 ML: 5 INJECTION INTRAVENOUS at 07:48

## 2025-04-10 RX ADMIN — AMIODARONE HYDROCHLORIDE 200 MG: 200 TABLET ORAL at 07:47

## 2025-04-10 RX ADMIN — POTASSIUM CHLORIDE 20 MEQ: 750 CAPSULE, EXTENDED RELEASE ORAL at 07:47

## 2025-04-10 RX ADMIN — BUDESONIDE AND FORMOTEROL FUMARATE DIHYDRATE 2 PUFF: 160; 4.5 AEROSOL RESPIRATORY (INHALATION) at 06:52

## 2025-04-10 RX ADMIN — IPRATROPIUM BROMIDE AND ALBUTEROL SULFATE 1 DOSE: 2.5; .5 SOLUTION RESPIRATORY (INHALATION) at 10:53

## 2025-04-10 RX ADMIN — CARVEDILOL 3.12 MG: 3.12 TABLET, FILM COATED ORAL at 07:47

## 2025-04-10 RX ADMIN — EMPAGLIFLOZIN 10 MG: 10 TABLET, FILM COATED ORAL at 07:47

## 2025-04-10 RX ADMIN — BUMETANIDE 2 MG: 1 TABLET ORAL at 07:47

## 2025-04-10 RX ADMIN — ATORVASTATIN CALCIUM 80 MG: 80 TABLET, FILM COATED ORAL at 07:47

## 2025-04-10 NOTE — TELEPHONE ENCOUNTER
New coumadin clinic referral    INR goal 2-3.   Dr. Castro (will send referral for signature to PCP Dr. Mason)  Indication LV apical thrombus    New coumadin start. Pt will start coumadin 2.5 mg daily and Lovenox 60 mg SubQ BID  - planning to  Rx on 4/11/25.   INR appt with coumadin clinic on 4/14/25.  Ellyn GustafsonD, BCPS 4/10/2025 5:17 PM

## 2025-04-10 NOTE — DISCHARGE INSTR - COC
THORACIC FUSION  01/10/2025    OCCIPUT TO T1 DECOMPRESSION FUSION, REVISION OF HARDWARE (SYNTHES, STEALTH, EVOKES)       Immunization History:   Immunization History   Administered Date(s) Administered    Influenza Virus Vaccine 11/14/2016, 09/29/2017    Influenza Whole 11/11/2015    Influenza, FLUAD, (age 65 y+), IM, Trivalent PF, 0.5mL 09/29/2017    Influenza, FLUARIX, FLULAVAL, FLUZONE (age 6 mo+) and AFLURIA, (age 3 y+), Quadv PF, 0.5mL 08/27/2019, 11/13/2020    Influenza, FLUZONE High Dose (age 65 y+), IM, Quadv, 0.7mL 01/14/2025    Influenza, FLUZONE High Dose, (age 65 y+), IM, Trivalent PF, 0.5mL 11/14/2014, 11/11/2015, 10/24/2018    Pneumococcal, PCV-13, PREVNAR 13, (age 6w+), IM, 0.5mL 11/14/2016    Pneumococcal, PCV20, PREVNAR 20, (age 6w+), IM, 0.5mL 01/22/2025    Pneumococcal, PPSV23, PNEUMOVAX 23, (age 2y+), SC/IM, 0.5mL 11/01/2017    TDaP, ADACEL (age 10y-64y), BOOSTRIX (age 10y+), IM, 0.5mL 07/02/2020, 10/10/2024       Active Problems:  Patient Active Problem List   Diagnosis Code    Chronic pain G89.29    Controlled type 2 diabetes mellitus without complication, without long-term current use of insulin E11.9    Hypertension goal BP (blood pressure) < 140/90 I10    Mixed hyperlipidemia E78.2    Chronic obstructive pulmonary disease (HCC) J44.9    Coronary artery disease involving native coronary artery of native heart without angina pectoris I25.10    Primary insomnia F51.01    Restless leg syndrome G25.81    Atherosclerosis of superior mesenteric artery 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

## 2025-04-10 NOTE — PLAN OF CARE
Problem: Chronic Conditions and Co-morbidities  Goal: Patient's chronic conditions and co-morbidity symptoms are monitored and maintained or improved  Outcome: Completed     Problem: Discharge Planning  Goal: Discharge to home or other facility with appropriate resources  Outcome: Completed     Problem: Pain  Goal: Verbalizes/displays adequate comfort level or baseline comfort level  Outcome: Completed     Problem: Safety - Adult  Goal: Free from fall injury  Outcome: Completed     Problem: ABCDS Injury Assessment  Goal: Absence of physical injury  Outcome: Completed     Problem: Skin/Tissue Integrity  Goal: Skin integrity remains intact  Description: 1.  Monitor for areas of redness and/or skin breakdown  2.  Assess vascular access sites hourly  3.  Every 4-6 hours minimum:  Change oxygen saturation probe site  4.  Every 4-6 hours:  If on nasal continuous positive airway pressure, respiratory therapy assess nares and determine need for appliance change or resting period  Outcome: Completed     Problem: Cardiovascular - Adult  Goal: Maintains optimal cardiac output and hemodynamic stability  Outcome: Completed  Goal: Absence of cardiac dysrhythmias or at baseline  Outcome: Completed     Problem: Respiratory - Adult  Goal: Achieves optimal ventilation and oxygenation  Outcome: Completed     Problem: Skin/Tissue Integrity - Adult  Goal: Skin integrity remains intact  Description: 1.  Monitor for areas of redness and/or skin breakdown  2.  Assess vascular access sites hourly  3.  Every 4-6 hours minimum:  Change oxygen saturation probe site  4.  Every 4-6 hours:  If on nasal continuous positive airway pressure, respiratory therapy assess nares and determine need for appliance change or resting period  Outcome: Completed     Problem: Musculoskeletal - Adult  Goal: Return mobility to safest level of function  Outcome: Completed     Problem: Nutrition Deficit:  Goal: Optimize nutritional status  Outcome: Completed

## 2025-04-10 NOTE — TELEPHONE ENCOUNTER
Called and spoke to granddaughter, Luana to schedule [facility] apt per instructions by patient. Scheduled [facility] Clinic apt 4/17/25.     Provided instructions on [employer] heart failure patch. In instructed her to open box when arrived and there will be a phone number for [employer] to call. They will provide further instructions.    Reviewed medications. She informed that pt is unable to afford Eliquis and Jardiance. Discussed this with [facility]ication [facility] pharmacist, Delfina concerned that if pt was not able to afford Eliquis, she may need to be put on Warfarin. Pt also has only 2 days left of her medication, Amiodarone. She called and spoke to , Yuko DELAROSA Requesting further assistance.     Referral request placed to Medication Management due to EF 20-25%
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
## 2025-04-10 NOTE — TELEPHONE ENCOUNTER
Care Transitions Initial Follow Up Call    Outreach made within 2 business days of discharge: Yes    Patient: Graciela Holt Patient : 1950   MRN: [id number]  Reason for Admission: DYSPNEA  Discharge Date: 4/10/25       Spoke with: DAINA #1    Discharge department/facility: [facility] Interactive Patient Contact:  Was patient able to fill all prescriptions:   Was patient instructed to bring all medications to the follow-up visit:   Is patient taking all medications as directed in the discharge summary?   Does patient understand their discharge instructions:   Does patient have questions or concerns that need addressed prior to 7-14 day follow up office visit:     Additional needs identified to be addressed with provider               Scheduled appointment with PCP within 7-14 days    Follow Up  Future Appointments   Date Time Provider Department Center   2025 11:00 AM [facility] [place]   2025 12:45 PM Travis Mason MD fp sc [facility] DEP   2025  1:15 PM Main Mann DO [facility]   2025  3:00 PM Yessica Flynn DO [facility]       Verona Montenegro MA

## 2025-04-10 NOTE — PLAN OF CARE
Problem: Chronic Conditions and Co-morbidities  Goal: Patient's chronic conditions and co-morbidity symptoms are monitored and maintained or improved  4/9/2025 2246 by Jhonathan Contreras RN  Outcome: Progressing  Flowsheets (Taken 4/9/2025 2114)  Care Plan - Patient's Chronic Conditions and Co-Morbidity Symptoms are Monitored and Maintained or Improved: Monitor and assess patient's chronic conditions and comorbid symptoms for stability, deterioration, or improvement     Problem: Discharge Planning  Goal: Discharge to home or other facility with appropriate resources  4/9/2025 2246 by Jhonathan Contreras RN  Outcome: Progressing  Flowsheets (Taken 4/9/2025 2114)  Discharge to home or other facility with appropriate resources: Identify barriers to discharge with patient and caregiver     Problem: Pain  Goal: Verbalizes/displays adequate comfort level or baseline comfort level  4/9/2025 2246 by Jhonathan Contreras RN  Outcome: Progressing     Problem: Safety - Adult  Goal: Free from fall injury  4/9/2025 2246 by Jhonathan Contreras RN  Outcome: Progressing     Problem: ABCDS Injury Assessment  Goal: Absence of physical injury  4/9/2025 2246 by Jhonathan Contreras RN  Outcome: Progressing     Problem: Skin/Tissue Integrity  Goal: Skin integrity remains intact  Description: 1.  Monitor for areas of redness and/or skin breakdown  2.  Assess vascular access sites hourly  3.  Every 4-6 hours minimum:  Change oxygen saturation probe site  4.  Every 4-6 hours:  If on nasal continuous positive airway pressure, respiratory therapy assess nares and determine need for appliance change or resting period  4/9/2025 2246 by Jhonathan Contreras RN  Outcome: Progressing  Flowsheets (Taken 4/9/2025 2114)  Skin Integrity Remains Intact: Monitor for areas of redness and/or skin breakdown     Problem: Cardiovascular - Adult  Goal: Maintains optimal cardiac output and hemodynamic stability  4/9/2025 2246 by Jhonathan Contreras RN  Outcome:

## 2025-04-10 NOTE — DISCHARGE INSTRUCTIONS
Your information:  Name: Graciela Holt  : 1950    Your instructions:    Follow up with physicians as recommended and take all prescriptions as prescribed.     What to do after you leave the hospital:    Recommended diet: regular diet    Recommended activity: activity as tolerated        The following personal items were collected during your admission and were returned to you:    Belongings  Dental Appliances: None  Vision - Corrective Lenses: None  Hearing Aid: None  Clothing: Shirt, Footwear, Socks, Undergarments  Jewelry: None  Body Piercings Removed: No  Electronic Devices: None  Weapons (Notify Protective Services/Security): None  Other Valuables: At home  Home Medications: None  Valuables Given To: Patient  Provide Name(s) of Who Valuable(s) Were Given To: Graciela Holt    Information obtained by:  By signing below, I understand that if any problems occur once I leave the hospital I am to contact my PCP or return to the emergency room.  I understand and acknowledge receipt of the instructions indicated above.

## 2025-04-10 NOTE — CARE COORDINATION
ACTIVE CHF    Does pt have a Cardiologist? Yes, has appt scheduled with Dr. Mann 5/7 @ 1:15pm    Is patient agreeable to CHF Clinic? Yes, referral sent.    Is patient agreeable to Zoll patch? Yes, unable to order at this time as Zoll website is down. Contacted Glynn from [facility] regarding this.    Nutrition Consult placed: Yes, referral to [facility] placed and faxed.    Electronically signed by Yuko Krause RN on 4/10/2025 at 10:28 AM    Glynn from [employer] confirmed that website is down. He will inform writer when it is back up so that writer can order Zoll patch.    Electronically signed by Yuko Krause RN on 4/10/2025 at 11:16 AM    
Case Management Assessment  Initial Evaluation    Date/Time of Evaluation: 4/8/2025 9:57 AM  Assessment Completed by: Aster Pelaez RN    If patient is discharged prior to next notation, then this note serves as note for discharge by case management.    Patient Name: Graciela Holt                   YOB: 1950  Diagnosis: Dyspnea on exertion [R06.09]  NSTEMI (non-ST elevated myocardial infarction) (HCC) [I21.4]  Chest pain, unspecified type [R07.9]                   Date / Time: 4/7/2025  4:07 PM    Patient Admission Status: Inpatient   Readmission Risk (Low < 19, Mod (19-27), High > 27): Readmission Risk Score: 24.6    Current PCP: Travis Mason MD  PCP verified by CM? Yes    Chart Reviewed: Yes      History Provided by: Patient  Patient Orientation: Alert and Oriented    Patient Cognition: Alert    Hospitalization in the last 30 days (Readmission):  No    If yes, Readmission Assessment in CM Navigator will be completed.    Advance Directives:      Code Status: Full Code   Patient's Primary Decision Maker is: Legal Next of Kin    Primary Decision Maker: Guanakito Zheng - Child - [phone]    Discharge Planning:    Patient lives with: Other (Comment) (Roommates) Type of Home: House  Primary Care Giver: Self  Patient Support Systems include: Family Members, Children   Current Financial resources: Medicare  Current community resources: None  Current services prior to admission: Durable Medical Equipment            Current DME: Walker ([employer]. Had oxygen in the past, states, was returned to [employer], at the end of January.)            Type of Home Care services:  None    ADLS  Prior functional level: Independent in ADLs/IADLs  Current functional level: Independent in ADLs/IADLs    PT AM-PAC:   /24  OT AM-PAC:   /24    Family can provide assistance at DC: Yes  Would you like Case Management to discuss the discharge plan with any other family members/significant others, and if so, who? No  Plans to 
DISCHARGE PLANNING NOTE:    Received call from pharmacist Rosalee in Med Management stating she spoke with Prachi in CHF Clinic who spoke with patient and her Eliquis is NO longer covered by insurance. It is $429 - NOT affordable.  Rosalee recommends Med Management for Coumadin consult along with Lovenox 60mg BID for 5 days and Coumadin 2.5mg daily.  Message sent to Dr. Castro to inform of this.    Electronically signed by Yuko Krause RN on 4/10/2025 at 4:20 PM    Called Dr. Castro. VM left.    Electronically signed by Yuko Krause RN on 4/10/2025 at 4:25 PM    Spoke with Dr. Castro. Received the following orders.  D/C Eliquis  Lovenox 60mg BID for 5 days  Coumadin 2.5mg daily #30 with 3 refills  Med management for Coumadin.    Placed orders and sent to [facility] in case Lovenox is not affordable.    Spoke with Noemi in OP pharmacy. Medications are available in stock. Cost of Lovenox is $53.66. Called pt's grand-daughter Luana, and updated her on the above. She states she will pick medications up first thing tomorrow morning. Informed Noemi in OP pharmacy of this and to voucher Lovenox if not affordable.    Electronically signed by Yuko Krause RN on 4/10/2025 at 4:46 PM    
DISCHARGE PLANNING NOTE:    Zoll website working.    Graciela Holt Submitted to ZOLL Submitted on 2025-04-10 Heart Failure Management System View Order     Order for Zoll patch submitted.    Electronically signed by Yuko Krause RN on 4/10/2025 at 2:49 PM    
HOME OXYGEN:    Portable 02 tank delivered per [employer].  02 tank turned on and noted to be full.  Patient understands to call [phone] immediately upon arrival home to have 02 concentrator delivered.    Electronically signed by uYko Krause RN on 4/10/2025 at 12:13 PM    Faxed facesheet, order for home O2, home O2 eval and face to face documentation to [employer]. Updated Leah from [employer] of this.    Electronically signed by Yuko Krause RN on 4/10/2025 at 12:18 PM    
ONGOING DISCHARGE PLAN:    Patient is alert and oriented x4.    Spoke with patient regarding discharge plan and patient confirms that plan is still home no needs.    Cardiac Consult. Echo pending. BNP 9,450. IV Bumex. SpO2 98% 2L.     Will continue to follow for additional discharge needs.    If patient is discharged prior to next notation, then this note serves as note for discharge by case management.    Electronically signed by Yuko Pizarro RN on 4/9/2025 at 12:38 PM    
ONGOING DISCHARGE PLAN:    Patient is alert and oriented x4.    Spoke with patient regarding discharge plan and patient confirms that plan is still home without needs. VNS has been declined.    Pt is currently on O2 at 2L NC. Had O2 at home in the past, and returned it as she was not using it. States she is agreeable to wearing O2 if she needs it. Will need home O2 eval prior to discharge.    Switched from IV to oral Bumex.    Per cardiology notes, on full dose Lovenox currently, will change to Eliquis. Pt states she has been on this in the past and it was $0.    Will continue to follow for additional discharge needs.    If patient is discharged prior to next notation, then this note serves as note for discharge by case management.    Electronically signed by Yuko Krause RN on 4/10/2025 at 10:14 AM    
Patient was evaluated today for the diagnosis of COPD, CHF.  I entered a DME order for home oxygen at 2 lpm because the diagnosis and testing require the patient to have supplemental oxygen.  Condition will improve or be benefited by oxygen use.  The patient is  able to perform good mobility in a home setting and therefore does require the use of a portable oxygen system.  The need for this equipment was discussed with the patient and she understands and is in agreement.    
    Req/Control # [Problem retrieving Specimen ID]                                   Order Date:  Apr 10, 2025  [id number]                                          Patient Information      Name:  Graciela Holt  :  1950  Age:  74 y.o.   Address:  [address] [place] [address]   Zip:  [address]  PCP: Travis Mason MD Sex:  F  SSN: [id number]  Home Phone: [phone]  Work Phone:    Patient MRN:  [id number]    Alt Patient ID:  [id number]  PCP Phone: [phone]       Authorizing Provider Information       AUTHORIZING PROVIDER: Lewis Castro MD  Physician ID: [id number]  NPI:  [id number]  Site:   Address: [address] [place] [address] Zip: [place] [address]  Phone: [phone]  Fax: [phone]             EQUIPMENT ORDERED  DME Order for Home Oxygen as OP [OLP094] (ORD   #:   [id number]) Priority  Routine Class  Hospital Performed        Associated Diagnosis:  CHF (congestive heart failure), NYHA class I, acute on chronic, combined (HCC) (I50.43 [ICD-10-CM]); Chronic combined systolic and diastolic congestive heart failure (HCC) (I50.42 [ICD-10-CM]); Mucopurulent chronic bronchitis (HCC) (J41.1 [ICD-10-CM])        Comments:   You must complete the order parameters below and add the medical necessity documentation for this DME in a separate note.     Stationary Oxygen Concentrator at 2 lpm via Nasal Cannula     Stationary Prescribed at:  Non Continuous while walking or exercise     Portable Gaseous O2 System and contents at 2 lpm via Nasal Cannula     Please provide patient with POC with setting of 3 via NC to keep spO2>90%     3-4hrs/day     Diagnosis: COPD and CHF  Length of need: Lifetime            Scheduling Instructions:                                 Specimen Source             Collection Date    Collection Time    Order Status    Expected Date                 Electronically Signed By  Lewis Castro MD  NPI:  [id number] Date  Apr 10, 2025  12:22 PM             
Coordinator or someone at our  who will acquire that for you.     Electronically Signed By: Lewis Castro                              Order Date: Apr 10, 2025

## 2025-04-11 ENCOUNTER — TELEPHONE (OUTPATIENT)
Dept: OTHER | Age: 75
End: 2025-04-11

## 2025-04-11 LAB
EKG ATRIAL RATE: 77 BPM
EKG P AXIS: 27 DEGREES
EKG P-R INTERVAL: 142 MS
EKG Q-T INTERVAL: 450 MS
EKG QRS DURATION: 106 MS
EKG QTC CALCULATION (BAZETT): 509 MS
EKG R AXIS: -6 DEGREES
EKG T AXIS: 168 DEGREES
EKG VENTRICULAR RATE: 77 BPM

## 2025-04-11 NOTE — DISCHARGE SUMMARY
IN-PATIENT SERVICE   [facility]    Discharge Summary     Patient ID: Graciela Holt  :  1950   MRN: [id number]     ACCOUNT:  [id number]   Patient's PCP: Travis Mason MD  Admit Date: 2025   Discharge Date: 2025    Length of Stay: 3  Code Status:  Prior  Admitting Physician: Juan Carlos Todd MD  Discharge Physician: Lewis Castro MD     Active Discharge Diagnoses:     Primary Problem  NSTEMI (non-ST elevated myocardial infarction) (HCC)      Hospital Problems  Active Hospital Problems    Diagnosis Date Noted    Moderate malnutrition [E44.0] 2025    NSTEMI (non-ST elevated myocardial infarction) (HCC) [I21.4] 2025       Admission Condition:  fair     Discharged Condition: fair    Hospital Stay:     Hospital Course:  Graciela Holt is a 74 y.o. female who was admitted for the management of NSTEMI (non-ST elevated myocardial infarction) (HCC) , presented to ER with Chest Pain and Shortness of Breath  Patient, his past medical history of multiple medical problem which include coronary artery disease status post CABG, ischemic cardiomyopathy, history of LV clot, atrial fibrillation, diabetes, hyperlipidemia, history of stroke, ex-smoker, COPD, admitted with worsening shortness of breath, fall she had a mechanical fall  Patient, chest x-ray concerning for pulmonary edema  Unfortunately not very compliant with her medication including blood thinners  Patient repeat echo suggestive of EF of 25% with LV clot  Evaluated by cardiologist  Patient is on Bumex, Coreg, Jardiance, Cozaar with heart failure and reduced ejection fraction  On amiodarone with history of V. tach  Patient started on Coumadin, will follow-up with Coumadin clinic and heart failure clinic  Getting discharge with Lovenox shots, until INR is more than 2  Patient qualified for home oxygen  Need to follow-up with cardiology as outpatient  Her insurance unfortunately did not cover

## 2025-04-11 NOTE — TELEPHONE ENCOUNTER
Follow up call to pt's granddaughter, Luana, she verifies all prescriptions have been filled and that are all set. Pt has follow up appts in place with Medication Management for Warfarin and CHF clinic. No other needs at this time, voices appreciation for follow up call.

## 2025-04-14 ENCOUNTER — ANTI-COAG VISIT (OUTPATIENT)
Age: 75
End: 2025-04-14
Payer: COMMERCIAL

## 2025-04-14 LAB
INTERNATIONAL NORMALIZATION RATIO, POC: 1.2
PROTHROMBIN TIME, POC: 13.9

## 2025-04-14 PROCEDURE — 99212 OFFICE O/P EST SF 10 MIN: CPT

## 2025-04-14 PROCEDURE — 85610 PROTHROMBIN TIME: CPT

## 2025-04-14 NOTE — PROGRESS NOTES
Patient seen in person in Medication Management Service.    Patient states compliant all of the time with regimen.    No bleeding or thromboembolic side effects noted.    No significant med or dietary changes.    No significant recent illness or disease state changes.      Patient acknowledges they are still an active patient of referring provider which is referral was sent to Dr. ADITYA Mason Yes Initial referral from hospitalist, Dr. Castro.     PT/INR done via POC meter per protocol.  INR was subtherapeutic at 1.2.  (goal 2 - 3)    Warfarin regimen will be increased to 5 mg daily. Continue Lovenox twice daily.   Will retest in 2 days.    Patient understands dosing directions and information discussed.  Dosing schedule and follow up appointment given to patient. Progress note routed to referring physicians office.  Patient acknowledges working in Consult Agreement with Pharmacist as referred by his/her physician/provider.      For Pharmacy Admin Tracking Only    Intervention Detail: Adherence Monitorin and Dose Adjustment: 1, reason: Therapy Optimization  Total # of Interventions Recommended: 2  Total # of Interventions Accepted: 2  Time Spent (min): 20    Ellyn Givens PharmD, BCPS 2025 11:26 AM

## 2025-04-15 ENCOUNTER — APPOINTMENT (OUTPATIENT)
Dept: GENERAL RADIOLOGY | Age: 75
DRG: 378 | End: 2025-04-15
Payer: COMMERCIAL

## 2025-04-15 ENCOUNTER — HOSPITAL ENCOUNTER (INPATIENT)
Age: 75
LOS: 4 days | Discharge: HOME | DRG: 378 | End: 2025-04-19
Attending: EMERGENCY MEDICINE | Admitting: INTERNAL MEDICINE
Payer: COMMERCIAL

## 2025-04-15 DIAGNOSIS — K92.1 MELENA: Primary | ICD-10-CM

## 2025-04-15 DIAGNOSIS — M25.462 PAIN AND SWELLING OF LEFT KNEE: ICD-10-CM

## 2025-04-15 DIAGNOSIS — M25.562 PAIN AND SWELLING OF LEFT KNEE: ICD-10-CM

## 2025-04-15 PROBLEM — K92.2 ACUTE UPPER GI BLEED: Status: ACTIVE | Noted: 2025-04-15

## 2025-04-15 LAB
ANION GAP SERPL CALCULATED.3IONS-SCNC: 9 MMOL/L (ref 9–16)
BASOPHILS # BLD: 0.1 K/UL (ref 0–0.2)
BASOPHILS NFR BLD: 1 % (ref 0–2)
BUN SERPL-MCNC: 30 MG/DL (ref 8–23)
CALCIUM SERPL-MCNC: 8.8 MG/DL (ref 8.6–10.4)
CHLORIDE SERPL-SCNC: 110 MMOL/L (ref 98–107)
CO2 SERPL-SCNC: 22 MMOL/L (ref 20–31)
CREAT SERPL-MCNC: 1.4 MG/DL (ref 0.7–1.2)
DATE, STOOL #1: ABNORMAL
EOSINOPHIL # BLD: 0.1 K/UL (ref 0–0.4)
EOSINOPHILS RELATIVE PERCENT: 2 % (ref 0–4)
ERYTHROCYTE [DISTWIDTH] IN BLOOD BY AUTOMATED COUNT: 14.8 % (ref 11.5–14.9)
GFR, ESTIMATED: 39 ML/MIN/1.73M2
GLUCOSE BLD-MCNC: 180 MG/DL (ref 65–105)
GLUCOSE SERPL-MCNC: 180 MG/DL (ref 74–99)
HCT VFR BLD AUTO: 33.2 % (ref 36–46)
HEMOCCULT SP1 STL QL: POSITIVE
HGB BLD-MCNC: 10.5 G/DL (ref 12–16)
INR PPP: 1.7
LYMPHOCYTES NFR BLD: 1 K/UL (ref 1–4.8)
LYMPHOCYTES RELATIVE PERCENT: 15 % (ref 24–44)
MCH RBC QN AUTO: 27.2 PG (ref 26–34)
MCHC RBC AUTO-ENTMCNC: 31.7 G/DL (ref 31–37)
MCV RBC AUTO: 85.9 FL (ref 80–100)
MONOCYTES NFR BLD: 0.6 K/UL (ref 0.1–1.3)
MONOCYTES NFR BLD: 10 % (ref 1–7)
NEUTROPHILS NFR BLD: 72 % (ref 36–66)
NEUTS SEG NFR BLD: 4.9 K/UL (ref 1.3–9.1)
PLATELET # BLD AUTO: 310 K/UL (ref 150–450)
PMV BLD AUTO: 9.2 FL (ref 6–12)
POTASSIUM SERPL-SCNC: 4.7 MMOL/L (ref 3.7–5.3)
PROTHROMBIN TIME: 21.2 SEC (ref 11.8–14.6)
RBC # BLD AUTO: 3.86 M/UL (ref 4–5.2)
SODIUM SERPL-SCNC: 141 MMOL/L (ref 136–145)
TIME, STOOL #1: 2010
WBC OTHER # BLD: 6.7 K/UL (ref 3.5–11)

## 2025-04-15 PROCEDURE — 80048 BASIC METABOLIC PNL TOTAL CA: CPT

## 2025-04-15 PROCEDURE — 2500000003 HC RX 250 WO HCPCS: Performed by: NURSE PRACTITIONER

## 2025-04-15 PROCEDURE — 82270 OCCULT BLOOD FECES: CPT

## 2025-04-15 PROCEDURE — 99285 EMERGENCY DEPT VISIT HI MDM: CPT

## 2025-04-15 PROCEDURE — 73562 X-RAY EXAM OF KNEE 3: CPT

## 2025-04-15 PROCEDURE — 6370000000 HC RX 637 (ALT 250 FOR IP): Performed by: NURSE PRACTITIONER

## 2025-04-15 PROCEDURE — 36415 COLL VENOUS BLD VENIPUNCTURE: CPT

## 2025-04-15 PROCEDURE — 85610 PROTHROMBIN TIME: CPT

## 2025-04-15 PROCEDURE — 85025 COMPLETE CBC W/AUTO DIFF WBC: CPT

## 2025-04-15 PROCEDURE — 2060000000 HC ICU INTERMEDIATE R&B

## 2025-04-15 PROCEDURE — 82947 ASSAY GLUCOSE BLOOD QUANT: CPT

## 2025-04-15 RX ORDER — SODIUM CHLORIDE 9 MG/ML
INJECTION, SOLUTION INTRAVENOUS CONTINUOUS
Status: DISCONTINUED | OUTPATIENT
Start: 2025-04-16 | End: 2025-04-16

## 2025-04-15 RX ORDER — ASPIRIN 81 MG/1
81 TABLET ORAL DAILY
Status: DISCONTINUED | OUTPATIENT
Start: 2025-04-16 | End: 2025-04-19 | Stop reason: HOSPADM

## 2025-04-15 RX ORDER — POTASSIUM CHLORIDE 7.45 MG/ML
10 INJECTION INTRAVENOUS PRN
Status: DISCONTINUED | OUTPATIENT
Start: 2025-04-15 | End: 2025-04-19 | Stop reason: HOSPADM

## 2025-04-15 RX ORDER — AMIODARONE HYDROCHLORIDE 200 MG/1
200 TABLET ORAL DAILY
Status: DISCONTINUED | OUTPATIENT
Start: 2025-04-16 | End: 2025-04-19 | Stop reason: HOSPADM

## 2025-04-15 RX ORDER — ONDANSETRON 4 MG/1
4 TABLET, ORALLY DISINTEGRATING ORAL EVERY 8 HOURS PRN
Status: DISCONTINUED | OUTPATIENT
Start: 2025-04-15 | End: 2025-04-15

## 2025-04-15 RX ORDER — FAMOTIDINE 20 MG/1
40 TABLET, FILM COATED ORAL EVERY EVENING
Status: DISCONTINUED | OUTPATIENT
Start: 2025-04-16 | End: 2025-04-18

## 2025-04-15 RX ORDER — SODIUM CHLORIDE 0.9 % (FLUSH) 0.9 %
5-40 SYRINGE (ML) INJECTION EVERY 12 HOURS SCHEDULED
Status: DISCONTINUED | OUTPATIENT
Start: 2025-04-15 | End: 2025-04-19 | Stop reason: HOSPADM

## 2025-04-15 RX ORDER — ACETAMINOPHEN 650 MG/1
650 SUPPOSITORY RECTAL EVERY 6 HOURS PRN
Status: DISCONTINUED | OUTPATIENT
Start: 2025-04-15 | End: 2025-04-19 | Stop reason: HOSPADM

## 2025-04-15 RX ORDER — SODIUM CHLORIDE 0.9 % (FLUSH) 0.9 %
10 SYRINGE (ML) INJECTION PRN
Status: DISCONTINUED | OUTPATIENT
Start: 2025-04-15 | End: 2025-04-19 | Stop reason: HOSPADM

## 2025-04-15 RX ORDER — CARVEDILOL 3.12 MG/1
3.12 TABLET ORAL 2 TIMES DAILY WITH MEALS
Status: DISCONTINUED | OUTPATIENT
Start: 2025-04-16 | End: 2025-04-19 | Stop reason: HOSPADM

## 2025-04-15 RX ORDER — POTASSIUM CHLORIDE 1500 MG/1
40 TABLET, EXTENDED RELEASE ORAL PRN
Status: DISCONTINUED | OUTPATIENT
Start: 2025-04-15 | End: 2025-04-19 | Stop reason: HOSPADM

## 2025-04-15 RX ORDER — ACETAMINOPHEN 325 MG/1
650 TABLET ORAL EVERY 6 HOURS PRN
Status: DISCONTINUED | OUTPATIENT
Start: 2025-04-15 | End: 2025-04-19 | Stop reason: HOSPADM

## 2025-04-15 RX ORDER — BUDESONIDE AND FORMOTEROL FUMARATE DIHYDRATE 160; 4.5 UG/1; UG/1
2 AEROSOL RESPIRATORY (INHALATION)
Status: DISCONTINUED | OUTPATIENT
Start: 2025-04-15 | End: 2025-04-19 | Stop reason: HOSPADM

## 2025-04-15 RX ORDER — ATORVASTATIN CALCIUM 80 MG/1
80 TABLET, FILM COATED ORAL DAILY
Status: DISCONTINUED | OUTPATIENT
Start: 2025-04-16 | End: 2025-04-19 | Stop reason: HOSPADM

## 2025-04-15 RX ORDER — ALBUTEROL SULFATE 90 UG/1
2 INHALANT RESPIRATORY (INHALATION) EVERY 6 HOURS PRN
Status: DISCONTINUED | OUTPATIENT
Start: 2025-04-15 | End: 2025-04-19 | Stop reason: HOSPADM

## 2025-04-15 RX ORDER — ONDANSETRON 2 MG/ML
4 INJECTION INTRAMUSCULAR; INTRAVENOUS EVERY 6 HOURS PRN
Status: DISCONTINUED | OUTPATIENT
Start: 2025-04-15 | End: 2025-04-15

## 2025-04-15 RX ORDER — POTASSIUM CHLORIDE 750 MG/1
20 CAPSULE, EXTENDED RELEASE ORAL 2 TIMES DAILY
Status: DISCONTINUED | OUTPATIENT
Start: 2025-04-15 | End: 2025-04-18

## 2025-04-15 RX ORDER — POLYETHYLENE GLYCOL 3350 17 G/17G
17 POWDER, FOR SOLUTION ORAL DAILY PRN
Status: DISCONTINUED | OUTPATIENT
Start: 2025-04-15 | End: 2025-04-19 | Stop reason: HOSPADM

## 2025-04-15 RX ORDER — ENOXAPARIN SODIUM 100 MG/ML
60 INJECTION SUBCUTANEOUS 2 TIMES DAILY
Status: DISCONTINUED | OUTPATIENT
Start: 2025-04-15 | End: 2025-04-19 | Stop reason: HOSPADM

## 2025-04-15 RX ORDER — NITROGLYCERIN 0.4 MG/1
0.4 TABLET SUBLINGUAL EVERY 5 MIN PRN
Status: DISCONTINUED | OUTPATIENT
Start: 2025-04-15 | End: 2025-04-19 | Stop reason: HOSPADM

## 2025-04-15 RX ORDER — MAGNESIUM SULFATE HEPTAHYDRATE 40 MG/ML
2000 INJECTION, SOLUTION INTRAVENOUS PRN
Status: DISCONTINUED | OUTPATIENT
Start: 2025-04-15 | End: 2025-04-19 | Stop reason: HOSPADM

## 2025-04-15 RX ORDER — LOSARTAN POTASSIUM 25 MG/1
25 TABLET ORAL DAILY
Status: DISCONTINUED | OUTPATIENT
Start: 2025-04-16 | End: 2025-04-19 | Stop reason: HOSPADM

## 2025-04-15 RX ORDER — BISACODYL 10 MG
10 SUPPOSITORY, RECTAL RECTAL DAILY PRN
Status: DISCONTINUED | OUTPATIENT
Start: 2025-04-15 | End: 2025-04-19 | Stop reason: HOSPADM

## 2025-04-15 RX ORDER — SODIUM CHLORIDE 9 MG/ML
INJECTION, SOLUTION INTRAVENOUS PRN
Status: DISCONTINUED | OUTPATIENT
Start: 2025-04-15 | End: 2025-04-19 | Stop reason: HOSPADM

## 2025-04-15 RX ADMIN — POTASSIUM CHLORIDE 20 MEQ: 750 CAPSULE, EXTENDED RELEASE ORAL at 23:17

## 2025-04-15 RX ADMIN — SODIUM CHLORIDE, PRESERVATIVE FREE 10 ML: 5 INJECTION INTRAVENOUS at 23:17

## 2025-04-15 ASSESSMENT — PAIN DESCRIPTION - LOCATION: LOCATION: KNEE

## 2025-04-15 ASSESSMENT — PAIN - FUNCTIONAL ASSESSMENT: PAIN_FUNCTIONAL_ASSESSMENT: 0-10

## 2025-04-15 ASSESSMENT — PAIN SCALES - GENERAL: PAINLEVEL_OUTOF10: 5

## 2025-04-15 ASSESSMENT — PAIN DESCRIPTION - ORIENTATION: ORIENTATION: LEFT

## 2025-04-15 NOTE — ED PROVIDER NOTES
[facility] EMERGENCY DEPARTMENT  eMERGENCY dEPARTMENT eNCOUnter      Pt Name: Graciela Holt  MRN: [id number]  Birthdate 1950  Date of evaluation: 4/15/25      CHIEF COMPLAINT       Chief Complaint   Patient presents with    Fall    Knee Pain         HISTORY OF PRESENT ILLNESS    Graciela Holt is a 74 y.o. female who presents complaining of left knee swelling and black stools  The history is provided by the patient.   Knee Problem  Location:  Knee  Time since incident:  1 week  Injury: yes    Mechanism of injury: fall    Fall:     Impact surface:  Hard floor    Point of impact:  Knees    Entrapped after fall: no    Knee location:  L knee  Pain details:     Quality:  Aching    Radiates to:  Does not radiate    Severity:  Moderate    Onset quality:  Gradual    Duration:  3 days    Timing:  Intermittent    Progression:  Waxing and waning  Chronicity:  New  Dislocation: no    Foreign body present:  No foreign bodies  Tetanus status:  Unknown  Relieved by:  Nothing  Worsened by:  Bearing weight  Associated symptoms: decreased ROM and swelling    Associated symptoms comment:  She reports she fell about a week ago landing on her knee and hitting her head.  At that time she came to the emergency department they found out that she was in atrial fibrillation they started her on blood thinners.  She is on Lovenox twice daily as well as Coumadin twice daily.  She reports now her left knee is bruised and swollen has pain with ambulating.  She is concerned because she is on the blood thinner that she may be hemorrhaging into the knee.  She went to the INR clinic yesterday and they doubled her Coumadin dose and she is currently taking 5 mg twice a day as well as the Lovenox twice daily      REVIEW OF SYSTEMS       Review of Systems   Musculoskeletal:  Positive for joint swelling.   All other systems reviewed and are negative.      PAST MEDICAL HISTORY     Past Medical History:   Diagnosis Date    Allergic rhinitis   hemarthrosis in the left knee she would benefit from admission.  I spoke with the nurse practitioner for internal medicine she agreed to admit patient.  Patient is agreeable to admission she is stable at this time we are awaiting bed number and transport to the floor.  Will continue to monitor.      DIAGNOSTIC RESULTS     EKG: All EKG's are interpreted by the Emergency Department Physician who either signs or Co-signs this chart in the absence of acardiologist.        RADIOLOGY:Allplain film, CT, MRI, and formal ultrasound images (except ED bedside ultrasound) are read by the radiologist and the images and interpretations are directly viewed by the emergency physician.       IMPRESSION:  Extensive soft tissue swelling around the upper portion of the knee as well  as knee effusion. No obvious fractures involving the patella, femur tibia or  fibula.    LABS:All lab results were reviewed by myself, and all abnormals are listed below.  Labs Reviewed   CBC WITH AUTO DIFFERENTIAL - Abnormal; Notable for the following components:       Result Value    RBC 3.86 (*)     Hemoglobin 10.5 (*)     Hematocrit 33.2 (*)     Neutrophils % 72 (*)     Lymphocytes % 15 (*)     Monocytes % 10 (*)     All other components within normal limits   BASIC METABOLIC PANEL - Abnormal; Notable for the following components:    Chloride 110 (*)     Glucose 180 (*)     BUN 30 (*)     Creatinine 1.4 (*)     Est, Glom Filt Rate 39 (*)     All other components within normal limits   PROTIME-INR - Abnormal; Notable for the following components:    Protime 21.2 (*)     All other components within normal limits   BLOOD OCCULT STOOL SCREEN #1 - Abnormal; Notable for the following components:    Occult Blood, Stool #1 POSITIVE (*)     All other components within normal limits         EMERGENCY DEPARTMENT COURSE:   Vitals:    Vitals:    04/15/25 1858   BP: 124/81   Pulse: 66   Resp: 16   Temp: 98.6 °F (37 °C)   TempSrc: Oral   SpO2: 96%   Weight: 59 kg (130  lb)   Height: 1.626 m (5' 4\")       The patient was given the following medications while in the emergency department:  No orders of the defined types were placed in this encounter.      -------------------------      CRITICAL CARE:     CONSULTS:  IP CONSULT TO INTERNAL MEDICINE    PROCEDURES:  Procedures    FINAL IMPRESSION      1. Melena    2. Pain and swelling of left knee          DISPOSITION/PLAN   DISPOSITION Decision To Admit 04/15/2025 08:16:43 PM   DISPOSITION CONDITION Stable           PATIENT REFERREDTO:  No follow-up provider specified.    DISCHARGEMEDICATIONS:  New Prescriptions    No medications on file       (Please note that portions of this note were completed with a voice recognition program.  Efforts were made to edit thedictations but occasionally words are mis-transcribed.)    JOSE Perry Steven D, PA-C  04/15/25 2035

## 2025-04-15 NOTE — ED TRIAGE NOTES
Mode of arrival (squad #, walk in, police, etc) : car        Chief complaint(s): fall, knee pain        Arrival Note (brief scenario, treatment PTA, etc).: fell last week, now has swelling and bruising to her left knee,         C= \"Have you ever felt that you should Cut down on your drinking?\"  No  A= \"Have people Annoyed you by criticizing your drinking?\"  No  G= \"Have you ever felt bad or Guilty about your drinking?\"  No  E= \"Have you ever had a drink as an Eye-opener first thing in the morning to steady your nerves or to help a hangover?\"  No      Deferred []      Reason for deferring: N/A    *If yes to two or more: probable alcohol abuse.*

## 2025-04-16 PROBLEM — D64.9 ANEMIA: Status: ACTIVE | Noted: 2025-04-16

## 2025-04-16 PROBLEM — K92.1 MELENA: Status: ACTIVE | Noted: 2025-04-16

## 2025-04-16 PROBLEM — S80.02XA TRAUMATIC HEMATOMA OF LEFT KNEE: Status: ACTIVE | Noted: 2025-04-16

## 2025-04-16 LAB
ABO + RH BLD: NORMAL
ANION GAP SERPL CALCULATED.3IONS-SCNC: 6 MMOL/L (ref 9–16)
ARM BAND NUMBER: NORMAL
BASOPHILS # BLD: 0.1 K/UL (ref 0–0.2)
BASOPHILS NFR BLD: 1 % (ref 0–2)
BLOOD BANK SAMPLE EXPIRATION: NORMAL
BLOOD GROUP ANTIBODIES SERPL: NEGATIVE
BUN SERPL-MCNC: 26 MG/DL (ref 8–23)
CALCIUM SERPL-MCNC: 8.5 MG/DL (ref 8.6–10.4)
CHLORIDE SERPL-SCNC: 113 MMOL/L (ref 98–107)
CO2 SERPL-SCNC: 24 MMOL/L (ref 20–31)
CREAT SERPL-MCNC: 1.4 MG/DL (ref 0.7–1.2)
EOSINOPHIL # BLD: 0.2 K/UL (ref 0–0.4)
EOSINOPHILS RELATIVE PERCENT: 3 % (ref 0–4)
ERYTHROCYTE [DISTWIDTH] IN BLOOD BY AUTOMATED COUNT: 14.7 % (ref 11.5–14.9)
GFR, ESTIMATED: 39 ML/MIN/1.73M2
GLUCOSE BLD-MCNC: 125 MG/DL (ref 65–105)
GLUCOSE BLD-MCNC: 154 MG/DL (ref 65–105)
GLUCOSE BLD-MCNC: 165 MG/DL (ref 65–105)
GLUCOSE SERPL-MCNC: 157 MG/DL (ref 74–99)
HCT VFR BLD AUTO: 25.2 % (ref 36–46)
HCT VFR BLD AUTO: 28.7 % (ref 36–46)
HCT VFR BLD AUTO: 29.1 % (ref 36–46)
HCT VFR BLD AUTO: 29.7 % (ref 36–46)
HGB BLD-MCNC: 8 G/DL (ref 12–16)
HGB BLD-MCNC: 8.9 G/DL (ref 12–16)
HGB BLD-MCNC: 9.3 G/DL (ref 12–16)
HGB BLD-MCNC: 9.6 G/DL (ref 12–16)
INR PPP: 1.6
LYMPHOCYTES NFR BLD: 1.1 K/UL (ref 1–4.8)
LYMPHOCYTES RELATIVE PERCENT: 18 % (ref 24–44)
MCH RBC QN AUTO: 27.4 PG (ref 26–34)
MCHC RBC AUTO-ENTMCNC: 32.2 G/DL (ref 31–37)
MCV RBC AUTO: 85 FL (ref 80–100)
MONOCYTES NFR BLD: 0.6 K/UL (ref 0.1–1.3)
MONOCYTES NFR BLD: 10 % (ref 1–7)
NEUTROPHILS NFR BLD: 68 % (ref 36–66)
NEUTS SEG NFR BLD: 4.2 K/UL (ref 1.3–9.1)
PLATELET # BLD AUTO: 272 K/UL (ref 150–450)
PMV BLD AUTO: 8.9 FL (ref 6–12)
POTASSIUM SERPL-SCNC: 4.6 MMOL/L (ref 3.7–5.3)
PROTHROMBIN TIME: 20.3 SEC (ref 11.8–14.6)
RBC # BLD AUTO: 3.49 M/UL (ref 4–5.2)
SODIUM SERPL-SCNC: 143 MMOL/L (ref 136–145)
WBC OTHER # BLD: 6.2 K/UL (ref 3.5–11)

## 2025-04-16 PROCEDURE — 99221 1ST HOSP IP/OBS SF/LOW 40: CPT | Performed by: ORTHOPAEDIC SURGERY

## 2025-04-16 PROCEDURE — 99223 1ST HOSP IP/OBS HIGH 75: CPT | Performed by: INTERNAL MEDICINE

## 2025-04-16 PROCEDURE — 85014 HEMATOCRIT: CPT

## 2025-04-16 PROCEDURE — 85025 COMPLETE CBC W/AUTO DIFF WBC: CPT

## 2025-04-16 PROCEDURE — 80048 BASIC METABOLIC PNL TOTAL CA: CPT

## 2025-04-16 PROCEDURE — 86900 BLOOD TYPING SEROLOGIC ABO: CPT

## 2025-04-16 PROCEDURE — 36415 COLL VENOUS BLD VENIPUNCTURE: CPT

## 2025-04-16 PROCEDURE — 6370000000 HC RX 637 (ALT 250 FOR IP): Performed by: NURSE PRACTITIONER

## 2025-04-16 PROCEDURE — 86850 RBC ANTIBODY SCREEN: CPT

## 2025-04-16 PROCEDURE — 2580000003 HC RX 258: Performed by: NURSE PRACTITIONER

## 2025-04-16 PROCEDURE — 85610 PROTHROMBIN TIME: CPT

## 2025-04-16 PROCEDURE — 94640 AIRWAY INHALATION TREATMENT: CPT

## 2025-04-16 PROCEDURE — 2500000003 HC RX 250 WO HCPCS: Performed by: NURSE PRACTITIONER

## 2025-04-16 PROCEDURE — 86901 BLOOD TYPING SEROLOGIC RH(D): CPT

## 2025-04-16 PROCEDURE — 6360000002 HC RX W HCPCS: Performed by: NURSE PRACTITIONER

## 2025-04-16 PROCEDURE — 85018 HEMOGLOBIN: CPT

## 2025-04-16 PROCEDURE — 82947 ASSAY GLUCOSE BLOOD QUANT: CPT

## 2025-04-16 PROCEDURE — 94761 N-INVAS EAR/PLS OXIMETRY MLT: CPT

## 2025-04-16 PROCEDURE — 2060000000 HC ICU INTERMEDIATE R&B

## 2025-04-16 PROCEDURE — 99222 1ST HOSP IP/OBS MODERATE 55: CPT | Performed by: STUDENT IN AN ORGANIZED HEALTH CARE EDUCATION/TRAINING PROGRAM

## 2025-04-16 RX ORDER — HYDROCODONE BITARTRATE AND ACETAMINOPHEN 5; 325 MG/1; MG/1
1 TABLET ORAL EVERY 6 HOURS PRN
Refills: 0 | Status: DISCONTINUED | OUTPATIENT
Start: 2025-04-16 | End: 2025-04-19 | Stop reason: HOSPADM

## 2025-04-16 RX ADMIN — POTASSIUM CHLORIDE 20 MEQ: 750 CAPSULE, EXTENDED RELEASE ORAL at 22:11

## 2025-04-16 RX ADMIN — MELATONIN TAB 3 MG 3 MG: 3 TAB at 22:11

## 2025-04-16 RX ADMIN — SODIUM CHLORIDE: 0.9 INJECTION, SOLUTION INTRAVENOUS at 00:49

## 2025-04-16 RX ADMIN — FAMOTIDINE 40 MG: 20 TABLET, FILM COATED ORAL at 18:16

## 2025-04-16 RX ADMIN — ACETAMINOPHEN 650 MG: 325 TABLET, FILM COATED ORAL at 18:16

## 2025-04-16 RX ADMIN — PANTOPRAZOLE SODIUM 8 MG/HR: 40 INJECTION, POWDER, FOR SOLUTION INTRAVENOUS at 16:56

## 2025-04-16 RX ADMIN — BUDESONIDE AND FORMOTEROL FUMARATE DIHYDRATE 2 PUFF: 160; 4.5 AEROSOL RESPIRATORY (INHALATION) at 20:07

## 2025-04-16 RX ADMIN — PANTOPRAZOLE SODIUM 8 MG/HR: 40 INJECTION, POWDER, FOR SOLUTION INTRAVENOUS at 01:04

## 2025-04-16 RX ADMIN — BUDESONIDE AND FORMOTEROL FUMARATE DIHYDRATE 2 PUFF: 160; 4.5 AEROSOL RESPIRATORY (INHALATION) at 08:27

## 2025-04-16 RX ADMIN — SODIUM CHLORIDE 80 MG: 9 INJECTION INTRAMUSCULAR; INTRAVENOUS; SUBCUTANEOUS at 00:52

## 2025-04-16 RX ADMIN — CARVEDILOL 3.12 MG: 3.12 TABLET, FILM COATED ORAL at 18:16

## 2025-04-16 RX ADMIN — ACETAMINOPHEN 650 MG: 325 TABLET, FILM COATED ORAL at 02:10

## 2025-04-16 RX ADMIN — SODIUM CHLORIDE, PRESERVATIVE FREE 10 ML: 5 INJECTION INTRAVENOUS at 10:26

## 2025-04-16 ASSESSMENT — PAIN DESCRIPTION - LOCATION
LOCATION: KNEE
LOCATION: KNEE

## 2025-04-16 ASSESSMENT — PAIN - FUNCTIONAL ASSESSMENT: PAIN_FUNCTIONAL_ASSESSMENT: ACTIVITIES ARE NOT PREVENTED

## 2025-04-16 ASSESSMENT — PAIN DESCRIPTION - DESCRIPTORS
DESCRIPTORS: ACHING
DESCRIPTORS: ACHING;DISCOMFORT

## 2025-04-16 ASSESSMENT — PAIN DESCRIPTION - ORIENTATION
ORIENTATION: LEFT
ORIENTATION: LEFT

## 2025-04-16 ASSESSMENT — PAIN SCALES - GENERAL
PAINLEVEL_OUTOF10: 6
PAINLEVEL_OUTOF10: 10

## 2025-04-16 NOTE — PROGRESS NOTES
Patient still bleeding from puncture site. Writer notified Yue Dee NP. Writer applied pressure and redressed site. Will continue to monitor.

## 2025-04-16 NOTE — PROGRESS NOTES
RN was notified that patient was bleeding from abdominal Lovenox site and that dressing had been saturated and changed 3 times. RN assessed and saw that it was still bleeding. A saline bag was applied for pressure and Dr. Choudhary notified. She came to assess the patient and requested to continue h/h q 6 hours and to apply pressure and cold compress as needed.

## 2025-04-16 NOTE — ACP (ADVANCE CARE PLANNING)
Advance Care Planning     Advance Care Planning Activator (Inpatient)  Conversation Note      Date of ACP Conversation: 4/16/2025     Conversation Conducted with: Patient with Decision Making Capacity    ACP Activator: Robyn Helms RN    Health Care Decision Maker:     Current Designated Health Care Decision Maker:     Primary Decision Maker: Guanakito Zheng - Child - [phone]  Click here to complete Healthcare Decision Makers including section of the Healthcare Decision Maker Relationship (ie \"Primary\")  Today we documented Decision Maker(s) consistent with Legal Next of Kin hierarchy.    Care Preferences    Ventilation:  \"If you were in your present state of health and suddenly became very ill and were unable to breathe on your own, what would your preference be about the use of a ventilator (breathing machine) if it were available to you?\"      Would the patient desire the use of ventilator (breathing machine)?: yes    \"If your health worsens and it becomes clear that your chance of recovery is unlikely, what would your preference be about the use of a ventilator (breathing machine) if it were available to you?\"     Would the patient desire the use of ventilator (breathing machine)?: No      Resuscitation  \"CPR works best to restart the heart when there is a sudden event, like a heart attack, in someone who is otherwise healthy. Unfortunately, CPR does not typically restart the heart for people who have serious health conditions or who are very sick.\"    \"In the event your heart stopped as a result of an underlying serious health condition, would you want attempts to be made to restart your heart (answer \"yes\" for attempt to resuscitate) or would you prefer a natural death (answer \"no\" for do not attempt to resuscitate)?\" yes       [] Yes   [x] No   Educated Patient / Decision Maker regarding differences between Advance Directives and portable DNR orders.    Length of ACP Conversation in minutes:

## 2025-04-16 NOTE — H&P
[facility]   IN-PATIENT SERVICE   [facility]    HISTORY AND PHYSICAL EXAMINATION            Date:   4/16/2025  Patient name:  Graciela Holt  Date of admission:  4/15/2025  7:00 PM  MRN:   [id number]  Account:  [id number]  YOB: 1950  PCP:    Travis Mason MD  Room:   [id number]  Code Status:    Full Code    Chief Complaint:     Chief Complaint   Patient presents with    Fall    Knee Pain   =    History Obtained From:     patient    History of Present Illness:     The patient is a 74 y.o.  Non- / non  female who presents with Fall and Knee Pain   and she is admitted to the hospital for the management of  =    Graciela Holt is a 74 y.o. Non- / non  female who presents with Fall and Knee Pain   and is admitted to the hospital for the management of Acute upper GI bleed.     Patient's medical history significant for chronic combined CHF, COPD, DM type II, HTN, hypothyroidism, hyperlipidemia, bilateral TKA, s/p CABG x 4, and CKD.  Patient was admitted earlier this month for new onset A-fib with RVR.  She was started on warfarin, which is being bridged with Lovenox until therapeutic INR achieved.     According to patient, she fell last Monday landing on her left knee.  Since states that she noticed that her left knee was getting bigger 2 days ago and has progressively worsened since that time.  Her left knee is noted to be warm and edematous with purple discoloration.  It is extremely edematous, as if possibly a hematoma has developed.  Additionally, patient states that she started having dark tarry stools yesterday and reports that she has had 2 loose stools since that started.  Patient denies prior history of GI bleed.  She denies chest pain, shortness of breath, abdominal pain, nausea, vomiting, and urinary symptoms.  Lungs sound clear to auscultation.     Left knee x-rays showed extensive swelling around the upper portion of the knee as well as

## 2025-04-16 NOTE — PROGRESS NOTES
Patient noted to have continuous bleeding from abdominal area from middle upper abdomen. Pressure applied with 4x4 dressing and dressing quickly became saturated. Second dressing changed, applied 4x4, kerlix, abd pad using paper tape. Charge RN notified.

## 2025-04-16 NOTE — ED PROVIDER NOTES
[facility] EMERGENCY DEPARTMENT  eMERGENCY dEPARTMENT eNCOUnter   Attending Attestation     Pt Name: Graciela Holt  MRN: [id number]  Birthdate 1950  Date of evaluation: 4/15/25    History, EXAM, MDM:    Graciela Holt is a 74 y.o. female who presents with Fall and Knee Pain  Patient is on anticoagulation, she has been having blood in her stool, her hemoglobin has been decreasing it was 11.5 on the 10th is 10.5 today her stool was positive for blood she is uremic her BUN is 30, I am suspecting an upper GI bleed, started on Protonix, placing in the hospital, patient has a left knee effusion, likely a hemarthrosis.    Vitals:   Vitals:    04/15/25 1858   BP: 124/81   Pulse: 66   Resp: 16   Temp: 98.6 °F (37 °C)   TempSrc: Oral   SpO2: 96%   Weight: 59 kg (130 lb)   Height: 1.626 m (5' 4\")       I performed a history and physical examination of the patient and discussed management with the APC. I reviewed the APC note and agree with the documented findings and plan of care. Any areas of disagreement are noted on the chart. I was personally present for the key portions of any procedures. I have documented in the chart those procedures where I was not present during the key portions. I have personally reviewed all images and agree with the resident's interpretation. I have reviewed the emergency nurses triage note. I agree with the chief complaint, past medical history, past surgical history, allergies, medications, social and family history as documented unless otherwise noted below. Documentation of the HPI, Physical Exam and Medical Decision Making performed by medical students or scribes is based on my personal performance of the HPI, PE and MDM. For Phys Assistant/ Nurse Practitioner cases/documentation I have had a face to face evaluation of this patient and have completed at least one if not all key elements of the E/M (history, physical exam, and MDM). Additional findings are as noted.    Samm SAPP  MD Kim  Attending Emergency  Physician             Samm Alvarez MD  04/15/25 2100

## 2025-04-16 NOTE — CARE COORDINATION
Case Management Assessment  Initial Evaluation    Date/Time of Evaluation: 4/16/2025 9:55 AM  Assessment Completed by: Robyn Helms RN    If patient is discharged prior to next notation, then this note serves as note for discharge by case management.    Patient Name: Gracieal Holt                   YOB: 1950  Diagnosis: Melena [K92.1]  Acute upper GI bleed [K92.2]  Pain and swelling of left knee [M25.562, M25.462]                   Date / Time: 4/15/2025  7:00 PM    Patient Admission Status: Inpatient   Readmission Risk (Low < 19, Mod (19-27), High > 27): Readmission Risk Score: 26.7    Current PCP: Travis Mason MD  PCP verified by CM? Yes    Chart Reviewed: Yes      History Provided by: Patient  Patient Orientation: Alert and Oriented    Patient Cognition: Alert    Hospitalization in the last 30 days (Readmission):  Yes    If yes, Readmission Assessment in  Navigator will be completed.    Advance Directives:      Code Status: Full Code   Patient's Primary Decision Maker is: Legal Next of Kin    Primary Decision Maker: Guanakito Zheng - Child - [phone]    Discharge Planning:    Patient lives with: Friends Type of Home: House  Primary Care Giver: Self  Patient Support Systems include: Family Members   Current Financial resources: Medicare  Current community resources: None  Current services prior to admission: Durable Medical Equipment, Oxygen Therapy            Current DME: Cane, Oxygen Therapy (Comment)            Type of Home Care services:  OT, PT, Nursing Services    ADLS  Prior functional level: Assistance with the following:, Mobility  Current functional level: Assistance with the following:, Mobility    PT AM-PAC:   /24  OT AM-PAC:   /24    Family can provide assistance at DC: Yes  Would you like Case Management to discuss the discharge plan with any other family members/significant others, and if so, who? No  Plans to Return to Present Housing: Unknown at present  Other

## 2025-04-16 NOTE — ED NOTES
Writer noticed that the patient was bleeding through abdominal dressing. Pt. Stated that this has been bleeding since this AM after a Lovenox shot. Writer held pressure at the site and redressed the site. Will continue to monitor at this time.

## 2025-04-16 NOTE — CONSULTS
Gastroenterology Consult Note    Patient:   Graciela Holt   Admit date:  4/15/2025  Facility:   [facility]  Referring/PCP: Travis Mason MD  Date:     4/16/2025  Consultant:   JAMES Monge - SIRI, Ellis Aranda MD    Subjective:     This 74 y.o. female was admitted 4/15/2025 with a diagnosis of \"Melena [K92.1]  Acute upper GI bleed [K92.2]  Pain and swelling of left knee [M25.562, M25.462]\" and is seen in consultation regarding   Chief Complaint   Patient presents with    Fall    Knee Pain     74-year-old female with past medical history of CHF, COPD, DM type II, hypertension, hypothyroidism, hyperlipidemia, bilateral TKA, status post CABG x 4, CKD, new onset A-fib RVR(earlier this month, started on warfarin and bridged with Lovenox) presents to the ED with left knee pain, status post fall.  Patient was also found to have melanotic stools for which GI was consulted.  Hemoglobin on admission 10.5, repeat 9.6. Patient is uremic, BUN 30.  FOBT was positive.  Patient was started on Protonix drip in ED.    No prior endoscopy.  Denies NSAID use. Patient denies any nausea, vomiting, dysphagia, odynophagia, dyspeptic symptoms.  No abdominal pain, chest pain, shortness of breath, fevers, chills.  Patient is having continuous bleeding from lovenox injection site.  Pressure dressing applied.    Patient has significant heart history.  Most recent echo EF 20%.  History of LV thrombus.  Was recently started on warfarin w/ lovenox bridging for A.fib RVR.  Cardiology consulted.    C/o pain to L knee.  Ortho consulted.    Past Medical History:  Past Medical History:   Diagnosis Date    Allergic rhinitis     Arthritis     general    CAD (coronary artery disease)     Dr. Fowler/ [place]    Cerebral artery occlusion with cerebral infarction (HCC) 07/2020    pt states mild stroke    CHF (congestive heart failure) (HCC)     Controlled type 2 diabetes mellitus without complication, without long-term    WBC  --  6.7 6.2  --   --    HGB  --  10.5* 9.6*  --  9.3*   MCV  --  85.9 85.0  --   --    PLT  --  310 272  --   --    INR 1.2 1.7  --  1.6  --    NA  --  141 143  --   --    K  --  4.7 4.6  --   --    CL  --  110* 113*  --   --    CO2  --  22 24  --   --    BUN  --  30* 26*  --   --    CREATININE  --  1.4* 1.4*  --   --    GLUCOSE  --  180* 157*  --   --    CALCIUM  --  8.8 8.5*  --   --      Assessment:   Principal Problem:    Acute upper GI bleed  Resolved Problems:    * No resolved hospital problems. *    74-year-old female with past medical history of CHF, COPD, DM type II, hypertension, hypothyroidism, hyperlipidemia, bilateral TKA, status post CABG x 4, CKD, new onset A-fib RVR(earlier this month, started on warfarin and bridged with Lovenox) presents to the ED with left knee pain, status post fall.  Patient was also found to have melanotic stools for which GI was consulted.    Melena  A.fib RVR  -Recently started on warfarin with lovenox bridging  L knee hematoma  HFrEF 20%  Hx of LV thrombus    Plan:   Plan for EGD once cleared per cardiology  Continue protonix drip  Hold AC   Trend HH  Transfuse for hgb < 8  PPI BID  Ortho consulted for L knee hematoma    This plan was formulated in collaboration with Dr. Aranda    Electronically signed by JAMES Monge NP on 4/16/2025 at 1:42 PM     Note is dictated utilizing voice recognition software. Unfortunately this leads to occasional typographical errors. Please contact our office if you have any questions.    Attending Attestation:    I have discussed the care of Graciela Holt and I have examined the patient myselft and taken ros and hpi , including pertinent history and exam findings,  with the author of this note . I have reviewed the key elements of all parts of the encounter with the nurse practitioner/resident.  I agree with the assessment, plan and orders as documented by the above health care provider with the following addendum.

## 2025-04-16 NOTE — CONSULTS
Inpatient consult to Orthopedic Surgery  Consult performed by: Armando Guerra MD  Consult ordered by: Yue Dee, APRN - CNP        Patient: Graciela WILLIAMSON [place]  Unit/Bed: 2095/2095-01  YOB: 1950  MRN: [id number]  Acct: [id number]   Admitting Diagnosis: Melena [K92.1]  Acute upper GI bleed [K92.2]  Pain and swelling of left knee [M25.562, M25.462]  Admit Date:  4/15/2025  Hospital Day: 1    Subjective:    Patient is having problems with  Left knee pain and swelling  Fall    Knee Pain       Patient Seen, Chart, Labs, Radiologystudies, and Consults reviewed.      Patient with fall and acute knee pain    Multiple medical problems agree with H&P and ED note      Objective:   /85   Pulse 72   Temp 98.1 °F (36.7 °C) (Oral)   Resp 16   Ht 1.626 m (5' 4\")   Wt 59 kg (130 lb)   SpO2 100%   BMI 22.31 kg/m² No intake or output data in the 24 hours ending 04/16/25 1619  Review of Systems  Physical Exam  Vitals and nursing note reviewed.   Constitutional:       Appearance: She is well-developed.   HENT:      Head: Normocephalic and atraumatic.      Nose: Nose normal.   Eyes:      Conjunctiva/sclera: Conjunctivae normal.   Pulmonary:      Effort: Pulmonary effort is normal. No respiratory distress.   Musculoskeletal:      Cervical back: Normal range of motion and neck supple.      Comments: Normal gait     Skin:     General: Skin is warm and dry.   Neurological:      Mental Status: She is alert and oriented to person, place, and time.      Sensory: No sensory deficit.   Psychiatric:         Behavior: Behavior normal.         Thought Content: Thought content normal.       Left knee hematoma medial proximal to patella fairly large active flexion and extension without tendon injury; tender over hematoma otherwise minimal pain    Drains:No  Imaging:Yes Left knee history TKA soft tissue swelling no fracture      Medications:    amiodarone  200 mg Oral Daily    atorvastatin  80 mg Oral Daily
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
## 2025-04-17 ENCOUNTER — ANESTHESIA EVENT (OUTPATIENT)
Dept: ENDOSCOPY | Age: 75
End: 2025-04-17
Payer: COMMERCIAL

## 2025-04-17 ENCOUNTER — ANESTHESIA (OUTPATIENT)
Dept: ENDOSCOPY | Age: 75
End: 2025-04-17
Payer: COMMERCIAL

## 2025-04-17 LAB
ANION GAP SERPL CALCULATED.3IONS-SCNC: 7 MMOL/L (ref 9–16)
BASOPHILS # BLD: 0.1 K/UL (ref 0–0.2)
BASOPHILS NFR BLD: 2 % (ref 0–2)
BUN SERPL-MCNC: 18 MG/DL (ref 8–23)
CALCIUM SERPL-MCNC: 8.1 MG/DL (ref 8.6–10.4)
CHLORIDE SERPL-SCNC: 113 MMOL/L (ref 98–107)
CO2 SERPL-SCNC: 19 MMOL/L (ref 20–31)
CREAT SERPL-MCNC: 1.2 MG/DL (ref 0.7–1.2)
EOSINOPHIL # BLD: 0.2 K/UL (ref 0–0.4)
EOSINOPHILS RELATIVE PERCENT: 2 % (ref 0–4)
ERYTHROCYTE [DISTWIDTH] IN BLOOD BY AUTOMATED COUNT: 14.5 % (ref 11.5–14.9)
GFR, ESTIMATED: 47 ML/MIN/1.73M2
GLUCOSE BLD-MCNC: 114 MG/DL (ref 65–105)
GLUCOSE BLD-MCNC: 176 MG/DL (ref 65–105)
GLUCOSE BLD-MCNC: 95 MG/DL (ref 65–105)
GLUCOSE SERPL-MCNC: 124 MG/DL (ref 74–99)
HCT VFR BLD AUTO: 23.7 % (ref 36–46)
HCT VFR BLD AUTO: 25.8 % (ref 36–46)
HCT VFR BLD AUTO: 26.4 % (ref 36–46)
HGB BLD-MCNC: 7.9 G/DL (ref 12–16)
HGB BLD-MCNC: 8.2 G/DL (ref 12–16)
HGB BLD-MCNC: 8.4 G/DL (ref 12–16)
LYMPHOCYTES NFR BLD: 0.8 K/UL (ref 1–4.8)
LYMPHOCYTES RELATIVE PERCENT: 11 % (ref 24–44)
MCH RBC QN AUTO: 27.9 PG (ref 26–34)
MCHC RBC AUTO-ENTMCNC: 33.2 G/DL (ref 31–37)
MCV RBC AUTO: 83.9 FL (ref 80–100)
MONOCYTES NFR BLD: 0.5 K/UL (ref 0.1–1.3)
MONOCYTES NFR BLD: 7 % (ref 1–7)
NEUTROPHILS NFR BLD: 78 % (ref 36–66)
NEUTS SEG NFR BLD: 5.7 K/UL (ref 1.3–9.1)
PLATELET # BLD AUTO: 239 K/UL (ref 150–450)
PMV BLD AUTO: 8.7 FL (ref 6–12)
POTASSIUM SERPL-SCNC: 4.6 MMOL/L (ref 3.7–5.3)
RBC # BLD AUTO: 2.83 M/UL (ref 4–5.2)
SODIUM SERPL-SCNC: 139 MMOL/L (ref 136–145)
WBC OTHER # BLD: 7.4 K/UL (ref 3.5–11)

## 2025-04-17 PROCEDURE — 2580000003 HC RX 258: Performed by: NURSE PRACTITIONER

## 2025-04-17 PROCEDURE — 3700000000 HC ANESTHESIA ATTENDED CARE: Performed by: STUDENT IN AN ORGANIZED HEALTH CARE EDUCATION/TRAINING PROGRAM

## 2025-04-17 PROCEDURE — 6360000002 HC RX W HCPCS: Performed by: STUDENT IN AN ORGANIZED HEALTH CARE EDUCATION/TRAINING PROGRAM

## 2025-04-17 PROCEDURE — 2500000003 HC RX 250 WO HCPCS: Performed by: NURSE ANESTHETIST, CERTIFIED REGISTERED

## 2025-04-17 PROCEDURE — 99233 SBSQ HOSP IP/OBS HIGH 50: CPT | Performed by: INTERNAL MEDICINE

## 2025-04-17 PROCEDURE — 97161 PT EVAL LOW COMPLEX 20 MIN: CPT

## 2025-04-17 PROCEDURE — 43255 EGD CONTROL BLEEDING ANY: CPT | Performed by: STUDENT IN AN ORGANIZED HEALTH CARE EDUCATION/TRAINING PROGRAM

## 2025-04-17 PROCEDURE — 36415 COLL VENOUS BLD VENIPUNCTURE: CPT

## 2025-04-17 PROCEDURE — 6370000000 HC RX 637 (ALT 250 FOR IP): Performed by: STUDENT IN AN ORGANIZED HEALTH CARE EDUCATION/TRAINING PROGRAM

## 2025-04-17 PROCEDURE — 2060000000 HC ICU INTERMEDIATE R&B

## 2025-04-17 PROCEDURE — 94640 AIRWAY INHALATION TREATMENT: CPT

## 2025-04-17 PROCEDURE — 2700000000 HC OXYGEN THERAPY PER DAY

## 2025-04-17 PROCEDURE — 85025 COMPLETE CBC W/AUTO DIFF WBC: CPT

## 2025-04-17 PROCEDURE — 2709999900 HC NON-CHARGEABLE SUPPLY: Performed by: STUDENT IN AN ORGANIZED HEALTH CARE EDUCATION/TRAINING PROGRAM

## 2025-04-17 PROCEDURE — 2720000010 HC SURG SUPPLY STERILE: Performed by: STUDENT IN AN ORGANIZED HEALTH CARE EDUCATION/TRAINING PROGRAM

## 2025-04-17 PROCEDURE — 0W3P8ZZ CONTROL BLEEDING IN GASTROINTESTINAL TRACT, VIA NATURAL OR ARTIFICIAL OPENING ENDOSCOPIC: ICD-10-PCS | Performed by: STUDENT IN AN ORGANIZED HEALTH CARE EDUCATION/TRAINING PROGRAM

## 2025-04-17 PROCEDURE — 85018 HEMOGLOBIN: CPT

## 2025-04-17 PROCEDURE — 93005 ELECTROCARDIOGRAM TRACING: CPT

## 2025-04-17 PROCEDURE — 85014 HEMATOCRIT: CPT

## 2025-04-17 PROCEDURE — 82947 ASSAY GLUCOSE BLOOD QUANT: CPT

## 2025-04-17 PROCEDURE — 94761 N-INVAS EAR/PLS OXIMETRY MLT: CPT

## 2025-04-17 PROCEDURE — 6370000000 HC RX 637 (ALT 250 FOR IP): Performed by: NURSE PRACTITIONER

## 2025-04-17 PROCEDURE — 2580000003 HC RX 258: Performed by: STUDENT IN AN ORGANIZED HEALTH CARE EDUCATION/TRAINING PROGRAM

## 2025-04-17 PROCEDURE — 3700000001 HC ADD 15 MINUTES (ANESTHESIA): Performed by: STUDENT IN AN ORGANIZED HEALTH CARE EDUCATION/TRAINING PROGRAM

## 2025-04-17 PROCEDURE — 2580000003 HC RX 258: Performed by: NURSE ANESTHETIST, CERTIFIED REGISTERED

## 2025-04-17 PROCEDURE — 7100000000 HC PACU RECOVERY - FIRST 15 MIN: Performed by: STUDENT IN AN ORGANIZED HEALTH CARE EDUCATION/TRAINING PROGRAM

## 2025-04-17 PROCEDURE — 7100000001 HC PACU RECOVERY - ADDTL 15 MIN: Performed by: STUDENT IN AN ORGANIZED HEALTH CARE EDUCATION/TRAINING PROGRAM

## 2025-04-17 PROCEDURE — 2500000003 HC RX 250 WO HCPCS: Performed by: NURSE PRACTITIONER

## 2025-04-17 PROCEDURE — 97165 OT EVAL LOW COMPLEX 30 MIN: CPT

## 2025-04-17 PROCEDURE — 80048 BASIC METABOLIC PNL TOTAL CA: CPT

## 2025-04-17 PROCEDURE — 6360000002 HC RX W HCPCS: Performed by: NURSE ANESTHETIST, CERTIFIED REGISTERED

## 2025-04-17 PROCEDURE — 6360000002 HC RX W HCPCS: Performed by: NURSE PRACTITIONER

## 2025-04-17 PROCEDURE — 3609013000 HC EGD TRANSORAL CONTROL BLEEDING ANY METHOD: Performed by: STUDENT IN AN ORGANIZED HEALTH CARE EDUCATION/TRAINING PROGRAM

## 2025-04-17 RX ORDER — SODIUM CHLORIDE 9 MG/ML
INJECTION, SOLUTION INTRAVENOUS
Status: DISCONTINUED | OUTPATIENT
Start: 2025-04-17 | End: 2025-04-17 | Stop reason: SDUPTHER

## 2025-04-17 RX ORDER — MIDAZOLAM HYDROCHLORIDE 1 MG/ML
INJECTION, SOLUTION INTRAMUSCULAR; INTRAVENOUS
Status: DISCONTINUED | OUTPATIENT
Start: 2025-04-17 | End: 2025-04-17 | Stop reason: SDUPTHER

## 2025-04-17 RX ORDER — ETOMIDATE 2 MG/ML
INJECTION INTRAVENOUS
Status: DISCONTINUED | OUTPATIENT
Start: 2025-04-17 | End: 2025-04-17 | Stop reason: SDUPTHER

## 2025-04-17 RX ADMIN — POTASSIUM CHLORIDE 20 MEQ: 750 CAPSULE, EXTENDED RELEASE ORAL at 20:46

## 2025-04-17 RX ADMIN — BUDESONIDE AND FORMOTEROL FUMARATE DIHYDRATE 2 PUFF: 160; 4.5 AEROSOL RESPIRATORY (INHALATION) at 20:20

## 2025-04-17 RX ADMIN — ETOMIDATE INJECTION 6 MG: 2 SOLUTION INTRAVENOUS at 14:06

## 2025-04-17 RX ADMIN — MIDAZOLAM 0.5 MG: 1 INJECTION INTRAMUSCULAR; INTRAVENOUS at 13:56

## 2025-04-17 RX ADMIN — MIDAZOLAM 0.5 MG: 1 INJECTION INTRAMUSCULAR; INTRAVENOUS at 13:52

## 2025-04-17 RX ADMIN — MELATONIN TAB 3 MG 3 MG: 3 TAB at 20:46

## 2025-04-17 RX ADMIN — NITROGLYCERIN 0.4 MG: 0.4 TABLET SUBLINGUAL at 16:07

## 2025-04-17 RX ADMIN — PANTOPRAZOLE SODIUM 8 MG/HR: 40 INJECTION, POWDER, FOR SOLUTION INTRAVENOUS at 16:06

## 2025-04-17 RX ADMIN — AMIODARONE HYDROCHLORIDE 200 MG: 200 TABLET ORAL at 09:34

## 2025-04-17 RX ADMIN — CARVEDILOL 3.12 MG: 3.12 TABLET, FILM COATED ORAL at 16:06

## 2025-04-17 RX ADMIN — BUDESONIDE AND FORMOTEROL FUMARATE DIHYDRATE 2 PUFF: 160; 4.5 AEROSOL RESPIRATORY (INHALATION) at 08:04

## 2025-04-17 RX ADMIN — HYDROCODONE BITARTRATE AND ACETAMINOPHEN 1 TABLET: 5; 325 TABLET ORAL at 17:18

## 2025-04-17 RX ADMIN — HYDROCODONE BITARTRATE AND ACETAMINOPHEN 1 TABLET: 5; 325 TABLET ORAL at 00:58

## 2025-04-17 RX ADMIN — ATORVASTATIN CALCIUM 80 MG: 80 TABLET, FILM COATED ORAL at 09:34

## 2025-04-17 RX ADMIN — PANTOPRAZOLE SODIUM 8 MG/HR: 40 INJECTION, POWDER, FOR SOLUTION INTRAVENOUS at 04:08

## 2025-04-17 RX ADMIN — EMPAGLIFLOZIN 10 MG: 10 TABLET, FILM COATED ORAL at 09:34

## 2025-04-17 RX ADMIN — FAMOTIDINE 40 MG: 20 TABLET, FILM COATED ORAL at 16:06

## 2025-04-17 RX ADMIN — SODIUM CHLORIDE, PRESERVATIVE FREE 10 ML: 5 INJECTION INTRAVENOUS at 09:37

## 2025-04-17 RX ADMIN — SODIUM CHLORIDE: 900 INJECTION, SOLUTION INTRAVENOUS at 13:50

## 2025-04-17 ASSESSMENT — PAIN SCALES - GENERAL
PAINLEVEL_OUTOF10: 8
PAINLEVEL_OUTOF10: 7
PAINLEVEL_OUTOF10: 6

## 2025-04-17 ASSESSMENT — PAIN - FUNCTIONAL ASSESSMENT
PAIN_FUNCTIONAL_ASSESSMENT: NONE - DENIES PAIN
PAIN_FUNCTIONAL_ASSESSMENT: ACTIVITIES ARE NOT PREVENTED
PAIN_FUNCTIONAL_ASSESSMENT: 0-10

## 2025-04-17 ASSESSMENT — PAIN DESCRIPTION - DESCRIPTORS
DESCRIPTORS: PRESSURE;HEAVINESS
DESCRIPTORS: DISCOMFORT;ACHING
DESCRIPTORS: ACHING;THROBBING

## 2025-04-17 ASSESSMENT — PAIN DESCRIPTION - LOCATION
LOCATION: CHEST
LOCATION: KNEE
LOCATION: KNEE

## 2025-04-17 ASSESSMENT — PAIN DESCRIPTION - ORIENTATION
ORIENTATION: LEFT
ORIENTATION: LEFT

## 2025-04-17 ASSESSMENT — ENCOUNTER SYMPTOMS: SHORTNESS OF BREATH: 1

## 2025-04-17 ASSESSMENT — PAIN SCALES - WONG BAKER: WONGBAKER_NUMERICALRESPONSE: NO HURT
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
## 2025-04-17 NOTE — PROGRESS NOTES
Date:   4/17/2025  Patient name: Graciela Holt  Date of admission:  4/15/2025  7:00 PM  MRN:   [id number]  YOB: 1950  PCP: Travis Mason MD    Reason for Admission: Melena [K92.1]  Acute upper GI bleed [K92.2]  Pain and swelling of left knee [M25.562, M25.462]    Cardiology follow-up: Ischemic cardiomyopathy, history of ventricular arrhythmias, A-fib       Referring physician: Dr Juan Carlos Todd     Impression  Admission 4/7/2025 increasing shortness of breath, mild chest pain, A-fib with RVR new onset  Repeat 2D echo 4/9/2025 ejection fraction 20 to 25%, dilated LV 6.6 cm severe dilated LA, mild AR MR and TR RVSP 38  Admission 12/28/2024 severe shortness of breath chest x-ray showed CHF proBNP 12,221 high-sensitivity troponin 46 and 41  Multivessel CAD   CABG LIMA to LAD and diagonal 1, SVG to OM, SVG to PDA February 2018 at [facility]  Severely reduced LV systolic function ejection fraction 25 to 30%, akinetic LV apex, apical thrombus 2D echo 4/8/2024  Moderately increased LV wall thickness  Left parasternal lift  Episodes of nonsustained V. Tach  Diabetes mellitus  Hyperlipidemia lipid profile 11/6/2024 cholesterol 159, HDL 55, LDL 95, triglyceride 41  History of left MCA stroke occluded internal carotid       Admission 11/5/2024 with a severe shortness of breath, CHF systolic acute on chronic  High-sensitivity troponin 42 and 29, proBNP 13,352  Admission 8/12/2024 chest pain like a heavy pressure radiating to the left arm, no new ECG changes, high-sensitivity troponin 28  Admission 6/17/2024 chest pain like heaviness no shortness of breath no radiation ECG showed LVH with repolarization seen changes no change from previous ECG borderline abnormal troponin most likely type II  Admission 4/4/2024 for increasing shortness of breath, increasing swelling over the legs, elevated troponin 119, 136,  chest x-ray showed pulmonary edema proBNP 31,990     Past surgical history  Bilateral knee  ([facility])     Type 2 diabetes mellitus with chronic kidney disease     CHF (congestive heart failure), NYHA class I, acute on chronic, combined ([facility])     Noncompliance     Moderate malnutrition     Acute on chronic diastolic (congestive) heart failure     Compression fracture of C1 vertebra, initial encounter ([facility])     Cervical spine instability     NSTEMI (non-ST elevated myocardial infarction) ([facility])     Acute upper GI bleed     Traumatic hematoma of left knee     Melena     Anemia      Plan of Treatment:   Medications reviewed  1: Admitted with severe GI bleeding positive blood stool test, left gluteal hematoma, recently started on warfarin with Lovenox bridging for A-fib with RVR patient also has LV apical thrombus  Warfarin and aspirin on hold  At present she has sinus rhythm continue carvedilol 3.125 mg twice daily, amiodarone 200 mg at  2:Ischemic cardiomyopathy congestive heart failure systolic and diastolic  Continue with Jardiance 10 mg a day,   Continue with , Lipitor 80 mg,      3: History of ventricular arrhythmia continue current dose of amiodarone 200 daily carvedilol 3.125 mg twice daily patient has refused for AICD  3: LV apical thrombus,, anticoagulants on hold because of GI bleeding and a large left knee hematoma status post fall  4: COPD, continue with bronchodilators     Okay for EGD  Continue ECG monitoring    Electronically signed by Tommy Flynn MD on 4/17/2025 at 12:35 PM

## 2025-04-17 NOTE — CONSULTS
Date:   4/16/2025  Patient name: Graciela Holt  Date of admission:  4/15/2025  7:00 PM  MRN:   [id number]  YOB: 1950  PCP: Travis Mason MD    Reason for Admission: Melena [K92.1]  Acute upper GI bleed [K92.2]  Pain and swelling of left knee [M25.562, M25.462]    Cardiology consult: Ischemic cardiomyopathy       Referring physician: Dr Juan Carlos Todd     Impression  Admission 4/7/2025 increasing shortness of breath, mild chest pain, A-fib with RVR new onset  Repeat 2D echo 4/9/2025 ejection fraction 20 to 25%, dilated LV 6.6 cm severe dilated LA, mild AR MR and TR RVSP 38  Admission 12/28/2024 severe shortness of breath chest x-ray showed CHF proBNP 12,221 high-sensitivity troponin 46 and 41  Multivessel CAD   CABG LIMA to LAD and diagonal 1, SVG to OM, SVG to PDA February 2018 at [facility]  Severely reduced LV systolic function ejection fraction 25 to 30%, akinetic LV apex, apical thrombus 2D echo 4/8/2024  Moderately increased LV wall thickness  Left parasternal lift  Episodes of nonsustained V. Tach  Diabetes mellitus  Hyperlipidemia lipid profile 11/6/2024 cholesterol 159, HDL 55, LDL 95, triglyceride 41  History of left MCA stroke occluded internal carotid      Admission 11/5/2024 with a severe shortness of breath, CHF systolic acute on chronic  High-sensitivity troponin 42 and 29, proBNP 13,352  Admission 8/12/2024 chest pain like a heavy pressure radiating to the left arm, no new ECG changes, high-sensitivity troponin 28  Admission 6/17/2024 chest pain like heaviness no shortness of breath no radiation ECG showed LVH with repolarization seen changes no change from previous ECG borderline abnormal troponin most likely type II  Admission 4/4/2024 for increasing shortness of breath, increasing swelling over the legs, elevated troponin 119, 136,  chest x-ray showed pulmonary edema proBNP 31,990     Past surgical history  Bilateral knee replacement, C-spine surgery, cholecystectomy,   section, CABG     Drug allergies codeine, lisinopri    History of present illness  74-year-old female who lives with her 2 roommates mother of 1 son and 1 daughter with a past medical history of multivessel coronary artery disease, coronary artery bypass surgery, carotid surgery, COPD, quit smoking about 1 year ago, left MCA stroke occluded internal carotid artery, akinetic LV apex with thrombus, multiple admissions in the [facility], history of nonsustained ventricular arrhythmias, she has refused for LifeVest and AICD got hospitalized on 9/15/2025 with a diagnosis of melena/acute upper GI bleed and painful swelling of the left knee, left knee hematoma.  She was recently started on warfarin with a Lovenox bridging for A-fib RVR.  No chest pain no palpitation no syncope      On admission her hemoglobin was 10.5 and it dropped to 9.3, sodium was 141 potassium 4.7, creatinine 1.4, BUN 30, glucose 180  On admission INR was 1.7 and prothrombin time 21.2  Stool test positive for blood    On admission blood pressure 124/80 heart rate 66 oxygen saturation 96 on 2 L, temperature 98.6    Current evaluation  Patient seen and examined  Very frail looking female she was resting on chair  She denied any chest pain or shortness of breath  He has left knee hematoma pain ++  She also has abdominal wall subcutaneous bleed secondary to Lovenox injection but no significant  ECG monitor sinus rhythm    Left knee x-ray showed extensive soft tissue swelling around the upper portion of the knee as well as the knee effusion no obvious fracture involving the patella, femur or tibia or fibula      Investigation workup    ECG 2025  Sinus rhythm left ventricular hypertrophy with repolarization abnormalities prolonged QT QTc 509 heart rate 77  ECG 2025  A-fib heart rate 97 LVH with repolarization changes  ECG 2024  Sinus bradycardia heart rate 56, LVH with repolarization changes prolonged QT  ECG 2024  Normal sinus rhythm

## 2025-04-17 NOTE — PLAN OF CARE
Problem: Chronic Conditions and Co-morbidities  Goal: Patient's chronic conditions and co-morbidity symptoms are monitored and maintained or improved  Outcome: Progressing  Flowsheets (Taken 4/17/2025 0900)  Care Plan - Patient's Chronic Conditions and Co-Morbidity Symptoms are Monitored and Maintained or Improved:   Monitor and assess patient's chronic conditions and comorbid symptoms for stability, deterioration, or improvement   Collaborate with multidisciplinary team to address chronic and comorbid conditions and prevent exacerbation or deterioration   Update acute care plan with appropriate goals if chronic or comorbid symptoms are exacerbated and prevent overall improvement and discharge     Problem: Discharge Planning  Goal: Discharge to home or other facility with appropriate resources  Outcome: Progressing  Flowsheets (Taken 4/17/2025 0900)  Discharge to home or other facility with appropriate resources:   Identify barriers to discharge with patient and caregiver   Arrange for needed discharge resources and transportation as appropriate   Identify discharge learning needs (meds, wound care, etc)   Refer to discharge planning if patient needs post-hospital services based on physician order or complex needs related to functional status, cognitive ability or social support system     Problem: Pain  Goal: Verbalizes/displays adequate comfort level or baseline comfort level  Outcome: Progressing     Problem: Safety - Adult  Goal: Free from fall injury  Outcome: Progressing     Problem: ABCDS Injury Assessment  Goal: Absence of physical injury  Outcome: Progressing     Problem: Skin/Tissue Integrity  Goal: Skin integrity remains intact  Description: 1.  Monitor for areas of redness and/or skin breakdown2.  Assess vascular access sites hourly3.  Every 4-6 hours minimum:  Change oxygen saturation probe site4.  Every 4-6 hours:  If on nasal continuous positive airway pressure, respiratory therapy assess nares and  determine need for appliance change or resting period  Outcome: Progressing  Flowsheets (Taken 4/17/2025 0900)  Skin Integrity Remains Intact: Monitor for areas of redness and/or skin breakdown

## 2025-04-17 NOTE — PROGRESS NOTES
[facility]   IN-PATIENT SERVICE   [facility]  Progress note            Date:   4/17/2025  Patient name:  Graciela Holt  Date of admission:  4/15/2025  7:00 PM  MRN:   [id number]  Account:  [id number]  YOB: 1950  PCP:    Travis Mason MD  Room:   [id number]  Code Status:    Full Code    Chief Complaint:     Chief Complaint   Patient presents with    Fall    Knee Pain   =    History Obtained From:     patient    History of Present Illness:     The patient is a 74 y.o.  Non- / non  female who presents with Fall and Knee Pain   and she is admitted to the hospital for the management of  =    Graciela Holt is a 74 y.o. Non- / non  female who presents with Fall and Knee Pain   and is admitted to the hospital for the management of Acute upper GI bleed.     Patient's medical history significant for chronic combined CHF, COPD, DM type II, HTN, hypothyroidism, hyperlipidemia, bilateral TKA, s/p CABG x 4, and CKD.  Patient was admitted earlier this month for new onset A-fib with RVR.  She was started on warfarin, which is being bridged with Lovenox until therapeutic INR achieved.     According to patient, she fell last Monday landing on her left knee.  Since states that she noticed that her left knee was getting bigger 2 days ago and has progressively worsened since that time.  Her left knee is noted to be warm and edematous with purple discoloration.  It is extremely edematous, as if possibly a hematoma has developed.  Additionally, patient states that she started having dark tarry stools yesterday and reports that she has had 2 loose stools since that started.  Patient denies prior history of GI bleed.  She denies chest pain, shortness of breath, abdominal pain, nausea, vomiting, and urinary symptoms.  Lungs sound clear to auscultation.     Left knee x-rays showed extensive swelling around the upper portion of the knee as well as a knee effusion.  No  SURGERY      bypass 4 vessel 2/2018    CERVICAL FUSION N/A 1/10/2025    OCCIPUT TO T2 DECOMPRESSION FUSION, REVISION OF HARDWARE performed by Yessica Flynn DO at [facility] OR    CERVICAL LAMINECTOMY N/A 10/14/2020    C3-C7 POSTERIOR CERVICAL DECOMPRESSION FUSION performed by Armando Guerra MD at [facility] OR    CHOLECYSTECTOMY      HYSTERECTOMY (CERVIX STATUS UNKNOWN)      JOINT REPLACEMENT Bilateral     knees    LEG BIOPSY EXCISION Right 08/29/2019    LEG LESION PUNCH BIOPSY performed by Chuckie Snow MD at [facility] OR    OTHER SURGICAL HISTORY  07/26/2020    cerebral angiogram    LA I&D DEEP ABSC BURSA/HEMATOMA THIGH/KNEE REGION Right 05/07/2018    DEBRIDEMENT INCISION AND DRAINAGE THIGH ABSCESS performed by Amanda Hernandez DO at [facility] OR    LA OFFICE/OUTPT VISIT,PROCEDURE ONLY N/A 02/06/2018    CABG X 3 LIMA-LAD-DIAG,SVG-PDA,CORONARY ARTERY BYPASS REDO, PUMP ASSIST, SWAN, JARRED, REDO STERNOTOMY performed by Savanna Mata MD at [facility] CVOR    THORACIC FUSION  01/10/2025    OCCIPUT TO T1 DECOMPRESSION FUSION, REVISION OF HARDWARE (SYNTHES, STEALTH, EVOKES)    UPPER GASTROINTESTINAL ENDOSCOPY N/A 4/17/2025    ESOPHAGOGASTRODUODENOSCOPY WITH APC performed by Ellis Aranda MD at [facility] ENDO        Medications Prior to Admission:     Prior to Admission medications    Medication Sig Start Date End Date Taking? Authorizing Provider   warfarin (COUMADIN) 2.5 MG tablet Take 2.5mg daily 4/10/25  Yes Lewis Castro MD   Handicap Placard MISC by Does not apply route Expires on 12/30/30 3/10/25  Yes Travis Mason MD   albuterol sulfate HFA (PROVENTIL;VENTOLIN;PROAIR) 108 (90 Base) MCG/ACT inhaler Inhale 2 puffs into the lungs every 6 hours as needed for Wheezing or Shortness of Breath 3/3/25  Yes Travis Mason MD   atorvastatin (LIPITOR) 80 MG tablet Take 1 tablet by mouth daily 3/3/25  Yes Travis Mason MD   amiodarone (CORDARONE) 200 MG tablet Take 1 tablet by mouth daily 3/3/25  Yes Travis Mason MD  showed small AVM in antrum and erythematous gastritis, as per GI to resume anticoagulation from tonight, once hemoglobin stable will resume, patient on Coumadin  Heart failure with reduced ejection fraction fraction of 20 to 25% currently, with will DC fluids, currently appear euvolemic, cardiology on board, currently euvolemic,   proximal A-fib, currently in sinus, rate controlled cardiology consult on amiodarone and Coreg, anticoagulation held  KELSEY on CKD stage III creatinine slightly elevated, at the time of admission was 1.4, has now normalized, no sign of volume overload, on Bumex at home, will resume from tomorrow   history of LV thrombus, previous echo reviewed, h has been present since 2023, was on Coumadin and Lovenox which is currently held due to active bleeding  DVT prophylaxis SCDs overall prognosis poor  COPD currently compensated on bronchodilators no active wheezing present          Consultations:   IP CONSULT TO INTERNAL MEDICINE  IP CONSULT TO GI  IP CONSULT TO ORTHOPEDIC SURGERY  IP CONSULT TO CARDIOLOGY     Patient is admitted as inpatient status because of co-morbidities listed above, severity of signs and symptoms as outlined, requirement for current medical therapies and most importantly because of direct risk to patient if care not provided in a hospital setting.    Pearl Choudhary MD  4/17/2025  4:19 PM    Copy sent to Travis Vaughn MD    Please note that this chart was generated using voice recognition Dragon dictation software.  Although every effort was made to ensure the accuracy of this automated transcription, some errors in transcription may have occurred.

## 2025-04-17 NOTE — OP NOTE
Esophagogastroduodenoscopy    DATE OF PROCEDURE: 4/17/2025     SURGEON: Ellis Aranda MD  Facility: \"[facility]  ASSISTANT: None  PREOPERATIVE DIAGNOSIS: Anemia, melena    Diagnosis:    POSTOPERATIVE DIAGNOSIS: As described below (see findings and impression)    OPERATION: Upper GI endoscopy with control bleed    ANESTHESIA: Monitored Anesthesia Care (MAC)     ESTIMATED BLOOD LOSS: Less than 50 ml    COMPLICATIONS: None.     SPECIMENS:  Was Not Obtained    * No specimens in log *     HISTORY: The patient is a 74 y.o. year old female with history of above preop diagnosis.  I recommended esophagogastroduodenoscopy with possible biopsy and I explained the risk, benefits, expected outcome, and alternatives to the procedure.  Risks included but are not limited to bleeding, infection, respiratory distress, hypotension, and perforation of the esophagus, stomach, or duodenum.  Patient understands and is in agreement.      PROCEDURE: The patient was given IV Monitored Anesthesia Care (MAC) and vitals monitoring per anesthesia department.  The patient was placed in the left lateral decubitus position. The patient was monitored continuously with ECG tracing, pulse oximetry, blood pressure monitoring, and direct observation. The gastroscope was inserted into the mouth and advanced under direct vision to second portion of the duodenum.  A careful inspection was made as the gastroscope was withdrawn, including a retroflexed view of the proximal stomach; findings and interventions are described below. Appropriate photodocumentation was obtained. Post sedation patient was stable with stable vital signs and stable O2 saturations.       Findings:    Esophagus:   Normal upper and midesophagus  3 cm hiatal hernia without Buzz lesion    Stomach:    Fundus: Revisualization of small hiatal hernia    Body: normal    Antrum: abnormal: Stripes of erythematous gastritis with 1 small AVM not bleeding-APC 30 W 1 L.  No bleeding  at the end of the procedure    Stomach appropriately distensible with gas on direct and retroflexion view    Duodenum:     Descending: normal    Bulb: normal  No biopsy obtained due to anemia and melena and elevated INR    The scope was removed and the patient tolerated the procedure well.     Impression-   Small hiatal hernia  1 small AVM in antrum-APC   Erythematous gastritis  Normal duodenum    Recommendations/Plan:   Return to hospital funk  Diet as tolerated  PPI twice daily  Replenish iron pool  Blood thinners may be resumed from tonight from GI stand, however avoid excess anticoagulation    Electronically signed by Ellis Aranda MD  on 4/17/2025 at 2:05 PM   This note is created with the assistance of a speech recognition program.  While intending to generate a document that actually reflects the content of the procedure, the document can still have some errors including those of syntax and sound a like substitutions which may escape proofreading.  It such instances, actual meaning can be extrapolated by contextual diversion.

## 2025-04-17 NOTE — FLOWSHEET NOTE
04/17/25 0042   Treatment Team Notification   Reason for Communication Review case   Name of Team Member Notified Yue Dee NP   Treatment Team Role Advanced Practice Nurse   Method of Communication Secure Message   Response Waiting for response   Notification Time 0043     0043 - \"Recent H/H lab draws came back. Hgb 8 and Hct 25.2. ... I wanted to notify you since it seems her Hgb has been steadily dropping and I didn't know if there is any additional measures you'd like to take. She is having an EGD done in the morning. Please advise - thank you\"    0114 - \"Thank you. I went back and reviewed. Is she having any active bleeding?\"    0140 - \"Earlier she was having active bleeding from a puncture site. Before admission, she was injecting herself with blood thinners in the same location and it was bleeding profusely when I first came on the shift. I have been keeping an eye on it, and it has not been bleeding as much as it was on days. No reports of any blood BMs either\"    0147 - No new orders at this time. Continue to monitor and review AM labwork for 0600 lab draw

## 2025-04-17 NOTE — ANESTHESIA POSTPROCEDURE EVALUATION
Department of Anesthesiology  Postprocedure Note    Patient: Graciela Holt  MRN: [id number]  YOB: 1950  Date of evaluation: 4/17/2025    Procedure Summary       Date: 04/17/25 Room / Location: [place] [address] / [facility]    Anesthesia Start: 1350 Anesthesia Stop: 1414    Procedure: ESOPHAGOGASTRODUODENOSCOPY WITH APC (Esophagus) Diagnosis:       Gastrointestinal hemorrhage, unspecified gastrointestinal hemorrhage type      (Gastrointestinal hemorrhage, unspecified gastrointestinal hemorrhage type [K92.2])    Surgeons: Ellis Aranda MD Responsible Provider: Pascale Rousseau MD    Anesthesia Type: general ASA Status: 3            Anesthesia Type: No value filed.    Carlos Phase I: Carlos Score: 7    Carlos Phase II:      Anesthesia Post Evaluation    Comments: POST- ANESTHESIA EVALUATION       Pt Name: Graciela Holt  MRN: [id number]  YOB: 1950  Date of evaluation: 4/17/2025  Time:  4:27 PM      /83   Pulse 64   Temp 98.1 °F (36.7 °C) (Oral)   Resp 22   Ht 1.626 m (5' 4\")   Wt 59 kg (130 lb)   SpO2 97%   BMI 22.31 kg/m²      Consciousness Level  Awake  Cardiopulmonary Status  Stable  Pain Adequately Treated YES  Nausea / Vomiting  NO  Adequate Hydration  YES  Anesthesia Related Complications NONE      Electronically signed by Pascale Rousseau MD on 4/17/2025 at 4:27 PM           No notable events documented.

## 2025-04-17 NOTE — CARE COORDINATION
ONGOING DISCHARGE PLAN:    Patient is alert and oriented x4.    Spoke with patient regarding discharge plan and patient confirms that plan is still home no needs.    GI-upper GI bleed, Hgb 7.9. Protonix GTT. Plan for EGD. Ortho-left knee pain.     PT/OT on.     Will continue to follow for additional discharge needs.    If patient is discharged prior to next notation, then this note serves as note for discharge by case management.    Electronically signed by Yuko Pizarro RN on 4/17/2025 at 1:38 PM

## 2025-04-17 NOTE — PROGRESS NOTES
rails  Entrance Stairs - Number of Steps: 5-6  Entrance Stairs - Rails: Both  Bathroom Shower/Tub: Tub/Shower unit, Curtain  Bathroom Toilet: Standard  Home Equipment: Oxygen, Walker - Rolling  Has the patient had two or more falls in the past year or any fall with injury in the past year?: Yes  Prior Level of Assist for ADLs: Independent  Prior Level of Assist for Ambulation: Independent household ambulator, with or without device  Prior Level of Assist for Transfers: Independent  Active : No  Mode of Transportation: Friends, Cab  IADL Comments: sleeps in a flat bed  Additional Comments: states room mates are generally around    Restrictions  Restrictions/Precautions  Restrictions/Precautions: Fall Risk, General Precautions  Implants Present? : Metal implants (B TKA)     Objective    Transfers  Transfers  Sit to Stand: Stand by assistance  Stand to Sit: Stand by assistance     Ambulation      Ambulation  Surface: Level tile  Device: No Device  Assistance: Stand by assistance  Distance: 10 ft  Comments: pt took off her O2 to ambulate to cart in go       Bed Mobility  Bed mobility  Supine to Sit: Modified independent (head of bed elevated)  Sit to Supine: Stand by assistance (onto cart for transport)     Balance  Balance  Sitting - Static: Good  Sitting - Dynamic: Good  Standing - Static: Fair, + (SBA)  Standing - Dynamic: Fair, + (SBA)            LE Function  AROM RLE (degrees)  RLE AROM: WFL  Strength RLE  Comment: grossly 3/3+/5     AROM LLE (degrees)  LLE AROM : WFL  Strength LLE  Comment: grossly 3/3+/5      Observation/Palpation  Palpation: + warmth at L knee  Observation: brusing noted L knee      Vitals  Vitals  O2 Device: Nasal cannula (3L)  Comment: pt without c/o pain        Assessment  Assessment  Assessment: Impaired mobility due to decreased tolerance to activity  Decision Making: Low Complexity  History: Fall with Hematoma L Knee  Exam: decreased endurance, mobility  Clinical Presentation:  evolving  Discharge Recommendations: Home with assist PRN  Activity Tolerance  Activity Tolerance: Patient tolerated evaluation without incident  Activity Tolerance Comments: limited due to having to go down for proceedure           Functional Outcome Measures  AM-PAC Basic Mobility - Inpatient   How much help is needed turning from your back to your side while in a flat bed without using bedrails?: None  How much help is needed moving from lying on your back to sitting on the side of a flat bed without using bedrails?: None  How much help is needed moving to and from a bed to a chair?: A Little  How much help is needed standing up from a chair using your arms?: A Little  How much help is needed walking in hospital room?: A Little  How much help is needed climbing 3-5 steps with a railing?: A Little  AM-[facility] Inpatient Mobility Raw Score : 20  AM-PAC Inpatient T-Scale Score : 47.67  Mobility Inpatient CMS 0-100% Score: 35.83  Mobility Inpatient CMS G-Code Modifier : CJ     Goals     Short Term Goals  Time Frame for Short Term Goals: 2-3 days  Short Term Goal 1: pt to perform transfers form various surfaces mod-I  Short Term Goal 2: pt able to ambulate  ft with/without device mod-I for household distances  Short Term Goal 3: pt able to negotiate 4-5 steps with rail and supervision for household entry      Plan  Physical Therapy Plan  General Plan: 3-5 times per week  Current Treatment Recommendations: Endurance training, Gait training, Stair training, Functional mobility training, Therapeutic activities   Safety Devices  Type of Devices:  (pt left with transport on cart)       PT Individual Minutes  Time In: 1310  Time Out: 1318  Minutes: 8           Electronically signed by Alla Willams PT on 4/17/25 at 3:48 PM EDT

## 2025-04-17 NOTE — ANESTHESIA PRE PROCEDURE
Department of Anesthesiology  Preprocedure Note       Name:  Graciela Holt   Age:  74 y.o.  :  1950                                          MRN:  [id number]         Date:  2025      Surgeon: Surgeon(s):  Ellis Aranda MD    Procedure: Procedure(s):  ESOPHAGOGASTRODUODENOSCOPY BIOPSY    Medications prior to admission:   Prior to Admission medications    Medication Sig Start Date End Date Taking? Authorizing Provider   warfarin (COUMADIN) 2.5 MG tablet Take 2.5mg daily 4/10/25  Yes Lewis Castro MD Handicap Placard MISC by Does not apply route Expires on 12/30/30 3/10/25  Yes Travis Mason MD   albuterol sulfate HFA (PROVENTIL;VENTOLIN;PROAIR) 108 (90 Base) MCG/ACT inhaler Inhale 2 puffs into the lungs every 6 hours as needed for Wheezing or Shortness of Breath 3/3/25  Yes Travis Mason MD   atorvastatin (LIPITOR) 80 MG tablet Take 1 tablet by mouth daily 3/3/25  Yes Travis Mason MD   amiodarone (CORDARONE) 200 MG tablet Take 1 tablet by mouth daily 3/3/25  Yes Travis Mason MD   budesonide-formoterol (SYMBICORT) 160-4.5 MCG/ACT AERO Inhale 2 puffs into the lungs in the morning and 2 puffs in the evening. 3/3/25  Yes Travis Mason MD   bumetanide (BUMEX) 2 MG tablet Take 1 tablet by mouth daily 3/3/25  Yes Travis Mason MD   carvedilol (COREG) 3.125 MG tablet Take 1 tablet by mouth 2 times daily (with meals) 3/3/25  Yes Travis Mason MD   empagliflozin (JARDIANCE) 10 MG tablet Take 1 tablet by mouth daily 3/3/25  Yes Traivs Mason MD   famotidine (PEPCID) 40 MG tablet Take 1 tablet by mouth every evening 3/3/25  Yes Travis Mason MD   fluticasone (FLONASE) 50 MCG/ACT nasal spray 2 sprays by Each Nostril route daily 3/3/25  Yes Travis Mason MD   losartan (COZAAR) 25 MG tablet Take 1 tablet by mouth daily 3/3/25  Yes Travis Mason MD   potassium chloride (KLOR-CON) 10 MEQ extended release tablet Take 2 tablets by mouth 2 times daily 3/3/25  Yes Travis Mason MD    ASPIRIN

## 2025-04-18 PROBLEM — K29.70 GASTRITIS WITHOUT BLEEDING: Status: ACTIVE | Noted: 2025-04-18

## 2025-04-18 PROBLEM — K31.819 AVM (ARTERIOVENOUS MALFORMATION) OF STOMACH, ACQUIRED: Status: ACTIVE | Noted: 2025-04-18

## 2025-04-18 PROBLEM — K44.9 HIATAL HERNIA: Status: ACTIVE | Noted: 2025-04-18

## 2025-04-18 LAB
ANION GAP SERPL CALCULATED.3IONS-SCNC: 9 MMOL/L (ref 9–16)
BASOPHILS # BLD: 0.1 K/UL (ref 0–0.2)
BASOPHILS NFR BLD: 2 % (ref 0–2)
BUN SERPL-MCNC: 17 MG/DL (ref 8–23)
CALCIUM SERPL-MCNC: 8.3 MG/DL (ref 8.6–10.4)
CHLORIDE SERPL-SCNC: 114 MMOL/L (ref 98–107)
CO2 SERPL-SCNC: 21 MMOL/L (ref 20–31)
CREAT SERPL-MCNC: 1.3 MG/DL (ref 0.7–1.2)
EOSINOPHIL # BLD: 0.2 K/UL (ref 0–0.4)
EOSINOPHILS RELATIVE PERCENT: 2 % (ref 0–4)
ERYTHROCYTE [DISTWIDTH] IN BLOOD BY AUTOMATED COUNT: 15 % (ref 11.5–14.9)
GFR, ESTIMATED: 43 ML/MIN/1.73M2
GLUCOSE BLD-MCNC: 142 MG/DL (ref 65–105)
GLUCOSE SERPL-MCNC: 141 MG/DL (ref 74–99)
HCT VFR BLD AUTO: 24.2 % (ref 36–46)
HCT VFR BLD AUTO: 25.7 % (ref 36–46)
HCT VFR BLD AUTO: 26.5 % (ref 36–46)
HCT VFR BLD AUTO: 26.8 % (ref 36–46)
HGB BLD-MCNC: 8 G/DL (ref 12–16)
HGB BLD-MCNC: 8.6 G/DL (ref 12–16)
HGB BLD-MCNC: 8.7 G/DL (ref 12–16)
HGB BLD-MCNC: 9 G/DL (ref 12–16)
INR PPP: 1.8
LYMPHOCYTES NFR BLD: 0.6 K/UL (ref 1–4.8)
LYMPHOCYTES RELATIVE PERCENT: 8 % (ref 24–44)
MCH RBC QN AUTO: 27.9 PG (ref 26–34)
MCHC RBC AUTO-ENTMCNC: 33.6 G/DL (ref 31–37)
MCV RBC AUTO: 83 FL (ref 80–100)
MONOCYTES NFR BLD: 0.7 K/UL (ref 0.1–1.3)
MONOCYTES NFR BLD: 9 % (ref 1–7)
NEUTROPHILS NFR BLD: 79 % (ref 36–66)
NEUTS SEG NFR BLD: 6.2 K/UL (ref 1.3–9.1)
PLATELET # BLD AUTO: 290 K/UL (ref 150–450)
PMV BLD AUTO: 8.7 FL (ref 6–12)
POTASSIUM SERPL-SCNC: 5.1 MMOL/L (ref 3.7–5.3)
PROTHROMBIN TIME: 22.4 SEC (ref 11.8–14.6)
RBC # BLD AUTO: 3.23 M/UL (ref 4–5.2)
SODIUM SERPL-SCNC: 144 MMOL/L (ref 136–145)
WBC OTHER # BLD: 7.9 K/UL (ref 3.5–11)

## 2025-04-18 PROCEDURE — 2500000003 HC RX 250 WO HCPCS: Performed by: STUDENT IN AN ORGANIZED HEALTH CARE EDUCATION/TRAINING PROGRAM

## 2025-04-18 PROCEDURE — 2700000000 HC OXYGEN THERAPY PER DAY

## 2025-04-18 PROCEDURE — 94761 N-INVAS EAR/PLS OXIMETRY MLT: CPT

## 2025-04-18 PROCEDURE — 80048 BASIC METABOLIC PNL TOTAL CA: CPT

## 2025-04-18 PROCEDURE — 6370000000 HC RX 637 (ALT 250 FOR IP): Performed by: STUDENT IN AN ORGANIZED HEALTH CARE EDUCATION/TRAINING PROGRAM

## 2025-04-18 PROCEDURE — 99233 SBSQ HOSP IP/OBS HIGH 50: CPT | Performed by: INTERNAL MEDICINE

## 2025-04-18 PROCEDURE — 85025 COMPLETE CBC W/AUTO DIFF WBC: CPT

## 2025-04-18 PROCEDURE — 85014 HEMATOCRIT: CPT

## 2025-04-18 PROCEDURE — APPSS30 APP SPLIT SHARED TIME 16-30 MINUTES: Performed by: NURSE PRACTITIONER

## 2025-04-18 PROCEDURE — 6370000000 HC RX 637 (ALT 250 FOR IP): Performed by: INTERNAL MEDICINE

## 2025-04-18 PROCEDURE — 82947 ASSAY GLUCOSE BLOOD QUANT: CPT

## 2025-04-18 PROCEDURE — 85610 PROTHROMBIN TIME: CPT

## 2025-04-18 PROCEDURE — 85018 HEMOGLOBIN: CPT

## 2025-04-18 PROCEDURE — 36415 COLL VENOUS BLD VENIPUNCTURE: CPT

## 2025-04-18 PROCEDURE — 2580000003 HC RX 258: Performed by: STUDENT IN AN ORGANIZED HEALTH CARE EDUCATION/TRAINING PROGRAM

## 2025-04-18 PROCEDURE — 6360000002 HC RX W HCPCS: Performed by: STUDENT IN AN ORGANIZED HEALTH CARE EDUCATION/TRAINING PROGRAM

## 2025-04-18 PROCEDURE — 2060000000 HC ICU INTERMEDIATE R&B

## 2025-04-18 PROCEDURE — 99232 SBSQ HOSP IP/OBS MODERATE 35: CPT | Performed by: STUDENT IN AN ORGANIZED HEALTH CARE EDUCATION/TRAINING PROGRAM

## 2025-04-18 PROCEDURE — 94640 AIRWAY INHALATION TREATMENT: CPT

## 2025-04-18 RX ORDER — POTASSIUM CHLORIDE 750 MG/1
20 CAPSULE, EXTENDED RELEASE ORAL DAILY
Status: DISCONTINUED | OUTPATIENT
Start: 2025-04-19 | End: 2025-04-19 | Stop reason: HOSPADM

## 2025-04-18 RX ORDER — WARFARIN SODIUM 2.5 MG/1
2.5 TABLET ORAL
Status: DISCONTINUED | OUTPATIENT
Start: 2025-04-18 | End: 2025-04-18

## 2025-04-18 RX ORDER — BUMETANIDE 1 MG/1
2 TABLET ORAL DAILY
Status: DISCONTINUED | OUTPATIENT
Start: 2025-04-18 | End: 2025-04-19 | Stop reason: HOSPADM

## 2025-04-18 RX ORDER — FAMOTIDINE 20 MG/1
20 TABLET, FILM COATED ORAL EVERY EVENING
Status: DISCONTINUED | OUTPATIENT
Start: 2025-04-18 | End: 2025-04-19 | Stop reason: HOSPADM

## 2025-04-18 RX ADMIN — SODIUM CHLORIDE, PRESERVATIVE FREE 10 ML: 5 INJECTION INTRAVENOUS at 20:17

## 2025-04-18 RX ADMIN — AMIODARONE HYDROCHLORIDE 200 MG: 200 TABLET ORAL at 09:41

## 2025-04-18 RX ADMIN — HYDROCODONE BITARTRATE AND ACETAMINOPHEN 1 TABLET: 5; 325 TABLET ORAL at 20:16

## 2025-04-18 RX ADMIN — CARVEDILOL 3.12 MG: 3.12 TABLET, FILM COATED ORAL at 09:42

## 2025-04-18 RX ADMIN — BUDESONIDE AND FORMOTEROL FUMARATE DIHYDRATE 2 PUFF: 160; 4.5 AEROSOL RESPIRATORY (INHALATION) at 08:06

## 2025-04-18 RX ADMIN — EMPAGLIFLOZIN 10 MG: 10 TABLET, FILM COATED ORAL at 09:41

## 2025-04-18 RX ADMIN — BUMETANIDE 2 MG: 1 TABLET ORAL at 16:16

## 2025-04-18 RX ADMIN — FAMOTIDINE 20 MG: 20 TABLET, FILM COATED ORAL at 16:12

## 2025-04-18 RX ADMIN — ATORVASTATIN CALCIUM 80 MG: 80 TABLET, FILM COATED ORAL at 09:42

## 2025-04-18 RX ADMIN — BUDESONIDE AND FORMOTEROL FUMARATE DIHYDRATE 2 PUFF: 160; 4.5 AEROSOL RESPIRATORY (INHALATION) at 19:26

## 2025-04-18 RX ADMIN — POTASSIUM CHLORIDE 20 MEQ: 750 CAPSULE, EXTENDED RELEASE ORAL at 09:42

## 2025-04-18 RX ADMIN — HYDROCODONE BITARTRATE AND ACETAMINOPHEN 1 TABLET: 5; 325 TABLET ORAL at 09:41

## 2025-04-18 RX ADMIN — CARVEDILOL 3.12 MG: 3.12 TABLET, FILM COATED ORAL at 16:14

## 2025-04-18 RX ADMIN — PANTOPRAZOLE SODIUM 8 MG/HR: 40 INJECTION, POWDER, FOR SOLUTION INTRAVENOUS at 02:19

## 2025-04-18 ASSESSMENT — PAIN DESCRIPTION - LOCATION
LOCATION: KNEE
LOCATION: KNEE

## 2025-04-18 ASSESSMENT — PAIN DESCRIPTION - ORIENTATION
ORIENTATION: LEFT
ORIENTATION: LEFT

## 2025-04-18 ASSESSMENT — PAIN DESCRIPTION - DESCRIPTORS
DESCRIPTORS: ACHING;DISCOMFORT;TENDER;THROBBING
DESCRIPTORS: TENDER;TIGHTNESS;DISCOMFORT

## 2025-04-18 ASSESSMENT — PAIN SCALES - GENERAL
PAINLEVEL_OUTOF10: 7
PAINLEVEL_OUTOF10: 5

## 2025-04-18 ASSESSMENT — PAIN - FUNCTIONAL ASSESSMENT: PAIN_FUNCTIONAL_ASSESSMENT: PREVENTS OR INTERFERES SOME ACTIVE ACTIVITIES AND ADLS

## 2025-04-18 NOTE — CARE COORDINATION
ONGOING DISCHARGE PLAN:    Patient is alert and oriented x4.    Spoke with patient regarding discharge plan and patient confirms that plan is still home no needs.    GI-upper GI bleed, Hgb 9.0. Protonix GTT. POD #1 EGD.     PT/OT on.     Will continue to follow for additional discharge needs.    If patient is discharged prior to next notation, then this note serves as note for discharge by case management.    Electronically signed by Yuko Pizarro RN on 4/18/2025 at 10:57 AM

## 2025-04-18 NOTE — PROGRESS NOTES
Comprehensive Nutrition Assessment    Type and Reason for Visit:  Reassess    Nutrition Recommendations/Plan:   Will continue to provide Regular diet     Malnutrition Assessment:  Malnutrition Status:  At risk for malnutrition (04/16/25 3560)    Context:  Chronic Illness     Findings of the 6 clinical characteristics of malnutrition:  Energy Intake:  Mild decrease in energy intake  Weight Loss:   (10% wt loss over 1 year)     Body Fat Loss:  Unable to assess     Muscle Mass Loss:  Unable to assess    Fluid Accumulation:  Mild Extremities   Strength:  Not Performed    Nutrition Assessment:    Pt is consuming more cindy 75% of food provided despite having no teeth. Results of EGD shows small Hiatal Hernia, AVM in antrum and gastritis.    Nutrition Related Findings:    Trace BLE edema, Labs: K 5.1, Glu 141, Meds: Reviewed, BM 4/17 Wound Type: None       Current Nutrition Intake & Therapies:    Average Meal Intake: %     ADULT DIET; Regular    Anthropometric Measures:  Height: 162.6 cm (5' 4\")  Ideal Body Weight (IBW): 120 lbs (55 kg)    Admission Body Weight: 59 kg (130 lb 1.1 oz)  Current Body Weight: 59 kg (130 lb 1.1 oz), 108.4 % IBW. Weight Source: Bed scale (4/9)  Current BMI (kg/m2): 22.3  Usual Body Weight: 65.8 kg (145 lb) (4/24)     % Weight Change (Calculated): -10.3                    BMI Categories: Normal Weight (BMI 22.0 to 24.9) age over 65    Estimated Daily Nutrient Needs:  Energy Requirements Based On: Formula  Weight Used for Energy Requirements: Admission  Energy (kcal/day): [place] x 1.2-1.3= 1300- 1400 kcal  Weight Used for Protein Requirements: Admission  Protein (g/day): 1.2g/kg= 70 g  Method Used for Fluid Requirements: ml/Kg  Fluid (ml/day): less than 1500 ml    Nutrition Diagnosis:   Predicted inadequate energy intake related to decreased appetite as evidenced by poor intake prior to admission    Nutrition Interventions:   Food and/or Nutrient Delivery: Continue Current

## 2025-04-18 NOTE — PROGRESS NOTES
Date:   4/18/2025  Patient name: Graciela Holt  Date of admission:  4/15/2025  7:00 PM  MRN:   [id number]  YOB: 1950  PCP: Travis Mason MD    Reason for Admission: Melena [K92.1]  Acute upper GI bleed [K92.2]  Pain and swelling of left knee [M25.562, M25.462]    Cardiology follow-up: Ischemic cardiomyopathy, history of ventricular arrhythmias, A-fib           Referring physician: Dr Juan Carlos Todd     Impression  Admission 4/7/2025 increasing shortness of breath, mild chest pain, A-fib with RVR new onset  Repeat 2D echo 4/9/2025 ejection fraction 20 to 25%, dilated LV 6.6 cm severe dilated LA, mild AR MR and TR RVSP 38  Admission 12/28/2024 severe shortness of breath chest x-ray showed CHF proBNP 12,221 high-sensitivity troponin 46 and 41  Multivessel CAD   CABG LIMA to LAD and diagonal 1, SVG to OM, SVG to PDA February 2018 at [facility]  Severely reduced LV systolic function ejection fraction 25 to 30%, akinetic LV apex, apical thrombus 2D echo 4/8/2024  Moderately increased LV wall thickness  Left parasternal lift  Episodes of nonsustained V. Tach  Diabetes mellitus  Hyperlipidemia lipid profile 11/6/2024 cholesterol 159, HDL 55, LDL 95, triglyceride 41  History of left MCA stroke occluded internal carotid       Admission 11/5/2024 with a severe shortness of breath, CHF systolic acute on chronic  High-sensitivity troponin 42 and 29, proBNP 13,352  Admission 8/12/2024 chest pain like a heavy pressure radiating to the left arm, no new ECG changes, high-sensitivity troponin 28  Admission 6/17/2024 chest pain like heaviness no shortness of breath no radiation ECG showed LVH with repolarization seen changes no change from previous ECG borderline abnormal troponin most likely type II  Admission 4/4/2024 for increasing shortness of breath, increasing swelling over the legs, elevated troponin 119, 136,  chest x-ray showed pulmonary edema proBNP 31,990     Past surgical history  Bilateral  knee replacement, C-spine surgery, cholecystectomy,  section, CABG     Drug allergies codeine, lisinopri      History of present illness  74-year-old female who lives with her 2 roommates mother of 1 son and 1 daughter with a past medical history of multivessel coronary artery disease, coronary artery bypass surgery, carotid surgery, COPD, quit smoking about 1 year ago, left MCA stroke occluded internal carotid artery, akinetic LV apex with thrombus, multiple admissions in the CHF, history of nonsustained ventricular arrhythmias, she has refused for LifeVest and AICD got hospitalized on 9/15/2025 with a diagnosis of melena/acute upper GI bleed and painful swelling of the left knee, left knee hematoma.  She was recently started on warfarin with a Lovenox bridging for A-fib RVR.  No chest pain no palpitation no syncope  On admission her hemoglobin was 10.5 and it dropped to 9.3, sodium was 141 potassium 4.7, creatinine 1.4, BUN 30, glucose 180  On admission INR was 1.7 and prothrombin time 21.2  Stool test positive for blood     On admission blood pressure 124/80 heart rate 66 oxygen saturation 96 on 2 L, temperature 98.6    Current evaluation  Patient seen and examined  She was resting on bed did not appear in any distress  She is still having left knee pain 5/10  Unable to get out of bed and walk without help  She had EGD yesterday argon plasma coagulation performed for small AVM in the stomach  Denied any chest pain or palpitation  Blood pressure 118/80 heart rate 62 oxygen saturation 95% 1 L  Hemoglobin 8.7 WBC 7.9, platelets 290 sodium 144 potassium 5.1, BUN 17 creatinine 1.3 glucose 141  INR 1.8    Investigation workup     Left knee x-ray showed extensive soft tissue swelling around the upper portion of the knee as well as the knee effusion no obvious fracture involving the patella, femur or tibia or fibula     ECG 2025  Sinus rhythm left ventricular hypertrophy with repolarization abnormalities

## 2025-04-18 NOTE — PROGRESS NOTES
GI Progress notes    4/18/2025   10:10 AM    Name:  Graciela Holt  MRN:    [id number]     Acct:     [id number]   Room:  2095/2095-[facility] Day: 3     Admit Date: 4/15/2025  7:00 PM  PCP: Travis Mason MD    Subjective:     C/C:   Chief Complaint   Patient presents with    Fall    Knee Pain       Interval History: Status: improved.     Patient seen and examined.  No acute events overnight.  S/p EGD   Small hiatal hernia  1 small AVM in antrum-APC   Erythematous gastritis  Normal duodenum   Hgb 9.0 today  No overt GI bleeding    ROS:  Constitutional: negative for chills, fevers and sweats  Gastrointestinal: negative for abdominal pain, constipation, diarrhea, nausea and vomiting  Neurological: negative for dizziness and headaches    Medications:     Allergies:   Allergies   Allergen Reactions    Codeine Other (See Comments)     Constipation.     Lisinopril      hyperkalemia    Morphine Other (See Comments)     Hallucinations.       Current Meds: famotidine (PEPCID) tablet 20 mg, QPM  HYDROcodone-acetaminophen (NORCO) 5-325 MG per tablet 1 tablet, Q6H PRN  albuterol sulfate HFA (PROVENTIL;VENTOLIN;PROAIR) 108 (90 Base) MCG/ACT inhaler 2 puff, Q6H PRN  amiodarone (CORDARONE) tablet 200 mg, Daily  atorvastatin (LIPITOR) tablet 80 mg, Daily  budesonide-formoterol (SYMBICORT) 160-4.5 MCG/ACT inhaler 2 puff, BID RT  carvedilol (COREG) tablet 3.125 mg, BID WC  empagliflozin (JARDIANCE) tablet 10 mg, Daily  [Held by provider] enoxaparin (LOVENOX) injection 60 mg, BID  [Held by provider] losartan (COZAAR) tablet 25 mg, Daily  nitroGLYCERIN (NITROSTAT) SL tablet 0.4 mg, Q5 Min PRN  potassium chloride (MICRO-K) extended release capsule 20 mEq, BID  [Held by provider] aspirin EC tablet 81 mg, Daily  sodium chloride flush 0.9 % injection 5-40 mL, 2 times per day  sodium chloride flush 0.9 % injection 10 mL, PRN  0.9 % sodium chloride infusion, PRN  potassium chloride (KLOR-CON M) extended release tablet 40 mEq, PRN    KNEE LEFT (3 VIEWS)  Result Date: 4/16/2025  EXAMINATION: 3 XRAY VIEWS OF THE LEFT KNEE 4/15/2025 7:18 pm COMPARISON: 9/13/2015 HISTORY: ORDERING SYSTEM PROVIDED HISTORY: swelling s/p fall TECHNOLOGIST PROVIDED HISTORY: Swelling s/p fall Reason for Exam: swelling s/p fall FINDINGS: Redemonstration of left knee arthroplasty.  Extensive soft tissue swelling around the upper portion of the knee as well as knee effusion.  No fracture or dislocation.  Knee swelling likely represents soft tissue hematoma.  No obvious fractures involving the patella, femur, tibia or fibula.     Extensive soft tissue swelling around the upper portion of the knee as well as knee effusion. No obvious fractures involving the patella, femur tibia or fibula.     Echo (TTE) complete (PRN contrast/bubble/strain/3D)  Result Date: 4/9/2025    Left Ventricle: Severely reduced left ventricular systolic function with a visually estimated EF of 20 - 25%. Left ventricle is severely dilated. Increased wall thickness. Findings consistent with concentric hypertrophy. Severe global hypokinesis present. Abnormal diastolic function. There is a mass present that is echogenic and pedunculated in the apex consistent with thrombus.   Mitral Valve: Mild annular calcification. Mildly thickened leaflets. Mild regurgitation.   Tricuspid Valve: Mild regurgitation. Est RA pressure is 8 mmHg. The estimated RVSP is 38 mmHg.   Left Atrium: Left atrium is severely dilated.   IVC/SVC: IVC diameter is less than or equal to 21 mm and decreases less than 50% during inspiration; therefore the estimated right atrial pressure is intermediate (~8 mmHg). IVC size is normal.   Image quality is good. Contrast used: Lumason.     CT CERVICAL SPINE WO CONTRAST  Result Date: 4/7/2025  EXAMINATION: CT OF THE CERVICAL SPINE WITHOUT CONTRAST 4/7/2025 5:20 pm TECHNIQUE: CT of the cervical spine was performed without the administration of intravenous contrast. Multiplanar reformatted images  are provided for review. Automated exposure control, iterative reconstruction, and/or weight based adjustment of the mA/kV was utilized to reduce the radiation dose to as low as reasonably achievable. COMPARISON: None. HISTORY: ORDERING SYSTEM PROVIDED HISTORY: recent cervical surgery, fall yesterday TECHNOLOGIST PROVIDED HISTORY: recent cervical surgery, fall yesterday Decision Support Exception - unselect if not a suspected or confirmed emergency medical condition->Emergency Medical Condition (MA) Reason for Exam: recent cervical surgery, fall yesterday Additional signs and symptoms: OCCIPUT TO T2 DECOMPRESSION FUSION done in January FINDINGS: BONES/ALIGNMENT: Since the MRI of the cervical spine dated 04/07/2025, interval extensive post fusion/laminectomy changes seen with interlocking transpedicular screws (C2-T1 on the right, and C5-T1 on the left) with occipital extension/synthetic bony material; there is no definite acute fracture or traumatic malalignment; known chronic nonunited odontoid fracture appears in similar position with posterior subluxation of C1/odontoid tip on C2, stable subluxation and fusion C6-C7 also unchanged, and severe multilevel degenerative changes. DEGENERATIVE CHANGES: No change in osseous spinal canal stenosis.  Varying degrees of neural foraminal stenosis, similar to prior MRI. Mucosal thickening noted posterior sphenoid sinus.  Extensive vascular calcifications.  Soft tissue     No definite acute abnormality of the cervical spine; extensive post orthopedic fusion changes/laminectomy as compared to 04/07/2025 with multiple findings as above.     XR CHEST PORTABLE  Result Date: 4/7/2025  EXAMINATION: ONE XRAY VIEW OF THE CHEST 4/7/2025 5:04 pm COMPARISON: 11/05/2024 HISTORY: ORDERING SYSTEM PROVIDED HISTORY: Chrest Pain TECHNOLOGIST PROVIDED HISTORY: Chrest Pain Reason for Exam: sob, chest pain FINDINGS: There is cardiomegaly with pulmonary vascular congestion and interstitial edema.

## 2025-04-18 NOTE — PROGRESS NOTES
[facility]   IN-PATIENT SERVICE   [facility]  Progress note            Date:   4/18/2025  Patient name:  Graciela Holt  Date of admission:  4/15/2025  7:00 PM  MRN:   [id number]  Account:  [id number]  YOB: 1950  PCP:    Travis Mason MD  Room:   [id number]  Code Status:    Full Code    Chief Complaint:     Chief Complaint   Patient presents with    Fall    Knee Pain   =    History Obtained From:     patient    History of Present Illness:     The patient is a 74 y.o.  Non- / non  female who presents with Fall and Knee Pain   and she is admitted to the hospital for the management of  =    Graciela Holt is a 74 y.o. Non- / non  female who presents with Fall and Knee Pain   and is admitted to the hospital for the management of Acute upper GI bleed.     Patient's medical history significant for chronic combined CHF, COPD, DM type II, HTN, hypothyroidism, hyperlipidemia, bilateral TKA, s/p CABG x 4, and CKD.  Patient was admitted earlier this month for new onset A-fib with RVR.  She was started on warfarin, which is being bridged with Lovenox until therapeutic INR achieved.     According to patient, she fell last Monday landing on her left knee.  Since states that she noticed that her left knee was getting bigger 2 days ago and has progressively worsened since that time.  Her left knee is noted to be warm and edematous with purple discoloration.  It is extremely edematous, as if possibly a hematoma has developed.  Additionally, patient states that she started having dark tarry stools yesterday and reports that she has had 2 loose stools since that started.  Patient denies prior history of GI bleed.  She denies chest pain, shortness of breath, abdominal pain, nausea, vomiting, and urinary symptoms.  Lungs sound clear to auscultation.     Left knee x-rays showed extensive swelling around the upper portion of the knee as well as a knee effusion.  No  palpable  Lungs: Bilateral equal air entry, clear to ausculation, no wheezing, rales or rhonchi, normal effort  Cardiovascular: normal rate, regular rhythm, no murmur, gallop, rub.  Abdomen: Soft, nontender, nondistended, normal bowel sounds, no hepatomegaly or splenomegaly  Neurologic: There are no new focal motor or sensory deficits, normal muscle tone and bulk, no abnormal sensation, normal speech, cranial nerves II through XII grossly intact  Skin: No gross lesions, rashes, bruising or bleeding on exposed skin area  Extremities: Left knee swollen, bruised, slight tenderness present,    Investigations:      Laboratory Testing:  Recent Results (from the past 24 hours)   POC Glucose Fingerstick    Collection Time: 04/17/25  3:58 PM   Result Value Ref Range    POC Glucose 95 65 - 105 mg/dL   EKG 12 Lead    Collection Time: 04/17/25  4:29 PM   Result Value Ref Range    Ventricular Rate 64 BPM    Atrial Rate 64 BPM    P-R Interval 134 ms    QRS Duration 110 ms    Q-T Interval 452 ms    QTc Calculation (Bazett) 466 ms    R Axis -14 degrees    T Axis 123 degrees   Hemoglobin and Hematocrit    Collection Time: 04/17/25  5:54 PM   Result Value Ref Range    Hemoglobin 8.4 (L) 12.0 - 16.0 g/dL    Hematocrit 26.4 (L) 36 - 46 %   POC Glucose Fingerstick    Collection Time: 04/17/25  7:56 PM   Result Value Ref Range    POC Glucose 176 (H) 65 - 105 mg/dL   Hemoglobin and Hematocrit    Collection Time: 04/17/25 11:57 PM   Result Value Ref Range    Hemoglobin 8.0 (L) 12.0 - 16.0 g/dL    Hematocrit 24.2 (L) 36 - 46 %   Basic Metabolic Panel w/ Reflex to MG    Collection Time: 04/18/25  5:53 AM   Result Value Ref Range    Sodium 144 136 - 145 mmol/L    Potassium 5.1 3.7 - 5.3 mmol/L    Chloride 114 (H) 98 - 107 mmol/L    CO2 21 20 - 31 mmol/L    Anion Gap 9 9 - 16 mmol/L    Glucose 141 (H) 74 - 99 mg/dL    BUN 17 8 - 23 mg/dL    Creatinine 1.3 (H) 0.7 - 1.2 mg/dL    Est, Glom Filt Rate 43 (L) >60 mL/min/1.73m2    Calcium 8.3 (L)  and Lovenox for new onset of A-fib and LV thrombus, h Ortho consulted, conservative management,, hemoglobin has been stable, patient currently in sinuses have LV thrombus resume Coumadin, hold onto Lovenox, INR is 1.8  acute on chronic anemia of blood loss, secondary to GI bleed and left knee hematoma patient is status post EGD yesterday, GI cleared to start anticoagulation, hemoglobin 8.7 we will discontinue repeat hemoglobin checks and check CBC daily, resumed on Coumadin, watch for hemoglobin while on Coumadin,  GI bleed, complaining of melanotic stools stool occult positive GI consulted started on PPI IV, patient had EGD today showed small AVM in antrum and erythematous gastritis, as per GI to resume anticoagulation started on, Coumadin, and aspirin, will hold Lovenox  On heart failure with reduced ejection fraction fraction of 20 to 25% currently, creatinine in the baseline resume the home dose of Bumex, developed increased pedal edema  proximal A-fib, currently in sinus, rate controlled cardiology consult on amiodarone and Coreg, \  KELSEY on CKD stage III creatinine slightly elevated, at the time of admission was 1.4, has now normalized, no sign of volume overload, creatinine has normalized, resume home dose of Breo  history of LV thrombus, previous echo reviewed, h has been present since 2023, resumed on Coumadin, I will DVT prophylaxis SCDs overall prognosis poor  COPD currently compensated on bronchodilators no active wheezing present    Discharge once cleared by cardiology and hemo if hemoglobin remained stable  Patient resumed on Bumex losartan continues to be held due to borderline hypotension, borderline hyperkalemia will cut down potassium to 20 mill equivalent daily.      Addendum, discussed with tate Varma to hold Coumadin and aspirin for 1 week.  Consultations:   IP CONSULT TO INTERNAL MEDICINE  IP CONSULT TO GI  IP CONSULT TO ORTHOPEDIC SURGERY  IP CONSULT TO CARDIOLOGY  PHARMACY TO DOSE

## 2025-04-18 NOTE — FLOWSHEET NOTE
04/18/25 0034   Treatment Team Notification   Reason for Communication Review case   Name of Team Member Notified Yue Dee NP   Treatment Team Role Advanced Practice Nurse   Method of Communication Secure Message   Response Waiting for response   Notification Time 0034 0034 - \"Pt recent H/H came back. Hgb 8.0. Pt had EGD done today, and her Hgb has been sitting on the lower side. Would you like any additional interventions? Please advise - thank you\"    0040 - No new orders at this time
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
## 2025-04-19 VITALS
RESPIRATION RATE: 18 BRPM | WEIGHT: 131.84 LBS | DIASTOLIC BLOOD PRESSURE: 63 MMHG | TEMPERATURE: 97.5 F | HEART RATE: 72 BPM | SYSTOLIC BLOOD PRESSURE: 118 MMHG | OXYGEN SATURATION: 97 % | BODY MASS INDEX: 22.51 KG/M2 | HEIGHT: 64 IN

## 2025-04-19 LAB
EKG ATRIAL RATE: 64 BPM
EKG P-R INTERVAL: 134 MS
EKG Q-T INTERVAL: 452 MS
EKG QRS DURATION: 110 MS
EKG QTC CALCULATION (BAZETT): 466 MS
EKG R AXIS: -14 DEGREES
EKG T AXIS: 123 DEGREES
EKG VENTRICULAR RATE: 64 BPM
HCT VFR BLD AUTO: 27.1 % (ref 36–46)
HGB BLD-MCNC: 8.8 G/DL (ref 12–16)
INR PPP: 1.4
PROTHROMBIN TIME: 18.4 SEC (ref 11.8–14.6)

## 2025-04-19 PROCEDURE — 94761 N-INVAS EAR/PLS OXIMETRY MLT: CPT

## 2025-04-19 PROCEDURE — 85014 HEMATOCRIT: CPT

## 2025-04-19 PROCEDURE — 6370000000 HC RX 637 (ALT 250 FOR IP): Performed by: STUDENT IN AN ORGANIZED HEALTH CARE EDUCATION/TRAINING PROGRAM

## 2025-04-19 PROCEDURE — 99239 HOSP IP/OBS DSCHRG MGMT >30: CPT | Performed by: INTERNAL MEDICINE

## 2025-04-19 PROCEDURE — 36415 COLL VENOUS BLD VENIPUNCTURE: CPT

## 2025-04-19 PROCEDURE — 2700000000 HC OXYGEN THERAPY PER DAY

## 2025-04-19 PROCEDURE — 85610 PROTHROMBIN TIME: CPT

## 2025-04-19 PROCEDURE — 85018 HEMOGLOBIN: CPT

## 2025-04-19 PROCEDURE — 94640 AIRWAY INHALATION TREATMENT: CPT

## 2025-04-19 PROCEDURE — 6370000000 HC RX 637 (ALT 250 FOR IP): Performed by: INTERNAL MEDICINE

## 2025-04-19 RX ORDER — POTASSIUM CHLORIDE 750 MG/1
20 CAPSULE, EXTENDED RELEASE ORAL DAILY
Qty: 60 CAPSULE | Refills: 3 | Status: SHIPPED | OUTPATIENT
Start: 2025-04-20

## 2025-04-19 RX ADMIN — EMPAGLIFLOZIN 10 MG: 10 TABLET, FILM COATED ORAL at 10:31

## 2025-04-19 RX ADMIN — CARVEDILOL 3.12 MG: 3.12 TABLET, FILM COATED ORAL at 10:32

## 2025-04-19 RX ADMIN — BUMETANIDE 2 MG: 1 TABLET ORAL at 10:32

## 2025-04-19 RX ADMIN — HYDROCODONE BITARTRATE AND ACETAMINOPHEN 1 TABLET: 5; 325 TABLET ORAL at 13:56

## 2025-04-19 RX ADMIN — ATORVASTATIN CALCIUM 80 MG: 80 TABLET, FILM COATED ORAL at 10:31

## 2025-04-19 RX ADMIN — AMIODARONE HYDROCHLORIDE 200 MG: 200 TABLET ORAL at 10:32

## 2025-04-19 RX ADMIN — POTASSIUM CHLORIDE 20 MEQ: 750 CAPSULE, EXTENDED RELEASE ORAL at 10:31

## 2025-04-19 RX ADMIN — HYDROCODONE BITARTRATE AND ACETAMINOPHEN 1 TABLET: 5; 325 TABLET ORAL at 02:09

## 2025-04-19 RX ADMIN — HYDROCODONE BITARTRATE AND ACETAMINOPHEN 1 TABLET: 5; 325 TABLET ORAL at 10:10

## 2025-04-19 RX ADMIN — BUDESONIDE AND FORMOTEROL FUMARATE DIHYDRATE 2 PUFF: 160; 4.5 AEROSOL RESPIRATORY (INHALATION) at 07:34

## 2025-04-19 ASSESSMENT — PAIN SCALES - GENERAL
PAINLEVEL_OUTOF10: 4
PAINLEVEL_OUTOF10: 5
PAINLEVEL_OUTOF10: 5

## 2025-04-19 ASSESSMENT — PAIN DESCRIPTION - LOCATION: LOCATION: KNEE

## 2025-04-19 ASSESSMENT — PAIN DESCRIPTION - ORIENTATION: ORIENTATION: LEFT

## 2025-04-19 ASSESSMENT — PAIN DESCRIPTION - DESCRIPTORS: DESCRIPTORS: THROBBING;SQUEEZING;DISCOMFORT

## 2025-04-19 NOTE — CARE COORDINATION
DISCHARGE PLANNING NOTE:    Reviewed previous case management notes, and discharge plan is home without needs. VNS has been declined.    INR 1.4 today, on Coumadin and follows at [facility].    EGD 4/17. Hgb 8.8 today.    Pt was recently here and was referred to [facility] Clinic and [facility] as well as Zoll patch was ordered.    Will continue to follow for additional discharge needs.    Electronically signed by Yuko Krause RN on 4/19/2025 at 11:43 AM    Message left for [facility] to make appt with patient on Friday 4/25 as requested by Dr. Castro and pt should only be on Coumadin. No Lovenox.    Pt has follow up with Dr. Mann 5/7 @ 1:15pm.    Electronically signed by Yuko Krause RN on 4/19/2025 at 12:25 PM

## 2025-04-19 NOTE — DISCHARGE INSTR - COC
Continuity of Care Form    Patient Name: Graciela Holt   :  1950  MRN:  [id number]    Admit date:  4/15/2025  Discharge date:  ***    Code Status Order: Full Code   Advance Directives:    Date/Time Healthcare Directive Type of Healthcare Directive Copy in Chart Healthcare Agent Appointed Healthcare Agent's Name Healthcare Agent's Phone Number    [phone] Yes, patient has an advance directive for healthcare treatment  pt states she is a \"full code, I want everything done to save me. \"  Health care treatment directive  Yes, copy in chart  --  --  --             Admitting Physician:  Juan Carlos Todd MD  PCP: Travis Mason MD    Discharging Nurse: ***  Discharging Hospital Unit/Room#: 2095/2095-01  Discharging Unit Phone Number: ***    Emergency Contact:   Extended Emergency Contact Information  Primary Emergency Contact: Lee Ann Cerdane  Mobile Phone: [phone]  Relation: Grandchild  Secondary Emergency Contact: Guanakito Zheng  Address: [address] [place] [address]  Home Phone: [phone]  Work Phone: [phone]  Mobile Phone: [phone]  Relation: Child    Past Surgical History:  Past Surgical History:   Procedure Laterality Date    APPENDECTOMY      BRONCHOSCOPY N/A 2021    BRONCHOSCOPY w/ WASHINGS performed by Toy Cheatham MD at [facility] ENDO    CARDIAC SURGERY      cath x 2/ stent x 1    CARDIAC SURGERY      bypass 4 vessel 2018    CERVICAL FUSION N/A 1/10/2025    OCCIPUT TO T2 DECOMPRESSION FUSION, REVISION OF HARDWARE performed by Yessica Flynn DO at [facility] OR    CERVICAL LAMINECTOMY N/A 10/14/2020    C3-C7 POSTERIOR CERVICAL DECOMPRESSION FUSION performed by Armando Guerra MD at [facility] OR    CHOLECYSTECTOMY      HYSTERECTOMY (CERVIX STATUS UNKNOWN)      JOINT REPLACEMENT Bilateral     knees    LEG BIOPSY EXCISION Right 2019    LEG LESION PUNCH BIOPSY performed by Chuckie Snow MD at [facility] OR    OTHER SURGICAL HISTORY  2020    cerebral  angiogram    OK I&D DEEP ABSC BURSA/HEMATOMA THIGH/KNEE REGION Right 05/07/2018    DEBRIDEMENT INCISION AND DRAINAGE THIGH ABSCESS performed by Amanda Hernandez DO at [facility] OR    OK OFFICE/OUTPT VISIT,PROCEDURE ONLY N/A 02/06/2018    CABG X 3 LIMA-LAD-DIAG,SVG-PDA,CORONARY ARTERY BYPASS REDO, PUMP ASSIST, SWAN, JARRED, REDO STERNOTOMY performed by Savanna Mata MD at [facility] CVOR    THORACIC FUSION  01/10/2025    OCCIPUT TO T1 DECOMPRESSION FUSION, REVISION OF HARDWARE (SYNTHES, STEALTH, EVOKES)    UPPER GASTROINTESTINAL ENDOSCOPY N/A 4/17/2025    ESOPHAGOGASTRODUODENOSCOPY WITH APC performed by Ellis Aranda MD at [facility] ENDO       Immunization History:   Immunization History   Administered Date(s) Administered    Influenza Virus Vaccine 11/14/2016, 09/29/2017    Influenza Whole 11/11/2015    Influenza, FLUAD, (age 65 y+), IM, Trivalent PF, 0.5mL 09/29/2017    Influenza, FLUARIX, FLULAVAL, FLUZONE (age 6 mo+) and AFLURIA, (age 3 y+), Quadv PF, 0.5mL 08/27/2019, 11/13/2020    Influenza, FLUZONE High Dose (age 65 y+), IM, Quadv, 0.7mL 01/14/2025    Influenza, FLUZONE High Dose, (age 65 y+), IM, Trivalent PF, 0.5mL 11/14/2014, 11/11/2015, 10/24/2018    Pneumococcal, PCV-13, PREVNAR 13, (age 6w+), IM, 0.5mL 11/14/2016    Pneumococcal, PCV20, PREVNAR 20, (age 6w+), IM, 0.5mL 01/22/2025    Pneumococcal, PPSV23, PNEUMOVAX 23, (age 2y+), SC/IM, 0.5mL 11/01/2017    TDaP, ADACEL (age 10y-64y), BOOSTRIX (age 10y+), IM, 0.5mL 07/02/2020, 10/10/2024       Active Problems:  Patient Active Problem List   Diagnosis Code    Chronic pain G89.29    Controlled type 2 diabetes mellitus without complication, without long-term current use of insulin E11.9    Hypertension goal BP (blood pressure) < 140/90 I10    Mixed hyperlipidemia E78.2    Chronic obstructive pulmonary disease (HCC) J44.9    Coronary artery disease involving native coronary artery of native heart without angina pectoris I25.10    Primary insomnia F51.01    Restless leg

## 2025-04-19 NOTE — PLAN OF CARE
Problem: Chronic Conditions and Co-morbidities  Goal: Patient's chronic conditions and co-morbidity symptoms are monitored and maintained or improved  Outcome: Progressing     Problem: Discharge Planning  Goal: Discharge to home or other facility with appropriate resources  Outcome: Progressing     Problem: Pain  Goal: Verbalizes/displays adequate comfort level or baseline comfort level  Outcome: Progressing     Problem: Safety - Adult  Goal: Free from fall injury  Outcome: Progressing     Problem: ABCDS Injury Assessment  Goal: Absence of physical injury  Outcome: Progressing     Problem: Skin/Tissue Integrity  Goal: Skin integrity remains intact  Description: 1.  Monitor for areas of redness and/or skin breakdown2.  Assess vascular access sites hourly3.  Every 4-6 hours minimum:  Change oxygen saturation probe site4.  Every 4-6 hours:  If on nasal continuous positive airway pressure, respiratory therapy assess nares and determine need for appliance change or resting period  Outcome: Progressing
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
## 2025-04-19 NOTE — PROGRESS NOTES
Discussed with , they will arrange appointment with Coumadin clinic in 1 week  Daughter will take patient to Coumadin clinic  Patient has already appointment with cardiologist on 7 th may

## 2025-04-19 NOTE — PROGRESS NOTES
Date:   4/19/2025  Patient name: Graciela Holt  Date of admission:  4/15/2025  7:00 PM  MRN:   [id number]  YOB: 1950  PCP: Travis Mason MD    Reason for Admission: Melena [K92.1]  Acute upper GI bleed [K92.2]  Pain and swelling of left knee [M25.562, M25.462]    Cardiology follow-up: Ischemic cardiomyopathy, atrial fibrillation, ventricular arrhythmias,       Referring physician: Dr Juan Carlos Todd     Impression  Admission 4/15/2025 with a GI bleed, anemia, left leg hematoma status post fall patient was on warfarin also subcu Lovenox  EGD 4/17/2025 showed gastritis, 1 small AVM no bleeding she underwent APC/argon plasma coagulation  Admission 4/7/2025 increasing shortness of breath, mild chest pain, A-fib with RVR new onset  Repeat 2D echo 4/9/2025 ejection fraction 20 to 25%, dilated LV 6.6 cm severe dilated LA, mild AR MR and TR RVSP 38  Admission 12/28/2024 severe shortness of breath chest x-ray showed CHF proBNP 12,221 high-sensitivity troponin 46 and 41  Multivessel CAD   CABG LIMA to LAD and diagonal 1, SVG to OM, SVG to PDA February 2018 at [facility]  Severely reduced LV systolic function ejection fraction 25 to 30%, akinetic LV apex, apical thrombus 2D echo 4/8/2024  Moderately increased LV wall thickness  Left parasternal lift  Episodes of nonsustained V. Tach  Diabetes mellitus  Hyperlipidemia lipid profile 11/6/2024 cholesterol 159, HDL 55, LDL 95, triglyceride 41  History of left MCA stroke occluded internal carotid       Admission 11/5/2024 with a severe shortness of breath, CHF systolic acute on chronic  High-sensitivity troponin 42 and 29, proBNP 13,352  Admission 8/12/2024 chest pain like a heavy pressure radiating to the left arm, no new ECG changes, high-sensitivity troponin 28  Admission 6/17/2024 chest pain like heaviness no shortness of breath no radiation ECG showed LVH with repolarization seen changes no change from previous ECG borderline abnormal troponin most  femur or tibia or fibula     ECG 4/9/2025  Sinus rhythm left ventricular hypertrophy with repolarization abnormalities prolonged QT QTc 509 heart rate 77  ECG 4/7/2025  A-fib heart rate 97 LVH with repolarization changes  ECG 12/29/2024  Sinus bradycardia heart rate 56, LVH with repolarization changes prolonged QT  ECG 12/28/2024  Normal sinus rhythm LVH with repolarization change, prolonged QT  ECG 11/5/2024  Sinus rhythm, PAC, left axis deviation, LVH with repolarization changes, prolonged QT  ECG 8/12/2024  Sinus rhythm heart rate 62, LVH with QRS widening and repolarization changes  ECG 6/17/2024  Sinus rhythm heart rate 72, LVH with rep    2D echo 4/9/2025  Ejection fraction 20 to 25% moderate to severely dilated LV 6.6 cm, mild increased LV wall thickness 1.2 cm  Mild mitral and tricuspid regurgitation trace aortic regurgitation RVSP 38 mmHg  Severely dilated left atrium     2D echo 11/11/2024  Normal LV size moderately increased LV wall thickness severe asymmetrical proximal septal hypertrophy akinetic mid to apical anterior wall remaining segments are severely hypokinetic akinetic LV apex with thrombus  Severely reduced LV systolic function ejection fraction 20 to 25%  Moderately dilated LA  Normal RV size and function  RVSP 32 mmHg  No significant valvular abnormality     2D echo 6/18/2024  Normal LV size  Moderately increased LV wall thickness, ejection fraction 25 to 30%  Akinetic LV apex  Severely dilated LA  2D echo 4/8/2024  The left ventricle appears borderline enlarged  Severe global hypokinesis, apical akinesis, contrast study positive for apical thrombus ejection fraction about 25%  Moderately increased LV wall thickness  Dilated LA, Doppler study positive for left-to-right interatrial shunt  RV appears normal in size and function RVSP 33 mmHg  Mitral annular calcification mild regurgitation no stenosis  IVC appears dilated, impaired respiratory variation suggestive of elevated right atrial filling

## 2025-04-21 ENCOUNTER — TELEPHONE (OUTPATIENT)
Dept: FAMILY MEDICINE CLINIC | Age: 75
End: 2025-04-21

## 2025-04-21 ENCOUNTER — TELEPHONE (OUTPATIENT)
Age: 75
End: 2025-04-21

## 2025-04-21 NOTE — TELEPHONE ENCOUNTER
Care Transitions Initial Follow Up Call    Outreach made within 2 business days of discharge: Yes    Patient: Graciela Holt Patient : 1950   MRN: [id number]  Reason for Admission: Acute upper GI bleed   Discharge Date: 25       Spoke with: ARMIDA WITH [facility] FOR PATIENT TO CALL OFFICE TO SET UP APPOINTMENT     Discharge department/facility: [facility] Patient Contact:  Was patient able to fill all prescriptions:   Was patient instructed to bring all medications to the follow-up visit:   Is patient taking all medications as directed in the discharge summary?   Does patient understand their discharge instructions:   Does patient have questions or concerns that need addressed prior to 7-14 day follow up office visit:     Additional needs identified to be addressed with provider               Scheduled appointment with PCP within 7-14 days    Follow Up  Future Appointments   Date Time Provider Department Center   2025  7:50 AM [facility] MEDICATION [facility]   2025 12:45 PM Travis Mason MD fp sc BS ECC DEP   2025  1:15 PM Main Mann DO AFL [facility]   2025  3:00 PM Yessica Flynn DO [facility]       Verona Montenegro MA

## 2025-04-21 NOTE — DISCHARGE SUMMARY
IN-PATIENT SERVICE   [facility] Internal Medicine    Discharge Summary     Patient ID: Graciela Holt  :  1950   MRN: [id number]     ACCOUNT:  [id number]   Patient's PCP: Travis Mason MD  Admit Date: 4/15/2025   Discharge Date: 2025    Length of Stay: 4  Code Status:  Prior  Admitting Physician: Juan Carlos Todd MD  Discharge Physician: Lewis Castro MD     Active Discharge Diagnoses:     Primary Problem  Acute upper GI bleed      Hospital Problems  Active Hospital Problems    Diagnosis Date Noted    AVM (arteriovenous malformation) of stomach, acquired [K31.819] 2025    Gastritis without bleeding [K29.70] 2025    Hiatal hernia [K44.9] 2025    Traumatic hematoma of left knee [S80.02XA] 2025    Melena [K92.1] 2025    Anemia [D64.9] 2025    Acute upper GI bleed [K92.2] 04/15/2025       Admission Condition:  fair     Discharged Condition: fair    Hospital Stay:     Hospital Course:  Graciela Holt is a 74 y.o. female who was admitted for the management of Acute upper GI bleed , presented to ER with Fall and Knee Pain  Patient, has history of coronary artery disease, heart failure with reduced ejection fraction, history of V. tach, was admitted to the hospital with A-fib, CKD, history of LV thrombus, heart failure with reduced ejection fraction, discharged on Lovenox with Coumadin bridging  Unfortunately she had a fall, had hematoma also had melanotic stools  Evaluated by orthopedic surgery, knee hematoma is getting managed conservatively  UnderwenT  EGD , found to have small AVM in gastric antrum, gastritis S/P apc by GI  Patient refused to go to SNF  She will go live with her granddaughter  Had extensive discussion with cardiologist given recent GI bleed, plan to hold aspirin and Coumadin on discharge  I discussed plan of care with , will have appointment with Coumadin clinic 1 week, and resume Coumadin after 1 week  Patient need  normal.   Image quality is good. Contrast used: Lumason.     CT CERVICAL SPINE WO CONTRAST  Result Date: 4/7/2025  EXAMINATION: CT OF THE CERVICAL SPINE WITHOUT CONTRAST 4/7/2025 5:20 pm TECHNIQUE: CT of the cervical spine was performed without the administration of intravenous contrast. Multiplanar reformatted images are provided for review. Automated exposure control, iterative reconstruction, and/or weight based adjustment of the mA/kV was utilized to reduce the radiation dose to as low as reasonably achievable. COMPARISON: None. HISTORY: ORDERING SYSTEM PROVIDED HISTORY: recent cervical surgery, fall yesterday TECHNOLOGIST PROVIDED HISTORY: recent cervical surgery, fall yesterday Decision Support Exception - unselect if not a suspected or confirmed emergency medical condition->Emergency Medical Condition (MA) Reason for Exam: recent cervical surgery, fall yesterday Additional signs and symptoms: OCCIPUT TO T2 DECOMPRESSION FUSION done in January FINDINGS: BONES/ALIGNMENT: Since the MRI of the cervical spine dated 04/07/2025, interval extensive post fusion/laminectomy changes seen with interlocking transpedicular screws (C2-T1 on the right, and C5-T1 on the left) with occipital extension/synthetic bony material; there is no definite acute fracture or traumatic malalignment; known chronic nonunited odontoid fracture appears in similar position with posterior subluxation of C1/odontoid tip on C2, stable subluxation and fusion C6-C7 also unchanged, and severe multilevel degenerative changes. DEGENERATIVE CHANGES: No change in osseous spinal canal stenosis.  Varying degrees of neural foraminal stenosis, similar to prior MRI. Mucosal thickening noted posterior sphenoid sinus.  Extensive vascular calcifications.  Soft tissue     No definite acute abnormality of the cervical spine; extensive post orthopedic fusion changes/laminectomy as compared to 04/07/2025 with multiple findings as above.     XR CHEST

## 2025-04-21 NOTE — TELEPHONE ENCOUNTER
[facility] Medication Management Clinic Note   Pt was admitted to [facility] with GI bleed on 4/15/25. Pt was a new start to warfarin and bridging with Lovenox. Anticoagulants held during admission      Received voicemail over the weekend from Yuko,Care Coordinator at [facility]. Pt will be discharging 4/19/25 and will need Coumadin Clinic appointment on 4/25/25. Dr. Castro does not want Lovenox only warfarin due to admission for GI bleed.     Did confirm this with hospital notes- \"DISCUSSED WITH Dr Flynn  Need to hold aspirin at Coumadin for 1 week  Will discuss with , will need to arrange her transport to Coumadin clinic, so that we can start her Coumadin 1 week from today\"      Called patient to discuss. No answer. Voicemail left requesting a call back.   Lauren Stephen, Pharm D, BCPS, CACP  [facility] Medication Management Clinic  4/21/2025 9:57 AM

## 2025-04-22 ENCOUNTER — TELEPHONE (OUTPATIENT)
Age: 75
End: 2025-04-22

## 2025-04-25 ENCOUNTER — ANTI-COAG VISIT (OUTPATIENT)
Age: 75
End: 2025-04-25
Payer: COMMERCIAL

## 2025-04-25 LAB
INTERNATIONAL NORMALIZATION RATIO, POC: 1.1
PROTHROMBIN TIME, POC: 13.3

## 2025-04-25 PROCEDURE — 85610 PROTHROMBIN TIME: CPT | Performed by: PHARMACIST

## 2025-04-25 PROCEDURE — 99212 OFFICE O/P EST SF 10 MIN: CPT | Performed by: PHARMACIST

## 2025-04-25 RX ORDER — WARFARIN SODIUM 2.5 MG/1
2.5 TABLET ORAL DAILY
COMMUNITY

## 2025-04-25 NOTE — PROGRESS NOTES
Patient seen in person in Medication Management Service.    Patient states compliant with regimen- has not been taking warfarin or Lovenox as instructed by physician- Dr. Castro.     Pt was admitted to [facility] with GI bleed on 4/15/25. Pt was a new start to warfarin and bridging with Lovenox. Anticoagulants held during admission    Telephone note 4/21/25 reviewed-    Received voicemail over the weekend from Yuko,Care Coordinator at [facility]. Pt will be discharging 4/19/25 and will need Coumadin Clinic appointment on 4/25/25. Dr. Castro does not want Lovenox only warfarin due to admission for GI bleed.      Did confirm this with hospital notes- \"DISCUSSED WITH Dr Flynn  Need to hold aspirin at Coumadin for 1 week  Will discuss with , will need to arrange her transport to Coumadin clinic, so that we can start her Coumadin 1 week from today\"        Today-  No bleeding or thromboembolic side effects noted.  Pt states rectal bleeding has resolved- denies dark tar like stool. States she had bleeding from Lovenox injection sites in the hospital. This resolved while she was in the hospital and has not restarted at home. Hematoma on knee. States it is getting slightly better.   No significant dietary changes.  Pt reports she was taken off of bumex by pcp. Remains on potassium supplement. Has appt with PCP next week. Recommended she discuss potassium supplement with PCP if not taking Bumex may not need the higher dose. Last potassium was 5.1.  No significant disease state changes.      Patient acknowledges they are still an active patient of referring provider which is Ahmad Yes     PT/INR done via POC meter per protocol.  INR was subtherapeutic at 1.1.  (goal 2 - 3)  This is expected as patient has not been taking warfarin as instructed. Instructions from Dr. Castro in the hospital NOT to start Lovenox due to recent GI bleed and hematoma. Ok to restart warfarin on 4/25/25  Warfarin

## 2025-04-29 ENCOUNTER — TELEPHONE (OUTPATIENT)
Age: 75
End: 2025-04-29

## 2025-04-29 DIAGNOSIS — I50.9 HEART FAILURE, UNSPECIFIED HF CHRONICITY, UNSPECIFIED HEART FAILURE TYPE (HCC): Primary | ICD-10-CM

## 2025-04-29 LAB
EKG Q-T INTERVAL: 334 MS
EKG QRS DURATION: 112 MS
EKG QTC CALCULATION (BAZETT): 437 MS
EKG R AXIS: -27 DEGREES
EKG T AXIS: 143 DEGREES
EKG VENTRICULAR RATE: 103 BPM

## 2025-04-29 NOTE — TELEPHONE ENCOUNTER
Patients granddaughter karine LVM to cancel appointment stating she could not make it because she was not feeling well and called to reschedule. Attempted to call back to reschedule and LVM.    Zenobia Glass, PharmD  PGY1 Pharmacy Resident    4/29/2025  3:21 PM

## 2025-04-29 NOTE — PROGRESS NOTES
Called to remind patient of first CHF clinic appointment tomorrow. Reminded patient to have labs drawn first. V/u.

## 2025-04-30 ENCOUNTER — HOSPITAL ENCOUNTER (OUTPATIENT)
Dept: OTHER | Age: 75
Discharge: HOME | End: 2025-04-30
Payer: COMMERCIAL

## 2025-04-30 ENCOUNTER — HOSPITAL ENCOUNTER (OUTPATIENT)
Age: 75
Setting detail: SPECIMEN
Discharge: HOME | End: 2025-04-30
Payer: COMMERCIAL

## 2025-04-30 VITALS
HEIGHT: 64 IN | OXYGEN SATURATION: 97 % | BODY MASS INDEX: 22.64 KG/M2 | DIASTOLIC BLOOD PRESSURE: 62 MMHG | SYSTOLIC BLOOD PRESSURE: 120 MMHG | HEART RATE: 64 BPM | RESPIRATION RATE: 20 BRPM | WEIGHT: 132.6 LBS

## 2025-04-30 DIAGNOSIS — I50.9 HEART FAILURE, UNSPECIFIED HF CHRONICITY, UNSPECIFIED HEART FAILURE TYPE (HCC): ICD-10-CM

## 2025-04-30 LAB
ANION GAP SERPL CALCULATED.3IONS-SCNC: 8 MMOL/L (ref 9–16)
BNP SERPL-MCNC: 2293 PG/ML (ref 0–300)
BUN SERPL-MCNC: 38 MG/DL (ref 8–23)
CALCIUM SERPL-MCNC: 8.7 MG/DL (ref 8.6–10.4)
CHLORIDE SERPL-SCNC: 110 MMOL/L (ref 98–107)
CO2 SERPL-SCNC: 25 MMOL/L (ref 20–31)
CREAT SERPL-MCNC: 1.3 MG/DL (ref 0.7–1.2)
GFR, ESTIMATED: 43 ML/MIN/1.73M2
GLUCOSE SERPL-MCNC: 168 MG/DL (ref 74–99)
POTASSIUM SERPL-SCNC: 4.2 MMOL/L (ref 3.7–5.3)
SODIUM SERPL-SCNC: 143 MMOL/L (ref 136–145)

## 2025-04-30 PROCEDURE — 36415 COLL VENOUS BLD VENIPUNCTURE: CPT

## 2025-04-30 PROCEDURE — 83880 ASSAY OF NATRIURETIC PEPTIDE: CPT

## 2025-04-30 PROCEDURE — 99202 OFFICE O/P NEW SF 15 MIN: CPT

## 2025-04-30 PROCEDURE — 80048 BASIC METABOLIC PNL TOTAL CA: CPT

## 2025-04-30 NOTE — PROGRESS NOTES
[facility]  Heart Failure Clinic      [address]  [place] [address]  Phone: [phone]  Fax: [phone]      NAME: Graciela Holt  MEDICAL RECORD NUMBER:  [id number]  AGE: 74 y.o.   GENDER: female  : 1950  EPISODE DATE:  2025      Graciela Holt was referred to the [facility] by Dr. Castro for heart failure services.She was followed by Cardiologist, Dr. SANDRITA Flynn during her hospitalization but has plans to establish care with Cardiologist, Dr. SAMINA Mann of [facility] on 25     ECHO EF%: 20-25% Date: 25      Recent Cardiology follow-up apt: none  Follow-up apt recommended: Apt 25  Upcoming testing: unknown  Medication Management: Currently for anticoagulation, Requested for Heart Failure  Nephrologist: n/a     Graciela Holt is a 74 y.o. female here for the Heart Failure Services.  She is here today for a Heart Failure assessment/consultation, monitoring medication effectiveness, reviewing necessary labs, transition of care, extensive Heart Failure education, reporting any concerns or symptoms and obtaining any necessary medication adjustments or orders from the patients health care team.    Vital Signs:   /62   Pulse 64   Resp 20   Ht 1.626 m (5' 4\")   Wt 60.1 kg (132 lb 9.6 oz)   SpO2 97%   BMI 22.76 kg/m²       Weight loss/gain +1.6lbs      Nursing Assessment:    Respiratory:    Assessment  Charting Type: Admission    Breath Sounds  Respiratory Pattern: Regular  Right Upper Lobe: Clear  Right Middle Lobe: Clear  Right Lower Lobe: Clear, Diminished  Left Upper Lobe: Clear  Left Lower Lobe: Diminished, Fine crackles    Cough/Sputum  Cough: Congested, Non-productive  Frequency: Frequent (unchanged per pt)    Cardiac  Cardiac Regularity: Irregular  Heart Sounds: S1, S2  Cardiac Symptoms: Peripheral edema    Peripheral Vascular  Peripheral Vascular (WDL): Exceptions to WDL  Edema: Left lower extremity, Right lower extremity  RLE Edema:

## 2025-05-05 ENCOUNTER — OFFICE VISIT (OUTPATIENT)
Dept: FAMILY MEDICINE CLINIC | Age: 75
End: 2025-05-05
Payer: COMMERCIAL

## 2025-05-05 VITALS
DIASTOLIC BLOOD PRESSURE: 70 MMHG | WEIGHT: 143 LBS | HEART RATE: 63 BPM | BODY MASS INDEX: 24.41 KG/M2 | HEIGHT: 64 IN | OXYGEN SATURATION: 95 % | SYSTOLIC BLOOD PRESSURE: 110 MMHG

## 2025-05-05 DIAGNOSIS — Z00.00 MEDICARE ANNUAL WELLNESS VISIT, SUBSEQUENT: Primary | ICD-10-CM

## 2025-05-05 DIAGNOSIS — I50.42 CHRONIC COMBINED SYSTOLIC AND DIASTOLIC CONGESTIVE HEART FAILURE (HCC): ICD-10-CM

## 2025-05-05 DIAGNOSIS — E11.9 CONTROLLED TYPE 2 DIABETES MELLITUS WITHOUT COMPLICATION, WITHOUT LONG-TERM CURRENT USE OF INSULIN (HCC): Chronic | ICD-10-CM

## 2025-05-05 DIAGNOSIS — F51.02 ADJUSTMENT INSOMNIA: ICD-10-CM

## 2025-05-05 DIAGNOSIS — M48.02 STENOSIS OF CERVICAL SPINE WITH MYELOPATHY (HCC): ICD-10-CM

## 2025-05-05 DIAGNOSIS — I25.10 CORONARY ARTERY DISEASE INVOLVING NATIVE CORONARY ARTERY OF NATIVE HEART WITHOUT ANGINA PECTORIS: Chronic | ICD-10-CM

## 2025-05-05 DIAGNOSIS — G99.2 STENOSIS OF CERVICAL SPINE WITH MYELOPATHY (HCC): ICD-10-CM

## 2025-05-05 DIAGNOSIS — J44.9 CHRONIC OBSTRUCTIVE PULMONARY DISEASE, UNSPECIFIED COPD TYPE (HCC): ICD-10-CM

## 2025-05-05 DIAGNOSIS — Z91.81 AT RISK FOR FALL ASSOCIATED WITH HOSPITALIZATION: ICD-10-CM

## 2025-05-05 DIAGNOSIS — I48.20 CHRONIC ATRIAL FIBRILLATION (HCC): ICD-10-CM

## 2025-05-05 DIAGNOSIS — D50.9 IRON DEFICIENCY ANEMIA, UNSPECIFIED IRON DEFICIENCY ANEMIA TYPE: ICD-10-CM

## 2025-05-05 DIAGNOSIS — J30.2 SEASONAL ALLERGIES: ICD-10-CM

## 2025-05-05 PROBLEM — K92.1 MELENA: Status: RESOLVED | Noted: 2025-04-16 | Resolved: 2025-05-05

## 2025-05-05 PROBLEM — K92.2 ACUTE UPPER GI BLEED: Status: RESOLVED | Noted: 2025-04-15 | Resolved: 2025-05-05

## 2025-05-05 PROBLEM — I21.4 NSTEMI (NON-ST ELEVATED MYOCARDIAL INFARCTION) (HCC): Status: RESOLVED | Noted: 2025-04-07 | Resolved: 2025-05-05

## 2025-05-05 LAB — HBA1C MFR BLD: 6.7 %

## 2025-05-05 PROCEDURE — 83036 HEMOGLOBIN GLYCOSYLATED A1C: CPT | Performed by: FAMILY MEDICINE

## 2025-05-05 PROCEDURE — 3044F HG A1C LEVEL LT 7.0%: CPT | Performed by: FAMILY MEDICINE

## 2025-05-05 PROCEDURE — 3078F DIAST BP <80 MM HG: CPT | Performed by: FAMILY MEDICINE

## 2025-05-05 PROCEDURE — 1160F RVW MEDS BY RX/DR IN RCRD: CPT | Performed by: FAMILY MEDICINE

## 2025-05-05 PROCEDURE — 1123F ACP DISCUSS/DSCN MKR DOCD: CPT | Performed by: FAMILY MEDICINE

## 2025-05-05 PROCEDURE — G0439 PPPS, SUBSEQ VISIT: HCPCS | Performed by: FAMILY MEDICINE

## 2025-05-05 PROCEDURE — 3074F SYST BP LT 130 MM HG: CPT | Performed by: FAMILY MEDICINE

## 2025-05-05 PROCEDURE — 1159F MED LIST DOCD IN RCRD: CPT | Performed by: FAMILY MEDICINE

## 2025-05-05 PROCEDURE — 99214 OFFICE O/P EST MOD 30 MIN: CPT | Performed by: FAMILY MEDICINE

## 2025-05-05 RX ORDER — AMIODARONE HYDROCHLORIDE 200 MG/1
200 TABLET ORAL DAILY
Qty: 30 TABLET | Refills: 0 | Status: SHIPPED | OUTPATIENT
Start: 2025-05-05

## 2025-05-05 RX ORDER — ALBUTEROL SULFATE 90 UG/1
2 INHALANT RESPIRATORY (INHALATION) EVERY 6 HOURS PRN
Qty: 18 G | Refills: 0 | Status: SHIPPED | OUTPATIENT
Start: 2025-05-05

## 2025-05-05 RX ORDER — BUMETANIDE 2 MG/1
2 TABLET ORAL DAILY
Qty: 90 TABLET | Refills: 0 | Status: SHIPPED | OUTPATIENT
Start: 2025-05-05

## 2025-05-05 RX ORDER — ATORVASTATIN CALCIUM 80 MG/1
80 TABLET, FILM COATED ORAL DAILY
Qty: 90 TABLET | Refills: 0 | Status: SHIPPED | OUTPATIENT
Start: 2025-05-05

## 2025-05-05 RX ORDER — FLUTICASONE PROPIONATE 50 MCG
2 SPRAY, SUSPENSION (ML) NASAL DAILY
Qty: 48 G | Refills: 3 | Status: SHIPPED | OUTPATIENT
Start: 2025-05-05

## 2025-05-05 RX ORDER — CARVEDILOL 3.12 MG/1
3.12 TABLET ORAL 2 TIMES DAILY WITH MEALS
Qty: 60 TABLET | Refills: 1 | Status: SHIPPED | OUTPATIENT
Start: 2025-05-05

## 2025-05-05 RX ORDER — LOSARTAN POTASSIUM 25 MG/1
25 TABLET ORAL DAILY
Qty: 90 TABLET | Refills: 0 | Status: SHIPPED | OUTPATIENT
Start: 2025-05-05

## 2025-05-05 RX ORDER — FERROUS SULFATE 325(65) MG
325 TABLET ORAL
Qty: 90 TABLET | Refills: 1 | Status: SHIPPED | OUTPATIENT
Start: 2025-05-05

## 2025-05-05 RX ORDER — FAMOTIDINE 40 MG/1
40 TABLET, FILM COATED ORAL EVERY EVENING
Qty: 30 TABLET | Refills: 3 | Status: SHIPPED | OUTPATIENT
Start: 2025-05-05

## 2025-05-05 RX ORDER — BUDESONIDE AND FORMOTEROL FUMARATE DIHYDRATE 160; 4.5 UG/1; UG/1
2 AEROSOL RESPIRATORY (INHALATION)
Qty: 10.2 G | Refills: 0 | Status: SHIPPED | OUTPATIENT
Start: 2025-05-05

## 2025-05-05 RX ORDER — TRAZODONE HYDROCHLORIDE 50 MG/1
50 TABLET ORAL NIGHTLY
Qty: 90 TABLET | Refills: 0 | Status: SHIPPED | OUTPATIENT
Start: 2025-05-05

## 2025-05-05 SDOH — ECONOMIC STABILITY: FOOD INSECURITY: WITHIN THE PAST 12 MONTHS, THE FOOD YOU BOUGHT JUST DIDN'T LAST AND YOU DIDN'T HAVE MONEY TO GET MORE.: NEVER TRUE

## 2025-05-05 SDOH — ECONOMIC STABILITY: FOOD INSECURITY: WITHIN THE PAST 12 MONTHS, YOU WORRIED THAT YOUR FOOD WOULD RUN OUT BEFORE YOU GOT MONEY TO BUY MORE.: NEVER TRUE

## 2025-05-05 ASSESSMENT — ENCOUNTER SYMPTOMS
VOMITING: 0
STRIDOR: 0
SINUS PRESSURE: 0
BLOOD IN STOOL: 0
EYE REDNESS: 0
DIARRHEA: 0
SHORTNESS OF BREATH: 1
TROUBLE SWALLOWING: 0
WHEEZING: 0
ABDOMINAL DISTENTION: 0
CONSTIPATION: 0
ABDOMINAL PAIN: 0
CHEST TIGHTNESS: 0
NAUSEA: 0
RECTAL PAIN: 0
COUGH: 0
SORE THROAT: 0
BACK PAIN: 1
COLOR CHANGE: 0

## 2025-05-05 ASSESSMENT — PATIENT HEALTH QUESTIONNAIRE - PHQ9
2. FEELING DOWN, DEPRESSED OR HOPELESS: MORE THAN HALF THE DAYS
3. TROUBLE FALLING OR STAYING ASLEEP: SEVERAL DAYS
9. THOUGHTS THAT YOU WOULD BE BETTER OFF DEAD, OR OF HURTING YOURSELF: NOT AT ALL
1. LITTLE INTEREST OR PLEASURE IN DOING THINGS: MORE THAN HALF THE DAYS
8. MOVING OR SPEAKING SO SLOWLY THAT OTHER PEOPLE COULD HAVE NOTICED. OR THE OPPOSITE, BEING SO FIGETY OR RESTLESS THAT YOU HAVE BEEN MOVING AROUND A LOT MORE THAN USUAL: NOT AT ALL
4. FEELING TIRED OR HAVING LITTLE ENERGY: SEVERAL DAYS
SUM OF ALL RESPONSES TO PHQ QUESTIONS 1-9: 9
7. TROUBLE CONCENTRATING ON THINGS, SUCH AS READING THE NEWSPAPER OR WATCHING TELEVISION: SEVERAL DAYS
10. IF YOU CHECKED OFF ANY PROBLEMS, HOW DIFFICULT HAVE THESE PROBLEMS MADE IT FOR YOU TO DO YOUR WORK, TAKE CARE OF THINGS AT HOME, OR GET ALONG WITH OTHER PEOPLE: SOMEWHAT DIFFICULT
5. POOR APPETITE OR OVEREATING: SEVERAL DAYS
SUM OF ALL RESPONSES TO PHQ QUESTIONS 1-9: 9
6. FEELING BAD ABOUT YOURSELF - OR THAT YOU ARE A FAILURE OR HAVE LET YOURSELF OR YOUR FAMILY DOWN: SEVERAL DAYS
SUM OF ALL RESPONSES TO PHQ QUESTIONS 1-9: 9
SUM OF ALL RESPONSES TO PHQ QUESTIONS 1-9: 9

## 2025-05-05 NOTE — PATIENT INSTRUCTIONS

## 2025-05-05 NOTE — PROGRESS NOTES
Medicare Annual Wellness Visit    Graciela Holt is here for Medicare AWV  The patient (or guardian, if applicable) and other individuals in attendance with the patient were advised that Artificial Intelligence will be utilized during this visit to record, process the conversation to generate a clinical note, and support improvement of the AI technology. The patient (or guardian, if applicable) and other individuals in attendance at the appointment consented to the use of AI, including the recording.                  Assessment & Plan  1. Congestive Heart Failure.  - She is retaining water due to CHF, which will improve with better heart function.  - She is currently on medications to improve heart function, including warfarin and amiodarone.  - She will follow up with her cardiologist on Wednesday.  - The heart failure clinic has recommended starting rehab for A-fib three days a week.    2. Atrial Fibrillation.  - She is on warfarin and amiodarone for A-fib.  - She will continue to follow up with her cardiologist and attend the heart failure clinic for rehab.  - Prescription Drug Monitoring Program was reviewed.  - Bed railings have been ordered to prevent falls.    3. Insomnia.  - She is not sleeping well at night, taking short naps during the day.  - Trazodone has been prescribed to help with sleep.  - Bed railings have been ordered to prevent falls.  - She will follow up in 3 months to assess the effectiveness of trazodone.    4. Fall Risk.  - She has experienced multiple falls, including one that resulted in a swollen knee.  - Bed railings have been ordered to prevent further falls.  - She has a walker but finds it difficult to use due to its speed and lack of brakes.  - A walker with tennis balls under the bottom may be considered for better stability.    5. Diabetes Mellitus.  - Her A1c level is 6.7, which has improved.  - She is currently not on any medication for diabetes as she cannot afford 
   Polio vaccine  Aged Out    Meningococcal (ACWY) vaccine  Aged Out    Meningococcal B vaccine  Aged Out    Diabetic foot exam  Discontinued    A1C test (Diabetic or Prediabetic)  Discontinued    Diabetic retinal exam  Discontinued    Breast cancer screen  Discontinued    Colorectal Cancer Screen  Discontinued

## 2025-05-07 ENCOUNTER — APPOINTMENT (OUTPATIENT)
Dept: GENERAL RADIOLOGY | Age: 75
End: 2025-05-07
Payer: COMMERCIAL

## 2025-05-07 ENCOUNTER — APPOINTMENT (OUTPATIENT)
Dept: VASCULAR LAB | Age: 75
End: 2025-05-07
Payer: COMMERCIAL

## 2025-05-07 ENCOUNTER — HOSPITAL ENCOUNTER (INPATIENT)
Age: 75
LOS: 3 days | Discharge: HOME | End: 2025-05-10
Attending: EMERGENCY MEDICINE | Admitting: INTERNAL MEDICINE
Payer: COMMERCIAL

## 2025-05-07 ENCOUNTER — TELEPHONE (OUTPATIENT)
Dept: GASTROENTEROLOGY | Age: 75
End: 2025-05-07

## 2025-05-07 DIAGNOSIS — I48.20 CHRONIC ATRIAL FIBRILLATION (HCC): Primary | ICD-10-CM

## 2025-05-07 DIAGNOSIS — R60.0 BILATERAL LEG EDEMA: ICD-10-CM

## 2025-05-07 DIAGNOSIS — R22.43 LOCALIZED SWELLING OF BOTH LOWER LEGS: ICD-10-CM

## 2025-05-07 DIAGNOSIS — R06.02 SHORTNESS OF BREATH: ICD-10-CM

## 2025-05-07 PROBLEM — I50.9 ACUTE EXACERBATION OF CHRONIC HEART FAILURE (HCC): Status: ACTIVE | Noted: 2025-05-07

## 2025-05-07 LAB
ANION GAP SERPL CALCULATED.3IONS-SCNC: 9 MMOL/L (ref 9–16)
BASOPHILS # BLD: 0.1 K/UL (ref 0–0.2)
BASOPHILS NFR BLD: 2 % (ref 0–2)
BNP SERPL-MCNC: 4576 PG/ML (ref 0–300)
BUN SERPL-MCNC: 36 MG/DL (ref 8–23)
CALCIUM SERPL-MCNC: 8.7 MG/DL (ref 8.6–10.4)
CHLORIDE SERPL-SCNC: 110 MMOL/L (ref 98–107)
CO2 SERPL-SCNC: 22 MMOL/L (ref 20–31)
CREAT SERPL-MCNC: 1.4 MG/DL (ref 0.7–1.2)
ECHO BSA: 1.69 M2
EOSINOPHIL # BLD: 0.2 K/UL (ref 0–0.4)
EOSINOPHILS RELATIVE PERCENT: 4 % (ref 0–4)
ERYTHROCYTE [DISTWIDTH] IN BLOOD BY AUTOMATED COUNT: 15.7 % (ref 11.5–14.9)
GFR, ESTIMATED: 39 ML/MIN/1.73M2
GLUCOSE SERPL-MCNC: 163 MG/DL (ref 74–99)
HCT VFR BLD AUTO: 27.2 % (ref 36–46)
HGB BLD-MCNC: 8.9 G/DL (ref 12–16)
INR PPP: 1.1
LYMPHOCYTES NFR BLD: 0.9 K/UL (ref 1–4.8)
LYMPHOCYTES RELATIVE PERCENT: 17 % (ref 24–44)
MAGNESIUM SERPL-MCNC: 2.2 MG/DL (ref 1.6–2.4)
MCH RBC QN AUTO: 26.6 PG (ref 26–34)
MCHC RBC AUTO-ENTMCNC: 32.7 G/DL (ref 31–37)
MCV RBC AUTO: 81.4 FL (ref 80–100)
MONOCYTES NFR BLD: 0.5 K/UL (ref 0.1–1.3)
MONOCYTES NFR BLD: 10 % (ref 1–7)
NEUTROPHILS NFR BLD: 67 % (ref 36–66)
NEUTS SEG NFR BLD: 3.9 K/UL (ref 1.3–9.1)
PARTIAL THROMBOPLASTIN TIME: 27.9 SEC (ref 24–36)
PHOSPHATE SERPL-MCNC: 3.7 MG/DL (ref 2.5–4.5)
PLATELET # BLD AUTO: 261 K/UL (ref 150–450)
PMV BLD AUTO: 8.3 FL (ref 6–12)
POTASSIUM SERPL-SCNC: 4.5 MMOL/L (ref 3.7–5.3)
PROTHROMBIN TIME: 15.4 SEC (ref 11.8–14.6)
RBC # BLD AUTO: 3.34 M/UL (ref 4–5.2)
SODIUM SERPL-SCNC: 141 MMOL/L (ref 136–145)
TROPONIN I SERPL HS-MCNC: 38 NG/L (ref 0–14)
TROPONIN I SERPL HS-MCNC: 55 NG/L (ref 0–14)
WBC OTHER # BLD: 5.6 K/UL (ref 3.5–11)

## 2025-05-07 PROCEDURE — 2500000003 HC RX 250 WO HCPCS

## 2025-05-07 PROCEDURE — 99285 EMERGENCY DEPT VISIT HI MDM: CPT

## 2025-05-07 PROCEDURE — 83880 ASSAY OF NATRIURETIC PEPTIDE: CPT

## 2025-05-07 PROCEDURE — 93970 EXTREMITY STUDY: CPT

## 2025-05-07 PROCEDURE — 36415 COLL VENOUS BLD VENIPUNCTURE: CPT

## 2025-05-07 PROCEDURE — 6370000000 HC RX 637 (ALT 250 FOR IP)

## 2025-05-07 PROCEDURE — 85730 THROMBOPLASTIN TIME PARTIAL: CPT

## 2025-05-07 PROCEDURE — 96374 THER/PROPH/DIAG INJ IV PUSH: CPT

## 2025-05-07 PROCEDURE — 84100 ASSAY OF PHOSPHORUS: CPT

## 2025-05-07 PROCEDURE — 80048 BASIC METABOLIC PNL TOTAL CA: CPT

## 2025-05-07 PROCEDURE — 83735 ASSAY OF MAGNESIUM: CPT

## 2025-05-07 PROCEDURE — 93970 EXTREMITY STUDY: CPT | Performed by: SURGERY

## 2025-05-07 PROCEDURE — 85610 PROTHROMBIN TIME: CPT

## 2025-05-07 PROCEDURE — 94640 AIRWAY INHALATION TREATMENT: CPT

## 2025-05-07 PROCEDURE — 94761 N-INVAS EAR/PLS OXIMETRY MLT: CPT

## 2025-05-07 PROCEDURE — 6360000002 HC RX W HCPCS

## 2025-05-07 PROCEDURE — 84484 ASSAY OF TROPONIN QUANT: CPT

## 2025-05-07 PROCEDURE — 85025 COMPLETE CBC W/AUTO DIFF WBC: CPT

## 2025-05-07 PROCEDURE — 71045 X-RAY EXAM CHEST 1 VIEW: CPT

## 2025-05-07 PROCEDURE — 99223 1ST HOSP IP/OBS HIGH 75: CPT | Performed by: INTERNAL MEDICINE

## 2025-05-07 PROCEDURE — 6360000002 HC RX W HCPCS: Performed by: EMERGENCY MEDICINE

## 2025-05-07 PROCEDURE — 2060000000 HC ICU INTERMEDIATE R&B

## 2025-05-07 PROCEDURE — 93005 ELECTROCARDIOGRAM TRACING: CPT | Performed by: EMERGENCY MEDICINE

## 2025-05-07 RX ORDER — SODIUM CHLORIDE 0.9 % (FLUSH) 0.9 %
5-40 SYRINGE (ML) INJECTION PRN
Status: DISCONTINUED | OUTPATIENT
Start: 2025-05-07 | End: 2025-05-10 | Stop reason: HOSPADM

## 2025-05-07 RX ORDER — BUMETANIDE 0.25 MG/ML
2 INJECTION, SOLUTION INTRAMUSCULAR; INTRAVENOUS ONCE
Status: COMPLETED | OUTPATIENT
Start: 2025-05-07 | End: 2025-05-07

## 2025-05-07 RX ORDER — PANTOPRAZOLE SODIUM 40 MG/1
40 TABLET, DELAYED RELEASE ORAL
Status: DISCONTINUED | OUTPATIENT
Start: 2025-05-07 | End: 2025-05-10 | Stop reason: HOSPADM

## 2025-05-07 RX ORDER — POTASSIUM CHLORIDE 1500 MG/1
40 TABLET, EXTENDED RELEASE ORAL PRN
Status: DISCONTINUED | OUTPATIENT
Start: 2025-05-07 | End: 2025-05-10 | Stop reason: HOSPADM

## 2025-05-07 RX ORDER — FLUTICASONE PROPIONATE 50 MCG
2 SPRAY, SUSPENSION (ML) NASAL DAILY PRN
Status: DISCONTINUED | OUTPATIENT
Start: 2025-05-07 | End: 2025-05-10 | Stop reason: HOSPADM

## 2025-05-07 RX ORDER — ONDANSETRON 2 MG/ML
4 INJECTION INTRAMUSCULAR; INTRAVENOUS EVERY 6 HOURS PRN
Status: DISCONTINUED | OUTPATIENT
Start: 2025-05-07 | End: 2025-05-10 | Stop reason: HOSPADM

## 2025-05-07 RX ORDER — FUROSEMIDE 10 MG/ML
20 INJECTION INTRAMUSCULAR; INTRAVENOUS ONCE
Status: COMPLETED | OUTPATIENT
Start: 2025-05-07 | End: 2025-05-07

## 2025-05-07 RX ORDER — SODIUM CHLORIDE 9 MG/ML
INJECTION, SOLUTION INTRAVENOUS PRN
Status: DISCONTINUED | OUTPATIENT
Start: 2025-05-07 | End: 2025-05-10 | Stop reason: HOSPADM

## 2025-05-07 RX ORDER — BUMETANIDE 1 MG/1
2 TABLET ORAL DAILY
Status: DISCONTINUED | OUTPATIENT
Start: 2025-05-07 | End: 2025-05-10 | Stop reason: HOSPADM

## 2025-05-07 RX ORDER — MAGNESIUM SULFATE HEPTAHYDRATE 40 MG/ML
2000 INJECTION, SOLUTION INTRAVENOUS PRN
Status: DISCONTINUED | OUTPATIENT
Start: 2025-05-07 | End: 2025-05-10 | Stop reason: HOSPADM

## 2025-05-07 RX ORDER — ACETAMINOPHEN 650 MG/1
650 SUPPOSITORY RECTAL EVERY 6 HOURS PRN
Status: DISCONTINUED | OUTPATIENT
Start: 2025-05-07 | End: 2025-05-10 | Stop reason: HOSPADM

## 2025-05-07 RX ORDER — FERROUS SULFATE 325(65) MG
325 TABLET ORAL
Status: DISCONTINUED | OUTPATIENT
Start: 2025-05-07 | End: 2025-05-10 | Stop reason: HOSPADM

## 2025-05-07 RX ORDER — BUDESONIDE AND FORMOTEROL FUMARATE DIHYDRATE 160; 4.5 UG/1; UG/1
2 AEROSOL RESPIRATORY (INHALATION)
Status: DISCONTINUED | OUTPATIENT
Start: 2025-05-07 | End: 2025-05-10 | Stop reason: HOSPADM

## 2025-05-07 RX ORDER — BUMETANIDE 0.25 MG/ML
2 INJECTION, SOLUTION INTRAMUSCULAR; INTRAVENOUS DAILY
Status: DISCONTINUED | OUTPATIENT
Start: 2025-05-08 | End: 2025-05-10 | Stop reason: HOSPADM

## 2025-05-07 RX ORDER — TRAZODONE HYDROCHLORIDE 50 MG/1
50 TABLET ORAL NIGHTLY
Status: DISCONTINUED | OUTPATIENT
Start: 2025-05-07 | End: 2025-05-10 | Stop reason: HOSPADM

## 2025-05-07 RX ORDER — LOSARTAN POTASSIUM 25 MG/1
25 TABLET ORAL DAILY
Status: DISCONTINUED | OUTPATIENT
Start: 2025-05-07 | End: 2025-05-10 | Stop reason: HOSPADM

## 2025-05-07 RX ORDER — SODIUM CHLORIDE 0.9 % (FLUSH) 0.9 %
5-40 SYRINGE (ML) INJECTION EVERY 12 HOURS SCHEDULED
Status: DISCONTINUED | OUTPATIENT
Start: 2025-05-07 | End: 2025-05-10 | Stop reason: HOSPADM

## 2025-05-07 RX ORDER — AMIODARONE HYDROCHLORIDE 200 MG/1
200 TABLET ORAL DAILY
Status: DISCONTINUED | OUTPATIENT
Start: 2025-05-07 | End: 2025-05-10 | Stop reason: HOSPADM

## 2025-05-07 RX ORDER — POLYETHYLENE GLYCOL 3350 17 G/17G
17 POWDER, FOR SOLUTION ORAL DAILY PRN
Status: DISCONTINUED | OUTPATIENT
Start: 2025-05-07 | End: 2025-05-10 | Stop reason: HOSPADM

## 2025-05-07 RX ORDER — POTASSIUM CHLORIDE 7.45 MG/ML
10 INJECTION INTRAVENOUS PRN
Status: DISCONTINUED | OUTPATIENT
Start: 2025-05-07 | End: 2025-05-10 | Stop reason: HOSPADM

## 2025-05-07 RX ORDER — ONDANSETRON 4 MG/1
4 TABLET, ORALLY DISINTEGRATING ORAL EVERY 8 HOURS PRN
Status: DISCONTINUED | OUTPATIENT
Start: 2025-05-07 | End: 2025-05-10 | Stop reason: HOSPADM

## 2025-05-07 RX ORDER — ACETAMINOPHEN 325 MG/1
650 TABLET ORAL EVERY 6 HOURS PRN
Status: DISCONTINUED | OUTPATIENT
Start: 2025-05-07 | End: 2025-05-10 | Stop reason: HOSPADM

## 2025-05-07 RX ORDER — ATORVASTATIN CALCIUM 80 MG/1
80 TABLET, FILM COATED ORAL DAILY
Status: DISCONTINUED | OUTPATIENT
Start: 2025-05-07 | End: 2025-05-10 | Stop reason: HOSPADM

## 2025-05-07 RX ORDER — WARFARIN SODIUM 5 MG/1
5 TABLET ORAL
Status: COMPLETED | OUTPATIENT
Start: 2025-05-07 | End: 2025-05-07

## 2025-05-07 RX ORDER — ALBUTEROL SULFATE 0.83 MG/ML
2.5 SOLUTION RESPIRATORY (INHALATION) EVERY 6 HOURS PRN
Status: DISCONTINUED | OUTPATIENT
Start: 2025-05-07 | End: 2025-05-10 | Stop reason: HOSPADM

## 2025-05-07 RX ORDER — CARVEDILOL 3.12 MG/1
3.12 TABLET ORAL 2 TIMES DAILY WITH MEALS
Status: DISCONTINUED | OUTPATIENT
Start: 2025-05-07 | End: 2025-05-10 | Stop reason: HOSPADM

## 2025-05-07 RX ADMIN — ACETAMINOPHEN 650 MG: 325 TABLET ORAL at 21:24

## 2025-05-07 RX ADMIN — FUROSEMIDE 20 MG: 10 INJECTION, SOLUTION INTRAMUSCULAR; INTRAVENOUS at 06:08

## 2025-05-07 RX ADMIN — AMIODARONE HYDROCHLORIDE 200 MG: 200 TABLET ORAL at 11:51

## 2025-05-07 RX ADMIN — SODIUM CHLORIDE, PRESERVATIVE FREE 10 ML: 5 INJECTION INTRAVENOUS at 11:52

## 2025-05-07 RX ADMIN — TRAZODONE HYDROCHLORIDE 50 MG: 50 TABLET ORAL at 21:25

## 2025-05-07 RX ADMIN — LOSARTAN POTASSIUM 25 MG: 25 TABLET, FILM COATED ORAL at 11:51

## 2025-05-07 RX ADMIN — BUDESONIDE AND FORMOTEROL FUMARATE DIHYDRATE 2 PUFF: 160; 4.5 AEROSOL RESPIRATORY (INHALATION) at 11:08

## 2025-05-07 RX ADMIN — Medication 325 MG: at 11:51

## 2025-05-07 RX ADMIN — ATORVASTATIN CALCIUM 80 MG: 80 TABLET, FILM COATED ORAL at 11:51

## 2025-05-07 RX ADMIN — CARVEDILOL 3.12 MG: 3.12 TABLET, FILM COATED ORAL at 11:51

## 2025-05-07 RX ADMIN — BUMETANIDE 2 MG: 0.25 INJECTION INTRAMUSCULAR; INTRAVENOUS at 16:10

## 2025-05-07 RX ADMIN — SODIUM CHLORIDE, PRESERVATIVE FREE 10 ML: 5 INJECTION INTRAVENOUS at 21:24

## 2025-05-07 RX ADMIN — BUDESONIDE AND FORMOTEROL FUMARATE DIHYDRATE 2 PUFF: 160; 4.5 AEROSOL RESPIRATORY (INHALATION) at 20:17

## 2025-05-07 RX ADMIN — WARFARIN SODIUM 5 MG: 5 TABLET ORAL at 18:26

## 2025-05-07 RX ADMIN — PANTOPRAZOLE SODIUM 40 MG: 40 TABLET, DELAYED RELEASE ORAL at 16:10

## 2025-05-07 RX ADMIN — CARVEDILOL 3.12 MG: 3.12 TABLET, FILM COATED ORAL at 18:25

## 2025-05-07 ASSESSMENT — ENCOUNTER SYMPTOMS
COLOR CHANGE: 0
NAUSEA: 0
SHORTNESS OF BREATH: 1
VOICE CHANGE: 0
DIARRHEA: 0
EYE PAIN: 0
BACK PAIN: 0
WHEEZING: 0
CHEST TIGHTNESS: 0
ABDOMINAL PAIN: 0
VOMITING: 0
COUGH: 0
TROUBLE SWALLOWING: 0
FACIAL SWELLING: 0
PHOTOPHOBIA: 0
ABDOMINAL DISTENTION: 0

## 2025-05-07 ASSESSMENT — PAIN DESCRIPTION - DESCRIPTORS: DESCRIPTORS: ACHING

## 2025-05-07 ASSESSMENT — HEART SCORE: ECG: NORMAL

## 2025-05-07 ASSESSMENT — PAIN DESCRIPTION - LOCATION: LOCATION: RIB CAGE

## 2025-05-07 ASSESSMENT — PAIN - FUNCTIONAL ASSESSMENT: PAIN_FUNCTIONAL_ASSESSMENT: NONE - DENIES PAIN

## 2025-05-07 ASSESSMENT — PAIN SCALES - GENERAL
PAINLEVEL_OUTOF10: 7
PAINLEVEL_OUTOF10: 5

## 2025-05-07 ASSESSMENT — LIFESTYLE VARIABLES
HOW OFTEN DO YOU HAVE A DRINK CONTAINING ALCOHOL: NEVER
HOW MANY STANDARD DRINKS CONTAINING ALCOHOL DO YOU HAVE ON A TYPICAL DAY: PATIENT DOES NOT DRINK
HOW MANY STANDARD DRINKS CONTAINING ALCOHOL DO YOU HAVE ON A TYPICAL DAY: PATIENT DOES NOT DRINK
HOW OFTEN DO YOU HAVE A DRINK CONTAINING ALCOHOL: NEVER

## 2025-05-07 ASSESSMENT — PAIN DESCRIPTION - ORIENTATION: ORIENTATION: RIGHT

## 2025-05-07 NOTE — CONSULTS
Date:   5/7/2025  Patient name: Graciela Holt  Date of admission:  5/7/2025  4:55 AM  MRN:   [id number]  YOB: 1950  PCP: Travis Mason MD    Reason for Admission: Shortness of breath [R06.02]  SOB (shortness of breath) [R06.02]  Bilateral leg edema [R60.0]  Acute exacerbation of chronic heart failure (HCC) [I50.9]    Cardiology consult       Referring physician: Dr Pearl Choudhary    Impression    Multivessel CAD   CABG LIMA to LAD and diagonal 1, SVG to OM, SVG to PDA February 2018 at [facility]  Severely reduced LV systolic function ejection fraction 25 to 30%, akinetic LV apex, apical thrombus 2D echo 4/8/2024  Moderately increased LV wall thickness  Left parasternal lift  Episodes of nonsustained V. Tach  Diabetes mellitus  Hyperlipidemia lipid profile 11/6/2024 cholesterol 159, HDL 55, LDL 95, triglyceride 41  History of left MCA stroke occluded internal carotid    Admission 4/15/2025 with a GI bleed, anemia, left leg hematoma status post fall patient was on warfarin also subcu Lovenox  EGD 4/17/2025 showed gastritis, 1 small AVM no bleeding she underwent APC/argon plasma coagulation  Admission 4/7/2025 increasing shortness of breath, mild chest pain, A-fib with RVR new onset  Repeat 2D echo 4/9/2025 ejection fraction 20 to 25%, dilated LV 6.6 cm severe dilated LA, mild AR MR and TR RVSP 38  Admission 12/28/2024 severe shortness of breath chest x-ray showed CHF proBNP 12,221 high-sensitivity troponin 46 and 41    Admission 11/5/2024 with a severe shortness of breath, CHF systolic acute on chronic  High-sensitivity troponin 42 and 29, proBNP 13,352  Admission 8/12/2024 chest pain like a heavy pressure radiating to the left arm, no new ECG changes, high-sensitivity troponin 28  Admission 6/17/2024 chest pain like heaviness no shortness of breath no radiation ECG showed LVH with repolarization seen changes no change from previous ECG borderline abnormal troponin most likely type  Yes

## 2025-05-07 NOTE — PLAN OF CARE
Problem: Chronic Conditions and Co-morbidities  Goal: Patient's chronic conditions and co-morbidity symptoms are monitored and maintained or improved  Outcome: Progressing     Problem: Discharge Planning  Goal: Discharge to home or other facility with appropriate resources  Outcome: Progressing  Flowsheets (Taken 5/7/2025 0812)  Discharge to home or other facility with appropriate resources:   Identify barriers to discharge with patient and caregiver   Refer to discharge planning if patient needs post-hospital services based on physician order or complex needs related to functional status, cognitive ability or social support system   Arrange for needed discharge resources and transportation as appropriate     Problem: Safety - Adult  Goal: Free from fall injury  Outcome: Progressing  Flowsheets (Taken 5/7/2025 1619)  Free From Fall Injury: Instruct family/caregiver on patient safety  Note: The patient remained free from falls this shift, call light within reach, bed in locked and lowest position.  Side rails up x2.  Continue to monitor closely.       Problem: ABCDS Injury Assessment  Goal: Absence of physical injury  Outcome: Progressing  Flowsheets (Taken 5/7/2025 0807)  Absence of Physical Injury: Implement safety measures based on patient assessment  Note: Fall assessment performed and appropriate measures implemented. Room freed from clutter. Bed in lowest position with wheels locked. Call light in place. ID band in place.        Problem: Skin/Tissue Integrity  Goal: Skin integrity remains intact  Description: 1.  Monitor for areas of redness and/or skin breakdown2.  Assess vascular access sites hourly3.  Every 4-6 hours minimum:  Change oxygen saturation probe site4.  Every 4-6 hours:  If on nasal continuous positive airway pressure, respiratory therapy assess nares and determine need for appliance change or resting period  Outcome: Progressing  Flowsheets (Taken 5/7/2025 1619)  Skin Integrity Remains Intact:

## 2025-05-07 NOTE — TELEPHONE ENCOUNTER
Writer spoke to granddaughter jyoti who is on HIPAA, pt is currently in hospital, writer was attempting to schedule patient off the referral for anemia, patient is established with Dr. Aranda
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
## 2025-05-07 NOTE — H&P
LEG LESION PUNCH BIOPSY performed by Chuckie Snow MD at [facility] OR    OTHER SURGICAL HISTORY  07/26/2020    cerebral angiogram    GA I&D DEEP ABSC BURSA/HEMATOMA THIGH/KNEE REGION Right 05/07/2018    DEBRIDEMENT INCISION AND DRAINAGE THIGH ABSCESS performed by Amanda Hernandez DO at [facility] OR    GA OFFICE/OUTPT VISIT,PROCEDURE ONLY N/A 02/06/2018    CABG X 3 LIMA-LAD-DIAG,SVG-PDA,CORONARY ARTERY BYPASS REDO, PUMP ASSIST, SWAN, JARRED, REDO STERNOTOMY performed by Savanna Mata MD at [facility] CVOR    THORACIC FUSION  01/10/2025    OCCIPUT TO T1 DECOMPRESSION FUSION, REVISION OF HARDWARE (SYNTHES, STEALTH, EVOKES)    UPPER GASTROINTESTINAL ENDOSCOPY N/A 4/17/2025    ESOPHAGOGASTRODUODENOSCOPY WITH APC performed by Ellis Aranda MD at [facility] ENDO        Medications Prior to Admission:     Prior to Admission medications    Medication Sig Start Date End Date Taking? Authorizing Provider   losartan (COZAAR) 25 MG tablet Take 1 tablet by mouth daily 5/5/25  Yes Travis Mason MD   fluticasone (FLONASE) 50 MCG/ACT nasal spray 2 sprays by Each Nostril route daily 5/5/25  Yes Travis Mason MD   famotidine (PEPCID) 40 MG tablet Take 1 tablet by mouth every evening 5/5/25  Yes Travis Mason MD   carvedilol (COREG) 3.125 MG tablet Take 1 tablet by mouth 2 times daily (with meals) 5/5/25  Yes Travis Mason MD   bumetanide (BUMEX) 2 MG tablet Take 1 tablet by mouth daily 5/5/25  Yes Travis Mason MD   budesonide-formoterol (SYMBICORT) 160-4.5 MCG/ACT AERO Inhale 2 puffs into the lungs in the morning and 2 puffs in the evening. 5/5/25  Yes Travis Mason MD   atorvastatin (LIPITOR) 80 MG tablet Take 1 tablet by mouth daily 5/5/25  Yes Travis Mason MD   amiodarone (CORDARONE) 200 MG tablet Take 1 tablet by mouth daily 5/5/25  Yes Travis Mason MD   albuterol sulfate HFA (PROVENTIL;VENTOLIN;PROAIR) 108 (90 Base) MCG/ACT inhaler Inhale 2 puffs into the lungs every 6 hours as needed for Wheezing or Shortness of Breath  05/07/25  6:14 AM   Result Value Ref Range    Troponin, High Sensitivity 38 (H) 0 - 14 ng/L       Imaging/Diagnostics:  XR CHEST PORTABLE  Result Date: 5/7/2025  Mild improvement of pulmonary edema.  No new focal consolidation, pleural effusion, or pneumothorax.       Assessment :      Hospital Problems           Last Modified POA    * (Principal) SOB (shortness of breath) 5/7/2025 Yes    Acute exacerbation of chronic heart failure (HCC) 5/7/2025 Yes       Plan:     Patient status inpatient in the Progressive Unit/Step down    Acute on chronic systolic heart failure.- baseline ef 20-25%. Hx of CABG x 4 in the past. Known refusal of AICD. On GDMT of coreg, losartan, and bumex at home. Not on jardiance or aldactone. BNP in the ED was over 4000. CXR showing pulmonary edema. Will start bumex 2mg IV daily. Already received lasix 20mg IV in ED. Cardiology consulted. Strict Is/Os. Daily weights.   CKD stage IIIB - baseline cr around 1.3-1.4. currently at baseline. Monitor creatine while on IV diuresis. Does follow with nephrology outpatient   Chronic anemia - continue to monitor hgb. Transfuse if hgb < 7  Hx of LV thrombus - continue warfarin, pharmacy to dose  Hx of afib - continue coreg, amiodarone, and coumadin   Insomnia- continue trazodone  Recent left knee hematoma- improving in size. Conservative management at this time   COPD - continue symbicort   Recent history of gastritis on EGD April 2025 - continue protonix BID    s/p Occipitocervical decompression and fusion with hardware revision from occiput to T2  - patient refusing to wear C-collar at this time.       Consultations:   IP CONSULT TO CASE MANAGEMENT  PHARMACY TO DOSE WARFARIN     Patient is admitted as inpatient status because of co-morbidities listed above, severity of signs and symptoms as outlined, requirement for current medical therapies and most importantly because of direct risk to patient if care not provided in a hospital setting.  Expected length

## 2025-05-07 NOTE — ED TRIAGE NOTES
Mode of arrival (squad #, walk in, police, etc) : Walk in        Chief complaint(s): Shortness of breath, leg swelling        Arrival Note (brief scenario, treatment PTA, etc).: Pt c/o shortness of breath that started tonight. Pt also c/o leg swelling that started a couple of days ago. Pt states she has CHF. Pt denies chest pain at this time. Pt A&Ox4.        C= \"Have you ever felt that you should Cut down on your drinking?\"  No  A= \"Have people Annoyed you by criticizing your drinking?\"  No  G= \"Have you ever felt bad or Guilty about your drinking?\"  No  E= \"Have you ever had a drink as an Eye-opener first thing in the morning to steady your nerves or to help a hangover?\"  No      Deferred []      Reason for deferring: N/A    *If yes to two or more: probable alcohol abuse.*
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
## 2025-05-07 NOTE — CARE COORDINATION
Case Management Assessment  Initial Evaluation    Date/Time of Evaluation: 5/7/2025 3:17 PM  Assessment Completed by: Cheryl Randle    If patient is discharged prior to next notation, then this note serves as note for discharge by case management.    Patient Name: Graciela Holt                   YOB: 1950  Diagnosis: Shortness of breath [R06.02]  SOB (shortness of breath) [R06.02]  Bilateral leg edema [R60.0]  Acute exacerbation of chronic heart failure (HCC) [I50.9]                   Date / Time: 5/7/2025  4:55 AM    Patient Admission Status: Inpatient   Readmission Risk (Low < 19, Mod (19-27), High > 27): Readmission Risk Score: 33.3    Current PCP: Travis Mason MD  PCP verified by CM? Yes    Chart Reviewed: Yes      History Provided by: Patient  Patient Orientation: Alert and Oriented    Patient Cognition: Alert    Hospitalization in the last 30 days (Readmission):  No    If yes, Readmission Assessment in CM Navigator will be completed.    Advance Directives:      Code Status: Full Code   Patient's Primary Decision Maker is: Legal Next of Kin    Primary Decision Maker: [place] Zheng - Child - [phone]    Discharge Planning:    Patient lives with: Family Members Type of Home: House  Primary Care Giver: Self  Patient Support Systems include: Family Members   Current Financial resources: Medicare  Current community resources:    Current services prior to admission: Durable Medical Equipment            Current DME: Walker            Type of Home Care services:  None    ADLS  Prior functional level: Independent in ADLs/IADLs  Current functional level: Independent in ADLs/IADLs    PT AM-PAC:   /24  OT AM-PAC:   /24    Family can provide assistance at DC: Yes  Would you like Case Management to discuss the discharge plan with any other family members/significant others, and if so, who? No  Plans to Return to Present Housing: Yes  Other Identified Issues/Barriers to RETURNING to current housing:

## 2025-05-07 NOTE — ED PROVIDER NOTES
EMERGENCY DEPARTMENT ENCOUNTER    Pt Name: Graciela Holt  MRN: [id number]  Birthdate 1950  Date of evaluation: 5/7/25  CHIEF COMPLAINT       Chief Complaint   Patient presents with    Shortness of Breath    Leg Swelling     HISTORY OF PRESENT ILLNESS   74-year-old female presenting to the ER complaining of worsening shortness of breath.  Patient states however at the current time it has improved.  Patient thinks it is because of the neck brace she has to wear since her neck operation in January.  The patient does admit to an underlying history of COPD and heart failure.    The history is provided by the patient.   Shortness of Breath  Severity:  Moderate  Associated symptoms: no abdominal pain, no chest pain, no headaches, no rash and no vomiting            REVIEW OF SYSTEMS     Review of Systems   Constitutional:  Negative for activity change, appetite change and fatigue.   HENT:  Negative for facial swelling, trouble swallowing and voice change.    Eyes:  Negative for photophobia and pain.   Respiratory:  Positive for shortness of breath. Negative for chest tightness.    Cardiovascular:  Positive for leg swelling (bilateral). Negative for chest pain and palpitations.   Gastrointestinal:  Negative for abdominal pain, nausea and vomiting.   Genitourinary:  Negative for dysuria and urgency.   Musculoskeletal:  Negative for arthralgias and back pain.   Skin:  Negative for color change and rash.   Neurological:  Negative for dizziness, syncope and headaches.   Psychiatric/Behavioral:  Negative for behavioral problems and hallucinations.      PASTMEDICAL HISTORY     Past Medical History:   Diagnosis Date    Allergic rhinitis     Arthritis     general    CAD (coronary artery disease)     Dr. Fowler/ [place]    Cerebral artery occlusion with cerebral infarction (HCC) 07/2020    pt states mild stroke    CHF (congestive heart failure) (HCC)     Controlled type 2 diabetes mellitus without complication, without long-term  Former     Current packs/day: 0.00     Average packs/day: 1 pack/day for 56.0 years (56.0 ttl pk-yrs)     Types: Cigarettes     Start date:      Quit date:      Years since quittin.3    Smokeless tobacco: Never    Tobacco comments:     4-5 cigarettes a day   Vaping Use    Vaping status: Former   Substance Use Topics    Alcohol use: No     Alcohol/week: 0.0 standard drinks of alcohol    Drug use: Not Currently     Types: Marijuana (Weed)     Comment: ocassionally     PHYSICAL EXAM     INITIAL VITALS: BP (!) 160/87   Pulse 67   Temp 97.7 °F (36.5 °C) (Oral)   Resp 20   Ht 1.626 m (5' 4\")   Wt 64.9 kg (143 lb)   SpO2 94%   BMI 24.55 kg/m²    Physical Exam  Vitals reviewed.   Constitutional:       General: She is not in acute distress.     Appearance: Normal appearance. She is not ill-appearing or toxic-appearing.   HENT:      Head: Normocephalic and atraumatic.      Right Ear: External ear normal.      Left Ear: External ear normal.      Nose: No congestion or rhinorrhea.   Eyes:      Extraocular Movements: Extraocular movements intact.      Pupils: Pupils are equal, round, and reactive to light.   Cardiovascular:      Rate and Rhythm: Normal rate and regular rhythm.      Pulses: Normal pulses.      Heart sounds: Normal heart sounds.   Pulmonary:      Effort: Pulmonary effort is normal. No respiratory distress.      Breath sounds: Normal breath sounds. No wheezing.   Abdominal:      General: Bowel sounds are normal. There is no distension.      Palpations: Abdomen is soft.      Tenderness: There is no abdominal tenderness.   Musculoskeletal:         General: No deformity or signs of injury. Normal range of motion.      Cervical back: No rigidity or tenderness.      Right lower leg: Edema present.      Left lower leg: Edema present.   Skin:     General: Skin is warm and dry.   Neurological:      Mental Status: She is alert and oriented to person, place, and time. Mental status is at baseline.    Psychiatric:         Mood and Affect: Mood normal.         Behavior: Behavior normal.         MEDICAL DECISION MAKING / ED COURSE:         1)  Number and Complexity of Problems Addressed at this Encounter      2)  Data Reviewed and Analyzed  (Lab and radiology tests/orders below in next section)    History: 0  EC  Patient Age: 1  Risk Factors: 2  Troponin: 1  Heart Score Total: 5          3)  Treatment and Disposition         74-year-old female presenting to the ER complaining of shortness of breath.  Cardiac workup in the ER did not display positive signs of acute cardiac ischemia.  EKG was reviewed and interpreted by myself, the ED physician.  The patient did have an elevated BNP and was provided with a dose of IV Lasix in the ER.  The patient has a moderate score heart score.  Patient was admitted after speaking with the admitting team.  Patient understands and agrees with the plan.    CRITICAL CARE:       PROCEDURES:    Procedures      DATA FOR LAB AND RADIOLOGY TESTS ORDERED BELOW ARE REVIEWED BY THE ED CLINICIAN:    RADIOLOGY: All x-rays, CT, MRI, and formal ultrasound images (except ED bedside ultrasound) are read by the radiologist, see reports below, unless otherwise noted in MDM or here.  Reports below are reviewed by myself.  XR CHEST PORTABLE   Final Result   Mild improvement of pulmonary edema.  No new focal consolidation, pleural   effusion, or pneumothorax.             LABS: Lab orders shown below, the results are reviewed by myself, and all abnormals are listed below.  Labs Reviewed   TROPONIN - Abnormal; Notable for the following components:       Result Value    Troponin, High Sensitivity 55 (*)     All other components within normal limits   TROPONIN - Abnormal; Notable for the following components:    Troponin, High Sensitivity 38 (*)     All other components within normal limits   PROTIME-INR - Abnormal; Notable for the following components:    Protime 15.4 (*)     All other components

## 2025-05-08 LAB
ANION GAP SERPL CALCULATED.3IONS-SCNC: 8 MMOL/L (ref 9–16)
BASOPHILS # BLD: 0.1 K/UL (ref 0–0.2)
BASOPHILS NFR BLD: 1 % (ref 0–2)
BUN SERPL-MCNC: 29 MG/DL (ref 8–23)
CALCIUM SERPL-MCNC: 8.2 MG/DL (ref 8.6–10.4)
CHLORIDE SERPL-SCNC: 112 MMOL/L (ref 98–107)
CO2 SERPL-SCNC: 25 MMOL/L (ref 20–31)
CREAT SERPL-MCNC: 1.3 MG/DL (ref 0.7–1.2)
EKG ATRIAL RATE: 66 BPM
EKG P AXIS: 51 DEGREES
EKG P-R INTERVAL: 142 MS
EKG Q-T INTERVAL: 440 MS
EKG QRS DURATION: 102 MS
EKG QTC CALCULATION (BAZETT): 461 MS
EKG R AXIS: 11 DEGREES
EKG T AXIS: 165 DEGREES
EKG VENTRICULAR RATE: 66 BPM
EOSINOPHIL # BLD: 0.2 K/UL (ref 0–0.4)
EOSINOPHILS RELATIVE PERCENT: 3 % (ref 0–4)
ERYTHROCYTE [DISTWIDTH] IN BLOOD BY AUTOMATED COUNT: 15.7 % (ref 11.5–14.9)
GFR, ESTIMATED: 43 ML/MIN/1.73M2
GLUCOSE SERPL-MCNC: 135 MG/DL (ref 74–99)
HCT VFR BLD AUTO: 24.4 % (ref 36–46)
HGB BLD-MCNC: 8.1 G/DL (ref 12–16)
INR PPP: 1.2
IRON SATN MFR SERPL: 57 % (ref 20–55)
IRON SERPL-MCNC: 185 UG/DL (ref 37–145)
LYMPHOCYTES NFR BLD: 0.9 K/UL (ref 1–4.8)
LYMPHOCYTES RELATIVE PERCENT: 20 % (ref 24–44)
MCH RBC QN AUTO: 27 PG (ref 26–34)
MCHC RBC AUTO-ENTMCNC: 33.1 G/DL (ref 31–37)
MCV RBC AUTO: 81.6 FL (ref 80–100)
MONOCYTES NFR BLD: 0.6 K/UL (ref 0.1–1.3)
MONOCYTES NFR BLD: 12 % (ref 1–7)
NEUTROPHILS NFR BLD: 64 % (ref 36–66)
NEUTS SEG NFR BLD: 3 K/UL (ref 1.3–9.1)
PLATELET # BLD AUTO: 224 K/UL (ref 150–450)
PMV BLD AUTO: 8.1 FL (ref 6–12)
POTASSIUM SERPL-SCNC: 3.6 MMOL/L (ref 3.7–5.3)
PROTHROMBIN TIME: 15.7 SEC (ref 11.8–14.6)
RBC # BLD AUTO: 2.99 M/UL (ref 4–5.2)
SODIUM SERPL-SCNC: 145 MMOL/L (ref 136–145)
TIBC SERPL-MCNC: 326 UG/DL (ref 250–450)
UNSATURATED IRON BINDING CAPACITY: 141 UG/DL (ref 112–347)
WBC OTHER # BLD: 4.7 K/UL (ref 3.5–11)

## 2025-05-08 PROCEDURE — 97161 PT EVAL LOW COMPLEX 20 MIN: CPT

## 2025-05-08 PROCEDURE — 6370000000 HC RX 637 (ALT 250 FOR IP)

## 2025-05-08 PROCEDURE — 6360000002 HC RX W HCPCS

## 2025-05-08 PROCEDURE — 6370000000 HC RX 637 (ALT 250 FOR IP): Performed by: NURSE PRACTITIONER

## 2025-05-08 PROCEDURE — 83550 IRON BINDING TEST: CPT

## 2025-05-08 PROCEDURE — 36415 COLL VENOUS BLD VENIPUNCTURE: CPT

## 2025-05-08 PROCEDURE — 97530 THERAPEUTIC ACTIVITIES: CPT

## 2025-05-08 PROCEDURE — 97166 OT EVAL MOD COMPLEX 45 MIN: CPT

## 2025-05-08 PROCEDURE — 94761 N-INVAS EAR/PLS OXIMETRY MLT: CPT

## 2025-05-08 PROCEDURE — 83540 ASSAY OF IRON: CPT

## 2025-05-08 PROCEDURE — 85610 PROTHROMBIN TIME: CPT

## 2025-05-08 PROCEDURE — 94640 AIRWAY INHALATION TREATMENT: CPT

## 2025-05-08 PROCEDURE — 2500000003 HC RX 250 WO HCPCS

## 2025-05-08 PROCEDURE — 94664 DEMO&/EVAL PT USE INHALER: CPT

## 2025-05-08 PROCEDURE — 99233 SBSQ HOSP IP/OBS HIGH 50: CPT | Performed by: INTERNAL MEDICINE

## 2025-05-08 PROCEDURE — 2060000000 HC ICU INTERMEDIATE R&B

## 2025-05-08 PROCEDURE — 85025 COMPLETE CBC W/AUTO DIFF WBC: CPT

## 2025-05-08 PROCEDURE — 6360000002 HC RX W HCPCS: Performed by: NURSE PRACTITIONER

## 2025-05-08 PROCEDURE — 6370000000 HC RX 637 (ALT 250 FOR IP): Performed by: INTERNAL MEDICINE

## 2025-05-08 PROCEDURE — 80048 BASIC METABOLIC PNL TOTAL CA: CPT

## 2025-05-08 RX ORDER — WARFARIN SODIUM 5 MG/1
5 TABLET ORAL
Status: COMPLETED | OUTPATIENT
Start: 2025-05-08 | End: 2025-05-08

## 2025-05-08 RX ORDER — POTASSIUM CHLORIDE 1500 MG/1
40 TABLET, EXTENDED RELEASE ORAL ONCE
Status: COMPLETED | OUTPATIENT
Start: 2025-05-08 | End: 2025-05-08

## 2025-05-08 RX ORDER — LIDOCAINE 4 G/G
1 PATCH TOPICAL DAILY
Status: DISCONTINUED | OUTPATIENT
Start: 2025-05-08 | End: 2025-05-10 | Stop reason: HOSPADM

## 2025-05-08 RX ORDER — KETOROLAC TROMETHAMINE 30 MG/ML
15 INJECTION, SOLUTION INTRAMUSCULAR; INTRAVENOUS ONCE
Status: COMPLETED | OUTPATIENT
Start: 2025-05-08 | End: 2025-05-08

## 2025-05-08 RX ADMIN — SODIUM CHLORIDE, PRESERVATIVE FREE 10 ML: 5 INJECTION INTRAVENOUS at 09:17

## 2025-05-08 RX ADMIN — CARVEDILOL 3.12 MG: 3.12 TABLET, FILM COATED ORAL at 09:16

## 2025-05-08 RX ADMIN — BUMETANIDE 2 MG: 0.25 INJECTION INTRAMUSCULAR; INTRAVENOUS at 09:16

## 2025-05-08 RX ADMIN — WARFARIN SODIUM 5 MG: 5 TABLET ORAL at 17:22

## 2025-05-08 RX ADMIN — LOSARTAN POTASSIUM 25 MG: 25 TABLET, FILM COATED ORAL at 09:16

## 2025-05-08 RX ADMIN — BUDESONIDE AND FORMOTEROL FUMARATE DIHYDRATE 2 PUFF: 160; 4.5 AEROSOL RESPIRATORY (INHALATION) at 11:14

## 2025-05-08 RX ADMIN — AMIODARONE HYDROCHLORIDE 200 MG: 200 TABLET ORAL at 09:16

## 2025-05-08 RX ADMIN — KETOROLAC TROMETHAMINE 15 MG: 30 INJECTION, SOLUTION INTRAMUSCULAR at 03:21

## 2025-05-08 RX ADMIN — Medication 325 MG: at 09:16

## 2025-05-08 RX ADMIN — TRAZODONE HYDROCHLORIDE 50 MG: 50 TABLET ORAL at 20:54

## 2025-05-08 RX ADMIN — SODIUM CHLORIDE, PRESERVATIVE FREE 10 ML: 5 INJECTION INTRAVENOUS at 20:54

## 2025-05-08 RX ADMIN — CARVEDILOL 3.12 MG: 3.12 TABLET, FILM COATED ORAL at 17:23

## 2025-05-08 RX ADMIN — POTASSIUM CHLORIDE 40 MEQ: 1500 TABLET, EXTENDED RELEASE ORAL at 09:53

## 2025-05-08 RX ADMIN — ATORVASTATIN CALCIUM 80 MG: 80 TABLET, FILM COATED ORAL at 09:16

## 2025-05-08 RX ADMIN — PANTOPRAZOLE SODIUM 40 MG: 40 TABLET, DELAYED RELEASE ORAL at 17:23

## 2025-05-08 RX ADMIN — PANTOPRAZOLE SODIUM 40 MG: 40 TABLET, DELAYED RELEASE ORAL at 06:18

## 2025-05-08 RX ADMIN — BUDESONIDE AND FORMOTEROL FUMARATE DIHYDRATE 2 PUFF: 160; 4.5 AEROSOL RESPIRATORY (INHALATION) at 19:41

## 2025-05-08 ASSESSMENT — ENCOUNTER SYMPTOMS
ABDOMINAL DISTENTION: 0
WHEEZING: 0
COUGH: 0
NAUSEA: 0
VOMITING: 0
DIARRHEA: 0
ABDOMINAL PAIN: 0
SHORTNESS OF BREATH: 1

## 2025-05-08 ASSESSMENT — PAIN DESCRIPTION - ORIENTATION: ORIENTATION: RIGHT

## 2025-05-08 ASSESSMENT — PAIN SCALES - GENERAL: PAINLEVEL_OUTOF10: 10

## 2025-05-08 ASSESSMENT — PAIN DESCRIPTION - LOCATION: LOCATION: OTHER (COMMENT)

## 2025-05-08 NOTE — PROGRESS NOTES
Pharmacy Note  Warfarin Consult follow-up      Recent Labs     05/07/25  0512 05/08/25  0956   INR 1.1 1.2     Recent Labs     05/07/25  0512 05/08/25  0540   HGB 8.9* 8.1*   HCT 27.2* 24.4*    224       Significant Drug-Drug Interactions:  New warfarin drug-drug interactions: none  Discontinued drug-drug interactions: none  Current warfarin drug-drug interactions: Amiodarone, Lipitor      Date             INR        Dose given previous day  Dose scheduled for today  5/8/2025            1.2       5 mg           5 mg        Notes:                     Daily PT/INR while inpatient.    Armando Ngo, RP,RPH, MS   5/8/2025  11:14 AM

## 2025-05-08 NOTE — PLAN OF CARE
Problem: Respiratory - Adult  Goal: Achieves optimal ventilation and oxygenation  5/8/2025 1116 by Becca Martínez RCP  Outcome: Progressing

## 2025-05-08 NOTE — PLAN OF CARE
Problem: Chronic Conditions and Co-morbidities  Goal: Patient's chronic conditions and co-morbidity symptoms are monitored and maintained or improved  5/7/2025 2337 by Suellen Junior RN  Outcome: Progressing  5/7/2025 1619 by Juliet Dejesus RN  Outcome: Progressing     Problem: Discharge Planning  Goal: Discharge to home or other facility with appropriate resources  5/7/2025 2337 by Suellen Junior RN  Outcome: Progressing  5/7/2025 1619 by Juliet Dejesus RN  Outcome: Progressing  Flowsheets (Taken 5/7/2025 0812)  Discharge to home or other facility with appropriate resources:   Identify barriers to discharge with patient and caregiver   Refer to discharge planning if patient needs post-hospital services based on physician order or complex needs related to functional status, cognitive ability or social support system   Arrange for needed discharge resources and transportation as appropriate     Problem: Safety - Adult  Goal: Free from fall injury  5/7/2025 2337 by Suellen Junior RN  Outcome: Progressing  5/7/2025 1619 by Juliet Dejesus RN  Outcome: Progressing  Flowsheets (Taken 5/7/2025 1619)  Free From Fall Injury: Instruct family/caregiver on patient safety  Note: The patient remained free from falls this shift, call light within reach, bed in locked and lowest position.  Side rails up x2.  Continue to monitor closely.       Problem: ABCDS Injury Assessment  Goal: Absence of physical injury  5/7/2025 2337 by Suellen Junior RN  Outcome: Progressing  5/7/2025 1619 by Juliet Dejesus RN  Outcome: Progressing  Flowsheets (Taken 5/7/2025 0807)  Absence of Physical Injury: Implement safety measures based on patient assessment  Note: Fall assessment performed and appropriate measures implemented. Room freed from clutter. Bed in lowest position with wheels locked. Call light in place. ID band in place.        Problem: Skin/Tissue Integrity  Goal: Skin integrity remains intact  Description: 1.  Monitor for  Adult  Goal: Maintains hematologic stability  5/7/2025 2337 by Suellen Junior, RN  Outcome: Progressing  5/7/2025 1619 by Juliet Dejesus, RN  Outcome: Progressing  Flowsheets (Taken 5/7/2025 1619)  Maintains hematologic stability:   Assess for signs and symptoms of bleeding or hemorrhage   Monitor labs for bleeding or clotting disorders   Administer blood products/factors as ordered     Problem: Pain  Goal: Verbalizes/displays adequate comfort level or baseline comfort level  Outcome: Progressing

## 2025-05-08 NOTE — PROGRESS NOTES
Date:   5/8/2025  Patient name: Graciela Holt  Date of admission:  5/7/2025  4:55 AM  MRN:   [id number]  YOB: 1950  PCP: Travis Mason MD    Reason for Admission: Shortness of breath [R06.02]  SOB (shortness of breath) [R06.02]  Bilateral leg edema [R60.0]  Acute exacerbation of chronic heart failure (HCC) [I50.9]    Cardiology follow:HFrEF, coronary artery disease, CABG, ventricular arrhythmia       Referring physician: Dr Pearl Choudhary     Impression  Admission 5/7/2025 increasing swelling over the legs and shortness of breath  Multivessel CAD   CABG LIMA to LAD and diagonal 1, SVG to OM, SVG to PDA February 2018 at [facility]  Severely reduced LV systolic function ejection fraction 25 to 30%, akinetic LV apex, apical thrombus 2D echo 4/8/2024  Moderately increased LV wall thickness, enlarged LV 6.6 cm  Left parasternal lift  Episodes of nonsustained V. Tach  Diabetes mellitus  Hyperlipidemia lipid profile 11/6/2024 cholesterol 159, HDL 55, LDL 95, triglyceride 41  History of left MCA stroke occluded internal carotid     Admission 4/15/2025 with a GI bleed, anemia, left leg hematoma status post fall patient was on warfarin also subcu Lovenox  EGD 4/17/2025 showed gastritis, 1 small AVM no bleeding she underwent APC/argon plasma coagulation  Admission 4/7/2025 increasing shortness of breath, mild chest pain, A-fib with RVR new onset  Repeat 2D echo 4/9/2025 ejection fraction 20 to 25%, dilated LV 6.6 cm severe dilated LA, mild AR MR and TR RVSP 38  Admission 12/28/2024 severe shortness of breath chest x-ray showed CHF proBNP 12,221 high-sensitivity troponin 46 and 41     Admission 11/5/2024 with a severe shortness of breath, CHF systolic acute on chronic  High-sensitivity troponin 42 and 29, proBNP 13,352  Admission 8/12/2024 chest pain like a heavy pressure radiating to the left arm, no new ECG changes, high-sensitivity troponin 28  Admission 6/17/2024 chest pain like heaviness no

## 2025-05-08 NOTE — PROGRESS NOTES
[facility] Internal Medicine  Juan Carlos Todd MD; Lewis Castro MD, Pearl Choudhary MD,    Chip Anderson MD, Brian Min MD.    [facility] Internal Medicine   IN-PATIENT SERVICE   [facility]    Progress Note            Date:   5/8/2025  Patient name:  Graciela Holt  Date of admission:  5/7/2025  4:55 AM  MRN:   [id number]  Account:  [id number]  YOB: 1950  PCP:    Travis Mason MD  Room:   2098/2098-01  Code Status:    Full Code    Chief Complaint:     Chief Complaint   Patient presents with    Shortness of Breath    Leg Swelling     History Obtained From:     Patient/EMR    History of Present Illness:     Graciela Holt is a 74 y.o. Non- / non  female who presents with Shortness of Breath and Leg Swelling   and is admitted to the hospital for the management of SOB (shortness of breath).    Has PMH of T2DM, systolic/diastolic CHF, COPD, CAD, iron deficiency, chronic afib, insomnia, history of LV thrombus, and history of CABG      Patient presents due to shortness of breath and worsening leg swelling at home.  Patient states over the last few days she has been having increasing leg swelling.  She has been compliant with her medications that she has been given.  She does have history of HFrEF with 2025% ejection fraction.  She is on GDMT with Bumex, Coreg, and losartan.  Patient is currently not on Jardiance and Aldactone.  Unsure of the reason at this time.  Patient also has known to have AICD refusal in the past. She has on warfarin therapy outpatient for history of LV thrombus.    Patient had recent hospital admission and was discharged on 4/19.  Upon discharge patient's weight was approximately 131 pounds she is currently 140 pounds.  He states she was compliant with her medications and her diet at home.    In the emergency department chest x-ray was completed which showed pulmonary edema.  Patient also had significant bilateral  chart was generated using voice recognition Dragon dictation software.  Although every effort was made to ensure the accuracy of this automated transcription, some errors in transcription may have occurred.     Attending Physician Statement  I have discussed the care of Graciela Holt and I have examined the patient myself and taken ROS and HPI, including pertinent history and exam findings, with the resident. I have reviewed the key elements of all parts of the encounter with the resident.  I agree with the assessment, plan and orders as documented by the resident.        Seen and examined, being treated for acute CHF exacerbation, on IV Bumex, output not being charted accurately, cardiology consulted, continue current dose of Bumex, start spironolactone and Jardiance,  LV thrombus chronic,  INR is 1.2 subtherapeutic recently had knee hematoma hemoglobin is 8.1 today, will continue Coumadin, defer to cardiology regarding Lovenox  Get iron studies  Underlying anemia likely making her dyspneic as well  Monitor H&H  Seen by Ortho last admission for hematoma recommended conservative management  Not ready for discharge yet  Electronically signed by Pearl Choudhary MD

## 2025-05-08 NOTE — PROGRESS NOTES
Physical Therapy  [facility]   Physical Therapy Evaluation  Date: 25  Patient Name: Graciela Holt       Room: /8-01  MRN: [id number]  Account: [id number]   : 1950  (74 y.o.) Gender: female     Discharge Recommendations:  Discharge Recommendations: Home with Home health PT, Home with assist PRN     PT Equipment Recommendations  Equipment Needed: No     Past Medical History:  has a past medical history of Allergic rhinitis, Arthritis, CAD (coronary artery disease), Cerebral artery occlusion with cerebral infarction ([facility]), CHF (congestive heart failure) ([facility]), Controlled type 2 diabetes mellitus without complication, without long-term current use of insulin ([facility]), COPD (chronic obstructive pulmonary disease) ([facility]), Depression, Former smoker, History of blood transfusion, Hyperlipidemia, Hypertension, Kidney stone, Myocardial infarction ([facility]), Obesity, Class I, BMI 30.0-34.9 (see actual BMI), Restless leg syndrome, Skin abnormality, Type II or unspecified type diabetes mellitus without mention of complication, not stated as uncontrolled, Wears glasses, and Wears partial dentures.  Past Surgical History:   has a past surgical history that includes Appendectomy; Hysterectomy; Cholecystectomy; joint replacement (Bilateral); pr office/outpt visit,procedure only (N/A, 2018); pr i&d deep absc bursa/hematoma thigh/knee region (Right, 2018); Leg biopsy excision (Right, 2019); Cardiac surgery; Cardiac surgery; other surgical history (2020); cervical laminectomy (N/A, 10/14/2020); bronchoscopy (N/A, 2021); Thoracic Fusion (01/10/2025); cervical fusion (N/A, 1/10/2025); and Upper gastrointestinal endoscopy (N/A, 2025).    Subjective  Subjective  Subjective: pt pleasant and cooperative     General  Family/Caregiver Present: No  Follows Commands: Within Functional Limits           Social/Functional History  Social/Functional History  Lives With:    Ambulation  Surface: Level tile  Device: No Device  Assistance: Stand by assistance  Quality of Gait: slight shuffling gait with decreased knee flexion, heel strike and toe-off but no LOB noted  Gait Deviations: Slow Lindsay, Increased MARIN  Distance: 24ft     Stairs  Stairs/Curb  Stairs?: Yes  Stairs  # Steps : 1  Stairs Height: 6\"  Rails: Right ascending  Device: No Device  Assistance: Stand by assistance    Bed Mobility  Bed mobility  Supine to Sit: Modified independent  Sit to Supine: Modified independent  Scooting: Independent     Balance  Balance  Sitting - Static: Good  Sitting - Dynamic: Good  Standing - Static: Good  Standing - Dynamic: Fair, +            LE Function  AROM RLE (degrees)  RLE AROM: WFL  Strength RLE  Comment: grossly 4/4-/5     AROM LLE (degrees)  LLE AROM : WFL  Strength LLE  Comment: grossly 4/4-/5           Vitals  Vitals  O2 Device: None (Room air)  Comment: pt denies pain    Orientation  Overall Orientation Status: Within Functional Limits  Vision  Vision: Impaired  Vision Exceptions: Wears glasses for reading  Hearing  Hearing: Within functional limits          Assessment  Assessment  Assessment: Impaired mobility due to decreased tolerance to activity and safety concerns of frequent falls, decreased balance and weakness  Decision Making: Medium Complexity  History: Cervical Spine Instability  Exam: decreased endurance, strength, balance, mobility  Clinical Presentation: evolving  Discharge Recommendations: Home with Home health PT, Home with assist PRN  Activity Tolerance  Activity Tolerance: Patient tolerated evaluation without incident, Patient limited by endurance           Functional Outcome Measures  AM-PAC Basic Mobility - Inpatient   How much help is needed turning from your back to your side while in a flat bed without using bedrails?: None  How much help is needed moving from lying on your back to sitting on the side of a flat bed without using bedrails?: A Little  How much

## 2025-05-08 NOTE — PLAN OF CARE
Problem: Chronic Conditions and Co-morbidities  Goal: Patient's chronic conditions and co-morbidity symptoms are monitored and maintained or improved  Outcome: Progressing  Flowsheets (Taken 5/8/2025 1811)  Care Plan - Patient's Chronic Conditions and Co-Morbidity Symptoms are Monitored and Maintained or Improved:   Monitor and assess patient's chronic conditions and comorbid symptoms for stability, deterioration, or improvement   Collaborate with multidisciplinary team to address chronic and comorbid conditions and prevent exacerbation or deterioration   Update acute care plan with appropriate goals if chronic or comorbid symptoms are exacerbated and prevent overall improvement and discharge     Problem: Discharge Planning  Goal: Discharge to home or other facility with appropriate resources  Outcome: Progressing  Flowsheets (Taken 5/7/2025 0812)  Discharge to home or other facility with appropriate resources:   Identify barriers to discharge with patient and caregiver   Refer to discharge planning if patient needs post-hospital services based on physician order or complex needs related to functional status, cognitive ability or social support system   Arrange for needed discharge resources and transportation as appropriate     Problem: Safety - Adult  Goal: Free from fall injury  Outcome: Progressing  Flowsheets (Taken 5/8/2025 1811)  Free From Fall Injury: Instruct family/caregiver on patient safety  Note: The patient remained free from falls this shift, call light within reach, bed in locked and lowest position.  Side rails up x2.  Continue to monitor closely.       Problem: ABCDS Injury Assessment  Goal: Absence of physical injury  Outcome: Progressing  Note: Fall assessment performed and appropriate measures implemented. Room freed from clutter. Bed in lowest position with wheels locked. Call light in place. ID band in place.        Problem: Skin/Tissue Integrity  Goal: Skin integrity remains

## 2025-05-08 NOTE — CARE COORDINATION
Case Management   Daily Progress Note       Patient Name: Graciela Holt                   YOB: 1950  Diagnosis: Shortness of breath [R06.02]  SOB (shortness of breath) [R06.02]  Bilateral leg edema [R60.0]  Acute exacerbation of chronic heart failure (HCC) [I50.9]                       GMLOS: 3.9 days  Length of Stay: 1  days    Readmission Risk (Low < 19, Mod (19-27), High > 27): Readmission Risk Score: 33.5      Patient is alert and oriented.    Spoke with Patient , and Current Transitional Plan is:    [x] Home Independently    [] Home with HC    [] Skilled Nursing Facility    [] Acute Rehabilitation    [] Long Term Acute Care (LTAC)    [] Other:     Medical Management: Cardiology following IV bumex, Possible discharge to home soon     Testing Ordered:     Additional Notes:  Jardiance cost verified cost is $10.00 a month. Script will be ready at [facility].       Electronically signed by Cheryl Randle on 5/8/2025 at 3:00 PM

## 2025-05-08 NOTE — PROGRESS NOTES
c/o 10/10 rib pain, gave her a couple tylenol at 930 pm for 7/10 rib pain, wants something stronger.     Message NP per pts request

## 2025-05-08 NOTE — PROGRESS NOTES
[facility]   Occupational Therapy Evaluation  Date: 25  Patient Name: Graciela Holt       Room: /8-01  MRN: [id number]  Account: [id number]   : 1950  (74 y.o.) Gender: female     Discharge Recommendations:  Further Occupational Therapy is recommended upon facility discharge.    OT Equipment Recommendations  Equipment Needed: Yes  Mobility Devices: ADL Assistive Devices  ADL Assistive Devices: Shower Chair with back, Grab Bars - shower    Referring Practitioner: Dr. Choudhary  Diagnosis: SOB; BLE edema; Acute exacerbation of CHF     Treatment Diagnosis: Impaired self-care status    Past Medical History:  has a past medical history of Allergic rhinitis, Arthritis, CAD (coronary artery disease), Cerebral artery occlusion with cerebral infarction ([facility]), CHF (congestive heart failure) ([facility]), Controlled type 2 diabetes mellitus without complication, without long-term current use of insulin (HCC), COPD (chronic obstructive pulmonary disease) ([facility]), Depression, Former smoker, History of blood transfusion, Hyperlipidemia, Hypertension, Kidney stone, Myocardial infarction ([facility]), Obesity, Class I, BMI 30.0-34.9 (see actual BMI), Restless leg syndrome, Skin abnormality, Type II or unspecified type diabetes mellitus without mention of complication, not stated as uncontrolled, Wears glasses, and Wears partial dentures.    Past Surgical History:   has a past surgical history that includes Appendectomy; Hysterectomy; Cholecystectomy; joint replacement (Bilateral); pr office/outpt visit,procedure only (N/A, 2018); pr i&d deep absc bursa/hematoma thigh/knee region (Right, 2018); Leg biopsy excision (Right, 2019); Cardiac surgery; Cardiac surgery; other surgical history (2020); cervical laminectomy (N/A, 10/14/2020); bronchoscopy (N/A, 2021); Thoracic Fusion (01/10/2025); cervical fusion (N/A, 1/10/2025); and Upper gastrointestinal endoscopy (N/A,  Within functional limits (B shoulder NT due to cervical spine surgery and still having C-collar ordered for daily use; Other joints WFL)  Coordination: Within functional limits  Tone: Normal  Sensation: Intact    Bed Mobility  Bed mobility  Supine to Sit: Modified independent  Sit to Supine: Modified independent  Scooting: Independent    Balance  Balance  Sitting Balance: Independent  Standing Balance: Stand by assistance    Transfers  Transfers  Stand Step Transfers: Stand by assistance  Stand Pivot Transfers: Stand by assistance  Sit to stand: Stand by assistance  Stand to sit: Stand by assistance  Transfer Comments: No device    Functional Mobility  Functional Mobility Comments: Pt ambulated to door and back with SBA and no device.  Pt sightly unsteady at times but no significant LOB.  Used environment for support PRN.    Assessment  Assessment  Performance deficits / Impairments: Decreased functional mobility , Decreased ADL status, Decreased safe awareness, Decreased cognition, Decreased endurance, Decreased high-level IADLs  Treatment Diagnosis: Impaired self-care status  Prognosis: Good  Decision Making: Medium Complexity    Activity Tolerance  Activity Tolerance: Patient Tolerated treatment well    Safety Devices  Type of Devices: Patient at risk for falls, Gait belt, Left in bed, Call light within reach, Bed alarm in place    Patient Education  Patient Education  Education Given To: Patient  Education Provided: Role of Therapy, Plan of Care, Precautions, Fall Prevention Strategies, Mobility Training, Equipment, ADL Adaptive Strategies  Education Method: Verbal  Barriers to Learning: Cognition  Education Outcome: Continued education needed    Functional Outcome Measures  AM-PAC Daily Activity - Inpatient   How much help is needed for putting on and taking off regular lower body clothing?: A Little  How much help is needed for bathing (which includes washing, rinsing, drying)?: A Little  How much help is

## 2025-05-08 NOTE — PROGRESS NOTES
05/08/25 1816   Encounter Summary   Encounter Overview/Reason Spiritual/Emotional Needs   Service Provided For Patient   Referral/Consult From Rounding   Complexity of Encounter Low   Assessment/Intervention/Outcome   Assessment Unable to assess  (PT sleeping)   Intervention Prayer (assurance of)/Sublimity

## 2025-05-09 LAB
ANION GAP SERPL CALCULATED.3IONS-SCNC: 7 MMOL/L (ref 9–16)
BASOPHILS # BLD: <0.03 K/UL (ref 0–0.2)
BASOPHILS NFR BLD: 0 % (ref 0–2)
BUN SERPL-MCNC: 27 MG/DL (ref 8–23)
CALCIUM SERPL-MCNC: 8.5 MG/DL (ref 8.6–10.4)
CHLORIDE SERPL-SCNC: 110 MMOL/L (ref 98–107)
CO2 SERPL-SCNC: 26 MMOL/L (ref 20–31)
CREAT SERPL-MCNC: 1.2 MG/DL (ref 0.7–1.2)
EOSINOPHIL # BLD: 0.15 K/UL (ref 0–0.44)
EOSINOPHILS RELATIVE PERCENT: 3 % (ref 0–4)
ERYTHROCYTE [DISTWIDTH] IN BLOOD BY AUTOMATED COUNT: 15.1 % (ref 11.5–14.9)
GFR, ESTIMATED: 47 ML/MIN/1.73M2
GLUCOSE SERPL-MCNC: 157 MG/DL (ref 74–99)
HCT VFR BLD AUTO: 25.7 % (ref 36–46)
HGB BLD-MCNC: 7.9 G/DL (ref 12–16)
IMM GRANULOCYTES # BLD AUTO: <0.03 K/UL (ref 0–0.3)
IMM GRANULOCYTES NFR BLD: 0 %
INR PPP: 1.2
LYMPHOCYTES NFR BLD: 0.85 K/UL (ref 1.1–3.7)
LYMPHOCYTES RELATIVE PERCENT: 17 % (ref 24–44)
MCH RBC QN AUTO: 26.2 PG (ref 26–34)
MCHC RBC AUTO-ENTMCNC: 30.7 G/DL (ref 31–37)
MCV RBC AUTO: 85.4 FL (ref 80–100)
MONOCYTES NFR BLD: 0.57 K/UL (ref 0.1–1.2)
MONOCYTES NFR BLD: 12 % (ref 3–12)
NEUTROPHILS NFR BLD: 68 % (ref 36–66)
NEUTS SEG NFR BLD: 3.32 K/UL (ref 1.5–8.1)
NRBC BLD-RTO: 0 PER 100 WBC
PLATELET # BLD AUTO: 209 K/UL (ref 150–450)
PMV BLD AUTO: 10 FL (ref 8–13.5)
POTASSIUM SERPL-SCNC: 4.4 MMOL/L (ref 3.7–5.3)
PROTHROMBIN TIME: 16.1 SEC (ref 11.8–14.6)
RBC # BLD AUTO: 3.01 M/UL (ref 3.95–5.11)
SODIUM SERPL-SCNC: 143 MMOL/L (ref 136–145)
WBC OTHER # BLD: 4.9 K/UL (ref 3.5–11)

## 2025-05-09 PROCEDURE — 94640 AIRWAY INHALATION TREATMENT: CPT

## 2025-05-09 PROCEDURE — 6370000000 HC RX 637 (ALT 250 FOR IP)

## 2025-05-09 PROCEDURE — 80048 BASIC METABOLIC PNL TOTAL CA: CPT

## 2025-05-09 PROCEDURE — 97530 THERAPEUTIC ACTIVITIES: CPT

## 2025-05-09 PROCEDURE — 85025 COMPLETE CBC W/AUTO DIFF WBC: CPT

## 2025-05-09 PROCEDURE — 6360000002 HC RX W HCPCS: Performed by: INTERNAL MEDICINE

## 2025-05-09 PROCEDURE — 6370000000 HC RX 637 (ALT 250 FOR IP): Performed by: NURSE PRACTITIONER

## 2025-05-09 PROCEDURE — 94761 N-INVAS EAR/PLS OXIMETRY MLT: CPT

## 2025-05-09 PROCEDURE — 99232 SBSQ HOSP IP/OBS MODERATE 35: CPT | Performed by: INTERNAL MEDICINE

## 2025-05-09 PROCEDURE — 6360000002 HC RX W HCPCS

## 2025-05-09 PROCEDURE — 2060000000 HC ICU INTERMEDIATE R&B

## 2025-05-09 PROCEDURE — 85610 PROTHROMBIN TIME: CPT

## 2025-05-09 PROCEDURE — 36415 COLL VENOUS BLD VENIPUNCTURE: CPT

## 2025-05-09 PROCEDURE — 2500000003 HC RX 250 WO HCPCS

## 2025-05-09 PROCEDURE — 6370000000 HC RX 637 (ALT 250 FOR IP): Performed by: INTERNAL MEDICINE

## 2025-05-09 RX ORDER — WARFARIN SODIUM 7.5 MG/1
7.5 TABLET ORAL
Status: COMPLETED | OUTPATIENT
Start: 2025-05-09 | End: 2025-05-09

## 2025-05-09 RX ORDER — ENOXAPARIN SODIUM 100 MG/ML
1 INJECTION SUBCUTANEOUS 2 TIMES DAILY
Status: DISCONTINUED | OUTPATIENT
Start: 2025-05-09 | End: 2025-05-09

## 2025-05-09 RX ORDER — ENOXAPARIN SODIUM 100 MG/ML
1 INJECTION SUBCUTANEOUS 2 TIMES DAILY
Status: DISCONTINUED | OUTPATIENT
Start: 2025-05-09 | End: 2025-05-10 | Stop reason: HOSPADM

## 2025-05-09 RX ORDER — ENOXAPARIN SODIUM 100 MG/ML
1 INJECTION SUBCUTANEOUS 2 TIMES DAILY
Qty: 8.4 ML | Refills: 0 | Status: SHIPPED | OUTPATIENT
Start: 2025-05-09 | End: 2025-05-10 | Stop reason: HOSPADM

## 2025-05-09 RX ADMIN — PANTOPRAZOLE SODIUM 40 MG: 40 TABLET, DELAYED RELEASE ORAL at 16:53

## 2025-05-09 RX ADMIN — ATORVASTATIN CALCIUM 80 MG: 80 TABLET, FILM COATED ORAL at 08:44

## 2025-05-09 RX ADMIN — TRAZODONE HYDROCHLORIDE 50 MG: 50 TABLET ORAL at 20:15

## 2025-05-09 RX ADMIN — BUMETANIDE 2 MG: 0.25 INJECTION INTRAMUSCULAR; INTRAVENOUS at 08:43

## 2025-05-09 RX ADMIN — PANTOPRAZOLE SODIUM 40 MG: 40 TABLET, DELAYED RELEASE ORAL at 08:44

## 2025-05-09 RX ADMIN — BUDESONIDE AND FORMOTEROL FUMARATE DIHYDRATE 2 PUFF: 160; 4.5 AEROSOL RESPIRATORY (INHALATION) at 07:50

## 2025-05-09 RX ADMIN — Medication 325 MG: at 08:43

## 2025-05-09 RX ADMIN — ENOXAPARIN SODIUM 60 MG: 100 INJECTION SUBCUTANEOUS at 16:53

## 2025-05-09 RX ADMIN — WARFARIN SODIUM 7.5 MG: 7.5 TABLET ORAL at 16:53

## 2025-05-09 RX ADMIN — AMIODARONE HYDROCHLORIDE 200 MG: 200 TABLET ORAL at 08:43

## 2025-05-09 RX ADMIN — EMPAGLIFLOZIN 10 MG: 10 TABLET, FILM COATED ORAL at 10:41

## 2025-05-09 RX ADMIN — SODIUM CHLORIDE, PRESERVATIVE FREE 10 ML: 5 INJECTION INTRAVENOUS at 08:44

## 2025-05-09 RX ADMIN — CARVEDILOL 3.12 MG: 3.12 TABLET, FILM COATED ORAL at 16:53

## 2025-05-09 RX ADMIN — CARVEDILOL 3.12 MG: 3.12 TABLET, FILM COATED ORAL at 08:43

## 2025-05-09 RX ADMIN — SODIUM CHLORIDE, PRESERVATIVE FREE 10 ML: 5 INJECTION INTRAVENOUS at 20:15

## 2025-05-09 RX ADMIN — LOSARTAN POTASSIUM 25 MG: 25 TABLET, FILM COATED ORAL at 08:43

## 2025-05-09 RX ADMIN — BUDESONIDE AND FORMOTEROL FUMARATE DIHYDRATE 2 PUFF: 160; 4.5 AEROSOL RESPIRATORY (INHALATION) at 20:26

## 2025-05-09 ASSESSMENT — ENCOUNTER SYMPTOMS
ABDOMINAL DISTENTION: 0
COUGH: 0
SHORTNESS OF BREATH: 1
NAUSEA: 0
VOMITING: 0
DIARRHEA: 0
WHEEZING: 0
ABDOMINAL PAIN: 0

## 2025-05-09 NOTE — CARE COORDINATION
Case Management   Daily Progress Note       Patient Name: Graciela Holt                   YOB: 1950  Diagnosis: Shortness of breath [R06.02]  SOB (shortness of breath) [R06.02]  Bilateral leg edema [R60.0]  Acute exacerbation of chronic heart failure (HCC) [I50.9]                       GMLOS: 3 days  Length of Stay: 2  days    Readmission Risk (Low < 19, Mod (19-27), High > 27): Readmission Risk Score: 33.5      Patient is alert and oriented.    Spoke with Patient, and Current Transitional Plan is:    [x] Home Independently    [] Home with HC    [] Skilled Nursing Facility    [] Acute Rehabilitation    [] Long Term Acute Care (LTAC)    [] Other:     Medical Management: Referral sent to anti coag clinic, Eliquis and Xalerto are to expensive. IV Bumex,   start spironolactone and Jardiance,     Testing Ordered:     Additional Notes:      Electronically signed by Cheryl Randle on 5/9/2025 at 12:59 PM

## 2025-05-09 NOTE — PROGRESS NOTES
Physical Therapy  [facility]   Physical Therapy   Date: 25  Patient Name: Graciela Holt       Room: /8-01  MRN: [id number]  Account: [id number]   : 1950  (74 y.o.) Gender: female     Discharge Recommendations:  Discharge Recommendations: Home with assist PRN     PT Equipment Recommendations  Equipment Needed: No     Past Medical History:  has a past medical history of Allergic rhinitis, Arthritis, CAD (coronary artery disease), Cerebral artery occlusion with cerebral infarction ([facility]), CHF (congestive heart failure) ([facility]), Controlled type 2 diabetes mellitus without complication, without long-term current use of insulin ([facility]), COPD (chronic obstructive pulmonary disease) ([facility]), Depression, Former smoker, History of blood transfusion, Hyperlipidemia, Hypertension, Kidney stone, Myocardial infarction ([facility]), Obesity, Class I, BMI 30.0-34.9 (see actual BMI), Restless leg syndrome, Skin abnormality, Type II or unspecified type diabetes mellitus without mention of complication, not stated as uncontrolled, Wears glasses, and Wears partial dentures.  Past Surgical History:   has a past surgical history that includes Appendectomy; Hysterectomy; Cholecystectomy; joint replacement (Bilateral); pr office/outpt visit,procedure only (N/A, 2018); pr i&d deep absc bursa/hematoma thigh/knee region (Right, 2018); Leg biopsy excision (Right, 2019); Cardiac surgery; Cardiac surgery; other surgical history (2020); cervical laminectomy (N/A, 10/14/2020); bronchoscopy (N/A, 2021); Thoracic Fusion (01/10/2025); cervical fusion (N/A, 1/10/2025); and Upper gastrointestinal endoscopy (N/A, 2025).    Subjective  Subjective  Subjective: pt reports that she will be going home tomorrow     General  Family/Caregiver Present: No  Follows Commands: Within Functional Limits           Social/Functional History  Social/Functional History  Lives With: Friend(s)  Type of Home:

## 2025-05-09 NOTE — PLAN OF CARE
Problem: Chronic Conditions and Co-morbidities  Goal: Patient's chronic conditions and co-morbidity symptoms are monitored and maintained or improved  5/9/2025 0335 by Veronica Betancourt RN  Outcome: Progressing  Flowsheets (Taken 5/8/2025 1811 by Juliet Dejesus RN)  Care Plan - Patient's Chronic Conditions and Co-Morbidity Symptoms are Monitored and Maintained or Improved:   Monitor and assess patient's chronic conditions and comorbid symptoms for stability, deterioration, or improvement   Collaborate with multidisciplinary team to address chronic and comorbid conditions and prevent exacerbation or deterioration   Update acute care plan with appropriate goals if chronic or comorbid symptoms are exacerbated and prevent overall improvement and discharge  5/8/2025 1811 by Juliet Dejesus RN  Outcome: Progressing  Flowsheets (Taken 5/8/2025 1811)  Care Plan - Patient's Chronic Conditions and Co-Morbidity Symptoms are Monitored and Maintained or Improved:   Monitor and assess patient's chronic conditions and comorbid symptoms for stability, deterioration, or improvement   Collaborate with multidisciplinary team to address chronic and comorbid conditions and prevent exacerbation or deterioration   Update acute care plan with appropriate goals if chronic or comorbid symptoms are exacerbated and prevent overall improvement and discharge     Problem: Safety - Adult  Goal: Free from fall injury  5/9/2025 0335 by Veronica Betancourt RN  Outcome: Progressing  Flowsheets (Taken 5/8/2025 1811 by Juliet Dejesus RN)  Free From Fall Injury: Instruct family/caregiver on patient safety  5/8/2025 1811 by Juliet Dejesus RN  Outcome: Progressing  Flowsheets (Taken 5/8/2025 1811)  Free From Fall Injury: Instruct family/caregiver on patient safety  Note: The patient remained free from falls this shift, call light within reach, bed in locked and lowest position.  Side rails up x2.  Continue to monitor closely.       Problem: ABCDS

## 2025-05-09 NOTE — PROGRESS NOTES
[facility] Internal Medicine  Juan Carlos Todd MD; Lewis Castro MD, Pearl Choudhary MD,    Chip Anderson MD, Brian Min MD.    [facility] Internal Medicine   IN-PATIENT SERVICE   [facility]    Progress Note            Date:   5/9/2025  Patient name:  Graciela Holt  Date of admission:  5/7/2025  4:55 AM  MRN:   [id number]  Account:  [id number]  YOB: 1950  PCP:    Travis Mason MD  Room:   2098/2098-01  Code Status:    Full Code    Chief Complaint:     Chief Complaint   Patient presents with    Shortness of Breath    Leg Swelling     History Obtained From:     Patient/EMR    History of Present Illness:     Graciela Holt is a 74 y.o. Non- / non  female who presents with Shortness of Breath and Leg Swelling   and is admitted to the hospital for the management of SOB (shortness of breath).    Has PMH of T2DM, systolic/diastolic CHF, COPD, CAD, iron deficiency, chronic afib, insomnia, history of LV thrombus, and history of CABG      Patient presents due to shortness of breath and worsening leg swelling at home.  Patient states over the last few days she has been having increasing leg swelling.  She has been compliant with her medications that she has been given.  She does have history of HFrEF with 2025% ejection fraction.  She is on GDMT with Bumex, Coreg, and losartan.  Patient is currently not on Jardiance and Aldactone.  Unsure of the reason at this time.  Patient also has known to have AICD refusal in the past. She has on warfarin therapy outpatient for history of LV thrombus.    Patient had recent hospital admission and was discharged on 4/19.  Upon discharge patient's weight was approximately 131 pounds she is currently 140 pounds.  He states she was compliant with her medications and her diet at home.    In the emergency department chest x-ray was completed which showed pulmonary edema.  Patient also had significant bilateral

## 2025-05-09 NOTE — PLAN OF CARE
Problem: Chronic Conditions and Co-morbidities  Goal: Patient's chronic conditions and co-morbidity symptoms are monitored and maintained or improved  5/9/2025 1701 by Ann Ho RN  Outcome: Progressing     Problem: Discharge Planning  Goal: Discharge to home or other facility with appropriate resources  Outcome: Progressing     Problem: Safety - Adult  Goal: Free from fall injury  5/9/2025 1701 by Ann Ho RN  Outcome: Progressing     Problem: ABCDS Injury Assessment  Goal: Absence of physical injury  Outcome: Progressing     Problem: Skin/Tissue Integrity  Goal: Skin integrity remains intact  Description: 1.  Monitor for areas of redness and/or skin breakdown2.  Assess vascular access sites hourly3.  Every 4-6 hours minimum:  Change oxygen saturation probe site4.  Every 4-6 hours:  If on nasal continuous positive airway pressure, respiratory therapy assess nares and determine need for appliance change or resting period  5/9/2025 1701 by Ann Ho RN  Outcome: Progressing     Problem: Respiratory - Adult  Goal: Achieves optimal ventilation and oxygenation  5/9/2025 1701 by Ann Ho RN  Outcome: Progressing     Problem: Metabolic/Fluid and Electrolytes - Adult  Goal: Electrolytes maintained within normal limits  Outcome: Progressing     Problem: Hematologic - Adult  Goal: Maintains hematologic stability  Outcome: Progressing     Problem: Pain  Goal: Verbalizes/displays adequate comfort level or baseline comfort level  5/9/2025 1701 by Ann Ho RN  Outcome: Progressing

## 2025-05-09 NOTE — DISCHARGE SUMMARY
[facility] Internal Medicine    Juan Carlos Todd MD; Lewis Castro MD, Pearl Choudhary MD,Dr. DENNISE Min MD. ; Chip Anderson MD      [facility] Internal Medicine  IN-PATIENT SERVICE   [facility]    Discharge Summary     Patient ID: Graciela Holt  :  1950   MRN: [id number]     ACCOUNT:  [id number]   Patient's PCP: Travis Mason MD  Admit Date: 2025   Discharge Date: 2025     Length of Stay: 2  Code Status:  Full Code  Admitting Physician: Pearl Choudhary MD  Discharge Physician: Chip Anderson MD     Active Discharge Diagnoses:     Hospital Problem Lists:  Principal Problem:    SOB (shortness of breath)  Active Problems:    Acute exacerbation of chronic heart failure (HCC)  Resolved Problems:    * No resolved hospital problems. *      Admission Condition:  Serious      Discharged Condition: Stable     Hospital Stay:     Hospital Course:  Graciela Holt is a 74 y.o. female who was admitted for the management of   SOB (shortness of breath) , presented to ER with Shortness of Breath and Leg Swelling          On examination,  Alert awake oriented x3,  S1-S2 present,  CTA bilateral,  Abdomen soft nontender nondistended bowel sounds present   Extremity no edema no calf tenderness,,  Skin no rash  CNS no focal neurological deficits   Significant therapeutic interventions:     Significant Diagnostic Studies:   Labs / Micro:  /71   Pulse 64   Temp 97.8 °F (36.6 °C) (Oral)   Resp 17   Ht 1.626 m (5' 4\")   Wt 63.6 kg (140 lb 3.4 oz)   SpO2 98%   BMI 24.07 kg/m²   Temp (24hrs), Av.8 °F (36.6 °C), Min:97.3 °F (36.3 °C), Max:98.2 °F (36.8 °C)    No results for input(s): \"POCGLU\" in the last 72 hours.    Intake/Output Summary (Last 24 hours) at 2025 1421  Last data filed at 2025 1233  Gross per 24 hour   Intake --   Output 2000 ml   Net -2000 ml        Labs:  Hematology:  Recent Labs     25  0512 25  0540 25  0956 25  0538    WBC 5.6 4.7  --  4.9   RBC 3.34* 2.99*  --  3.01*   HGB 8.9* 8.1*  --  7.9*   HCT 27.2* 24.4*  --  25.7*   MCV 81.4 81.6  --  85.4   MCH 26.6 27.0  --  26.2   MCHC 32.7 33.1  --  30.7*   RDW 15.7* 15.7*  --  15.1*    224  --  209   MPV 8.3 8.1  --  10.0   INR 1.1  --  1.2 1.2     Chemistry:  Recent Labs     05/07/25  0512 05/07/25  0614 05/08/25  0540 05/09/25  0538     --  145 143   K 4.5  --  3.6* 4.4   *  --  112* 110*   CO2 22  --  25 26   GLUCOSE 163*  --  135* 157*   BUN 36*  --  29* 27*   CREATININE 1.4*  --  1.3* 1.2   MG 2.2  --   --   --    ANIONGAP 9  --  8* 7*   LABGLOM 39*  --  43* 47*   CALCIUM 8.7  --  8.2* 8.5*   PHOS 3.7  --   --   --    PROBNP 4,576*  --   --   --    TROPHS 55* 38*  --   --    No results for input(s): \"LABALBU\", \"LABA1C\", \"V7BUSTD\", \"FT4\", \"TSH\", \"AST\", \"ALT\", \"LDH\", \"GGT\", \"ALKPHOS\", \"BILITOT\", \"BILIDIR\", \"AMMONIA\", \"AMYLASE\", \"LIPASE\", \"LACTATE\", \"CHOL\", \"HDL\", \"CHOLHDLRATIO\", \"TRIG\", \"VLDL\", \"DET16JJ\", \"PHENYTOIN\", \"PHENYF\", \"URICACID\", \"POCGLU\" in the last 72 hours.    Invalid input(s): \"PROT\", \"U7VGYLI\", \"LABGGT\", \"LDLCHOLESTEROL\"  ABG:  Lab Results   Component Value Date/Time    POCPH 7.376 02/06/2018 10:16 PM    POCPCO2 37.1 02/06/2018 10:16 PM    POCPO2 83.5 02/06/2018 10:16 PM    POCHCO3 21.8 02/06/2018 10:16 PM    NBEA 3 02/06/2018 10:16 PM    PBEA NOT REPORTED 02/06/2018 10:16 PM    ARO2BGO 23 02/06/2018 10:16 PM    VPGD9NBB 96 02/06/2018 10:16 PM    FIO2 40.0 02/06/2018 10:16 PM     Lab Results   Component Value Date/Time    SPECIAL Site: Urine 01/10/2025 07:08 PM     Lab Results   Component Value Date/Time    CULTURE NO GROWTH 01/10/2025 07:08 PM       Radiology:  XR CHEST PORTABLE  Result Date: 5/7/2025  Mild improvement of pulmonary edema.  No new focal consolidation, pleural effusion, or pneumothorax.       Consultations:    Consults:     Final Specialist Recommendations/Findings:   IP CONSULT TO CASE MANAGEMENT  PHARMACY TO DOSE WARFARIN  IP

## 2025-05-09 NOTE — PROGRESS NOTES
Date:   5/9/2025  Patient name: Graciela Holt  Date of admission:  5/7/2025  4:55 AM  MRN:   [id number]  YOB: 1950  PCP: Travis Mason MD    Reason for Admission: Shortness of breath [R06.02]  SOB (shortness of breath) [R06.02]  Bilateral leg edema [R60.0]  Acute exacerbation of chronic heart failure (HCC) [I50.9]    Cardiology follow:HFrEF, coronary artery disease, CABG, ventricular arrhythmia        Referring physician: Dr Pearl Choudhary     Impression  Admission 5/7/2025 increasing swelling over the legs and shortness of breath  Multivessel CAD   CABG LIMA to LAD and diagonal 1, SVG to OM, SVG to PDA February 2018 at [facility]  Severely reduced LV systolic function ejection fraction 25 to 30%, akinetic LV apex, apical thrombus 2D echo 4/8/2024  Moderately increased LV wall thickness, enlarged LV 6.6 cm  Left parasternal lift  Episodes of nonsustained V. Tach  Diabetes mellitus  Hyperlipidemia lipid profile 11/6/2024 cholesterol 159, HDL 55, LDL 95, triglyceride 41  History of left MCA stroke occluded internal carotid     Admission 4/15/2025 with a GI bleed, anemia, left leg hematoma status post fall patient was on warfarin also subcu Lovenox  EGD 4/17/2025 showed gastritis, 1 small AVM no bleeding she underwent APC/argon plasma coagulation  Admission 4/7/2025 increasing shortness of breath, mild chest pain, A-fib with RVR new onset  Repeat 2D echo 4/9/2025 ejection fraction 20 to 25%, dilated LV 6.6 cm severe dilated LA, mild AR MR and TR RVSP 38  Admission 12/28/2024 severe shortness of breath chest x-ray showed CHF proBNP 12,221 high-sensitivity troponin 46 and 41     Admission 11/5/2024 with a severe shortness of breath, CHF systolic acute on chronic  High-sensitivity troponin 42 and 29, proBNP 13,352  Admission 8/12/2024 chest pain like a heavy pressure radiating to the left arm, no new ECG changes, high-sensitivity troponin 28  Admission 6/17/2024 chest pain like heaviness no  improvement in pulmonary edema     Vascular Duplex 5-7-2025  No DVT     ECG 4/9/2025  Sinus rhythm left ventricular hypertrophy with repolarization abnormalities prolonged QT QTc 509 heart rate 77  ECG 4/7/2025  A-fib heart rate 97 LVH with repolarization changes      2D echo 4/9/2025  Ejection fraction 20 to 25% moderate to severely dilated LV 6.6 cm, mild increased LV wall thickness 1.2 cm  Mild mitral and tricuspid regurgitation trace aortic regurgitation RVSP 38 mmHg  Severely dilated left atrium     2D echo 11/11/2024  Normal LV size moderately increased LV wall thickness severe asymmetrical proximal septal hypertrophy akinetic mid to apical anterior wall remaining segments are severely hypokinetic akinetic LV apex with thrombus  Severely reduced LV systolic function ejection fraction 20 to 25%  Moderately dilated LA  Normal RV size and function  RVSP 32 mmHg  No significant valvular abnormality     Left knee x-ray showed extensive soft tissue swelling around the upper portion of the knee as well as the knee effusion no obvious fracture involving the patella, femur or tibia or fibula        Medications:   Scheduled Meds:   warfarin  7.5 mg Oral Once    empagliflozin  10 mg Oral Daily    lidocaine  1 patch TransDERmal Daily    sodium chloride flush  5-40 mL IntraVENous 2 times per day    amiodarone  200 mg Oral Daily    atorvastatin  80 mg Oral Daily    budesonide-formoterol  2 puff Inhalation BID RT    [Held by provider] bumetanide  2 mg Oral Daily    carvedilol  3.125 mg Oral BID WC    ferrous sulfate  325 mg Oral Daily with breakfast    losartan  25 mg Oral Daily    traZODone  50 mg Oral Nightly    bumetanide  2 mg IntraVENous Daily    pantoprazole  40 mg Oral BID AC    warfarin placeholder: dosing by pharmacy   Oral RX Placeholder     Continuous Infusions:   sodium chloride       CBC:   Recent Labs     05/07/25  0512 05/08/25  0540 05/09/25  0538   WBC 5.6 4.7 4.9   HGB 8.9* 8.1* 7.9*    224 209  added Jardiance 10 mg a day  Continue with , Lipitor 80 mg,   2: History of ventricular arrhythmia continue current dose of amiodarone 200 daily carvedilol 3.125 mg twice daily patient has refused for AICD  3: LV apical thrombus, history of A-fib with RVR on warfarin, patient unable to afford Eliquis  4: COPD, continue with bronchodilators  Discussed case with the charge nurse and   Patient said she will go to Coumadin clinic  Okay to discharge from    Electronically signed by Tommy Flynn MD on 5/9/2025 at 11:52 AM

## 2025-05-09 NOTE — CARE COORDINATION
Patient refused VNS, Patient will need to give her own Lovenox shot with teaching tonight and tomorrow. Will need to make sure patient is educated on how to administer this medication.

## 2025-05-09 NOTE — PROGRESS NOTES
Pharmacy Note  Warfarin Consult follow-up      Recent Labs     05/07/25  0512 05/08/25  0956 05/09/25  0538   INR 1.1 1.2 1.2     Recent Labs     05/07/25  0512 05/08/25  0540 05/09/25  0538   HGB 8.9* 8.1* 7.9*   HCT 27.2* 24.4* 25.7*    224 209       Significant Drug-Drug Interactions:  New warfarin drug-drug interactions: none  Discontinued drug-drug interactions: none  Current warfarin drug-drug interactions: amiodarone, atorvastatin       Date             INR        Dose given previous day  Dose scheduled for today  5/9/2025            1.2       5 mg           7.5 mg        Notes: INR subtherapeutic at 1.2 (goal 2-3). Patient is new start to warfarin, was recently admitted here for GI bleed/hematoma while on warfarin and lovenox bridging. Deferring to cardiology to determine whether or not lovenox bridging is needed at this time. Will schedule booster dose of warfarin 7.5 mg tonight.                     Daily PT/INR while inpatient.     Rosemary Hu, PharmD, [facility]

## 2025-05-09 NOTE — DISCHARGE INSTR - COC
Continuity of Care Form    Patient Name: Graciela Holt   :  1950  MRN:  [id number]    Admit date:  2025  Discharge date:  ***    Code Status Order: Full Code   Advance Directives:     Admitting Physician:  Pearl Choudhary MD  PCP: Travis Mason MD    Discharging Nurse: ***  Discharging Hospital Unit/Room#: 2098/2098-01  Discharging Unit Phone Number: ***    Emergency Contact:   Extended Emergency Contact Information  Primary Emergency Contact: Luana Cerda  Mobile Phone: [phone]  Relation: Grandchild  Secondary Emergency Contact: Guanakito Zheng  Address: [address] [place] [address]  Home Phone: [phone]  Work Phone: [phone]  Mobile Phone: [phone]  Relation: Child    Past Surgical History:  Past Surgical History:   Procedure Laterality Date    APPENDECTOMY      BRONCHOSCOPY N/A 2021    BRONCHOSCOPY w/ WASHINGS performed by Toy Cheatham MD at [facility] ENDO    CARDIAC SURGERY      cath x 2/ stent x 1    CARDIAC SURGERY      bypass 4 vessel 2018    CERVICAL FUSION N/A 1/10/2025    OCCIPUT TO T2 DECOMPRESSION FUSION, REVISION OF HARDWARE performed by Yessica Flynn DO at [facility] OR    CERVICAL LAMINECTOMY N/A 10/14/2020    C3-C7 POSTERIOR CERVICAL DECOMPRESSION FUSION performed by Armando Guerra MD at [facility] OR    CHOLECYSTECTOMY      HYSTERECTOMY (CERVIX STATUS UNKNOWN)      JOINT REPLACEMENT Bilateral     knees    LEG BIOPSY EXCISION Right 2019    LEG LESION PUNCH BIOPSY performed by Chuckie Snow MD at [facility] OR    OTHER SURGICAL HISTORY  2020    cerebral angiogram    WY I&D DEEP ABSC BURSA/HEMATOMA THIGH/KNEE REGION Right 2018    DEBRIDEMENT INCISION AND DRAINAGE THIGH ABSCESS performed by Amanda Hernandez DO at [facility] OR    WY OFFICE/OUTPT VISIT,PROCEDURE ONLY N/A 2018    CABG X 3 LIMA-LAD-DIAG,SVG-PDA,CORONARY ARTERY BYPASS REDO, PUMP ASSIST, SWAN, JARRED, REDO STERNOTOMY performed by Savanna Mata MD at [facility] CVOR       Number of days: 122       Incision 01/10/25 Neck Posterior (Active)   Number of days: 119        Elimination:  Continence:   Bowel: {YES / NO:}  Bladder: {YES / NO:}  Urinary Catheter: {Urinary Catheter:497513010}   Colostomy/Ileostomy/Ileal Conduit: {YES / NO:}       Date of Last BM: ***    Intake/Output Summary (Last 24 hours) at 2025 1519  Last data filed at 2025 1449  Gross per 24 hour   Intake --   Output 2200 ml   Net -2200 ml     I/O last 3 completed shifts:  In: -   Out:  [Urine:]    Safety Concerns:     { CRISTOBAL Safety Concerns:283447237}    Impairments/Disabilities:      { CRISTOBAL Impairments/Disabilities:567833697}    Nutrition Therapy:  Current Nutrition Therapy:   { CRISTOBAL Diet List:329738271}    Routes of Feeding: {[facility] DME Other Feedings:104511612}  Liquids: {Slp liquid thickness:14166}  Daily Fluid Restriction: {[facility] DME Yes amt example:846160212}  Last Modified Barium Swallow with Video (Video Swallowing Test): {Done Not Done Date:}    Treatments at the Time of Hospital Discharge:   Respiratory Treatments: ***  Oxygen Therapy:  {Therapy; copd oxygen:56420}  Ventilator:    { CC Vent List:904275663}    Rehab Therapies: {THERAPEUTIC INTERVENTION:5163242814}  Weight Bearing Status/Restrictions: {[facility] Weight Bearin}  Other Medical Equipment (for information only, NOT a DME order):  {EQUIPMENT:921223415}  Other Treatments: ***    Patient's personal belongings (please select all that are sent with patient):  {[facility] DME Belongings:630856880}    RN SIGNATURE:  {Esignature:653530831}    CASE MANAGEMENT/SOCIAL WORK SECTION    Inpatient Status Date: ***    Readmission Risk Assessment Score:  [facility] RISK OF UNPLANNED READMISSION 2.0             33.5 Total Score        Discharging to Facility/ Agency   Name:   Address:  Phone:  Fax:    Dialysis Facility (if applicable)   Name:  Address:  Dialysis Schedule:  Phone:  Fax:    / signature:

## 2025-05-09 NOTE — PROGRESS NOTES
[facility]   INPATIENT OCCUPATIONAL THERAPY  PROGRESS NOTE  Date: 2025  Patient Name: Graciela Holt       Room: -  MRN: [id number]    : 1950  (74 y.o.)  Gender: female   Referring Practitioner: Dr. Choudhary  Diagnosis: SOB; BLE edema; Acute exacerbation of CHF    Discharge Recommendations:  Further Occupational Therapy is recommended upon facility discharge.    OT Equipment Recommendations  Equipment Needed: Yes  Mobility Devices: ADL Assistive Devices  ADL Assistive Devices: Shower Chair with back, Grab Bars - shower    Restrictions/Precautions  Restrictions/Precautions  Restrictions/Precautions: Fall Risk;General Precautions  Activity Level: Up as Tolerated  Required Braces or Orthoses?: Yes  Implants Present? : Metal implants    Subjective  Subjective  Subjective: \"Oh yeah I feel good. I am hoping to go home tomorrow after breakfast.\"  Comments: Pt reports no concerns re: going home but is agreeable to therapy working with her if discharge is delayed.  Pt reports she is unable to follow up with HH therapy due to dogs in the home not being friendly.  Pt does not drive and roommates work so outpatient therapy would be difficult for her to attend.    Objective  Orientation  Overall Orientation Status: Within Functional Limits  Cognition  Overall Cognitive Status: Exceptions  Safety Judgement: Decreased awareness of need for safety (Pt minimizes concerns re: her report that she has been falling monthly and is non-compliant with C-collar/walker)  Problem Solving: Assistance required to generate solutions;Assistance required to implement solutions  Insights: Decreased awareness of deficits    Activities of Daily Living  Feeding: Independent  Feeding Skilled Clinical Factors: Per pt report.  Grooming: Stand by assistance  Grooming Skilled Clinical Factors: While standing at sink for hand hygiene.  Functional Mobility: Stand by assistance  Functional Mobility Skilled Clinical

## 2025-05-09 NOTE — PROGRESS NOTES
No [id number] delivery 5/9/25. Jardiance not in stock - transferred to [facility] at [address] [place] [address], [place] [address], phone # [phone]

## 2025-05-09 NOTE — PROGRESS NOTES
Nutrition Note    Pt does not want Soft/Bite Sized diet despite not having any teeth. She requests \"Regular food. Just cook it and put it on the plate. Don't cut it up.\" Diet changed to Regular    Electronically signed by Pati Ivy RD, LD on 5/9/25 at 1:42 PM EDT    Contact: [phone]

## 2025-05-09 NOTE — PROGRESS NOTES
Patient self administered lovenox shot with minimal assistance from writer. Pt to self administer without help tomorrow AM.

## 2025-05-09 NOTE — PROGRESS NOTES
Spiritual Health History and Assessment/Progress Note  [facility]    (P) Spiritual/Emotional Needs,  ,  ,      Name: Graciela Holt MRN: [id number]    Age: 74 y.o.     Sex: female   Language: English   Mandaen: None   SOB (shortness of breath)     Date: 5/9/2025                           Spiritual Assessment began in [facility] PROGRESSIVE CARE        Referral/Consult From: (P) Rounding   Encounter Overview/Reason: (P) Spiritual/Emotional Needs  Service Provided For: (P) Patient      PT lives with two other roommates. PT was happy to be discharged soon. Welcome prayer.    Yareli, Belief, Meaning:   Patient identifies as spiritual  Family/Friends No family/friends present      Importance and Influence:  Patient has spiritual/personal beliefs that influence decisions regarding their health  Family/Friends No family/friends present    Community:  Patient feels well-supported. Support system includes: Friends  Family/Friends No family/friends present    Assessment and Plan of Care:     Patient Interventions include: Facilitated expression of thoughts and feelings, Explored spiritual coping/struggle/distress, and Affirmed coping skills/support systems  Family/Friends Interventions include: No family/friends present    Patient Plan of Care: Spiritual Care available upon further referral  Family/Friends Plan of Care: No family/friends present    Electronically signed by Chaplain Meghann on 5/9/2025 at 6:55 PM

## 2025-05-10 VITALS
HEART RATE: 64 BPM | SYSTOLIC BLOOD PRESSURE: 116 MMHG | BODY MASS INDEX: 23.94 KG/M2 | DIASTOLIC BLOOD PRESSURE: 58 MMHG | OXYGEN SATURATION: 93 % | HEIGHT: 64 IN | RESPIRATION RATE: 18 BRPM | WEIGHT: 140.21 LBS | TEMPERATURE: 98.1 F

## 2025-05-10 LAB
ANION GAP SERPL CALCULATED.3IONS-SCNC: 6 MMOL/L (ref 9–16)
BASOPHILS # BLD: 0 K/UL (ref 0–0.2)
BASOPHILS NFR BLD: 0 % (ref 0–2)
BUN SERPL-MCNC: 26 MG/DL (ref 8–23)
CALCIUM SERPL-MCNC: 8.5 MG/DL (ref 8.6–10.4)
CHLORIDE SERPL-SCNC: 109 MMOL/L (ref 98–107)
CO2 SERPL-SCNC: 28 MMOL/L (ref 20–31)
CREAT SERPL-MCNC: 1.2 MG/DL (ref 0.7–1.2)
EOSINOPHIL # BLD: 0.08 K/UL (ref 0–0.4)
EOSINOPHILS RELATIVE PERCENT: 1 % (ref 0–4)
ERYTHROCYTE [DISTWIDTH] IN BLOOD BY AUTOMATED COUNT: 14.9 % (ref 11.5–14.9)
GFR, ESTIMATED: 47 ML/MIN/1.73M2
GLUCOSE SERPL-MCNC: 194 MG/DL (ref 74–99)
HCT VFR BLD AUTO: 28.1 % (ref 36–46)
HGB BLD-MCNC: 8.6 G/DL (ref 12–16)
IMM GRANULOCYTES # BLD AUTO: 0 K/UL (ref 0–0.3)
IMM GRANULOCYTES NFR BLD: 0 %
INR PPP: 1.4
LYMPHOCYTES NFR BLD: 0.49 K/UL (ref 1–4.8)
LYMPHOCYTES RELATIVE PERCENT: 6 % (ref 24–44)
MCH RBC QN AUTO: 26.2 PG (ref 26–34)
MCHC RBC AUTO-ENTMCNC: 30.6 G/DL (ref 31–37)
MCV RBC AUTO: 85.7 FL (ref 80–100)
MONOCYTES NFR BLD: 0.49 K/UL (ref 0.1–1.3)
MONOCYTES NFR BLD: 6 % (ref 1–7)
MORPHOLOGY: NORMAL
NEUTROPHILS NFR BLD: 87 % (ref 36–66)
NEUTS SEG NFR BLD: 7.04 K/UL (ref 1.3–9.1)
NRBC BLD-RTO: 0 PER 100 WBC
PLATELET # BLD AUTO: 232 K/UL (ref 150–450)
PMV BLD AUTO: 10.7 FL (ref 8–13.5)
POTASSIUM SERPL-SCNC: 4.1 MMOL/L (ref 3.7–5.3)
PROTHROMBIN TIME: 18.2 SEC (ref 11.8–14.6)
RBC # BLD AUTO: 3.28 M/UL (ref 3.95–5.11)
SODIUM SERPL-SCNC: 143 MMOL/L (ref 136–145)
WBC OTHER # BLD: 8.1 K/UL (ref 3.5–11)

## 2025-05-10 PROCEDURE — 6370000000 HC RX 637 (ALT 250 FOR IP)

## 2025-05-10 PROCEDURE — 94761 N-INVAS EAR/PLS OXIMETRY MLT: CPT

## 2025-05-10 PROCEDURE — 36415 COLL VENOUS BLD VENIPUNCTURE: CPT

## 2025-05-10 PROCEDURE — 80048 BASIC METABOLIC PNL TOTAL CA: CPT

## 2025-05-10 PROCEDURE — 6360000002 HC RX W HCPCS: Performed by: INTERNAL MEDICINE

## 2025-05-10 PROCEDURE — 85025 COMPLETE CBC W/AUTO DIFF WBC: CPT

## 2025-05-10 PROCEDURE — 85610 PROTHROMBIN TIME: CPT

## 2025-05-10 PROCEDURE — 94640 AIRWAY INHALATION TREATMENT: CPT

## 2025-05-10 PROCEDURE — 6370000000 HC RX 637 (ALT 250 FOR IP): Performed by: INTERNAL MEDICINE

## 2025-05-10 PROCEDURE — 2500000003 HC RX 250 WO HCPCS

## 2025-05-10 PROCEDURE — 6370000000 HC RX 637 (ALT 250 FOR IP): Performed by: NURSE PRACTITIONER

## 2025-05-10 PROCEDURE — 6360000002 HC RX W HCPCS

## 2025-05-10 RX ORDER — WARFARIN SODIUM 2.5 MG/1
2.5 TABLET ORAL DAILY
Qty: 90 TABLET | Refills: 0 | Status: ON HOLD | OUTPATIENT
Start: 2025-05-10

## 2025-05-10 RX ORDER — WARFARIN SODIUM 7.5 MG/1
7.5 TABLET ORAL
Status: COMPLETED | OUTPATIENT
Start: 2025-05-10 | End: 2025-05-10

## 2025-05-10 RX ADMIN — CARVEDILOL 3.12 MG: 3.12 TABLET, FILM COATED ORAL at 08:10

## 2025-05-10 RX ADMIN — BUDESONIDE AND FORMOTEROL FUMARATE DIHYDRATE 2 PUFF: 160; 4.5 AEROSOL RESPIRATORY (INHALATION) at 07:44

## 2025-05-10 RX ADMIN — BUDESONIDE AND FORMOTEROL FUMARATE DIHYDRATE 2 PUFF: 160; 4.5 AEROSOL RESPIRATORY (INHALATION) at 19:24

## 2025-05-10 RX ADMIN — ATORVASTATIN CALCIUM 80 MG: 80 TABLET, FILM COATED ORAL at 08:09

## 2025-05-10 RX ADMIN — AMIODARONE HYDROCHLORIDE 200 MG: 200 TABLET ORAL at 08:10

## 2025-05-10 RX ADMIN — SODIUM CHLORIDE, PRESERVATIVE FREE 10 ML: 5 INJECTION INTRAVENOUS at 08:46

## 2025-05-10 RX ADMIN — ENOXAPARIN SODIUM 60 MG: 100 INJECTION SUBCUTANEOUS at 08:09

## 2025-05-10 RX ADMIN — BUMETANIDE 2 MG: 0.25 INJECTION INTRAMUSCULAR; INTRAVENOUS at 08:31

## 2025-05-10 RX ADMIN — ENOXAPARIN SODIUM 60 MG: 100 INJECTION SUBCUTANEOUS at 17:33

## 2025-05-10 RX ADMIN — LOSARTAN POTASSIUM 25 MG: 25 TABLET, FILM COATED ORAL at 08:09

## 2025-05-10 RX ADMIN — PANTOPRAZOLE SODIUM 40 MG: 40 TABLET, DELAYED RELEASE ORAL at 17:33

## 2025-05-10 RX ADMIN — WARFARIN SODIUM 7.5 MG: 7.5 TABLET ORAL at 17:33

## 2025-05-10 RX ADMIN — Medication 325 MG: at 08:10

## 2025-05-10 RX ADMIN — EMPAGLIFLOZIN 10 MG: 10 TABLET, FILM COATED ORAL at 08:10

## 2025-05-10 RX ADMIN — ACETAMINOPHEN 650 MG: 325 TABLET ORAL at 08:09

## 2025-05-10 RX ADMIN — PANTOPRAZOLE SODIUM 40 MG: 40 TABLET, DELAYED RELEASE ORAL at 08:10

## 2025-05-10 ASSESSMENT — PAIN SCALES - GENERAL
PAINLEVEL_OUTOF10: 3
PAINLEVEL_OUTOF10: 5

## 2025-05-10 ASSESSMENT — PAIN DESCRIPTION - DESCRIPTORS: DESCRIPTORS: ACHING

## 2025-05-10 ASSESSMENT — PAIN - FUNCTIONAL ASSESSMENT: PAIN_FUNCTIONAL_ASSESSMENT: ACTIVITIES ARE NOT PREVENTED

## 2025-05-10 ASSESSMENT — PAIN DESCRIPTION - LOCATION: LOCATION: CHEST

## 2025-05-10 NOTE — PROGRESS NOTES
Coumadin listed as a home medication for pt, not newly prescribed at discharge. RN verified with pt if she was already taking coumadin at home, pt stated yes but she thinks she may be out of pills or almost out. RN messaged Dr. Anderson asking if he could send in a refill of coumadin to the [facility] on [facility].

## 2025-05-10 NOTE — PLAN OF CARE
Problem: Chronic Conditions and Co-morbidities  Goal: Patient's chronic conditions and co-morbidity symptoms are monitored and maintained or improved  5/10/2025 0307 by Veronica Betancourt RN  Outcome: Progressing  Flowsheets (Taken 5/8/2025 1811 by Juliet Dejesus, RN)  Care Plan - Patient's Chronic Conditions and Co-Morbidity Symptoms are Monitored and Maintained or Improved:   Monitor and assess patient's chronic conditions and comorbid symptoms for stability, deterioration, or improvement   Collaborate with multidisciplinary team to address chronic and comorbid conditions and prevent exacerbation or deterioration   Update acute care plan with appropriate goals if chronic or comorbid symptoms are exacerbated and prevent overall improvement and discharge  5/9/2025 1701 by Ann Ho RN  Outcome: Progressing     Problem: Safety - Adult  Goal: Free from fall injury  5/10/2025 0307 by Veronica Betancourt RN  Outcome: Progressing  Flowsheets (Taken 5/8/2025 1811 by Juliet Dejesus RN)  Free From Fall Injury: Instruct family/caregiver on patient safety  5/9/2025 1701 by Ann Ho RN  Outcome: Progressing     Problem: Skin/Tissue Integrity  Goal: Skin integrity remains intact  Description: 1.  Monitor for areas of redness and/or skin breakdown2.  Assess vascular access sites hourly3.  Every 4-6 hours minimum:  Change oxygen saturation probe site4.  Every 4-6 hours:  If on nasal continuous positive airway pressure, respiratory therapy assess nares and determine need for appliance change or resting period  5/10/2025 0307 by Veronica Betancourt RN  Outcome: Progressing  Flowsheets (Taken 5/7/2025 1619 by Juliet Dejesus, RN)  Skin Integrity Remains Intact: Monitor for areas of redness and/or skin breakdown  5/9/2025 1701 by Ann Ho RN  Outcome: Progressing     Problem: Respiratory - Adult  Goal: Achieves optimal ventilation and oxygenation  5/10/2025 0307 by Veronica Betancourt RN  Outcome:  Progressing  Flowsheets (Taken 5/7/2025 1619 by Juliet Dejesus, RN)  Achieves optimal ventilation and oxygenation:   Assess for changes in respiratory status   Respiratory therapy support as indicated   Assess and instruct to report shortness of breath or any respiratory difficulty  5/9/2025 1701 by Ann Ho, RN  Outcome: Progressing     Problem: Pain  Goal: Verbalizes/displays adequate comfort level or baseline comfort level  5/10/2025 0307 by Veronica Betancourt, RN  Outcome: Progressing  Flowsheets (Taken 5/8/2025 1811 by Juliet Dejesus, RN)  Verbalizes/displays adequate comfort level or baseline comfort level:   Assess pain using appropriate pain scale   Implement non-pharmacological measures as appropriate and evaluate response  5/9/2025 1701 by Ann Ho RN  Outcome: Progressing

## 2025-05-10 NOTE — PROGRESS NOTES
Pharmacy Note  Warfarin Consult follow-up      Recent Labs     05/08/25  0956 05/09/25  0538 05/10/25  0544   INR 1.2 1.2 1.4     Recent Labs     05/08/25  0540 05/09/25  0538   HGB 8.1* 7.9*   HCT 24.4* 25.7*    209       Significant Drug-Drug Interactions:  New warfarin drug-drug interactions: none  Discontinued drug-drug interactions: none  Current warfarin drug-drug interactions: amiodarone, atorvastatin      Date             INR        Dose given previous day  Dose scheduled for today  5/10/2025            1.4       7.5 mg           7.5 mg        Notes: INR subtherapeutic today at 1.4 (2-3). Will give a booster dose of warfarin 7.5 mg tonight. Patient also bridging with Lovenox 1 mg/kg SUBQ BID until INR > 2.                     Daily PT/INR while inpatient.     Rosemary Hu, PharmD, [facility]

## 2025-05-10 NOTE — CARE COORDINATION
Case Management   Daily Progress Note       Patient Name: Graciela Holt                   YOB: 1950  Diagnosis: Shortness of breath [R06.02]  SOB (shortness of breath) [R06.02]  Bilateral leg edema [R60.0]  Acute exacerbation of chronic heart failure (HCC) [I50.9]                       GMLOS: 3 days  Length of Stay: 3  days    Readmission Risk (Low < 19, Mod (19-27), High > 27): Readmission Risk Score: 32.9      Patient is alert and oriented.    Spoke with Patient, and Current Transitional Plan is:    [x] Home Independently    [] Home with HC    [] Skilled Nursing Facility    [] Acute Rehabilitation    [] Long Term Acute Care (LTAC)    [] Other:     Medical Management: Patient refused need for VNS. Patient will have a dose of Lovenox tonight and discharge to home. Explained to patient that she will need to follow up with Anti-coag clinic on Monday.      Testing Ordered:     Additional Notes:  Will follow for needs.     Electronically signed by Cheryl Randle on 5/10/2025 at 12:19 PM

## 2025-05-10 NOTE — PROGRESS NOTES
Discharge teaching and instructions for diagnosis of CHF completed with patient using teachback method. AVS reviewed. Escripted Rxs to home pharmacy. Patient voiced understanding regarding prescriptions, follow up appointments, and care of self at home. Denies questions. IV d/c'd with cath intact and drsg applied. Skin Pk/W/D. Easy respirations. Denies pain. Waiting for cab.

## 2025-05-10 NOTE — PROGRESS NOTES
Date:   5/10/2025  Patient name: Graciela Holt  Date of admission:  5/7/2025  4:55 AM  MRN:   [id number]  YOB: 1950  PCP: Travis Mason MD    Reason for Admission: Shortness of breath [R06.02]  SOB (shortness of breath) [R06.02]  Bilateral leg edema [R60.0]  Acute exacerbation of chronic heart failure (HCC) [I50.9]    Cardiology follow:HFrEF, coronary artery disease, CABG, ventricular arrhythmia        Referring physician: Dr Pearl Choudhary     Impression  Admission 5/7/2025 increasing swelling over the legs and shortness of breath  Multivessel CAD   CABG LIMA to LAD and diagonal 1, SVG to OM, SVG to PDA February 2018 at [facility]  Severely reduced LV systolic function ejection fraction 25 to 30%, akinetic LV apex, apical thrombus 2D echo 4/8/2024  Moderately increased LV wall thickness, enlarged LV 6.6 cm  Left parasternal lift  Episodes of nonsustained V. Tach, patient also had A-fib with RVR  Diabetes mellitus  Hyperlipidemia lipid profile 11/6/2024 cholesterol 159, HDL 55, LDL 95, triglyceride 41  History of left MCA stroke occluded internal carotid     Admission 4/15/2025 with a GI bleed, anemia, left leg hematoma status post fall patient was on warfarin also subcu Lovenox  EGD 4/17/2025 showed gastritis, 1 small AVM no bleeding she underwent APC/argon plasma coagulation  Admission 4/7/2025 increasing shortness of breath, mild chest pain, A-fib with RVR new onset  Repeat 2D echo 4/9/2025 ejection fraction 20 to 25%, dilated LV 6.6 cm severe dilated LA, mild AR MR and TR RVSP 38  Admission 12/28/2024 severe shortness of breath chest x-ray showed CHF proBNP 12,221 high-sensitivity troponin 46 and 41     Admission 11/5/2024 with a severe shortness of breath, CHF systolic acute on chronic  High-sensitivity troponin 42 and 29, proBNP 13,352  Admission 8/12/2024 chest pain like a heavy pressure radiating to the left arm, no new ECG changes, high-sensitivity troponin 28  Admission 6/17/2024  05/08/25  0540 05/09/25  0538 05/10/25  0544    143 143   K 3.6* 4.4 4.1   * 110* 109*   CO2 25 26 28   BUN 29* 27* 26*   CREATININE 1.3* 1.2 1.2   GLUCOSE 135* 157* 194*     Hepatic: No results for input(s): \"AST\", \"ALT\", \"BILITOT\", \"ALKPHOS\" in the last 72 hours.    Invalid input(s): \"ALB\"  Troponin: No results for input(s): \"TROPONINI\" in the last 72 hours.  BNP: No results for input(s): \"BNP\" in the last 72 hours.  Lipids: No results for input(s): \"CHOL\", \"HDL\" in the last 72 hours.    Invalid input(s): \"LDLCALCU\"  INR:   Recent Labs     05/08/25  0956 05/09/25  0538 05/10/25  0544   INR 1.2 1.2 1.4       Objective:   Vitals: BP (!) 150/86   Pulse 74   Temp 98.4 °F (36.9 °C) (Oral)   Resp 16   Ht 1.626 m (5' 4\")   Wt 63.6 kg (140 lb 3.4 oz)   SpO2 92%   BMI 24.07 kg/m²   General appearance: alert and cooperative with exam  HEENT: Head: Normal, normocephalic, atraumatic.  Neck: supple, symmetrical, trachea midline  Lungs: Poor chest expansion diminished breath sound both side  Heart: regular rate and rhythm  Abdomen:  Soft bowel sounds present  Extremities: Homans sign is negative, no sign of DVT  Neurologic: Mental status: Alert, oriented, thought content appropriate    EKG: Monitor sinus rhythm  ECHO: reviewed.   Ejection fraction: 25% dilated LV, moderate increased LV wall thickness Global hypokinesis, akinesis of the apical lateral and anterolateral walls akinetic apex dilated LA  Stress Test: reviewed.  Cardiac Angiography: reviewed    Assessment / Acute Cardiac Problems:     Admission 5/7/2020  increasing shortness of breath, increasing swelling over the legs, chest x-ray showed pulmonary edema and venous Doppler study negative for DVT lower extremities     Multivessel CAD, CABG LIMA to LAD and diagonal, SVG to OM and PDA February 2018 at [facility]  Severely reduced LV function ejection fraction 25%, akinetic apex, apical thrombus  Previous admission nonsustained ventricular  arrhythmias she is on amiodarone  She also has a history of nonsustained supraventricular arrhythmia  Patient  denied using LifeVest, does not want AICD      Patient Active Problem List:     Controlled type 2 diabetes mellitus without complication, without long-term current use of insulin ([facility])     Hypertension goal BP (blood pressure) < 140/90     Mixed hyperlipidemia     Chronic obstructive pulmonary disease ([facility])     Coronary artery disease involving native coronary artery of native heart without angina pectoris     Primary insomnia     Restless leg syndrome     Atherosclerosis of superior mesenteric artery     Left adrenal mass     Former smoker     Chronic bilateral low back pain with left-sided sciatica     Hypothyroidism     S/P CABG x 4     Chronic atrial fibrillation ([facility])     Tobacco use disorder     Internal carotid artery occlusion     Stenosis of left internal carotid artery     Acquired spondylolisthesis of cervical vertebra     Stenosis of cervical spine with myelopathy ([facility])     Iron deficiency anemia     Chronic combined systolic and diastolic congestive heart failure ([facility])     Type 2 diabetes mellitus with chronic kidney disease     CHF (congestive heart failure), NYHA class I, acute on chronic, combined ([facility])     Noncompliance     Moderate malnutrition     Acute on chronic diastolic (congestive) heart failure ([facility])     Compression fracture of C1 vertebra, initial encounter ([facility])     Cervical spine instability     Traumatic hematoma of left knee     Anemia     AVM (arteriovenous malformation) of stomach, acquired     Gastritis without bleeding     Hiatal hernia     At risk for fall associated with hospitalization     SOB (shortness of breath)     Acute exacerbation of chronic heart failure ([facility])      Plan of Treatment:   Medications reviewed     1:Ischemic cardiomyopathy congestive heart failure systolic and diastolic  Change Bumex 2 mg po daily, losartan 25 mg daily, add Aldactone 25 mg daily,

## 2025-05-10 NOTE — DISCHARGE INSTR - DIET

## 2025-05-12 ENCOUNTER — TELEPHONE (OUTPATIENT)
Dept: FAMILY MEDICINE CLINIC | Age: 75
End: 2025-05-12

## 2025-05-12 ENCOUNTER — HOSPITAL ENCOUNTER (EMERGENCY)
Age: 75
Discharge: HOME | End: 2025-05-12
Attending: EMERGENCY MEDICINE
Payer: COMMERCIAL

## 2025-05-12 ENCOUNTER — ANTI-COAG VISIT (OUTPATIENT)
Age: 75
End: 2025-05-12
Payer: COMMERCIAL

## 2025-05-12 ENCOUNTER — APPOINTMENT (OUTPATIENT)
Dept: CT IMAGING | Age: 75
End: 2025-05-12
Payer: COMMERCIAL

## 2025-05-12 ENCOUNTER — TELEPHONE (OUTPATIENT)
Age: 75
End: 2025-05-12

## 2025-05-12 VITALS
DIASTOLIC BLOOD PRESSURE: 67 MMHG | OXYGEN SATURATION: 100 % | BODY MASS INDEX: 23.32 KG/M2 | HEART RATE: 65 BPM | RESPIRATION RATE: 18 BRPM | WEIGHT: 140 LBS | HEIGHT: 65 IN | SYSTOLIC BLOOD PRESSURE: 143 MMHG

## 2025-05-12 DIAGNOSIS — I48.20 CHRONIC ATRIAL FIBRILLATION (HCC): Primary | ICD-10-CM

## 2025-05-12 DIAGNOSIS — S09.90XA CLOSED HEAD INJURY, INITIAL ENCOUNTER: Primary | ICD-10-CM

## 2025-05-12 LAB
ALBUMIN SERPL-MCNC: 3.5 G/DL (ref 3.5–5.2)
ALP SERPL-CCNC: 108 U/L (ref 35–104)
ALT SERPL-CCNC: 20 U/L (ref 10–35)
ANION GAP SERPL CALCULATED.3IONS-SCNC: 7 MMOL/L (ref 9–16)
AST SERPL-CCNC: 22 U/L (ref 10–35)
BASOPHILS # BLD: 0.1 K/UL (ref 0–0.2)
BASOPHILS NFR BLD: 1 % (ref 0–2)
BILIRUB SERPL-MCNC: 0.3 MG/DL (ref 0–1.2)
BUN SERPL-MCNC: 32 MG/DL (ref 8–23)
CALCIUM SERPL-MCNC: 8.8 MG/DL (ref 8.6–10.4)
CHLORIDE SERPL-SCNC: 106 MMOL/L (ref 98–107)
CO2 SERPL-SCNC: 29 MMOL/L (ref 20–31)
CREAT SERPL-MCNC: 1.3 MG/DL (ref 0.7–1.2)
DATE, STOOL #1: NORMAL
EOSINOPHIL # BLD: 0.2 K/UL (ref 0–0.4)
EOSINOPHILS RELATIVE PERCENT: 4 % (ref 0–4)
ERYTHROCYTE [DISTWIDTH] IN BLOOD BY AUTOMATED COUNT: 16.1 % (ref 11.5–14.9)
GFR, ESTIMATED: 43 ML/MIN/1.73M2
GLUCOSE SERPL-MCNC: 138 MG/DL (ref 74–99)
HCT VFR BLD AUTO: 27.5 % (ref 36–46)
HEMOCCULT SP1 STL QL: NEGATIVE
HGB BLD-MCNC: 8.9 G/DL (ref 12–16)
INR PPP: 2
INTERNATIONAL NORMALIZATION RATIO, POC: 2.3
LYMPHOCYTES NFR BLD: 1 K/UL (ref 1–4.8)
LYMPHOCYTES RELATIVE PERCENT: 19 % (ref 24–44)
MCH RBC QN AUTO: 26.3 PG (ref 26–34)
MCHC RBC AUTO-ENTMCNC: 32.5 G/DL (ref 31–37)
MCV RBC AUTO: 81.1 FL (ref 80–100)
MONOCYTES NFR BLD: 0.6 K/UL (ref 0.1–1.3)
MONOCYTES NFR BLD: 12 % (ref 1–7)
NEUTROPHILS NFR BLD: 64 % (ref 36–66)
NEUTS SEG NFR BLD: 3.4 K/UL (ref 1.3–9.1)
PLATELET # BLD AUTO: 250 K/UL (ref 150–450)
PMV BLD AUTO: 8.7 FL (ref 6–12)
POTASSIUM SERPL-SCNC: 4 MMOL/L (ref 3.7–5.3)
PROT SERPL-MCNC: 5.9 G/DL (ref 6.6–8.7)
PROTHROMBIN TIME, POC: 27.9
PROTHROMBIN TIME: 24.6 SEC (ref 11.8–14.6)
RBC # BLD AUTO: 3.39 M/UL (ref 4–5.2)
SODIUM SERPL-SCNC: 142 MMOL/L (ref 136–145)
TIME, STOOL #1: 1515
WBC OTHER # BLD: 5.4 K/UL (ref 3.5–11)

## 2025-05-12 PROCEDURE — 85610 PROTHROMBIN TIME: CPT

## 2025-05-12 PROCEDURE — 80053 COMPREHEN METABOLIC PANEL: CPT

## 2025-05-12 PROCEDURE — 99212 OFFICE O/P EST SF 10 MIN: CPT

## 2025-05-12 PROCEDURE — 82270 OCCULT BLOOD FECES: CPT

## 2025-05-12 PROCEDURE — 99284 EMERGENCY DEPT VISIT MOD MDM: CPT

## 2025-05-12 PROCEDURE — 85025 COMPLETE CBC W/AUTO DIFF WBC: CPT

## 2025-05-12 PROCEDURE — 70450 CT HEAD/BRAIN W/O DYE: CPT

## 2025-05-12 PROCEDURE — 36415 COLL VENOUS BLD VENIPUNCTURE: CPT

## 2025-05-12 PROCEDURE — 99211 OFF/OP EST MAY X REQ PHY/QHP: CPT

## 2025-05-12 NOTE — TELEPHONE ENCOUNTER
Patient discharged from hospital on 5/10 after admission  5/7-5/10 for SOB / heart failure exacerbation.  Patient INR on 5/10 was 1.4.      INR/warfarin history while hospitalized below:    5/7 1.1 5mg  5/8 1.2 5mg  5/9 1.2 7.5mg  5/10 1.4 7.5mg    Discharged on 5/10 with AVS that indicated patient had warfarin 2.5mg tablets but was to take 1 tablet but also 5mg each day.   Indicated patient was to follow up with [facility] today 5/12.  Patient was given enoxaparin in hospital but was NOT discharged with enoxaparin. Called to speak to patient but granddaughter - Luana picked up and indicated she was at work but patient was at home with her . Agreed to bring in patient at 2pm for INR monitoring. Will confirm what dose patient is taking at that time.  Patient does have warfarin 2.5mg tablets at home.     Soledad Barcenas [facility],Pharm.D,, BCPS, CACP  5/12/2025  8:37 AM

## 2025-05-12 NOTE — PROGRESS NOTES
Patient seen in person in Medication Management Service.    Patient states compliant all of the time with regimen.    Pt reports a Fall last night and has a large raised hematoma on hr forehead, pt did not go to ED to check. Pt also reports observing blood in stool today.   No significant med or dietary changes.    No significant recent illness or disease state changes.      5/71.15mg  5/81.25mg  5/91.27.5mg  5/101.4       7.5mg  Patient acknowledges they are still an active patient of referring provider which is Dr. Mason Yes     PT/INR done via POC meter per protocol.  INR was therapeutic at 2.3.  (goal 2 - 3)    Warfarin regimen will be held. Pt has a recent history of GI bleed.   Pt and family have been advised to go to ED to check on blood in stool and also head hematoma after fall. Pt's granddaughter will call back to make a new appt if pt will not be admitted.       Patient understands dosing directions and information discussed.  Dosing schedule and follow up appointment given to patient. Progress note routed to referring physicians office.  Patient acknowledges working in Consult Agreement with Pharmacist as referred by his/her physician/provider.      For Pharmacy Admin Tracking Only    Intervention Detail: Adherence Monitorin and Dose Adjustment: 1, reason: Therapy Optimization  Total # of Interventions Recommended: 2  Total # of Interventions Accepted: 2  Time Spent (min): 20    Ellyn Givens PharmD, BCPS 2025 2:10 PM

## 2025-05-12 NOTE — ED PROVIDER NOTES
[facility] EMERGENCY DEPARTMENT  EMERGENCY DEPARTMENT ENCOUNTER      Pt Name: Graciela Holt  MRN: [id number]  Birthdate 1950  Date of evaluation: 5/12/25      CHIEF COMPLAINT       Chief Complaint   Patient presents with    Rectal Bleeding         HISTORY OF PRESENT ILLNESS   HPI 74 y.o. female presents with c/o blood in her stool.  Patient also had a fall with head injury.  Patient was brought to the emergency department by her family.  Patient reports that she tripped and fell at home last night she hit the right side of her head.  She says she did not pass out.  Family wanted her to come to the emergency department but she did not want to go, she had a home nurse come today saw the bruise on her forehead and because she is on anticoagulation they stressed the importance of coming to get checked out.  She is not having any headache she does not have any nausea or vomiting.  Patient also reports that her stool was very dark today and she is worried that she might be having some blood in her stool.  She denies any lightheadedness dizziness chest pain or shortness of breath.  .  REVIEW OF SYSTEMS     Review of Systems  10 systems reviewed and negative unless otherwise noted in the HPI.     PAST MEDICAL HISTORY     Past Medical History:   Diagnosis Date    Allergic rhinitis     Arthritis     general    CAD (coronary artery disease)     Dr. Fowler/ [place]    Cerebral artery occlusion with cerebral infarction ([facility]) 07/2020    pt states mild stroke    CHF (congestive heart failure) ([facility])     Controlled type 2 diabetes mellitus without complication, without long-term current use of insulin (HCC) 09/10/2015    COPD (chronic obstructive pulmonary disease) ([facility])     Depression     Former smoker 10/06/2015    History of blood transfusion     no reaction    Hyperlipidemia     Hypertension     Kidney stone     Myocardial infarction ([facility])     Obesity, Class I, BMI 30.0-34.9 (see actual BMI) 02/11/2016    Restless leg  encounter.    DISCHARGE PRESCRIPTIONS:  Discharge Medication List as of 5/12/2025  4:09 PM        PHYSICIAN CONSULTS ORDERED THIS ENCOUNTER:  None     FINAL IMPRESSION      1. Closed head injury, initial encounter          DISPOSITION/PLAN   DISPOSITION Decision To Discharge 05/12/2025 04:09:23 PM   DISPOSITION CONDITION Stable           PATIENT REFERRED TO:  Travis Mason MD  [address] [address]  [phone]    In 3 days      [facility] Emergency Department  [address] [facility] [address]  [phone]          DISCHARGE MEDICATIONS:  Discharge Medication List as of 5/12/2025  4:09 PM            Samm Alvarez MD  Attending Emergency Physician                      Samm Alvarez MD  05/13/25 0834

## 2025-05-12 NOTE — TELEPHONE ENCOUNTER
[facility] Transitions Initial Follow Up Call    Outreach made within 2 business days of discharge: Yes    Patient: Graciela Holt Patient : 1950   MRN: [id number]  Reason for Admission: There are no discharge diagnoses documented for the most recent discharge.  Discharge Date: 5/10/25       Spoke with: LM WITH FAMILY TO HAVE PATIENT RETURN CALL    If Virtual visit made for hospital follow up, advise patient to make sure to have family member present to help assist with visit. Please make sure mobile number is updated in patient chart.     Discharge department/facility: [facility] Interactive Patient Contact:  Was patient able to fill all prescriptions:   Was patient instructed to bring all medications to the follow-up visit:   Is patient taking all medications as directed in the discharge summary?   Does patient understand their discharge instructions:   Does patient have questions or concerns that need addressed prior to 7-14 day follow up office visit:     Scheduled appointment with PCP within 7-14 days    Follow Up  Future Appointments   Date Time Provider Department Center   2025  2:10 PM [facility] MEDICATION MGMT ROOM 3 [facility] MED MGMT [facility]   2025  3:00 PM Yessica Flynn DO [facility]   2025  1:00 PM [facility] CHF CLINIC RM 1 [facility] CHFCLIN [place]   2025 12:00 PM Travis Mason MD [facility] DEP       Verona Montenegro MA

## 2025-05-13 ENCOUNTER — TELEPHONE (OUTPATIENT)
Age: 75
End: 2025-05-13

## 2025-05-13 ENCOUNTER — TELEPHONE (OUTPATIENT)
Dept: FAMILY MEDICINE CLINIC | Age: 75
End: 2025-05-13

## 2025-05-13 NOTE — TELEPHONE ENCOUNTER
[facility] Transitions Initial Follow Up Call    Outreach made within 2 business days of discharge: Yes    Patient: Graciela Holt Patient : 1950   MRN: [id number]  Reason for Admission: There are no discharge diagnoses documented for the most recent discharge.  Discharge Date: 25       Spoke with: ROSE/ARMIDA WITH ROSE TO HAVE PATIENT RETURN CALL    If Virtual visit made for hospital follow up, advise patient to make sure to have family member present to help assist with visit. Please make sure mobile number is updated in patient chart.     Discharge department/facility: [facility] Interactive Patient Contact:  Was patient able to fill all prescriptions:   Was patient instructed to bring all medications to the follow-up visit:   Is patient taking all medications as directed in the discharge summary?   Does patient understand their discharge instructions:   Does patient have questions or concerns that need addressed prior to 7-14 day follow up office visit:     Scheduled appointment with PCP within 7-14 days    Follow Up  Future Appointments   Date Time Provider Department Center   2025  1:00 PM [facility] CHF CLINIC  1 [facility] CHFCLIN [place]   2025  3:00 PM Yessica Flynn DO [facility]   2025 12:00 PM Travis Mason MD [facility] DEP       Verona Montenegro MA

## 2025-05-13 NOTE — TELEPHONE ENCOUNTER
Pt has been seen at [facility] ED on 5/12/2025 for hematoma. Brain and GI bleed ruled out.   Called pt to follow up, left message.   Will schedule appt for 5/19/2025 for INR check. Pt was instructed to take warfarin 2.5 mg daily until next INR check, will not increase dose at this time due to facial hematoma.     Ellyn Givens PharmD, BCPS 5/13/2025 5:13 PM

## 2025-05-14 ENCOUNTER — TELEPHONE (OUTPATIENT)
Dept: FAMILY MEDICINE CLINIC | Age: 75
End: 2025-05-14

## 2025-05-14 NOTE — TELEPHONE ENCOUNTER
[facility] ED Follow up Call    Reason for ED visit:  HEAD INJURY/RECTAL BLEEDING     #2//LM WITH ROSE TO HAVE PATIENT CALL OFFICE    Atilio Owens , this is WALLACE from Travis Vaughn's office, just calling to see how you are doing after your recent ED visit.    Did you receive discharge instructions?    Do you understand the discharge instructions?   Did the ED give you any new prescriptions?   Were you able to fill your prescriptions?       Do you have one of our red, yellow and green  Zone sheets that help you to determine when you should go to the ED?      Do you need or want to make a follow up appt with your PCP?    Do you have any further needs in the home i.e. Equipment?          FU appts/Provider:    Future Appointments   Date Time Provider Department Center   5/19/2025 12:15 PM [facility] MEDICATION MGMT ROOM 3 [facility] MED [facility]   5/19/2025  1:00 PM [facility] CHF CLINIC RM 1 [facility] [place]   8/4/2025  3:00 PM Yessica Flynn DO [facility]   8/6/2025 12:00 PM Travis Mason MD [facility] DEP

## 2025-05-19 ENCOUNTER — HOSPITAL ENCOUNTER (OUTPATIENT)
Dept: OTHER | Age: 75
Discharge: HOME | End: 2025-05-19
Payer: COMMERCIAL

## 2025-05-19 ENCOUNTER — ANTI-COAG VISIT (OUTPATIENT)
Age: 75
End: 2025-05-19
Payer: COMMERCIAL

## 2025-05-19 ENCOUNTER — HOSPITAL ENCOUNTER (OUTPATIENT)
Age: 75
Setting detail: SPECIMEN
Discharge: HOME | End: 2025-05-19
Payer: COMMERCIAL

## 2025-05-19 VITALS
RESPIRATION RATE: 18 BRPM | HEIGHT: 65 IN | WEIGHT: 141.8 LBS | OXYGEN SATURATION: 100 % | HEART RATE: 59 BPM | DIASTOLIC BLOOD PRESSURE: 72 MMHG | SYSTOLIC BLOOD PRESSURE: 140 MMHG | BODY MASS INDEX: 23.63 KG/M2

## 2025-05-19 DIAGNOSIS — I50.42 CHRONIC COMBINED SYSTOLIC AND DIASTOLIC CONGESTIVE HEART FAILURE (HCC): Primary | ICD-10-CM

## 2025-05-19 DIAGNOSIS — R34 DECREASED URINATION: ICD-10-CM

## 2025-05-19 DIAGNOSIS — I48.20 CHRONIC ATRIAL FIBRILLATION (HCC): Primary | ICD-10-CM

## 2025-05-19 DIAGNOSIS — R41.0 CONFUSION: Primary | ICD-10-CM

## 2025-05-19 DIAGNOSIS — E55.9 VITAMIN D DEFICIENCY: ICD-10-CM

## 2025-05-19 DIAGNOSIS — I50.42 CHRONIC COMBINED SYSTOLIC AND DIASTOLIC CONGESTIVE HEART FAILURE (HCC): ICD-10-CM

## 2025-05-19 DIAGNOSIS — E11.9 CONTROLLED TYPE 2 DIABETES MELLITUS WITHOUT COMPLICATION, WITHOUT LONG-TERM CURRENT USE OF INSULIN (HCC): Chronic | ICD-10-CM

## 2025-05-19 LAB
25(OH)D3 SERPL-MCNC: 10 NG/ML (ref 30–100)
ANION GAP SERPL CALCULATED.3IONS-SCNC: 8 MMOL/L (ref 9–16)
BNP SERPL-MCNC: 3180 PG/ML (ref 0–300)
BUN SERPL-MCNC: 27 MG/DL (ref 8–23)
CALCIUM SERPL-MCNC: 9 MG/DL (ref 8.6–10.4)
CHLORIDE SERPL-SCNC: 110 MMOL/L (ref 98–107)
CO2 SERPL-SCNC: 24 MMOL/L (ref 20–31)
CREAT SERPL-MCNC: 1.4 MG/DL (ref 0.7–1.2)
GFR, ESTIMATED: 39 ML/MIN/1.73M2
GLUCOSE SERPL-MCNC: 154 MG/DL (ref 74–99)
INTERNATIONAL NORMALIZATION RATIO, POC: 1.2
MAGNESIUM SERPL-MCNC: 2 MG/DL (ref 1.6–2.4)
POTASSIUM SERPL-SCNC: 4.8 MMOL/L (ref 3.7–5.3)
PROTHROMBIN TIME, POC: 14.1
SODIUM SERPL-SCNC: 142 MMOL/L (ref 136–145)
URATE SERPL-MCNC: 4.5 MG/DL (ref 2.4–5.7)

## 2025-05-19 PROCEDURE — 85610 PROTHROMBIN TIME: CPT

## 2025-05-19 PROCEDURE — 83880 ASSAY OF NATRIURETIC PEPTIDE: CPT

## 2025-05-19 PROCEDURE — 36415 COLL VENOUS BLD VENIPUNCTURE: CPT

## 2025-05-19 PROCEDURE — 84550 ASSAY OF BLOOD/URIC ACID: CPT

## 2025-05-19 PROCEDURE — 80048 BASIC METABOLIC PNL TOTAL CA: CPT

## 2025-05-19 PROCEDURE — 99212 OFFICE O/P EST SF 10 MIN: CPT

## 2025-05-19 PROCEDURE — 83735 ASSAY OF MAGNESIUM: CPT

## 2025-05-19 PROCEDURE — 99211 OFF/OP EST MAY X REQ PHY/QHP: CPT

## 2025-05-19 PROCEDURE — 82306 VITAMIN D 25 HYDROXY: CPT

## 2025-05-19 NOTE — PROGRESS NOTES
[facility]  Heart Failure Clinic      [address]  [place] [address]  Phone: [phone]  Fax: [phone]      NAME: Graciela Holt  MEDICAL RECORD NUMBER:  [id number]  AGE: 74 y.o.   GENDER: female  : 1950  EPISODE DATE:  2025      Graciela Holt was referred to the [facility] by Dr. Castro for heart failure services.She was followed by Cardiologist, Dr. SANDRITA Flynn during her hospitalization but has plans to establish care with Cardiologist, Dr. SAMINA Mann of [facility] on 25                ECHO EF%: 20-25%  Date: 25       Recent Cardiology follow-up apt: none  Follow-up apt recommended: Call to reschedule apt  Upcoming testing: unknown  Medication Management: Currently for anticoagulation, Requested for Heart Failure  Nephrologist: n/a     Graciela Holt is a 74 y.o. female here for the Heart Failure Services.  She is here today for a Heart Failure assessment/consultation, monitoring medication effectiveness, reviewing necessary labs, transition of care, extensive Heart Failure education, reporting any concerns or symptoms and obtaining any necessary medication adjustments or orders from the patients health care team.    Vital Signs:   BP (!) 140/72   Pulse 59   Resp 18   Ht 1.651 m (5' 5\")   Wt 64.3 kg (141 lb 12.8 oz)   SpO2 100%   BMI 23.60 kg/m²    O2 Device: None (Room air)        Weight loss/gain +9.2lbs      Nursing Assessment:    Respiratory:    Assessment  Charting Type: Reassessment    Breath Sounds  Respiratory Pattern: Regular  Breath Sounds Bilateral: Clear  Right Upper Lobe: Clear  Right Middle Lobe: Clear  Right Lower Lobe: Clear  Left Upper Lobe: Clear  Left Lower Lobe: Clear    Cough/Sputum  Cough: Non-productive, Dry  Frequency: Frequent    Cardiac  Cardiac Regularity: Regular  Heart Sounds: S1, S2  Jugular Vein Distention (JVD) Present: No  Cardiac Symptoms: Peripheral edema, Lightheaded (lightheadedness with position

## 2025-05-19 NOTE — PROGRESS NOTES
Patient seen in person in Medication Management Service.    Patient states compliant all of the time with regimen.    No bleeding or thromboembolic side effects noted.    No significant med or dietary changes.    No significant recent illness or disease state changes.      Patient acknowledges they are still an active patient of referring provider which is Dr. Ruiz Yes     PT/INR done via POC meter per protocol.  INR was subtherapeutic at 1.2.  (goal 2 - 3)    Warfarin regimen will be increased to 5 mg Mon and Wed and 2.5 mg all other days.  Will retest in 4 days.    Patient understands dosing directions and information discussed.  Dosing schedule and follow up appointment given to patient. Progress note routed to referring physicians office.  Patient acknowledges working in Consult Agreement with Pharmacist as referred by his/her physician/provider.      For Pharmacy Admin Tracking Only    Intervention Detail: Adherence Monitorin and Dose Adjustment: 1, reason: Therapy Optimization  Total # of Interventions Recommended: 2  Total # of Interventions Accepted: 2  Time Spent (min): 20    Ellyn Givens PharmD, BCPS 2025 12:12 PM\

## 2025-05-20 ENCOUNTER — TELEPHONE (OUTPATIENT)
Dept: OTHER | Age: 75
End: 2025-05-20

## 2025-05-20 NOTE — TELEPHONE ENCOUNTER
Travis Mason gives order for pt to take an extra Bumex 1 mg daily for 3 days and then repeat BMP, also a UA today due to increased confusion.     Called and left second message for pt and daughter with new instructions and asked for a return call to confirm. Gave help line for assistance in obtaining Jardiance discount at [phone].

## 2025-05-20 NOTE — PROGRESS NOTES
Physician Progress Note      PATIENT:               IVETT ANDRE  CSN #:                  [id number]  :                       1950  ADMIT DATE:       2025 4:55 AM  DISCH DATE:        5/10/2025 8:52 PM  RESPONDING  PROVIDER #:        Chip Anderson MD          QUERY TEXT:    Congestive Heart Failure is documented in the medical record on  H&P by   Pearl Choudhary MD.  Please document further specificity regarding the most   likely etiology of the CHF:    The clinical indicators include:  Age 74yrs, HTN, CAD, ICMP      -\"Acute on chronic systolic heart failure - baseline EF 20-25%\" on  H&P   by IM  -\"Ischemic cardiomyopathy congestive heart failure systolic and diastolic\" on    CN by cardiology  -\"Multivessel CAD, Hypertension \" on  PN by cardiology     CXR-Mild improvement of pulmonary edema  Per H&P: Breath sounds: Rales (bibasilar) present  Repeat 2D echo 2025 ejection fraction 20 to 25%    BNP- 4576    Treatment- IV Lasix, IV Bumex, Oral Coreg, IVF, CXR, serial labs    Holly Yu [facility] CDS  Options provided:  -- CHF related to Hypertensive Heart Disease  -- CHF related to Hypertensive Heart Disease and CAD  -- CHF not related to Hypertension but related to CAD  -- CHF related to Hypertensive Heart Disease and ICMP  -- CHF not related to Hypertension but related to ICMP  -- CHF related to Hypertensive Heart Disease, CAD and ICMP  -- Other - I will add my own diagnosis  -- Disagree - Not applicable / Not valid  -- Disagree - Clinically unable to determine / Unknown  -- Refer to Clinical Documentation Reviewer    PROVIDER RESPONSE TEXT:    This patient has CHF related to hypertensive heart disease.    Query created by: Holly Sun on 2025 6:09 AM      Electronically signed by:  Chip Anderson MD 2025 3:47 PM

## 2025-05-21 ENCOUNTER — TELEPHONE (OUTPATIENT)
Dept: OTHER | Age: 75
End: 2025-05-21

## 2025-05-21 ENCOUNTER — HOSPITAL ENCOUNTER (EMERGENCY)
Age: 75
Discharge: HOME | DRG: 291 | End: 2025-05-21
Attending: EMERGENCY MEDICINE
Payer: COMMERCIAL

## 2025-05-21 ENCOUNTER — APPOINTMENT (OUTPATIENT)
Dept: GENERAL RADIOLOGY | Age: 75
DRG: 291 | End: 2025-05-21
Payer: COMMERCIAL

## 2025-05-21 ENCOUNTER — RESULTS FOLLOW-UP (OUTPATIENT)
Dept: FAMILY MEDICINE CLINIC | Age: 75
End: 2025-05-21

## 2025-05-21 VITALS
TEMPERATURE: 97.5 F | SYSTOLIC BLOOD PRESSURE: 142 MMHG | WEIGHT: 141 LBS | HEART RATE: 72 BPM | DIASTOLIC BLOOD PRESSURE: 75 MMHG | BODY MASS INDEX: 24.07 KG/M2 | OXYGEN SATURATION: 95 % | RESPIRATION RATE: 19 BRPM | HEIGHT: 64 IN

## 2025-05-21 DIAGNOSIS — N30.01 ACUTE CYSTITIS WITH HEMATURIA: ICD-10-CM

## 2025-05-21 DIAGNOSIS — I50.42 CHRONIC COMBINED SYSTOLIC AND DIASTOLIC CONGESTIVE HEART FAILURE (HCC): ICD-10-CM

## 2025-05-21 DIAGNOSIS — R60.0 PERIPHERAL EDEMA: Primary | ICD-10-CM

## 2025-05-21 DIAGNOSIS — E55.9 VITAMIN D DEFICIENCY: Primary | ICD-10-CM

## 2025-05-21 LAB
AMORPH SED URNS QL MICRO: ABNORMAL
ANION GAP SERPL CALCULATED.3IONS-SCNC: 9 MMOL/L (ref 9–16)
BACTERIA URNS QL MICRO: ABNORMAL
BILIRUB UR QL STRIP: NEGATIVE
BNP SERPL-MCNC: 2110 PG/ML (ref 0–300)
BUN SERPL-MCNC: 21 MG/DL (ref 8–23)
CALCIUM SERPL-MCNC: 8.8 MG/DL (ref 8.6–10.4)
CASTS #/AREA URNS LPF: ABNORMAL /LPF
CASTS #/AREA URNS LPF: ABNORMAL /LPF
CHLORIDE SERPL-SCNC: 112 MMOL/L (ref 98–107)
CLARITY UR: ABNORMAL
CO2 SERPL-SCNC: 22 MMOL/L (ref 20–31)
COLOR UR: YELLOW
CREAT SERPL-MCNC: 1.1 MG/DL (ref 0.7–1.2)
EPI CELLS #/AREA URNS HPF: ABNORMAL /HPF
GFR, ESTIMATED: 52 ML/MIN/1.73M2
GLUCOSE SERPL-MCNC: 143 MG/DL (ref 74–99)
GLUCOSE UR STRIP-MCNC: NEGATIVE MG/DL
HGB UR QL STRIP.AUTO: ABNORMAL
INR PPP: 1.4
KETONES UR STRIP-MCNC: NEGATIVE MG/DL
LEUKOCYTE ESTERASE UR QL STRIP: ABNORMAL
MYOGLOBIN SERPL-MCNC: 53 NG/ML (ref 25–58)
NITRITE UR QL STRIP: POSITIVE
PARTIAL THROMBOPLASTIN TIME: 29 SEC (ref 24–36)
PH UR STRIP: 5 [PH] (ref 5–8)
POTASSIUM SERPL-SCNC: 4.9 MMOL/L (ref 3.7–5.3)
PROT UR STRIP-MCNC: ABNORMAL MG/DL
PROTHROMBIN TIME: 18.9 SEC (ref 11.8–14.6)
RBC #/AREA URNS HPF: ABNORMAL /HPF
SODIUM SERPL-SCNC: 143 MMOL/L (ref 136–145)
SP GR UR STRIP: 1.02 (ref 1–1.03)
TROPONIN I SERPL HS-MCNC: 35 NG/L (ref 0–14)
UROBILINOGEN UR STRIP-ACNC: NORMAL EU/DL (ref 0–1)
WBC #/AREA URNS HPF: ABNORMAL /HPF

## 2025-05-21 PROCEDURE — 84484 ASSAY OF TROPONIN QUANT: CPT

## 2025-05-21 PROCEDURE — 93005 ELECTROCARDIOGRAM TRACING: CPT | Performed by: EMERGENCY MEDICINE

## 2025-05-21 PROCEDURE — 87086 URINE CULTURE/COLONY COUNT: CPT

## 2025-05-21 PROCEDURE — 83874 ASSAY OF MYOGLOBIN: CPT

## 2025-05-21 PROCEDURE — 87088 URINE BACTERIA CULTURE: CPT

## 2025-05-21 PROCEDURE — 36415 COLL VENOUS BLD VENIPUNCTURE: CPT

## 2025-05-21 PROCEDURE — 6370000000 HC RX 637 (ALT 250 FOR IP): Performed by: EMERGENCY MEDICINE

## 2025-05-21 PROCEDURE — 71045 X-RAY EXAM CHEST 1 VIEW: CPT

## 2025-05-21 PROCEDURE — 85730 THROMBOPLASTIN TIME PARTIAL: CPT

## 2025-05-21 PROCEDURE — 80048 BASIC METABOLIC PNL TOTAL CA: CPT

## 2025-05-21 PROCEDURE — 87186 SC STD MICRODIL/AGAR DIL: CPT

## 2025-05-21 PROCEDURE — 85610 PROTHROMBIN TIME: CPT

## 2025-05-21 PROCEDURE — 83880 ASSAY OF NATRIURETIC PEPTIDE: CPT

## 2025-05-21 PROCEDURE — 81001 URINALYSIS AUTO W/SCOPE: CPT

## 2025-05-21 RX ORDER — ERGOCALCIFEROL 1.25 MG/1
50000 CAPSULE, LIQUID FILLED ORAL WEEKLY
Qty: 12 CAPSULE | Refills: 1 | Status: SHIPPED | OUTPATIENT
Start: 2025-05-21

## 2025-05-21 RX ORDER — CEPHALEXIN 500 MG/1
500 CAPSULE ORAL 2 TIMES DAILY
Qty: 14 CAPSULE | Refills: 0 | Status: ON HOLD | OUTPATIENT
Start: 2025-05-21 | End: 2025-05-26 | Stop reason: HOSPADM

## 2025-05-21 RX ORDER — BUMETANIDE 2 MG/1
2 TABLET ORAL 2 TIMES DAILY
Qty: 10 TABLET | Refills: 0 | Status: ON HOLD | OUTPATIENT
Start: 2025-05-21 | End: 2025-05-26 | Stop reason: HOSPADM

## 2025-05-21 RX ADMIN — CEPHALEXIN 500 MG: 250 CAPSULE ORAL at 14:09

## 2025-05-21 ASSESSMENT — ENCOUNTER SYMPTOMS
CHEST TIGHTNESS: 0
COLOR CHANGE: 0
DIARRHEA: 0
BACK PAIN: 0
NAUSEA: 0
EYE REDNESS: 0
RHINORRHEA: 0
TROUBLE SWALLOWING: 0
FACIAL SWELLING: 0
COUGH: 0
VOMITING: 0
SHORTNESS OF BREATH: 1
ABDOMINAL PAIN: 0
WHEEZING: 0
EYE PAIN: 0
SORE THROAT: 0
EYE DISCHARGE: 0
SINUS PRESSURE: 0
CONSTIPATION: 0
BLOOD IN STOOL: 0

## 2025-05-21 NOTE — ED TRIAGE NOTES
Mode of arrival (squad #, walk in, police, etc) : walk-in        Chief complaint(s): shortness of breath/leg swelling         Arrival Note (brief scenario, treatment PTA, etc).:  pt reports above symptoms over last couple days        C= \"Have you ever felt that you should Cut down on your drinking?\"  No  A= \"Have people Annoyed you by criticizing your drinking?\"  No  G= \"Have you ever felt bad or Guilty about your drinking?\"  No  E= \"Have you ever had a drink as an Eye-opener first thing in the morning to steady your nerves or to help a hangover?\"  No      Deferred []      Reason for deferring: N/A    *If yes to two or more: probable alcohol abuse.*

## 2025-05-21 NOTE — ED PROVIDER NOTES
eMERGENCY dEPARTMENT eNCOUnter      Pt Name: Graciela Holt  MRN: [id number]  Birthdate 1950  Date of evaluation: 5/21/25      CHIEF COMPLAINT       Chief Complaint   Patient presents with    Shortness of Breath    Leg Swelling         HISTORY OF PRESENT ILLNESS    Graciela Holt is a 74 y.o. female who presents complaining of blood swelling.  Patient states that she started noticing yesterday that her legs were starting to swell up again.  Patient does have a history of CHF and CAD was recently hospitalized for this issue.  Patient states that she is getting just a little bit of shortness of breath but no cough no chest pain no fevers.  Patient states that her legs do hurt a little bit because of the increased swelling.      REVIEW OF SYSTEMS       Review of Systems   Constitutional:  Negative for activity change, appetite change, chills, diaphoresis and fever.   HENT:  Negative for congestion, ear pain, facial swelling, nosebleeds, rhinorrhea, sinus pressure, sore throat and trouble swallowing.    Eyes:  Negative for pain, discharge and redness.   Respiratory:  Positive for shortness of breath. Negative for cough, chest tightness and wheezing.    Cardiovascular:  Positive for leg swelling. Negative for chest pain and palpitations.   Gastrointestinal:  Negative for abdominal pain, blood in stool, constipation, diarrhea, nausea and vomiting.   Genitourinary:  Negative for difficulty urinating, dysuria, flank pain, frequency, genital sores and hematuria.   Musculoskeletal:  Negative for arthralgias, back pain, gait problem, joint swelling, myalgias and neck pain.   Skin:  Negative for color change, pallor, rash and wound.   Neurological:  Negative for dizziness, tremors, seizures, syncope, speech difficulty, weakness, numbness and headaches.   Psychiatric/Behavioral:  Negative for confusion, decreased concentration, hallucinations, self-injury, sleep disturbance and suicidal ideas.        PAST MEDICAL HISTORY  GRANULAR (*)     Casts UA 3 to 5 (*)     Bacteria, UA MANY (*)     All other components within normal limits   CULTURE, URINE   APTT       Vitals Reviewed:    Vitals:    05/21/25 1230 05/21/25 1300 05/21/25 1332   BP: (!) 173/89 (!) 160/83 (!) 142/75   Pulse: 76 69 72   Resp: 16 17 19   Temp: 97.5 °F (36.4 °C)     TempSrc: Oral     SpO2: 96% 93% 95%   Weight: 64 kg (141 lb)     Height: 1.626 m (5' 4\")       MEDICATIONS GIVEN TO PATIENT THIS ENCOUNTER:  Orders Placed This Encounter   Medications    cephALEXin (KEFLEX) capsule 500 mg     Antimicrobial Indications:   Urinary Tract Infection    cephALEXin (KEFLEX) 500 MG capsule     Sig: Take 1 capsule by mouth 2 times daily for 7 days     Dispense:  14 capsule     Refill:  0    bumetanide (BUMEX) 2 MG tablet     Sig: Take 1 tablet by mouth 2 times daily for 5 days     Dispense:  10 tablet     Refill:  0     DISCHARGE PRESCRIPTIONS:  Current Discharge Medication List        START taking these medications    Details   cephALEXin (KEFLEX) 500 MG capsule Take 1 capsule by mouth 2 times daily for 7 days  Qty: 14 capsule, Refills: 0           PHYSICIAN CONSULTS ORDERED THIS ENCOUNTER:  None    FINAL IMPRESSION      1. Peripheral edema    2. Acute cystitis with hematuria    3. Chronic combined systolic and diastolic congestive heart failure (HCC)          DISPOSITION/PLAN   DISPOSITION Decision To Discharge 05/21/2025 02:05:11 PM   DISPOSITION CONDITION Stable           PATIENT REFERREDTO:  Travis Mason MD  [address] [address] [phone]    In 1 week      [facility] Emergency Department  [address] [place] [address]  [phone]    If symptoms worsen      DISCHARGEMEDICATIONS:  Current Discharge Medication List        START taking these medications    Details   cephALEXin (KEFLEX) 500 MG capsule Take 1 capsule by mouth 2 times daily for 7 days  Qty: 14 capsule, Refills: 0             (Please note that portions of this note

## 2025-05-22 ENCOUNTER — TELEPHONE (OUTPATIENT)
Dept: FAMILY MEDICINE CLINIC | Age: 75
End: 2025-05-22

## 2025-05-22 LAB
EKG ATRIAL RATE: 69 BPM
EKG P AXIS: 112 DEGREES
EKG P-R INTERVAL: 158 MS
EKG Q-T INTERVAL: 416 MS
EKG QRS DURATION: 114 MS
EKG QTC CALCULATION (BAZETT): 445 MS
EKG R AXIS: 212 DEGREES
EKG T AXIS: 39 DEGREES
EKG VENTRICULAR RATE: 69 BPM

## 2025-05-22 NOTE — TELEPHONE ENCOUNTER
[facility] ED Follow up Call    Reason for ED visit:  PERIPHERAL EDEMA     FU appts/Provider:    Future Appointments   Date Time Provider Department Center   5/23/2025  1:15 PM [facility] MEDICATION MGMT ROOM 3 [facility] MED MGMT [facility]   6/23/2025 12:00 PM [facility] CHF CLINIC RM 1 [facility] CHFCLIN [place]   8/4/2025  3:00 PM Yessica Flynn DO [facility]   8/6/2025 12:00 PM Travis Mason MD [facility] DEP       VOICEMAIL DOCUMENTATION - ERASE IF NOT USED  Hi, this message is for  IVETT  This is WALLACE from Travis Gutierrez office. Just calling to see how you are doing after your recent visit to the Emergency Room. Travis Gutierrez wants to make sure you were able to fill any prescriptions and that you understand your discharge instructions. Please return our call if you need to make a follow up appointment with your provider or have any further needs.   Our phone number is [phone]*. Have a great day.

## 2025-05-23 ENCOUNTER — TELEPHONE (OUTPATIENT)
Dept: FAMILY MEDICINE CLINIC | Age: 75
End: 2025-05-23

## 2025-05-23 ENCOUNTER — HOSPITAL ENCOUNTER (INPATIENT)
Age: 75
LOS: 3 days | Discharge: HOME | DRG: 291 | End: 2025-05-26
Attending: EMERGENCY MEDICINE | Admitting: INTERNAL MEDICINE
Payer: COMMERCIAL

## 2025-05-23 ENCOUNTER — HOSPITAL ENCOUNTER (OUTPATIENT)
Age: 75
Discharge: HOME | End: 2025-05-23

## 2025-05-23 ENCOUNTER — APPOINTMENT (OUTPATIENT)
Dept: GENERAL RADIOLOGY | Age: 75
DRG: 291 | End: 2025-05-23
Payer: COMMERCIAL

## 2025-05-23 DIAGNOSIS — I50.9 ACUTE ON CHRONIC CONGESTIVE HEART FAILURE, UNSPECIFIED HEART FAILURE TYPE (HCC): Primary | ICD-10-CM

## 2025-05-23 LAB
ALBUMIN SERPL-MCNC: 3.4 G/DL (ref 3.5–5.2)
ALP SERPL-CCNC: 138 U/L (ref 35–104)
ALT SERPL-CCNC: 32 U/L (ref 10–35)
ANION GAP SERPL CALCULATED.3IONS-SCNC: 9 MMOL/L (ref 9–16)
AST SERPL-CCNC: 37 U/L (ref 10–35)
BASOPHILS # BLD: 0.05 K/UL (ref 0–0.2)
BASOPHILS NFR BLD: 1 % (ref 0–2)
BILIRUB SERPL-MCNC: 0.6 MG/DL (ref 0–1.2)
BNP SERPL-MCNC: 4031 PG/ML (ref 0–300)
BUN SERPL-MCNC: 21 MG/DL (ref 8–23)
CALCIUM SERPL-MCNC: 8.8 MG/DL (ref 8.6–10.4)
CHLORIDE SERPL-SCNC: 109 MMOL/L (ref 98–107)
CO2 SERPL-SCNC: 23 MMOL/L (ref 20–31)
CREAT SERPL-MCNC: 1.2 MG/DL (ref 0.7–1.2)
EOSINOPHIL # BLD: 0.15 K/UL (ref 0–0.44)
EOSINOPHILS RELATIVE PERCENT: 3 % (ref 0–4)
ERYTHROCYTE [DISTWIDTH] IN BLOOD BY AUTOMATED COUNT: 15.8 % (ref 11.5–14.9)
FLUAV RNA RESP QL NAA+PROBE: NOT DETECTED
FLUBV RNA RESP QL NAA+PROBE: NOT DETECTED
GFR, ESTIMATED: 47 ML/MIN/1.73M2
GLUCOSE BLD-MCNC: 119 MG/DL (ref 65–105)
GLUCOSE BLD-MCNC: 191 MG/DL (ref 65–105)
GLUCOSE SERPL-MCNC: 129 MG/DL (ref 74–99)
HCT VFR BLD AUTO: 28.1 % (ref 36–46)
HGB BLD-MCNC: 8.6 G/DL (ref 12–16)
IMM GRANULOCYTES # BLD AUTO: <0.03 K/UL (ref 0–0.3)
IMM GRANULOCYTES NFR BLD: 0 %
INR PPP: 1.6
LYMPHOCYTES NFR BLD: 0.84 K/UL (ref 1.1–3.7)
LYMPHOCYTES RELATIVE PERCENT: 15 % (ref 24–44)
MAGNESIUM SERPL-MCNC: 1.9 MG/DL (ref 1.6–2.4)
MCH RBC QN AUTO: 26.2 PG (ref 26–34)
MCHC RBC AUTO-ENTMCNC: 30.6 G/DL (ref 31–37)
MCV RBC AUTO: 85.7 FL (ref 80–100)
MONOCYTES NFR BLD: 0.53 K/UL (ref 0.1–1.2)
MONOCYTES NFR BLD: 9 % (ref 3–12)
NEUTROPHILS NFR BLD: 72 % (ref 36–66)
NEUTS SEG NFR BLD: 4.09 K/UL (ref 1.5–8.1)
NRBC BLD-RTO: 0 PER 100 WBC
PLATELET # BLD AUTO: 369 K/UL (ref 150–450)
PMV BLD AUTO: 10.5 FL (ref 8–13.5)
POTASSIUM SERPL-SCNC: 4.5 MMOL/L (ref 3.7–5.3)
PROT SERPL-MCNC: 5.9 G/DL (ref 6.6–8.7)
PROTHROMBIN TIME: 21 SEC (ref 11.8–14.6)
RBC # BLD AUTO: 3.28 M/UL (ref 3.95–5.11)
SARS-COV-2 RNA RESP QL NAA+PROBE: NOT DETECTED
SODIUM SERPL-SCNC: 141 MMOL/L (ref 136–145)
SOURCE: NORMAL
SPECIMEN DESCRIPTION: NORMAL
TROPONIN I SERPL HS-MCNC: 44 NG/L (ref 0–14)
TROPONIN I SERPL HS-MCNC: 51 NG/L (ref 0–14)
WBC OTHER # BLD: 5.7 K/UL (ref 3.5–11)

## 2025-05-23 PROCEDURE — 96374 THER/PROPH/DIAG INJ IV PUSH: CPT

## 2025-05-23 PROCEDURE — 2700000000 HC OXYGEN THERAPY PER DAY

## 2025-05-23 PROCEDURE — 84484 ASSAY OF TROPONIN QUANT: CPT

## 2025-05-23 PROCEDURE — 36415 COLL VENOUS BLD VENIPUNCTURE: CPT

## 2025-05-23 PROCEDURE — 82947 ASSAY GLUCOSE BLOOD QUANT: CPT

## 2025-05-23 PROCEDURE — 85610 PROTHROMBIN TIME: CPT

## 2025-05-23 PROCEDURE — 94760 N-INVAS EAR/PLS OXIMETRY 1: CPT

## 2025-05-23 PROCEDURE — 6360000002 HC RX W HCPCS

## 2025-05-23 PROCEDURE — 80053 COMPREHEN METABOLIC PANEL: CPT

## 2025-05-23 PROCEDURE — 85025 COMPLETE CBC W/AUTO DIFF WBC: CPT

## 2025-05-23 PROCEDURE — 99285 EMERGENCY DEPT VISIT HI MDM: CPT

## 2025-05-23 PROCEDURE — 87636 SARSCOV2 & INF A&B AMP PRB: CPT

## 2025-05-23 PROCEDURE — 6360000002 HC RX W HCPCS: Performed by: EMERGENCY MEDICINE

## 2025-05-23 PROCEDURE — 2060000000 HC ICU INTERMEDIATE R&B

## 2025-05-23 PROCEDURE — 71045 X-RAY EXAM CHEST 1 VIEW: CPT

## 2025-05-23 PROCEDURE — 6370000000 HC RX 637 (ALT 250 FOR IP): Performed by: EMERGENCY MEDICINE

## 2025-05-23 PROCEDURE — 94640 AIRWAY INHALATION TREATMENT: CPT

## 2025-05-23 PROCEDURE — 83735 ASSAY OF MAGNESIUM: CPT

## 2025-05-23 PROCEDURE — 93005 ELECTROCARDIOGRAM TRACING: CPT | Performed by: EMERGENCY MEDICINE

## 2025-05-23 PROCEDURE — 6370000000 HC RX 637 (ALT 250 FOR IP)

## 2025-05-23 PROCEDURE — 83880 ASSAY OF NATRIURETIC PEPTIDE: CPT

## 2025-05-23 PROCEDURE — 2500000003 HC RX 250 WO HCPCS

## 2025-05-23 RX ORDER — TRAZODONE HYDROCHLORIDE 50 MG/1
50 TABLET ORAL NIGHTLY
Status: DISCONTINUED | OUTPATIENT
Start: 2025-05-23 | End: 2025-05-26 | Stop reason: HOSPADM

## 2025-05-23 RX ORDER — BUDESONIDE AND FORMOTEROL FUMARATE DIHYDRATE 160; 4.5 UG/1; UG/1
2 AEROSOL RESPIRATORY (INHALATION)
Status: DISCONTINUED | OUTPATIENT
Start: 2025-05-23 | End: 2025-05-26 | Stop reason: HOSPADM

## 2025-05-23 RX ORDER — POTASSIUM CHLORIDE 7.45 MG/ML
10 INJECTION INTRAVENOUS PRN
Status: DISCONTINUED | OUTPATIENT
Start: 2025-05-23 | End: 2025-05-26 | Stop reason: HOSPADM

## 2025-05-23 RX ORDER — IPRATROPIUM BROMIDE AND ALBUTEROL SULFATE 2.5; .5 MG/3ML; MG/3ML
1 SOLUTION RESPIRATORY (INHALATION) EVERY 4 HOURS PRN
Status: DISCONTINUED | OUTPATIENT
Start: 2025-05-23 | End: 2025-05-26 | Stop reason: HOSPADM

## 2025-05-23 RX ORDER — MAGNESIUM SULFATE HEPTAHYDRATE 40 MG/ML
2000 INJECTION, SOLUTION INTRAVENOUS PRN
Status: DISCONTINUED | OUTPATIENT
Start: 2025-05-23 | End: 2025-05-26 | Stop reason: HOSPADM

## 2025-05-23 RX ORDER — ONDANSETRON 2 MG/ML
4 INJECTION INTRAMUSCULAR; INTRAVENOUS EVERY 6 HOURS PRN
Status: DISCONTINUED | OUTPATIENT
Start: 2025-05-23 | End: 2025-05-26 | Stop reason: HOSPADM

## 2025-05-23 RX ORDER — SODIUM CHLORIDE 0.9 % (FLUSH) 0.9 %
5-40 SYRINGE (ML) INJECTION EVERY 12 HOURS SCHEDULED
Status: DISCONTINUED | OUTPATIENT
Start: 2025-05-23 | End: 2025-05-26 | Stop reason: HOSPADM

## 2025-05-23 RX ORDER — ACETAMINOPHEN 325 MG/1
650 TABLET ORAL EVERY 6 HOURS PRN
Status: DISCONTINUED | OUTPATIENT
Start: 2025-05-23 | End: 2025-05-26 | Stop reason: HOSPADM

## 2025-05-23 RX ORDER — POTASSIUM CHLORIDE 1500 MG/1
40 TABLET, EXTENDED RELEASE ORAL PRN
Status: DISCONTINUED | OUTPATIENT
Start: 2025-05-23 | End: 2025-05-26 | Stop reason: HOSPADM

## 2025-05-23 RX ORDER — WARFARIN SODIUM 2.5 MG/1
2.5 TABLET ORAL
Status: COMPLETED | OUTPATIENT
Start: 2025-05-23 | End: 2025-05-23

## 2025-05-23 RX ORDER — AMIODARONE HYDROCHLORIDE 200 MG/1
200 TABLET ORAL DAILY
Status: DISCONTINUED | OUTPATIENT
Start: 2025-05-24 | End: 2025-05-26 | Stop reason: HOSPADM

## 2025-05-23 RX ORDER — WARFARIN SODIUM 2.5 MG/1
2.5 TABLET ORAL DAILY
Status: CANCELLED | OUTPATIENT
Start: 2025-05-23

## 2025-05-23 RX ORDER — INSULIN LISPRO 100 [IU]/ML
0-4 INJECTION, SOLUTION INTRAVENOUS; SUBCUTANEOUS
Status: DISCONTINUED | OUTPATIENT
Start: 2025-05-23 | End: 2025-05-26 | Stop reason: HOSPADM

## 2025-05-23 RX ORDER — POLYETHYLENE GLYCOL 3350 17 G/17G
17 POWDER, FOR SOLUTION ORAL DAILY PRN
Status: DISCONTINUED | OUTPATIENT
Start: 2025-05-23 | End: 2025-05-26 | Stop reason: HOSPADM

## 2025-05-23 RX ORDER — LOSARTAN POTASSIUM 25 MG/1
25 TABLET ORAL DAILY
Status: DISCONTINUED | OUTPATIENT
Start: 2025-05-23 | End: 2025-05-26 | Stop reason: HOSPADM

## 2025-05-23 RX ORDER — SODIUM CHLORIDE 9 MG/ML
INJECTION, SOLUTION INTRAVENOUS PRN
Status: DISCONTINUED | OUTPATIENT
Start: 2025-05-23 | End: 2025-05-26 | Stop reason: HOSPADM

## 2025-05-23 RX ORDER — ATORVASTATIN CALCIUM 80 MG/1
80 TABLET, FILM COATED ORAL DAILY
Status: DISCONTINUED | OUTPATIENT
Start: 2025-05-23 | End: 2025-05-26 | Stop reason: HOSPADM

## 2025-05-23 RX ORDER — CARVEDILOL 3.12 MG/1
3.12 TABLET ORAL 2 TIMES DAILY WITH MEALS
Status: DISCONTINUED | OUTPATIENT
Start: 2025-05-23 | End: 2025-05-26 | Stop reason: HOSPADM

## 2025-05-23 RX ORDER — SODIUM CHLORIDE 0.9 % (FLUSH) 0.9 %
5-40 SYRINGE (ML) INJECTION PRN
Status: DISCONTINUED | OUTPATIENT
Start: 2025-05-23 | End: 2025-05-26 | Stop reason: HOSPADM

## 2025-05-23 RX ORDER — ENOXAPARIN SODIUM 100 MG/ML
40 INJECTION SUBCUTANEOUS DAILY
Status: CANCELLED | OUTPATIENT
Start: 2025-05-23

## 2025-05-23 RX ORDER — BUMETANIDE 0.25 MG/ML
2 INJECTION, SOLUTION INTRAMUSCULAR; INTRAVENOUS 2 TIMES DAILY
Status: DISCONTINUED | OUTPATIENT
Start: 2025-05-23 | End: 2025-05-26

## 2025-05-23 RX ORDER — FAMOTIDINE 20 MG/1
20 TABLET, FILM COATED ORAL EVERY EVENING
Status: DISCONTINUED | OUTPATIENT
Start: 2025-05-23 | End: 2025-05-26 | Stop reason: HOSPADM

## 2025-05-23 RX ORDER — BUMETANIDE 0.25 MG/ML
2 INJECTION, SOLUTION INTRAMUSCULAR; INTRAVENOUS ONCE
Status: COMPLETED | OUTPATIENT
Start: 2025-05-23 | End: 2025-05-23

## 2025-05-23 RX ORDER — ONDANSETRON 4 MG/1
4 TABLET, ORALLY DISINTEGRATING ORAL EVERY 8 HOURS PRN
Status: DISCONTINUED | OUTPATIENT
Start: 2025-05-23 | End: 2025-05-26 | Stop reason: HOSPADM

## 2025-05-23 RX ORDER — BUMETANIDE 0.25 MG/ML
2 INJECTION, SOLUTION INTRAMUSCULAR; INTRAVENOUS 2 TIMES DAILY
Status: DISCONTINUED | OUTPATIENT
Start: 2025-05-23 | End: 2025-05-23

## 2025-05-23 RX ORDER — ACETAMINOPHEN 650 MG/1
650 SUPPOSITORY RECTAL EVERY 6 HOURS PRN
Status: DISCONTINUED | OUTPATIENT
Start: 2025-05-23 | End: 2025-05-26 | Stop reason: HOSPADM

## 2025-05-23 RX ORDER — BUMETANIDE 1 MG/1
2 TABLET ORAL 2 TIMES DAILY
Status: DISCONTINUED | OUTPATIENT
Start: 2025-05-23 | End: 2025-05-26

## 2025-05-23 RX ORDER — DEXTROSE MONOHYDRATE 100 MG/ML
INJECTION, SOLUTION INTRAVENOUS CONTINUOUS PRN
Status: DISCONTINUED | OUTPATIENT
Start: 2025-05-23 | End: 2025-05-26 | Stop reason: HOSPADM

## 2025-05-23 RX ORDER — ALBUTEROL SULFATE 90 UG/1
2 INHALANT RESPIRATORY (INHALATION) EVERY 6 HOURS PRN
Status: DISCONTINUED | OUTPATIENT
Start: 2025-05-23 | End: 2025-05-26 | Stop reason: HOSPADM

## 2025-05-23 RX ADMIN — BUDESONIDE AND FORMOTEROL FUMARATE DIHYDRATE 2 PUFF: 160; 4.5 AEROSOL RESPIRATORY (INHALATION) at 20:01

## 2025-05-23 RX ADMIN — BUMETANIDE 2 MG: 0.25 INJECTION INTRAMUSCULAR; INTRAVENOUS at 14:52

## 2025-05-23 RX ADMIN — ATORVASTATIN CALCIUM 80 MG: 80 TABLET, FILM COATED ORAL at 21:06

## 2025-05-23 RX ADMIN — WARFARIN SODIUM 2.5 MG: 2.5 TABLET ORAL at 17:30

## 2025-05-23 RX ADMIN — BUMETANIDE 2 MG: 0.25 INJECTION INTRAMUSCULAR; INTRAVENOUS at 21:06

## 2025-05-23 RX ADMIN — INSULIN LISPRO 1 UNITS: 100 INJECTION, SOLUTION INTRAVENOUS; SUBCUTANEOUS at 17:29

## 2025-05-23 RX ADMIN — IPRATROPIUM BROMIDE AND ALBUTEROL SULFATE 1 DOSE: .5; 2.5 SOLUTION RESPIRATORY (INHALATION) at 12:54

## 2025-05-23 RX ADMIN — CARVEDILOL 3.12 MG: 3.12 TABLET, FILM COATED ORAL at 17:06

## 2025-05-23 RX ADMIN — SODIUM CHLORIDE, PRESERVATIVE FREE 10 ML: 5 INJECTION INTRAVENOUS at 21:14

## 2025-05-23 RX ADMIN — TRAZODONE HYDROCHLORIDE 50 MG: 50 TABLET ORAL at 21:06

## 2025-05-23 RX ADMIN — FAMOTIDINE 20 MG: 20 TABLET, FILM COATED ORAL at 17:06

## 2025-05-23 RX ADMIN — WATER 1000 MG: 1 INJECTION INTRAMUSCULAR; INTRAVENOUS; SUBCUTANEOUS at 17:06

## 2025-05-23 ASSESSMENT — PAIN - FUNCTIONAL ASSESSMENT: PAIN_FUNCTIONAL_ASSESSMENT: NONE - DENIES PAIN

## 2025-05-23 NOTE — PROGRESS NOTES
Pharmacy Medication History Note      List of current medications patient is taking is complete.    Source of information: patient, Sure Scripts, Care Everywhere, OARRS    Changes made to medication list:  Medications removed (include reason, ex. therapy complete or physician discontinued, noncompliance):  None     Medications flagged for provider review:  Jardiance - patient has been having trouble affording this medication and has not started it    Medications added/doses adjusted:  None     Other notes (ex. Recent course of antibiotics, Coumadin dosing):  Patient's current warfarin regimen is 5 mg Mon/Wed and 2.5 mg all other days. Her most recent outpatient INR was 1.2 on 5/19/25 which prompted the increase in her regimen. She reports adherence to regimen and that she did take her warfarin this morning.   The patient was prescribed cephalexin for 7 days starting 5/21/25.   OARRS negative       Current Home Medication List at Time of Admission:  Prior to Admission medications    Medication Sig   vitamin D (ERGOCALCIFEROL) 1.25 MG (01756 UT) CAPS capsule Take 1 capsule by mouth once a week   cephALEXin (KEFLEX) 500 MG capsule Take 1 capsule by mouth 2 times daily for 7 days   bumetanide (BUMEX) 2 MG tablet Take 1 tablet by mouth 2 times daily for 5 days   warfarin (COUMADIN) 2.5 MG tablet Take 1 tablet by mouth daily Take 5 mg once daily or as directed by [facility] coumadin clinic  Patient taking differently: Take by mouth Take 5 mg Mon/Wed and 2.5 mg all other days or as directed by [facility]   empagliflozin (JARDIANCE) 10 MG tablet Take 1 tablet by mouth daily  Patient not taking: Reported on 5/23/2025   losartan (COZAAR) 25 MG tablet Take 1 tablet by mouth daily   fluticasone (FLONASE) 50 MCG/ACT nasal spray 2 sprays by Each Nostril route daily   famotidine (PEPCID) 40 MG tablet Take 1 tablet by mouth every evening   carvedilol (COREG) 3.125 MG tablet Take 1 tablet by mouth 2 times  daily (with meals)   budesonide-formoterol (SYMBICORT) 160-4.5 MCG/ACT AERO Inhale 2 puffs into the lungs in the morning and 2 puffs in the evening.   atorvastatin (LIPITOR) 80 MG tablet Take 1 tablet by mouth daily   amiodarone (CORDARONE) 200 MG tablet Take 1 tablet by mouth daily   albuterol sulfate HFA (PROVENTIL;VENTOLIN;PROAIR) 108 (90 Base) MCG/ACT inhaler Inhale 2 puffs into the lungs every 6 hours as needed for Wheezing or Shortness of Breath   traZODone (DESYREL) 50 MG tablet Take 1 tablet by mouth nightly   ferrous sulfate (IRON 325) 325 (65 Fe) MG tablet Take 1 tablet by mouth daily (with breakfast)   potassium chloride (MICRO-K) 10 MEQ extended release capsule Take 2 capsules by mouth daily   nitroGLYCERIN (NITROSTAT) 0.3 MG SL tablet Place 1 tablet under the tongue as needed for Chest pain up to max of 3 total doses. If no relief after 1 dose, call 911.         Please let me know if you have any questions about this encounter. Thank you!    Electronically signed by Farida Mullen RPH on 5/23/2025 at 4:07 PM
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
## 2025-05-23 NOTE — ED TRIAGE NOTES
Mode of arrival (squad #, walk in, police, etc) : squad        Chief complaint(s): SOB        Arrival Note (brief scenario, treatment PTA, etc).: onset 3 days, was in 80's upon EMS arrival. Placed on 3L NC. Feels better after NC placed. Denies any pain. Pt (+) edema to bilat legs, Has CHF monitor to lower Lt chest.         C= \"Have you ever felt that you should Cut down on your drinking?\"  No  A= \"Have people Annoyed you by criticizing your drinking?\"  No  G= \"Have you ever felt bad or Guilty about your drinking?\"  No  E= \"Have you ever had a drink as an Eye-opener first thing in the morning to steady your nerves or to help a hangover?\"  No      Deferred []      Reason for deferring: N/A    *If yes to two or more: probable alcohol abuse.*

## 2025-05-23 NOTE — H&P
[facility]  IN-PATIENT SERVICE  [facility]  IN-PATIENT SERVICE   [facility]     HISTORY AND PHYSICAL EXAMINATION            Date:   5/23/2025  Patient name:  Graciela Holt  Date of admission:  5/23/2025 12:03 PM  MRN:   [id number]  Account:  [id number]  YOB: 1950  PCP:    Travis Mason MD  Room:   2090/2090-01  Code Status:    Prior    Chief Complaint:     Chief Complaint   Patient presents with    Shortness of Breath     History Obtained From:   Patient    History of Present Illness:   Graciela Holt is a 74 y.o. female patient with PMH of paroxysmal atrial fibrillation on warfarin, HFrEF with a EF of 20 to 25%, T2DM, essential hypertension, hyperlipidemia, depression, who presented to the ED on 5/23/2025 with complaints of shortness of breath and bilateral lower extremity edema for the past 3 days.  Patient denies any fever, chills, chest pain, abdominal pain, cough.  She states she is compliant with her home Bumex, and has been noticing worsening of bilateral lower extremity edema.  Of note, she did come to the ER 2 days prior to this admission, at that time she was found to have a UTI and was discharged with Keflex, however patient states she did not pick this up because she could not afford it from the pharmacy.    In the ED, patient was desaturating and was placed on 2 L oxygen via nasal cannula.  She was found to have 1+ bilateral lower extremity edema.  Rales heard at lung bases on auscultation.  She received IV Bumex in the ED.  proBNP 4031, WBC WNL, troponin 51>44, CXR showed pulmonary vascular congestion.  Patient was in sinus rhythm in the ED.  Patient will be admitted with consult to cardiology.    The patient was admitted to the hospital for management of acute on chronic hypoxic respiratory failure likely secondary to CHF exacerbation.    Past Medical History:     Past Medical  [facility] OR    IA OFFICE/OUTPT VISIT,PROCEDURE ONLY N/A 02/06/2018    CABG X 3 LIMA-LAD-DIAG,SVG-PDA,CORONARY ARTERY BYPASS REDO, PUMP ASSIST, SWAN, JARRED, REDO STERNOTOMY performed by Savanna Mata MD at [facility] CVOR    THORACIC FUSION  01/10/2025    OCCIPUT TO T1 DECOMPRESSION FUSION, REVISION OF HARDWARE (SYNTHES, STEALTH, EVOKES)    UPPER GASTROINTESTINAL ENDOSCOPY N/A 4/17/2025    ESOPHAGOGASTRODUODENOSCOPY WITH APC performed by Ellis Aranda MD at [facility] ENDO      Medications Prior to Admission:     Prior to Admission medications    Medication Sig Start Date End Date Taking? Authorizing Provider   vitamin D (ERGOCALCIFEROL) 1.25 MG (37544 UT) CAPS capsule Take 1 capsule by mouth once a week 5/21/25   Travis Mason MD   cephALEXin (KEFLEX) 500 MG capsule Take 1 capsule by mouth 2 times daily for 7 days 5/21/25 5/28/25  Alvaro Glass MD   bumetanide (BUMEX) 2 MG tablet Take 1 tablet by mouth 2 times daily for 5 days 5/21/25 5/26/25  Alvaro Glass MD   warfarin (COUMADIN) 2.5 MG tablet Take 1 tablet by mouth daily Take 5 mg once daily or as directed by [facility] coumadin clinic  Patient taking differently: Take by mouth Take 5 mg Mon/Wed and 2.5 mg all other days or as directed by [facility] 5/10/25   Chip Anderson MD   empagliflozin (JARDIANCE) 10 MG tablet Take 1 tablet by mouth daily  Patient not taking: Reported on 5/23/2025 5/10/25   Chip Anderson MD   Handicap Placard MISC by Does not apply route 5/5/25   Travis Mason MD   losartan (COZAAR) 25 MG tablet Take 1 tablet by mouth daily 5/5/25   Travis Mason MD   fluticasone (FLONASE) 50 MCG/ACT nasal spray 2 sprays by Each Nostril route daily 5/5/25   Travis Mason MD   famotidine (PEPCID) 40 MG tablet Take 1 tablet by mouth every evening 5/5/25   Travis Mason MD   carvedilol (COREG) 3.125 MG tablet Take 1 tablet by mouth 2 times daily (with meals) 5/5/25   Travis Mason MD   budesonide-formoterol (SYMBICORT)

## 2025-05-23 NOTE — CARE COORDINATION
Case Management Assessment  Initial Evaluation    Date/Time of Evaluation: 5/23/2025 4:55 PM  Assessment Completed by: Bonita Pierce RN    If patient is discharged prior to next notation, then this note serves as note for discharge by case management.    Patient Name: Graciela Holt                   YOB: 1950  Diagnosis: Acute on chronic congestive heart failure, unspecified heart failure type (HCC) [I50.9]  Acute exacerbation of chronic heart failure (HCC) [I50.9]                   Date / Time: 5/23/2025 12:03 PM    Patient Admission Status: Inpatient   Readmission Risk (Low < 19, Mod (19-27), High > 27): Readmission Risk Score: 37.6    Current PCP: Travis Mason MD  PCP verified by CM? Yes    Chart Reviewed: Yes      History Provided by: Patient  Patient Orientation: Alert and Oriented    Patient Cognition: Alert    Hospitalization in the last 30 days (Readmission):  Yes    If yes, Readmission Assessment in  Navigator will be completed.    Readmission Assessment  Number of Days since last admission?: 8-30 days  Previous Disposition: Home with Family  Who is being Interviewed: Patient  What was the patient's/caregiver's perception as to why they think they needed to return back to the hospital?: (S) Other (Comment) (short of breath)  Did you visit your Primary Care Physician after you left the hospital, before you returned this time?: No  Why weren't you able to visit your PCP?: Did not have an appointment  Did you see a specialist, such as Cardiac, Pulmonary, Orthopedic Physician, etc. after you left the hospital?: No  Who advised the patient to return to the hospital?: Self-referral  Does the patient report anything that got in the way of taking their medications?: No  In our efforts to provide the best possible care to you and others like you, can you think of anything that we could have done to help you after you left the hospital the first time, so that you might not have needed to

## 2025-05-24 LAB
ANION GAP SERPL CALCULATED.3IONS-SCNC: 10 MMOL/L (ref 9–16)
BASOPHILS # BLD: 0.04 K/UL (ref 0–0.2)
BASOPHILS NFR BLD: 1 % (ref 0–2)
BUN SERPL-MCNC: 22 MG/DL (ref 8–23)
CALCIUM SERPL-MCNC: 8.5 MG/DL (ref 8.6–10.4)
CHLORIDE SERPL-SCNC: 107 MMOL/L (ref 98–107)
CO2 SERPL-SCNC: 26 MMOL/L (ref 20–31)
CREAT SERPL-MCNC: 1.4 MG/DL (ref 0.7–1.2)
EKG ATRIAL RATE: 57 BPM
EKG P AXIS: 50 DEGREES
EKG P-R INTERVAL: 178 MS
EKG Q-T INTERVAL: 468 MS
EKG QRS DURATION: 110 MS
EKG QTC CALCULATION (BAZETT): 455 MS
EKG R AXIS: -16 DEGREES
EKG T AXIS: 156 DEGREES
EKG VENTRICULAR RATE: 57 BPM
EOSINOPHIL # BLD: 0.18 K/UL (ref 0–0.44)
EOSINOPHILS RELATIVE PERCENT: 3 % (ref 0–4)
ERYTHROCYTE [DISTWIDTH] IN BLOOD BY AUTOMATED COUNT: 15.6 % (ref 11.5–14.9)
GFR, ESTIMATED: 39 ML/MIN/1.73M2
GLUCOSE BLD-MCNC: 124 MG/DL (ref 65–105)
GLUCOSE BLD-MCNC: 168 MG/DL (ref 65–105)
GLUCOSE BLD-MCNC: 196 MG/DL (ref 65–105)
GLUCOSE BLD-MCNC: 196 MG/DL (ref 65–105)
GLUCOSE SERPL-MCNC: 129 MG/DL (ref 74–99)
HCT VFR BLD AUTO: 27.6 % (ref 36–46)
HGB BLD-MCNC: 8.6 G/DL (ref 12–16)
IMM GRANULOCYTES # BLD AUTO: <0.03 K/UL (ref 0–0.3)
IMM GRANULOCYTES NFR BLD: 0 %
INR PPP: 1.8
LYMPHOCYTES NFR BLD: 1.09 K/UL (ref 1.1–3.7)
LYMPHOCYTES RELATIVE PERCENT: 18 % (ref 24–44)
MCH RBC QN AUTO: 26.3 PG (ref 26–34)
MCHC RBC AUTO-ENTMCNC: 31.2 G/DL (ref 31–37)
MCV RBC AUTO: 84.4 FL (ref 80–100)
MICROORGANISM SPEC CULT: ABNORMAL
MONOCYTES NFR BLD: 0.61 K/UL (ref 0.1–1.2)
MONOCYTES NFR BLD: 10 % (ref 3–12)
NEUTROPHILS NFR BLD: 68 % (ref 36–66)
NEUTS SEG NFR BLD: 4.17 K/UL (ref 1.5–8.1)
NRBC BLD-RTO: 0 PER 100 WBC
PLATELET # BLD AUTO: 324 K/UL (ref 150–450)
PMV BLD AUTO: 10.2 FL (ref 8–13.5)
POTASSIUM SERPL-SCNC: 4 MMOL/L (ref 3.7–5.3)
PROTHROMBIN TIME: 22.5 SEC (ref 11.8–14.6)
RBC # BLD AUTO: 3.27 M/UL (ref 3.95–5.11)
SODIUM SERPL-SCNC: 143 MMOL/L (ref 136–145)
SPECIMEN DESCRIPTION: ABNORMAL
WBC OTHER # BLD: 6.1 K/UL (ref 3.5–11)

## 2025-05-24 PROCEDURE — 85610 PROTHROMBIN TIME: CPT

## 2025-05-24 PROCEDURE — 6370000000 HC RX 637 (ALT 250 FOR IP)

## 2025-05-24 PROCEDURE — 94761 N-INVAS EAR/PLS OXIMETRY MLT: CPT

## 2025-05-24 PROCEDURE — 2500000003 HC RX 250 WO HCPCS

## 2025-05-24 PROCEDURE — 2060000000 HC ICU INTERMEDIATE R&B

## 2025-05-24 PROCEDURE — 97116 GAIT TRAINING THERAPY: CPT

## 2025-05-24 PROCEDURE — 6360000002 HC RX W HCPCS

## 2025-05-24 PROCEDURE — 82947 ASSAY GLUCOSE BLOOD QUANT: CPT

## 2025-05-24 PROCEDURE — 2700000000 HC OXYGEN THERAPY PER DAY

## 2025-05-24 PROCEDURE — 80048 BASIC METABOLIC PNL TOTAL CA: CPT

## 2025-05-24 PROCEDURE — 94640 AIRWAY INHALATION TREATMENT: CPT

## 2025-05-24 PROCEDURE — 36415 COLL VENOUS BLD VENIPUNCTURE: CPT

## 2025-05-24 PROCEDURE — 85025 COMPLETE CBC W/AUTO DIFF WBC: CPT

## 2025-05-24 PROCEDURE — 97162 PT EVAL MOD COMPLEX 30 MIN: CPT

## 2025-05-24 PROCEDURE — 94669 MECHANICAL CHEST WALL OSCILL: CPT

## 2025-05-24 PROCEDURE — 6370000000 HC RX 637 (ALT 250 FOR IP): Performed by: INTERNAL MEDICINE

## 2025-05-24 PROCEDURE — 99223 1ST HOSP IP/OBS HIGH 75: CPT | Performed by: INTERNAL MEDICINE

## 2025-05-24 RX ORDER — MIDODRINE HYDROCHLORIDE 5 MG/1
5 TABLET ORAL
Status: DISCONTINUED | OUTPATIENT
Start: 2025-05-24 | End: 2025-05-25

## 2025-05-24 RX ORDER — WARFARIN SODIUM 2.5 MG/1
2.5 TABLET ORAL
Status: COMPLETED | OUTPATIENT
Start: 2025-05-24 | End: 2025-05-24

## 2025-05-24 RX ORDER — MIDODRINE HYDROCHLORIDE 5 MG/1
5 TABLET ORAL ONCE
Status: COMPLETED | OUTPATIENT
Start: 2025-05-24 | End: 2025-05-24

## 2025-05-24 RX ADMIN — MIDODRINE HYDROCHLORIDE 5 MG: 5 TABLET ORAL at 16:19

## 2025-05-24 RX ADMIN — INSULIN LISPRO 1 UNITS: 100 INJECTION, SOLUTION INTRAVENOUS; SUBCUTANEOUS at 12:28

## 2025-05-24 RX ADMIN — BUDESONIDE AND FORMOTEROL FUMARATE DIHYDRATE 2 PUFF: 160; 4.5 AEROSOL RESPIRATORY (INHALATION) at 19:12

## 2025-05-24 RX ADMIN — WARFARIN SODIUM 2.5 MG: 2.5 TABLET ORAL at 17:46

## 2025-05-24 RX ADMIN — INSULIN LISPRO 1 UNITS: 100 INJECTION, SOLUTION INTRAVENOUS; SUBCUTANEOUS at 20:55

## 2025-05-24 RX ADMIN — AMIODARONE HYDROCHLORIDE 200 MG: 200 TABLET ORAL at 09:15

## 2025-05-24 RX ADMIN — CARVEDILOL 3.12 MG: 3.12 TABLET, FILM COATED ORAL at 09:15

## 2025-05-24 RX ADMIN — BUMETANIDE 2 MG: 0.25 INJECTION INTRAMUSCULAR; INTRAVENOUS at 09:15

## 2025-05-24 RX ADMIN — TRAZODONE HYDROCHLORIDE 50 MG: 50 TABLET ORAL at 20:55

## 2025-05-24 RX ADMIN — ATORVASTATIN CALCIUM 80 MG: 80 TABLET, FILM COATED ORAL at 20:55

## 2025-05-24 RX ADMIN — BUDESONIDE AND FORMOTEROL FUMARATE DIHYDRATE 2 PUFF: 160; 4.5 AEROSOL RESPIRATORY (INHALATION) at 07:58

## 2025-05-24 RX ADMIN — MIDODRINE HYDROCHLORIDE 5 MG: 5 TABLET ORAL at 17:46

## 2025-05-24 RX ADMIN — SODIUM CHLORIDE, PRESERVATIVE FREE 10 ML: 5 INJECTION INTRAVENOUS at 20:54

## 2025-05-24 RX ADMIN — LOSARTAN POTASSIUM 25 MG: 25 TABLET, FILM COATED ORAL at 09:15

## 2025-05-24 RX ADMIN — WATER 1000 MG: 1 INJECTION INTRAMUSCULAR; INTRAVENOUS; SUBCUTANEOUS at 17:46

## 2025-05-24 RX ADMIN — FAMOTIDINE 20 MG: 20 TABLET, FILM COATED ORAL at 17:09
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
## 2025-05-24 NOTE — PROGRESS NOTES
Pharmacy Note  Warfarin Consult follow-up      Recent Labs     05/21/25  1245 05/23/25  1235 05/24/25  0536   INR 1.4 1.6 1.8     Recent Labs     05/23/25  1235 05/24/25  0536   HGB 8.6* 8.6*   HCT 28.1* 27.6*    324       Significant Drug-Drug Interactions:  Current warfarin drug-drug interactions: amiodarone, ceftriaxone      Date             INR        Dose given previous day  Dose scheduled for today  5/24/2025            1.8        2.5  mg         2.5 mg        Notes:  Give 2.5 mg dose today.                  Daily PT/INR while inpatient.  Gordo Rodriguez [facility]  5/24/2025  7:16 AM

## 2025-05-24 NOTE — CONSULTS
Date:   5/24/2025  Patient name: Graciela Holt  Date of admission:  5/23/2025 12:03 PM  MRN:   [id number]  YOB: 1950  PCP: Travis Mason MD    Reason for Admission: Acute on chronic congestive heart failure, unspecified heart failure type (HCC) [I50.9]  Acute exacerbation of chronic heart failure (HCC) [I50.9]    Cardiology consult: Coronary artery disease, CABG, ventricular arrhythmias, heart failure with reduced ejection fraction       Referring physician: Dr Pearl Choudhary    Impression  Admission 5/7/2025 increasing swelling over the legs and shortness of breath  Multivessel CAD   CABG LIMA to LAD and diagonal 1, SVG to OM, SVG to PDA February 2018 at [facility]  Severely reduced LV systolic function ejection fraction 25 to 30%, akinetic LV apex, apical thrombus 2D echo 4/8/2024  Moderately increased LV wall thickness, enlarged LV 6.6 cm  Left parasternal lift  Episodes of nonsustained V. Tach, patient also had A-fib with RVR  Diabetes mellitus  Hyperlipidemia lipid profile 11/6/2024 cholesterol 159, HDL 55, LDL 95, triglyceride 41  History of left MCA stroke occluded internal carotid     Admission 4/15/2025 with a GI bleed, anemia, left leg hematoma status post fall patient was on warfarin also subcu Lovenox  EGD 4/17/2025 showed gastritis, 1 small AVM no bleeding she underwent APC/argon plasma coagulation  Admission 4/7/2025 increasing shortness of breath, mild chest pain, A-fib with RVR new onset  Repeat 2D echo 4/9/2025 ejection fraction 20 to 25%, dilated LV 6.6 cm severe dilated LA, mild AR MR and TR RVSP 38  Admission 12/28/2024 severe shortness of breath chest x-ray showed CHF proBNP 12,221 high-sensitivity troponin 46 and 41     Admission 11/5/2024 with a severe shortness of breath, CHF systolic acute on chronic  High-sensitivity troponin 42 and 29, proBNP 13,352  Admission 8/12/2024 chest pain like a heavy pressure radiating to the left arm, no new ECG changes, high-sensitivity  (shortness of breath)     Acute exacerbation of chronic heart failure (HCC)      Plan of Treatment:   Medications reviewed     1:Ischemic cardiomyopathy congestive heart failure systolic and diastolic  Hold diuretics, losartan and carvedilol till blood pressures stabilize  Continue with , Lipitor 80 mg,   2: History of ventricular arrhythmia continue current dose of amiodarone 200 daily carvedilol 3.125 mg twice daily patient has refused for AICD  3: LV apical thrombus, history of A-fib with RVR on warfarin, INR 1.8  4: COPD, continue with bronchodilators  Started on midodrine 5 mg 3 times  Keep checking manual blood pressure  At present patient is not complaining any chest pain and does not appear in any significant distress      Electronically signed by Tommy Flynn MD on 5/24/2025 at 4:38 PM

## 2025-05-24 NOTE — PLAN OF CARE
Problem: Chronic Conditions and Co-morbidities  Goal: Patient's chronic conditions and co-morbidity symptoms are monitored and maintained or improved  5/24/2025 1722 by Christy Cadena RN  Outcome: Progressing     Problem: Discharge Planning  Goal: Discharge to home or other facility with appropriate resources  5/24/2025 1722 by Christy Cadena, RN  Outcome: Progressing     Problem: Safety - Adult  Goal: Free from fall injury  5/24/2025 1722 by Christy Cadena, RN  Outcome: Progressing     Problem: ABCDS Injury Assessment  Goal: Absence of physical injury  5/24/2025 1722 by Christy Cadena RN  Outcome: Progressing     Problem: Skin/Tissue Integrity  Goal: Skin integrity remains intact  Description: 1.  Monitor for areas of redness and/or skin breakdown2.  Assess vascular access sites hourly3.  Every 4-6 hours minimum:  Change oxygen saturation probe site4.  Every 4-6 hours:  If on nasal continuous positive airway pressure, respiratory therapy assess nares and determine need for appliance change or resting period  5/24/2025 1722 by Christy Cadena, RN  Outcome: Progressing     Problem: Respiratory - Adult  Goal: Achieves optimal ventilation and oxygenation  5/24/2025 1722 by Christy Cadena, RN  Outcome: Progressing

## 2025-05-24 NOTE — PROGRESS NOTES
[facility]  IN-PATIENT SERVICE  [facility]    PROGRESS NOTE             5/24/2025    8:22 AM    Name:   Graciela Holt  MRN:     [id number]     Acct:      [id number]   Room:   2090/2090-01   Day:  1  Admit Date:  5/23/2025 12:03 PM    PCP:  Travis Mason MD  Code Status:  Full Code    Subjective:     C/C:   Chief Complaint   Patient presents with    Shortness of Breath     Interval History Status: improved.    Patient seen and examined at bedside.  States that she is feeling well this morning with improvement of shortness of breath.  Currently having adequate oxygen saturation on 2.5 L nasal cannula.  Not on oxygen at baseline.    Continues on Bumex 2 mg IV twice daily.  Improvement of bilateral lower extremity edema and rales on auscultation this morning.  Daily urine output of 1 L with 1 unmeasured occurrence.    Continues on Rocephin for UTI with previous cultures growing E. coli.    Creatinine at 1.4 this morning (1.2 on admission), will continue to monitor renal function.    Brief History:     Graciela Holt is a 74 y.o. female patient with PMH of paroxysmal atrial fibrillation on warfarin, HFrEF with a EF of 20 to 25%, T2DM, essential hypertension, hyperlipidemia, depression, who presented to the ED on 5/23/2025 with complaints of shortness of breath and bilateral lower extremity edema for the past 3 days.  Patient denies any fever, chills, chest pain, abdominal pain, cough.  She states she is compliant with her home Bumex, and has been noticing worsening of bilateral lower extremity edema.  Of note, she did come to the ER 2 days prior to this admission, at that time she was found to have a UTI and was discharged with Keflex, however patient states she did not pick this up because she could not afford it from the pharmacy.     In the ED, patient was desaturating and was placed on 2 L oxygen via nasal cannula.  She was found to have 1+ bilateral lower

## 2025-05-24 NOTE — PROGRESS NOTES
Pt refused karen and/or waffle mattress. Pt states they are uncomfortable and does not want it. Pt was educated by writer and RN notified.

## 2025-05-24 NOTE — PLAN OF CARE
Problem: Chronic Conditions and Co-morbidities  Goal: Patient's chronic conditions and co-morbidity symptoms are monitored and maintained or improved  Outcome: Progressing  Flowsheets (Taken 5/24/2025 0514)  Care Plan - Patient's Chronic Conditions and Co-Morbidity Symptoms are Monitored and Maintained or Improved:   Update acute care plan with appropriate goals if chronic or comorbid symptoms are exacerbated and prevent overall improvement and discharge   Collaborate with multidisciplinary team to address chronic and comorbid conditions and prevent exacerbation or deterioration     Problem: Discharge Planning  Goal: Discharge to home or other facility with appropriate resources  Outcome: Progressing  Flowsheets (Taken 5/24/2025 0549)  Discharge to home or other facility with appropriate resources:   Refer to discharge planning if patient needs post-hospital services based on physician order or complex needs related to functional status, cognitive ability or social support system   Arrange for needed discharge resources and transportation as appropriate     Problem: Safety - Adult  Goal: Free from fall injury  Outcome: Progressing  Note: No falls noted this shift. Patient ambulates with x1 staff assistance without difficulty.  Bed kept in low position. Safe environment maintained. Bedside table & call light in reach. Uses call light appropriately when needing assistance.

## 2025-05-24 NOTE — CARE COORDINATION
ONGOING DISCHARGE PLAN:    Patient is sleeping.     Follows with CHF clinic and Coumadin Clinic    Cardio consult  IV bumex twice daily    INR 1.8    PT/OT    Will continue to follow for additional discharge needs.    If patient is discharged prior to next notation, then this note serves as note for discharge by case management.    Electronically signed by Bonita Pierce RN on 5/24/2025 at 3:40 PM

## 2025-05-25 LAB
ANION GAP SERPL CALCULATED.3IONS-SCNC: 9 MMOL/L (ref 9–16)
BASOPHILS # BLD: 0.06 K/UL (ref 0–0.2)
BASOPHILS NFR BLD: 1 % (ref 0–2)
BUN SERPL-MCNC: 31 MG/DL (ref 8–23)
CALCIUM SERPL-MCNC: 8.3 MG/DL (ref 8.6–10.4)
CHLORIDE SERPL-SCNC: 105 MMOL/L (ref 98–107)
CO2 SERPL-SCNC: 28 MMOL/L (ref 20–31)
CREAT SERPL-MCNC: 1.5 MG/DL (ref 0.7–1.2)
EOSINOPHIL # BLD: 0.18 K/UL (ref 0–0.44)
EOSINOPHILS RELATIVE PERCENT: 3 % (ref 0–4)
ERYTHROCYTE [DISTWIDTH] IN BLOOD BY AUTOMATED COUNT: 15.7 % (ref 11.5–14.9)
GFR, ESTIMATED: 36 ML/MIN/1.73M2
GLUCOSE BLD-MCNC: 123 MG/DL (ref 65–105)
GLUCOSE BLD-MCNC: 128 MG/DL (ref 65–105)
GLUCOSE BLD-MCNC: 128 MG/DL (ref 65–105)
GLUCOSE BLD-MCNC: 173 MG/DL (ref 65–105)
GLUCOSE SERPL-MCNC: 134 MG/DL (ref 74–99)
HCT VFR BLD AUTO: 29 % (ref 36–46)
HGB BLD-MCNC: 8.6 G/DL (ref 12–16)
IMM GRANULOCYTES # BLD AUTO: <0.03 K/UL (ref 0–0.3)
IMM GRANULOCYTES NFR BLD: 0 %
INR PPP: 1.9
LYMPHOCYTES NFR BLD: 1.19 K/UL (ref 1.1–3.7)
LYMPHOCYTES RELATIVE PERCENT: 17 % (ref 24–44)
MCH RBC QN AUTO: 25.2 PG (ref 26–34)
MCHC RBC AUTO-ENTMCNC: 29.7 G/DL (ref 31–37)
MCV RBC AUTO: 85 FL (ref 80–100)
MONOCYTES NFR BLD: 0.76 K/UL (ref 0.1–1.2)
MONOCYTES NFR BLD: 11 % (ref 3–12)
NEUTROPHILS NFR BLD: 68 % (ref 36–66)
NEUTS SEG NFR BLD: 5.03 K/UL (ref 1.5–8.1)
NRBC BLD-RTO: 0 PER 100 WBC
PLATELET # BLD AUTO: 343 K/UL (ref 150–450)
PMV BLD AUTO: 9.9 FL (ref 8–13.5)
POTASSIUM SERPL-SCNC: 4.6 MMOL/L (ref 3.7–5.3)
PROTHROMBIN TIME: 23.8 SEC (ref 11.8–14.6)
RBC # BLD AUTO: 3.41 M/UL (ref 3.95–5.11)
SODIUM SERPL-SCNC: 142 MMOL/L (ref 136–145)
WBC OTHER # BLD: 7.2 K/UL (ref 3.5–11)

## 2025-05-25 PROCEDURE — 94761 N-INVAS EAR/PLS OXIMETRY MLT: CPT

## 2025-05-25 PROCEDURE — 2060000000 HC ICU INTERMEDIATE R&B

## 2025-05-25 PROCEDURE — 2700000000 HC OXYGEN THERAPY PER DAY

## 2025-05-25 PROCEDURE — 85025 COMPLETE CBC W/AUTO DIFF WBC: CPT

## 2025-05-25 PROCEDURE — 80048 BASIC METABOLIC PNL TOTAL CA: CPT

## 2025-05-25 PROCEDURE — 6370000000 HC RX 637 (ALT 250 FOR IP): Performed by: INTERNAL MEDICINE

## 2025-05-25 PROCEDURE — 6370000000 HC RX 637 (ALT 250 FOR IP)

## 2025-05-25 PROCEDURE — 82947 ASSAY GLUCOSE BLOOD QUANT: CPT

## 2025-05-25 PROCEDURE — 85610 PROTHROMBIN TIME: CPT

## 2025-05-25 PROCEDURE — 97110 THERAPEUTIC EXERCISES: CPT

## 2025-05-25 PROCEDURE — 6360000002 HC RX W HCPCS

## 2025-05-25 PROCEDURE — 2500000003 HC RX 250 WO HCPCS

## 2025-05-25 PROCEDURE — 97116 GAIT TRAINING THERAPY: CPT

## 2025-05-25 PROCEDURE — 99233 SBSQ HOSP IP/OBS HIGH 50: CPT | Performed by: INTERNAL MEDICINE

## 2025-05-25 PROCEDURE — 94640 AIRWAY INHALATION TREATMENT: CPT

## 2025-05-25 PROCEDURE — 36415 COLL VENOUS BLD VENIPUNCTURE: CPT

## 2025-05-25 RX ORDER — MIDODRINE HYDROCHLORIDE 5 MG/1
5 TABLET ORAL
Status: DISCONTINUED | OUTPATIENT
Start: 2025-05-25 | End: 2025-05-26 | Stop reason: HOSPADM

## 2025-05-25 RX ORDER — WARFARIN SODIUM 2.5 MG/1
2.5 TABLET ORAL
Status: COMPLETED | OUTPATIENT
Start: 2025-05-25 | End: 2025-05-25

## 2025-05-25 RX ADMIN — FAMOTIDINE 20 MG: 20 TABLET, FILM COATED ORAL at 18:16

## 2025-05-25 RX ADMIN — AMIODARONE HYDROCHLORIDE 200 MG: 200 TABLET ORAL at 08:20

## 2025-05-25 RX ADMIN — TRAZODONE HYDROCHLORIDE 50 MG: 50 TABLET ORAL at 21:34

## 2025-05-25 RX ADMIN — MIDODRINE HYDROCHLORIDE 5 MG: 5 TABLET ORAL at 08:20

## 2025-05-25 RX ADMIN — SODIUM CHLORIDE, PRESERVATIVE FREE 10 ML: 5 INJECTION INTRAVENOUS at 21:34

## 2025-05-25 RX ADMIN — BUDESONIDE AND FORMOTEROL FUMARATE DIHYDRATE 2 PUFF: 160; 4.5 AEROSOL RESPIRATORY (INHALATION) at 19:53

## 2025-05-25 RX ADMIN — WARFARIN SODIUM 2.5 MG: 2.5 TABLET ORAL at 18:16

## 2025-05-25 RX ADMIN — SODIUM CHLORIDE, PRESERVATIVE FREE 10 ML: 5 INJECTION INTRAVENOUS at 08:21

## 2025-05-25 RX ADMIN — BUDESONIDE AND FORMOTEROL FUMARATE DIHYDRATE 2 PUFF: 160; 4.5 AEROSOL RESPIRATORY (INHALATION) at 08:15

## 2025-05-25 RX ADMIN — ATORVASTATIN CALCIUM 80 MG: 80 TABLET, FILM COATED ORAL at 21:34

## 2025-05-25 RX ADMIN — WATER 1000 MG: 1 INJECTION INTRAMUSCULAR; INTRAVENOUS; SUBCUTANEOUS at 17:15

## 2025-05-25 NOTE — PROGRESS NOTES
Per Dr. Flynn continue to hold bumex and cardiac medications. Hold midodrine if systolic greater than 100. Dr. Flynn will monitor vitals and labs.

## 2025-05-25 NOTE — CARE COORDINATION
ONGOING DISCHARGE PLAN:    Patient is alert and oriented.    Plan is to go home and denies VNS.     Follows with CHF clinic and Coumadin Clinic    91% on room air  Cardio consult  IV bumex twice daily on hold  Low BP  Creatinine 1.5    INR 1.9    IV rocephin    PT/OT    Will continue to follow for additional discharge needs.    If patient is discharged prior to next notation, then this note serves as note for discharge by case management.    Electronically signed by Bonita Pierce RN on 5/25/2025 at 3:11 PM

## 2025-05-25 NOTE — PROGRESS NOTES
Date:   5/25/2025  Patient name: Graciela Holt  Date of admission:  5/23/2025 12:03 PM  MRN:   [id number]  YOB: 1950  PCP: Travis Mason MD    Reason for Admission: Acute on chronic congestive heart failure, unspecified heart failure type (HCC) [I50.9]  Acute exacerbation of chronic heart failure (HCC) [I50.9]    Cardiology follow-up: Coronary artery disease, CABG, ventricular arrhythmias, heart failure with reduced ejection fraction        Referring physician: Dr Pearl Choudhary     Impression  Admission 5/7/2025 increasing swelling over the legs and shortness of breath  Multivessel CAD   CABG LIMA to LAD and diagonal 1, SVG to OM, SVG to PDA February 2018 at [facility]  Severely reduced LV systolic function ejection fraction 25 to 30%, akinetic LV apex, apical thrombus 2D echo 4/8/2024  Moderately increased LV wall thickness, enlarged LV 6.6 cm  Left parasternal lift  Episodes of nonsustained V. Tach, patient also had A-fib with RVR  Diabetes mellitus  Hyperlipidemia lipid profile 11/6/2024 cholesterol 159, HDL 55, LDL 95, triglyceride 41  History of left MCA stroke occluded internal carotid     Admission 4/15/2025 with a GI bleed, anemia, left leg hematoma status post fall patient was on warfarin also subcu Lovenox  EGD 4/17/2025 showed gastritis, 1 small AVM no bleeding she underwent APC/argon plasma coagulation  Admission 4/7/2025 increasing shortness of breath, mild chest pain, A-fib with RVR new onset  Repeat 2D echo 4/9/2025 ejection fraction 20 to 25%, dilated LV 6.6 cm severe dilated LA, mild AR MR and TR RVSP 38  Admission 12/28/2024 severe shortness of breath chest x-ray showed CHF proBNP 12,221 high-sensitivity troponin 46 and 41     Admission 11/5/2024 with a severe shortness of breath, CHF systolic acute on chronic  High-sensitivity troponin 42 and 29, proBNP 13,352  Admission 8/12/2024 chest pain like a heavy pressure radiating to the left arm, no new ECG changes,  (arteriovenous malformation) of stomach, acquired     Gastritis without bleeding     Hiatal hernia     At risk for fall associated with hospitalization     SOB (shortness of breath)     Acute exacerbation of chronic heart failure (HCC)      Plan of Treatment:   Medications reviewed     1:Ischemic cardiomyopathy congestive heart failure systolic and diastolic  Continue hold diuretics, losartan and carvedilol   Continue with , Lipitor 80 mg,   2: History of ventricular arrhythmia continue current dose of amiodarone 200 daily carvedilol 3.125 mg twice daily patient has refused for AICD  3: LV apical thrombus, history of A-fib with RVR on warfarin, INR 1.8  4: COPD, continue with bronchodilators  Started on midodrine 5 mg 3 times daily, blood pressure has improved, give midodrine for blood pressure below 100   Keep checking manual blood pressure  At present patient is not complaining any chest pain and does not appear in any significant distress    Electronically signed by Tommy Flynn MD on 5/25/2025 at 10:27 AM

## 2025-05-25 NOTE — PROGRESS NOTES
Physical Therapy  [facility]   Physical Therapy Daily Progress Note  Date: 25  Patient Name: Graciela Holt       Room:   MRN: [id number]  Account: [id number]   : 1950  (74 y.o.) Gender: female     Discharge Recommendations:  Discharge Recommendations: Continue to assess pending progress, Patient would benefit from continued therapy after discharge, Therapy recommended at discharge     PT Equipment Recommendations  Equipment Needed:  (TBD)     Past Medical History:  has a past medical history of Allergic rhinitis, Arthritis, CAD (coronary artery disease), Cerebral artery occlusion with cerebral infarction (HCC), CHF (congestive heart failure) ([facility]), Controlled type 2 diabetes mellitus without complication, without long-term current use of insulin (HCC), COPD (chronic obstructive pulmonary disease) (HCC), Depression, Former smoker, History of blood transfusion, Hyperlipidemia, Hypertension, Kidney stone, Myocardial infarction (HCC), Obesity, Class I, BMI 30.0-34.9 (see actual BMI), Restless leg syndrome, Skin abnormality, Type II or unspecified type diabetes mellitus without mention of complication, not stated as uncontrolled, Wears glasses, and Wears partial dentures.  Past Surgical History:   has a past surgical history that includes Appendectomy; Hysterectomy; Cholecystectomy; joint replacement (Bilateral); pr office/outpt visit,procedure only (N/A, 2018); pr i&d deep absc bursa/hematoma thigh/knee region (Right, 2018); Leg biopsy excision (Right, 2019); Cardiac surgery; Cardiac surgery; other surgical history (2020); cervical laminectomy (N/A, 10/14/2020); bronchoscopy (N/A, 2021); Thoracic Fusion (01/10/2025); cervical fusion (N/A, 1/10/2025); and Upper gastrointestinal endoscopy (N/A, 2025).    Subjective  Subjective  Subjective: Pt states she feels better today and would like to sit up in chair for lunch.     General  Patient  from a bed to a chair?: A Little  How much help is needed standing up from a chair using your arms?: A Little  How much help is needed walking in hospital room?: A Little  How much help is needed climbing 3-5 steps with a railing?: Total  AM-PAC Inpatient Mobility Raw Score : 17  AM-PAC Inpatient T-Scale Score : 42.13  Mobility Inpatient CMS 0-100% Score: 50.57  Mobility Inpatient CMS G-Code Modifier : CK     Goals  Patient Goals   Patient Goals : did not state a goal  Short Term Goals  Time Frame for Short Term Goals: 5-7 treatments/ week  Short Term Goal 1: pt to tolerate 1/2 hour of therauetic exercise and activity keeping O2 sats above 90%  Short Term Goal 2: pt to demonstrate good technique for exercise program for general strengthening, balance and energy conservation  Short Term Goal 3: pt to demonstrate independent bed mobility from a flat surface for position change  Short Term Goal 4: pt to demonstrate modified independent transfers sit <> stand from various surfaces using least restrictive device and O2 as ordered  Short Term Goal 5: pt to demonstrate modified independent gait 50 -80  using least restrictive device and O2 as ordered for household distances  Short Term Goal 6: pt to demonstrate good ambulatory balance using least restrictive device  Short Term Goal 7: pt to demonstrate ability to ascend/ descend 5-6 steps using 2 rails  or least restrictive device & 1 rail w/ min x 1 for home entry      Plan  Physical Therapy Plan  General Plan:  (5-7 treatments/ week)  Specific Instructions for Next Treatment: advance gait distance using wheeled walker and O2 as ordered- monitor O2 sats and HR w/ activity, instruct in exercise program emphasizing energy conservation, balance and general strengthening  Current Treatment Recommendations: Strengthening, Balance training, Functional mobility training, Transfer training, Endurance training, Gait training, Stair training, Patient/Caregiver education & training,

## 2025-05-25 NOTE — PLAN OF CARE
Problem: Chronic Conditions and Co-morbidities  Goal: Patient's chronic conditions and co-morbidity symptoms are monitored and maintained or improved  5/25/2025 0335 by Sneha Betancourt RN  Outcome: Progressing  Flowsheets (Taken 5/24/2025 0549)  Care Plan - Patient's Chronic Conditions and Co-Morbidity Symptoms are Monitored and Maintained or Improved:   Update acute care plan with appropriate goals if chronic or comorbid symptoms are exacerbated and prevent overall improvement and discharge   Collaborate with multidisciplinary team to address chronic and comorbid conditions and prevent exacerbation or deterioration     Problem: Discharge Planning  Goal: Discharge to home or other facility with appropriate resources  5/25/2025 0335 by Sneha Betancourt RN  Outcome: Progressing  Flowsheets (Taken 5/24/2025 0549)  Discharge to home or other facility with appropriate resources:   Refer to discharge planning if patient needs post-hospital services based on physician order or complex needs related to functional status, cognitive ability or social support system   Arrange for needed discharge resources and transportation as appropriate     Problem: Safety - Adult  Goal: Free from fall injury  5/25/2025 0335 by Sneha Betancourt RN  Outcome: Progressing  Note: No falls noted this shift. Patient ambulates with x1 staff assistance without difficulty.  Bed kept in low position. Safe environment maintained. Bedside table & call light in reach. Uses call light appropriately when needing assistance.        Problem: Skin/Tissue Integrity  Goal: Skin integrity remains intact  Description: 1.  Monitor for areas of redness and/or skin breakdown2.  Assess vascular access sites hourly3.  Every 4-6 hours minimum:  Change oxygen saturation probe site4.  Every 4-6 hours:  If on nasal continuous positive airway pressure, respiratory therapy assess nares and determine need for appliance change or resting period  5/25/2025  0335 by Sneha Betancourt, RN  Outcome: Progressing  Note: No falls noted this shift. Patient ambulates with x1 staff assistance without difficulty.  Bed kept in low position. Safe environment maintained. Bedside table & call light in reach. Uses call light appropriately when needing assistance.

## 2025-05-25 NOTE — PROGRESS NOTES
Pharmacy Note  Warfarin Consult follow-up      Recent Labs     05/23/25  1235 05/24/25  0536 05/25/25  0414   INR 1.6 1.8 1.9     Recent Labs     05/23/25  1235 05/24/25  0536 05/25/25  0414   HGB 8.6* 8.6* 8.6*   HCT 28.1* 27.6* 29.0*    324 343       Significant Drug-Drug Interactions:  Current warfarin drug-drug interactions: amiodarone, ceftriaxone, atorvastatin      Date             INR        Dose given previous day  Dose scheduled for today  5/25/2025            1.9        2.5  mg         2.5 mg        Notes:  Give 2.5 mg dose today.                  Daily PT/INR while inpatient.  Gordo Rodriguez RPh  5/25/2025  6:46 AM

## 2025-05-25 NOTE — PROGRESS NOTES
[facility]  IN-PATIENT SERVICE  [facility]    PROGRESS NOTE             5/25/2025    7:37 AM    Name:   Graciela Holt  MRN:     [id number]     Acct:      [id number]   Room:   2090/2090-01   Day:  2  Admit Date:  5/23/2025 12:03 PM    PCP:  Travis Mason MD  Code Status:  Full Code    Subjective:     C/C:   Chief Complaint   Patient presents with    Shortness of Breath     Interval History Status: improved.    Patient seen and examined eating breakfast at bedside.  States that she is feeling well this morning.  No acute events overnight    Hypotensive yesterday evening with BP at 75/46.  Cardiology following with patient.  Home dose Bumex, losartan, Coreg currently held.  Midodrine 5 mg 3 times daily added yesterday.  Currently normotensive, most recent /74.  Will Continue to monitor pressures and adjust medications accordingly.  Patient denies headache, lightheadedness, worsening dyspnea, chest pain, palpitations.  Will continue to monitor.    Continues on Rocephin for management of UTI with cultures growing E. Coli.    Creatinine trending up at 1.5 from 1.1.  GFR 36, BUN 31.  Will continue to monitor.    Daily total urine output at 2 L with 1 unmeasured occurrence.    Brief History:     Graciela Holt is a 74 y.o. female patient with PMH of paroxysmal atrial fibrillation on warfarin, HFrEF with a EF of 20 to 25%, T2DM, essential hypertension, hyperlipidemia, depression, who presented to the ED on 5/23/2025 with complaints of shortness of breath and bilateral lower extremity edema for the past 3 days.  Patient denies any fever, chills, chest pain, abdominal pain, cough.  She states she is compliant with her home Bumex, and has been noticing worsening of bilateral lower extremity edema.  Of note, she did come to the ER 2 days prior to this admission, at that time she was found to have a UTI and was discharged with Keflex, however patient states she did  sodium chloride      dextrose       PRN Meds: ipratropium 0.5 mg-albuterol 2.5 mg, albuterol sulfate HFA, sodium chloride flush, sodium chloride, potassium chloride **OR** potassium alternative oral replacement **OR** potassium chloride, magnesium sulfate, ondansetron **OR** ondansetron, polyethylene glycol, acetaminophen **OR** acetaminophen, melatonin, glucose, dextrose bolus **OR** dextrose bolus, glucagon (rDNA), dextrose    Data:     Past Medical History:   has a past medical history of Allergic rhinitis, Arthritis, CAD (coronary artery disease), Cerebral artery occlusion with cerebral infarction (HCC), CHF (congestive heart failure) ([facility]), Controlled type 2 diabetes mellitus without complication, without long-term current use of insulin ([facility]), COPD (chronic obstructive pulmonary disease) ([facility]), Depression, Former smoker, History of blood transfusion, Hyperlipidemia, Hypertension, Kidney stone, Myocardial infarction ([facility]), Obesity, Class I, BMI 30.0-34.9 (see actual BMI), Restless leg syndrome, Skin abnormality, Type II or unspecified type diabetes mellitus without mention of complication, not stated as uncontrolled, Wears glasses, and Wears partial dentures.    Social History:   reports that she quit smoking about 2 years ago. Her smoking use included cigarettes. She started smoking about 58 years ago. She has a 56 pack-year smoking history. She has never used smokeless tobacco. She reports that she does not currently use drugs after having used the following drugs: Marijuana (Weed). She reports that she does not drink alcohol.     Family History:   Family History   Problem Relation Age of Onset    Heart Disease Father        Vitals:  /64   Pulse 52   Temp 98.2 °F (36.8 °C) (Oral)   Resp 16   Ht 1.626 m (5' 4\")   Wt 56.5 kg (124 lb 9 oz)   SpO2 98%   BMI 21.38 kg/m²   Temp (24hrs), Av °F (36.7 °C), Min:97.7 °F (36.5 °C), Max:98.2 °F (36.8 °C)    Recent Labs     25  1148 25  9645  sob Reason for Exam: Sob-  shortness of breath, lower extremity edema (hx chf), pt states fall from bed 3 days ago.  Pt having rt side anterior rib/sternal pain Additional signs and symptoms: Sob-  shortness of breath, lower extremity edema (hx chf), pt states fall from bed 3 days ago.  Pt having rt side anterior rib/sternal pain Relevant Medical/Surgical History: Sob-  shortness of breath, lower extremity edema (hx chf), pt states fall from bed 3 days ago.  Pt having rt side anterior rib/sternal pain FINDINGS: Stable postoperative changes of mediastinum.Slightly improved prominent interstitial markings and central vascular congestion.There is no pleural effusion or pneumothorax.The cardiomediastinal silhouette is unchanged.No acute osseous abnormality.     Mild improvement of pulmonary edema.  No new focal consolidation, pleural effusion, or pneumothorax.         Physical Examination:        Physical Exam  Constitutional:       Appearance: Normal appearance.   Cardiovascular:      Rate and Rhythm: Normal rate and regular rhythm.   Pulmonary:      Effort: Pulmonary effort is normal.      Breath sounds: Rales (Improved from yesterday) present.   Abdominal:      General: Bowel sounds are normal.      Palpations: Abdomen is soft.   Musculoskeletal:      Comments: Trace edema bilateral lower extremities   Neurological:      General: No focal deficit present.      Mental Status: She is alert and oriented to person, place, and time.         Assessment:        Primary Problem  Acute exacerbation of chronic heart failure (HCC)    Active Hospital Problems    Diagnosis Date Noted    Acute exacerbation of chronic heart failure (HCC) [I50.9] 05/07/2025       Plan:        Acute on chronic hypoxic respiratory failure likely secondary to CHF exacerbation  Patient with known HFrEF, EF 20-25%, presented with 3-day history of worsening SOB and bilateral lower extremity edema.  In the ED: proBNP 4031, WBC WNL, troponin 51>44, patient

## 2025-05-26 VITALS
HEIGHT: 64 IN | OXYGEN SATURATION: 97 % | RESPIRATION RATE: 16 BRPM | WEIGHT: 124.56 LBS | BODY MASS INDEX: 21.27 KG/M2 | SYSTOLIC BLOOD PRESSURE: 143 MMHG | TEMPERATURE: 97.5 F | HEART RATE: 64 BPM | DIASTOLIC BLOOD PRESSURE: 84 MMHG

## 2025-05-26 LAB
ANION GAP SERPL CALCULATED.3IONS-SCNC: 12 MMOL/L (ref 9–16)
BASOPHILS # BLD: 0.04 K/UL (ref 0–0.2)
BASOPHILS NFR BLD: 1 % (ref 0–2)
BUN SERPL-MCNC: 27 MG/DL (ref 8–23)
CALCIUM SERPL-MCNC: 8.6 MG/DL (ref 8.6–10.4)
CHLORIDE SERPL-SCNC: 105 MMOL/L (ref 98–107)
CO2 SERPL-SCNC: 24 MMOL/L (ref 20–31)
CREAT SERPL-MCNC: 1.2 MG/DL (ref 0.7–1.2)
EOSINOPHIL # BLD: 0.17 K/UL (ref 0–0.44)
EOSINOPHILS RELATIVE PERCENT: 3 % (ref 0–4)
ERYTHROCYTE [DISTWIDTH] IN BLOOD BY AUTOMATED COUNT: 15.3 % (ref 11.5–14.9)
GFR, ESTIMATED: 47 ML/MIN/1.73M2
GLUCOSE BLD-MCNC: 118 MG/DL (ref 65–105)
GLUCOSE BLD-MCNC: 175 MG/DL (ref 65–105)
GLUCOSE SERPL-MCNC: 130 MG/DL (ref 74–99)
HCT VFR BLD AUTO: 28.2 % (ref 36–46)
HGB BLD-MCNC: 8.6 G/DL (ref 12–16)
IMM GRANULOCYTES # BLD AUTO: <0.03 K/UL (ref 0–0.3)
IMM GRANULOCYTES NFR BLD: 0 %
INR PPP: 1.5
LYMPHOCYTES NFR BLD: 1.04 K/UL (ref 1.1–3.7)
LYMPHOCYTES RELATIVE PERCENT: 21 % (ref 24–44)
MCH RBC QN AUTO: 25.6 PG (ref 26–34)
MCHC RBC AUTO-ENTMCNC: 30.5 G/DL (ref 31–37)
MCV RBC AUTO: 83.9 FL (ref 80–100)
MONOCYTES NFR BLD: 0.56 K/UL (ref 0.1–1.2)
MONOCYTES NFR BLD: 11 % (ref 3–12)
NEUTROPHILS NFR BLD: 64 % (ref 36–66)
NEUTS SEG NFR BLD: 3.2 K/UL (ref 1.5–8.1)
NRBC BLD-RTO: 0 PER 100 WBC
PLATELET # BLD AUTO: 308 K/UL (ref 150–450)
PMV BLD AUTO: 9.7 FL (ref 8–13.5)
POTASSIUM SERPL-SCNC: 4.4 MMOL/L (ref 3.7–5.3)
PROTHROMBIN TIME: 19 SEC (ref 11.8–14.6)
RBC # BLD AUTO: 3.36 M/UL (ref 3.95–5.11)
SODIUM SERPL-SCNC: 141 MMOL/L (ref 136–145)
WBC OTHER # BLD: 5 K/UL (ref 3.5–11)

## 2025-05-26 PROCEDURE — 80048 BASIC METABOLIC PNL TOTAL CA: CPT

## 2025-05-26 PROCEDURE — 85025 COMPLETE CBC W/AUTO DIFF WBC: CPT

## 2025-05-26 PROCEDURE — 82947 ASSAY GLUCOSE BLOOD QUANT: CPT

## 2025-05-26 PROCEDURE — 6370000000 HC RX 637 (ALT 250 FOR IP)

## 2025-05-26 PROCEDURE — 94761 N-INVAS EAR/PLS OXIMETRY MLT: CPT

## 2025-05-26 PROCEDURE — 94640 AIRWAY INHALATION TREATMENT: CPT

## 2025-05-26 PROCEDURE — 36415 COLL VENOUS BLD VENIPUNCTURE: CPT

## 2025-05-26 PROCEDURE — 99239 HOSP IP/OBS DSCHRG MGMT >30: CPT | Performed by: INTERNAL MEDICINE

## 2025-05-26 PROCEDURE — 85610 PROTHROMBIN TIME: CPT

## 2025-05-26 PROCEDURE — 2500000003 HC RX 250 WO HCPCS

## 2025-05-26 RX ORDER — BUMETANIDE 1 MG/1
1 TABLET ORAL DAILY
Status: DISCONTINUED | OUTPATIENT
Start: 2025-05-26 | End: 2025-05-26 | Stop reason: HOSPADM

## 2025-05-26 RX ORDER — BUMETANIDE 1 MG/1
1 TABLET ORAL DAILY
Qty: 30 TABLET | Refills: 3 | Status: SHIPPED | OUTPATIENT
Start: 2025-05-26 | End: 2025-05-30 | Stop reason: DRUGHIGH

## 2025-05-26 RX ORDER — WARFARIN SODIUM 5 MG/1
5 TABLET ORAL
Status: DISCONTINUED | OUTPATIENT
Start: 2025-05-26 | End: 2025-05-26 | Stop reason: HOSPADM

## 2025-05-26 RX ADMIN — ACETAMINOPHEN 650 MG: 325 TABLET ORAL at 00:35

## 2025-05-26 RX ADMIN — ACETAMINOPHEN 650 MG: 325 TABLET ORAL at 08:39

## 2025-05-26 RX ADMIN — BUDESONIDE AND FORMOTEROL FUMARATE DIHYDRATE 2 PUFF: 160; 4.5 AEROSOL RESPIRATORY (INHALATION) at 07:10

## 2025-05-26 RX ADMIN — AMIODARONE HYDROCHLORIDE 200 MG: 200 TABLET ORAL at 08:39

## 2025-05-26 RX ADMIN — Medication 3 MG: at 00:35

## 2025-05-26 RX ADMIN — SODIUM CHLORIDE, PRESERVATIVE FREE 10 ML: 5 INJECTION INTRAVENOUS at 08:41

## 2025-05-26 ASSESSMENT — PAIN SCALES - GENERAL
PAINLEVEL_OUTOF10: 5
PAINLEVEL_OUTOF10: 5

## 2025-05-26 ASSESSMENT — PAIN DESCRIPTION - ORIENTATION: ORIENTATION: RIGHT;LEFT

## 2025-05-26 ASSESSMENT — ENCOUNTER SYMPTOMS
GASTROINTESTINAL NEGATIVE: 1
SHORTNESS OF BREATH: 1

## 2025-05-26 ASSESSMENT — PAIN DESCRIPTION - LOCATION: LOCATION: LEG

## 2025-05-26 NOTE — PLAN OF CARE
Problem: Chronic Conditions and Co-morbidities  Goal: Patient's chronic conditions and co-morbidity symptoms are monitored and maintained or improved  5/26/2025 0331 by Sneha Betancourt RN  Outcome: Progressing  Flowsheets (Taken 5/26/2025 0331)  Care Plan - Patient's Chronic Conditions and Co-Morbidity Symptoms are Monitored and Maintained or Improved:   Monitor and assess patient's chronic conditions and comorbid symptoms for stability, deterioration, or improvement   Collaborate with multidisciplinary team to address chronic and comorbid conditions and prevent exacerbation or deterioration   Update acute care plan with appropriate goals if chronic or comorbid symptoms are exacerbated and prevent overall improvement and discharge     Problem: Safety - Adult  Goal: Free from fall injury  5/26/2025 0331 by Sneha Betancourt RN  Outcome: Progressing  Flowsheets (Taken 5/26/2025 0331)  Free From Fall Injury:   Instruct family/caregiver on patient safety   Based on caregiver fall risk screen, instruct family/caregiver to ask for assistance with transferring infant if caregiver noted to have fall risk factors     Problem: Skin/Tissue Integrity  Goal: Skin integrity remains intact  Description: 1.  Monitor for areas of redness and/or skin breakdown2.  Assess vascular access sites hourly3.  Every 4-6 hours minimum:  Change oxygen saturation probe site4.  Every 4-6 hours:  If on nasal continuous positive airway pressure, respiratory therapy assess nares and determine need for appliance change or resting period  5/26/2025 0331 by Sneha Betancourt RN  Outcome: Progressing  Flowsheets (Taken 5/26/2025 0331)  Skin Integrity Remains Intact:   Monitor for areas of redness and/or skin breakdown   Positioning devices   Turn and reposition as indicated   Every 4-6 hours:  If on nasal continuous positive airway pressure, assess nares and determine need for appliance change or resting period

## 2025-05-26 NOTE — PROGRESS NOTES
Pharmacy Note  Warfarin Consult follow-up      Recent Labs     05/24/25  0536 05/25/25  0414 05/26/25  0605   INR 1.8 1.9 1.5     Recent Labs     05/24/25  0536 05/25/25  0414 05/26/25  0605   HGB 8.6* 8.6* 8.6*   HCT 27.6* 29.0* 28.2*    343 308       Significant Drug-Drug Interactions:  Current warfarin drug-drug interactions: amiodarone, ceftriaxone, atorvastatin       Date             INR        Dose given previous day  Dose scheduled for today  5/26/2025            1.5        2.5  mg         5 mg        Notes:  Give 5 mg dose today.                  Daily PT/INR while inpatient.  Gordo Rodriguez RPh  5/26/2025  10:54 AM

## 2025-05-26 NOTE — DISCHARGE INSTRUCTIONS
Follow-up with PCP in 1 week for hospital follow-up  Follow-up with Coumadin clinic as soon as possible for management of dosing  Decrease home dose Bumex 1 mg daily

## 2025-05-26 NOTE — DISCHARGE SUMMARY
[facility]   IN-PATIENT SERVICE   [facility]    Discharge Summary     Patient ID: Graciela Holt  :  1950   MRN: [id number]     ACCOUNT:  [id number]   Patient's PCP: Travis Mason MD  Admit Date: 2025   Discharge Date: 2025     Length of Stay: 3  Code Status:  Full Code  Admitting Physician: Pearl Choudhary MD  Discharge Physician: Chata Lackey MD     Active Discharge Diagnoses:       Primary Problem  Acute exacerbation of chronic heart failure (HCC)      Hospital Problems  Active Hospital Problems    Diagnosis Date Noted    Acute exacerbation of chronic heart failure (HCC) [I50.9] 2025       Admission Condition:  good     Discharged Condition: stable    Hospital Stay:       Hospital Course:  Graciela Holt is a 74 y.o. female PMH of paroxysmal atrial fibrillation on warfarin, HFrEF with a EF of 20 to 25%, T2DM, essential hypertension, hyperlipidemia, depression, who presented to the ED on 2025 with complaints of shortness of breath and bilateral lower extremity edema for the past 3 days.  Patient denied fever/chills, chest pain, abdominal pain, recent sick contacts.  Patient reported compliance with home dose Bumex.  Reports worsening bilateral lower extremity edema despite taking diuretics as prescribed.  2 days prior to admission, patient was evaluated in the emergency department for UTI, discharged with prescription for Keflex.  However patient states she had been unable to  medication due to cost.    Patient desaturating emergency department, placed on 2 L O2 via nasal cannula.  1+ pitting edema on bilateral lower extremities.  Rales at auscultation on exam.  proBNP 4031, WBC WNL, troponin 51, 44 and repeat.  Chest x-ray exhibiting pulmonary vascular congestion IV Bumex initiated in ED continue on admission, cardiology consulted for further evaluation of hypoxic respiratory failure likely secondary to acute CHF  exacerbation.    Respiratory status improving over the course of admission.  Patient weaned from nasal cannula to room air.  Improvement of expectoration and dyspnea.  Symptoms managed with IV Bumex, DuoNebs.    Patient became hypotensive on 5/24 with systolic pressures in the 70s.  Per cardiology recommendation, antihypertensives and Bumex held with midodrine added 3 times daily.  Blood pressure normalized on 5/25.  Home medications continued.  Bumex decreased from 2 mg daily to 1 mg daily.    UTI managed with Rocephin during admission.  Urine cultures from 5/21 positive for E. coli, which was pansensitive to antibiotics.  Patient reports improvement of urinary symptoms at time of discharge.    Patient agreeable to and hemodynamically stable at time of discharge.  Instructed to follow-up with PCP in 1 week for hospital follow-up and to return if symptoms worsen or persist.      Significant therapeutic interventions:   Antibiotic treatment for management of UTI  IV diuretics for management of acute CHF exacerbation    Significant Diagnostic Studies:   Labs / Micro:  CBC:   Lab Results   Component Value Date/Time    WBC 5.0 05/26/2025 06:05 AM    RBC 3.36 05/26/2025 06:05 AM    HGB 8.6 05/26/2025 06:05 AM    HCT 28.2 05/26/2025 06:05 AM    MCV 83.9 05/26/2025 06:05 AM    MCH 25.6 05/26/2025 06:05 AM    MCHC 30.5 05/26/2025 06:05 AM    RDW 15.3 05/26/2025 06:05 AM     05/26/2025 06:05 AM     BMP:    Lab Results   Component Value Date/Time    GLUCOSE 130 05/26/2025 06:05 AM     05/26/2025 06:05 AM    K 4.4 05/26/2025 06:05 AM     05/26/2025 06:05 AM    CO2 24 05/26/2025 06:05 AM    ANIONGAP 12 05/26/2025 06:05 AM    ANIONGAP 11 02/06/2018 03:31 PM    BUN 27 05/26/2025 06:05 AM    CREATININE 1.2 05/26/2025 06:05 AM    CREATININE 1.1 03/28/2022 12:00 AM    CALCIUM 8.6 05/26/2025 06:05 AM    LABGLOM 47 05/26/2025 06:05 AM    LABGLOM 52 04/09/2024 10:56 AM    GFRAA 52 05/16/2022 05:08 AM    GFR       tablet  Commonly known as: COUMADIN  Take as directed. If you are unsure how to take this medication, talk to your nurse or doctor.  Original instructions: Take 1 tablet by mouth daily Take 5 mg once daily or as directed by [facility] coumadin clinic              Electronically signed by   Chata Lackey MD  5/26/2025  10:40 AM      Thank you Travis Vaughn MD for the opportunity to be involved in this patient's care.

## 2025-05-26 NOTE — PROGRESS NOTES
[facility]  IN-PATIENT SERVICE  [facility]    PROGRESS NOTE             5/26/2025    9:00 AM    Name:   Graciela Holt  MRN:     [id number]     Acct:      [id number]   Room:   2090/2090-01   Day:  3  Admit Date:  5/23/2025 12:03 PM    PCP:  Travis Mason MD  Code Status:  Full Code    Subjective:     C/C:   Chief Complaint   Patient presents with    Shortness of Breath     Interval History Status: improved.    Patient seen and examined eating breakfast.  States that she is feeling well this morning.  No acute events overnight.    Blood pressures improved.  Most recent /83.  Home dose Bumex, Coreg, Cozaar held due to hypotension 5/24.  Coreg continued this morning.  Will continue to monitor.  Denies headache, dyspnea, palpitations, dizziness.    1.5 L daily, daily total output.    Creatinine trending down from 1.5 to 1.2 this morning.    Adequate oxygen saturation on room air.  Reports improvement of dyspnea.    On day 4 of Rocephin for UTI with cultures growing E. Coli, sensitivities completed.    Per case management note, plan to discharge home.    Brief History:     Graciela Holt is a 74 y.o. female patient with PMH of paroxysmal atrial fibrillation on warfarin, HFrEF with a EF of 20 to 25%, T2DM, essential hypertension, hyperlipidemia, depression, who presented to the ED on 5/23/2025 with complaints of shortness of breath and bilateral lower extremity edema for the past 3 days.  Patient denies any fever, chills, chest pain, abdominal pain, cough.  She states she is compliant with her home Bumex, and has been noticing worsening of bilateral lower extremity edema.  Of note, she did come to the ER 2 days prior to this admission, at that time she was found to have a UTI and was discharged with Keflex, however patient states she did not pick this up because she could not afford it from the pharmacy.     In the ED, patient was desaturating and was placed  on 2 L oxygen via nasal cannula.  She was found to have 1+ bilateral lower extremity edema.  Rales heard at lung bases on auscultation.  She received IV Bumex in the ED.  proBNP 4031, WBC WNL, troponin 51>44, CXR showed pulmonary vascular congestion.  Patient was in sinus rhythm in the ED.  Patient will be admitted with consult to cardiology.     The patient was admitted to the hospital for management of acute on chronic hypoxic respiratory failure likely secondary to CHF exacerbation.    Review of Systems:     Review of Systems   Constitutional: Negative.    Respiratory:  Positive for shortness of breath (Patient reports improvement of symptoms).    Cardiovascular:         Trace lower extremity edema, improved from admission   Gastrointestinal: Negative.    Genitourinary: Negative.    Neurological: Negative.    Psychiatric/Behavioral: Negative.           Medications:     Allergies:    Allergies   Allergen Reactions    Codeine Other (See Comments)     Constipation.     Lisinopril      hyperkalemia    Morphine Other (See Comments)     Hallucinations.       Current Meds:   Scheduled Meds:    midodrine  5 mg Oral TID WC    amiodarone  200 mg Oral Daily    atorvastatin  80 mg Oral Daily    budesonide-formoterol  2 puff Inhalation BID RT    [Held by provider] bumetanide  2 mg Oral BID    [Held by provider] carvedilol  3.125 mg Oral BID WC    famotidine  20 mg Oral QPM    [Held by provider] losartan  25 mg Oral Daily    traZODone  50 mg Oral Nightly    cefTRIAXone (ROCEPHIN) IV  1,000 mg IntraVENous Q24H    sodium chloride flush  5-40 mL IntraVENous 2 times per day    insulin lispro  0-4 Units SubCUTAneous 4x Daily AC & HS    warfarin placeholder: dosing by pharmacy   Oral RX Placeholder    [Held by provider] bumetanide  2 mg IntraVENous BID     Continuous Infusions:    sodium chloride      dextrose       PRN Meds: ipratropium 0.5 mg-albuterol 2.5 mg, albuterol sulfate HFA, sodium chloride flush, sodium chloride,  anterior rib/sternal pain Additional signs and symptoms: Sob-  shortness of breath, lower extremity edema (hx chf), pt states fall from bed 3 days ago.  Pt having rt side anterior rib/sternal pain Relevant Medical/Surgical History: Sob-  shortness of breath, lower extremity edema (hx chf), pt states fall from bed 3 days ago.  Pt having rt side anterior rib/sternal pain FINDINGS: Stable postoperative changes of mediastinum.Slightly improved prominent interstitial markings and central vascular congestion.There is no pleural effusion or pneumothorax.The cardiomediastinal silhouette is unchanged.No acute osseous abnormality.     Mild improvement of pulmonary edema.  No new focal consolidation, pleural effusion, or pneumothorax.         Physical Examination:        Physical Exam  Constitutional:       Appearance: Normal appearance.   Cardiovascular:      Rate and Rhythm: Normal rate and regular rhythm.   Pulmonary:      Effort: Pulmonary effort is normal.      Breath sounds: Rales (Improved) present.   Abdominal:      General: Bowel sounds are normal.      Palpations: Abdomen is soft.   Musculoskeletal:      Comments: Trace edema bilateral lower extremities   Neurological:      General: No focal deficit present.      Mental Status: She is alert and oriented to person, place, and time.         Assessment:        Primary Problem  Acute exacerbation of chronic heart failure (HCC)    Active Hospital Problems    Diagnosis Date Noted    Acute exacerbation of chronic heart failure (HCC) [I50.9] 05/07/2025       Plan:        Acute on chronic hypoxic respiratory failure likely secondary to CHF exacerbation  Patient with known HFrEF, EF 20-25%, presented with 3-day history of worsening SOB and bilateral lower extremity edema.  In the ED: proBNP 4031, WBC WNL, troponin 51>44, patient desaturating requiring oxygen via NC  CXR in the ED shows pulmonary vascular congestion  Bumex 2 mg twice daily currently held due to

## 2025-05-26 NOTE — PLAN OF CARE
Problem: Respiratory - Adult  Goal: Achieves optimal ventilation and oxygenation  5/26/2025 0711 by Becca Martínez RCP  Outcome: Progressing

## 2025-05-26 NOTE — CARE COORDINATION
Case Management   Daily Progress Note       Patient Name: Graciela Holt                   YOB: 1950  Diagnosis: Acute on chronic congestive heart failure, unspecified heart failure type (HCC) [I50.9]  Acute exacerbation of chronic heart failure (HCC) [I50.9]                       GMLOS: 3.9 days  Length of Stay: 3  days    Readmission Risk (Low < 19, Mod (19-27), High > 27): Readmission Risk Score: 38.3      Patient is alert and oriented.    Spoke with Pt, and Current Transitional Plan is:    [x] Home Independently  w/Friends.     [] Home with HC    [] Skilled Nursing Facility    [] Acute Rehabilitation    [] Long Term Acute Care (LTAC)    [] Other:     Medical Management: Cr 1.2, INR 1.5. IV Bumex, remains on Hold. BP improving, Sating 96% on RA. Remains on IV Rocephin.     Testing Ordered: Labs    Additional Notes: Denies Needs/VNS, PT/OT on board, will follow for any rec/needs.     Electronically signed by Aster Pelaez RN on 5/26/2025 at 10:14 AM

## 2025-05-26 NOTE — PROGRESS NOTES
Date:   5/26/2025  Patient name: Graciela Holt  Date of admission:  5/23/2025 12:03 PM  MRN:   [id number]  YOB: 1950  PCP: Travis Mason MD    Reason for Admission: Acute on chronic congestive heart failure, unspecified heart failure type (HCC) [I50.9]  Acute exacerbation of chronic heart failure (HCC) [I50.9]       Cardiology follow-up: Coronary artery disease, CABG, ventricular arrhythmias, heart failure with reduced ejection fraction           Referring physician: Dr Pearl Choudhary     Impression  Admission 5/7/2025 increasing swelling over the legs and shortness of breath  Multivessel CAD   CABG LIMA to LAD and diagonal 1, SVG to OM, SVG to PDA February 2018 at [facility]  Severely reduced LV systolic function ejection fraction 25 to 30%, akinetic LV apex, apical thrombus 2D echo 4/8/2024  Moderately increased LV wall thickness, enlarged LV 6.6 cm  Left parasternal lift  Episodes of nonsustained V. Tach, patient also had A-fib with RVR  Diabetes mellitus  Hyperlipidemia lipid profile 11/6/2024 cholesterol 159, HDL 55, LDL 95, triglyceride 41  History of left MCA stroke occluded internal carotid     Admission 4/15/2025 with a GI bleed, anemia, left leg hematoma status post fall patient was on warfarin also subcu Lovenox  EGD 4/17/2025 showed gastritis, 1 small AVM no bleeding she underwent APC/argon plasma coagulation  Admission 4/7/2025 increasing shortness of breath, mild chest pain, A-fib with RVR new onset  Repeat 2D echo 4/9/2025 ejection fraction 20 to 25%, dilated LV 6.6 cm severe dilated LA, mild AR MR and TR RVSP 38  Admission 12/28/2024 severe shortness of breath chest x-ray showed CHF proBNP 12,221 high-sensitivity troponin 46 and 41     Admission 11/5/2024 with a severe shortness of breath, CHF systolic acute on chronic  High-sensitivity troponin 42 and 29, proBNP 13,352  Admission 8/12/2024 chest pain like a heavy pressure radiating to the left arm, no new ECG changes,  SubCUTAneous 4x Daily AC & HS    warfarin placeholder: dosing by pharmacy   Oral RX Placeholder    [Held by provider] bumetanide  2 mg IntraVENous BID     Continuous Infusions:   sodium chloride      dextrose       CBC:   Recent Labs     05/24/25  0536 05/25/25  0414 05/26/25  0605   WBC 6.1 7.2 5.0   HGB 8.6* 8.6* 8.6*    343 308     BMP:    Recent Labs     05/24/25  0536 05/25/25  0414 05/26/25  0605    142 141   K 4.0 4.6 4.4    105 105   CO2 26 28 24   BUN 22 31* 27*   CREATININE 1.4* 1.5* 1.2   GLUCOSE 129* 134* 130*     Hepatic: No results for input(s): \"AST\", \"ALT\", \"BILITOT\", \"ALKPHOS\" in the last 72 hours.    Invalid input(s): \"ALB\"  Troponin: No results for input(s): \"TROPONINI\" in the last 72 hours.  BNP: No results for input(s): \"BNP\" in the last 72 hours.  Lipids: No results for input(s): \"CHOL\", \"HDL\" in the last 72 hours.    Invalid input(s): \"LDLCALCU\"  INR:   Recent Labs     05/24/25  0536 05/25/25  0414 05/26/25  0605   INR 1.8 1.9 1.5       Objective:   Vitals: BP (!) 143/84   Pulse 64   Temp 97.5 °F (36.4 °C) (Oral)   Resp 16   Ht 1.626 m (5' 4.02\")   Wt 56.5 kg (124 lb 9 oz)   SpO2 97%   BMI 21.37 kg/m²   General appearance: Very frail looking.  Alert and cooperative with exam  HEENT: Head: Normal, normocephalic, atraumatic.  Neck: supple, symmetrical, trachea midline  Lungs:  Very poor chest expansion diminished breath sound bilateral crackles  Heart: regular rate and rhythm  Abdomen:  Soft bowel sounds (  Extremities: Homans sign is negative, no sign of DVT  Neurologic: Mental status: Alert, oriented, thought content appropriate    EKG: Sinus rhythm, LVH with repolarization change heart rate 50s  ECHO: reviewed.   Ejection fraction: 25% dilated LV, moderate increased LV wall thickness Global hypokinesis, akinesis of the apical lateral and anterolateral walls akinetic apex dilated LA  Stress Test: reviewed.  Cardiac Angiography: reviewed    Assessment / Acute Cardiac

## 2025-05-26 NOTE — PLAN OF CARE
Problem: Chronic Conditions and Co-morbidities  Goal: Patient's chronic conditions and co-morbidity symptoms are monitored and maintained or improved  5/26/2025 1600 by Sb Keenan RN  Outcome: Adequate for Discharge     Problem: Discharge Planning  Goal: Discharge to home or other facility with appropriate resources  5/26/2025 1600 by Sb Keenan RN  Outcome: Adequate for Discharge     Problem: Safety - Adult  Goal: Free from fall injury  5/26/2025 1600 by Sb Keenan RN  Outcome: Adequate for Discharge     Problem: ABCDS Injury Assessment  Goal: Absence of physical injury  5/26/2025 1600 by Sb Keenan RN  Outcome: Adequate for Discharge     Problem: Skin/Tissue Integrity  Goal: Skin integrity remains intact  Description: 1.  Monitor for areas of redness and/or skin breakdown2.  Assess vascular access sites hourly3.  Every 4-6 hours minimum:  Change oxygen saturation probe site4.  Every 4-6 hours:  If on nasal continuous positive airway pressure, respiratory therapy assess nares and determine need for appliance change or resting period  5/26/2025 1600 by Sb Keenan RN  Outcome: Adequate for Discharge     Problem: Respiratory - Adult  Goal: Achieves optimal ventilation and oxygenation  5/26/2025 0711 by Becca Martínez RCP  Outcome: Progressing

## 2025-05-27 ENCOUNTER — TELEPHONE (OUTPATIENT)
Dept: FAMILY MEDICINE CLINIC | Age: 75
End: 2025-05-27

## 2025-05-27 ENCOUNTER — TELEPHONE (OUTPATIENT)
Age: 75
End: 2025-05-27

## 2025-05-27 NOTE — TELEPHONE ENCOUNTER
Patient was admitted to [facility] from 5/23/25 to  5/26/252 for CHF exacerbation.     INR: 5/23: 1.6 - 2.5 mg given          5/24: 1.8 - 2.5 mg given          5/25: 1.9 -   2.5 mg given          5/26: 1.5   - 5 mg given    Note from 5/19 shows patient dose increased to 5 mg M/W then 2.5 all other days.    Discharge paperwork from 5/26 shows 5 mg once daily or as other wise directed.     Attempted to call patient but she was unavailable.     Zenobia Glass, PharmD  PGY1 Pharmacy Resident    5/27/2025  5:39 PM

## 2025-05-27 NOTE — TELEPHONE ENCOUNTER
Care Transitions Initial Follow Up Call    Outreach made within 2 business days of discharge: Yes    Patient: Graciela Holt Patient : 1950   MRN: [id number]  Reason for Admission: ACUTE EXACERBATION OF CHRONIC HEART FAILURE  Discharge Date: 25       Spoke with: ROSE HDEZ   Discharge department/facility: [facility] Interactive Patient Contact:  Was patient able to fill all prescriptions: No: NONE GIVEN  Was patient instructed to bring all medications to the follow-up visit: Yes  Is patient taking all medications as directed in the discharge summary? Yes  Does patient understand their discharge instructions: Yes  Does patient have questions or concerns that need addressed prior to 7-14 day follow up office visit: no    Additional needs identified to be addressed with provider  NONE             Scheduled appointment with PCP within 7-14 days    Follow Up  Future Appointments   Date Time Provider Department Center   2025 12:00 PM [facility] [address] CHFCLIN [place]   2025  3:00 PM Yessica Flynn DO [facility]   2025 12:00 PM Travis Mason MD [facility] JAZMINE Montenegro MA

## 2025-05-28 ENCOUNTER — TELEPHONE (OUTPATIENT)
Age: 75
End: 2025-05-28

## 2025-05-28 NOTE — PROGRESS NOTES
PT CALLED AND REMINDED OF HEART FAILURE CLINIC APPOINTMENT ON 5/29/2025 AT 12:00. PT VERBALIZED UNDERSTANDING.

## 2025-05-28 NOTE — TELEPHONE ENCOUNTER
Spoke to patient's granddaughter who states she will come for INR check tomorrow.  Soledad Barcenas [facility],Pharm.D,, BCPS, CACP  5/28/2025  5:05 PM

## 2025-05-29 ENCOUNTER — HOSPITAL ENCOUNTER (OUTPATIENT)
Age: 75
Setting detail: SPECIMEN
Discharge: HOME | End: 2025-05-29
Payer: COMMERCIAL

## 2025-05-29 ENCOUNTER — ANTI-COAG VISIT (OUTPATIENT)
Age: 75
End: 2025-05-29
Payer: COMMERCIAL

## 2025-05-29 ENCOUNTER — HOSPITAL ENCOUNTER (OUTPATIENT)
Dept: OTHER | Age: 75
Discharge: HOME | End: 2025-05-29
Payer: COMMERCIAL

## 2025-05-29 VITALS
WEIGHT: 136.3 LBS | BODY MASS INDEX: 23.27 KG/M2 | DIASTOLIC BLOOD PRESSURE: 72 MMHG | RESPIRATION RATE: 16 BRPM | SYSTOLIC BLOOD PRESSURE: 146 MMHG | HEART RATE: 58 BPM | HEIGHT: 64 IN | OXYGEN SATURATION: 100 %

## 2025-05-29 DIAGNOSIS — I48.20 CHRONIC ATRIAL FIBRILLATION (HCC): Primary | ICD-10-CM

## 2025-05-29 LAB
ANION GAP SERPL CALCULATED.3IONS-SCNC: 9 MMOL/L (ref 9–16)
BNP SERPL-MCNC: 3879 PG/ML (ref 0–300)
BUN SERPL-MCNC: 22 MG/DL (ref 8–23)
CALCIUM SERPL-MCNC: 9.2 MG/DL (ref 8.6–10.4)
CHLORIDE SERPL-SCNC: 109 MMOL/L (ref 98–107)
CO2 SERPL-SCNC: 24 MMOL/L (ref 20–31)
CREAT SERPL-MCNC: 1 MG/DL (ref 0.7–1.2)
GFR, ESTIMATED: 58 ML/MIN/1.73M2
GLUCOSE SERPL-MCNC: 143 MG/DL (ref 74–99)
INTERNATIONAL NORMALIZATION RATIO, POC: 1.2
POTASSIUM SERPL-SCNC: 5.2 MMOL/L (ref 3.7–5.3)
PROTHROMBIN TIME, POC: 14.9
SODIUM SERPL-SCNC: 142 MMOL/L (ref 136–145)

## 2025-05-29 PROCEDURE — 99211 OFF/OP EST MAY X REQ PHY/QHP: CPT

## 2025-05-29 PROCEDURE — 99212 OFFICE O/P EST SF 10 MIN: CPT

## 2025-05-29 PROCEDURE — 36415 COLL VENOUS BLD VENIPUNCTURE: CPT

## 2025-05-29 PROCEDURE — 80048 BASIC METABOLIC PNL TOTAL CA: CPT

## 2025-05-29 PROCEDURE — 85610 PROTHROMBIN TIME: CPT

## 2025-05-29 PROCEDURE — 83880 ASSAY OF NATRIURETIC PEPTIDE: CPT

## 2025-05-29 NOTE — CARE COORDINATION
Called patient mom and informed of results and recommendations per provider notes as documented below, patient mom agrees with plan, and has no further questions at this time. Advised to return call if any questions arise.      I agree with the [address] sent

## 2025-05-29 NOTE — PROGRESS NOTES
[facility]  Heart Failure Clinic      [address] [place] [address]  Phone: [phone]  Fax: [phone]      NAME: Graciela Holt  MEDICAL RECORD NUMBER:  [id number]  AGE: 74 y.o.   GENDER: female  : 1950  EPISODE DATE:  2025      Graciela Holt was referred to the [facility] by Dr. Castro for heart failure services.She was followed by Cardiologist, Dr. SANDRITA Flynn during her hospitalization but has plans to establish care with Cardiologist, Dr. Mccoy of [facility] on 25                ECHO EF%: 20-25%  Date: 25       Recent Cardiology follow-up apt: none  Follow-up apt recommended: [address], Dr. Mccoy  Upcoming testing: unknown  Medication Management: Currently for anticoagulation, Requested for Heart Failure  Nephrologist: n/a    Graciela Holt is a 74 y.o. female here for the Heart Failure Services.  She is here today for a Heart Failure assessment/consultation, monitoring medication effectiveness, reviewing necessary labs, transition of care, extensive Heart Failure education, reporting any concerns or symptoms and obtaining any necessary medication adjustments or orders from the patients health care team.    Vital Signs:   BP (!) 146/72   Pulse 58   Resp 16   Ht 1.626 m (5' 4\")   Wt 61.8 kg (136 lb 4.8 oz)   SpO2 100%   BMI 23.40 kg/m²    O2 Device: None (Room air)        Weight loss/gain stable      Nursing Assessment:    Respiratory:    Assessment  Charting Type: Reassessment    Breath Sounds  Respiratory Pattern: Regular  Breath Sounds Bilateral: Clear  Right Upper Lobe: Clear  Right Middle Lobe: Clear  Right Lower Lobe: Clear  Left Upper Lobe: Clear  Left Lower Lobe: Clear    Cough/Sputum  Cough: Dry  Frequency: Occasional    Cardiac  Cardiac Regularity: Regular  Heart Sounds: S1, S2  Cardiac Rhythm: Sinus rachael  Jugular Vein Distention (JVD) Present: No  Cardiac Symptoms: Peripheral edema  Implanted Cardiac Device (Pacemaker, ICD,

## 2025-05-29 NOTE — PROGRESS NOTES
Patient seen in person in Medication Management Service.      Patient was admitted to [facility] from 5/23/25 to  5/26/252 for CHF exacerbation.      INR: 5/23: 1.6 - 2.5 mg given          5/24: 1.8 - 2.5 mg given          5/25: 1.9 -   2.5 mg given          5/26: 1.5   - 5 mg given        Patient states she has been taking warfarin 2.5mg every day since hospital release on 5/26/25.  Confirmed patient has warfarin 2.5mg tablets as states they are green.  Reports not missing any doses.   No bleeding or thromboembolic side effects noted.    No significant med or dietary changes.    No significant recent illness or disease state changes.      Patient acknowledges they are still an active patient of referring provider which is Dr. WASHINGTON Mason Yes    PT/INR done via POC meter per protocol.  INR was subtherapeutic at 1.2.  (goal 2 - 3)    Warfarin regimen will be increased to 5mg on Thursdays, Saturdays and Mondays, 2.5mg all other days.  Patient already took dose today so will go home and take additional 2.5mg tablet to make total of 5mg for today.  Will retest in 1 week.    Patient still needs to make appt with [facility].  Patient's granddaughter called right after appt today and is scheduled with Dr. Nice on 7/1/25.     Patient understands dosing directions and information discussed.  Dosing schedule and follow up appointment given to patient. Progress note routed to referring physicians office.  Patient acknowledges working in Consult Agreement with Pharmacist as referred by his/her physician/provider.      For Pharmacy Admin Tracking Only    Intervention Detail: Dose Adjustment: 1, reason: Therapy Optimization  Total # of Interventions Recommended: 1  Total # of Interventions Accepted: 1  Time Spent (min): 20    Soledad Barcenas RPH,Pharm.D,, BCPS, CACP  5/29/2025  5:07 PM

## 2025-05-30 ENCOUNTER — HOSPITAL ENCOUNTER (INPATIENT)
Age: 75
LOS: 5 days | Discharge: HOME | DRG: 303 | End: 2025-06-04
Attending: STUDENT IN AN ORGANIZED HEALTH CARE EDUCATION/TRAINING PROGRAM
Payer: COMMERCIAL

## 2025-05-30 ENCOUNTER — APPOINTMENT (OUTPATIENT)
Dept: GENERAL RADIOLOGY | Age: 75
DRG: 303 | End: 2025-05-30
Payer: COMMERCIAL

## 2025-05-30 ENCOUNTER — TELEPHONE (OUTPATIENT)
Dept: OTHER | Age: 75
End: 2025-05-30

## 2025-05-30 DIAGNOSIS — I42.9 CARDIOMYOPATHY, UNSPECIFIED TYPE (HCC): ICD-10-CM

## 2025-05-30 DIAGNOSIS — I20.9 ANGINA PECTORIS: ICD-10-CM

## 2025-05-30 DIAGNOSIS — I50.42 CHRONIC COMBINED SYSTOLIC AND DIASTOLIC CONGESTIVE HEART FAILURE (HCC): Primary | ICD-10-CM

## 2025-05-30 DIAGNOSIS — R07.9 CHEST PAIN, UNSPECIFIED TYPE: Primary | ICD-10-CM

## 2025-05-30 DIAGNOSIS — E11.9 CONTROLLED TYPE 2 DIABETES MELLITUS WITHOUT COMPLICATION, WITHOUT LONG-TERM CURRENT USE OF INSULIN (HCC): Chronic | ICD-10-CM

## 2025-05-30 DIAGNOSIS — I50.33 ACUTE ON CHRONIC DIASTOLIC (CONGESTIVE) HEART FAILURE (HCC): ICD-10-CM

## 2025-05-30 DIAGNOSIS — I50.42 CHRONIC COMBINED SYSTOLIC AND DIASTOLIC CONGESTIVE HEART FAILURE (HCC): ICD-10-CM

## 2025-05-30 DIAGNOSIS — E27.8 LEFT ADRENAL MASS: Chronic | ICD-10-CM

## 2025-05-30 LAB
ALBUMIN SERPL-MCNC: 3.7 G/DL (ref 3.5–5.2)
ALP SERPL-CCNC: 132 U/L (ref 35–104)
ALT SERPL-CCNC: 51 U/L (ref 10–35)
ANION GAP SERPL CALCULATED.3IONS-SCNC: 10 MMOL/L (ref 9–16)
AST SERPL-CCNC: 58 U/L (ref 10–35)
BASOPHILS # BLD: 0.05 K/UL (ref 0–0.2)
BASOPHILS NFR BLD: 1 % (ref 0–2)
BILIRUB SERPL-MCNC: 0.3 MG/DL (ref 0–1.2)
BUN SERPL-MCNC: 31 MG/DL (ref 8–23)
CALCIUM SERPL-MCNC: 9.1 MG/DL (ref 8.6–10.4)
CHLORIDE SERPL-SCNC: 110 MMOL/L (ref 98–107)
CO2 SERPL-SCNC: 24 MMOL/L (ref 20–31)
CREAT SERPL-MCNC: 1.1 MG/DL (ref 0.7–1.2)
EOSINOPHIL # BLD: 0.21 K/UL (ref 0–0.44)
EOSINOPHILS RELATIVE PERCENT: 3 % (ref 0–4)
ERYTHROCYTE [DISTWIDTH] IN BLOOD BY AUTOMATED COUNT: 15.8 % (ref 11.5–14.9)
GFR, ESTIMATED: 52 ML/MIN/1.73M2
GLUCOSE SERPL-MCNC: 125 MG/DL (ref 74–99)
HCT VFR BLD AUTO: 27.6 % (ref 36–46)
HGB BLD-MCNC: 8.3 G/DL (ref 12–16)
IMM GRANULOCYTES # BLD AUTO: <0.03 K/UL (ref 0–0.3)
IMM GRANULOCYTES NFR BLD: 0 %
INR PPP: 1.3
LYMPHOCYTES NFR BLD: 1.54 K/UL (ref 1.1–3.7)
LYMPHOCYTES RELATIVE PERCENT: 22 % (ref 24–44)
MAGNESIUM SERPL-MCNC: 2 MG/DL (ref 1.6–2.4)
MCH RBC QN AUTO: 25.5 PG (ref 26–34)
MCHC RBC AUTO-ENTMCNC: 30.1 G/DL (ref 31–37)
MCV RBC AUTO: 84.7 FL (ref 80–100)
MONOCYTES NFR BLD: 0.64 K/UL (ref 0.1–1.2)
MONOCYTES NFR BLD: 9 % (ref 3–12)
NEUTROPHILS NFR BLD: 65 % (ref 36–66)
NEUTS SEG NFR BLD: 4.42 K/UL (ref 1.5–8.1)
NRBC BLD-RTO: 0 PER 100 WBC
PARTIAL THROMBOPLASTIN TIME: 26.8 SEC (ref 24–36)
PLATELET # BLD AUTO: 264 K/UL (ref 150–450)
PMV BLD AUTO: 10.4 FL (ref 8–13.5)
POTASSIUM SERPL-SCNC: 4.6 MMOL/L (ref 3.7–5.3)
PROT SERPL-MCNC: 6 G/DL (ref 6.6–8.7)
PROTHROMBIN TIME: 17.7 SEC (ref 11.8–14.6)
RBC # BLD AUTO: 3.26 M/UL (ref 3.95–5.11)
SODIUM SERPL-SCNC: 144 MMOL/L (ref 136–145)
TROPONIN I SERPL HS-MCNC: 36 NG/L (ref 0–14)
TROPONIN I SERPL HS-MCNC: 36 NG/L (ref 0–14)
WBC OTHER # BLD: 6.9 K/UL (ref 3.5–11)

## 2025-05-30 PROCEDURE — 85025 COMPLETE CBC W/AUTO DIFF WBC: CPT

## 2025-05-30 PROCEDURE — 85730 THROMBOPLASTIN TIME PARTIAL: CPT

## 2025-05-30 PROCEDURE — 2060000000 HC ICU INTERMEDIATE R&B

## 2025-05-30 PROCEDURE — 36415 COLL VENOUS BLD VENIPUNCTURE: CPT

## 2025-05-30 PROCEDURE — 84484 ASSAY OF TROPONIN QUANT: CPT

## 2025-05-30 PROCEDURE — 85610 PROTHROMBIN TIME: CPT

## 2025-05-30 PROCEDURE — 99285 EMERGENCY DEPT VISIT HI MDM: CPT

## 2025-05-30 PROCEDURE — 80053 COMPREHEN METABOLIC PANEL: CPT

## 2025-05-30 PROCEDURE — 93005 ELECTROCARDIOGRAM TRACING: CPT

## 2025-05-30 PROCEDURE — 71046 X-RAY EXAM CHEST 2 VIEWS: CPT

## 2025-05-30 PROCEDURE — 83880 ASSAY OF NATRIURETIC PEPTIDE: CPT

## 2025-05-30 PROCEDURE — 83735 ASSAY OF MAGNESIUM: CPT

## 2025-05-30 RX ORDER — BUMETANIDE 1 MG/1
1 TABLET ORAL 2 TIMES DAILY
Qty: 60 TABLET | Refills: 0 | Status: ON HOLD | OUTPATIENT
Start: 2025-05-30

## 2025-05-30 NOTE — TELEPHONE ENCOUNTER
Discussed CHF Clinic assessment and lab results with PCP. Dr. WASHINGTON Mason gives instructions for pt to increase her Bumex to 1mg twice daily, stop Potassium and recheck labs on Monday. I attempted to call pt but call would not go through. Called pt granddaughter, Luana and left message with instructions and requested that she call back to confirm.

## 2025-05-31 LAB
ANION GAP SERPL CALCULATED.3IONS-SCNC: 7 MMOL/L (ref 9–16)
BNP SERPL-MCNC: 3333 PG/ML (ref 0–300)
BUN SERPL-MCNC: 27 MG/DL (ref 8–23)
CALCIUM SERPL-MCNC: 9 MG/DL (ref 8.6–10.4)
CHLORIDE SERPL-SCNC: 111 MMOL/L (ref 98–107)
CHOLEST SERPL-MCNC: 111 MG/DL (ref 0–199)
CHOLESTEROL/HDL RATIO: 2.1
CO2 SERPL-SCNC: 27 MMOL/L (ref 20–31)
CREAT SERPL-MCNC: 1.1 MG/DL (ref 0.7–1.2)
ERYTHROCYTE [DISTWIDTH] IN BLOOD BY AUTOMATED COUNT: 15.9 % (ref 11.5–14.9)
GFR, ESTIMATED: 52 ML/MIN/1.73M2
GLUCOSE BLD-MCNC: 112 MG/DL (ref 65–105)
GLUCOSE BLD-MCNC: 134 MG/DL (ref 65–105)
GLUCOSE BLD-MCNC: 169 MG/DL (ref 65–105)
GLUCOSE BLD-MCNC: 188 MG/DL (ref 65–105)
GLUCOSE SERPL-MCNC: 114 MG/DL (ref 74–99)
HCT VFR BLD AUTO: 26.3 % (ref 36–46)
HDLC SERPL-MCNC: 54 MG/DL
HGB BLD-MCNC: 7.9 G/DL (ref 12–16)
INR PPP: 1.3
LDLC SERPL CALC-MCNC: 50 MG/DL (ref 0–100)
MCH RBC QN AUTO: 25.2 PG (ref 26–34)
MCHC RBC AUTO-ENTMCNC: 30 G/DL (ref 31–37)
MCV RBC AUTO: 84 FL (ref 80–100)
NRBC BLD-RTO: 0 PER 100 WBC
PLATELET # BLD AUTO: 229 K/UL (ref 150–450)
PMV BLD AUTO: 10.7 FL (ref 8–13.5)
POTASSIUM SERPL-SCNC: 4.9 MMOL/L (ref 3.7–5.3)
PROTHROMBIN TIME: 17 SEC (ref 11.8–14.6)
RBC # BLD AUTO: 3.13 M/UL (ref 3.95–5.11)
SODIUM SERPL-SCNC: 145 MMOL/L (ref 136–145)
TRIGL SERPL-MCNC: 35 MG/DL (ref 0–149)
WBC OTHER # BLD: 5.9 K/UL (ref 3.5–11)

## 2025-05-31 PROCEDURE — 85027 COMPLETE CBC AUTOMATED: CPT

## 2025-05-31 PROCEDURE — 36415 COLL VENOUS BLD VENIPUNCTURE: CPT

## 2025-05-31 PROCEDURE — 99223 1ST HOSP IP/OBS HIGH 75: CPT | Performed by: INTERNAL MEDICINE

## 2025-05-31 PROCEDURE — 2500000003 HC RX 250 WO HCPCS: Performed by: NURSE PRACTITIONER

## 2025-05-31 PROCEDURE — 97110 THERAPEUTIC EXERCISES: CPT

## 2025-05-31 PROCEDURE — 2060000000 HC ICU INTERMEDIATE R&B

## 2025-05-31 PROCEDURE — 6370000000 HC RX 637 (ALT 250 FOR IP): Performed by: NURSE PRACTITIONER

## 2025-05-31 PROCEDURE — 85610 PROTHROMBIN TIME: CPT

## 2025-05-31 PROCEDURE — 94761 N-INVAS EAR/PLS OXIMETRY MLT: CPT

## 2025-05-31 PROCEDURE — 80061 LIPID PANEL: CPT

## 2025-05-31 PROCEDURE — 80048 BASIC METABOLIC PNL TOTAL CA: CPT

## 2025-05-31 PROCEDURE — 6370000000 HC RX 637 (ALT 250 FOR IP)

## 2025-05-31 PROCEDURE — 97162 PT EVAL MOD COMPLEX 30 MIN: CPT

## 2025-05-31 PROCEDURE — 99223 1ST HOSP IP/OBS HIGH 75: CPT

## 2025-05-31 PROCEDURE — 6370000000 HC RX 637 (ALT 250 FOR IP): Performed by: INTERNAL MEDICINE

## 2025-05-31 PROCEDURE — 97116 GAIT TRAINING THERAPY: CPT

## 2025-05-31 PROCEDURE — 82947 ASSAY GLUCOSE BLOOD QUANT: CPT

## 2025-05-31 PROCEDURE — 94640 AIRWAY INHALATION TREATMENT: CPT

## 2025-05-31 RX ORDER — BUDESONIDE AND FORMOTEROL FUMARATE DIHYDRATE 160; 4.5 UG/1; UG/1
2 AEROSOL RESPIRATORY (INHALATION)
Status: DISCONTINUED | OUTPATIENT
Start: 2025-05-31 | End: 2025-06-04 | Stop reason: HOSPADM

## 2025-05-31 RX ORDER — WARFARIN SODIUM 2.5 MG/1
2.5 TABLET ORAL
Status: COMPLETED | OUTPATIENT
Start: 2025-05-31 | End: 2025-05-31

## 2025-05-31 RX ORDER — BUMETANIDE 1 MG/1
1 TABLET ORAL 2 TIMES DAILY
Status: DISCONTINUED | OUTPATIENT
Start: 2025-05-31 | End: 2025-06-01

## 2025-05-31 RX ORDER — POTASSIUM CHLORIDE 750 MG/1
20 CAPSULE, EXTENDED RELEASE ORAL DAILY
Status: DISCONTINUED | OUTPATIENT
Start: 2025-05-31 | End: 2025-06-02

## 2025-05-31 RX ORDER — ACETAMINOPHEN 325 MG/1
650 TABLET ORAL EVERY 6 HOURS PRN
Status: DISCONTINUED | OUTPATIENT
Start: 2025-05-31 | End: 2025-06-04 | Stop reason: HOSPADM

## 2025-05-31 RX ORDER — FERROUS SULFATE 325(65) MG
325 TABLET ORAL
Status: DISCONTINUED | OUTPATIENT
Start: 2025-05-31 | End: 2025-06-04 | Stop reason: HOSPADM

## 2025-05-31 RX ORDER — SODIUM CHLORIDE 9 MG/ML
INJECTION, SOLUTION INTRAVENOUS PRN
Status: DISCONTINUED | OUTPATIENT
Start: 2025-05-31 | End: 2025-06-04 | Stop reason: HOSPADM

## 2025-05-31 RX ORDER — SODIUM CHLORIDE 0.9 % (FLUSH) 0.9 %
5-40 SYRINGE (ML) INJECTION PRN
Status: DISCONTINUED | OUTPATIENT
Start: 2025-05-31 | End: 2025-06-04 | Stop reason: HOSPADM

## 2025-05-31 RX ORDER — ATORVASTATIN CALCIUM 80 MG/1
80 TABLET, FILM COATED ORAL NIGHTLY
Status: DISCONTINUED | OUTPATIENT
Start: 2025-05-31 | End: 2025-06-04 | Stop reason: HOSPADM

## 2025-05-31 RX ORDER — SPIRONOLACTONE 25 MG/1
12.5 TABLET ORAL DAILY
Status: DISCONTINUED | OUTPATIENT
Start: 2025-05-31 | End: 2025-06-02

## 2025-05-31 RX ORDER — LOSARTAN POTASSIUM 25 MG/1
25 TABLET ORAL DAILY
Status: DISCONTINUED | OUTPATIENT
Start: 2025-05-31 | End: 2025-06-04 | Stop reason: HOSPADM

## 2025-05-31 RX ORDER — POTASSIUM CHLORIDE 1500 MG/1
40 TABLET, EXTENDED RELEASE ORAL PRN
Status: DISCONTINUED | OUTPATIENT
Start: 2025-05-31 | End: 2025-06-03 | Stop reason: CLARIF

## 2025-05-31 RX ORDER — POLYETHYLENE GLYCOL 3350 17 G/17G
17 POWDER, FOR SOLUTION ORAL DAILY PRN
Status: DISCONTINUED | OUTPATIENT
Start: 2025-05-31 | End: 2025-06-04 | Stop reason: HOSPADM

## 2025-05-31 RX ORDER — AMIODARONE HYDROCHLORIDE 200 MG/1
200 TABLET ORAL DAILY
Status: DISCONTINUED | OUTPATIENT
Start: 2025-05-31 | End: 2025-06-04 | Stop reason: HOSPADM

## 2025-05-31 RX ORDER — TRAZODONE HYDROCHLORIDE 50 MG/1
50 TABLET ORAL NIGHTLY PRN
Status: DISCONTINUED | OUTPATIENT
Start: 2025-05-31 | End: 2025-06-04 | Stop reason: HOSPADM

## 2025-05-31 RX ORDER — CARVEDILOL 3.12 MG/1
3.12 TABLET ORAL 2 TIMES DAILY WITH MEALS
Status: DISCONTINUED | OUTPATIENT
Start: 2025-05-31 | End: 2025-06-04 | Stop reason: HOSPADM

## 2025-05-31 RX ORDER — WARFARIN SODIUM 5 MG/1
5 TABLET ORAL
Status: COMPLETED | OUTPATIENT
Start: 2025-05-31 | End: 2025-05-31

## 2025-05-31 RX ORDER — MAGNESIUM SULFATE HEPTAHYDRATE 40 MG/ML
2000 INJECTION, SOLUTION INTRAVENOUS PRN
Status: DISCONTINUED | OUTPATIENT
Start: 2025-05-31 | End: 2025-06-03 | Stop reason: CLARIF

## 2025-05-31 RX ORDER — ACETAMINOPHEN 650 MG/1
650 SUPPOSITORY RECTAL EVERY 6 HOURS PRN
Status: DISCONTINUED | OUTPATIENT
Start: 2025-05-31 | End: 2025-06-04 | Stop reason: HOSPADM

## 2025-05-31 RX ORDER — SODIUM CHLORIDE 0.9 % (FLUSH) 0.9 %
5-40 SYRINGE (ML) INJECTION EVERY 12 HOURS SCHEDULED
Status: DISCONTINUED | OUTPATIENT
Start: 2025-05-31 | End: 2025-06-04 | Stop reason: HOSPADM

## 2025-05-31 RX ORDER — POTASSIUM CHLORIDE 7.45 MG/ML
10 INJECTION INTRAVENOUS PRN
Status: DISCONTINUED | OUTPATIENT
Start: 2025-05-31 | End: 2025-06-03 | Stop reason: CLARIF

## 2025-05-31 RX ORDER — INSULIN LISPRO 100 [IU]/ML
0-4 INJECTION, SOLUTION INTRAVENOUS; SUBCUTANEOUS
Status: DISCONTINUED | OUTPATIENT
Start: 2025-05-31 | End: 2025-06-04 | Stop reason: HOSPADM

## 2025-05-31 RX ORDER — DEXTROSE MONOHYDRATE 100 MG/ML
INJECTION, SOLUTION INTRAVENOUS CONTINUOUS PRN
Status: DISCONTINUED | OUTPATIENT
Start: 2025-05-31 | End: 2025-06-04 | Stop reason: HOSPADM

## 2025-05-31 RX ORDER — ALBUTEROL SULFATE 90 UG/1
2 INHALANT RESPIRATORY (INHALATION) EVERY 6 HOURS PRN
Status: DISCONTINUED | OUTPATIENT
Start: 2025-05-31 | End: 2025-06-04 | Stop reason: HOSPADM

## 2025-05-31 RX ADMIN — INSULIN LISPRO 1 UNITS: 100 INJECTION, SOLUTION INTRAVENOUS; SUBCUTANEOUS at 21:33

## 2025-05-31 RX ADMIN — BUDESONIDE AND FORMOTEROL FUMARATE DIHYDRATE 2 PUFF: 160; 4.5 AEROSOL RESPIRATORY (INHALATION) at 08:55

## 2025-05-31 RX ADMIN — POTASSIUM CHLORIDE 20 MEQ: 750 CAPSULE, EXTENDED RELEASE ORAL at 08:59

## 2025-05-31 RX ADMIN — CARVEDILOL 3.12 MG: 3.12 TABLET, FILM COATED ORAL at 09:00

## 2025-05-31 RX ADMIN — AMIODARONE HYDROCHLORIDE 200 MG: 200 TABLET ORAL at 09:00

## 2025-05-31 RX ADMIN — LOSARTAN POTASSIUM 25 MG: 25 TABLET, FILM COATED ORAL at 09:00

## 2025-05-31 RX ADMIN — WARFARIN SODIUM 5 MG: 5 TABLET ORAL at 16:26

## 2025-05-31 RX ADMIN — EMPAGLIFLOZIN 10 MG: 10 TABLET, FILM COATED ORAL at 12:52

## 2025-05-31 RX ADMIN — CARVEDILOL 3.12 MG: 3.12 TABLET, FILM COATED ORAL at 16:26

## 2025-05-31 RX ADMIN — WARFARIN SODIUM 2.5 MG: 2.5 TABLET ORAL at 03:34

## 2025-05-31 RX ADMIN — FERROUS SULFATE TAB 325 MG (65 MG ELEMENTAL FE) 325 MG: 325 (65 FE) TAB at 08:59

## 2025-05-31 RX ADMIN — ATORVASTATIN CALCIUM 80 MG: 80 TABLET, FILM COATED ORAL at 21:33

## 2025-05-31 RX ADMIN — SODIUM CHLORIDE, PRESERVATIVE FREE 10 ML: 5 INJECTION INTRAVENOUS at 09:01

## 2025-05-31 RX ADMIN — BUMETANIDE 1 MG: 1 TABLET ORAL at 08:59

## 2025-05-31 RX ADMIN — SPIRONOLACTONE 12.5 MG: 25 TABLET ORAL at 12:52

## 2025-05-31 RX ADMIN — BUMETANIDE 1 MG: 1 TABLET ORAL at 16:26

## 2025-05-31 RX ADMIN — SODIUM CHLORIDE, PRESERVATIVE FREE 10 ML: 5 INJECTION INTRAVENOUS at 21:33

## 2025-05-31 RX ADMIN — BUDESONIDE AND FORMOTEROL FUMARATE DIHYDRATE 2 PUFF: 160; 4.5 AEROSOL RESPIRATORY (INHALATION) at 19:37

## 2025-05-31 NOTE — PLAN OF CARE
Problem: Chronic Conditions and Co-morbidities  Goal: Patient's chronic conditions and co-morbidity symptoms are monitored and maintained or improved  Outcome: Progressing  Flowsheets (Taken 5/31/2025 0342)  Care Plan - Patient's Chronic Conditions and Co-Morbidity Symptoms are Monitored and Maintained or Improved: Monitor and assess patient's chronic conditions and comorbid symptoms for stability, deterioration, or improvement     Problem: Safety - Adult  Goal: Free from fall injury  Outcome: Progressing  Flowsheets (Taken 5/31/2025 0342)  Free From Fall Injury: Instruct family/caregiver on patient safety     Problem: Nutrition Deficit:  Goal: Optimize nutritional status  Outcome: Progressing  Flowsheets (Taken 5/31/2025 0343)  Nutrient intake appropriate for improving, restoring, or maintaining nutritional needs:   Assess nutritional status and recommend course of action   Recommend, monitor, and adjust tube feedings and TPN/PPN based on assessed needs   Recommend appropriate diets, oral nutritional supplements, and vitamin/mineral supplements
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
## 2025-05-31 NOTE — ACP (ADVANCE CARE PLANNING)
Advance Care Planning     Advance Care Planning Activator (Inpatient)  Conversation Note      Date of ACP Conversation: 5/31/2025     Conversation Conducted with: Patient with Decision Making Capacity    ACP Activator: Robyn Helms RN    Health Care Decision Maker:     Current Designated Health Care Decision Maker:     Primary Decision Maker: Guanakito Zheng - Child - [phone]  Click here to complete Healthcare Decision Makers including section of the Healthcare Decision Maker Relationship (ie \"Primary\")  Today we documented Decision Maker(s) consistent with ACP documents on file.    Care Preferences    Ventilation:  \"If you were in your present state of health and suddenly became very ill and were unable to breathe on your own, what would your preference be about the use of a ventilator (breathing machine) if it were available to you?\"      Would the patient desire the use of ventilator (breathing machine)?: yes    \"If your health worsens and it becomes clear that your chance of recovery is unlikely, what would your preference be about the use of a ventilator (breathing machine) if it were available to you?\"     Would the patient desire the use of ventilator (breathing machine)?: No      Resuscitation  \"CPR works best to restart the heart when there is a sudden event, like a heart attack, in someone who is otherwise healthy. Unfortunately, CPR does not typically restart the heart for people who have serious health conditions or who are very sick.\"    \"In the event your heart stopped as a result of an underlying serious health condition, would you want attempts to be made to restart your heart (answer \"yes\" for attempt to resuscitate) or would you prefer a natural death (answer \"no\" for do not attempt to resuscitate)?\" yes       [] Yes   [x] No   Educated Patient / Decision Maker regarding differences between Advance Directives and portable DNR orders.    Length of ACP Conversation in minutes:      Conversation
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
## 2025-05-31 NOTE — CARE COORDINATION
Case Management Assessment  Initial Evaluation    Date/Time of Evaluation: 5/31/2025 9:50 AM  Assessment Completed by: Robyn Helms RN    If patient is discharged prior to next notation, then this note serves as note for discharge by case management.    Patient Name: Graciela Holt                   YOB: 1950  Diagnosis: Angina pectoris [I20.9]  Chest pain, unspecified type [R07.9]                   Date / Time: 5/30/2025  9:58 PM    Patient Admission Status: Inpatient   Readmission Risk (Low < 19, Mod (19-27), High > 27): Readmission Risk Score: 39.5    Current PCP: Travis Mason MD  PCP verified by CM? Yes    Chart Reviewed: Yes      History Provided by: Patient  Patient Orientation: Alert and Oriented    Patient Cognition: Alert    Hospitalization in the last 30 days (Readmission):  Yes    If yes, Readmission Assessment in CM Navigator will be completed.    Advance Directives:      Code Status: Full Code   Patient's Primary Decision Maker is: Named in Scanned ACP Document    Primary Decision Maker: Guanakito Zheng - Child - [phone]    Discharge Planning:    Patient lives with: Friends Type of Home: House  Primary Care Giver: Self  Patient Support Systems include: Family Members, Friends/Neighbors   Current Financial resources: Medicare  Current community resources: Other (Comment) ([facility], [facility])  Current services prior to admission: Durable Medical Equipment            Current DME: Glucometer            Type of Home Care services:  None    ADLS  Prior functional level: Assistance with the following:, Shopping, Housework  Current functional level: Assistance with the following:, Housework, Shopping    PT AM-PAC:   /24  OT AM-PAC:   /24    Family can provide assistance at DC: No  Would you like Case Management to discuss the discharge plan with any other family members/significant others, and if so, who? Yes (Son; Guanakito)  Plans to Return to Present Housing: Yes  Other

## 2025-05-31 NOTE — H&P
[facility] Internal Medicine   Juan Carlos Todd MD; MD Pearl Weaver MD, MD , Abiola Min MD    [facility] Internal Medicine   IN-PATIENT SERVICE   [facility]    HISTORY AND PHYSICAL EXAMINATION            Date:   5/31/2025  Patient name:  Graciela Holt  Date of admission:  5/30/2025  9:58 PM  MRN:   [id number]  Account:  [id number]  YOB: 1950  PCP:    Travis Mason MD  Room:   2081/2081-01  Code Status:    Full Code    Chief Complaint:     Chief Complaint   Patient presents with    Chest Pain       History Obtained From:     patient, electronic medical record    History of Present Illness:     Graciela Holt is a 74 y.o. Non- / non  female who presents with Chest Pain   and is admitted to the hospital for the management of Angina pectoris.    Graciela Holt is a 74 y.o. Non- / non  female who presents with Chest Pain   and is admitted to the hospital for the management of Angina pectoris. Medical history significant for paroxysmal A-Fib on Warfarin, HFrEF 20-25%, CAD, CABG x2, HTN, HLD, COPD, hx of left MCA stroke, Diabetes Mellitus type II and depression.      Recent admission 5/23-5/26 for heart failure exacerbation and UTI.      Patient presented to ED complaining of chest pain and shortness of breath. Reports sudden onset of constant chest pain that started while she was resting on her couch. States that she did take a nitro but has continued to have intermittent chest pain for the past several hours leading to her coming to ED for further evaluation. Describes the pain as a pressure across her chest, denies radiation. Denies cough or fever. Denies increased edema. Reports compliance with coumadin. Current INR 1.3. EKG normal sinus rhythm with ST elevation in V2 and V3 (new), inversion in [place]. Troponin flat at 36, baseline elvation. BNP 3300. Last echo 4/9/25 EF 20-25%,

## 2025-05-31 NOTE — ED PROVIDER NOTES
[facility] EMERGENCY DEPARTMENT  Emergency Department Encounter  Emergency Medicine Resident     Pt Name:Graciela Holt  MRN: [id number]  Birthdate 1950  Date of evaluation: 5/31/25  PCP:  Travis Mason MD  Note Started: 12:30 AM EDT      CHIEF COMPLAINT       Chief Complaint   Patient presents with    Chest Pain       HISTORY OF PRESENT ILLNESS  (Location/Symptom, Timing/Onset, Context/Setting, Quality, Duration, Modifying Factors, Severity.)      Graciela Holt is a 74 y.o. female who presents with chest pain with associated shortness of breath that started approximate hour prior to presentation.  The patient took her home nitro with some improvement in symptoms.  No associated nausea or vomiting.  Patient does have a history of coronary artery disease with CABG surgeries x 2 last surgery in 2018.  History of CHF, diabetes, COPD.  States she was watching TV when the shortness of breath and chest pain started.  Is on warfarin.  Denies any nausea or vomiting, fevers, chills, sick contacts.  No new cough or congestion.    PAST MEDICAL / SURGICAL / SOCIAL / FAMILY HISTORY      has a past medical history of Allergic rhinitis, Arthritis, CAD (coronary artery disease), Cerebral artery occlusion with cerebral infarction (HCC), CHF (congestive heart failure) (HCC), Controlled type 2 diabetes mellitus without complication, without long-term current use of insulin (HCC), COPD (chronic obstructive pulmonary disease) (HCC), Depression, Former smoker, History of blood transfusion, Hyperlipidemia, Hypertension, Kidney stone, Myocardial infarction (HCC), Obesity, Class I, BMI 30.0-34.9 (see actual BMI), Restless leg syndrome, Skin abnormality, Type II or unspecified type diabetes mellitus without mention of complication, not stated as uncontrolled, Wears glasses, and Wears partial dentures.     has a past surgical history that includes Appendectomy; Hysterectomy; Cholecystectomy; joint replacement

## 2025-06-01 LAB
ANION GAP SERPL CALCULATED.3IONS-SCNC: 8 MMOL/L (ref 9–16)
BACTERIA URNS QL MICRO: ABNORMAL
BILIRUB UR QL STRIP: NEGATIVE
BUN SERPL-MCNC: 27 MG/DL (ref 8–23)
CALCIUM SERPL-MCNC: 8.9 MG/DL (ref 8.6–10.4)
CASTS #/AREA URNS LPF: ABNORMAL /LPF
CHLORIDE SERPL-SCNC: 105 MMOL/L (ref 98–107)
CLARITY UR: CLEAR
CO2 SERPL-SCNC: 28 MMOL/L (ref 20–31)
COLOR UR: YELLOW
CREAT SERPL-MCNC: 1.3 MG/DL (ref 0.7–1.2)
EPI CELLS #/AREA URNS HPF: ABNORMAL /HPF
ERYTHROCYTE [DISTWIDTH] IN BLOOD BY AUTOMATED COUNT: 16.4 % (ref 11.5–14.9)
GFR, ESTIMATED: 43 ML/MIN/1.73M2
GLUCOSE BLD-MCNC: 146 MG/DL (ref 65–105)
GLUCOSE BLD-MCNC: 240 MG/DL (ref 65–105)
GLUCOSE BLD-MCNC: 281 MG/DL (ref 65–105)
GLUCOSE BLD-MCNC: 98 MG/DL (ref 65–105)
GLUCOSE SERPL-MCNC: 113 MG/DL (ref 74–99)
GLUCOSE UR STRIP-MCNC: ABNORMAL MG/DL
HCT VFR BLD AUTO: 28.7 % (ref 36–46)
HGB BLD-MCNC: 9 G/DL (ref 12–16)
HGB UR QL STRIP.AUTO: ABNORMAL
INR PPP: 1.3
KETONES UR STRIP-MCNC: NEGATIVE MG/DL
LEUKOCYTE ESTERASE UR QL STRIP: NEGATIVE
MCH RBC QN AUTO: 26 PG (ref 26–34)
MCHC RBC AUTO-ENTMCNC: 31.4 G/DL (ref 31–37)
MCV RBC AUTO: 82.9 FL (ref 80–100)
NITRITE UR QL STRIP: NEGATIVE
NRBC BLD-RTO: 0 PER 100 WBC
PH UR STRIP: 7 [PH] (ref 5–8)
PLATELET # BLD AUTO: 239 K/UL (ref 150–450)
PMV BLD AUTO: 10.7 FL (ref 8–13.5)
POTASSIUM SERPL-SCNC: 4.6 MMOL/L (ref 3.7–5.3)
PROT UR STRIP-MCNC: NEGATIVE MG/DL
PROTHROMBIN TIME: 17.6 SEC (ref 11.8–14.6)
RBC # BLD AUTO: 3.46 M/UL (ref 3.95–5.11)
RBC #/AREA URNS HPF: ABNORMAL /HPF
SODIUM SERPL-SCNC: 141 MMOL/L (ref 136–145)
SP GR UR STRIP: 1.01 (ref 1–1.03)
UROBILINOGEN UR STRIP-ACNC: NORMAL EU/DL (ref 0–1)
WBC #/AREA URNS HPF: ABNORMAL /HPF
WBC OTHER # BLD: 6.3 K/UL (ref 3.5–11)

## 2025-06-01 PROCEDURE — 85610 PROTHROMBIN TIME: CPT

## 2025-06-01 PROCEDURE — 85027 COMPLETE CBC AUTOMATED: CPT

## 2025-06-01 PROCEDURE — 94761 N-INVAS EAR/PLS OXIMETRY MLT: CPT

## 2025-06-01 PROCEDURE — 2500000003 HC RX 250 WO HCPCS: Performed by: NURSE PRACTITIONER

## 2025-06-01 PROCEDURE — 36415 COLL VENOUS BLD VENIPUNCTURE: CPT

## 2025-06-01 PROCEDURE — 6370000000 HC RX 637 (ALT 250 FOR IP): Performed by: NURSE PRACTITIONER

## 2025-06-01 PROCEDURE — 6370000000 HC RX 637 (ALT 250 FOR IP)

## 2025-06-01 PROCEDURE — 82947 ASSAY GLUCOSE BLOOD QUANT: CPT

## 2025-06-01 PROCEDURE — 2060000000 HC ICU INTERMEDIATE R&B

## 2025-06-01 PROCEDURE — 2580000003 HC RX 258

## 2025-06-01 PROCEDURE — 6370000000 HC RX 637 (ALT 250 FOR IP): Performed by: INTERNAL MEDICINE

## 2025-06-01 PROCEDURE — 81001 URINALYSIS AUTO W/SCOPE: CPT

## 2025-06-01 PROCEDURE — 99233 SBSQ HOSP IP/OBS HIGH 50: CPT | Performed by: INTERNAL MEDICINE

## 2025-06-01 PROCEDURE — 99233 SBSQ HOSP IP/OBS HIGH 50: CPT

## 2025-06-01 PROCEDURE — 94640 AIRWAY INHALATION TREATMENT: CPT

## 2025-06-01 PROCEDURE — 80048 BASIC METABOLIC PNL TOTAL CA: CPT

## 2025-06-01 RX ORDER — SODIUM CHLORIDE 9 MG/ML
INJECTION, SOLUTION INTRAVENOUS CONTINUOUS
Status: ACTIVE | OUTPATIENT
Start: 2025-06-01 | End: 2025-06-01

## 2025-06-01 RX ORDER — WARFARIN SODIUM 5 MG/1
5 TABLET ORAL
Status: COMPLETED | OUTPATIENT
Start: 2025-06-01 | End: 2025-06-01

## 2025-06-01 RX ORDER — BUMETANIDE 1 MG/1
1 TABLET ORAL 2 TIMES DAILY
Status: DISCONTINUED | OUTPATIENT
Start: 2025-06-02 | End: 2025-06-03

## 2025-06-01 RX ADMIN — BUDESONIDE AND FORMOTEROL FUMARATE DIHYDRATE 2 PUFF: 160; 4.5 AEROSOL RESPIRATORY (INHALATION) at 18:51

## 2025-06-01 RX ADMIN — FERROUS SULFATE TAB 325 MG (65 MG ELEMENTAL FE) 325 MG: 325 (65 FE) TAB at 09:57

## 2025-06-01 RX ADMIN — ACETAMINOPHEN 650 MG: 325 TABLET ORAL at 16:00

## 2025-06-01 RX ADMIN — SODIUM CHLORIDE: 0.9 INJECTION, SOLUTION INTRAVENOUS at 11:33

## 2025-06-01 RX ADMIN — EMPAGLIFLOZIN 10 MG: 10 TABLET, FILM COATED ORAL at 09:57

## 2025-06-01 RX ADMIN — SPIRONOLACTONE 12.5 MG: 25 TABLET ORAL at 09:57

## 2025-06-01 RX ADMIN — ATORVASTATIN CALCIUM 80 MG: 80 TABLET, FILM COATED ORAL at 21:12

## 2025-06-01 RX ADMIN — POTASSIUM CHLORIDE 20 MEQ: 750 CAPSULE, EXTENDED RELEASE ORAL at 09:57

## 2025-06-01 RX ADMIN — AMIODARONE HYDROCHLORIDE 200 MG: 200 TABLET ORAL at 09:57

## 2025-06-01 RX ADMIN — INSULIN LISPRO 2 UNITS: 100 INJECTION, SOLUTION INTRAVENOUS; SUBCUTANEOUS at 21:12

## 2025-06-01 RX ADMIN — LOSARTAN POTASSIUM 25 MG: 25 TABLET, FILM COATED ORAL at 09:57

## 2025-06-01 RX ADMIN — CARVEDILOL 3.12 MG: 3.12 TABLET, FILM COATED ORAL at 09:57

## 2025-06-01 RX ADMIN — CARVEDILOL 3.12 MG: 3.12 TABLET, FILM COATED ORAL at 16:01

## 2025-06-01 RX ADMIN — ACETAMINOPHEN 650 MG: 325 TABLET ORAL at 09:57

## 2025-06-01 RX ADMIN — BUMETANIDE 1 MG: 1 TABLET ORAL at 09:57

## 2025-06-01 RX ADMIN — SODIUM CHLORIDE, PRESERVATIVE FREE 10 ML: 5 INJECTION INTRAVENOUS at 21:12

## 2025-06-01 RX ADMIN — SODIUM CHLORIDE, PRESERVATIVE FREE 10 ML: 5 INJECTION INTRAVENOUS at 09:58

## 2025-06-01 RX ADMIN — INSULIN LISPRO 1 UNITS: 100 INJECTION, SOLUTION INTRAVENOUS; SUBCUTANEOUS at 12:23

## 2025-06-01 RX ADMIN — BUDESONIDE AND FORMOTEROL FUMARATE DIHYDRATE 2 PUFF: 160; 4.5 AEROSOL RESPIRATORY (INHALATION) at 09:26

## 2025-06-01 RX ADMIN — WARFARIN SODIUM 5 MG: 5 TABLET ORAL at 17:37

## 2025-06-01 ASSESSMENT — PAIN SCALES - GENERAL
PAINLEVEL_OUTOF10: 3
PAINLEVEL_OUTOF10: 4
PAINLEVEL_OUTOF10: 0
PAINLEVEL_OUTOF10: 3

## 2025-06-01 ASSESSMENT — PAIN DESCRIPTION - DESCRIPTORS: DESCRIPTORS: ACHING

## 2025-06-01 ASSESSMENT — PAIN DESCRIPTION - LOCATION
LOCATION: BACK
LOCATION: BACK

## 2025-06-01 ASSESSMENT — PAIN DESCRIPTION - ORIENTATION
ORIENTATION: LOWER
ORIENTATION: MID

## 2025-06-01 NOTE — CARE COORDINATION
Case Management   Daily Progress Note       Patient Name: Graciela Holt                   YOB: 1950  Diagnosis: Angina pectoris [I20.9]  Chest pain, unspecified type [R07.9]                         days  Length of Stay: 2  days    Readmission Risk (Low < 19, Mod (19-27), High > 27): Readmission Risk Score: 39.2      Patient is alert and oriented.    Spoke with patient, and Current Transitional Plan is:    [x] Home Independently    [] Home with HC    [] Skilled Nursing Facility    [] Acute Rehabilitation    [] Long Term Acute Care (LTAC)    [] Other:     Medical Management: Cardiology on board, stress test 6/2.    Testing Ordered: Stress test tomorrow.    Additional Notes: PT/OT recommend home with assist, patient denies need for visiting nurse services, states her \"friend can assist with all needs.\"    Electronically signed by Robyn Helms RN on 6/1/2025 at 10:38 AM            yes

## 2025-06-02 ENCOUNTER — APPOINTMENT (OUTPATIENT)
Dept: ULTRASOUND IMAGING | Age: 75
DRG: 303 | End: 2025-06-02
Payer: COMMERCIAL

## 2025-06-02 ENCOUNTER — HOSPITAL ENCOUNTER (INPATIENT)
Age: 75
Discharge: HOME | DRG: 303 | End: 2025-06-04
Payer: COMMERCIAL

## 2025-06-02 ENCOUNTER — APPOINTMENT (OUTPATIENT)
Dept: NUCLEAR MEDICINE | Age: 75
DRG: 303 | End: 2025-06-02
Payer: COMMERCIAL

## 2025-06-02 ENCOUNTER — TELEPHONE (OUTPATIENT)
Age: 75
End: 2025-06-02

## 2025-06-02 LAB
ANION GAP SERPL CALCULATED.3IONS-SCNC: 6 MMOL/L (ref 9–16)
BUN SERPL-MCNC: 26 MG/DL (ref 8–23)
CALCIUM SERPL-MCNC: 8.7 MG/DL (ref 8.6–10.4)
CHLORIDE SERPL-SCNC: 104 MMOL/L (ref 98–107)
CO2 SERPL-SCNC: 28 MMOL/L (ref 20–31)
CREAT SERPL-MCNC: 1.5 MG/DL (ref 0.7–1.2)
EKG ATRIAL RATE: 72 BPM
EKG P AXIS: 7 DEGREES
EKG P-R INTERVAL: 140 MS
EKG Q-T INTERVAL: 418 MS
EKG QRS DURATION: 104 MS
EKG QTC CALCULATION (BAZETT): 457 MS
EKG R AXIS: -17 DEGREES
EKG T AXIS: 146 DEGREES
EKG VENTRICULAR RATE: 72 BPM
ERYTHROCYTE [DISTWIDTH] IN BLOOD BY AUTOMATED COUNT: 16.7 % (ref 11.5–14.9)
GFR, ESTIMATED: 36 ML/MIN/1.73M2
GLUCOSE BLD-MCNC: 110 MG/DL (ref 65–105)
GLUCOSE BLD-MCNC: 127 MG/DL (ref 65–105)
GLUCOSE BLD-MCNC: 169 MG/DL (ref 65–105)
GLUCOSE BLD-MCNC: 228 MG/DL (ref 65–105)
GLUCOSE SERPL-MCNC: 112 MG/DL (ref 74–99)
HCT VFR BLD AUTO: 29.4 % (ref 36–46)
HGB BLD-MCNC: 8.9 G/DL (ref 12–16)
INR PPP: 1.4
MCH RBC QN AUTO: 25.5 PG (ref 26–34)
MCHC RBC AUTO-ENTMCNC: 30.3 G/DL (ref 31–37)
MCV RBC AUTO: 84.2 FL (ref 80–100)
NRBC BLD-RTO: 0 PER 100 WBC
PLATELET # BLD AUTO: 240 K/UL (ref 150–450)
PMV BLD AUTO: 10 FL (ref 8–13.5)
POTASSIUM SERPL-SCNC: 5.1 MMOL/L (ref 3.7–5.3)
PROTHROMBIN TIME: 18.6 SEC (ref 11.8–14.6)
RBC # BLD AUTO: 3.49 M/UL (ref 3.95–5.11)
SODIUM SERPL-SCNC: 138 MMOL/L (ref 136–145)
WBC OTHER # BLD: 6.2 K/UL (ref 3.5–11)

## 2025-06-02 PROCEDURE — 6370000000 HC RX 637 (ALT 250 FOR IP): Performed by: NURSE PRACTITIONER

## 2025-06-02 PROCEDURE — 85027 COMPLETE CBC AUTOMATED: CPT

## 2025-06-02 PROCEDURE — 99233 SBSQ HOSP IP/OBS HIGH 50: CPT | Performed by: SURGERY

## 2025-06-02 PROCEDURE — 80048 BASIC METABOLIC PNL TOTAL CA: CPT

## 2025-06-02 PROCEDURE — 97166 OT EVAL MOD COMPLEX 45 MIN: CPT

## 2025-06-02 PROCEDURE — 85610 PROTHROMBIN TIME: CPT

## 2025-06-02 PROCEDURE — 2500000003 HC RX 250 WO HCPCS: Performed by: NURSE PRACTITIONER

## 2025-06-02 PROCEDURE — 99233 SBSQ HOSP IP/OBS HIGH 50: CPT

## 2025-06-02 PROCEDURE — 6370000000 HC RX 637 (ALT 250 FOR IP)

## 2025-06-02 PROCEDURE — 2060000000 HC ICU INTERMEDIATE R&B

## 2025-06-02 PROCEDURE — 93010 ELECTROCARDIOGRAM REPORT: CPT | Performed by: INTERNAL MEDICINE

## 2025-06-02 PROCEDURE — 36415 COLL VENOUS BLD VENIPUNCTURE: CPT

## 2025-06-02 PROCEDURE — 94640 AIRWAY INHALATION TREATMENT: CPT

## 2025-06-02 PROCEDURE — 76775 US EXAM ABDO BACK WALL LIM: CPT

## 2025-06-02 PROCEDURE — 82947 ASSAY GLUCOSE BLOOD QUANT: CPT

## 2025-06-02 PROCEDURE — 94761 N-INVAS EAR/PLS OXIMETRY MLT: CPT

## 2025-06-02 PROCEDURE — 97530 THERAPEUTIC ACTIVITIES: CPT

## 2025-06-02 RX ORDER — SODIUM CHLORIDE 0.9 % (FLUSH) 0.9 %
10 SYRINGE (ML) INJECTION PRN
Status: DISCONTINUED | OUTPATIENT
Start: 2025-06-02 | End: 2025-06-04 | Stop reason: HOSPADM

## 2025-06-02 RX ORDER — ATROPINE SULFATE 0.1 MG/ML
0.5 INJECTION INTRAVENOUS EVERY 5 MIN PRN
Status: ACTIVE | OUTPATIENT
Start: 2025-06-02 | End: 2025-06-02

## 2025-06-02 RX ORDER — TETRAKIS(2-METHOXYISOBUTYLISOCYANIDE)COPPER(I) TETRAFLUOROBORATE 1 MG/ML
15 INJECTION, POWDER, LYOPHILIZED, FOR SOLUTION INTRAVENOUS
Status: DISCONTINUED | OUTPATIENT
Start: 2025-06-02 | End: 2025-06-04 | Stop reason: HOSPADM

## 2025-06-02 RX ORDER — TETRAKIS(2-METHOXYISOBUTYLISOCYANIDE)COPPER(I) TETRAFLUOROBORATE 1 MG/ML
35 INJECTION, POWDER, LYOPHILIZED, FOR SOLUTION INTRAVENOUS
Status: DISCONTINUED | OUTPATIENT
Start: 2025-06-02 | End: 2025-06-04 | Stop reason: HOSPADM

## 2025-06-02 RX ORDER — REGADENOSON 0.08 MG/ML
0.4 INJECTION, SOLUTION INTRAVENOUS
Status: DISCONTINUED | OUTPATIENT
Start: 2025-06-02 | End: 2025-06-05 | Stop reason: HOSPADM

## 2025-06-02 RX ORDER — WARFARIN SODIUM 7.5 MG/1
7.5 TABLET ORAL
Status: COMPLETED | OUTPATIENT
Start: 2025-06-02 | End: 2025-06-02

## 2025-06-02 RX ORDER — NITROGLYCERIN 0.4 MG/1
0.4 TABLET SUBLINGUAL EVERY 5 MIN PRN
Status: ACTIVE | OUTPATIENT
Start: 2025-06-02 | End: 2025-06-02

## 2025-06-02 RX ORDER — SODIUM CHLORIDE 9 MG/ML
500 INJECTION, SOLUTION INTRAVENOUS CONTINUOUS PRN
Status: ACTIVE | OUTPATIENT
Start: 2025-06-02 | End: 2025-06-02

## 2025-06-02 RX ORDER — METOPROLOL TARTRATE 1 MG/ML
5 INJECTION, SOLUTION INTRAVENOUS EVERY 5 MIN PRN
Status: ACTIVE | OUTPATIENT
Start: 2025-06-02 | End: 2025-06-02

## 2025-06-02 RX ORDER — AMINOPHYLLINE 25 MG/ML
50 INJECTION, SOLUTION INTRAVENOUS PRN
Status: ACTIVE | OUTPATIENT
Start: 2025-06-02 | End: 2025-06-02

## 2025-06-02 RX ORDER — SODIUM CHLORIDE 0.9 % (FLUSH) 0.9 %
5-40 SYRINGE (ML) INJECTION PRN
Status: ACTIVE | OUTPATIENT
Start: 2025-06-02 | End: 2025-06-02

## 2025-06-02 RX ORDER — ALBUTEROL SULFATE 90 UG/1
2 INHALANT RESPIRATORY (INHALATION) PRN
Status: ACTIVE | OUTPATIENT
Start: 2025-06-02 | End: 2025-06-02

## 2025-06-02 RX ADMIN — SODIUM CHLORIDE, PRESERVATIVE FREE 10 ML: 5 INJECTION INTRAVENOUS at 20:02

## 2025-06-02 RX ADMIN — INSULIN LISPRO 1 UNITS: 100 INJECTION, SOLUTION INTRAVENOUS; SUBCUTANEOUS at 20:02

## 2025-06-02 RX ADMIN — POTASSIUM CHLORIDE 20 MEQ: 750 CAPSULE, EXTENDED RELEASE ORAL at 10:30

## 2025-06-02 RX ADMIN — ATORVASTATIN CALCIUM 80 MG: 80 TABLET, FILM COATED ORAL at 20:02

## 2025-06-02 RX ADMIN — ACETAMINOPHEN 650 MG: 325 TABLET ORAL at 21:50

## 2025-06-02 RX ADMIN — AMIODARONE HYDROCHLORIDE 200 MG: 200 TABLET ORAL at 10:30

## 2025-06-02 RX ADMIN — BUDESONIDE AND FORMOTEROL FUMARATE DIHYDRATE 2 PUFF: 160; 4.5 AEROSOL RESPIRATORY (INHALATION) at 08:35

## 2025-06-02 RX ADMIN — CARVEDILOL 3.12 MG: 3.12 TABLET, FILM COATED ORAL at 10:30

## 2025-06-02 RX ADMIN — CARVEDILOL 3.12 MG: 3.12 TABLET, FILM COATED ORAL at 17:19

## 2025-06-02 RX ADMIN — FERROUS SULFATE TAB 325 MG (65 MG ELEMENTAL FE) 325 MG: 325 (65 FE) TAB at 10:30

## 2025-06-02 RX ADMIN — WARFARIN SODIUM 7.5 MG: 7.5 TABLET ORAL at 17:19

## 2025-06-02 RX ADMIN — BUDESONIDE AND FORMOTEROL FUMARATE DIHYDRATE 2 PUFF: 160; 4.5 AEROSOL RESPIRATORY (INHALATION) at 19:35

## 2025-06-02 ASSESSMENT — PAIN DESCRIPTION - ORIENTATION: ORIENTATION: POSTERIOR

## 2025-06-02 ASSESSMENT — PAIN - FUNCTIONAL ASSESSMENT: PAIN_FUNCTIONAL_ASSESSMENT: ACTIVITIES ARE NOT PREVENTED

## 2025-06-02 ASSESSMENT — PAIN SCALES - GENERAL
PAINLEVEL_OUTOF10: 0
PAINLEVEL_OUTOF10: 2

## 2025-06-02 ASSESSMENT — PAIN DESCRIPTION - LOCATION: LOCATION: BACK

## 2025-06-02 ASSESSMENT — PAIN DESCRIPTION - DESCRIPTORS: DESCRIPTORS: ACHING

## 2025-06-02 ASSESSMENT — PAIN SCALES - WONG BAKER: WONGBAKER_NUMERICALRESPONSE: NO HURT

## 2025-06-02 NOTE — PLAN OF CARE
Problem: Discharge Planning  Goal: Discharge to home or other facility with appropriate resources  Outcome: Progressing  Flowsheets (Taken 6/2/2025 0815)  Discharge to home or other facility with appropriate resources:   Identify barriers to discharge with patient and caregiver   Arrange for needed discharge resources and transportation as appropriate   Identify discharge learning needs (meds, wound care, etc)   Refer to discharge planning if patient needs post-hospital services based on physician order or complex needs related to functional status, cognitive ability or social support system     Problem: Safety - Adult  Goal: Free from fall injury  6/2/2025 1557 by Paz Richardson, RN  Outcome: Progressing     Problem: ABCDS Injury Assessment  Goal: Absence of physical injury  Outcome: Progressing     Problem: Nutrition Deficit:  Goal: Optimize nutritional status  6/2/2025 1557 by Paz Richardson, RN  Outcome: Progressing
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
## 2025-06-02 NOTE — TELEPHONE ENCOUNTER
Attempted to call patient to schedule initial visit for heart failure clinic in [facility]. No answer, will continue to follow up.    Zenobia Glass, PharmD  PGY1 Pharmacy Resident    6/2/2025  1:19 PM

## 2025-06-02 NOTE — CARE COORDINATION
Case Management   Daily Progress Note       Patient Name: Graciela Holt                   YOB: 1950  Diagnosis: Angina pectoris [I20.9]  Chest pain, unspecified type [R07.9]                       GMLOS: 1.8 days  Length of Stay: 3  days    Readmission Risk (Low < 19, Mod (19-27), High > 27): Readmission Risk Score: 39.8      Patient is alert and oriented.    Spoke with patient, and Current Transitional Plan is:    [x] Home Independently    [] Home with HC    [] Skilled Nursing Facility    [] Acute Rehabilitation    [] Long Term Acute Care (LTAC)    [] Other:     Medical Management: Patient refused stress test this morning, but agreeable to echo.    Testing Ordered: Echo pending. Renal ultrasound.     Additional Notes: Nephrology consulted for KELSEY. Cr 1.5, BUN 26.    IMM letter provided to patient.  Patient offered four hours to make informed decision regarding appeal process; patient agreeable to discharge.      Electronically signed by Robyn Helms RN on 6/2/2025 at 9:22 AM

## 2025-06-03 ENCOUNTER — APPOINTMENT (OUTPATIENT)
Dept: GENERAL RADIOLOGY | Age: 75
DRG: 303 | End: 2025-06-03
Payer: COMMERCIAL

## 2025-06-03 ENCOUNTER — APPOINTMENT (OUTPATIENT)
Age: 75
DRG: 303 | End: 2025-06-03
Attending: INTERNAL MEDICINE
Payer: COMMERCIAL

## 2025-06-03 LAB
ANION GAP SERPL CALCULATED.3IONS-SCNC: 8 MMOL/L (ref 9–16)
BNP SERPL-MCNC: 1644 PG/ML (ref 0–300)
BUN SERPL-MCNC: 24 MG/DL (ref 8–23)
CALCIUM SERPL-MCNC: 8.8 MG/DL (ref 8.6–10.4)
CHLORIDE SERPL-SCNC: 107 MMOL/L (ref 98–107)
CO2 SERPL-SCNC: 27 MMOL/L (ref 20–31)
CREAT SERPL-MCNC: 1.5 MG/DL (ref 0.7–1.2)
ECHO BSA: 1.67 M2
ECHO EST RA PRESSURE: 8 MMHG
ECHO LV EDV A2C: 161 ML
ECHO LV EDV A4C: 220 ML
ECHO LV EDV INDEX A4C: 133 ML/M2
ECHO LV EDV NDEX A2C: 97 ML/M2
ECHO LV EF PHYSICIAN: 25 %
ECHO LV EJECTION FRACTION A2C: 24 %
ECHO LV EJECTION FRACTION A4C: 31 %
ECHO LV EJECTION FRACTION BIPLANE: 32 % (ref 55–100)
ECHO LV ESV A2C: 122 ML
ECHO LV ESV A4C: 152 ML
ECHO LV ESV INDEX A2C: 73 ML/M2
ECHO LV ESV INDEX A4C: 92 ML/M2
ECHO LV FRACTIONAL SHORTENING: 14 % (ref 28–44)
ECHO LV GLOBAL LONGITUDINAL STRAIN (GLS): -11 %
ECHO LV INTERNAL DIMENSION DIASTOLE INDEX: 3.43 CM/M2
ECHO LV INTERNAL DIMENSION DIASTOLIC: 5.7 CM (ref 3.9–5.3)
ECHO LV INTERNAL DIMENSION SYSTOLIC INDEX: 2.95 CM/M2
ECHO LV INTERNAL DIMENSION SYSTOLIC: 4.9 CM
ECHO LV IVSD: 1.4 CM (ref 0.6–0.9)
ECHO LV MASS 2D: 357.5 G (ref 67–162)
ECHO LV MASS INDEX 2D: 215.3 G/M2 (ref 43–95)
ECHO LV POSTERIOR WALL DIASTOLIC: 1.4 CM (ref 0.6–0.9)
ECHO LV RELATIVE WALL THICKNESS RATIO: 0.49
GFR, ESTIMATED: 36 ML/MIN/1.73M2
GLUCOSE BLD-MCNC: 129 MG/DL (ref 65–105)
GLUCOSE BLD-MCNC: 180 MG/DL (ref 65–105)
GLUCOSE BLD-MCNC: 187 MG/DL (ref 65–105)
GLUCOSE BLD-MCNC: 196 MG/DL (ref 65–105)
GLUCOSE SERPL-MCNC: 135 MG/DL (ref 74–99)
INR PPP: 1.6
POTASSIUM SERPL-SCNC: 5 MMOL/L (ref 3.7–5.3)
PROTHROMBIN TIME: 20.2 SEC (ref 11.8–14.6)
SODIUM SERPL-SCNC: 142 MMOL/L (ref 136–145)

## 2025-06-03 PROCEDURE — 82947 ASSAY GLUCOSE BLOOD QUANT: CPT

## 2025-06-03 PROCEDURE — 97530 THERAPEUTIC ACTIVITIES: CPT

## 2025-06-03 PROCEDURE — 2060000000 HC ICU INTERMEDIATE R&B

## 2025-06-03 PROCEDURE — 94640 AIRWAY INHALATION TREATMENT: CPT

## 2025-06-03 PROCEDURE — 6360000002 HC RX W HCPCS: Performed by: INTERNAL MEDICINE

## 2025-06-03 PROCEDURE — 93356 MYOCRD STRAIN IMG SPCKL TRCK: CPT

## 2025-06-03 PROCEDURE — 97535 SELF CARE MNGMENT TRAINING: CPT

## 2025-06-03 PROCEDURE — 99233 SBSQ HOSP IP/OBS HIGH 50: CPT | Performed by: INTERNAL MEDICINE

## 2025-06-03 PROCEDURE — 6370000000 HC RX 637 (ALT 250 FOR IP): Performed by: NURSE PRACTITIONER

## 2025-06-03 PROCEDURE — 94664 DEMO&/EVAL PT USE INHALER: CPT

## 2025-06-03 PROCEDURE — 2500000003 HC RX 250 WO HCPCS: Performed by: NURSE PRACTITIONER

## 2025-06-03 PROCEDURE — 6360000004 HC RX CONTRAST MEDICATION: Performed by: INTERNAL MEDICINE

## 2025-06-03 PROCEDURE — 94761 N-INVAS EAR/PLS OXIMETRY MLT: CPT

## 2025-06-03 PROCEDURE — 80048 BASIC METABOLIC PNL TOTAL CA: CPT

## 2025-06-03 PROCEDURE — 93356 MYOCRD STRAIN IMG SPCKL TRCK: CPT | Performed by: INTERNAL MEDICINE

## 2025-06-03 PROCEDURE — 93308 TTE F-UP OR LMTD: CPT | Performed by: INTERNAL MEDICINE

## 2025-06-03 PROCEDURE — 36415 COLL VENOUS BLD VENIPUNCTURE: CPT

## 2025-06-03 PROCEDURE — 85610 PROTHROMBIN TIME: CPT

## 2025-06-03 PROCEDURE — 6370000000 HC RX 637 (ALT 250 FOR IP)

## 2025-06-03 PROCEDURE — 99233 SBSQ HOSP IP/OBS HIGH 50: CPT

## 2025-06-03 PROCEDURE — 97116 GAIT TRAINING THERAPY: CPT

## 2025-06-03 PROCEDURE — 6370000000 HC RX 637 (ALT 250 FOR IP): Performed by: INTERNAL MEDICINE

## 2025-06-03 PROCEDURE — 97110 THERAPEUTIC EXERCISES: CPT

## 2025-06-03 PROCEDURE — 71045 X-RAY EXAM CHEST 1 VIEW: CPT

## 2025-06-03 PROCEDURE — 83880 ASSAY OF NATRIURETIC PEPTIDE: CPT

## 2025-06-03 RX ORDER — BUMETANIDE 1 MG/1
0.5 TABLET ORAL DAILY
Status: DISCONTINUED | OUTPATIENT
Start: 2025-06-03 | End: 2025-06-04 | Stop reason: HOSPADM

## 2025-06-03 RX ORDER — ENOXAPARIN SODIUM 100 MG/ML
1 INJECTION SUBCUTANEOUS DAILY
Status: DISCONTINUED | OUTPATIENT
Start: 2025-06-03 | End: 2025-06-04 | Stop reason: ALTCHOICE

## 2025-06-03 RX ORDER — WARFARIN SODIUM 7.5 MG/1
7.5 TABLET ORAL
Status: COMPLETED | OUTPATIENT
Start: 2025-06-03 | End: 2025-06-03

## 2025-06-03 RX ADMIN — CARVEDILOL 3.12 MG: 3.12 TABLET, FILM COATED ORAL at 17:01

## 2025-06-03 RX ADMIN — BUDESONIDE AND FORMOTEROL FUMARATE DIHYDRATE 2 PUFF: 160; 4.5 AEROSOL RESPIRATORY (INHALATION) at 20:08

## 2025-06-03 RX ADMIN — INSULIN LISPRO 1 UNITS: 100 INJECTION, SOLUTION INTRAVENOUS; SUBCUTANEOUS at 18:02

## 2025-06-03 RX ADMIN — INSULIN LISPRO 1 UNITS: 100 INJECTION, SOLUTION INTRAVENOUS; SUBCUTANEOUS at 12:57

## 2025-06-03 RX ADMIN — FERROUS SULFATE TAB 325 MG (65 MG ELEMENTAL FE) 325 MG: 325 (65 FE) TAB at 08:43

## 2025-06-03 RX ADMIN — AMIODARONE HYDROCHLORIDE 200 MG: 200 TABLET ORAL at 08:43

## 2025-06-03 RX ADMIN — CARVEDILOL 3.12 MG: 3.12 TABLET, FILM COATED ORAL at 08:43

## 2025-06-03 RX ADMIN — WARFARIN SODIUM 7.5 MG: 7.5 TABLET ORAL at 17:01

## 2025-06-03 RX ADMIN — SODIUM CHLORIDE, PRESERVATIVE FREE 10 ML: 5 INJECTION INTRAVENOUS at 20:40

## 2025-06-03 RX ADMIN — ACETAMINOPHEN 650 MG: 325 TABLET ORAL at 22:29

## 2025-06-03 RX ADMIN — INSULIN LISPRO 1 UNITS: 100 INJECTION, SOLUTION INTRAVENOUS; SUBCUTANEOUS at 20:40

## 2025-06-03 RX ADMIN — BUMETANIDE 0.5 MG: 1 TABLET ORAL at 14:46

## 2025-06-03 RX ADMIN — TRAZODONE HYDROCHLORIDE 50 MG: 50 TABLET ORAL at 00:37

## 2025-06-03 RX ADMIN — BUDESONIDE AND FORMOTEROL FUMARATE DIHYDRATE 2 PUFF: 160; 4.5 AEROSOL RESPIRATORY (INHALATION) at 08:31

## 2025-06-03 RX ADMIN — SODIUM CHLORIDE, PRESERVATIVE FREE 10 ML: 5 INJECTION INTRAVENOUS at 08:43

## 2025-06-03 RX ADMIN — ATORVASTATIN CALCIUM 80 MG: 80 TABLET, FILM COATED ORAL at 20:40

## 2025-06-03 RX ADMIN — TRAZODONE HYDROCHLORIDE 50 MG: 50 TABLET ORAL at 22:29

## 2025-06-03 RX ADMIN — ENOXAPARIN SODIUM 60 MG: 100 INJECTION SUBCUTANEOUS at 12:57

## 2025-06-03 RX ADMIN — SULFUR HEXAFLUORIDE 5 ML: KIT at 11:17

## 2025-06-03 ASSESSMENT — PAIN DESCRIPTION - ORIENTATION: ORIENTATION: RIGHT;LEFT;LOWER

## 2025-06-03 ASSESSMENT — PAIN DESCRIPTION - PAIN TYPE: TYPE: ACUTE PAIN

## 2025-06-03 ASSESSMENT — PAIN - FUNCTIONAL ASSESSMENT: PAIN_FUNCTIONAL_ASSESSMENT: ACTIVITIES ARE NOT PREVENTED

## 2025-06-03 ASSESSMENT — PAIN DESCRIPTION - ONSET: ONSET: ON-GOING

## 2025-06-03 ASSESSMENT — PAIN DESCRIPTION - FREQUENCY: FREQUENCY: INTERMITTENT

## 2025-06-03 ASSESSMENT — PAIN SCALES - GENERAL
PAINLEVEL_OUTOF10: 0
PAINLEVEL_OUTOF10: 3
PAINLEVEL_OUTOF10: 0

## 2025-06-03 ASSESSMENT — PAIN DESCRIPTION - DESCRIPTORS: DESCRIPTORS: ACHING;DISCOMFORT

## 2025-06-03 ASSESSMENT — PAIN DESCRIPTION - LOCATION: LOCATION: LEG

## 2025-06-03 NOTE — PLAN OF CARE
Problem: Chronic Conditions and Co-morbidities  Goal: Patient's chronic conditions and co-morbidity symptoms are monitored and maintained or improved  6/3/2025 0200 by Veronica Betancourt RN  Outcome: Progressing  Flowsheets (Taken 6/2/2025 0815 by Paz Richardson RN)  Care Plan - Patient's Chronic Conditions and Co-Morbidity Symptoms are Monitored and Maintained or Improved:   Monitor and assess patient's chronic conditions and comorbid symptoms for stability, deterioration, or improvement   Collaborate with multidisciplinary team to address chronic and comorbid conditions and prevent exacerbation or deterioration   Update acute care plan with appropriate goals if chronic or comorbid symptoms are exacerbated and prevent overall improvement and discharge  6/2/2025 1557 by Paz Richardson RN  Outcome: Progressing  Flowsheets (Taken 6/2/2025 0815)  Care Plan - Patient's Chronic Conditions and Co-Morbidity Symptoms are Monitored and Maintained or Improved:   Monitor and assess patient's chronic conditions and comorbid symptoms for stability, deterioration, or improvement   Collaborate with multidisciplinary team to address chronic and comorbid conditions and prevent exacerbation or deterioration   Update acute care plan with appropriate goals if chronic or comorbid symptoms are exacerbated and prevent overall improvement and discharge     Problem: Discharge Planning  Goal: Discharge to home or other facility with appropriate resources  6/2/2025 1557 by Paz Richardson RN  Outcome: Progressing  Flowsheets (Taken 6/2/2025 0815)  Discharge to home or other facility with appropriate resources:   Identify barriers to discharge with patient and caregiver   Arrange for needed discharge resources and transportation as appropriate   Identify discharge learning needs (meds, wound care, etc)   Refer to discharge planning if patient needs post-hospital services based on physician order or complex needs related to functional status,

## 2025-06-03 NOTE — CARE COORDINATION
ONGOING DISCHARGE PLAN:    Patient is alert and oriented x4.    Spoke with patient regarding discharge plan and patient confirms that plan is still home. Denies needs for VNS. She states she has a few dogs that do not like people. States granddaughter, Luana, helps with appts.     Cardio consult  Refusing stress test.   Echo pending  (Echo 4/2025 ef 20-25%, refused AICD)    Nephro consult  Renal US-unremarkable  Creatinine 1.5    INR 1.6    PT/OT    F/U appt on 6/10 CHF clinic  F/U appt Dr Awilda Mason 6/10 @ 3:30pm    Will continue to follow for additional discharge needs.    If patient is discharged prior to next notation, then this note serves as note for discharge by case management.    Electronically signed by Bonita Pierce RN on 6/3/2025 at 9:29 AM

## 2025-06-04 VITALS
BODY MASS INDEX: 23.15 KG/M2 | WEIGHT: 135.58 LBS | OXYGEN SATURATION: 99 % | HEIGHT: 64 IN | DIASTOLIC BLOOD PRESSURE: 57 MMHG | HEART RATE: 64 BPM | SYSTOLIC BLOOD PRESSURE: 124 MMHG | TEMPERATURE: 97.9 F | RESPIRATION RATE: 18 BRPM

## 2025-06-04 LAB
ANION GAP SERPL CALCULATED.3IONS-SCNC: 7 MMOL/L (ref 9–16)
BUN SERPL-MCNC: 24 MG/DL (ref 8–23)
CALCIUM SERPL-MCNC: 8.8 MG/DL (ref 8.6–10.4)
CHLORIDE SERPL-SCNC: 106 MMOL/L (ref 98–107)
CO2 SERPL-SCNC: 27 MMOL/L (ref 20–31)
CREAT SERPL-MCNC: 1.5 MG/DL (ref 0.7–1.2)
GFR, ESTIMATED: 36 ML/MIN/1.73M2
GLUCOSE BLD-MCNC: 138 MG/DL (ref 65–105)
GLUCOSE BLD-MCNC: 196 MG/DL (ref 65–105)
GLUCOSE SERPL-MCNC: 125 MG/DL (ref 74–99)
INR PPP: 2
POTASSIUM SERPL-SCNC: 5 MMOL/L (ref 3.7–5.3)
PROCALCITONIN SERPL-MCNC: 0.02 NG/ML (ref 0–0.09)
PROTHROMBIN TIME: 24 SEC (ref 11.8–14.6)
SODIUM SERPL-SCNC: 140 MMOL/L (ref 136–145)

## 2025-06-04 PROCEDURE — 2500000003 HC RX 250 WO HCPCS: Performed by: NURSE PRACTITIONER

## 2025-06-04 PROCEDURE — 99239 HOSP IP/OBS DSCHRG MGMT >30: CPT

## 2025-06-04 PROCEDURE — 6370000000 HC RX 637 (ALT 250 FOR IP)

## 2025-06-04 PROCEDURE — 85610 PROTHROMBIN TIME: CPT

## 2025-06-04 PROCEDURE — 84145 PROCALCITONIN (PCT): CPT

## 2025-06-04 PROCEDURE — 6370000000 HC RX 637 (ALT 250 FOR IP): Performed by: NURSE PRACTITIONER

## 2025-06-04 PROCEDURE — 6370000000 HC RX 637 (ALT 250 FOR IP): Performed by: INTERNAL MEDICINE

## 2025-06-04 PROCEDURE — 97530 THERAPEUTIC ACTIVITIES: CPT

## 2025-06-04 PROCEDURE — 94761 N-INVAS EAR/PLS OXIMETRY MLT: CPT

## 2025-06-04 PROCEDURE — 6360000002 HC RX W HCPCS: Performed by: INTERNAL MEDICINE

## 2025-06-04 PROCEDURE — 82947 ASSAY GLUCOSE BLOOD QUANT: CPT

## 2025-06-04 PROCEDURE — 94640 AIRWAY INHALATION TREATMENT: CPT

## 2025-06-04 PROCEDURE — 36415 COLL VENOUS BLD VENIPUNCTURE: CPT

## 2025-06-04 PROCEDURE — 80048 BASIC METABOLIC PNL TOTAL CA: CPT

## 2025-06-04 PROCEDURE — 97110 THERAPEUTIC EXERCISES: CPT

## 2025-06-04 RX ORDER — WARFARIN SODIUM 5 MG/1
5 TABLET ORAL
Status: COMPLETED | OUTPATIENT
Start: 2025-06-04 | End: 2025-06-04

## 2025-06-04 RX ORDER — MIDODRINE HYDROCHLORIDE 2.5 MG/1
2.5 TABLET ORAL
Qty: 90 TABLET | Refills: 3 | Status: SHIPPED | OUTPATIENT
Start: 2025-06-04

## 2025-06-04 RX ORDER — BUMETANIDE 0.5 MG/1
0.5 TABLET ORAL DAILY
Qty: 30 TABLET | Refills: 3 | Status: SHIPPED | OUTPATIENT
Start: 2025-06-05

## 2025-06-04 RX ORDER — BUMETANIDE 0.5 MG/1
0.5 TABLET ORAL DAILY
Qty: 30 TABLET | Refills: 3 | Status: CANCELLED | OUTPATIENT
Start: 2025-06-05

## 2025-06-04 RX ORDER — MIDODRINE HYDROCHLORIDE 2.5 MG/1
2.5 TABLET ORAL
Status: DISCONTINUED | OUTPATIENT
Start: 2025-06-04 | End: 2025-06-04 | Stop reason: HOSPADM

## 2025-06-04 RX ADMIN — ENOXAPARIN SODIUM 60 MG: 100 INJECTION SUBCUTANEOUS at 08:30

## 2025-06-04 RX ADMIN — INSULIN LISPRO 1 UNITS: 100 INJECTION, SOLUTION INTRAVENOUS; SUBCUTANEOUS at 12:01

## 2025-06-04 RX ADMIN — CARVEDILOL 3.12 MG: 3.12 TABLET, FILM COATED ORAL at 16:48

## 2025-06-04 RX ADMIN — BUMETANIDE 0.5 MG: 1 TABLET ORAL at 08:30

## 2025-06-04 RX ADMIN — CARVEDILOL 3.12 MG: 3.12 TABLET, FILM COATED ORAL at 08:30

## 2025-06-04 RX ADMIN — WARFARIN SODIUM 5 MG: 5 TABLET ORAL at 17:08

## 2025-06-04 RX ADMIN — FERROUS SULFATE TAB 325 MG (65 MG ELEMENTAL FE) 325 MG: 325 (65 FE) TAB at 08:30

## 2025-06-04 RX ADMIN — MIDODRINE HYDROCHLORIDE 2.5 MG: 2.5 TABLET ORAL at 16:48

## 2025-06-04 RX ADMIN — BUDESONIDE AND FORMOTEROL FUMARATE DIHYDRATE 2 PUFF: 160; 4.5 AEROSOL RESPIRATORY (INHALATION) at 06:56

## 2025-06-04 RX ADMIN — SODIUM CHLORIDE, PRESERVATIVE FREE 10 ML: 5 INJECTION INTRAVENOUS at 08:30

## 2025-06-04 RX ADMIN — AMIODARONE HYDROCHLORIDE 200 MG: 200 TABLET ORAL at 08:30

## 2025-06-04 NOTE — PLAN OF CARE
Problem: Chronic Conditions and Co-morbidities  Goal: Patient's chronic conditions and co-morbidity symptoms are monitored and maintained or improved  6/4/2025 1552 by Chantel Lew RN  Outcome: Adequate for Discharge     Problem: Discharge Planning  Goal: Discharge to home or other facility with appropriate resources  Outcome: Adequate for Discharge     Problem: Safety - Adult  Goal: Free from fall injury  6/4/2025 1552 by Chantel Lew RN  Outcome: Adequate for Discharge     Problem: ABCDS Injury Assessment  Goal: Absence of physical injury  Outcome: Adequate for Discharge     Problem: Nutrition Deficit:  Goal: Optimize nutritional status  6/4/2025 1552 by Chantel Lew RN  Outcome: Adequate for Discharge     Problem: Pain  Goal: Verbalizes/displays adequate comfort level or baseline comfort level  6/4/2025 1552 by Chantel Lew RN  Outcome: Adequate for Discharge

## 2025-06-04 NOTE — PLAN OF CARE
Problem: Safety - Adult  Goal: Free from fall injury  6/4/2025 0325 by Jenn Leonard RN  Outcome: Progressing   Note: No falls noted this shift. Patient ambulates with x1 staff assistance without difficulty.  Bed kept in low position. Safe environment maintained. Bedside table & call light in reach. Uses call light appropriately when needing assistance.      Problem: Chronic Conditions and Co-morbidities  Goal: Patient's chronic conditions and co-morbidity symptoms are monitored and maintained or improved  6/4/2025 0325 by Jenn Leonard RN  Outcome: Progressing     Problem: Pain  Goal: Verbalizes/displays adequate comfort level or baseline comfort level  Outcome: Progressing   Note: Pt medicated with pain medication prn.  Assessed all pain characteristics including level, type, location, frequency, and onset.  Non-pharmacologic interventions offered to pt as well.  Pt states pain is tolerable at this time.      Problem: Nutrition Deficit:  Goal: Optimize nutritional status  6/4/2025 0325 by Jenn Leonard RN  Outcome: Progressing

## 2025-06-04 NOTE — DISCHARGE INSTR - DIET

## 2025-06-04 NOTE — PROGRESS NOTES
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 Assessment/Progress Note  [facility]    (P) Loneliness/Social Isolation,  ,  ,      Name: Graciela Holt MRN: [id number]    Age: 74 y.o.     Sex: female   Language: English   Protestant: None   Angina pectoris     Date: 6/2/2025            Total Time Calculated: (P) 25 min              Patient mentioned that she has concerns with suffering on a possible heart procedure. Patient said that she is very close with family and her adult children.     Spiritual Assessment continued in [facility] PROGRESSIVE CARE        Referral/Consult From: (P) Multi-disciplinary team   Encounter Overview/Reason: (P) Loneliness/Social Isolation  Service Provided For: (P) Patient    Yareli, Belief, Meaning:   Patient identifies as spiritual  Family/Friends No family/friends present      Importance and Influence:  Patient has no beliefs influential to healthcare decision-making identified during this visit  Family/Friends No family/friends present    Community:  Patient feels well-supported. Support system includes: Extended family  Family/Friends No family/friends present    Assessment and Plan of Care:     Patient Interventions include: Facilitated expression of thoughts and feelings and Explored spiritual coping/struggle/distress  Family/Friends Interventions include: No family/friends present    Patient Plan of Care: Spiritual Care available upon further referral  Family/Friends Plan of Care: No family/friends present    Electronically signed by Chaplain Gurmeet on 6/2/2025 at 3:45 PM    
Spoke with LIVE Sweet regarding stress test. Pt is still refusing, she has already eaten breakfast today and has drank coffee.   
The potassium/magnesium sliding scale has been automatically discontinued per Pharmacy and Therapeutic Committee approved policy because the patient has decreased renal function (CrCl<30 ml/min).  The patient's current K/Mag levels are currently:    Recent Labs     06/01/25  0603 06/02/25  0556 06/03/25  0540   K 4.6 5.1 5.0       Estimated Creatinine Clearance: 28 mL/min (A) (based on SCr of 1.5 mg/dL (H)).  For patients with decreased renal function (below 30ml/min) needing potassium/magnesium supplementation, please order individual bolus doses with appropriate monitoring.      Please contact the inpatient pharmacy at [phone] with any concerns.  Thank you.    Rosemary Hu [facility]  6/3/2025  8:03 AM  
2 times per day, Mallorie Choe APRN - NP, 10 mL at 06/02/25 2002    sodium chloride flush 0.9 % injection 5-40 mL, 5-40 mL, IntraVENous, PRNDaija Brittney, APRN - NP    0.9 % sodium chloride infusion, , IntraVENous, PRN, Mallorie Choe APRN - NP    potassium chloride (KLOR-CON M) extended release tablet 40 mEq, 40 mEq, Oral, PRN **OR** potassium bicarb-citric acid (EFFER-K) effervescent tablet 40 mEq, 40 mEq, Oral, PRN **OR** potassium chloride 10 mEq/100 mL IVPB (Peripheral Line), 10 mEq, IntraVENous, PRNDaija Brittney, APRN - NP    magnesium sulfate 2000 mg in water 50 mL IVPB, 2,000 mg, IntraVENous, PRN, Mallorie Choe APRN - NP    acetaminophen (TYLENOL) tablet 650 mg, 650 mg, Oral, Q6H PRN, 650 mg at 06/02/25 2150 **OR** acetaminophen (TYLENOL) suppository 650 mg, 650 mg, Rectal, Q6H PRN, Mallorie Choe APRN - NP    polyethylene glycol (GLYCOLAX) packet 17 g, 17 g, Oral, Daily PRN, Mallorie Choe APRN - NP    warfarin placeholder: dosing by pharmacy, , Oral, RX Placeholder, Mallorie Choe APRN - NP    [Held by provider] empagliflozin (JARDIANCE) tablet 10 mg, 10 mg, Oral, Daily, Main Mann, DO, 10 mg at 06/01/25 0957    insulin lispro (HUMALOG,ADMELOG) injection vial 0-4 Units, 0-4 Units, SubCUTAneous, 4x Daily AC & HS, Brian Min MD, 1 Units at 06/02/25 2002    glucose chewable tablet 16 g, 4 tablet, Oral, PRN, Brian Min MD    dextrose bolus 10% 125 mL, 125 mL, IntraVENous, PRN **OR** dextrose bolus 10% 250 mL, 250 mL, IntraVENous, PRN, Brian Min MD    glucagon injection 1 mg, 1 mg, SubCUTAneous, PRN, Brian Min MD    dextrose 10 % infusion, , IntraVENous, Continuous PRN, Brian Min MD    Facility-Administered Medications Ordered in Other Encounters:     regadenoson (LEXISCAN) injection 0.4 mg, 0.4 mg, IntraVENous, ONCE PRN, Main Mann, DO      PHYSICAL EXAM:    Physical Examination:    /67   Pulse 60   Temp 97.7 °F (36.5 °C) 
mobility  Bed Mobility Comments: Pt sitting in chair at the start and end of session  Transfers  Sit to stand: Stand by assistance  Stand to sit: Stand by assistance  Transfer Comments: Verbal cues for safety as needed    Functional Mobility  Functional - Mobility Device: No device  Activity: Other (Hallway)  Assist Level: Stand by assistance  Functional Mobility Comments: Cues for safety as needed. Intetmittent support on wall/railing as needed. Slight unsteadiness noted with ability to right self    OT Exercises  Exercise Treatment: OT facilitated pts engagement in BUE exercises with use of 2# free weight. Pt completed 15 reps each in all planes of motion. increased time to complete with rest breaks as needed. Pt completed to increase strength and overall endurance with ADL and IADL tasks.    Patient Education  Patient Education  Education Given To: Patient  Education Provided: Role of Therapy, Plan of Care, ADL Adaptive Strategies, Transfer Training, Energy Conservation, Fall Prevention Strategies  Education Provided Comments: Fall  prevention, energy conservation  Education Method: Demonstration, Verbal  Barriers to Learning: None  Education Outcome: Continued education needed, Demonstrated understanding, Verbalized understanding    Goals  Short Term Goals  Time Frame for Short Term Goals: upon dc  Short Term Goal 1: Pt will complete UB ADLs IND and LB ADLs LAVELLE, Good safety, and use of AE/modified techs as needed  Short Term Goal 2: Pt will complete functional mobility/transfers with LAVELLE, Good safety, and least restrictive device  Short Term Goal 3: Pt will tolerate 10+ mins of dynamic/static standing during therapeutic exercise/functional activity for improved activity tolerance  Short Term Goal 4: Pt will verbalize/demonstrate Good understanding of fall prevention/home safety modification and AE/DME/modified techs for improved safety and participation with self-care  Short Term Goal 5: Pt will actively 
Patient would benefit from continued therapy after discharge, Therapy recommended at discharge  Activity Tolerance  Activity Tolerance: Patient tolerated treatment well     Patient Education  Patient Education  Education Given To: Patient  Education Provided: Role of Therapy, Plan of Care, Precautions, Fall Prevention Strategies, Mobility Training, Transfer Training  Education Method: Demonstration, Verbal  Education Outcome: Demonstrated understanding, Verbalized understanding     Functional Outcome Measures  AM-PAC Basic Mobility - Inpatient   How much help is needed turning from your back to your side while in a flat bed without using bedrails?: A Little  How much help is needed moving from lying on your back to sitting on the side of a flat bed without using bedrails?: A Little  How much help is needed moving to and from a bed to a chair?: A Little  How much help is needed standing up from a chair using your arms?: A Little  How much help is needed walking in hospital room?: A Little  How much help is needed climbing 3-5 steps with a railing?: A Little  [facility] Inpatient Mobility Raw Score : 18  AM-PAC Inpatient T-Scale Score : 43.63  Mobility Inpatient CMS 0-100% Score: 46.58  Mobility Inpatient CMS G-Code Modifier : CK     Goals     Short Term Goals  Time Frame for Short Term Goals: 4 visits  Short Term Goal 1: pt to demonstrate good technique for exercise program for general strengthening, balance and energy conservation  Short Term Goal 2: pt to demonstrate modified independent gait 50 -80  using no device for household distances  Short Term Goal 3: pt to demonstrate safe and independnet transfers to chair/toilet  Short Term Goal 4: pt to perform 6\" step ups x 5 reps with UE support, SBA      Plan  Physical Therapy Plan  General Plan:  (5x/ week)  Specific Instructions for Next Treatment: 6\" step ups nxt visit  Current Treatment Recommendations: Strengthening, Balance training, Functional mobility training, 
discharge:  -Will resume patient's Jardiance  - Add parameters to all of her blood pressure medications  - To hold losartan until she sees nephrology outpatient  - Also to get a BMP in 1 week after discharge  - Bumex has been decreased to 0.5 mg daily  - Also midodrine has been added to her home medications    Nate Mott DO  6/4/2025  5:56 PM     Attending Physician Statement  I have discussed the care of Graciela Holt and I have examined the patient myself and taken ROS and HPI, including pertinent history and exam findings, with the resident. I have reviewed the key elements of all parts of the encounter with the resident.  I agree with the assessment, plan and orders as documented by the resident.    74-year-old female presented with chest pain, shortness of breath which all started when she was resting on her couch.  Angina.  Cardiology consult.  Planning for stress test Monday morning.  Resumed home medications.  Chronic HFrEF.  Refusing AICD, trace edema noted bilateral lower extremity swelling.  Continue Bumex.  Recent DVT scan 5/2025 negative for DVT.  Denies any calf tenderness.  History of apical thrombus.  Currently on Coumadin.,  Continue pharmacy to dose.  Paroxysmal A-fib on warfarin, rate controlled, continue amiodarone..  Subtherapeutic INR.  Pharmacy to dose warfarin.  CAD s/p CABG.  Continue statin.  Hypertension.  Controlled, resume home medications.  Hyperlipidemia.  Continue statin.  COPD.  Stable.  History of CVA.  DM type II.  LDSSI, ACHS.  DVT prophylaxis.  Pharmacy to dose Coumadin.    Patient seen and examined at bedside.   Nephrology input noted   Ok to dc from their standpoint  Medications adjsuted  Low procal  Patient refusing further treatment for angina  Ok for dc         Electronically signed by Brian Min MD     
 Wt 62.1 kg (136 lb 14.5 oz)   SpO2 96%   BMI 23.50 kg/m²   Temp (24hrs), Av.4 °F (36.9 °C), Min:97.3 °F (36.3 °C), Max:99 °F (37.2 °C)    Recent Labs     25  1109 25  1624 25  1954 25  0633   POCGLU 169* 134* 188* 98     No intake or output data in the 24 hours ending 25 1048    General Appearance: alert, well appearing, and in no acute distress  Mental status: oriented to person, place, and time  Head: normocephalic, atraumatic  Eye: no icterus, redness, pupils equal and reactive, extraocular eye movements intact, conjunctiva clear  Ear: normal external ear, no discharge, hearing intact  Nose: no drainage noted  Mouth: mucous membranes moist  Neck: supple, no carotid bruits, thyroid not palpable  Lungs: Bilateral equal air entry, clear to ausculation, no wheezing, rales or rhonchi, normal effort  Cardiovascular: normal rate, regular rhythm, no murmur, gallop, rub  Abdomen: Soft, nontender, nondistended, normal bowel sounds, no hepatomegaly or splenomegaly  Neurologic: There are no new focal motor or sensory deficits, normal muscle tone and bulk, no abnormal sensation, normal speech, cranial nerves II through XII grossly intact  Skin: No gross lesions, rashes, bruising or bleeding on exposed skin area  Extremities: peripheral pulses palpable, no pedal edema or calf pain with palpation  Psych: normal affect    Investigations:      Laboratory Testing:  Recent Results (from the past 24 hours)   POC Glucose Fingerstick    Collection Time: 25 11:09 AM   Result Value Ref Range    POC Glucose 169 (H) 65 - 105 mg/dL   POC Glucose Fingerstick    Collection Time: 25  4:24 PM   Result Value Ref Range    POC Glucose 134 (H) 65 - 105 mg/dL   POC Glucose Fingerstick    Collection Time: 25  7:54 PM   Result Value Ref Range    POC Glucose 188 (H) 65 - 105 mg/dL   Basic Metabolic Panel w/ Reflex to MG    Collection Time: 25  6:03 AM   Result Value Ref Range    Sodium 141 
education & training, Equipment evaluation, education, & procurement, Modalities, Positioning, Self-Care / ADL, Home management training    OT Individual Minutes  OT Individual Minutes  Time In: 1035  Time Out: 1052  Minutes: 17  Time Code Minutes   Timed Code Treatment Minutes: 8 Minutes    Electronically signed by CHRISTELLE Holliday on 6/2/25 at 1:18 PM EDT

## 2025-06-04 NOTE — CARE COORDINATION
ONGOING DISCHARGE PLAN:    Patient is alert and oriented x4.    Spoke with patient regarding discharge plan and patient confirms that plan is still home. Denies needs for VNS.     Cardio consult  Refusing stress test.   Echo pending  (Echo 4/2025 ef 20-25%, refused AICD)    Nephro consult  Renal US-unremarkable  Creatinine 1.5    INR 2.0    PT/OT    F/U appt on 6/10 CHF clinic  F/U appt Dr Awilda Mason 6/10 @ 3:30pm    IMM letter provided to patient.  Patient offered four hours to make informed decision regarding appeal process; patient agreeable to discharge.      Will continue to follow for additional discharge needs.    If patient is discharged prior to next notation, then this note serves as note for discharge by case management.    Electronically signed by Bonita Pierce RN on 6/4/2025 at 9:53 AM

## 2025-06-05 ENCOUNTER — TELEPHONE (OUTPATIENT)
Age: 75
End: 2025-06-05

## 2025-06-05 ENCOUNTER — TELEPHONE (OUTPATIENT)
Dept: FAMILY MEDICINE CLINIC | Age: 75
End: 2025-06-05

## 2025-06-05 NOTE — TELEPHONE ENCOUNTER
Patient was a no show for [facility] Management Clinic appointment today for INR check.   Left message for patient to call back to reschedule. Indicated the importance of appropriate follow-up.

## 2025-06-05 NOTE — TELEPHONE ENCOUNTER
Care Transitions Initial Follow Up Call    Outreach made within 2 business days of discharge: Yes    Patient: Graciela Holt Patient : [id number]   MRN: [id number]  Reason for Admission: ANGINA PECTORIS  Discharge Date: 25       Spoke with: DAINA    Discharge department/facility: [facility] Interactive Patient Contact:  Was patient able to fill all prescriptions:   Was patient instructed to bring all medications to the follow-up visit:   Is patient taking all medications as directed in the discharge summary?   Does patient understand their discharge instructions:   Does patient have questions or concerns that need addressed prior to 7-14 day follow up office visit:     Additional needs identified to be addressed with provider             Scheduled appointment with PCP within 7-14 days    Follow Up  Future Appointments   Date Time Provider Department Center   2025  1:30 PM [facility] MEDICATION MGMT [facility]OM 3 [facility]   6/10/2025  1:00 PM [facility] CHF [facility] RM 1 [facility] CHFCLIN [place]   6/10/2025  3:30 PM Travis Mason MD [facility] DEP   2025  3:30 PM Glenys Mccoy MD [facility]   2025  3:00 PM Yessica Flynn DO [facility]   2025 12:00 PM Travis Mason MD [facility] DEP       Verona Montenegro MA

## 2025-06-06 NOTE — TELEPHONE ENCOUNTER
Patient was discharged from [facility] 6/4. Attempted to reach patient to set up appt for INR monitoring. No answer. VM left  Lauren Stephen, Pharm D, BCPS, CACP  [facility] Medication Management Clinic  6/6/2025 4:50 PM

## 2025-06-07 ENCOUNTER — APPOINTMENT (OUTPATIENT)
Dept: CT IMAGING | Age: 75
End: 2025-06-07
Payer: COMMERCIAL

## 2025-06-07 ENCOUNTER — HOSPITAL ENCOUNTER (EMERGENCY)
Age: 75
Discharge: HOME | End: 2025-06-07
Attending: STUDENT IN AN ORGANIZED HEALTH CARE EDUCATION/TRAINING PROGRAM
Payer: COMMERCIAL

## 2025-06-07 ENCOUNTER — APPOINTMENT (OUTPATIENT)
Dept: GENERAL RADIOLOGY | Age: 75
End: 2025-06-07
Payer: COMMERCIAL

## 2025-06-07 VITALS
RESPIRATION RATE: 14 BRPM | SYSTOLIC BLOOD PRESSURE: 141 MMHG | WEIGHT: 135 LBS | DIASTOLIC BLOOD PRESSURE: 61 MMHG | HEART RATE: 77 BPM | TEMPERATURE: 97.9 F | OXYGEN SATURATION: 96 % | HEIGHT: 64 IN | BODY MASS INDEX: 23.05 KG/M2

## 2025-06-07 DIAGNOSIS — K59.00 CONSTIPATION, UNSPECIFIED CONSTIPATION TYPE: Primary | ICD-10-CM

## 2025-06-07 DIAGNOSIS — I77.810 DILATATION OF THORACIC AORTA: ICD-10-CM

## 2025-06-07 LAB
ABO + RH BLD: NORMAL
ANION GAP SERPL CALCULATED.3IONS-SCNC: 9 MMOL/L (ref 9–16)
ARM BAND NUMBER: NORMAL
BASOPHILS # BLD: 0.04 K/UL (ref 0–0.2)
BASOPHILS NFR BLD: 1 % (ref 0–2)
BLOOD BANK SAMPLE EXPIRATION: NORMAL
BLOOD GROUP ANTIBODIES SERPL: NEGATIVE
BUN SERPL-MCNC: 26 MG/DL (ref 8–23)
CALCIUM SERPL-MCNC: 9.3 MG/DL (ref 8.6–10.4)
CHLORIDE SERPL-SCNC: 107 MMOL/L (ref 98–107)
CO2 SERPL-SCNC: 27 MMOL/L (ref 20–31)
CREAT SERPL-MCNC: 1.3 MG/DL (ref 0.7–1.2)
DATE, STOOL #1: NORMAL
EOSINOPHIL # BLD: 0.18 K/UL (ref 0–0.44)
EOSINOPHILS RELATIVE PERCENT: 3 % (ref 0–4)
ERYTHROCYTE [DISTWIDTH] IN BLOOD BY AUTOMATED COUNT: 18 % (ref 11.5–14.9)
GFR, ESTIMATED: 43 ML/MIN/1.73M2
GLUCOSE SERPL-MCNC: 133 MG/DL (ref 74–99)
HCT VFR BLD AUTO: 32.7 % (ref 36–46)
HEMOCCULT SP1 STL QL: NEGATIVE
HGB BLD-MCNC: 9.9 G/DL (ref 12–16)
IMM GRANULOCYTES # BLD AUTO: <0.03 K/UL (ref 0–0.3)
IMM GRANULOCYTES NFR BLD: 0 %
INR PPP: 1.7
LYMPHOCYTES NFR BLD: 1.23 K/UL (ref 1.1–3.7)
LYMPHOCYTES RELATIVE PERCENT: 18 % (ref 24–44)
MCH RBC QN AUTO: 26.2 PG (ref 26–34)
MCHC RBC AUTO-ENTMCNC: 30.3 G/DL (ref 31–37)
MCV RBC AUTO: 86.5 FL (ref 80–100)
MONOCYTES NFR BLD: 0.75 K/UL (ref 0.1–1.2)
MONOCYTES NFR BLD: 11 % (ref 3–12)
NEUTROPHILS NFR BLD: 67 % (ref 36–66)
NEUTS SEG NFR BLD: 4.54 K/UL (ref 1.5–8.1)
NRBC BLD-RTO: 0 PER 100 WBC
PLATELET # BLD AUTO: 265 K/UL (ref 150–450)
PMV BLD AUTO: 10.6 FL (ref 8–13.5)
POTASSIUM SERPL-SCNC: 4.1 MMOL/L (ref 3.7–5.3)
PROTHROMBIN TIME: 21.5 SEC (ref 11.8–14.6)
RBC # BLD AUTO: 3.78 M/UL (ref 3.95–5.11)
SODIUM SERPL-SCNC: 143 MMOL/L (ref 136–145)
TIME, STOOL #1: 2013
WBC OTHER # BLD: 6.8 K/UL (ref 3.5–11)

## 2025-06-07 PROCEDURE — 99285 EMERGENCY DEPT VISIT HI MDM: CPT

## 2025-06-07 PROCEDURE — 74174 CTA ABD&PLVS W/CONTRAST: CPT

## 2025-06-07 PROCEDURE — 82272 OCCULT BLD FECES 1-3 TESTS: CPT

## 2025-06-07 PROCEDURE — 6360000004 HC RX CONTRAST MEDICATION

## 2025-06-07 PROCEDURE — 86900 BLOOD TYPING SEROLOGIC ABO: CPT

## 2025-06-07 PROCEDURE — 80048 BASIC METABOLIC PNL TOTAL CA: CPT

## 2025-06-07 PROCEDURE — 2580000003 HC RX 258

## 2025-06-07 PROCEDURE — 70450 CT HEAD/BRAIN W/O DYE: CPT

## 2025-06-07 PROCEDURE — 85025 COMPLETE CBC W/AUTO DIFF WBC: CPT

## 2025-06-07 PROCEDURE — 96374 THER/PROPH/DIAG INJ IV PUSH: CPT

## 2025-06-07 PROCEDURE — 86901 BLOOD TYPING SEROLOGIC RH(D): CPT

## 2025-06-07 PROCEDURE — 72125 CT NECK SPINE W/O DYE: CPT

## 2025-06-07 PROCEDURE — 2500000003 HC RX 250 WO HCPCS

## 2025-06-07 PROCEDURE — 36415 COLL VENOUS BLD VENIPUNCTURE: CPT

## 2025-06-07 PROCEDURE — 85610 PROTHROMBIN TIME: CPT

## 2025-06-07 PROCEDURE — 73562 X-RAY EXAM OF KNEE 3: CPT

## 2025-06-07 PROCEDURE — 6360000002 HC RX W HCPCS

## 2025-06-07 PROCEDURE — 86850 RBC ANTIBODY SCREEN: CPT

## 2025-06-07 RX ORDER — ACETAMINOPHEN 500 MG
1000 TABLET ORAL ONCE
Status: DISCONTINUED | OUTPATIENT
Start: 2025-06-07 | End: 2025-06-08 | Stop reason: HOSPADM

## 2025-06-07 RX ORDER — IOPAMIDOL 755 MG/ML
100 INJECTION, SOLUTION INTRAVASCULAR
Status: COMPLETED | OUTPATIENT
Start: 2025-06-07 | End: 2025-06-07

## 2025-06-07 RX ORDER — SODIUM CHLORIDE 9 MG/ML
INJECTION, SOLUTION INTRAVENOUS ONCE
Status: COMPLETED | OUTPATIENT
Start: 2025-06-07 | End: 2025-06-07

## 2025-06-07 RX ORDER — SODIUM CHLORIDE 0.9 % (FLUSH) 0.9 %
10 SYRINGE (ML) INJECTION PRN
Status: COMPLETED | OUTPATIENT
Start: 2025-06-07 | End: 2025-06-07

## 2025-06-07 RX ORDER — POLYETHYLENE GLYCOL 3350 17 G/17G
17 POWDER, FOR SOLUTION ORAL DAILY
Qty: 238 G | Refills: 1 | Status: SHIPPED | OUTPATIENT
Start: 2025-06-07 | End: 2025-07-07

## 2025-06-07 RX ORDER — SENNA AND DOCUSATE SODIUM 50; 8.6 MG/1; MG/1
1 TABLET, FILM COATED ORAL DAILY
Qty: 30 TABLET | Refills: 0 | Status: SHIPPED | OUTPATIENT
Start: 2025-06-07 | End: 2025-07-07

## 2025-06-07 RX ORDER — SODIUM CHLORIDE 9 MG/ML
INJECTION, SOLUTION INTRAMUSCULAR; INTRAVENOUS; SUBCUTANEOUS
Status: DISCONTINUED
Start: 2025-06-07 | End: 2025-06-08 | Stop reason: HOSPADM

## 2025-06-07 RX ADMIN — SODIUM CHLORIDE 80 MG: 9 INJECTION INTRAMUSCULAR; INTRAVENOUS; SUBCUTANEOUS at 18:13

## 2025-06-07 RX ADMIN — SODIUM CHLORIDE, PRESERVATIVE FREE 10 ML: 5 INJECTION INTRAVENOUS at 19:17

## 2025-06-07 RX ADMIN — IOPAMIDOL 100 ML: 755 INJECTION, SOLUTION INTRAVENOUS at 19:15

## 2025-06-07 RX ADMIN — SODIUM CHLORIDE: 9 INJECTION, SOLUTION INTRAVENOUS at 19:17

## 2025-06-07 ASSESSMENT — PAIN - FUNCTIONAL ASSESSMENT: PAIN_FUNCTIONAL_ASSESSMENT: NONE - DENIES PAIN

## 2025-06-07 NOTE — ED NOTES
Amy Ledbetter, was walking patient to bathroom.  Amy called out from hallway saying that patient fell.  Writer and physician's walked into hallway to find patient lying in the hallway face down and patient stated that she hit her head.

## 2025-06-08 NOTE — ED PROVIDER NOTES
[facility] EMERGENCY DEPARTMENT  Emergency Department  Faculty Attestation       I performed a history and physical examination of the patient and discussed management with the resident. I reviewed the resident’s note and agree with the documented findings including all diagnostic interpretations and plan of care. Any areas of disagreement are noted on the chart. I was personally present for the key portions of any procedures. I have documented in the chart those procedures where I was not present during the key portions. I have reviewed the emergency nurses triage note. I agree with the chief complaint, past medical history, past surgical history, allergies, medications, social and family history as documented unless otherwise noted below. Documentation of the HPI, Physical Exam and Medical Decision Making performed by rahatibcristy is based on my personal performance of the HPI, PE and MDM. For Physician Assistant/ Nurse Practitioner cases/documentation I have personally evaluated this patient and have completed at least one if not all key elements of the E/M (history, physical exam, and MDM). Additional findings are as noted.    Pertinent Comments     Primary Care Physician: Travis Mason MD    History: This is a 74 y.o. female who presents to the Emergency Department with complaint of    Chief Complaint   Patient presents with    Melena         Physical:    ED Triage Vitals [06/07/25 1452]   BP Systolic BP Percentile Diastolic BP Percentile Temp Temp Source Pulse Respirations SpO2   (!) 144/93 -- -- 97.9 °F (36.6 °C) Oral 77 14 95 %      Height Weight - Scale         1.626 m (5' 4\") 61.2 kg (135 lb)              RADIOLOGY:  No results found.    MDM/Plan:   Melena noticed today  On warfarin 2/2 cardiac stents  No abd pain  No changes in diet    .  Lungs clear to auscultation bilaterally.  Vital signs stable.  Abdomen soft, and is nontender.  States that she has not had any issues with bowel movement but 
branch vessels  without occlusion.  5. Status post cholecystectomy.  6. Degenerative changes in the hips and lumbar spine.  7. No blush enhancement diagnostic of active bleeding site is noted.   []   2132 CT HEAD WO CONTRAST  RECOMMENDATIONS:  Consider cardiovascular consultation given dilated ascending aorta.   []   2133 XR KNEE LEFT (3 VIEWS)  IMPRESSION:  Total knee arthroplasty in satisfactory alignment position without insert  fracture or hardware complication.   []      ED Course User Index  [] Viral Resendiz MD       PROCEDURES:      CONSULTS:  None    CRITICAL CARE:  There was significant risk of life threatening deterioration of patient's condition requiring my direct management. Critical care time 0 minutes, excluding any documented procedures.    FINAL IMPRESSION      1. Constipation, unspecified constipation type    2. Dilatation of thoracic aorta          DISPOSITION / PLAN     DISPOSITION Decision To Discharge 06/07/2025 09:44:40 PM   DISPOSITION CONDITION Stable           PATIENT REFERRED TO:  Travis Mason MD  [address] [address] [phone]    Schedule an appointment as soon as possible for a visit       [facility] Emergency Department  [address] [address]  [phone]  Go to   As needed, If symptoms worsen      DISCHARGE MEDICATIONS:  New Prescriptions    POLYETHYLENE GLYCOL (GLYCOLAX) 17 GM/SCOOP POWDER    Take 17 g by mouth daily    SENNOSIDES-DOCUSATE SODIUM (SENOKOT-S) 8.6-50 MG TABLET    Take 1 tablet by mouth daily       Viral Resendiz MD  Emergency Medicine Resident    (Please note that portions of thisnote were completed with a voice recognition program.  Efforts were made to edit the dictations but occasionally words are mis-transcribed.)

## 2025-06-08 NOTE — DISCHARGE INSTRUCTIONS
This is a you are seen here for concerns for GI bleeding.    - You do not have GI bleeding at this time.  Your labs are reassuring.    We do recommend that you follow-up with your 4 hours later primary care provider, ideally within a couple days.    Please return to the emergency department with any new or worsening symptoms, or if your symptoms do not improve.  This includes fever and chills that do not go down with Tylenol or Motrin, shortness of breath or chest pain, nausea or vomiting that prevents you from eating, lightheadedness or dizziness that prevents you from doing her normal daily activities, numbness or weakness to the arms or legs, if your symptoms do not improve.

## 2025-06-09 ENCOUNTER — TELEPHONE (OUTPATIENT)
Dept: FAMILY MEDICINE CLINIC | Age: 75
End: 2025-06-09

## 2025-06-09 NOTE — TELEPHONE ENCOUNTER
[facility] ED Follow up Call    Reason for ED visit:       6/9/2025         FU appts/Provider:    Future Appointments   Date Time Provider Department Center   6/10/2025  1:00 PM [facility] CHF CLINIC RM 1 [facility] CHFCLIN [place]   6/10/2025  3:30 PM Travis Mason MD fp [facility] ECC DEP   7/1/2025  3:30 PM Glenys Mccoy MD [facility]   8/4/2025  3:00 PM Yessica Flynn DO [facility]   8/6/2025 12:00 PM Travis Mason MD [facility] ECC DEP         VOICEMAIL DOCUMENTATION - ERASE IF NOT USED  Hi, this message is for Graciela.  This is Keira Cantu MA from Travis Gutierrez office.  Just calling to see how you are doing after your recent visit to the Emergency Room.  Travis Gutierrez wants to make sure you were able to fill any prescriptions and that you understand your discharge instructions.  Please return our call if you need to make a follow up appointment with your provider or have any further needs.   Our phone number is [phone].  Have a great day.

## 2025-06-10 ENCOUNTER — TELEPHONE (OUTPATIENT)
Age: 75
End: 2025-06-10

## 2025-06-10 ENCOUNTER — TELEPHONE (OUTPATIENT)
Dept: FAMILY MEDICINE CLINIC | Age: 75
End: 2025-06-10

## 2025-06-10 ENCOUNTER — HOSPITAL ENCOUNTER (OUTPATIENT)
Age: 75
Setting detail: SPECIMEN
Discharge: HOME | End: 2025-06-10

## 2025-06-10 NOTE — TELEPHONE ENCOUNTER
[facility] Medication Management Clinic Note    Patient dc'd from hospital 6/4 and missed appointment on 6/5 for INR check. Additionally, patient has referral CHF visit with clinic. Left voicemail with phone number to set up appointments.     Zenobia Glass, PharmD  PGY1 Pharmacy Resident    6/10/2025  8:22 AM

## 2025-06-10 NOTE — TELEPHONE ENCOUNTER
[facility] ED Follow up Call    Reason for ED visit:    6/7/2025 (5 hours)  [facility] Emergency Department     Adelso Chen DO  Last attending  Treatment team Constipation, unspecified constipation type +1 more  Clinical impression Melena  Chief complaint       Atilio Owens ,     This is Violeta Mazariegos from Travis Vaughn's office, just calling to see how you are doing after your recent ED visit.    Did you receive discharge instructions?  Yes  Do you understand the discharge instructions? Yes  Did the ED give you any new prescriptions? Yes  Were you able to fill your prescriptions? Yes    Do you have one of our red, yellow and green  Zone sheets that help you to determine when you should go to the ED?Yes    Do you need or want to make a follow up appt with your PCP?Yes    Do you have any further needs in the home i.e. Equipment?  No    FU appts/Provider:      Future Appointments   Date Time Provider Department Center   6/10/2025  1:00 PM [facility] CHF CLINIC RM 1 [facility] CHFCLIN [place]   6/10/2025  3:30 PM Travis Mason MD fp [facility] ECC DEP   7/1/2025  3:30 PM Glenys Mccoy MD [facility]   8/4/2025  3:00 PM Yessica Flynn DO [facility]   8/6/2025 12:00 PM Travis Mason MD [facility] ECC DEP

## 2025-06-11 ENCOUNTER — TELEPHONE (OUTPATIENT)
Age: 75
End: 2025-06-11

## 2025-06-11 NOTE — TELEPHONE ENCOUNTER
[facility] Medication Management Clinic Note    Attempted to reach patient after hospital discharge and missed INR. Patients phone number links to patients grandchild. She states she will call us back to schedule her appointments.    Zenobia Glass, PharmD  PGY1 Pharmacy Resident    6/11/2025  9:07 AM

## 2025-06-12 NOTE — PROGRESS NOTES
SPOKE TO PATIENTS GRANDDAUGHTER REMINDING OF HEART FAILURE CLINIC APPOINTMENT ON 6/13/2025 AT 2:00. GRANDDAUGHTER VERBALIZED UNDERSTANDING.

## 2025-06-13 ENCOUNTER — HOSPITAL ENCOUNTER (OUTPATIENT)
Age: 75
Setting detail: SPECIMEN
Discharge: HOME | End: 2025-06-13

## 2025-06-13 ENCOUNTER — HOSPITAL ENCOUNTER (OUTPATIENT)
Dept: OTHER | Age: 75
Discharge: HOME | End: 2025-06-13

## 2025-06-13 NOTE — PROGRESS NOTES
Pt granddaughter, Luana called in to cancel CHF Clinic apt today, due to pt having a headache. She reschedules for 6/20/25..

## 2025-06-17 NOTE — PROGRESS NOTES
Physician Progress Note      PATIENT:               IVETT ANDRE  CSN #:                  [id number]  :                       1950  ADMIT DATE:       2025 12:03 PM  DISCH DATE:        2025 4:01 PM  RESPONDING  PROVIDER #:        Pearl Choudhary MD          QUERY TEXT:    Acute on chronic hypoxic respiratory failure is documented in the medical   record Progress Notes by Pearl Choudhary MD at 2025. Please provide   additional clinical indicators supportive of your documentation.However   Patient have No respiratory distress,no documentation of accessory muscle   usage and increased work of breathing,with no use of home O2 Or please   document if the diagnosis of Acute on chronic hypoxic respiratory failure has   been ruled out after study.    The clinical indicators include:  Patient presented with Shortness of Breath,Leg Swelling:  CHF,HTN on DS     -\"hypoxic respiratory failure likely secondary to acute CHF   exacerbation\"(Discharge Summary by Chata Lackey MD at 2025)    -\"No respiratory distress.\"(ED Provider Notes by Gilson Riggins DO at 2025)    -\"Blood pressure 143/84 heart rate 64 oxygen saturation 97% respiratory rate   16 at 1127\"(Progress Notes by Tommy Flynn MD at 2025 )    -\"Pulmonary: Effort: Pulmonary effort is normal.Breath sounds: Rales   (Bilateral bases) present\"(H&P by Pearl Choudhary MD at 2025)    -\"The patient was admitted to the hospital for management of acute on chronic   hypoxic respiratory failure likely secondary to CHF exacerbation\"(Progress   Notes by Pearl Choudhary MD at 2025 )    -\"Patient denies headache, lightheadedness, worsening dyspnea, chest pain,   palpitations.\"(Progress Notes by Pearl Choudhary MD at 2025)    -\"Not on oxygen at baseline.\"(Progress Notes by Pearl Choudhary MD at   2025)    -\"No new lung infiltrate.No pneumothorax\"(chest x ray )    From [facility] Oxygen therpay  -\"SPO2- % on  2-3L (5/23)  % on 2.5-3L(5/24)  91-99% on 1.5-3L   (5/25) \"    -Nasal cannula   1.5-3 L(From [facility] Oxygen therapy),albuterol sulfate HFA   -inhaler,budesonide-formoterol-inhaler    Thank you  Willi MATTHEW CDS  Options provided:  -- Acute on chronic hypoxic respiratory failure as evidenced by, Please   document evidence.  -- Acute on chronic hypoxic respiratory failure ruled out after study  -- Other - I will add my own diagnosis  -- Disagree - Not applicable / Not valid  -- Disagree - Clinically unable to determine / Unknown  -- Refer to Clinical Documentation Reviewer    PROVIDER RESPONSE TEXT:    Acute on chronic hypoxic respiratory failure has been ruled out after study.    Query created by: Willi Alves on 6/16/2025 8:51 AM      Electronically signed by:  Pearl Choudhary MD 6/17/2025 10:33 AM

## 2025-06-19 RX ORDER — WARFARIN SODIUM 2.5 MG/1
TABLET ORAL
Qty: 90 TABLET | Refills: 0 | Status: ON HOLD | OUTPATIENT
Start: 2025-06-19

## 2025-06-20 ENCOUNTER — HOSPITAL ENCOUNTER (INPATIENT)
Age: 75
LOS: 1 days | Discharge: HOME | DRG: 302 | End: 2025-06-21
Attending: EMERGENCY MEDICINE | Admitting: INTERNAL MEDICINE
Payer: COMMERCIAL

## 2025-06-20 ENCOUNTER — HOSPITAL ENCOUNTER (OUTPATIENT)
Dept: OTHER | Age: 75
Discharge: HOME | End: 2025-06-20
Payer: COMMERCIAL

## 2025-06-20 ENCOUNTER — APPOINTMENT (OUTPATIENT)
Dept: GENERAL RADIOLOGY | Age: 75
DRG: 302 | End: 2025-06-20
Payer: COMMERCIAL

## 2025-06-20 ENCOUNTER — HOSPITAL ENCOUNTER (OUTPATIENT)
Age: 75
Setting detail: SPECIMEN
Discharge: HOME | End: 2025-06-20
Payer: COMMERCIAL

## 2025-06-20 ENCOUNTER — ANTI-COAG VISIT (OUTPATIENT)
Age: 75
End: 2025-06-20
Payer: COMMERCIAL

## 2025-06-20 VITALS
HEIGHT: 64 IN | SYSTOLIC BLOOD PRESSURE: 162 MMHG | OXYGEN SATURATION: 98 % | DIASTOLIC BLOOD PRESSURE: 82 MMHG | HEART RATE: 65 BPM | WEIGHT: 136 LBS | BODY MASS INDEX: 23.22 KG/M2 | RESPIRATION RATE: 18 BRPM

## 2025-06-20 DIAGNOSIS — I48.20 CHRONIC ATRIAL FIBRILLATION (HCC): Primary | ICD-10-CM

## 2025-06-20 DIAGNOSIS — I50.43 ACUTE ON CHRONIC COMBINED SYSTOLIC AND DIASTOLIC CONGESTIVE HEART FAILURE (HCC): Primary | ICD-10-CM

## 2025-06-20 PROBLEM — I20.9: Status: ACTIVE | Noted: 2025-06-20

## 2025-06-20 LAB
ALBUMIN SERPL-MCNC: 3.7 G/DL (ref 3.5–5.2)
ALP SERPL-CCNC: 111 U/L (ref 35–104)
ALT SERPL-CCNC: 29 U/L (ref 10–35)
ANION GAP SERPL CALCULATED.3IONS-SCNC: 10 MMOL/L (ref 9–16)
ANION GAP SERPL CALCULATED.3IONS-SCNC: 10 MMOL/L (ref 9–16)
AST SERPL-CCNC: 25 U/L (ref 10–35)
BASOPHILS # BLD: 0.04 K/UL (ref 0–0.2)
BASOPHILS NFR BLD: 1 % (ref 0–2)
BILIRUB DIRECT SERPL-MCNC: 0.2 MG/DL (ref 0–0.3)
BILIRUB INDIRECT SERPL-MCNC: 0.2 MG/DL (ref 0–1)
BILIRUB SERPL-MCNC: 0.4 MG/DL (ref 0–1.2)
BNP SERPL-MCNC: 3427 PG/ML (ref 0–300)
BNP SERPL-MCNC: 4090 PG/ML (ref 0–300)
BUN SERPL-MCNC: 28 MG/DL (ref 8–23)
BUN SERPL-MCNC: 35 MG/DL (ref 8–23)
CALCIUM SERPL-MCNC: 9 MG/DL (ref 8.6–10.4)
CALCIUM SERPL-MCNC: 9.5 MG/DL (ref 8.6–10.4)
CHLORIDE SERPL-SCNC: 109 MMOL/L (ref 98–107)
CHLORIDE SERPL-SCNC: 111 MMOL/L (ref 98–107)
CO2 SERPL-SCNC: 23 MMOL/L (ref 20–31)
CO2 SERPL-SCNC: 23 MMOL/L (ref 20–31)
CREAT SERPL-MCNC: 1.2 MG/DL (ref 0.7–1.2)
CREAT SERPL-MCNC: 1.3 MG/DL (ref 0.7–1.2)
EOSINOPHIL # BLD: 0.14 K/UL (ref 0–0.44)
EOSINOPHILS RELATIVE PERCENT: 3 % (ref 0–4)
ERYTHROCYTE [DISTWIDTH] IN BLOOD BY AUTOMATED COUNT: 17.7 % (ref 11.5–14.9)
GFR, ESTIMATED: 43 ML/MIN/1.73M2
GFR, ESTIMATED: 47 ML/MIN/1.73M2
GLUCOSE SERPL-MCNC: 116 MG/DL (ref 74–99)
GLUCOSE SERPL-MCNC: 162 MG/DL (ref 74–99)
HCT VFR BLD AUTO: 30.3 % (ref 36–46)
HGB BLD-MCNC: 9.4 G/DL (ref 12–16)
IMM GRANULOCYTES # BLD AUTO: <0.03 K/UL (ref 0–0.3)
IMM GRANULOCYTES NFR BLD: 0 %
INTERNATIONAL NORMALIZATION RATIO, POC: 1.3
LYMPHOCYTES NFR BLD: 1.22 K/UL (ref 1.1–3.7)
LYMPHOCYTES RELATIVE PERCENT: 23 % (ref 24–44)
MCH RBC QN AUTO: 26.1 PG (ref 26–34)
MCHC RBC AUTO-ENTMCNC: 31 G/DL (ref 31–37)
MCV RBC AUTO: 84.2 FL (ref 80–100)
MONOCYTES NFR BLD: 0.56 K/UL (ref 0.1–1.2)
MONOCYTES NFR BLD: 11 % (ref 3–12)
NEUTROPHILS NFR BLD: 62 % (ref 36–66)
NEUTS SEG NFR BLD: 3.32 K/UL (ref 1.5–8.1)
NRBC BLD-RTO: 0 PER 100 WBC
PLATELET # BLD AUTO: 282 K/UL (ref 150–450)
PMV BLD AUTO: 10.3 FL (ref 8–13.5)
POTASSIUM SERPL-SCNC: 4 MMOL/L (ref 3.7–5.3)
POTASSIUM SERPL-SCNC: 4.5 MMOL/L (ref 3.7–5.3)
PROT SERPL-MCNC: 6.2 G/DL (ref 6.6–8.7)
PROTHROMBIN TIME, POC: 16.1
RBC # BLD AUTO: 3.6 M/UL (ref 3.95–5.11)
SODIUM SERPL-SCNC: 142 MMOL/L (ref 136–145)
SODIUM SERPL-SCNC: 144 MMOL/L (ref 136–145)
TROPONIN I SERPL HS-MCNC: 32 NG/L (ref 0–14)
TROPONIN I SERPL HS-MCNC: 33 NG/L (ref 0–14)
WBC OTHER # BLD: 5.3 K/UL (ref 3.5–11)

## 2025-06-20 PROCEDURE — 71046 X-RAY EXAM CHEST 2 VIEWS: CPT

## 2025-06-20 PROCEDURE — 99212 OFFICE O/P EST SF 10 MIN: CPT | Performed by: PHARMACY

## 2025-06-20 PROCEDURE — 80076 HEPATIC FUNCTION PANEL: CPT

## 2025-06-20 PROCEDURE — 2500000003 HC RX 250 WO HCPCS: Performed by: NURSE PRACTITIONER

## 2025-06-20 PROCEDURE — 93005 ELECTROCARDIOGRAM TRACING: CPT

## 2025-06-20 PROCEDURE — 85610 PROTHROMBIN TIME: CPT | Performed by: PHARMACY

## 2025-06-20 PROCEDURE — 83880 ASSAY OF NATRIURETIC PEPTIDE: CPT

## 2025-06-20 PROCEDURE — 36415 COLL VENOUS BLD VENIPUNCTURE: CPT

## 2025-06-20 PROCEDURE — 6360000002 HC RX W HCPCS: Performed by: NURSE PRACTITIONER

## 2025-06-20 PROCEDURE — 84484 ASSAY OF TROPONIN QUANT: CPT

## 2025-06-20 PROCEDURE — 6370000000 HC RX 637 (ALT 250 FOR IP): Performed by: NURSE PRACTITIONER

## 2025-06-20 PROCEDURE — 2060000000 HC ICU INTERMEDIATE R&B

## 2025-06-20 PROCEDURE — 80048 BASIC METABOLIC PNL TOTAL CA: CPT

## 2025-06-20 PROCEDURE — 6360000002 HC RX W HCPCS

## 2025-06-20 PROCEDURE — 85025 COMPLETE CBC W/AUTO DIFF WBC: CPT

## 2025-06-20 PROCEDURE — 99285 EMERGENCY DEPT VISIT HI MDM: CPT

## 2025-06-20 PROCEDURE — 99211 OFF/OP EST MAY X REQ PHY/QHP: CPT

## 2025-06-20 RX ORDER — SODIUM CHLORIDE 0.9 % (FLUSH) 0.9 %
10 SYRINGE (ML) INJECTION PRN
Status: DISCONTINUED | OUTPATIENT
Start: 2025-06-20 | End: 2025-06-21 | Stop reason: HOSPADM

## 2025-06-20 RX ORDER — BUMETANIDE 1 MG/1
0.5 TABLET ORAL DAILY
Status: DISCONTINUED | OUTPATIENT
Start: 2025-06-21 | End: 2025-06-21 | Stop reason: HOSPADM

## 2025-06-20 RX ORDER — POTASSIUM CHLORIDE 1500 MG/1
40 TABLET, EXTENDED RELEASE ORAL PRN
Status: DISCONTINUED | OUTPATIENT
Start: 2025-06-20 | End: 2025-06-21 | Stop reason: HOSPADM

## 2025-06-20 RX ORDER — ACETAMINOPHEN 325 MG/1
650 TABLET ORAL EVERY 6 HOURS PRN
Status: DISCONTINUED | OUTPATIENT
Start: 2025-06-20 | End: 2025-06-21 | Stop reason: HOSPADM

## 2025-06-20 RX ORDER — AMIODARONE HYDROCHLORIDE 200 MG/1
200 TABLET ORAL DAILY
Status: DISCONTINUED | OUTPATIENT
Start: 2025-06-21 | End: 2025-06-21 | Stop reason: HOSPADM

## 2025-06-20 RX ORDER — NITROGLYCERIN 0.4 MG/1
0.4 TABLET SUBLINGUAL EVERY 5 MIN PRN
Status: DISCONTINUED | OUTPATIENT
Start: 2025-06-20 | End: 2025-06-21 | Stop reason: HOSPADM

## 2025-06-20 RX ORDER — SODIUM CHLORIDE 0.9 % (FLUSH) 0.9 %
5-40 SYRINGE (ML) INJECTION EVERY 12 HOURS SCHEDULED
Status: DISCONTINUED | OUTPATIENT
Start: 2025-06-20 | End: 2025-06-21 | Stop reason: HOSPADM

## 2025-06-20 RX ORDER — POLYETHYLENE GLYCOL 3350 17 G/17G
17 POWDER, FOR SOLUTION ORAL DAILY PRN
Status: DISCONTINUED | OUTPATIENT
Start: 2025-06-20 | End: 2025-06-21 | Stop reason: HOSPADM

## 2025-06-20 RX ORDER — BISACODYL 10 MG
10 SUPPOSITORY, RECTAL RECTAL DAILY PRN
Status: DISCONTINUED | OUTPATIENT
Start: 2025-06-20 | End: 2025-06-21 | Stop reason: HOSPADM

## 2025-06-20 RX ORDER — CARVEDILOL 3.12 MG/1
3.12 TABLET ORAL 2 TIMES DAILY WITH MEALS
Status: DISCONTINUED | OUTPATIENT
Start: 2025-06-21 | End: 2025-06-20

## 2025-06-20 RX ORDER — POTASSIUM CHLORIDE 750 MG/1
20 CAPSULE, EXTENDED RELEASE ORAL DAILY
Status: DISCONTINUED | OUTPATIENT
Start: 2025-06-21 | End: 2025-06-21 | Stop reason: HOSPADM

## 2025-06-20 RX ORDER — WARFARIN SODIUM 2.5 MG/1
2.5 TABLET ORAL
Status: COMPLETED | OUTPATIENT
Start: 2025-06-21 | End: 2025-06-21

## 2025-06-20 RX ORDER — HYDRALAZINE HYDROCHLORIDE 20 MG/ML
10 INJECTION INTRAMUSCULAR; INTRAVENOUS EVERY 4 HOURS PRN
Status: DISCONTINUED | OUTPATIENT
Start: 2025-06-20 | End: 2025-06-21 | Stop reason: HOSPADM

## 2025-06-20 RX ORDER — POTASSIUM CHLORIDE 7.45 MG/ML
10 INJECTION INTRAVENOUS PRN
Status: DISCONTINUED | OUTPATIENT
Start: 2025-06-20 | End: 2025-06-21 | Stop reason: HOSPADM

## 2025-06-20 RX ORDER — MAGNESIUM SULFATE HEPTAHYDRATE 40 MG/ML
2000 INJECTION, SOLUTION INTRAVENOUS PRN
Status: DISCONTINUED | OUTPATIENT
Start: 2025-06-20 | End: 2025-06-21 | Stop reason: HOSPADM

## 2025-06-20 RX ORDER — BUMETANIDE 0.25 MG/ML
0.5 INJECTION, SOLUTION INTRAMUSCULAR; INTRAVENOUS ONCE
Status: COMPLETED | OUTPATIENT
Start: 2025-06-20 | End: 2025-06-20

## 2025-06-20 RX ORDER — CARVEDILOL 3.12 MG/1
3.12 TABLET ORAL 2 TIMES DAILY WITH MEALS
Status: DISCONTINUED | OUTPATIENT
Start: 2025-06-20 | End: 2025-06-21 | Stop reason: HOSPADM

## 2025-06-20 RX ORDER — ONDANSETRON 2 MG/ML
4 INJECTION INTRAMUSCULAR; INTRAVENOUS EVERY 6 HOURS PRN
Status: DISCONTINUED | OUTPATIENT
Start: 2025-06-20 | End: 2025-06-21 | Stop reason: HOSPADM

## 2025-06-20 RX ORDER — ONDANSETRON 4 MG/1
4 TABLET, ORALLY DISINTEGRATING ORAL EVERY 8 HOURS PRN
Status: DISCONTINUED | OUTPATIENT
Start: 2025-06-20 | End: 2025-06-21 | Stop reason: HOSPADM

## 2025-06-20 RX ORDER — BUDESONIDE AND FORMOTEROL FUMARATE DIHYDRATE 160; 4.5 UG/1; UG/1
2 AEROSOL RESPIRATORY (INHALATION)
Status: DISCONTINUED | OUTPATIENT
Start: 2025-06-20 | End: 2025-06-21 | Stop reason: HOSPADM

## 2025-06-20 RX ORDER — TRAZODONE HYDROCHLORIDE 50 MG/1
50 TABLET ORAL NIGHTLY
Status: DISCONTINUED | OUTPATIENT
Start: 2025-06-20 | End: 2025-06-21 | Stop reason: HOSPADM

## 2025-06-20 RX ORDER — ENOXAPARIN SODIUM 100 MG/ML
1 INJECTION SUBCUTANEOUS 2 TIMES DAILY
Status: DISCONTINUED | OUTPATIENT
Start: 2025-06-20 | End: 2025-06-21 | Stop reason: HOSPADM

## 2025-06-20 RX ORDER — ACETAMINOPHEN 650 MG/1
650 SUPPOSITORY RECTAL EVERY 6 HOURS PRN
Status: DISCONTINUED | OUTPATIENT
Start: 2025-06-20 | End: 2025-06-21 | Stop reason: HOSPADM

## 2025-06-20 RX ORDER — ATORVASTATIN CALCIUM 80 MG/1
80 TABLET, FILM COATED ORAL NIGHTLY
Status: DISCONTINUED | OUTPATIENT
Start: 2025-06-20 | End: 2025-06-21 | Stop reason: HOSPADM

## 2025-06-20 RX ORDER — SODIUM CHLORIDE 9 MG/ML
INJECTION, SOLUTION INTRAVENOUS PRN
Status: DISCONTINUED | OUTPATIENT
Start: 2025-06-20 | End: 2025-06-21 | Stop reason: HOSPADM

## 2025-06-20 RX ADMIN — ATORVASTATIN CALCIUM 80 MG: 80 TABLET, FILM COATED ORAL at 23:03

## 2025-06-20 RX ADMIN — BUMETANIDE 0.5 MG: 0.25 INJECTION INTRAMUSCULAR; INTRAVENOUS at 21:06

## 2025-06-20 RX ADMIN — CARVEDILOL 3.12 MG: 3.12 TABLET, FILM COATED ORAL at 23:03

## 2025-06-20 RX ADMIN — SODIUM CHLORIDE, PRESERVATIVE FREE 10 ML: 5 INJECTION INTRAVENOUS at 22:03

## 2025-06-20 RX ADMIN — ENOXAPARIN SODIUM 60 MG: 100 INJECTION SUBCUTANEOUS at 23:04

## 2025-06-20 RX ADMIN — TRAZODONE HYDROCHLORIDE 50 MG: 50 TABLET ORAL at 23:03

## 2025-06-20 ASSESSMENT — PAIN DESCRIPTION - ORIENTATION: ORIENTATION: MID

## 2025-06-20 ASSESSMENT — ENCOUNTER SYMPTOMS
NAUSEA: 0
DIARRHEA: 0
COUGH: 0
VOMITING: 0
BACK PAIN: 0
SHORTNESS OF BREATH: 0
ABDOMINAL PAIN: 0

## 2025-06-20 ASSESSMENT — HEART SCORE: ECG: NON-SPECIFC REPOLARIZATION DISTURBANCE/LBTB/PM

## 2025-06-20 ASSESSMENT — PAIN SCALES - GENERAL
PAINLEVEL_OUTOF10: 4
PAINLEVEL_OUTOF10: 8

## 2025-06-20 ASSESSMENT — PAIN - FUNCTIONAL ASSESSMENT: PAIN_FUNCTIONAL_ASSESSMENT: 0-10

## 2025-06-20 ASSESSMENT — PAIN DESCRIPTION - LOCATION: LOCATION: CHEST

## 2025-06-20 NOTE — ED PROVIDER NOTES
[facility] EMERGENCY DEPARTMENT  Emergency Department Encounter  Emergency Medicine Resident     Pt Name:Graciela Holt  MRN: [id number]  Birthdate 1950  Date of evaluation: 6/20/25  PCP:  Travis Mason MD  Note Started: 5:27 PM EDT      CHIEF COMPLAINT       Chief Complaint   Patient presents with    Chest Pain    Shortness of Breath       HISTORY OF PRESENT ILLNESS  (Location/Symptom, Timing/Onset, Context/Setting, Quality, Duration, Modifying Factors, Severity.)      Graciela Holt is a 74 y.o. female who presents with chest pain since 3 in the morning that woke her from sleep.  Patient states it was initially located in her left arm and then moved to her left chest.  She notes that she has a history of stent placement and that this pain is not similar to when the stent was placed.  She attempted to use a nitro tablet but this did not affect her pain.  She describes the pain as an ache in her left chest and does not feel like a pressure or sharp pain.  She does note history of COPD and former oxygen use when she was a smoker but since she has quit smoking she has not needed any oxygen.  She denies fever or cough.    PAST MEDICAL / SURGICAL / SOCIAL / FAMILY HISTORY      has a past medical history of Allergic rhinitis, Arthritis, CAD (coronary artery disease), Cerebral artery occlusion with cerebral infarction (HCC), CHF (congestive heart failure) ([facility]), Controlled type 2 diabetes mellitus without complication, without long-term current use of insulin (HCC), COPD (chronic obstructive pulmonary disease) (HCC), Depression, Former smoker, History of blood transfusion, Hyperlipidemia, Hypertension, Kidney stone, Myocardial infarction (HCC), Obesity, Class I, BMI 30.0-34.9 (see actual BMI), Restless leg syndrome, Skin abnormality, Type II or unspecified type diabetes mellitus without mention of complication, not stated as uncontrolled, Wears glasses, and Wears partial dentures.       has a past

## 2025-06-20 NOTE — PROGRESS NOTES
[facility]  Juan Carlos Todd MD; Lewis Castro MD; Chip Anderson MD;   Pearl Choudhary MD; Brian Min MD  [facility]   IN-PATIENT SERVICE  [facility]                 Date:   6/21/2025  Patientname:  Graciela Holt  Date of admission:  6/20/2025  5:27 PM  MRN:   [id number]  Account:  [id number]  YOB: 1950  PCP:    Travis Mason MD  Room:   2123/2123-01  Code Status:    Full Code      Chief Complaint:     Chief Complaint   Patient presents with    Chest Pain    Shortness of Breath       History of Present Illness:     Graciela Holt is a 74 y.o. Non- / non  female who presents with Chest Pain and Shortness of Breath   and is admitted to the hospital for the management of Angina pectoris without myocardial infarction.    Patient's medical history significant for chronic combined CHF, chronic A-fib, COPD, diabetes mellitus, past smoking history, HTN, hypothyroidism, hyperlipidemia, and CAD-s/p CABG x 4.      According to patient, she developed constant, nonradiating, dull, midsternal chest pain while sleeping this morning.  Rates pain 8/10.  Reports that chest pain was associated with shortness of breath and mild lightheadedness.  Denies nausea, vomiting, and diaphoresis.  Patient reports that pain improved with nitroglycerin and worsened with activity.  Patient states when her chest pain started she began stressing about a blood clot that they recently found in her heart, which made the chest pain worse.  Patient reports that chest pain is currently resolved.  She is resting comfortably in bed having an at bedtime snack.  Lungs are clear to auscultation.    Patient was hospitalized in April 2025 for NSTEMI.  An echocardiogram completed during that visit showed EF of 25% with LV clot.  Patient was started on Coumadin and was scheduled to follow-up with the Coumadin clinic and take Lovenox injections until

## 2025-06-20 NOTE — PROGRESS NOTES
[facility]      [address]  [place] [address]  Phone: [phone]  Fax: [phone]      NAME: Graciela Holt  MEDICAL RECORD NUMBER:  [id number]  AGE: 74 y.o.   GENDER: female  : 1950  EPISODE DATE:  2025      Graciela Holt was referred to the [facility] by Dr. Castro for heart failure services.She was followed by Cardiologist, Dr. SANDRITA Flynn during her hospitalization but has plans to establish care with Cardiologist, Dr. Mccoy of [facility] on 25     ECHO EF%: 20-25%  Date:6/3/25             ECHO EF%: 20-25%  Date: 25       Recent Cardiology follow-up apt: none  Follow-up apt recommended: Apt 25, Dr. Mccoy  Upcoming testing: unknown  Medication Management: yes  Nephrologist: n/a     Graciela Holt is a 74 y.o. female here for the Heart Failure Services.  She is here today for a Heart Failure assessment/consultation, monitoring medication effectiveness, reviewing necessary labs, transition of care, extensive Heart Failure education, reporting any concerns or symptoms and obtaining any necessary medication adjustments or orders from the patients health care team.    Vital Signs:   BP (!) 162/82   Pulse 65   Resp 18   Ht 1.626 m (5' 4\")   Wt 61.7 kg (136 lb)   SpO2 98%   BMI 23.34 kg/m²    O2 Device: None (Room air)        Weight loss/gain -0.3      Nursing Assessment:    Respiratory:    Assessment  Charting Type: Reassessment    Breath Sounds  Respiratory Pattern: Regular  Right Upper Lobe: Clear  Right Middle Lobe: Clear  Right Lower Lobe: Clear  Left Upper Lobe: Clear  Left Lower Lobe: Clear    Cough/Sputum  Cough: None    Cardiac  Cardiac Regularity: Regular  Heart Sounds: S1, S2  Jugular Vein Distention (JVD) Present: No  Cardiac Symptoms: Chest pain  Implanted Cardiac Device (Pacemaker, ICD, PA Sensor): No    Peripheral Vascular  Peripheral Vascular (WDL): Within Defined Limits  Edema: None      Subjective

## 2025-06-20 NOTE — ED PROVIDER NOTES
[facility] EMERGENCY DEPARTMENT  eMERGENCY dEPARTMENT eNCOUnter   Attending Attestation     Pt Name: Graciela Holt  MRN: [id number]  Birthdate 1950  Date of evaluation: 25       Graciela Holt is a 74 y.o. female who presents with Chest Pain and Shortness of Breath      History:   74-year-old female presenting to the ER complaining of chest pain and shortness of breath    Exam: Vitals:   Vitals:    25 1725   BP: (!) 153/89   Pulse: 65   Resp: 16   Temp: 98.1 °F (36.7 °C)   TempSrc: Oral   SpO2: 96%   Weight: 61.7 kg (136 lb)   Height: 1.626 m (5' 4\")     Cardiac workup in the ER did not display positive signs of acute cardiac ischemia.  EKG was reviewed and interpreted by myself and the ED physician.  Patient was admitted after speaking with the admitting team.  Patient understands and agrees with the plan    History: 1  EC  Patient Age: 1  *Risk factors for Atherosclerotic disease: Hypertension; Hypercholesterolemia; Coronary Artery Disease  Risk Factors: 2  Troponin: 1  Heart Score Total: 7        I performed a history and physical examination of the patient and discussed management with the resident. I reviewed the resident’s note and agree with the documented findings and plan of care. Any areas of disagreement are noted on the chart. I was personally present for the key portions of any procedures. I have documented in the chart those procedures where I was not present during the key portions. I have personally reviewed all images and agree with the resident's interpretation. I have reviewed the emergency nurses triage note. I agree with the chief complaint, past medical history, past surgical history, allergies, medications, social and family history as documented unless otherwise noted below. Documentation of the HPI, Physical Exam and Medical Decision Making performed by medical students or scribes is based on my personal performance of the HPI, PE and MDM. I personally evaluated

## 2025-06-20 NOTE — PROGRESS NOTES
Patient seen in person in Medication Management Service. Patient was a walk in and was here for CHF Clinic and had missed previous INR checks and HF medication management clinic appointments. Patient asked to get INR today and was not previously scheduled. Patient added to schedule to be seen.     Patient states probably noncompliant though I cannot elicit that specific history with regimen. Patient states she has been taking 2.5 mg daily since released from hospital. Patient has a fill of Eliquis at at [facility] that was filled on 6/18 but patient does not plan to start. Patient did not bring after visit summary with her, and warfarin still appears on patient medication list, including discharge papers found in Epic.     Patient not seen for INR recently due to being in the hospital, and her caregiver/ride was also in the hospital for a while, too.    Patient also needed to schedule HF Medication Management clinic appointment. Due to schedule and pharmacist availability, unable to meet today for HF medication management.     No bleeding or thromboembolic side effects noted.    No significant med or dietary changes.    No significant recent illness or disease state changes.          PT/INR done via POC meter per protocol.  INR was subtherapeutic at 1.3.  (goal 2 - 3)    Warfarin regimen will be increased to 5 mg on Saturday and Sunday and 2.5 all other days.  Will retest in 5 days.    Patient understands dosing directions and information discussed.  Dosing schedule and follow up appointment given to patient. Progress note routed to referring physicians office.  Patient acknowledges working in Consult Agreement with Pharmacist as referred by his/her physician/provider.      For Pharmacy Admin Tracking Only    Intervention Detail: Dose Adjustment: 1, reason: Therapy Optimization  Total # of Interventions Recommended: 1  Total # of Interventions Accepted: 1  Time Spent (min): 20     Gayatri Delgadillo RPH, PharmD,

## 2025-06-21 ENCOUNTER — HOSPITAL ENCOUNTER (INPATIENT)
Age: 75
LOS: 3 days | Discharge: HOME | DRG: 287 | End: 2025-06-24
Attending: EMERGENCY MEDICINE | Admitting: STUDENT IN AN ORGANIZED HEALTH CARE EDUCATION/TRAINING PROGRAM
Payer: COMMERCIAL

## 2025-06-21 ENCOUNTER — APPOINTMENT (OUTPATIENT)
Dept: GENERAL RADIOLOGY | Age: 75
DRG: 287 | End: 2025-06-21
Payer: COMMERCIAL

## 2025-06-21 VITALS
OXYGEN SATURATION: 96 % | DIASTOLIC BLOOD PRESSURE: 82 MMHG | SYSTOLIC BLOOD PRESSURE: 144 MMHG | WEIGHT: 136 LBS | RESPIRATION RATE: 16 BRPM | TEMPERATURE: 97.9 F | HEART RATE: 65 BPM | BODY MASS INDEX: 23.22 KG/M2 | HEIGHT: 64 IN

## 2025-06-21 DIAGNOSIS — I25.10 CORONARY ARTERY DISEASE INVOLVING NATIVE CORONARY ARTERY OF NATIVE HEART, UNSPECIFIED WHETHER ANGINA PRESENT: ICD-10-CM

## 2025-06-21 DIAGNOSIS — R07.9 CHEST PAIN: ICD-10-CM

## 2025-06-21 DIAGNOSIS — R07.9 CHEST PAIN, UNSPECIFIED TYPE: Primary | ICD-10-CM

## 2025-06-21 PROBLEM — I20.0 UNSTABLE ANGINA (HCC): Status: ACTIVE | Noted: 2025-06-21

## 2025-06-21 LAB
ANION GAP SERPL CALCULATED.3IONS-SCNC: 9 MMOL/L (ref 9–16)
BASOPHILS # BLD: 0.04 K/UL (ref 0–0.2)
BASOPHILS NFR BLD: 1 % (ref 0–2)
BNP SERPL-MCNC: 2777 PG/ML (ref 0–450)
BUN SERPL-MCNC: 33 MG/DL (ref 8–23)
CALCIUM SERPL-MCNC: 8.4 MG/DL (ref 8.6–10.4)
CHLORIDE SERPL-SCNC: 110 MMOL/L (ref 98–107)
CO2 SERPL-SCNC: 24 MMOL/L (ref 20–31)
CREAT SERPL-MCNC: 1.2 MG/DL (ref 0.7–1.2)
EOSINOPHIL # BLD: 0.16 K/UL (ref 0–0.44)
EOSINOPHILS RELATIVE PERCENT: 3 % (ref 0–4)
ERYTHROCYTE [DISTWIDTH] IN BLOOD BY AUTOMATED COUNT: 17.4 % (ref 11.5–14.9)
GFR, ESTIMATED: 47 ML/MIN/1.73M2
GLUCOSE SERPL-MCNC: 147 MG/DL (ref 74–99)
HCT VFR BLD AUTO: 30.6 % (ref 36–46)
HGB BLD-MCNC: 9.2 G/DL (ref 12–16)
IMM GRANULOCYTES # BLD AUTO: <0.03 K/UL (ref 0–0.3)
IMM GRANULOCYTES NFR BLD: 0 %
INR PPP: 1.5
LYMPHOCYTES NFR BLD: 1.19 K/UL (ref 1.1–3.7)
LYMPHOCYTES RELATIVE PERCENT: 25 % (ref 24–44)
MCH RBC QN AUTO: 25.3 PG (ref 26–34)
MCHC RBC AUTO-ENTMCNC: 30.1 G/DL (ref 31–37)
MCV RBC AUTO: 84.1 FL (ref 80–100)
MONOCYTES NFR BLD: 0.54 K/UL (ref 0.1–1.2)
MONOCYTES NFR BLD: 11 % (ref 3–12)
NEUTROPHILS NFR BLD: 60 % (ref 36–66)
NEUTS SEG NFR BLD: 2.8 K/UL (ref 1.5–8.1)
NRBC BLD-RTO: 0 PER 100 WBC
PLATELET # BLD AUTO: 257 K/UL (ref 150–450)
PMV BLD AUTO: 10.5 FL (ref 8–13.5)
POTASSIUM SERPL-SCNC: 3.6 MMOL/L (ref 3.7–5.3)
PROTHROMBIN TIME: 19.2 SEC (ref 11.8–14.6)
RBC # BLD AUTO: 3.64 M/UL (ref 3.95–5.11)
SODIUM SERPL-SCNC: 143 MMOL/L (ref 136–145)
TROPONIN I SERPL HS-MCNC: 36 NG/L (ref 0–14)
WBC OTHER # BLD: 4.7 K/UL (ref 3.5–11)

## 2025-06-21 PROCEDURE — 96374 THER/PROPH/DIAG INJ IV PUSH: CPT

## 2025-06-21 PROCEDURE — 94761 N-INVAS EAR/PLS OXIMETRY MLT: CPT

## 2025-06-21 PROCEDURE — 85025 COMPLETE CBC W/AUTO DIFF WBC: CPT

## 2025-06-21 PROCEDURE — 85730 THROMBOPLASTIN TIME PARTIAL: CPT

## 2025-06-21 PROCEDURE — 6360000002 HC RX W HCPCS

## 2025-06-21 PROCEDURE — 6370000000 HC RX 637 (ALT 250 FOR IP): Performed by: STUDENT IN AN ORGANIZED HEALTH CARE EDUCATION/TRAINING PROGRAM

## 2025-06-21 PROCEDURE — 36415 COLL VENOUS BLD VENIPUNCTURE: CPT

## 2025-06-21 PROCEDURE — 93005 ELECTROCARDIOGRAM TRACING: CPT | Performed by: INTERNAL MEDICINE

## 2025-06-21 PROCEDURE — 80048 BASIC METABOLIC PNL TOTAL CA: CPT

## 2025-06-21 PROCEDURE — 6370000000 HC RX 637 (ALT 250 FOR IP): Performed by: NURSE PRACTITIONER

## 2025-06-21 PROCEDURE — 2500000003 HC RX 250 WO HCPCS: Performed by: NURSE PRACTITIONER

## 2025-06-21 PROCEDURE — 2500000003 HC RX 250 WO HCPCS: Performed by: STUDENT IN AN ORGANIZED HEALTH CARE EDUCATION/TRAINING PROGRAM

## 2025-06-21 PROCEDURE — 84484 ASSAY OF TROPONIN QUANT: CPT

## 2025-06-21 PROCEDURE — 71045 X-RAY EXAM CHEST 1 VIEW: CPT

## 2025-06-21 PROCEDURE — 2060000002 HC BURN ICU INTERMEDIATE R&B

## 2025-06-21 PROCEDURE — 6360000002 HC RX W HCPCS: Performed by: NURSE PRACTITIONER

## 2025-06-21 PROCEDURE — 85520 HEPARIN ASSAY: CPT

## 2025-06-21 PROCEDURE — 85610 PROTHROMBIN TIME: CPT

## 2025-06-21 PROCEDURE — 94640 AIRWAY INHALATION TREATMENT: CPT

## 2025-06-21 PROCEDURE — 85027 COMPLETE CBC AUTOMATED: CPT

## 2025-06-21 PROCEDURE — 99285 EMERGENCY DEPT VISIT HI MDM: CPT

## 2025-06-21 PROCEDURE — 99223 1ST HOSP IP/OBS HIGH 75: CPT | Performed by: INTERNAL MEDICINE

## 2025-06-21 PROCEDURE — 83880 ASSAY OF NATRIURETIC PEPTIDE: CPT

## 2025-06-21 RX ORDER — ONDANSETRON 4 MG/1
4 TABLET, ORALLY DISINTEGRATING ORAL EVERY 8 HOURS PRN
Status: DISCONTINUED | OUTPATIENT
Start: 2025-06-21 | End: 2025-06-24 | Stop reason: HOSPADM

## 2025-06-21 RX ORDER — ACETAMINOPHEN 650 MG/1
650 SUPPOSITORY RECTAL EVERY 6 HOURS PRN
Status: DISCONTINUED | OUTPATIENT
Start: 2025-06-21 | End: 2025-06-24 | Stop reason: HOSPADM

## 2025-06-21 RX ORDER — SODIUM CHLORIDE 0.9 % (FLUSH) 0.9 %
5-40 SYRINGE (ML) INJECTION PRN
Status: DISCONTINUED | OUTPATIENT
Start: 2025-06-21 | End: 2025-06-24 | Stop reason: HOSPADM

## 2025-06-21 RX ORDER — HEPARIN SODIUM 1000 [USP'U]/ML
30 INJECTION, SOLUTION INTRAVENOUS; SUBCUTANEOUS PRN
Status: DISCONTINUED | OUTPATIENT
Start: 2025-06-21 | End: 2025-06-23

## 2025-06-21 RX ORDER — TRAZODONE HYDROCHLORIDE 50 MG/1
50 TABLET ORAL NIGHTLY
Status: DISCONTINUED | OUTPATIENT
Start: 2025-06-21 | End: 2025-06-24 | Stop reason: HOSPADM

## 2025-06-21 RX ORDER — FERROUS SULFATE 325(65) MG
325 TABLET, DELAYED RELEASE (ENTERIC COATED) ORAL
Status: DISCONTINUED | OUTPATIENT
Start: 2025-06-22 | End: 2025-06-24 | Stop reason: HOSPADM

## 2025-06-21 RX ORDER — SODIUM CHLORIDE 0.9 % (FLUSH) 0.9 %
5-40 SYRINGE (ML) INJECTION EVERY 12 HOURS SCHEDULED
Status: DISCONTINUED | OUTPATIENT
Start: 2025-06-21 | End: 2025-06-24 | Stop reason: HOSPADM

## 2025-06-21 RX ORDER — CARVEDILOL 6.25 MG/1
3.12 TABLET ORAL 2 TIMES DAILY WITH MEALS
Status: DISCONTINUED | OUTPATIENT
Start: 2025-06-22 | End: 2025-06-24 | Stop reason: HOSPADM

## 2025-06-21 RX ORDER — POLYETHYLENE GLYCOL 3350 17 G/17G
17 POWDER, FOR SOLUTION ORAL DAILY PRN
Status: DISCONTINUED | OUTPATIENT
Start: 2025-06-21 | End: 2025-06-24 | Stop reason: HOSPADM

## 2025-06-21 RX ORDER — HEPARIN SODIUM 1000 [USP'U]/ML
60 INJECTION, SOLUTION INTRAVENOUS; SUBCUTANEOUS ONCE
Status: DISCONTINUED | OUTPATIENT
Start: 2025-06-21 | End: 2025-06-21

## 2025-06-21 RX ORDER — HEPARIN SODIUM 1000 [USP'U]/ML
60 INJECTION, SOLUTION INTRAVENOUS; SUBCUTANEOUS ONCE
Status: COMPLETED | OUTPATIENT
Start: 2025-06-21 | End: 2025-06-22

## 2025-06-21 RX ORDER — AMIODARONE HYDROCHLORIDE 200 MG/1
200 TABLET ORAL DAILY
Status: DISCONTINUED | OUTPATIENT
Start: 2025-06-22 | End: 2025-06-24 | Stop reason: HOSPADM

## 2025-06-21 RX ORDER — ALBUTEROL SULFATE 90 UG/1
2 INHALANT RESPIRATORY (INHALATION) EVERY 6 HOURS PRN
Status: DISCONTINUED | OUTPATIENT
Start: 2025-06-21 | End: 2025-06-24 | Stop reason: HOSPADM

## 2025-06-21 RX ORDER — ATORVASTATIN CALCIUM 80 MG/1
80 TABLET, FILM COATED ORAL NIGHTLY
Status: DISCONTINUED | OUTPATIENT
Start: 2025-06-21 | End: 2025-06-24 | Stop reason: HOSPADM

## 2025-06-21 RX ORDER — HEPARIN SODIUM 1000 [USP'U]/ML
60 INJECTION, SOLUTION INTRAVENOUS; SUBCUTANEOUS PRN
Status: DISCONTINUED | OUTPATIENT
Start: 2025-06-21 | End: 2025-06-23

## 2025-06-21 RX ORDER — ERGOCALCIFEROL 1.25 MG/1
50000 CAPSULE, LIQUID FILLED ORAL WEEKLY
Status: DISCONTINUED | OUTPATIENT
Start: 2025-06-23 | End: 2025-06-24 | Stop reason: HOSPADM

## 2025-06-21 RX ORDER — SODIUM CHLORIDE 9 MG/ML
INJECTION, SOLUTION INTRAVENOUS PRN
Status: DISCONTINUED | OUTPATIENT
Start: 2025-06-21 | End: 2025-06-24 | Stop reason: HOSPADM

## 2025-06-21 RX ORDER — ENOXAPARIN SODIUM 100 MG/ML
1 INJECTION SUBCUTANEOUS 2 TIMES DAILY
Qty: 3.6 ML | Refills: 0 | Status: ON HOLD | OUTPATIENT
Start: 2025-06-21 | End: 2025-06-24 | Stop reason: HOSPADM

## 2025-06-21 RX ORDER — MAGNESIUM SULFATE IN WATER 40 MG/ML
2000 INJECTION, SOLUTION INTRAVENOUS PRN
Status: DISCONTINUED | OUTPATIENT
Start: 2025-06-21 | End: 2025-06-23

## 2025-06-21 RX ORDER — ONDANSETRON 2 MG/ML
4 INJECTION INTRAMUSCULAR; INTRAVENOUS EVERY 6 HOURS PRN
Status: DISCONTINUED | OUTPATIENT
Start: 2025-06-21 | End: 2025-06-24 | Stop reason: HOSPADM

## 2025-06-21 RX ORDER — WARFARIN SODIUM 5 MG/1
5 TABLET ORAL
Status: DISCONTINUED | OUTPATIENT
Start: 2025-06-22 | End: 2025-06-21 | Stop reason: HOSPADM

## 2025-06-21 RX ORDER — ACETAMINOPHEN 325 MG/1
650 TABLET ORAL EVERY 6 HOURS PRN
Status: DISCONTINUED | OUTPATIENT
Start: 2025-06-21 | End: 2025-06-24 | Stop reason: HOSPADM

## 2025-06-21 RX ORDER — HEPARIN SODIUM 1000 [USP'U]/ML
60 INJECTION, SOLUTION INTRAVENOUS; SUBCUTANEOUS PRN
Status: DISCONTINUED | OUTPATIENT
Start: 2025-06-21 | End: 2025-06-21

## 2025-06-21 RX ORDER — ASPIRIN 81 MG/1
81 TABLET, CHEWABLE ORAL DAILY
Status: DISCONTINUED | OUTPATIENT
Start: 2025-06-22 | End: 2025-06-24 | Stop reason: HOSPADM

## 2025-06-21 RX ORDER — NITROGLYCERIN 0.4 MG/1
0.4 TABLET SUBLINGUAL PRN
Status: DISCONTINUED | OUTPATIENT
Start: 2025-06-21 | End: 2025-06-24 | Stop reason: HOSPADM

## 2025-06-21 RX ORDER — KETOROLAC TROMETHAMINE 15 MG/ML
15 INJECTION, SOLUTION INTRAMUSCULAR; INTRAVENOUS ONCE
Status: COMPLETED | OUTPATIENT
Start: 2025-06-21 | End: 2025-06-21

## 2025-06-21 RX ORDER — HEPARIN SODIUM 10000 [USP'U]/100ML
5-30 INJECTION, SOLUTION INTRAVENOUS CONTINUOUS
Status: DISCONTINUED | OUTPATIENT
Start: 2025-06-21 | End: 2025-06-21

## 2025-06-21 RX ORDER — HEPARIN SODIUM 10000 [USP'U]/100ML
5-30 INJECTION, SOLUTION INTRAVENOUS CONTINUOUS
Status: DISCONTINUED | OUTPATIENT
Start: 2025-06-21 | End: 2025-06-23

## 2025-06-21 RX ORDER — POTASSIUM CHLORIDE 1500 MG/1
40 TABLET, EXTENDED RELEASE ORAL PRN
Status: DISCONTINUED | OUTPATIENT
Start: 2025-06-21 | End: 2025-06-23

## 2025-06-21 RX ORDER — HEPARIN SODIUM 1000 [USP'U]/ML
30 INJECTION, SOLUTION INTRAVENOUS; SUBCUTANEOUS PRN
Status: DISCONTINUED | OUTPATIENT
Start: 2025-06-21 | End: 2025-06-21

## 2025-06-21 RX ORDER — FAMOTIDINE 20 MG/1
40 TABLET, FILM COATED ORAL EVERY EVENING
Status: DISCONTINUED | OUTPATIENT
Start: 2025-06-21 | End: 2025-06-23

## 2025-06-21 RX ORDER — POTASSIUM CHLORIDE 7.45 MG/ML
10 INJECTION INTRAVENOUS PRN
Status: DISCONTINUED | OUTPATIENT
Start: 2025-06-21 | End: 2025-06-23

## 2025-06-21 RX ORDER — BUMETANIDE 0.25 MG/ML
0.5 INJECTION, SOLUTION INTRAMUSCULAR; INTRAVENOUS DAILY
Status: DISCONTINUED | OUTPATIENT
Start: 2025-06-21 | End: 2025-06-22

## 2025-06-21 RX ORDER — BUDESONIDE AND FORMOTEROL FUMARATE DIHYDRATE 160; 4.5 UG/1; UG/1
2 AEROSOL RESPIRATORY (INHALATION)
Status: DISCONTINUED | OUTPATIENT
Start: 2025-06-21 | End: 2025-06-24 | Stop reason: HOSPADM

## 2025-06-21 RX ADMIN — SODIUM CHLORIDE, PRESERVATIVE FREE 10 ML: 5 INJECTION INTRAVENOUS at 10:47

## 2025-06-21 RX ADMIN — BUDESONIDE AND FORMOTEROL FUMARATE DIHYDRATE 2 PUFF: 160; 4.5 AEROSOL RESPIRATORY (INHALATION) at 06:49

## 2025-06-21 RX ADMIN — POTASSIUM CHLORIDE 20 MEQ: 750 CAPSULE, EXTENDED RELEASE ORAL at 10:45

## 2025-06-21 RX ADMIN — ATORVASTATIN CALCIUM 80 MG: 80 TABLET, FILM COATED ORAL at 23:58

## 2025-06-21 RX ADMIN — CARVEDILOL 3.12 MG: 3.12 TABLET, FILM COATED ORAL at 10:45

## 2025-06-21 RX ADMIN — WARFARIN SODIUM 2.5 MG: 2.5 TABLET ORAL at 00:26

## 2025-06-21 RX ADMIN — FAMOTIDINE 40 MG: 20 TABLET, FILM COATED ORAL at 23:58

## 2025-06-21 RX ADMIN — ENOXAPARIN SODIUM 60 MG: 100 INJECTION SUBCUTANEOUS at 10:45

## 2025-06-21 RX ADMIN — KETOROLAC TROMETHAMINE 15 MG: 15 INJECTION, SOLUTION INTRAMUSCULAR; INTRAVENOUS at 19:28

## 2025-06-21 RX ADMIN — BUMETANIDE 0.5 MG: 1 TABLET ORAL at 10:45

## 2025-06-21 RX ADMIN — TRAZODONE HYDROCHLORIDE 50 MG: 50 TABLET ORAL at 23:59

## 2025-06-21 RX ADMIN — SODIUM CHLORIDE, PRESERVATIVE FREE 10 ML: 5 INJECTION INTRAVENOUS at 22:49

## 2025-06-21 RX ADMIN — AMIODARONE HYDROCHLORIDE 200 MG: 200 TABLET ORAL at 10:45

## 2025-06-21 ASSESSMENT — PAIN DESCRIPTION - ONSET: ONSET: ON-GOING

## 2025-06-21 ASSESSMENT — PAIN DESCRIPTION - ORIENTATION
ORIENTATION: LEFT
ORIENTATION: LEFT;ANTERIOR

## 2025-06-21 ASSESSMENT — PAIN SCALES - GENERAL
PAINLEVEL_OUTOF10: 7
PAINLEVEL_OUTOF10: 5
PAINLEVEL_OUTOF10: 4

## 2025-06-21 ASSESSMENT — PAIN DESCRIPTION - FREQUENCY: FREQUENCY: CONTINUOUS

## 2025-06-21 ASSESSMENT — PAIN DESCRIPTION - LOCATION
LOCATION: CHEST
LOCATION: CHEST

## 2025-06-21 ASSESSMENT — PAIN DESCRIPTION - DIRECTION: RADIATING_TOWARDS: NO

## 2025-06-21 ASSESSMENT — PAIN DESCRIPTION - PAIN TYPE: TYPE: ACUTE PAIN

## 2025-06-21 ASSESSMENT — PAIN DESCRIPTION - DESCRIPTORS: DESCRIPTORS: ACHING

## 2025-06-21 NOTE — ED TRIAGE NOTES
Pt presents to ED room 22 via EMS. Pt was seen and admitted to [facility] on Friday and was discharged this afternoon. Pt states she returned home and her family called EMS due to persistent chest pain. Pt has hx of 2 prior open heart surgeries. Monitor attached. EKG and IV established. Will continue with plan of care.

## 2025-06-21 NOTE — PROGRESS NOTES
Patient discharged with all belongings, A&O x's, VS WNL, pw reviewed, down to cab via WC per [facility]A Toyin.   (4) completely dependent

## 2025-06-21 NOTE — PROGRESS NOTES
Pharmacy Note  Warfarin Consult follow-up      Recent Labs     06/20/25  0900 06/21/25  0437   INR 1.3 1.5     Recent Labs     06/20/25  1747 06/21/25  0437   HGB 9.4* 9.2*   HCT 30.3* 30.6*    257       Current warfarin drug-drug interactions: lovenox 1 mg/kg SUBQ BID, amiodarone, atorvastatin, APAP      Date             INR        Dose given previous day  Dose scheduled for morning of 06/22 6/21/2025            1.5       2.5 mg           5 mg        Notes: INR subtherapeutic at 1.5 (goal 2-3). Will schedule dose of coumadin for 5 mg tomorrow morning (patient received coumadin at midnight on 06/21). Patient also bridging with lovenox 1 mg/kg SUBQ BID until INR > 2.                    Daily PT/INR while inpatient.     Rosemary Hu, PharmD, [facility]

## 2025-06-21 NOTE — ED PROVIDER NOTES
Note Started: 7:48 PM EDT         [facility]     Emergency Department     Faculty Attestation    I performed a history and physical examination of the patient and discussed management with the resident. I reviewed the resident’s note and agree with the documented findings and plan of care. Any areas of disagreement are noted on the chart. I was personally present for the key portions of any procedures. I have documented in the chart those procedures where I was not present during the key portions. I have reviewed the emergency nurses triage note. I agree with the chief complaint, past medical history, past surgical history, allergies, medications, social and family history as documented unless otherwise noted below.        For Physician Assistant/ Nurse Practitioner cases/documentation I have personally evaluated this patient and have completed at least one if not all key elements of the E/M (history, physical exam, and MDM). Additional findings are as noted.  I have personally seen and evaluated the patient.  I find the patient's history and physical exam are consistent with the NP/PA documentation.  I agree with the care provided, treatment rendered, disposition and follow-up plan.    74-year-old female recently discharged from [facility] presenting with chest pain.  Started having pain upon arrival to home.  Has chest pain frequently, sometimes improves with nitroglycerin but has been worse recently.  Takes 2 to 3 hours of sitting at rest for her chest pain to improve.  No increase in orthopnea    Exam:  General : Laying on the bed, awake, alert, and in no acute distress  CV : normal rate and regular rhythm  Lungs : Breathing comfortably on room air with no tachypnea, hypoxia, or increased work of breathing    DDx: Angina    Plan:  Cardiology consult  Pain control    Medical Decision Making  Amount and/or Complexity of Data Reviewed  Labs: ordered.  ECG/medicine tests:

## 2025-06-21 NOTE — PLAN OF CARE
Problem: Chronic Conditions and Co-morbidities  Goal: Patient's chronic conditions and co-morbidity symptoms are monitored and maintained or improved  6/21/2025 1553 by Era Prado RN  Outcome: Completed  6/21/2025 1552 by Era Prado RN  Outcome: Progressing  6/21/2025 0207 by Livier Santana RN  Outcome: Progressing  Flowsheets (Taken 6/20/2025 2124)  Care Plan - Patient's Chronic Conditions and Co-Morbidity Symptoms are Monitored and Maintained or Improved:   Monitor and assess patient's chronic conditions and comorbid symptoms for stability, deterioration, or improvement   Collaborate with multidisciplinary team to address chronic and comorbid conditions and prevent exacerbation or deterioration   Update acute care plan with appropriate goals if chronic or comorbid symptoms are exacerbated and prevent overall improvement and discharge     Problem: Discharge Planning  Goal: Discharge to home or other facility with appropriate resources  6/21/2025 1553 by Era Prado RN  Outcome: Completed  6/21/2025 1552 by Era Prado RN  Outcome: Progressing  6/21/2025 0207 by Livier Santana RN  Outcome: Progressing  Flowsheets  Taken 6/20/2025 2155  Discharge to home or other facility with appropriate resources:   Identify barriers to discharge with patient and caregiver   Identify discharge learning needs (meds, wound care, etc)   Arrange for needed discharge resources and transportation as appropriate   Arrange for interpreters to assist at discharge as needed   Refer to discharge planning if patient needs post-hospital services based on physician order or complex needs related to functional status, cognitive ability or social support system  Taken 6/20/2025 2124  Discharge to home or other facility with appropriate resources:   Identify barriers to discharge with patient and caregiver   Arrange for needed discharge resources and transportation as appropriate   Identify discharge learning needs (meds, wound  care, etc)   Arrange for interpreters to assist at discharge as needed   Refer to discharge planning if patient needs post-hospital services based on physician order or complex needs related to functional status, cognitive ability or social support system     Problem: Pain  Goal: Verbalizes/displays adequate comfort level or baseline comfort level  6/21/2025 1553 by Era Prado RN  Outcome: Completed  6/21/2025 1552 by Era Prado RN  Outcome: Progressing  6/21/2025 0207 by Livier Santana RN  Outcome: Progressing  Flowsheets (Taken 6/20/2025 2115)  Verbalizes/displays adequate comfort level or baseline comfort level:   Encourage patient to monitor pain and request assistance   Administer analgesics based on type and severity of pain and evaluate response   Assess pain using appropriate pain scale   Consider cultural and social influences on pain and pain management   Notify Licensed Independent Practitioner if interventions unsuccessful or patient reports new pain   Implement non-pharmacological measures as appropriate and evaluate response     Problem: Safety - Adult  Goal: Free from fall injury  6/21/2025 1553 by Era Prado RN  Outcome: Completed  6/21/2025 1552 by Era Prado RN  Outcome: Progressing  6/21/2025 0207 by Livier Santana RN  Outcome: Progressing  Flowsheets (Taken 6/20/2025 2124)  Free From Fall Injury:   Instruct family/caregiver on patient safety   Based on caregiver fall risk screen, instruct family/caregiver to ask for assistance with transferring infant if caregiver noted to have fall risk factors     Problem: ABCDS Injury Assessment  Goal: Absence of physical injury  6/21/2025 1553 by Era Prado RN  Outcome: Completed  6/21/2025 1552 by Era Prado RN  Outcome: Progressing  6/21/2025 0207 by Livier Santana RN  Outcome: Progressing  Flowsheets (Taken 6/20/2025 2124)  Absence of Physical Injury: Implement safety measures based on patient assessment

## 2025-06-21 NOTE — PLAN OF CARE
Problem: Chronic Conditions and Co-morbidities  Goal: Patient's chronic conditions and co-morbidity symptoms are monitored and maintained or improved  Outcome: Progressing  Flowsheets (Taken 6/20/2025 2124)  Care Plan - Patient's Chronic Conditions and Co-Morbidity Symptoms are Monitored and Maintained or Improved:   Monitor and assess patient's chronic conditions and comorbid symptoms for stability, deterioration, or improvement   Collaborate with multidisciplinary team to address chronic and comorbid conditions and prevent exacerbation or deterioration   Update acute care plan with appropriate goals if chronic or comorbid symptoms are exacerbated and prevent overall improvement and discharge     Problem: Discharge Planning  Goal: Discharge to home or other facility with appropriate resources  Outcome: Progressing  Flowsheets  Taken 6/20/2025 2155  Discharge to home or other facility with appropriate resources:   Identify barriers to discharge with patient and caregiver   Identify discharge learning needs (meds, wound care, etc)   Arrange for needed discharge resources and transportation as appropriate   Arrange for interpreters to assist at discharge as needed   Refer to discharge planning if patient needs post-hospital services based on physician order or complex needs related to functional status, cognitive ability or social support system  Taken 6/20/2025 2124  Discharge to home or other facility with appropriate resources:   Identify barriers to discharge with patient and caregiver   Arrange for needed discharge resources and transportation as appropriate   Identify discharge learning needs (meds, wound care, etc)   Arrange for interpreters to assist at discharge as needed   Refer to discharge planning if patient needs post-hospital services based on physician order or complex needs related to functional status, cognitive ability or social support system     Problem: Pain  Goal: Verbalizes/displays adequate  comfort level or baseline comfort level  Outcome: Progressing  Flowsheets (Taken 6/20/2025 2115)  Verbalizes/displays adequate comfort level or baseline comfort level:   Encourage patient to monitor pain and request assistance   Administer analgesics based on type and severity of pain and evaluate response   Assess pain using appropriate pain scale   Consider cultural and social influences on pain and pain management   Notify Licensed Independent Practitioner if interventions unsuccessful or patient reports new pain   Implement non-pharmacological measures as appropriate and evaluate response     Problem: Safety - Adult  Goal: Free from fall injury  Outcome: Progressing  Flowsheets (Taken 6/20/2025 2124)  Free From Fall Injury:   Instruct family/caregiver on patient safety   Based on caregiver fall risk screen, instruct family/caregiver to ask for assistance with transferring infant if caregiver noted to have fall risk factors     Problem: ABCDS Injury Assessment  Goal: Absence of physical injury  Outcome: Progressing  Flowsheets (Taken 6/20/2025 2124)  Absence of Physical Injury: Implement safety measures based on patient assessment

## 2025-06-21 NOTE — ED NOTES
Report given to LIVE Maier from [facility].   Report method by phone   The following was reviewed with receiving RN:   Current vital signs:  BP (!) 153/89   Pulse 65   Temp 98.1 °F (36.7 °C) (Oral)   Resp 16   Ht 1.626 m (5' 4\")   Wt 61.7 kg (136 lb)   SpO2 96%   BMI 23.34 kg/m²                MEWS Score: 1     Any medication or safety alerts were reviewed. Any pending diagnostics and notifications were also reviewed, as well as any safety concerns or issues, abnormal labs, abnormal imaging, and abnormal assessment findings. Questions were answered.

## 2025-06-21 NOTE — CONSULTS
Date:   6/21/2025  Patient name: Graciela Holt  Date of admission:  6/20/2025  5:27 PM  MRN:   [id number]  YOB: 1950  PCP: Travis Mason MD    Reason for Admission: Acute on chronic combined systolic and diastolic congestive heart failure (HCC) [I50.43]  Angina pectoris without myocardial infarction [I20.9]    Cardiology consult: Chest pain, shortness of breath, history of CAD CABG and ventricular arrhythmias CHF with reduced ejection fraction       Referring physician: Dr Pearl Choudhary    Impression  Admission 5/20/2025 severe chest pain, shortness of breath high-sensitivity troponin 32, proBNP 3427 chest x-ray showed stable cardiomegaly, post CABG changes no significant pleural effusion      Multivessel CAD   CABG LIMA to LAD and diagonal 1, SVG to OM, SVG to PDA February 2018 at [facility]  Severely reduced LV systolic function ejection fraction 25 to 30%, akinetic LV apex, apical thrombus 2D echo 4/8/2024  Moderately increased LV wall thickness, enlarged LV 6.6 cm  Left parasternal lift  Episodes of nonsustained V. Tach, patient also had A-fib with RVR  Diabetes mellitus  Hyperlipidemia lipid profile 11/6/2024 cholesterol 159, HDL 55, LDL 95, triglyceride 41  History of left MCA stroke   CT head: 4/4/2020 no acute intracranial abnormality.  Atrophy and senescent changes   including findings compatible with multiple old cortical infarcts.  No   intracranial hemorrhage.  Impaired memory  Carotid Doppler 3/5/2021 left 50 to 69% internal carotid artery, right less than 50%, antegrade vertebral artery flow both side  Bilateral femoral artery bruit  Dilated ascending aorta  Moderate calcified plaque in the abdominal aorta and branches vessel without occlusion  Status post cholecystectomy  C-spine surgery  Sigmoid diverticulosis       Admission 5/7/2025 increasing swelling over the legs and shortness of breath, high-sensitivity troponin 44 and proBNP 4031  Admission 4/15/2025 with a GI bleed,

## 2025-06-21 NOTE — CARE COORDINATION
Case Management Assessment  Initial Evaluation    Date/Time of Evaluation: 6/21/2025 2:05 PM  Assessment Completed by: Cheryl Randle    If patient is discharged prior to next notation, then this note serves as note for discharge by case management.    Patient Name: Graciela Holt                   YOB: 1950  Diagnosis: Acute on chronic combined systolic and diastolic congestive heart failure (HCC) [I50.43]  Angina pectoris without myocardial infarction [I20.9]                   Date / Time: 6/20/2025  5:27 PM    Patient Admission Status: Inpatient   Readmission Risk (Low < 19, Mod (19-27), High > 27): Readmission Risk Score: 42.6    Current PCP: Travis Mason MD  PCP verified by CM? Yes     Chart Reviewed: Yes      History Provided by: Patient  Patient Orientation: Alert and Oriented    Patient Cognition: Alert     Hospitalization in the last 30 days (Readmission):  Yes    If yes, Readmission Assessment in CM Navigator will be completed.     Advance Directives:       Code Status: Full Code   Patient's Primary Decision Maker is: Named in Scanned ACP Document    Primary Decision Maker: Guanakito Zheng - Child - [phone]     Discharge Planning:     Patient lives with: Family Members Type of Home: House  Primary Care Giver: Self  Patient Support Systems include: Family Members, Friends/Neighbors   Current Financial resources: Medicare  Current community resources: (S) Other (Comment) (CHF clinic, [facility] coumadin clinic)  Current services prior to admission: Durable Medical Equipment            Current DME: Walker, Shower Chair, Oxygen Therapy (Comment)            Type of Home Care services:  None     ADLS  Prior functional level: Independent in ADLs/IADLs, Assistance with the following:, Shopping, Housework  Current functional level: Independent in ADLs/IADLs, Assistance with the following:, Housework, Shopping     PT AM-PAC:   /24  OT AM-PAC:   /24     Family can provide assistance at DC:
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
## 2025-06-21 NOTE — H&P
[facility]   IN-PATIENT SERVICE   [facility]    HISTORY AND PHYSICAL EXAMINATION            Date:   6/21/2025  Patient name:  Graciela Holt  Date of admission:  6/20/2025  5:27 PM  MRN:   [id number]  Account:  [id number]  YOB: 1950  PCP:    Travis Mason MD  Room:   [address] [place] [address]  Code Status:    Full Code    Chief Complaint:     Chief Complaint   Patient presents with    Chest Pain    Shortness of Breath       History Obtained From:     patient    History of Present Illness:     The patient is a 74 y.o.  Non- / non  female who presents with Chest Pain and Shortness of Breath   and she is admitted to the hospital for the management of      Graciela Holt is a 74 y.o. Non- / non  female who presents with Chest Pain and Shortness of Breath   and is admitted to the hospital for the management of Angina pectoris without myocardial infarction.     Patient's medical history significant for chronic combined CHF, chronic A-fib, COPD, diabetes mellitus, past smoking history, HTN, hypothyroidism, hyperlipidemia, and CAD-s/p CABG x 4.       According to patient, she developed constant, nonradiating, dull, midsternal chest pain while sleeping this morning.  Rates pain 8/10.  Reports that chest pain was associated with shortness of breath and mild lightheadedness.  Denies nausea, vomiting, and diaphoresis.  Patient reports that pain improved with nitroglycerin and worsened with activity.  Patient states when her chest pain started she began stressing about a blood clot that they recently found in her heart, which made the chest pain worse.  Patient reports that chest pain is currently resolved.  She is resting comfortably in bed having an at bedtime snack.  Lungs are clear to auscultation.     Patient was hospitalized in April 2025 for NSTEMI.  An echocardiogram completed during that visit showed EF of 25% with LV clot.  Patient was started on Coumadin and  k/uL    Eosinophils Absolute 0.16 0.00 - 0.44 k/uL    Basophils Absolute 0.04 0.00 - 0.20 k/uL    Immature Granulocytes Absolute <0.03 0.00 - 0.30 k/uL   Protime-INR    Collection Time: 06/21/25  4:37 AM   Result Value Ref Range    Protime 19.2 (H) 11.8 - 14.6 sec    INR 1.5        Imaging/Diagnostics:        Assessment :      Primary Problem  Angina pectoris without myocardial infarction    Active Hospital Problems    Diagnosis Date Noted    Angina pectoris without myocardial infarction [I20.9] 06/20/2025       Plan:     Patient status Admit as inpatient in the  Progressive Unit/Step down    Angina, troponin has been plateaued, patient has history of CAD status post CABG, he has been having recurrent chest pain, last echocardiogram done earlier this month showed ejection fraction of 20 to 25% global hypokinesis, and apical thrombus, patient was offered stress test last admission but refused, consult cardiology,  Acute systolic heart failure, last echocardiogram 20 to 25%, proBNP is elevated, patient received 1 dose of IV Bumex, currently no sign of volume overload present, patient on room air comfortable denies any shortness of breath, continue the current dose of Bumex next, patient has also refused AICD  CKD stage III creatinine has been stable  History of A-fib with LV thrombus, on: Amiodarone and Coumadin INR is subtherapeutic, patient has had multiple admissions and comes on with subtherapeutic INR, noncompliant with medications, patient states that she ran out of Coumadin and her prescription got delayed,, on full dose of Lovenox,  COPD, currently compensated,  Anemia of chronic disease hemoglobin has bee  DVT prophylaxis Coumadin, multiple admissions with similar complaints, refuses stress test  refusing AICD, noncompliant with Coumadin,  Will discharge once cleared by cardiology, overall prognosis poor  Consultations:   PHARMACY TO DOSE WARFARIN  PHARMACY TO DOSE WARFARIN  IP CONSULT TO CARDIOLOGY

## 2025-06-21 NOTE — PLAN OF CARE
Problem: Chronic Conditions and Co-morbidities  Goal: Patient's chronic conditions and co-morbidity symptoms are monitored and maintained or improved  6/21/2025 1552 by Era Prado RN  Outcome: Progressing     Problem: Chronic Conditions and Co-morbidities  Goal: Patient's chronic conditions and co-morbidity symptoms are monitored and maintained or improved  6/21/2025 1552 by Era Prado RN  Outcome: Progressing  6/21/2025 0207 by Livier Santana RN  Outcome: Progressing  Flowsheets (Taken 6/20/2025 2124)  Care Plan - Patient's Chronic Conditions and Co-Morbidity Symptoms are Monitored and Maintained or Improved:   Monitor and assess patient's chronic conditions and comorbid symptoms for stability, deterioration, or improvement   Collaborate with multidisciplinary team to address chronic and comorbid conditions and prevent exacerbation or deterioration   Update acute care plan with appropriate goals if chronic or comorbid symptoms are exacerbated and prevent overall improvement and discharge     Problem: Discharge Planning  Goal: Discharge to home or other facility with appropriate resources  6/21/2025 1552 by Era Prado RN  Outcome: Progressing  6/21/2025 0207 by Livier Santana RN  Outcome: Progressing  Flowsheets  Taken 6/20/2025 2155  Discharge to home or other facility with appropriate resources:   Identify barriers to discharge with patient and caregiver   Identify discharge learning needs (meds, wound care, etc)   Arrange for needed discharge resources and transportation as appropriate   Arrange for interpreters to assist at discharge as needed   Refer to discharge planning if patient needs post-hospital services based on physician order or complex needs related to functional status, cognitive ability or social support system  Taken 6/20/2025 2124  Discharge to home or other facility with appropriate resources:   Identify barriers to discharge with patient and caregiver   Arrange for needed

## 2025-06-21 NOTE — ED NOTES
Report received from [facility] Basurto  Patient resting on stretcher, NAD  RR even and non-labored  Bed locked and in lowest position  Call light within reach  Whiteboard updated  No needs expressed at this time

## 2025-06-21 NOTE — PROGRESS NOTES
Discussed with Dr. Flynn, patient cleared for discharge,  Patient to go on Lovenox and strongly recommended Coumadin compliance to follow-up with PCP cardiology and Coumadin clinic as

## 2025-06-21 NOTE — ACP (ADVANCE CARE PLANNING)
Advance Care Planning     Advance Care Planning Activator (Inpatient)  Conversation Note      Date of ACP Conversation: 6/21/2025     Conversation Conducted with: Patient with Decision Making Capacity    ACP Activator: Cheryl Randle        Health Care Decision Maker:     Current Designated Health Care Decision Maker:     Primary Decision Maker: NormGuanakito wray - Child - [phone]  Click here to complete Healthcare Decision Makers including section of the Healthcare Decision Maker Relationship (ie \"Primary\")      Care Preferences    Ventilation:  \"If you were in your present state of health and suddenly became very ill and were unable to breathe on your own, what would your preference be about the use of a ventilator (breathing machine) if it were available to you?\"      Would the patient desire the use of ventilator (breathing machine)?: yes    \"If your health worsens and it becomes clear that your chance of recovery is unlikely, what would your preference be about the use of a ventilator (breathing machine) if it were available to you?\"     Would the patient desire the use of ventilator (breathing machine)?: Yes      Resuscitation  \"CPR works best to restart the heart when there is a sudden event, like a heart attack, in someone who is otherwise healthy. Unfortunately, CPR does not typically restart the heart for people who have serious health conditions or who are very sick.\"    \"In the event your heart stopped as a result of an underlying serious health condition, would you want attempts to be made to restart your heart (answer \"yes\" for attempt to resuscitate) or would you prefer a natural death (answer \"no\" for do not attempt to resuscitate)?\" yes       [] Yes   [] No   Educated Patient / Decision Maker regarding differences between Advance Directives and portable DNR orders.    Length of ACP Conversation in minutes:      Conversation Outcomes:  ACP discussion completed    Follow-up plan:    [] Schedule
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
## 2025-06-22 LAB
ALBUMIN SERPL-MCNC: 3.4 G/DL (ref 3.5–5.2)
ALBUMIN/GLOB SERPL: 1.4 {RATIO} (ref 1–2.5)
ALP SERPL-CCNC: 107 U/L (ref 35–104)
ALT SERPL-CCNC: 19 U/L (ref 10–35)
ANION GAP SERPL CALCULATED.3IONS-SCNC: 10 MMOL/L (ref 9–16)
ANTI-XA UNFRAC HEPARIN: 0.27 IU/L
ANTI-XA UNFRAC HEPARIN: 0.51 IU/L
ANTI-XA UNFRAC HEPARIN: 0.59 IU/L
AST SERPL-CCNC: 22 U/L (ref 10–35)
BILIRUB DIRECT SERPL-MCNC: 0.2 MG/DL (ref 0–0.2)
BILIRUB INDIRECT SERPL-MCNC: 0.1 MG/DL (ref 0–1)
BILIRUB SERPL-MCNC: 0.3 MG/DL (ref 0–1.2)
BUN SERPL-MCNC: 30 MG/DL (ref 8–23)
CALCIUM SERPL-MCNC: 8.3 MG/DL (ref 8.6–10.4)
CHLORIDE SERPL-SCNC: 107 MMOL/L (ref 98–107)
CO2 SERPL-SCNC: 24 MMOL/L (ref 20–31)
CREAT SERPL-MCNC: 1.3 MG/DL (ref 0.6–0.9)
EKG ATRIAL RATE: 55 BPM
EKG P AXIS: 26 DEGREES
EKG P-R INTERVAL: 166 MS
EKG Q-T INTERVAL: 494 MS
EKG QRS DURATION: 120 MS
EKG QTC CALCULATION (BAZETT): 472 MS
EKG R AXIS: -22 DEGREES
EKG T AXIS: 145 DEGREES
EKG VENTRICULAR RATE: 55 BPM
ERYTHROCYTE [DISTWIDTH] IN BLOOD BY AUTOMATED COUNT: 17.2 % (ref 11.8–14.4)
ERYTHROCYTE [DISTWIDTH] IN BLOOD BY AUTOMATED COUNT: 17.3 % (ref 11.8–14.4)
GFR, ESTIMATED: 43 ML/MIN/1.73M2
GLOBULIN SER CALC-MCNC: 2.4 G/DL
GLUCOSE BLD-MCNC: 100 MG/DL (ref 65–105)
GLUCOSE SERPL-MCNC: 106 MG/DL (ref 74–99)
HCT VFR BLD AUTO: 29.6 % (ref 36.3–47.1)
HCT VFR BLD AUTO: 31.3 % (ref 36.3–47.1)
HGB BLD-MCNC: 9 G/DL (ref 11.9–15.1)
HGB BLD-MCNC: 9.4 G/DL (ref 11.9–15.1)
INR PPP: 1.4
LACTIC ACID, WHOLE BLOOD: 1.5 MMOL/L (ref 0.7–2.1)
MCH RBC QN AUTO: 25.1 PG (ref 25.2–33.5)
MCH RBC QN AUTO: 25.4 PG (ref 25.2–33.5)
MCHC RBC AUTO-ENTMCNC: 30 G/DL (ref 28.4–34.8)
MCHC RBC AUTO-ENTMCNC: 30.4 G/DL (ref 28.4–34.8)
MCV RBC AUTO: 83.5 FL (ref 82.6–102.9)
MCV RBC AUTO: 83.6 FL (ref 82.6–102.9)
NRBC BLD-RTO: 0 PER 100 WBC
NRBC BLD-RTO: 0 PER 100 WBC
PARTIAL THROMBOPLASTIN TIME: 29 SEC (ref 23–36.5)
PLATELET # BLD AUTO: 215 K/UL (ref 138–453)
PLATELET # BLD AUTO: 244 K/UL (ref 138–453)
PMV BLD AUTO: 10.4 FL (ref 8.1–13.5)
PMV BLD AUTO: 11.1 FL (ref 8.1–13.5)
POTASSIUM SERPL-SCNC: 3.8 MMOL/L (ref 3.7–5.3)
PROT SERPL-MCNC: 5.8 G/DL (ref 6.6–8.7)
PROTHROMBIN TIME: 17.8 SEC (ref 11.7–14.9)
RBC # BLD AUTO: 3.54 M/UL (ref 3.95–5.11)
RBC # BLD AUTO: 3.75 M/UL (ref 3.95–5.11)
SODIUM SERPL-SCNC: 141 MMOL/L (ref 136–145)
TROPONIN I SERPL HS-MCNC: 37 NG/L (ref 0–14)
TROPONIN I SERPL HS-MCNC: 39 NG/L (ref 0–14)
WBC OTHER # BLD: 4.4 K/UL (ref 3.5–11.3)
WBC OTHER # BLD: 5.4 K/UL (ref 3.5–11.3)

## 2025-06-22 PROCEDURE — 6370000000 HC RX 637 (ALT 250 FOR IP): Performed by: STUDENT IN AN ORGANIZED HEALTH CARE EDUCATION/TRAINING PROGRAM

## 2025-06-22 PROCEDURE — 80076 HEPATIC FUNCTION PANEL: CPT

## 2025-06-22 PROCEDURE — 85520 HEPARIN ASSAY: CPT

## 2025-06-22 PROCEDURE — 36415 COLL VENOUS BLD VENIPUNCTURE: CPT

## 2025-06-22 PROCEDURE — 85027 COMPLETE CBC AUTOMATED: CPT

## 2025-06-22 PROCEDURE — 6360000002 HC RX W HCPCS: Performed by: STUDENT IN AN ORGANIZED HEALTH CARE EDUCATION/TRAINING PROGRAM

## 2025-06-22 PROCEDURE — 82947 ASSAY GLUCOSE BLOOD QUANT: CPT

## 2025-06-22 PROCEDURE — 80048 BASIC METABOLIC PNL TOTAL CA: CPT

## 2025-06-22 PROCEDURE — 99223 1ST HOSP IP/OBS HIGH 75: CPT | Performed by: STUDENT IN AN ORGANIZED HEALTH CARE EDUCATION/TRAINING PROGRAM

## 2025-06-22 PROCEDURE — 2060000002 HC BURN ICU INTERMEDIATE R&B

## 2025-06-22 PROCEDURE — 94640 AIRWAY INHALATION TREATMENT: CPT

## 2025-06-22 PROCEDURE — 84484 ASSAY OF TROPONIN QUANT: CPT

## 2025-06-22 PROCEDURE — 93005 ELECTROCARDIOGRAM TRACING: CPT

## 2025-06-22 PROCEDURE — 2580000003 HC RX 258: Performed by: INTERNAL MEDICINE

## 2025-06-22 PROCEDURE — 94761 N-INVAS EAR/PLS OXIMETRY MLT: CPT

## 2025-06-22 PROCEDURE — 83605 ASSAY OF LACTIC ACID: CPT

## 2025-06-22 RX ORDER — BUMETANIDE 1 MG/1
0.5 TABLET ORAL DAILY
Status: DISCONTINUED | OUTPATIENT
Start: 2025-06-22 | End: 2025-06-23

## 2025-06-22 RX ORDER — 0.9 % SODIUM CHLORIDE 0.9 %
250 INTRAVENOUS SOLUTION INTRAVENOUS ONCE
Status: COMPLETED | OUTPATIENT
Start: 2025-06-22 | End: 2025-06-22

## 2025-06-22 RX ADMIN — ACETAMINOPHEN 650 MG: 325 TABLET ORAL at 23:09

## 2025-06-22 RX ADMIN — BUDESONIDE AND FORMOTEROL FUMARATE DIHYDRATE 2 PUFF: 160; 4.5 AEROSOL RESPIRATORY (INHALATION) at 19:57

## 2025-06-22 RX ADMIN — HEPARIN SODIUM 3400 UNITS: 1000 INJECTION INTRAVENOUS; SUBCUTANEOUS at 00:31

## 2025-06-22 RX ADMIN — ASPIRIN 81 MG: 81 TABLET, CHEWABLE ORAL at 09:40

## 2025-06-22 RX ADMIN — FAMOTIDINE 40 MG: 20 TABLET, FILM COATED ORAL at 18:20

## 2025-06-22 RX ADMIN — ACETAMINOPHEN 650 MG: 325 TABLET ORAL at 01:49

## 2025-06-22 RX ADMIN — TRAZODONE HYDROCHLORIDE 50 MG: 50 TABLET ORAL at 23:08

## 2025-06-22 RX ADMIN — ATORVASTATIN CALCIUM 80 MG: 80 TABLET, FILM COATED ORAL at 20:56

## 2025-06-22 RX ADMIN — CARVEDILOL 3.12 MG: 6.25 TABLET, FILM COATED ORAL at 10:25

## 2025-06-22 RX ADMIN — BUMETANIDE 0.5 MG: 0.25 INJECTION INTRAMUSCULAR; INTRAVENOUS at 00:06

## 2025-06-22 RX ADMIN — BUDESONIDE AND FORMOTEROL FUMARATE DIHYDRATE 2 PUFF: 160; 4.5 AEROSOL RESPIRATORY (INHALATION) at 08:21

## 2025-06-22 RX ADMIN — AMIODARONE HYDROCHLORIDE 200 MG: 200 TABLET ORAL at 09:40

## 2025-06-22 RX ADMIN — BUMETANIDE 0.5 MG: 1 TABLET ORAL at 09:41

## 2025-06-22 RX ADMIN — SODIUM CHLORIDE 250 ML: 0.9 INJECTION, SOLUTION INTRAVENOUS at 16:11

## 2025-06-22 RX ADMIN — HEPARIN SODIUM 12 UNITS/KG/HR: 10000 INJECTION, SOLUTION INTRAVENOUS at 00:35

## 2025-06-22 RX ADMIN — FERROUS SULFATE TAB EC 325 MG (65 MG FE EQUIVALENT) 325 MG: 325 (65 FE) TABLET DELAYED RESPONSE at 09:40

## 2025-06-22 ASSESSMENT — PAIN DESCRIPTION - DESCRIPTORS: DESCRIPTORS: OTHER (COMMENT)

## 2025-06-22 ASSESSMENT — PAIN DESCRIPTION - LOCATION: LOCATION: LEG

## 2025-06-22 ASSESSMENT — PAIN SCALES - GENERAL: PAINLEVEL_OUTOF10: 0

## 2025-06-22 ASSESSMENT — PAIN DESCRIPTION - ONSET: ONSET: GRADUAL

## 2025-06-22 ASSESSMENT — PAIN DESCRIPTION - PAIN TYPE: TYPE: OTHER (COMMENT)

## 2025-06-22 ASSESSMENT — PAIN DESCRIPTION - FREQUENCY: FREQUENCY: CONTINUOUS

## 2025-06-22 ASSESSMENT — PAIN DESCRIPTION - ORIENTATION: ORIENTATION: RIGHT;LEFT;LOWER

## 2025-06-22 NOTE — CARE COORDINATION
Case Management Assessment  Initial Evaluation    Date/Time of Evaluation: 6/22/2025 11:16 AM  Assessment Completed by: Veronica Singh    If patient is discharged prior to next notation, then this note serves as note for discharge by case management.    Patient Name: Graciela Holt                   YOB: 1950  Diagnosis: Unstable angina (HCC) [I20.0]  Chest pain, unspecified type [R07.9]                   Date / Time: 6/21/2025  7:16 PM    Patient Admission Status: Inpatient   Readmission Risk (Low < 19, Mod (19-27), High > 27): Readmission Risk Score: 44.8    Current PCP: Travis Mason MD  PCP verified by CM? Yes (Dr. Mason)    Chart Reviewed: Yes      History Provided by: Patient  Patient Orientation: Alert and Oriented, Person, Place, Situation    Patient Cognition: Alert    Hospitalization in the last 30 days (Readmission):  Yes    If yes, Readmission Assessment in  Navigator will be completed.    Advance Directives:      Code Status: Full Code   Patient's Primary Decision Maker is: Legal Next of Kin    Primary Decision Maker: Guanakito Zheng - Child - [phone]    Discharge Planning:    Patient lives with: Family Members Type of Home: House  Primary Care Giver: Self  Patient Support Systems include: Children, Family Members   Current Financial resources: Medicare  Current community resources: None  Current services prior to admission: None            Current DME: Glucometer            Type of Home Care services:  None    ADLS  Prior functional level: Independent in ADLs/IADLs  Current functional level: Independent in ADLs/IADLs    PT AM-PAC:   /24  OT AM-PAC:   /24    Family can provide assistance at DC: Yes  Would you like Case Management to discuss the discharge plan with any other family members/significant others, and if so, who? Yes (Granddaughter- Luana, Son- Guanakito)  Plans to Return to Present Housing: Yes  Other Identified Issues/Barriers to RETURNING to current housing:  Patient and/or Patient Representative Agree with the Discharge Plan? Yes    Veronica Singh  Case Management Department  Ph: [phone]

## 2025-06-22 NOTE — CONSULTS
[facility]    Consult / H&P               Today's Date: 6/22/2025  Patient Name: Graciela Holt  Date of admission: 6/21/2025  7:16 PM  Patient's age: 74 y.o., 1950  Admission Dx: Unstable angina (HCC) [I20.0]  Chest pain, unspecified type [R07.9]    Requesting Physician: No admitting provider for patient encounter.    Cardiac Evaluation Reason:  Chest pain    History Obtained From: patient and chart review     History of Present Illness:    This patient 74 y.o. years old with past medical history given below. 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 tach, history of left MCA stroke, dilated ascending aorta, multiple admissions since last year with chest pain and shortness of breath, recent admission was in May/2025 with increased shortness of breath, fluid overload and patient got admitted yesterday at [facility] due to severe chest pain/tightness.    Friday with substernal chest pain/pressure, nonradiating associated with some shortness of breath.  Patient has been having recurrent episodes of chest pain every week. EKG done at outside facility showed nonspecific T wave changes no significant change from previous EKG.  Troponin was 32 and proBNP 3427--4090.    Troponin in ED at [facility] were 36, repeat 37 and 39. BNP 2,777.    Past Medical History:   has a past medical history of Allergic rhinitis, Arthritis, CAD (coronary artery disease), Cerebral artery occlusion with cerebral infarction ([facility]), CHF (congestive heart failure) ([facility]), Controlled type 2 diabetes mellitus without complication, without long-term current use of insulin ([facility]), COPD (chronic obstructive pulmonary disease) ([facility]), Depression, Former smoker,  old cortical infarcts.  No intracranial hemorrhage. CT C-spine: Extensive postsurgical change with intact indwelling hardware. Degenerative and degenerative disc changes without evidence of traumatic malalignment or acute fracture.  Ascending aorta is dilated. RECOMMENDATIONS: Consider cardiovascular consultation given dilated ascending aorta.     CT CERVICAL SPINE WO CONTRAST  Result Date: 6/7/2025  CT head: No acute intracranial abnormality.  Atrophy and senescent changes including findings compatible with multiple old cortical infarcts.  No intracranial hemorrhage. CT C-spine: Extensive postsurgical change with intact indwelling hardware. Degenerative and degenerative disc changes without evidence of traumatic malalignment or acute fracture.  Ascending aorta is dilated. RECOMMENDATIONS: Consider cardiovascular consultation given dilated ascending aorta.     CTA ABDOMEN PELVIS W CONTRAST  Result Date: 6/7/2025  1. No acute intra-abdominal or pelvic abnormality. 2. Moderate colonic stool burden. 3. Sigmoid diverticulosis without acute diverticulitis. 4. Moderate calcified plaque in the abdominal aorta and branch vessels without occlusion. 5. Status post cholecystectomy. 6. Degenerative changes in the hips and lumbar spine. 7. No blush enhancement diagnostic of active bleeding site is noted.     XR KNEE LEFT (3 VIEWS)  Result Date: 6/7/2025  Total knee arthroplasty in satisfactory alignment position without insert fracture or hardware complication.     XR CHEST PORTABLE  Result Date: 6/3/2025  Interstitial opacities in the lung bases suggesting mild interstitial edema. Regional area of ground-glass in the right lower lung field may represent a component of developing edema versus developing infiltrate.     US RENAL LIMITED  Result Date: 6/2/2025  1. Unremarkable ultrasound of the kidneys. 2. Bilateral simple renal cysts, for which no additional follow-up is recommended.     XR CHEST (2 VW)  Result Date:  5/30/2025  No acute process.     XR CHEST PORTABLE  Result Date: 5/23/2025  Stable cardiomegaly with pulmonary vascular congestion.       LABS:   CBC:   Recent Labs     06/21/25  2347 06/22/25  0602   WBC 5.4 4.4   HGB 9.4* 9.0*   HCT 31.3* 29.6*    215     BMP:   Recent Labs     06/21/25  0437 06/22/25  0602    141   K 3.6* 3.8   CO2 24 24   BUN 33* 30*   CREATININE 1.2 1.3*   LABGLOM 47* 43*   GLUCOSE 147* 106*     BNP: No results for input(s): \"BNP\" in the last 72 hours.  PT/INR:   Recent Labs     06/21/25 0437 06/21/25 2347   PROTIME 19.2* 17.8*   INR 1.5 1.4     APTT:  Recent Labs     06/21/25 2347   APTT 29.0     CARDIAC ENZYMES:No results for input(s): \"CKTOTAL\", \"CKMB\", \"CKMBINDEX\", \"TROPONINI\" in the last 72 hours.  Troponin:    ASSESSMENT:  Chest pain  CAD s/p CABG (2018)  Combined systolic and diastolic CHF reduced EF of 20-25%. Apical Thrombus present  HLD  Former smoker  Chronic Afib      RECOMMENDATIONS:  Given history of CABG, will do Cardiac cath for further ischemic work up  Continue Heparin drip, will need to be on coumadin on discharge given apical thrombus on echo  Okay to eat from cardiac standpoint, NPO at midnight  Keep K > 4, Mg > 2  Rest of care per primary team    Please wait for final attestation from rounding attending     Moises Neumann MD  PGY-1  [facility]        Attending Physician Statement:    I have discussed the care of  Graciela Holt , including pertinent history and exam findings, with the Cardiology fellow/resident.     I have seen and examined the patient and the key elements of all parts of the encounter have been performed by me. I agree with the assessment, plan and orders as documented by the fellow/resident, after I modified exam findings and plan of treatments, and the final version is my approved version of the assessment.     I performed a history and physical examination of the patient and discussed

## 2025-06-22 NOTE — PLAN OF CARE
Problem: Chronic Conditions and Co-morbidities  Goal: Patient's chronic conditions and co-morbidity symptoms are monitored and maintained or improved  6/22/2025 1620 by Steve Campbell RN  Outcome: Progressing  6/22/2025 0251 by Bronwyn Tompkins RN  Outcome: Progressing     Problem: ABCDS Injury Assessment  Goal: Absence of physical injury  6/22/2025 1620 by Steve Campbell RN  Outcome: Progressing  6/22/2025 0251 by Bronwyn Tompkins RN  Outcome: Progressing     Problem: Safety - Adult  Goal: Free from fall injury  6/22/2025 1620 by Steve Campbell RN  Outcome: Progressing  6/22/2025 0251 by Bronwyn Tompkins RN  Outcome: Progressing     Problem: Pain  Goal: Verbalizes/displays adequate comfort level or baseline comfort level  6/22/2025 1620 by Steve Campbell RN  Outcome: Progressing  6/22/2025 0251 by Bronwyn Tompkins RN  Outcome: Progressing     Problem: Respiratory - Adult  Goal: Achieves optimal ventilation and oxygenation  6/22/2025 1620 by Steve Campbell RN  Outcome: Progressing  6/22/2025 1111 by Dorie Hogan RCP  Outcome: Progressing  Flowsheets (Taken 6/22/2025 1111)  Achieves optimal ventilation and oxygenation:   Assess for changes in respiratory status   Assess for changes in mentation and behavior   Assess and instruct to report shortness of breath or any respiratory difficulty   Respiratory therapy support as indicated

## 2025-06-22 NOTE — CARE COORDINATION
06/22/25 1116   Readmission Assessment   Number of Days since last admission? 1-7 days   Previous Disposition Home with Family   Who is being Interviewed Patient   What was the patient's/caregiver's perception as to why they think they needed to return back to the hospital? Other (Comment)  (Chest pain worsened)   Did you visit your Primary Care Physician after you left the hospital, before you returned this time? No   Why weren't you able to visit your PCP? Did not have an appointment   Did you see a specialist, such as Cardiac, Pulmonary, Orthopedic Physician, etc. after you left the hospital? No   Who advised the patient to return to the hospital? Self-referral   Does the patient report anything that got in the way of taking their medications? No   In our efforts to provide the best possible care to you and others like you, can you think of anything that we could have done to help you after you left the hospital the first time, so that you might not have needed to return so soon? Other (Comment)  (Patient reports 'no')

## 2025-06-22 NOTE — PROGRESS NOTES
NP on call notified the O2 was applied @ 2- L p/m Via NC due to sat's decreased <90% (down to 77%) while asleep. Pt was sleeping well, & looks like she has some sleep apnea. Sat's improved to 100% after O2 applied.

## 2025-06-22 NOTE — H&P
[facility]  Office: [phone]  Dario Sequeira DO, Gunner De La Paz DO, Christo Pizano DO, Don Geller DO, Raphael Rosales MD, Mariya Polk MD, Bienvenido Mason MD, Loretta Diaz MD,  Ruiz Marroquin MD, Nathalie Kenyon MD, Earl Rosa DO, Belén Bullard MD,  Rock Hugo DO, Betsy Ray MD, Abhijit Webster MD, Margarito Sequeira DO, Meghan Cote MD, Rico Todd MD, Jason Nunez DO, Velma Kumar MD, Irina French MD, Delfina Ely MD, Renetta Cody MD,  John Martin DO, Yordan Samuel MD,  Shirley Waterhouse, CNP,  Amelia Leigh, CNP, Chana Boo, CNP, Max Bruno, CNP,  Beverly Martinez, [facility], Marianna Gleason, CNP, Delia Malcolm, CNP, Carlee Israel, CNP, Sumaya Navas, CNP, Claire Garnica, CNP, Sb Nunez PA-C, Rere Jarrett, CNS, Diya Card, CNP, Chen Mackey, CNP         [facility]   IN-PATIENT SERVICE   [facility]    HISTORY AND PHYSICAL EXAMINATION            Date:   6/21/2025  Patient name:  Graciela Holt  Date of admission:  6/21/2025  7:16 PM  MRN:   [id number]  Account:  [id number]  YOB: 1950  PCP:    Travis Mason MD  Room:   22/22  Code Status:    Prior    Chief Complaint:     Chief Complaint   Patient presents with    Chest Pain       History Obtained From:     patient    History of Present Illness:     Graciela Holt is a 74 y.o. Non- / non  female who presents with Chest Pain   and is admitted to the hospital for the management of Unstable angina (HCC).    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 tach, history of left MCA stroke, dilated ascending aorta, multiple admissions since last  %    Lymphocytes % 25 24 - 44 %    Monocytes % 11 3 - 12 %    Eosinophils % 3 0 - 4 %    Basophils % 1 0 - 2 %    Immature Granulocytes % 0 0 %    Neutrophils Absolute 2.80 1.50 - 8.10 k/uL    Lymphocytes Absolute 1.19 1.10 - 3.70 k/uL    Monocytes Absolute 0.54 0.10 - 1.20 k/uL    Eosinophils Absolute 0.16 0.00 - 0.44 k/uL    Basophils Absolute 0.04 0.00 - 0.20 k/uL    Immature Granulocytes Absolute <0.03 0.00 - 0.30 k/uL   Protime-INR    Collection Time: 06/21/25  4:37 AM   Result Value Ref Range    Protime 19.2 (H) 11.8 - 14.6 sec    INR 1.5    Troponin    Collection Time: 06/21/25  7:30 PM   Result Value Ref Range    Troponin, High Sensitivity 36 (H) 0 - 14 ng/L       Imaging/Diagnostics:  XR CHEST (2 VW)  Result Date: 6/20/2025  Stable chest without acute abnormality.       Assessment :      Hospital Problems           Last Modified POA    * (Principal) Unstable angina (HCC) 6/21/2025 Yes    Mixed hyperlipidemia (Chronic) 6/21/2025 Yes    Chronic obstructive pulmonary disease (HCC) (Chronic) 6/21/2025 Yes    Coronary artery disease involving native coronary artery of native heart without angina pectoris (Chronic) 6/21/2025 Yes    Primary insomnia (Chronic) 6/21/2025 Yes    Restless leg syndrome (Chronic) 6/21/2025 Yes    Former smoker (Chronic) 6/21/2025 Yes    Chronic bilateral low back pain with left-sided sciatica (Chronic) 6/21/2025 Yes    Hypothyroidism (Chronic) 6/21/2025 Yes    S/P CABG x 4 (Chronic) 6/21/2025 Yes    Chronic atrial fibrillation (HCC) 6/21/2025 Yes    Chest pain 6/21/2025 Yes    Stenosis of left internal carotid artery 6/21/2025 Yes    Chronic combined systolic and diastolic congestive heart failure (HCC) 6/21/2025 Yes    Type 2 diabetes mellitus with chronic kidney disease 6/21/2025 Yes    Anemia 6/21/2025 Yes    AVM (arteriovenous malformation) of stomach, acquired 6/21/2025 Yes    Acute exacerbation of chronic heart failure (HCC) 6/21/2025 Yes       Plan:     Patient status inpatient  in the Progressive Unit/Step down    Unstable angina: Troponin seems to be currently stable.  Patient has a history of CAD status post CABG x 4.  Patient has been having recurrent chest pain and she has been refusing workup including stress test and AICD placement.  But now patient mentioned that she is ready to get full workup.  Cardiology is consulted.  Placing patient on IV heparin.  Patient would need stress test versus cardiac cath.  Need AICD evaluation as well.  History of nonsustained V. Tach: Resuming amiodarone, Coreg.  Acute on chronic CHF HFrEF EF 20 to 25%, proBNP is elevated patient did receive 1 dose of IV Bumex yesterday.  CKD stage III creatinine at baseline:  History of A-fib with LV thrombus: Resuming amiodarone.  Patient on IV heparin.  Patient is on Coumadin INR is subtherapeutic.  Patient has a noncompliance with Coumadin.  COPD: Not in exacerbation.  Resuming home inhaler  Anemia of chronic disease: History of AVMs.  Hemoglobin is currently at baseline      Consultations:   IP CONSULT TO CARDIOLOGY  IP CONSULT TO INTERNAL MEDICINE     Patient is admitted as inpatient status because of co-morbidities listed above, severity of signs and symptoms as outlined, requirement for current medical therapies and most importantly because of direct risk to patient if care not provided in a hospital setting.  Expected length of stay > 48 hours.    Chely Longo MD  6/21/2025  10:21 PM    Copy sent to Travis Vaughn MD

## 2025-06-22 NOTE — ED NOTES
ED to inpatient nurses report      Chief Complaint:  Chief Complaint   Patient presents with    Chest Pain     Present to ED from: home    MOA:     LOC: alert and orientated to name, place, date  Mobility: Independent with walker- patient stated she has frequent falls at home  Oxygen Baseline: room air     Current needs required: room air   Pending ED orders: none   Present condition: stable    Why did the patient come to the ED? Chest pain   What is the plan? Admission   Any procedures or intervention occur? IV placement, blood work, EKG, med admin.   Any safety concerns?? None     Mental Status:  Level of Consciousness: Alert (0)    Psych Assessment:   Psychosocial  Psychosocial (WDL): Within Defined Limits  Vital signs   Vitals:    06/21/25 1852 06/21/25 1901 06/21/25 2010 06/21/25 2015   BP: 116/68 126/70 130/78 125/73   Pulse:  71 59 56   Resp:  18 16    Temp:   98.2 °F (36.8 °C)    SpO2:  93% 96% 97%        Vitals:  Patient Vitals for the past 24 hrs:   BP Temp Pulse Resp SpO2   06/21/25 2015 125/73 -- 56 -- 97 %   06/21/25 2010 130/78 98.2 °F (36.8 °C) 59 16 96 %   06/21/25 1901 126/70 -- 71 18 93 %   06/21/25 1852 116/68 -- -- -- --      Visit Vitals  /73   Pulse 56   Temp 98.2 °F (36.8 °C)   Resp 16   SpO2 97%        LDAs:   Peripheral IV 06/21/25 Distal;Posterior;Right Forearm (Active)       Ambulatory Status:  Presents to emergency department  because of falls (Syncope, seizure, or loss of consciousness): No, Age > 70: Yes, Altered Mental Status, Intoxication with alcohol or substance confusion (Disorientation, impaired judgment, poor safety awaremess, or inability to follow instructions): No, Impaired Mobility: Ambulates or transfers with assistive devices or assistance; Unable to ambulate or transer.: Yes, Nursing Judgement: No    Diagnosis:  DISPOSITION Decision To Admit 06/21/2025 08:34:24 PM   Final diagnoses:   Chest pain, unspecified type        Consults:  IP CONSULT TO CARDIOLOGY  IP CONSULT  (with meals)    EMPAGLIFLOZIN (JARDIANCE) 10 MG TABLET    Take 1 tablet by mouth daily    ENOXAPARIN (LOVENOX) 60 MG/0.6ML    Inject 0.6 mLs into the skin 2 times daily for 3 days    FAMOTIDINE (PEPCID) 40 MG TABLET    Take 1 tablet by mouth every evening    FERROUS SULFATE (IRON 325) 325 (65 FE) MG TABLET    Take 1 tablet by mouth daily (with breakfast)    HANDICAP PLACARD MISC    by Does not apply route Expires on 12/30/30    HANDICAP PLACARD MISC    by Does not apply route    LOSARTAN (COZAAR) 25 MG TABLET    Take 1 tablet by mouth daily    MIDODRINE (PROAMATINE) 2.5 MG TABLET    Take 1 tablet by mouth 3 times daily (with meals)    MISC. DEVICES (HOME STYLE BED RAILS) MISC    Use on bed to prevent fall    NITROGLYCERIN (NITROSTAT) 0.3 MG SL TABLET    Place 1 tablet under the tongue as needed for Chest pain up to max of 3 total doses. If no relief after 1 dose, call 911.    POTASSIUM CHLORIDE (MICRO-K) 10 MEQ EXTENDED RELEASE CAPSULE    Take 2 capsules by mouth daily    SENNOSIDES-DOCUSATE SODIUM (SENOKOT-S) 8.6-50 MG TABLET    Take 1 tablet by mouth daily    TRAZODONE (DESYREL) 50 MG TABLET    Take 1 tablet by mouth nightly    VITAMIN D (ERGOCALCIFEROL) 1.25 MG (31371 UT) CAPS CAPSULE    Take 1 capsule by mouth once a week    WARFARIN (COUMADIN) 2.5 MG TABLET    TAKE 1 TABLET BY MOUTH EVERY DAY OR AS DIRECTED BY [facility]     Orders Placed This Encounter   Medications    ketorolac (TORADOL) injection 15 mg       SURGICAL HISTORY       Past Surgical History:   Procedure Laterality Date    APPENDECTOMY      BRONCHOSCOPY N/A 08/16/2021    BRONCHOSCOPY w/ WASHINGS performed by Toy Cheatham MD at [facility] [place] ENDO    CARDIAC SURGERY      cath x 2/ stent x 1    CARDIAC SURGERY      bypass 4 vessel 2/2018    CERVICAL FUSION N/A 1/10/2025    OCCIPUT TO T2 DECOMPRESSION FUSION, REVISION OF HARDWARE performed by Yessica Flynn DO at [facility] OR    CERVICAL LAMINECTOMY N/A 10/14/2020    C3-C7 POSTERIOR CERVICAL

## 2025-06-22 NOTE — PLAN OF CARE
Problem: Respiratory - Adult  Goal: Achieves optimal ventilation and oxygenation  Outcome: Progressing  Flowsheets (Taken 6/22/2025 1111)  Achieves optimal ventilation and oxygenation:   Assess for changes in respiratory status   Assess for changes in mentation and behavior   Assess and instruct to report shortness of breath or any respiratory difficulty   Respiratory therapy support as indicated

## 2025-06-23 ENCOUNTER — TELEPHONE (OUTPATIENT)
Age: 75
End: 2025-06-23

## 2025-06-23 PROBLEM — I21.4 NSTEMI (NON-ST ELEVATED MYOCARDIAL INFARCTION) (HCC): Status: ACTIVE | Noted: 2025-06-23

## 2025-06-23 LAB
ANION GAP SERPL CALCULATED.3IONS-SCNC: 8 MMOL/L (ref 9–16)
ANTI-XA UNFRAC HEPARIN: 0.34 IU/L
BUN SERPL-MCNC: 33 MG/DL (ref 8–23)
CALCIUM SERPL-MCNC: 8 MG/DL (ref 8.6–10.4)
CHLORIDE SERPL-SCNC: 106 MMOL/L (ref 98–107)
CO2 SERPL-SCNC: 25 MMOL/L (ref 20–31)
CREAT SERPL-MCNC: 1.5 MG/DL (ref 0.6–0.9)
ECHO BSA: 1.61 M2
EKG ATRIAL RATE: 66 BPM
EKG P AXIS: -8 DEGREES
EKG P-R INTERVAL: 168 MS
EKG Q-T INTERVAL: 462 MS
EKG QRS DURATION: 112 MS
EKG QTC CALCULATION (BAZETT): 484 MS
EKG R AXIS: -24 DEGREES
EKG T AXIS: 142 DEGREES
EKG VENTRICULAR RATE: 66 BPM
ERYTHROCYTE [DISTWIDTH] IN BLOOD BY AUTOMATED COUNT: 17.3 % (ref 11.8–14.4)
GFR, ESTIMATED: 36 ML/MIN/1.73M2
GLUCOSE SERPL-MCNC: 146 MG/DL (ref 74–99)
HCT VFR BLD AUTO: 28.7 % (ref 36.3–47.1)
HGB BLD-MCNC: 8.5 G/DL (ref 11.9–15.1)
MCH RBC QN AUTO: 25.5 PG (ref 25.2–33.5)
MCHC RBC AUTO-ENTMCNC: 29.6 G/DL (ref 28.4–34.8)
MCV RBC AUTO: 86.2 FL (ref 82.6–102.9)
NRBC BLD-RTO: 0 PER 100 WBC
PLATELET # BLD AUTO: 215 K/UL (ref 138–453)
PMV BLD AUTO: 10.8 FL (ref 8.1–13.5)
POTASSIUM SERPL-SCNC: 3.8 MMOL/L (ref 3.7–5.3)
RBC # BLD AUTO: 3.33 M/UL (ref 3.95–5.11)
SODIUM SERPL-SCNC: 139 MMOL/L (ref 136–145)
WBC OTHER # BLD: 4.8 K/UL (ref 3.5–11.3)

## 2025-06-23 PROCEDURE — 6370000000 HC RX 637 (ALT 250 FOR IP): Performed by: INTERNAL MEDICINE

## 2025-06-23 PROCEDURE — 36415 COLL VENOUS BLD VENIPUNCTURE: CPT

## 2025-06-23 PROCEDURE — B213YZZ FLUOROSCOPY OF MULTIPLE CORONARY ARTERY BYPASS GRAFTS USING OTHER CONTRAST: ICD-10-PCS | Performed by: INTERNAL MEDICINE

## 2025-06-23 PROCEDURE — 80048 BASIC METABOLIC PNL TOTAL CA: CPT

## 2025-06-23 PROCEDURE — 6370000000 HC RX 637 (ALT 250 FOR IP): Performed by: STUDENT IN AN ORGANIZED HEALTH CARE EDUCATION/TRAINING PROGRAM

## 2025-06-23 PROCEDURE — 6360000002 HC RX W HCPCS: Performed by: INTERNAL MEDICINE

## 2025-06-23 PROCEDURE — 99232 SBSQ HOSP IP/OBS MODERATE 35: CPT | Performed by: INTERNAL MEDICINE

## 2025-06-23 PROCEDURE — 93455 CORONARY ART/GRFT ANGIO S&I: CPT | Performed by: INTERNAL MEDICINE

## 2025-06-23 PROCEDURE — 76937 US GUIDE VASCULAR ACCESS: CPT | Performed by: INTERNAL MEDICINE

## 2025-06-23 PROCEDURE — 6360000002 HC RX W HCPCS: Performed by: STUDENT IN AN ORGANIZED HEALTH CARE EDUCATION/TRAINING PROGRAM

## 2025-06-23 PROCEDURE — C1894 INTRO/SHEATH, NON-LASER: HCPCS | Performed by: INTERNAL MEDICINE

## 2025-06-23 PROCEDURE — 6360000004 HC RX CONTRAST MEDICATION: Performed by: INTERNAL MEDICINE

## 2025-06-23 PROCEDURE — 99233 SBSQ HOSP IP/OBS HIGH 50: CPT | Performed by: NURSE PRACTITIONER

## 2025-06-23 PROCEDURE — 94640 AIRWAY INHALATION TREATMENT: CPT

## 2025-06-23 PROCEDURE — 4A023N7 MEASUREMENT OF CARDIAC SAMPLING AND PRESSURE, LEFT HEART, PERCUTANEOUS APPROACH: ICD-10-PCS | Performed by: INTERNAL MEDICINE

## 2025-06-23 PROCEDURE — 99152 MOD SED SAME PHYS/QHP 5/>YRS: CPT | Performed by: INTERNAL MEDICINE

## 2025-06-23 PROCEDURE — B215YZZ FLUOROSCOPY OF LEFT HEART USING OTHER CONTRAST: ICD-10-PCS | Performed by: INTERNAL MEDICINE

## 2025-06-23 PROCEDURE — 94761 N-INVAS EAR/PLS OXIMETRY MLT: CPT

## 2025-06-23 PROCEDURE — 2060000002 HC BURN ICU INTERMEDIATE R&B

## 2025-06-23 PROCEDURE — 2709999900 HC NON-CHARGEABLE SUPPLY: Performed by: INTERNAL MEDICINE

## 2025-06-23 PROCEDURE — 85520 HEPARIN ASSAY: CPT

## 2025-06-23 PROCEDURE — 93459 L HRT ART/GRFT ANGIO: CPT | Performed by: INTERNAL MEDICINE

## 2025-06-23 PROCEDURE — C1760 CLOSURE DEV, VASC: HCPCS | Performed by: INTERNAL MEDICINE

## 2025-06-23 PROCEDURE — 2500000003 HC RX 250 WO HCPCS: Performed by: STUDENT IN AN ORGANIZED HEALTH CARE EDUCATION/TRAINING PROGRAM

## 2025-06-23 PROCEDURE — C1769 GUIDE WIRE: HCPCS | Performed by: INTERNAL MEDICINE

## 2025-06-23 PROCEDURE — 85027 COMPLETE CBC AUTOMATED: CPT

## 2025-06-23 PROCEDURE — 6370000000 HC RX 637 (ALT 250 FOR IP): Performed by: NURSE PRACTITIONER

## 2025-06-23 PROCEDURE — B218YZZ FLUOROSCOPY OF LEFT INTERNAL MAMMARY BYPASS GRAFT USING OTHER CONTRAST: ICD-10-PCS | Performed by: INTERNAL MEDICINE

## 2025-06-23 RX ORDER — SODIUM CHLORIDE 0.9 % (FLUSH) 0.9 %
5-40 SYRINGE (ML) INJECTION PRN
Status: CANCELLED | OUTPATIENT
Start: 2025-06-23

## 2025-06-23 RX ORDER — IOPAMIDOL 755 MG/ML
INJECTION, SOLUTION INTRAVASCULAR PRN
Status: DISCONTINUED | OUTPATIENT
Start: 2025-06-23 | End: 2025-06-23 | Stop reason: HOSPADM

## 2025-06-23 RX ORDER — FAMOTIDINE 20 MG/1
20 TABLET, FILM COATED ORAL EVERY EVENING
Status: DISCONTINUED | OUTPATIENT
Start: 2025-06-23 | End: 2025-06-24 | Stop reason: HOSPADM

## 2025-06-23 RX ORDER — MIDAZOLAM HYDROCHLORIDE 1 MG/ML
INJECTION, SOLUTION INTRAMUSCULAR; INTRAVENOUS PRN
Status: DISCONTINUED | OUTPATIENT
Start: 2025-06-23 | End: 2025-06-23 | Stop reason: HOSPADM

## 2025-06-23 RX ORDER — SODIUM CHLORIDE 0.9 % (FLUSH) 0.9 %
5-40 SYRINGE (ML) INJECTION EVERY 12 HOURS SCHEDULED
Status: CANCELLED | OUTPATIENT
Start: 2025-06-23

## 2025-06-23 RX ORDER — ACETAMINOPHEN 325 MG/1
650 TABLET ORAL EVERY 4 HOURS PRN
Status: CANCELLED | OUTPATIENT
Start: 2025-06-23

## 2025-06-23 RX ORDER — BUMETANIDE 1 MG/1
0.5 TABLET ORAL DAILY
Status: DISCONTINUED | OUTPATIENT
Start: 2025-06-23 | End: 2025-06-24 | Stop reason: HOSPADM

## 2025-06-23 RX ORDER — SODIUM CHLORIDE 9 MG/ML
INJECTION, SOLUTION INTRAVENOUS PRN
Status: CANCELLED | OUTPATIENT
Start: 2025-06-23

## 2025-06-23 RX ADMIN — ERGOCALCIFEROL 50000 UNITS: 1.25 CAPSULE ORAL at 08:15

## 2025-06-23 RX ADMIN — AMIODARONE HYDROCHLORIDE 200 MG: 200 TABLET ORAL at 08:15

## 2025-06-23 RX ADMIN — ACETAMINOPHEN 650 MG: 325 TABLET ORAL at 20:23

## 2025-06-23 RX ADMIN — FAMOTIDINE 20 MG: 20 TABLET, FILM COATED ORAL at 18:00

## 2025-06-23 RX ADMIN — HEPARIN SODIUM 12 UNITS/KG/HR: 10000 INJECTION, SOLUTION INTRAVENOUS at 11:05

## 2025-06-23 RX ADMIN — ATORVASTATIN CALCIUM 80 MG: 80 TABLET, FILM COATED ORAL at 20:18

## 2025-06-23 RX ADMIN — FERROUS SULFATE TAB EC 325 MG (65 MG FE EQUIVALENT) 325 MG: 325 (65 FE) TABLET DELAYED RESPONSE at 08:15

## 2025-06-23 RX ADMIN — ASPIRIN 81 MG: 81 TABLET, CHEWABLE ORAL at 08:15

## 2025-06-23 RX ADMIN — APIXABAN 5 MG: 5 TABLET, FILM COATED ORAL at 20:18

## 2025-06-23 RX ADMIN — BUDESONIDE AND FORMOTEROL FUMARATE DIHYDRATE 2 PUFF: 160; 4.5 AEROSOL RESPIRATORY (INHALATION) at 08:34

## 2025-06-23 RX ADMIN — BUDESONIDE AND FORMOTEROL FUMARATE DIHYDRATE 2 PUFF: 160; 4.5 AEROSOL RESPIRATORY (INHALATION) at 20:44

## 2025-06-23 RX ADMIN — CARVEDILOL 3.12 MG: 6.25 TABLET, FILM COATED ORAL at 18:00

## 2025-06-23 RX ADMIN — SODIUM CHLORIDE, PRESERVATIVE FREE 10 ML: 5 INJECTION INTRAVENOUS at 20:24

## 2025-06-23 RX ADMIN — TRAZODONE HYDROCHLORIDE 50 MG: 50 TABLET ORAL at 20:18

## 2025-06-23 ASSESSMENT — PAIN SCALES - GENERAL: PAINLEVEL_OUTOF10: 4

## 2025-06-23 NOTE — PROGRESS NOTES
Occupational Therapy    [facility]  Occupational Therapy Not Seen Note    DATE: 2025    NAME: Graciela Holt  MRN: [id number]   : 1950      Patient not seen this date for Occupational Therapy due to:    Surgery/Procedure: Plan for L heart cath today, will continue to follow for OT eval.     Next Scheduled Treatment: 25    Electronically signed by CHRISTELLE BOBO on 2025 at 11:38 AM

## 2025-06-23 NOTE — PLAN OF CARE
Problem: Chronic Conditions and Co-morbidities  Goal: Patient's chronic conditions and co-morbidity symptoms are monitored and maintained or improved  6/22/2025 2304 by Bronwyn Tompkins RN  Outcome: Progressing  6/22/2025 1620 by Steve Campbell RN  Outcome: Progressing     Problem: ABCDS Injury Assessment  Goal: Absence of physical injury  6/22/2025 2304 by Bronwyn Tompkins RN  Outcome: Progressing  6/22/2025 1620 by Steve Campbell RN  Outcome: Progressing     Problem: Safety - Adult  Goal: Free from fall injury  6/22/2025 2304 by Bronwyn Tompkins RN  Outcome: Progressing  6/22/2025 1620 by Steve Campbell RN  Outcome: Progressing     Problem: Pain  Goal: Verbalizes/displays adequate comfort level or baseline comfort level  6/22/2025 2304 by Bronwyn Tompkins RN  Outcome: Progressing  6/22/2025 1620 by Steve Campbell RN  Outcome: Progressing     Problem: Respiratory - Adult  Goal: Achieves optimal ventilation and oxygenation  6/22/2025 2304 by Bronwyn Tompkins RN  Outcome: Progressing  6/22/2025 1620 by Steve Campbell RN  Outcome: Progressing  6/22/2025 1111 by Dorie Hogan RCP  Outcome: Progressing  Flowsheets (Taken 6/22/2025 1111)  Achieves optimal ventilation and oxygenation:   Assess for changes in respiratory status   Assess for changes in mentation and behavior   Assess and instruct to report shortness of breath or any respiratory difficulty   Respiratory therapy support as indicated

## 2025-06-23 NOTE — PLAN OF CARE
Problem: Chronic Conditions and Co-morbidities  Goal: Patient's chronic conditions and co-morbidity symptoms are monitored and maintained or improved  Outcome: Progressing     Problem: ABCDS Injury Assessment  Goal: Absence of physical injury  Outcome: Progressing     Problem: Safety - Adult  Goal: Free from fall injury  Outcome: Progressing     Problem: Pain  Goal: Verbalizes/displays adequate comfort level or baseline comfort level  Outcome: Progressing     Problem: Respiratory - Adult  Goal: Achieves optimal ventilation and oxygenation  Outcome: Progressing

## 2025-06-23 NOTE — PROGRESS NOTES
Patient admitted, consent signed and questions answered.  Call light to reach with side rails up 2 of 2. Bilateral Groin clipped with writer and Aurora RN present.  RN at bedside with patient.  History and physical to be updated. VS & Pulses obtained and documented. Will continue to monitor.

## 2025-06-23 NOTE — TELEPHONE ENCOUNTER
[facility] Note   Patient discharged from [facility] on 06/21. Readmitted at [facility], plans for heart cath. Will follow and call patient to reschedule appointment once discharged.     Rosemary Hu, SjD, [facility]

## 2025-06-23 NOTE — PROGRESS NOTES
Physical Therapy        Physical Therapy Cancel Note      DATE: 2025    NAME: Graciela Holt  MRN: [id number]   : 1950      Patient not seen this date for Physical Therapy due to:    Strict Bedrest: cath for today planned. Ck       Electronically signed by Yue Mcgowan PT on 2025 at 1:29 PM

## 2025-06-23 NOTE — PROGRESS NOTES
[facility]  Progress Note                   Date:   6/23/2025  Patient name: Graciela Holt  Date of admission:  6/21/2025  7:16 PM  MRN:   [id number]  YOB: 1950  PCP: Travis Mason MD    Reason for Admission: Unstable angina (HCC) [I20.0]  Chest pain, unspecified type [R07.9]    Subjective:       Clinical Changes /Abnormalities: Seen & examined alone in room lying quietly in bed. Discussed with RN, no overnight issues/concerns. NPO for cath today. Labs, vitals, & tele reviewed. Remains SB- asymptomatic. 50s    Review of Systems    Medications:   Scheduled Meds:   [Held by provider] bumetanide  0.5 mg Oral Daily    amiodarone  200 mg Oral Daily    atorvastatin  80 mg Oral Nightly    budesonide-formoterol  2 puff Inhalation BID RT    [Held by provider] carvedilol  3.125 mg Oral BID WC    famotidine  40 mg Oral QPM    ferrous sulfate  325 mg Oral Daily with breakfast    traZODone  50 mg Oral Nightly    vitamin D  50,000 Units Oral Weekly    sodium chloride flush  5-40 mL IntraVENous 2 times per day    aspirin  81 mg Oral Daily     Continuous Infusions:   sodium chloride      heparin (PORCINE) Infusion 12 Units/kg/hr (06/23/25 0648)     CBC:   Recent Labs     06/21/25  2347 06/22/25  0602 06/23/25  0559   WBC 5.4 4.4 4.8   HGB 9.4* 9.0* 8.5*    215 215     BMP:    Recent Labs     06/21/25  0437 06/22/25  0602 06/23/25  0559    141 139   K 3.6* 3.8 3.8   * 107 106   CO2 24 24 25   BUN 33* 30* 33*   CREATININE 1.2 1.3* 1.5*   GLUCOSE 147* 106* 146*     Hepatic:  Recent Labs     06/20/25  1747 06/22/25  2059   AST 25 22   ALT 29 19   BILITOT 0.4 0.3   ALKPHOS 111* 107*     Troponin:   Recent Labs     06/21/25  1930 06/21/25  2347 06/22/25  0119   TROPHS 36* 37* 39*     BNP: No results for input(s): \"BNP\" in the last 72 hours.  Lipids: No results for input(s): \"CHOL\", \"HDL\" in the last 72 hours.    Invalid input(s): \"LDLCALCU\"  INR:   Recent Labs     06/21/25  0437  ventricular systolic function is severely reduced.  Estimated ejection fraction is 15 %.  There is severe global hypokinesis with minor regional variation.  Evidence of diastolic dysfunction.  Left atrium is severely dilated.  Right ventricle is normal in size with mildly reduced function.  Trivial aortic insufficiency.  Mitral annular calcification is seen.  Mild to moderate mitral regurgitation.  Moderate tricuspid regurgitation.  Mild pulmonic insufficiency.  Estimated right ventricular systolic pressure is 47 mmHg, suggests mild Pulm  HTN.  IVC Increased diameter, but still has inspiratory variation suggesting upper  normal or mildly elevated RA filling pressure (i.e. CVP).    Assessment / Acute Cardiac Problems:   ICM, HFrEF 25 to 25% on TTE 11/24  Refused AICD and LifeVest in the past  CAD s/p CABG x 4, in 2018 ( LIMA in seq to LAD and Diagonal, SVG to OM1, SVG to PDA)  Hx of apical thrombus 4/2024 - on Eliquis at home  Hx of NSVT and ventricular arrhythmias -on amiodarone  C-spine myelopathy with C1 focal cord edema with Hx of C3-7 laminectomy and fusion  Hypertension  hyperlipidemia    Patient Active Problem List:     Controlled type 2 diabetes mellitus without complication, without long-term current use of insulin ([facility])     Hypertension goal BP (blood pressure) < 140/90     Mixed hyperlipidemia     Chronic obstructive pulmonary disease (HCC)     Coronary artery disease involving native coronary artery of native heart without angina pectoris     Primary insomnia     Restless leg syndrome     Atherosclerosis of superior mesenteric artery     Left adrenal mass     Former smoker     Chronic bilateral low back pain with left-sided sciatica     Hypothyroidism     S/P CABG x 4     Chronic atrial fibrillation (HCC)     Tobacco use disorder     Chest pain     Internal carotid artery occlusion     Stenosis of left internal carotid artery     Acquired spondylolisthesis of cervical vertebra     Stenosis of cervical

## 2025-06-23 NOTE — PROGRESS NOTES
Pharmacy Note     Renal Dose Adjustment    Graciela Holt is a 74 y.o. female. Pharmacist assessment of renally cleared medications.    Recent Labs     06/22/25  0602 06/23/25  0559   BUN 30* 33*       Recent Labs     06/22/25  0602 06/23/25  0559   CREATININE 1.3* 1.5*       Estimated Creatinine Clearance: 28 mL/min (A) (based on SCr of 1.5 mg/dL (H)).    Height:   Ht Readings from Last 1 Encounters:   06/21/25 1.626 m (5' 4\")     Weight:  Wt Readings from Last 1 Encounters:   06/23/25 58.3 kg (128 lb 8.5 oz)       The following medication dose has been adjusted based upon renal function per P&T Guidelines:             Famotidine 40 mg every evening --> Famotidine 20 mg every evening      Sara Chanel, SjD, [facility] 6/23/2025 12:05 PM

## 2025-06-23 NOTE — PROGRESS NOTES
PHARMACY NOTE:    The electrolyte replacement protocol for potassium/magnesium has been discontinued per P&T guidelines because the patient has reduced renal function (CrCl < 30 mL/min).      The patient's most recent potassium & magnesium levels are:  Recent Labs     06/21/25  0437 06/22/25  0602 06/23/25  0559   K 3.6* 3.8 3.8     Estimated Creatinine Clearance: 28 mL/min (A) (based on SCr of 1.5 mg/dL (H)).    For patients with decreased renal function (below 30ml/min) needing potassium/magnesium supplementation, please order individual bolus doses with appropriate monitoring.      Please contact the inpatient pharmacy with any concerns.  Thank you.    Sara Chanel, SjD, BCCCP 6/23/2025 12:04 PM

## 2025-06-23 NOTE — PROGRESS NOTES
[facility]  Office: [phone]  Dario Sequeira DO, Gunner De La Paz, DO, Christo Pizano DO, Don Geller, DO, Raphael Rosales MD, Mariya Polk MD, Bienvenido Mason MD, Loretta Diaz MD,  Ruiz Marroquin MD, Nathalie Kenyon MD, Belén Bullard MD,  Rock Hugo DO, Betsy Ray MD, Abhijit Webster MD, Margarito Sequeira DO, Meghan Cote MD,  Jason Nunez DO, Irina French MD, Delfina Ely MD, Renetta Cody MD,  Moises Garnett MD, Chely Longo MD, Leif Mejia MD, Kevin Mccauley MD, Tyrese Du MD, Bhaskar Oswald MD, Max Mcgrath, DO, Andra Germain MD, Latasha Carr DO, Rico Todd MD, Rock Hicks MD, Mohsin Reza, MD, Lucas Tinajero MD, Shirley Waterhouse, CNP,  Amelia Leigh, CNP, Max Bruno, CNP,  Beverly Martinez, JACK, Marianna Gleason CNP, Delia Malcolm, CNP, Sumaya Navas, CNP, Claire Garnica, CNP, Jessica Pantoja, PA-C, Di Barger, CNP, Angelina Marcus, CNP,  Desirae Marie, CNP, Michelle Marquez, CNP, Juan Burton, PA-C, Shantell Villalta, PA-C, Chana Boo, CNP,        Rere Jarrett, CNS, Carlee Israel, CNP, Chen Mackey, CNP       [facility]   IN-PATIENT SERVICE   [facility] -     Progress Note    6/23/2025    9:36 AM    Name:   Graciela Holt  MRN:     [id number]     Acct:      [id number]   Room:   0166/0166-01   Day:  2  Admit Date:  6/21/2025  7:16 PM    PCP:   Travis Mason MD  Code Status:  Full Code    Subjective:     No acute events overnight.  She is comfortable sitting in bed.  Yesterday blood pressure was on the lower end of normal.  This morning better after small bolus of fluids.  She does not have any nausea vomiting diarrhea.      Brief History:     74-year-old female with a history of heart failure reduced ejection fraction, CABG who presents to the hospital for evaluation of chest pain.  Transferred to our facility for cardiology evaluation.  Tentative plans for cardiac catheterization.  Also needs titration of  30.4 29.6   RDW 17.4* 17.3* 17.2* 17.3*    244 215 215   MPV 10.5 10.4 11.1 10.8   INR 1.5 1.4  --   --      Chemistry:  Recent Labs     06/20/25 1747 06/20/25 1910 06/21/25  0437 06/21/25  1930 06/21/25  2347 06/22/25  0119 06/22/25  0602 06/22/25 2059 06/23/25  0559     --  143  --   --   --  141  --  139   K 4.0  --  3.6*  --   --   --  3.8  --  3.8   *  --  110*  --   --   --  107  --  106   CO2 23  --  24  --   --   --  24  --  25   GLUCOSE 116*  --  147*  --   --   --  106*  --  146*   BUN 35*  --  33*  --   --   --  30*  --  33*   CREATININE 1.3*  --  1.2  --   --   --  1.3*  --  1.5*   ANIONGAP 10  --  9  --   --   --  10  --  8*   LABGLOM 43*  --  47*  --   --   --  43*  --  36*   CALCIUM 9.0  --  8.4*  --   --   --  8.3*  --  8.0*   PROBNP 4,090*  --   --  2,777*  --   --   --   --   --    TROPHS 33*   < >  --  36* 37* 39*  --   --   --    LACTACIDWB  --   --   --   --   --   --   --  1.5  --     < > = values in this interval not displayed.     Recent Labs     06/20/25 1747 06/22/25  0601 06/22/25 2059   AST 25  --  22   ALT 29  --  19   ALKPHOS 111*  --  107*   BILITOT 0.4  --  0.3   BILIDIR 0.2  --  0.2   POCGLU  --  100  --      ABG:  Lab Results   Component Value Date/Time    POCPH 7.376 02/06/2018 10:16 PM    POCPCO2 37.1 02/06/2018 10:16 PM    POCPO2 83.5 02/06/2018 10:16 PM    POCHCO3 21.8 02/06/2018 10:16 PM    NBEA 3 02/06/2018 10:16 PM    PBEA NOT REPORTED 02/06/2018 10:16 PM    MHF8DPV 23 02/06/2018 10:16 PM    PRIJ8SJT 96 02/06/2018 10:16 PM    FIO2 40.0 02/06/2018 10:16 PM     Lab Results   Component Value Date/Time    SPECIAL Site: Urine 01/10/2025 07:08 PM     Lab Results   Component Value Date/Time    CULTURE ESCHERICHIA COLI >100,000 CFU/ML (A) 05/21/2025 01:46 PM       Radiology:  XR CHEST PORTABLE  Result Date: 6/22/2025  Enlargement of the cardiac silhouette with diffuse increased interstitial changes suggestive of pulmonary edema.     XR CHEST (2 VW)  Result

## 2025-06-24 ENCOUNTER — TELEPHONE (OUTPATIENT)
Dept: FAMILY MEDICINE CLINIC | Age: 75
End: 2025-06-24

## 2025-06-24 VITALS
SYSTOLIC BLOOD PRESSURE: 124 MMHG | TEMPERATURE: 98.1 F | OXYGEN SATURATION: 96 % | DIASTOLIC BLOOD PRESSURE: 70 MMHG | BODY MASS INDEX: 21.94 KG/M2 | HEART RATE: 54 BPM | WEIGHT: 128.53 LBS | HEIGHT: 64 IN | RESPIRATION RATE: 13 BRPM

## 2025-06-24 LAB
EKG ATRIAL RATE: 63 BPM
EKG ATRIAL RATE: 65 BPM
EKG P AXIS: -28 DEGREES
EKG P-R INTERVAL: 154 MS
EKG P-R INTERVAL: 166 MS
EKG Q-T INTERVAL: 430 MS
EKG Q-T INTERVAL: 478 MS
EKG QRS DURATION: 114 MS
EKG QRS DURATION: 114 MS
EKG QTC CALCULATION (BAZETT): 447 MS
EKG QTC CALCULATION (BAZETT): 489 MS
EKG R AXIS: -14 DEGREES
EKG R AXIS: -18 DEGREES
EKG T AXIS: 156 DEGREES
EKG T AXIS: 162 DEGREES
EKG VENTRICULAR RATE: 63 BPM
EKG VENTRICULAR RATE: 65 BPM

## 2025-06-24 PROCEDURE — 99233 SBSQ HOSP IP/OBS HIGH 50: CPT | Performed by: NURSE PRACTITIONER

## 2025-06-24 PROCEDURE — 97162 PT EVAL MOD COMPLEX 30 MIN: CPT

## 2025-06-24 PROCEDURE — 97116 GAIT TRAINING THERAPY: CPT

## 2025-06-24 PROCEDURE — 6370000000 HC RX 637 (ALT 250 FOR IP): Performed by: INTERNAL MEDICINE

## 2025-06-24 PROCEDURE — 94640 AIRWAY INHALATION TREATMENT: CPT

## 2025-06-24 PROCEDURE — 99232 SBSQ HOSP IP/OBS MODERATE 35: CPT | Performed by: STUDENT IN AN ORGANIZED HEALTH CARE EDUCATION/TRAINING PROGRAM

## 2025-06-24 PROCEDURE — 97530 THERAPEUTIC ACTIVITIES: CPT

## 2025-06-24 PROCEDURE — 6370000000 HC RX 637 (ALT 250 FOR IP): Performed by: NURSE PRACTITIONER

## 2025-06-24 PROCEDURE — 6370000000 HC RX 637 (ALT 250 FOR IP): Performed by: STUDENT IN AN ORGANIZED HEALTH CARE EDUCATION/TRAINING PROGRAM

## 2025-06-24 PROCEDURE — 2500000003 HC RX 250 WO HCPCS: Performed by: STUDENT IN AN ORGANIZED HEALTH CARE EDUCATION/TRAINING PROGRAM

## 2025-06-24 PROCEDURE — 94761 N-INVAS EAR/PLS OXIMETRY MLT: CPT

## 2025-06-24 RX ORDER — WARFARIN SODIUM 2.5 MG/1
TABLET ORAL
Qty: 30 TABLET | Refills: 0
Start: 2025-06-24

## 2025-06-24 RX ORDER — ASPIRIN 81 MG/1
81 TABLET, CHEWABLE ORAL DAILY
Qty: 30 TABLET | Refills: 3 | Status: SHIPPED | OUTPATIENT
Start: 2025-06-25

## 2025-06-24 RX ADMIN — SODIUM CHLORIDE, PRESERVATIVE FREE 10 ML: 5 INJECTION INTRAVENOUS at 09:10

## 2025-06-24 RX ADMIN — APIXABAN 5 MG: 5 TABLET, FILM COATED ORAL at 09:10

## 2025-06-24 RX ADMIN — AMIODARONE HYDROCHLORIDE 200 MG: 200 TABLET ORAL at 09:10

## 2025-06-24 RX ADMIN — CARVEDILOL 3.12 MG: 6.25 TABLET, FILM COATED ORAL at 09:10

## 2025-06-24 RX ADMIN — ASPIRIN 81 MG: 81 TABLET, CHEWABLE ORAL at 09:10

## 2025-06-24 RX ADMIN — BUDESONIDE AND FORMOTEROL FUMARATE DIHYDRATE 2 PUFF: 160; 4.5 AEROSOL RESPIRATORY (INHALATION) at 08:25

## 2025-06-24 RX ADMIN — FERROUS SULFATE TAB EC 325 MG (65 MG FE EQUIVALENT) 325 MG: 325 (65 FE) TABLET DELAYED RESPONSE at 09:10

## 2025-06-24 NOTE — PLAN OF CARE
Problem: Chronic Conditions and Co-morbidities  Goal: Patient's chronic conditions and co-morbidity symptoms are monitored and maintained or improved  6/23/2025 2118 by Katlyn Roman RN  Outcome: Progressing     Problem: ABCDS Injury Assessment  Goal: Absence of physical injury  6/23/2025 2118 by Katlyn Roman, RN  Outcome: Progressing     Problem: Safety - Adult  Goal: Free from fall injury  6/23/2025 2118 by Katlyn Roman RN  Outcome: Progressing     Problem: Pain  Goal: Verbalizes/displays adequate comfort level or baseline comfort level  6/23/2025 2118 by Katlyn Roman RN  Outcome: Progressing

## 2025-06-24 NOTE — PROGRESS NOTES
[facility]  Progress Note                   Date:   6/24/2025  Patient name: Graciela Holt  Date of admission:  6/21/2025  7:16 PM  MRN:   [id number]  YOB: 1950  PCP: Travis Mason MD    Reason for Admission: Unstable angina (HCC) [I20.0]  Chest pain, unspecified type [R07.9]    Subjective:       Clinical Changes /Abnormalities: Pt seen and examined in the room.  Patient resting in bed. Pt denies any CP or sob.  Labs, vitals and tele reviewed- SB 56   S/P LHC via right femoral access    Medications:   Scheduled Meds:   [Held by provider] bumetanide  0.5 mg Oral Daily    famotidine  20 mg Oral QPM    apixaban  5 mg Oral BID    amiodarone  200 mg Oral Daily    atorvastatin  80 mg Oral Nightly    budesonide-formoterol  2 puff Inhalation BID RT    carvedilol  3.125 mg Oral BID WC    ferrous sulfate  325 mg Oral Daily with breakfast    traZODone  50 mg Oral Nightly    vitamin D  50,000 Units Oral Weekly    sodium chloride flush  5-40 mL IntraVENous 2 times per day    aspirin  81 mg Oral Daily     Continuous Infusions:   sodium chloride       CBC:   Recent Labs     06/21/25  2347 06/22/25  0602 06/23/25  0559   WBC 5.4 4.4 4.8   HGB 9.4* 9.0* 8.5*    215 215     BMP:    Recent Labs     06/22/25  0602 06/23/25  0559    139   K 3.8 3.8    106   CO2 24 25   BUN 30* 33*   CREATININE 1.3* 1.5*   GLUCOSE 106* 146*     Hepatic:  Recent Labs     06/22/25 2059   AST 22   ALT 19   BILITOT 0.3   ALKPHOS 107*     Troponin:   Recent Labs     06/21/25  1930 06/21/25  2347 06/22/25  0119   TROPHS 36* 37* 39*     BNP: No results for input(s): \"BNP\" in the last 72 hours.  Lipids: No results for input(s): \"CHOL\", \"HDL\" in the last 72 hours.    Invalid input(s): \"LDLCALCU\"  INR:   Recent Labs     06/21/25 2347   INR 1.4       Objective:   Vitals: /70   Pulse 54   Temp 98.1 °F (36.7 °C) (Oral)   Resp 13   Ht 1.626 m (5' 4\")   Wt 58.3 kg (128 lb 8.5 oz)   SpO2 96%   BMI

## 2025-06-24 NOTE — PLAN OF CARE
Problem: Chronic Conditions and Co-morbidities  Goal: Patient's chronic conditions and co-morbidity symptoms are monitored and maintained or improved  6/24/2025 1321 by Kenya Vasquez RN  Outcome: Adequate for Discharge  6/24/2025 1002 by Kenya Vasquez RN  Outcome: Progressing  6/24/2025 0618 by Maris Gordillo RN  Outcome: Progressing     Problem: ABCDS Injury Assessment  Goal: Absence of physical injury  6/24/2025 1321 by Kenya Vasquez RN  Outcome: Adequate for Discharge  6/24/2025 1002 by Kenya Vasquez RN  Outcome: Progressing  6/24/2025 0618 by Maris Gordillo RN  Outcome: Progressing     Problem: Safety - Adult  Goal: Free from fall injury  6/24/2025 1321 by Kenya Vasquez RN  Outcome: Adequate for Discharge  6/24/2025 1002 by Kenya Vasquez RN  Outcome: Progressing  6/24/2025 0618 by Maris Gordillo RN  Outcome: Progressing     Problem: Pain  Goal: Verbalizes/displays adequate comfort level or baseline comfort level  6/24/2025 1321 by Kenya Vasquez RN  Outcome: Adequate for Discharge  6/24/2025 1002 by Kenya Vasquez RN  Outcome: Progressing  6/24/2025 0618 by Maris Gordillo RN  Outcome: Progressing     Problem: Respiratory - Adult  Goal: Achieves optimal ventilation and oxygenation  6/24/2025 1321 by Kenya Vasquez RN  Outcome: Adequate for Discharge  6/24/2025 1002 by Kenya Vasquez RN  Outcome: Progressing  Flowsheets (Taken 6/24/2025 0825 by Paz Singh RCP)  Achieves optimal ventilation and oxygenation:   Assess for changes in respiratory status   Respiratory therapy support as indicated  6/24/2025 0618 by Maris Gordillo RN  Outcome: Progressing  6/24/2025 0542 by Kylah Vasquez RCP  Outcome: Progressing

## 2025-06-24 NOTE — TELEPHONE ENCOUNTER
[facility] Transitions Initial Follow Up Call    Outreach made within 2 business days of discharge: Yes    Patient: Graciela Hotl Patient : 1950   MRN: [id number]  Reason for Admission: There are no discharge diagnoses documented for the most recent discharge.  Discharge Date: 25            If Virtual visit made for hospital follow up, advise patient to make sure to have family member present to help assist with visit. Please make sure mobile number is updated in patient chart.     Discharge department/facility:  Current  2025 - present (3 days)  [facility]       Bhaskar Oswald MD  Last attending  Treatment team Unstable angina (HCC)  Principal problem          Scheduled appointment with PCP within 7-14 days    Follow Up  Future Appointments   Date Time Provider Department Center   2025  7:50 AM [facility] MEDICATION MGMT ROOM 2 [facility] MED [facility]   2025  8:30 AM [facility] MEDICATION MGMT ROOM 2 [facility] MED [facility]   2025  3:30 PM Glenys Mccoy MD [facility]   2025 10:00 AM [facility] CHF CLINIC RM 1 [facility] [place]   2025  3:00 PM Yessica Flynn DO [facility]   2025 12:00 PM Travis Mason MD [facility] DEP       Violeta Mazariegos

## 2025-06-24 NOTE — PROGRESS NOTES
Physical Therapy  Facility/Department: [address] BURN UNIT   Physical Therapy Initial Evaluation    Patient Name: rGaciela Holt        MRN: [id number]    : 1950    Date of Service: 2025    Chief Complaint   Patient presents with    Chest Pain     Past Medical History:  has a past medical history of Allergic rhinitis, Arthritis, CAD (coronary artery disease), Cerebral artery occlusion with cerebral infarction (HCC), CHF (congestive heart failure) (HCC), Controlled type 2 diabetes mellitus without complication, without long-term current use of insulin (HCC), COPD (chronic obstructive pulmonary disease) (HCC), Depression, Former smoker, History of blood transfusion, Hyperlipidemia, Hypertension, Kidney stone, Myocardial infarction (HCC), Obesity, Class I, BMI 30.0-34.9 (see actual BMI), Restless leg syndrome, Skin abnormality, Type II or unspecified type diabetes mellitus without mention of complication, not stated as uncontrolled, Wears glasses, and Wears partial dentures.  Past Surgical History:  has a past surgical history that includes Appendectomy; Hysterectomy; Cholecystectomy; joint replacement (Bilateral); pr office/outpt visit,procedure only (N/A, 2018); pr i&d deep absc bursa/hematoma thigh/knee region (Right, 2018); Leg biopsy excision (Right, 2019); Cardiac surgery; Cardiac surgery; other surgical history (2020); cervical laminectomy (N/A, 10/14/2020); bronchoscopy (N/A, 2021); Thoracic Fusion (01/10/2025); cervical fusion (N/A, 1/10/2025); Upper gastrointestinal endoscopy (N/A, 2025); and Cardiac procedure (N/A, 2025).    Discharge Recommendations  Discharge Recommendations: No therapy recommended at discharge  PT Equipment Recommendations  Equipment Needed: No    Assessment  Body Structures, Functions, Activity Limitations Requiring Skilled Therapeutic Intervention: Decreased functional mobility , Decreased strength, Decreased safe awareness, Decreased  RLE  Strength RLE: Exception  Comment: Knee extension, ankle DF and PF WFL.  R Hip Flexion: 3+/5  Strength LLE  Strength LLE: Exception  Comment: Knee extension, ankle DF and PF WFL.  L Hip Flexion: 3+/5  Strength RUE  Strength RUE: WFL  Comment: Elbow flexion, extension, and  strength WFL.  Strength LUE  Strength LUE: WFL  Comment: Elbow flexion, extension, and  strength WFL.    Mobility   Bed mobility  Supine to Sit: Stand by assistance  Sit to Supine:  (pt retired to chair)  Scooting: Stand by assistance  Bed Mobility Comments: Pt performed bed mobility with SBA with increased time and with use of bed rail.         Transfers  Sit to Stand: Stand by assistance  Stand to Sit: Stand by assistance  Comment: Assessed with no AD. Pt denies dizziness coming to stand.    Ambulation  Surface: Level tile  Device: No Device  Assistance: Contact guard assistance  Gait Deviations: Slow Lindsay;Decreased step height;Decreased step length  Distance: 50' + 50'  Comments: Pt tends to reach for walls and railings for external support.    Stairs/Curb  Stairs?: Yes  Stairs  # Steps : 8 (4 steps 2x)  Stairs Height: 6\"  Rails: Bilateral  Device: No Device  Comment: Pt able to clear step without diffuculty using B railings.          Balance   Balance  Posture: Fair  Sitting - Static: Good  Sitting - Dynamic: Good  Standing - Static: Fair;+  Standing - Dynamic: Fair;+  Comments: Standing balance assessed with no AD.           Patient Education  Patient Education  Education Given To: Patient  Education Provided: Role of Therapy;Transfer Training;Mobility Training  Education Method: Demonstration;Verbal  Barriers to Learning: None  Education Outcome: Verbalized understanding;Demonstrated understanding    Plan  Physical Therapy Plan  General Plan: Discharge with evaluation only    Goals  Patient Goals   Patient Goals : Return home  Short Term Goals  Time Frame for Short Term Goals: 14 visits  Short Term Goal 1: Pt able to

## 2025-06-24 NOTE — PLAN OF CARE
Problem: Chronic Conditions and Co-morbidities  Goal: Patient's chronic conditions and co-morbidity symptoms are monitored and maintained or improved  6/24/2025 1002 by Kenya Vasquez RN  Outcome: Progressing  6/24/2025 0618 by Maris Gordillo RN  Outcome: Progressing  6/23/2025 2118 by Katlyn Roman RN  Outcome: Progressing     Problem: ABCDS Injury Assessment  Goal: Absence of physical injury  6/24/2025 1002 by Kenya Vasquez RN  Outcome: Progressing  6/24/2025 0618 by Maris Gordillo RN  Outcome: Progressing  6/23/2025 2118 by Katlyn Roman RN  Outcome: Progressing     Problem: Safety - Adult  Goal: Free from fall injury  6/24/2025 1002 by Kenya Vasquez RN  Outcome: Progressing  6/24/2025 0618 by Maris Gordillo RN  Outcome: Progressing  6/23/2025 2118 by Katlyn Roman RN  Outcome: Progressing     Problem: Pain  Goal: Verbalizes/displays adequate comfort level or baseline comfort level  6/24/2025 1002 by Kenya Vasquez RN  Outcome: Progressing  6/24/2025 0618 by Maris Gordillo RN  Outcome: Progressing  6/23/2025 2118 by Katlyn Roman RN  Outcome: Progressing     Problem: Respiratory - Adult  Goal: Achieves optimal ventilation and oxygenation  6/24/2025 1002 by Kenya Vasquez RN  Outcome: Progressing  Flowsheets (Taken 6/24/2025 0825 by Paz Singh RCP)  Achieves optimal ventilation and oxygenation:   Assess for changes in respiratory status   Respiratory therapy support as indicated  6/24/2025 0618 by Maris Gordillo RN  Outcome: Progressing  6/24/2025 0542 by Kylah Vasquez RCP  Outcome: Progressing  6/23/2025 2118 by Katlyn Roman RN  Outcome: Progressing

## 2025-06-24 NOTE — PLAN OF CARE
Problem: Chronic Conditions and Co-morbidities  Goal: Patient's chronic conditions and co-morbidity symptoms are monitored and maintained or improved  6/24/2025 0618 by Maris Gordillo RN  Outcome: Progressing  6/23/2025 2118 by Katlyn Roman RN  Outcome: Progressing  6/23/2025 1642 by Steve Campbell RN  Outcome: Progressing     Problem: ABCDS Injury Assessment  Goal: Absence of physical injury  6/24/2025 0618 by Maris Gordillo RN  Outcome: Progressing  6/23/2025 2118 by Katlyn Roman RN  Outcome: Progressing  6/23/2025 1642 by Steve Capmbell RN  Outcome: Progressing     Problem: Safety - Adult  Goal: Free from fall injury  6/24/2025 0618 by Maris Gordillo RN  Outcome: Progressing  6/23/2025 2118 by Katlyn Roman RN  Outcome: Progressing  6/23/2025 1642 by Steve Campbell RN  Outcome: Progressing     Problem: Pain  Goal: Verbalizes/displays adequate comfort level or baseline comfort level  6/24/2025 0618 by Maris Gordillo RN  Outcome: Progressing  6/23/2025 2118 by Katlyn Roman RN  Outcome: Progressing  6/23/2025 1642 by Steve Campbell RN  Outcome: Progressing     Problem: Respiratory - Adult  Goal: Achieves optimal ventilation and oxygenation  6/24/2025 0618 by Maris Gordillo RN  Outcome: Progressing  6/24/2025 0542 by Kylah Vasquez RCP  Outcome: Progressing  6/23/2025 2118 by Katlyn Roman RN  Outcome: Progressing  6/23/2025 1642 by Steve Campbell RN  Outcome: Progressing

## 2025-06-24 NOTE — PROGRESS NOTES
Physician Progress Note      PATIENT:               IVETT ANDRE  CSN #:                  [id number]  :                       1950  ADMIT DATE:       2025 5:27 PM  DISCH DATE:        2025 7:13 PM  RESPONDING  PROVIDER #:        Pearl Choudhary MD          QUERY TEXT:    Based on your medical judgment, please clarify these findings and document if   any of the following are being evaluated and/or treated:    The clinical indicators include:  Patient is an 74-year-old female with hypertension and CHF, history of Atrial   fibrillation  Clinical Indicators: maintained on Coumadin  Treatment: Coumadin management, bleeding precautions  Options provided:  -- Secondary hypercoagulable state in a patient with atrial fibrillation  -- Other - I will add my own diagnosis  -- Disagree - Not applicable / Not valid  -- Disagree - Clinically unable to determine / Unknown  -- Refer to Clinical Documentation Reviewer    PROVIDER RESPONSE TEXT:    This patient has secondary hypercoagulable state in a patient with atrial   fibrillation.    Query created by: Paris Yang on 2025 10:36 AM      Electronically signed by:  Pearl Choudhary MD 2025 2:45 PM           PARKER SINGH    Patient Age: 70 year old   Refill request by: Pharmacy phone  Refill to be: ePrescribed to [facility] pharmacy    Henny from [facility] states they have not yet received the following scripts for patient.    Medication requested to be refilled:     clonazePAM (KLONOPIN) 1 MG tablet   fluticasone (FLONASE) 50 MCG/ACT nasal spray   latanoprost (XALATAN) 0.005 % ophthalmic solution     Medication confirmed with caller.    Routing to [facility] Pool       Current Meds:  Current Outpatient Medications   Medication Sig Dispense Refill   • doxazosin (CARDURA) 2 MG tablet Take 1 tablet by mouth nightly. Take together with the 4 mg tab doxazosin 90 tablet 0   • doxazosin (CARDURA) 4 MG tablet Take 1 tablet by mouth nightly. 90 tablet 0   • metoPROLOL succinate (TOPROL-XL) 100 MG 24 hr tablet Take 1 tablet by mouth daily. 90 tablet 0   • pravastatin (PRAVACHOL) 10 MG tablet Take 1 tablet by mouth daily. 90 tablet 0   • enalapril (VASOTEC) 20 MG tablet Take 1 tablet by mouth 2 times daily. 180 tablet 0   • finasteride (PROSCAR) 5 MG tablet Take 1 tablet by mouth daily. 90 tablet 0   • amLODIPine (NORVASC) 5 MG tablet Take 1 tablet by mouth daily. 90 tablet 0   • omeprazole (PRILOSEC) 40 MG capsule Take 1 capsule by mouth daily. 90 capsule 0   • metformin (GLUCOPHAGE) 1000 MG tablet Take 1 tablet by mouth 2 times daily (with meals). 180 tablet 0   • clonazePAM (KLONOPIN) 1 MG tablet Take 1 tablet by mouth nightly as needed for Anxiety. 30 tablet 0   • fluticasone (FLONASE) 50 MCG/ACT nasal spray Spray 2 sprays in each nostril daily. 3 Inhaler 3   • latanoprost (XALATAN) 0.005 % ophthalmic solution Place 1 drop into both eyes nightly. 2.5 mL 12     No current facility-administered medications for this visit.          Next and Last Visit with Provider and Department  Last visit with this provider: 4/14/2020  Last visit with this department: 4/14/2020    Next visit with  this provider: Visit date not found  Next visit with this department: Visit date not found    WEIGHT AND HEIGHT:   Wt Readings from Last 1 Encounters:   12/09/19 72.6 kg (160 lb)     Ht Readings from Last 1 Encounters:   12/09/19 5' 3\" (1.6 m)     BMI Readings from Last 1 Encounters:   12/09/19 28.34 kg/m²       ALLERGIES:  Penicillins  Current Outpatient Medications   Medication   • doxazosin (CARDURA) 2 MG tablet   • doxazosin (CARDURA) 4 MG tablet   • metoPROLOL succinate (TOPROL-XL) 100 MG 24 hr tablet   • pravastatin (PRAVACHOL) 10 MG tablet   • enalapril (VASOTEC) 20 MG tablet   • finasteride (PROSCAR) 5 MG tablet   • amLODIPine (NORVASC) 5 MG tablet   • omeprazole (PRILOSEC) 40 MG capsule   • metformin (GLUCOPHAGE) 1000 MG tablet   • clonazePAM (KLONOPIN) 1 MG tablet   • fluticasone (FLONASE) 50 MCG/ACT nasal spray   • latanoprost (XALATAN) 0.005 % ophthalmic solution     No current facility-administered medications for this visit.      PHARMACY to use: [facility]               CALL BACK INFO: Ok to leave response (including medical information) with family member or on answering machine  ROUTING: Patient's physician/staff        PCP: Roman Dreyer, MD         INS: Payor: [employer] - [employer] / Plan: [employer] H[employer] - [employer] / Product Type: [facility] Risk   PATIENT ADDRESS:  [address] [place] [address]

## 2025-06-24 NOTE — PLAN OF CARE
Problem: Respiratory - Adult  Goal: Achieves optimal ventilation and oxygenation  6/24/2025 0542 by Kylah Vasquez RCP  Outcome: Progressing

## 2025-06-24 NOTE — PROGRESS NOTES
Congestive Heart Failure Education completed and charted. CHF booklet given. Patient was receptive to education.    Discussed the  importance of medication compliance.    Discussed the importance of a heart healthy diet. Discussed 2000 mg sodium-restricted daily diet.  Patient instructed to limit fluid intake to  1.5 to 2 liters per day.    Patient instructed to weigh self at the same time of each day each morning, reinforced teaching to monitor for 3-5 lb weight increase over 1-2 days notify physician if change noted.      Signs and symptoms of CHF discussed with patient, such as feeling more tired than normal, feeling short of breath, coughing that increases when lying down, sudden weight gain, swelling of the feet, legs or belly.  Patient verbalized understanding to notify physician office if these symptoms occur.  EF 25%   Pt is established with [facility]

## 2025-06-24 NOTE — DISCHARGE SUMMARY
[facility]  Office: [phone]  Dario Sequeira DO, Gunner De La Paz, DO, Christo Pizano DO, Don Geller DO, Raphael Rosales MD, Mariya Polk MD, Bienvenido Mason MD, Loretta Diaz MD,  Ruiz Marroquin MD, Nathalie Kenyon MD, Belén Bullard MD,  Rock Hugo DO, Betsy Ray MD, Abhijit Webster MD, Margarito Sequeira DO, Meghan Cote MD,  Jason Nunez DO, Irina French MD, Delfina Ely MD, Renetta Cody MD,  Moises Garnett MD, Chely Longo MD, Leif Mejia MD, Kevin Mccauley MD, Tyrese Du MD, Bhaskar Oswald MD, Max Mcgrath DO, Andra Germain MD, Latasha Carr DO, Rico Todd MD, Rock Hicks MD, Mohsin Reza, MD, Lucas Tinajero MD, Shirley Waterhouse, CNP,  Amelia Leigh CNP, Max Bruno, CNP,  Beverly Martinez, JACK, Marianna Gleason CNP, Delia Malcolm, CNP, Sumaya Navas, CNP, Claire Garnica, CNP, Jessica Pantoja, PA-C, Di Barger, CNP, Angelina Marcus, CNP,  Desirae Marie, CNP, Michelle Marquez, CNP, Juan Burton PA-C, Shantell Villalta PA-C, Chana Boo, CNP,        Rere Jarrett, DYLLAN, Carlee Israel, CNP, Chen Mackey, CNP         [facility]   IN-PATIENT SERVICE   [facility]    Discharge Summary     Patient ID: Graciela Holt  :  1950   MRN: [id number]     ACCOUNT:  [id number]   Patient's PCP: Travis Mason MD  Admit Date: 2025   Discharge Date: 2025     Length of Stay: 3  Code Status:  Full Code  Admitting Physician: No admitting provider for patient encounter.  Discharge Physician: Bhaskar Oswald MD     Active Discharge Diagnoses:     Hospital Problem Lists:  Principal Problem:    Unstable angina (HCC)  Active Problems:    Mixed hyperlipidemia    Chronic obstructive pulmonary disease (HCC)    Coronary artery disease involving native coronary artery of native heart    Primary insomnia    Restless leg syndrome    Former smoker    Chronic bilateral low back pain with left-sided sciatica    Hypothyroidism    S/P  2.5 MG tablet  Commonly known as: PROAMATINE               Where to Get Your Medications        These medications were sent to [facility] - [place], [place] - [address] - P [phone] - F [phone]  [facility][note 3][address], [place] [address]      Phone: [phone]   aspirin 81 MG chewable tablet       Information about where to get these medications is not yet available    Ask your nurse or doctor about these medications  warfarin 2.5 MG tablet         No discharge procedures on file.    Time Spent on discharge is  40 mins in patient examination, evaluation, counseling as well as medication reconciliation, prescriptions for required medications, discharge plan and follow up.    Electronically signed by   Bhaskar Oswald MD  6/24/2025  2:31 PM      Thank you Travis Vaughn MD for the opportunity to be involved in this patient's care.

## 2025-06-24 NOTE — PROGRESS NOTES
Physician Progress Note      PATIENT:               IVETT ANDRE  CSN #:                  [id number]  :                       1950  ADMIT DATE:       2025 12:03 PM  DISCH DATE:        2025 4:01 PM  RESPONDING  PROVIDER #:        Pearl Choudhary MD          QUERY TEXT:    Congestive Heart Failure is documented in the medical record Progress Notes by   Tommy Flynn MD at 2025.  Please document further specificity   regarding the most likely etiology of the CHF:    The clinical indicators include:  Patient presented with Shortness of Breath:  Female,74yrs,HTN on PN     -\"Ischemic cardiomyopathy congestive heart failure systolic and diastolic   Resume diuretics, losartan and carvedilol\"(Progress Notes by Tommy Flynn MD at 2025)    -\"Severely reduced LV systolic function ejection fraction 25 to 30%, akinetic   LV apex, apical thrombus 2D echo 2024\"(Progress Notes by Tommy Flynn MD at 2025)    -\" Essential hypertension-Hypotensive .  Currently normotensive.  Will   continue to monitor pressures\"(Progress Notes by Tommy Flynn MD at   2025)    -\"Ischemic cardiomyopathy congestive heart failure systolic and   diastolic\"(Progress Notes by Tommy Flynn MD at 2025 )    -\"Ejection fraction: 25% dilated LV, moderate increased LV wall thickness   Global hypokinesis, akinesis of the apical lateral and anterolateral walls   akinetic apex dilated LA\"(Progress Notes by Tommy Flynn MD at 2025 )    -\" Left Ventricle: Severely reduced left ventricular systolic function with a   visually estimated EF of 20 - 25%. Left ventricle is severely dilated.   Increased wall thickness. Findings consistent with concentric hypertrophy.   Severe global hypokinesis present. Abnormal diastolic function. There is a   mass present that is echogenic and pedunculated in the apex consistent with   thrombus.  -  Mitral Valve: Mild annular calcification. Mildly

## 2025-06-25 ENCOUNTER — PHARMACY VISIT (OUTPATIENT)
Age: 75
End: 2025-06-25
Payer: COMMERCIAL

## 2025-06-25 ENCOUNTER — ANTI-COAG VISIT (OUTPATIENT)
Age: 75
End: 2025-06-25
Payer: COMMERCIAL

## 2025-06-25 VITALS — OXYGEN SATURATION: 96 % | BODY MASS INDEX: 22.9 KG/M2 | HEART RATE: 72 BPM | WEIGHT: 133.4 LBS

## 2025-06-25 DIAGNOSIS — I48.20 CHRONIC ATRIAL FIBRILLATION (HCC): Primary | ICD-10-CM

## 2025-06-25 DIAGNOSIS — I50.9 CHRONIC HEART FAILURE, UNSPECIFIED HEART FAILURE TYPE (HCC): Primary | ICD-10-CM

## 2025-06-25 LAB
INTERNATIONAL NORMALIZATION RATIO, POC: 1.3
PROTHROMBIN TIME, POC: 16

## 2025-06-25 PROCEDURE — 99212 OFFICE O/P EST SF 10 MIN: CPT

## 2025-06-25 PROCEDURE — 85610 PROTHROMBIN TIME: CPT

## 2025-06-25 PROCEDURE — 99213 OFFICE O/P EST LOW 20 MIN: CPT

## 2025-06-25 RX ORDER — BUMETANIDE 0.5 MG/1
0.5 TABLET ORAL DAILY
COMMUNITY

## 2025-06-25 NOTE — PATIENT INSTRUCTIONS
Continue Carvedilol 3.125 mg 1 tablet twice a day  Restart Jardiance 10 mg daily  Restart Bumex 0.5 mg daily   Check weight first thing in morning and record daily   Call with weight gain of 3 pounds per day or 5 pounds per week  Check Blood pressure daily and record

## 2025-06-25 NOTE — PROGRESS NOTES
Patient seen in person in Medication Management Service.    Patient states compliant all of the time with regimen.    No bleeding or thromboembolic side effects noted.  Some bruising on left arm.   No significant med or dietary changes.    No significant recent illness or disease state changes.      Patient acknowledges they are still an active patient of referring provider which is Travis Mason MD  Yes     PT/INR done via POC meter per protocol.  INR was supratherapeutic at 1.3.  (goal 2 - 3)    Warfarin regimen will be 5 mg on Wed and Sun and 2.5 mg all other days.  Will retest in 4 days.    Patient understands dosing directions and information discussed.  Dosing schedule and follow up appointment given to patient. Progress note routed to referring physicians office.  Patient acknowledges working in Consult Agreement with Pharmacist as referred by his/her physician/provider.      For Pharmacy Admin Tracking Only    Intervention Detail: Adherence Monitorin and Dose Adjustment: 1, reason: Therapy Optimization  Total # of Interventions Recommended: 2  Total # of Interventions Accepted: 2  Time Spent (min): 20      Ellyn Givens PharmD, BCPS 2025 1:15 PM

## 2025-06-25 NOTE — PROGRESS NOTES
[facility]  Medication Management  Pharmacist  Heart Failure    [address]  [place], [place] [address]  Phone: [phone]  Fax: [phone]      NAME: Graciela Holt  MEDICAL RECORD NUMBER:  [id number]  AGE: 74 y.o.   GENDER: female  : 1950  EPISODE DATE:  2025      Graciela Holt was referred to the [place] [facility] by Dr. Dr. Mccoy for heart failure services.  Special Instructions per the Referral Include: Medication Management    Dry weight: 133. pounds       ECHO EF%: 20-25%  Date:6/3/25             ECHO EF%: 20-25%  Date: 25      This visit was performed as:  THIS VISIT WAS PERFORMED AS: An in person visit. Protocols were followed with precautions to reduce the spread of COVID-19.     Subjective   Graciela Holt is a 74 y.o. female here for the Heart Failure Services.    They are here today for a comprehensive medication review including over the counter medications and herbal products, overall wellbeing assessment, transition of care, extensive education and any needed adjustments with updates and recommendations communicated to the referring physician.      New York Heart Association Classification based on patient symptoms:  Unable to determine    Shortness of Breath:   Dyspnea at rest    Other Heart Failure Findings:   Denies cough at night or when lying down  Denies lower extremity edema  Denies unusual fatigue  Denies early saiety or abdominal fullness    General Findings:  No other general findings noted     Comments:    - Uses two pillows     Diet: Canned veggies, frozen food  = watches sodium. Diet varies. Chicken and pork. Some veggies, baked beans and corn, green beans.   Fluids: 8 oz in am water, not tracking. Reminded to drink 64 oz max pr day, but not be dehydrated and drink when thirsty.   Exercise: walks when not too hot, usually about 30 min 2-3 times a week.     Pt is considering AICD placement. Had cath done on 25.

## 2025-06-27 ENCOUNTER — TELEPHONE (OUTPATIENT)
Dept: FAMILY MEDICINE CLINIC | Age: 75
End: 2025-06-27

## 2025-06-27 NOTE — TELEPHONE ENCOUNTER
Care Transitions Initial Follow Up Call    Outreach made within 2 business days of discharge: Yes    Patient: Graciela Holt Patient : 1950   MRN: [id number]  Reason for Admission: UNSTABLE ANGINA  Discharge Date: 25       Spoke with: DAINA    Discharge department/facility: [facility] Interactive Patient Contact:  Was patient able to fill all prescriptions:   Was patient instructed to bring all medications to the follow-up visit:   Is patient taking all medications as directed in the discharge summary?   Does patient understand their discharge instructions:   Does patient have questions or concerns that need addressed prior to 7-14 day follow up office visit:     Additional needs identified to be addressed with provider               Scheduled appointment with PCP within 7-14 days    Follow Up  Future Appointments   Date Time Provider Department Center   2025  1:30 PM [facility] MEDICATION MGMT ROOM 3 [facility]   2025  3:30 PM Glenys Mccoy MD [facility]   2025  3:10 PM [facility] MEDICATION MGMT ROOM 2 [facility] MED [facility]   2025 10:00 AM [facility] CHF CLINIC RM 1 [facility] [place]   2025  3:00 PM Yessica Flynn DO [facility]   2025 12:00 PM Travis Mason MD [facility] DEP       Verona Montenegro MA

## 2025-06-30 ENCOUNTER — TELEPHONE (OUTPATIENT)
Age: 75
End: 2025-06-30

## 2025-06-30 NOTE — TELEPHONE ENCOUNTER
No show for warfarin medication management appt  Called patient to reschedule  No Answer   Left VM  Important to see patient this week as last INR was 1.3

## 2025-07-01 ENCOUNTER — APPOINTMENT (OUTPATIENT)
Dept: GENERAL RADIOLOGY | Age: 75
End: 2025-07-01
Payer: COMMERCIAL

## 2025-07-01 ENCOUNTER — HOSPITAL ENCOUNTER (OUTPATIENT)
Age: 75
Setting detail: OBSERVATION
Discharge: HOME | End: 2025-07-05
Attending: EMERGENCY MEDICINE | Admitting: INTERNAL MEDICINE
Payer: COMMERCIAL

## 2025-07-01 DIAGNOSIS — I50.23 ACUTE ON CHRONIC SYSTOLIC CONGESTIVE HEART FAILURE (HCC): Primary | ICD-10-CM

## 2025-07-01 DIAGNOSIS — I50.21 ACUTE SYSTOLIC HEART FAILURE (HCC): ICD-10-CM

## 2025-07-01 PROBLEM — I50.9 HEART FAILURE (HCC): Status: ACTIVE | Noted: 2025-07-01

## 2025-07-01 LAB
ALBUMIN SERPL-MCNC: 3.8 G/DL (ref 3.5–5.2)
ALP SERPL-CCNC: 96 U/L (ref 35–104)
ALT SERPL-CCNC: 19 U/L (ref 10–35)
ANION GAP SERPL CALCULATED.3IONS-SCNC: 12 MMOL/L (ref 9–16)
AST SERPL-CCNC: 21 U/L (ref 10–35)
BASOPHILS # BLD: 0.04 K/UL (ref 0–0.2)
BASOPHILS NFR BLD: 1 % (ref 0–2)
BILIRUB SERPL-MCNC: 0.4 MG/DL (ref 0–1.2)
BNP SERPL-MCNC: 1592 PG/ML (ref 0–300)
BUN SERPL-MCNC: 22 MG/DL (ref 8–23)
CALCIUM SERPL-MCNC: 8.7 MG/DL (ref 8.6–10.4)
CHLORIDE SERPL-SCNC: 109 MMOL/L (ref 98–107)
CO2 SERPL-SCNC: 23 MMOL/L (ref 20–31)
CREAT SERPL-MCNC: 1.4 MG/DL (ref 0.7–1.2)
EOSINOPHIL # BLD: 0.18 K/UL (ref 0–0.44)
EOSINOPHILS RELATIVE PERCENT: 3 % (ref 0–4)
ERYTHROCYTE [DISTWIDTH] IN BLOOD BY AUTOMATED COUNT: 17.7 % (ref 11.5–14.9)
GFR, ESTIMATED: 39 ML/MIN/1.73M2
GLUCOSE SERPL-MCNC: 204 MG/DL (ref 74–99)
HCT VFR BLD AUTO: 28.2 % (ref 36–46)
HGB BLD-MCNC: 8.8 G/DL (ref 12–16)
IMM GRANULOCYTES # BLD AUTO: <0.03 K/UL (ref 0–0.3)
IMM GRANULOCYTES NFR BLD: 0 %
INR PPP: 1
LYMPHOCYTES NFR BLD: 1.25 K/UL (ref 1.1–3.7)
LYMPHOCYTES RELATIVE PERCENT: 22 % (ref 24–44)
MCH RBC QN AUTO: 26.6 PG (ref 26–34)
MCHC RBC AUTO-ENTMCNC: 31.2 G/DL (ref 31–37)
MCV RBC AUTO: 85.2 FL (ref 80–100)
MONOCYTES NFR BLD: 0.58 K/UL (ref 0.1–1.2)
MONOCYTES NFR BLD: 10 % (ref 3–12)
NEUTROPHILS NFR BLD: 64 % (ref 36–66)
NEUTS SEG NFR BLD: 3.54 K/UL (ref 1.5–8.1)
NRBC BLD-RTO: 0 PER 100 WBC
PLATELET # BLD AUTO: 240 K/UL (ref 150–450)
PMV BLD AUTO: 10.8 FL (ref 8–13.5)
POTASSIUM SERPL-SCNC: 3.4 MMOL/L (ref 3.7–5.3)
PROT SERPL-MCNC: 6 G/DL (ref 6.6–8.7)
PROTHROMBIN TIME: 14.6 SEC (ref 11.8–14.6)
RBC # BLD AUTO: 3.31 M/UL (ref 3.95–5.11)
SODIUM SERPL-SCNC: 144 MMOL/L (ref 136–145)
TROPONIN I SERPL HS-MCNC: 38 NG/L (ref 0–14)
WBC OTHER # BLD: 5.6 K/UL (ref 3.5–11)

## 2025-07-01 PROCEDURE — 84484 ASSAY OF TROPONIN QUANT: CPT

## 2025-07-01 PROCEDURE — 83880 ASSAY OF NATRIURETIC PEPTIDE: CPT

## 2025-07-01 PROCEDURE — 99285 EMERGENCY DEPT VISIT HI MDM: CPT

## 2025-07-01 PROCEDURE — 93005 ELECTROCARDIOGRAM TRACING: CPT | Performed by: EMERGENCY MEDICINE

## 2025-07-01 PROCEDURE — G0378 HOSPITAL OBSERVATION PER HR: HCPCS

## 2025-07-01 PROCEDURE — 71045 X-RAY EXAM CHEST 1 VIEW: CPT

## 2025-07-01 PROCEDURE — 6370000000 HC RX 637 (ALT 250 FOR IP)

## 2025-07-01 PROCEDURE — 80053 COMPREHEN METABOLIC PANEL: CPT

## 2025-07-01 PROCEDURE — 85610 PROTHROMBIN TIME: CPT

## 2025-07-01 PROCEDURE — 36415 COLL VENOUS BLD VENIPUNCTURE: CPT

## 2025-07-01 PROCEDURE — 6360000002 HC RX W HCPCS

## 2025-07-01 PROCEDURE — 85025 COMPLETE CBC W/AUTO DIFF WBC: CPT

## 2025-07-01 PROCEDURE — 96372 THER/PROPH/DIAG INJ SC/IM: CPT

## 2025-07-01 RX ORDER — ACETAMINOPHEN 650 MG/1
650 SUPPOSITORY RECTAL EVERY 6 HOURS PRN
Status: DISCONTINUED | OUTPATIENT
Start: 2025-07-01 | End: 2025-07-05 | Stop reason: HOSPADM

## 2025-07-01 RX ORDER — FERROUS SULFATE 325(65) MG
325 TABLET ORAL
Status: DISCONTINUED | OUTPATIENT
Start: 2025-07-02 | End: 2025-07-05 | Stop reason: HOSPADM

## 2025-07-01 RX ORDER — POLYETHYLENE GLYCOL 3350 17 G/17G
17 POWDER, FOR SOLUTION ORAL DAILY PRN
Status: DISCONTINUED | OUTPATIENT
Start: 2025-07-01 | End: 2025-07-05 | Stop reason: HOSPADM

## 2025-07-01 RX ORDER — SENNA AND DOCUSATE SODIUM 50; 8.6 MG/1; MG/1
1 TABLET, FILM COATED ORAL DAILY
Status: DISCONTINUED | OUTPATIENT
Start: 2025-07-02 | End: 2025-07-05 | Stop reason: HOSPADM

## 2025-07-01 RX ORDER — ONDANSETRON 2 MG/ML
4 INJECTION INTRAMUSCULAR; INTRAVENOUS EVERY 6 HOURS PRN
Status: DISCONTINUED | OUTPATIENT
Start: 2025-07-01 | End: 2025-07-05 | Stop reason: HOSPADM

## 2025-07-01 RX ORDER — ALBUTEROL SULFATE 90 UG/1
2 INHALANT RESPIRATORY (INHALATION) EVERY 6 HOURS PRN
Status: DISCONTINUED | OUTPATIENT
Start: 2025-07-01 | End: 2025-07-05 | Stop reason: HOSPADM

## 2025-07-01 RX ORDER — AMIODARONE HYDROCHLORIDE 200 MG/1
200 TABLET ORAL DAILY
Status: DISCONTINUED | OUTPATIENT
Start: 2025-07-01 | End: 2025-07-05 | Stop reason: HOSPADM

## 2025-07-01 RX ORDER — POTASSIUM CHLORIDE 7.45 MG/ML
10 INJECTION INTRAVENOUS PRN
Status: DISCONTINUED | OUTPATIENT
Start: 2025-07-01 | End: 2025-07-05 | Stop reason: HOSPADM

## 2025-07-01 RX ORDER — POTASSIUM CHLORIDE 750 MG/1
20 CAPSULE, EXTENDED RELEASE ORAL DAILY
Status: DISCONTINUED | OUTPATIENT
Start: 2025-07-02 | End: 2025-07-05 | Stop reason: HOSPADM

## 2025-07-01 RX ORDER — ASPIRIN 81 MG/1
81 TABLET, CHEWABLE ORAL DAILY
Status: DISCONTINUED | OUTPATIENT
Start: 2025-07-02 | End: 2025-07-05 | Stop reason: HOSPADM

## 2025-07-01 RX ORDER — ACETAMINOPHEN 325 MG/1
650 TABLET ORAL EVERY 6 HOURS PRN
Status: DISCONTINUED | OUTPATIENT
Start: 2025-07-01 | End: 2025-07-05 | Stop reason: HOSPADM

## 2025-07-01 RX ORDER — AMIODARONE HYDROCHLORIDE 200 MG/1
200 TABLET ORAL DAILY
Status: DISCONTINUED | OUTPATIENT
Start: 2025-07-02 | End: 2025-07-01

## 2025-07-01 RX ORDER — SODIUM CHLORIDE 0.9 % (FLUSH) 0.9 %
10 SYRINGE (ML) INJECTION PRN
Status: DISCONTINUED | OUTPATIENT
Start: 2025-07-01 | End: 2025-07-05 | Stop reason: HOSPADM

## 2025-07-01 RX ORDER — ATORVASTATIN CALCIUM 80 MG/1
80 TABLET, FILM COATED ORAL DAILY
Status: DISCONTINUED | OUTPATIENT
Start: 2025-07-02 | End: 2025-07-01

## 2025-07-01 RX ORDER — ONDANSETRON 4 MG/1
4 TABLET, ORALLY DISINTEGRATING ORAL EVERY 8 HOURS PRN
Status: DISCONTINUED | OUTPATIENT
Start: 2025-07-01 | End: 2025-07-05 | Stop reason: HOSPADM

## 2025-07-01 RX ORDER — TRAZODONE HYDROCHLORIDE 50 MG/1
50 TABLET ORAL NIGHTLY
Status: DISCONTINUED | OUTPATIENT
Start: 2025-07-02 | End: 2025-07-05 | Stop reason: HOSPADM

## 2025-07-01 RX ORDER — FAMOTIDINE 20 MG/1
40 TABLET, FILM COATED ORAL EVERY EVENING
Status: DISCONTINUED | OUTPATIENT
Start: 2025-07-02 | End: 2025-07-01

## 2025-07-01 RX ORDER — SODIUM CHLORIDE 9 MG/ML
INJECTION, SOLUTION INTRAVENOUS PRN
Status: DISCONTINUED | OUTPATIENT
Start: 2025-07-01 | End: 2025-07-05 | Stop reason: HOSPADM

## 2025-07-01 RX ORDER — BUDESONIDE AND FORMOTEROL FUMARATE DIHYDRATE 160; 4.5 UG/1; UG/1
2 AEROSOL RESPIRATORY (INHALATION)
Status: DISCONTINUED | OUTPATIENT
Start: 2025-07-01 | End: 2025-07-05 | Stop reason: HOSPADM

## 2025-07-01 RX ORDER — CARVEDILOL 3.12 MG/1
3.12 TABLET ORAL 2 TIMES DAILY WITH MEALS
Status: DISCONTINUED | OUTPATIENT
Start: 2025-07-02 | End: 2025-07-05 | Stop reason: HOSPADM

## 2025-07-01 RX ORDER — WARFARIN SODIUM 5 MG/1
5 TABLET ORAL
Status: COMPLETED | OUTPATIENT
Start: 2025-07-01 | End: 2025-07-01

## 2025-07-01 RX ORDER — FAMOTIDINE 20 MG/1
20 TABLET, FILM COATED ORAL EVERY EVENING
Status: DISCONTINUED | OUTPATIENT
Start: 2025-07-02 | End: 2025-07-05 | Stop reason: HOSPADM

## 2025-07-01 RX ORDER — BISACODYL 10 MG
10 SUPPOSITORY, RECTAL RECTAL DAILY PRN
Status: DISCONTINUED | OUTPATIENT
Start: 2025-07-01 | End: 2025-07-05 | Stop reason: HOSPADM

## 2025-07-01 RX ORDER — MAGNESIUM SULFATE HEPTAHYDRATE 40 MG/ML
2000 INJECTION, SOLUTION INTRAVENOUS PRN
Status: DISCONTINUED | OUTPATIENT
Start: 2025-07-01 | End: 2025-07-05 | Stop reason: HOSPADM

## 2025-07-01 RX ORDER — POTASSIUM CHLORIDE 1500 MG/1
40 TABLET, EXTENDED RELEASE ORAL PRN
Status: DISCONTINUED | OUTPATIENT
Start: 2025-07-01 | End: 2025-07-05 | Stop reason: HOSPADM

## 2025-07-01 RX ORDER — ATORVASTATIN CALCIUM 80 MG/1
80 TABLET, FILM COATED ORAL DAILY
Status: DISCONTINUED | OUTPATIENT
Start: 2025-07-01 | End: 2025-07-05 | Stop reason: HOSPADM

## 2025-07-01 RX ORDER — ENOXAPARIN SODIUM 100 MG/ML
1 INJECTION SUBCUTANEOUS 2 TIMES DAILY
Status: DISCONTINUED | OUTPATIENT
Start: 2025-07-01 | End: 2025-07-05 | Stop reason: HOSPADM

## 2025-07-01 RX ADMIN — ENOXAPARIN SODIUM 60 MG: 100 INJECTION SUBCUTANEOUS at 23:39

## 2025-07-01 RX ADMIN — AMIODARONE HYDROCHLORIDE 200 MG: 200 TABLET ORAL at 23:25

## 2025-07-01 RX ADMIN — ATORVASTATIN CALCIUM 80 MG: 80 TABLET, FILM COATED ORAL at 23:25

## 2025-07-01 RX ADMIN — ACETAMINOPHEN 650 MG: 325 TABLET ORAL at 23:32

## 2025-07-01 RX ADMIN — WARFARIN SODIUM 5 MG: 5 TABLET ORAL at 23:25

## 2025-07-01 ASSESSMENT — PAIN DESCRIPTION - LOCATION
LOCATION: HEAD
LOCATION: CHEST

## 2025-07-01 ASSESSMENT — PAIN SCALES - GENERAL
PAINLEVEL_OUTOF10: 3
PAINLEVEL_OUTOF10: 7

## 2025-07-01 ASSESSMENT — PAIN DESCRIPTION - DESCRIPTORS: DESCRIPTORS: ACHING
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
## 2025-07-01 NOTE — ED NOTES
Mode of arrival (squad #, walk in, police, etc) : walk in         Chief complaint(s): leg swelling         Arrival Note (brief scenario, treatment PTA, etc).: pt states leg swelling pt has hx of CHF pt states she is taking her medications like she is supposed to.         C= \"Have you ever felt that you should Cut down on your drinking?\"  No  A= \"Have people Annoyed you by criticizing your drinking?\"  No  G= \"Have you ever felt bad or Guilty about your drinking?\"  No  E= \"Have you ever had a drink as an Eye-opener first thing in the morning to steady your nerves or to help a hangover?\"  No      Deferred []      Reason for deferring: N/A    *If yes to two or more: probable alcohol abuse.*

## 2025-07-02 LAB
ANION GAP SERPL CALCULATED.3IONS-SCNC: 8 MMOL/L (ref 9–16)
BASOPHILS # BLD: 0.03 K/UL (ref 0–0.2)
BASOPHILS NFR BLD: 1 % (ref 0–2)
BUN SERPL-MCNC: 16 MG/DL (ref 8–23)
CALCIUM SERPL-MCNC: 8.4 MG/DL (ref 8.6–10.4)
CHLORIDE SERPL-SCNC: 110 MMOL/L (ref 98–107)
CO2 SERPL-SCNC: 26 MMOL/L (ref 20–31)
CREAT SERPL-MCNC: 1.3 MG/DL (ref 0.7–1.2)
EOSINOPHIL # BLD: 0.17 K/UL (ref 0–0.44)
EOSINOPHILS RELATIVE PERCENT: 4 % (ref 0–4)
ERYTHROCYTE [DISTWIDTH] IN BLOOD BY AUTOMATED COUNT: 18 % (ref 11.5–14.9)
GFR, ESTIMATED: 43 ML/MIN/1.73M2
GLUCOSE BLD-MCNC: 136 MG/DL (ref 65–105)
GLUCOSE BLD-MCNC: 168 MG/DL (ref 65–105)
GLUCOSE SERPL-MCNC: 181 MG/DL (ref 74–99)
HCT VFR BLD AUTO: 30.3 % (ref 36–46)
HGB BLD-MCNC: 9 G/DL (ref 12–16)
IMM GRANULOCYTES # BLD AUTO: <0.03 K/UL (ref 0–0.3)
IMM GRANULOCYTES NFR BLD: 0 %
INR PPP: 1.1
LYMPHOCYTES NFR BLD: 1.16 K/UL (ref 1.1–3.7)
LYMPHOCYTES RELATIVE PERCENT: 25 % (ref 24–44)
MAGNESIUM SERPL-MCNC: 1.8 MG/DL (ref 1.6–2.4)
MCH RBC QN AUTO: 25.8 PG (ref 26–34)
MCHC RBC AUTO-ENTMCNC: 29.7 G/DL (ref 31–37)
MCV RBC AUTO: 86.8 FL (ref 80–100)
MONOCYTES NFR BLD: 0.53 K/UL (ref 0.1–1.2)
MONOCYTES NFR BLD: 12 % (ref 3–12)
NEUTROPHILS NFR BLD: 58 % (ref 36–66)
NEUTS SEG NFR BLD: 2.7 K/UL (ref 1.5–8.1)
NRBC BLD-RTO: 0 PER 100 WBC
PLATELET # BLD AUTO: 220 K/UL (ref 150–450)
PMV BLD AUTO: 10.7 FL (ref 8–13.5)
POTASSIUM SERPL-SCNC: 3.5 MMOL/L (ref 3.7–5.3)
PROTHROMBIN TIME: 14.8 SEC (ref 11.8–14.6)
RBC # BLD AUTO: 3.49 M/UL (ref 3.95–5.11)
SODIUM SERPL-SCNC: 144 MMOL/L (ref 136–145)
WBC OTHER # BLD: 4.6 K/UL (ref 3.5–11)

## 2025-07-02 PROCEDURE — 80048 BASIC METABOLIC PNL TOTAL CA: CPT

## 2025-07-02 PROCEDURE — 99223 1ST HOSP IP/OBS HIGH 75: CPT | Performed by: INTERNAL MEDICINE

## 2025-07-02 PROCEDURE — G0378 HOSPITAL OBSERVATION PER HR: HCPCS

## 2025-07-02 PROCEDURE — 6370000000 HC RX 637 (ALT 250 FOR IP)

## 2025-07-02 PROCEDURE — 96372 THER/PROPH/DIAG INJ SC/IM: CPT

## 2025-07-02 PROCEDURE — 36415 COLL VENOUS BLD VENIPUNCTURE: CPT

## 2025-07-02 PROCEDURE — 6360000002 HC RX W HCPCS

## 2025-07-02 PROCEDURE — 82947 ASSAY GLUCOSE BLOOD QUANT: CPT

## 2025-07-02 PROCEDURE — 85025 COMPLETE CBC W/AUTO DIFF WBC: CPT

## 2025-07-02 PROCEDURE — 94664 DEMO&/EVAL PT USE INHALER: CPT

## 2025-07-02 PROCEDURE — 94761 N-INVAS EAR/PLS OXIMETRY MLT: CPT

## 2025-07-02 PROCEDURE — 94640 AIRWAY INHALATION TREATMENT: CPT

## 2025-07-02 PROCEDURE — 85610 PROTHROMBIN TIME: CPT

## 2025-07-02 PROCEDURE — 2500000003 HC RX 250 WO HCPCS

## 2025-07-02 PROCEDURE — 83735 ASSAY OF MAGNESIUM: CPT

## 2025-07-02 RX ORDER — WARFARIN SODIUM 5 MG/1
5 TABLET ORAL
Status: COMPLETED | OUTPATIENT
Start: 2025-07-02 | End: 2025-07-02

## 2025-07-02 RX ADMIN — WARFARIN SODIUM 5 MG: 5 TABLET ORAL at 17:19

## 2025-07-02 RX ADMIN — BUDESONIDE AND FORMOTEROL FUMARATE DIHYDRATE 2 PUFF: 160; 4.5 AEROSOL RESPIRATORY (INHALATION) at 07:33

## 2025-07-02 RX ADMIN — CARVEDILOL 3.12 MG: 3.12 TABLET, FILM COATED ORAL at 17:19

## 2025-07-02 RX ADMIN — ENOXAPARIN SODIUM 60 MG: 100 INJECTION SUBCUTANEOUS at 07:48

## 2025-07-02 RX ADMIN — TRAZODONE HYDROCHLORIDE 50 MG: 50 TABLET ORAL at 20:49

## 2025-07-02 RX ADMIN — FERROUS SULFATE TAB 325 MG (65 MG ELEMENTAL FE) 325 MG: 325 (65 FE) TAB at 07:48

## 2025-07-02 RX ADMIN — BUDESONIDE AND FORMOTEROL FUMARATE DIHYDRATE 2 PUFF: 160; 4.5 AEROSOL RESPIRATORY (INHALATION) at 19:36

## 2025-07-02 RX ADMIN — POTASSIUM CHLORIDE 20 MEQ: 750 CAPSULE, EXTENDED RELEASE ORAL at 07:48

## 2025-07-02 RX ADMIN — SODIUM CHLORIDE, PRESERVATIVE FREE 10 ML: 5 INJECTION INTRAVENOUS at 07:49

## 2025-07-02 RX ADMIN — FAMOTIDINE 20 MG: 20 TABLET, FILM COATED ORAL at 17:19

## 2025-07-02 RX ADMIN — ATORVASTATIN CALCIUM 80 MG: 80 TABLET, FILM COATED ORAL at 07:48

## 2025-07-02 RX ADMIN — ENOXAPARIN SODIUM 60 MG: 100 INJECTION SUBCUTANEOUS at 20:49

## 2025-07-02 RX ADMIN — ACETAMINOPHEN 650 MG: 325 TABLET ORAL at 20:54

## 2025-07-02 RX ADMIN — DOCUSATE SODIUM 50 MG AND SENNOSIDES 8.6 MG 1 TABLET: 8.6; 5 TABLET, FILM COATED ORAL at 07:48

## 2025-07-02 RX ADMIN — ASPIRIN 81 MG: 81 TABLET, CHEWABLE ORAL at 07:48

## 2025-07-02 RX ADMIN — AMIODARONE HYDROCHLORIDE 200 MG: 200 TABLET ORAL at 07:48

## 2025-07-02 ASSESSMENT — PAIN DESCRIPTION - LOCATION: LOCATION: LEG

## 2025-07-02 ASSESSMENT — PAIN SCALES - GENERAL
PAINLEVEL_OUTOF10: 4
PAINLEVEL_OUTOF10: 2

## 2025-07-02 ASSESSMENT — PAIN DESCRIPTION - DESCRIPTORS: DESCRIPTORS: ACHING

## 2025-07-02 ASSESSMENT — PAIN DESCRIPTION - ORIENTATION: ORIENTATION: RIGHT;LEFT

## 2025-07-02 NOTE — PROGRESS NOTES
New Patient admission complete, home medications reviewed with patient, admission questions complete, vital signs complete, patient is alert and oriented, resting comfortably in bed. All questions answered at this time.

## 2025-07-02 NOTE — PLAN OF CARE
Problem: Chronic Conditions and Co-morbidities  Goal: Patient's chronic conditions and co-morbidity symptoms are monitored and maintained or improved  7/2/2025 1621 by Daisy Martinez RN  Outcome: Progressing  Flowsheets (Taken 7/1/2025 2145 by Elyssa Hatch, RN)  Care Plan - Patient's Chronic Conditions and Co-Morbidity Symptoms are Monitored and Maintained or Improved: Monitor and assess patient's chronic conditions and comorbid symptoms for stability, deterioration, or improvement  7/2/2025 0326 by Elyssa Hatch RN  Outcome: Progressing  Flowsheets (Taken 7/1/2025 2145)  Care Plan - Patient's Chronic Conditions and Co-Morbidity Symptoms are Monitored and Maintained or Improved: Monitor and assess patient's chronic conditions and comorbid symptoms for stability, deterioration, or improvement     Problem: Discharge Planning  Goal: Discharge to home or other facility with appropriate resources  7/2/2025 1621 by Daisy Martinez RN  Outcome: Progressing  Flowsheets (Taken 7/2/2025 1621)  Discharge to home or other facility with appropriate resources:   Identify barriers to discharge with patient and caregiver   Arrange for needed discharge resources and transportation as appropriate   Identify discharge learning needs (meds, wound care, etc)  7/2/2025 0326 by Elyssa Hatch RN  Outcome: Progressing     Problem: Pain  Goal: Verbalizes/displays adequate comfort level or baseline comfort level  7/2/2025 1621 by Daisy Martinez RN  Outcome: Progressing  Flowsheets (Taken 7/2/2025 1621)  Verbalizes/displays adequate comfort level or baseline comfort level:   Encourage patient to monitor pain and request assistance   Assess pain using appropriate pain scale   Administer analgesics based on type and severity of pain and evaluate response   Implement non-pharmacological measures as appropriate and evaluate response  7/2/2025 0326 by Elyssa Hatch RN  Outcome: Progressing     Problem: Safety  - Adult  Goal: Free from fall injury  7/2/2025 1621 by Daisy Martinez RN  Outcome: Progressing  Flowsheets (Taken 7/1/2025 2146 by Elyssa Hatch, RN)  Free From Fall Injury: Instruct family/caregiver on patient safety  7/2/2025 0326 by Elyssa Hatch RN  Outcome: Progressing  Flowsheets (Taken 7/1/2025 2146)  Free From Fall Injury: Instruct family/caregiver on patient safety     Problem: ABCDS Injury Assessment  Goal: Absence of physical injury  7/2/2025 1621 by Daisy Martinez, RN  Outcome: Progressing  Flowsheets (Taken 7/1/2025 2146 by Elyssa Hatch, RN)  Absence of Physical Injury: Implement safety measures based on patient assessment  7/2/2025 0326 by Elyssa Hatch RN  Outcome: Progressing  Flowsheets (Taken 7/1/2025 2146)  Absence of Physical Injury: Implement safety measures based on patient assessment

## 2025-07-02 NOTE — PROGRESS NOTES
RN attempted to place a pressure redistribution mattress topper due to patients age, Patient refused due to it being uncomfortable. RN educated patient on the importance of having the over lay on the bed. Patient let RN she is be turning herself while bed. During skin assessment patients coccyx region was clean dry and intact, no signs of skin break down.
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
## 2025-07-02 NOTE — PLAN OF CARE
Problem: Chronic Conditions and Co-morbidities  Goal: Patient's chronic conditions and co-morbidity symptoms are monitored and maintained or improved  Outcome: Progressing  Flowsheets (Taken 7/1/2025 2145)  Care Plan - Patient's Chronic Conditions and Co-Morbidity Symptoms are Monitored and Maintained or Improved: Monitor and assess patient's chronic conditions and comorbid symptoms for stability, deterioration, or improvement     Problem: Discharge Planning  Goal: Discharge to home or other facility with appropriate resources  Outcome: Progressing     Problem: Pain  Goal: Verbalizes/displays adequate comfort level or baseline comfort level  Outcome: Progressing     Problem: Safety - Adult  Goal: Free from fall injury  Outcome: Progressing  Flowsheets (Taken 7/1/2025 2146)  Free From Fall Injury: Instruct family/caregiver on patient safety     Problem: ABCDS Injury Assessment  Goal: Absence of physical injury  Outcome: Progressing  Flowsheets (Taken 7/1/2025 2146)  Absence of Physical Injury: Implement safety measures based on patient assessment

## 2025-07-02 NOTE — CARE COORDINATION
Case Management Assessment  Initial Evaluation    Date/Time of Evaluation: 7/2/2025 2:25 PM  Assessment Completed by: Aster Pelaez RN    If patient is discharged prior to next notation, then this note serves as note for discharge by case management.    Patient Name: Graciela Holt                   YOB: 1950  Diagnosis: Heart failure (HCC) [I50.9]                   Date / Time: 7/1/2025  4:34 PM    Patient Admission Status: Observation   Readmission Risk (Low < 19, Mod (19-27), High > 27): Readmission Risk Score: 45.5    Current PCP: Travis Mason MD  PCP verified by CM? Yes    Chart Reviewed: Yes      History Provided by: Patient  Patient Orientation: Alert and Oriented    Patient Cognition: Alert    Hospitalization in the last 30 days (Readmission):  Yes    If yes, Readmission Assessment in  Navigator will be completed.    Advance Directives:      Code Status: Full Code   Patient's Primary Decision Maker is: Legal Next of Kin    Primary Decision Maker: Guanakito Zheng - Child - [phone]    Discharge Planning:    Patient lives with: Friends Type of Home: House  Primary Care Giver: Self  Patient Support Systems include: Children, Family Members, Friends/Neighbors   Current Financial resources: Medicare  Current community resources: Other (Comment) ([facility])  Current services prior to admission: Durable Medical Equipment            Current DME: Shower Chair, Walker, Oxygen Therapy (Comment) (Wears 2LNC, if needed, Has Concentrator, from [employer])            Type of Home Care services:  None    ADLS  Prior functional level: Independent in ADLs/IADLs  Current functional level: Independent in ADLs/IADLs    PT AM-PAC:   /24  OT AM-PAC:   /24    Family can provide assistance at DC: Yes  Would you like Case Management to discuss the discharge plan with any other family members/significant others, and if so, who? No  Plans to Return to Present Housing: Yes  Other

## 2025-07-02 NOTE — ED NOTES
Report given to LIVE Reynolds from [facility].   Report method by phone   The following was reviewed with receiving RN:   Current vital signs:  BP (!) 109/50   Pulse 57   Temp 97.5 °F (36.4 °C) (Temporal)   Resp 18   SpO2 96%                MEWS Score: 2     Any medication or safety alerts were reviewed. Any pending diagnostics and notifications were also reviewed, as well as any safety concerns or issues, abnormal labs, abnormal imaging, and abnormal assessment findings. Questions were answered.

## 2025-07-02 NOTE — CONSULTS
Date:   2025  Patient name: Graciela Holt  Date of admission:  2025  4:34 PM  MRN:   [id number]  YOB: 1950  PCP: Travis Mason MD    Reason for Admission: Heart failure (HCC) [I50.9]    Cardiology consult: CHF systolic and diastolic acute on chronic multivessel CAD, cardiac arrhythmias       Referring physician: Dr Chip Anderson    Impression  Admission 2025 increasing swelling over the legs, shortness of breath  Cardiac cath 2025 multivessel CAD, patent LIMA to LAD, severely tortuous left subclavian artery, patent SVG to RPDA, occluded SVG to OM1, no intervention  Multivessel CAD   CABG LIMA to LAD and diagonal 1, SVG to OM, SVG to PDA 2018 at [facility]  Severely reduced LV systolic function ejection fraction 25 to 30%, akinetic LV apex, apical thrombus 2D echo 2024  Moderately increased LV wall thickness, enlarged LV 6.6 cm, Left parasternal lift  Episodes of nonsustained V. Tach, patient also had A-fib with RVR  Diabetes mellitus  Hyperlipidemia lipid profile 2024 cholesterol 159, HDL 55, LDL 95, triglyceride 41  History of left MCA stroke   CT head: 2020 no acute intracranial abnormality.  Atrophy and senescent changes   including findings compatible with multiple old cortical infarcts.  No intracranial hemorrhage.    Impaired memory  Carotid Doppler 3/5/2021 left 50 to 69% internal carotid artery, right less than 50%, antegrade vertebral artery flow both side  Bilateral femoral artery bruit  Dilated ascending aorta  Moderate calcified plaque in the abdominal aorta and branches vessel without occlusion  Status post cholecystectomy  C-spine surgery  Sigmoid diverticulosis    Multiple admissions with shortness of breath, peripheral edema, chest pain    Past surgical history  Bilateral knee replacement, C-spine surgery, cholecystectomy,  section, CABG     Drug allergies codeine, lisinopril     History of present illness  74-year-old female  25% dilated LV, moderate increased LV wall thickness Global hypokinesis, akinesis of the apical lateral and anterolateral walls akinetic apex dilated LA  Stress Test: reviewed.  Cardiac Angiography: reviewed    Assessment / Acute Cardiac Problems:     Admission 7/1/2025 increasing swelling over the legs and shortness of breath    Cardiac cath 6/23/2025 multivessel coronary artery disease, patent LIMA to LAD, patent SVG to RPDA, occluded SVG to OM1, no intervention    Multivessel CAD, CABG LIMA to LAD and diagonal, SVG to OM and PDA February 2018 at [facility]  Severely reduced LV function ejection fraction 25%, akinetic apex, apical thrombus  Previous admission nonsustained ventricular arrhythmias she is on amiodarone  She also has a history of nonsustained supraventricular arrhythmia  Impaired memory  Abnormal CTA head and neck, multiple old cortical infarcts  Patient has previously denied using LifeVest, no AICD, now she wants a defibrillator    Patient Active Problem List:     Controlled type 2 diabetes mellitus without complication, without long-term current use of insulin (HCC)     Hypertension goal BP (blood pressure) < 140/90     Mixed hyperlipidemia     Chronic obstructive pulmonary disease (HCC)     Coronary artery disease involving native coronary artery of native heart     Primary insomnia     Restless leg syndrome     Atherosclerosis of superior mesenteric artery     Left adrenal mass     Former smoker     Chronic bilateral low back pain with left-sided sciatica     Hypothyroidism     S/P CABG x 4     Chronic atrial fibrillation (HCC)     Tobacco use disorder     Chest pain     Internal carotid artery occlusion     Stenosis of left internal carotid artery     Acquired spondylolisthesis of cervical vertebra     Stenosis of cervical spine with myelopathy (HCC)     Iron deficiency anemia     Chronic combined systolic and diastolic congestive heart failure (HCC)     Type 2 diabetes mellitus with

## 2025-07-02 NOTE — PROGRESS NOTES
Pharmacy Note  Warfarin Consult follow-up      Recent Labs     07/01/25  1642 07/02/25  0636   INR 1.0 1.1     Recent Labs     07/01/25  1642 07/02/25  0636   HGB 8.8* 9.0*   HCT 28.2* 30.3*    220       Significant Drug-Drug Interactions:  New warfarin drug-drug interactions: Lovenox  Discontinued drug-drug interactions: none  Current warfarin drug-drug interactions: Amiodarone, Aspirin, Atorvastatin       Date             INR        Dose given previous day  Dose scheduled for today  7/2/2025            1.1       5 mg           5 mg        Notes:                     Daily PT/INR while inpatient.     Ellyn GustafsonD, [facility] 7/2/2025 8:56 AM

## 2025-07-02 NOTE — ACP (ADVANCE CARE PLANNING)
Advance Care Planning     Advance Care Planning Activator (Inpatient)  Conversation Note      Date of ACP Conversation: 7/2/2025     Conversation Conducted with: Patient with Decision Making Capacity    ACP Activator: Aster Pelaez RN      Health Care Decision Maker:     Current Designated Health Care Decision Maker:     Primary Decision Maker: Guanakito Zheng - [facility] - [phone]        Care Preferences    Ventilation:  \"If you were in your present state of health and suddenly became very ill and were unable to breathe on your own, what would your preference be about the use of a ventilator (breathing machine) if it were available to you?\"      Would the patient desire the use of ventilator (breathing machine)?: yes    \"If your health worsens and it becomes clear that your chance of recovery is unlikely, what would your preference be about the use of a ventilator (breathing machine) if it were available to you?\"     Would the patient desire the use of ventilator (breathing machine)?: Yes      Resuscitation  \"CPR works best to restart the heart when there is a sudden event, like a heart attack, in someone who is otherwise healthy. Unfortunately, CPR does not typically restart the heart for people who have serious health conditions or who are very sick.\"    \"In the event your heart stopped as a result of an underlying serious health condition, would you want attempts to be made to restart your heart (answer \"yes\" for attempt to resuscitate) or would you prefer a natural death (answer \"no\" for do not attempt to resuscitate)?\" yes       [] Yes   [] No   Educated Patient / Decision Maker regarding differences between Advance Directives and portable DNR orders.    Length of ACP Conversation in minutes:      Conversation Outcomes:  ACP discussion completed    Follow-up plan:    [] Schedule follow-up conversation to continue planning  [] Referred individual to Provider for additional questions/concerns   [] Advised

## 2025-07-02 NOTE — H&P
([place]).    Family History:     Family History   Problem Relation Age of Onset    Heart Disease Father        Review of Systems:     Positive and Negative as described in HPI.    CONSTITUTIONAL:  negative for fevers, chills, sweats, fatigue, weight loss  HEENT:  negative for vision, hearing changes, runny nose, throat pain  RESPIRATORY:  negative for shortness of breath, cough, congestion, wheezing  CARDIOVASCULAR:  negative for chest pain, palpitations  GASTROINTESTINAL:  negative for nausea, vomiting, diarrhea, constipation, change in bowel habits, abdominal pain   GENITOURINARY:  negative for difficulty of urination, burning with urination, frequency   INTEGUMENT:  negative for rash, skin lesions, easy bruising   HEMATOLOGIC/LYMPHATIC:  negative for swelling/edema   ALLERGIC/IMMUNOLOGIC:  negative for urticaria , itching  ENDOCRINE:  negative increase in drinking, increase in urination, hot or cold intolerance  MUSCULOSKELETAL:  negative joint pains, muscle aches, swelling of joints  NEUROLOGICAL:  negative for headaches, dizziness, lightheadedness, numbness, pain, tingling extremities  BEHAVIOR/PSYCH:  negative for depression, anxiety    Physical Exam:   BP (!) 126/59   Pulse 52   Temp 97.5 °F (36.4 °C)   Resp 16   Ht 1.626 m (5' 4.02\")   Wt 60.4 kg (133 lb 2.5 oz)   SpO2 96%   BMI 22.85 kg/m²   Temp (24hrs), Av.9 °F (36.6 °C), Min:97.5 °F (36.4 °C), Max:98.4 °F (36.9 °C)    Recent Labs     25  0607   POCGLU 168*       Intake/Output Summary (Last 24 hours) at 2025 1508  Last data filed at 2025 1108  Gross per 24 hour   Intake --   Output 550 ml   Net -550 ml       General Appearance: alert, well appearing, and in no acute distress  Mental status: oriented to person, place, and time  Head: normocephalic, atraumatic  Eye: no icterus, redness, pupils equal and reactive, extraocular eye movements intact, conjunctiva clear  Ear: normal external ear, no discharge, hearing intact  Nose: no  ejection fraction of 20-25% was recently admitted to [place], she was advised to have LifeVest, patient refused initially now brought back to the hospital under cardiology care for possible AICD, cardiology evaluated at this time, continue diuretics, home dose,  COPD, continue inhalers,  Chronic atrial fibrillation, continue rate control medication and anticoagulation,  Type 2 diabetes, sugars ACHS, moderate malnutrition, dietitian,  Coronary disease with CABG x 4,  Hypothyroidism, continue Robaxin,  Noncompliance,  Overall prognosis very poor,  Full CODE STATUS,  DVT prophylaxis with full dose Lovenox, was on Coumadin at home, due to noncompliance INR is 1.1    2. Disposition 3d      Consultations:   PHARMACY TO DOSE WARFARIN  IP CONSULT TO CARDIOLOGY     Patient is admitted as inpatient status because of co-morbidities listed above, severity of signs and symptoms as outlined, requirement for current medical therapies and most importantly because of direct risk to patient if care not provided in a hospital setting.  Expected length of stay > 48 hours.    Chip Anderson MD  7/2/2025  3:08 PM    Copy sent to Travis Vaughn MD    Please note that this chart was generated using voice recognition Dragon dictation software.  Although every effort was made to ensure the accuracy of this automated transcription, some errors in transcription may have occurred.

## 2025-07-03 LAB
ANION GAP SERPL CALCULATED.3IONS-SCNC: 8 MMOL/L (ref 9–16)
BASOPHILS # BLD: 0.04 K/UL (ref 0–0.2)
BASOPHILS NFR BLD: 1 % (ref 0–2)
BUN SERPL-MCNC: 14 MG/DL (ref 8–23)
CALCIUM SERPL-MCNC: 8.8 MG/DL (ref 8.6–10.4)
CHLORIDE SERPL-SCNC: 108 MMOL/L (ref 98–107)
CO2 SERPL-SCNC: 26 MMOL/L (ref 20–31)
CREAT SERPL-MCNC: 1.1 MG/DL (ref 0.7–1.2)
EKG ATRIAL RATE: 56 BPM
EKG P AXIS: 78 DEGREES
EKG P-R INTERVAL: 196 MS
EKG Q-T INTERVAL: 498 MS
EKG QRS DURATION: 126 MS
EKG QTC CALCULATION (BAZETT): 480 MS
EKG R AXIS: -33 DEGREES
EKG T AXIS: 141 DEGREES
EKG VENTRICULAR RATE: 56 BPM
EOSINOPHIL # BLD: 0.17 K/UL (ref 0–0.44)
EOSINOPHILS RELATIVE PERCENT: 4 % (ref 0–4)
ERYTHROCYTE [DISTWIDTH] IN BLOOD BY AUTOMATED COUNT: 17.6 % (ref 11.5–14.9)
GFR, ESTIMATED: 52 ML/MIN/1.73M2
GLUCOSE BLD-MCNC: 134 MG/DL (ref 65–105)
GLUCOSE BLD-MCNC: 153 MG/DL (ref 65–105)
GLUCOSE BLD-MCNC: 187 MG/DL (ref 65–105)
GLUCOSE SERPL-MCNC: 123 MG/DL (ref 74–99)
HCT VFR BLD AUTO: 32.6 % (ref 36–46)
HGB BLD-MCNC: 9.6 G/DL (ref 12–16)
IMM GRANULOCYTES # BLD AUTO: <0.03 K/UL (ref 0–0.3)
IMM GRANULOCYTES NFR BLD: 0 %
INR PPP: 1.1
LYMPHOCYTES NFR BLD: 0.93 K/UL (ref 1.1–3.7)
LYMPHOCYTES RELATIVE PERCENT: 20 % (ref 24–44)
MCH RBC QN AUTO: 25.2 PG (ref 26–34)
MCHC RBC AUTO-ENTMCNC: 29.4 G/DL (ref 31–37)
MCV RBC AUTO: 85.6 FL (ref 80–100)
MONOCYTES NFR BLD: 0.44 K/UL (ref 0.1–1.2)
MONOCYTES NFR BLD: 10 % (ref 3–12)
NEUTROPHILS NFR BLD: 65 % (ref 36–66)
NEUTS SEG NFR BLD: 3.05 K/UL (ref 1.5–8.1)
NRBC BLD-RTO: 0 PER 100 WBC
PLATELET # BLD AUTO: 245 K/UL (ref 150–450)
PMV BLD AUTO: 11.1 FL (ref 8–13.5)
POTASSIUM SERPL-SCNC: 3.9 MMOL/L (ref 3.7–5.3)
PROTHROMBIN TIME: 15.4 SEC (ref 11.8–14.6)
RBC # BLD AUTO: 3.81 M/UL (ref 3.95–5.11)
SODIUM SERPL-SCNC: 142 MMOL/L (ref 136–145)
WBC OTHER # BLD: 4.7 K/UL (ref 3.5–11)

## 2025-07-03 PROCEDURE — 80048 BASIC METABOLIC PNL TOTAL CA: CPT

## 2025-07-03 PROCEDURE — 94761 N-INVAS EAR/PLS OXIMETRY MLT: CPT

## 2025-07-03 PROCEDURE — 36415 COLL VENOUS BLD VENIPUNCTURE: CPT

## 2025-07-03 PROCEDURE — 6370000000 HC RX 637 (ALT 250 FOR IP)

## 2025-07-03 PROCEDURE — 6360000002 HC RX W HCPCS

## 2025-07-03 PROCEDURE — 85610 PROTHROMBIN TIME: CPT

## 2025-07-03 PROCEDURE — 6370000000 HC RX 637 (ALT 250 FOR IP): Performed by: INTERNAL MEDICINE

## 2025-07-03 PROCEDURE — 99233 SBSQ HOSP IP/OBS HIGH 50: CPT | Performed by: INTERNAL MEDICINE

## 2025-07-03 PROCEDURE — 93010 ELECTROCARDIOGRAM REPORT: CPT | Performed by: INTERNAL MEDICINE

## 2025-07-03 PROCEDURE — G0378 HOSPITAL OBSERVATION PER HR: HCPCS

## 2025-07-03 PROCEDURE — 94640 AIRWAY INHALATION TREATMENT: CPT

## 2025-07-03 PROCEDURE — 96372 THER/PROPH/DIAG INJ SC/IM: CPT

## 2025-07-03 PROCEDURE — 82947 ASSAY GLUCOSE BLOOD QUANT: CPT

## 2025-07-03 PROCEDURE — 85025 COMPLETE CBC W/AUTO DIFF WBC: CPT

## 2025-07-03 RX ORDER — BUMETANIDE 1 MG/1
1 TABLET ORAL DAILY
Status: DISCONTINUED | OUTPATIENT
Start: 2025-07-03 | End: 2025-07-05 | Stop reason: HOSPADM

## 2025-07-03 RX ORDER — WARFARIN SODIUM 7.5 MG/1
7.5 TABLET ORAL
Status: COMPLETED | OUTPATIENT
Start: 2025-07-03 | End: 2025-07-03

## 2025-07-03 RX ADMIN — AMIODARONE HYDROCHLORIDE 200 MG: 200 TABLET ORAL at 08:11

## 2025-07-03 RX ADMIN — FERROUS SULFATE TAB 325 MG (65 MG ELEMENTAL FE) 325 MG: 325 (65 FE) TAB at 08:11

## 2025-07-03 RX ADMIN — ASPIRIN 81 MG: 81 TABLET, CHEWABLE ORAL at 08:11

## 2025-07-03 RX ADMIN — TRAZODONE HYDROCHLORIDE 50 MG: 50 TABLET ORAL at 20:40

## 2025-07-03 RX ADMIN — BUDESONIDE AND FORMOTEROL FUMARATE DIHYDRATE 2 PUFF: 160; 4.5 AEROSOL RESPIRATORY (INHALATION) at 07:29

## 2025-07-03 RX ADMIN — POTASSIUM CHLORIDE 20 MEQ: 750 CAPSULE, EXTENDED RELEASE ORAL at 08:11

## 2025-07-03 RX ADMIN — CARVEDILOL 3.12 MG: 3.12 TABLET, FILM COATED ORAL at 08:11

## 2025-07-03 RX ADMIN — FAMOTIDINE 20 MG: 20 TABLET, FILM COATED ORAL at 17:28

## 2025-07-03 RX ADMIN — WARFARIN SODIUM 7.5 MG: 7.5 TABLET ORAL at 17:28

## 2025-07-03 RX ADMIN — DOCUSATE SODIUM 50 MG AND SENNOSIDES 8.6 MG 1 TABLET: 8.6; 5 TABLET, FILM COATED ORAL at 08:11

## 2025-07-03 RX ADMIN — ENOXAPARIN SODIUM 60 MG: 100 INJECTION SUBCUTANEOUS at 20:40

## 2025-07-03 RX ADMIN — BUMETANIDE 1 MG: 1 TABLET ORAL at 13:54

## 2025-07-03 RX ADMIN — ATORVASTATIN CALCIUM 80 MG: 80 TABLET, FILM COATED ORAL at 08:11

## 2025-07-03 RX ADMIN — BUDESONIDE AND FORMOTEROL FUMARATE DIHYDRATE 2 PUFF: 160; 4.5 AEROSOL RESPIRATORY (INHALATION) at 19:58

## 2025-07-03 RX ADMIN — ENOXAPARIN SODIUM 60 MG: 100 INJECTION SUBCUTANEOUS at 08:12

## 2025-07-03 ASSESSMENT — PAIN SCALES - GENERAL: PAINLEVEL_OUTOF10: 0

## 2025-07-03 NOTE — PLAN OF CARE
Problem: Pain  Goal: Verbalizes/displays adequate comfort level or baseline comfort level  7/3/2025 1550 by Pardeep Barahona RN  Note: Assess the location, characteristics, onset, duration, frequency, quality, and severity of pain. Encourage immediate report of pain. Use appropriate pain scale to rate pain. Manage pain using nonpharmacologic/pharmacologic interventions.   7/3/2025 1550 by Pardeep Barahona RN  Outcome: Progressing  Note: Assess the location, characteristics, onset, duration, frequency, quality, and severity of pain. Encourage immediate report of pain. Use appropriate pain scale to rate pain. Manage pain using nonpharmacologic/pharmacologic interventions.      Problem: Safety - Adult  Goal: Free from fall injury  7/3/2025 1550 by Pardeep Barahona RN  Outcome: Progressing  Note: Patient remains free of falls and injuries throughout shift. Bed remains in the lowest position, wheels locked, call light and bedside table are within reach.   7/3/2025 1550 by Pardeep Barahona RN  Outcome: Progressing  Note: Patient remains free of falls and injuries throughout shift. Bed remains in the lowest position, wheels locked, call light and bedside table are within reach.

## 2025-07-03 NOTE — CARE COORDINATION
ONGOING DISCHARGE PLAN:    Patient is alert and oriented x4.    Spoke with patient regarding discharge plan and patient confirms that plan is still home. Denies needs for VNS. States \"I have too many dogs\".    States her granddaughter will provide transport home.     Follows with [facility] OP.    Cardio consult  CHF, not on diuretics  Ef 25%  INR 1.1    Refused Life Vest and AICD in past  Consult for Dr Maxwell Hardin for AICD    Will continue to follow for additional discharge needs.    If patient is discharged prior to next notation, then this note serves as note for discharge by case management.    Electronically signed by Bonita Pierce RN on 7/3/2025 at 10:11 AM

## 2025-07-03 NOTE — PLAN OF CARE
Problem: Chronic Conditions and Co-morbidities  Goal: Patient's chronic conditions and co-morbidity symptoms are monitored and maintained or improved  7/2/2025 2331 by Stalin Caldwell RN  Outcome: Progressing  Flowsheets (Taken 7/2/2025 2055)  Care Plan - Patient's Chronic Conditions and Co-Morbidity Symptoms are Monitored and Maintained or Improved: Monitor and assess patient's chronic conditions and comorbid symptoms for stability, deterioration, or improvement  7/2/2025 1621 by Daisy Martinez RN  Outcome: Progressing  Flowsheets (Taken 7/1/2025 2145 by Boler, Elyssa Era, RN)  Care Plan - Patient's Chronic Conditions and Co-Morbidity Symptoms are Monitored and Maintained or Improved: Monitor and assess patient's chronic conditions and comorbid symptoms for stability, deterioration, or improvement     Problem: Discharge Planning  Goal: Discharge to home or other facility with appropriate resources  7/2/2025 2331 by Stalin Caldwell RN  Outcome: Progressing  Flowsheets (Taken 7/2/2025 2055)  Discharge to home or other facility with appropriate resources: Identify barriers to discharge with patient and caregiver  7/2/2025 1621 by Daisy Martinez RN  Outcome: Progressing  Flowsheets (Taken 7/2/2025 1621)  Discharge to home or other facility with appropriate resources:   Identify barriers to discharge with patient and caregiver   Arrange for needed discharge resources and transportation as appropriate   Identify discharge learning needs (meds, wound care, etc)     Problem: Pain  Goal: Verbalizes/displays adequate comfort level or baseline comfort level  7/2/2025 2331 by Stalin Caldwell RN  Outcome: Progressing  Flowsheets (Taken 7/2/2025 2054)  Verbalizes/displays adequate comfort level or baseline comfort level: Encourage patient to monitor pain and request assistance  7/2/2025 1621 by Daisy Martinez RN  Outcome: Progressing  Flowsheets (Taken 7/2/2025 1621)  Verbalizes/displays adequate comfort level or

## 2025-07-03 NOTE — PROGRESS NOTES
[facility] Internal Medicine  Juan Carlos Todd MD; Lewis Castro MD, Pearl Choudhary MD,    Chip Anderson MD, Brian Min MD.    [facility] Internal Medicine   IN-PATIENT SERVICE   [facility]    Progress note             Date:   7/3/2025  Patient name:  Graciela Holt  Date of admission:  7/1/2025  4:34 PM  MRN:   [id number]  Account:  [id number]  YOB: 1950  PCP:    Travis Mason MD  Room:   2074/2074-01  Code Status:    Full Code    Chief Complaint:     Chief Complaint   Patient presents with    Leg Swelling       History Obtained From:     Patient/EMR/Bedside RN    History of Present Illness:     Graciela Holt is a 74 y.o. Non- / non  female who presents with Leg Swelling   and is admitted to the hospital for the management of Heart failure (HCC).     Patient presents to the ED with complaints of leg swelling.  Patient has a significant medical history of c CHF, chronic A-fib, COPD, diabetes, previous smoking history, hypertension, hypothyroidism, hyperlipidemia and CAD status post CABG x 4.     According the patient she has noticed some increase in her leg swelling and some mild increase in shortness of breath.  Patient was recently admitted at [facility] from 6/21-6/24 where she underwent a cardiology evaluation, along with a cardiac cath.  Patient was recommended to have an AICD placed at that time, along with a LifeVest ordered for patient which patient refused.     According to patient she decided to arrive to the hospital today because she wants to see her cardiologist.  Unfortunately patient did not attempt to call cardiologist office to make an outpatient appointment.  Patient denies any lightheadedness or dizziness or chest pain.  Patient does not have any redness or swelling in her legs at the time of physical exam     Patient's potassium is low at 3.4 with orders to replace.  Patient's kidney function is      Family History   Problem Relation Age of Onset    Heart Disease Father        Review of Systems:     Positive and Negative as described in HPI.    CONSTITUTIONAL:  negative for fevers, chills, sweats, fatigue, weight loss  HEENT:  negative for vision, hearing changes, runny nose, throat pain  RESPIRATORY:  negative for shortness of breath, cough, congestion, wheezing  CARDIOVASCULAR:  negative for chest pain, palpitations  GASTROINTESTINAL:  negative for nausea, vomiting, diarrhea, constipation, change in bowel habits, abdominal pain   GENITOURINARY:  negative for difficulty of urination, burning with urination, frequency   INTEGUMENT:  negative for rash, skin lesions, easy bruising   HEMATOLOGIC/LYMPHATIC:  negative for swelling/edema   ALLERGIC/IMMUNOLOGIC:  negative for urticaria , itching  ENDOCRINE:  negative increase in drinking, increase in urination, hot or cold intolerance  MUSCULOSKELETAL:  negative joint pains, muscle aches, swelling of joints  NEUROLOGICAL:  negative for headaches, dizziness, lightheadedness, numbness, pain, tingling extremities  BEHAVIOR/PSYCH:  negative for depression, anxiety    Physical Exam:   BP (!) 141/74   Pulse 67   Temp 97.5 °F (36.4 °C) (Oral)   Resp 16   Ht 1.626 m (5' 4.02\")   Wt 60.4 kg (133 lb 2.5 oz)   SpO2 95%   BMI 22.85 kg/m²   Temp (24hrs), Av.8 °F (36.6 °C), Min:97.5 °F (36.4 °C), Max:98.1 °F (36.7 °C)    Recent Labs     25  0607 25  1956 25  0620 25  1109   POCGLU 168* 136* 134* 187*       Intake/Output Summary (Last 24 hours) at 7/3/2025 1505  Last data filed at 7/3/2025 1450  Gross per 24 hour   Intake --   Output 1350 ml   Net -1350 ml       General Appearance: alert, well appearing, and in no acute distress  Mental status: oriented to person, place, and time  Head: normocephalic, atraumatic  Eye: no icterus, redness, pupils equal and reactive, extraocular eye movements intact, conjunctiva clear  Ear: normal external ear, no

## 2025-07-03 NOTE — PROGRESS NOTES
Pharmacy Note  Warfarin Consult follow-up      Recent Labs     07/01/25  1642 07/02/25  0636 07/03/25  0649   INR 1.0 1.1 1.1     Recent Labs     07/01/25  1642 07/02/25  0636 07/03/25  0649   HGB 8.8* 9.0* 9.6*   HCT 28.2* 30.3* 32.6*    220 245       Significant Drug-Drug Interactions:  New warfarin drug-drug interactions: none  Discontinued drug-drug interactions: none  Current warfarin drug-drug interactions: Amiodarone, Aspirin, Atorvastatin, Lovenox      Date             INR        Dose given previous day  Dose scheduled for today  7/3/2025          1.1            5 mg                  7.5 mg        Notes:                     Daily PT/INR while inpatient.     Ellyn Givens PharmD, [facility] 7/3/2025 8:08 AM

## 2025-07-03 NOTE — PROGRESS NOTES
Date:   7/3/2025  Patient name: Graciela Holt  Date of admission:  2025  4:34 PM  MRN:   [id number]  YOB: 1950  PCP: Travis Mason MD    Reason for Admission: Heart failure (HCC) [I50.9]    Cardiology follow-up: CHF systolic and diastolic acute on chronic, multivessel CAD, cardiac arrhythmias       Referring physician: Dr Chip Anderson     Impression  Admission 2025 increasing swelling over the legs, shortness of breath  Cardiac cath 2025 multivessel CAD, patent LIMA to LAD, severely tortuous left subclavian artery, patent SVG to RPDA, occluded SVG to OM1, no intervention  Multivessel CAD   CABG LIMA to LAD and diagonal 1, SVG to OM, SVG to PDA 2018 at [facility]  Severely reduced LV systolic function ejection fraction 25 to 30%, akinetic LV apex, apical thrombus 2D echo 2024  Moderately increased LV wall thickness, enlarged LV 6.6 cm, Left parasternal lift  Episodes of nonsustained V. Tach, patient also had A-fib with RVR  Diabetes mellitus  Hyperlipidemia lipid profile 2024 cholesterol 159, HDL 55, LDL 95, triglyceride 41  History of left MCA stroke   CT head: 2020 no acute intracranial abnormality.  Atrophy and senescent changes   including findings compatible with multiple old cortical infarcts.  No intracranial hemorrhage.     Impaired memory  Carotid Doppler 3/5/2021 left 50 to 69% internal carotid artery, right less than 50%, antegrade vertebral artery flow both side  Bilateral femoral artery bruit  Dilated ascending aorta  Moderate calcified plaque in the abdominal aorta and branches vessel without occlusion  Status post cholecystectomy  C-spine surgery  Sigmoid diverticulosis     Multiple admissions with shortness of breath, peripheral edema, chest pain     Past surgical history  Bilateral knee replacement, C-spine surgery, cholecystectomy,  section, CABG     Drug allergies codeine, lisinopril    History of present illness  74-year-old  1.1    Investigation workup     ECG 7/1/2025  Sinus rhythm heart rate 56, LVH with repolarization changes, poor R wave progression/old anterior MI, no significant change from previous ECG     ECG 6/21/2025  Sinus rhythm heart rate 61, LVH with repolarization changes nonspecific T wave changes, no change from previous ECG  ECG 5/23/2025  Sinus bradycardia with a PAC heart rate 57, LVH with repolarization changes, T wave abnormalities lateral ischemia     Chest x-ray 7/1/2025  Cardiomegaly with improving vascular congestion     Cardiac cath 6/23/2025  Left Main   The vessel is angiographically normal.      Left Anterior Descending   Has mid heavy calcification followed by 100% occlusion. D1 is very small. D2 has ostial 90% stenosis. LIMA was nonselectively engaged due to severe tortuosity of left subclavian vessel and it is patent      Left Circumflex   Has proximal 20% stenosis OM1 is a small vessel with a proximal 75% stenosis. SVG to OM1 is occluded. OM 2 has luminal irregularities      Right Coronary Artery   Has heavy calcification for with proximal 90% stenosis and mid 100% occlusion. SVG to RPDA is patent      Graft To 1st Mrg occluded      Graft To RPDA patent      LIMA Graft To Mid LAD patent        Echo 6/3/2025  Dilated LV 5.7 cm normal 3.9-5.3, moderate LVH IVSd 1.4 cm, ejection fraction 20 to 25%, global LV strain's -11.0%, LV apical thrombus  Normal RV size and function  Dilated IVC impaired respiratory variation      2D echo 4/9/2025  Ejection fraction 20 to 25% moderate to severely dilated LV 6.6 cm, mild increased LV wall thickness 1.2 cm  Mild mitral and tricuspid regurgitation trace aortic regurgitation RVSP 38 mmHg  Severely dilated left atrium     2D echo 11/11/2024  Normal LV size moderately increased LV wall thickness severe asymmetrical proximal septal hypertrophy akinetic mid to apical anterior wall remaining segments are severely hypokinetic akinetic LV apex with thrombus  Severely reduced LV

## 2025-07-04 ENCOUNTER — TELEPHONE (OUTPATIENT)
Age: 75
End: 2025-07-04

## 2025-07-04 PROBLEM — F43.0 ACUTE STRESS REACTION: Status: ACTIVE | Noted: 2025-07-04

## 2025-07-04 PROBLEM — I50.23 ACUTE ON CHRONIC SYSTOLIC CONGESTIVE HEART FAILURE (HCC): Status: ACTIVE | Noted: 2025-05-07

## 2025-07-04 LAB
ANION GAP SERPL CALCULATED.3IONS-SCNC: 10 MMOL/L (ref 9–16)
BASOPHILS # BLD: 0.03 K/UL (ref 0–0.2)
BASOPHILS NFR BLD: 1 % (ref 0–2)
BUN SERPL-MCNC: 16 MG/DL (ref 8–23)
CALCIUM SERPL-MCNC: 8.8 MG/DL (ref 8.6–10.4)
CHLORIDE SERPL-SCNC: 108 MMOL/L (ref 98–107)
CO2 SERPL-SCNC: 23 MMOL/L (ref 20–31)
CREAT SERPL-MCNC: 1.3 MG/DL (ref 0.7–1.2)
EOSINOPHIL # BLD: 0.18 K/UL (ref 0–0.44)
EOSINOPHILS RELATIVE PERCENT: 4 % (ref 0–4)
ERYTHROCYTE [DISTWIDTH] IN BLOOD BY AUTOMATED COUNT: 17.9 % (ref 11.5–14.9)
GFR, ESTIMATED: 43 ML/MIN/1.73M2
GLUCOSE BLD-MCNC: 134 MG/DL (ref 65–105)
GLUCOSE SERPL-MCNC: 117 MG/DL (ref 74–99)
HCT VFR BLD AUTO: 31.4 % (ref 36–46)
HGB BLD-MCNC: 9.4 G/DL (ref 12–16)
IMM GRANULOCYTES # BLD AUTO: <0.03 K/UL (ref 0–0.3)
IMM GRANULOCYTES NFR BLD: 0 %
INR PPP: 1.4
INR PPP: 4.6
LYMPHOCYTES NFR BLD: 1.2 K/UL (ref 1.1–3.7)
LYMPHOCYTES RELATIVE PERCENT: 25 % (ref 24–44)
MCH RBC QN AUTO: 25.7 PG (ref 26–34)
MCHC RBC AUTO-ENTMCNC: 29.9 G/DL (ref 31–37)
MCV RBC AUTO: 85.8 FL (ref 80–100)
MONOCYTES NFR BLD: 0.49 K/UL (ref 0.1–1.2)
MONOCYTES NFR BLD: 10 % (ref 3–12)
NEUTROPHILS NFR BLD: 60 % (ref 36–66)
NEUTS SEG NFR BLD: 2.93 K/UL (ref 1.5–8.1)
NRBC BLD-RTO: 0 PER 100 WBC
PLATELET # BLD AUTO: 227 K/UL (ref 150–450)
PMV BLD AUTO: 10.6 FL (ref 8–13.5)
POTASSIUM SERPL-SCNC: 4.3 MMOL/L (ref 3.7–5.3)
PROTHROMBIN TIME: 18.9 SEC (ref 11.8–14.6)
PROTHROMBIN TIME: 47.9 SEC (ref 11.8–14.6)
RBC # BLD AUTO: 3.66 M/UL (ref 3.95–5.11)
SODIUM SERPL-SCNC: 141 MMOL/L (ref 136–145)
WBC OTHER # BLD: 4.9 K/UL (ref 3.5–11)

## 2025-07-04 PROCEDURE — 6370000000 HC RX 637 (ALT 250 FOR IP)

## 2025-07-04 PROCEDURE — G0378 HOSPITAL OBSERVATION PER HR: HCPCS

## 2025-07-04 PROCEDURE — 80048 BASIC METABOLIC PNL TOTAL CA: CPT

## 2025-07-04 PROCEDURE — 85025 COMPLETE CBC W/AUTO DIFF WBC: CPT

## 2025-07-04 PROCEDURE — 6370000000 HC RX 637 (ALT 250 FOR IP): Performed by: INTERNAL MEDICINE

## 2025-07-04 PROCEDURE — 94761 N-INVAS EAR/PLS OXIMETRY MLT: CPT

## 2025-07-04 PROCEDURE — 99239 HOSP IP/OBS DSCHRG MGMT >30: CPT | Performed by: INTERNAL MEDICINE

## 2025-07-04 PROCEDURE — 85610 PROTHROMBIN TIME: CPT

## 2025-07-04 PROCEDURE — 99222 1ST HOSP IP/OBS MODERATE 55: CPT | Performed by: PSYCHIATRY & NEUROLOGY

## 2025-07-04 PROCEDURE — 94640 AIRWAY INHALATION TREATMENT: CPT

## 2025-07-04 PROCEDURE — 96372 THER/PROPH/DIAG INJ SC/IM: CPT

## 2025-07-04 PROCEDURE — 6360000002 HC RX W HCPCS: Performed by: INTERNAL MEDICINE

## 2025-07-04 PROCEDURE — 82947 ASSAY GLUCOSE BLOOD QUANT: CPT

## 2025-07-04 PROCEDURE — APPSS60 APP SPLIT SHARED TIME 46-60 MINUTES

## 2025-07-04 PROCEDURE — 36415 COLL VENOUS BLD VENIPUNCTURE: CPT

## 2025-07-04 RX ORDER — WARFARIN SODIUM 5 MG/1
TABLET ORAL
Qty: 5 TABLET | Refills: 0 | Status: SHIPPED | OUTPATIENT
Start: 2025-07-04

## 2025-07-04 RX ORDER — WARFARIN SODIUM 5 MG/1
5 TABLET ORAL
Status: COMPLETED | OUTPATIENT
Start: 2025-07-04 | End: 2025-07-04

## 2025-07-04 RX ORDER — BUMETANIDE 1 MG/1
1 TABLET ORAL DAILY
Qty: 30 TABLET | Refills: 3 | Status: SHIPPED | OUTPATIENT
Start: 2025-07-05

## 2025-07-04 RX ORDER — ENOXAPARIN SODIUM 100 MG/ML
60 INJECTION SUBCUTANEOUS 2 TIMES DAILY
Qty: 8 EACH | Refills: 0 | Status: SHIPPED | OUTPATIENT
Start: 2025-07-04

## 2025-07-04 RX ADMIN — ASPIRIN 81 MG: 81 TABLET, CHEWABLE ORAL at 09:17

## 2025-07-04 RX ADMIN — ENOXAPARIN SODIUM 60 MG: 100 INJECTION SUBCUTANEOUS at 15:00

## 2025-07-04 RX ADMIN — CARVEDILOL 3.12 MG: 3.12 TABLET, FILM COATED ORAL at 16:26

## 2025-07-04 RX ADMIN — POTASSIUM CHLORIDE 20 MEQ: 750 CAPSULE, EXTENDED RELEASE ORAL at 09:17

## 2025-07-04 RX ADMIN — BUMETANIDE 1 MG: 1 TABLET ORAL at 09:17

## 2025-07-04 RX ADMIN — DOCUSATE SODIUM 50 MG AND SENNOSIDES 8.6 MG 1 TABLET: 8.6; 5 TABLET, FILM COATED ORAL at 09:17

## 2025-07-04 RX ADMIN — FERROUS SULFATE TAB 325 MG (65 MG ELEMENTAL FE) 325 MG: 325 (65 FE) TAB at 09:17

## 2025-07-04 RX ADMIN — FAMOTIDINE 20 MG: 20 TABLET, FILM COATED ORAL at 16:26

## 2025-07-04 RX ADMIN — BUDESONIDE AND FORMOTEROL FUMARATE DIHYDRATE 2 PUFF: 160; 4.5 AEROSOL RESPIRATORY (INHALATION) at 19:20

## 2025-07-04 RX ADMIN — ENOXAPARIN SODIUM 60 MG: 100 INJECTION SUBCUTANEOUS at 22:15

## 2025-07-04 RX ADMIN — CARVEDILOL 3.12 MG: 3.12 TABLET, FILM COATED ORAL at 09:17

## 2025-07-04 RX ADMIN — BUDESONIDE AND FORMOTEROL FUMARATE DIHYDRATE 2 PUFF: 160; 4.5 AEROSOL RESPIRATORY (INHALATION) at 08:26

## 2025-07-04 RX ADMIN — WARFARIN SODIUM 5 MG: 5 TABLET ORAL at 16:59

## 2025-07-04 RX ADMIN — ACETAMINOPHEN 650 MG: 325 TABLET ORAL at 09:17

## 2025-07-04 RX ADMIN — ATORVASTATIN CALCIUM 80 MG: 80 TABLET, FILM COATED ORAL at 09:17

## 2025-07-04 RX ADMIN — AMIODARONE HYDROCHLORIDE 200 MG: 200 TABLET ORAL at 09:17

## 2025-07-04 RX ADMIN — TRAZODONE HYDROCHLORIDE 50 MG: 50 TABLET ORAL at 22:15

## 2025-07-04 ASSESSMENT — PAIN SCALES - GENERAL
PAINLEVEL_OUTOF10: 3
PAINLEVEL_OUTOF10: 5

## 2025-07-04 ASSESSMENT — PAIN DESCRIPTION - ORIENTATION: ORIENTATION: RIGHT;LEFT

## 2025-07-04 ASSESSMENT — PAIN - FUNCTIONAL ASSESSMENT: PAIN_FUNCTIONAL_ASSESSMENT: PREVENTS OR INTERFERES SOME ACTIVE ACTIVITIES AND ADLS

## 2025-07-04 ASSESSMENT — PAIN DESCRIPTION - LOCATION: LOCATION: LEG

## 2025-07-04 NOTE — CONSULTS
Department of Psychiatry  Consult Service   Psychiatric Assessment        REASON FOR CONSULT: 'capacity eval'    CONSULTING PHYSICIAN: Dr. Shady Anderson    History obtained from: Patient, treatment team, EMR    HISTORY OF PRESENT ILLNESS:          The patient is a 74 y.o. female with significant psychiatric history of depression and medical history of arthritis, coronary artery disease, COPD, congestive heart failure, type 2 diabetes, hyperlipidemia, hypertension, myocardial infarction, and restless leg syndrome who presented to the ED endorsing increased leg swelling and shortness of breath. Recent hospitalization at [facility] June 21-June 24 when she was treated for unstable angina and congestive heart failure with Bumex placed on hold. At this time, she was recommended for LIfeVest but refused. She has a noted history of noncompliance, refusing ICD and LifeVest. She expressed presentation to the ED because she wants placement of defibrillator. She was admitted to medical floor for work up. Cardiology was consulted. She is started on Bumex 1 mg daily and determined okay for discharge from cardiac point of view. Psychiatry is consulted for capacity evaluation.     Patient is seen today face-to-face for initial consultation. She is alert and oriented X 4. Thought processes are coherent and linear. She reports presenting to the ED with worsening shortness of breath and leg swelling. She expresses belief she would be able to have a defibrillator placed. She reports previous refusal of LifeVest stating \"its a pain in the butt\". She demonstrates understanding of risk for sudden cardiac event without wear of LifeVest, stating \"my heart could stop and I could die\". She reports being a full code. She reports cardiologist is not medication until July 15. Reports plan to discharge home today and follow up with another cardiologist. She reports living at home with 2 roommates.     Patient denies depression or anxiety. She  PROCEDURE N/A 6/23/2025    Left heart cath / coronary angiography performed by Moises Denny MD at [facility] CARDIAC CATH LAB    CARDIAC SURGERY      cath x 2/ stent x 1    CARDIAC SURGERY      bypass 4 vessel 2/2018    CERVICAL FUSION N/A 1/10/2025    OCCIPUT TO T2 DECOMPRESSION FUSION, REVISION OF HARDWARE performed by Yessica Flynn DO at [facility] OR    CERVICAL LAMINECTOMY N/A 10/14/2020    C3-C7 POSTERIOR CERVICAL DECOMPRESSION FUSION performed by Armando Guerra MD at [facility] OR    CHOLECYSTECTOMY      HYSTERECTOMY (CERVIX STATUS UNKNOWN)      JOINT REPLACEMENT Bilateral     knees    LEG BIOPSY EXCISION Right 08/29/2019    LEG LESION PUNCH BIOPSY performed by Chuckie Snow MD at [facility] OR    OTHER SURGICAL HISTORY  07/26/2020    cerebral angiogram    AZ I&D DEEP ABSC BURSA/HEMATOMA THIGH/KNEE REGION Right 05/07/2018    DEBRIDEMENT INCISION AND DRAINAGE THIGH ABSCESS performed by Amanda Hernandez DO at [facility] OR    AZ OFFICE/OUTPT VISIT,PROCEDURE ONLY N/A 02/06/2018    CABG X 3 LIMA-LAD-DIAG,SVG-PDA,CORONARY ARTERY BYPASS REDO, PUMP ASSIST, SWAN, JARRED, REDO STERNOTOMY performed by Savanna Mata MD at [facility] CVOR    THORACIC FUSION  01/10/2025    OCCIPUT TO T1 DECOMPRESSION FUSION, REVISION OF HARDWARE (SYNTHES, STEALTH, EVOKES)    UPPER GASTROINTESTINAL ENDOSCOPY N/A 4/17/2025    ESOPHAGOGASTRODUODENOSCOPY WITH APC performed by Ellis Aranda MD at [facility] ENDO       Family Medical and Psychiatric History:     Denies family psychiatric history        Problem Relation Age of Onset    Heart Disease Father        Medications Prior to Admission:   Medications Prior to Admission: aspirin 81 MG chewable tablet, Take 1 tablet by mouth daily  warfarin (COUMADIN) 2.5 MG tablet, TAKE 1 TABLET BY MOUTH EVERY DAY OR AS DIRECTED BY [facility]  vitamin D (ERGOCALCIFEROL) 1.25 MG (52513 UT) CAPS capsule, Take 1 capsule by mouth once a week  famotidine (PEPCID) 40 MG tablet, Take 1 tablet by mouth every

## 2025-07-04 NOTE — PROGRESS NOTES
Pharmacy Note  Warfarin Consult follow-up      Recent Labs     07/03/25  0649 07/04/25  0637 07/04/25  1500   INR 1.1 4.6* 1.4     Recent Labs     07/02/25  0636 07/03/25  0649 07/04/25  0637   HGB 9.0* 9.6* 9.4*   HCT 30.3* 32.6* 31.4*    245 227       Significant Drug-Drug Interactions:  New warfarin drug-drug interactions: none  Discontinued drug-drug interactions: none  Current warfarin drug-drug interactions: Amiodarone, aspirin,Lipitor, Lovenox      Date             INR        Dose given previous day  Dose scheduled for today  7/4/2025            1.4       5 mg          5 mg        Notes:                   Resume Lovenox  Daily PT/INR while inpatient.   Armando Ngo RPH,RP, MS   7/4/2025  3:46 PM

## 2025-07-04 NOTE — PROGRESS NOTES
[facility] Internal Medicine  Juan Carlos Todd MD; Lewis Castro MD, Paerl Choudhary MD,    Chip Anderson MD, Brian Min MD.    [facility] Internal Medicine   IN-PATIENT SERVICE   [facility]    Progress note             Date:   7/4/2025  Patient name:  Graciela Holt  Date of admission:  7/1/2025  4:34 PM  MRN:   [id number]  Account:  [id number]  YOB: 1950  PCP:    Travis Mason MD  Room:   2074/2074-01  Code Status:    Full Code    Chief Complaint:     Chief Complaint   Patient presents with    Leg Swelling       History Obtained From:     Patient/EMR/Bedside RN    History of Present Illness:     Graciela Holt is a 74 y.o. Non- / non  female who presents with Leg Swelling   and is admitted to the hospital for the management of Heart failure (HCC).     Patient presents to the ED with complaints of leg swelling.  Patient has a significant medical history of c CHF, chronic A-fib, COPD, diabetes, previous smoking history, hypertension, hypothyroidism, hyperlipidemia and CAD status post CABG x 4.     According the patient she has noticed some increase in her leg swelling and some mild increase in shortness of breath.  Patient was recently admitted at [facility] from 6/21-6/24 where she underwent a cardiology evaluation, along with a cardiac cath.  Patient was recommended to have an AICD placed at that time, along with a LifeVest ordered for patient which patient refused.     According to patient she decided to arrive to the hospital today because she wants to see her cardiologist.  Unfortunately patient did not attempt to call cardiologist office to make an outpatient appointment.  Patient denies any lightheadedness or dizziness or chest pain.  Patient does not have any redness or swelling in her legs at the time of physical exam     Patient's potassium is low at 3.4 with orders to replace.  Patient's kidney function is

## 2025-07-04 NOTE — PROGRESS NOTES
Pharmacy Note  Warfarin Consult follow-up      Recent Labs     07/02/25  0636 07/03/25  0649 07/04/25  0637   INR 1.1 1.1 4.6*     Recent Labs     07/02/25  0636 07/03/25  0649 07/04/25  0637   HGB 9.0* 9.6* 9.4*   HCT 30.3* 32.6* 31.4*    245 227       Significant Drug-Drug Interactions:  New warfarin drug-drug interactions: none  Discontinued drug-drug interactions: none  Current warfarin drug-drug interactions: Amiodarone, Aspirin, Lipitor, Lovenox      Date             INR        Dose given previous day  Dose scheduled for today  7/4/2025            4.6       7.5 mg            hold        Notes:                   Will hold Lovenox  Daily PT/INR while inpatient.    Armando Ngo RP,RP, MS   7/4/2025  12:25 PM

## 2025-07-04 NOTE — PROGRESS NOTES
Date:   2025  Patient name: Graciela Holt  Date of admission:  2025  4:34 PM  MRN:   [id number]  YOB: 1950  PCP: Travis Mason MD    Reason for Admission: Heart failure (HCC) [I50.9]    Cardiology follow-up: CHF systolic and diastolic acute on chronic, multivessel CAD, cardiac arrhythmias        Referring physician: Dr Chip Anderson     Impression  Admission 2025 increasing swelling over the legs, shortness of breath  Cardiac cath 2025 multivessel CAD, patent LIMA to LAD, severely tortuous left subclavian artery, patent SVG to RPDA, occluded SVG to OM1, no intervention  Multivessel CAD   CABG LIMA to LAD and diagonal 1, SVG to OM, SVG to PDA 2018 at [facility]  Severely reduced LV systolic function ejection fraction 25 to 30%, akinetic LV apex, apical thrombus 2D echo 2024  Moderately increased LV wall thickness, enlarged LV 6.6 cm, Left parasternal lift  Episodes of nonsustained V. Tach, patient also had A-fib with RVR  Diabetes mellitus  Hyperlipidemia lipid profile 2024 cholesterol 159, HDL 55, LDL 95, triglyceride 41  History of left MCA stroke   CT head: 2020 no acute intracranial abnormality.  Atrophy and senescent changes   including findings compatible with multiple old cortical infarcts.  No intracranial hemorrhage.     Impaired memory  Carotid Doppler 3/5/2021 left 50 to 69% internal carotid artery, right less than 50%, antegrade vertebral artery flow both side  Bilateral femoral artery bruit  Dilated ascending aorta  Moderate calcified plaque in the abdominal aorta and branches vessel without occlusion  Status post cholecystectomy  C-spine surgery  Sigmoid diverticulosis     Multiple admissions with shortness of breath, peripheral edema, chest pain    Past surgical history  Bilateral knee replacement, C-spine surgery, cholecystectomy,  section, CABG     Drug allergies codeine, lisinopril     History of present illness  74-year-old  female who lives with her 2 roommates mother of 1 son and 1 daughter with a past medical history of multivessel coronary artery disease, coronary artery bypass surgery, carotid surgery, COPD, quit smoking about 1 year ago, left MCA stroke occluded internal carotid artery, akinetic LV apex with thrombus, multiple admissions in the CHF, history of nonsustained ventricular arrhythmias, she has refused for LifeVest and AICD got hospitalized on 7/01/2025 with increasing swelling over the legs and shortness of breath.  She was recently hospitalized at [facility] and she underwent cardiac cath.  It showed patent LIMA to LAD and SVG to RPDA occluded SVG to OM1 and no intervention was performed.  She refused for AICD placement and LifeVest placement.  She has had multiple admissions over the last 1 year with the same problem.  She has had episodes of A-fib and a nonsustained V. tach which has responded to beta-blockers and amiodarone.     ECG showed sinus bradycardia, LVH with repolarization changes, poor R wave progression  Chest x-ray showed pulmonary congestion, cardiomegaly  Blood pressure on admission 115/70 heart rate 68 temperature 97.5, oxygen saturation 96%  Lab work on admission  Sodium 144, potassium 3.5, BUN 16, creatinine 1.3, magnesium 1.8 glucose 181, WBC 4.6 hemoglobin 9.0, platelets 220, INR 1.1  Troponin high-sensitivity 38, proBNP 1592     Current evaluation  Patient seen and examined  She was resting in bed with a couple of pillows, watching TV/  cartoon  She denied any chest pain or palpitation no shortness of  She is ambulating in the room without help mild dizziness  No paroxysmal nocturnal dyspnea no fever no chill  Neck veins were normal no sign of pleural effusion no peripheral edema  ECG monitor sinus rhythm     Blood pressure 145/70 heart rate 63 oxygen saturation 94%  Sodium 141 potassium 4.3 BUN 16 creatinine 1.3 glucose 117  WBC 4.9 hemoglobin 9.4  INR 4.6    Investigation workup

## 2025-07-04 NOTE — PLAN OF CARE
Problem: Chronic Conditions and Co-morbidities  Goal: Patient's chronic conditions and co-morbidity symptoms are monitored and maintained or improved  Outcome: Progressing  Flowsheets (Taken 7/3/2025 2042)  Care Plan - Patient's Chronic Conditions and Co-Morbidity Symptoms are Monitored and Maintained or Improved: Monitor and assess patient's chronic conditions and comorbid symptoms for stability, deterioration, or improvement     Problem: Discharge Planning  Goal: Discharge to home or other facility with appropriate resources  Outcome: Progressing  Flowsheets (Taken 7/3/2025 2042)  Discharge to home or other facility with appropriate resources: Identify barriers to discharge with patient and caregiver     Problem: Pain  Goal: Verbalizes/displays adequate comfort level or baseline comfort level  7/3/2025 2318 by Stalin Caldwell RN  Outcome: Progressing  Flowsheets (Taken 7/3/2025 2030)  Verbalizes/displays adequate comfort level or baseline comfort level: Encourage patient to monitor pain and request assistance  7/3/2025 1550 by Pardeep Barahona RN  Note: Assess the location, characteristics, onset, duration, frequency, quality, and severity of pain. Encourage immediate report of pain. Use appropriate pain scale to rate pain. Manage pain using nonpharmacologic/pharmacologic interventions.   7/3/2025 1550 by Pardeep Barahona RN  Outcome: Progressing  Note: Assess the location, characteristics, onset, duration, frequency, quality, and severity of pain. Encourage immediate report of pain. Use appropriate pain scale to rate pain. Manage pain using nonpharmacologic/pharmacologic interventions.      Problem: Safety - Adult  Goal: Free from fall injury  7/3/2025 2318 by Stalin Caldwell RN  Outcome: Progressing  Flowsheets (Taken 7/3/2025 2314)  Free From Fall Injury: Instruct family/caregiver on patient safety  7/3/2025 1550 by Pardeep Barahona RN  Outcome: Progressing  Note: Patient remains free of falls and injuries throughout

## 2025-07-04 NOTE — TELEPHONE ENCOUNTER
Home with INR of 1.4  Bridging with Lovenox per Dr Choudhary  Plan warfarin 5 mg daily and check INR in three  days in our clinc

## 2025-07-05 VITALS
OXYGEN SATURATION: 96 % | DIASTOLIC BLOOD PRESSURE: 67 MMHG | HEIGHT: 64 IN | RESPIRATION RATE: 17 BRPM | SYSTOLIC BLOOD PRESSURE: 134 MMHG | TEMPERATURE: 97.9 F | HEART RATE: 59 BPM | BODY MASS INDEX: 22.73 KG/M2 | WEIGHT: 133.16 LBS

## 2025-07-05 LAB
INR PPP: 1.6
PROTHROMBIN TIME: 20.7 SEC (ref 11.8–14.6)

## 2025-07-05 PROCEDURE — 6370000000 HC RX 637 (ALT 250 FOR IP)

## 2025-07-05 PROCEDURE — 6370000000 HC RX 637 (ALT 250 FOR IP): Performed by: INTERNAL MEDICINE

## 2025-07-05 PROCEDURE — 6360000002 HC RX W HCPCS: Performed by: INTERNAL MEDICINE

## 2025-07-05 PROCEDURE — G0378 HOSPITAL OBSERVATION PER HR: HCPCS

## 2025-07-05 PROCEDURE — 94640 AIRWAY INHALATION TREATMENT: CPT

## 2025-07-05 PROCEDURE — 36415 COLL VENOUS BLD VENIPUNCTURE: CPT

## 2025-07-05 PROCEDURE — 96372 THER/PROPH/DIAG INJ SC/IM: CPT

## 2025-07-05 PROCEDURE — 99239 HOSP IP/OBS DSCHRG MGMT >30: CPT | Performed by: INTERNAL MEDICINE

## 2025-07-05 PROCEDURE — 85610 PROTHROMBIN TIME: CPT

## 2025-07-05 RX ORDER — WARFARIN SODIUM 7.5 MG/1
7.5 TABLET ORAL
Status: DISCONTINUED | OUTPATIENT
Start: 2025-07-05 | End: 2025-07-05

## 2025-07-05 RX ORDER — WARFARIN SODIUM 7.5 MG/1
7.5 TABLET ORAL
Status: COMPLETED | OUTPATIENT
Start: 2025-07-05 | End: 2025-07-05

## 2025-07-05 RX ADMIN — BUMETANIDE 1 MG: 1 TABLET ORAL at 08:18

## 2025-07-05 RX ADMIN — FERROUS SULFATE TAB 325 MG (65 MG ELEMENTAL FE) 325 MG: 325 (65 FE) TAB at 08:18

## 2025-07-05 RX ADMIN — ATORVASTATIN CALCIUM 80 MG: 80 TABLET, FILM COATED ORAL at 08:18

## 2025-07-05 RX ADMIN — WARFARIN SODIUM 7.5 MG: 7.5 TABLET ORAL at 11:07

## 2025-07-05 RX ADMIN — POTASSIUM CHLORIDE 20 MEQ: 750 CAPSULE, EXTENDED RELEASE ORAL at 08:18

## 2025-07-05 RX ADMIN — AMIODARONE HYDROCHLORIDE 200 MG: 200 TABLET ORAL at 08:18

## 2025-07-05 RX ADMIN — BUDESONIDE AND FORMOTEROL FUMARATE DIHYDRATE 2 PUFF: 160; 4.5 AEROSOL RESPIRATORY (INHALATION) at 08:18

## 2025-07-05 RX ADMIN — CARVEDILOL 3.12 MG: 3.12 TABLET, FILM COATED ORAL at 08:18

## 2025-07-05 RX ADMIN — ENOXAPARIN SODIUM 60 MG: 100 INJECTION SUBCUTANEOUS at 08:20

## 2025-07-05 RX ADMIN — DOCUSATE SODIUM 50 MG AND SENNOSIDES 8.6 MG 1 TABLET: 8.6; 5 TABLET, FILM COATED ORAL at 08:18

## 2025-07-05 RX ADMIN — ASPIRIN 81 MG: 81 TABLET, CHEWABLE ORAL at 08:18

## 2025-07-05 NOTE — PLAN OF CARE
Problem: Chronic Conditions and Co-morbidities  Goal: Patient's chronic conditions and co-morbidity symptoms are monitored and maintained or improved  7/5/2025 0140 by Sonja Neely RN  Outcome: Progressing  7/4/2025 1704 by Barbara Walker RN  Outcome: Progressing     Problem: Discharge Planning  Goal: Discharge to home or other facility with appropriate resources  7/5/2025 0140 by Sonja Neely RN  Outcome: Progressing  7/4/2025 1704 by Barbara Walker RN  Outcome: Progressing     Problem: Pain  Goal: Verbalizes/displays adequate comfort level or baseline comfort level  7/5/2025 0140 by Sonja Neely RN  Outcome: Progressing  7/4/2025 1704 by Barbara Walker RN  Outcome: Progressing     Problem: Safety - Adult  Goal: Free from fall injury  7/5/2025 0140 by Sonja Neely RN  Outcome: Progressing  Flowsheets (Taken 7/4/2025 2130)  Free From Fall Injury: Instruct family/caregiver on patient safety  7/4/2025 1704 by Barbara Walker RN  Outcome: Progressing     Problem: ABCDS Injury Assessment  Goal: Absence of physical injury  7/5/2025 0140 by Sonja Neely RN  Outcome: Progressing  Flowsheets (Taken 7/4/2025 2130)  Absence of Physical Injury: Implement safety measures based on patient assessment  7/4/2025 1704 by Barbara Walker RN  Outcome: Progressing

## 2025-07-05 NOTE — PROGRESS NOTES
Date:   2025  Patient name: Graciela Holt  Date of admission:  2025  4:34 PM  MRN:   [id number]  YOB: 1950  PCP: Travis Mason MD    Reason for Admission: Heart failure (HCC) [I50.9]    Cardiology follow-up: CHF systolic and diastolic acute on chronic, multivessel CAD, cardiac arrhythmias        Referring physician: Dr Chip Anderson     Impression  Admission 2025 increasing swelling over the legs, shortness of breath  Cardiac cath 2025 multivessel CAD, patent LIMA to LAD, severely tortuous left subclavian artery, patent SVG to RPDA, occluded SVG to OM1, no intervention  Multivessel CAD   CABG LIMA to LAD and diagonal 1, SVG to OM, SVG to PDA 2018 at [facility]  Severely reduced LV systolic function ejection fraction 25 to 30%, akinetic LV apex, apical thrombus 2D echo 2024  Moderately increased LV wall thickness, enlarged LV 6.6 cm, Left parasternal lift  Episodes of nonsustained V. Tach, patient also had A-fib with RVR  Diabetes mellitus  Hyperlipidemia lipid profile 2024 cholesterol 159, HDL 55, LDL 95, triglyceride 41  History of left MCA stroke   CT head: 2020 no acute intracranial abnormality.  Atrophy and senescent changes   including findings compatible with multiple old cortical infarcts.  No intracranial hemorrhage.     Impaired memory  Carotid Doppler 3/5/2021 left 50 to 69% internal carotid artery, right less than 50%, antegrade vertebral artery flow both side  Bilateral femoral artery bruit  Dilated ascending aorta  Moderate calcified plaque in the abdominal aorta and branches vessel without occlusion  Status post cholecystectomy  C-spine surgery  Sigmoid diverticulosis     Multiple admissions with shortness of breath, peripheral edema, chest pain     Past surgical history  Bilateral knee replacement, C-spine surgery, cholecystectomy,  section, CABG x3    Drug allergies codeine, lisinopril     History of present  significant valvular abnormality     CT brain and cervical spine without contrast 6/7/2025  CT head: No acute intracranial abnormality.  Atrophy and senescent changes   including findings compatible with multiple old cortical infarcts.  No   intracranial hemorrhage.   CT C-spine: Extensive postsurgical change with intact indwelling hardware.   Degenerative and degenerative disc changes without evidence of traumatic   malalignment or acute fracture.  Ascending aorta is dilated.      Medications:   Scheduled Meds:   bumetanide  1 mg Oral Daily    aspirin  81 mg Oral Daily    budesonide-formoterol  2 puff Inhalation BID RT    carvedilol  3.125 mg Oral BID WC    ferrous sulfate  325 mg Oral Daily with breakfast    potassium chloride  20 mEq Oral Daily    sennosides-docusate sodium  1 tablet Oral Daily    traZODone  50 mg Oral Nightly    famotidine  20 mg Oral QPM    warfarin placeholder: dosing by pharmacy   Oral RX Placeholder    amiodarone  200 mg Oral Daily    atorvastatin  80 mg Oral Daily    enoxaparin  1 mg/kg (Adjusted) SubCUTAneous BID     Continuous Infusions:   sodium chloride       CBC:   Recent Labs     07/03/25  0649 07/04/25  0637   WBC 4.7 4.9   HGB 9.6* 9.4*    227     BMP:    Recent Labs     07/03/25  0649 07/04/25  0637    141   K 3.9 4.3   * 108*   CO2 26 23   BUN 14 16   CREATININE 1.1 1.3*   GLUCOSE 123* 117*     Hepatic: No results for input(s): \"AST\", \"ALT\", \"BILITOT\", \"ALKPHOS\" in the last 72 hours.    Invalid input(s): \"ALB\"  Troponin: No results for input(s): \"TROPONINI\" in the last 72 hours.  BNP: No results for input(s): \"BNP\" in the last 72 hours.  Lipids: No results for input(s): \"CHOL\", \"HDL\" in the last 72 hours.    Invalid input(s): \"LDLCALCU\"  INR:   Recent Labs     07/04/25  0637 07/04/25  1500 07/05/25  0605   INR 4.6* 1.4 1.6       Objective:   Vitals: /67   Pulse 59   Temp 97.9 °F (36.6 °C)   Resp 17   Ht 1.626 m (5' 4.02\")   Wt 60.4 kg (133 lb 2.5 oz)

## 2025-07-05 NOTE — CARE COORDINATION
ONGOING DISCHARGE PLAN:    Patient is alert and oriented x4.    Spoke with patient regarding discharge plan and patient confirms that plan is still to return to home w/ Roommates/Friends.     Pt. Denies VNS/Needs. Pt has Dogs at home.     Pt. Remains on Coumadin. Follows at the [facility]. Pt is to F/U on 7/7/25 @ 3:10 PM.     Pt to DC on Lovenox. Unsure if she will be compliant.     Pt. Is to F/U w/ Dr. Posada for Possible AICD. Information for her to call is already on AVS. Pt. Has not been agreeable in past.     Pt Follows at [facility] Clinic.     Per Notes, Pt. Refused Life Vest.     Will continue to follow for additional discharge needs.    If patient is discharged prior to next notation, then this note serves as note for discharge by case management.    Electronically signed by Aster Pelaez RN on 7/5/2025 at 10:55 AM

## 2025-07-05 NOTE — PLAN OF CARE
Problem: Chronic Conditions and Co-morbidities  Goal: Patient's chronic conditions and co-morbidity symptoms are monitored and maintained or improved  7/5/2025 1050 by Ruiz Alvares RN  Outcome: Adequate for Discharge  7/5/2025 0140 by Sonja Neely RN  Outcome: Progressing     Problem: Discharge Planning  Goal: Discharge to home or other facility with appropriate resources  7/5/2025 1050 by Ruiz Alvares RN  Outcome: Adequate for Discharge  7/5/2025 0140 by Sonja Neely RN  Outcome: Progressing     Problem: Pain  Goal: Verbalizes/displays adequate comfort level or baseline comfort level  7/5/2025 1050 by Ruiz Alvares RN  Outcome: Adequate for Discharge  7/5/2025 0140 by Sonja Neely RN  Outcome: Progressing     Problem: Safety - Adult  Goal: Free from fall injury  7/5/2025 1050 by Ruiz Alvares RN  Outcome: Adequate for Discharge  7/5/2025 0140 by Sonja Neely RN  Outcome: Progressing  Flowsheets (Taken 7/4/2025 2130)  Free From Fall Injury: Instruct family/caregiver on patient safety     Problem: ABCDS Injury Assessment  Goal: Absence of physical injury  7/5/2025 1050 by Ruiz Alvares RN  Outcome: Adequate for Discharge  7/5/2025 0140 by Sonja Neely RN  Outcome: Progressing  Flowsheets (Taken 7/4/2025 2130)  Absence of Physical Injury: Implement safety measures based on patient assessment

## 2025-07-05 NOTE — PROGRESS NOTES
Pt discharged at this time IV removed cath intact dressing applied pt tolerated well AVS reviewed with pt all questions answered pt verbalizes understanding [employer] called for pt transport home

## 2025-07-05 NOTE — PROGRESS NOTES
Pharmacy Note  Warfarin Consult follow-up      Recent Labs     07/04/25  0637 07/04/25  1500 07/05/25  0605   INR 4.6* 1.4 1.6     Recent Labs     07/03/25  0649 07/04/25  0637   HGB 9.6* 9.4*   HCT 32.6* 31.4*    227       Significant Drug-Drug Interactions:  Current warfarin drug-drug interactions: Amiodarone, aspirin,Lipitor, Lovenox       Date             INR        Dose given previous day  Dose scheduled for today  7/5/2025            1.6        5  mg         7.5 mg        Notes:  Give 7.5 mg dose today.                  Daily PT/INR while inpatient.  Gordo Rodriguez RPh  7/5/2025  6:44 AM

## 2025-07-05 NOTE — DISCHARGE SUMMARY
IN-PATIENT SERVICE   [facility]    Discharge Summary     Patient ID: Graciela Holt  :  1950   MRN: [id number]     ACCOUNT:  [id number]   Patient's PCP: Travis Mason MD  Admit Date: 2025   Discharge Date: 2025    Length of Stay: 0  Code Status:  Full Code  Admitting Physician: Juan Carlos Todd MD  Discharge Physician: Pearl Choudhary MD     Active Discharge Diagnoses:     Primary Problem  Heart failure (HCC)      Hospital Problems  Active Hospital Problems    Diagnosis Date Noted    Acute stress reaction [F43.0] 2025    Heart failure (HCC) [I50.9] 2025    Acute on chronic systolic congestive heart failure (HCC) [I50.23] 2025    Moderate malnutrition [E44.0] 2025    Noncompliance [Z91.199] 2023    Chronic combined systolic and diastolic congestive heart failure (HCC) [I50.42] 02/10/2022    Type 2 diabetes mellitus with chronic kidney disease [E11.22] 02/10/2022    Chronic atrial fibrillation (HCC) [I48.20] 2018    S/P CABG x 4 [Z95.1]     Hypothyroidism [E03.9] 2018    Chronic bilateral low back pain with left-sided sciatica [G89.29, M54.42] 2017    Controlled type 2 diabetes mellitus without complication, without long-term current use of insulin (HCC) [E11.9] 09/10/2015    Hypertension goal BP (blood pressure) < 140/90 [I10] 09/10/2015    Mixed hyperlipidemia [E78.2] 09/10/2015    Chronic obstructive pulmonary disease (HCC) [J44.9] 09/10/2015       Admission Condition:  fair     Discharged Condition: fair    Hospital Stay:     Hospital Course:  Graciela Holt is a 74 y.o. female who was admitted for the management of Heart failure (HCC) , presented to ER with Leg Swelling    Patient presents to the ED with complaints of leg swelling.  Patient has a significant medical history of c CHF, chronic A-fib, COPD, diabetes, previous smoking history, hypertension, hypothyroidism, hyperlipidemia and CAD status post CABG x

## 2025-07-07 ENCOUNTER — ANTI-COAG VISIT (OUTPATIENT)
Age: 75
End: 2025-07-07
Payer: COMMERCIAL

## 2025-07-07 ENCOUNTER — TELEPHONE (OUTPATIENT)
Dept: FAMILY MEDICINE CLINIC | Age: 75
End: 2025-07-07

## 2025-07-07 DIAGNOSIS — I48.20 CHRONIC ATRIAL FIBRILLATION (HCC): Primary | ICD-10-CM

## 2025-07-07 LAB
GLUCOSE BLD-MCNC: 161 MG/DL (ref 65–105)
GLUCOSE BLD-MCNC: 168 MG/DL (ref 65–105)
GLUCOSE BLD-MCNC: 177 MG/DL (ref 65–105)
INTERNATIONAL NORMALIZATION RATIO, POC: 2.3
PROTHROMBIN TIME, POC: 27.4

## 2025-07-07 PROCEDURE — 85610 PROTHROMBIN TIME: CPT | Performed by: PHARMACY

## 2025-07-07 PROCEDURE — 99212 OFFICE O/P EST SF 10 MIN: CPT | Performed by: PHARMACY

## 2025-07-07 RX ORDER — WARFARIN SODIUM 5 MG/1
TABLET ORAL
Qty: 30 TABLET | Refills: 3 | Status: SHIPPED | OUTPATIENT
Start: 2025-07-07

## 2025-07-07 NOTE — PROGRESS NOTES
Patient seen in person in Medication Management Service.    Patient states unknown compliance with regimen. Patient was in hospital and discharged with plan to take 5 mg warfarin daily with Lovenox bridging.     No bleeding or thromboembolic side effects noted.    No significant med or dietary changes.    No significant recent illness or disease state changes.      Patient acknowledges they are still an active patient of referring provider which is JERI Mccoy Yes     PT/INR done via POC meter per protocol.  INR was therapeutic at 2.3.  (goal 2 - 3)    Warfarin regimen will be continued at current dose 5 mg daily. Patient will stop Lovenox injections.  Will retest in 4 days.    Patient understands dosing directions and information discussed.  Dosing schedule and follow up appointment given to patient. Progress note routed to referring physicians office.  Patient acknowledges working in Consult Agreement with Pharmacist as referred by his/her physician/provider.      For Pharmacy Admin Tracking Only    Intervention Detail: Adherence Monitorin and New Rx: 1, reason: Improve Adherence  Total # of Interventions Recommended: 2  Total # of Interventions Accepted: 2  Time Spent (min): 20    Gayatri Delgadillo RPH, PharmD, BCACP  2025  2:59 PM

## 2025-07-07 NOTE — TELEPHONE ENCOUNTER
Care Transitions Initial Follow Up Call    Outreach made within 2 business days of discharge: Yes    Patient: Graciela Holt Patient : 1950   MRN: [id number]  Reason for Admission: heart failure  Discharge Date: 25       Spoke with: unable to LM    Discharge department/facility: [facility] Interactive Patient Contact:  Was patient able to fill all prescriptions: Yes  Was patient instructed to bring all medications to the follow-up visit: Yes  Is patient taking all medications as directed in the discharge summary? Yes  Does patient understand their discharge instructions: Yes  Does patient have questions or concerns that need addressed prior to 7-14 day follow up office visit: no    Additional needs identified to be addressed with provider  LM to schedule hospital follow up             Scheduled appointment with PCP within 7-14 days    Follow Up  Future Appointments   Date Time Provider Department Center   2025  3:10 PM [facility] MEDICATION MGMT ROOM 3 [facility]   2025  3:10 PM [facility] MEDICATION MGMT ROOM 2 [facility]   2025 10:00 AM [facility] CHF CLINIC RM 1 [facility] [place]   2025  3:00 PM Yessica Flynn DO [facility]   2025 12:00 PM [facility] JAZMINE Brizuela MA

## 2025-07-08 ENCOUNTER — TELEPHONE (OUTPATIENT)
Dept: FAMILY MEDICINE CLINIC | Age: 75
End: 2025-07-08

## 2025-07-08 NOTE — TELEPHONE ENCOUNTER
Care Transitions Initial Follow Up Call    Outreach made within 2 business days of discharge: Yes    Patient: Graciela Holt Patient : 1950   MRN: [id number]  Reason for Admission: heart failure   Discharge Date: 25       Spoke with: unable to leave message     Discharge department/facility:     [facility] Interactive Patient Contact:  Was patient able to fill all prescriptions:   Was patient instructed to bring all medications to the follow-up visit:   Is patient taking all medications as directed in the discharge summary?   Does patient understand their discharge instructions:   Does patient have questions or concerns that need addressed prior to 7-14 day follow up office visit:     Additional needs identified to be addressed with provider                 Keira Cantu MA

## 2025-07-09 ENCOUNTER — HOSPITAL ENCOUNTER (OUTPATIENT)
Age: 75
Setting detail: SPECIMEN
Discharge: HOME | End: 2025-07-09

## 2025-07-09 ENCOUNTER — APPOINTMENT (OUTPATIENT)
Dept: GENERAL RADIOLOGY | Age: 75
End: 2025-07-09
Payer: COMMERCIAL

## 2025-07-09 ENCOUNTER — HOSPITAL ENCOUNTER (EMERGENCY)
Age: 75
Discharge: HOME | End: 2025-07-09
Attending: EMERGENCY MEDICINE
Payer: COMMERCIAL

## 2025-07-09 ENCOUNTER — PHARMACY VISIT (OUTPATIENT)
Age: 75
End: 2025-07-09
Payer: COMMERCIAL

## 2025-07-09 VITALS
TEMPERATURE: 98.5 F | HEIGHT: 64 IN | RESPIRATION RATE: 19 BRPM | OXYGEN SATURATION: 95 % | WEIGHT: 132 LBS | BODY MASS INDEX: 22.53 KG/M2 | SYSTOLIC BLOOD PRESSURE: 136 MMHG | HEART RATE: 58 BPM | DIASTOLIC BLOOD PRESSURE: 70 MMHG

## 2025-07-09 VITALS
HEART RATE: 65 BPM | OXYGEN SATURATION: 95 % | DIASTOLIC BLOOD PRESSURE: 57 MMHG | BODY MASS INDEX: 22.77 KG/M2 | WEIGHT: 132.7 LBS | SYSTOLIC BLOOD PRESSURE: 96 MMHG

## 2025-07-09 DIAGNOSIS — R42 DIZZINESS: ICD-10-CM

## 2025-07-09 DIAGNOSIS — I50.43 CHF (CONGESTIVE HEART FAILURE), NYHA CLASS I, ACUTE ON CHRONIC, COMBINED (HCC): ICD-10-CM

## 2025-07-09 DIAGNOSIS — I50.42 CHRONIC COMBINED SYSTOLIC AND DIASTOLIC CONGESTIVE HEART FAILURE (HCC): Primary | ICD-10-CM

## 2025-07-09 DIAGNOSIS — R07.9 CHEST PAIN, UNSPECIFIED TYPE: Primary | ICD-10-CM

## 2025-07-09 LAB
ANION GAP SERPL CALCULATED.3IONS-SCNC: 10 MMOL/L (ref 9–16)
BASOPHILS # BLD: 0.04 K/UL (ref 0–0.2)
BASOPHILS NFR BLD: 1 % (ref 0–2)
BNP SERPL-MCNC: 1885 PG/ML (ref 0–300)
BUN SERPL-MCNC: 23 MG/DL (ref 8–23)
CALCIUM SERPL-MCNC: 9 MG/DL (ref 8.6–10.4)
CHLORIDE SERPL-SCNC: 108 MMOL/L (ref 98–107)
CHP ED QC CHECK: YES
CO2 SERPL-SCNC: 27 MMOL/L (ref 20–31)
CREAT SERPL-MCNC: 1.3 MG/DL (ref 0.7–1.2)
EOSINOPHIL # BLD: 0.25 K/UL (ref 0–0.44)
EOSINOPHILS RELATIVE PERCENT: 5 % (ref 0–4)
ERYTHROCYTE [DISTWIDTH] IN BLOOD BY AUTOMATED COUNT: 19 % (ref 11.5–14.9)
GFR, ESTIMATED: 43 ML/MIN/1.73M2
GLUCOSE BLD-MCNC: 144 MG/DL
GLUCOSE BLD-MCNC: 144 MG/DL (ref 65–105)
GLUCOSE SERPL-MCNC: 150 MG/DL (ref 74–99)
HCT VFR BLD AUTO: 31.5 % (ref 36–46)
HGB BLD-MCNC: 9.5 G/DL (ref 12–16)
IMM GRANULOCYTES # BLD AUTO: <0.03 K/UL (ref 0–0.3)
IMM GRANULOCYTES NFR BLD: 0 %
INR PPP: 1.6
LYMPHOCYTES NFR BLD: 1.2 K/UL (ref 1.1–3.7)
LYMPHOCYTES RELATIVE PERCENT: 22 % (ref 24–44)
MAGNESIUM SERPL-MCNC: 2 MG/DL (ref 1.6–2.4)
MCH RBC QN AUTO: 26.1 PG (ref 26–34)
MCHC RBC AUTO-ENTMCNC: 30.2 G/DL (ref 31–37)
MCV RBC AUTO: 86.5 FL (ref 80–100)
MONOCYTES NFR BLD: 0.59 K/UL (ref 0.1–1.2)
MONOCYTES NFR BLD: 11 % (ref 3–12)
NEUTROPHILS NFR BLD: 61 % (ref 36–66)
NEUTS SEG NFR BLD: 3.41 K/UL (ref 1.5–8.1)
NRBC BLD-RTO: 0 PER 100 WBC
PLATELET # BLD AUTO: 292 K/UL (ref 150–450)
PMV BLD AUTO: 11.1 FL (ref 8–13.5)
POTASSIUM SERPL-SCNC: 5 MMOL/L (ref 3.7–5.3)
PROTHROMBIN TIME: 20 SEC (ref 11.8–14.6)
RBC # BLD AUTO: 3.64 M/UL (ref 3.95–5.11)
SODIUM SERPL-SCNC: 145 MMOL/L (ref 136–145)
TROPONIN I SERPL HS-MCNC: 31 NG/L (ref 0–14)
TROPONIN I SERPL HS-MCNC: 32 NG/L (ref 0–14)
WBC OTHER # BLD: 5.5 K/UL (ref 3.5–11)

## 2025-07-09 PROCEDURE — 82947 ASSAY GLUCOSE BLOOD QUANT: CPT

## 2025-07-09 PROCEDURE — 99213 OFFICE O/P EST LOW 20 MIN: CPT | Performed by: PHARMACY

## 2025-07-09 PROCEDURE — 80048 BASIC METABOLIC PNL TOTAL CA: CPT

## 2025-07-09 PROCEDURE — 99284 EMERGENCY DEPT VISIT MOD MDM: CPT

## 2025-07-09 PROCEDURE — 71045 X-RAY EXAM CHEST 1 VIEW: CPT

## 2025-07-09 PROCEDURE — 36415 COLL VENOUS BLD VENIPUNCTURE: CPT

## 2025-07-09 PROCEDURE — 85610 PROTHROMBIN TIME: CPT

## 2025-07-09 PROCEDURE — 83880 ASSAY OF NATRIURETIC PEPTIDE: CPT

## 2025-07-09 PROCEDURE — 93005 ELECTROCARDIOGRAM TRACING: CPT | Performed by: EMERGENCY MEDICINE

## 2025-07-09 PROCEDURE — 85025 COMPLETE CBC W/AUTO DIFF WBC: CPT

## 2025-07-09 PROCEDURE — 83735 ASSAY OF MAGNESIUM: CPT

## 2025-07-09 PROCEDURE — 6370000000 HC RX 637 (ALT 250 FOR IP): Performed by: STUDENT IN AN ORGANIZED HEALTH CARE EDUCATION/TRAINING PROGRAM

## 2025-07-09 PROCEDURE — 84484 ASSAY OF TROPONIN QUANT: CPT

## 2025-07-09 RX ORDER — METHOCARBAMOL 500 MG/1
500 TABLET, FILM COATED ORAL ONCE
Status: COMPLETED | OUTPATIENT
Start: 2025-07-09 | End: 2025-07-09

## 2025-07-09 RX ORDER — ACETAMINOPHEN 500 MG
1000 TABLET ORAL 4 TIMES DAILY PRN
Qty: 80 TABLET | Refills: 0 | Status: SHIPPED | OUTPATIENT
Start: 2025-07-09 | End: 2025-07-19

## 2025-07-09 RX ORDER — ACETAMINOPHEN 500 MG
1000 TABLET ORAL ONCE
Status: COMPLETED | OUTPATIENT
Start: 2025-07-09 | End: 2025-07-09

## 2025-07-09 RX ORDER — METHOCARBAMOL 500 MG/1
500 TABLET, FILM COATED ORAL 3 TIMES DAILY
Qty: 3 TABLET | Refills: 0 | Status: SHIPPED | OUTPATIENT
Start: 2025-07-09 | End: 2025-07-10

## 2025-07-09 RX ADMIN — METHOCARBAMOL 500 MG: 500 TABLET ORAL at 19:48

## 2025-07-09 RX ADMIN — ACETAMINOPHEN 1000 MG: 500 TABLET ORAL at 19:48

## 2025-07-09 ASSESSMENT — PAIN DESCRIPTION - LOCATION
LOCATION: CHEST
LOCATION: CHEST

## 2025-07-09 ASSESSMENT — PAIN SCALES - GENERAL
PAINLEVEL_OUTOF10: 3
PAINLEVEL_OUTOF10: 5
PAINLEVEL_OUTOF10: 3
PAINLEVEL_OUTOF10: 3

## 2025-07-09 ASSESSMENT — PAIN - FUNCTIONAL ASSESSMENT
PAIN_FUNCTIONAL_ASSESSMENT: 0-10
PAIN_FUNCTIONAL_ASSESSMENT: 0-10

## 2025-07-09 NOTE — ED PROVIDER NOTES
[facility] EMERGENCY DEPARTMENT  Emergency Department Encounter  Emergency Medicine Resident     Pt Name:Graciela Holt  MRN: [id number]  Birthdate 1950  Date of evaluation: 7/9/25  PCP:  Travis Mason MD  Note Started: 4:28 PM EDT      CHIEF COMPLAINT       Chief Complaint   Patient presents with    Chest Pain    Dizziness    Shortness of Breath       HISTORY OF PRESENT ILLNESS  (Location/Symptom, Timing/Onset, Context/Setting, Quality, Duration, Modifying Factors, Severity.)      Graciela Holt is a 74 y.o. female who presents at the recommendation of her CHF pharmacy team, who noted she had asymmetric blood pressures in her upper extremities.  Patient also noting several days of chest pain, these are not different from her previous episodes, though they have increased today.  Patient also reporting intermittent dizziness, reports that this tends to come and go, but is currently present.    Patient denies sensation of tearing chest pain, denies lightheadedness/weakness.  Denies unilateral weakness or difficulty finding words.  Patient reports adherence to her medications, denies fevers, chills, recent illness.    PAST MEDICAL / SURGICAL / SOCIAL / FAMILY HISTORY      has a past medical history of Allergic rhinitis, Arthritis, CAD (coronary artery disease), Cerebral artery occlusion with cerebral infarction (HCC), CHF (congestive heart failure) (HCC), Controlled type 2 diabetes mellitus without complication, without long-term current use of insulin (HCC), COPD (chronic obstructive pulmonary disease) (HCC), Depression, Former smoker, History of blood transfusion, Hyperlipidemia, Hypertension, Kidney stone, Myocardial infarction (HCC), Obesity, Class I, BMI 30.0-34.9 (see actual BMI), Restless leg syndrome, Skin abnormality, Type II or unspecified type diabetes mellitus without mention of complication, not stated as uncontrolled, Wears glasses, and Wears partial dentures.     has a past surgical

## 2025-07-09 NOTE — ED TRIAGE NOTES
Mode of arrival (squad #, walk in, police, etc) : walk-in        Chief complaint(s): chest pain/dizziness        Arrival Note (brief scenario, treatment PTA, etc).: pt reports above symptoms for a couple hours        C= \"Have you ever felt that you should Cut down on your drinking?\"  No  A= \"Have people Annoyed you by criticizing your drinking?\"  No  G= \"Have you ever felt bad or Guilty about your drinking?\"  No  E= \"Have you ever had a drink as an Eye-opener first thing in the morning to steady your nerves or to help a hangover?\"  No      Deferred []      Reason for deferring: N/A    *If yes to two or more: probable alcohol abuse.*

## 2025-07-09 NOTE — PATIENT INSTRUCTIONS
Obtain BMP/BNP.     Call cardiologist to schedule appointment and have patient assistance forms signed.      Get evaluated for concerning heart-related symptoms (shortness of breath, chest pain). Medications may be modified after lab results or ED evaluation of vital signs.      Monitor and record weights and blood pressures at home.

## 2025-07-09 NOTE — PROGRESS NOTES
Physician Progress Note      PATIENT:               IVETT ANDRE  CSN #:                  [id number]  :                       1950  ADMIT DATE:       2025 12:03 PM  DISCH DATE:        2025 4:01 PM  RESPONDING  PROVIDER #:        Pearl Choudhary MD          QUERY TEXT:    Based on your medical judgment, please clarify these findings and document if   any of the following are being evaluated and/or treated:    The clinical indicators include:  Patient presented with Shortness of Breath\"  Female,74yrs,DM,HTN,CHF on DS     -\"Paroxysmal atrial fibrillation-Patient with known atrial fibrillation on   warfarin\"(Progress Notes by Pearl Choudhary MD at 2025)    -\" Hypertension goal BP (blood pressure) < 140/90\"(Progress Notes by Tommy Flynn MD at 2025)    -\" Type 2 diabetes mellitus with chronic kidney disease\"(Progress Notes by   Tommy Flynn MD at 2025)    -\"Patient with known HFrEF, EF 20-25%\"(H&P by Pearl Choudhary MD at 2025)    -\" 74 y.o. female PMH of paroxysmal atrial fibrillation on warfarin, HFrEF   with a EF of 20 to 25%, T2DM, essential hypertension,\"(Discharge Summary by   Chata Lackey MD at 2025)    -From EPIC Result review  -INR-1.6,1.8,1.9,1.5(-)  -Prothrombin time-21,22.5,23.8,19    -\"warfarin (COUMADIN) tablet -oral(From EPIC MAR 5/23-)    Thank you  Willi MATTHEW CDS  Options provided:  -- Secondary hypercoagulable state in a patient with atrial fibrillation  -- Other - I will add my own diagnosis  -- Disagree - Not applicable / Not valid  -- Disagree - Clinically unable to determine / Unknown  -- Refer to Clinical Documentation Reviewer    PROVIDER RESPONSE TEXT:    This patient has secondary hypercoagulable state in a patient with atrial   fibrillation.    Query created by: Willi Alves on 2025 11:50 PM      Electronically signed by:  Pearl Choudhary MD 2025 10:13 AM

## 2025-07-09 NOTE — PROGRESS NOTES
[facility]  Medication Management  Pharmacist  Heart Failure    [address]  [place], [place] [address]  Phone: [phone]  Fax: [phone]      NAME: Graciela Holt  MEDICAL RECORD NUMBER:  [id number]  AGE: 74 y.o.   GENDER: female  : 1950  EPISODE DATE:  2025      Graciela Holt was referred to the [place] [facility]ication Management [facility] by Dr. Mccoy for heart failure services.  Special Instructions per the Referral Include: Patient Education and Medication Management    Dry weight: 132.7 pounds     ECHO EF%: 20-25% Date: 6/3/2025    ECHO EF%: 20-25% Date: 2025      This visit was performed as:  THIS VISIT WAS PERFORMED AS: An in person visit. Protocols were followed with precautions to reduce the spread of COVID-19.     Subjective   rGaciela Holt is a 74 y.o. female here for the Heart Failure Services.    They are here today for a comprehensive medication review including over the counter medications and herbal products, overall wellbeing assessment, transition of care, extensive education and any needed adjustments with updates and recommendations communicated to the referring physician. Patient presents with grandchild, Luana.      Shortness of Breath:   Dyspnea with normal activities    Other Heart Failure Findings:    Increased coughing   Trace edema on lower extremities    General Findings:  Chest pain  Dizziness     Comments:    - Patient was supposed to start Jardiance. Patient never started Jardiance due to affordability. Patient was to get patient assistance form signed by cardiologist; however, patient has not yet had cardiologist appointment and patient assistance form has not been signed.      - Patient did restart Bumex - dose increased to 1 mg daily.    - Patient is taking carvedilol 3.125 mg twice daily.    - Patient weighed 132.7 lbs and has not been weighing self at home. Patient does not monitor blood pressure at home.     - Patient was in the hospital

## 2025-07-10 NOTE — DISCHARGE INSTRUCTIONS
You were seen today in emergency department for dizziness, concerns of different blood pressures on your arms, and for your chest pain.  A cardiac workup, to include CHF labs were stable from your baseline, your EKG was normal/stable compared to your other ones, and the

## 2025-07-11 ENCOUNTER — TELEPHONE (OUTPATIENT)
Age: 75
End: 2025-07-11

## 2025-07-11 ENCOUNTER — HOSPITAL ENCOUNTER (OUTPATIENT)
Age: 75
Setting detail: SPECIMEN
Discharge: HOME | End: 2025-07-11

## 2025-07-11 ENCOUNTER — TELEPHONE (OUTPATIENT)
Dept: FAMILY MEDICINE CLINIC | Age: 75
End: 2025-07-11

## 2025-07-11 NOTE — TELEPHONE ENCOUNTER
[facility] ED Follow up Call    Reason for ED visit:  CHEST PAIN       FU appts/Provider:    Future Appointments   Date Time Provider Department Center   7/11/2025  1:30 PM [facility] MEDICATION MGMT [facility] MED MGMT [facility]   7/18/2025 10:00 AM [facility] CHF CLINIC RM 1 [facility] CHFCLIN [place]   8/4/2025  3:00 PM Yessica Flynn DO [facility]   8/6/2025 12:00 PM Travis Mason MD  sc [facility] ECC DEP       LM WITH FRANCISCO FOR PATIENT TO RETURN CALL  Hi, this message is for  IVETT  This is WALLACE from Travis Gutierrez office. Just calling to see how you are doing after your recent visit to the Emergency Room. Travis Gutierrez wants to make sure you were able to fill any prescriptions and that you understand your discharge instructions. Please return our call if you need to make a follow up appointment with your provider or have any further needs.   Our phone number is [phone]*. Have a great day.

## 2025-07-11 NOTE — TELEPHONE ENCOUNTER
[facility] Medication Management Clinic Note   Patient seen in [facility] for chest pain - was discharged on APAP 1000 mg QID PRN for pain and methocarbamol 500 mg PO TID. INR was 1.6 in the ED.    Called and spoke with granddaughter listed on profile. Stating need for Collen to return call and schedule INR and CHF f/u appointment. Stated that they would call back and schedule, just getting off of work right now. Call back number provided.     Rosemary Hu, SjD, [facility]

## 2025-07-12 ENCOUNTER — HOSPITAL ENCOUNTER (EMERGENCY)
Age: 75
Discharge: HOME | End: 2025-07-12
Attending: EMERGENCY MEDICINE
Payer: COMMERCIAL

## 2025-07-12 ENCOUNTER — APPOINTMENT (OUTPATIENT)
Dept: GENERAL RADIOLOGY | Age: 75
End: 2025-07-12
Payer: COMMERCIAL

## 2025-07-12 VITALS
TEMPERATURE: 98.3 F | SYSTOLIC BLOOD PRESSURE: 144 MMHG | RESPIRATION RATE: 16 BRPM | OXYGEN SATURATION: 96 % | HEART RATE: 56 BPM | DIASTOLIC BLOOD PRESSURE: 78 MMHG

## 2025-07-12 DIAGNOSIS — R07.9 CHEST PAIN, UNSPECIFIED TYPE: Primary | ICD-10-CM

## 2025-07-12 LAB
ANION GAP SERPL CALCULATED.3IONS-SCNC: 11 MMOL/L (ref 9–16)
BASOPHILS # BLD: <0.03 K/UL (ref 0–0.2)
BASOPHILS NFR BLD: 0 % (ref 0–2)
BNP SERPL-MCNC: 2194 PG/ML (ref 0–450)
BUN SERPL-MCNC: 21 MG/DL (ref 8–23)
CALCIUM SERPL-MCNC: 8.9 MG/DL (ref 8.6–10.4)
CHLORIDE SERPL-SCNC: 109 MMOL/L (ref 98–107)
CO2 SERPL-SCNC: 22 MMOL/L (ref 20–31)
CREAT SERPL-MCNC: 1.3 MG/DL (ref 0.6–0.9)
EKG ATRIAL RATE: 68 BPM
EKG P AXIS: 76 DEGREES
EKG P-R INTERVAL: 184 MS
EKG Q-T INTERVAL: 450 MS
EKG QRS DURATION: 118 MS
EKG QTC CALCULATION (BAZETT): 478 MS
EKG R AXIS: -31 DEGREES
EKG T AXIS: 142 DEGREES
EKG VENTRICULAR RATE: 68 BPM
EOSINOPHIL # BLD: 0.21 K/UL (ref 0–0.44)
EOSINOPHILS RELATIVE PERCENT: 4 % (ref 1–4)
ERYTHROCYTE [DISTWIDTH] IN BLOOD BY AUTOMATED COUNT: 18.7 % (ref 11.8–14.4)
GFR, ESTIMATED: 43 ML/MIN/1.73M2
GLUCOSE SERPL-MCNC: 153 MG/DL (ref 74–99)
HCT VFR BLD AUTO: 29.3 % (ref 36.3–47.1)
HGB BLD-MCNC: 8.6 G/DL (ref 11.9–15.1)
IMM GRANULOCYTES # BLD AUTO: <0.03 K/UL (ref 0–0.3)
IMM GRANULOCYTES NFR BLD: 0 %
INR PPP: 2.2
LYMPHOCYTES NFR BLD: 1.14 K/UL (ref 1.1–3.7)
LYMPHOCYTES RELATIVE PERCENT: 21 % (ref 24–43)
MAGNESIUM SERPL-MCNC: 1.9 MG/DL (ref 1.6–2.4)
MCH RBC QN AUTO: 25.8 PG (ref 25.2–33.5)
MCHC RBC AUTO-ENTMCNC: 29.4 G/DL (ref 28.4–34.8)
MCV RBC AUTO: 88 FL (ref 82.6–102.9)
MONOCYTES NFR BLD: 0.6 K/UL (ref 0.1–1.2)
MONOCYTES NFR BLD: 11 % (ref 3–12)
NEUTROPHILS NFR BLD: 64 % (ref 36–65)
NEUTS SEG NFR BLD: 3.41 K/UL (ref 1.5–8.1)
NRBC BLD-RTO: 0 PER 100 WBC
PLATELET # BLD AUTO: 262 K/UL (ref 138–453)
PMV BLD AUTO: 11 FL (ref 8.1–13.5)
POTASSIUM SERPL-SCNC: 4.2 MMOL/L (ref 3.7–5.3)
PROTHROMBIN TIME: 24.9 SEC (ref 11.7–14.9)
RBC # BLD AUTO: 3.33 M/UL (ref 3.95–5.11)
RBC # BLD: ABNORMAL 10*6/UL
SODIUM SERPL-SCNC: 142 MMOL/L (ref 136–145)
TROPONIN I SERPL HS-MCNC: 27 NG/L (ref 0–14)
TROPONIN I SERPL HS-MCNC: 29 NG/L (ref 0–14)
WBC OTHER # BLD: 5.4 K/UL (ref 3.5–11.3)

## 2025-07-12 PROCEDURE — 80048 BASIC METABOLIC PNL TOTAL CA: CPT

## 2025-07-12 PROCEDURE — 84484 ASSAY OF TROPONIN QUANT: CPT

## 2025-07-12 PROCEDURE — 71046 X-RAY EXAM CHEST 2 VIEWS: CPT

## 2025-07-12 PROCEDURE — 99285 EMERGENCY DEPT VISIT HI MDM: CPT

## 2025-07-12 PROCEDURE — 83880 ASSAY OF NATRIURETIC PEPTIDE: CPT

## 2025-07-12 PROCEDURE — 85610 PROTHROMBIN TIME: CPT

## 2025-07-12 PROCEDURE — 85025 COMPLETE CBC W/AUTO DIFF WBC: CPT

## 2025-07-12 PROCEDURE — 83735 ASSAY OF MAGNESIUM: CPT

## 2025-07-12 PROCEDURE — 93010 ELECTROCARDIOGRAM REPORT: CPT | Performed by: INTERNAL MEDICINE

## 2025-07-12 PROCEDURE — 93005 ELECTROCARDIOGRAM TRACING: CPT

## 2025-07-12 ASSESSMENT — LIFESTYLE VARIABLES
HOW MANY STANDARD DRINKS CONTAINING ALCOHOL DO YOU HAVE ON A TYPICAL DAY: PATIENT DECLINED
HOW OFTEN DO YOU HAVE A DRINK CONTAINING ALCOHOL: PATIENT DECLINED

## 2025-07-12 ASSESSMENT — PAIN - FUNCTIONAL ASSESSMENT: PAIN_FUNCTIONAL_ASSESSMENT: 0-10

## 2025-07-12 ASSESSMENT — PAIN SCALES - GENERAL: PAINLEVEL_OUTOF10: 10

## 2025-07-12 NOTE — ED NOTES
Pt arrived via m5 for c/o chest pain x 1 hour  Pt states its midsternal and radiates down left arm  States  she takes a blood thinner  Denies sob  Hx chf/ copd  A&O x4  RR even and unlabored  Call light in reach

## 2025-07-12 NOTE — ED PROVIDER NOTES
[facility] EMERGENCY DEPARTMENT     Emergency Department     Faculty Attestation    I performed a history and physical examination of the patient and discussed management with the resident. I reviewed the resident’s note and agree with the documented findings and plan of care. Any areas of disagreement are noted on the chart. I was personally present for the key portions of any procedures. I have documented in the chart those procedures where I was not present during the key portions. I have reviewed the emergency nurses triage note. I agree with the chief complaint, past medical history, past surgical history, allergies, medications, social and family history as documented unless otherwise noted below. For Physician Assistant/ Nurse Practitioner cases/documentation I have personally evaluated this patient and have completed at least one if not all key elements of the E/M (history, physical exam, and MDM). Additional findings are as noted.    Note Started: 6:36 PM EDT    Patient here with chest pain.  Chronic issue for her.  Several visits recently.  Left-sided typical for her was just admitted for same.  Chart review does show had a cath on 623 with patent CABG grafts except an occluded saphenous vein graft to the obtuse marginal.  On exam resting comfortably.  Lungs clear and equal heart regular equal radial pedal pulses.  No calf tenderness.  Will check labs EKG chest x-ray possible admit    EKG interpretation: Sinus rhythm 64 normal intervals there is a left axis.  There is LVH.  LV strain seen in I and aVL.  No acute ST or T changes.  Similar to prior on 7/9/2025.  No acute findings      Critical Care     none    Margarito Neville MD, FACEP  Attending Emergency  Physician           Margarito Neville MD  07/12/25 1930

## 2025-07-12 NOTE — ED PROVIDER NOTES
[facility] EMERGENCY DEPARTMENT  Emergency Department Encounter  Emergency Medicine Resident     Pt Name:Graciela Holt  MRN: [id number]  Birthdate 1950  Date of evaluation: 7/12/25  PCP:  Travis Mason MD  Note Started: 6:37 PM EDT      CHIEF COMPLAINT       Chief Complaint   Patient presents with    Chest Pain       HISTORY OF PRESENT ILLNESS  (Location/Symptom, Timing/Onset, Context/Setting, Quality, Duration, Modifying Factors, Severity.)      Graciela Holt is a 74 y.o. female who presents with left-sided chest pain that radiates almost to the left arm.  Started while she was watching television.  Patient with history of multiple cardiac surgeries with history of CHF with plans for an AICD which she has not gotten yet.  History of COPD and diabetes.  Patient was admitted to the hospital recently with a CHF exacerbation and seen not too long ago for similar chest pains.  Did have a cardiac cath less than a month ago which did show chronic occlusions with plans for medical management.  She has had mild shortness of breath but no diaphoresis.  No nausea or vomiting.  Denies any back pain.  No falls or trauma to the chest.    PAST MEDICAL / SURGICAL / SOCIAL / FAMILY HISTORY      has a past medical history of Allergic rhinitis, Arthritis, CAD (coronary artery disease), Cerebral artery occlusion with cerebral infarction (HCC), CHF (congestive heart failure) (HCC), Controlled type 2 diabetes mellitus without complication, without long-term current use of insulin (HCC), COPD (chronic obstructive pulmonary disease) (HCC), Depression, Former smoker, History of blood transfusion, Hyperlipidemia, Hypertension, Kidney stone, Myocardial infarction (HCC), Obesity, Class I, BMI 30.0-34.9 (see actual BMI), Restless leg syndrome, Skin abnormality, Type II or unspecified type diabetes mellitus without mention of complication, not stated as uncontrolled, Wears glasses, and Wears partial dentures.     has a  (COUMADIN) 5 MG tablet TAKE 1 TABLET BY MOUTH EVERY DAY OR AS DIRECTED BY [facility] 7/4/25   Melvi Ray MD   aspirin 81 MG chewable tablet Take 1 tablet by mouth daily 6/25/25   Bhaskar Oswald MD   vitamin D (ERGOCALCIFEROL) 1.25 MG (07579 UT) CAPS capsule Take 1 capsule by mouth once a week  Patient not taking: Reported on 7/9/2025 5/21/25   Travis Mason MD   Handicap Placard MISC by Does not apply route 5/5/25   Travis Mason MD   famotidine (PEPCID) 40 MG tablet Take 1 tablet by mouth every evening 5/5/25   Travis Mason MD   carvedilol (COREG) 3.125 MG tablet Take 1 tablet by mouth 2 times daily (with meals) 5/5/25   Travis Mason MD   budesonide-formoterol (SYMBICORT) 160-4.5 MCG/ACT AERO Inhale 2 puffs into the lungs in the morning and 2 puffs in the evening.  Patient not taking: Reported on 7/9/2025 5/5/25   Travis Maosn MD   atorvastatin (LIPITOR) 80 MG tablet Take 1 tablet by mouth daily 5/5/25   Travis Mason MD   amiodarone (CORDARONE) 200 MG tablet Take 1 tablet by mouth daily 5/5/25   Travis Mason MD   albuterol sulfate HFA (PROVENTIL;VENTOLIN;PROAIR) 108 (90 Base) MCG/ACT inhaler Inhale 2 puffs into the lungs every 6 hours as needed for Wheezing or Shortness of Breath 5/5/25   Travis Mason MD   Misc. Devices (HOME STYLE BED RAILS) MISC Use on bed to prevent fall 5/5/25   Travis Mason MD   traZODone (DESYREL) 50 MG tablet Take 1 tablet by mouth nightly 5/5/25   Travis Mason MD   ferrous sulfate (IRON 325) 325 (65 Fe) MG tablet Take 1 tablet by mouth daily (with breakfast) 5/5/25   Travis Mason MD   potassium chloride (MICRO-K) 10 MEQ extended release capsule Take 2 capsules by mouth daily 4/20/25   Lewis Castro MD   Handicap Placard MISC by Does not apply route Expires on 12/30/30 3/10/25   Travis Mason MD     REVIEW OF SYSTEMS       Review of Systems  See HPI    PHYSICAL EXAM      INITIAL VITALS:   BP (!) 144/78   Pulse 56   Temp 98.3 °F (36.8

## 2025-07-13 ENCOUNTER — TELEPHONE (OUTPATIENT)
Dept: FAMILY MEDICINE CLINIC | Age: 75
End: 2025-07-13

## 2025-07-13 LAB
EKG ATRIAL RATE: 64 BPM
EKG P AXIS: 32 DEGREES
EKG P-R INTERVAL: 172 MS
EKG Q-T INTERVAL: 454 MS
EKG QRS DURATION: 118 MS
EKG QTC CALCULATION (BAZETT): 468 MS
EKG R AXIS: -22 DEGREES
EKG T AXIS: 135 DEGREES
EKG VENTRICULAR RATE: 64 BPM

## 2025-07-13 ASSESSMENT — HEART SCORE: ECG: NON-SPECIFC REPOLARIZATION DISTURBANCE/LBTB/PM

## 2025-07-13 NOTE — DISCHARGE INSTRUCTIONS
You are seen in the emergency room with chest pain.  Your cardiac workup was within baseline for you.  You are being discharged to please follow-up with your cardiologist and your primary care physician.  If any of your symptoms worsen you develop any symptoms of concern please return to the emergency room for reevaluation

## 2025-07-14 ENCOUNTER — TELEPHONE (OUTPATIENT)
Dept: FAMILY MEDICINE CLINIC | Age: 75
End: 2025-07-14

## 2025-07-14 NOTE — TELEPHONE ENCOUNTER
[facility] ED Follow up Call    Reason for ED visit:   CHEST PAIN     7/14/2025           FU appts/Provider:    Future Appointments   Date Time Provider Department Center   7/18/2025 10:00 AM [facility] CHF CLINIC RM 1 [facility] [place]   8/4/2025  3:00 PM Yessica Flynn DO [facility]   8/6/2025 12:00 PM Travis Mason MD [facility] DEP         VOICEMAIL DOCUMENTATION - ERASE IF NOT USED  Hi, this message is for Graciela.  This is Keira Cantu MA from Travis Gutierrez office.  Just calling to see how you are doing after your recent visit to the Emergency Room.  Travis Gutierrez wants to make sure you were able to fill any prescriptions and that you understand your discharge instructions.  Please return our call if you need to make a follow up appointment with your provider or have any further needs.   Our phone number is [phone].  Have a great day.

## 2025-07-15 ENCOUNTER — TELEPHONE (OUTPATIENT)
Age: 75
End: 2025-07-15

## 2025-07-15 NOTE — TELEPHONE ENCOUNTER
[facility] Medication Management Clinic Note   Attempted to reach patient to set up appt for INR check. No answer,  left requesting call back.   Lauren Stephen, Pharm D, BCPS, CACP  [facility] Medication Management [facility]  7/15/2025 2:28 PM

## 2025-07-16 ENCOUNTER — HOSPITAL ENCOUNTER (EMERGENCY)
Age: 75
Discharge: HOME | End: 2025-07-16
Attending: EMERGENCY MEDICINE
Payer: COMMERCIAL

## 2025-07-16 ENCOUNTER — APPOINTMENT (OUTPATIENT)
Dept: GENERAL RADIOLOGY | Age: 75
End: 2025-07-16
Payer: COMMERCIAL

## 2025-07-16 ENCOUNTER — TELEPHONE (OUTPATIENT)
Dept: FAMILY MEDICINE CLINIC | Age: 75
End: 2025-07-16

## 2025-07-16 VITALS
TEMPERATURE: 98.1 F | WEIGHT: 130 LBS | HEIGHT: 64 IN | BODY MASS INDEX: 22.2 KG/M2 | SYSTOLIC BLOOD PRESSURE: 173 MMHG | HEART RATE: 64 BPM | OXYGEN SATURATION: 98 % | RESPIRATION RATE: 18 BRPM | DIASTOLIC BLOOD PRESSURE: 98 MMHG

## 2025-07-16 DIAGNOSIS — M79.89 LEG SWELLING: Primary | ICD-10-CM

## 2025-07-16 DIAGNOSIS — R06.00 DYSPNEA, UNSPECIFIED TYPE: ICD-10-CM

## 2025-07-16 LAB
ANION GAP SERPL CALCULATED.3IONS-SCNC: 10 MMOL/L (ref 9–16)
BASOPHILS # BLD: 0.03 K/UL (ref 0–0.2)
BASOPHILS NFR BLD: 1 % (ref 0–2)
BNP SERPL-MCNC: 2715 PG/ML (ref 0–300)
BUN SERPL-MCNC: 25 MG/DL (ref 8–23)
CALCIUM SERPL-MCNC: 8.8 MG/DL (ref 8.6–10.4)
CHLORIDE SERPL-SCNC: 111 MMOL/L (ref 98–107)
CO2 SERPL-SCNC: 22 MMOL/L (ref 20–31)
CREAT SERPL-MCNC: 1.2 MG/DL (ref 0.7–1.2)
EOSINOPHIL # BLD: 0.19 K/UL (ref 0–0.44)
EOSINOPHILS RELATIVE PERCENT: 3 % (ref 0–4)
ERYTHROCYTE [DISTWIDTH] IN BLOOD BY AUTOMATED COUNT: 18.8 % (ref 11.5–14.9)
GFR, ESTIMATED: 47 ML/MIN/1.73M2
GLUCOSE SERPL-MCNC: 155 MG/DL (ref 74–99)
HCT VFR BLD AUTO: 27.9 % (ref 36–46)
HGB BLD-MCNC: 8.3 G/DL (ref 12–16)
IMM GRANULOCYTES # BLD AUTO: <0.03 K/UL (ref 0–0.3)
IMM GRANULOCYTES NFR BLD: 0 %
INR PPP: 2.7
LYMPHOCYTES NFR BLD: 0.93 K/UL (ref 1.1–3.7)
LYMPHOCYTES RELATIVE PERCENT: 16 % (ref 24–44)
MCH RBC QN AUTO: 26.3 PG (ref 26–34)
MCHC RBC AUTO-ENTMCNC: 29.7 G/DL (ref 31–37)
MCV RBC AUTO: 88.3 FL (ref 80–100)
MONOCYTES NFR BLD: 0.51 K/UL (ref 0.1–1.2)
MONOCYTES NFR BLD: 9 % (ref 3–12)
NEUTROPHILS NFR BLD: 71 % (ref 36–66)
NEUTS SEG NFR BLD: 4.12 K/UL (ref 1.5–8.1)
NRBC BLD-RTO: 0 PER 100 WBC
PLATELET # BLD AUTO: 218 K/UL (ref 150–450)
PMV BLD AUTO: 11.1 FL (ref 8–13.5)
POTASSIUM SERPL-SCNC: 3.7 MMOL/L (ref 3.7–5.3)
PROTHROMBIN TIME: 31.1 SEC (ref 11.8–14.6)
RBC # BLD AUTO: 3.16 M/UL (ref 3.95–5.11)
SODIUM SERPL-SCNC: 143 MMOL/L (ref 136–145)
TROPONIN I SERPL HS-MCNC: 31 NG/L (ref 0–14)
WBC OTHER # BLD: 5.8 K/UL (ref 3.5–11)

## 2025-07-16 PROCEDURE — 96374 THER/PROPH/DIAG INJ IV PUSH: CPT

## 2025-07-16 PROCEDURE — 36415 COLL VENOUS BLD VENIPUNCTURE: CPT

## 2025-07-16 PROCEDURE — 83880 ASSAY OF NATRIURETIC PEPTIDE: CPT

## 2025-07-16 PROCEDURE — 71046 X-RAY EXAM CHEST 2 VIEWS: CPT

## 2025-07-16 PROCEDURE — 84484 ASSAY OF TROPONIN QUANT: CPT

## 2025-07-16 PROCEDURE — 85610 PROTHROMBIN TIME: CPT

## 2025-07-16 PROCEDURE — 99285 EMERGENCY DEPT VISIT HI MDM: CPT

## 2025-07-16 PROCEDURE — 85025 COMPLETE CBC W/AUTO DIFF WBC: CPT

## 2025-07-16 PROCEDURE — 93005 ELECTROCARDIOGRAM TRACING: CPT | Performed by: STUDENT IN AN ORGANIZED HEALTH CARE EDUCATION/TRAINING PROGRAM

## 2025-07-16 PROCEDURE — 80048 BASIC METABOLIC PNL TOTAL CA: CPT

## 2025-07-16 PROCEDURE — 6360000002 HC RX W HCPCS: Performed by: STUDENT IN AN ORGANIZED HEALTH CARE EDUCATION/TRAINING PROGRAM

## 2025-07-16 RX ORDER — FUROSEMIDE 10 MG/ML
20 INJECTION INTRAMUSCULAR; INTRAVENOUS ONCE
Status: COMPLETED | OUTPATIENT
Start: 2025-07-16 | End: 2025-07-16

## 2025-07-16 RX ADMIN — FUROSEMIDE 20 MG: 10 INJECTION, SOLUTION INTRAMUSCULAR; INTRAVENOUS at 20:18

## 2025-07-16 ASSESSMENT — PAIN SCALES - GENERAL
PAINLEVEL_OUTOF10: 0
PAINLEVEL_OUTOF10: 0

## 2025-07-16 ASSESSMENT — PAIN - FUNCTIONAL ASSESSMENT
PAIN_FUNCTIONAL_ASSESSMENT: 0-10
PAIN_FUNCTIONAL_ASSESSMENT: 0-10

## 2025-07-16 NOTE — ED TRIAGE NOTES
Mode of arrival (squad #, walk in, police, etc) : squad        Chief complaint(s): SOB and Leg Swelling        Arrival Note (brief scenario, treatment PTA, etc).: Pt. Arrives to ED with complaints listed above.  Pt. States that she noticed this AM that with exertion she was increasingly SOB.  Pt. Also noticed that she had swelling in BLE.  Pt. Said that the leg swelling started yesterday.        C= \"Have you ever felt that you should Cut down on your drinking?\"  No  A= \"Have people Annoyed you by criticizing your drinking?\"  No  G= \"Have you ever felt bad or Guilty about your drinking?\"  No  E= \"Have you ever had a drink as an Eye-opener first thing in the morning to steady your nerves or to help a hangover?\"  No      Deferred []      Reason for deferring: N/A    *If yes to two or more: probable alcohol abuse.*

## 2025-07-16 NOTE — ED PROVIDER NOTES
[facility] EMERGENCY DEPARTMENT  Emergency Department Encounter  Emergency Medicine Resident     Pt Name:Graciela Holt  MRN: [id number]  Birthdate 1950  Date of evaluation: 7/16/25  PCP:  Travis Mason MD  Note Started: 6:14 PM EDT      CHIEF COMPLAINT       Chief Complaint   Patient presents with    Shortness of Breath     Started this AM    Leg Swelling     Pt. Noticed this starting yesterday       HISTORY OF PRESENT ILLNESS  (Location/Symptom, Timing/Onset, Context/Setting, Quality, Duration, Modifying Factors, Severity.)      Graciela Holt is a 74 y.o. female who presents with concerns of dyspnea, increased bilateral lower extremity edema.  Patient reports that she feels more swollen than normal, has been taking her medications as prescribed, and has been adhering to her dietary recommendations of her physicians.  Despite this, patient reports increased shortness of breath, concerns for a CHF exacerbation.      PAST MEDICAL / SURGICAL / SOCIAL / FAMILY HISTORY      has a past medical history of Allergic rhinitis, Arthritis, CAD (coronary artery disease), Cerebral artery occlusion with cerebral infarction (HCC), CHF (congestive heart failure) (HCC), Controlled type 2 diabetes mellitus without complication, without long-term current use of insulin (HCC), COPD (chronic obstructive pulmonary disease) (HCC), Depression, Former smoker, History of blood transfusion, Hyperlipidemia, Hypertension, Kidney stone, Myocardial infarction (HCC), Obesity, Class I, BMI 30.0-34.9 (see actual BMI), Restless leg syndrome, Skin abnormality, Type II or unspecified type diabetes mellitus without mention of complication, not stated as uncontrolled, Wears glasses, and Wears partial dentures.       has a past surgical history that includes Appendectomy; Hysterectomy; Cholecystectomy; joint replacement (Bilateral); pr office/outpt visit,procedure only (N/A, 02/06/2018); pr i&d deep absc bursa/hematoma thigh/knee

## 2025-07-16 NOTE — TELEPHONE ENCOUNTER
[facility] ED Follow up Call    Reason for ED visit:  CHEST PAIN       FU appts/Provider:    Future Appointments   Date Time Provider Department Center   7/18/2025 10:00 AM [facility] CHF CLINIC RM 1 [facility] [place]   8/4/2025  3:00 PM Yessica Flynn DO [facility]   8/6/2025 12:00 PM Travis Mason MD [facility] ECC DEP       VOICEMAIL DOCUMENTATION - ERASE IF NOT USED  Hi, this message is for  IVETT  This is WALLACE from Travis Gutierrez office. Just calling to see how you are doing after your recent visit to the Emergency Room. Travis Gutierrez wants to make sure you were able to fill any prescriptions and that you understand your discharge instructions. Please return our call if you need to make a follow up appointment with your provider or have any further needs.   Our phone number is [phone]*. Have a great day.

## 2025-07-17 ENCOUNTER — TELEPHONE (OUTPATIENT)
Age: 75
End: 2025-07-17

## 2025-07-17 LAB
EKG ATRIAL RATE: 63 BPM
EKG P AXIS: 32 DEGREES
EKG P-R INTERVAL: 172 MS
EKG Q-T INTERVAL: 452 MS
EKG QRS DURATION: 122 MS
EKG QTC CALCULATION (BAZETT): 462 MS
EKG R AXIS: -15 DEGREES
EKG T AXIS: 154 DEGREES
EKG VENTRICULAR RATE: 63 BPM

## 2025-07-17 PROCEDURE — 93010 ELECTROCARDIOGRAM REPORT: CPT | Performed by: INTERNAL MEDICINE

## 2025-07-17 NOTE — TELEPHONE ENCOUNTER
Patient presented to ED yesterday (7/16) with increased SOB.  While in ED patient's INR done and found to be 2.7 (therapeutic with goal INR 2-3).  Patient is scheduled to see heart failure clinic tomorrow 7/18.  Called and left message encouraging patient to keep this appt.   Patient also needs to call us back to schedule follow up appt for INR monitoring.       Soledad Barcenas [facility],Pharm.D,, BCPS, CACP  7/17/2025  4:59 PM

## 2025-07-17 NOTE — DISCHARGE INSTRUCTIONS
You were seen today in the emergency department for concerns of swelling legs, you were given a one-time dose of Lasix, and are cleared to discharge at this time.  Please take your medications as prescribed, and reach out to primary care team to let them know that you are having increased swelling.  Please adhere to the dietary recommendations they give you, as this can help decrease your peripheral swelling and shortness of breath.  If your symptoms return, worsen, you have other concerns, please return to emergency department for reevaluation.

## 2025-07-17 NOTE — ED PROVIDER NOTES
[facility] EMERGENCY DEPARTMENT  eMERGENCY dEPARTMENT eNCOUnter   Attending Attestation     Pt Name: Graciela Holt  MRN: [id number]  Birthdate 1950  Date of evaluation: 7/16/25    History, EXAM, MDM:    Graciela Holt is a 74 y.o. female who presents with Shortness of Breath (Started this AM) and Leg Swelling (Pt. Noticed this starting yesterday)  Shortness of breath and leg swelling.  Patient has systolic heart failure.  She says that her heart failure is worsening again.  She says that she has noticed things have been bad against this morning.  Patient is been in and out of the hospital frequently recently.  She was actually just seen in the emergency department 4 days ago and evaluated for chest pain.  She was admitted to the hospital at the beginning of the month for heart failure.  No chest pain no labored breathing no hypoxia no crackles or wheezing on exam.  Minimal bilateral lower extremity swelling.  Laboratory studies show a normal white blood cell count, stable hemoglobin, stable renal function, stable troponins, stable BMP, INR is therapeutic.  Patient was treated with a dose of IV Lasix, at this point I believe she is stable for discharge close outpatient follow-up with her cardiologist and primary care provider.   EKG: All EKG's are interpreted by the Emergency Department Physician who either signs or Co-signs this chart in the absence of a cardiologist.  EKG shows a sinus rhythm heart rate of 63   QTc of 462 T wave flattening no ST segment elevations normal axis    Vitals:   Vitals:    07/16/25 1809 07/16/25 2018   BP: (!) 152/75 (!) 180/94   Pulse: 65    Resp: 20    Temp: 98.1 °F (36.7 °C)    TempSrc: Oral    SpO2: 95%    Weight: 59 kg (130 lb)    Height: 1.626 m (5' 4\")      I performed a history and physical examination of the patient and discussed management with the resident. I reviewed the resident’s note and agree with the documented findings and plan of care. Any

## 2025-07-18 ENCOUNTER — HOSPITAL ENCOUNTER (OUTPATIENT)
Dept: OTHER | Age: 75
Discharge: HOME | End: 2025-07-18

## 2025-07-18 ENCOUNTER — HOSPITAL ENCOUNTER (OUTPATIENT)
Age: 75
Setting detail: SPECIMEN
Discharge: HOME | End: 2025-07-18

## 2025-07-18 ENCOUNTER — TELEPHONE (OUTPATIENT)
Dept: FAMILY MEDICINE CLINIC | Age: 75
End: 2025-07-18

## 2025-07-18 NOTE — PROGRESS NOTES
Pt was no call/no show to CHF Clinic apt today, 7/18/25. Called and left message requesting return call to reschedule.

## 2025-07-18 NOTE — TELEPHONE ENCOUNTER
[facility] ED Follow up Call    Reason for ED visit:  LEG SWELLING        FU appts/Provider:    Future Appointments   Date Time Provider Department Center   8/4/2025  3:00 PM Yessica Flynn DO [facility]   8/6/2025 12:00 PM Travis Mason MD [facility] ECC DEP       VOICEMAIL DOCUMENTATION - ERASE IF NOT USED  Hi, this message is for  IVETT  This is WALLACE from Travis Gutierrez office. Just calling to see how you are doing after your recent visit to the Emergency Room. Travis Gutierrez wants to make sure you were able to fill any prescriptions and that you understand your discharge instructions. Please return our call if you need to make a follow up appointment with your provider or have any further needs.   Our phone number is [phone]*. Have a great day.

## 2025-07-21 ENCOUNTER — TELEPHONE (OUTPATIENT)
Dept: OTHER | Age: 75
End: 2025-07-21

## 2025-07-22 ENCOUNTER — APPOINTMENT (OUTPATIENT)
Dept: GENERAL RADIOLOGY | Age: 75
End: 2025-07-22
Payer: COMMERCIAL

## 2025-07-22 ENCOUNTER — HOSPITAL ENCOUNTER (EMERGENCY)
Age: 75
Discharge: HOME | End: 2025-07-22
Attending: EMERGENCY MEDICINE
Payer: COMMERCIAL

## 2025-07-22 VITALS
BODY MASS INDEX: 23.05 KG/M2 | DIASTOLIC BLOOD PRESSURE: 81 MMHG | RESPIRATION RATE: 14 BRPM | SYSTOLIC BLOOD PRESSURE: 177 MMHG | TEMPERATURE: 98.5 F | OXYGEN SATURATION: 95 % | WEIGHT: 135 LBS | HEART RATE: 58 BPM | HEIGHT: 64 IN

## 2025-07-22 DIAGNOSIS — R06.02 SHORTNESS OF BREATH: Primary | ICD-10-CM

## 2025-07-22 LAB
ANION GAP SERPL CALCULATED.3IONS-SCNC: 10 MMOL/L (ref 9–16)
BASOPHILS # BLD: 0.05 K/UL (ref 0–0.2)
BASOPHILS NFR BLD: 1 % (ref 0–2)
BNP SERPL-MCNC: 4872 PG/ML (ref 0–450)
BUN SERPL-MCNC: 23 MG/DL (ref 8–23)
CALCIUM SERPL-MCNC: 9.4 MG/DL (ref 8.6–10.4)
CHLORIDE SERPL-SCNC: 109 MMOL/L (ref 98–107)
CO2 SERPL-SCNC: 26 MMOL/L (ref 20–31)
CREAT SERPL-MCNC: 1.1 MG/DL (ref 0.6–0.9)
EOSINOPHIL # BLD: 0.16 K/UL (ref 0–0.44)
EOSINOPHILS RELATIVE PERCENT: 3 % (ref 1–4)
ERYTHROCYTE [DISTWIDTH] IN BLOOD BY AUTOMATED COUNT: 18.2 % (ref 11.8–14.4)
GFR, ESTIMATED: 52 ML/MIN/1.73M2
GLUCOSE SERPL-MCNC: 167 MG/DL (ref 74–99)
HCT VFR BLD AUTO: 31.3 % (ref 36.3–47.1)
HGB BLD-MCNC: 9.1 G/DL (ref 11.9–15.1)
IMM GRANULOCYTES # BLD AUTO: <0.03 K/UL (ref 0–0.3)
IMM GRANULOCYTES NFR BLD: 0 %
INR PPP: 2.9
LYMPHOCYTES NFR BLD: 1.36 K/UL (ref 1.1–3.7)
LYMPHOCYTES RELATIVE PERCENT: 23 % (ref 24–43)
MCH RBC QN AUTO: 26.1 PG (ref 25.2–33.5)
MCHC RBC AUTO-ENTMCNC: 29.1 G/DL (ref 28.4–34.8)
MCV RBC AUTO: 89.9 FL (ref 82.6–102.9)
MONOCYTES NFR BLD: 0.54 K/UL (ref 0.1–1.2)
MONOCYTES NFR BLD: 9 % (ref 3–12)
NEUTROPHILS NFR BLD: 64 % (ref 36–65)
NEUTS SEG NFR BLD: 3.82 K/UL (ref 1.5–8.1)
NRBC BLD-RTO: 0 PER 100 WBC
PLATELET # BLD AUTO: 259 K/UL (ref 138–453)
PMV BLD AUTO: 10.8 FL (ref 8.1–13.5)
POTASSIUM SERPL-SCNC: 3.7 MMOL/L (ref 3.7–5.3)
PROTHROMBIN TIME: 31.5 SEC (ref 11.7–14.9)
RBC # BLD AUTO: 3.48 M/UL (ref 3.95–5.11)
RBC # BLD: ABNORMAL 10*6/UL
SODIUM SERPL-SCNC: 145 MMOL/L (ref 136–145)
TROPONIN I SERPL HS-MCNC: 29 NG/L (ref 0–14)
TROPONIN I SERPL HS-MCNC: 31 NG/L (ref 0–14)
WBC OTHER # BLD: 6 K/UL (ref 3.5–11.3)

## 2025-07-22 PROCEDURE — 96374 THER/PROPH/DIAG INJ IV PUSH: CPT

## 2025-07-22 PROCEDURE — 80048 BASIC METABOLIC PNL TOTAL CA: CPT

## 2025-07-22 PROCEDURE — 85025 COMPLETE CBC W/AUTO DIFF WBC: CPT

## 2025-07-22 PROCEDURE — 71045 X-RAY EXAM CHEST 1 VIEW: CPT

## 2025-07-22 PROCEDURE — 99285 EMERGENCY DEPT VISIT HI MDM: CPT

## 2025-07-22 PROCEDURE — 6370000000 HC RX 637 (ALT 250 FOR IP)

## 2025-07-22 PROCEDURE — 84484 ASSAY OF TROPONIN QUANT: CPT

## 2025-07-22 PROCEDURE — 83880 ASSAY OF NATRIURETIC PEPTIDE: CPT

## 2025-07-22 PROCEDURE — 85610 PROTHROMBIN TIME: CPT

## 2025-07-22 PROCEDURE — 93005 ELECTROCARDIOGRAM TRACING: CPT

## 2025-07-22 PROCEDURE — 6360000002 HC RX W HCPCS

## 2025-07-22 PROCEDURE — 94640 AIRWAY INHALATION TREATMENT: CPT

## 2025-07-22 RX ORDER — IPRATROPIUM BROMIDE AND ALBUTEROL SULFATE 2.5; .5 MG/3ML; MG/3ML
1 SOLUTION RESPIRATORY (INHALATION) ONCE
Status: COMPLETED | OUTPATIENT
Start: 2025-07-22 | End: 2025-07-22

## 2025-07-22 RX ORDER — BUMETANIDE 0.25 MG/ML
1 INJECTION, SOLUTION INTRAMUSCULAR; INTRAVENOUS ONCE
Status: COMPLETED | OUTPATIENT
Start: 2025-07-22 | End: 2025-07-22

## 2025-07-22 RX ADMIN — BUMETANIDE 1 MG: 0.25 INJECTION INTRAMUSCULAR; INTRAVENOUS at 17:02

## 2025-07-22 RX ADMIN — IPRATROPIUM BROMIDE AND ALBUTEROL SULFATE 1 DOSE: .5; 2.5 SOLUTION RESPIRATORY (INHALATION) at 17:18

## 2025-07-22 NOTE — ED NOTES
Pt to ed via triage  Pt c/o shortness of breath due to not taking any of her home meds for two days  Pt has a history of COPD  Pt breathing does appear to be short of breath  Pt using accessory muscles while breathing  Pt placed on cont cardiac monitor, ekg completed, iv established, labs drawn, labeled and will send to lab via orders

## 2025-07-22 NOTE — ED NOTES
Patient is in need of transportation home at discharge.  She indicates that her roommate typically transports her but is unable to pick her up at this time.  Patients insurance does not provide transportation and patient does not have funding to pay for her own ride.  Writer reminded patient of the importance of securing transportation home from the ED.  Will provide a voucher ride today, patient reports understanding that this is not a guarantee for future visits.

## 2025-07-22 NOTE — DISCHARGE INSTRUCTIONS
Call today or tomorrow to follow up with Travis Mason MD  in 2 days.    Use ibuprofen or Tylenol (unless prescribed medications that have Tylenol in it) for pain.  You can take over the counter Ibuprofen (advil) tablets (4 tablets every 8 hours or 3 tablets every 6 hours or 2 tablets every 4 hours)      Take your medication as indicated.  Use the inhaler - 2 puffs every 4 hours.  If you are given an antibiotic then make sure you get the prescription filled and take the antibiotics until finished.  Drink plenty of water while taking the antibiotics.  Avoid drinking alcohol or drinks that have caffeine it in while taking antibiotics.     [employer], [employer], [employer] has some antibiotics for free; [employer] and [employer] has a 4 dollar prescription plan for some antibiotics.    Please return to the Emergency Department if you develop any symptoms that concern you, including the following: increasing pain, shortness of breath, swelling to your feet, unable to lay flat, vomiting, fevers, numbness, weakness, loss of bladder/bowel control, loss of consciousness or any other concerns.

## 2025-07-23 ENCOUNTER — TELEPHONE (OUTPATIENT)
Age: 75
End: 2025-07-23

## 2025-07-23 NOTE — TELEPHONE ENCOUNTER
Patient presented to ED yesterday with complaints of SOB.  Notes indicate patient had not taken any medications for last two days but does not indicate why.      Patient did have INR done while in ED on 9/22 found to be 2.9 which is in goal range of 2-3 but unsure if dose is correct as if patient had not taken any warfarin in two days and still had therapeutic INR it would indicate it would have been higher had she been compliant.      Called to check on patient and see if we could help patient with medication compliance / order refills. Patient's listed phone number is actually her granddaughter.  There was no answer.  Left message for patient to return our call.      Need to schedule both an INR and heart failure medication management appt when we reach patient.  Patient also missed heart failure clinic appt and needs to reschedule.    Soledad Barcenas [facility],Pharm.D,, BCPS, CACP  7/23/2025  3:53 PM

## 2025-07-24 LAB
EKG ATRIAL RATE: 66 BPM
EKG P AXIS: 2 DEGREES
EKG P-R INTERVAL: 168 MS
EKG Q-T INTERVAL: 456 MS
EKG QRS DURATION: 118 MS
EKG QTC CALCULATION (BAZETT): 478 MS
EKG R AXIS: -19 DEGREES
EKG T AXIS: 160 DEGREES
EKG VENTRICULAR RATE: 66 BPM

## 2025-07-24 PROCEDURE — 93010 ELECTROCARDIOGRAM REPORT: CPT | Performed by: INTERNAL MEDICINE

## 2025-07-24 ASSESSMENT — ENCOUNTER SYMPTOMS
COUGH: 0
ABDOMINAL PAIN: 0

## 2025-07-24 NOTE — ED PROVIDER NOTES
[facility] EMERGENCY DEPARTMENT  Emergency Department Encounter  Emergency Medicine Resident     Pt Name:Graciela Holt  MRN: [id number]  Birthdate 1950  Date of evaluation: 7/24/25  PCP:  Travis Mason MD  Note Started: 6:41 AM EDT      CHIEF COMPLAINT       Chief Complaint   Patient presents with    Shortness of Breath       HISTORY OF PRESENT ILLNESS  (Location/Symptom, Timing/Onset, Context/Setting, Quality, Duration, Modifying Factors, Severity.)      Graciela Holt is a 74 y.o. female who presents with history for HFrEF, atrial fibrillation on Coumadin, COPD, presents to the ER with shortness of breath.  She reports that she resides at a nursing home and has not received any of her medications over the past 2 days.  She is unsure why her medications have not been given to her.  She denies any fever, chills, sweats, chest pain, dyspnea on exertion, increased leg swelling, decreased urine output, abdominal pain, nausea, vomiting, change in bowel or urinary habits.    PAST MEDICAL / SURGICAL / SOCIAL / FAMILY HISTORY      has a past medical history of Allergic rhinitis, Arthritis, CAD (coronary artery disease), Cerebral artery occlusion with cerebral infarction (HCC), CHF (congestive heart failure) (HCC), Controlled type 2 diabetes mellitus without complication, without long-term current use of insulin (HCC), COPD (chronic obstructive pulmonary disease) (HCC), Depression, Former smoker, History of blood transfusion, Hyperlipidemia, Hypertension, Kidney stone, Myocardial infarction (HCC), Obesity, Class I, BMI 30.0-34.9 (see actual BMI), Restless leg syndrome, Skin abnormality, Type II or unspecified type diabetes mellitus without mention of complication, not stated as uncontrolled, Wears glasses, and Wears partial dentures.     has a past surgical history that includes Appendectomy; Hysterectomy; Cholecystectomy; joint replacement (Bilateral); pr office/outpt visit,procedure only (N/A, 
edit the dictations but occasionally words are mis-transcribed. Whenever words are used in this note in any gender, they shall be construed as though they were used in the gender appropriate to the circumstances; and whenever words are used in this note in the singular or plural form, they shall be construed as though they were used in the form appropriate to the circumstances.)    Paul Boyce MD [facility]  Attending Emergency Medicine Physician           Paul Boyce MD  07/22/25 [id number]

## 2025-07-25 ENCOUNTER — TELEPHONE (OUTPATIENT)
Age: 75
End: 2025-07-25

## 2025-07-25 DIAGNOSIS — E11.9 CONTROLLED TYPE 2 DIABETES MELLITUS WITHOUT COMPLICATION, WITHOUT LONG-TERM CURRENT USE OF INSULIN (HCC): Chronic | ICD-10-CM

## 2025-07-25 DIAGNOSIS — I50.42 CHRONIC COMBINED SYSTOLIC AND DIASTOLIC CONGESTIVE HEART FAILURE (HCC): ICD-10-CM

## 2025-07-25 RX ORDER — LOSARTAN POTASSIUM 25 MG/1
25 TABLET ORAL DAILY
Qty: 90 TABLET | Refills: 0 | Status: SHIPPED | OUTPATIENT
Start: 2025-07-25

## 2025-07-25 RX ORDER — ATORVASTATIN CALCIUM 80 MG/1
80 TABLET, FILM COATED ORAL DAILY
Qty: 90 TABLET | Refills: 0 | Status: SHIPPED | OUTPATIENT
Start: 2025-07-25

## 2025-07-25 NOTE — TELEPHONE ENCOUNTER
Please Approve or Refuse.  Send to Pharmacy per Pt's Request:      Next Visit Date:  8/6/2025   Last Visit Date: 5/5/2025    Hemoglobin A1C (%)   Date Value   05/05/2025 6.7   01/13/2025 7.6 (H)   04/05/2024 7.2 (H)             ( goal A1C is < 7)   BP Readings from Last 3 Encounters:   07/22/25 (!) 177/81   07/16/25 (!) 173/98   07/12/25 (!) 144/78          (goal 120/80)  BUN   Date Value Ref Range Status   07/22/2025 23 8 - 23 mg/dL Final     Creatinine   Date Value Ref Range Status   07/22/2025 1.1 (H) 0.6 - 0.9 mg/dL Final   03/28/2022 1.1 mg/dL Final     Potassium   Date Value Ref Range Status   07/22/2025 3.7 3.7 - 5.3 mmol/L Final

## 2025-07-25 NOTE — TELEPHONE ENCOUNTER
Called pt's granddaughter, Luana Cerda (HIPAA approved). Left voice mail to call back and schedule INR check appt and HF appt with med management.     Ellyn GustafsonD, BCPS 7/25/2025 4:11 PM'

## 2025-08-01 ENCOUNTER — HOSPITAL ENCOUNTER (OUTPATIENT)
Age: 75
Setting detail: SPECIMEN
Discharge: HOME | End: 2025-08-01
Payer: COMMERCIAL

## 2025-08-01 ENCOUNTER — PHARMACY VISIT (OUTPATIENT)
Age: 75
End: 2025-08-01
Payer: COMMERCIAL

## 2025-08-01 ENCOUNTER — HOSPITAL ENCOUNTER (INPATIENT)
Age: 75
LOS: 3 days | Discharge: HOME | End: 2025-08-04
Attending: EMERGENCY MEDICINE
Payer: COMMERCIAL

## 2025-08-01 ENCOUNTER — TELEPHONE (OUTPATIENT)
Age: 75
End: 2025-08-01

## 2025-08-01 ENCOUNTER — APPOINTMENT (OUTPATIENT)
Dept: GENERAL RADIOLOGY | Age: 75
End: 2025-08-01
Attending: EMERGENCY MEDICINE
Payer: COMMERCIAL

## 2025-08-01 ENCOUNTER — ANTI-COAG VISIT (OUTPATIENT)
Age: 75
End: 2025-08-01
Payer: COMMERCIAL

## 2025-08-01 VITALS
OXYGEN SATURATION: 95 % | WEIGHT: 134 LBS | HEART RATE: 72 BPM | DIASTOLIC BLOOD PRESSURE: 87 MMHG | BODY MASS INDEX: 23 KG/M2 | SYSTOLIC BLOOD PRESSURE: 141 MMHG

## 2025-08-01 DIAGNOSIS — I48.20 CHRONIC ATRIAL FIBRILLATION (HCC): Primary | ICD-10-CM

## 2025-08-01 DIAGNOSIS — I48.20 CHRONIC ATRIAL FIBRILLATION (HCC): ICD-10-CM

## 2025-08-01 DIAGNOSIS — R79.89 ELEVATED BRAIN NATRIURETIC PEPTIDE (BNP) LEVEL: ICD-10-CM

## 2025-08-01 DIAGNOSIS — N17.9 AKI (ACUTE KIDNEY INJURY): ICD-10-CM

## 2025-08-01 DIAGNOSIS — R06.02 SHORTNESS OF BREATH: ICD-10-CM

## 2025-08-01 DIAGNOSIS — I50.20 SYSTOLIC HEART FAILURE, UNSPECIFIED HF CHRONICITY (HCC): ICD-10-CM

## 2025-08-01 DIAGNOSIS — I50.42 CHRONIC COMBINED SYSTOLIC AND DIASTOLIC CONGESTIVE HEART FAILURE (HCC): ICD-10-CM

## 2025-08-01 DIAGNOSIS — J44.1 COPD EXACERBATION (HCC): Primary | ICD-10-CM

## 2025-08-01 DIAGNOSIS — I50.9 CHRONIC HEART FAILURE, UNSPECIFIED HEART FAILURE TYPE (HCC): Primary | ICD-10-CM

## 2025-08-01 LAB
ANION GAP SERPL CALCULATED.3IONS-SCNC: 11 MMOL/L (ref 9–16)
ANION GAP SERPL CALCULATED.3IONS-SCNC: 14 MMOL/L (ref 9–16)
BASOPHILS # BLD: 0.03 K/UL (ref 0–0.2)
BASOPHILS NFR BLD: 0 % (ref 0–2)
BNP SERPL-MCNC: 3191 PG/ML (ref 0–300)
BNP SERPL-MCNC: 3352 PG/ML (ref 0–300)
BUN SERPL-MCNC: 14 MG/DL (ref 8–23)
BUN SERPL-MCNC: 14 MG/DL (ref 8–23)
CALCIUM SERPL-MCNC: 9.3 MG/DL (ref 8.6–10.4)
CALCIUM SERPL-MCNC: 9.4 MG/DL (ref 8.6–10.4)
CHLORIDE SERPL-SCNC: 106 MMOL/L (ref 98–107)
CHLORIDE SERPL-SCNC: 108 MMOL/L (ref 98–107)
CO2 SERPL-SCNC: 24 MMOL/L (ref 20–31)
CO2 SERPL-SCNC: 25 MMOL/L (ref 20–31)
CREAT SERPL-MCNC: 1.1 MG/DL (ref 0.7–1.2)
CREAT SERPL-MCNC: 1.1 MG/DL (ref 0.7–1.2)
EKG ATRIAL RATE: 68 BPM
EKG P-R INTERVAL: 166 MS
EKG Q-T INTERVAL: 446 MS
EKG QRS DURATION: 112 MS
EKG QTC CALCULATION (BAZETT): 474 MS
EKG R AXIS: -20 DEGREES
EKG T AXIS: 145 DEGREES
EKG VENTRICULAR RATE: 68 BPM
EOSINOPHIL # BLD: 0.17 K/UL (ref 0–0.44)
EOSINOPHILS RELATIVE PERCENT: 3 % (ref 0–4)
ERYTHROCYTE [DISTWIDTH] IN BLOOD BY AUTOMATED COUNT: 17.6 % (ref 11.5–14.9)
GFR, ESTIMATED: 52 ML/MIN/1.73M2
GFR, ESTIMATED: 52 ML/MIN/1.73M2
GLUCOSE BLD-MCNC: 448 MG/DL (ref 65–105)
GLUCOSE SERPL-MCNC: 129 MG/DL (ref 74–99)
GLUCOSE SERPL-MCNC: 144 MG/DL (ref 74–99)
HCT VFR BLD AUTO: 35.1 % (ref 36–46)
HGB BLD-MCNC: 10.1 G/DL (ref 12–16)
IMM GRANULOCYTES # BLD AUTO: <0.03 K/UL (ref 0–0.3)
IMM GRANULOCYTES NFR BLD: 0 %
INR PPP: 1.1
INTERNATIONAL NORMALIZATION RATIO, POC: 1.1
LYMPHOCYTES NFR BLD: 1.31 K/UL (ref 1.1–3.7)
LYMPHOCYTES RELATIVE PERCENT: 19 % (ref 24–44)
MAGNESIUM SERPL-MCNC: 1.8 MG/DL (ref 1.6–2.4)
MCH RBC QN AUTO: 24.8 PG (ref 26–34)
MCHC RBC AUTO-ENTMCNC: 28.8 G/DL (ref 31–37)
MCV RBC AUTO: 86 FL (ref 80–100)
MONOCYTES NFR BLD: 0.55 K/UL (ref 0.1–1.2)
MONOCYTES NFR BLD: 8 % (ref 3–12)
NEUTROPHILS NFR BLD: 70 % (ref 36–66)
NEUTS SEG NFR BLD: 4.66 K/UL (ref 1.5–8.1)
NRBC BLD-RTO: 0 PER 100 WBC
PARTIAL THROMBOPLASTIN TIME: 26.1 SEC (ref 24–36)
PHOSPHATE SERPL-MCNC: 3 MG/DL (ref 2.5–4.5)
PLATELET # BLD AUTO: 305 K/UL (ref 150–450)
PMV BLD AUTO: 10.7 FL (ref 8–13.5)
POTASSIUM SERPL-SCNC: 3.6 MMOL/L (ref 3.7–5.3)
POTASSIUM SERPL-SCNC: 4.3 MMOL/L (ref 3.7–5.3)
PROCALCITONIN SERPL-MCNC: <0.02 NG/ML (ref 0–0.09)
PROTHROMBIN TIME, POC: 13.8
PROTHROMBIN TIME: 14.7 SEC (ref 11.8–14.6)
RBC # BLD AUTO: 4.08 M/UL (ref 3.95–5.11)
SODIUM SERPL-SCNC: 144 MMOL/L (ref 136–145)
SODIUM SERPL-SCNC: 144 MMOL/L (ref 136–145)
TROPONIN I SERPL HS-MCNC: 40 NG/L (ref 0–14)
TROPONIN I SERPL HS-MCNC: 40 NG/L (ref 0–14)
WBC OTHER # BLD: 6.7 K/UL (ref 3.5–11)

## 2025-08-01 PROCEDURE — 85610 PROTHROMBIN TIME: CPT

## 2025-08-01 PROCEDURE — 84100 ASSAY OF PHOSPHORUS: CPT

## 2025-08-01 PROCEDURE — 96374 THER/PROPH/DIAG INJ IV PUSH: CPT

## 2025-08-01 PROCEDURE — 83735 ASSAY OF MAGNESIUM: CPT

## 2025-08-01 PROCEDURE — 6360000002 HC RX W HCPCS: Performed by: EMERGENCY MEDICINE

## 2025-08-01 PROCEDURE — 85730 THROMBOPLASTIN TIME PARTIAL: CPT

## 2025-08-01 PROCEDURE — 36415 COLL VENOUS BLD VENIPUNCTURE: CPT

## 2025-08-01 PROCEDURE — 2500000003 HC RX 250 WO HCPCS: Performed by: EMERGENCY MEDICINE

## 2025-08-01 PROCEDURE — 99212 OFFICE O/P EST SF 10 MIN: CPT

## 2025-08-01 PROCEDURE — 6370000000 HC RX 637 (ALT 250 FOR IP): Performed by: EMERGENCY MEDICINE

## 2025-08-01 PROCEDURE — 94760 N-INVAS EAR/PLS OXIMETRY 1: CPT

## 2025-08-01 PROCEDURE — 6360000002 HC RX W HCPCS

## 2025-08-01 PROCEDURE — 85025 COMPLETE CBC W/AUTO DIFF WBC: CPT

## 2025-08-01 PROCEDURE — 6370000000 HC RX 637 (ALT 250 FOR IP)

## 2025-08-01 PROCEDURE — 84484 ASSAY OF TROPONIN QUANT: CPT

## 2025-08-01 PROCEDURE — 84145 PROCALCITONIN (PCT): CPT

## 2025-08-01 PROCEDURE — 83880 ASSAY OF NATRIURETIC PEPTIDE: CPT

## 2025-08-01 PROCEDURE — 80048 BASIC METABOLIC PNL TOTAL CA: CPT

## 2025-08-01 PROCEDURE — 93010 ELECTROCARDIOGRAM REPORT: CPT | Performed by: INTERNAL MEDICINE

## 2025-08-01 PROCEDURE — 93005 ELECTROCARDIOGRAM TRACING: CPT | Performed by: EMERGENCY MEDICINE

## 2025-08-01 PROCEDURE — 2500000003 HC RX 250 WO HCPCS

## 2025-08-01 PROCEDURE — 99285 EMERGENCY DEPT VISIT HI MDM: CPT

## 2025-08-01 PROCEDURE — 94761 N-INVAS EAR/PLS OXIMETRY MLT: CPT

## 2025-08-01 PROCEDURE — 71045 X-RAY EXAM CHEST 1 VIEW: CPT

## 2025-08-01 PROCEDURE — 94640 AIRWAY INHALATION TREATMENT: CPT

## 2025-08-01 PROCEDURE — 82947 ASSAY GLUCOSE BLOOD QUANT: CPT

## 2025-08-01 PROCEDURE — 99213 OFFICE O/P EST LOW 20 MIN: CPT

## 2025-08-01 PROCEDURE — 2060000000 HC ICU INTERMEDIATE R&B

## 2025-08-01 RX ORDER — FUROSEMIDE 10 MG/ML
20 INJECTION INTRAMUSCULAR; INTRAVENOUS ONCE
Status: COMPLETED | OUTPATIENT
Start: 2025-08-01 | End: 2025-08-01

## 2025-08-01 RX ORDER — POLYETHYLENE GLYCOL 3350 17 G/17G
17 POWDER, FOR SOLUTION ORAL DAILY PRN
Status: DISCONTINUED | OUTPATIENT
Start: 2025-08-01 | End: 2025-08-04 | Stop reason: HOSPADM

## 2025-08-01 RX ORDER — WARFARIN SODIUM 7.5 MG/1
7.5 TABLET ORAL
Status: COMPLETED | OUTPATIENT
Start: 2025-08-01 | End: 2025-08-01

## 2025-08-01 RX ORDER — IPRATROPIUM BROMIDE AND ALBUTEROL SULFATE 2.5; .5 MG/3ML; MG/3ML
1 SOLUTION RESPIRATORY (INHALATION)
Status: DISCONTINUED | OUTPATIENT
Start: 2025-08-01 | End: 2025-08-01

## 2025-08-01 RX ORDER — AMIODARONE HYDROCHLORIDE 200 MG/1
200 TABLET ORAL DAILY
Status: DISCONTINUED | OUTPATIENT
Start: 2025-08-01 | End: 2025-08-04 | Stop reason: HOSPADM

## 2025-08-01 RX ORDER — IPRATROPIUM BROMIDE AND ALBUTEROL SULFATE 2.5; .5 MG/3ML; MG/3ML
1 SOLUTION RESPIRATORY (INHALATION)
Status: DISCONTINUED | OUTPATIENT
Start: 2025-08-01 | End: 2025-08-04 | Stop reason: HOSPADM

## 2025-08-01 RX ORDER — FUROSEMIDE 10 MG/ML
20 INJECTION INTRAMUSCULAR; INTRAVENOUS DAILY
Status: DISCONTINUED | OUTPATIENT
Start: 2025-08-01 | End: 2025-08-02

## 2025-08-01 RX ORDER — POTASSIUM CHLORIDE 7.45 MG/ML
10 INJECTION INTRAVENOUS PRN
Status: DISCONTINUED | OUTPATIENT
Start: 2025-08-01 | End: 2025-08-04 | Stop reason: HOSPADM

## 2025-08-01 RX ORDER — ACETAMINOPHEN 650 MG/1
650 SUPPOSITORY RECTAL EVERY 6 HOURS PRN
Status: DISCONTINUED | OUTPATIENT
Start: 2025-08-01 | End: 2025-08-04 | Stop reason: HOSPADM

## 2025-08-01 RX ORDER — INSULIN LISPRO 100 [IU]/ML
0-4 INJECTION, SOLUTION INTRAVENOUS; SUBCUTANEOUS
Status: DISCONTINUED | OUTPATIENT
Start: 2025-08-01 | End: 2025-08-02

## 2025-08-01 RX ORDER — ACETAMINOPHEN 325 MG/1
650 TABLET ORAL EVERY 6 HOURS PRN
Status: DISCONTINUED | OUTPATIENT
Start: 2025-08-01 | End: 2025-08-04 | Stop reason: HOSPADM

## 2025-08-01 RX ORDER — ONDANSETRON 4 MG/1
4 TABLET, ORALLY DISINTEGRATING ORAL EVERY 8 HOURS PRN
Status: DISCONTINUED | OUTPATIENT
Start: 2025-08-01 | End: 2025-08-04 | Stop reason: HOSPADM

## 2025-08-01 RX ORDER — TRAZODONE HYDROCHLORIDE 50 MG/1
50 TABLET ORAL NIGHTLY
Status: DISCONTINUED | OUTPATIENT
Start: 2025-08-01 | End: 2025-08-04 | Stop reason: HOSPADM

## 2025-08-01 RX ORDER — ALBUTEROL SULFATE 0.83 MG/ML
2.5 SOLUTION RESPIRATORY (INHALATION) EVERY 6 HOURS PRN
Status: DISCONTINUED | OUTPATIENT
Start: 2025-08-01 | End: 2025-08-04 | Stop reason: HOSPADM

## 2025-08-01 RX ORDER — WARFARIN SODIUM 5 MG/1
5 TABLET ORAL DAILY
Status: DISCONTINUED | OUTPATIENT
Start: 2025-08-01 | End: 2025-08-01 | Stop reason: DRUGHIGH

## 2025-08-01 RX ORDER — POTASSIUM CHLORIDE 1500 MG/1
40 TABLET, EXTENDED RELEASE ORAL PRN
Status: DISCONTINUED | OUTPATIENT
Start: 2025-08-01 | End: 2025-08-04 | Stop reason: HOSPADM

## 2025-08-01 RX ORDER — FAMOTIDINE 20 MG/1
40 TABLET, FILM COATED ORAL EVERY EVENING
Status: DISCONTINUED | OUTPATIENT
Start: 2025-08-01 | End: 2025-08-04

## 2025-08-01 RX ORDER — ENOXAPARIN SODIUM 100 MG/ML
1 INJECTION SUBCUTANEOUS 2 TIMES DAILY
Status: DISCONTINUED | OUTPATIENT
Start: 2025-08-01 | End: 2025-08-03

## 2025-08-01 RX ORDER — SODIUM CHLORIDE 0.9 % (FLUSH) 0.9 %
5-40 SYRINGE (ML) INJECTION EVERY 12 HOURS SCHEDULED
Status: DISCONTINUED | OUTPATIENT
Start: 2025-08-01 | End: 2025-08-04 | Stop reason: HOSPADM

## 2025-08-01 RX ORDER — SODIUM CHLORIDE 0.9 % (FLUSH) 0.9 %
5-40 SYRINGE (ML) INJECTION PRN
Status: DISCONTINUED | OUTPATIENT
Start: 2025-08-01 | End: 2025-08-04 | Stop reason: HOSPADM

## 2025-08-01 RX ORDER — CARVEDILOL 3.12 MG/1
3.12 TABLET ORAL 2 TIMES DAILY WITH MEALS
Status: DISCONTINUED | OUTPATIENT
Start: 2025-08-01 | End: 2025-08-04 | Stop reason: HOSPADM

## 2025-08-01 RX ORDER — ONDANSETRON 2 MG/ML
4 INJECTION INTRAMUSCULAR; INTRAVENOUS EVERY 6 HOURS PRN
Status: DISCONTINUED | OUTPATIENT
Start: 2025-08-01 | End: 2025-08-04 | Stop reason: HOSPADM

## 2025-08-01 RX ORDER — DEXTROSE MONOHYDRATE 100 MG/ML
INJECTION, SOLUTION INTRAVENOUS CONTINUOUS PRN
Status: DISCONTINUED | OUTPATIENT
Start: 2025-08-01 | End: 2025-08-04 | Stop reason: HOSPADM

## 2025-08-01 RX ORDER — ASPIRIN 81 MG/1
81 TABLET, CHEWABLE ORAL DAILY
Status: DISCONTINUED | OUTPATIENT
Start: 2025-08-01 | End: 2025-08-04 | Stop reason: HOSPADM

## 2025-08-01 RX ORDER — LOSARTAN POTASSIUM 25 MG/1
25 TABLET ORAL DAILY
Status: DISCONTINUED | OUTPATIENT
Start: 2025-08-01 | End: 2025-08-04 | Stop reason: HOSPADM

## 2025-08-01 RX ORDER — ATORVASTATIN CALCIUM 80 MG/1
80 TABLET, FILM COATED ORAL DAILY
Status: DISCONTINUED | OUTPATIENT
Start: 2025-08-01 | End: 2025-08-04 | Stop reason: HOSPADM

## 2025-08-01 RX ORDER — MAGNESIUM SULFATE HEPTAHYDRATE 40 MG/ML
2000 INJECTION, SOLUTION INTRAVENOUS PRN
Status: DISCONTINUED | OUTPATIENT
Start: 2025-08-01 | End: 2025-08-04 | Stop reason: HOSPADM

## 2025-08-01 RX ORDER — LOSARTAN POTASSIUM 25 MG/1
37.5 TABLET ORAL DAILY
Qty: 135 TABLET | Refills: 0 | Status: SHIPPED | OUTPATIENT
Start: 2025-08-01

## 2025-08-01 RX ORDER — SODIUM CHLORIDE 9 MG/ML
INJECTION, SOLUTION INTRAVENOUS PRN
Status: DISCONTINUED | OUTPATIENT
Start: 2025-08-01 | End: 2025-08-04 | Stop reason: HOSPADM

## 2025-08-01 RX ADMIN — WATER 125 MG: 1 INJECTION INTRAMUSCULAR; INTRAVENOUS; SUBCUTANEOUS at 11:14

## 2025-08-01 RX ADMIN — IPRATROPIUM BROMIDE AND ALBUTEROL SULFATE 1 DOSE: .5; 2.5 SOLUTION RESPIRATORY (INHALATION) at 20:03

## 2025-08-01 RX ADMIN — FUROSEMIDE 20 MG: 10 INJECTION, SOLUTION INTRAMUSCULAR; INTRAVENOUS at 15:51

## 2025-08-01 RX ADMIN — SODIUM CHLORIDE, PRESERVATIVE FREE 10 ML: 5 INJECTION INTRAVENOUS at 21:04

## 2025-08-01 RX ADMIN — IPRATROPIUM BROMIDE AND ALBUTEROL SULFATE 1 DOSE: .5; 2.5 SOLUTION RESPIRATORY (INHALATION) at 11:20

## 2025-08-01 RX ADMIN — Medication 10 ML: at 15:50

## 2025-08-01 RX ADMIN — CARVEDILOL 3.12 MG: 3.12 TABLET, FILM COATED ORAL at 15:51

## 2025-08-01 RX ADMIN — TRAZODONE HYDROCHLORIDE 50 MG: 50 TABLET ORAL at 21:08

## 2025-08-01 RX ADMIN — IPRATROPIUM BROMIDE AND ALBUTEROL SULFATE 1 DOSE: .5; 2.5 SOLUTION RESPIRATORY (INHALATION) at 15:32

## 2025-08-01 RX ADMIN — WARFARIN SODIUM 7.5 MG: 7.5 TABLET ORAL at 17:20

## 2025-08-01 RX ADMIN — METHYLPREDNISOLONE SODIUM SUCCINATE 40 MG: 40 INJECTION, POWDER, LYOPHILIZED, FOR SOLUTION INTRAMUSCULAR; INTRAVENOUS at 15:51

## 2025-08-01 RX ADMIN — FUROSEMIDE 20 MG: 10 INJECTION, SOLUTION INTRAMUSCULAR; INTRAVENOUS at 21:04

## 2025-08-01 RX ADMIN — ENOXAPARIN SODIUM 70 MG: 100 INJECTION SUBCUTANEOUS at 18:15

## 2025-08-01 RX ADMIN — FAMOTIDINE 40 MG: 20 TABLET, FILM COATED ORAL at 17:20

## 2025-08-01 RX ADMIN — INSULIN LISPRO 4 UNITS: 100 INJECTION, SOLUTION INTRAVENOUS; SUBCUTANEOUS at 21:11

## 2025-08-01 ASSESSMENT — PAIN - FUNCTIONAL ASSESSMENT: PAIN_FUNCTIONAL_ASSESSMENT: NONE - DENIES PAIN

## 2025-08-01 ASSESSMENT — HEART SCORE: ECG: NON-SPECIFC REPOLARIZATION DISTURBANCE/LBTB/PM

## 2025-08-01 NOTE — TELEPHONE ENCOUNTER
New rx losartan 25 mg #135R0 to [facility] pharmacy.     Ellyn Givens PharmD, BCPS 8/1/2025 6:35 PM

## 2025-08-01 NOTE — PROGRESS NOTES
Patient seen in person in Medication Management Service.    Patient states compliant most of the time with regimen.  Missed three days of coumadin -.   No bleeding or thromboembolic side effects noted.    No significant med or dietary changes.    No significant recent illness or disease state changes.  Pt has increased shortness of breath in past few days, advised going to ED.     Patient acknowledges they are still an active patient of referring provider which is Travis Mason MD  Yes     PT/INR done via POC meter per protocol.  INR was subtherapeutic at 1.1.  (goal 2 - 3)    Warfarin regimen will be increased to 7.5 mg on on Fri and 5 mg al other days.  Will retest in 1 week.    Patient understands dosing directions and information discussed.  Dosing schedule and follow up appointment given to patient. Progress note routed to referring physicians office.  Patient acknowledges working in Consult Agreement with Pharmacist as referred by his/her physician/provider.      For Pharmacy Admin Tracking Only    Intervention Detail: Adherence Monitorin and Dose Adjustment: 1, reason: Therapy Optimization  Total # of Interventions Recommended: 2  Total # of Interventions Accepted: 2  Time Spent (min): 20    Ellyn Givens PharmD, BCPS 2025 2:46 PM

## 2025-08-02 LAB
ANION GAP SERPL CALCULATED.3IONS-SCNC: 10 MMOL/L (ref 9–16)
BASOPHILS # BLD: 0 K/UL (ref 0–0.2)
BASOPHILS NFR BLD: 0 % (ref 0–2)
BUN SERPL-MCNC: 19 MG/DL (ref 8–23)
CALCIUM SERPL-MCNC: 8.5 MG/DL (ref 8.6–10.4)
CHLORIDE SERPL-SCNC: 105 MMOL/L (ref 98–107)
CO2 SERPL-SCNC: 27 MMOL/L (ref 20–31)
CREAT SERPL-MCNC: 1.3 MG/DL (ref 0.7–1.2)
EOSINOPHIL # BLD: 0.05 K/UL (ref 0–0.4)
EOSINOPHILS RELATIVE PERCENT: 1 % (ref 0–4)
ERYTHROCYTE [DISTWIDTH] IN BLOOD BY AUTOMATED COUNT: 17.2 % (ref 11.5–14.9)
GFR, ESTIMATED: 43 ML/MIN/1.73M2
GLUCOSE BLD-MCNC: 144 MG/DL (ref 65–105)
GLUCOSE BLD-MCNC: 193 MG/DL (ref 65–105)
GLUCOSE BLD-MCNC: 323 MG/DL (ref 65–105)
GLUCOSE BLD-MCNC: 391 MG/DL (ref 65–105)
GLUCOSE BLD-MCNC: 396 MG/DL (ref 65–105)
GLUCOSE SERPL-MCNC: 212 MG/DL (ref 74–99)
HCT VFR BLD AUTO: 29.3 % (ref 36–46)
HGB BLD-MCNC: 9 G/DL (ref 12–16)
IMM GRANULOCYTES # BLD AUTO: 0 K/UL (ref 0–0.3)
IMM GRANULOCYTES NFR BLD: 0 %
INR PPP: 1.3
LYMPHOCYTES NFR BLD: 0.62 K/UL (ref 1–4.8)
LYMPHOCYTES RELATIVE PERCENT: 12 % (ref 24–44)
MCH RBC QN AUTO: 25.9 PG (ref 26–34)
MCHC RBC AUTO-ENTMCNC: 30.7 G/DL (ref 31–37)
MCV RBC AUTO: 84.4 FL (ref 80–100)
MONOCYTES NFR BLD: 0.1 K/UL (ref 0.1–1.3)
MONOCYTES NFR BLD: 2 % (ref 1–7)
MORPHOLOGY: ABNORMAL
NEUTROPHILS NFR BLD: 85 % (ref 36–66)
NEUTS SEG NFR BLD: 4.43 K/UL (ref 1.3–9.1)
NRBC BLD-RTO: 0 PER 100 WBC
PLATELET # BLD AUTO: 272 K/UL (ref 150–450)
PMV BLD AUTO: 10.9 FL (ref 8–13.5)
POTASSIUM SERPL-SCNC: 3.6 MMOL/L (ref 3.7–5.3)
PROTHROMBIN TIME: 17.7 SEC (ref 11.8–14.6)
RBC # BLD AUTO: 3.47 M/UL (ref 3.95–5.11)
SODIUM SERPL-SCNC: 142 MMOL/L (ref 136–145)
WBC OTHER # BLD: 5.2 K/UL (ref 3.5–11)

## 2025-08-02 PROCEDURE — 6370000000 HC RX 637 (ALT 250 FOR IP): Performed by: NURSE PRACTITIONER

## 2025-08-02 PROCEDURE — 99223 1ST HOSP IP/OBS HIGH 75: CPT

## 2025-08-02 PROCEDURE — 85610 PROTHROMBIN TIME: CPT

## 2025-08-02 PROCEDURE — 80048 BASIC METABOLIC PNL TOTAL CA: CPT

## 2025-08-02 PROCEDURE — 2500000003 HC RX 250 WO HCPCS

## 2025-08-02 PROCEDURE — 94640 AIRWAY INHALATION TREATMENT: CPT

## 2025-08-02 PROCEDURE — 85025 COMPLETE CBC W/AUTO DIFF WBC: CPT

## 2025-08-02 PROCEDURE — 6360000002 HC RX W HCPCS

## 2025-08-02 PROCEDURE — 6370000000 HC RX 637 (ALT 250 FOR IP)

## 2025-08-02 PROCEDURE — 94761 N-INVAS EAR/PLS OXIMETRY MLT: CPT

## 2025-08-02 PROCEDURE — 36415 COLL VENOUS BLD VENIPUNCTURE: CPT

## 2025-08-02 PROCEDURE — 2060000000 HC ICU INTERMEDIATE R&B

## 2025-08-02 PROCEDURE — 94760 N-INVAS EAR/PLS OXIMETRY 1: CPT

## 2025-08-02 PROCEDURE — 82947 ASSAY GLUCOSE BLOOD QUANT: CPT

## 2025-08-02 RX ORDER — BUMETANIDE 0.25 MG/ML
2 INJECTION, SOLUTION INTRAMUSCULAR; INTRAVENOUS DAILY
Status: DISCONTINUED | OUTPATIENT
Start: 2025-08-03 | End: 2025-08-03

## 2025-08-02 RX ORDER — INSULIN LISPRO 100 [IU]/ML
5 INJECTION, SOLUTION INTRAVENOUS; SUBCUTANEOUS ONCE
Status: COMPLETED | OUTPATIENT
Start: 2025-08-02 | End: 2025-08-02

## 2025-08-02 RX ORDER — INSULIN LISPRO 100 [IU]/ML
0-8 INJECTION, SOLUTION INTRAVENOUS; SUBCUTANEOUS
Status: DISCONTINUED | OUTPATIENT
Start: 2025-08-02 | End: 2025-08-04 | Stop reason: HOSPADM

## 2025-08-02 RX ORDER — WARFARIN SODIUM 5 MG/1
5 TABLET ORAL
Status: COMPLETED | OUTPATIENT
Start: 2025-08-02 | End: 2025-08-02

## 2025-08-02 RX ADMIN — IPRATROPIUM BROMIDE AND ALBUTEROL SULFATE 1 DOSE: .5; 2.5 SOLUTION RESPIRATORY (INHALATION) at 15:41

## 2025-08-02 RX ADMIN — IPRATROPIUM BROMIDE AND ALBUTEROL SULFATE 1 DOSE: .5; 2.5 SOLUTION RESPIRATORY (INHALATION) at 10:36

## 2025-08-02 RX ADMIN — CARVEDILOL 3.12 MG: 3.12 TABLET, FILM COATED ORAL at 08:03

## 2025-08-02 RX ADMIN — CARVEDILOL 3.12 MG: 3.12 TABLET, FILM COATED ORAL at 16:44

## 2025-08-02 RX ADMIN — SODIUM CHLORIDE, PRESERVATIVE FREE 10 ML: 5 INJECTION INTRAVENOUS at 20:40

## 2025-08-02 RX ADMIN — FAMOTIDINE 40 MG: 20 TABLET, FILM COATED ORAL at 16:44

## 2025-08-02 RX ADMIN — LOSARTAN POTASSIUM 25 MG: 25 TABLET, FILM COATED ORAL at 08:02

## 2025-08-02 RX ADMIN — INSULIN LISPRO 8 UNITS: 100 INJECTION, SOLUTION INTRAVENOUS; SUBCUTANEOUS at 11:58

## 2025-08-02 RX ADMIN — ENOXAPARIN SODIUM 70 MG: 100 INJECTION SUBCUTANEOUS at 08:02

## 2025-08-02 RX ADMIN — METHYLPREDNISOLONE SODIUM SUCCINATE 40 MG: 40 INJECTION, POWDER, LYOPHILIZED, FOR SOLUTION INTRAMUSCULAR; INTRAVENOUS at 03:05

## 2025-08-02 RX ADMIN — IPRATROPIUM BROMIDE AND ALBUTEROL SULFATE 1 DOSE: .5; 2.5 SOLUTION RESPIRATORY (INHALATION) at 06:55

## 2025-08-02 RX ADMIN — ENOXAPARIN SODIUM 70 MG: 100 INJECTION SUBCUTANEOUS at 20:40

## 2025-08-02 RX ADMIN — ASPIRIN 81 MG: 81 TABLET, CHEWABLE ORAL at 08:03

## 2025-08-02 RX ADMIN — INSULIN LISPRO 6 UNITS: 100 INJECTION, SOLUTION INTRAVENOUS; SUBCUTANEOUS at 16:44

## 2025-08-02 RX ADMIN — INSULIN LISPRO 1 UNITS: 100 INJECTION, SOLUTION INTRAVENOUS; SUBCUTANEOUS at 08:02

## 2025-08-02 RX ADMIN — TRAZODONE HYDROCHLORIDE 50 MG: 50 TABLET ORAL at 20:40

## 2025-08-02 RX ADMIN — IPRATROPIUM BROMIDE AND ALBUTEROL SULFATE 1 DOSE: .5; 2.5 SOLUTION RESPIRATORY (INHALATION) at 19:39

## 2025-08-02 RX ADMIN — SODIUM CHLORIDE, PRESERVATIVE FREE 10 ML: 5 INJECTION INTRAVENOUS at 08:02

## 2025-08-02 RX ADMIN — ACETAMINOPHEN 650 MG: 325 TABLET ORAL at 02:17

## 2025-08-02 RX ADMIN — WARFARIN SODIUM 5 MG: 5 TABLET ORAL at 17:08

## 2025-08-02 RX ADMIN — FUROSEMIDE 20 MG: 10 INJECTION, SOLUTION INTRAMUSCULAR; INTRAVENOUS at 08:03

## 2025-08-02 RX ADMIN — AMIODARONE HYDROCHLORIDE 200 MG: 200 TABLET ORAL at 08:02

## 2025-08-02 RX ADMIN — ATORVASTATIN CALCIUM 80 MG: 80 TABLET, FILM COATED ORAL at 08:02

## 2025-08-02 RX ADMIN — INSULIN LISPRO 5 UNITS: 100 INJECTION, SOLUTION INTRAVENOUS; SUBCUTANEOUS at 00:57

## 2025-08-02 ASSESSMENT — PAIN DESCRIPTION - DESCRIPTORS: DESCRIPTORS: ACHING

## 2025-08-02 ASSESSMENT — PAIN SCALES - GENERAL: PAINLEVEL_OUTOF10: 5

## 2025-08-02 ASSESSMENT — PAIN DESCRIPTION - LOCATION: LOCATION: HEAD

## 2025-08-03 LAB
ANION GAP SERPL CALCULATED.3IONS-SCNC: 11 MMOL/L (ref 9–16)
BASOPHILS # BLD: <0.03 K/UL (ref 0–0.2)
BASOPHILS NFR BLD: 0 % (ref 0–2)
BNP SERPL-MCNC: 2442 PG/ML (ref 0–300)
BUN SERPL-MCNC: 26 MG/DL (ref 8–23)
CALCIUM SERPL-MCNC: 8.1 MG/DL (ref 8.6–10.4)
CHLORIDE SERPL-SCNC: 106 MMOL/L (ref 98–107)
CO2 SERPL-SCNC: 27 MMOL/L (ref 20–31)
CREAT SERPL-MCNC: 1.5 MG/DL (ref 0.7–1.2)
EOSINOPHIL # BLD: <0.03 K/UL (ref 0–0.44)
EOSINOPHILS RELATIVE PERCENT: 0 % (ref 0–4)
ERYTHROCYTE [DISTWIDTH] IN BLOOD BY AUTOMATED COUNT: 17.5 % (ref 11.5–14.9)
GFR, ESTIMATED: 36 ML/MIN/1.73M2
GLUCOSE BLD-MCNC: 112 MG/DL (ref 65–105)
GLUCOSE BLD-MCNC: 120 MG/DL (ref 65–105)
GLUCOSE BLD-MCNC: 187 MG/DL (ref 65–105)
GLUCOSE BLD-MCNC: 202 MG/DL (ref 65–105)
GLUCOSE SERPL-MCNC: 113 MG/DL (ref 74–99)
HCT VFR BLD AUTO: 30.6 % (ref 36–46)
HGB BLD-MCNC: 9 G/DL (ref 12–16)
IMM GRANULOCYTES # BLD AUTO: 0.04 K/UL (ref 0–0.3)
IMM GRANULOCYTES NFR BLD: 0 %
INR PPP: 2.5
LYMPHOCYTES NFR BLD: 1.14 K/UL (ref 1.1–3.7)
LYMPHOCYTES RELATIVE PERCENT: 11 % (ref 24–44)
MAGNESIUM SERPL-MCNC: 1.7 MG/DL (ref 1.6–2.4)
MAGNESIUM SERPL-MCNC: 2.4 MG/DL (ref 1.6–2.4)
MCH RBC QN AUTO: 25.6 PG (ref 26–34)
MCHC RBC AUTO-ENTMCNC: 29.4 G/DL (ref 31–37)
MCV RBC AUTO: 86.9 FL (ref 80–100)
MONOCYTES NFR BLD: 0.6 K/UL (ref 0.1–1.2)
MONOCYTES NFR BLD: 6 % (ref 3–12)
NEUTROPHILS NFR BLD: 83 % (ref 36–66)
NEUTS SEG NFR BLD: 8.33 K/UL (ref 1.5–8.1)
NRBC BLD-RTO: 0 PER 100 WBC
PLATELET # BLD AUTO: 279 K/UL (ref 150–450)
PMV BLD AUTO: 10.7 FL (ref 8–13.5)
POTASSIUM SERPL-SCNC: 3.2 MMOL/L (ref 3.7–5.3)
POTASSIUM SERPL-SCNC: 3.4 MMOL/L (ref 3.7–5.3)
PROTHROMBIN TIME: 29.2 SEC (ref 11.8–14.6)
RBC # BLD AUTO: 3.52 M/UL (ref 3.95–5.11)
SODIUM SERPL-SCNC: 144 MMOL/L (ref 136–145)
WBC OTHER # BLD: 10.2 K/UL (ref 3.5–11)

## 2025-08-03 PROCEDURE — 83735 ASSAY OF MAGNESIUM: CPT

## 2025-08-03 PROCEDURE — 6370000000 HC RX 637 (ALT 250 FOR IP)

## 2025-08-03 PROCEDURE — 85610 PROTHROMBIN TIME: CPT

## 2025-08-03 PROCEDURE — 97162 PT EVAL MOD COMPLEX 30 MIN: CPT

## 2025-08-03 PROCEDURE — 82947 ASSAY GLUCOSE BLOOD QUANT: CPT

## 2025-08-03 PROCEDURE — 36415 COLL VENOUS BLD VENIPUNCTURE: CPT

## 2025-08-03 PROCEDURE — 84132 ASSAY OF SERUM POTASSIUM: CPT

## 2025-08-03 PROCEDURE — 2060000000 HC ICU INTERMEDIATE R&B

## 2025-08-03 PROCEDURE — 80048 BASIC METABOLIC PNL TOTAL CA: CPT

## 2025-08-03 PROCEDURE — 2500000003 HC RX 250 WO HCPCS

## 2025-08-03 PROCEDURE — 85025 COMPLETE CBC W/AUTO DIFF WBC: CPT

## 2025-08-03 PROCEDURE — 94761 N-INVAS EAR/PLS OXIMETRY MLT: CPT

## 2025-08-03 PROCEDURE — 6360000002 HC RX W HCPCS

## 2025-08-03 PROCEDURE — 83880 ASSAY OF NATRIURETIC PEPTIDE: CPT

## 2025-08-03 PROCEDURE — 94640 AIRWAY INHALATION TREATMENT: CPT

## 2025-08-03 PROCEDURE — 99233 SBSQ HOSP IP/OBS HIGH 50: CPT

## 2025-08-03 RX ORDER — MAGNESIUM SULFATE HEPTAHYDRATE 40 MG/ML
2000 INJECTION, SOLUTION INTRAVENOUS ONCE
Status: COMPLETED | OUTPATIENT
Start: 2025-08-03 | End: 2025-08-03

## 2025-08-03 RX ORDER — POTASSIUM CHLORIDE 1500 MG/1
40 TABLET, EXTENDED RELEASE ORAL ONCE
Status: COMPLETED | OUTPATIENT
Start: 2025-08-03 | End: 2025-08-03

## 2025-08-03 RX ORDER — MIDODRINE HYDROCHLORIDE 5 MG/1
5 TABLET ORAL
Status: DISCONTINUED | OUTPATIENT
Start: 2025-08-03 | End: 2025-08-04 | Stop reason: HOSPADM

## 2025-08-03 RX ORDER — WARFARIN SODIUM 5 MG/1
5 TABLET ORAL
Status: DISCONTINUED | OUTPATIENT
Start: 2025-08-04 | End: 2025-08-03 | Stop reason: ALTCHOICE

## 2025-08-03 RX ORDER — BUMETANIDE 1 MG/1
2 TABLET ORAL DAILY
Status: DISCONTINUED | OUTPATIENT
Start: 2025-08-03 | End: 2025-08-04

## 2025-08-03 RX ADMIN — BUMETANIDE 2 MG: 1 TABLET ORAL at 07:54

## 2025-08-03 RX ADMIN — POTASSIUM CHLORIDE 40 MEQ: 1500 TABLET, EXTENDED RELEASE ORAL at 07:55

## 2025-08-03 RX ADMIN — MIDODRINE HYDROCHLORIDE 5 MG: 5 TABLET ORAL at 20:43

## 2025-08-03 RX ADMIN — SODIUM CHLORIDE, PRESERVATIVE FREE 10 ML: 5 INJECTION INTRAVENOUS at 20:44

## 2025-08-03 RX ADMIN — IPRATROPIUM BROMIDE AND ALBUTEROL SULFATE 1 DOSE: .5; 2.5 SOLUTION RESPIRATORY (INHALATION) at 19:45

## 2025-08-03 RX ADMIN — ASPIRIN 81 MG: 81 TABLET, CHEWABLE ORAL at 07:55

## 2025-08-03 RX ADMIN — IPRATROPIUM BROMIDE AND ALBUTEROL SULFATE 1 DOSE: .5; 2.5 SOLUTION RESPIRATORY (INHALATION) at 15:00

## 2025-08-03 RX ADMIN — INSULIN LISPRO 2 UNITS: 100 INJECTION, SOLUTION INTRAVENOUS; SUBCUTANEOUS at 11:38

## 2025-08-03 RX ADMIN — IPRATROPIUM BROMIDE AND ALBUTEROL SULFATE 1 DOSE: .5; 2.5 SOLUTION RESPIRATORY (INHALATION) at 07:39

## 2025-08-03 RX ADMIN — CARVEDILOL 3.12 MG: 3.12 TABLET, FILM COATED ORAL at 07:55

## 2025-08-03 RX ADMIN — POTASSIUM CHLORIDE 40 MEQ: 1500 TABLET, EXTENDED RELEASE ORAL at 14:10

## 2025-08-03 RX ADMIN — INSULIN LISPRO 2 UNITS: 100 INJECTION, SOLUTION INTRAVENOUS; SUBCUTANEOUS at 17:29

## 2025-08-03 RX ADMIN — MAGNESIUM SULFATE HEPTAHYDRATE 2000 MG: 40 INJECTION, SOLUTION INTRAVENOUS at 09:03

## 2025-08-03 RX ADMIN — AMIODARONE HYDROCHLORIDE 200 MG: 200 TABLET ORAL at 07:55

## 2025-08-03 RX ADMIN — INSULIN LISPRO 2 UNITS: 100 INJECTION, SOLUTION INTRAVENOUS; SUBCUTANEOUS at 20:43

## 2025-08-03 RX ADMIN — FAMOTIDINE 40 MG: 20 TABLET, FILM COATED ORAL at 17:29

## 2025-08-03 RX ADMIN — SODIUM CHLORIDE, PRESERVATIVE FREE 10 ML: 5 INJECTION INTRAVENOUS at 07:54

## 2025-08-03 RX ADMIN — CARVEDILOL 3.12 MG: 3.12 TABLET, FILM COATED ORAL at 17:29

## 2025-08-03 RX ADMIN — IPRATROPIUM BROMIDE AND ALBUTEROL SULFATE 1 DOSE: .5; 2.5 SOLUTION RESPIRATORY (INHALATION) at 11:14

## 2025-08-03 RX ADMIN — ATORVASTATIN CALCIUM 80 MG: 80 TABLET, FILM COATED ORAL at 07:55

## 2025-08-03 RX ADMIN — MIDODRINE HYDROCHLORIDE 5 MG: 5 TABLET ORAL at 13:13

## 2025-08-03 RX ADMIN — TRAZODONE HYDROCHLORIDE 50 MG: 50 TABLET ORAL at 20:43

## 2025-08-03 RX ADMIN — LOSARTAN POTASSIUM 25 MG: 25 TABLET, FILM COATED ORAL at 07:55

## 2025-08-04 VITALS
OXYGEN SATURATION: 98 % | RESPIRATION RATE: 16 BRPM | WEIGHT: 144.4 LBS | HEART RATE: 66 BPM | HEIGHT: 64 IN | BODY MASS INDEX: 24.65 KG/M2 | DIASTOLIC BLOOD PRESSURE: 62 MMHG | TEMPERATURE: 97.7 F | SYSTOLIC BLOOD PRESSURE: 105 MMHG

## 2025-08-04 LAB
ANION GAP SERPL CALCULATED.3IONS-SCNC: 9 MMOL/L (ref 9–16)
BASOPHILS # BLD: <0.03 K/UL (ref 0–0.2)
BASOPHILS NFR BLD: 0 % (ref 0–2)
BUN SERPL-MCNC: 28 MG/DL (ref 8–23)
CALCIUM SERPL-MCNC: 8.2 MG/DL (ref 8.6–10.4)
CHLORIDE SERPL-SCNC: 108 MMOL/L (ref 98–107)
CO2 SERPL-SCNC: 26 MMOL/L (ref 20–31)
CREAT SERPL-MCNC: 1.4 MG/DL (ref 0.7–1.2)
EOSINOPHIL # BLD: 0.21 K/UL (ref 0–0.44)
EOSINOPHILS RELATIVE PERCENT: 2 % (ref 0–4)
ERYTHROCYTE [DISTWIDTH] IN BLOOD BY AUTOMATED COUNT: 17.5 % (ref 11.5–14.9)
GFR, ESTIMATED: 39 ML/MIN/1.73M2
GLUCOSE BLD-MCNC: 133 MG/DL (ref 65–105)
GLUCOSE BLD-MCNC: 142 MG/DL (ref 65–105)
GLUCOSE BLD-MCNC: 92 MG/DL (ref 65–105)
GLUCOSE SERPL-MCNC: 91 MG/DL (ref 74–99)
HCT VFR BLD AUTO: 29.9 % (ref 36–46)
HGB BLD-MCNC: 8.6 G/DL (ref 12–16)
IMM GRANULOCYTES # BLD AUTO: 0.03 K/UL (ref 0–0.3)
IMM GRANULOCYTES NFR BLD: 0 %
INR PPP: 2.3
LYMPHOCYTES NFR BLD: 0.87 K/UL (ref 1.1–3.7)
LYMPHOCYTES RELATIVE PERCENT: 10 % (ref 24–44)
MAGNESIUM SERPL-MCNC: 2.1 MG/DL (ref 1.6–2.4)
MCH RBC QN AUTO: 25.1 PG (ref 26–34)
MCHC RBC AUTO-ENTMCNC: 28.8 G/DL (ref 31–37)
MCV RBC AUTO: 87.2 FL (ref 80–100)
MONOCYTES NFR BLD: 0.73 K/UL (ref 0.1–1.2)
MONOCYTES NFR BLD: 8 % (ref 3–12)
NEUTROPHILS NFR BLD: 80 % (ref 36–66)
NEUTS SEG NFR BLD: 7.19 K/UL (ref 1.5–8.1)
NRBC BLD-RTO: 0 PER 100 WBC
PLATELET # BLD AUTO: 282 K/UL (ref 150–450)
PMV BLD AUTO: 10.8 FL (ref 8–13.5)
POTASSIUM SERPL-SCNC: 4.5 MMOL/L (ref 3.7–5.3)
PROTHROMBIN TIME: 27.1 SEC (ref 11.8–14.6)
RBC # BLD AUTO: 3.43 M/UL (ref 3.95–5.11)
SODIUM SERPL-SCNC: 143 MMOL/L (ref 136–145)
WBC OTHER # BLD: 9.1 K/UL (ref 3.5–11)

## 2025-08-04 PROCEDURE — 36415 COLL VENOUS BLD VENIPUNCTURE: CPT

## 2025-08-04 PROCEDURE — 82947 ASSAY GLUCOSE BLOOD QUANT: CPT

## 2025-08-04 PROCEDURE — 6370000000 HC RX 637 (ALT 250 FOR IP)

## 2025-08-04 PROCEDURE — 97165 OT EVAL LOW COMPLEX 30 MIN: CPT

## 2025-08-04 PROCEDURE — 80048 BASIC METABOLIC PNL TOTAL CA: CPT

## 2025-08-04 PROCEDURE — 85025 COMPLETE CBC W/AUTO DIFF WBC: CPT

## 2025-08-04 PROCEDURE — 94761 N-INVAS EAR/PLS OXIMETRY MLT: CPT

## 2025-08-04 PROCEDURE — 99233 SBSQ HOSP IP/OBS HIGH 50: CPT

## 2025-08-04 PROCEDURE — 94640 AIRWAY INHALATION TREATMENT: CPT

## 2025-08-04 PROCEDURE — 83735 ASSAY OF MAGNESIUM: CPT

## 2025-08-04 PROCEDURE — 2500000003 HC RX 250 WO HCPCS

## 2025-08-04 PROCEDURE — 85610 PROTHROMBIN TIME: CPT

## 2025-08-04 PROCEDURE — 97530 THERAPEUTIC ACTIVITIES: CPT

## 2025-08-04 RX ORDER — BUMETANIDE 1 MG/1
1 TABLET ORAL DAILY
Status: DISCONTINUED | OUTPATIENT
Start: 2025-08-05 | End: 2025-08-04 | Stop reason: HOSPADM

## 2025-08-04 RX ORDER — WARFARIN SODIUM 5 MG/1
TABLET ORAL
Qty: 30 TABLET | Refills: 3 | Status: SHIPPED | OUTPATIENT
Start: 2025-08-05

## 2025-08-04 RX ORDER — FAMOTIDINE 20 MG/1
20 TABLET, FILM COATED ORAL EVERY EVENING
Status: DISCONTINUED | OUTPATIENT
Start: 2025-08-04 | End: 2025-08-04 | Stop reason: HOSPADM

## 2025-08-04 RX ORDER — WARFARIN SODIUM 2 MG/1
4 TABLET ORAL
Status: DISCONTINUED | OUTPATIENT
Start: 2025-08-04 | End: 2025-08-04 | Stop reason: HOSPADM

## 2025-08-04 RX ADMIN — ASPIRIN 81 MG: 81 TABLET, CHEWABLE ORAL at 07:32

## 2025-08-04 RX ADMIN — IPRATROPIUM BROMIDE AND ALBUTEROL SULFATE 1 DOSE: .5; 2.5 SOLUTION RESPIRATORY (INHALATION) at 11:20

## 2025-08-04 RX ADMIN — IPRATROPIUM BROMIDE AND ALBUTEROL SULFATE 1 DOSE: .5; 2.5 SOLUTION RESPIRATORY (INHALATION) at 07:41

## 2025-08-04 RX ADMIN — CARVEDILOL 3.12 MG: 3.12 TABLET, FILM COATED ORAL at 07:32

## 2025-08-04 RX ADMIN — MIDODRINE HYDROCHLORIDE 5 MG: 5 TABLET ORAL at 07:32

## 2025-08-04 RX ADMIN — SODIUM CHLORIDE, PRESERVATIVE FREE 10 ML: 5 INJECTION INTRAVENOUS at 07:33

## 2025-08-04 RX ADMIN — MIDODRINE HYDROCHLORIDE 5 MG: 5 TABLET ORAL at 16:30

## 2025-08-04 RX ADMIN — ATORVASTATIN CALCIUM 80 MG: 80 TABLET, FILM COATED ORAL at 07:32

## 2025-08-04 RX ADMIN — FAMOTIDINE 20 MG: 20 TABLET, FILM COATED ORAL at 16:30

## 2025-08-04 RX ADMIN — IPRATROPIUM BROMIDE AND ALBUTEROL SULFATE 1 DOSE: .5; 2.5 SOLUTION RESPIRATORY (INHALATION) at 16:07

## 2025-08-04 RX ADMIN — AMIODARONE HYDROCHLORIDE 200 MG: 200 TABLET ORAL at 07:32

## 2025-08-04 RX ADMIN — MIDODRINE HYDROCHLORIDE 5 MG: 5 TABLET ORAL at 11:08

## 2025-08-04 ASSESSMENT — PAIN SCALES - GENERAL
PAINLEVEL_OUTOF10: 0
PAINLEVEL_OUTOF10: 0

## 2025-08-04 ASSESSMENT — ENCOUNTER SYMPTOMS
WHEEZING: 0
COUGH: 1
SHORTNESS OF BREATH: 1
CHEST TIGHTNESS: 0

## 2025-08-05 ENCOUNTER — TELEPHONE (OUTPATIENT)
Dept: FAMILY MEDICINE CLINIC | Age: 75
End: 2025-08-05

## 2025-08-05 ENCOUNTER — TELEPHONE (OUTPATIENT)
Age: 75
End: 2025-08-05

## 2025-08-06 ENCOUNTER — TELEPHONE (OUTPATIENT)
Age: 75
End: 2025-08-06

## 2025-08-08 ENCOUNTER — APPOINTMENT (OUTPATIENT)
Dept: GENERAL RADIOLOGY | Age: 75
End: 2025-08-08
Payer: COMMERCIAL

## 2025-08-08 ENCOUNTER — TELEPHONE (OUTPATIENT)
Age: 75
End: 2025-08-08

## 2025-08-08 ENCOUNTER — HOSPITAL ENCOUNTER (OUTPATIENT)
Age: 75
Setting detail: OBSERVATION
Discharge: HOME | End: 2025-08-09
Attending: EMERGENCY MEDICINE | Admitting: EMERGENCY MEDICINE
Payer: COMMERCIAL

## 2025-08-08 DIAGNOSIS — J44.9 CHRONIC OBSTRUCTIVE PULMONARY DISEASE, UNSPECIFIED COPD TYPE (HCC): ICD-10-CM

## 2025-08-08 DIAGNOSIS — I50.9 ACUTE ON CHRONIC CONGESTIVE HEART FAILURE, UNSPECIFIED HEART FAILURE TYPE (HCC): Primary | ICD-10-CM

## 2025-08-08 DIAGNOSIS — J44.1 COPD EXACERBATION (HCC): ICD-10-CM

## 2025-08-08 LAB
ALBUMIN SERPL-MCNC: 3.8 G/DL (ref 3.5–5.2)
ALBUMIN/GLOB SERPL: 1.8 {RATIO} (ref 1–2.5)
ALP SERPL-CCNC: 97 U/L (ref 35–104)
ALT SERPL-CCNC: 39 U/L (ref 10–35)
ANION GAP SERPL CALCULATED.3IONS-SCNC: 9 MMOL/L (ref 9–16)
AST SERPL-CCNC: 36 U/L (ref 10–35)
BASOPHILS # BLD: 0.04 K/UL (ref 0–0.2)
BASOPHILS NFR BLD: 1 % (ref 0–2)
BILIRUB SERPL-MCNC: 0.6 MG/DL (ref 0–1.2)
BNP SERPL-MCNC: 2569 PG/ML (ref 0–450)
BUN SERPL-MCNC: 15 MG/DL (ref 8–23)
CALCIUM SERPL-MCNC: 9 MG/DL (ref 8.6–10.4)
CHLORIDE SERPL-SCNC: 107 MMOL/L (ref 98–107)
CO2 SERPL-SCNC: 26 MMOL/L (ref 20–31)
CREAT SERPL-MCNC: 1 MG/DL (ref 0.6–0.9)
EOSINOPHIL # BLD: 0.15 K/UL (ref 0–0.44)
EOSINOPHILS RELATIVE PERCENT: 3 % (ref 1–4)
ERYTHROCYTE [DISTWIDTH] IN BLOOD BY AUTOMATED COUNT: 17.2 % (ref 11.8–14.4)
GFR, ESTIMATED: 58 ML/MIN/1.73M2
GLUCOSE SERPL-MCNC: 109 MG/DL (ref 74–99)
HCT VFR BLD AUTO: 29 % (ref 36.3–47.1)
HGB BLD-MCNC: 8.5 G/DL (ref 11.9–15.1)
IMM GRANULOCYTES # BLD AUTO: <0.03 K/UL (ref 0–0.3)
IMM GRANULOCYTES NFR BLD: 0 %
INR PPP: 1.8
LYMPHOCYTES NFR BLD: 1.23 K/UL (ref 1.1–3.7)
LYMPHOCYTES RELATIVE PERCENT: 24 % (ref 24–43)
MCH RBC QN AUTO: 25.1 PG (ref 25.2–33.5)
MCHC RBC AUTO-ENTMCNC: 29.3 G/DL (ref 28.4–34.8)
MCV RBC AUTO: 85.5 FL (ref 82.6–102.9)
MONOCYTES NFR BLD: 0.61 K/UL (ref 0.1–1.2)
MONOCYTES NFR BLD: 12 % (ref 3–12)
NEUTROPHILS NFR BLD: 60 % (ref 36–65)
NEUTS SEG NFR BLD: 3.08 K/UL (ref 1.5–8.1)
NRBC BLD-RTO: 0 PER 100 WBC
PLATELET # BLD AUTO: 299 K/UL (ref 138–453)
PMV BLD AUTO: 11.1 FL (ref 8.1–13.5)
POTASSIUM SERPL-SCNC: 4.3 MMOL/L (ref 3.7–5.3)
PROT SERPL-MCNC: 5.9 G/DL (ref 6.6–8.7)
PROTHROMBIN TIME: 21.7 SEC (ref 11.7–14.9)
RBC # BLD AUTO: 3.39 M/UL (ref 3.95–5.11)
RBC # BLD: ABNORMAL 10*6/UL
SODIUM SERPL-SCNC: 142 MMOL/L (ref 136–145)
TROPONIN I SERPL HS-MCNC: 33 NG/L (ref 0–14)
TROPONIN I SERPL HS-MCNC: 34 NG/L (ref 0–14)
WBC OTHER # BLD: 5.1 K/UL (ref 3.5–11.3)

## 2025-08-08 PROCEDURE — 93005 ELECTROCARDIOGRAM TRACING: CPT

## 2025-08-08 PROCEDURE — 84484 ASSAY OF TROPONIN QUANT: CPT

## 2025-08-08 PROCEDURE — G0378 HOSPITAL OBSERVATION PER HR: HCPCS

## 2025-08-08 PROCEDURE — 85610 PROTHROMBIN TIME: CPT

## 2025-08-08 PROCEDURE — 6370000000 HC RX 637 (ALT 250 FOR IP)

## 2025-08-08 PROCEDURE — 36415 COLL VENOUS BLD VENIPUNCTURE: CPT

## 2025-08-08 PROCEDURE — 71046 X-RAY EXAM CHEST 2 VIEWS: CPT

## 2025-08-08 PROCEDURE — 83880 ASSAY OF NATRIURETIC PEPTIDE: CPT

## 2025-08-08 PROCEDURE — 85025 COMPLETE CBC W/AUTO DIFF WBC: CPT

## 2025-08-08 PROCEDURE — 80053 COMPREHEN METABOLIC PANEL: CPT

## 2025-08-08 PROCEDURE — 6360000002 HC RX W HCPCS

## 2025-08-08 PROCEDURE — 2500000003 HC RX 250 WO HCPCS

## 2025-08-08 PROCEDURE — 99285 EMERGENCY DEPT VISIT HI MDM: CPT

## 2025-08-08 PROCEDURE — 96374 THER/PROPH/DIAG INJ IV PUSH: CPT

## 2025-08-08 PROCEDURE — 94640 AIRWAY INHALATION TREATMENT: CPT

## 2025-08-08 RX ORDER — AMIODARONE HYDROCHLORIDE 200 MG/1
200 TABLET ORAL DAILY
Status: DISCONTINUED | OUTPATIENT
Start: 2025-08-08 | End: 2025-08-09 | Stop reason: HOSPADM

## 2025-08-08 RX ORDER — ALBUTEROL SULFATE 5 MG/ML
5 SOLUTION RESPIRATORY (INHALATION)
Status: DISCONTINUED | OUTPATIENT
Start: 2025-08-08 | End: 2025-08-09 | Stop reason: HOSPADM

## 2025-08-08 RX ORDER — CARVEDILOL 3.12 MG/1
3.12 TABLET ORAL 2 TIMES DAILY WITH MEALS
Status: DISCONTINUED | OUTPATIENT
Start: 2025-08-08 | End: 2025-08-09 | Stop reason: HOSPADM

## 2025-08-08 RX ORDER — SODIUM CHLORIDE 0.9 % (FLUSH) 0.9 %
5-40 SYRINGE (ML) INJECTION PRN
Status: DISCONTINUED | OUTPATIENT
Start: 2025-08-08 | End: 2025-08-09 | Stop reason: HOSPADM

## 2025-08-08 RX ORDER — ASPIRIN 81 MG/1
324 TABLET, CHEWABLE ORAL ONCE
Status: DISCONTINUED | OUTPATIENT
Start: 2025-08-08 | End: 2025-08-08

## 2025-08-08 RX ORDER — POTASSIUM CHLORIDE 7.45 MG/ML
10 INJECTION INTRAVENOUS PRN
Status: DISCONTINUED | OUTPATIENT
Start: 2025-08-08 | End: 2025-08-09 | Stop reason: HOSPADM

## 2025-08-08 RX ORDER — ONDANSETRON 4 MG/1
4 TABLET, ORALLY DISINTEGRATING ORAL EVERY 8 HOURS PRN
Status: DISCONTINUED | OUTPATIENT
Start: 2025-08-08 | End: 2025-08-09 | Stop reason: HOSPADM

## 2025-08-08 RX ORDER — ACETAMINOPHEN 650 MG/1
650 SUPPOSITORY RECTAL EVERY 6 HOURS PRN
Status: DISCONTINUED | OUTPATIENT
Start: 2025-08-08 | End: 2025-08-09 | Stop reason: HOSPADM

## 2025-08-08 RX ORDER — TRAZODONE HYDROCHLORIDE 50 MG/1
50 TABLET ORAL NIGHTLY
Status: DISCONTINUED | OUTPATIENT
Start: 2025-08-08 | End: 2025-08-09 | Stop reason: HOSPADM

## 2025-08-08 RX ORDER — ACETAMINOPHEN 325 MG/1
650 TABLET ORAL EVERY 6 HOURS PRN
Status: DISCONTINUED | OUTPATIENT
Start: 2025-08-08 | End: 2025-08-09 | Stop reason: HOSPADM

## 2025-08-08 RX ORDER — BUMETANIDE 1 MG/1
1 TABLET ORAL DAILY
Status: DISCONTINUED | OUTPATIENT
Start: 2025-08-09 | End: 2025-08-09 | Stop reason: HOSPADM

## 2025-08-08 RX ORDER — POTASSIUM CHLORIDE 1500 MG/1
40 TABLET, EXTENDED RELEASE ORAL PRN
Status: DISCONTINUED | OUTPATIENT
Start: 2025-08-08 | End: 2025-08-09 | Stop reason: HOSPADM

## 2025-08-08 RX ORDER — SODIUM CHLORIDE 0.9 % (FLUSH) 0.9 %
5-40 SYRINGE (ML) INJECTION EVERY 12 HOURS SCHEDULED
Status: DISCONTINUED | OUTPATIENT
Start: 2025-08-08 | End: 2025-08-09 | Stop reason: HOSPADM

## 2025-08-08 RX ORDER — WARFARIN SODIUM 5 MG/1
5 TABLET ORAL DAILY
Status: DISCONTINUED | OUTPATIENT
Start: 2025-08-08 | End: 2025-08-09

## 2025-08-08 RX ORDER — IPRATROPIUM BROMIDE AND ALBUTEROL SULFATE 2.5; .5 MG/3ML; MG/3ML
1 SOLUTION RESPIRATORY (INHALATION)
Status: DISCONTINUED | OUTPATIENT
Start: 2025-08-08 | End: 2025-08-09

## 2025-08-08 RX ORDER — FUROSEMIDE 10 MG/ML
40 INJECTION INTRAMUSCULAR; INTRAVENOUS ONCE
Status: COMPLETED | OUTPATIENT
Start: 2025-08-08 | End: 2025-08-08

## 2025-08-08 RX ORDER — ATORVASTATIN CALCIUM 80 MG/1
80 TABLET, FILM COATED ORAL DAILY
Status: DISCONTINUED | OUTPATIENT
Start: 2025-08-08 | End: 2025-08-09 | Stop reason: HOSPADM

## 2025-08-08 RX ORDER — POLYETHYLENE GLYCOL 3350 17 G/17G
17 POWDER, FOR SOLUTION ORAL DAILY PRN
Status: DISCONTINUED | OUTPATIENT
Start: 2025-08-08 | End: 2025-08-09 | Stop reason: HOSPADM

## 2025-08-08 RX ORDER — SODIUM CHLORIDE 9 MG/ML
INJECTION, SOLUTION INTRAVENOUS PRN
Status: DISCONTINUED | OUTPATIENT
Start: 2025-08-08 | End: 2025-08-09 | Stop reason: HOSPADM

## 2025-08-08 RX ORDER — ONDANSETRON 2 MG/ML
4 INJECTION INTRAMUSCULAR; INTRAVENOUS EVERY 6 HOURS PRN
Status: DISCONTINUED | OUTPATIENT
Start: 2025-08-08 | End: 2025-08-09 | Stop reason: HOSPADM

## 2025-08-08 RX ORDER — ASPIRIN 81 MG/1
81 TABLET, CHEWABLE ORAL DAILY
Status: DISCONTINUED | OUTPATIENT
Start: 2025-08-08 | End: 2025-08-09 | Stop reason: HOSPADM

## 2025-08-08 RX ORDER — MAGNESIUM SULFATE IN WATER 40 MG/ML
2000 INJECTION, SOLUTION INTRAVENOUS PRN
Status: DISCONTINUED | OUTPATIENT
Start: 2025-08-08 | End: 2025-08-09 | Stop reason: HOSPADM

## 2025-08-08 RX ORDER — FAMOTIDINE 20 MG/1
40 TABLET, FILM COATED ORAL EVERY EVENING
Status: DISCONTINUED | OUTPATIENT
Start: 2025-08-08 | End: 2025-08-09

## 2025-08-08 RX ADMIN — Medication 5 MG: at 15:36

## 2025-08-08 RX ADMIN — IPRATROPIUM BROMIDE AND ALBUTEROL SULFATE 1 DOSE: .5; 2.5 SOLUTION RESPIRATORY (INHALATION) at 20:04

## 2025-08-08 RX ADMIN — TRAZODONE HYDROCHLORIDE 50 MG: 50 TABLET ORAL at 21:11

## 2025-08-08 RX ADMIN — SODIUM CHLORIDE, PRESERVATIVE FREE 10 ML: 5 INJECTION INTRAVENOUS at 21:11

## 2025-08-08 RX ADMIN — IPRATROPIUM BROMIDE 0.5 MG: 0.5 SOLUTION RESPIRATORY (INHALATION) at 15:36

## 2025-08-08 RX ADMIN — FUROSEMIDE 40 MG: 10 INJECTION, SOLUTION INTRAMUSCULAR; INTRAVENOUS at 19:09

## 2025-08-08 ASSESSMENT — PAIN DESCRIPTION - PAIN TYPE: TYPE: ACUTE PAIN

## 2025-08-08 ASSESSMENT — PAIN DESCRIPTION - ORIENTATION: ORIENTATION: RIGHT

## 2025-08-08 ASSESSMENT — PAIN SCALES - GENERAL: PAINLEVEL_OUTOF10: 7

## 2025-08-08 ASSESSMENT — PAIN DESCRIPTION - DESCRIPTORS: DESCRIPTORS: DULL

## 2025-08-08 ASSESSMENT — PAIN - FUNCTIONAL ASSESSMENT: PAIN_FUNCTIONAL_ASSESSMENT: 0-10

## 2025-08-08 ASSESSMENT — PAIN DESCRIPTION - LOCATION: LOCATION: CHEST

## 2025-08-08 ASSESSMENT — LIFESTYLE VARIABLES: HOW OFTEN DO YOU HAVE A DRINK CONTAINING ALCOHOL: NEVER

## 2025-08-09 VITALS
BODY MASS INDEX: 23.05 KG/M2 | HEART RATE: 70 BPM | TEMPERATURE: 97.2 F | OXYGEN SATURATION: 97 % | SYSTOLIC BLOOD PRESSURE: 144 MMHG | HEIGHT: 64 IN | RESPIRATION RATE: 17 BRPM | WEIGHT: 135 LBS | DIASTOLIC BLOOD PRESSURE: 76 MMHG

## 2025-08-09 LAB
EKG ATRIAL RATE: 67 BPM
EKG P AXIS: -5 DEGREES
EKG P-R INTERVAL: 150 MS
EKG Q-T INTERVAL: 438 MS
EKG QRS DURATION: 118 MS
EKG QTC CALCULATION (BAZETT): 462 MS
EKG R AXIS: -15 DEGREES
EKG T AXIS: 148 DEGREES
EKG VENTRICULAR RATE: 67 BPM
INR PPP: 1.7
PROTHROMBIN TIME: 20.4 SEC (ref 11.7–14.9)

## 2025-08-09 PROCEDURE — 94640 AIRWAY INHALATION TREATMENT: CPT

## 2025-08-09 PROCEDURE — 36415 COLL VENOUS BLD VENIPUNCTURE: CPT

## 2025-08-09 PROCEDURE — 85610 PROTHROMBIN TIME: CPT

## 2025-08-09 PROCEDURE — G0378 HOSPITAL OBSERVATION PER HR: HCPCS

## 2025-08-09 PROCEDURE — 93010 ELECTROCARDIOGRAM REPORT: CPT | Performed by: INTERNAL MEDICINE

## 2025-08-09 PROCEDURE — 6370000000 HC RX 637 (ALT 250 FOR IP)

## 2025-08-09 RX ORDER — WARFARIN SODIUM 7.5 MG/1
7.5 TABLET ORAL
Status: DISCONTINUED | OUTPATIENT
Start: 2025-08-09 | End: 2025-08-09 | Stop reason: HOSPADM

## 2025-08-09 RX ORDER — WARFARIN SODIUM 7.5 MG/1
7.5 TABLET ORAL
Status: DISCONTINUED | OUTPATIENT
Start: 2025-08-15 | End: 2025-08-09 | Stop reason: DRUGHIGH

## 2025-08-09 RX ORDER — IPRATROPIUM BROMIDE AND ALBUTEROL SULFATE 2.5; .5 MG/3ML; MG/3ML
1 SOLUTION RESPIRATORY (INHALATION)
Status: DISCONTINUED | OUTPATIENT
Start: 2025-08-09 | End: 2025-08-09 | Stop reason: HOSPADM

## 2025-08-09 RX ORDER — BUDESONIDE AND FORMOTEROL FUMARATE DIHYDRATE 160; 4.5 UG/1; UG/1
2 AEROSOL RESPIRATORY (INHALATION)
Qty: 10.2 G | Refills: 0 | Status: SHIPPED | OUTPATIENT
Start: 2025-08-09 | End: 2025-08-11 | Stop reason: SDUPTHER

## 2025-08-09 RX ORDER — FAMOTIDINE 20 MG/1
20 TABLET, FILM COATED ORAL EVERY EVENING
Status: DISCONTINUED | OUTPATIENT
Start: 2025-08-09 | End: 2025-08-09 | Stop reason: HOSPADM

## 2025-08-09 RX ORDER — WARFARIN SODIUM 5 MG/1
5 TABLET ORAL
Status: DISCONTINUED | OUTPATIENT
Start: 2025-08-09 | End: 2025-08-09 | Stop reason: DRUGHIGH

## 2025-08-09 RX ADMIN — IPRATROPIUM BROMIDE AND ALBUTEROL SULFATE 1 DOSE: .5; 2.5 SOLUTION RESPIRATORY (INHALATION) at 07:32

## 2025-08-11 ENCOUNTER — HOSPITAL ENCOUNTER (EMERGENCY)
Age: 75
Discharge: HOME | End: 2025-08-11
Attending: STUDENT IN AN ORGANIZED HEALTH CARE EDUCATION/TRAINING PROGRAM
Payer: COMMERCIAL

## 2025-08-11 ENCOUNTER — TELEPHONE (OUTPATIENT)
Dept: FAMILY MEDICINE CLINIC | Age: 75
End: 2025-08-11

## 2025-08-11 ENCOUNTER — APPOINTMENT (OUTPATIENT)
Dept: GENERAL RADIOLOGY | Age: 75
End: 2025-08-11
Payer: COMMERCIAL

## 2025-08-11 VITALS
TEMPERATURE: 99.2 F | HEART RATE: 77 BPM | DIASTOLIC BLOOD PRESSURE: 75 MMHG | SYSTOLIC BLOOD PRESSURE: 156 MMHG | RESPIRATION RATE: 19 BRPM | OXYGEN SATURATION: 97 %

## 2025-08-11 DIAGNOSIS — R06.09 DYSPNEA ON EXERTION: ICD-10-CM

## 2025-08-11 DIAGNOSIS — E11.9 CONTROLLED TYPE 2 DIABETES MELLITUS WITHOUT COMPLICATION, WITHOUT LONG-TERM CURRENT USE OF INSULIN (HCC): Chronic | ICD-10-CM

## 2025-08-11 DIAGNOSIS — I50.9 CHRONIC CONGESTIVE HEART FAILURE, UNSPECIFIED HEART FAILURE TYPE (HCC): Primary | ICD-10-CM

## 2025-08-11 DIAGNOSIS — I25.10 CORONARY ARTERY DISEASE INVOLVING NATIVE CORONARY ARTERY OF NATIVE HEART WITHOUT ANGINA PECTORIS: Chronic | ICD-10-CM

## 2025-08-11 DIAGNOSIS — J44.9 CHRONIC OBSTRUCTIVE PULMONARY DISEASE, UNSPECIFIED COPD TYPE (HCC): ICD-10-CM

## 2025-08-11 LAB
ANION GAP SERPL CALCULATED.3IONS-SCNC: 10 MMOL/L (ref 9–16)
BASOPHILS # BLD: 0.04 K/UL (ref 0–0.2)
BASOPHILS NFR BLD: 1 % (ref 0–2)
BNP SERPL-MCNC: 1525 PG/ML (ref 0–450)
BUN SERPL-MCNC: 23 MG/DL (ref 8–23)
CALCIUM SERPL-MCNC: 9.1 MG/DL (ref 8.6–10.4)
CHLORIDE SERPL-SCNC: 107 MMOL/L (ref 98–107)
CO2 SERPL-SCNC: 27 MMOL/L (ref 20–31)
CREAT SERPL-MCNC: 1.2 MG/DL (ref 0.6–0.9)
D DIMER PPP FEU-MCNC: 0.41 UG/ML FEU (ref 0–0.57)
EOSINOPHIL # BLD: 0.1 K/UL (ref 0–0.44)
EOSINOPHILS RELATIVE PERCENT: 2 % (ref 1–4)
ERYTHROCYTE [DISTWIDTH] IN BLOOD BY AUTOMATED COUNT: 17.5 % (ref 11.8–14.4)
GFR, ESTIMATED: 47 ML/MIN/1.73M2
GLUCOSE SERPL-MCNC: 187 MG/DL (ref 74–99)
HCT VFR BLD AUTO: 28.2 % (ref 36.3–47.1)
HGB BLD-MCNC: 8.4 G/DL (ref 11.9–15.1)
IMM GRANULOCYTES # BLD AUTO: <0.03 K/UL (ref 0–0.3)
IMM GRANULOCYTES NFR BLD: 0 %
LYMPHOCYTES NFR BLD: 1 K/UL (ref 1.1–3.7)
LYMPHOCYTES RELATIVE PERCENT: 16 % (ref 24–43)
MCH RBC QN AUTO: 25.9 PG (ref 25.2–33.5)
MCHC RBC AUTO-ENTMCNC: 29.8 G/DL (ref 28.4–34.8)
MCV RBC AUTO: 87 FL (ref 82.6–102.9)
MONOCYTES NFR BLD: 0.73 K/UL (ref 0.1–1.2)
MONOCYTES NFR BLD: 12 % (ref 3–12)
NEUTROPHILS NFR BLD: 69 % (ref 36–65)
NEUTS SEG NFR BLD: 4.43 K/UL (ref 1.5–8.1)
NRBC BLD-RTO: 0 PER 100 WBC
PLATELET # BLD AUTO: 288 K/UL (ref 138–453)
PMV BLD AUTO: 10.7 FL (ref 8.1–13.5)
POTASSIUM SERPL-SCNC: 3.5 MMOL/L (ref 3.7–5.3)
RBC # BLD AUTO: 3.24 M/UL (ref 3.95–5.11)
RBC # BLD: ABNORMAL 10*6/UL
SODIUM SERPL-SCNC: 144 MMOL/L (ref 136–145)
TROPONIN I SERPL HS-MCNC: 35 NG/L (ref 0–14)
TROPONIN I SERPL HS-MCNC: 35 NG/L (ref 0–14)
WBC OTHER # BLD: 6.3 K/UL (ref 3.5–11.3)

## 2025-08-11 PROCEDURE — 71046 X-RAY EXAM CHEST 2 VIEWS: CPT

## 2025-08-11 PROCEDURE — 99285 EMERGENCY DEPT VISIT HI MDM: CPT

## 2025-08-11 PROCEDURE — 85025 COMPLETE CBC W/AUTO DIFF WBC: CPT

## 2025-08-11 PROCEDURE — 84484 ASSAY OF TROPONIN QUANT: CPT

## 2025-08-11 PROCEDURE — 85379 FIBRIN DEGRADATION QUANT: CPT

## 2025-08-11 PROCEDURE — 93005 ELECTROCARDIOGRAM TRACING: CPT

## 2025-08-11 PROCEDURE — 6370000000 HC RX 637 (ALT 250 FOR IP)

## 2025-08-11 PROCEDURE — 83880 ASSAY OF NATRIURETIC PEPTIDE: CPT

## 2025-08-11 PROCEDURE — 80048 BASIC METABOLIC PNL TOTAL CA: CPT

## 2025-08-11 RX ORDER — ATORVASTATIN CALCIUM 80 MG/1
80 TABLET, FILM COATED ORAL DAILY
Qty: 90 TABLET | Refills: 1 | Status: SHIPPED | OUTPATIENT
Start: 2025-08-11

## 2025-08-11 RX ORDER — BUDESONIDE AND FORMOTEROL FUMARATE DIHYDRATE 160; 4.5 UG/1; UG/1
2 AEROSOL RESPIRATORY (INHALATION)
Qty: 10.2 G | Refills: 1 | Status: SHIPPED | OUTPATIENT
Start: 2025-08-11

## 2025-08-11 RX ORDER — ALBUTEROL SULFATE 90 UG/1
2 INHALANT RESPIRATORY (INHALATION) EVERY 6 HOURS PRN
Qty: 18 G | Refills: 1 | Status: SHIPPED | OUTPATIENT
Start: 2025-08-11

## 2025-08-11 RX ADMIN — POTASSIUM BICARBONATE 40 MEQ: 782 TABLET, EFFERVESCENT ORAL at 17:40

## 2025-08-11 ASSESSMENT — PAIN SCALES - GENERAL: PAINLEVEL_OUTOF10: 0

## 2025-08-11 ASSESSMENT — PAIN - FUNCTIONAL ASSESSMENT: PAIN_FUNCTIONAL_ASSESSMENT: 0-10

## 2025-08-12 ENCOUNTER — TELEPHONE (OUTPATIENT)
Dept: FAMILY MEDICINE CLINIC | Age: 75
End: 2025-08-12

## 2025-08-12 ENCOUNTER — HOSPITAL ENCOUNTER (OUTPATIENT)
Age: 75
Setting detail: SPECIMEN
Discharge: HOME | End: 2025-08-12

## 2025-08-12 LAB
EKG ATRIAL RATE: 79 BPM
EKG P AXIS: 68 DEGREES
EKG P-R INTERVAL: 178 MS
EKG Q-T INTERVAL: 436 MS
EKG QRS DURATION: 114 MS
EKG QTC CALCULATION (BAZETT): 499 MS
EKG R AXIS: -2 DEGREES
EKG T AXIS: 168 DEGREES
EKG VENTRICULAR RATE: 79 BPM

## 2025-08-13 ENCOUNTER — TELEPHONE (OUTPATIENT)
Dept: FAMILY MEDICINE CLINIC | Age: 75
End: 2025-08-13

## 2025-08-13 ENCOUNTER — HOSPITAL ENCOUNTER (OUTPATIENT)
Age: 75
Setting detail: SPECIMEN
Discharge: HOME | End: 2025-08-13

## 2025-08-13 ENCOUNTER — TELEPHONE (OUTPATIENT)
Age: 75
End: 2025-08-13

## 2025-08-20 ENCOUNTER — TELEPHONE (OUTPATIENT)
Age: 75
End: 2025-08-20

## 2025-08-21 ENCOUNTER — APPOINTMENT (OUTPATIENT)
Dept: CT IMAGING | Age: 75
End: 2025-08-21
Payer: COMMERCIAL

## 2025-08-21 ENCOUNTER — HOSPITAL ENCOUNTER (INPATIENT)
Age: 75
LOS: 2 days | Discharge: HOME | End: 2025-08-23
Attending: COUNSELOR | Admitting: INTERNAL MEDICINE
Payer: COMMERCIAL

## 2025-08-21 DIAGNOSIS — S22.000A COMPRESSION FRACTURE OF THORACIC VERTEBRA, UNSPECIFIED THORACIC VERTEBRAL LEVEL, INITIAL ENCOUNTER (HCC): ICD-10-CM

## 2025-08-21 DIAGNOSIS — R68.89 INCREASED OXYGEN DEMAND: ICD-10-CM

## 2025-08-21 DIAGNOSIS — R06.00 DYSPNEA, UNSPECIFIED TYPE: ICD-10-CM

## 2025-08-21 DIAGNOSIS — J44.1 COPD EXACERBATION (HCC): Primary | ICD-10-CM

## 2025-08-21 DIAGNOSIS — I50.9 CONGESTIVE HEART FAILURE, UNSPECIFIED HF CHRONICITY, UNSPECIFIED HEART FAILURE TYPE (HCC): ICD-10-CM

## 2025-08-21 DIAGNOSIS — R09.02 HYPOXEMIA: ICD-10-CM

## 2025-08-21 LAB
ANION GAP SERPL CALCULATED.3IONS-SCNC: 13 MMOL/L (ref 9–16)
ARTERIAL PATENCY WRIST A: ABNORMAL
BASOPHILS # BLD: <0.03 K/UL (ref 0–0.2)
BASOPHILS NFR BLD: 0 % (ref 0–2)
BDY SITE: ABNORMAL
BNP SERPL-MCNC: 3424 PG/ML (ref 0–300)
BODY TEMPERATURE: 37
BUN SERPL-MCNC: 15 MG/DL (ref 8–23)
CALCIUM SERPL-MCNC: 9 MG/DL (ref 8.6–10.4)
CHLORIDE SERPL-SCNC: 110 MMOL/L (ref 98–107)
CO2 SERPL-SCNC: 23 MMOL/L (ref 20–31)
COHGB MFR BLD: 0.3 % (ref 0–5)
CREAT SERPL-MCNC: 1.1 MG/DL (ref 0.7–1.2)
EOSINOPHIL # BLD: <0.03 K/UL (ref 0–0.44)
EOSINOPHILS RELATIVE PERCENT: 0 % (ref 0–4)
ERYTHROCYTE [DISTWIDTH] IN BLOOD BY AUTOMATED COUNT: 17 % (ref 11.5–14.9)
GFR, ESTIMATED: 52 ML/MIN/1.73M2
GLUCOSE SERPL-MCNC: 136 MG/DL (ref 74–99)
HCO3 VENOUS: 24.9 MMOL/L (ref 24–30)
HCT VFR BLD AUTO: 25.9 % (ref 36–46)
HGB BLD-MCNC: 7.5 G/DL (ref 12–16)
IMM GRANULOCYTES # BLD AUTO: <0.03 K/UL (ref 0–0.3)
IMM GRANULOCYTES NFR BLD: 0 %
INR PPP: 2.9
IRON SATN MFR SERPL: 7 % (ref 20–55)
IRON SERPL-MCNC: 29 UG/DL (ref 37–145)
LYMPHOCYTES NFR BLD: 0.81 K/UL (ref 1.1–3.7)
LYMPHOCYTES RELATIVE PERCENT: 15 % (ref 24–44)
MAGNESIUM SERPL-MCNC: 1.9 MG/DL (ref 1.6–2.4)
MCH RBC QN AUTO: 24.7 PG (ref 26–34)
MCHC RBC AUTO-ENTMCNC: 29 G/DL (ref 31–37)
MCV RBC AUTO: 85.2 FL (ref 80–100)
METHEMOGLOBIN: 2 % (ref 0–1.9)
MONOCYTES NFR BLD: 0.37 K/UL (ref 0.1–1.2)
MONOCYTES NFR BLD: 7 % (ref 3–12)
NEGATIVE BASE EXCESS, VEN: 0.7 MMOL/L (ref 0–2)
NEUTROPHILS NFR BLD: 78 % (ref 36–66)
NEUTS SEG NFR BLD: 4.08 K/UL (ref 1.5–8.1)
NRBC BLD-RTO: 0 PER 100 WBC
O2 SAT, VEN: 16.1 % (ref 60–85)
PCO2 VENOUS: 45.1 MM HG (ref 39–55)
PH VENOUS: 7.36 (ref 7.32–7.42)
PLATELET # BLD AUTO: 336 K/UL (ref 150–450)
PMV BLD AUTO: 10.2 FL (ref 8–13.5)
PO2 VENOUS: 15.6 MM HG (ref 30–50)
POTASSIUM SERPL-SCNC: 4 MMOL/L (ref 3.7–5.3)
PROTHROMBIN TIME: 32.8 SEC (ref 11.8–14.6)
RBC # BLD AUTO: 3.04 M/UL (ref 3.95–5.11)
SODIUM SERPL-SCNC: 146 MMOL/L (ref 136–145)
TIBC SERPL-MCNC: 412 UG/DL (ref 250–450)
TROPONIN I SERPL HS-MCNC: 35 NG/L (ref 0–14)
TROPONIN I SERPL HS-MCNC: 49 NG/L (ref 0–14)
UNSATURATED IRON BINDING CAPACITY: 383 UG/DL (ref 112–347)
WBC OTHER # BLD: 5.3 K/UL (ref 3.5–11)

## 2025-08-21 PROCEDURE — 83540 ASSAY OF IRON: CPT

## 2025-08-21 PROCEDURE — 82272 OCCULT BLD FECES 1-3 TESTS: CPT

## 2025-08-21 PROCEDURE — 85610 PROTHROMBIN TIME: CPT

## 2025-08-21 PROCEDURE — 99285 EMERGENCY DEPT VISIT HI MDM: CPT

## 2025-08-21 PROCEDURE — 2060000000 HC ICU INTERMEDIATE R&B

## 2025-08-21 PROCEDURE — 94664 DEMO&/EVAL PT USE INHALER: CPT

## 2025-08-21 PROCEDURE — 83735 ASSAY OF MAGNESIUM: CPT

## 2025-08-21 PROCEDURE — 36415 COLL VENOUS BLD VENIPUNCTURE: CPT

## 2025-08-21 PROCEDURE — 93005 ELECTROCARDIOGRAM TRACING: CPT | Performed by: COUNSELOR

## 2025-08-21 PROCEDURE — 83880 ASSAY OF NATRIURETIC PEPTIDE: CPT

## 2025-08-21 PROCEDURE — 82800 BLOOD PH: CPT

## 2025-08-21 PROCEDURE — 96375 TX/PRO/DX INJ NEW DRUG ADDON: CPT

## 2025-08-21 PROCEDURE — 84484 ASSAY OF TROPONIN QUANT: CPT

## 2025-08-21 PROCEDURE — 6360000002 HC RX W HCPCS: Performed by: COUNSELOR

## 2025-08-21 PROCEDURE — 2500000003 HC RX 250 WO HCPCS: Performed by: INTERNAL MEDICINE

## 2025-08-21 PROCEDURE — 2500000003 HC RX 250 WO HCPCS: Performed by: COUNSELOR

## 2025-08-21 PROCEDURE — 96374 THER/PROPH/DIAG INJ IV PUSH: CPT

## 2025-08-21 PROCEDURE — 82805 BLOOD GASES W/O2 SATURATION: CPT

## 2025-08-21 PROCEDURE — 6370000000 HC RX 637 (ALT 250 FOR IP): Performed by: COUNSELOR

## 2025-08-21 PROCEDURE — 85025 COMPLETE CBC W/AUTO DIFF WBC: CPT

## 2025-08-21 PROCEDURE — 94640 AIRWAY INHALATION TREATMENT: CPT

## 2025-08-21 PROCEDURE — 6360000004 HC RX CONTRAST MEDICATION: Performed by: COUNSELOR

## 2025-08-21 PROCEDURE — 71260 CT THORAX DX C+: CPT

## 2025-08-21 PROCEDURE — 2700000000 HC OXYGEN THERAPY PER DAY

## 2025-08-21 PROCEDURE — 99223 1ST HOSP IP/OBS HIGH 75: CPT | Performed by: INTERNAL MEDICINE

## 2025-08-21 PROCEDURE — 6370000000 HC RX 637 (ALT 250 FOR IP): Performed by: INTERNAL MEDICINE

## 2025-08-21 PROCEDURE — 83550 IRON BINDING TEST: CPT

## 2025-08-21 PROCEDURE — 2580000003 HC RX 258: Performed by: COUNSELOR

## 2025-08-21 PROCEDURE — 94761 N-INVAS EAR/PLS OXIMETRY MLT: CPT

## 2025-08-21 PROCEDURE — 80048 BASIC METABOLIC PNL TOTAL CA: CPT

## 2025-08-21 RX ORDER — SODIUM CHLORIDE 0.9 % (FLUSH) 0.9 %
10 SYRINGE (ML) INJECTION PRN
Status: DISCONTINUED | OUTPATIENT
Start: 2025-08-21 | End: 2025-08-23 | Stop reason: HOSPADM

## 2025-08-21 RX ORDER — SODIUM CHLORIDE 0.9 % (FLUSH) 0.9 %
5-40 SYRINGE (ML) INJECTION PRN
Status: DISCONTINUED | OUTPATIENT
Start: 2025-08-21 | End: 2025-08-23 | Stop reason: HOSPADM

## 2025-08-21 RX ORDER — SODIUM CHLORIDE 0.9 % (FLUSH) 0.9 %
5-40 SYRINGE (ML) INJECTION EVERY 12 HOURS SCHEDULED
Status: DISCONTINUED | OUTPATIENT
Start: 2025-08-21 | End: 2025-08-23 | Stop reason: HOSPADM

## 2025-08-21 RX ORDER — FAMOTIDINE 20 MG/1
40 TABLET, FILM COATED ORAL EVERY EVENING
Status: DISCONTINUED | OUTPATIENT
Start: 2025-08-21 | End: 2025-08-22

## 2025-08-21 RX ORDER — POLYETHYLENE GLYCOL 3350 17 G/17G
17 POWDER, FOR SOLUTION ORAL DAILY PRN
Status: DISCONTINUED | OUTPATIENT
Start: 2025-08-21 | End: 2025-08-23 | Stop reason: HOSPADM

## 2025-08-21 RX ORDER — ONDANSETRON 4 MG/1
4 TABLET, ORALLY DISINTEGRATING ORAL EVERY 8 HOURS PRN
Status: DISCONTINUED | OUTPATIENT
Start: 2025-08-21 | End: 2025-08-23 | Stop reason: HOSPADM

## 2025-08-21 RX ORDER — CARVEDILOL 3.12 MG/1
3.12 TABLET ORAL 2 TIMES DAILY WITH MEALS
Status: DISCONTINUED | OUTPATIENT
Start: 2025-08-21 | End: 2025-08-22

## 2025-08-21 RX ORDER — AMIODARONE HYDROCHLORIDE 200 MG/1
200 TABLET ORAL DAILY
Status: DISCONTINUED | OUTPATIENT
Start: 2025-08-21 | End: 2025-08-23 | Stop reason: HOSPADM

## 2025-08-21 RX ORDER — LOSARTAN POTASSIUM 25 MG/1
25 TABLET ORAL DAILY
Status: DISCONTINUED | OUTPATIENT
Start: 2025-08-21 | End: 2025-08-23 | Stop reason: HOSPADM

## 2025-08-21 RX ORDER — TRAZODONE HYDROCHLORIDE 50 MG/1
50 TABLET ORAL NIGHTLY
Status: DISCONTINUED | OUTPATIENT
Start: 2025-08-21 | End: 2025-08-23 | Stop reason: HOSPADM

## 2025-08-21 RX ORDER — IOPAMIDOL 755 MG/ML
75 INJECTION, SOLUTION INTRAVASCULAR
Status: COMPLETED | OUTPATIENT
Start: 2025-08-21 | End: 2025-08-21

## 2025-08-21 RX ORDER — ALBUTEROL SULFATE 90 UG/1
2 INHALANT RESPIRATORY (INHALATION) EVERY 6 HOURS PRN
Status: DISCONTINUED | OUTPATIENT
Start: 2025-08-21 | End: 2025-08-23 | Stop reason: HOSPADM

## 2025-08-21 RX ORDER — POTASSIUM CHLORIDE 1500 MG/1
40 TABLET, EXTENDED RELEASE ORAL PRN
Status: DISCONTINUED | OUTPATIENT
Start: 2025-08-21 | End: 2025-08-23 | Stop reason: HOSPADM

## 2025-08-21 RX ORDER — BUMETANIDE 0.25 MG/ML
1 INJECTION, SOLUTION INTRAMUSCULAR; INTRAVENOUS DAILY
Status: DISCONTINUED | OUTPATIENT
Start: 2025-08-22 | End: 2025-08-23 | Stop reason: HOSPADM

## 2025-08-21 RX ORDER — ATORVASTATIN CALCIUM 80 MG/1
80 TABLET, FILM COATED ORAL DAILY
Status: DISCONTINUED | OUTPATIENT
Start: 2025-08-21 | End: 2025-08-23 | Stop reason: HOSPADM

## 2025-08-21 RX ORDER — ACETAMINOPHEN 325 MG/1
650 TABLET ORAL EVERY 6 HOURS PRN
Status: DISCONTINUED | OUTPATIENT
Start: 2025-08-21 | End: 2025-08-23 | Stop reason: HOSPADM

## 2025-08-21 RX ORDER — ASPIRIN 81 MG/1
81 TABLET, CHEWABLE ORAL DAILY
Status: DISCONTINUED | OUTPATIENT
Start: 2025-08-21 | End: 2025-08-23 | Stop reason: HOSPADM

## 2025-08-21 RX ORDER — POTASSIUM CHLORIDE 7.45 MG/ML
10 INJECTION INTRAVENOUS PRN
Status: DISCONTINUED | OUTPATIENT
Start: 2025-08-21 | End: 2025-08-23 | Stop reason: HOSPADM

## 2025-08-21 RX ORDER — BUDESONIDE AND FORMOTEROL FUMARATE DIHYDRATE 160; 4.5 UG/1; UG/1
2 AEROSOL RESPIRATORY (INHALATION)
Status: DISCONTINUED | OUTPATIENT
Start: 2025-08-21 | End: 2025-08-23 | Stop reason: HOSPADM

## 2025-08-21 RX ORDER — BUMETANIDE 0.25 MG/ML
1 INJECTION, SOLUTION INTRAMUSCULAR; INTRAVENOUS ONCE
Status: COMPLETED | OUTPATIENT
Start: 2025-08-21 | End: 2025-08-21

## 2025-08-21 RX ORDER — IPRATROPIUM BROMIDE AND ALBUTEROL SULFATE 2.5; .5 MG/3ML; MG/3ML
1 SOLUTION RESPIRATORY (INHALATION) ONCE
Status: COMPLETED | OUTPATIENT
Start: 2025-08-21 | End: 2025-08-21

## 2025-08-21 RX ORDER — BUMETANIDE 1 MG/1
1 TABLET ORAL DAILY
Status: DISCONTINUED | OUTPATIENT
Start: 2025-08-21 | End: 2025-08-21

## 2025-08-21 RX ORDER — SODIUM CHLORIDE 9 MG/ML
INJECTION, SOLUTION INTRAVENOUS PRN
Status: DISCONTINUED | OUTPATIENT
Start: 2025-08-21 | End: 2025-08-23 | Stop reason: HOSPADM

## 2025-08-21 RX ORDER — ACETAMINOPHEN 650 MG/1
650 SUPPOSITORY RECTAL EVERY 6 HOURS PRN
Status: DISCONTINUED | OUTPATIENT
Start: 2025-08-21 | End: 2025-08-23 | Stop reason: HOSPADM

## 2025-08-21 RX ORDER — 0.9 % SODIUM CHLORIDE 0.9 %
100 INTRAVENOUS SOLUTION INTRAVENOUS ONCE
Status: COMPLETED | OUTPATIENT
Start: 2025-08-21 | End: 2025-08-21

## 2025-08-21 RX ORDER — ONDANSETRON 2 MG/ML
4 INJECTION INTRAMUSCULAR; INTRAVENOUS EVERY 6 HOURS PRN
Status: DISCONTINUED | OUTPATIENT
Start: 2025-08-21 | End: 2025-08-23 | Stop reason: HOSPADM

## 2025-08-21 RX ADMIN — SODIUM CHLORIDE, PRESERVATIVE FREE 10 ML: 5 INJECTION INTRAVENOUS at 13:57

## 2025-08-21 RX ADMIN — SODIUM CHLORIDE 100 ML: 9 INJECTION, SOLUTION INTRAVENOUS at 13:57

## 2025-08-21 RX ADMIN — LOSARTAN POTASSIUM 25 MG: 25 TABLET, FILM COATED ORAL at 20:04

## 2025-08-21 RX ADMIN — TRAZODONE HYDROCHLORIDE 50 MG: 50 TABLET ORAL at 22:40

## 2025-08-21 RX ADMIN — BUMETANIDE 1 MG: 0.25 INJECTION INTRAMUSCULAR; INTRAVENOUS at 14:23

## 2025-08-21 RX ADMIN — WATER 40 MG: 1 INJECTION INTRAMUSCULAR; INTRAVENOUS; SUBCUTANEOUS at 12:34

## 2025-08-21 RX ADMIN — ATORVASTATIN CALCIUM 80 MG: 80 TABLET, FILM COATED ORAL at 20:04

## 2025-08-21 RX ADMIN — AMIODARONE HYDROCHLORIDE 200 MG: 200 TABLET ORAL at 20:04

## 2025-08-21 RX ADMIN — SODIUM CHLORIDE, PRESERVATIVE FREE 10 ML: 5 INJECTION INTRAVENOUS at 22:42

## 2025-08-21 RX ADMIN — ASPIRIN 81 MG: 81 TABLET, CHEWABLE ORAL at 20:04

## 2025-08-21 RX ADMIN — FAMOTIDINE 40 MG: 20 TABLET, FILM COATED ORAL at 20:04

## 2025-08-21 RX ADMIN — IPRATROPIUM BROMIDE AND ALBUTEROL SULFATE 1 DOSE: .5; 2.5 SOLUTION RESPIRATORY (INHALATION) at 11:10

## 2025-08-21 RX ADMIN — CARVEDILOL 3.12 MG: 3.12 TABLET, FILM COATED ORAL at 20:04

## 2025-08-21 RX ADMIN — IOPAMIDOL 75 ML: 755 INJECTION, SOLUTION INTRAVENOUS at 13:56

## 2025-08-21 RX ADMIN — BUDESONIDE AND FORMOTEROL FUMARATE DIHYDRATE 2 PUFF: 160; 4.5 AEROSOL RESPIRATORY (INHALATION) at 20:37

## 2025-08-21 ASSESSMENT — ENCOUNTER SYMPTOMS
NAUSEA: 0
COUGH: 0
SORE THROAT: 0
BLOOD IN STOOL: 0
VOMITING: 0
ABDOMINAL PAIN: 0
SHORTNESS OF BREATH: 1
DIARRHEA: 0

## 2025-08-21 ASSESSMENT — PAIN SCALES - GENERAL
PAINLEVEL_OUTOF10: 0
PAINLEVEL_OUTOF10: 0

## 2025-08-21 ASSESSMENT — PAIN - FUNCTIONAL ASSESSMENT: PAIN_FUNCTIONAL_ASSESSMENT: 0-10

## 2025-08-22 LAB
ANION GAP SERPL CALCULATED.3IONS-SCNC: 8 MMOL/L (ref 9–16)
BUN SERPL-MCNC: 16 MG/DL (ref 8–23)
CALCIUM SERPL-MCNC: 8.6 MG/DL (ref 8.6–10.4)
CHLORIDE SERPL-SCNC: 105 MMOL/L (ref 98–107)
CO2 SERPL-SCNC: 26 MMOL/L (ref 20–31)
CREAT SERPL-MCNC: 1.1 MG/DL (ref 0.7–1.2)
DATE, STOOL #1: NORMAL
EKG ATRIAL RATE: 58 BPM
EKG P AXIS: 51 DEGREES
EKG P-R INTERVAL: 146 MS
EKG Q-T INTERVAL: 472 MS
EKG QRS DURATION: 120 MS
EKG QTC CALCULATION (BAZETT): 463 MS
EKG R AXIS: -14 DEGREES
EKG T AXIS: 150 DEGREES
EKG VENTRICULAR RATE: 58 BPM
GFR, ESTIMATED: 52 ML/MIN/1.73M2
GLUCOSE SERPL-MCNC: 288 MG/DL (ref 74–99)
HEMOCCULT SP1 STL QL: NEGATIVE
INR PPP: 3.1
POTASSIUM SERPL-SCNC: 3.9 MMOL/L (ref 3.7–5.3)
PROTHROMBIN TIME: 34.8 SEC (ref 11.8–14.6)
SODIUM SERPL-SCNC: 139 MMOL/L (ref 136–145)
TIME, STOOL #1: NORMAL

## 2025-08-22 PROCEDURE — 93010 ELECTROCARDIOGRAM REPORT: CPT | Performed by: INTERNAL MEDICINE

## 2025-08-22 PROCEDURE — 2060000000 HC ICU INTERMEDIATE R&B

## 2025-08-22 PROCEDURE — 97116 GAIT TRAINING THERAPY: CPT

## 2025-08-22 PROCEDURE — 97162 PT EVAL MOD COMPLEX 30 MIN: CPT

## 2025-08-22 PROCEDURE — 6360000002 HC RX W HCPCS: Performed by: INTERNAL MEDICINE

## 2025-08-22 PROCEDURE — 97166 OT EVAL MOD COMPLEX 45 MIN: CPT

## 2025-08-22 PROCEDURE — 36415 COLL VENOUS BLD VENIPUNCTURE: CPT

## 2025-08-22 PROCEDURE — 85610 PROTHROMBIN TIME: CPT

## 2025-08-22 PROCEDURE — 97535 SELF CARE MNGMENT TRAINING: CPT

## 2025-08-22 PROCEDURE — 94640 AIRWAY INHALATION TREATMENT: CPT

## 2025-08-22 PROCEDURE — 2500000003 HC RX 250 WO HCPCS: Performed by: INTERNAL MEDICINE

## 2025-08-22 PROCEDURE — 6370000000 HC RX 637 (ALT 250 FOR IP): Performed by: INTERNAL MEDICINE

## 2025-08-22 PROCEDURE — 94761 N-INVAS EAR/PLS OXIMETRY MLT: CPT

## 2025-08-22 PROCEDURE — 80048 BASIC METABOLIC PNL TOTAL CA: CPT

## 2025-08-22 PROCEDURE — 99233 SBSQ HOSP IP/OBS HIGH 50: CPT | Performed by: INTERNAL MEDICINE

## 2025-08-22 RX ORDER — LOSARTAN POTASSIUM 25 MG/1
25 TABLET ORAL DAILY
Qty: 30 TABLET | Refills: 3 | Status: SHIPPED | OUTPATIENT
Start: 2025-08-23

## 2025-08-22 RX ORDER — BUMETANIDE 2 MG/1
2 TABLET ORAL DAILY
Qty: 60 TABLET | Refills: 2 | Status: ON HOLD | OUTPATIENT
Start: 2025-08-22 | End: 2025-09-07 | Stop reason: HOSPADM

## 2025-08-22 RX ORDER — FAMOTIDINE 20 MG/1
20 TABLET, FILM COATED ORAL EVERY EVENING
Status: DISCONTINUED | OUTPATIENT
Start: 2025-08-22 | End: 2025-08-23 | Stop reason: HOSPADM

## 2025-08-22 RX ADMIN — BUMETANIDE 1 MG: 0.25 INJECTION INTRAMUSCULAR; INTRAVENOUS at 10:32

## 2025-08-22 RX ADMIN — BUDESONIDE AND FORMOTEROL FUMARATE DIHYDRATE 2 PUFF: 160; 4.5 AEROSOL RESPIRATORY (INHALATION) at 20:29

## 2025-08-22 RX ADMIN — AMIODARONE HYDROCHLORIDE 200 MG: 200 TABLET ORAL at 10:32

## 2025-08-22 RX ADMIN — FAMOTIDINE 20 MG: 20 TABLET, FILM COATED ORAL at 16:13

## 2025-08-22 RX ADMIN — SODIUM CHLORIDE, PRESERVATIVE FREE 10 ML: 5 INJECTION INTRAVENOUS at 10:32

## 2025-08-22 RX ADMIN — ATORVASTATIN CALCIUM 80 MG: 80 TABLET, FILM COATED ORAL at 10:32

## 2025-08-22 RX ADMIN — LOSARTAN POTASSIUM 25 MG: 25 TABLET, FILM COATED ORAL at 10:32

## 2025-08-22 RX ADMIN — ASPIRIN 81 MG: 81 TABLET, CHEWABLE ORAL at 10:32

## 2025-08-22 RX ADMIN — SODIUM CHLORIDE, PRESERVATIVE FREE 10 ML: 5 INJECTION INTRAVENOUS at 21:36

## 2025-08-22 RX ADMIN — ACETAMINOPHEN 650 MG: 325 TABLET ORAL at 14:57

## 2025-08-22 RX ADMIN — CARVEDILOL 3.12 MG: 3.12 TABLET, FILM COATED ORAL at 10:32

## 2025-08-22 RX ADMIN — BUDESONIDE AND FORMOTEROL FUMARATE DIHYDRATE 2 PUFF: 160; 4.5 AEROSOL RESPIRATORY (INHALATION) at 07:51

## 2025-08-22 ASSESSMENT — PAIN DESCRIPTION - DESCRIPTORS: DESCRIPTORS: ACHING;DISCOMFORT

## 2025-08-22 ASSESSMENT — PAIN DESCRIPTION - LOCATION: LOCATION: RIB CAGE

## 2025-08-22 ASSESSMENT — PAIN DESCRIPTION - ORIENTATION: ORIENTATION: RIGHT

## 2025-08-22 ASSESSMENT — PAIN SCALES - GENERAL
PAINLEVEL_OUTOF10: 0
PAINLEVEL_OUTOF10: 10

## 2025-08-22 ASSESSMENT — PAIN - FUNCTIONAL ASSESSMENT
PAIN_FUNCTIONAL_ASSESSMENT: ACTIVITIES ARE NOT PREVENTED
PAIN_FUNCTIONAL_ASSESSMENT: 0-10

## 2025-08-23 VITALS
WEIGHT: 135 LBS | TEMPERATURE: 98.8 F | SYSTOLIC BLOOD PRESSURE: 135 MMHG | RESPIRATION RATE: 12 BRPM | OXYGEN SATURATION: 97 % | DIASTOLIC BLOOD PRESSURE: 67 MMHG | HEART RATE: 56 BPM | BODY MASS INDEX: 23.05 KG/M2 | HEIGHT: 64 IN

## 2025-08-23 LAB
INR PPP: 2.9
PROTHROMBIN TIME: 33.4 SEC (ref 11.8–14.6)

## 2025-08-23 PROCEDURE — 36415 COLL VENOUS BLD VENIPUNCTURE: CPT

## 2025-08-23 PROCEDURE — 6370000000 HC RX 637 (ALT 250 FOR IP): Performed by: INTERNAL MEDICINE

## 2025-08-23 PROCEDURE — 99239 HOSP IP/OBS DSCHRG MGMT >30: CPT | Performed by: INTERNAL MEDICINE

## 2025-08-23 PROCEDURE — 94640 AIRWAY INHALATION TREATMENT: CPT

## 2025-08-23 PROCEDURE — 85610 PROTHROMBIN TIME: CPT

## 2025-08-23 RX ORDER — LOSARTAN POTASSIUM 25 MG/1
25 TABLET ORAL DAILY
Qty: 90 TABLET | Refills: 1 | Status: ON HOLD | OUTPATIENT
Start: 2025-08-23 | End: 2025-09-07 | Stop reason: HOSPADM

## 2025-08-23 RX ORDER — BUMETANIDE 2 MG/1
2 TABLET ORAL DAILY
Qty: 30 TABLET | Refills: 0 | Status: ON HOLD | OUTPATIENT
Start: 2025-08-23 | End: 2025-09-07 | Stop reason: HOSPADM

## 2025-08-23 RX ORDER — WARFARIN SODIUM 5 MG/1
5 TABLET ORAL
Status: DISCONTINUED | OUTPATIENT
Start: 2025-08-23 | End: 2025-08-23 | Stop reason: HOSPADM

## 2025-08-23 RX ORDER — ALBUTEROL SULFATE 90 UG/1
2 INHALANT RESPIRATORY (INHALATION) 4 TIMES DAILY PRN
Qty: 18 G | Refills: 0 | Status: ON HOLD | OUTPATIENT
Start: 2025-08-23 | End: 2025-09-07 | Stop reason: HOSPADM

## 2025-08-23 RX ORDER — BUDESONIDE AND FORMOTEROL FUMARATE DIHYDRATE 160; 4.5 UG/1; UG/1
2 AEROSOL RESPIRATORY (INHALATION) 2 TIMES DAILY
Qty: 10.2 G | Refills: 3 | Status: ON HOLD | OUTPATIENT
Start: 2025-08-23 | End: 2025-09-07 | Stop reason: HOSPADM

## 2025-08-23 RX ADMIN — LOSARTAN POTASSIUM 25 MG: 25 TABLET, FILM COATED ORAL at 09:03

## 2025-08-23 RX ADMIN — ASPIRIN 81 MG: 81 TABLET, CHEWABLE ORAL at 09:03

## 2025-08-23 RX ADMIN — AMIODARONE HYDROCHLORIDE 200 MG: 200 TABLET ORAL at 09:03

## 2025-08-23 RX ADMIN — BUDESONIDE AND FORMOTEROL FUMARATE DIHYDRATE 2 PUFF: 160; 4.5 AEROSOL RESPIRATORY (INHALATION) at 07:58

## 2025-08-23 RX ADMIN — ATORVASTATIN CALCIUM 80 MG: 80 TABLET, FILM COATED ORAL at 09:03

## 2025-08-25 ENCOUNTER — TELEPHONE (OUTPATIENT)
Age: 75
End: 2025-08-25

## 2025-08-26 ENCOUNTER — TELEPHONE (OUTPATIENT)
Dept: FAMILY MEDICINE CLINIC | Age: 75
End: 2025-08-26

## 2025-08-26 ENCOUNTER — HOSPITAL ENCOUNTER (OUTPATIENT)
Age: 75
Setting detail: SPECIMEN
Discharge: HOME | End: 2025-08-26

## 2025-08-26 ENCOUNTER — TELEPHONE (OUTPATIENT)
Age: 75
End: 2025-08-26

## 2025-08-27 ENCOUNTER — TELEPHONE (OUTPATIENT)
Age: 75
End: 2025-08-27

## 2025-08-27 ENCOUNTER — TELEPHONE (OUTPATIENT)
Dept: FAMILY MEDICINE CLINIC | Age: 75
End: 2025-08-27

## 2025-08-29 ENCOUNTER — APPOINTMENT (OUTPATIENT)
Dept: GENERAL RADIOLOGY | Age: 75
End: 2025-08-29
Payer: COMMERCIAL

## 2025-08-29 ENCOUNTER — HOSPITAL ENCOUNTER (EMERGENCY)
Age: 75
Discharge: HOME | End: 2025-08-29
Attending: EMERGENCY MEDICINE
Payer: COMMERCIAL

## 2025-08-29 VITALS
DIASTOLIC BLOOD PRESSURE: 81 MMHG | RESPIRATION RATE: 20 BRPM | HEART RATE: 64 BPM | TEMPERATURE: 98.3 F | SYSTOLIC BLOOD PRESSURE: 164 MMHG | OXYGEN SATURATION: 94 %

## 2025-08-29 DIAGNOSIS — S52.602A RADIUS AND ULNA DISTAL FRACTURE, LEFT, CLOSED, INITIAL ENCOUNTER: Primary | ICD-10-CM

## 2025-08-29 DIAGNOSIS — S52.502A RADIUS AND ULNA DISTAL FRACTURE, LEFT, CLOSED, INITIAL ENCOUNTER: Primary | ICD-10-CM

## 2025-08-29 PROCEDURE — 29125 APPL SHORT ARM SPLINT STATIC: CPT

## 2025-08-29 PROCEDURE — 73130 X-RAY EXAM OF HAND: CPT

## 2025-08-29 PROCEDURE — 73090 X-RAY EXAM OF FOREARM: CPT

## 2025-08-29 PROCEDURE — 6370000000 HC RX 637 (ALT 250 FOR IP): Performed by: EMERGENCY MEDICINE

## 2025-08-29 PROCEDURE — 99283 EMERGENCY DEPT VISIT LOW MDM: CPT

## 2025-08-29 PROCEDURE — 73060 X-RAY EXAM OF HUMERUS: CPT

## 2025-08-29 RX ORDER — HYDROCODONE BITARTRATE AND ACETAMINOPHEN 5; 325 MG/1; MG/1
1 TABLET ORAL ONCE
Refills: 0 | Status: COMPLETED | OUTPATIENT
Start: 2025-08-29 | End: 2025-08-29

## 2025-08-29 RX ORDER — HYDROCODONE BITARTRATE AND ACETAMINOPHEN 5; 325 MG/1; MG/1
1 TABLET ORAL EVERY 8 HOURS PRN
Qty: 12 TABLET | Refills: 0 | Status: ON HOLD | OUTPATIENT
Start: 2025-08-29 | End: 2025-09-07 | Stop reason: HOSPADM

## 2025-08-29 RX ADMIN — HYDROCODONE BITARTRATE AND ACETAMINOPHEN 1 TABLET: 5; 325 TABLET ORAL at 11:19

## 2025-08-29 ASSESSMENT — ENCOUNTER SYMPTOMS
BACK PAIN: 0
ABDOMINAL PAIN: 0

## 2025-08-29 ASSESSMENT — PAIN DESCRIPTION - DESCRIPTORS
DESCRIPTORS: SHARP
DESCRIPTORS: SHARP

## 2025-08-29 ASSESSMENT — PAIN DESCRIPTION - LOCATION
LOCATION: WRIST
LOCATION: WRIST

## 2025-08-29 ASSESSMENT — PAIN - FUNCTIONAL ASSESSMENT: PAIN_FUNCTIONAL_ASSESSMENT: 0-10

## 2025-08-29 ASSESSMENT — PAIN SCALES - GENERAL
PAINLEVEL_OUTOF10: 10
PAINLEVEL_OUTOF10: 10

## 2025-08-29 ASSESSMENT — PAIN DESCRIPTION - ORIENTATION
ORIENTATION: LEFT
ORIENTATION: LEFT
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
## 2025-09-02 PROBLEM — S52.502A CLOSED FRACTURE OF LEFT DISTAL RADIUS: Status: ACTIVE | Noted: 2025-09-02

## 2025-09-04 ENCOUNTER — ANESTHESIA EVENT (OUTPATIENT)
Dept: OPERATING ROOM | Age: 75
End: 2025-09-04
Payer: COMMERCIAL

## 2025-09-04 ASSESSMENT — ENCOUNTER SYMPTOMS: SHORTNESS OF BREATH: 1

## 2025-09-05 ENCOUNTER — ANESTHESIA (OUTPATIENT)
Dept: OPERATING ROOM | Age: 75
End: 2025-09-05
Payer: COMMERCIAL

## 2025-09-05 PROCEDURE — 6360000002 HC RX W HCPCS

## 2025-09-05 PROCEDURE — 6360000002 HC RX W HCPCS: Performed by: ANESTHESIOLOGY

## 2025-09-05 PROCEDURE — 64417 NJX AA&/STRD AX NERVE IMG: CPT | Performed by: ANESTHESIOLOGY

## 2025-09-05 PROCEDURE — 2500000003 HC RX 250 WO HCPCS

## 2025-09-05 RX ORDER — GLYCOPYRROLATE 0.2 MG/ML
INJECTION INTRAMUSCULAR; INTRAVENOUS
Status: DISCONTINUED | OUTPATIENT
Start: 2025-09-05 | End: 2025-09-05 | Stop reason: SDUPTHER

## 2025-09-05 RX ORDER — ETOMIDATE 2 MG/ML
INJECTION INTRAVENOUS
Status: DISCONTINUED | OUTPATIENT
Start: 2025-09-05 | End: 2025-09-05 | Stop reason: SDUPTHER

## 2025-09-05 RX ORDER — CEFAZOLIN SODIUM 1 G/3ML
INJECTION, POWDER, FOR SOLUTION INTRAMUSCULAR; INTRAVENOUS
Status: DISCONTINUED | OUTPATIENT
Start: 2025-09-05 | End: 2025-09-05 | Stop reason: SDUPTHER

## 2025-09-05 RX ORDER — ONDANSETRON 2 MG/ML
INJECTION INTRAMUSCULAR; INTRAVENOUS
Status: DISCONTINUED | OUTPATIENT
Start: 2025-09-05 | End: 2025-09-05 | Stop reason: SDUPTHER

## 2025-09-05 RX ORDER — DEXAMETHASONE SODIUM PHOSPHATE 4 MG/ML
INJECTION, SOLUTION INTRA-ARTICULAR; INTRALESIONAL; INTRAMUSCULAR; INTRAVENOUS; SOFT TISSUE
Status: COMPLETED | OUTPATIENT
Start: 2025-09-05 | End: 2025-09-05

## 2025-09-05 RX ORDER — DEXAMETHASONE SODIUM PHOSPHATE 4 MG/ML
INJECTION, SOLUTION INTRA-ARTICULAR; INTRALESIONAL; INTRAMUSCULAR; INTRAVENOUS; SOFT TISSUE
Status: DISCONTINUED | OUTPATIENT
Start: 2025-09-05 | End: 2025-09-05 | Stop reason: SDUPTHER

## 2025-09-05 RX ORDER — LIDOCAINE HYDROCHLORIDE 10 MG/ML
INJECTION, SOLUTION EPIDURAL; INFILTRATION; INTRACAUDAL; PERINEURAL
Status: DISCONTINUED | OUTPATIENT
Start: 2025-09-05 | End: 2025-09-05 | Stop reason: SDUPTHER

## 2025-09-05 RX ORDER — ROPIVACAINE HYDROCHLORIDE 5 MG/ML
INJECTION, SOLUTION EPIDURAL; INFILTRATION; PERINEURAL
Status: COMPLETED | OUTPATIENT
Start: 2025-09-05 | End: 2025-09-05

## 2025-09-05 RX ADMIN — GLYCOPYRROLATE 0.2 MG: 0.2 INJECTION INTRAMUSCULAR; INTRAVENOUS at 16:16

## 2025-09-05 RX ADMIN — ROPIVACAINE HYDROCHLORIDE 20 ML: 5 INJECTION EPIDURAL; INFILTRATION; PERINEURAL at 15:40

## 2025-09-05 RX ADMIN — CEFAZOLIN 1 G: 1 INJECTION, POWDER, FOR SOLUTION INTRAMUSCULAR; INTRAVENOUS at 16:14

## 2025-09-05 RX ADMIN — DEXAMETHASONE SODIUM PHOSPHATE 4 MG: 4 INJECTION, SOLUTION INTRA-ARTICULAR; INTRALESIONAL; INTRAMUSCULAR; INTRAVENOUS; SOFT TISSUE at 16:09

## 2025-09-05 RX ADMIN — ETOMIDATE INJECTION 12 MG: 2 SOLUTION INTRAVENOUS at 16:03

## 2025-09-05 RX ADMIN — ONDANSETRON 4 MG: 2 INJECTION, SOLUTION INTRAMUSCULAR; INTRAVENOUS at 17:36

## 2025-09-05 RX ADMIN — DEXAMETHASONE SODIUM PHOSPHATE 4 MG: 4 INJECTION INTRA-ARTICULAR; INTRALESIONAL; INTRAMUSCULAR; INTRAVENOUS; SOFT TISSUE at 15:40

## 2025-09-05 RX ADMIN — LIDOCAINE HYDROCHLORIDE 50 MG: 10 INJECTION, SOLUTION EPIDURAL; INFILTRATION; INTRACAUDAL; PERINEURAL at 16:03

## (undated) PROCEDURE — 0RG2071 FUSION OF 2 OR MORE CERVICAL VERTEBRAL JOINTS WITH AUTOLOGOUS TISSUE SUBSTITUTE, POSTERIOR APPROACH, POSTERIOR COLUMN, OPEN APPROACH: ICD-10-PCS

## (undated) PROCEDURE — 0RH004Z INSERTION OF INTERNAL FIXATION DEVICE INTO OCCIPITAL-CERVICAL JOINT, OPEN APPROACH: ICD-10-PCS

## (undated) DEVICE — SHUNT SURG INTRALUMINAL 1.50 MM SIL STRL FLO-THRU LF DISP

## (undated) DEVICE — SPONGE GZ W4XL4IN 12 PLY KERLIX

## (undated) DEVICE — HEADREST NEURO DONUT 7 IN

## (undated) DEVICE — KIT BLWR MISTER 5P 15L W/ TBNG SET IRRIG MIST TO IMPROVE

## (undated) DEVICE — 20 ML SYRINGE LUER-LOCK TIP: Brand: MONOJECT

## (undated) DEVICE — PRESSURE MONITORING LINES 4FT. (122CM) M/F: Brand: PRESSURE MONITORING LINES

## (undated) DEVICE — DRAPE,REIN 53X77,STERILE: Brand: MEDLINE

## (undated) DEVICE — SUTURE VCRL + 1 L27IN ABSRB UD CT-1 L36MM 1/2 CIR TAPR PNT VCP261H

## (undated) DEVICE — GLOVE ORTHO 8   MSG9480

## (undated) DEVICE — GAUZE,SPONGE,4"X4",16PLY,XRAY,STRL,LF: Brand: MEDLINE

## (undated) DEVICE — PAD,NON-ADHERENT,3X8,STERILE,LF,1/PK: Brand: MEDLINE

## (undated) DEVICE — SWAB CULT CLR BLU PLAS RAYON LIQ AMIES AERB ANAERB FRIC CAP

## (undated) DEVICE — PACK PROCEDURE SURG OPN HRT ADD ON

## (undated) DEVICE — STRAP,POSITIONING,KNEE/BODY,FOAM,4X60": Brand: MEDLINE

## (undated) DEVICE — KIT APPL 11:1 PROC W/ FIBRIJET MED CUP APPL TIP TY

## (undated) DEVICE — ST CHARLES MAJOR ABDOMINAL PK: Brand: MEDLINE INDUSTRIES, INC.

## (undated) DEVICE — CONNECTOR TBNG WHT PLAS SUCT STR 5IN1 LTWT W/ M CONN

## (undated) DEVICE — KIT DRN FLAT W/ 100CC EVAC 7MM FULL PERF

## (undated) DEVICE — Device

## (undated) DEVICE — ANGIOGRAPHIC CATHETER: Brand: EXPO™

## (undated) DEVICE — YANKAUER,FLEXIBLE HANDLE,FINE CAPACITY: Brand: MEDLINE

## (undated) DEVICE — HYPODERMIC SAFETY NEEDLE: Brand: MAGELLAN

## (undated) DEVICE — GLOVE ORANGE PI 8 1/2   MSG9085

## (undated) DEVICE — GOWN,SIRUS,POLYRNF,BRTHSLV,XL,30/CS: Brand: MEDLINE

## (undated) DEVICE — SUTURE NONABSORBABLE MONOFILAMENT 4-0 RB-1 36 IN BLU PROLENE 8557H

## (undated) DEVICE — GLOVE SURG SZ 8 L12IN FNGR THK87MIL WHT LTX FREE

## (undated) DEVICE — Device: Brand: SNAP ON SPHERZ

## (undated) DEVICE — COVER,LIGHT HANDLE,FLX,2/PK: Brand: MEDLINE INDUSTRIES, INC.

## (undated) DEVICE — SINGLE USE BIOPSY VALVE MAJ-210: Brand: SINGLE USE BIOPSY VALVE (STERILE)

## (undated) DEVICE — SYSTEM SEAL 4.3MM PROX HEMSTAT HEARTSTRING III

## (undated) DEVICE — DRILL BIT FOR 3.5MM SCREW

## (undated) DEVICE — SUTURE PROL SZ 3-0 L36IN NONABSORBABLE BLU L17MM RB-1 1/2 8558H

## (undated) DEVICE — CONNECTOR TBNG Y 6IN 1 PLAS LTWT

## (undated) DEVICE — GOWN,AURORA,NONRNF,XL,30/CS: Brand: MEDLINE

## (undated) DEVICE — SHEET, T, LAPAROTOMY, STERILE: Brand: MEDLINE

## (undated) DEVICE — SUTURE PERMAHAND SZ 4-0 L30IN NONABSORBABLE BLK L17MM RB-1 K871H

## (undated) DEVICE — SUTURE STRATAFIX SYMMETRIC PDS + SZ 0 L18IN ABSRB L36MM SXPP1A401

## (undated) DEVICE — IMPLANTABLE DEVICE: Type: IMPLANTABLE DEVICE | Site: SPINE CERVICAL | Status: NON-FUNCTIONAL

## (undated) DEVICE — BUR RND DIA COARSE 5.0MM

## (undated) DEVICE — SPONGE LAP W18XL18IN WHT COT 4 PLY FLD STRUNG RADPQ DISP ST

## (undated) DEVICE — GLOVE SURG 8 PF POLYMER COAT WHT STRL SIGN LTX ESSENTIAL LTX

## (undated) DEVICE — TUBE CHST L20IN OD32FR SIL TAPR CONN TIP STR SFT RADPQ

## (undated) DEVICE — SUTURE PROL SZ 5-0 L30IN NONABSORBABLE BLU L13MM RB-2 1/2 8710H

## (undated) DEVICE — JELLY,LUBE,STERILE,FLIP TOP,TUBE,2-OZ: Brand: MEDLINE

## (undated) DEVICE — SUTURE NONABSORBABLE MONOFILAMENT 3-0 PS-1 18 IN BLK ETHILON 1663H

## (undated) DEVICE — CATHETER ANGIO 6FR L100CM DIA0.056IN FR4 CRV VASC ACCS EXPO

## (undated) DEVICE — Z INACTIVE USE 2540311 LEAD PACE L475MM CHN A OR V MYOCARDIAL STEROID ELUT SIL

## (undated) DEVICE — STERILE LATEX POWDER-FREE SURGICAL GLOVESWITH NITRILE COATING: Brand: PROTEXIS

## (undated) DEVICE — 1LYRTR 16FR10ML100%SIL UMS SNP: Brand: MEDLINE INDUSTRIES, INC.

## (undated) DEVICE — SUTURE PROL SZ 7-0 L24IN NONABSORBABLE BLU L8MM BV175-6 3/8 8735H

## (undated) DEVICE — GLOVE ORANGE PI 7 1/2   MSG9075

## (undated) DEVICE — SINGLE PORT MANIFOLD: Brand: NEPTUNE 2

## (undated) DEVICE — PREVENA INCISION MANAGEMENT SYSTEM- PEEL & PLACE DRESSING: Brand: PREVENA™ PEEL & PLACE™

## (undated) DEVICE — GLOVE SURG SZ 75 CRM LTX FREE POLYISOPRENE POLYMER BEAD ANTI

## (undated) DEVICE — CONTAINER,SPECIMEN,4OZ,OR STRL: Brand: MEDLINE

## (undated) DEVICE — BANDAGE,GAUZE,BULKEE II,4.5"X4.1YD,STRL: Brand: MEDLINE

## (undated) DEVICE — SUTURE ETHBND EXCEL SZ 0 L30IN NONABSORBABLE GRN CT1 L36MM X424H

## (undated) DEVICE — GLOVE SURG SZ 75 L12IN FNGR THK87MIL DK GRN LTX FREE ISOLEX

## (undated) DEVICE — Z DISCONTINUED GLOVE SURG SZ 7.5 L12IN FNGR THK13MIL WHT ISOLEX

## (undated) DEVICE — GAUZE,SPONGE,FLUFF,6"X6.75",STRL,5/TRAY: Brand: MEDLINE

## (undated) DEVICE — Z INACTIVE USE 2535480 CLIP LIG M BLU TI HRT SHP WIRE HORZ 180 PER BX

## (undated) DEVICE — CANNULA PERF L2IN BLNT TIP 2MM VES CLR RADPQ BODY FEM LUER

## (undated) DEVICE — SOLUTION IV IRRIG POUR BRL 0.9% SODIUM CHL 2F7124

## (undated) DEVICE — BITEBLOCK 54FR W/ DENT RIM BLOX

## (undated) DEVICE — CHLORAPREP 26ML ORANGE

## (undated) DEVICE — APPLICATOR MEDICATED 10.5 CC SOLUTION HI LT ORNG CHLORAPREP

## (undated) DEVICE — AGENT HEMSTAT W2XL14IN OXIDIZED REGENERATED CELOS ABSRB FOR

## (undated) DEVICE — GLOVE SURG SZ 65 STD WHT LTX SYN POLYMER BEAD REINF ANTI RL

## (undated) DEVICE — SUTURE PROL SZ 8-0 L18IN NONABSORBABLE BLU L8MM BV175-6 3/8 8740H

## (undated) DEVICE — TOWEL,OR,DSP,ST,BLUE,DLX,XR,4/PK,20PK/CS: Brand: MEDLINE

## (undated) DEVICE — ENDO KIT W/SYRINGE: Brand: MEDLINE INDUSTRIES, INC.

## (undated) DEVICE — BAG ENDOSCP TRNSPRT CLR RECLOSABLE 24INX20IN

## (undated) DEVICE — DEFENDO AIR WATER SUCTION AND BIOPSY VALVE KIT FOR  OLYMPUS: Brand: DEFENDO AIR/WATER/SUCTION AND BIOPSY VALVE

## (undated) DEVICE — TEMP PACING WIRE: Brand: MYO/WIRE

## (undated) DEVICE — ELECTRODE PT RET AD L9FT HI MOIST COND ADH HYDRGEL CORDED

## (undated) DEVICE — PAD,ABDOMINAL,8"X7.5",ST,LF,20/BX: Brand: MEDLINE INDUSTRIES, INC.

## (undated) DEVICE — SUTURE TICRON SZ 2-0 L30IN NONABSORBABLE BLU CV-331 DBL 8886308751

## (undated) DEVICE — SUTURE SZ 7 L18IN NONABSORBABLE SIL CCS L48MM 1/2 CIR STRNM M655G

## (undated) DEVICE — MERCY HEALTH ST CHARLES: Brand: MEDLINE INDUSTRIES, INC.

## (undated) DEVICE — ST CHARLES BRONCHOSCOPY PACK: Brand: MEDLINE INDUSTRIES, INC.

## (undated) DEVICE — AMBU AURASTRAIGHT U SIZE 4: Brand: AURASTRAIGHT

## (undated) DEVICE — CATHETER THOR L21IN DIA32FR R ANG HYDRAGLIDE SFT RADPQ STRP

## (undated) DEVICE — SUTURE PROL SZ 6-0 L18IN NONABSORBABLE BLU RB-2 L13MM 1/2 8714H

## (undated) DEVICE — BLADE,CLIPPER,UNIVERSAL,GRAY: Brand: MEDLINE

## (undated) DEVICE — FLOSEAL HEMOSTATIC MATRIX, 5ML: Brand: FLOSEAL HEMOSTATIC MATRIX

## (undated) DEVICE — WIRE TEMP PACE SZ 0 L24IN LIGHT/DARK BLU S STL

## (undated) DEVICE — DRESSING HYDROCOLLOID BORDER 35X10 IN ALUM PRIMASEAL

## (undated) DEVICE — GUIDEWIRE 35/260/FC/PTFE/3J: Brand: GUIDEWIRE

## (undated) DEVICE — PROTECTOR EYE PT SELF ADH NS OPT GRD LF

## (undated) DEVICE — GOWN,SIRUS,NONRNF,SETINSLV,XL,20/CS: Brand: MEDLINE

## (undated) DEVICE — SUTURE PROL SZ 3-0 L36IN NONABSORBABLE BLU L26MM SH 1/2 CIR 8522H

## (undated) DEVICE — SUTURE VICRYL SZ 2-0 L18IN ABSRB UD CT-1 L36MM 1/2 CIR J839D

## (undated) DEVICE — PUNCH AORT DIA4MM LNG HNDL

## (undated) DEVICE — GLOVE SURG SZ 65 L12IN FNGR THK87MIL WHT LTX FREE

## (undated) DEVICE — CATHETER IV 14GA L2IN POLYUR STR ORNG HUB SFTY RADPQ DISP

## (undated) DEVICE — GOWN,AURORA,NONREINFORCED,LARGE: Brand: MEDLINE

## (undated) DEVICE — TOOL MR8-15MH22 MR8 15CM MATCH 2.2MM: Brand: MIDAS REX MR8

## (undated) DEVICE — GOWN,SIRUS,POLYRNF,BRTHSLV,LG,30/CS: Brand: MEDLINE

## (undated) DEVICE — DRESSING TRNSPAR W5XL4.5IN FLM SHT SEMIPERMEABLE WIND

## (undated) DEVICE — BLADE OPHTH ORNG GRINDLESS SMALLER ALTERNATIVE TO NO15 GEN

## (undated) DEVICE — TRAY SURG CUST CRD CATH TOLEDO

## (undated) DEVICE — STRIP,CLOSURE,WOUND,MEDI-STRIP,1/2X4: Brand: MEDLINE

## (undated) DEVICE — Z DISCONTINUED APPLICATOR SURG PREP 0.35OZ 2% CHG 70% ISO ALC W/ HI LT

## (undated) DEVICE — DRESSING BORDERED ADH GZ UNIV GEN USE 8INX4IN AND 6INX2IN

## (undated) DEVICE — INTRODUCER SHTH 6FR L11CM 0.038IN STD SIDEPRT EXTN 3 W

## (undated) DEVICE — GLOVE SURG SZ 75 L12IN FNGR THK87MIL WHT LTX FREE

## (undated) DEVICE — BIPOLAR SEALER 23-113-1 AQM 2.3 OM NEURO: Brand: AQUAMANTYS ®

## (undated) DEVICE — PLATELET CONCENTRATION PACK PROC 14-20 ML SMARTPREP 2

## (undated) DEVICE — COLLAR CERV COT DIAL HT ADJ XL PT ACCS WIND VISTA

## (undated) DEVICE — HI-TORQUE VERSACORE FLOPPY GUIDE WIRE SYSTEM 175 CM: Brand: HI-TORQUE VERSACORE

## (undated) DEVICE — Z DISCONTINUED USE 2275689 GLOVE SURG SZ 55 L12IN FNGR THK87MIL WHT LTX FREE

## (undated) DEVICE — LUER-LOK 360°: Brand: CONNECTA, LUER-LOK

## (undated) DEVICE — GLOVE SURG SZ 6 L12IN FNGR THK87MIL DK GRN LTX FREE ISOLEX

## (undated) DEVICE — SUTURE VICRYL + SZ 0 L18IN ABSRB UD L36MM CT-1 1/2 CIR VCP840D

## (undated) DEVICE — SCANLAN® VASCU-STATT® II PLUS S-USE BULLDOG CLAMP W/FIRM PRESS GENTLE-JAW™- MINI STRAIGHT (GREEN), CLAMPING PRESSURE 20-25 G (1/STERILE PKG): Brand: SCANLAN® VASCU-STATT® II PLUS S-USE BULLDOG CLAMP W/FIRM PRESS GENTLE-JAW™

## (undated) DEVICE — YANKAUER,BULB TIP,W/O VENT,RIGID,STERILE: Brand: MEDLINE

## (undated) DEVICE — FOGARTY - HYDRAGRIP SURGICAL - CLAMP INSERTS: Brand: FOGARTY SOFTJAW

## (undated) DEVICE — BLANKET WRM W40.2XL55.9IN IORT LO BODY + MISTRAL AIR

## (undated) DEVICE — MEDI-TRACE CADENCE ADULT, DEFIBRILLATION ELECTRODE -RTS  (10 PR/PK) - PHYSIO-CONTROL: Brand: MEDI-TRACE CADENCE

## (undated) DEVICE — BLADE OPHTH D5MM 15DEG GRN W/ RND KNURLED HNDL MICRO-SHARP

## (undated) DEVICE — SUTURE PERMAHAND SZ 2-0 L18IN NONABSORBABLE BLK L26MM SH C012D

## (undated) DEVICE — GLOVE SURG SZ 7 L12IN FNGR THK87MIL WHT LTX FREE

## (undated) DEVICE — INTRODUCER SHTH 0.018 IN 4 FRX40 CM KT SFT TIP NIT VSI 7266V

## (undated) DEVICE — YANKAUER,POOLE TIP,STERILE,50/CS: Brand: MEDLINE

## (undated) DEVICE — SUTURE VCRL + SZ 4-0 L18IN ABSRB UD L19MM PS-2 3/8 CIR PRIM VCP496H

## (undated) DEVICE — SUTURE VCRL + SZ 2 L27IN ABSRB UD L40MM CP 1/2 CIR REV CUT VCP195H

## (undated) DEVICE — DRAIN SURG W10MMXL20CM SIL FULL PERF HUBLESS FLAT RADPQ

## (undated) DEVICE — GEL US 20GM NONIRRITATING OVERWRAPPED FILE PCH TRNSMIT

## (undated) DEVICE — THE STERILE LIGHT HANDLE COVER IS USED WITH STERIS SURGICAL LIGHTING AND VISUALIZATION SYSTEMS.

## (undated) DEVICE — FIAPC® PROBE W/ FILTER 2200 A OD 2.3MM/6.9FR; L 2.2M/7.2FT: Brand: ERBE

## (undated) DEVICE — SINGLE USE SUCTION VALVE MAJ-209: Brand: SINGLE USE SUCTION VALVE (STERILE)

## (undated) DEVICE — DRAPE SLUSH DISC W44XL66IN ST FOR RND BSIN HUSH SLUSH SYS

## (undated) DEVICE — TRAY CATH 16FR COMPLT CARE URIN M TEMP SENS STATLOK STBL

## (undated) DEVICE — PACK PROCEDURE SURG OPN HRT

## (undated) DEVICE — SUTURE VCRL + SZ 3-0 L27IN ABSRB UD L26MM SH 1/2 CIR VCP416H

## (undated) DEVICE — GLOVE SURG SZ 8 L12IN FNGR THK79MIL GRN LTX FREE

## (undated) DEVICE — CANNULA TIP SPRY MAGELLAN

## (undated) DEVICE — INSUFFLATION TUBING SET WITH FILTER, FUNNEL CONNECTOR AND LUER LOCK: Brand: JOSNOE MEDICAL INC

## (undated) DEVICE — Z DUPLICATE USE 2624755 KIT NEG PRSS DSG W/ PRSS INDIC PTCH STRP 45ML CANSTR CARR

## (undated) DEVICE — PROTECTOR ULN NRV PUR FOAM HK LOOP STRP ANATOMICALLY

## (undated) DEVICE — SUTURE VCRL + SZ 4-0 L27IN ABSRB UD L26MM SH 1/2 CIR VCP415H

## (undated) DEVICE — Z DISCONTINUED USE 2275676 GLOVE SURG SZ 65 L12IN FNGR THK87MIL DK GRN LTX FREE ISOLEX

## (undated) DEVICE — STVZ LUMBAR SPINE PACK: Brand: MEDLINE INDUSTRIES, INC.

## (undated) DEVICE — Z DISCONTINUED NO SUB IDED DRAIN SURG 2 COLL PT TB FOR ATS BG OASIS

## (undated) DEVICE — STABILIZER TISS CANSTR TBNG EVOLUTION AS OCTPS

## (undated) DEVICE — 3M™ STERI-STRIP™ COMPOUND BENZOIN TINCTURE 40 BAGS/CARTON 4 CARTONS/CASE C1544: Brand: 3M™ STERI-STRIP™

## (undated) DEVICE — Device: Brand: VIRTUOSAPH PLUS WITH RADIAL INDICATION

## (undated) DEVICE — WAX SURG 2.5GM HEMSTAT BNE BEESWAX PARAFFIN ISO PALMITATE

## (undated) DEVICE — SUTURE PROL SZ 8-0 L18IN NONABSORBABLE BLU L6.5MM BV130-5 8730H

## (undated) DEVICE — SUTURE ETHBND EXCEL SZ 1 L30IN NONABSORBABLE GRN L36MM CT-1 X425H

## (undated) DEVICE — BLADE CLP TAPR HD WET DRY CAPABILITY GTT IN CHARGING USE

## (undated) DEVICE — SUTURE VCRL + SZ 3-0 L27IN ABSRB WHT CT-1 1/2 CIR VCP258H

## (undated) DEVICE — 450 ML BOTTLE OF 0.05% CHLORHEXIDINE GLUCONATE IN 99.95% STERILE WATER FOR IRRIGATION, USP AND APPLICATOR.: Brand: IRRISEPT ANTIMICROBIAL WOUND LAVAGE

## (undated) DEVICE — SYRINGE MED 10ML LUERLOCK TIP W/O SFTY DISP

## (undated) DEVICE — GOWN,POLY REINFORCED,LG: Brand: MEDLINE